# Patient Record
Sex: MALE | Race: OTHER | NOT HISPANIC OR LATINO | URBAN - METROPOLITAN AREA
[De-identification: names, ages, dates, MRNs, and addresses within clinical notes are randomized per-mention and may not be internally consistent; named-entity substitution may affect disease eponyms.]

---

## 2018-09-25 ENCOUNTER — INPATIENT (INPATIENT)
Facility: HOSPITAL | Age: 68
LOS: 2 days | Discharge: NURSING, REHAB OR LONG-TERM CARE | End: 2018-09-28
Attending: INTERNAL MEDICINE | Admitting: INTERNAL MEDICINE
Payer: MEDICAID

## 2018-09-25 VITALS
SYSTOLIC BLOOD PRESSURE: 210 MMHG | DIASTOLIC BLOOD PRESSURE: 108 MMHG | HEART RATE: 72 BPM | OXYGEN SATURATION: 97 % | RESPIRATION RATE: 18 BRPM | TEMPERATURE: 98 F

## 2018-09-25 LAB
ALBUMIN SERPL ELPH-MCNC: 4.2 G/DL — SIGNIFICANT CHANGE UP (ref 3.5–5.2)
ALP SERPL-CCNC: 123 U/L — HIGH (ref 30–115)
ALT FLD-CCNC: 12 U/L — SIGNIFICANT CHANGE UP (ref 0–41)
ANION GAP SERPL CALC-SCNC: 13 MMOL/L — SIGNIFICANT CHANGE UP (ref 7–14)
APPEARANCE UR: CLEAR — SIGNIFICANT CHANGE UP
APTT BLD: 36.1 SEC — SIGNIFICANT CHANGE UP (ref 27–39.2)
AST SERPL-CCNC: 35 U/L — SIGNIFICANT CHANGE UP (ref 0–41)
BASOPHILS # BLD AUTO: 0.04 K/UL — SIGNIFICANT CHANGE UP (ref 0–0.2)
BASOPHILS NFR BLD AUTO: 0.7 % — SIGNIFICANT CHANGE UP (ref 0–1)
BILIRUB SERPL-MCNC: 0.2 MG/DL — SIGNIFICANT CHANGE UP (ref 0.2–1.2)
BILIRUB UR-MCNC: NEGATIVE — SIGNIFICANT CHANGE UP
BUN SERPL-MCNC: 23 MG/DL — HIGH (ref 10–20)
CALCIUM SERPL-MCNC: 9.5 MG/DL — SIGNIFICANT CHANGE UP (ref 8.5–10.1)
CHLORIDE SERPL-SCNC: 103 MMOL/L — SIGNIFICANT CHANGE UP (ref 98–110)
CO2 SERPL-SCNC: 24 MMOL/L — SIGNIFICANT CHANGE UP (ref 17–32)
COLOR SPEC: YELLOW — SIGNIFICANT CHANGE UP
CREAT SERPL-MCNC: 1.2 MG/DL — SIGNIFICANT CHANGE UP (ref 0.7–1.5)
DIFF PNL FLD: NEGATIVE — SIGNIFICANT CHANGE UP
EOSINOPHIL # BLD AUTO: 0.23 K/UL — SIGNIFICANT CHANGE UP (ref 0–0.7)
EOSINOPHIL NFR BLD AUTO: 3.8 % — SIGNIFICANT CHANGE UP (ref 0–8)
GLUCOSE SERPL-MCNC: 81 MG/DL — SIGNIFICANT CHANGE UP (ref 70–99)
GLUCOSE UR QL: NEGATIVE MG/DL — SIGNIFICANT CHANGE UP
HCT VFR BLD CALC: 46.4 % — SIGNIFICANT CHANGE UP (ref 42–52)
HGB BLD-MCNC: 15.1 G/DL — SIGNIFICANT CHANGE UP (ref 14–18)
IMM GRANULOCYTES NFR BLD AUTO: 0.3 % — SIGNIFICANT CHANGE UP (ref 0.1–0.3)
INR BLD: 0.99 RATIO — SIGNIFICANT CHANGE UP (ref 0.65–1.3)
KETONES UR-MCNC: NEGATIVE — SIGNIFICANT CHANGE UP
LEUKOCYTE ESTERASE UR-ACNC: NEGATIVE — SIGNIFICANT CHANGE UP
LYMPHOCYTES # BLD AUTO: 1.11 K/UL — LOW (ref 1.2–3.4)
LYMPHOCYTES # BLD AUTO: 18.1 % — LOW (ref 20.5–51.1)
MCHC RBC-ENTMCNC: 27.7 PG — SIGNIFICANT CHANGE UP (ref 27–31)
MCHC RBC-ENTMCNC: 32.5 G/DL — SIGNIFICANT CHANGE UP (ref 32–37)
MCV RBC AUTO: 85 FL — SIGNIFICANT CHANGE UP (ref 80–94)
MONOCYTES # BLD AUTO: 0.65 K/UL — HIGH (ref 0.1–0.6)
MONOCYTES NFR BLD AUTO: 10.6 % — HIGH (ref 1.7–9.3)
NEUTROPHILS # BLD AUTO: 4.08 K/UL — SIGNIFICANT CHANGE UP (ref 1.4–6.5)
NEUTROPHILS NFR BLD AUTO: 66.5 % — SIGNIFICANT CHANGE UP (ref 42.2–75.2)
NITRITE UR-MCNC: NEGATIVE — SIGNIFICANT CHANGE UP
PH UR: 7 — SIGNIFICANT CHANGE UP (ref 5–8)
PLATELET # BLD AUTO: 244 K/UL — SIGNIFICANT CHANGE UP (ref 130–400)
POTASSIUM SERPL-MCNC: 5.3 MMOL/L — HIGH (ref 3.5–5)
POTASSIUM SERPL-SCNC: 5.3 MMOL/L — HIGH (ref 3.5–5)
PROT SERPL-MCNC: 7.4 G/DL — SIGNIFICANT CHANGE UP (ref 6–8)
PROT UR-MCNC: ABNORMAL MG/DL
PROTHROM AB SERPL-ACNC: 10.7 SEC — SIGNIFICANT CHANGE UP (ref 9.95–12.87)
RBC # BLD: 5.46 M/UL — SIGNIFICANT CHANGE UP (ref 4.7–6.1)
RBC # FLD: 13 % — SIGNIFICANT CHANGE UP (ref 11.5–14.5)
SODIUM SERPL-SCNC: 140 MMOL/L — SIGNIFICANT CHANGE UP (ref 135–146)
SP GR SPEC: 1.02 — SIGNIFICANT CHANGE UP (ref 1.01–1.03)
UROBILINOGEN FLD QL: 0.2 MG/DL — SIGNIFICANT CHANGE UP (ref 0.2–0.2)
WBC # BLD: 6.13 K/UL — SIGNIFICANT CHANGE UP (ref 4.8–10.8)
WBC # FLD AUTO: 6.13 K/UL — SIGNIFICANT CHANGE UP (ref 4.8–10.8)

## 2018-09-25 RX ORDER — ACETAMINOPHEN 500 MG
650 TABLET ORAL ONCE
Qty: 0 | Refills: 0 | Status: COMPLETED | OUTPATIENT
Start: 2018-09-25 | End: 2018-09-25

## 2018-09-25 RX ADMIN — Medication 650 MILLIGRAM(S): at 13:43

## 2018-09-25 NOTE — ED PROVIDER NOTE - PHYSICAL EXAMINATION
CONSTITUTIONAL: Well-developed; well-nourished; in no acute distress.   SKIN: warm, dry  HEAD: Normocephalic; atraumatic.  EYES: PERRL, EOMI, no conjunctival erythema  ENT: No nasal discharge; airway clear.  NECK: Supple; non tender. no midline cervical tenderness   CARD: S1, S2 normal;  Regular rate and rhythm.   RESP: No wheezes, rales or rhonchi.  ABD: soft ntnd  EXT: Normal ROM. 5/5 strength in all 4 extremities, + lumbar sacral midline point tenderness.   LYMPH: No acute cervical adenopathy.  NEURO: Alert, oriented, grossly unremarkable. neurovascularly intact   PSYCH: Cooperative, appropriate.

## 2018-09-25 NOTE — ED PROVIDER NOTE - ATTENDING CONTRIBUTION TO CARE
69 Yo M bib daughter sp multiple falls this week. Hx of parkinson's, DM, without recent med changes. Pt endorses left paraspinal back pain. At baseline mental status and appearance per daughter at bedside. Found on exam to be awake, alert, following commands, no lateralizing signs on neuro exam. midline lumbar tenderness and left paraspinal tenderness without deformity ecchymosis or fluctuance.   Concern for fx - will obtain CT L spine and pelvis, CTH, labs and admission for PT. I personally evaluated the patient. I reviewed the Resident’s or Physician Assistant’s note (as assigned above), and agree with the findings and plan except as documented in my note.    67 Yo M bib daughter sp multiple falls this week. Hx of parkinson's, DM, without recent med changes. Pt endorses left paraspinal back pain. At baseline mental status and appearance per daughter at bedside. Found on exam to be awake, alert, following commands, no lateralizing signs on neuro exam. midline lumbar tenderness and left paraspinal tenderness without deformity ecchymosis or fluctuance.   Concern for fx - will obtain CT L spine and pelvis, CTH, labs and admission for PT.

## 2018-09-25 NOTE — ED PROVIDER NOTE - MEDICAL DECISION MAKING DETAILS
69 Yo M bib daughter sp multiple falls this week. Hx of parkinson's, DM, without recent med changes. Pt endorses left paraspinal back pain. At baseline mental status and appearance per daughter at bedside. Found on exam to be awake, alert, following commands, no lateralizing signs on neuro exam. midline lumbar tenderness and left paraspinal tenderness without deformity ecchymosis or fluctuance.   Concern for fx - will obtain CT L spine and pelvis, CTH, labs and admission for PT.

## 2018-09-25 NOTE — ED PROVIDER NOTE - NS ED ROS FT
Eyes:  No visual changes, eye pain or discharge.  ENMT:   no sore throat or runny nose  Cardiac:  No chest pain, SOB   Respiratory:  No cough or respiratory distress.   GI:  No nausea, vomiting, diarrhea or abdominal pain.  :  No dysuria, frequency or burning.  MS:  + right lower back pain, sharp, radiating to the right leg, worse with movement. occuring for 1 month not relieved with naproxen.   Neuro:  No headache or weakness.  No LOC. no numbness, tingling or weakness.   Skin:  No skin rash.   Endocrine: No history of thyroid disease or diabetes.

## 2018-09-25 NOTE — ED PROVIDER NOTE - OBJECTIVE STATEMENT
67 yo M pmh of parkinsons, HTN, HLD presents with frequent falls, multiple this week. No head trauma or loc. States that for over one month he has been having right lower back pain that has been radiating down his right leg. Went to the pmd who started him on naproxen without relief. no numbness, weakness or tingling. no change in urination or bowel habits. no headache, no dizzines, no n/v/d, no abdominal pain, no cp. pmd is Dr. Patterson. Currently taking a  baby aspirin.

## 2018-09-25 NOTE — ED PROVIDER NOTE - PROGRESS NOTE DETAILS
Endosed to Dr. Marina pending CTA and admission. CT angio neg, pt unstable upon ambulation, will admit to medicine, MAR Aware

## 2018-09-25 NOTE — ED ADULT NURSE NOTE - OBJECTIVE STATEMENT
pt came to  the ER today with c/o hip pain. as per daughter he has been falling a lot lately. pt denies hitting head or LOC.

## 2018-09-26 LAB
ANION GAP SERPL CALC-SCNC: 14 MMOL/L — SIGNIFICANT CHANGE UP (ref 7–14)
APPEARANCE UR: CLEAR — SIGNIFICANT CHANGE UP
BILIRUB UR-MCNC: NEGATIVE — SIGNIFICANT CHANGE UP
BUN SERPL-MCNC: 23 MG/DL — HIGH (ref 10–20)
CALCIUM SERPL-MCNC: 9 MG/DL — SIGNIFICANT CHANGE UP (ref 8.5–10.1)
CHLORIDE SERPL-SCNC: 101 MMOL/L — SIGNIFICANT CHANGE UP (ref 98–110)
CO2 SERPL-SCNC: 25 MMOL/L — SIGNIFICANT CHANGE UP (ref 17–32)
COLOR SPEC: YELLOW — SIGNIFICANT CHANGE UP
CREAT SERPL-MCNC: 1.4 MG/DL — SIGNIFICANT CHANGE UP (ref 0.7–1.5)
CULTURE RESULTS: SIGNIFICANT CHANGE UP
DIFF PNL FLD: NEGATIVE — SIGNIFICANT CHANGE UP
GLUCOSE SERPL-MCNC: 112 MG/DL — HIGH (ref 70–99)
GLUCOSE UR QL: NEGATIVE — SIGNIFICANT CHANGE UP
KETONES UR-MCNC: ABNORMAL
LEUKOCYTE ESTERASE UR-ACNC: NEGATIVE — SIGNIFICANT CHANGE UP
NITRITE UR-MCNC: NEGATIVE — SIGNIFICANT CHANGE UP
PH UR: 6.5 — SIGNIFICANT CHANGE UP (ref 5–8)
POTASSIUM SERPL-MCNC: 4.2 MMOL/L — SIGNIFICANT CHANGE UP (ref 3.5–5)
POTASSIUM SERPL-SCNC: 4.2 MMOL/L — SIGNIFICANT CHANGE UP (ref 3.5–5)
PROT UR-MCNC: NEGATIVE — SIGNIFICANT CHANGE UP
SODIUM SERPL-SCNC: 140 MMOL/L — SIGNIFICANT CHANGE UP (ref 135–146)
SP GR SPEC: >=1.03 — SIGNIFICANT CHANGE UP (ref 1.01–1.03)
SPECIMEN SOURCE: SIGNIFICANT CHANGE UP
UROBILINOGEN FLD QL: 1 (ref 0.2–0.2)

## 2018-09-26 RX ORDER — NIFEDIPINE 30 MG
30 TABLET, EXTENDED RELEASE 24 HR ORAL DAILY
Qty: 0 | Refills: 0 | Status: DISCONTINUED | OUTPATIENT
Start: 2018-09-26 | End: 2018-09-28

## 2018-09-26 RX ORDER — TAMSULOSIN HYDROCHLORIDE 0.4 MG/1
0.8 CAPSULE ORAL AT BEDTIME
Qty: 0 | Refills: 0 | Status: DISCONTINUED | OUTPATIENT
Start: 2018-09-26 | End: 2018-09-28

## 2018-09-26 RX ORDER — COLCHICINE 0.6 MG
0.6 TABLET ORAL DAILY
Qty: 0 | Refills: 0 | Status: DISCONTINUED | OUTPATIENT
Start: 2018-09-26 | End: 2018-09-28

## 2018-09-26 RX ORDER — CARBIDOPA AND LEVODOPA 25; 100 MG/1; MG/1
1 TABLET ORAL ONCE
Qty: 0 | Refills: 0 | Status: COMPLETED | OUTPATIENT
Start: 2018-09-26 | End: 2018-09-26

## 2018-09-26 RX ORDER — PRAMIPEXOLE DIHYDROCHLORIDE 0.12 MG/1
0.5 TABLET ORAL
Qty: 0 | Refills: 0 | Status: DISCONTINUED | OUTPATIENT
Start: 2018-09-26 | End: 2018-09-28

## 2018-09-26 RX ORDER — CARBIDOPA AND LEVODOPA 25; 100 MG/1; MG/1
1 TABLET ORAL
Qty: 0 | Refills: 0 | Status: DISCONTINUED | OUTPATIENT
Start: 2018-09-26 | End: 2018-09-28

## 2018-09-26 RX ORDER — PRAMIPEXOLE DIHYDROCHLORIDE 0.12 MG/1
0.5 TABLET ORAL ONCE
Qty: 0 | Refills: 0 | Status: COMPLETED | OUTPATIENT
Start: 2018-09-26 | End: 2018-09-26

## 2018-09-26 RX ORDER — ENOXAPARIN SODIUM 100 MG/ML
40 INJECTION SUBCUTANEOUS DAILY
Qty: 0 | Refills: 0 | Status: DISCONTINUED | OUTPATIENT
Start: 2018-09-26 | End: 2018-09-28

## 2018-09-26 RX ORDER — CARBIDOPA AND LEVODOPA 25; 100 MG/1; MG/1
1 TABLET ORAL DAILY
Qty: 0 | Refills: 0 | Status: DISCONTINUED | OUTPATIENT
Start: 2018-09-26 | End: 2018-09-26

## 2018-09-26 RX ORDER — ASPIRIN/CALCIUM CARB/MAGNESIUM 324 MG
81 TABLET ORAL DAILY
Qty: 0 | Refills: 0 | Status: DISCONTINUED | OUTPATIENT
Start: 2018-09-26 | End: 2018-09-28

## 2018-09-26 RX ORDER — CARBIDOPA AND LEVODOPA 25; 100 MG/1; MG/1
1 TABLET ORAL
Qty: 0 | Refills: 0 | Status: DISCONTINUED | OUTPATIENT
Start: 2018-09-26 | End: 2018-09-26

## 2018-09-26 RX ORDER — ATORVASTATIN CALCIUM 80 MG/1
40 TABLET, FILM COATED ORAL AT BEDTIME
Qty: 0 | Refills: 0 | Status: DISCONTINUED | OUTPATIENT
Start: 2018-09-26 | End: 2018-09-28

## 2018-09-26 RX ORDER — FUROSEMIDE 40 MG
40 TABLET ORAL DAILY
Qty: 0 | Refills: 0 | Status: DISCONTINUED | OUTPATIENT
Start: 2018-09-26 | End: 2018-09-28

## 2018-09-26 RX ORDER — INFLUENZA VIRUS VACCINE 15; 15; 15; 15 UG/.5ML; UG/.5ML; UG/.5ML; UG/.5ML
0.5 SUSPENSION INTRAMUSCULAR ONCE
Qty: 0 | Refills: 0 | Status: DISCONTINUED | OUTPATIENT
Start: 2018-09-26 | End: 2018-09-28

## 2018-09-26 RX ADMIN — CARBIDOPA AND LEVODOPA 1 TABLET(S): 25; 100 TABLET ORAL at 11:34

## 2018-09-26 RX ADMIN — Medication 0.6 MILLIGRAM(S): at 11:36

## 2018-09-26 RX ADMIN — PRAMIPEXOLE DIHYDROCHLORIDE 0.5 MILLIGRAM(S): 0.12 TABLET ORAL at 17:11

## 2018-09-26 RX ADMIN — Medication 5 MILLIGRAM(S): at 06:17

## 2018-09-26 RX ADMIN — PRAMIPEXOLE DIHYDROCHLORIDE 0.5 MILLIGRAM(S): 0.12 TABLET ORAL at 11:34

## 2018-09-26 RX ADMIN — ENOXAPARIN SODIUM 40 MILLIGRAM(S): 100 INJECTION SUBCUTANEOUS at 11:35

## 2018-09-26 RX ADMIN — CARBIDOPA AND LEVODOPA 1 TABLET(S): 25; 100 TABLET ORAL at 03:30

## 2018-09-26 RX ADMIN — Medication 40 MILLIGRAM(S): at 06:18

## 2018-09-26 RX ADMIN — Medication 81 MILLIGRAM(S): at 11:34

## 2018-09-26 RX ADMIN — CARBIDOPA AND LEVODOPA 1 TABLET(S): 25; 100 TABLET ORAL at 17:11

## 2018-09-26 RX ADMIN — PRAMIPEXOLE DIHYDROCHLORIDE 0.5 MILLIGRAM(S): 0.12 TABLET ORAL at 08:14

## 2018-09-26 RX ADMIN — Medication 500 MILLIGRAM(S): at 06:17

## 2018-09-26 RX ADMIN — CARBIDOPA AND LEVODOPA 1 TABLET(S): 25; 100 TABLET ORAL at 08:14

## 2018-09-26 RX ADMIN — CARBIDOPA AND LEVODOPA 1 TABLET(S): 25; 100 TABLET ORAL at 21:41

## 2018-09-26 RX ADMIN — TAMSULOSIN HYDROCHLORIDE 0.8 MILLIGRAM(S): 0.4 CAPSULE ORAL at 21:41

## 2018-09-26 RX ADMIN — PRAMIPEXOLE DIHYDROCHLORIDE 0.5 MILLIGRAM(S): 0.12 TABLET ORAL at 03:30

## 2018-09-26 RX ADMIN — Medication 500 MILLIGRAM(S): at 17:11

## 2018-09-26 RX ADMIN — Medication 30 MILLIGRAM(S): at 12:29

## 2018-09-26 NOTE — OCCUPATIONAL THERAPY INITIAL EVALUATION ADULT - GENERAL OBSERVATIONS, REHAB EVAL
pt recevied semi robin in bed in [facility], agreeable to OT evaluation, pt is Occitan speaking, however dtr present at bedside and prefers to translate for pt, pt declined  phone offered to him by therapist.  + IV lock, pt left seated in b/s chair in NAD, LILLY Wolf present in room as well as PCA. call bell in reach, dtr present at bedside

## 2018-09-26 NOTE — PATIENT PROFILE ADULT. - VISION (WITH CORRECTIVE LENSES IF THE PATIENT USUALLY WEARS THEM):
Partially impaired: cannot see medication labels or newsprint, but can see obstacles in path, and the surrounding layout; can count fingers at arm's length/B/L CATARACTS

## 2018-09-26 NOTE — OCCUPATIONAL THERAPY INITIAL EVALUATION ADULT - TRANSFER SAFETY CONCERNS NOTED: BED/CHAIR, REHAB EVAL
decreased weight-shifting ability/decreased balance during turns/losing balance/decreased sequencing ability

## 2018-09-26 NOTE — OCCUPATIONAL THERAPY INITIAL EVALUATION ADULT - PLANNED THERAPY INTERVENTIONS, OT EVAL
ADL retraining/balance training/strengthening/ROM/fine motor coordination training/motor coordination training/parent/caregiver training.../transfer training/bed mobility training

## 2018-09-26 NOTE — OCCUPATIONAL THERAPY INITIAL EVALUATION ADULT - ADDITIONAL COMMENTS
as per pt and dtr report, pt has home health aide prior to admit for 7 hours per day, 7 days per week.  Aide assisted pt with all ADLs and functional transfers. Pt utilized RW prior to admit, at night time, pt was alone, however was unable to get to bedside commode independently and would utilize urinal when able to do so.

## 2018-09-26 NOTE — PHYSICAL THERAPY INITIAL EVALUATION ADULT - GENERAL OBSERVATIONS, REHAB EVAL
[id number] pm. patient received in bed, left in b/s chair, nad, + chair alarm, RN aware. patient seen 3.5 hrs post last PD medicine administered; on PD med Q6 hrs

## 2018-09-26 NOTE — CONSULT NOTE ADULT - ASSESSMENT
IMPRESSION: Rehab of parkinson's disease    PRECAUTIONS: [  ] Cardiac  [  ] Respiratory  [  ] Seizures [  ] Contact Isolation  [  ] Droplet Isolation  [  ] Other    Weight Bearing Status:     RECOMMENDATION:    Out of Bed to Chair     DVT/Decubiti Prophylaxis    REHAB PLAN:     [ x  ] Bedside P/T 3-5 times a week   [ x  ]   Bedside O/T  2-3 times a week             [   ] No Rehab Therapy Indicated                   [   ]  Speech Therapy   Conditioning/ROM                                    ADL  Bed Mobility                                               Conditioning/ROM  Transfers                                                     Bed Mobility  Sitting /Standing Balance                         Transfers                                        Gait Training                                               Sitting/Standing Balance  Stair Training [   ]Applicable                    Home equipment Eval                                                                        Splinting  [   ] Only      GOALS:   ADL   [   ]   Independent                    Transfers  [  x ] Independent                          Ambulation  [ x  ] Independent     [   x ] With device                            [   ]  CG                                                         [   ]  CG                                                                  [   ] CG                            [    ] Min A                                                   [   ] Min A                                                              [   ] Min  A          DISCHARGE PLAN:   [ x  ]  Good candidate for Intensive Rehabilitation/Hospital based-4A [facility]                                             Will tolerate 3hrs Intensive Rehab Daily                                       [    ]  Short Term Rehab in Skilled Nursing Facility                                       [    ]  Home with Outpatient or VN services                                         [    ]  Possible Candidate for Intensive Hospital based Rehab

## 2018-09-26 NOTE — CONSULT NOTE ADULT - SUBJECTIVE AND OBJECTIVE BOX
HISTORY OF PRESENT ILLNESS:     67 yo M with PMH of parkinsons, HTN, DLD, gout, prostatitis, back pain, b/l cataracts (surgery scheduled Oct 5th)   presents with frequent falls, daughter reports over last 4-6 weeks falls have become daily. Patient states that 'legs feel very heavy' and he falls due to this, can sense seconds before he is about to fall due to weakness.  No head trauma or LOC. For over one month he has been having right lower back pain that radiates down right leg. PMD started naproxen but pt states no relief.  Denies headaches, dizziness. No numbness, weakness or tingling.  No chest pain or palpitations. No nausea, vomiting, diarrhea.  No dysuria.      Daughter states that over last few months pt seems to have worsening Parkinson's symptoms in terms of tremor and gait instability.  Receives Parkinson's meds 4x daily.  Patient used to having symptoms worsening about 15 minutes before upcoming dose, which would improve after dose was given.  Over last 4-6 weeks he symptoms are worsening two hours before next dose.  Daughter cooks for pt, helps with toileting, hygiene.      PAST MEDICAL & SURGICAL HISTORY:  Cataract  Prostatitis  Dyslipidemia  Gout  Hypertension  Parkinson disease  No significant past surgical history    FAMILY HISTORY:  Family history of cerebrovascular accident (CVA) (Father)      SOCIAL HISTORY:  Tobacco Use:  EtOH use:   Substance:    Allergies    No Known Allergies      Review of Systems:    CONSTITUTIONAL: +weakness - legs feeling 'heavy', feels he can't support his own weight, no fevers or chills  EYES/ENT: No visual changes;  No vertigo or throat pain   NECK: No pain or stiffness  RESPIRATORY: No cough, wheezing, hemoptysis; No shortness of breath  CARDIOVASCULAR: No chest pain or palpitations  GASTROINTESTINAL: No abdominal or epigastric pain. No nausea, vomiting, or hematemesis; No diarrhea or constipation. No melena or hematochezia.  GENITOURINARY: No dysuria, frequency or hematuria NEUROLOGICAL: b/l arm tremor, gait instability	      MEDICATIONS:  Antibiotics:    Neuro:  carbidopa/levodopa  25/250 1 Tablet(s) Oral four times a day  carbidopa/levodopa CR 25/100 1 Tablet(s) Oral <User Schedule>  naproxen 500 milliGRAM(s) Oral two times a day  pramipexole 0.5 milliGRAM(s) Oral four times a day    Anticoagulation:  aspirin  chewable 81 milliGRAM(s) Oral daily  enoxaparin Injectable 40 milliGRAM(s) SubCutaneous daily    OTHER:  atorvastatin 40 milliGRAM(s) Oral at bedtime  colchicine 0.6 milliGRAM(s) Oral daily  enalapril 5 milliGRAM(s) Oral daily  furosemide    Tablet 40 milliGRAM(s) Oral daily  influenza   Vaccine 0.5 milliLiter(s) IntraMuscular once  NIFEdipine XL 30 milliGRAM(s) Oral daily  tamsulosin 0.8 milliGRAM(s) Oral at bedtime    IVF:      Vital Signs Last 24 Hrs  T(C): 36.5 (26 Sep 2018 07:48), Max: 36.8 (25 Sep 2018 16:34)  T(F): 97.7 (26 Sep 2018 07:48), Max: 98.3 (25 Sep 2018 16:34)  HR: 69 (26 Sep 2018 09:43) (64 - 72)  BP: 185/91 (26 Sep 2018 09:43) (170/85 - 197/109)  BP(mean): --  RR: 18 (26 Sep 2018 03:14) (18 - 20)  SpO2: 98% (26 Sep 2018 03:14) (97% - 98%)    PHYSICAL EXAM:    LABS:                        15.1   6.13  )-----------( 244      ( 25 Sep 2018 13:35 )             46.4         140  |  101  |  23<H>  ----------------------------<  112<H>  4.2   |  25  |  1.4    Ca    9.0      26 Sep 2018 12:42    TPro  7.4  /  Alb  4.2  /  TBili  0.2  /  DBili  x   /  AST  35  /  ALT  12  /  AlkPhos  123<H>  09-25    PT/INR - ( 25 Sep 2018 13:35 )   PT: 10.70 sec;   INR: 0.99 ratio         PTT - ( 25 Sep 2018 13:35 )  PTT:36.1 sec  Urinalysis Basic - ( 25 Sep 2018 21:00 )    Color: Yellow / Appearance: Clear / S.020 / pH: x  Gluc: x / Ketone: Negative  / Bili: Negative / Urobili: 0.2 mg/dL   Blood: x / Protein: Trace mg/dL / Nitrite: Negative   Leuk Esterase: Negative / RBC: 1-2 /HPF / WBC 1-2 /HPF   Sq Epi: x / Non Sq Epi: Few /HPF / Bacteria: x          RADIOLOGY & ADDITIONAL STUDIES:  < from: CT Lumbar Spine No Cont (18 @ 16:48) >  1.  No acute fracture demonstrated.    2.  Infrarenal abdominal aortic aneurysm incompletely evaluated on this   examination, correlation with ultrasound is recommended.    3.  L1-L2; annular disc bulge and degenerative changes.    4.  L2-L3; central spinal stenosis and bilateral foraminal narrowing.    5.  L3-L4; central spinal stenosis and bilateral foraminal narrowing.   Hypodense lesion posterior to L4 there is a free disc fragment, MRI of   the lumbar spine is suggested for further evaluation if not   contraindicated in this patient.    6.  L4-L5; central spinal stenosis, bilateral foraminal narrowing, and   right lateral recess narrowing.    7.  L5-S1; annular disc bulge, degenerative changes, bilateral foraminal   narrowing, right lateral recess narrowing.    8.  Degenerative changes involving the sacroiliac joints.    < end of copied text >    A/P       68 Year old male with Parkinsons disease with frequent falls       await MRI of LSpine HISTORY OF PRESENT ILLNESS:     69 yo M with PMH of parkinsons, HTN, DLD, gout, prostatitis, back pain, b/l cataracts (surgery scheduled Oct 5th)   presents with frequent falls, daughter reports over last 4-6 weeks falls have become daily. Patient states that 'legs feel very heavy' and he falls due to this, can sense seconds before he is about to fall due to weakness.  No head trauma or LOC. For over one month he has been having right lower back pain that radiates down right leg. PMD started naproxen but pt states no relief.  Denies headaches, dizziness. No numbness, weakness or tingling.  No chest pain or palpitations. No nausea, vomiting, diarrhea.  No dysuria.      Daughter states that over last few months pt seems to have worsening Parkinson's symptoms in terms of tremor and gait instability.  Receives Parkinson's meds 4x daily.  Patient used to having symptoms worsening about 15 minutes before upcoming dose, which would improve after dose was given.  Over last 4-6 weeks he symptoms are worsening two hours before next dose.  Daughter cooks for pt, helps with toileting, hygiene.     Patient states he has pain in bilateral knee pain and when he walks he feels like his legs are heavy     PAST MEDICAL & SURGICAL HISTORY:  Cataract  Prostatitis  Dyslipidemia  Gout  Hypertension  Parkinson disease  No significant past surgical history    FAMILY HISTORY:  Family history of cerebrovascular accident (CVA) (Father)      SOCIAL HISTORY:  Tobacco Use:  EtOH use:   Substance:    Allergies    No Known Allergies      Review of Systems:    CONSTITUTIONAL: +weakness - legs feeling 'heavy', feels he can't support his own weight, no fevers or chills  EYES/ENT: No visual changes;  No vertigo or throat pain   NECK: No pain or stiffness  RESPIRATORY: No cough, wheezing, hemoptysis; No shortness of breath  CARDIOVASCULAR: No chest pain or palpitations  GASTROINTESTINAL: No abdominal or epigastric pain. No nausea, vomiting, or hematemesis; No diarrhea or constipation. No melena or hematochezia.  GENITOURINARY: No dysuria, frequency or hematuria NEUROLOGICAL: b/l arm tremor, gait instability	      MEDICATIONS:  Antibiotics:    Neuro:  carbidopa/levodopa  25/250 1 Tablet(s) Oral four times a day  carbidopa/levodopa CR 25/100 1 Tablet(s) Oral <User Schedule>  naproxen 500 milliGRAM(s) Oral two times a day  pramipexole 0.5 milliGRAM(s) Oral four times a day    Anticoagulation:  aspirin  chewable 81 milliGRAM(s) Oral daily  enoxaparin Injectable 40 milliGRAM(s) SubCutaneous daily    OTHER:  atorvastatin 40 milliGRAM(s) Oral at bedtime  colchicine 0.6 milliGRAM(s) Oral daily  enalapril 5 milliGRAM(s) Oral daily  furosemide    Tablet 40 milliGRAM(s) Oral daily  influenza   Vaccine 0.5 milliLiter(s) IntraMuscular once  NIFEdipine XL 30 milliGRAM(s) Oral daily  tamsulosin 0.8 milliGRAM(s) Oral at bedtime    IVF:      Vital Signs Last 24 Hrs  T(C): 36.5 (26 Sep 2018 07:48), Max: 36.8 (25 Sep 2018 16:34)  T(F): 97.7 (26 Sep 2018 07:48), Max: 98.3 (25 Sep 2018 16:34)  HR: 69 (26 Sep 2018 09:43) (64 - 72)  BP: 185/91 (26 Sep 2018 09:43) (170/85 - 197/109)  BP(mean): --  RR: 18 (26 Sep 2018 03:14) (18 - 20)  SpO2: 98% (26 Sep 2018 03:14) (97% - 98%)    PHYSICAL EXAM:    alert , PERRLA  MAEX4,   strength RLE DF/PF 5/5  Knee 4/5 Hip 5-/5               LLE DF/PF 5/5  Knee 4+/5 Hip 5-/5    no back tenderness     LABS:                        15.1   6.13  )-----------( 244      ( 25 Sep 2018 13:35 )             46.4         140  |  101  |  23<H>  ----------------------------<  112<H>  4.2   |  25  |  1.4    Ca    9.0      26 Sep 2018 12:42    TPro  7.4  /  Alb  4.2  /  TBili  0.2  /  DBili  x   /  AST  35  /  ALT  12  /  AlkPhos  123<H>  09-25    PT/INR - ( 25 Sep 2018 13:35 )   PT: 10.70 sec;   INR: 0.99 ratio         PTT - ( 25 Sep 2018 13:35 )  PTT:36.1 sec  Urinalysis Basic - ( 25 Sep 2018 21:00 )    Color: Yellow / Appearance: Clear / S.020 / pH: x  Gluc: x / Ketone: Negative  / Bili: Negative / Urobili: 0.2 mg/dL   Blood: x / Protein: Trace mg/dL / Nitrite: Negative   Leuk Esterase: Negative / RBC: 1-2 /HPF / WBC 1-2 /HPF   Sq Epi: x / Non Sq Epi: Few /HPF / Bacteria: x          RADIOLOGY & ADDITIONAL STUDIES:  < from: CT Lumbar Spine No Cont (18 @ 16:48) >  1.  No acute fracture demonstrated.    2.  Infrarenal abdominal aortic aneurysm incompletely evaluated on this   examination, correlation with ultrasound is recommended.    3.  L1-L2; annular disc bulge and degenerative changes.    4.  L2-L3; central spinal stenosis and bilateral foraminal narrowing.    5.  L3-L4; central spinal stenosis and bilateral foraminal narrowing.   Hypodense lesion posterior to L4 there is a free disc fragment, MRI of   the lumbar spine is suggested for further evaluation if not   contraindicated in this patient.    6.  L4-L5; central spinal stenosis, bilateral foraminal narrowing, and   right lateral recess narrowing.    7.  L5-S1; annular disc bulge, degenerative changes, bilateral foraminal   narrowing, right lateral recess narrowing.    8.  Degenerative changes involving the sacroiliac joints.    < end of copied text >    A/P       68 Year old male with Parkinsons disease with frequent falls       await MRI of LSpine

## 2018-09-26 NOTE — OCCUPATIONAL THERAPY INITIAL EVALUATION ADULT - NS ASR FOLLOW COMMAND OT EVAL
75% of the time/able to follow single-step instructions/increased time to perform tasks secondary to bradykinesia

## 2018-09-26 NOTE — H&P ADULT - NSHPREVIEWOFSYSTEMS_GEN_ALL_CORE
REVIEW OF SYSTEMS:    CONSTITUTIONAL: +weakness - legs feeling 'heavy', feels he can't support his own weight, no fevers or chills  EYES/ENT: No visual changes;  No vertigo or throat pain   NECK: No pain or stiffness  RESPIRATORY: No cough, wheezing, hemoptysis; No shortness of breath  CARDIOVASCULAR: No chest pain or palpitations  GASTROINTESTINAL: No abdominal or epigastric pain. No nausea, vomiting, or hematemesis; No diarrhea or constipation. No melena or hematochezia.  GENITOURINARY: No dysuria, frequency or hematuria  NEUROLOGICAL: b/l arm tremor, gait instability

## 2018-09-26 NOTE — OCCUPATIONAL THERAPY INITIAL EVALUATION ADULT - RANGE OF MOTION EXAMINATION, UPPER EXTREMITY
Right UE Active Assistive ROM was WFL  (within functional limits)/required increased time to perform all

## 2018-09-26 NOTE — H&P ADULT - HISTORY OF PRESENT ILLNESS
69 yo M with PMH of parkinsons, HTN, DLD, gout, prostatitis, back pain, b/l cataracts (surgery scheduled Oct 5th)   presents with frequent falls, daughter reports over last 4-6 weeks falls have become daily. Patient states that 'legs feel very heavy' and he falls due to this, can sense seconds before he is about to fall due to weakness.  No head trauma or LOC. For over one month he has been having right lower back pain that radiates down right leg. PMD started naproxen but pt states no relief.  Denies headaches, dizziness. No numbness, weakness or tingling.  No chest pain or palpitations. No nausea, vomiting, diarrhea.  No dysuria.      Daughter states that over last few months pt seems to have worsening Parkinson's symptoms in terms of tremor and gait instability.  Receives Parkinson's meds 4x daily.  Patient used to having symptoms worsening about 15 minutes before upcoming dose, which would improve after dose was given.  Over last 4-6 weeks he symptoms are worsening two hours before next dose.  Daughter cooks for pt, helps with toileting, hygiene.

## 2018-09-26 NOTE — OCCUPATIONAL THERAPY INITIAL EVALUATION ADULT - NS ASR OT EQUIP NEEDS DISCH
pt has DME in home, potentially may benefit from tub transfer bench as opposed to current shower chair, as well as grab bars in shower

## 2018-09-26 NOTE — OCCUPATIONAL THERAPY INITIAL EVALUATION ADULT - LIVES WITH, PROFILE
apartment, 1 flight of stairs to apartment + handrail, + bathtub with shower chair, no grab bars, commode at bedside/alone

## 2018-09-26 NOTE — CONSULT NOTE ADULT - ATTENDING COMMENTS
Labs today  Contrasted enema study  After enema:  Miralax cleanout then  Miralax 17 grams Po daily   High FIber Diet 13-15 grams/day  Benefiber  2-3 tsp/day  Stool Calendar  Sit on toilet 2x/day for 5-10 minutes  Senna 10 ml Po at bedtime  Follow up 6-8 weeks with xray    
Patient seen and examined agree with above except as noted.  Spoke with patient and daughter and patient has been getting worse over last 1.5 months.  He has also been complaining of Lower back pain which they believe is related to his known sciatica.  He has been seeing pain management doctor for PAULINO in the past.    Has R>L resting tremor; CN 2-12 normal  power in UE 5/5  b/l HF 4/5, KF/KE 4+/5, DF4+/5 and PF 5-/5  Sensory decreased in LE to Vib, Temp, LT  DTR trace in UE and 0 in Le  FTN NL  Gait deferred    A/P  Patient with parkinsons disease and worsening gait.  1. MRI L/S spine w/o BASIA  2. Neurosurgery evaluation   3. Change Sinemet timing to 7AM, 11AM, 3PM, 7PM  4. Can add sinement CR 25/100 at 8PM  5. Sinemet should be given atleast 30 minutes before meals and 2 hours after meals  6. PT/Rehab after MRI LS done
Pt seen on rounds, unable to exam pt extensively due to language barrier. will attempt to see pt with family present. MRI L spine shows some degenerative changes with foraminal disease. Chronic in nature. Unclear extent of conservative management pt has had to this point.

## 2018-09-26 NOTE — OCCUPATIONAL THERAPY INITIAL EVALUATION ADULT - FINE MOTOR COORDINATION, LEFT HAND THUMB/FINGER OPPOSITION SKILLS, OT EVAL
mild to moderate impairment secondary to tremors due to parkinsons, pt reports tremors become worse when he is tired

## 2018-09-26 NOTE — CONSULT NOTE ADULT - SUBJECTIVE AND OBJECTIVE BOX
HPI:  67 yo M with PMH of parkinsons, HTN, DLD, gout, prostatitis, back pain, b/l cataracts (surgery scheduled Oct 5th)   presents with frequent falls, daughter reports over last 4-6 weeks falls have become daily. Patient states that 'legs feel very heavy' and he falls due to this, can sense seconds before he is about to fall due to weakness.  No head trauma or LOC. For over one month he has been having right lower back pain that radiates down right leg. PMD started naproxen but pt states no relief.  Denies headaches, dizziness. No numbness, weakness or tingling.  No chest pain or palpitations. No nausea, vomiting, diarrhea.  No dysuria.      Daughter states that over last few months pt seems to have worsening Parkinson's symptoms in terms of tremor and gait instability.  Receives Parkinson's meds 4x daily.  Patient used to having symptoms worsening about 15 minutes before upcoming dose, which would improve after dose was given.  Over last 4-6 weeks he symptoms are worsening two hours before next dose.  Daughter cooks for pt, helps with toileting, hygiene. (26 Sep 2018 03:01)      PAST MEDICAL & SURGICAL HISTORY:  Cataract  Prostatitis  Dyslipidemia  Gout  Hypertension  Parkinson disease  No significant past surgical history      Hospital Course:    TODAY'S SUBJECTIVE & REVIEW OF SYMPTOMS:     Constitutional WNL   Cardio WNL   Resp WNL   GI WNL  Heme WNL  Endo WNL  Skin WNL  MSK WNL  Neuro gait ataxia  Cognitive WNL  Psych WNL      MEDICATIONS  (STANDING):  aspirin  chewable 81 milliGRAM(s) Oral daily  atorvastatin 40 milliGRAM(s) Oral at bedtime  carbidopa/levodopa  25/250 1 Tablet(s) Oral four times a day  colchicine 0.6 milliGRAM(s) Oral daily  enalapril 5 milliGRAM(s) Oral daily  enoxaparin Injectable 40 milliGRAM(s) SubCutaneous daily  furosemide    Tablet 40 milliGRAM(s) Oral daily  influenza   Vaccine 0.5 milliLiter(s) IntraMuscular once  naproxen 500 milliGRAM(s) Oral two times a day  pramipexole 0.5 milliGRAM(s) Oral four times a day  tamsulosin 0.8 milliGRAM(s) Oral at bedtime    MEDICATIONS  (PRN):      FAMILY HISTORY:  Family history of cerebrovascular accident (CVA) (Father)      Allergies    No Known Allergies    Intolerances        SOCIAL HISTORY:    [  ] Etoh  [  ] Smoking  [  ] Substance abuse     Home Environment:  [  ] Home Alone  [ x ] Lives with Family  [  ] Home Health Aid    Dwelling:  [  ] Apartment  [x  ] Private House  [  ] Adult Home  [  ] Skilled Nursing Facility      [  ] Short Term  [  ] Long Term  [x  ] Stairs       Elevator [  ]    FUNCTIONAL STATUS PTA: (Check all that apply)  Ambulation: [ x  ]Independent    [  ] Dependent     [  ] Non-Ambulatory  Assistive Device: [  ] SA Cane  [  ]  Q Cane  [x  ] Walker  [  ]  Wheelchair  ADL : [  ] Independent  [x  ]  Dependent       Vital Signs Last 24 Hrs  T(C): 36.5 (26 Sep 2018 07:48), Max: 36.8 (25 Sep 2018 16:34)  T(F): 97.7 (26 Sep 2018 07:48), Max: 98.3 (25 Sep 2018 16:34)  HR: 69 (26 Sep 2018 07:48) (64 - 72)  BP: 197/109 (26 Sep 2018 07:48) (170/85 - 210/108)  BP(mean): --  RR: 18 (26 Sep 2018 03:14) (18 - 20)  SpO2: 98% (26 Sep 2018 03:14) (97% - 98%)      PHYSICAL EXAM: awake / alert  GENERAL: NAD, well-groomed, well-developed  HEAD:  Atraumatic, Normocephalic  CHEST/LUNG: Clear   HEART: S1S2+  ABDOMEN: Soft, Nontender  EXTREMITIES:  no calf tenderness, + tremors of arms    NERVOUS SYSTEM:  Cranial Nerves 2-12 intact [  ] Abnormal  [  ]  ROM: WFL all extremities [  ]  Abnormal [  ]able to move all ext against gravity  Motor Strength: WFL all extremities  [  ]  Abnormal [  ]  Sensation: intact to light touch [  ] Abnormal [  ]  Reflexes: Symmetric [  ]  Abnormal [  ]    FUNCTIONAL STATUS:  Bed Mobility: Independent [  ]  Supervision [  ]  Needs Assistance [x  ]  N/A [  ]  Transfers: Independent [  ]  Supervision [  ]  Needs Assistance [x  ]  N/A [  ]   Ambulation: Independent [  ]  Supervision [  ]  Needs Assistance [  ]  N/A [  ]  ADL: Independent [  ] Requires Assistance [  ] N/A [  ]      LABS:                        15.1   6.13  )-----------( 244      ( 25 Sep 2018 13:35 )             46.4     09-25    140  |  103  |  23<H>  ----------------------------<  81  5.3<H>   |  24  |  1.2    Ca    9.5      25 Sep 2018 13:35    TPro  7.4  /  Alb  4.2  /  TBili  0.2  /  DBili  x   /  AST  35  /  ALT  12  /  AlkPhos  123<H>  09-25    PT/INR - ( 25 Sep 2018 13:35 )   PT: 10.70 sec;   INR: 0.99 ratio         PTT - ( 25 Sep 2018 13:35 )  PTT:36.1 sec  Urinalysis Basic - ( 25 Sep 2018 21:00 )    Color: Yellow / Appearance: Clear / S.020 / pH: x  Gluc: x / Ketone: Negative  / Bili: Negative / Urobili: 0.2 mg/dL   Blood: x / Protein: Trace mg/dL / Nitrite: Negative   Leuk Esterase: Negative / RBC: 1-2 /HPF / WBC 1-2 /HPF   Sq Epi: x / Non Sq Epi: Few /HPF / Bacteria: x        RADIOLOGY & ADDITIONAL STUDIES:    Assesment:

## 2018-09-26 NOTE — H&P ADULT - NSHPPHYSICALEXAM_GEN_ALL_CORE
PHYSICAL EXAM:  GENERAL: NAD, speaks in full sentences, no signs of respiratory distress, b/l arm tremor L >R   HEAD:  Atraumatic, Normocephalic  EYES: EOMI, PERRLA, conjunctiva and sclera clear  NECK: Supple, No JVD  CHEST/LUNG:  No wheeze; No crackles; No accessory muscles used  HEART: Regular rate and rhythm; No murmurs  ABDOMEN: soft, NT, +BS  EXTREMITIES:  No cyanosis or edema  PSYCH: AAOx3  NEUROLOGY: non-focal, strength 5/5 all 4 extremities, paraspinal lumbar tenderness, sensation intact in extremities

## 2018-09-26 NOTE — H&P ADULT - ASSESSMENT
67 yo M with PMH of parkinsons, HTN, DLD, gout, prostatitis, back pain, b/l cataracts (surgery scheduled Oct 5th)     presents with frequent falls, daughter reports over last 4-6 weeks falls have become daily. Patient states that 'legs feel very heavy' and he falls due to this, can sense seconds before he is about to fall due to weakness.      # Frequent falls, likely worsening of Parkinson's Disease  - daughter states Parkinson symptoms used to worsen 15 minutes before next dose, now occuring 2 hours prior to next dose  - EKG showed Sinus rhythm with 1st degree A-V block  - CT head: Widening of the ventricles and sulci consistent with generalized cerebral   atrophy but no CT evidence for acute intracranial pathology.  - CT L spine:  no acute fracture, central spinal stenosis, foraminal narrowing, multilevel disc bulge   - CT pelvis :  No acute osseous abnormality  - CTA dissection: No evidence of aortic dissection. Ascending aortic aneurysm 4.5 cm. Aortic arch and descending thoracic  aorta within normal limits in size. There is infrarenal abdominal aortic ectasia up to 2.5 cm.  -neurology consult to review Parkinson medication    #BPH, hx of prostatitis  c/w tamsulosin    #HTN  c/w furosemide, enalapril    # gout   c/w colchicine    #DLD   c/w atorvastatin    # Right thyroid nodule   -1.3 cm nodule  - outpatient thyroid ultrasound       PT/rehab  social work consult: daugther would like to discuss options for help with father given worsening symptoms and she is helping him with ADLs  dvt ppx  full code

## 2018-09-26 NOTE — CONSULT NOTE ADULT - ASSESSMENT
67 yo M with PMH of parkinson's disease  HTN, DLD, gout, prostatitis, back pain, b/l cataracts (surgery scheduled Oct 5th) presents with  1 month h/o difficulty with ambulation,  persistent low back pain radiating to the right leg, and frequent falls since past 1 month . Daughter reports that patient had been living alone with some help until last month. Now since past 1 month he needs help to carry out all his  ADLS.      ddx     lumbar radiculopathy    Neuropathy    Plan  N/c MRI l spine  CHECK B12 FOLATE LEVELS  neuro attending note to follow

## 2018-09-26 NOTE — H&P ADULT - ATTENDING COMMENTS
Patient seen and examined independently earlier today. Case discussed with housestaff, nursing, social work, patient, daughter. I agree with most of the resident's note, physical exam, and plan except as below. Translated by .    67 yo M with PMH of parkinsons, HTN, DLD, gout, prostatitis, back pain, b/l cataracts (surgery scheduled Oct 5th) presents with frequent falls, daughter reports over last 4-6 weeks falls have become almost daily. Patient states that 'legs feel very heavy' and he falls due to this, can sense seconds before he is about to fall due to weakness. Parkinson's Sx also worse. +LBP rad into RLE. No incontinence but +freq urination.    Denies CP, SOB, N/V/D/C/AP, cough, F, chills, dizziness, new focal weakness, HA, vision changes, dysuria, or urinary symptoms, blood in stool.    ROS: all systems unremarkable except as above.     Gen: NAD, AA0x3  HEENT: PERRLA, EOM, no LAD  CV: nl S1 S2  Resp: decreased BS b/l  GI: NT/ND/S +BS  MS: no c/c/e  Neuro: Has resting tremor; CN 2-12 normal  power is 5/5 UE, 5-/5  Sensory intact to LT  DTR decreased in LE    # Frequent falls, likely worsening of Parkinson's Disease vs due to Spinal stenosis  - CT head: Widening of the ventricles and sulci consistent with generalized cerebral   atrophy but no CT evidence for acute intracranial pathology.  - CT L spine:  no acute fracture, central spinal stenosis, foraminal narrowing, multilevel disc bulge   - CT pelvis :  No acute osseous abnormality  - CTA dissection: No evidence of aortic dissection. Ascending aortic aneurysm 4.5 cm. Aortic arch and descending thoracic  aorta within normal limits in size. There is infrarenal abdominal aortic ectasia up to 2.5 cm.  -neurology consult appreciated: check MRI L-s spine, change frequency of Parkinson's meds    #Urinary frequency - BPH, hx of prostatitis  c/w tamsulosin  -check U/a  -check PVR    #HTN  c/w furosemide, enalapril    # gout   c/w colchicine    #DLD   c/w atorvastatin    # Right thyroid nodule   -1.3 cm nodule  - outpatient thyroid ultrasound     #Asc Ao/Infrarenal  aneurysms  -outpt f/u    PT/rehab  social work consult: karriether would like to discuss options for help with father given worsening symptoms and she is helping him with ADLs  dvt ppx  full code

## 2018-09-26 NOTE — PHYSICAL THERAPY INITIAL EVALUATION ADULT - GAIT PATTERN USED, PT EVAL
unsteady, ataxic, NBOS, impaired bilat steps coordination, absent heel strike, ant weight shift, impaired balance reactions

## 2018-09-26 NOTE — H&P ADULT - NSHPLABSRESULTS_GEN_ALL_CORE
Patient is a 68y old  Male who presents with a chief complaint of     PAST MEDICAL & SURGICAL HISTORY:  Cataract  Prostatitis  Dyslipidemia  Gout  Hypertension      Labs:                        15.1   6.13  )-----------( 244      ( 25 Sep 2018 13:35 )             46.4                 140  |  103  |  23<H>  ----------------------------<  81  5.3<H>   |  24  |  1.2    Ca    9.5      25 Sep 2018 13:35    TPro  7.4  /  Alb  4.2  /  TBili  0.2  /  DBili  x   /  AST  35  /  ALT  12  /  AlkPhos  123<H>      LIVER FUNCTIONS - ( 25 Sep 2018 13:35 )  Alb: 4.2 g/dL / Pro: 7.4 g/dL / ALK PHOS: 123 U/L / ALT: 12 U/L / AST: 35 U/L / GGT: x                 PT/INR - ( 25 Sep 2018 13:35 )   PT: 10.70 sec;   INR: 0.99 ratio         PTT - ( 25 Sep 2018 13:35 )  PTT:36.1 sec        Urinalysis Basic - ( 25 Sep 2018 21:00 )    Color: Yellow / Appearance: Clear / S.020 / pH: x  Gluc: x / Ketone: Negative  / Bili: Negative / Urobili: 0.2 mg/dL   Blood: x / Protein: Trace mg/dL / Nitrite: Negative   Leuk Esterase: Negative / RBC: 1-2 /HPF / WBC 1-2 /HPF   Sq Epi: x / Non Sq Epi: Few /HPF / Bacteria: x

## 2018-09-26 NOTE — CONSULT NOTE ADULT - SUBJECTIVE AND OBJECTIVE BOX
CC:  Worsening parkinson's symptoms    HPI: Patient is Upper sorbian speaking , history obtained /translated  from daughter at bedside   67 yo M with PMH of parkinson's disease  HTN, DLD, gout, prostatitis, back pain, b/l cataracts (surgery scheduled Oct 5th) presents with  1 month h/o difficulty with ambulation and  frequent falls. Daughter reports that patient had been living alone with some help until last month. Now since past 1 month he needs help to carry out his ADLS. Daughter reports falls almost everyday since past 4-6 weeks.  Patient states that his  'legs feel very heavy' and he falls due to this. . For over one month he has also been having right lower back pain that radiates down right leg. PMD started naproxen but pt states no relief.  Denies headaches, dizziness. No numbness, weakness or tingling.   Daughter states that over last few months pt seems to have worsening Parkinson's symptoms in terms of tremor and gait instability.  Receives Parkinson's meds 4x daily. Denies chewing swallowing or choking    Home medications  sinemet 25/250mg 4xd  Lasix 40mg   [id number] daily  naproxen 500mg q bid  enalapril 5mg q24  Flomax 0.4 mg q 24  colchicine o.6 mg q 24  Lipitor 40mg q 24  pramipexole 0.5 mg 4xd    Social history  Neg x 3      Neuro Exam:  Orientation: oriented to person, and place speech mildly dysarthric  Cranial Nerves: visual acuity intact bilaterally, visual fields full to confrontation, pupils equal round and reactive to light, extraocular motion intact, facial sensation intact symmetrically, face symmetrical, hearing was intact bilaterally, tongue and palate midline, head turning and shoulder shrug symmetric and there was no tongue deviation with protrusion.   Motor: 5/5 throughout / no drift             No resting tremors             No cogwheel rigidity  Sensory exam: intact to pp temp                        Decreased vibration sense Proprioception intact  Coordination:. no dysmetria or limb ataxia   Deep tendon reflexes: 2+/4 ue                                   0/ 4  le  gait: slow shuffling gait           Allergies    No Known Allergies    Intolerances      MEDICATIONS  (STANDING):  aspirin  chewable 81 milliGRAM(s) Oral daily  atorvastatin 40 milliGRAM(s) Oral at bedtime  carbidopa/levodopa  25/250 1 Tablet(s) Oral four times a day  colchicine 0.6 milliGRAM(s) Oral daily  enalapril 5 milliGRAM(s) Oral daily  enoxaparin Injectable 40 milliGRAM(s) SubCutaneous daily  furosemide    Tablet 40 milliGRAM(s) Oral daily  influenza   Vaccine 0.5 milliLiter(s) IntraMuscular once  naproxen 500 milliGRAM(s) Oral two times a day  pramipexole 0.5 milliGRAM(s) Oral four times a day  tamsulosin 0.8 milliGRAM(s) Oral at bedtime    MEDICATIONS  (PRN):      LABS:                        15.1   6.13  )-----------( 244      ( 25 Sep 2018 13:35 )             46.4     09-25    140  |  103  |  23<H>  ----------------------------<  81  5.3<H>   |  24  |  1.2    Ca    9.5      25 Sep 2018 13:35    TPro  7.4  /  Alb  4.2  /  TBili  0.2  /  DBili  x   /  AST  35  /  ALT  12  /  AlkPhos  123<H>  09-25    PT/INR - ( 25 Sep 2018 13:35 )   PT: 10.70 sec;   INR: 0.99 ratio         PTT - ( 25 Sep 2018 13:35 )  PTT:36.1 sec        Neuro Imaging:  < from: CT Head No Cont (09.25.18 @ 16:47) >  Findings:    Widening of the ventricles and sulci consistent with generalized cerebral   atrophy.    Grey-white differentiation is preserved.    There is no acute mass effect, midline shift or intracranial hemorrhage.      The calvarium is intact.    The visualized paranasal sinuses and bilateral mastoid complexes are   unremarkable. The globes and orbits are unremarkable.    Impression:     No CT evidence for acute intracranial pathology.    < end of copied text >                < from: CT Lumbar Spine No Cont (09.25.18 @ 16:48) >  IMPRESSION:    1.  No acute fracture demonstrated.    2.  Infrarenal abdominal aortic aneurysm incompletely evaluated on this   examination, correlation with ultrasound is recommended.    3.  L1-L2; annular disc bulge and degenerative changes.    4.  L2-L3; central spinal stenosis and bilateral foraminal narrowing.    5.  L3-L4; central spinal stenosis and bilateral foraminal narrowing.   Hypodense lesion posterior to L4 there is a free disc fragment, MRI of   the lumbar spine is suggested for further evaluation if not   contraindicated in this patient.    6.  L4-L5; central spinal stenosis, bilateral foraminal narrowing, and   right lateral recess narrowing.    7.  L5-S1; annular disc bulge, degenerative changes, bilateral foraminal   narrowing, right lateral recess narrowing.    8.  Degenerative changes involving the sacroiliac joints.      < end of copied text >            EEG:     Echo:   Carotid Doppler: N/A  Telemetry:

## 2018-09-27 LAB
ALBUMIN SERPL ELPH-MCNC: 4.2 G/DL — SIGNIFICANT CHANGE UP (ref 3.5–5.2)
ALP SERPL-CCNC: 125 U/L — HIGH (ref 30–115)
ALT FLD-CCNC: <5 U/L — SIGNIFICANT CHANGE UP (ref 0–41)
ANION GAP SERPL CALC-SCNC: 14 MMOL/L — SIGNIFICANT CHANGE UP (ref 7–14)
AST SERPL-CCNC: 16 U/L — SIGNIFICANT CHANGE UP (ref 0–41)
BILIRUB SERPL-MCNC: 0.4 MG/DL — SIGNIFICANT CHANGE UP (ref 0.2–1.2)
BUN SERPL-MCNC: 26 MG/DL — HIGH (ref 10–20)
CALCIUM SERPL-MCNC: 9 MG/DL — SIGNIFICANT CHANGE UP (ref 8.5–10.1)
CHLORIDE SERPL-SCNC: 100 MMOL/L — SIGNIFICANT CHANGE UP (ref 98–110)
CO2 SERPL-SCNC: 26 MMOL/L — SIGNIFICANT CHANGE UP (ref 17–32)
CREAT SERPL-MCNC: 1.3 MG/DL — SIGNIFICANT CHANGE UP (ref 0.7–1.5)
FOLATE SERPL-MCNC: 10.6 NG/ML — SIGNIFICANT CHANGE UP
GLUCOSE SERPL-MCNC: 93 MG/DL — SIGNIFICANT CHANGE UP (ref 70–99)
HCT VFR BLD CALC: 47.4 % — SIGNIFICANT CHANGE UP (ref 42–52)
HGB BLD-MCNC: 15.2 G/DL — SIGNIFICANT CHANGE UP (ref 14–18)
MAGNESIUM SERPL-MCNC: 2.3 MG/DL — SIGNIFICANT CHANGE UP (ref 1.8–2.4)
MCHC RBC-ENTMCNC: 27.2 PG — SIGNIFICANT CHANGE UP (ref 27–31)
MCHC RBC-ENTMCNC: 32.1 G/DL — SIGNIFICANT CHANGE UP (ref 32–37)
MCV RBC AUTO: 84.9 FL — SIGNIFICANT CHANGE UP (ref 80–94)
NRBC # BLD: 0 /100 WBCS — SIGNIFICANT CHANGE UP (ref 0–0)
PHOSPHATE SERPL-MCNC: 3.3 MG/DL — SIGNIFICANT CHANGE UP (ref 2.1–4.9)
PLATELET # BLD AUTO: 208 K/UL — SIGNIFICANT CHANGE UP (ref 130–400)
POTASSIUM SERPL-MCNC: 4.5 MMOL/L — SIGNIFICANT CHANGE UP (ref 3.5–5)
POTASSIUM SERPL-SCNC: 4.5 MMOL/L — SIGNIFICANT CHANGE UP (ref 3.5–5)
PROT SERPL-MCNC: 7.3 G/DL — SIGNIFICANT CHANGE UP (ref 6–8)
RBC # BLD: 5.58 M/UL — SIGNIFICANT CHANGE UP (ref 4.7–6.1)
RBC # FLD: 12.8 % — SIGNIFICANT CHANGE UP (ref 11.5–14.5)
SODIUM SERPL-SCNC: 140 MMOL/L — SIGNIFICANT CHANGE UP (ref 135–146)
T PALLIDUM AB TITR SER: NEGATIVE — SIGNIFICANT CHANGE UP
TSH SERPL-MCNC: 1.2 UIU/ML — SIGNIFICANT CHANGE UP (ref 0.27–4.2)
VIT B12 SERPL-MCNC: 599 PG/ML — SIGNIFICANT CHANGE UP (ref 232–1245)
WBC # BLD: 4.34 K/UL — LOW (ref 4.8–10.8)
WBC # FLD AUTO: 4.34 K/UL — LOW (ref 4.8–10.8)

## 2018-09-27 PROCEDURE — 99223 1ST HOSP IP/OBS HIGH 75: CPT

## 2018-09-27 RX ADMIN — ENOXAPARIN SODIUM 40 MILLIGRAM(S): 100 INJECTION SUBCUTANEOUS at 11:28

## 2018-09-27 RX ADMIN — PRAMIPEXOLE DIHYDROCHLORIDE 0.5 MILLIGRAM(S): 0.12 TABLET ORAL at 11:28

## 2018-09-27 RX ADMIN — CARBIDOPA AND LEVODOPA 1 TABLET(S): 25; 100 TABLET ORAL at 06:11

## 2018-09-27 RX ADMIN — CARBIDOPA AND LEVODOPA 1 TABLET(S): 25; 100 TABLET ORAL at 00:12

## 2018-09-27 RX ADMIN — ATORVASTATIN CALCIUM 40 MILLIGRAM(S): 80 TABLET, FILM COATED ORAL at 21:09

## 2018-09-27 RX ADMIN — Medication 5 MILLIGRAM(S): at 06:11

## 2018-09-27 RX ADMIN — TAMSULOSIN HYDROCHLORIDE 0.8 MILLIGRAM(S): 0.4 CAPSULE ORAL at 21:09

## 2018-09-27 RX ADMIN — Medication 40 MILLIGRAM(S): at 06:11

## 2018-09-27 RX ADMIN — Medication 30 MILLIGRAM(S): at 06:11

## 2018-09-27 RX ADMIN — PRAMIPEXOLE DIHYDROCHLORIDE 0.5 MILLIGRAM(S): 0.12 TABLET ORAL at 06:11

## 2018-09-27 RX ADMIN — PRAMIPEXOLE DIHYDROCHLORIDE 0.5 MILLIGRAM(S): 0.12 TABLET ORAL at 18:06

## 2018-09-27 RX ADMIN — Medication 0.6 MILLIGRAM(S): at 11:29

## 2018-09-27 RX ADMIN — CARBIDOPA AND LEVODOPA 1 TABLET(S): 25; 100 TABLET ORAL at 20:27

## 2018-09-27 RX ADMIN — PRAMIPEXOLE DIHYDROCHLORIDE 0.5 MILLIGRAM(S): 0.12 TABLET ORAL at 00:13

## 2018-09-27 RX ADMIN — Medication 81 MILLIGRAM(S): at 11:28

## 2018-09-27 RX ADMIN — ATORVASTATIN CALCIUM 40 MILLIGRAM(S): 80 TABLET, FILM COATED ORAL at 00:12

## 2018-09-27 RX ADMIN — CARBIDOPA AND LEVODOPA 1 TABLET(S): 25; 100 TABLET ORAL at 11:28

## 2018-09-27 RX ADMIN — CARBIDOPA AND LEVODOPA 1 TABLET(S): 25; 100 TABLET ORAL at 18:04

## 2018-09-27 NOTE — PROGRESS NOTE ADULT - ASSESSMENT
69 yo M with PMH of parkinsons, HTN, DLD, gout, prostatitis, back pain, b/l cataracts (surgery scheduled Oct 5th)   presents with frequent falls, daughter reports over last 4-6 weeks falls have become daily. Patient states that 'legs feel very heavy' and he falls due to this, can sense seconds before he is about to fall due to weakness.      # Frequent falls, likely worsening of Parkinson's Disease  - CT head: Widening of the ventricles and sulci consistent with generalized cerebral   atrophy but no CT evidence for acute intracranial pathology.  - CT L spine:  no acute fracture, central spinal stenosis, foraminal narrowing, multilevel disc bulge   - CT pelvis :  No acute osseous abnormality  - CTA dissection: No evidence of aortic dissection. Ascending aortic aneurysm 4.5 cm. Aortic arch and descending thoracic  aorta within normal limits in size. There is infrarenal abdominal aortic ectasia up to 2.5 cm.  -added simenet 25/100 8 pm   -c/w simenet 25/250 7am, 11am, 3pm, 7pm  -MRI L/S non con:  1.  L2-3 and L3-4: Moderate left foraminal stenosis due to small left   foraminal disc protrusions and facet hypertrophy.  2.  L4-5: Small disc herniation in the right lateral recess with   posterior displacement the descending right L5 nerve root.   3.  L5-S1: Moderate right foraminal stenosis. Enlarged, edematous   appearance of the exiting right L5 nerve root likely due to the   compression (L4 right lateral recess and/or L5 right foramen).  4.  Bone marrow edema within the right L5 pedicle, likely reflecting a   pedicle stress reaction.  -neurosurgery consult: await MRI of LSpine   -b12, tsh, folate, rpr: all negative   -PT/rehab    #BPH, hx of prostatitis  -c/w tamsulosin  -UA repeat negative     #HTN  c/w furosemide, enalapril    # gout   c/w colchicine    #DLD   c/w atorvastatin    # Right thyroid nodule   -1.3 cm nodule  - outpatient thyroid ultrasound     dvt ppx- levenox  full code 69 yo M with PMH of parkinsons, HTN, DLD, gout, prostatitis, back pain, b/l cataracts (surgery scheduled Oct 5th)   presents with frequent falls, daughter reports over last 4-6 weeks falls have become daily. Patient states that 'legs feel very heavy' and he falls due to this, can sense seconds before he is about to fall due to weakness.      Cataract surgery scheduled on 10/6 Thur    # Frequent falls, likely worsening of Parkinson's Disease  - CT head: Widening of the ventricles and sulci consistent with generalized cerebral   atrophy but no CT evidence for acute intracranial pathology.  - CT L spine:  no acute fracture, central spinal stenosis, foraminal narrowing, multilevel disc bulge   - CT pelvis :  No acute osseous abnormality  - CTA dissection: No evidence of aortic dissection. Ascending aortic aneurysm 4.5 cm. Aortic arch and descending thoracic  aorta within normal limits in size. There is infrarenal abdominal aortic ectasia up to 2.5 cm.  -added simenet 25/100 8 pm   -c/w simenet 25/250 7am, 11am, 3pm, 7pm  -MRI L/S non con:  1.  L2-3 and L3-4: Moderate left foraminal stenosis due to small left   foraminal disc protrusions and facet hypertrophy.  2.  L4-5: Small disc herniation in the right lateral recess with   posterior displacement the descending right L5 nerve root.   3.  L5-S1: Moderate right foraminal stenosis. Enlarged, edematous   appearance of the exiting right L5 nerve root likely due to the   compression (L4 right lateral recess and/or L5 right foramen).  4.  Bone marrow edema within the right L5 pedicle, likely reflecting a   pedicle stress reaction.  -neurosurgery consult: await MRI of LSpine   -b12, tsh, folate, rpr: all negative   -PT/rehab    #BPH, hx of prostatitis  -c/w tamsulosin  -UA repeat negative   -US kidney/bladder normal     #HTN  c/w furosemide, enalapril    # gout   c/w colchicine    #DLD   c/w atorvastatin    # Right thyroid nodule   -1.3 cm nodule  - outpatient thyroid ultrasound     dvt ppx- levenox  full code 67 yo M with PMH of parkinsons, HTN, DLD, gout, prostatitis, back pain, b/l cataracts (surgery scheduled Oct 5th)   presents with frequent falls, daughter reports over last 4-6 weeks falls have become daily. Patient states that 'legs feel very heavy' and he falls due to this, can sense seconds before he is about to fall due to weakness.      Cataract surgery scheduled on 10/6 Thur    # Frequent falls, likely worsening of Parkinson's Disease  - CT head: Widening of the ventricles and sulci consistent with generalized cerebral   atrophy but no CT evidence for acute intracranial pathology.  - CT L spine:  no acute fracture, central spinal stenosis, foraminal narrowing, multilevel disc bulge   - CT pelvis :  No acute osseous abnormality  - CTA dissection: No evidence of aortic dissection. Ascending aortic aneurysm 4.5 cm. Aortic arch and descending thoracic  aorta within normal limits in size. There is infrarenal abdominal aortic ectasia up to 2.5 cm.  -added simenet 25/100 8 pm   -c/w simenet 25/250 7am, 11am, 3pm, 7pm  -MRI L/S non con:  1.  L2-3 and L3-4: Moderate left foraminal stenosis due to small left   foraminal disc protrusions and facet hypertrophy.  2.  L4-5: Small disc herniation in the right lateral recess with   posterior displacement the descending right L5 nerve root.   3.  L5-S1: Moderate right foraminal stenosis. Enlarged, edematous   appearance of the exiting right L5 nerve root likely due to the   compression (L4 right lateral recess and/or L5 right foramen).  4.  Bone marrow edema within the right L5 pedicle, likely reflecting a   pedicle stress reaction.  -neurosurgery consult: await MRI of LSpine   -b12, tsh, folate, rpr: all negative   -PT/rehab    #foraminal stenosis  management as above    #BPH, hx of prostatitis  -c/w tamsulosin  -UA repeat negative   -US kidney/bladder normal     #HTN  c/w furosemide, enalapril    # gout   c/w colchicine    #DLD   c/w atorvastatin    # Right thyroid nodule   -1.3 cm nodule  - outpatient thyroid ultrasound     dvt ppx- levenox  full code

## 2018-09-27 NOTE — PROGRESS NOTE ADULT - SUBJECTIVE AND OBJECTIVE BOX
SUBJECTIVE:    Patient is a 68y old Male who presents with a chief complaint of FREQUENT FALLS (26 Sep 2018 05:26)    Currently admitted to medicine with the primary diagnosis of Ambulatory dysfunction     Today is hospital day 2d.   OVERNIGHT EVENTS no acute events  Today, patient continues to have difficulty walking and lower extremities weakness and numbness.     PAST MEDICAL & SURGICAL HISTORY  Cataract  Prostatitis  Dyslipidemia  Gout  Hypertension  Parkinson disease  No significant past surgical history          ALLERGIES:  No Known Allergies    MEDICATIONS:  STANDING MEDICATIONS  aspirin  chewable 81 milliGRAM(s) Oral daily  atorvastatin 40 milliGRAM(s) Oral at bedtime  carbidopa/levodopa  25/250 1 Tablet(s) Oral four times a day  carbidopa/levodopa CR 25/100 1 Tablet(s) Oral <User Schedule>  colchicine 0.6 milliGRAM(s) Oral daily  enalapril 5 milliGRAM(s) Oral daily  enoxaparin Injectable 40 milliGRAM(s) SubCutaneous daily  furosemide    Tablet 40 milliGRAM(s) Oral daily  influenza   Vaccine 0.5 milliLiter(s) IntraMuscular once  NIFEdipine XL 30 milliGRAM(s) Oral daily  pramipexole 0.5 milliGRAM(s) Oral four times a day  tamsulosin 0.8 milliGRAM(s) Oral at bedtime    PRN MEDICATIONS    VITALS:   T(F): 96.6  HR: 68  BP: 116/64  RR: 18  SpO2: --          LABS:                        15.2   4.34  )-----------( 208      ( 27 Sep 2018 06:36 )             47.4     09-27    140  |  100  |  26<H>  ----------------------------<  93  4.5   |  26  |  1.3    Ca    9.0      27 Sep 2018 06:36  Phos  3.3     09-27  Mg     2.3     09-27    TPro  7.3  /  Alb  4.2  /  TBili  0.4  /  DBili  x   /  AST  16  /  ALT  <5  /  AlkPhos  125<H>  09-27    PT/INR - ( 25 Sep 2018 13:35 )   PT: 10.70 sec;   INR: 0.99 ratio         PTT - ( 25 Sep 2018 13:35 )  PTT:36.1 sec  Urinalysis Basic - ( 26 Sep 2018 18:20 )    Color: Yellow / Appearance: Clear / SG: >=1.030 / pH: x  Gluc: x / Ketone: Trace  / Bili: Negative / Urobili: 1.0   Blood: x / Protein: Negative / Nitrite: Negative   Leuk Esterase: Negative / RBC: x / WBC x   Sq Epi: x / Non Sq Epi: x / Bacteria: x            Culture - Urine (collected 25 Sep 2018 21:00)  Source: .Urine Clean Catch (Midstream)  Final Report (26 Sep 2018 22:40):    <10,000 CFU/ml Normal Urogenital srikanth present          RADIOLOGY:    PHYSICAL EXAM:  GEN: NAD  LUNGS: CTAB  HEART:   ABD:   EXT:   NEURO: SUBJECTIVE:    Patient is a 68y old Male who presents with a chief complaint of FREQUENT FALLS (26 Sep 2018 05:26)    Currently admitted to medicine with the primary diagnosis of Ambulatory dysfunction     Today is hospital day 2d.   OVERNIGHT EVENTS no acute events  Today, patient continues to have difficulty walking and lower extremities weakness and numbness.     PAST MEDICAL & SURGICAL HISTORY  Cataract  Prostatitis  Dyslipidemia  Gout  Hypertension  Parkinson disease  No significant past surgical history          ALLERGIES:  No Known Allergies    MEDICATIONS:  STANDING MEDICATIONS  aspirin  chewable 81 milliGRAM(s) Oral daily  atorvastatin 40 milliGRAM(s) Oral at bedtime  carbidopa/levodopa  25/250 1 Tablet(s) Oral four times a day  carbidopa/levodopa CR 25/100 1 Tablet(s) Oral <User Schedule>  colchicine 0.6 milliGRAM(s) Oral daily  enalapril 5 milliGRAM(s) Oral daily  enoxaparin Injectable 40 milliGRAM(s) SubCutaneous daily  furosemide    Tablet 40 milliGRAM(s) Oral daily  influenza   Vaccine 0.5 milliLiter(s) IntraMuscular once  NIFEdipine XL 30 milliGRAM(s) Oral daily  pramipexole 0.5 milliGRAM(s) Oral four times a day  tamsulosin 0.8 milliGRAM(s) Oral at bedtime    PRN MEDICATIONS    VITALS:   T(F): 96.6  HR: 68  BP: 116/64  RR: 18  SpO2: --          LABS:                        15.2   4.34  )-----------( 208      ( 27 Sep 2018 06:36 )             47.4     09-27    140  |  100  |  26<H>  ----------------------------<  93  4.5   |  26  |  1.3    Ca    9.0      27 Sep 2018 06:36  Phos  3.3     09-27  Mg     2.3     09-27    TPro  7.3  /  Alb  4.2  /  TBili  0.4  /  DBili  x   /  AST  16  /  ALT  <5  /  AlkPhos  125<H>  09-27    PT/INR - ( 25 Sep 2018 13:35 )   PT: 10.70 sec;   INR: 0.99 ratio         PTT - ( 25 Sep 2018 13:35 )  PTT:36.1 sec  Urinalysis Basic - ( 26 Sep 2018 18:20 )    Color: Yellow / Appearance: Clear / SG: >=1.030 / pH: x  Gluc: x / Ketone: Trace  / Bili: Negative / Urobili: 1.0   Blood: x / Protein: Negative / Nitrite: Negative   Leuk Esterase: Negative / RBC: x / WBC x   Sq Epi: x / Non Sq Epi: x / Bacteria: x            Culture - Urine (collected 25 Sep 2018 21:00)  Source: .Urine Clean Catch (Midstream)  Final Report (26 Sep 2018 22:40):    <10,000 CFU/ml Normal Urogenital srikanth present          RADIOLOGY:    PHYSICAL EXAM:  GEN: NAD  LUNGS: CTAB  HEART: s1s2 present, RRR  ABD: soft nontender nondistended +BS  EXT: no edema   NEURO: aao x3, numbness in b/l LEs, motor 4/5 LEs SUBJECTIVE:    Patient is a 68y old Male who presents with a chief complaint of FREQUENT FALLS (26 Sep 2018 05:26)    Currently admitted to medicine with the primary diagnosis of Ambulatory dysfunction     Today is hospital day 2d.   OVERNIGHT EVENTS no acute events  Today, patient continues to have difficulty walking and lower extremities weakness and numbness. no pain  no cp, sob, abd pain, fever.    PAST MEDICAL & SURGICAL HISTORY  Cataract  Prostatitis  Dyslipidemia  Gout  Hypertension  Parkinson disease  No significant past surgical history          ALLERGIES:  No Known Allergies    MEDICATIONS:  STANDING MEDICATIONS  aspirin  chewable 81 milliGRAM(s) Oral daily  atorvastatin 40 milliGRAM(s) Oral at bedtime  carbidopa/levodopa  25/250 1 Tablet(s) Oral four times a day  carbidopa/levodopa CR 25/100 1 Tablet(s) Oral <User Schedule>  colchicine 0.6 milliGRAM(s) Oral daily  enalapril 5 milliGRAM(s) Oral daily  enoxaparin Injectable 40 milliGRAM(s) SubCutaneous daily  furosemide    Tablet 40 milliGRAM(s) Oral daily  influenza   Vaccine 0.5 milliLiter(s) IntraMuscular once  NIFEdipine XL 30 milliGRAM(s) Oral daily  pramipexole 0.5 milliGRAM(s) Oral four times a day  tamsulosin 0.8 milliGRAM(s) Oral at bedtime    PRN MEDICATIONS    VITALS:   T(F): 96.6  HR: 68  BP: 116/64  RR: 18  SpO2: --          LABS:                        15.2   4.34  )-----------( 208      ( 27 Sep 2018 06:36 )             47.4     09-27    140  |  100  |  26<H>  ----------------------------<  93  4.5   |  26  |  1.3    Ca    9.0      27 Sep 2018 06:36  Phos  3.3     09-27  Mg     2.3     09-27    TPro  7.3  /  Alb  4.2  /  TBili  0.4  /  DBili  x   /  AST  16  /  ALT  <5  /  AlkPhos  125<H>  09-27    PT/INR - ( 25 Sep 2018 13:35 )   PT: 10.70 sec;   INR: 0.99 ratio         PTT - ( 25 Sep 2018 13:35 )  PTT:36.1 sec  Urinalysis Basic - ( 26 Sep 2018 18:20 )    Color: Yellow / Appearance: Clear / SG: >=1.030 / pH: x  Gluc: x / Ketone: Trace  / Bili: Negative / Urobili: 1.0   Blood: x / Protein: Negative / Nitrite: Negative   Leuk Esterase: Negative / RBC: x / WBC x   Sq Epi: x / Non Sq Epi: x / Bacteria: x            Culture - Urine (collected 25 Sep 2018 21:00)  Source: .Urine Clean Catch (Midstream)  Final Report (26 Sep 2018 22:40):    <10,000 CFU/ml Normal Urogenital srikanth present          RADIOLOGY:    PHYSICAL EXAM:  GEN: NAD  LUNGS: CTAB  HEART: s1s2 present, RRR  ABD: soft nontender nondistended +BS  EXT: no edema   NEURO: aao x3, numbness in b/l LEs, motor 4/5 LEs

## 2018-09-28 ENCOUNTER — TRANSCRIPTION ENCOUNTER (OUTPATIENT)
Age: 68
End: 2018-09-28

## 2018-09-28 ENCOUNTER — INPATIENT (INPATIENT)
Facility: HOSPITAL | Age: 68
LOS: 13 days | Discharge: HOME HEALTH | End: 2018-10-12
Attending: PHYSICAL MEDICINE & REHABILITATION | Admitting: PHYSICAL MEDICINE & REHABILITATION

## 2018-09-28 VITALS
HEART RATE: 66 BPM | TEMPERATURE: 98 F | RESPIRATION RATE: 18 BRPM | DIASTOLIC BLOOD PRESSURE: 67 MMHG | SYSTOLIC BLOOD PRESSURE: 140 MMHG

## 2018-09-28 LAB
ANION GAP SERPL CALC-SCNC: 13 MMOL/L — SIGNIFICANT CHANGE UP (ref 7–14)
BUN SERPL-MCNC: 25 MG/DL — HIGH (ref 10–20)
CALCIUM SERPL-MCNC: 8.9 MG/DL — SIGNIFICANT CHANGE UP (ref 8.5–10.1)
CHLORIDE SERPL-SCNC: 104 MMOL/L — SIGNIFICANT CHANGE UP (ref 98–110)
CO2 SERPL-SCNC: 23 MMOL/L — SIGNIFICANT CHANGE UP (ref 17–32)
CREAT SERPL-MCNC: 1.2 MG/DL — SIGNIFICANT CHANGE UP (ref 0.7–1.5)
GLUCOSE SERPL-MCNC: 89 MG/DL — SIGNIFICANT CHANGE UP (ref 70–99)
HCT VFR BLD CALC: 42.5 % — SIGNIFICANT CHANGE UP (ref 42–52)
HGB BLD-MCNC: 13.7 G/DL — LOW (ref 14–18)
MCHC RBC-ENTMCNC: 27.2 PG — SIGNIFICANT CHANGE UP (ref 27–31)
MCHC RBC-ENTMCNC: 32.2 G/DL — SIGNIFICANT CHANGE UP (ref 32–37)
MCV RBC AUTO: 84.3 FL — SIGNIFICANT CHANGE UP (ref 80–94)
NRBC # BLD: 0 /100 WBCS — SIGNIFICANT CHANGE UP (ref 0–0)
PLATELET # BLD AUTO: 219 K/UL — SIGNIFICANT CHANGE UP (ref 130–400)
POTASSIUM SERPL-MCNC: 4.2 MMOL/L — SIGNIFICANT CHANGE UP (ref 3.5–5)
POTASSIUM SERPL-SCNC: 4.2 MMOL/L — SIGNIFICANT CHANGE UP (ref 3.5–5)
RBC # BLD: 5.04 M/UL — SIGNIFICANT CHANGE UP (ref 4.7–6.1)
RBC # FLD: 12.7 % — SIGNIFICANT CHANGE UP (ref 11.5–14.5)
SODIUM SERPL-SCNC: 140 MMOL/L — SIGNIFICANT CHANGE UP (ref 135–146)
WBC # BLD: 4.72 K/UL — LOW (ref 4.8–10.8)
WBC # FLD AUTO: 4.72 K/UL — LOW (ref 4.8–10.8)

## 2018-09-28 RX ORDER — TAMSULOSIN HYDROCHLORIDE 0.4 MG/1
0.8 CAPSULE ORAL AT BEDTIME
Qty: 0 | Refills: 0 | Status: DISCONTINUED | OUTPATIENT
Start: 2018-09-28 | End: 2018-10-12

## 2018-09-28 RX ORDER — CARBIDOPA AND LEVODOPA 25; 100 MG/1; MG/1
1 TABLET ORAL DAILY
Qty: 0 | Refills: 0 | Status: DISCONTINUED | OUTPATIENT
Start: 2018-09-28 | End: 2018-10-09

## 2018-09-28 RX ORDER — ATORVASTATIN CALCIUM 80 MG/1
40 TABLET, FILM COATED ORAL AT BEDTIME
Qty: 0 | Refills: 0 | Status: DISCONTINUED | OUTPATIENT
Start: 2018-09-28 | End: 2018-10-12

## 2018-09-28 RX ORDER — CARBIDOPA AND LEVODOPA 25; 100 MG/1; MG/1
1 TABLET ORAL EVERY 6 HOURS
Qty: 0 | Refills: 0 | Status: DISCONTINUED | OUTPATIENT
Start: 2018-09-28 | End: 2018-10-09

## 2018-09-28 RX ORDER — ATORVASTATIN CALCIUM 80 MG/1
1 TABLET, FILM COATED ORAL
Qty: 0 | Refills: 0 | COMMUNITY

## 2018-09-28 RX ORDER — NIFEDIPINE 30 MG
30 TABLET, EXTENDED RELEASE 24 HR ORAL DAILY
Qty: 0 | Refills: 0 | Status: DISCONTINUED | OUTPATIENT
Start: 2018-09-28 | End: 2018-10-12

## 2018-09-28 RX ORDER — ACETAMINOPHEN 500 MG
650 TABLET ORAL EVERY 6 HOURS
Qty: 0 | Refills: 0 | Status: DISCONTINUED | OUTPATIENT
Start: 2018-09-28 | End: 2018-09-28

## 2018-09-28 RX ORDER — ATORVASTATIN CALCIUM 80 MG/1
1 TABLET, FILM COATED ORAL
Qty: 0 | Refills: 0 | DISCHARGE
Start: 2018-09-28

## 2018-09-28 RX ORDER — FUROSEMIDE 40 MG
1 TABLET ORAL
Qty: 0 | Refills: 0 | DISCHARGE
Start: 2018-09-28

## 2018-09-28 RX ORDER — MAGNESIUM HYDROXIDE 400 MG/1
30 TABLET, CHEWABLE ORAL
Qty: 0 | Refills: 0 | Status: DISCONTINUED | OUTPATIENT
Start: 2018-09-28 | End: 2018-10-12

## 2018-09-28 RX ORDER — FUROSEMIDE 40 MG
40 TABLET ORAL DAILY
Qty: 0 | Refills: 0 | Status: DISCONTINUED | OUTPATIENT
Start: 2018-09-28 | End: 2018-10-12

## 2018-09-28 RX ORDER — FUROSEMIDE 40 MG
1 TABLET ORAL
Qty: 0 | Refills: 0 | COMMUNITY

## 2018-09-28 RX ORDER — CARBIDOPA AND LEVODOPA 25; 100 MG/1; MG/1
1 TABLET ORAL
Qty: 0 | Refills: 0 | COMMUNITY
Start: 2018-09-28

## 2018-09-28 RX ORDER — INFLUENZA VIRUS VACCINE 15; 15; 15; 15 UG/.5ML; UG/.5ML; UG/.5ML; UG/.5ML
0.5 SUSPENSION INTRAMUSCULAR ONCE
Qty: 0 | Refills: 0 | Status: COMPLETED | OUTPATIENT
Start: 2018-09-28 | End: 2018-09-28

## 2018-09-28 RX ORDER — ASPIRIN/CALCIUM CARB/MAGNESIUM 324 MG
1 TABLET ORAL
Qty: 0 | Refills: 0 | DISCHARGE
Start: 2018-09-28

## 2018-09-28 RX ORDER — NIFEDIPINE 30 MG
1 TABLET, EXTENDED RELEASE 24 HR ORAL
Qty: 0 | Refills: 0 | DISCHARGE
Start: 2018-09-28

## 2018-09-28 RX ORDER — ASPIRIN/CALCIUM CARB/MAGNESIUM 324 MG
1 TABLET ORAL
Qty: 0 | Refills: 0 | COMMUNITY

## 2018-09-28 RX ORDER — PRAMIPEXOLE DIHYDROCHLORIDE 0.12 MG/1
0.5 TABLET ORAL
Qty: 0 | Refills: 0 | COMMUNITY

## 2018-09-28 RX ORDER — PRAMIPEXOLE DIHYDROCHLORIDE 0.12 MG/1
0.5 TABLET ORAL EVERY 6 HOURS
Qty: 0 | Refills: 0 | Status: DISCONTINUED | OUTPATIENT
Start: 2018-09-28 | End: 2018-10-12

## 2018-09-28 RX ORDER — ENOXAPARIN SODIUM 100 MG/ML
40 INJECTION SUBCUTANEOUS
Qty: 0 | Refills: 0 | COMMUNITY
Start: 2018-09-28

## 2018-09-28 RX ORDER — ENOXAPARIN SODIUM 100 MG/ML
40 INJECTION SUBCUTANEOUS DAILY
Qty: 0 | Refills: 0 | Status: DISCONTINUED | OUTPATIENT
Start: 2018-09-28 | End: 2018-10-12

## 2018-09-28 RX ORDER — ASPIRIN/CALCIUM CARB/MAGNESIUM 324 MG
81 TABLET ORAL DAILY
Qty: 0 | Refills: 0 | Status: DISCONTINUED | OUTPATIENT
Start: 2018-09-28 | End: 2018-10-12

## 2018-09-28 RX ORDER — CARBIDOPA AND LEVODOPA 25; 100 MG/1; MG/1
1 TABLET ORAL
Qty: 0 | Refills: 0 | COMMUNITY

## 2018-09-28 RX ORDER — COLCHICINE 0.6 MG
0.6 TABLET ORAL DAILY
Qty: 0 | Refills: 0 | Status: DISCONTINUED | OUTPATIENT
Start: 2018-09-28 | End: 2018-10-12

## 2018-09-28 RX ORDER — PRAMIPEXOLE DIHYDROCHLORIDE 0.12 MG/1
1 TABLET ORAL
Qty: 0 | Refills: 0 | COMMUNITY
Start: 2018-09-28

## 2018-09-28 RX ORDER — ACETAMINOPHEN 500 MG
650 TABLET ORAL EVERY 6 HOURS
Qty: 0 | Refills: 0 | Status: DISCONTINUED | OUTPATIENT
Start: 2018-09-28 | End: 2018-10-12

## 2018-09-28 RX ADMIN — CARBIDOPA AND LEVODOPA 1 TABLET(S): 25; 100 TABLET ORAL at 06:31

## 2018-09-28 RX ADMIN — Medication 81 MILLIGRAM(S): at 11:51

## 2018-09-28 RX ADMIN — PRAMIPEXOLE DIHYDROCHLORIDE 0.5 MILLIGRAM(S): 0.12 TABLET ORAL at 11:51

## 2018-09-28 RX ADMIN — ENOXAPARIN SODIUM 40 MILLIGRAM(S): 100 INJECTION SUBCUTANEOUS at 11:52

## 2018-09-28 RX ADMIN — TAMSULOSIN HYDROCHLORIDE 0.8 MILLIGRAM(S): 0.4 CAPSULE ORAL at 22:15

## 2018-09-28 RX ADMIN — Medication 0.6 MILLIGRAM(S): at 11:52

## 2018-09-28 RX ADMIN — PRAMIPEXOLE DIHYDROCHLORIDE 0.5 MILLIGRAM(S): 0.12 TABLET ORAL at 00:45

## 2018-09-28 RX ADMIN — PRAMIPEXOLE DIHYDROCHLORIDE 0.5 MILLIGRAM(S): 0.12 TABLET ORAL at 06:32

## 2018-09-28 RX ADMIN — Medication 40 MILLIGRAM(S): at 06:31

## 2018-09-28 RX ADMIN — PRAMIPEXOLE DIHYDROCHLORIDE 0.5 MILLIGRAM(S): 0.12 TABLET ORAL at 17:07

## 2018-09-28 RX ADMIN — Medication 5 MILLIGRAM(S): at 06:31

## 2018-09-28 RX ADMIN — CARBIDOPA AND LEVODOPA 1 TABLET(S): 25; 100 TABLET ORAL at 00:38

## 2018-09-28 RX ADMIN — Medication 30 MILLIGRAM(S): at 06:31

## 2018-09-28 RX ADMIN — CARBIDOPA AND LEVODOPA 1 TABLET(S): 25; 100 TABLET ORAL at 11:52

## 2018-09-28 RX ADMIN — CARBIDOPA AND LEVODOPA 1 TABLET(S): 25; 100 TABLET ORAL at 17:07

## 2018-09-28 RX ADMIN — ATORVASTATIN CALCIUM 40 MILLIGRAM(S): 80 TABLET, FILM COATED ORAL at 22:17

## 2018-09-28 NOTE — DISCHARGE NOTE ADULT - CARE PROVIDER_API CALL
Sarika Villasenor), Surgical Physicians  [address]  [address]  [place] [address]  Phone: [phone]  Fax: [phone]    Fausto Snell), EEGEpilepsy; Neurology  [address]  [address]  [place] [address]  Phone: [phone]  Fax: [phone] Sarika Villasenor), Surgical Physicians  [address] [facility] [address]  [address]  [place] [address]  Phone: [phone]  Fax: [phone]    Fausto Snell), EEGEpilepsy; Neurology  [address]  [address]  [place] [address]  Phone: [phone]  Fax: [phone]    Kali Mancilla), Vascular Surgery  [address] [address]  [address]  [place] [address]  Phone: [phone]  Fax: [phone]    Krunal Anderson), Cardiology; Interventional Cardiology  [address]  [address]th [place] [address]  Phone: [phone]  Fax: [phone] Sarika Villasenor), Surgical Physicians  [address] [facility]  [address]  [place] [address]  Phone: [phone]  Fax: [phone]    Fausto Snell), EEGEpilepsy; Neurology  [address] [facility] [address] [address]  [address]  [place] [address]  Phone: [phone]  Fax: [phone]    Kali Mancilla), Vascular Surgery  [address] [facility]  [address]  [place] [address]  Phone: [phone]  Fax: [phone]    Liliya Guthrie), Thoracic and Cardiac Surgery  [address] [place] [address]  Phone: [phone]  Fax: [phone]

## 2018-09-28 NOTE — DISCHARGE NOTE ADULT - MEDICATION SUMMARY - MEDICATIONS TO TAKE
I will START or STAY ON the medications listed below when I get home from the hospital:    aspirin 81 mg oral tablet, chewable  -- 1 tab(s) by mouth once a day  -- Indication: For Prophylaxis    enalapril 5 mg oral tablet  -- 1 tab(s) by mouth once a day  -- Indication: For Hypertension    tamsulosin 0.4 mg oral capsule  -- 2 cap(s) by mouth once a day  -- Indication: For bph    enoxaparin  -- 40 milligram(s) subcutaneous once a day  -- Indication: For Dvt prophylaxis    colchicine 0.6 mg oral tablet  -- 1 tab(s) by mouth once a day  -- Indication: For Gout    atorvastatin 40 mg oral tablet  -- 1 tab(s) by mouth once a day (at bedtime)  -- Indication: For Prophylaxis    carbidopa-levodopa 25 mg-250 mg oral tablet  -- 1 tab(s) by mouth 4 times a day  -- Indication: For Parkinson disease    carbidopa-levodopa 25 mg-100 mg oral tablet, extended release  -- 1 tab(s) by mouth   -- Indication: For Parkinson disease    pramipexole 0.5 mg oral tablet  -- 1 tab(s) by mouth 4 times a day  -- Indication: For Parkinson disease    NIFEdipine 30 mg oral tablet, extended release  -- 1 tab(s) by mouth once a day  -- Indication: For Hypertension    furosemide 40 mg oral tablet  -- 1 tab(s) by mouth once a day  -- Indication: For Hypertension

## 2018-09-28 NOTE — DISCHARGE NOTE ADULT - PROVIDER TOKENS
TOKEN:'[id number]',TOKEN:'[id number]' TOKEN:'[id number]',TOKEN:'[id number]',TOKEN:'[id number]',TOKEN:'[id number]' TOKEN:'[id number]',TOKEN:'[id number]',TOKEN:'[id number]',TOKEN:'[id number]'

## 2018-09-28 NOTE — DISCHARGE NOTE ADULT - PLAN OF CARE
Control symptoms We have made adjustment to the frequency of Sinemet. Please continue Sinemet as prescribed at home, and follow up with neurology as outpatient in 1-2 weeks. Follow up outpatient You were found to have ascending aortic aneurysm 4.5 cm in CT scan. Please follow up with cardiothoracic surgery as outpatient in 1-2 weeks. Rehab and follow up outpatient Your back pain is due to foraminal stenosis in your spine. Please follow up with neurology and neurosurgery as outpatient in 1-2 weeks. Surgery Please follow up with your scheduled surgery outpatient next week 10/6/18. Please follow up with vascular surgery as outpatient in 1-2 weeks. You were found to have ascending aortic aneurysm 4.5 cm in CT scan. Please follow up with cardiothoracic surgery and vascular surgery as outpatient in 1-2 weeks. We have made adjustment to the frequency of Sinemet. Please follow up with neurology as outpatient in 1-2 weeks. Change Sinemet timing to 7AM, 11AM, 3PM, 7PM. Can add sinemet CR 25/100 at 8PM.  Sinemet should be given at least 30 minutes before meals and 2 hours after meals. You were found to have ascending aortic aneurysm 4.5 cm  and infrarenal aneurysm on CT scan. Please follow up with cardiothoracic surgery and vascular surgery as outpatient in 1-2 weeks. Please follow up with your ophthalmologist obtain ultrasound of your thyroid at earliest convenience

## 2018-09-28 NOTE — DISCHARGE NOTE ADULT - MEDICATION SUMMARY - MEDICATIONS TO CHANGE
I will SWITCH the dose or number of times a day I take the medications listed below when I get home from the hospital:    Sinemet 25 mg-250 mg oral tablet  -- 1 tab(s) by mouth 4 times a day

## 2018-09-28 NOTE — PROGRESS NOTE ADULT - ASSESSMENT
69 yo M with PMH of parkinsons, HTN, DLD, gout, prostatitis, back pain, b/l cataracts (surgery scheduled Oct 5th)   presents with frequent falls, daughter reports over last 4-6 weeks falls have become daily. Patient states that 'legs feel very heavy' and he falls due to this, can sense seconds before he is about to fall due to weakness.      Cataract surgery scheduled on 10/6 Thur, daughter wants patient to be discharged prior to surgery if its okay    # Frequent falls, likely worsening of Parkinson's Disease  - CT head: Widening of the ventricles and sulci consistent with generalized cerebral   atrophy but no CT evidence for acute intracranial pathology.  - CT L spine:  no acute fracture, central spinal stenosis, foraminal narrowing, multilevel disc bulge   - CT pelvis :  No acute osseous abnormality  - CTA dissection: No evidence of aortic dissection. Ascending aortic aneurysm 4.5 cm. Aortic arch and descending thoracic  aorta within normal limits in size. There is infrarenal abdominal aortic ectasia up to 2.5 cm.  -added simenet 25/100 8 pm   -c/w simenet 25/250 7am, 11am, 3pm, 7pm    #foraminal stenosis  -MRI L/S non con: foraminal stenosis   -f/u neurosurgery consult  -b12, tsh, folate, rpr: all negative   -PT/rehab    #BPH, hx of prostatitis  -c/w tamsulosin  -UA repeat negative   -US kidney/bladder normal     #HTN  c/w furosemide, enalapril    # gout   c/w colchicine    #DLD   c/w atorvastatin    # Right thyroid nodule   -1.3 cm nodule  - outpatient thyroid ultrasound     dvt ppx- levenox  full code 67 yo M with PMH of parkinsons, HTN, DLD, gout, prostatitis, back pain, b/l cataracts (surgery scheduled Oct 5th)   presents with frequent falls, daughter reports over last 4-6 weeks falls have become daily. Patient states that 'legs feel very heavy' and he falls due to this, can sense seconds before he is about to fall due to weakness.      Cataract surgery scheduled on 10/6 Thur, daughter wants patient to be discharged prior to surgery if its okay  Patient should follow up with CT surgery outpatient regarding ascending aortic aneurysm     # Frequent falls, likely worsening of Parkinson's Disease  - CT head: Widening of the ventricles and sulci consistent with generalized cerebral   atrophy but no CT evidence for acute intracranial pathology.  - CT L spine:  no acute fracture, central spinal stenosis, foraminal narrowing, multilevel disc bulge   - CT pelvis :  No acute osseous abnormality  - CTA dissection: No evidence of aortic dissection. Ascending aortic aneurysm 4.5 cm. Aortic arch and descending thoracic  aorta within normal limits in size. There is infrarenal abdominal aortic ectasia up to 2.5 cm.  -added simenet 25/100 8 pm   -c/w simenet 25/250 7am, 11am, 3pm, 7pm    #foraminal stenosis  -MRI L/S non con: foraminal stenosis   -f/u neurosurgery consult  -b12, tsh, folate, rpr: all negative   -PT/rehab    #BPH, hx of prostatitis  -c/w tamsulosin  -UA repeat negative   -US kidney/bladder normal     #HTN  c/w furosemide, enalapril    # gout   c/w colchicine    #DLD   c/w atorvastatin    # Right thyroid nodule   -1.3 cm nodule  - outpatient thyroid ultrasound     dvt ppx- levenox  full code

## 2018-09-28 NOTE — DISCHARGE NOTE ADULT - PATIENT PORTAL LINK FT
You can access the [facility] Patient Portal, offered by [facility], by registering with the following website: [email or web address]

## 2018-09-28 NOTE — PROGRESS NOTE ADULT - SUBJECTIVE AND OBJECTIVE BOX
SUBJECTIVE:    Patient is a 68y old Male who presents with a chief complaint of FREQUENT FALLS (26 Sep 2018 05:26)    Currently admitted to medicine with the primary diagnosis of Ambulatory dysfunction     Today is hospital day 3d.   OVERNIGHT EVENTS no acute events  Today, patient continues to have some weakness in LEs, and back pain radiating down right leg.     PAST MEDICAL & SURGICAL HISTORY  Cataract  Prostatitis  Dyslipidemia  Gout  Hypertension  Parkinson disease  No significant past surgical history          ALLERGIES:  No Known Allergies    MEDICATIONS:  STANDING MEDICATIONS  aspirin  chewable 81 milliGRAM(s) Oral daily  atorvastatin 40 milliGRAM(s) Oral at bedtime  carbidopa/levodopa  25/250 1 Tablet(s) Oral four times a day  carbidopa/levodopa CR 25/100 1 Tablet(s) Oral <User Schedule>  colchicine 0.6 milliGRAM(s) Oral daily  enalapril 5 milliGRAM(s) Oral daily  enoxaparin Injectable 40 milliGRAM(s) SubCutaneous daily  furosemide    Tablet 40 milliGRAM(s) Oral daily  influenza   Vaccine 0.5 milliLiter(s) IntraMuscular once  NIFEdipine XL 30 milliGRAM(s) Oral daily  pramipexole 0.5 milliGRAM(s) Oral four times a day  tamsulosin 0.8 milliGRAM(s) Oral at bedtime    PRN MEDICATIONS    VITALS:   T(F): 97.2  HR: 59  BP: 116/69  RR: 18  SpO2: --          LABS:                        13.7   4.72  )-----------( 219      ( 28 Sep 2018 06:30 )             42.5     09-28    140  |  104  |  25<H>  ----------------------------<  89  4.2   |  23  |  1.2    Ca    8.9      28 Sep 2018 06:30  Phos  3.3     09-27  Mg     2.3     09-27    TPro  7.3  /  Alb  4.2  /  TBili  0.4  /  DBili  x   /  AST  16  /  ALT  <5  /  AlkPhos  125<H>  09-27      Urinalysis Basic - ( 26 Sep 2018 18:20 )    Color: Yellow / Appearance: Clear / SG: >=1.030 / pH: x  Gluc: x / Ketone: Trace  / Bili: Negative / Urobili: 1.0   Blood: x / Protein: Negative / Nitrite: Negative   Leuk Esterase: Negative / RBC: x / WBC x   Sq Epi: x / Non Sq Epi: x / Bacteria: x            Culture - Urine (collected 25 Sep 2018 21:00)  Source: .Urine Clean Catch (Midstream)  Final Report (26 Sep 2018 22:40):    <10,000 CFU/ml Normal Urogenital srikanth present          RADIOLOGY:    PHYSICAL EXAM:  GEN: NAD  LUNGS: CTAB  HEART: s1s2 present, RRR  ABD: soft nontender nondistended +BS  EXT: no edema   NEURO: aao x3

## 2018-09-28 NOTE — DISCHARGE NOTE ADULT - CARE PROVIDERS DIRECT ADDRESSES
,DirectAddress_Unknown,[email or web address] ,[email or web address],[email or web address] ,[email or web address],[email or web address],DirectAddress_Unknown

## 2018-09-28 NOTE — PROGRESS NOTE ADULT - ATTENDING COMMENTS
Patient seen and examined independently earlier today. Case discussed with housestaff, nursing, social work, patient. I agree with most of the resident's note, physical exam, and plan except as below. Patient feels better. No new complaints.    Discharge instructions discussed with pt via  and daughter via phone. Patient has proper follow up.  All results discussed and patient/daughter aware they require further work up i.e aneurysms.  Stressed importance of proper follow up.  Medications prescribed and changes discussed.  All questions and concerns from patient and family addressed. Understanding of instructions verbalized.    Time spent in completing discharge process and coordinating care 45 minutes.
#foraminal stenosis  f/u neurosx recs  pain control  PT

## 2018-09-28 NOTE — DISCHARGE NOTE ADULT - CARE PLAN
Principal Discharge DX:	Parkinson disease  Goal:	Control symptoms  Assessment and plan of treatment:	We have made adjustment to the frequency of Sinemet. Please continue Sinemet as prescribed at home, and follow up with neurology as outpatient in 1-2 weeks.  Secondary Diagnosis:	Aortic aneurysm  Goal:	Follow up outpatient  Assessment and plan of treatment:	You were found to have ascending aortic aneurysm 4.5 cm in CT scan. Please follow up with cardiothoracic surgery as outpatient in 1-2 weeks.  Secondary Diagnosis:	Back pain  Goal:	Rehab and follow up outpatient  Assessment and plan of treatment:	Your back pain is due to foraminal stenosis in your spine. Please follow up with neurology and neurosurgery as outpatient in 1-2 weeks.  Secondary Diagnosis:	Cataract  Goal:	Surgery  Assessment and plan of treatment:	Please follow up with your scheduled surgery outpatient next week 10/6/18.  Secondary Diagnosis:	Lower extremity numbness  Goal:	Follow up outpatient  Assessment and plan of treatment:	Please follow up with vascular surgery as outpatient in 1-2 weeks. Principal Discharge DX:	Parkinson disease  Goal:	Control symptoms  Assessment and plan of treatment:	We have made adjustment to the frequency of Sinemet. Please continue Sinemet as prescribed at home, and follow up with neurology as outpatient in 1-2 weeks.  Secondary Diagnosis:	Aortic aneurysm  Goal:	Follow up outpatient  Assessment and plan of treatment:	You were found to have ascending aortic aneurysm 4.5 cm in CT scan. Please follow up with cardiothoracic surgery and vascular surgery as outpatient in 1-2 weeks.  Secondary Diagnosis:	Back pain  Goal:	Rehab and follow up outpatient  Assessment and plan of treatment:	Your back pain is due to foraminal stenosis in your spine. Please follow up with neurology and neurosurgery as outpatient in 1-2 weeks.  Secondary Diagnosis:	Cataract  Goal:	Surgery  Assessment and plan of treatment:	Please follow up with your scheduled surgery outpatient next week 10/6/18.  Secondary Diagnosis:	Lower extremity numbness  Goal:	Follow up outpatient  Assessment and plan of treatment:	Please follow up with vascular surgery as outpatient in 1-2 weeks. Principal Discharge DX:	Parkinson disease  Goal:	Control symptoms  Assessment and plan of treatment:	We have made adjustment to the frequency of Sinemet. Please follow up with neurology as outpatient in 1-2 weeks. Change Sinemet timing to 7AM, 11AM, 3PM, 7PM. Can add sinemet CR 25/100 at 8PM.  Sinemet should be given at least 30 minutes before meals and 2 hours after meals.  Secondary Diagnosis:	Aortic aneurysm  Goal:	Follow up outpatient  Assessment and plan of treatment:	You were found to have ascending aortic aneurysm 4.5 cm  and infrarenal aneurysm on CT scan. Please follow up with cardiothoracic surgery and vascular surgery as outpatient in 1-2 weeks.  Secondary Diagnosis:	Back pain  Goal:	Rehab and follow up outpatient  Assessment and plan of treatment:	Your back pain is due to foraminal stenosis in your spine. Please follow up with neurology and neurosurgery as outpatient in 1-2 weeks.  Secondary Diagnosis:	Cataract  Goal:	Surgery  Assessment and plan of treatment:	Please follow up with your ophthalmologist  Secondary Diagnosis:	Lower extremity numbness  Goal:	Follow up outpatient  Assessment and plan of treatment:	Please follow up with vascular surgery as outpatient in 1-2 weeks.  Secondary Diagnosis:	Thyroid nodule  Assessment and plan of treatment:	obtain ultrasound of your thyroid at earliest convenience

## 2018-09-28 NOTE — DISCHARGE NOTE ADULT - HOSPITAL COURSE
67 yo M with PMH of parkinsons, HTN, DLD, gout, prostatitis, back pain, b/l cataracts (surgery scheduled Oct 6th, 2018) presents with frequent falls, daughter reports over last 4-6 weeks falls have become daily. Patient states that 'legs feel very heavy' and he falls due to this, can sense seconds before he is about to fall due to weakness.    Frequent falls are likely worsening of Parkinson's Disease  - CT head: Widening of the ventricles and sulci consistent with generalized cerebral   atrophy but no CT evidence for acute intracranial pathology.  - CT L spine:  no acute fracture, central spinal stenosis, foraminal narrowing, multilevel disc bulge   - CT pelvis :  No acute osseous abnormality  - CTA dissection: No evidence of aortic dissection. Ascending aortic aneurysm 4.5 cm. Aortic arch and descending thoracic  aorta within normal limits in size. There is infrarenal abdominal aortic ectasia up to 2.5 cm.  patient continued to take simenet 25/250 7am, 11am, 3pm, 7pm, and added simenet 25/100 8 pm.  MRI L/S non con revealed foraminal stenosis, and neurosurgery was consulted, but patient d/c'd to rehab before follow up.   patient will follow up with neuro, neurosurg for back pain as outpatient, and CT surgery and vascular surgery as outpatient for ascending aortic aneurysm.

## 2018-09-29 ENCOUNTER — TRANSCRIPTION ENCOUNTER (OUTPATIENT)
Age: 68
End: 2018-09-29

## 2018-09-29 PROBLEM — M10.9 GOUT, UNSPECIFIED: Chronic | Status: ACTIVE | Noted: 2018-09-26

## 2018-09-29 PROBLEM — E78.5 HYPERLIPIDEMIA, UNSPECIFIED: Chronic | Status: ACTIVE | Noted: 2018-09-26

## 2018-09-29 PROBLEM — G20 PARKINSON'S DISEASE: Chronic | Status: ACTIVE | Noted: 2018-09-26

## 2018-09-29 PROBLEM — N41.9 INFLAMMATORY DISEASE OF PROSTATE, UNSPECIFIED: Chronic | Status: ACTIVE | Noted: 2018-09-26

## 2018-09-29 PROBLEM — H26.9 UNSPECIFIED CATARACT: Chronic | Status: ACTIVE | Noted: 2018-09-26

## 2018-09-29 PROBLEM — I10 ESSENTIAL (PRIMARY) HYPERTENSION: Chronic | Status: ACTIVE | Noted: 2018-09-26

## 2018-09-29 LAB
ALBUMIN SERPL ELPH-MCNC: 3.8 G/DL — SIGNIFICANT CHANGE UP (ref 3.5–5.2)
ALP SERPL-CCNC: 111 U/L — SIGNIFICANT CHANGE UP (ref 30–115)
ALT FLD-CCNC: <5 U/L — SIGNIFICANT CHANGE UP (ref 0–41)
ANION GAP SERPL CALC-SCNC: 17 MMOL/L — HIGH (ref 7–14)
AST SERPL-CCNC: 16 U/L — SIGNIFICANT CHANGE UP (ref 0–41)
BASOPHILS # BLD AUTO: 0.04 K/UL — SIGNIFICANT CHANGE UP (ref 0–0.2)
BASOPHILS NFR BLD AUTO: 0.4 % — SIGNIFICANT CHANGE UP (ref 0–1)
BILIRUB SERPL-MCNC: 0.5 MG/DL — SIGNIFICANT CHANGE UP (ref 0.2–1.2)
BUN SERPL-MCNC: 26 MG/DL — HIGH (ref 10–20)
CALCIUM SERPL-MCNC: 8.8 MG/DL — SIGNIFICANT CHANGE UP (ref 8.5–10.1)
CHLORIDE SERPL-SCNC: 100 MMOL/L — SIGNIFICANT CHANGE UP (ref 98–110)
CO2 SERPL-SCNC: 23 MMOL/L — SIGNIFICANT CHANGE UP (ref 17–32)
CREAT SERPL-MCNC: 1.4 MG/DL — SIGNIFICANT CHANGE UP (ref 0.7–1.5)
EOSINOPHIL # BLD AUTO: 0.11 K/UL — SIGNIFICANT CHANGE UP (ref 0–0.7)
EOSINOPHIL NFR BLD AUTO: 1 % — SIGNIFICANT CHANGE UP (ref 0–8)
GLUCOSE BLDC GLUCOMTR-MCNC: 107 MG/DL — HIGH (ref 70–99)
GLUCOSE SERPL-MCNC: 113 MG/DL — HIGH (ref 70–99)
HCT VFR BLD CALC: 43.1 % — SIGNIFICANT CHANGE UP (ref 42–52)
HGB BLD-MCNC: 13.9 G/DL — LOW (ref 14–18)
IMM GRANULOCYTES NFR BLD AUTO: 0.5 % — HIGH (ref 0.1–0.3)
LYMPHOCYTES # BLD AUTO: 0.58 K/UL — LOW (ref 1.2–3.4)
LYMPHOCYTES # BLD AUTO: 5.2 % — LOW (ref 20.5–51.1)
MAGNESIUM SERPL-MCNC: 2.1 MG/DL — SIGNIFICANT CHANGE UP (ref 1.8–2.4)
MCHC RBC-ENTMCNC: 27.4 PG — SIGNIFICANT CHANGE UP (ref 27–31)
MCHC RBC-ENTMCNC: 32.3 G/DL — SIGNIFICANT CHANGE UP (ref 32–37)
MCV RBC AUTO: 84.8 FL — SIGNIFICANT CHANGE UP (ref 80–94)
MONOCYTES # BLD AUTO: 0.57 K/UL — SIGNIFICANT CHANGE UP (ref 0.1–0.6)
MONOCYTES NFR BLD AUTO: 5.2 % — SIGNIFICANT CHANGE UP (ref 1.7–9.3)
NEUTROPHILS # BLD AUTO: 9.71 K/UL — HIGH (ref 1.4–6.5)
NEUTROPHILS NFR BLD AUTO: 87.7 % — HIGH (ref 42.2–75.2)
PLATELET # BLD AUTO: 233 K/UL — SIGNIFICANT CHANGE UP (ref 130–400)
POTASSIUM SERPL-MCNC: 4.4 MMOL/L — SIGNIFICANT CHANGE UP (ref 3.5–5)
POTASSIUM SERPL-SCNC: 4.4 MMOL/L — SIGNIFICANT CHANGE UP (ref 3.5–5)
PROT SERPL-MCNC: 6.8 G/DL — SIGNIFICANT CHANGE UP (ref 6–8)
RBC # BLD: 5.08 M/UL — SIGNIFICANT CHANGE UP (ref 4.7–6.1)
RBC # FLD: 12.9 % — SIGNIFICANT CHANGE UP (ref 11.5–14.5)
SODIUM SERPL-SCNC: 140 MMOL/L — SIGNIFICANT CHANGE UP (ref 135–146)
WBC # BLD: 11.06 K/UL — HIGH (ref 4.8–10.8)
WBC # FLD AUTO: 11.06 K/UL — HIGH (ref 4.8–10.8)

## 2018-09-29 RX ADMIN — ENOXAPARIN SODIUM 40 MILLIGRAM(S): 100 INJECTION SUBCUTANEOUS at 12:38

## 2018-09-29 RX ADMIN — PRAMIPEXOLE DIHYDROCHLORIDE 0.5 MILLIGRAM(S): 0.12 TABLET ORAL at 12:28

## 2018-09-29 RX ADMIN — CARBIDOPA AND LEVODOPA 1 TABLET(S): 25; 100 TABLET ORAL at 12:30

## 2018-09-29 RX ADMIN — PRAMIPEXOLE DIHYDROCHLORIDE 0.5 MILLIGRAM(S): 0.12 TABLET ORAL at 17:01

## 2018-09-29 RX ADMIN — CARBIDOPA AND LEVODOPA 1 TABLET(S): 25; 100 TABLET ORAL at 06:23

## 2018-09-29 RX ADMIN — ATORVASTATIN CALCIUM 40 MILLIGRAM(S): 80 TABLET, FILM COATED ORAL at 22:35

## 2018-09-29 RX ADMIN — PRAMIPEXOLE DIHYDROCHLORIDE 0.5 MILLIGRAM(S): 0.12 TABLET ORAL at 00:42

## 2018-09-29 RX ADMIN — CARBIDOPA AND LEVODOPA 1 TABLET(S): 25; 100 TABLET ORAL at 12:31

## 2018-09-29 RX ADMIN — TAMSULOSIN HYDROCHLORIDE 0.8 MILLIGRAM(S): 0.4 CAPSULE ORAL at 22:35

## 2018-09-29 RX ADMIN — Medication 5 MILLIGRAM(S): at 06:23

## 2018-09-29 RX ADMIN — PRAMIPEXOLE DIHYDROCHLORIDE 0.5 MILLIGRAM(S): 0.12 TABLET ORAL at 06:23

## 2018-09-29 RX ADMIN — Medication 650 MILLIGRAM(S): at 10:55

## 2018-09-29 RX ADMIN — CARBIDOPA AND LEVODOPA 1 TABLET(S): 25; 100 TABLET ORAL at 00:42

## 2018-09-29 RX ADMIN — Medication 40 MILLIGRAM(S): at 06:23

## 2018-09-29 RX ADMIN — Medication 0.6 MILLIGRAM(S): at 12:36

## 2018-09-29 RX ADMIN — Medication 81 MILLIGRAM(S): at 12:36

## 2018-09-29 RX ADMIN — CARBIDOPA AND LEVODOPA 1 TABLET(S): 25; 100 TABLET ORAL at 17:01

## 2018-09-29 RX ADMIN — Medication 650 MILLIGRAM(S): at 14:04

## 2018-09-29 RX ADMIN — Medication 30 MILLIGRAM(S): at 06:23

## 2018-09-29 NOTE — DISCHARGE NOTE ADULT - CARE PLAN
Principal Discharge DX:	Parkinson disease  Goal:	Improve function  Assessment and plan of treatment:	c/w PT/OT; Neurology OP f/u  Secondary Diagnosis:	Cataract  Goal:	Improve vision  Assessment and plan of treatment:	f/u with Ophthomologist  Secondary Diagnosis:	Essential hypertension  Goal:	BP < 140/80  Assessment and plan of treatment:	c/w enalapril 4mg daily, nifedipine xL 30mg daily

## 2018-09-29 NOTE — DISCHARGE NOTE ADULT - CARE PROVIDER_API CALL
Kali Mancilla), Vascular Surgery  [address]  [address]  [place] [address]  Phone: [phone]  Fax: [phone]    Liliya Guthrie), Thoracic and Cardiac Surgery  [address] [place] [address]  Phone: [phone]  Fax: [phone]    Sarika Villasenor), Surgical Physicians  [address]  [address]  [place] [address]  Phone: [phone]  Fax: [phone]    Fausto Snell), EEGEpilepsy; Neurology  [address]  [address]  [place] [address]  Phone: [phone]  Fax: [phone]

## 2018-09-29 NOTE — DISCHARGE NOTE ADULT - MEDICATION SUMMARY - MEDICATIONS TO TAKE
I will START or STAY ON the medications listed below when I get home from the hospital:    predniSONE 10 mg oral tablet  -- 2 tab(s) by mouth on 10/13, then 1 tab on 10/14, then stop  -- Indication: For taper    aspirin 81 mg oral tablet, chewable  -- 1 tab(s) by mouth once a day  -- Indication: For REHAB OF PARKINSONS WITH DECLINE    acetaminophen 325 mg oral tablet  -- 2 tab(s) by mouth every 6 hours, As needed, Mild Pain (1 - 3)  -- Indication: For Pain    enalapril 5 mg oral tablet  -- 1 tab(s) by mouth once a day  -- Indication: For htn    tamsulosin 0.4 mg oral capsule  -- 2 cap(s) by mouth once a day (at bedtime)  -- Indication: For BPH    gabapentin 100 mg oral capsule  -- 1 cap(s) by mouth 3 times a day  -- Indication: For REHAB OF PARKINSONS WITH DECLINE    colchicine 0.6 mg oral tablet  -- 1 tab(s) by mouth once a day  -- Indication: For REHAB OF PARKINSONS WITH DECLINE    atorvastatin 40 mg oral tablet  -- 1 tab(s) by mouth once a day (at bedtime)  -- Indication: For hyperlipidemia    carbidopa-levodopa 25 mg-250 mg oral tablet  -- 1 tab(s) by mouth 4 times a day  -- Indication: For REHAB OF PARKINSONS WITH DECLINE    pramipexole 0.5 mg oral tablet  -- 1 tab(s) by mouth 4 times a day  -- Indication: For REHAB OF PARKINSONS WITH DECLINE    carbidopa-levodopa 25 mg-100 mg oral tablet, extended release  -- 1 tab(s) by mouth   -- Indication: For REHAB OF PARKINSONS WITH DECLINE    NIFEdipine 30 mg oral tablet, extended release  -- 1 tab(s) by mouth once a day  -- Indication: For htn    furosemide 40 mg oral tablet  -- 1 tab(s) by mouth once a day  -- Indication: For htn

## 2018-09-29 NOTE — DISCHARGE NOTE ADULT - PATIENT PORTAL LINK FT
You can access the [facility] Patient Portal, offered by [facility], by registering with the following website: [email or web address]

## 2018-09-29 NOTE — DISCHARGE NOTE ADULT - HOSPITAL COURSE
HPI: 68M with PMH of parkinsons, HTN, DLD, gout, prostatitis, back pain, b/l cataracts (surgery scheduled Oct 6th, 2018) presents with frequent falls, daughter reports over last 4-6 weeks falls have become daily. Patient states that 'legs feel very heavy' and he falls due to this, can sense seconds before he is about to fall due to weakness. Frequent falls are likely worsening of Parkinson's Disease. CT head: Widening of the ventricles and sulci consistent with generalized cerebral atrophy but no CT evidence for acute intracranial pathology. CT L spine:  no acute fracture, central spinal stenosis, foraminal narrowing, multilevel disc bulge. CT pelvis :  No acute osseous abnormality. CTA dissection: No evidence of aortic dissection. Ascending aortic aneurysm 4.5 cm. Aortic arch and descending thoracic  aorta within normal limits in size. There is infrarenal abdominal aortic ectasia up to 2.5 cm. Patient continued to take simenet 25/250 7am, 11am, 3pm, 7pm, and added simenet 25/100 8 pm. MRI L/S non con revealed foraminal stenosis, and neurosurgery was consulted, but patient d/c'd to rehab before follow up. Patient will follow up with neuro, neurosurg for back pain as outpatient, and CT surgery and vascular surgery as outpatient for ascending aortic aneurysm.     Prior functional status: Ambulates with walker, dependent in ADLs.    Admission Physical Exam:  Vital signs stable. Afebrile  Gen: Alert oriented x 3, NAD  Head: Atraumatic, normocephalic  Cardio: S1, S2  Lungs: clear  Abd: soft, NT  Extremities: No calf tenderness, +tremors of arms. Functional ROM   Neuro: +tremors bilat arms  Cranial nerves: grossly intact  Motor Power: Functional ROM   Sensation: intact    Hospital Course: The patient was admitted to the acute inpatient rehab unit presenting with a decline in functional status. Neurology followed the pt and recommended starting robaxin PRN, neurontin 100mg tid and prednisone taper. The patient participated in three hours of multidisciplinary therapy 5-6 days per week. The patient was continued on all home medications or equivalent alternatives as deemed appropriate. The patient received prophylactic anticoagulation medication and was monitored closely with no complications. During the stay on the inpatient unit, the patient showed as much progress as had been anticipated and was cleared for discharge by a multidisciplinary team.    Discharge functional status: Min A / CG with bed mobility, transfers to , Toileting/hygiene and functional mobility; FIM 4. CG with toilet transfer. Able to ambulate 150ft x 2 with RW and CG; FIM 4. Able to navigate 12 steps with CG and two hand rails. Max A with WC mobility for 10 feet; FIM 1.    Discharge disposition: Home with home care

## 2018-09-29 NOTE — DISCHARGE NOTE ADULT - MEDICATION SUMMARY - MEDICATIONS TO STOP TAKING
I will STOP taking the medications listed below when I get home from the hospital:    enoxaparin  -- 40 milligram(s) subcutaneous once a day

## 2018-09-29 NOTE — DISCHARGE NOTE ADULT - CARE PROVIDERS DIRECT ADDRESSES
,[email or web address],DirectAddress_Unknown,DirectAddress_Unknown,[email or web address]

## 2018-09-29 NOTE — DISCHARGE NOTE ADULT - PLAN OF CARE
Improve function c/w PT/OT; Neurology OP f/u Improve vision f/u with Ophthomologist BP < 140/80 c/w enalapril 4mg daily, nifedipine xL 30mg daily

## 2018-09-30 RX ADMIN — CARBIDOPA AND LEVODOPA 1 TABLET(S): 25; 100 TABLET ORAL at 13:13

## 2018-09-30 RX ADMIN — CARBIDOPA AND LEVODOPA 1 TABLET(S): 25; 100 TABLET ORAL at 11:42

## 2018-09-30 RX ADMIN — PRAMIPEXOLE DIHYDROCHLORIDE 0.5 MILLIGRAM(S): 0.12 TABLET ORAL at 00:57

## 2018-09-30 RX ADMIN — PRAMIPEXOLE DIHYDROCHLORIDE 0.5 MILLIGRAM(S): 0.12 TABLET ORAL at 06:33

## 2018-09-30 RX ADMIN — TAMSULOSIN HYDROCHLORIDE 0.8 MILLIGRAM(S): 0.4 CAPSULE ORAL at 21:29

## 2018-09-30 RX ADMIN — Medication 81 MILLIGRAM(S): at 11:41

## 2018-09-30 RX ADMIN — Medication 5 MILLIGRAM(S): at 06:33

## 2018-09-30 RX ADMIN — CARBIDOPA AND LEVODOPA 1 TABLET(S): 25; 100 TABLET ORAL at 17:06

## 2018-09-30 RX ADMIN — Medication 30 MILLIGRAM(S): at 06:33

## 2018-09-30 RX ADMIN — PRAMIPEXOLE DIHYDROCHLORIDE 0.5 MILLIGRAM(S): 0.12 TABLET ORAL at 11:41

## 2018-09-30 RX ADMIN — CARBIDOPA AND LEVODOPA 1 TABLET(S): 25; 100 TABLET ORAL at 06:33

## 2018-09-30 RX ADMIN — Medication 0.6 MILLIGRAM(S): at 11:50

## 2018-09-30 RX ADMIN — Medication 40 MILLIGRAM(S): at 06:33

## 2018-09-30 RX ADMIN — PRAMIPEXOLE DIHYDROCHLORIDE 0.5 MILLIGRAM(S): 0.12 TABLET ORAL at 17:08

## 2018-09-30 RX ADMIN — ENOXAPARIN SODIUM 40 MILLIGRAM(S): 100 INJECTION SUBCUTANEOUS at 11:44

## 2018-09-30 RX ADMIN — CARBIDOPA AND LEVODOPA 1 TABLET(S): 25; 100 TABLET ORAL at 00:57

## 2018-09-30 RX ADMIN — ATORVASTATIN CALCIUM 40 MILLIGRAM(S): 80 TABLET, FILM COATED ORAL at 21:29

## 2018-10-01 LAB
ANION GAP SERPL CALC-SCNC: 15 MMOL/L — HIGH (ref 7–14)
BUN SERPL-MCNC: 31 MG/DL — HIGH (ref 10–20)
CALCIUM SERPL-MCNC: 9 MG/DL — SIGNIFICANT CHANGE UP (ref 8.5–10.1)
CHLORIDE SERPL-SCNC: 100 MMOL/L — SIGNIFICANT CHANGE UP (ref 98–110)
CO2 SERPL-SCNC: 24 MMOL/L — SIGNIFICANT CHANGE UP (ref 17–32)
CREAT SERPL-MCNC: 1.4 MG/DL — SIGNIFICANT CHANGE UP (ref 0.7–1.5)
GLUCOSE SERPL-MCNC: 101 MG/DL — HIGH (ref 70–99)
HCT VFR BLD CALC: 42.7 % — SIGNIFICANT CHANGE UP (ref 42–52)
HGB BLD-MCNC: 13.9 G/DL — LOW (ref 14–18)
MCHC RBC-ENTMCNC: 27.6 PG — SIGNIFICANT CHANGE UP (ref 27–31)
MCHC RBC-ENTMCNC: 32.6 G/DL — SIGNIFICANT CHANGE UP (ref 32–37)
MCV RBC AUTO: 84.9 FL — SIGNIFICANT CHANGE UP (ref 80–94)
NRBC # BLD: 0 /100 WBCS — SIGNIFICANT CHANGE UP (ref 0–0)
PLATELET # BLD AUTO: 203 K/UL — SIGNIFICANT CHANGE UP (ref 130–400)
POTASSIUM SERPL-MCNC: 4 MMOL/L — SIGNIFICANT CHANGE UP (ref 3.5–5)
POTASSIUM SERPL-SCNC: 4 MMOL/L — SIGNIFICANT CHANGE UP (ref 3.5–5)
RBC # BLD: 5.03 M/UL — SIGNIFICANT CHANGE UP (ref 4.7–6.1)
RBC # FLD: 13 % — SIGNIFICANT CHANGE UP (ref 11.5–14.5)
SODIUM SERPL-SCNC: 139 MMOL/L — SIGNIFICANT CHANGE UP (ref 135–146)
WBC # BLD: 5.46 K/UL — SIGNIFICANT CHANGE UP (ref 4.8–10.8)
WBC # FLD AUTO: 5.46 K/UL — SIGNIFICANT CHANGE UP (ref 4.8–10.8)

## 2018-10-01 RX ADMIN — PRAMIPEXOLE DIHYDROCHLORIDE 0.5 MILLIGRAM(S): 0.12 TABLET ORAL at 23:04

## 2018-10-01 RX ADMIN — CARBIDOPA AND LEVODOPA 1 TABLET(S): 25; 100 TABLET ORAL at 06:05

## 2018-10-01 RX ADMIN — Medication 650 MILLIGRAM(S): at 14:00

## 2018-10-01 RX ADMIN — CARBIDOPA AND LEVODOPA 1 TABLET(S): 25; 100 TABLET ORAL at 00:00

## 2018-10-01 RX ADMIN — Medication 0.6 MILLIGRAM(S): at 12:32

## 2018-10-01 RX ADMIN — CARBIDOPA AND LEVODOPA 1 TABLET(S): 25; 100 TABLET ORAL at 23:04

## 2018-10-01 RX ADMIN — PRAMIPEXOLE DIHYDROCHLORIDE 0.5 MILLIGRAM(S): 0.12 TABLET ORAL at 00:01

## 2018-10-01 RX ADMIN — PRAMIPEXOLE DIHYDROCHLORIDE 0.5 MILLIGRAM(S): 0.12 TABLET ORAL at 17:22

## 2018-10-01 RX ADMIN — Medication 650 MILLIGRAM(S): at 13:49

## 2018-10-01 RX ADMIN — Medication 40 MILLIGRAM(S): at 06:05

## 2018-10-01 RX ADMIN — CARBIDOPA AND LEVODOPA 1 TABLET(S): 25; 100 TABLET ORAL at 17:23

## 2018-10-01 RX ADMIN — CARBIDOPA AND LEVODOPA 1 TABLET(S): 25; 100 TABLET ORAL at 12:27

## 2018-10-01 RX ADMIN — Medication 30 MILLIGRAM(S): at 06:05

## 2018-10-01 RX ADMIN — TAMSULOSIN HYDROCHLORIDE 0.8 MILLIGRAM(S): 0.4 CAPSULE ORAL at 21:17

## 2018-10-01 RX ADMIN — CARBIDOPA AND LEVODOPA 1 TABLET(S): 25; 100 TABLET ORAL at 12:31

## 2018-10-01 RX ADMIN — Medication 5 MILLIGRAM(S): at 06:05

## 2018-10-01 RX ADMIN — Medication 81 MILLIGRAM(S): at 12:27

## 2018-10-01 RX ADMIN — PRAMIPEXOLE DIHYDROCHLORIDE 0.5 MILLIGRAM(S): 0.12 TABLET ORAL at 14:10

## 2018-10-01 RX ADMIN — ENOXAPARIN SODIUM 40 MILLIGRAM(S): 100 INJECTION SUBCUTANEOUS at 12:32

## 2018-10-01 RX ADMIN — ATORVASTATIN CALCIUM 40 MILLIGRAM(S): 80 TABLET, FILM COATED ORAL at 21:17

## 2018-10-01 RX ADMIN — PRAMIPEXOLE DIHYDROCHLORIDE 0.5 MILLIGRAM(S): 0.12 TABLET ORAL at 06:05

## 2018-10-02 DIAGNOSIS — H26.9 UNSPECIFIED CATARACT: ICD-10-CM

## 2018-10-02 DIAGNOSIS — I10 ESSENTIAL (PRIMARY) HYPERTENSION: ICD-10-CM

## 2018-10-02 DIAGNOSIS — E11.9 TYPE 2 DIABETES MELLITUS WITHOUT COMPLICATIONS: ICD-10-CM

## 2018-10-02 DIAGNOSIS — M25.551 PAIN IN RIGHT HIP: ICD-10-CM

## 2018-10-02 DIAGNOSIS — N40.0 BENIGN PROSTATIC HYPERPLASIA WITHOUT LOWER URINARY TRACT SYMPTOMS: ICD-10-CM

## 2018-10-02 DIAGNOSIS — R35.0 FREQUENCY OF MICTURITION: ICD-10-CM

## 2018-10-02 DIAGNOSIS — M99.73 CONNECTIVE TISSUE AND DISC STENOSIS OF INTERVERTEBRAL FORAMINA OF LUMBAR REGION: ICD-10-CM

## 2018-10-02 DIAGNOSIS — M25.562 PAIN IN LEFT KNEE: ICD-10-CM

## 2018-10-02 DIAGNOSIS — E78.5 HYPERLIPIDEMIA, UNSPECIFIED: ICD-10-CM

## 2018-10-02 DIAGNOSIS — E04.1 NONTOXIC SINGLE THYROID NODULE: ICD-10-CM

## 2018-10-02 DIAGNOSIS — G20 PARKINSON'S DISEASE: ICD-10-CM

## 2018-10-02 DIAGNOSIS — I72.2 ANEURYSM OF RENAL ARTERY: ICD-10-CM

## 2018-10-02 DIAGNOSIS — M10.9 GOUT, UNSPECIFIED: ICD-10-CM

## 2018-10-02 DIAGNOSIS — I71.4 ABDOMINAL AORTIC ANEURYSM, WITHOUT RUPTURE: ICD-10-CM

## 2018-10-02 DIAGNOSIS — N41.9 INFLAMMATORY DISEASE OF PROSTATE, UNSPECIFIED: ICD-10-CM

## 2018-10-02 DIAGNOSIS — M25.561 PAIN IN RIGHT KNEE: ICD-10-CM

## 2018-10-02 RX ADMIN — Medication 30 MILLIGRAM(S): at 05:41

## 2018-10-02 RX ADMIN — Medication 5 MILLIGRAM(S): at 05:41

## 2018-10-02 RX ADMIN — CARBIDOPA AND LEVODOPA 1 TABLET(S): 25; 100 TABLET ORAL at 12:23

## 2018-10-02 RX ADMIN — ENOXAPARIN SODIUM 40 MILLIGRAM(S): 100 INJECTION SUBCUTANEOUS at 12:25

## 2018-10-02 RX ADMIN — Medication 0.6 MILLIGRAM(S): at 12:27

## 2018-10-02 RX ADMIN — ATORVASTATIN CALCIUM 40 MILLIGRAM(S): 80 TABLET, FILM COATED ORAL at 21:48

## 2018-10-02 RX ADMIN — CARBIDOPA AND LEVODOPA 1 TABLET(S): 25; 100 TABLET ORAL at 12:30

## 2018-10-02 RX ADMIN — Medication 81 MILLIGRAM(S): at 12:24

## 2018-10-02 RX ADMIN — PRAMIPEXOLE DIHYDROCHLORIDE 0.5 MILLIGRAM(S): 0.12 TABLET ORAL at 17:32

## 2018-10-02 RX ADMIN — TAMSULOSIN HYDROCHLORIDE 0.8 MILLIGRAM(S): 0.4 CAPSULE ORAL at 21:48

## 2018-10-02 RX ADMIN — CARBIDOPA AND LEVODOPA 1 TABLET(S): 25; 100 TABLET ORAL at 17:33

## 2018-10-02 RX ADMIN — Medication 40 MILLIGRAM(S): at 05:41

## 2018-10-02 RX ADMIN — CARBIDOPA AND LEVODOPA 1 TABLET(S): 25; 100 TABLET ORAL at 05:42

## 2018-10-02 RX ADMIN — CARBIDOPA AND LEVODOPA 1 TABLET(S): 25; 100 TABLET ORAL at 23:25

## 2018-10-02 RX ADMIN — PRAMIPEXOLE DIHYDROCHLORIDE 0.5 MILLIGRAM(S): 0.12 TABLET ORAL at 12:23

## 2018-10-02 RX ADMIN — PRAMIPEXOLE DIHYDROCHLORIDE 0.5 MILLIGRAM(S): 0.12 TABLET ORAL at 05:41

## 2018-10-02 RX ADMIN — PRAMIPEXOLE DIHYDROCHLORIDE 0.5 MILLIGRAM(S): 0.12 TABLET ORAL at 23:25

## 2018-10-03 RX ADMIN — CARBIDOPA AND LEVODOPA 1 TABLET(S): 25; 100 TABLET ORAL at 11:45

## 2018-10-03 RX ADMIN — ENOXAPARIN SODIUM 40 MILLIGRAM(S): 100 INJECTION SUBCUTANEOUS at 11:46

## 2018-10-03 RX ADMIN — Medication 5 MILLIGRAM(S): at 06:12

## 2018-10-03 RX ADMIN — PRAMIPEXOLE DIHYDROCHLORIDE 0.5 MILLIGRAM(S): 0.12 TABLET ORAL at 17:54

## 2018-10-03 RX ADMIN — PRAMIPEXOLE DIHYDROCHLORIDE 0.5 MILLIGRAM(S): 0.12 TABLET ORAL at 11:46

## 2018-10-03 RX ADMIN — Medication 0.6 MILLIGRAM(S): at 11:47

## 2018-10-03 RX ADMIN — TAMSULOSIN HYDROCHLORIDE 0.8 MILLIGRAM(S): 0.4 CAPSULE ORAL at 21:56

## 2018-10-03 RX ADMIN — Medication 30 MILLIGRAM(S): at 06:12

## 2018-10-03 RX ADMIN — Medication 40 MILLIGRAM(S): at 06:12

## 2018-10-03 RX ADMIN — ATORVASTATIN CALCIUM 40 MILLIGRAM(S): 80 TABLET, FILM COATED ORAL at 21:55

## 2018-10-03 RX ADMIN — CARBIDOPA AND LEVODOPA 1 TABLET(S): 25; 100 TABLET ORAL at 06:12

## 2018-10-03 RX ADMIN — Medication 81 MILLIGRAM(S): at 11:45

## 2018-10-03 RX ADMIN — CARBIDOPA AND LEVODOPA 1 TABLET(S): 25; 100 TABLET ORAL at 17:54

## 2018-10-03 RX ADMIN — PRAMIPEXOLE DIHYDROCHLORIDE 0.5 MILLIGRAM(S): 0.12 TABLET ORAL at 06:12

## 2018-10-04 RX ADMIN — CARBIDOPA AND LEVODOPA 1 TABLET(S): 25; 100 TABLET ORAL at 11:55

## 2018-10-04 RX ADMIN — ENOXAPARIN SODIUM 40 MILLIGRAM(S): 100 INJECTION SUBCUTANEOUS at 11:54

## 2018-10-04 RX ADMIN — Medication 0.6 MILLIGRAM(S): at 11:56

## 2018-10-04 RX ADMIN — CARBIDOPA AND LEVODOPA 1 TABLET(S): 25; 100 TABLET ORAL at 02:16

## 2018-10-04 RX ADMIN — Medication 5 MILLIGRAM(S): at 06:19

## 2018-10-04 RX ADMIN — Medication 40 MILLIGRAM(S): at 06:19

## 2018-10-04 RX ADMIN — PRAMIPEXOLE DIHYDROCHLORIDE 0.5 MILLIGRAM(S): 0.12 TABLET ORAL at 06:19

## 2018-10-04 RX ADMIN — PRAMIPEXOLE DIHYDROCHLORIDE 0.5 MILLIGRAM(S): 0.12 TABLET ORAL at 11:55

## 2018-10-04 RX ADMIN — Medication 81 MILLIGRAM(S): at 11:55

## 2018-10-04 RX ADMIN — ATORVASTATIN CALCIUM 40 MILLIGRAM(S): 80 TABLET, FILM COATED ORAL at 21:28

## 2018-10-04 RX ADMIN — PRAMIPEXOLE DIHYDROCHLORIDE 0.5 MILLIGRAM(S): 0.12 TABLET ORAL at 02:16

## 2018-10-04 RX ADMIN — CARBIDOPA AND LEVODOPA 1 TABLET(S): 25; 100 TABLET ORAL at 23:14

## 2018-10-04 RX ADMIN — CARBIDOPA AND LEVODOPA 1 TABLET(S): 25; 100 TABLET ORAL at 11:54

## 2018-10-04 RX ADMIN — TAMSULOSIN HYDROCHLORIDE 0.8 MILLIGRAM(S): 0.4 CAPSULE ORAL at 21:28

## 2018-10-04 RX ADMIN — PRAMIPEXOLE DIHYDROCHLORIDE 0.5 MILLIGRAM(S): 0.12 TABLET ORAL at 17:26

## 2018-10-04 RX ADMIN — PRAMIPEXOLE DIHYDROCHLORIDE 0.5 MILLIGRAM(S): 0.12 TABLET ORAL at 23:14

## 2018-10-04 RX ADMIN — Medication 30 MILLIGRAM(S): at 06:18

## 2018-10-04 RX ADMIN — CARBIDOPA AND LEVODOPA 1 TABLET(S): 25; 100 TABLET ORAL at 06:19

## 2018-10-04 RX ADMIN — CARBIDOPA AND LEVODOPA 1 TABLET(S): 25; 100 TABLET ORAL at 17:27

## 2018-10-05 LAB — GLUCOSE BLDC GLUCOMTR-MCNC: 109 MG/DL — HIGH (ref 70–99)

## 2018-10-05 RX ADMIN — PRAMIPEXOLE DIHYDROCHLORIDE 0.5 MILLIGRAM(S): 0.12 TABLET ORAL at 11:41

## 2018-10-05 RX ADMIN — Medication 81 MILLIGRAM(S): at 11:39

## 2018-10-05 RX ADMIN — CARBIDOPA AND LEVODOPA 1 TABLET(S): 25; 100 TABLET ORAL at 11:40

## 2018-10-05 RX ADMIN — ATORVASTATIN CALCIUM 40 MILLIGRAM(S): 80 TABLET, FILM COATED ORAL at 21:13

## 2018-10-05 RX ADMIN — TAMSULOSIN HYDROCHLORIDE 0.8 MILLIGRAM(S): 0.4 CAPSULE ORAL at 21:13

## 2018-10-05 RX ADMIN — Medication 0.6 MILLIGRAM(S): at 11:39

## 2018-10-05 RX ADMIN — Medication 40 MILLIGRAM(S): at 06:03

## 2018-10-05 RX ADMIN — CARBIDOPA AND LEVODOPA 1 TABLET(S): 25; 100 TABLET ORAL at 11:38

## 2018-10-05 RX ADMIN — Medication 30 MILLIGRAM(S): at 06:03

## 2018-10-05 RX ADMIN — ENOXAPARIN SODIUM 40 MILLIGRAM(S): 100 INJECTION SUBCUTANEOUS at 11:41

## 2018-10-05 RX ADMIN — Medication 5 MILLIGRAM(S): at 06:03

## 2018-10-05 RX ADMIN — CARBIDOPA AND LEVODOPA 1 TABLET(S): 25; 100 TABLET ORAL at 06:03

## 2018-10-05 RX ADMIN — PRAMIPEXOLE DIHYDROCHLORIDE 0.5 MILLIGRAM(S): 0.12 TABLET ORAL at 06:03

## 2018-10-05 RX ADMIN — CARBIDOPA AND LEVODOPA 1 TABLET(S): 25; 100 TABLET ORAL at 17:58

## 2018-10-05 RX ADMIN — PRAMIPEXOLE DIHYDROCHLORIDE 0.5 MILLIGRAM(S): 0.12 TABLET ORAL at 17:59

## 2018-10-06 RX ADMIN — CARBIDOPA AND LEVODOPA 1 TABLET(S): 25; 100 TABLET ORAL at 23:09

## 2018-10-06 RX ADMIN — PRAMIPEXOLE DIHYDROCHLORIDE 0.5 MILLIGRAM(S): 0.12 TABLET ORAL at 23:09

## 2018-10-06 RX ADMIN — CARBIDOPA AND LEVODOPA 1 TABLET(S): 25; 100 TABLET ORAL at 11:43

## 2018-10-06 RX ADMIN — ATORVASTATIN CALCIUM 40 MILLIGRAM(S): 80 TABLET, FILM COATED ORAL at 21:33

## 2018-10-06 RX ADMIN — CARBIDOPA AND LEVODOPA 1 TABLET(S): 25; 100 TABLET ORAL at 17:10

## 2018-10-06 RX ADMIN — Medication 0.6 MILLIGRAM(S): at 11:42

## 2018-10-06 RX ADMIN — PRAMIPEXOLE DIHYDROCHLORIDE 0.5 MILLIGRAM(S): 0.12 TABLET ORAL at 00:30

## 2018-10-06 RX ADMIN — Medication 40 MILLIGRAM(S): at 05:37

## 2018-10-06 RX ADMIN — Medication 30 MILLIGRAM(S): at 05:37

## 2018-10-06 RX ADMIN — PRAMIPEXOLE DIHYDROCHLORIDE 0.5 MILLIGRAM(S): 0.12 TABLET ORAL at 05:37

## 2018-10-06 RX ADMIN — CARBIDOPA AND LEVODOPA 1 TABLET(S): 25; 100 TABLET ORAL at 00:30

## 2018-10-06 RX ADMIN — PRAMIPEXOLE DIHYDROCHLORIDE 0.5 MILLIGRAM(S): 0.12 TABLET ORAL at 17:10

## 2018-10-06 RX ADMIN — Medication 5 MILLIGRAM(S): at 05:36

## 2018-10-06 RX ADMIN — PRAMIPEXOLE DIHYDROCHLORIDE 0.5 MILLIGRAM(S): 0.12 TABLET ORAL at 11:43

## 2018-10-06 RX ADMIN — Medication 81 MILLIGRAM(S): at 11:43

## 2018-10-06 RX ADMIN — CARBIDOPA AND LEVODOPA 1 TABLET(S): 25; 100 TABLET ORAL at 05:36

## 2018-10-06 RX ADMIN — TAMSULOSIN HYDROCHLORIDE 0.8 MILLIGRAM(S): 0.4 CAPSULE ORAL at 21:33

## 2018-10-06 RX ADMIN — ENOXAPARIN SODIUM 40 MILLIGRAM(S): 100 INJECTION SUBCUTANEOUS at 11:43

## 2018-10-07 RX ADMIN — ENOXAPARIN SODIUM 40 MILLIGRAM(S): 100 INJECTION SUBCUTANEOUS at 11:32

## 2018-10-07 RX ADMIN — PRAMIPEXOLE DIHYDROCHLORIDE 0.5 MILLIGRAM(S): 0.12 TABLET ORAL at 11:32

## 2018-10-07 RX ADMIN — Medication 40 MILLIGRAM(S): at 05:41

## 2018-10-07 RX ADMIN — CARBIDOPA AND LEVODOPA 1 TABLET(S): 25; 100 TABLET ORAL at 11:32

## 2018-10-07 RX ADMIN — Medication 81 MILLIGRAM(S): at 11:32

## 2018-10-07 RX ADMIN — Medication 30 MILLIGRAM(S): at 05:41

## 2018-10-07 RX ADMIN — CARBIDOPA AND LEVODOPA 1 TABLET(S): 25; 100 TABLET ORAL at 05:41

## 2018-10-07 RX ADMIN — PRAMIPEXOLE DIHYDROCHLORIDE 0.5 MILLIGRAM(S): 0.12 TABLET ORAL at 05:41

## 2018-10-07 RX ADMIN — PRAMIPEXOLE DIHYDROCHLORIDE 0.5 MILLIGRAM(S): 0.12 TABLET ORAL at 23:19

## 2018-10-07 RX ADMIN — Medication 0.6 MILLIGRAM(S): at 11:33

## 2018-10-07 RX ADMIN — CARBIDOPA AND LEVODOPA 1 TABLET(S): 25; 100 TABLET ORAL at 23:18

## 2018-10-07 RX ADMIN — ATORVASTATIN CALCIUM 40 MILLIGRAM(S): 80 TABLET, FILM COATED ORAL at 21:33

## 2018-10-07 RX ADMIN — Medication 5 MILLIGRAM(S): at 05:41

## 2018-10-07 RX ADMIN — PRAMIPEXOLE DIHYDROCHLORIDE 0.5 MILLIGRAM(S): 0.12 TABLET ORAL at 17:40

## 2018-10-07 RX ADMIN — TAMSULOSIN HYDROCHLORIDE 0.8 MILLIGRAM(S): 0.4 CAPSULE ORAL at 21:33

## 2018-10-07 RX ADMIN — CARBIDOPA AND LEVODOPA 1 TABLET(S): 25; 100 TABLET ORAL at 17:40

## 2018-10-08 RX ADMIN — PRAMIPEXOLE DIHYDROCHLORIDE 0.5 MILLIGRAM(S): 0.12 TABLET ORAL at 14:41

## 2018-10-08 RX ADMIN — Medication 0.6 MILLIGRAM(S): at 12:12

## 2018-10-08 RX ADMIN — PRAMIPEXOLE DIHYDROCHLORIDE 0.5 MILLIGRAM(S): 0.12 TABLET ORAL at 23:49

## 2018-10-08 RX ADMIN — ATORVASTATIN CALCIUM 40 MILLIGRAM(S): 80 TABLET, FILM COATED ORAL at 21:37

## 2018-10-08 RX ADMIN — ENOXAPARIN SODIUM 40 MILLIGRAM(S): 100 INJECTION SUBCUTANEOUS at 12:12

## 2018-10-08 RX ADMIN — CARBIDOPA AND LEVODOPA 1 TABLET(S): 25; 100 TABLET ORAL at 12:12

## 2018-10-08 RX ADMIN — TAMSULOSIN HYDROCHLORIDE 0.8 MILLIGRAM(S): 0.4 CAPSULE ORAL at 21:37

## 2018-10-08 RX ADMIN — Medication 81 MILLIGRAM(S): at 12:12

## 2018-10-08 RX ADMIN — PRAMIPEXOLE DIHYDROCHLORIDE 0.5 MILLIGRAM(S): 0.12 TABLET ORAL at 17:32

## 2018-10-08 RX ADMIN — CARBIDOPA AND LEVODOPA 1 TABLET(S): 25; 100 TABLET ORAL at 17:32

## 2018-10-08 RX ADMIN — PRAMIPEXOLE DIHYDROCHLORIDE 0.5 MILLIGRAM(S): 0.12 TABLET ORAL at 06:13

## 2018-10-08 RX ADMIN — Medication 40 MILLIGRAM(S): at 06:12

## 2018-10-08 RX ADMIN — Medication 30 MILLIGRAM(S): at 06:12

## 2018-10-08 RX ADMIN — CARBIDOPA AND LEVODOPA 1 TABLET(S): 25; 100 TABLET ORAL at 23:49

## 2018-10-08 RX ADMIN — Medication 5 MILLIGRAM(S): at 06:12

## 2018-10-08 RX ADMIN — CARBIDOPA AND LEVODOPA 1 TABLET(S): 25; 100 TABLET ORAL at 06:12

## 2018-10-09 RX ORDER — CARBIDOPA AND LEVODOPA 25; 100 MG/1; MG/1
1 TABLET ORAL
Qty: 0 | Refills: 0 | Status: DISCONTINUED | OUTPATIENT
Start: 2018-10-09 | End: 2018-10-12

## 2018-10-09 RX ORDER — GABAPENTIN 400 MG/1
100 CAPSULE ORAL THREE TIMES A DAY
Qty: 0 | Refills: 0 | Status: DISCONTINUED | OUTPATIENT
Start: 2018-10-09 | End: 2018-10-12

## 2018-10-09 RX ORDER — METHOCARBAMOL 500 MG/1
750 TABLET, FILM COATED ORAL THREE TIMES A DAY
Qty: 0 | Refills: 0 | Status: DISCONTINUED | OUTPATIENT
Start: 2018-10-09 | End: 2018-10-12

## 2018-10-09 RX ADMIN — Medication 30 MILLIGRAM(S): at 06:14

## 2018-10-09 RX ADMIN — PRAMIPEXOLE DIHYDROCHLORIDE 0.5 MILLIGRAM(S): 0.12 TABLET ORAL at 17:53

## 2018-10-09 RX ADMIN — TAMSULOSIN HYDROCHLORIDE 0.8 MILLIGRAM(S): 0.4 CAPSULE ORAL at 21:11

## 2018-10-09 RX ADMIN — CARBIDOPA AND LEVODOPA 1 TABLET(S): 25; 100 TABLET ORAL at 06:14

## 2018-10-09 RX ADMIN — Medication 0.6 MILLIGRAM(S): at 12:07

## 2018-10-09 RX ADMIN — GABAPENTIN 100 MILLIGRAM(S): 400 CAPSULE ORAL at 21:13

## 2018-10-09 RX ADMIN — CARBIDOPA AND LEVODOPA 1 TABLET(S): 25; 100 TABLET ORAL at 12:00

## 2018-10-09 RX ADMIN — PRAMIPEXOLE DIHYDROCHLORIDE 0.5 MILLIGRAM(S): 0.12 TABLET ORAL at 23:46

## 2018-10-09 RX ADMIN — ENOXAPARIN SODIUM 40 MILLIGRAM(S): 100 INJECTION SUBCUTANEOUS at 12:01

## 2018-10-09 RX ADMIN — Medication 5 MILLIGRAM(S): at 06:14

## 2018-10-09 RX ADMIN — CARBIDOPA AND LEVODOPA 1 TABLET(S): 25; 100 TABLET ORAL at 18:00

## 2018-10-09 RX ADMIN — PRAMIPEXOLE DIHYDROCHLORIDE 0.5 MILLIGRAM(S): 0.12 TABLET ORAL at 12:02

## 2018-10-09 RX ADMIN — PRAMIPEXOLE DIHYDROCHLORIDE 0.5 MILLIGRAM(S): 0.12 TABLET ORAL at 06:14

## 2018-10-09 RX ADMIN — Medication 40 MILLIGRAM(S): at 06:14

## 2018-10-09 RX ADMIN — Medication 81 MILLIGRAM(S): at 12:00

## 2018-10-09 RX ADMIN — CARBIDOPA AND LEVODOPA 1 TABLET(S): 25; 100 TABLET ORAL at 15:46

## 2018-10-09 RX ADMIN — ATORVASTATIN CALCIUM 40 MILLIGRAM(S): 80 TABLET, FILM COATED ORAL at 21:11

## 2018-10-09 RX ADMIN — CARBIDOPA AND LEVODOPA 1 TABLET(S): 25; 100 TABLET ORAL at 20:46

## 2018-10-09 RX ADMIN — GABAPENTIN 100 MILLIGRAM(S): 400 CAPSULE ORAL at 15:03

## 2018-10-09 RX ADMIN — Medication 60 MILLIGRAM(S): at 12:02

## 2018-10-10 LAB
ANION GAP SERPL CALC-SCNC: 15 MMOL/L — HIGH (ref 7–14)
BUN SERPL-MCNC: 35 MG/DL — HIGH (ref 10–20)
CALCIUM SERPL-MCNC: 9.4 MG/DL — SIGNIFICANT CHANGE UP (ref 8.5–10.1)
CHLORIDE SERPL-SCNC: 102 MMOL/L — SIGNIFICANT CHANGE UP (ref 98–110)
CO2 SERPL-SCNC: 24 MMOL/L — SIGNIFICANT CHANGE UP (ref 17–32)
CREAT SERPL-MCNC: 1.3 MG/DL — SIGNIFICANT CHANGE UP (ref 0.7–1.5)
GLUCOSE SERPL-MCNC: 127 MG/DL — HIGH (ref 70–99)
HCT VFR BLD CALC: 43.1 % — SIGNIFICANT CHANGE UP (ref 42–52)
HGB BLD-MCNC: 14.1 G/DL — SIGNIFICANT CHANGE UP (ref 14–18)
MAGNESIUM SERPL-MCNC: 2.3 MG/DL — SIGNIFICANT CHANGE UP (ref 1.8–2.4)
MCHC RBC-ENTMCNC: 27.8 PG — SIGNIFICANT CHANGE UP (ref 27–31)
MCHC RBC-ENTMCNC: 32.7 G/DL — SIGNIFICANT CHANGE UP (ref 32–37)
MCV RBC AUTO: 84.8 FL — SIGNIFICANT CHANGE UP (ref 80–94)
NRBC # BLD: 0 /100 WBCS — SIGNIFICANT CHANGE UP (ref 0–0)
PLATELET # BLD AUTO: 237 K/UL — SIGNIFICANT CHANGE UP (ref 130–400)
POTASSIUM SERPL-MCNC: 4 MMOL/L — SIGNIFICANT CHANGE UP (ref 3.5–5)
POTASSIUM SERPL-SCNC: 4 MMOL/L — SIGNIFICANT CHANGE UP (ref 3.5–5)
RBC # BLD: 5.08 M/UL — SIGNIFICANT CHANGE UP (ref 4.7–6.1)
RBC # FLD: 12.8 % — SIGNIFICANT CHANGE UP (ref 11.5–14.5)
SODIUM SERPL-SCNC: 141 MMOL/L — SIGNIFICANT CHANGE UP (ref 135–146)
WBC # BLD: 11.88 K/UL — HIGH (ref 4.8–10.8)
WBC # FLD AUTO: 11.88 K/UL — HIGH (ref 4.8–10.8)

## 2018-10-10 RX ADMIN — GABAPENTIN 100 MILLIGRAM(S): 400 CAPSULE ORAL at 21:34

## 2018-10-10 RX ADMIN — CARBIDOPA AND LEVODOPA 1 TABLET(S): 25; 100 TABLET ORAL at 06:21

## 2018-10-10 RX ADMIN — ENOXAPARIN SODIUM 40 MILLIGRAM(S): 100 INJECTION SUBCUTANEOUS at 12:27

## 2018-10-10 RX ADMIN — Medication 50 MILLIGRAM(S): at 07:56

## 2018-10-10 RX ADMIN — CARBIDOPA AND LEVODOPA 1 TABLET(S): 25; 100 TABLET ORAL at 12:28

## 2018-10-10 RX ADMIN — ATORVASTATIN CALCIUM 40 MILLIGRAM(S): 80 TABLET, FILM COATED ORAL at 21:34

## 2018-10-10 RX ADMIN — PRAMIPEXOLE DIHYDROCHLORIDE 0.5 MILLIGRAM(S): 0.12 TABLET ORAL at 12:27

## 2018-10-10 RX ADMIN — PRAMIPEXOLE DIHYDROCHLORIDE 0.5 MILLIGRAM(S): 0.12 TABLET ORAL at 20:42

## 2018-10-10 RX ADMIN — CARBIDOPA AND LEVODOPA 1 TABLET(S): 25; 100 TABLET ORAL at 21:35

## 2018-10-10 RX ADMIN — TAMSULOSIN HYDROCHLORIDE 0.8 MILLIGRAM(S): 0.4 CAPSULE ORAL at 21:34

## 2018-10-10 RX ADMIN — GABAPENTIN 100 MILLIGRAM(S): 400 CAPSULE ORAL at 13:47

## 2018-10-10 RX ADMIN — Medication 60 MILLIGRAM(S): at 12:28

## 2018-10-10 RX ADMIN — GABAPENTIN 100 MILLIGRAM(S): 400 CAPSULE ORAL at 06:21

## 2018-10-10 RX ADMIN — Medication 0.6 MILLIGRAM(S): at 12:27

## 2018-10-10 RX ADMIN — CARBIDOPA AND LEVODOPA 1 TABLET(S): 25; 100 TABLET ORAL at 18:15

## 2018-10-10 RX ADMIN — CARBIDOPA AND LEVODOPA 1 TABLET(S): 25; 100 TABLET ORAL at 13:47

## 2018-10-10 RX ADMIN — Medication 30 MILLIGRAM(S): at 06:21

## 2018-10-10 RX ADMIN — Medication 81 MILLIGRAM(S): at 12:28

## 2018-10-10 RX ADMIN — Medication 40 MILLIGRAM(S): at 06:21

## 2018-10-10 RX ADMIN — Medication 5 MILLIGRAM(S): at 06:20

## 2018-10-10 RX ADMIN — PRAMIPEXOLE DIHYDROCHLORIDE 0.5 MILLIGRAM(S): 0.12 TABLET ORAL at 06:22

## 2018-10-11 RX ADMIN — TAMSULOSIN HYDROCHLORIDE 0.8 MILLIGRAM(S): 0.4 CAPSULE ORAL at 21:27

## 2018-10-11 RX ADMIN — Medication 5 MILLIGRAM(S): at 05:36

## 2018-10-11 RX ADMIN — PRAMIPEXOLE DIHYDROCHLORIDE 0.5 MILLIGRAM(S): 0.12 TABLET ORAL at 05:35

## 2018-10-11 RX ADMIN — GABAPENTIN 100 MILLIGRAM(S): 400 CAPSULE ORAL at 21:27

## 2018-10-11 RX ADMIN — PRAMIPEXOLE DIHYDROCHLORIDE 0.5 MILLIGRAM(S): 0.12 TABLET ORAL at 17:42

## 2018-10-11 RX ADMIN — Medication 81 MILLIGRAM(S): at 13:04

## 2018-10-11 RX ADMIN — CARBIDOPA AND LEVODOPA 1 TABLET(S): 25; 100 TABLET ORAL at 17:43

## 2018-10-11 RX ADMIN — GABAPENTIN 100 MILLIGRAM(S): 400 CAPSULE ORAL at 13:22

## 2018-10-11 RX ADMIN — GABAPENTIN 100 MILLIGRAM(S): 400 CAPSULE ORAL at 05:36

## 2018-10-11 RX ADMIN — CARBIDOPA AND LEVODOPA 1 TABLET(S): 25; 100 TABLET ORAL at 18:25

## 2018-10-11 RX ADMIN — Medication 30 MILLIGRAM(S): at 05:36

## 2018-10-11 RX ADMIN — PRAMIPEXOLE DIHYDROCHLORIDE 0.5 MILLIGRAM(S): 0.12 TABLET ORAL at 00:24

## 2018-10-11 RX ADMIN — ATORVASTATIN CALCIUM 40 MILLIGRAM(S): 80 TABLET, FILM COATED ORAL at 21:27

## 2018-10-11 RX ADMIN — Medication 40 MILLIGRAM(S): at 05:36

## 2018-10-11 RX ADMIN — Medication 40 MILLIGRAM(S): at 08:17

## 2018-10-11 RX ADMIN — PRAMIPEXOLE DIHYDROCHLORIDE 0.5 MILLIGRAM(S): 0.12 TABLET ORAL at 23:02

## 2018-10-11 RX ADMIN — CARBIDOPA AND LEVODOPA 1 TABLET(S): 25; 100 TABLET ORAL at 08:17

## 2018-10-11 RX ADMIN — CARBIDOPA AND LEVODOPA 1 TABLET(S): 25; 100 TABLET ORAL at 21:28

## 2018-10-11 RX ADMIN — PRAMIPEXOLE DIHYDROCHLORIDE 0.5 MILLIGRAM(S): 0.12 TABLET ORAL at 12:52

## 2018-10-11 RX ADMIN — ENOXAPARIN SODIUM 40 MILLIGRAM(S): 100 INJECTION SUBCUTANEOUS at 12:50

## 2018-10-11 RX ADMIN — CARBIDOPA AND LEVODOPA 1 TABLET(S): 25; 100 TABLET ORAL at 12:49

## 2018-10-11 RX ADMIN — Medication 0.6 MILLIGRAM(S): at 12:54

## 2018-10-12 RX ORDER — CARBIDOPA AND LEVODOPA 25; 100 MG/1; MG/1
2 TABLET ORAL
Qty: 0 | Refills: 0 | DISCHARGE
Start: 2018-10-12

## 2018-10-12 RX ORDER — GABAPENTIN 400 MG/1
1 CAPSULE ORAL
Qty: 90 | Refills: 0 | OUTPATIENT
Start: 2018-10-12 | End: 2018-11-10

## 2018-10-12 RX ORDER — TAMSULOSIN HYDROCHLORIDE 0.4 MG/1
2 CAPSULE ORAL
Qty: 0 | Refills: 0 | COMMUNITY

## 2018-10-12 RX ORDER — CARBIDOPA AND LEVODOPA 25; 100 MG/1; MG/1
1 TABLET ORAL
Qty: 0 | Refills: 0 | COMMUNITY
Start: 2018-10-12

## 2018-10-12 RX ORDER — PRAMIPEXOLE DIHYDROCHLORIDE 0.12 MG/1
1 TABLET ORAL
Qty: 0 | Refills: 0 | COMMUNITY
Start: 2018-10-12

## 2018-10-12 RX ORDER — COLCHICINE 0.6 MG
1 TABLET ORAL
Qty: 0 | Refills: 0 | COMMUNITY

## 2018-10-12 RX ORDER — ACETAMINOPHEN 500 MG
2 TABLET ORAL
Qty: 0 | Refills: 0 | DISCHARGE
Start: 2018-10-12

## 2018-10-12 RX ORDER — CARBIDOPA AND LEVODOPA 25; 100 MG/1; MG/1
1 TABLET ORAL
Qty: 0 | Refills: 0 | DISCHARGE
Start: 2018-10-12

## 2018-10-12 RX ORDER — PRAMIPEXOLE DIHYDROCHLORIDE 0.12 MG/1
1 TABLET ORAL
Qty: 0 | Refills: 0 | DISCHARGE
Start: 2018-10-12

## 2018-10-12 RX ORDER — GABAPENTIN 400 MG/1
1 CAPSULE ORAL
Qty: 90 | Refills: 0
Start: 2018-10-12 | End: 2018-11-10

## 2018-10-12 RX ADMIN — Medication 30 MILLIGRAM(S): at 07:56

## 2018-10-12 RX ADMIN — GABAPENTIN 100 MILLIGRAM(S): 400 CAPSULE ORAL at 13:17

## 2018-10-12 RX ADMIN — GABAPENTIN 100 MILLIGRAM(S): 400 CAPSULE ORAL at 06:14

## 2018-10-12 RX ADMIN — PRAMIPEXOLE DIHYDROCHLORIDE 0.5 MILLIGRAM(S): 0.12 TABLET ORAL at 06:13

## 2018-10-12 RX ADMIN — Medication 40 MILLIGRAM(S): at 06:14

## 2018-10-12 RX ADMIN — ENOXAPARIN SODIUM 40 MILLIGRAM(S): 100 INJECTION SUBCUTANEOUS at 11:42

## 2018-10-12 RX ADMIN — Medication 5 MILLIGRAM(S): at 06:14

## 2018-10-12 RX ADMIN — Medication 30 MILLIGRAM(S): at 06:14

## 2018-10-12 RX ADMIN — CARBIDOPA AND LEVODOPA 1 TABLET(S): 25; 100 TABLET ORAL at 11:35

## 2018-10-12 RX ADMIN — PRAMIPEXOLE DIHYDROCHLORIDE 0.5 MILLIGRAM(S): 0.12 TABLET ORAL at 12:56

## 2018-10-12 RX ADMIN — Medication 81 MILLIGRAM(S): at 11:36

## 2018-10-12 RX ADMIN — Medication 0.6 MILLIGRAM(S): at 11:39

## 2018-10-12 RX ADMIN — CARBIDOPA AND LEVODOPA 1 TABLET(S): 25; 100 TABLET ORAL at 06:14

## 2018-10-18 DIAGNOSIS — I77.811 ABDOMINAL AORTIC ECTASIA: ICD-10-CM

## 2018-10-18 DIAGNOSIS — R29.6 REPEATED FALLS: ICD-10-CM

## 2018-10-18 DIAGNOSIS — G20 PARKINSON'S DISEASE: ICD-10-CM

## 2018-10-18 DIAGNOSIS — M10.9 GOUT, UNSPECIFIED: ICD-10-CM

## 2018-10-18 DIAGNOSIS — H26.9 UNSPECIFIED CATARACT: ICD-10-CM

## 2018-10-18 DIAGNOSIS — I10 ESSENTIAL (PRIMARY) HYPERTENSION: ICD-10-CM

## 2018-10-18 DIAGNOSIS — E78.5 HYPERLIPIDEMIA, UNSPECIFIED: ICD-10-CM

## 2018-10-18 DIAGNOSIS — I71.2 THORACIC AORTIC ANEURYSM, WITHOUT RUPTURE: ICD-10-CM

## 2018-10-18 DIAGNOSIS — N40.0 BENIGN PROSTATIC HYPERPLASIA WITHOUT LOWER URINARY TRACT SYMPTOMS: ICD-10-CM

## 2018-10-18 DIAGNOSIS — M51.16 INTERVERTEBRAL DISC DISORDERS WITH RADICULOPATHY, LUMBAR REGION: ICD-10-CM

## 2019-12-10 PROBLEM — Z00.00 ENCOUNTER FOR PREVENTIVE HEALTH EXAMINATION: Status: ACTIVE | Noted: 2019-12-10

## 2019-12-12 ENCOUNTER — APPOINTMENT (OUTPATIENT)
Dept: NEUROLOGY | Facility: CLINIC | Age: 69
End: 2019-12-12
Payer: MEDICARE

## 2019-12-12 VITALS — BODY MASS INDEX: 33.32 KG/M2 | HEIGHT: 65 IN | WEIGHT: 200 LBS

## 2019-12-12 VITALS — HEART RATE: 75 BPM | DIASTOLIC BLOOD PRESSURE: 108 MMHG | OXYGEN SATURATION: 98 % | SYSTOLIC BLOOD PRESSURE: 187 MMHG

## 2019-12-12 DIAGNOSIS — Z87.438 PERSONAL HISTORY OF OTHER DISEASES OF MALE GENITAL ORGANS: ICD-10-CM

## 2019-12-12 DIAGNOSIS — Z86.79 PERSONAL HISTORY OF OTHER DISEASES OF THE CIRCULATORY SYSTEM: ICD-10-CM

## 2019-12-12 DIAGNOSIS — Z87.39 PERSONAL HISTORY OF OTHER DISEASES OF THE MUSCULOSKELETAL SYSTEM AND CONNECTIVE TISSUE: ICD-10-CM

## 2019-12-12 PROCEDURE — 99214 OFFICE O/P EST MOD 30 MIN: CPT

## 2019-12-12 RX ORDER — GABAPENTIN 100 MG/1
100 CAPSULE ORAL
Refills: 0 | Status: ACTIVE | COMMUNITY

## 2019-12-12 RX ORDER — OMEPRAZOLE 40 MG/1
40 CAPSULE, DELAYED RELEASE ORAL
Refills: 0 | Status: ACTIVE | COMMUNITY

## 2019-12-12 RX ORDER — FUROSEMIDE 40 MG/1
40 TABLET ORAL
Refills: 0 | Status: ACTIVE | COMMUNITY

## 2019-12-12 RX ORDER — ENALAPRIL MALEATE 5 MG/1
5 TABLET ORAL
Refills: 0 | Status: ACTIVE | COMMUNITY

## 2019-12-12 RX ORDER — COLCHICINE 0.6 MG/1
0.6 CAPSULE ORAL
Refills: 0 | Status: ACTIVE | COMMUNITY

## 2019-12-12 NOTE — REVIEW OF SYSTEMS
[Feeling Tired] : feeling tired [Difficulty Writing] : difficulty writing [Difficulty Walking] : difficulty walking [As Noted in HPI] : as noted in HPI [Arthralgias] : arthralgias [Joint Pain] : joint pain [Negative] : Heme/Lymph [FreeTextEntry3] : eyes droop sometimes

## 2019-12-12 NOTE — HISTORY OF PRESENT ILLNESS
[FreeTextEntry1] : Mr. Edmonds is a 70yo man seen by me in [facility] in 9/2018 for worsening Parkinsons disease (see below for consult note form hospital).  Since then his sinemet is taken 6AM, 10AM, 2PM and 6PM.  He also takes a Sinemet ER at 8 PM\par He believes and daughter agrees that the sinemet lasts about 3 hours for him.  He has severe knee pain and right hip pain and was getting injections regularly but they no longer help.\par He does not have any cognitive complaints as per daughter and patient.\par He urinates frequently.\par He uses a walker to get around and a wheelchair when going out for convienience\par He c/o pain in all his joints as well

## 2019-12-12 NOTE — DISCUSSION/SUMMARY
[FreeTextEntry1] : Mr. Edmonds is here for his parkinsons. He time one sinemet is about 3 hours from history.  Will add entacapone to try and increase the effective coverage.  As his arthritis limits his mobility further management may be limited because of the arthtis\par 1. Orthopedic evaluation\par 2. Add entacapone initally once a day and then over 1 month increase to 3 times a day\par 3. Continue sinemet and sinemet ER as above\par 4. Will consider Parkinsons rehab in the future

## 2020-02-24 ENCOUNTER — APPOINTMENT (OUTPATIENT)
Dept: ORTHOPEDIC SURGERY | Facility: CLINIC | Age: 70
End: 2020-02-24
Payer: MEDICARE

## 2020-02-24 DIAGNOSIS — M25.561 PAIN IN RIGHT KNEE: ICD-10-CM

## 2020-02-24 DIAGNOSIS — M25.551 PAIN IN RIGHT HIP: ICD-10-CM

## 2020-02-24 DIAGNOSIS — M25.562 PAIN IN LEFT KNEE: ICD-10-CM

## 2020-02-24 DIAGNOSIS — Z80.9 FAMILY HISTORY OF MALIGNANT NEOPLASM, UNSPECIFIED: ICD-10-CM

## 2020-02-24 DIAGNOSIS — Z86.39 PERSONAL HISTORY OF OTHER ENDOCRINE, NUTRITIONAL AND METABOLIC DISEASE: ICD-10-CM

## 2020-02-24 DIAGNOSIS — M25.552 PAIN IN LEFT HIP: ICD-10-CM

## 2020-02-24 DIAGNOSIS — Z86.718 PERSONAL HISTORY OF OTHER VENOUS THROMBOSIS AND EMBOLISM: ICD-10-CM

## 2020-02-24 DIAGNOSIS — Z82.61 FAMILY HISTORY OF ARTHRITIS: ICD-10-CM

## 2020-02-24 DIAGNOSIS — M17.10 UNILATERAL PRIMARY OSTEOARTHRITIS, UNSPECIFIED KNEE: ICD-10-CM

## 2020-02-24 PROCEDURE — 73522 X-RAY EXAM HIPS BI 3-4 VIEWS: CPT

## 2020-02-24 PROCEDURE — 99203 OFFICE O/P NEW LOW 30 MIN: CPT

## 2020-02-24 PROCEDURE — 73564 X-RAY EXAM KNEE 4 OR MORE: CPT | Mod: 50

## 2020-02-24 RX ORDER — KETOROLAC TROMETHAMINE 5 MG/ML
0.5 SOLUTION OPHTHALMIC
Refills: 0 | Status: DISCONTINUED | COMMUNITY
End: 2020-02-24

## 2020-02-24 RX ORDER — PREDNISOLONE ACETATE 10 MG/ML
1 SUSPENSION/ DROPS OPHTHALMIC
Refills: 0 | Status: DISCONTINUED | COMMUNITY
End: 2020-02-24

## 2020-02-24 RX ORDER — PRAMIPEXOLE DIHYDROCHLORIDE 0.5 MG/1
0.5 TABLET ORAL
Refills: 0 | Status: ACTIVE | COMMUNITY

## 2020-02-24 RX ORDER — APIXABAN 5 MG/1
5 TABLET, FILM COATED ORAL
Refills: 0 | Status: DISCONTINUED | COMMUNITY
End: 2020-02-24

## 2020-02-24 RX ORDER — PRAMIPEXOLE DIHYDROCHLORIDE 0.5 MG/1
0.5 TABLET ORAL
Refills: 0 | Status: DISCONTINUED | COMMUNITY
End: 2020-02-24

## 2020-02-24 RX ORDER — NAPROXEN AND ESOMEPRAZOLE MAGNESIUM 500; 20 MG/1; MG/1
500-20 TABLET, DELAYED RELEASE ORAL
Refills: 0 | Status: DISCONTINUED | COMMUNITY
End: 2020-02-24

## 2020-02-24 RX ORDER — ATORVASTATIN CALCIUM 40 MG/1
40 TABLET, FILM COATED ORAL
Refills: 0 | Status: DISCONTINUED | COMMUNITY
End: 2020-02-24

## 2020-02-24 RX ORDER — CARBIDOPA AND LEVODOPA 25; 250 MG/1; MG/1
25-250 TABLET ORAL
Refills: 0 | Status: DISCONTINUED | COMMUNITY
End: 2020-02-24

## 2020-03-04 NOTE — ASSESSMENT
[FreeTextEntry1] : Pt with Parkinsons disease that presents with bilateral knee pain, secondary to PF arthritis. The plan is to treat the pt with bilateral gel injections.

## 2020-03-04 NOTE — HISTORY OF PRESENT ILLNESS
[de-identified] : 70 year old male with a past medical history of Parkinsons disease presents to the office today complaining of bilateral hip pain and bilateral knee pain. He is accompanied by his daughter. Pt denies any groin pain, but reports lateral and posterior hip pain. Pt reports swelling, clicking, and loss of motion in his knees. He is currently in a wheelchair and does not ambulate often. Pt reports extreme difficulty with negotiating stairs. He reports taking Ibuprofen for pain with only minimal relief. Pt reports having injections in his bilateral knees about 6 months ago but is unsure what type of injection and denies any relief. Pt reports having a history of DVT after being in the hospital for stomach ulcers about 2 years ago, he was treated with Eliquis. Pt is here today to discuss his options for pain relief.

## 2020-03-04 NOTE — PHYSICAL EXAM
[de-identified] : General appearance: well nourished and hydrated, pleasant, alert and oriented x 3, cooperative.\par Cardiovascular: no apparent abnormalities, no lower leg edema, no varicosities, pedal pulses are palpable.\par Neurologic: sensation is normal, no muscle weakness in upper or lower extremities\par Dermatologic no apparent skin lesions, moist, warm, no rash.\par Gait: nonantalgic.\par \par Left knee\par Inspection: no effusion or erythema.\par Wounds: none.\par Alignment: normal.\par Palpation: no specific tenderness on palpation.\par ROM: 0-100 degrees with PF crepitus \par Ligamentous laxity: all ligaments appear stable\par Muscle Test: good quad strength.\par \par Right knee\par Inspection: no effusion or erythema.\par Wounds: none.\par Alignment: normal.\par Palpation: no specific tenderness on palpation.\par ROM: 0-100 degrees with PF crepitus \par Ligamentous laxity: all ligaments appear stable\par Muscle Test: good quad strength.\par \par Left hip\par Inspection: No swelling or ecchymosis.\par Wounds: none.\par Palpation: non-tender.\par Stability: no instability.\par Strength: 5/5 all motor groups.\par ROM: no pain with FROM.\par Leg length: equal.\par \par Right hip\par Inspection: No swelling or ecchymosis.\par Wounds: none.\par Palpation: non-tender.\par Stability: no instability.\par Strength: 5/5 all motor groups.\par ROM: no pain with FROM.\par Leg length: equal.\par \par   [de-identified] : Radiographs done today AP lateral and skyline of both knees shows the bony structures are intact , there is well maintained TF joint spaces. Bone on bone in PF joint.

## 2020-04-27 ENCOUNTER — APPOINTMENT (OUTPATIENT)
Dept: NEUROLOGY | Facility: CLINIC | Age: 70
End: 2020-04-27
Payer: MEDICARE

## 2020-04-27 DIAGNOSIS — G20 PARKINSON'S DISEASE: ICD-10-CM

## 2020-04-27 PROCEDURE — 99442: CPT | Mod: CR

## 2020-04-27 PROCEDURE — ZZZZZ: CPT

## 2020-05-04 ENCOUNTER — APPOINTMENT (OUTPATIENT)
Dept: ORTHOPEDIC SURGERY | Facility: CLINIC | Age: 70
End: 2020-05-04

## 2020-05-26 ENCOUNTER — APPOINTMENT (OUTPATIENT)
Dept: ORTHOPEDIC SURGERY | Facility: CLINIC | Age: 70
End: 2020-05-26
Payer: MEDICAID

## 2020-05-26 VITALS — TEMPERATURE: 98.1 F

## 2020-05-26 RX ORDER — HYALURONATE SODIUM 20 MG/2 ML
20 SYRINGE (ML) INTRAARTICULAR
Refills: 0 | Status: COMPLETED | OUTPATIENT
Start: 2020-05-26

## 2020-05-26 RX ADMIN — Medication 2 MG/2ML: at 00:00

## 2020-05-26 NOTE — ASSESSMENT
[FreeTextEntry1] : Discussed at length with the patient the planned Euflexxa injection. The risks, benefits, convalescence and alternatives were reviewed. The possible side effects discussed included but were not limited to: pain, swelling, heat and redness. There symptoms are generally mild but if they are extensive then contact the office. Giving pain relievers by mouth such as NSAID’s or Tylenol can generally treat the reactions to steroid and lidocaine. Rare cases of infection have been noted. Rash, hives and itching may occur post injection. If you have muscle pain or cramps, flushing and or swelling of the face, rapid heart beat, nausea, dizziness, fever, chills, headache, difficulty breathing, swelling in the arms or legs, or have a prickly feeling of your skin, contact a health care provider immediately.\par \par Following this discussion, the right and left knee was prepped with betadine and under sterile conditions the Euflexxa injection was performed with a 22 gauge needle. The needle was introduced into the joint, aspiration was performed to ensure intra-articular placement and the medication was injected. Upon withdrawal of the needle the site was cleaned with alcohol and a bandaid applied. The patient tolerated the injection well and there were no adverse effects. Post injection instructions included no strenuous activity for 24 hours, cryotherapy and if there are any adverse effects to contact the office.

## 2020-05-26 NOTE — HISTORY OF PRESENT ILLNESS
[de-identified] : Patient is here for bilateral Euflexxa injections to his bilateral arthritic knees.

## 2020-05-26 NOTE — REASON FOR VISIT
[Follow-Up Visit] : a follow-up visit for [Osteoarthritis, Knee] : osteoarthritis, knee [FreeTextEntry2] : Right and Left Knee

## 2020-06-02 ENCOUNTER — APPOINTMENT (OUTPATIENT)
Dept: ORTHOPEDIC SURGERY | Facility: CLINIC | Age: 70
End: 2020-06-02
Payer: MEDICARE

## 2020-06-02 VITALS — TEMPERATURE: 97.5 F

## 2020-06-02 DIAGNOSIS — M17.12 UNILATERAL PRIMARY OSTEOARTHRITIS, LEFT KNEE: ICD-10-CM

## 2020-06-02 DIAGNOSIS — M17.11 UNILATERAL PRIMARY OSTEOARTHRITIS, RIGHT KNEE: ICD-10-CM

## 2020-06-02 PROCEDURE — 99213 OFFICE O/P EST LOW 20 MIN: CPT | Mod: 25

## 2020-06-02 RX ORDER — HYALURONATE SODIUM 20 MG/2 ML
20 SYRINGE (ML) INTRAARTICULAR
Refills: 0 | Status: COMPLETED | OUTPATIENT
Start: 2020-06-02

## 2020-06-02 RX ADMIN — Medication 2 MG/2ML: at 00:00

## 2020-06-02 NOTE — HISTORY OF PRESENT ILLNESS
[de-identified] : 70 year old male presents to the office today for Euflexxa injections in his bilateral arthritic knees.

## 2020-06-08 ENCOUNTER — APPOINTMENT (OUTPATIENT)
Dept: ORTHOPEDIC SURGERY | Facility: CLINIC | Age: 70
End: 2020-06-08

## 2020-06-09 ENCOUNTER — APPOINTMENT (OUTPATIENT)
Dept: ORTHOPEDIC SURGERY | Facility: CLINIC | Age: 70
End: 2020-06-09
Payer: MEDICARE

## 2020-06-09 VITALS — TEMPERATURE: 97.9 F

## 2020-06-09 PROCEDURE — 99213 OFFICE O/P EST LOW 20 MIN: CPT | Mod: 25

## 2020-06-09 RX ORDER — HYALURONATE SODIUM 20 MG/2 ML
20 SYRINGE (ML) INTRAARTICULAR
Refills: 0 | Status: COMPLETED | OUTPATIENT
Start: 2020-06-09

## 2020-06-09 RX ADMIN — Medication 2 MG/2ML: at 00:00

## 2020-06-09 NOTE — HISTORY OF PRESENT ILLNESS
[de-identified] : 70 year old male presents to the office today for Euflexxa injections in his bilateral arthritic knees.

## 2020-06-12 NOTE — OCCUPATIONAL THERAPY INITIAL EVALUATION ADULT - REHAB POTENTIAL, OT EVAL
Chau Rodarte is a 70 y.o. male with CAD s/p PCI x3 back in 2016 (on DAPT at home), HFrEF (last echo Feb 2019 showing EF 45% and G1DD; on GDMT with lisinopril and metoprolol succinate), A fib (on amiodarone,not on anticoagulation 2/2 GI bleed in 2017; has a loop recorder in place), CKD (baseline creatinine appears 2.5-3), IDDM, PAD (s/p L femoro-popliteal bypass in 2017; history of diabetic foot ulcers), HTN, HLD, and obesity who is admitted to vascular neurology service in the setting of large R ISABELA stroke and smaller L ISABELA stroke diagnosed overnight 6/10-6/11. Patient reports that he had increasing weakness and 4 loose bowel movements with bloody streaks throughout the day 6/10 and when attempting to leave for the ER, he noticed weakness and inability to move his left leg to put on his shoes. Patient then presented to ED and was diagnosed with R ISABELA and L ISABELA stroke as above and admitted to stroke service, opted for medical management given presentation outside the tPA window and history concerning for possible GI bleed (hematechezia with 4 looser bowel movements and known history of GI bleeding). Pt evaluated by GI for possible GI bleeding with plan for colonoscopy 6/15.  Pt reported right sided abdominal pain in the AM on 6/11 with a sore, achy character that continued to worsen throughout the day. A KUB obtained 6/11 concerning for distal small bowel obstruction; NGT placed but patient continued to have worsening abdominal pain. Lactic acid elevated at 4.2 and continued to rise to 4.7 on repeat. Critical care medicine consulted for concern for worsening abdominal pain and lactic acidosis.    fair, will monitor progress closely

## 2020-08-18 RX ORDER — CELECOXIB 100 MG/1
100 CAPSULE ORAL TWICE DAILY
Qty: 60 | Refills: 0 | Status: ACTIVE | COMMUNITY
Start: 2020-08-18 | End: 1900-01-01

## 2020-09-21 ENCOUNTER — APPOINTMENT (OUTPATIENT)
Dept: ORTHOPEDIC SURGERY | Facility: CLINIC | Age: 70
End: 2020-09-21
Payer: MEDICARE

## 2020-09-21 VITALS — TEMPERATURE: 97.2 F

## 2020-09-21 PROCEDURE — 99214 OFFICE O/P EST MOD 30 MIN: CPT

## 2020-09-21 RX ORDER — MELOXICAM 7.5 MG/1
7.5 TABLET ORAL
Qty: 30 | Refills: 1 | Status: DISCONTINUED | COMMUNITY
Start: 2020-04-27 | End: 2020-09-21

## 2020-09-21 NOTE — ASSESSMENT
[FreeTextEntry1] : Patient is here to follow up on his bilateral arthritic knees. Patient has severe patellofemoral arthritis of both knees. Patient is essentially wheelchair bound, has difficulty with ambulation, getting in and out of chairs, and going up and down stairs. I am concerned that simply replacing his knees will not produce the result that the patient and family are looking for because of his other comorbidities, particularly his Parkinsons. I would like to discuss this further with his neurologist. We have called him and left a message for him to call us back.

## 2020-09-21 NOTE — HISTORY OF PRESENT ILLNESS
[de-identified] : 2/24/2020 - 70 year old male with a past medical history of Parkinsons disease presents to the office today complaining of bilateral hip pain and bilateral knee pain. He is accompanied by his daughter. Pt denies any groin pain, but reports lateral and posterior hip pain. Pt reports swelling, clicking, and loss of motion in his knees. He is currently in a wheelchair and does not ambulate often. Pt reports extreme difficulty with negotiating stairs. He reports taking Ibuprofen for pain with only minimal relief. Pt reports having injections in his bilateral knees about 6 months ago but is unsure what type of injection and denies any relief. Pt reports having a history of DVT after being in the hospital for stomach ulcers about 2 years ago, he was treated with Eliquis. Pt is here today to discuss his options for pain relief.\par \par 9/21/2020 - 70 year old male presents to the office today complaining of bilateral knee pain, Left knee worse than right. He received Euflexxa injections by us in early June in both of his knees, but denies any relief. Patient reports taking Ibuprofen for pain, but denies any relief with this. Patient returns today to discuss his options for a total knee replacement.

## 2020-10-08 RX ORDER — TAMSULOSIN HYDROCHLORIDE 0.4 MG/1
0.4 CAPSULE ORAL DAILY
Refills: 0 | Status: DISCONTINUED | COMMUNITY
End: 2020-10-08

## 2020-10-09 ENCOUNTER — APPOINTMENT (OUTPATIENT)
Dept: ORTHOPEDIC SURGERY | Facility: CLINIC | Age: 70
End: 2020-10-09
Payer: MEDICARE

## 2020-10-09 VITALS
BODY MASS INDEX: 34.15 KG/M2 | TEMPERATURE: 95.9 F | OXYGEN SATURATION: 98 % | SYSTOLIC BLOOD PRESSURE: 118 MMHG | HEIGHT: 64 IN | WEIGHT: 200 LBS | HEART RATE: 66 BPM | DIASTOLIC BLOOD PRESSURE: 80 MMHG

## 2020-10-09 PROCEDURE — 73030 X-RAY EXAM OF SHOULDER: CPT | Mod: LT

## 2020-10-09 PROCEDURE — 99204 OFFICE O/P NEW MOD 45 MIN: CPT

## 2020-10-09 RX ORDER — CARBIDOPA AND LEVODOPA 25; 250 MG/1; MG/1
25-250 TABLET ORAL
Refills: 0 | Status: ACTIVE | COMMUNITY

## 2020-10-09 RX ORDER — ACETAMINOPHEN 325 MG/1
325 TABLET ORAL
Refills: 0 | Status: ACTIVE | COMMUNITY

## 2020-10-09 RX ORDER — APIXABAN 5 MG/1
5 TABLET, FILM COATED ORAL
Refills: 0 | Status: ACTIVE | COMMUNITY

## 2020-10-09 RX ORDER — TAMSULOSIN HYDROCHLORIDE 0.4 MG/1
CAPSULE ORAL
Refills: 0 | Status: ACTIVE | COMMUNITY

## 2020-10-09 RX ORDER — ATORVASTATIN CALCIUM 40 MG/1
40 TABLET, FILM COATED ORAL
Refills: 0 | Status: ACTIVE | COMMUNITY

## 2020-10-21 ENCOUNTER — APPOINTMENT (OUTPATIENT)
Age: 70
End: 2020-10-21
Payer: MEDICARE

## 2020-10-21 VITALS — WEIGHT: 200 LBS | HEIGHT: 64 IN | BODY MASS INDEX: 34.15 KG/M2

## 2020-10-21 DIAGNOSIS — M24.412 RECURRENT DISLOCATION, LEFT SHOULDER: ICD-10-CM

## 2020-10-21 PROCEDURE — 99204 OFFICE O/P NEW MOD 45 MIN: CPT | Mod: 95

## 2020-10-21 PROCEDURE — 99214 OFFICE O/P EST MOD 30 MIN: CPT | Mod: 95

## 2020-10-21 RX ORDER — CARBIDOPA AND LEVODOPA 25; 100 MG/1; MG/1
25-100 TABLET ORAL 3 TIMES DAILY
Qty: 90 | Refills: 0 | Status: DISCONTINUED | COMMUNITY
End: 2020-10-21

## 2020-12-22 RX ORDER — ENTACAPONE 200 MG/1
200 TABLET, FILM COATED ORAL 3 TIMES DAILY
Qty: 90 | Refills: 5 | Status: ACTIVE | COMMUNITY
Start: 2019-12-12 | End: 1900-01-01

## 2021-02-12 ENCOUNTER — INPATIENT (INPATIENT)
Facility: HOSPITAL | Age: 71
LOS: 26 days | Discharge: NURSING, REHAB OR LONG-TERM CARE | End: 2021-03-11
Attending: INTERNAL MEDICINE | Admitting: INTERNAL MEDICINE
Payer: MEDICARE

## 2021-02-12 VITALS
OXYGEN SATURATION: 98 % | SYSTOLIC BLOOD PRESSURE: 120 MMHG | TEMPERATURE: 98 F | HEIGHT: 66.93 IN | DIASTOLIC BLOOD PRESSURE: 62 MMHG | RESPIRATION RATE: 17 BRPM | HEART RATE: 40 BPM | WEIGHT: 199.96 LBS

## 2021-02-12 DIAGNOSIS — R13.10 DYSPHAGIA, UNSPECIFIED: ICD-10-CM

## 2021-02-12 DIAGNOSIS — I82.509 CHRONIC EMBOLISM AND THROMBOSIS OF UNSPECIFIED DEEP VEINS OF UNSPECIFIED LOWER EXTREMITY: ICD-10-CM

## 2021-02-12 DIAGNOSIS — Z79.01 LONG TERM (CURRENT) USE OF ANTICOAGULANTS: ICD-10-CM

## 2021-02-12 LAB
ALBUMIN SERPL ELPH-MCNC: 3.7 G/DL — SIGNIFICANT CHANGE UP (ref 3.5–5.2)
ALP SERPL-CCNC: 228 U/L — HIGH (ref 30–115)
ALT FLD-CCNC: 27 U/L — SIGNIFICANT CHANGE UP (ref 0–41)
ANION GAP SERPL CALC-SCNC: 8 MMOL/L — SIGNIFICANT CHANGE UP (ref 7–14)
ANISOCYTOSIS BLD QL: SLIGHT — SIGNIFICANT CHANGE UP
APPEARANCE UR: CLEAR — SIGNIFICANT CHANGE UP
APTT BLD: 54.5 SEC — HIGH (ref 27–39.2)
AST SERPL-CCNC: 82 U/L — HIGH (ref 0–41)
BACTERIA # UR AUTO: ABNORMAL
BASE EXCESS BLDV CALC-SCNC: 2.4 MMOL/L — HIGH (ref -2–2)
BASOPHILS # BLD AUTO: 0 K/UL — SIGNIFICANT CHANGE UP (ref 0–0.2)
BASOPHILS NFR BLD AUTO: 0 % — SIGNIFICANT CHANGE UP (ref 0–1)
BILIRUB SERPL-MCNC: 0.3 MG/DL — SIGNIFICANT CHANGE UP (ref 0.2–1.2)
BILIRUB UR-MCNC: NEGATIVE — SIGNIFICANT CHANGE UP
BLD GP AB SCN SERPL QL: SIGNIFICANT CHANGE UP
BUN SERPL-MCNC: 38 MG/DL — HIGH (ref 10–20)
CA-I SERPL-SCNC: 1.23 MMOL/L — SIGNIFICANT CHANGE UP (ref 1.12–1.3)
CALCIUM SERPL-MCNC: 8.7 MG/DL — SIGNIFICANT CHANGE UP (ref 8.5–10.1)
CHLORIDE SERPL-SCNC: 106 MMOL/L — SIGNIFICANT CHANGE UP (ref 98–110)
CO2 SERPL-SCNC: 26 MMOL/L — SIGNIFICANT CHANGE UP (ref 17–32)
COLOR SPEC: SIGNIFICANT CHANGE UP
CREAT SERPL-MCNC: 1.2 MG/DL — SIGNIFICANT CHANGE UP (ref 0.7–1.5)
DIFF PNL FLD: SIGNIFICANT CHANGE UP
ELLIPTOCYTES BLD QL SMEAR: SLIGHT — SIGNIFICANT CHANGE UP
EOSINOPHIL # BLD AUTO: 0.05 K/UL — SIGNIFICANT CHANGE UP (ref 0–0.7)
EOSINOPHIL NFR BLD AUTO: 2.6 % — SIGNIFICANT CHANGE UP (ref 0–8)
EPI CELLS # UR: 2 /HPF — SIGNIFICANT CHANGE UP (ref 0–5)
GAS PNL BLDV: 144 MMOL/L — SIGNIFICANT CHANGE UP (ref 136–145)
GAS PNL BLDV: SIGNIFICANT CHANGE UP
GIANT PLATELETS BLD QL SMEAR: PRESENT — SIGNIFICANT CHANGE UP
GLUCOSE SERPL-MCNC: 73 MG/DL — SIGNIFICANT CHANGE UP (ref 70–99)
GLUCOSE UR QL: NEGATIVE — SIGNIFICANT CHANGE UP
HCO3 BLDV-SCNC: 30 MMOL/L — HIGH (ref 22–29)
HCT VFR BLD CALC: 33.9 % — LOW (ref 42–52)
HCT VFR BLDA CALC: 33.5 % — LOW (ref 34–44)
HGB BLD CALC-MCNC: 10.9 G/DL — LOW (ref 14–18)
HGB BLD-MCNC: 10.6 G/DL — LOW (ref 14–18)
HYALINE CASTS # UR AUTO: 1 /LPF — SIGNIFICANT CHANGE UP (ref 0–7)
INR BLD: 1.13 RATIO — SIGNIFICANT CHANGE UP (ref 0.65–1.3)
KETONES UR-MCNC: NEGATIVE — SIGNIFICANT CHANGE UP
LACTATE BLDV-MCNC: 1.1 MMOL/L — SIGNIFICANT CHANGE UP (ref 0.5–1.6)
LEUKOCYTE ESTERASE UR-ACNC: ABNORMAL
LYMPHOCYTES # BLD AUTO: 0.32 K/UL — LOW (ref 1.2–3.4)
LYMPHOCYTES # BLD AUTO: 15.7 % — LOW (ref 20.5–51.1)
MAGNESIUM SERPL-MCNC: 2.2 MG/DL — SIGNIFICANT CHANGE UP (ref 1.8–2.4)
MANUAL SMEAR VERIFICATION: SIGNIFICANT CHANGE UP
MCHC RBC-ENTMCNC: 25.5 PG — LOW (ref 27–31)
MCHC RBC-ENTMCNC: 31.3 G/DL — LOW (ref 32–37)
MCV RBC AUTO: 81.5 FL — SIGNIFICANT CHANGE UP (ref 80–94)
MONOCYTES # BLD AUTO: 0.11 K/UL — SIGNIFICANT CHANGE UP (ref 0.1–0.6)
MONOCYTES NFR BLD AUTO: 5.2 % — SIGNIFICANT CHANGE UP (ref 1.7–9.3)
NEUTROPHILS # BLD AUTO: 1.47 K/UL — SIGNIFICANT CHANGE UP (ref 1.4–6.5)
NEUTROPHILS NFR BLD AUTO: 72.2 % — SIGNIFICANT CHANGE UP (ref 42.2–75.2)
NITRITE UR-MCNC: POSITIVE
NRBC # BLD: 1 /100 — HIGH (ref 0–0)
NRBC # BLD: SIGNIFICANT CHANGE UP /100 WBCS (ref 0–0)
NT-PROBNP SERPL-SCNC: 348 PG/ML — HIGH (ref 0–300)
PCO2 BLDV: 62 MMHG — HIGH (ref 41–51)
PH BLDV: 7.29 — SIGNIFICANT CHANGE UP (ref 7.26–7.43)
PH UR: 7 — SIGNIFICANT CHANGE UP (ref 5–8)
PLAT MORPH BLD: ABNORMAL
PLATELET # BLD AUTO: 66 K/UL — LOW (ref 130–400)
PO2 BLDV: 40 MMHG — SIGNIFICANT CHANGE UP (ref 20–40)
POIKILOCYTOSIS BLD QL AUTO: SLIGHT — SIGNIFICANT CHANGE UP
POTASSIUM BLDV-SCNC: 4.8 MMOL/L — SIGNIFICANT CHANGE UP (ref 3.3–5.6)
POTASSIUM SERPL-MCNC: 4.5 MMOL/L — SIGNIFICANT CHANGE UP (ref 3.5–5)
POTASSIUM SERPL-SCNC: 4.5 MMOL/L — SIGNIFICANT CHANGE UP (ref 3.5–5)
PROT SERPL-MCNC: 7.1 G/DL — SIGNIFICANT CHANGE UP (ref 6–8)
PROT UR-MCNC: NEGATIVE — SIGNIFICANT CHANGE UP
PROTHROM AB SERPL-ACNC: 13 SEC — HIGH (ref 9.95–12.87)
RBC # BLD: 4.16 M/UL — LOW (ref 4.7–6.1)
RBC # FLD: 17.2 % — HIGH (ref 11.5–14.5)
RBC BLD AUTO: ABNORMAL
RBC CASTS # UR COMP ASSIST: 12 /HPF — HIGH (ref 0–4)
SAO2 % BLDV: 67 % — SIGNIFICANT CHANGE UP
SARS-COV-2 RNA SPEC QL NAA+PROBE: SIGNIFICANT CHANGE UP
SODIUM SERPL-SCNC: 140 MMOL/L — SIGNIFICANT CHANGE UP (ref 135–146)
SP GR SPEC: 1.01 — SIGNIFICANT CHANGE UP (ref 1.01–1.03)
TROPONIN T SERPL-MCNC: 0.03 NG/ML — CRITICAL HIGH
UROBILINOGEN FLD QL: SIGNIFICANT CHANGE UP
VARIANT LYMPHS # BLD: 4.3 % — SIGNIFICANT CHANGE UP (ref 0–5)
WBC # BLD: 2.03 K/UL — LOW (ref 4.8–10.8)
WBC # FLD AUTO: 2.03 K/UL — LOW (ref 4.8–10.8)
WBC UR QL: 15 /HPF — HIGH (ref 0–5)

## 2021-02-12 PROCEDURE — 99291 CRITICAL CARE FIRST HOUR: CPT

## 2021-02-12 PROCEDURE — 71045 X-RAY EXAM CHEST 1 VIEW: CPT | Mod: 26

## 2021-02-12 PROCEDURE — 70450 CT HEAD/BRAIN W/O DYE: CPT | Mod: 26

## 2021-02-12 PROCEDURE — 99223 1ST HOSP IP/OBS HIGH 75: CPT

## 2021-02-12 PROCEDURE — 99285 EMERGENCY DEPT VISIT HI MDM: CPT | Mod: GC

## 2021-02-12 PROCEDURE — 93010 ELECTROCARDIOGRAM REPORT: CPT | Mod: 77

## 2021-02-12 PROCEDURE — 93010 ELECTROCARDIOGRAM REPORT: CPT | Mod: 76

## 2021-02-12 RX ORDER — CARBIDOPA AND LEVODOPA 25; 100 MG/1; MG/1
1 TABLET ORAL
Refills: 0 | Status: DISCONTINUED | OUTPATIENT
Start: 2021-02-12 | End: 2021-02-20

## 2021-02-12 RX ORDER — TAMSULOSIN HYDROCHLORIDE 0.4 MG/1
2 CAPSULE ORAL
Qty: 0 | Refills: 0 | DISCHARGE

## 2021-02-12 RX ORDER — CHLORHEXIDINE GLUCONATE 213 G/1000ML
1 SOLUTION TOPICAL
Refills: 0 | Status: DISCONTINUED | OUTPATIENT
Start: 2021-02-12 | End: 2021-03-11

## 2021-02-12 RX ORDER — ATROPINE SULFATE 0.1 MG/ML
0.5 SYRINGE (ML) INJECTION ONCE
Refills: 0 | Status: COMPLETED | OUTPATIENT
Start: 2021-02-12 | End: 2021-02-12

## 2021-02-12 RX ORDER — SENNA PLUS 8.6 MG/1
2 TABLET ORAL AT BEDTIME
Refills: 0 | Status: DISCONTINUED | OUTPATIENT
Start: 2021-02-12 | End: 2021-03-11

## 2021-02-12 RX ORDER — DOBUTAMINE HCL 250MG/20ML
5 VIAL (ML) INTRAVENOUS
Qty: 500 | Refills: 0 | Status: DISCONTINUED | OUTPATIENT
Start: 2021-02-12 | End: 2021-02-12

## 2021-02-12 RX ORDER — CARBIDOPA AND LEVODOPA 25; 100 MG/1; MG/1
1 TABLET ORAL AT BEDTIME
Refills: 0 | Status: DISCONTINUED | OUTPATIENT
Start: 2021-02-12 | End: 2021-02-20

## 2021-02-12 RX ORDER — FUROSEMIDE 40 MG
40 TABLET ORAL
Refills: 0 | Status: DISCONTINUED | OUTPATIENT
Start: 2021-02-12 | End: 2021-02-13

## 2021-02-12 RX ORDER — SODIUM CHLORIDE 9 MG/ML
1000 INJECTION, SOLUTION INTRAVENOUS ONCE
Refills: 0 | Status: COMPLETED | OUTPATIENT
Start: 2021-02-12 | End: 2021-02-12

## 2021-02-12 RX ORDER — ATORVASTATIN CALCIUM 80 MG/1
40 TABLET, FILM COATED ORAL AT BEDTIME
Refills: 0 | Status: DISCONTINUED | OUTPATIENT
Start: 2021-02-12 | End: 2021-03-11

## 2021-02-12 RX ORDER — POLYETHYLENE GLYCOL 3350 17 G/17G
17 POWDER, FOR SOLUTION ORAL DAILY
Refills: 0 | Status: DISCONTINUED | OUTPATIENT
Start: 2021-02-12 | End: 2021-03-11

## 2021-02-12 RX ORDER — DOPAMINE HYDROCHLORIDE 40 MG/ML
5 INJECTION, SOLUTION, CONCENTRATE INTRAVENOUS
Qty: 400 | Refills: 0 | Status: DISCONTINUED | OUTPATIENT
Start: 2021-02-12 | End: 2021-02-13

## 2021-02-12 RX ORDER — PANTOPRAZOLE SODIUM 20 MG/1
40 TABLET, DELAYED RELEASE ORAL
Refills: 0 | Status: DISCONTINUED | OUTPATIENT
Start: 2021-02-12 | End: 2021-02-16

## 2021-02-12 RX ORDER — PRAMIPEXOLE DIHYDROCHLORIDE 0.12 MG/1
0.5 TABLET ORAL
Refills: 0 | Status: DISCONTINUED | OUTPATIENT
Start: 2021-02-12 | End: 2021-02-21

## 2021-02-12 RX ORDER — APIXABAN 2.5 MG/1
5 TABLET, FILM COATED ORAL EVERY 12 HOURS
Refills: 0 | Status: DISCONTINUED | OUTPATIENT
Start: 2021-02-12 | End: 2021-02-13

## 2021-02-12 RX ORDER — COLCHICINE 0.6 MG
0.6 TABLET ORAL DAILY
Refills: 0 | Status: DISCONTINUED | OUTPATIENT
Start: 2021-02-12 | End: 2021-03-11

## 2021-02-12 RX ADMIN — Medication 0.5 MILLIGRAM(S): at 10:23

## 2021-02-12 RX ADMIN — DOPAMINE HYDROCHLORIDE 17 MICROGRAM(S)/KG/MIN: 40 INJECTION, SOLUTION, CONCENTRATE INTRAVENOUS at 15:54

## 2021-02-12 RX ADMIN — Medication 13.6 MICROGRAM(S)/KG/MIN: at 13:41

## 2021-02-12 RX ADMIN — CARBIDOPA AND LEVODOPA 1 TABLET(S): 25; 100 TABLET ORAL at 22:03

## 2021-02-12 RX ADMIN — SENNA PLUS 2 TABLET(S): 8.6 TABLET ORAL at 22:03

## 2021-02-12 RX ADMIN — ATORVASTATIN CALCIUM 40 MILLIGRAM(S): 80 TABLET, FILM COATED ORAL at 22:03

## 2021-02-12 RX ADMIN — PRAMIPEXOLE DIHYDROCHLORIDE 0.5 MILLIGRAM(S): 0.12 TABLET ORAL at 23:20

## 2021-02-12 RX ADMIN — SODIUM CHLORIDE 1000 MILLILITER(S): 9 INJECTION, SOLUTION INTRAVENOUS at 12:36

## 2021-02-12 RX ADMIN — CARBIDOPA AND LEVODOPA 1 TABLET(S): 25; 100 TABLET ORAL at 23:20

## 2021-02-12 NOTE — H&P ADULT - NSHPLABSRESULTS_GEN_ALL_CORE
10.6   2.03  )-----------( 66       ( 12 Feb 2021 10:26 )             33.9       02-12    140  |  106  |  38<H>  ----------------------------<  73  4.5   |  26  |  1.2    Ca    8.7      12 Feb 2021 10:26  Mg     2.2     02-12    TPro  7.1  /  Alb  3.7  /  TBili  0.3  /  DBili  x   /  AST  82<H>  /  ALT  27  /  AlkPhos  228<H>  02-12                  PT/INR - ( 12 Feb 2021 10:27 )   PT: 13.00 sec;   INR: 1.13 ratio         PTT - ( 12 Feb 2021 10:27 )  PTT:54.5 sec    CARDIAC MARKERS ( 12 Feb 2021 10:26 )  x     / 0.03 ng/mL / x     / x     / x            CAPILLARY BLOOD GLUCOSE      POCT Blood Glucose.: 74 mg/dL (12 Feb 2021 10:11)

## 2021-02-12 NOTE — H&P ADULT - NSHPPHYSICALEXAM_GEN_ALL_CORE
GENERAL: NAD, lying in bed comfortably  HEAD:  Atraumatic, Normocephalic  CHEST/LUNG: Clear to auscultation bilaterally  HEART: Regular rate and rhythm  ABDOMEN: Bowel sounds present; Soft, Nontender, Nondistended  EXTREMITIES: 2+ Peripheral edema, 2+ arm edema  NERVOUS SYSTEM: Mute, responds to name  MSK: Moves all extremities  SKIN: No rashes or lesions  Back: No ulcer or excoriation on the back GENERAL: NAD, lying in bed comfortably, responds to tactile stuimuli  HEAD:  Atraumatic, Normocephalic  CHEST/LUNG: Clear to auscultation bilaterally  HEART: Regular rate and rhythm  ABDOMEN: Bowel sounds present; Soft, Nontender, Nondistended  EXTREMITIES: 2+ Peripheral edema, 2+ arm edema  NERVOUS SYSTEM: Mute, responds to name  MSK: Moves all extremities  SKIN: No rashes or lesions  Back: No ulcer or excoriation on the back

## 2021-02-12 NOTE — ED ADULT NURSE NOTE - OBJECTIVE STATEMENT
pt presents for weakness, lethargy , altered mental status at home/unable to ambulate x 2 days. as per wife, pt is normal alert and oriented and functional. pt is lethargic and not speaking currently with generalized weakness. pt found to be bradycardic in triage., wife denies any n/v/d/ . denies fever/chills.

## 2021-02-12 NOTE — ED ADULT TRIAGE NOTE - CHIEF COMPLAINT QUOTE
as per daughter, patient is unable to talk since Wednesday, patient non-verbal at this time, noted bradycardia. Patient taken to crtical care.

## 2021-02-12 NOTE — H&P ADULT - ASSESSMENT
71 year old Telugu speaking Male with past medical history of Parkinson disease, HLD, HTN, H/O DVT on Eliquis and Lasix presents to ED with altered mental status for 2 days.     Patient still altered possible from underlying cardiac or metabolic pathology. Will send workup for bradycardia and admit the patient to CCU. Patients symptoms could be worsening from baseline from Parkinson and bradycardia could also be from decreased sympathetic drive from PD. Will need to rule out reversible causes and follow with EP and Cardio    # Sinus Bradycardia possible Parkinson induced vs Infectious  - Patient lethargic and mute for 2 days  - HR 30s on admission s/p Dopamine drip with improvement, pancytopenia  - EKG shows sinus Bradycardia with first degree block  - Will continue with Dopamine for now  - Will get TSH, Lyme titers, Echo  - Will work up for possible infectious workup  - Monitor in CCU  - Cardiology follow up for ischemic work up  - Likely will need a pacemaker    # Pancytopenia  - Low WBC count 2k, low platelets and low Hgb  - Will send iron studies  - Will get Heme/On consult  - No indication for any transfusion    # RLL Opacity on CXR  - Pancytopenic with change in mental status  - s/p Levofloxacin in ED  - Will continue with Levofloxacin for now    # Gout  - Continue with Colchicine    # H/O Disc Herniation  - Hold Gabapentin    # Chronic Lower extremity Edema  - Will increase Lasix to twice daily    # H/O DVT  - Continue Eliquis twice daily    # DLD  - Continue Statin    # H/O Parkinson's Disease  - Will continue with home Sinemet ( bedtime and  four times a day and Pramipexole)    DVT: Eliquis  GI: Pantoprazole  Dispo: CCU 71 year old Maori speaking Male with past medical history of Parkinson disease, HLD, HTN, H/O DVT on Eliquis and Lasix presents to ED with altered mental status for 2 days.     Patient still altered possible from underlying cardiac or metabolic pathology. Will send workup for bradycardia and admit the patient to CCU. Patients symptoms could be worsening from baseline from Parkinson and bradycardia could also be from decreased sympathetic drive from PD. Will need to rule out reversible causes and follow with EP and Cardio    # Sinus Bradycardia possible Parkinson induced Dysautonomia vs Infectious  - Patient lethargic and mute for 2 days  - HR 30s on admission s/p Dopamine drip with improvement, pancytopenia  - EKG shows sinus Bradycardia with first degree block  - Will continue with Dopamine for now  - Will get TSH, Lyme titers, Echo  - Will work up for possible infectious workup  - Monitor in CCU  - Cardiology follow up for ischemic work up  - Likely will need a pacemaker    # Pancytopenia  - Low WBC count 2k, low platelets and low Hgb  - Will send iron studies  - Will get Heme/On consult  - No indication for any transfusion    # RLL Opacity on CXR  - Pancytopenic with change in mental status  - s/p Levofloxacin in ED  - Will continue with Levofloxacin for now    # Gout  - Continue with Colchicine    # H/O Disc Herniation  - Hold Gabapentin    # Chronic Lower extremity Edema  - Will increase Lasix to twice daily    # H/O DVT  - Continue Eliquis twice daily    # DLD  - Continue Statin    # H/O Parkinson's Disease  - Will continue with home Sinemet ( bedtime and  four times a day and Pramipexole)    # Constipation  - Has intermittent constipation, abdomen distended on exam  - Will give Senna and Miralax    DVT: Eliquis  GI: Pantoprazole  Dispo: CCU 71 year old Slovak speaking Male with past medical history of Parkinson disease, HLD, HTN, H/O DVT on Eliquis and Lasix presents to ED with altered mental status for 2 days.     Patient still altered possible from underlying cardiac or metabolic pathology. Will send workup for bradycardia and admit the patient to CCU. Patients symptoms could be worsening from baseline from Parkinson and bradycardia could also be from decreased sympathetic drive from PD. Will need to rule out reversible causes and follow with EP and Cardio    # Sinus Bradycardia possible Parkinson induced Dysautonomia vs Infectious  - Patient lethargic and mute for 2 days  - HR 30s on admission s/p Dopamine drip with improvement, pancytopenia  - EKG shows sinus Bradycardia with first degree block  - Will continue with Dopamine for now  - Will get TSH, Lyme titers, Echo  - Will work up for possible infectious workup  - Monitor in CCU  - Cardiology follow up for ischemic work up  - Likely will need a pacemaker    # Pancytopenia  - Low WBC count 2k, low platelets and low Hgb  - Will send iron studies  - Will get Heme/On consult  - No indication for any transfusion    # RLL Opacity on CXR  - Pancytopenic with change in mental status  - s/p Levofloxacin in ED  - Will continue with IV Abx for now. F/u cultures    # Gout  - Continue with Colchicine    # H/O Disc Herniation  - Hold Gabapentin    # Chronic Lower extremity Edema  - Will increase Lasix to twice daily    # H/O DVT  - Continue Eliquis twice daily    # DLD  - Continue Statin    # H/O Parkinson's Disease  - Will continue with home Sinemet ( bedtime and  four times a day and Pramipexole)    # Constipation  - Has intermittent constipation, abdomen distended on exam  - Will give Senna and Miralax    DVT: Eliquis  GI: Pantoprazole  Dispo: CCU

## 2021-02-12 NOTE — CONSULT NOTE ADULT - SUBJECTIVE AND OBJECTIVE BOX
Outpt cardiologist:  none    HPI:      ---  cardio fellow additional notes:        PAST MEDICAL & SURGICAL HISTORY  Cataract    Prostatitis    Dyslipidemia    Gout    Hypertension    Parkinson disease    No significant past surgical history        FAMILY HISTORY:  FAMILY HISTORY:  Family history of cerebrovascular accident (CVA) (Father)        SOCIAL HISTORY:  Social History:      ALLERGIES:  No Known Allergies      MEDICATIONS:  DOPamine Infusion 5 MICROgram(s)/kG/Min (17 mL/Hr) IV Continuous <Continuous>    PRN:      HOME MEDICATIONS:  Home Medications:  acetaminophen 325 mg oral tablet: 2 tab(s) orally every 6 hours, As needed, Mild Pain (1 - 3) (12 Oct 2018 10:07)  aspirin 81 mg oral tablet, chewable: 1 tab(s) orally once a day (28 Sep 2018 16:47)  atorvastatin 40 mg oral tablet: 1 tab(s) orally once a day (at bedtime) (28 Sep 2018 16:47)  carbidopa-levodopa 25 mg-100 mg oral tablet, extended release: 1 tab(s) orally  (12 Oct 2018 10:07)  carbidopa-levodopa 25 mg-250 mg oral tablet: 1 tab(s) orally 4 times a day (12 Oct 2018 11:14)  colchicine 0.6 mg oral tablet: 1 tab(s) orally once a day (12 Oct 2018 11:14)  enalapril 5 mg oral tablet: 1 tab(s) orally once a day (28 Sep 2018 16:47)  furosemide 40 mg oral tablet: 1 tab(s) orally once a day (28 Sep 2018 16:47)  NIFEdipine 30 mg oral tablet, extended release: 1 tab(s) orally once a day (28 Sep 2018 16:47)  pramipexole 0.5 mg oral tablet: 1 tab(s) orally 4 times a day (12 Oct 2018 11:14)  tamsulosin 0.4 mg oral capsule: 2 cap(s) orally once a day (at bedtime) (12 Oct 2018 11:14)      VITALS:   T(F): 97.5 ( @ 15:24), Max: 98 ( @ 09:55)  HR: 51 ( @ 16:26) (36 - 59)  BP: 152/85 ( @ 16:26) (107/62 - 168/77)  BP(mean): 112 ( @ 16:26) (81 - 112)  RR: 16 ( @ 16:26) (16 - 18)  SpO2: 98% ( @ 16:26) (97% - 99%)    I&O's Summary      REVIEW OF SYSTEMS:  unable to obtain from pt.  per daughter at bedside,  just L shoulder pains    PHYSICAL EXAM:  General: Not in distress.  Non-toxic appearing.   HEENT: EOMI  Cardio: regular, S1, S2, no murmur  Pulm: B/L BS.  No wheezing / crackles / rales  Abdomen: Soft, non-tender, non-distended. Normoactive bowel sounds  Extremities: No edema b/l le  Neuro: A&O x3. No focal deficits    LABS:                        10.6   2.03  )-----------( 66       ( 2021 10:26 )             33.9         140  |  106  |  38<H>  ----------------------------<  73  4.5   |  26  |  1.2    Ca    8.7      2021 10:26  Mg     2.2         TPro  7.1  /  Alb  3.7  /  TBili  0.3  /  DBili  x   /  AST  82<H>  /  ALT  27  /  AlkPhos  228<H>      PT/INR - ( 2021 10:27 )   PT: 13.00 sec;   INR: 1.13 ratio         PTT - ( 2021 10:27 )  PTT:54.5 sec  Troponin T, Serum: 0.03 ng/mL <HH> (21 @ 10:26)    CARDIAC MARKERS ( 2021 10:26 )  x     / 0.03 ng/mL / x     / x     / x            Troponin trend:    Serum Pro-Brain Natriuretic Peptide: 348 pg/mL (21 @ 10:26)      COVID-19 PCR: NotDetec (2021 10:17)      RADIOLOGY:  -CXR:  -TTE:  -CCTA:  -STRESS TEST:  -CATHETERIZATION:  -OTHER:  EC Lead ECG:   Ventricular Rate 49 BPM    Atrial Rate 49 BPM    P-R Interval 302 ms    QRS Duration 106 ms    Q-T Interval 532 ms    QTC Calculation(Bazett) 480 ms    R Axis -1 degrees    T Axis 19 degrees    Diagnosis Line Sinus bradycardia with 1st degree A-V block  Prolonged QT  Abnormal ECG    Confirmed by ANA GARDUNO MD ([id number]) on 2021 3:45:47 PM ( @ 13:58)      TELEMETRY EVENTS:   Outpt cardiologist:  none    HPI:  72 yo M,  pmh HTN, DL, PD/dementia (baseline conversive and ambulatory, though has on/off days),  L shoulder dislocation few months ago, and recent covid infection last month (per family, asymptomatic, but CXR today showing R opacities) --     presents with 2d history of AMS / lethargy / decreased ambulation status / decreased responsiveness.      In ED,  pt was normotensive but bradycardic ~40 (narrow complex).  Pt was started on dobutamine, and went into a wide complex tachycardia / LBBB morphology, c/w VT.    Dobutamine was then stopped and pt was placed on dopamine instead.  On Dopamine, HR improved to ~50.      No reports of chest pain / palpitations / fevers / loc / syncope per daughter at bedside.     ----  Daughter: Kelton [phone]        PAST MEDICAL & SURGICAL HISTORY  Cataract    Prostatitis    Dyslipidemia    Gout    Hypertension    Parkinson disease    No significant past surgical history        FAMILY HISTORY:  FAMILY HISTORY:  Family history of cerebrovascular accident (CVA) (Father)        SOCIAL HISTORY:  Social History:      ALLERGIES:  No Known Allergies      MEDICATIONS:  DOPamine Infusion 5 MICROgram(s)/kG/Min (17 mL/Hr) IV Continuous <Continuous>    PRN:      HOME MEDICATIONS:  Home Medications:  acetaminophen 325 mg oral tablet: 2 tab(s) orally every 6 hours, As needed, Mild Pain (1 - 3) (12 Oct 2018 10:07)  aspirin 81 mg oral tablet, chewable: 1 tab(s) orally once a day (28 Sep 2018 16:47)  atorvastatin 40 mg oral tablet: 1 tab(s) orally once a day (at bedtime) (28 Sep 2018 16:47)  carbidopa-levodopa 25 mg-100 mg oral tablet, extended release: 1 tab(s) orally  (12 Oct 2018 10:07)  carbidopa-levodopa 25 mg-250 mg oral tablet: 1 tab(s) orally 4 times a day (12 Oct 2018 11:14)  colchicine 0.6 mg oral tablet: 1 tab(s) orally once a day (12 Oct 2018 11:14)  enalapril 5 mg oral tablet: 1 tab(s) orally once a day (28 Sep 2018 16:47)  furosemide 40 mg oral tablet: 1 tab(s) orally once a day (28 Sep 2018 16:47)  NIFEdipine 30 mg oral tablet, extended release: 1 tab(s) orally once a day (28 Sep 2018 16:47)  pramipexole 0.5 mg oral tablet: 1 tab(s) orally 4 times a day (12 Oct 2018 11:14)  tamsulosin 0.4 mg oral capsule: 2 cap(s) orally once a day (at bedtime) (12 Oct 2018 11:14)      VITALS:   T(F): 97.5 ( @ 15:24), Max: 98 ( @ 09:55)  HR: 51 ( @ 16:26) (36 - 59)  BP: 152/85 ( @ 16:26) (107/62 - 168/77)  BP(mean): 112 ( @ 16:26) (81 - 112)  RR: 16 ( @ 16:26) (16 - 18)  SpO2: 98% ( @ 16:26) (97% - 99%)    I&O's Summary      REVIEW OF SYSTEMS:  unable to obtain from pt.  per daughter at bedside,  just L shoulder pains    PHYSICAL EXAM:  General: Not in distress.  Non-toxic appearing.   HEENT: EOMI  Neck: no JVD  Cardio: regular, S1, S2, no murmur  Pulm: B/L BS.  No wheezing / crackles / rales  Abdomen: Soft, non-tender, non-distended. Normoactive bowel sounds  Extremities: No edema b/l le      LABS:                        10.6   2.03  )-----------( 66       ( 2021 10:26 )             33.9     02-12    140  |  106  |  38<H>  ----------------------------<  73  4.5   |  26  |  1.2    Ca    8.7      2021 10:26  Mg     2.2     02-12    TPro  7.1  /  Alb  3.7  /  TBili  0.3  /  DBili  x   /  AST  82<H>  /  ALT  27  /  AlkPhos  228<H>  02-12    PT/INR - ( 2021 10:27 )   PT: 13.00 sec;   INR: 1.13 ratio         PTT - ( 2021 10:27 )  PTT:54.5 sec  Troponin T, Serum: 0.03 ng/mL <HH> (21 @ 10:26)    CARDIAC MARKERS ( 2021 10:26 )  x     / 0.03 ng/mL / x     / x     / x            Troponin trend:    Serum Pro-Brain Natriuretic Peptide: 348 pg/mL (21 @ 10:26)      COVID-19 PCR: NotDetec (2021 10:17)      RADIOLOGY:  -CXR:  -TTE:  -CCTA:  -STRESS TEST:  -CATHETERIZATION:  -OTHER:  EC Lead ECG:   Ventricular Rate 49 BPM    Atrial Rate 49 BPM    P-R Interval 302 ms    QRS Duration 106 ms    Q-T Interval 532 ms    QTC Calculation(Bazett) 480 ms    R Axis -1 degrees    T Axis 19 degrees    Diagnosis Line Sinus bradycardia with 1st degree A-V block  Prolonged QT  Abnormal ECG    Confirmed by ANA GARDUNO MD ([id number]) on 2021 3:45:47 PM ( @ 13:58)      TELEMETRY EVENTS:   Outpt cardiologist:  none    HPI:  70 yo M,  pmh HTN, DL, PD/dementia (baseline conversive and ambulatory, though has on/off days),  L shoulder dislocation few months ago, and recent covid infection last month (per family, asymptomatic, but CXR today showing R opacities) --     presents with 2d history of AMS / lethargy / decreased ambulation status / decreased responsiveness.      In ED,  pt was normotensive but bradycardic ~40 (narrow complex).  Pt was started on dobutamine, and went into a wide complex tachycardia / LBBB morphology, c/w VT.    Dobutamine was then stopped and pt was placed on dopamine instead.  On Dopamine, HR improved to ~50.      No reports of chest pain / palpitations / fevers / loc / syncope per daughter at bedside.     ----  Daughter: Kelton [phone]        PAST MEDICAL & SURGICAL HISTORY  Cataract    Prostatitis    Dyslipidemia    Gout    Hypertension    Parkinson disease    No significant past surgical history        FAMILY HISTORY:  FAMILY HISTORY:  Family history of cerebrovascular accident (CVA) (Father)        SOCIAL HISTORY:  Social History:      ALLERGIES:  No Known Allergies      MEDICATIONS:  DOPamine Infusion 5 MICROgram(s)/kG/Min (17 mL/Hr) IV Continuous <Continuous>    PRN:      HOME MEDICATIONS:  Home Medications:  acetaminophen 325 mg oral tablet: 2 tab(s) orally every 6 hours, As needed, Mild Pain (1 - 3) (12 Oct 2018 10:07)  aspirin 81 mg oral tablet, chewable: 1 tab(s) orally once a day (28 Sep 2018 16:47)  atorvastatin 40 mg oral tablet: 1 tab(s) orally once a day (at bedtime) (28 Sep 2018 16:47)  carbidopa-levodopa 25 mg-100 mg oral tablet, extended release: 1 tab(s) orally  (12 Oct 2018 10:07)  carbidopa-levodopa 25 mg-250 mg oral tablet: 1 tab(s) orally 4 times a day (12 Oct 2018 11:14)  colchicine 0.6 mg oral tablet: 1 tab(s) orally once a day (12 Oct 2018 11:14)  enalapril 5 mg oral tablet: 1 tab(s) orally once a day (28 Sep 2018 16:47)  furosemide 40 mg oral tablet: 1 tab(s) orally once a day (28 Sep 2018 16:47)  NIFEdipine 30 mg oral tablet, extended release: 1 tab(s) orally once a day (28 Sep 2018 16:47)  pramipexole 0.5 mg oral tablet: 1 tab(s) orally 4 times a day (12 Oct 2018 11:14)  tamsulosin 0.4 mg oral capsule: 2 cap(s) orally once a day (at bedtime) (12 Oct 2018 11:14)      VITALS:   T(F): 97.5 ( @ 15:24), Max: 98 ( @ 09:55)  HR: 51 ( @ 16:26) (36 - 59)  BP: 152/85 ( @ 16:26) (107/62 - 168/77)  BP(mean): 112 ( @ 16:26) (81 - 112)  RR: 16 ( @ 16:26) (16 - 18)  SpO2: 98% ( @ 16:26) (97% - 99%)    I&O's Summary      REVIEW OF SYSTEMS:  unable to obtain from pt.  per daughter at bedside,  just L shoulder pains    PHYSICAL EXAM:  General: Not in distress.  Non-toxic appearing.   HEENT: EOMI  Neck: no JVD  Cardio: regular, S1, S2, no murmur  Pulm: B/L BS.  +crackles R  Abdomen: Soft, non-tender, non-distended. Normoactive bowel sounds  Extremities: +b/l le edema, +lue edema      LABS:                        10.6   2.03  )-----------( 66       ( 2021 10:26 )             33.9     -12    140  |  106  |  38<H>  ----------------------------<  73  4.5   |  26  |  1.2    Ca    8.7      2021 10:26  Mg     2.2     02-    TPro  7.1  /  Alb  3.7  /  TBili  0.3  /  DBili  x   /  AST  82<H>  /  ALT  27  /  AlkPhos  228<H>  0212    PT/INR - ( 2021 10:27 )   PT: 13.00 sec;   INR: 1.13 ratio         PTT - ( 2021 10:27 )  PTT:54.5 sec  Troponin T, Serum: 0.03 ng/mL <HH> (21 @ 10:26)    CARDIAC MARKERS ( 2021 10:26 )  x     / 0.03 ng/mL / x     / x     / x            Troponin trend:    Serum Pro-Brain Natriuretic Peptide: 348 pg/mL (21 @ 10:26)      COVID-19 PCR: NotDetec (2021 10:17)      RADIOLOGY:  -CXR:  -TTE:  -CCTA:  -STRESS TEST:  -CATHETERIZATION:  -OTHER:  EC Lead ECG:   Ventricular Rate 49 BPM    Atrial Rate 49 BPM    P-R Interval 302 ms    QRS Duration 106 ms    Q-T Interval 532 ms    QTC Calculation(Bazett) 480 ms    R Axis -1 degrees    T Axis 19 degrees    Diagnosis Line Sinus bradycardia with 1st degree A-V block  Prolonged QT  Abnormal ECG    Confirmed by ANA GARDUNO MD ([id number]) on 2021 3:45:47 PM ( @ 13:58)      TELEMETRY EVENTS:   Outpt cardiologist:  none    HPI:  72 yo M,  pmh HTN, DL, PD/dementia (baseline conversive and ambulatory, though has on/off days),  L shoulder dislocation few months ago, and recent covid infection last month (per family, asymptomatic, but CXR today showing R opacities) --     presents with 2d history of AMS / lethargy / decreased ambulation status / decreased responsiveness.      In ED,  pt was normotensive but bradycardic ~40 (narrow complex).   Atropin was given without improvement, and  Pt was started on dobutamine, and went into a wide complex tachycardia / LBBB morphology, c/w VT.    Dobutamine was then stopped and pt was placed on dopamine instead.  On Dopamine, HR improved to ~50.      No reports of chest pain / palpitations / fevers / loc / syncope per daughter at bedside.     No reports of prior cardiac history, arrythmias, cad/mi.      ----  Daughter: Kelton [phone]        PAST MEDICAL & SURGICAL HISTORY  Cataract    Prostatitis    Dyslipidemia    Gout    Hypertension    Parkinson disease    No significant past surgical history        FAMILY HISTORY:  FAMILY HISTORY:  Family history of cerebrovascular accident (CVA) (Father)        SOCIAL HISTORY:  Social History:      ALLERGIES:  No Known Allergies      MEDICATIONS:  DOPamine Infusion 5 MICROgram(s)/kG/Min (17 mL/Hr) IV Continuous <Continuous>    PRN:      HOME MEDICATIONS:  Home Medications:  acetaminophen 325 mg oral tablet: 2 tab(s) orally every 6 hours, As needed, Mild Pain (1 - 3) (12 Oct 2018 10:07)  aspirin 81 mg oral tablet, chewable: 1 tab(s) orally once a day (28 Sep 2018 16:47)  atorvastatin 40 mg oral tablet: 1 tab(s) orally once a day (at bedtime) (28 Sep 2018 16:47)  carbidopa-levodopa 25 mg-100 mg oral tablet, extended release: 1 tab(s) orally  (12 Oct 2018 10:07)  carbidopa-levodopa 25 mg-250 mg oral tablet: 1 tab(s) orally 4 times a day (12 Oct 2018 11:14)  colchicine 0.6 mg oral tablet: 1 tab(s) orally once a day (12 Oct 2018 11:14)  enalapril 5 mg oral tablet: 1 tab(s) orally once a day (28 Sep 2018 16:47)  furosemide 40 mg oral tablet: 1 tab(s) orally once a day (28 Sep 2018 16:47)  NIFEdipine 30 mg oral tablet, extended release: 1 tab(s) orally once a day (28 Sep 2018 16:47)  pramipexole 0.5 mg oral tablet: 1 tab(s) orally 4 times a day (12 Oct 2018 11:14)  tamsulosin 0.4 mg oral capsule: 2 cap(s) orally once a day (at bedtime) (12 Oct 2018 11:14)      VITALS:   T(F): 97.5 ( @ 15:24), Max: 98 ( @ 09:55)  HR: 51 ( @ 16:26) (36 - 59)  BP: 152/85 ( @ 16:26) (107/62 - 168/77)  BP(mean): 112 ( @ 16:26) (81 - 112)  RR: 16 ( @ 16:26) (16 - 18)  SpO2: 98% ( @ 16:26) (97% - 99%)    I&O's Summary      REVIEW OF SYSTEMS:  unable to obtain from pt.  per daughter at bedside,  just L shoulder pains    PHYSICAL EXAM:  General: Not in distress.  Non-toxic appearing.   HEENT: EOMI  Neck: no JVD  Cardio: regular, S1, S2, no murmur  Pulm: B/L BS.  +crackles R  Abdomen: Soft, non-tender, non-distended. Normoactive bowel sounds  Extremities: +b/l le edema, +lue edema      LABS:                        10.6   2.03  )-----------( 66       ( 2021 10:26 )             33.9     02-12    140  |  106  |  38<H>  ----------------------------<  73  4.5   |  26  |  1.2    Ca    8.7      2021 10:26  Mg     2.2     -    TPro  7.1  /  Alb  3.7  /  TBili  0.3  /  DBili  x   /  AST  82<H>  /  ALT  27  /  AlkPhos  228<H>  02-12    PT/INR - ( 2021 10:27 )   PT: 13.00 sec;   INR: 1.13 ratio         PTT - ( 2021 10:27 )  PTT:54.5 sec  Troponin T, Serum: 0.03 ng/mL <HH> (21 @ 10:26)    CARDIAC MARKERS ( 2021 10:26 )  x     / 0.03 ng/mL / x     / x     / x            Troponin trend:    Serum Pro-Brain Natriuretic Peptide: 348 pg/mL (21 @ 10:26)      COVID-19 PCR: NotDetec (2021 10:17)      RADIOLOGY:  -CXR:  -TTE:  -CCTA:  -STRESS TEST:  -CATHETERIZATION:  -OTHER:  EC Lead ECG:   Ventricular Rate 49 BPM    Atrial Rate 49 BPM    P-R Interval 302 ms    QRS Duration 106 ms    Q-T Interval 532 ms    QTC Calculation(Bazett) 480 ms    R Axis -1 degrees    T Axis 19 degrees    Diagnosis Line Sinus bradycardia with 1st degree A-V block  Prolonged QT  Abnormal ECG    Confirmed by ANA GARDUNO MD ([id number]) on 2021 3:45:47 PM ( @ 13:58)      TELEMETRY EVENTS:

## 2021-02-12 NOTE — ED PROVIDER NOTE - CLINICAL SUMMARY MEDICAL DECISION MAKING FREE TEXT BOX
70 y/o Greek speaking M PMHx Parkinsons, HLD, HTN, on Eliquis and Lasix presents to ED with AMS x2 days and bradycardia. Per patients daughter, patient typically speaks but has been non-verbal over last two days acutely. No recent changes to medications. Per daughter no fevers/chills. No recent trauma or falls per daughter. New worsening swelling of lower extremities.    Patient brought to Critical Care ED/ HR in the 30's. Patient awake and SBP initially 170. Patient has pitting edema to legs and rhonchi to right side. Bedside echo performed. Emergent large bore access obtained and labs sent, including Thyroid studies. Patients temperature was normal. EKG immediately performed and shows marked sinus bradycardia with 1st degree AV block. ED blood work reviewed and electrolytes were normal. CT head done for AMS and shows no acute changes. Patient had a steady HR in the high 30's and his BP was falling (-120). Patient initially started on Dobutamine and HR improved and patient went tachycardic. Electrophysiology and Cardiology consulted for care. CXR shows right sided pneumonia and Legionella antigen ordered. Patient given fluids IV slowly with multiple reassessments. On Dopamine drip, HR improved. Patient evaluated and will admit to CCU. Will treat infection and thyroid studies pending as look for underlying cause continues. Daughter spoken to about results and plan of care, need for admission.

## 2021-02-12 NOTE — ED PROVIDER NOTE - PROGRESS NOTE DETAILS
DL - Spoke with EP, will come evaluate. Patient bradycardic 1st degree heart block, PNA on CXR, added Levaquin for legionella coverage. DL - Pt initial EKG sinus ingris 1st degree AV block, did not respond to atropine, given dobutamine with response in HR rapdily. Drip d/c and HR back in 30s. CCU consulted. Cardiology bedside recomending dopamine. DL - CCU fellow Carmelina approved pt for CCU. Pt on dopamine drip HR in 50s. Cardiology and EP following.

## 2021-02-12 NOTE — ED PROVIDER NOTE - OBJECTIVE STATEMENT
72 y/o Azeri speaking M PMHx Parkinsons, HLD, HTN, on Eliquis and Lasix presents to ED with AMS x2 days and bradycardia. Per patients daughter, patient typically speaks but has been non-verbal over last two days acutely. No recent changes to medications. Per daughter no fevers/chills. No recent trauma or falls per daughter. New worsening swelling of lower extremities.

## 2021-02-12 NOTE — ED ADULT NURSE NOTE - NSIMPLEMENTINTERV_GEN_ALL_ED
Implemented All Fall with Harm Risk Interventions:  Ponder to call system. Call bell, personal items and telephone within reach. Instruct patient to call for assistance. Room bathroom lighting operational. Non-slip footwear when patient is off stretcher. Physically safe environment: no spills, clutter or unnecessary equipment. Stretcher in lowest position, wheels locked, appropriate side rails in place. Provide visual cue, wrist band, yellow gown, etc. Monitor gait and stability. Monitor for mental status changes and reorient to person, place, and time. Review medications for side effects contributing to fall risk. Reinforce activity limits and safety measures with patient and family. Provide visual clues: red socks.

## 2021-02-12 NOTE — CONSULT NOTE ADULT - SUBJECTIVE AND OBJECTIVE BOX
Patient is a 71y old  Male who presents with a chief complaint of bradycardia / ams. (2021 16:43)        HPI:      Electrophysiology:  71y Male with h/o Parkinson disease, HTN, HLD, gout, psoriasis, DVT on Eliquis, brought in by family due to altered mental status, lethargy, not communicating/non-verbal for 2 days. In ED patient was noted to be bradycardic to HR 38-42 with 1st degree AVB JESS 340ms.  Family at bedside, Denies recent travels, recent illnesses, head trauma, change in medications, Denies c/o chest discomfort, palpitations, dizziness, LOC.  No prior cardiac history, no prior events, no prior work up.  In ED, Atropin was given with no response, was started on Dobutamine, developed WCT @120-125bpms VT vs Aberrancy drip was d/c'd.      REVIEW OF SYSTEMS    [x] A ten-point review of systems was otherwise negative except as noted. per family  [x ] Due to altered mental status/intubation, subjective information were not able to be obtained from the patient. History was obtained, to the extent possible, from review of the chart and collateral sources of information.      PAST MEDICAL & SURGICAL HISTORY:  Cataract    Prostatitis    Dyslipidemia    Gout    Hypertension    Parkinson disease    No significant past surgical history        Home Medications:  acetaminophen 325 mg oral tablet: 2 tab(s) orally every 6 hours, As needed, Mild Pain (1 - 3) (12 Oct 2018 10:07)  aspirin 81 mg oral tablet, chewable: 1 tab(s) orally once a day (28 Sep 2018 16:47)  atorvastatin 40 mg oral tablet: 1 tab(s) orally once a day (at bedtime) (28 Sep 2018 16:47)  carbidopa-levodopa 25 mg-100 mg oral tablet, extended release: 1 tab(s) orally  (12 Oct 2018 10:07)  carbidopa-levodopa 25 mg-250 mg oral tablet: 1 tab(s) orally 4 times a day (12 Oct 2018 11:14)  colchicine 0.6 mg oral tablet: 1 tab(s) orally once a day (12 Oct 2018 11:14)  enalapril 5 mg oral tablet: 1 tab(s) orally once a day (28 Sep 2018 16:47)  furosemide 40 mg oral tablet: 1 tab(s) orally once a day (28 Sep 2018 16:47)  NIFEdipine 30 mg oral tablet, extended release: 1 tab(s) orally once a day (28 Sep 2018 16:47)  pramipexole 0.5 mg oral tablet: 1 tab(s) orally 4 times a day (12 Oct 2018 11:14)  tamsulosin 0.4 mg oral capsule: 2 cap(s) orally once a day (at bedtime) (12 Oct 2018 11:14)      Allergies:  No Known Allergies      FAMILY HISTORY:  Family history of cerebrovascular accident (CVA) (Father)        SOCIAL HISTORY: denies tobacco / ETOH / illicit drug use     CIGARETTES:  ALCOHOL:        PREVIOUS DIAGNOSTIC TESTING:  none available       MEDICATIONS  (STANDING):  DOPamine Infusion 5 MICROgram(s)/kG/Min (17 mL/Hr) IV Continuous <Continuous>    MEDICATIONS  (PRN):      Vital Signs Last 24 Hrs  T(C): 36.4 (2021 15:24), Max: 36.7 (2021 09:55)  T(F): 97.5 (2021 15:24), Max: 98 (2021 09:55)  HR: 53 (2021 16:57) (36 - 59)  BP: 149/76 (2021 16:57) (107/62 - 168/77)  BP(mean): 112 (2021 16:26) (81 - 112)  RR: 17 (2021 16:57) (16 - 18)  SpO2: 98% (2021 16:57) (97% - 99%)    PHYSICAL EXAM:    HEAD:  Atraumatic, Normocephalic  EYES: EOMI, PERRLA, conjunctiva and sclera clear  HEART: bradyc Regular rhythm; No murmurs, rubs, or gallops.  PULMONARY: Clear to auscultation and perfusion.  No rales, wheezing, or rhonchi bilaterally.  ABDOMEN: Soft, Nontender, Nondistended; Bowel sounds present  EXTREMITIES:  2+ Peripheral Pulses, No clubbing, cyanosis, or 1+ edema  NEUROLOGICAL: Grossly nonfocal    I&O's Detail    Daily Height in cm: 170 (2021 09:55)    Daily     INTERPRETATION OF TELEMETRY:    EC Lead ECG:   Ventricular Rate 49 BPM    Atrial Rate 49 BPM    P-R Interval 302 ms    QRS Duration 106 ms    Q-T Interval 532 ms    QTC Calculation(Bazett) 480 ms    R Axis -1 degrees    T Axis 19 degrees    Diagnosis Line Sinus bradycardia with 1st degree A-V block  Prolonged QT  Abnormal ECG    Confirmed by ANA GARDUNO MD ([id number]) on 2021 3:45:47 PM (21 @ 13:58)  12 Lead ECG:   Ventricular Rate 124 BPM    QRS Duration 148 ms    Q-T Interval 410 ms    QTC Calculation(Bazett) 589 ms    R Axis -10 degrees    T Axis 204 degrees    Diagnosis Line Wide QRS tachycardia  Left bundle branch block  Abnormal ECG    Confirmed by STEVE SOARES MD ([id number]) on 2021 3:23:19 PM (21 @ 13:41)  12 Lead ECG:   Ventricular Rate 41 BPM    Atrial Rate 41 BPM    P-R Interval 340 ms    QRS Duration 110 ms    Q-T Interval 540 ms    QTC Calculation(Bazett) 445 ms    P Axis -15 degrees    R Axis -15 degrees    T Axis -9 degrees    Diagnosis Line Marked sinus bradycardia with 1st degree A-V block  Minimal voltage criteria for LVH, may be normal variant ( R in aVL )  Inferior infarct , age undetermined  Abnormal ECG    Confirmed by ANA GARDUNO MD ([id number]) on 2021 10:50:53 AM (21 @ 10:03)        LABS:                        10.6   2.03  )-----------( 66       ( 2021 10:26 )             33.9         140  |  106  |  38<H>  ----------------------------<  73  4.5   |  26  |  1.2    Ca    8.7      2021 10:26  Mg     2.2         TPro  7.1  /  Alb  3.7  /  TBili  0.3  /  DBili  x   /  AST  82<H>  /  ALT  27  /  AlkPhos  228<H>  12    CARDIAC MARKERS ( 2021 10:26 )  x     / 0.03 ng/mL / x     / x     / x          PT/INR - ( 2021 10:27 )   PT: 13.00 sec;   INR: 1.13 ratio         PTT - ( 2021 10:27 )  PTT:54.5 sec    BNPSerum Pro-Brain Natriuretic Peptide: 348 pg/mL ( @ 10:26)      COVID-19 PCR: NotDetec:  (21 @ 10:17)         RADIOLOGY & ADDITIONAL STUDIES:  < from: Xray Chest 1 View- PORTABLE-Urgent (Xray Chest 1 View- PORTABLE-Urgent .) (21 @ 11:10) >  Findings:    Support devices: Telemetry leads overlie the thorax    Cardiac/mediastinum/hilum: Heart is enlarged.    Lung parenchyma/Pleura: Right lung opacity.    Skeleton/soft tissues: Unchanged    Impression:    Right lung opacification, may be indicative of pneumonia.    < end of copied text >      < from: CT Head No Cont (21 @ 11:41) >  MPRESSION:    No acute intracranial pathology.    Mild chronic microvascular ischemic changes.    < end of copied text >

## 2021-02-12 NOTE — CONSULT NOTE ADULT - ASSESSMENT
71y Male with h/o Parkinson disease, HTN, HLD, gout, psoriasis, DVT on Eliquis, brought in by family due to altered mental status, lethargy, not communicating/non-verbal for 2 days. In ED patient was noted to be bradycardic to HR 38-42 with 1st degree AVB JESS 340ms    symptomatic bradycardia  not on any AVN blockers at baseline    con't tele  Dopamine drip for bradycardia and hemodynamic support  avoid AVN blockers  Echocardiogram  Cardiology for ischemic work up  Check TSH / Free T4   Check lyme titers   will follow                 Cardiologist: None  PCP: Dr. Patterson ([id number])    71y Male with h/o Parkinson disease, HTN, HLD, gout, psoriasis, DVT on Eliquis, brought in by family due to altered mental status, lethargy, not communicating/non-verbal for 2 days. In ED patient was noted to be bradycardic to HR 38-42 with 1st degree AVB (340 ms)    sinus bradycardia with symptoms of lethargy  not on any AVN blockers at baseline    con't tele  Dopamine drip for bradycardia and hemodynamic support  avoid AVN blockers  2D Echocardiogram  Cardiology consult for ischemic work up  Check TSH / Free T4   Check lyme titers   will follow

## 2021-02-12 NOTE — H&P ADULT - HISTORY OF PRESENT ILLNESS
71 year old Yi speaking Male with past medical history of Parkinson disease, HLD, HTN, on Eliquis and Lasix presents to ED with altered mental status for 2 days.     As per family patient at baseline is able to speak but since last 2 days has not been able to speak and more lethargic than baseline. Patient bright to ED for non resolution of symptoms. Family denies any fevers, chills, rigors, cough, shortness of breath, loss of consciousness, incontinence or any other complaints.     In ED patient hypertensive to 170s and bradycardic to 30s. EKG showed sinus bradycardia with 1st degree AV block. Electrolytes were normal. Patient given atropine without response. dobutamine and HR increased with wide complex tachycardia CT head done for AMS and shows no acute changes 71 year old Mohawk speaking Male with past medical history of Parkinson disease, HLD, HTN, H/O DVT on Eliquis and Lasix presents to ED with altered mental status for 2 days.     As per family patient at baseline is able to speak and has his on and off days but since last 2 days has not been able to speak and more lethargic than baseline. As per daughter at the bedside, patient has "bad days but this is the worst we have seen" Patient bright to ED for non resolution of symptoms. Family denies any fevers, chills, rigors, cough, shortness of breath, loss of consciousness, incontinence or any other complaints.     In ED patient hypertensive to 170s and bradycardic to 30s. EKG showed sinus bradycardia with 1st degree AV block. Electrolytes were normal. Patient given atropine without response, he was given dobutamine and HR increased with wide complex tachycardia. Patient placed on Dopamine with HR improving to the 50s. CT head done for AMS and shows no acute changes. Patient at time of interview minimally verbal but appeared in no apparent discomfort 71 year old Armenian speaking Male with past medical history of Parkinson disease, HLD, HTN, H/O DVT on Eliquis and Lasix, H/O COVID presents to ED with altered mental status for 2 days.     As per family patient at baseline is able to speak and has his on and off days but since last 2 days has not been able to speak and more lethargic than baseline. As per daughter at the bedside, patient has "bad days but this is the worst we have seen" Patient bright to ED for non resolution of symptoms. Family denies any fevers, chills, rigors, cough, shortness of breath, loss of consciousness, incontinence or any other complaints.     In ED patient hypertensive to 170s and bradycardic to 30s. EKG showed sinus bradycardia with 1st degree AV block. Electrolytes were normal. Patient given atropine without response, he was given dobutamine and HR increased with wide complex tachycardia. Patient placed on Dopamine with HR improving to the 50s. CT head done for AMS and shows no acute changes. Patient at time of interview minimally verbal but appeared in no apparent discomfort 71 year old Danish speaking Male with past medical history of Parkinson disease, HLD, HTN, H/O DVT on Eliquis and Lasix, H/O COVID presents to ED with altered mental status for 2 days.     As per family patient at baseline is able to speak and has his on and off days but since last 2 days has not been able to speak and more lethargic than baseline. As per daughter at the bedside, patient has "bad days but this is the worst we have seen" Patient bright to ED for non resolution of symptoms. Family denies any fevers, chills, rigors, cough, shortness of breath, loss of consciousness, incontinence or any other complaints.     In ED patient hypertensive to 170s and bradycardic to 30s. EKG showed sinus bradycardia with 1st degree AV block. Electrolytes were normal. Patient given atropine without response, he was given dobutamine and HR increased with wide complex tachycardia. Patient placed on Dopamine with HR improving to the 50s. CT head done for AMS and shows no acute changes. Patient at time of interview minimally verbal but appeared in no apparent discomfort.

## 2021-02-12 NOTE — ED PROVIDER NOTE - PHYSICAL EXAMINATION
CONSTITUTIONAL: Well-developed; well-nourished; in no acute distress.   SKIN: warm, dry  HEAD: Normocephalic; atraumatic.  EYES: no conjunctival injection. EOMI. PERRLA, 3mm R and 2mm L, +horizontal nystagmus   ENT: No nasal discharge; airway clear.  NECK: Supple; non tender.  CARD: S1, S2 normal, +Brachycardiac   RESP: No wheezes, rales or rhonchi.  ABD: soft ntnd. +ventral hernia   EXT: Normal ROM.  +bilateral LE edema. Distal pulses intact   LYMPH: No acute cervical adenopathy.  NEURO: AAOX0. Non-verbal. Follows commands.

## 2021-02-12 NOTE — ED PROVIDER NOTE - ATTENDING CONTRIBUTION TO CARE
I personally evaluated patient. I agree with the findings and plan with all documentation on chart except as documented  in my note.    70 y/o Frisian speaking M PMHx Parkinsons, HLD, HTN, on Eliquis and Lasix presents to ED with AMS x2 days and bradycardia. Per patients daughter, patient typically speaks but has been non-verbal over last two days acutely. No recent changes to medications. Per daughter no fevers/chills. No recent trauma or falls per daughter. New worsening swelling of lower extremities.    Patient brought to Critical Care ED/ HR in the 30's. Patient awake and SBP initially 170. Patient has pitting edema to legs and rhonchi to right side. Bedside echo performed. Emergent large bore access obtained and labs sent, including Thyroid studies. Patients temperature was normal. EKG immediately performed and shows marked sinus bradycardia with 1st degree AV block. ED blood work reviewed and electrolytes were normal. CT head done for AMS and shows no acute changes. Patient had a steady HR in the high 30's and his BP was falling (-120). Patient initially started on Dobutamine and HR improved and patient went tachycardic. Electrophysiology and Cardiology consulted for care. CXR shows right sided pneumonia and Legionella antigen ordered. Patient given fluids IV slowly with multiple reassessments. On Dopamine drip, HR improved. Patient evaluated and will admit to CCU. Will treat infection and thyroid studies pending as look for underlying cause continues. Daughter spoken to about results and plan of care, need for admission.

## 2021-02-12 NOTE — CONSULT NOTE ADULT - ASSESSMENT
IMPRESSION:        PLAN:    CNS: Avoid over sedation    HEENT: Oral care    PULMONARY:  HOB @ 45 degrees    CARDIOVASCULAR:  - tte:    - meds:   - plan:           GI: GI prophylaxis.  Feeding     RENAL:  Follow up lytes.  Correct as needed    INFECTIOUS DISEASE: Follow up cultures    HEMATOLOGICAL:  DVT prophylaxis.    ENDOCRINE:  Follow up FS.  Insulin protocol if needed.    MUSCULOSKELETAL: oobtc      DISPO:  CCU         IMPRESSION:        PLAN:    CNS: Avoid over sedation    HEENT: Oral care    PULMONARY:  HOB @ 45 degrees    CARDIOVASCULAR:  - tte:  pending  avoid AVN blockers  C/w Dopamine for now.  F/u labs, TFT's, r/o reversible causes  EP follow-up for possible PPM          GI: GI prophylaxis.  Feeding     RENAL:  Follow up lytes.  Correct as needed    INFECTIOUS DISEASE: Follow up cultures    HEMATOLOGICAL:  DVT prophylaxis.    ENDOCRINE:  Follow up FS.  Insulin protocol if needed.    MUSCULOSKELETAL: oobtc      DISPO:  CCU         IMPRESSION:  - Sinus Bradycardia / 1st deg AVB - symptomatic? (lethargy)  - Lethargy / AMS - need to r/o other causes   - Pancytopenia  - Recent Covid infection with R opacities on CXR.   - Other hx: htn, dl, parkinsons dementia, dvt      PLAN:    CNS: Avoid over sedation  >> continue same PD medications with same timing  >> neuro eval (Dr. Lm Velasquez)    HEENT: Oral care    PULMONARY:  HOB @ 45 degrees    CARDIOVASCULAR:  - tte:  pending  avoid AVN blockers  C/w Dopamine for now.  F/u labs, TFT's, r/o reversible causes  EP follow-up for possible PPM  check lue duplex for unilateral LUE swelling  repeat troponins and ekg in am      GI: GI prophylaxis.  Feeding     RENAL:  Follow up lytes.  Correct as needed    INFECTIOUS DISEASE: Follow up cultures    HEMATOLOGICAL:  DVT prophylaxis.  >> hemonc eval      ENDOCRINE:  Follow up FS.  Insulin protocol if needed.    MUSCULOSKELETAL: [email or web address]      DISPO:  CCU

## 2021-02-12 NOTE — PATIENT PROFILE ADULT - LANGUAGE ASSISTANCE NEEDED
pt is altered but has attempted to use  ipad with patient. pt is also newly nonverbal. Romanian translation board used as well

## 2021-02-12 NOTE — H&P ADULT - NSHPSOCIALHISTORY_GEN_ALL_CORE
Denies smoking, alcohol or any other illicit drug. Brother takes care of him at home Denies smoking, alcohol or any other illicit drug. Brother takes care of him at home.

## 2021-02-13 LAB
A1C WITH ESTIMATED AVERAGE GLUCOSE RESULT: 5.1 % — SIGNIFICANT CHANGE UP (ref 4–5.6)
ALBUMIN SERPL ELPH-MCNC: 3.5 G/DL — SIGNIFICANT CHANGE UP (ref 3.5–5.2)
ALP SERPL-CCNC: 219 U/L — HIGH (ref 30–115)
ALT FLD-CCNC: 37 U/L — SIGNIFICANT CHANGE UP (ref 0–41)
ANION GAP SERPL CALC-SCNC: 10 MMOL/L — SIGNIFICANT CHANGE UP (ref 7–14)
APPEARANCE UR: ABNORMAL
APTT BLD: 53.3 SEC — HIGH (ref 27–39.2)
AST SERPL-CCNC: 95 U/L — HIGH (ref 0–41)
B BURGDOR C6 AB SER-ACNC: NEGATIVE — SIGNIFICANT CHANGE UP
B BURGDOR IGG+IGM SER-ACNC: 0.71 INDEX — SIGNIFICANT CHANGE UP (ref 0.01–0.89)
BACTERIA # UR AUTO: NEGATIVE — SIGNIFICANT CHANGE UP
BASOPHILS # BLD AUTO: 0.01 K/UL — SIGNIFICANT CHANGE UP (ref 0–0.2)
BASOPHILS NFR BLD AUTO: 0.3 % — SIGNIFICANT CHANGE UP (ref 0–1)
BILIRUB SERPL-MCNC: 0.4 MG/DL — SIGNIFICANT CHANGE UP (ref 0.2–1.2)
BILIRUB UR-MCNC: NEGATIVE — SIGNIFICANT CHANGE UP
BUN SERPL-MCNC: 35 MG/DL — HIGH (ref 10–20)
CALCIUM SERPL-MCNC: 8.7 MG/DL — SIGNIFICANT CHANGE UP (ref 8.5–10.1)
CHLORIDE SERPL-SCNC: 106 MMOL/L — SIGNIFICANT CHANGE UP (ref 98–110)
CHOLEST SERPL-MCNC: 131 MG/DL — SIGNIFICANT CHANGE UP
CO2 SERPL-SCNC: 26 MMOL/L — SIGNIFICANT CHANGE UP (ref 17–32)
COLOR SPEC: ABNORMAL
CREAT SERPL-MCNC: 1.1 MG/DL — SIGNIFICANT CHANGE UP (ref 0.7–1.5)
CULTURE RESULTS: SIGNIFICANT CHANGE UP
DIFF PNL FLD: ABNORMAL
EOSINOPHIL # BLD AUTO: 0.01 K/UL — SIGNIFICANT CHANGE UP (ref 0–0.7)
EOSINOPHIL NFR BLD AUTO: 0.3 % — SIGNIFICANT CHANGE UP (ref 0–8)
EPI CELLS # UR: 1 /HPF — SIGNIFICANT CHANGE UP (ref 0–5)
ESTIMATED AVERAGE GLUCOSE: 100 MG/DL — SIGNIFICANT CHANGE UP (ref 68–114)
GLUCOSE SERPL-MCNC: 66 MG/DL — LOW (ref 70–99)
GLUCOSE UR QL: NEGATIVE — SIGNIFICANT CHANGE UP
HCT VFR BLD CALC: 33.8 % — LOW (ref 42–52)
HDLC SERPL-MCNC: 51 MG/DL — SIGNIFICANT CHANGE UP
HGB BLD-MCNC: 10.7 G/DL — LOW (ref 14–18)
HYALINE CASTS # UR AUTO: 3 /LPF — SIGNIFICANT CHANGE UP (ref 0–7)
IMM GRANULOCYTES NFR BLD AUTO: 0.3 % — SIGNIFICANT CHANGE UP (ref 0.1–0.3)
INR BLD: 1.23 RATIO — SIGNIFICANT CHANGE UP (ref 0.65–1.3)
IRON SATN MFR SERPL: 32 % — SIGNIFICANT CHANGE UP (ref 15–50)
IRON SATN MFR SERPL: 86 UG/DL — SIGNIFICANT CHANGE UP (ref 35–150)
KETONES UR-MCNC: SIGNIFICANT CHANGE UP
LEUKOCYTE ESTERASE UR-ACNC: ABNORMAL
LIPID PNL WITH DIRECT LDL SERPL: 73 MG/DL — SIGNIFICANT CHANGE UP
LYMPHOCYTES # BLD AUTO: 0.18 K/UL — LOW (ref 1.2–3.4)
LYMPHOCYTES # BLD AUTO: 5.4 % — LOW (ref 20.5–51.1)
MAGNESIUM SERPL-MCNC: 2 MG/DL — SIGNIFICANT CHANGE UP (ref 1.8–2.4)
MCHC RBC-ENTMCNC: 25.1 PG — LOW (ref 27–31)
MCHC RBC-ENTMCNC: 31.7 G/DL — LOW (ref 32–37)
MCV RBC AUTO: 79.3 FL — LOW (ref 80–94)
MONOCYTES # BLD AUTO: 0.17 K/UL — SIGNIFICANT CHANGE UP (ref 0.1–0.6)
MONOCYTES NFR BLD AUTO: 5.1 % — SIGNIFICANT CHANGE UP (ref 1.7–9.3)
NEUTROPHILS # BLD AUTO: 2.98 K/UL — SIGNIFICANT CHANGE UP (ref 1.4–6.5)
NEUTROPHILS NFR BLD AUTO: 88.6 % — HIGH (ref 42.2–75.2)
NITRITE UR-MCNC: NEGATIVE — SIGNIFICANT CHANGE UP
NON HDL CHOLESTEROL: 80 MG/DL — SIGNIFICANT CHANGE UP
NRBC # BLD: 0 /100 WBCS — SIGNIFICANT CHANGE UP (ref 0–0)
PH UR: 6 — SIGNIFICANT CHANGE UP (ref 5–8)
PHOSPHATE SERPL-MCNC: 3.4 MG/DL — SIGNIFICANT CHANGE UP (ref 2.1–4.9)
PLATELET # BLD AUTO: 65 K/UL — LOW (ref 130–400)
POTASSIUM SERPL-MCNC: 4.6 MMOL/L — SIGNIFICANT CHANGE UP (ref 3.5–5)
POTASSIUM SERPL-SCNC: 4.6 MMOL/L — SIGNIFICANT CHANGE UP (ref 3.5–5)
PROT SERPL-MCNC: 6.8 G/DL — SIGNIFICANT CHANGE UP (ref 6–8)
PROT UR-MCNC: ABNORMAL
PROTHROM AB SERPL-ACNC: 14.1 SEC — HIGH (ref 9.95–12.87)
RBC # BLD: 4.26 M/UL — LOW (ref 4.7–6.1)
RBC # FLD: 17 % — HIGH (ref 11.5–14.5)
RBC CASTS # UR COMP ASSIST: 147 /HPF — HIGH (ref 0–4)
SODIUM SERPL-SCNC: 142 MMOL/L — SIGNIFICANT CHANGE UP (ref 135–146)
SP GR SPEC: 1.02 — SIGNIFICANT CHANGE UP (ref 1.01–1.03)
SPECIMEN SOURCE: SIGNIFICANT CHANGE UP
T3 SERPL-MCNC: 102 NG/DL — SIGNIFICANT CHANGE UP (ref 80–200)
T4 AB SER-ACNC: 7.4 UG/DL — SIGNIFICANT CHANGE UP (ref 4.6–12)
TIBC SERPL-MCNC: 266 UG/DL — SIGNIFICANT CHANGE UP (ref 220–430)
TRIGL SERPL-MCNC: 63 MG/DL — SIGNIFICANT CHANGE UP
TSH SERPL-MCNC: 2.63 UIU/ML — SIGNIFICANT CHANGE UP (ref 0.27–4.2)
TSH SERPL-MCNC: 3.95 UIU/ML — SIGNIFICANT CHANGE UP (ref 0.27–4.2)
UIBC SERPL-MCNC: 180 UG/DL — SIGNIFICANT CHANGE UP (ref 110–370)
UROBILINOGEN FLD QL: ABNORMAL
WBC # BLD: 3.36 K/UL — LOW (ref 4.8–10.8)
WBC # FLD AUTO: 3.36 K/UL — LOW (ref 4.8–10.8)
WBC UR QL: 523 /HPF — HIGH (ref 0–5)

## 2021-02-13 PROCEDURE — 99222 1ST HOSP IP/OBS MODERATE 55: CPT

## 2021-02-13 PROCEDURE — 99291 CRITICAL CARE FIRST HOUR: CPT

## 2021-02-13 PROCEDURE — 93306 TTE W/DOPPLER COMPLETE: CPT | Mod: 26

## 2021-02-13 PROCEDURE — 93010 ELECTROCARDIOGRAM REPORT: CPT

## 2021-02-13 PROCEDURE — 71045 X-RAY EXAM CHEST 1 VIEW: CPT | Mod: 26

## 2021-02-13 RX ORDER — ACETAMINOPHEN 500 MG
650 TABLET ORAL ONCE
Refills: 0 | Status: COMPLETED | OUTPATIENT
Start: 2021-02-13 | End: 2021-02-13

## 2021-02-13 RX ORDER — ONDANSETRON 8 MG/1
4 TABLET, FILM COATED ORAL ONCE
Refills: 0 | Status: COMPLETED | OUTPATIENT
Start: 2021-02-13 | End: 2021-02-13

## 2021-02-13 RX ORDER — CEFEPIME 1 G/1
INJECTION, POWDER, FOR SOLUTION INTRAMUSCULAR; INTRAVENOUS
Refills: 0 | Status: DISCONTINUED | OUTPATIENT
Start: 2021-02-13 | End: 2021-02-15

## 2021-02-13 RX ORDER — SODIUM CHLORIDE 9 MG/ML
500 INJECTION, SOLUTION INTRAVENOUS ONCE
Refills: 0 | Status: COMPLETED | OUTPATIENT
Start: 2021-02-13 | End: 2021-02-13

## 2021-02-13 RX ORDER — CEFEPIME 1 G/1
2000 INJECTION, POWDER, FOR SOLUTION INTRAMUSCULAR; INTRAVENOUS EVERY 12 HOURS
Refills: 0 | Status: DISCONTINUED | OUTPATIENT
Start: 2021-02-13 | End: 2021-02-15

## 2021-02-13 RX ORDER — FUROSEMIDE 40 MG
40 TABLET ORAL DAILY
Refills: 0 | Status: DISCONTINUED | OUTPATIENT
Start: 2021-02-13 | End: 2021-02-13

## 2021-02-13 RX ORDER — SODIUM CHLORIDE 9 MG/ML
500 INJECTION, SOLUTION INTRAVENOUS
Refills: 0 | Status: DISCONTINUED | OUTPATIENT
Start: 2021-02-13 | End: 2021-02-13

## 2021-02-13 RX ORDER — FUROSEMIDE 40 MG
40 TABLET ORAL ONCE
Refills: 0 | Status: COMPLETED | OUTPATIENT
Start: 2021-02-13 | End: 2021-02-13

## 2021-02-13 RX ORDER — ENOXAPARIN SODIUM 100 MG/ML
90 INJECTION SUBCUTANEOUS EVERY 12 HOURS
Refills: 0 | Status: DISCONTINUED | OUTPATIENT
Start: 2021-02-13 | End: 2021-02-14

## 2021-02-13 RX ORDER — NOREPINEPHRINE BITARTRATE/D5W 8 MG/250ML
0.05 PLASTIC BAG, INJECTION (ML) INTRAVENOUS
Qty: 8 | Refills: 0 | Status: DISCONTINUED | OUTPATIENT
Start: 2021-02-13 | End: 2021-02-16

## 2021-02-13 RX ORDER — CEFEPIME 1 G/1
2000 INJECTION, POWDER, FOR SOLUTION INTRAMUSCULAR; INTRAVENOUS ONCE
Refills: 0 | Status: COMPLETED | OUTPATIENT
Start: 2021-02-13 | End: 2021-02-13

## 2021-02-13 RX ORDER — FUROSEMIDE 40 MG
40 TABLET ORAL DAILY
Refills: 0 | Status: DISCONTINUED | OUTPATIENT
Start: 2021-02-14 | End: 2021-02-14

## 2021-02-13 RX ADMIN — SODIUM CHLORIDE 500 MILLILITER(S): 9 INJECTION, SOLUTION INTRAVENOUS at 18:22

## 2021-02-13 RX ADMIN — Medication 40 MILLIGRAM(S): at 21:07

## 2021-02-13 RX ADMIN — CHLORHEXIDINE GLUCONATE 1 APPLICATION(S): 213 SOLUTION TOPICAL at 05:14

## 2021-02-13 RX ADMIN — Medication 40 MILLIGRAM(S): at 05:14

## 2021-02-13 RX ADMIN — CEFEPIME 100 MILLIGRAM(S): 1 INJECTION, POWDER, FOR SOLUTION INTRAMUSCULAR; INTRAVENOUS at 12:41

## 2021-02-13 RX ADMIN — PANTOPRAZOLE SODIUM 40 MILLIGRAM(S): 20 TABLET, DELAYED RELEASE ORAL at 05:14

## 2021-02-13 RX ADMIN — CEFEPIME 100 MILLIGRAM(S): 1 INJECTION, POWDER, FOR SOLUTION INTRAMUSCULAR; INTRAVENOUS at 17:16

## 2021-02-13 RX ADMIN — ONDANSETRON 4 MILLIGRAM(S): 8 TABLET, FILM COATED ORAL at 23:35

## 2021-02-13 RX ADMIN — CARBIDOPA AND LEVODOPA 1 TABLET(S): 25; 100 TABLET ORAL at 05:14

## 2021-02-13 RX ADMIN — SODIUM CHLORIDE 1000 MILLILITER(S): 9 INJECTION, SOLUTION INTRAVENOUS at 15:00

## 2021-02-13 RX ADMIN — PRAMIPEXOLE DIHYDROCHLORIDE 0.5 MILLIGRAM(S): 0.12 TABLET ORAL at 05:14

## 2021-02-13 RX ADMIN — APIXABAN 5 MILLIGRAM(S): 2.5 TABLET, FILM COATED ORAL at 05:14

## 2021-02-13 RX ADMIN — SODIUM CHLORIDE 1000 MILLILITER(S): 9 INJECTION, SOLUTION INTRAVENOUS at 12:30

## 2021-02-13 RX ADMIN — Medication 650 MILLIGRAM(S): at 14:20

## 2021-02-13 NOTE — CONSULT NOTE ADULT - ASSESSMENT
71y Male with h/o Parkinson disease, HTN, HLD, gout, psoriasis, DVT on Eliquis, brought in by family due to altered mental status, lethargy, not communicating/non-verbal for 2 days. In ED patient was noted to be bradycardic to HR 38-42 with 1st degree AVB (340 ms). He was started on dopamine drip. This morning he was also noted to be hypotensive.     We are consulted for pancytopenia       1. Pancytopenia likely 2/2 to shock / sepsis/ recent COVID infection   His CBC was normal in 2018  Peripheral smear reviewed- Normocytic RBC snoted. No schistocytes. Platelets are decreased in number estimated about 60-70K. No clumps noted.   Normal looking WBCs with some bands seen . No blasts like cells noted     Iron studies normal- f/u ferritin. TSH normal   r/o underlying infection   UA+ and CXR showed Rt LL opacity   f/u blood and urine Cx  f/u ID recs- on cefepime. recommended CT chest   Can check HIV and Hepatitis panel     Can monitor CBC for now. Hopefully it will start to improve once he improves clinically   If persistent pancytopenia then we can do further work up as outpt.   c/w supportive care to keep Hb above 7 and plt count above 20K   He is on eliquis for h/o DVT- ok to c/w therapeutic AC as long as plt count is above 50K

## 2021-02-13 NOTE — CONSULT NOTE ADULT - ASSESSMENT
IMPRESSION;  No convincing evidence of an infectious focus  Clinically no cough/SOB. CXR with opacity ( not clear ) right base   Miramontes with borderline pyuria. No clinical Hx c/w pyelonephritis  pancytopenia    RECOMMENDATIONS;  procal  bCx  UCx  Cefepime 2 gm iv q12h for now  Consider CT chest

## 2021-02-13 NOTE — PROGRESS NOTE ADULT - SUBJECTIVE AND OBJECTIVE BOX
SUBJECTIVE:    Patient is a 71y old Male who presents with a chief complaint of Altered Mental Status (2021 17:19)    Currently admitted to medicine with the primary diagnosis of Bradycardia       Today is hospital day 1d. This morning he is resting comfortably in bed and reports no new issues or overnight events.     PAST MEDICAL & SURGICAL HISTORY  Cataract    Prostatitis    Dyslipidemia    Gout    Hypertension    Parkinson disease    No significant past surgical history      SOCIAL HISTORY:  Negative for smoking/alcohol/drug use.     ALLERGIES:  No Known Allergies    MEDICATIONS:  STANDING MEDICATIONS  apixaban 5 milliGRAM(s) Oral every 12 hours  atorvastatin 40 milliGRAM(s) Oral at bedtime  carbidopa/levodopa  25/250 1 Tablet(s) Oral four times a day  carbidopa/levodopa CR 25/100 1 Tablet(s) Oral at bedtime  chlorhexidine 4% Liquid 1 Application(s) Topical <User Schedule>  colchicine 0.6 milliGRAM(s) Oral daily  DOPamine Infusion 5 MICROgram(s)/kG/Min IV Continuous <Continuous>  furosemide    Tablet 40 milliGRAM(s) Oral two times a day  levoFLOXacin IVPB 750 milliGRAM(s) IV Intermittent every 24 hours  pantoprazole    Tablet 40 milliGRAM(s) Oral before breakfast  polyethylene glycol 3350 17 Gram(s) Oral daily  pramipexole 0.5 milliGRAM(s) Oral four times a day  senna 2 Tablet(s) Oral at bedtime    PRN MEDICATIONS    VITALS:   T(F): 97.3  HR: 74  BP: 80/41  RR: 19  SpO2: 92%    LABS:                        10.7   3.36  )-----------( 65       ( 2021 01:38 )             33.8     -    142  |  106  |  35<H>  ----------------------------<  66<L>  4.6   |  26  |  1.1    Ca    8.7      2021 01:38  Phos  3.4     02-13  Mg     2.0         TPro  6.8  /  Alb  3.5  /  TBili  0.4  /  DBili  x   /  AST  95<H>  /  ALT  37  /  AlkPhos  219<H>  02-13    PT/INR - ( 2021 01:38 )   PT: 14.10 sec;   INR: 1.23 ratio         PTT - ( 2021 01:38 )  PTT:53.3 sec  Urinalysis Basic - ( 2021 18:37 )    Color: Light Yellow / Appearance: Clear / S.011 / pH: x  Gluc: x / Ketone: Negative  / Bili: Negative / Urobili: <2 mg/dL   Blood: x / Protein: Negative / Nitrite: Positive   Leuk Esterase: Moderate / RBC: 12 /HPF / WBC 15 /HPF   Sq Epi: x / Non Sq Epi: 2 /HPF / Bacteria: Moderate        Troponin T, Serum: 0.03 ng/mL <HH> (21 @ 10:26)      CARDIAC MARKERS ( 2021 10:26 )  x     / 0.03 ng/mL / x     / x     / x          RADIOLOGY:    PHYSICAL EXAM:  GEN: No acute distress  LUNGS: Clear to auscultation bilaterally   HEART: S1/S2 present. RRR.   ABD: Soft, non-tender, non-distended. Bowel sounds present  EXT: NC/NC/NE/2+PP/EVANGELISTA  NEURO: AAOX3     SUBJECTIVE:    Patient is a 71y old Male who presents with a chief complaint of Altered Mental Status (2021 17:19)    Currently admitted to medicine with the primary diagnosis of Bradycardia       Today is hospital day 1d. This morning he is resting comfortably in bed and reports no new issues or overnight events.     PAST MEDICAL & SURGICAL HISTORY  Cataract    Prostatitis    Dyslipidemia    Gout    Hypertension    Parkinson disease    No significant past surgical history      SOCIAL HISTORY:  Negative for smoking/alcohol/drug use.     ALLERGIES:  No Known Allergies    MEDICATIONS:  STANDING MEDICATIONS  apixaban 5 milliGRAM(s) Oral every 12 hours  atorvastatin 40 milliGRAM(s) Oral at bedtime  carbidopa/levodopa  25/250 1 Tablet(s) Oral four times a day  carbidopa/levodopa CR 25/100 1 Tablet(s) Oral at bedtime  chlorhexidine 4% Liquid 1 Application(s) Topical <User Schedule>  colchicine 0.6 milliGRAM(s) Oral daily  DOPamine Infusion 5 MICROgram(s)/kG/Min IV Continuous <Continuous>  furosemide    Tablet 40 milliGRAM(s) Oral two times a day  levoFLOXacin IVPB 750 milliGRAM(s) IV Intermittent every 24 hours  pantoprazole    Tablet 40 milliGRAM(s) Oral before breakfast  polyethylene glycol 3350 17 Gram(s) Oral daily  pramipexole 0.5 milliGRAM(s) Oral four times a day  senna 2 Tablet(s) Oral at bedtime    PRN MEDICATIONS    VITALS:   T(F): 97.3  HR: 74  BP: 80/41  RR: 19  SpO2: 92%    LABS:                        10.7   3.36  )-----------( 65       ( 2021 01:38 )             33.8     -    142  |  106  |  35<H>  ----------------------------<  66<L>  4.6   |  26  |  1.1    Ca    8.7      2021 01:38  Phos  3.4     02-13  Mg     2.0         TPro  6.8  /  Alb  3.5  /  TBili  0.4  /  DBili  x   /  AST  95<H>  /  ALT  37  /  AlkPhos  219<H>  02-13    PT/INR - ( 2021 01:38 )   PT: 14.10 sec;   INR: 1.23 ratio         PTT - ( 2021 01:38 )  PTT:53.3 sec  Urinalysis Basic - ( 2021 18:37 )    Color: Light Yellow / Appearance: Clear / S.011 / pH: x  Gluc: x / Ketone: Negative  / Bili: Negative / Urobili: <2 mg/dL   Blood: x / Protein: Negative / Nitrite: Positive   Leuk Esterase: Moderate / RBC: 12 /HPF / WBC 15 /HPF   Sq Epi: x / Non Sq Epi: 2 /HPF / Bacteria: Moderate        Troponin T, Serum: 0.03 ng/mL <HH> (21 @ 10:26)      CARDIAC MARKERS ( 2021 10:26 )  x     / 0.03 ng/mL / x     / x     / x          RADIOLOGY:  CXR: right sided opacity     PHYSICAL EXAM:  GEN: No acute distress  LUNGS: bilateral ronchi  HEART: S1/S2 present. RRR.   ABD: Soft, non-tender, non-distended. Bowel sounds present  EXT: Bilateral NUNU +2  NEURO: A&Ox1-2     SUBJECTIVE:    Patient is a 71y old Male who presents with a chief complaint of Altered Mental Status (2021 17:19)    Currently admitted to medicine with the primary diagnosis of Bradycardia       Today is hospital day 1d. This morning he is resting comfortably in bed and reports no new issues or overnight events. BP is low, required increased pressor support.    PAST MEDICAL & SURGICAL HISTORY  Cataract    Prostatitis    Dyslipidemia    Gout    Hypertension    Parkinson disease    No significant past surgical history      SOCIAL HISTORY:  Negative for smoking/alcohol/drug use.     ALLERGIES:  No Known Allergies    MEDICATIONS:  STANDING MEDICATIONS  apixaban 5 milliGRAM(s) Oral every 12 hours  atorvastatin 40 milliGRAM(s) Oral at bedtime  carbidopa/levodopa  25/250 1 Tablet(s) Oral four times a day  carbidopa/levodopa CR 25/100 1 Tablet(s) Oral at bedtime  chlorhexidine 4% Liquid 1 Application(s) Topical <User Schedule>  colchicine 0.6 milliGRAM(s) Oral daily  DOPamine Infusion 5 MICROgram(s)/kG/Min IV Continuous <Continuous>  furosemide    Tablet 40 milliGRAM(s) Oral two times a day  levoFLOXacin IVPB 750 milliGRAM(s) IV Intermittent every 24 hours  pantoprazole    Tablet 40 milliGRAM(s) Oral before breakfast  polyethylene glycol 3350 17 Gram(s) Oral daily  pramipexole 0.5 milliGRAM(s) Oral four times a day  senna 2 Tablet(s) Oral at bedtime    PRN MEDICATIONS    VITALS:   T(F): 97.3  HR: 74  BP: 80/41  RR: 19  SpO2: 92%    LABS:                        10.7   3.36  )-----------( 65       ( 2021 01:38 )             33.8     02-    142  |  106  |  35<H>  ----------------------------<  66<L>  4.6   |  26  |  1.1    Ca    8.7      2021 01:38  Phos  3.4     -  Mg     2.0         TPro  6.8  /  Alb  3.5  /  TBili  0.4  /  DBili  x   /  AST  95<H>  /  ALT  37  /  AlkPhos  219<H>  -    PT/INR - ( 2021 01:38 )   PT: 14.10 sec;   INR: 1.23 ratio         PTT - ( 2021 01:38 )  PTT:53.3 sec  Urinalysis Basic - ( 2021 18:37 )    Color: Light Yellow / Appearance: Clear / S.011 / pH: x  Gluc: x / Ketone: Negative  / Bili: Negative / Urobili: <2 mg/dL   Blood: x / Protein: Negative / Nitrite: Positive   Leuk Esterase: Moderate / RBC: 12 /HPF / WBC 15 /HPF   Sq Epi: x / Non Sq Epi: 2 /HPF / Bacteria: Moderate        Troponin T, Serum: 0.03 ng/mL <HH> (21 @ 10:26)      CARDIAC MARKERS ( 2021 10:26 )  x     / 0.03 ng/mL / x     / x     / x          RADIOLOGY:  CXR: right sided opacity     PHYSICAL EXAM:  GEN: No acute distress  HEEN: NC/AT  NEck: no JVD  LUNGS: bilateral ronchi  HEART: S1/S2 present. RRR.   ABD: Soft, non-tender, non-distended. Bowel sounds present  EXT: Bilateral NUNU +2  NEURO: A&Ox1

## 2021-02-13 NOTE — CONSULT NOTE ADULT - SUBJECTIVE AND OBJECTIVE BOX
Patient is a 71y old  Male who presents with a chief complaint of Altered Mental Status (13 Feb 2021 10:18)      HPI:  71 year old Sinhala speaking Male with past medical history of Parkinson disease, HLD, HTN, H/O DVT on Eliquis and Lasix, H/O COVID presents to ED with altered mental status for 2 days.     As per family patient at baseline is able to speak and has his on and off days but since last 2 days has not been able to speak and more lethargic than baseline. As per daughter at the bedside, patient has "bad days but this is the worst we have seen" Patient bright to ED for non resolution of symptoms. Family denies any fevers, chills, rigors, cough, shortness of breath, loss of consciousness, incontinence or any other complaints.     In ED patient hypertensive to 170s and bradycardic to 30s. EKG showed sinus bradycardia with 1st degree AV block. Electrolytes were normal. Patient given atropine without response, he was given dobutamine and HR increased with wide complex tachycardia. Patient placed on Dopamine with HR improving to the 50s. CT head done for AMS and shows no acute changes. Patient at time of interview minimally verbal but appeared in no apparent discomfort. This morning he was noted to be hypotensive as well      We are consulted for pancytopenia. On review of HIE he had normal CBCs in 2018.   ROS:  Negative except for:    PAST MEDICAL & SURGICAL HISTORY:  Cataract    Prostatitis    Dyslipidemia    Gout    Hypertension    Parkinson disease    No significant past surgical history      :    FAMILY HISTORY:  Family history of cerebrovascular accident (CVA) (Father)        MEDICATIONS  (STANDING):  apixaban 5 milliGRAM(s) Oral every 12 hours  atorvastatin 40 milliGRAM(s) Oral at bedtime  carbidopa/levodopa  25/250 1 Tablet(s) Oral four times a day  carbidopa/levodopa CR 25/100 1 Tablet(s) Oral at bedtime  cefepime   IVPB      cefepime   IVPB 2000 milliGRAM(s) IV Intermittent every 12 hours  chlorhexidine 4% Liquid 1 Application(s) Topical <User Schedule>  colchicine 0.6 milliGRAM(s) Oral daily  DOPamine Infusion 5 MICROgram(s)/kG/Min (17 mL/Hr) IV Continuous <Continuous>  furosemide    Tablet 40 milliGRAM(s) Oral two times a day  lactated ringers. 500 milliLiter(s) (1000 mL/Hr) IV Continuous <Continuous>  pantoprazole    Tablet 40 milliGRAM(s) Oral before breakfast  polyethylene glycol 3350 17 Gram(s) Oral daily  pramipexole 0.5 milliGRAM(s) Oral four times a day  senna 2 Tablet(s) Oral at bedtime    MEDICATIONS  (PRN):      Allergies    No Known Allergies    Intolerances        Vital Signs Last 24 Hrs  T(C): 36.5 (13 Feb 2021 08:00), Max: 36.5 (13 Feb 2021 08:00)  T(F): 97.7 (13 Feb 2021 08:00), Max: 97.7 (13 Feb 2021 08:00)  HR: 82 (13 Feb 2021 10:00) (39 - 84)  BP: 79/46 (13 Feb 2021 10:00) (75/49 - 152/85)  BP(mean): 68 (13 Feb 2021 10:00) (53 - 112)  RR: 16 (13 Feb 2021 10:00) (10 - 31)  SpO2: 92% (13 Feb 2021 10:00) (92% - 99%)    PHYSICAL EXAM  General: Lethargic and not answering any questions   Neck: supple, No JVD  CV: normal S1/S2   Lungs: positive air movement b/l ant lungs with rales   Abdomen: soft non-tender  Neuro: Lethargic and not answering any questions     LABS:                          10.7   3.36  )-----------( 65       ( 13 Feb 2021 01:38 )             33.8         Mean Cell Volume : 79.3 fL  Mean Cell Hemoglobin : 25.1 pg  Mean Cell Hemoglobin Concentration : 31.7 g/dL  Auto Neutrophil # : 2.98 K/uL  Auto Lymphocyte # : 0.18 K/uL  Auto Monocyte # : 0.17 K/uL  Auto Eosinophil # : 0.01 K/uL  Auto Basophil # : 0.01 K/uL  Auto Neutrophil % : 88.6 %  Auto Lymphocyte % : 5.4 %  Auto Monocyte % : 5.1 %  Auto Eosinophil % : 0.3 %  Auto Basophil % : 0.3 %      Serial CBC's  02-13 @ 01:38  Hct-33.8 / Hgb-10.7 / Plat-65 / RBC-4.26 / WBC-3.36  Serial CBC's  02-12 @ 10:26  Hct-33.9 / Hgb-10.6 / Plat-66 / RBC-4.16 / WBC-2.03      02-13    142  |  106  |  35<H>  ----------------------------<  66<L>  4.6   |  26  |  1.1    Ca    8.7      13 Feb 2021 01:38  Phos  3.4     02-13  Mg     2.0     02-13    TPro  6.8  /  Alb  3.5  /  TBili  0.4  /  DBili  x   /  AST  95<H>  /  ALT  37  /  AlkPhos  219<H>  02-13      PT/INR - ( 13 Feb 2021 01:38 )   PT: 14.10 sec;   INR: 1.23 ratio         PTT - ( 13 Feb 2021 01:38 )  PTT:53.3 sec    Iron - Total Binding Capacity.: 266 ug/dL (02-13 @ 01:38)              BLOOD SMEAR INTERPRETATION:       RADIOLOGY & ADDITIONAL STUDIES:

## 2021-02-13 NOTE — PROGRESS NOTE ADULT - ASSESSMENT
IMPRESSION:  - Sinus Bradycardia / 1st deg AVB - symptomatic? (lethargy/AMS)  - UTI as cause of lethargy/AMS?   - Pancytopenia  - Recent Covid infection with R opacities on CXR.   - Other hx: htn, dl, parkinsons dementia, dvt      PLAN:    CNS: Avoid over sedation  >> continue same PD medications with same timing  >> neuro eval (Dr. Lm Velasquez)    HEENT: Oral care    PULMONARY:  HOB @ 45 degrees    CARDIOVASCULAR:  - tte:  pending  avoid AVN blockers  C/w Dopamine for now.  F/u labs, TFT's, r/o reversible causes  EP follow-up for possible PPM  check lue duplex for unilateral LUE swelling  repeat troponins and ekg in am    GI: GI prophylaxis.  Feeding     RENAL:  Follow up lytes.  Correct as needed    INFECTIOUS DISEASE:  - Pancultures cultures  - Treat for UTI empirically and check for improvement in patient's condition    HEMATOLOGICAL:  DVT prophylaxis.  >> hemonc eval      ENDOCRINE:  Follow up FS.  Insulin protocol if needed.    MUSCULOSKELETAL: oobtc      DISPO:  CCU         IMPRESSION:  - Sinus Bradycardia / 1st deg AVB - symptomatic? (lethargy/AMS)  - Sepsis secondary to UTI +/- PNA   - Pancytopenia  - Recent Covid infection with R opacities on CXR.   - Other hx: htn, dl, parkinsons dementia, dvt      PLAN:    CNS: Avoid over sedation  >> continue same PD medications with same timing  >> neuro eval (Dr. Lm Velasquez)    HEENT: Oral care    PULMONARY:  HOB @ 45 degrees  - right sided opacity on CXR (unlikely covid) - will cover for HCAP    CARDIOVASCULAR:  - tte:  pending  avoid AVN blockers  C/w Dopamine for now.  F/u labs, TFT's, r/o reversible causes  EP follow-up for possible PPM  check lue duplex for unilateral LUE swelling  repeat troponins and ekg in am    GI: GI prophylaxis.  Feeding     RENAL:  Follow up lytes.  Correct as needed    INFECTIOUS DISEASE:  - Pancultures cultures  - Start Cefepime for UTI and PNA  - Give 500cc bolus IV fluids  - If no major improvement in BP, start levophed    HEMATOLOGICAL:  DVT prophylaxis.  >> hemonc eval - Pancytopenia likely secondary to recent covid and sepsis    ENDOCRINE:  Follow up FS.  Insulin protocol if needed.    MUSCULOSKELETAL: oobtc    DISPO:  CCU         IMPRESSION:  - Sinus Bradycardia / 1st deg AVB - symptomatic (lethargy/AMS) - responded to pressors. Had VT with Dobutamine, switched to Dopamine, which he tolerated.  - Sepsis secondary to UTI +/- PNA   - Pancytopenia  - Recent Covid infection with R opacities on CXR.   - Other hx: htn, dl, Parkinson's dementia, DVT      PLAN:    CNS: Avoid over sedation  >> continue same PD medications with same timing  >> neuro eval (Dr. Lm Velasquez)    HEENT: Oral care    PULMONARY:  HOB @ 45 degrees  - right sided opacity on CXR (unlikely covid) - will cover for HCAP  Switch Abx to Cefepime for broader coverage. F/u cultures.    CARDIOVASCULAR:  - tte:  pending  avoid AVN blockers  C/w Dopamine for now. Dose increased today - HR is WNL, monitor BP  Will give a bolus of NS. If still hypotensive, will switch to Levophed, in which case will need A-line and Central line  F/u labs, TFT's, r/o reversible causes  EP follow-up for possible PPM  check lue duplex for unilateral LUE swelling  repeat troponins and ekg in am  Check Cortisol level    GI: GI prophylaxis.  Feeding     RENAL:  Follow up lytes.  Correct as needed    INFECTIOUS DISEASE:  - Pan-cultures cultures of urine, blood, sputum  - Start Cefepime for UTI and PNA coverage  - Give 500cc bolus IV fluids  - If no major improvement in BP, start levophed  - recent COVID, no active antiviral therapy    HEMATOLOGICAL:  DVT prophylaxis.  >> hemonc eval - Pancytopenia likely secondary to recent covid and sepsis    ENDOCRINE:  Follow up FS.  Insulin protocol if needed.    MUSCULOSKELETAL: oobtc if tolerated    DISPO:  CCU. MICU consult

## 2021-02-13 NOTE — CHART NOTE - NSCHARTNOTEFT_GEN_A_CORE
Examined patient on overloaded side, with LE edema, will switch to levophed from dopamine and give lasix Iv push once bp improves  patient opens his eyes and awake on stimulation ( same since admission, non focal, normal pupillary reflexes) but does not respond to commands, so cannot take po meds - ABG done showed a pH 7.45, co2 39, O2 67- sat 94% on RA  will place ng tube to start feeds and PO meds - switched AC to lovenox as not taking eliquis PO -please check platelet count in am if < [id number], d/c anticoagulation  ANtibiotics started at cefepime, fu MRSA, ID on board, fu CT chest and procalcitonin

## 2021-02-13 NOTE — CONSULT NOTE ADULT - SUBJECTIVE AND OBJECTIVE BOX
NIEVES LABOY  71y, Male  Allergy: No Known Allergies      All historical available data reviewed.    HPI:  71 year old Portuguese speaking Male with past medical history of Parkinson disease, HLD, HTN, H/O DVT on Eliquis and Lasix, H/O COVID presents to ED with altered mental status for 2 days.     As per family patient at baseline is able to speak and has his on and off days but since last 2 days has not been able to speak and more lethargic than baseline. As per daughter at the bedside, patient has "bad days but this is the worst we have seen" Patient bright to ED for non resolution of symptoms. Family denies any fevers, chills, rigors, cough, shortness of breath, loss of consciousness, incontinence or any other complaints.     In ED patient hypertensive to 170s and bradycardic to 30s. EKG showed sinus bradycardia with 1st degree AV block. Electrolytes were normal. Patient given atropine without response, he was given dobutamine and HR increased with wide complex tachycardia. Patient placed on Dopamine with HR improving to the 50s. CT head done for AMS and shows no acute changes. Patient at time of interview minimally verbal but appeared in no apparent discomfort (2021 17:17)  ID called for possible PNA    FAMILY HISTORY:  Family history of cerebrovascular accident (CVA) (Father)      PAST MEDICAL & SURGICAL HISTORY:  Cataract    Prostatitis    Dyslipidemia    Gout    Hypertension    Parkinson disease    No significant past surgical history          VITALS:  T(F): 97.7, Max: 97.7 (21 @ 08:00)  HR: 82  BP: 79/46  RR: 16Vital Signs Last 24 Hrs  T(C): 36.5 (2021 08:00), Max: 36.5 (2021 08:00)  T(F): 97.7 (2021 08:00), Max: 97.7 (2021 08:00)  HR: 82 (2021 10:00) (36 - 84)  BP: 79/46 (2021 10:00) (75/49 - 152/85)  BP(mean): 68 (2021 10:00) (53 - 112)  RR: 16 (2021 10:00) (10 - 31)  SpO2: 92% (2021 10:00) (92% - 99%)    TESTS & MEASUREMENTS:                        10.7   3.36  )-----------( 65       ( 2021 01:38 )             33.8         142  |  106  |  35<H>  ----------------------------<  66<L>  4.6   |  26  |  1.1    Ca    8.7      2021 01:38  Phos  3.4       Mg     2.0         TPro  6.8  /  Alb  3.5  /  TBili  0.4  /  DBili  x   /  AST  95<H>  /  ALT  37  /  AlkPhos  219<H>      LIVER FUNCTIONS - ( 2021 01:38 )  Alb: 3.5 g/dL / Pro: 6.8 g/dL / ALK PHOS: 219 U/L / ALT: 37 U/L / AST: 95 U/L / GGT: x             Urinalysis Basic - ( 2021 18:37 )    Color: Light Yellow / Appearance: Clear / S.011 / pH: x  Gluc: x / Ketone: Negative  / Bili: Negative / Urobili: <2 mg/dL   Blood: x / Protein: Negative / Nitrite: Positive   Leuk Esterase: Moderate / RBC: 12 /HPF / WBC 15 /HPF   Sq Epi: x / Non Sq Epi: 2 /HPF / Bacteria: Moderate          RADIOLOGY & ADDITIONAL TESTS:  Personal review of radiological diagnostics performed  Echo and EKG results noted when applicable.     MEDICATIONS:  apixaban 5 milliGRAM(s) Oral every 12 hours  atorvastatin 40 milliGRAM(s) Oral at bedtime  carbidopa/levodopa  25/250 1 Tablet(s) Oral four times a day  carbidopa/levodopa CR 25/100 1 Tablet(s) Oral at bedtime  cefepime   IVPB      chlorhexidine 4% Liquid 1 Application(s) Topical <User Schedule>  colchicine 0.6 milliGRAM(s) Oral daily  DOPamine Infusion 5 MICROgram(s)/kG/Min IV Continuous <Continuous>  furosemide    Tablet 40 milliGRAM(s) Oral two times a day  lactated ringers. 500 milliLiter(s) IV Continuous <Continuous>  pantoprazole    Tablet 40 milliGRAM(s) Oral before breakfast  polyethylene glycol 3350 17 Gram(s) Oral daily  pramipexole 0.5 milliGRAM(s) Oral four times a day  senna 2 Tablet(s) Oral at bedtime      ANTIBIOTICS:  cefepime   IVPB

## 2021-02-14 LAB
ALBUMIN SERPL ELPH-MCNC: 3.2 G/DL — LOW (ref 3.5–5.2)
ALP SERPL-CCNC: 217 U/L — HIGH (ref 30–115)
ALT FLD-CCNC: 52 U/L — HIGH (ref 0–41)
ANION GAP SERPL CALC-SCNC: 11 MMOL/L — SIGNIFICANT CHANGE UP (ref 7–14)
APTT BLD: >200 SEC — CRITICAL HIGH (ref 27–39.2)
AST SERPL-CCNC: 128 U/L — HIGH (ref 0–41)
BASE EXCESS BLDA CALC-SCNC: 4.1 MMOL/L — HIGH (ref -2–2)
BASOPHILS # BLD AUTO: 0.01 K/UL — SIGNIFICANT CHANGE UP (ref 0–0.2)
BASOPHILS # BLD AUTO: 0.03 K/UL — SIGNIFICANT CHANGE UP (ref 0–0.2)
BASOPHILS NFR BLD AUTO: 0.3 % — SIGNIFICANT CHANGE UP (ref 0–1)
BASOPHILS NFR BLD AUTO: 0.9 % — SIGNIFICANT CHANGE UP (ref 0–1)
BILIRUB SERPL-MCNC: 0.7 MG/DL — SIGNIFICANT CHANGE UP (ref 0.2–1.2)
BLD GP AB SCN SERPL QL: SIGNIFICANT CHANGE UP
BUN SERPL-MCNC: 45 MG/DL — HIGH (ref 10–20)
CALCIUM SERPL-MCNC: 8.3 MG/DL — LOW (ref 8.5–10.1)
CHLORIDE SERPL-SCNC: 102 MMOL/L — SIGNIFICANT CHANGE UP (ref 98–110)
CO2 SERPL-SCNC: 24 MMOL/L — SIGNIFICANT CHANGE UP (ref 17–32)
CREAT SERPL-MCNC: 2.3 MG/DL — HIGH (ref 0.7–1.5)
EOSINOPHIL # BLD AUTO: 0.01 K/UL — SIGNIFICANT CHANGE UP (ref 0–0.7)
EOSINOPHIL # BLD AUTO: 0.02 K/UL — SIGNIFICANT CHANGE UP (ref 0–0.7)
EOSINOPHIL NFR BLD AUTO: 0.3 % — SIGNIFICANT CHANGE UP (ref 0–8)
EOSINOPHIL NFR BLD AUTO: 0.6 % — SIGNIFICANT CHANGE UP (ref 0–8)
GAS PNL BLDA: SIGNIFICANT CHANGE UP
GLUCOSE SERPL-MCNC: 65 MG/DL — LOW (ref 70–99)
HCO3 BLDA-SCNC: 28 MMOL/L — SIGNIFICANT CHANGE UP (ref 21–29)
HCT VFR BLD CALC: 32.4 % — LOW (ref 42–52)
HCT VFR BLD CALC: 32.4 % — LOW (ref 42–52)
HGB BLD-MCNC: 10 G/DL — LOW (ref 14–18)
HGB BLD-MCNC: 10.2 G/DL — LOW (ref 14–18)
IMM GRANULOCYTES NFR BLD AUTO: 0.3 % — SIGNIFICANT CHANGE UP (ref 0.1–0.3)
IMM GRANULOCYTES NFR BLD AUTO: 0.5 % — HIGH (ref 0.1–0.3)
LYMPHOCYTES # BLD AUTO: 0.49 K/UL — LOW (ref 1.2–3.4)
LYMPHOCYTES # BLD AUTO: 0.62 K/UL — LOW (ref 1.2–3.4)
LYMPHOCYTES # BLD AUTO: 13.9 % — LOW (ref 20.5–51.1)
LYMPHOCYTES # BLD AUTO: 16.8 % — LOW (ref 20.5–51.1)
MAGNESIUM SERPL-MCNC: 1.8 MG/DL — SIGNIFICANT CHANGE UP (ref 1.8–2.4)
MCHC RBC-ENTMCNC: 25.2 PG — LOW (ref 27–31)
MCHC RBC-ENTMCNC: 25.4 PG — LOW (ref 27–31)
MCHC RBC-ENTMCNC: 30.9 G/DL — LOW (ref 32–37)
MCHC RBC-ENTMCNC: 31.5 G/DL — LOW (ref 32–37)
MCV RBC AUTO: 80.8 FL — SIGNIFICANT CHANGE UP (ref 80–94)
MCV RBC AUTO: 81.6 FL — SIGNIFICANT CHANGE UP (ref 80–94)
MONOCYTES # BLD AUTO: 0.52 K/UL — SIGNIFICANT CHANGE UP (ref 0.1–0.6)
MONOCYTES # BLD AUTO: 0.7 K/UL — HIGH (ref 0.1–0.6)
MONOCYTES NFR BLD AUTO: 14.8 % — HIGH (ref 1.7–9.3)
MONOCYTES NFR BLD AUTO: 18.9 % — HIGH (ref 1.7–9.3)
NEUTROPHILS # BLD AUTO: 2.34 K/UL — SIGNIFICANT CHANGE UP (ref 1.4–6.5)
NEUTROPHILS # BLD AUTO: 2.45 K/UL — SIGNIFICANT CHANGE UP (ref 1.4–6.5)
NEUTROPHILS NFR BLD AUTO: 63.2 % — SIGNIFICANT CHANGE UP (ref 42.2–75.2)
NEUTROPHILS NFR BLD AUTO: 69.5 % — SIGNIFICANT CHANGE UP (ref 42.2–75.2)
NRBC # BLD: 0 /100 WBCS — SIGNIFICANT CHANGE UP (ref 0–0)
NRBC # BLD: 0 /100 WBCS — SIGNIFICANT CHANGE UP (ref 0–0)
PCO2 BLDA: 42 MMHG — SIGNIFICANT CHANGE UP (ref 38–42)
PH BLDA: 7.45 — HIGH (ref 7.38–7.42)
PLATELET # BLD AUTO: 55 K/UL — LOW (ref 130–400)
PLATELET # BLD AUTO: 60 K/UL — LOW (ref 130–400)
PO2 BLDA: 103 MMHG — HIGH (ref 78–95)
POTASSIUM SERPL-MCNC: 5 MMOL/L — SIGNIFICANT CHANGE UP (ref 3.5–5)
POTASSIUM SERPL-SCNC: 5 MMOL/L — SIGNIFICANT CHANGE UP (ref 3.5–5)
PROCALCITONIN SERPL-MCNC: 0.46 NG/ML — HIGH (ref 0.02–0.1)
PROT SERPL-MCNC: 6.2 G/DL — SIGNIFICANT CHANGE UP (ref 6–8)
RBC # BLD: 3.97 M/UL — LOW (ref 4.7–6.1)
RBC # BLD: 4.01 M/UL — LOW (ref 4.7–6.1)
RBC # FLD: 17.2 % — HIGH (ref 11.5–14.5)
RBC # FLD: 17.4 % — HIGH (ref 11.5–14.5)
SAO2 % BLDA: 98 % — HIGH (ref 92–96)
SODIUM SERPL-SCNC: 137 MMOL/L — SIGNIFICANT CHANGE UP (ref 135–146)
WBC # BLD: 3.52 K/UL — LOW (ref 4.8–10.8)
WBC # BLD: 3.7 K/UL — LOW (ref 4.8–10.8)
WBC # FLD AUTO: 3.52 K/UL — LOW (ref 4.8–10.8)
WBC # FLD AUTO: 3.7 K/UL — LOW (ref 4.8–10.8)

## 2021-02-14 PROCEDURE — 71250 CT THORAX DX C-: CPT | Mod: 26

## 2021-02-14 PROCEDURE — 70450 CT HEAD/BRAIN W/O DYE: CPT | Mod: 26

## 2021-02-14 PROCEDURE — 76705 ECHO EXAM OF ABDOMEN: CPT | Mod: 26

## 2021-02-14 PROCEDURE — 99291 CRITICAL CARE FIRST HOUR: CPT

## 2021-02-14 PROCEDURE — 95819 EEG AWAKE AND ASLEEP: CPT | Mod: 26

## 2021-02-14 PROCEDURE — 71045 X-RAY EXAM CHEST 1 VIEW: CPT | Mod: 26

## 2021-02-14 PROCEDURE — 71045 X-RAY EXAM CHEST 1 VIEW: CPT | Mod: 26,77

## 2021-02-14 PROCEDURE — 99222 1ST HOSP IP/OBS MODERATE 55: CPT

## 2021-02-14 RX ORDER — SODIUM CHLORIDE 9 MG/ML
1000 INJECTION, SOLUTION INTRAVENOUS
Refills: 0 | Status: DISCONTINUED | OUTPATIENT
Start: 2021-02-14 | End: 2021-02-16

## 2021-02-14 RX ORDER — HEPARIN SODIUM 5000 [USP'U]/ML
1000 INJECTION INTRAVENOUS; SUBCUTANEOUS
Qty: 25000 | Refills: 0 | Status: DISCONTINUED | OUTPATIENT
Start: 2021-02-14 | End: 2021-02-18

## 2021-02-14 RX ORDER — SODIUM CHLORIDE 9 MG/ML
1000 INJECTION, SOLUTION INTRAVENOUS
Refills: 0 | Status: DISCONTINUED | OUTPATIENT
Start: 2021-02-14 | End: 2021-02-14

## 2021-02-14 RX ORDER — VANCOMYCIN HCL 1 G
1500 VIAL (EA) INTRAVENOUS ONCE
Refills: 0 | Status: COMPLETED | OUTPATIENT
Start: 2021-02-14 | End: 2021-02-14

## 2021-02-14 RX ADMIN — CHLORHEXIDINE GLUCONATE 1 APPLICATION(S): 213 SOLUTION TOPICAL at 05:31

## 2021-02-14 RX ADMIN — CEFEPIME 100 MILLIGRAM(S): 1 INJECTION, POWDER, FOR SOLUTION INTRAMUSCULAR; INTRAVENOUS at 17:48

## 2021-02-14 RX ADMIN — CEFEPIME 100 MILLIGRAM(S): 1 INJECTION, POWDER, FOR SOLUTION INTRAMUSCULAR; INTRAVENOUS at 05:29

## 2021-02-14 RX ADMIN — Medication 40 MILLIGRAM(S): at 05:30

## 2021-02-14 RX ADMIN — Medication 250 MILLIGRAM(S): at 09:31

## 2021-02-14 RX ADMIN — HEPARIN SODIUM 10 UNIT(S)/HR: 5000 INJECTION INTRAVENOUS; SUBCUTANEOUS at 09:03

## 2021-02-14 RX ADMIN — ENOXAPARIN SODIUM 90 MILLIGRAM(S): 100 INJECTION SUBCUTANEOUS at 05:30

## 2021-02-14 RX ADMIN — SODIUM CHLORIDE 70 MILLILITER(S): 9 INJECTION, SOLUTION INTRAVENOUS at 09:13

## 2021-02-14 NOTE — CHART NOTE - NSCHARTNOTEFT_GEN_A_CORE
Transfer Note    Transfer from: [facility]    Transfer to: (  ) Medicine    (  ) Telemetry    (  ) RCU      (  ) Palliative    (  ) Stroke Unit    ( X ) MICU    (  ) __________________    Accepting Physician:    Signout given to:     HPI / CCU COURSE:    Patient is a 72 y/o man with PMH Parkinson disease, HLD, HTN, DVT (on Eliquis), Lasix, COVID who presents for AMS x2 days. In ED patient found to be bradycardic to 30s; EKG showing bradycardia with 1st degree AV block; patient given atropine with no response. Patient then placed on dopamine drip.       Vital Signs Last 24 Hrs  T(C): 37.1 (14 Feb 2021 11:39), Max: 37.6 (13 Feb 2021 18:00)  T(F): 98.8 (14 Feb 2021 11:39), Max: 99.6 (13 Feb 2021 18:00)  HR: 68 (14 Feb 2021 13:00) (50 - 78)  BP: 149/75 (14 Feb 2021 13:00) (93/46 - 149/75)  BP(mean): 99 (14 Feb 2021 13:00) (64 - 108)  RR: 12 (14 Feb 2021 13:00) (12 - 24)  SpO2: 100% (14 Feb 2021 13:00) (93% - 100%)    I&O's Summary    13 Feb 2021 07:01  -  14 Feb 2021 07:00  --------------------------------------------------------  IN: 1374 mL / OUT: 2445 mL / NET: -1071 mL    14 Feb 2021 07:01  -  14 Feb 2021 17:21  --------------------------------------------------------  IN: 782 mL / OUT: 1475 mL / NET: -693 mL        Physical Exam:       LABS:                               10.0   3.70  )-----------( 60       ( 14 Feb 2021 05:07 )             32.4       02-14    137  |  102  |  45<H>  ----------------------------<  65<L>  5.0   |  24  |  2.3<H>    Ca    8.3<L>      14 Feb 2021 05:07  Phos  3.4     02-13  Mg     1.8     02-14    TPro  6.2  /  Alb  3.2<L>  /  TBili  0.7  /  DBili  x   /  AST  128<H>  /  ALT  52<H>  /  AlkPhos  217<H>  02-14      PT/INR - ( 13 Feb 2021 01:38 )   PT: 14.10 sec;   INR: 1.23 ratio         PTT - ( 14 Feb 2021 16:15 )  PTT:>200.0 sec    ABG - ( 14 Feb 2021 09:28 )  pH, Arterial: 7.45  pH, Blood: x     /  pCO2: 42    /  pO2: 103   / HCO3: 28    / Base Excess: 4.1   /  SaO2: 98          Imaging:    < from: CT Head No Cont (02.14.21 @ 13:15) >      IMPRESSION:  No evidence of acute transcortical infarct, acute intracranial hemorrhage, or mass effect.                < end of copied text >    < from: US Abdomen Limited (02.14.21 @ 00:22) >      IMPRESSION:    No sonographic evidence of cholelithiasis or cholecystitis.        < end of copied text >        ASSESSMENT & PLAN:     71 year old Lao speaking Male with past medical history of Parkinson disease, HLD, HTN, H/O DVT on Eliquis and Lasix presents to ED with altered mental status for 2 days.     Patient still altered possible from underlying cardiac or metabolic pathology. Will send workup for bradycardia and admit the patient to CCU. Patients symptoms could be worsening from baseline from Parkinson and bradycardia could also be from decreased sympathetic drive from PD. Will need to rule out reversible causes and follow with EP and Cardio    # Sinus Bradycardia possible Parkinson induced Dysautonomia vs Infectious  - Patient lethargic and mute for 2 days  - HR 30s on admission s/p Dopamine drip with improvement, pancytopenia  - EKG shows sinus Bradycardia with first degree block  - Will continue with Dopamine for now  - Will get TSH, Lyme titers, Echo  - Will work up for possible infectious workup  - Monitor in CCU  - Cardiology follow up for ischemic work up  - Likely will need a pacemaker    # Pancytopenia  - Low WBC count 2k, low platelets and low Hgb  - Will send iron studies  - Will get Heme/On consult  - No indication for any transfusion    # RLL Opacity on CXR  - Pancytopenic with change in mental status  - s/p Levofloxacin in ED  - Will continue with IV Abx for now. F/u cultures    # Gout  - Continue with Colchicine    # H/O Disc Herniation  - Hold Gabapentin    # Chronic Lower extremity Edema  - Will increase Lasix to twice daily    # H/O DVT  - Continue Eliquis twice daily    # DLD  - Continue Statin    # H/O Parkinson's Disease  - Will continue with home Sinemet ( bedtime and  four times a day and Pramipexole)    # Constipation  - Has intermittent constipation, abdomen distended on exam  - Will give Senna and Miralax    DVT: Eliquis  GI: Pantoprazole  Dispo: CCU        FOR FOLLOW UP:  [ ]   [ ]   [ ]   [ ]   [ ]   [ ] Transfer Note    Transfer from: [facility]    Transfer to: (  ) Medicine    (  ) Telemetry    (  ) RCU      (  ) Palliative    (  ) Stroke Unit    ( X ) MICU    (  ) __________________    Accepting Physician:    Signout given to:     HPI / CCU COURSE:    Patient is a 72 y/o man with PMH Parkinson disease, HLD, HTN, DVT (on Eliquis), Lasix, COVID who presents for AMS x2 days. In ED patient found to be bradycardic to 30s; EKG showing bradycardia with 1st degree AV block; patient given atropine with no response. Patient then placed on dopamine drip with improvement; transitioned to levophed.      Vital Signs Last 24 Hrs  T(C): 37.1 (14 Feb 2021 11:39), Max: 37.6 (13 Feb 2021 18:00)  T(F): 98.8 (14 Feb 2021 11:39), Max: 99.6 (13 Feb 2021 18:00)  HR: 68 (14 Feb 2021 13:00) (50 - 78)  BP: 149/75 (14 Feb 2021 13:00) (93/46 - 149/75)  BP(mean): 99 (14 Feb 2021 13:00) (64 - 108)  RR: 12 (14 Feb 2021 13:00) (12 - 24)  SpO2: 100% (14 Feb 2021 13:00) (93% - 100%)    I&O's Summary    13 Feb 2021 07:01  -  14 Feb 2021 07:00  --------------------------------------------------------  IN: 1374 mL / OUT: 2445 mL / NET: -1071 mL    14 Feb 2021 07:01  -  14 Feb 2021 17:21  --------------------------------------------------------  IN: 782 mL / OUT: 1475 mL / NET: -693 mL        Physical Exam:       LABS:                               10.0   3.70  )-----------( 60       ( 14 Feb 2021 05:07 )             32.4       02-14    137  |  102  |  45<H>  ----------------------------<  65<L>  5.0   |  24  |  2.3<H>    Ca    8.3<L>      14 Feb 2021 05:07  Phos  3.4     02-13  Mg     1.8     02-14    TPro  6.2  /  Alb  3.2<L>  /  TBili  0.7  /  DBili  x   /  AST  128<H>  /  ALT  52<H>  /  AlkPhos  217<H>  02-14      PT/INR - ( 13 Feb 2021 01:38 )   PT: 14.10 sec;   INR: 1.23 ratio         PTT - ( 14 Feb 2021 16:15 )  PTT:>200.0 sec    ABG - ( 14 Feb 2021 09:28 )  pH, Arterial: 7.45  pH, Blood: x     /  pCO2: 42    /  pO2: 103   / HCO3: 28    / Base Excess: 4.1   /  SaO2: 98          Imaging:    < from: CT Head No Cont (02.14.21 @ 13:15) >      IMPRESSION:  No evidence of acute transcortical infarct, acute intracranial hemorrhage, or mass effect.                < end of copied text >    < from: US Abdomen Limited (02.14.21 @ 00:22) >      IMPRESSION:    No sonographic evidence of cholelithiasis or cholecystitis.        < end of copied text >        ASSESSMENT & PLAN:     71 year old Czech speaking Male with past medical history of Parkinson disease, HLD, HTN, H/O DVT on Eliquis and Lasix presents to ED with altered mental status for 2 days.     Patient still altered possible from underlying cardiac or metabolic pathology. Will send workup for bradycardia and admit the patient to CCU. Patients symptoms could be worsening from baseline from Parkinson and bradycardia could also be from decreased sympathetic drive from PD. Will need to rule out reversible causes and follow with EP and Cardio    # Sinus Bradycardia possible Parkinson induced Dysautonomia vs Infectious  - Patient lethargic and mute for 2 days  - HR 30s on admission s/p Dopamine drip with improvement, pancytopenia  - EKG shows sinus Bradycardia with first degree block  - Will continue with Dopamine for now  - Will get TSH, Lyme titers, Echo  - Will work up for possible infectious workup  - Monitor in CCU  - Cardiology follow up for ischemic work up  - Likely will need a pacemaker    # Pancytopenia  - Low WBC count 2k, low platelets and low Hgb  - Will send iron studies  - Will get Heme/On consult  - No indication for any transfusion    # RLL Opacity on CXR  - Pancytopenic with change in mental status  - s/p Levofloxacin in ED  - Will continue with IV Abx for now. F/u cultures    # Gout  - Continue with Colchicine    # H/O Disc Herniation  - Hold Gabapentin    # Chronic Lower extremity Edema  - Will increase Lasix to twice daily    # H/O DVT  - Continue Eliquis twice daily    # DLD  - Continue Statin    # H/O Parkinson's Disease  - Will continue with home Sinemet ( bedtime and  four times a day and Pramipexole)    # Constipation  - Has intermittent constipation, abdomen distended on exam  - Will give Senna and Miralax    DVT: Eliquis  GI: Pantoprazole  Dispo: CCU        FOR FOLLOW UP:  [ ]   [ ]   [ ]   [ ]   [ ]   [ ]

## 2021-02-14 NOTE — CONSULT NOTE ADULT - ASSESSMENT
Altered mental Status  -History of Parkinsons Altered mental Status  -History of Parkinsons on Sinemet and Entacapone.   -thyroid function normal. CT head not significant for acute intracranial pathology.   -Getting Routine EEG, maintain Mg >2 and potassium >4, seizure precautions.   -Rule out metabolic causes of encephalopathy including infection.

## 2021-02-14 NOTE — CONSULT NOTE ADULT - ASSESSMENT
IMPRESSION:  alter mental status   secondary to metabolic encephalopathy   sepsis   possible PNA   parkinson   ABEBE   DVT hx         PLAN:    CNS: ct brain   neurology consult   EEG   continue home meds     HEENT: oral care     PULMONARY: keep pox > 92 % ct chest no contrast   ABG   CARDIOVASCULAR: hold lasix   follow cardiology     GI: GI prophylaxis.  npo because of alter mental status place NG for feed     RENAL: renal consult   renal US     INFECTIOUS DISEASE: cefpime , vanco x1   nasal mrsa   bld cx     HEMATOLOGICAL:  DVT prophylaxis. heparin follow PTT     ENDOCRINE:  Follow up FS.  Insulin protocol if needed.    MUSCULOSKELETAL:        CRITICAL CARE TIME SPENT: ***

## 2021-02-14 NOTE — CONSULT NOTE ADULT - SUBJECTIVE AND OBJECTIVE BOX
NEPHROLOGY CONSULTATION NOTE    NIEVES LABOY  71y  Male  MRN-[id number]    CC:   Patient is a 71y old  Male who presents with a chief complaint of Altered Mental Status (2021 08:56)      HPI:  71 year old Irish speaking Male with past medical history of Parkinson disease, HLD, HTN, H/O DVT on Eliquis and Lasix, H/O COVID presents to ED with altered mental status for 2 days.     As per family patient at baseline is able to speak and has his on and off days but since last 2 days has not been able to speak and more lethargic than baseline. As per daughter at the bedside, patient has "bad days but this is the worst we have seen" Patient bright to ED for non resolution of symptoms. Family denies any fevers, chills, rigors, cough, shortness of breath, loss of consciousness, incontinence or any other complaints.     In ED patient hypertensive to 170s and bradycardic to 30s. EKG showed sinus bradycardia with 1st degree AV block. Electrolytes were normal. Patient given atropine without response, he was given dobutamine and HR increased with wide complex tachycardia. Patient placed on Dopamine with HR improving to the 50s. CT head done for AMS and shows no acute changes. Patient at time of interview minimally verbal but appeared in no apparent discomfort. (2021 17:17)      PAST MEDICAL & SURGICAL HISTORY:  Cataract    Prostatitis    Dyslipidemia    Gout    Hypertension    Parkinson disease    No significant past surgical history      Allergies:  No Known Allergies    Home Medications Reviewed  Hospital Medications:   MEDICATIONS  (STANDING):  atorvastatin 40 milliGRAM(s) Oral at bedtime  carbidopa/levodopa  25/250 1 Tablet(s) Oral four times a day  carbidopa/levodopa CR 25/100 1 Tablet(s) Oral at bedtime  cefepime   IVPB      cefepime   IVPB 2000 milliGRAM(s) IV Intermittent every 12 hours  chlorhexidine 4% Liquid 1 Application(s) Topical <User Schedule>  colchicine 0.6 milliGRAM(s) Oral daily  heparin  Infusion 1000 Unit(s)/Hr (10 mL/Hr) IV Continuous <Continuous>  lactated ringers. 1000 milliLiter(s) (70 mL/Hr) IV Continuous <Continuous>  norepinephrine Infusion 0.05 MICROgram(s)/kG/Min (8.5 mL/Hr) IV Continuous <Continuous>  pantoprazole    Tablet 40 milliGRAM(s) Oral before breakfast  polyethylene glycol 3350 17 Gram(s) Oral daily  pramipexole 0.5 milliGRAM(s) Oral four times a day  senna 2 Tablet(s) Oral at bedtime    MEDICATIONS  (PRN):    Home medications:  Home Medications:  atorvastatin 40 mg oral tablet: 1 tab(s) orally once a day (at bedtime) (2021 18:10)  carbidopa-levodopa 25 mg-100 mg oral tablet, extended release: 1 tab(s) orally  (2021 18:10)  carbidopa-levodopa 25 mg-250 mg oral tablet: 1 tab(s) orally 4 times a day (2021 18:10)  colchicine 0.6 mg oral tablet: 1 tab(s) orally once a day (2021 18:10)  Eliquis 5 mg oral tablet: 1 tab(s) orally 2 times a day (2021 18:27)  enalapril 5 mg oral tablet: 1 tab(s) orally once a day (2021 18:10)  furosemide 40 mg oral tablet: 1 tab(s) orally once a day (2021 18:10)  omeprazole 40 mg oral delayed release capsule: 1 cap(s) orally once a day (2021 18:10)  pramipexole 0.5 mg oral tablet: 1 tab(s) orally 4 times a day (2021 18:10)      SOCIAL HISTORY:  Social History:  Denies smoking, alcohol or any other illicit drug. Brother takes care of him at home. (2021 17:17)      FAMILY HISTORY:  Family history of cerebrovascular accident (CVA) (Father)        REVIEW OF SYSTEMS:     All other review of systems is negative unless indicated above.    VITALS:  T(F): 98.5 (21 @ 08:00), Max: 100.3 (21 @ 14:00)  HR: 72 (21 @ 10:00)  BP: 138/66 (21 @ 10:00)  RR: 12 (21 @ 10:00)  SpO2: 99% (21 @ 10:00)      I&O's Detail    2021 07:01  -  2021 07:00  --------------------------------------------------------  IN:    DOPamine Infusion: 214 mL    IV PiggyBack: 100 mL    Lactated Ringers: 1000 mL    Norepinephrine: 60 mL  Total IN: 1374 mL    OUT:    Voided (mL): 2445 mL  Total OUT: 2445 mL    Total NET: -1071 mL      2021 07:  -  2021 10:47  --------------------------------------------------------  IN:    Heparin: 10 mL    Norepinephrine: 4 mL  Total IN: 14 mL    OUT:    Voided (mL): 275 mL  Total OUT: 275 mL    Total NET: -261 mL            I&O's Summary    2021 07:  -  2021 07:00  --------------------------------------------------------  IN: 1374 mL / OUT: 2445 mL / NET: -1071 mL    2021 07:01  -  2021 10:47  --------------------------------------------------------  IN: 14 mL / OUT: 275 mL / NET: -261 mL        PHYSICAL EXAM:  Gen: NAD  resp: bibasilar crackles  card: S1/S2  abd: soft  ext: b/l LE edema      LABS:  Daily     Daily Weight in k.2 (2021 06:02)  ABG - ( 2021 09:28 )  pH, Arterial: 7.45  pH, Blood: x     /  pCO2: 42    /  pO2: 103   / HCO3: 28    / Base Excess: 4.1   /  SaO2: 98                137  |  102  |  45<H>  ----------------------------<  65<L>  5.0   |  24  |  2.3<H>    Ca    8.3<L>      2021 05:07  Phos  3.4       Mg     1.8         TPro  6.2  /  Alb  3.2<L>  /  TBili  0.7  /  DBili      /  AST  128<H>  /  ALT  52<H>  /  AlkPhos  217<H>      eGFR if Non African American: 27 mL/min/1.73M2 (21 @ 05:07)  eGFR if African American: 32 mL/min/1.73M2 (21 @ 05:07)  eGFR if Non African American: 67 mL/min/1.73M2 (21 @ 01:38)  eGFR if : 77 mL/min/1.73M2 (21 @ 01:38)    Creatinine Trend:   Creatinine, Serum: 2.3 mg/dL (21 @ 05:07)  Creatinine, Serum: 1.1 mg/dL (21 @ 01:38)  Creatinine, Serum: 1.2 mg/dL (21 @ 10:26)  Creatinine, Serum: 1.3 mg/dL (10-10-18 @ 07:26)  Creatinine, Serum: 1.4 mg/dL (10-01-18 @ 07:33)                            10.0   3.70  )-----------( 60       ( 2021 05:07 )             32.4     Mean Cell Volume: 81.6 fL (21 @ 05:07)    Urine Studies:  Urinalysis Basic - ( 2021 09:36 )    Color: Marta / Appearance: Slightly Turbid / S.016 / pH:   Gluc:  / Ketone: Trace  / Bili: Negative / Urobili: 3 mg/dL   Blood:  / Protein: 30 mg/dL / Nitrite: Negative   Leuk Esterase: Large / RBC: 147 /HPF /  /HPF   Sq Epi:  / Non Sq Epi: 1 /HPF / Bacteria: Negative            RADIOLOGY & ADDITIONAL STUDIES:        Xray Chest 1 View- PORTABLE-Routine:   EXAM:  XR CHEST PORTABLE ROUTINE 1V            PROCEDURE DATE:  2021            INTERPRETATION:  Clinical History / Reason for exam: Follow-up    Comparison : Chest radiograph 2021.    Technique/Positioning: Adequate.    Findings:    Support devices: None.    Cardiac/mediastinum/hilum: Stable    Lung parenchyma/Pleura: Bilateral opacities, unchanged. No pneumothorax is seen.    Skeleton/soft tissues: Stable    Impression: Bilateral opacities, unchanged.      LATOYA DE LA FUENTE MD; Attending Radiologist  This document has been electronically signed. 2021  8:35AM (21 @ 04:58)

## 2021-02-14 NOTE — CONSULT NOTE ADULT - ASSESSMENT
71M with PMH of CKD 3, parkinson's disease, HTN admitted for AMS, with bradycardia and ABEBE, now hypotensive on pressor.      # ABEBE, baseline creatinine ~ 1.3  - pre-renal iso bradycardia, hypotension, r/o ATN, shock on pressor, CRS, ?obstruction  - urine output significantly improved, continue holding lasix, continue LR current rate  - UA with pyuria/hematuria, f/u urine cx, on cefepime  - repeat phos level  - f/u renal/bladder ultrasound  - d/c colchicine if able  # Appreciate cardioloy/EP f/u   # AMS - likely metabolic, appreciate neuro eval, EEG  Will follow  71M with PMH of CKD 3, parkinson's disease, HTN admitted for AMS, with bradycardia and ABEBE, now hypotensive on pressor.      # ABEBE, baseline creatinine ~ 1.3  - pre-renal iso bradycardia, hypotension, r/o ATN, shock on pressor, CRS, ?obstruction  - urine output significantly improved, continue holding lasix, continue LR current rate  - UA with pyuria/hematuria, f/u urine cx, on cefepime  - repeat phos level  - f/u renal/bladder ultrasound  - d/c colchicine if able  # Appreciate cardioloy/EP f/u   # AMS - likely metabolic, appreciate neuro eval, EEG  # Sepsis - vanc x1, on cefepime, adjust dose to egfr< 10 if creatinine continues rising  Will follow

## 2021-02-14 NOTE — CONSULT NOTE ADULT - SUBJECTIVE AND OBJECTIVE BOX
Neurology Consult    Patient is a 71y old  Male who presents with a chief complaint of Altered Mental Status (2021 08:00)      HPI:  71 year old Dive[place] speaking Male with past medical history of Parkinson disease, HLD, HTN, H/O DVT on Eliquis and Lasix, H/O COVID presents to ED with altered mental status for 3 days.     As per family patient at baseline is able to speak and has his on and off days but since last few days has not been able to speak and more lethargic than baseline. As per daughter at the bedside, patient has "bad days but this is the worst we have seen" Patient brought to ED for non resolution of symptoms. Family denies any fevers, chills, rigors, cough, shortness of breath, loss of consciousness, incontinence or any other complaints.     In ED patient hypertensive to 170s and bradycardic to 30s. EKG showed sinus bradycardia with 1st degree AV block. Electrolytes were normal. Patient given atropine without response, he was given dobutamine and HR increased with wide complex tachycardia. Patient placed on Dopamine with HR improving to the 50s. CT head done for AMS and shows no acute changes. Patient at time of interview minimally verbal but appeared in no apparent discomfort. (2021 17:17)    Currently patient is somnolent but responsive to painful stimuli, able to maintain his airway tract. Patient not responding to questions or verbal stimuli.       PAST MEDICAL & SURGICAL HISTORY:  Cataract    Prostatitis    Dyslipidemia    Gout    Hypertension    Parkinson disease    No significant past surgical history        FAMILY HISTORY:  Family history of cerebrovascular accident (CVA) (Father)        Social History: (-) x 3    Allergies    No Known Allergies    Intolerances        MEDICATIONS  (STANDING):  atorvastatin 40 milliGRAM(s) Oral at bedtime  carbidopa/levodopa  25/250 1 Tablet(s) Oral four times a day  carbidopa/levodopa CR 25/100 1 Tablet(s) Oral at bedtime  cefepime   IVPB      cefepime   IVPB 2000 milliGRAM(s) IV Intermittent every 12 hours  chlorhexidine 4% Liquid 1 Application(s) Topical <User Schedule>  colchicine 0.6 milliGRAM(s) Oral daily  furosemide   Injectable 40 milliGRAM(s) IV Push daily  heparin  Infusion 1000 Unit(s)/Hr (10 mL/Hr) IV Continuous <Continuous>  lactated ringers. 1000 milliLiter(s) (70 mL/Hr) IV Continuous <Continuous>  norepinephrine Infusion 0.05 MICROgram(s)/kG/Min (8.5 mL/Hr) IV Continuous <Continuous>  pantoprazole    Tablet 40 milliGRAM(s) Oral before breakfast  polyethylene glycol 3350 17 Gram(s) Oral daily  pramipexole 0.5 milliGRAM(s) Oral four times a day  senna 2 Tablet(s) Oral at bedtime  vancomycin  IVPB 1500 milliGRAM(s) IV Intermittent once    Review of systems:    Constitutional: as per HPI  Eyes: No eye pain or discharge  Neck: No pain or stiffness  Respiratory: No cough, wheezing, chills or hemoptysis  Cardiovascular: No chest pain, palpitations, shortness of breath, dyspnea on exertion  Gastrointestinal: No abdominal pain, nausea, vomiting or hematemesis; No diarrhea or constipation.   Genitourinary: No dysuria, frequency, hematuria or incontinence  Neurological: As per HPI    Vital Signs Last 24 Hrs  T(C): 36.9 (2021 08:00), Max: 37.9 (2021 14:00)  T(F): 98.5 (2021 08:00), Max: 100.3 (2021 14:00)  HR: 70 (2021 08:00) (70 - 100)  BP: 149/73 (2021 08:00) (75/49 - 149/73)  BP(mean): 108 (2021 08:00) (47 - 108)  RR: 16 (2021 08:00) (12 - 31)  SpO2: 99% (2021 08:00) (92% - 99%)    Examination:  General:  Appearance is consistent with chronologic age.  No abnormal facies.   Cognitive/Language: Unable to obtain as patient is keeping eyes closed and not responding.   Eyes: PEERL, no nystagmus noted.   Face: no facial asymmetry.    Ears/Nose/Throat:  Palate elevates midline.  Tongue and uvula midline.   Motor examination: Unable to evaluate as patient is not following commands. Withdraws to pain in lower extremities bilaterally. Moves all extremities occasionally on his own during the examination.   Reflexes: Plantar response downgoing b/l. clonus absent.  Sensory examination: Not evaluated.   Cerebellum: Unable to evaluate.   Respiratory:  no audible wheezing or inspiratory stridor.  No use of accessory muscles.   Cardiac: pulse palpable, no audible bruits  Abdomen: Mildly winces his eyes with right upper quadrant palpation.     Labs:   CBC Full  -  ( 2021 05:07 )  WBC Count : 3.70 K/uL  RBC Count : 3.97 M/uL  Hemoglobin : 10.0 g/dL  Hematocrit : 32.4 %  Platelet Count - Automated : 60 K/uL  Mean Cell Volume : 81.6 fL  Mean Cell Hemoglobin : 25.2 pg  Mean Cell Hemoglobin Concentration : 30.9 g/dL  Auto Neutrophil # : 2.34 K/uL  Auto Lymphocyte # : 0.62 K/uL  Auto Monocyte # : 0.70 K/uL  Auto Eosinophil # : 0.01 K/uL  Auto Basophil # : 0.01 K/uL  Auto Neutrophil % : 63.2 %  Auto Lymphocyte % : 16.8 %  Auto Monocyte % : 18.9 %  Auto Eosinophil % : 0.3 %  Auto Basophil % : 0.3 %        137  |  102  |  45<H>  ----------------------------<  65<L>  5.0   |  24  |  2.3<H>    Ca    8.3<L>      2021 05:07  Phos  3.4     -  Mg     1.8         TPro  6.2  /  Alb  3.2<L>  /  TBili  0.7  /  DBili  x   /  AST  128<H>  /  ALT  52<H>  /  AlkPhos  217<H>      LIVER FUNCTIONS - ( 2021 05:07 )  Alb: 3.2 g/dL / Pro: 6.2 g/dL / ALK PHOS: 217 U/L / ALT: 52 U/L / AST: 128 U/L / GGT: x           PT/INR - ( 2021 01:38 )   PT: 14.10 sec;   INR: 1.23 ratio         PTT - ( 2021 01:38 )  PTT:53.3 sec  Urinalysis Basic - ( 2021 09:36 )    Color: Marta / Appearance: Slightly Turbid / S.016 / pH: x  Gluc: x / Ketone: Trace  / Bili: Negative / Urobili: 3 mg/dL   Blood: x / Protein: 30 mg/dL / Nitrite: Negative   Leuk Esterase: Large / RBC: 147 /HPF /  /HPF   Sq Epi: x / Non Sq Epi: 1 /HPF / Bacteria: Negative      < from: CT Head No Cont (21 @ 11:41) >  FINDINGS:    There is enlargement of the sulci, sylvian fissures, and ventricles, reflecting stable moderate diffuse parenchymal volume loss.    There are scattered patchy low attenuations in the periventricular cerebral white matter consistent with stable mild chronic microvascular ischemic changes.    There is no evidence of acute territorial/transcortical infarction or intracranial hemorrhage. There is no space-occupying lesion or midline shift.    There is no evidence of hydrocephalus. There are no extra-axial fluid collections.    The visualized intraorbital contents are normal. The imaged portions of the paranasal sinuses are aerated.The mastoid air cells are aerated. The visualized soft tissues and osseous structures appear normal. Partially visualized parotid glands are normal.      IMPRESSION:    No acute intracranial pathology.    Mild chronic microvascular ischemic changes.    < end of copied text >

## 2021-02-14 NOTE — PROGRESS NOTE ADULT - ASSESSMENT
Cardiologist: None  PCP: Dr. Patterson ([id number])    71y Male with h/o Parkinson disease, HTN, HLD, gout, psoriasis, DVT on Eliquis, brought in by family due to altered mental status, lethargy, not communicating/non-verbal for 2 days. In ED patient was noted to be bradycardic to HR 38-42 with 1st degree AVB (340 ms)  Now resolved  Echo normal LV function  suspected sepsis    resolved sinus bradycardia with symptoms of lethargy, likely sepsis induced  not on any AVN blockers at baseline    con't tele  avoid AVN blockers  continue current management  No need for PPM at this time  recall EP prn  [id number]         Cardiologist: None  PCP: Dr. Patterson ([id number])    71 y Male with h/o Parkinson disease, HTN, HLD, gout, psoriasis, DVT on Eliquis, brought in by family due to altered mental status, lethargy, not communicating/non-verbal for 2 days. In ED patient was noted to be bradycardic to HR 38-42 with 1st degree AVB (340 ms)    Sinus Gilbert resolved  Echo normal LV function  suspected sepsis    resolved sinus bradycardia with symptoms of lethargy, likely sepsis induced  not on any AVN blockers at baseline    con't tele  avoid AVN blockers  continue current management  No need for PPM at this time  recall EP prn  [id number]

## 2021-02-14 NOTE — PROGRESS NOTE ADULT - SUBJECTIVE AND OBJECTIVE BOX
`HPI:  71 year old Persian speaking Male with past medical history of Parkinson disease, HLD, HTN, H/O DVT on Eliquis and Lasix, H/O COVID presents to ED with altered mental status for 2 days.     As per family patient at baseline is able to speak and has his on and off days but since last 2 days has not been able to speak and more lethargic than baseline. As per daughter at the bedside, patient has "bad days but this is the worst we have seen" Patient bright to ED for non resolution of symptoms. Family denies any fevers, chills, rigors, cough, shortness of breath, loss of consciousness, incontinence or any other complaints.     In ED patient hypertensive to 170s and bradycardic to 30s. EKG showed sinus bradycardia with 1st degree AV block. Electrolytes were normal. Patient given atropine without response, he was given dobutamine and HR increased with wide complex tachycardia. Patient placed on Dopamine with HR improving to the 50s. CT head done for AMS and shows no acute changes. Patient at time of interview minimally verbal but appeared in no apparent discomfort. (2021 17:17)      INTERVAL HISTORY:    PAST MEDICAL & SURGICAL HISTORY  Cataract    Prostatitis    Dyslipidemia    Gout    Hypertension    Parkinson disease    No significant past surgical history        ALLERGIES:  No Known Allergies      MEDICATIONS:  MEDICATIONS  (STANDING):  atorvastatin 40 milliGRAM(s) Oral at bedtime  carbidopa/levodopa  25/250 1 Tablet(s) Oral four times a day  carbidopa/levodopa CR 25/100 1 Tablet(s) Oral at bedtime  cefepime   IVPB      cefepime   IVPB 2000 milliGRAM(s) IV Intermittent every 12 hours  chlorhexidine 4% Liquid 1 Application(s) Topical <User Schedule>  colchicine 0.6 milliGRAM(s) Oral daily  enoxaparin Injectable 90 milliGRAM(s) SubCutaneous every 12 hours  furosemide   Injectable 40 milliGRAM(s) IV Push daily  norepinephrine Infusion 0.05 MICROgram(s)/kG/Min (8.5 mL/Hr) IV Continuous <Continuous>  pantoprazole    Tablet 40 milliGRAM(s) Oral before breakfast  polyethylene glycol 3350 17 Gram(s) Oral daily  pramipexole 0.5 milliGRAM(s) Oral four times a day  senna 2 Tablet(s) Oral at bedtime    MEDICATIONS  (PRN):      HOME MEDICATIONS:  Home Medications:  atorvastatin 40 mg oral tablet: 1 tab(s) orally once a day (at bedtime) (2021 18:10)  carbidopa-levodopa 25 mg-100 mg oral tablet, extended release: 1 tab(s) orally  (2021 18:10)  carbidopa-levodopa 25 mg-250 mg oral tablet: 1 tab(s) orally 4 times a day (2021 18:10)  colchicine 0.6 mg oral tablet: 1 tab(s) orally once a day (2021 18:10)  Eliquis 5 mg oral tablet: 1 tab(s) orally 2 times a day (2021 18:27)  enalapril 5 mg oral tablet: 1 tab(s) orally once a day (2021 18:10)  furosemide 40 mg oral tablet: 1 tab(s) orally once a day (2021 18:10)  omeprazole 40 mg oral delayed release capsule: 1 cap(s) orally once a day (2021 18:10)  pramipexole 0.5 mg oral tablet: 1 tab(s) orally 4 times a day (2021 18:10)        OBJECTIVE:  ICU Vital Signs Last 24 Hrs  T(C): 37.2 (2021 00:00), Max: 37.9 (2021 14:00)  T(F): 99 (2021 00:00), Max: 100.3 (2021 14:00)  HR: 74 (2021 00:00) (64 - 100)  BP: 129/57 (2021 00:00) (75/49 - 136/57)  BP(mean): 82 (2021 00:00) (47 - 85)  ABP: --  ABP(mean): --  RR: 12 (2021 00:00) (12 - 31)  SpO2: 98% (2021 00:00) (92% - 98%)      Adult Advanced Hemodynamics Last 24 Hrs  CVP(mm Hg): --  CVP(cm H2O): --  CO: --  CI: --  PA: --  PA(mean): --  PCWP: --  SVR: --  SVRI: --  PVR: --  PVRI: --  I&O's Summary    2021 07:01  -  2021 07:00  --------------------------------------------------------  IN: 88 mL / OUT: 750 mL / NET: -662 mL    2021 07:01  -  2021 02:26  --------------------------------------------------------  IN: 1293 mL / OUT: 1095 mL / NET: 198 mL      Daily     Daily Weight in k.7 (2021 06:05)    PHYSICAL EXAM:  GEN: No acute distress  HEEN: NC/AT  NEck: no JVD  LUNGS: bilateral ronchi  HEART: S1/S2 present. RRR.   ABD: Soft, non-tender, non-distended. Bowel sounds present  EXT: Bilateral NUNU +2  NEURO: A&Ox1    LABS:                        10.7   3.36  )-----------( 65       ( 2021 01:38 )             33.8     02-13    142  |  106  |  35<H>  ----------------------------<  66<L>  4.6   |  26  |  1.1    Ca    8.7      2021 01:38  Phos  3.4     02-13  Mg     2.0     02-13    TPro  6.8  /  Alb  3.5  /  TBili  0.4  /  DBili  x   /  AST  95<H>  /  ALT  37  /  AlkPhos  219<H>  02-13    PT/INR - ( 2021 01:38 )   PT: 14.10 sec;   INR: 1.23 ratio         PTT - ( 2021 01:38 )  PTT:53.3 sec    CARDIAC MARKERS ( 2021 10:26 )  x     / 0.03 ng/mL / x     / x     / x            Troponin trend:    Serum Pro-Brain Natriuretic Peptide: 348 pg/mL (21 @ 10:26)     Chol 131 LDL -- HDL 51 Trig 63      RADIOLOGY:  -CXR:  -TTE:  -STRESS TEST:  -CATHETERIZATION:    ECG:    TELEMETRY EVENTS:       `HPI:  71 year old Maltese speaking Male with past medical history of Parkinson disease, HLD, HTN, H/O DVT on Eliquis and Lasix, H/O COVID presents to ED with altered mental status for 2 days.     As per family patient at baseline is able to speak and has his on and off days but since last 2 days has not been able to speak and more lethargic than baseline. As per daughter at the bedside, patient has "bad days but this is the worst we have seen" Patient bright to ED for non resolution of symptoms. Family denies any fevers, chills, rigors, cough, shortness of breath, loss of consciousness, incontinence or any other complaints.     In ED patient hypertensive to 170s and bradycardic to 30s. EKG showed sinus bradycardia with 1st degree AV block. Electrolytes were normal. Patient given atropine without response, he was given dobutamine and HR increased with wide complex tachycardia. Patient placed on Dopamine with HR improving to the 50s. CT head done for AMS and shows no acute changes. Patient at time of interview minimally verbal but appeared in no apparent discomfort. (2021 17:17)      INTERVAL HISTORY:  received 1L LR for hypotension, became fluid overloaded, started on lasix and levophed ggt, ordered BCx and UCx, started on cefepime    PAST MEDICAL & SURGICAL HISTORY  Cataract    Prostatitis    Dyslipidemia    Gout    Hypertension    Parkinson disease    No significant past surgical history        ALLERGIES:  No Known Allergies      MEDICATIONS:  MEDICATIONS  (STANDING):  atorvastatin 40 milliGRAM(s) Oral at bedtime  carbidopa/levodopa  25/250 1 Tablet(s) Oral four times a day  carbidopa/levodopa CR 25/100 1 Tablet(s) Oral at bedtime  cefepime   IVPB      cefepime   IVPB 2000 milliGRAM(s) IV Intermittent every 12 hours  chlorhexidine 4% Liquid 1 Application(s) Topical <User Schedule>  colchicine 0.6 milliGRAM(s) Oral daily  enoxaparin Injectable 90 milliGRAM(s) SubCutaneous every 12 hours  furosemide   Injectable 40 milliGRAM(s) IV Push daily  norepinephrine Infusion 0.05 MICROgram(s)/kG/Min (8.5 mL/Hr) IV Continuous <Continuous>  pantoprazole    Tablet 40 milliGRAM(s) Oral before breakfast  polyethylene glycol 3350 17 Gram(s) Oral daily  pramipexole 0.5 milliGRAM(s) Oral four times a day  senna 2 Tablet(s) Oral at bedtime    MEDICATIONS  (PRN):      HOME MEDICATIONS:  Home Medications:  atorvastatin 40 mg oral tablet: 1 tab(s) orally once a day (at bedtime) (2021 18:10)  carbidopa-levodopa 25 mg-100 mg oral tablet, extended release: 1 tab(s) orally  (2021 18:10)  carbidopa-levodopa 25 mg-250 mg oral tablet: 1 tab(s) orally 4 times a day (2021 18:10)  colchicine 0.6 mg oral tablet: 1 tab(s) orally once a day (2021 18:10)  Eliquis 5 mg oral tablet: 1 tab(s) orally 2 times a day (2021 18:27)  enalapril 5 mg oral tablet: 1 tab(s) orally once a day (2021 18:10)  furosemide 40 mg oral tablet: 1 tab(s) orally once a day (2021 18:10)  omeprazole 40 mg oral delayed release capsule: 1 cap(s) orally once a day (2021 18:10)  pramipexole 0.5 mg oral tablet: 1 tab(s) orally 4 times a day (2021 18:10)        OBJECTIVE:  ICU Vital Signs Last 24 Hrs  T(C): 37.2 (2021 00:00), Max: 37.9 (2021 14:00)  T(F): 99 (2021 00:00), Max: 100.3 (2021 14:00)  HR: 74 (2021 00:00) (64 - 100)  BP: 129/57 (2021 00:00) (75/49 - 136/57)  BP(mean): 82 (2021 00:00) (47 - 85)  ABP: --  ABP(mean): --  RR: 12 (2021 00:00) (12 - 31)  SpO2: 98% (2021 00:00) (92% - 98%)      Adult Advanced Hemodynamics Last 24 Hrs  CVP(mm Hg): --  CVP(cm H2O): --  CO: --  CI: --  PA: --  PA(mean): --  PCWP: --  SVR: --  SVRI: --  PVR: --  PVRI: --  I&O's Summary    2021 07:01  -  2021 07:00  --------------------------------------------------------  IN: 88 mL / OUT: 750 mL / NET: -662 mL    2021 07:01  -  2021 02:26  --------------------------------------------------------  IN: 1293 mL / OUT: 1095 mL / NET: 198 mL      Daily     Daily Weight in k.7 (2021 06:05)    PHYSICAL EXAM:  GEN: No acute distress  HEEN: NC/AT  NEck: no JVD  LUNGS: bl crackles  HEART: S1/S2 present. RRR.   ABD: Soft, non-tender, non-distended. Bowel sounds present  EXT: bl 2+ pitting edema  NEURO: AAOx1    LABS:                        10.7   3.36  )-----------( 65       ( 2021 01:38 )             33.8     02-    142  |  106  |  35<H>  ----------------------------<  66<L>  4.6   |  26  |  1.1    Ca    8.7      2021 01:38  Phos  3.4     02-13  Mg     2.0     -13    TPro  6.8  /  Alb  3.5  /  TBili  0.4  /  DBili  x   /  AST  95<H>  /  ALT  37  /  AlkPhos  219<H>  02-13    PT/INR - ( 2021 01:38 )   PT: 14.10 sec;   INR: 1.23 ratio         PTT - ( 2021 01:38 )  PTT:53.3 sec    CARDIAC MARKERS ( 2021 10:26 )  x     / 0.03 ng/mL / x     / x     / x            Troponin trend:    Serum Pro-Brain Natriuretic Peptide: 348 pg/mL (21 @ 10:26)    02- Chol 131 LDL -- HDL 51 Trig 63      RADIOLOGY:  -CXR:  -TTE:  -STRESS TEST:  -CATHETERIZATION:    ECG:    TELEMETRY EVENTS:       `HPI:  71 year old Kazakh speaking Male with past medical history of Parkinson disease, HLD, HTN, H/O DVT on Eliquis and Lasix, H/O COVID presents to ED with altered mental status for 2 days.     As per family patient at baseline is able to speak and has his on and off days but since last 2 days has not been able to speak and more lethargic than baseline. As per daughter at the bedside, patient has "bad days but this is the worst we have seen" Patient bright to ED for non resolution of symptoms. Family denies any fevers, chills, rigors, cough, shortness of breath, loss of consciousness, incontinence or any other complaints.     In ED patient hypertensive to 170s and bradycardic to 30s. EKG showed sinus bradycardia with 1st degree AV block. Electrolytes were normal. Patient given atropine without response, he was given dobutamine and HR increased with wide complex tachycardia. Patient placed on Dopamine with HR improving to the 50s. CT head done for AMS and shows no acute changes. Patient at time of interview minimally verbal but appeared in no apparent discomfort. (2021 17:17)      INTERVAL HISTORY:  received 1L LR for hypotension, became fluid overloaded, started on lasix and levophed ggt, ordered BCx and UCx, started on cefepime    PAST MEDICAL & SURGICAL HISTORY  Cataract    Prostatitis    Dyslipidemia    Gout    Hypertension    Parkinson disease    No significant past surgical history        ALLERGIES:  No Known Allergies      MEDICATIONS:  MEDICATIONS  (STANDING):  atorvastatin 40 milliGRAM(s) Oral at bedtime  carbidopa/levodopa  25/250 1 Tablet(s) Oral four times a day  carbidopa/levodopa CR 25/100 1 Tablet(s) Oral at bedtime  cefepime   IVPB      cefepime   IVPB 2000 milliGRAM(s) IV Intermittent every 12 hours  chlorhexidine 4% Liquid 1 Application(s) Topical <User Schedule>  colchicine 0.6 milliGRAM(s) Oral daily  enoxaparin Injectable 90 milliGRAM(s) SubCutaneous every 12 hours  furosemide   Injectable 40 milliGRAM(s) IV Push daily  norepinephrine Infusion 0.05 MICROgram(s)/kG/Min (8.5 mL/Hr) IV Continuous <Continuous>  pantoprazole    Tablet 40 milliGRAM(s) Oral before breakfast  polyethylene glycol 3350 17 Gram(s) Oral daily  pramipexole 0.5 milliGRAM(s) Oral four times a day  senna 2 Tablet(s) Oral at bedtime    MEDICATIONS  (PRN):      HOME MEDICATIONS:  Home Medications:  atorvastatin 40 mg oral tablet: 1 tab(s) orally once a day (at bedtime) (2021 18:10)  carbidopa-levodopa 25 mg-100 mg oral tablet, extended release: 1 tab(s) orally  (2021 18:10)  carbidopa-levodopa 25 mg-250 mg oral tablet: 1 tab(s) orally 4 times a day (2021 18:10)  colchicine 0.6 mg oral tablet: 1 tab(s) orally once a day (2021 18:10)  Eliquis 5 mg oral tablet: 1 tab(s) orally 2 times a day (2021 18:27)  enalapril 5 mg oral tablet: 1 tab(s) orally once a day (2021 18:10)  furosemide 40 mg oral tablet: 1 tab(s) orally once a day (2021 18:10)  omeprazole 40 mg oral delayed release capsule: 1 cap(s) orally once a day (2021 18:10)  pramipexole 0.5 mg oral tablet: 1 tab(s) orally 4 times a day (2021 18:10)        OBJECTIVE:  ICU Vital Signs Last 24 Hrs  T(C): 37.2 (2021 00:00), Max: 37.9 (2021 14:00)  T(F): 99 (2021 00:00), Max: 100.3 (2021 14:00)  HR: 74 (2021 00:00) (64 - 100)  BP: 129/57 (2021 00:00) (75/49 - 136/57)  BP(mean): 82 (2021 00:00) (47 - 85)  ABP: --  ABP(mean): --  RR: 12 (2021 00:00) (12 - 31)  SpO2: 98% (2021 00:00) (92% - 98%)    I&O's Summary    2021 07:01  -  2021 07:00  --------------------------------------------------------  IN: 88 mL / OUT: 750 mL / NET: -662 mL    2021 07:01  -  2021 02:26  --------------------------------------------------------  IN: 1293 mL / OUT: 1095 mL / NET: 198 mL      Daily     Daily Weight in k.7 (2021 06:05)    PHYSICAL EXAM:  GEN: No acute distress  HEEN: NC/AT  NEck: no JVD  LUNGS: bl crackles  HEART: S1/S2 present. RRR.   ABD: Soft, non-tender, non-distended. Bowel sounds present  EXT: bl 2+ pitting edema  NEURO: AAOx1    LABS:                        10.7   3.36  )-----------( 65       ( 2021 01:38 )             33.8     02    142  |  106  |  35<H>  ----------------------------<  66<L>  4.6   |  26  |  1.1    Ca    8.7      2021 01:38  Phos  3.4       Mg     2.0         TPro  6.8  /  Alb  3.5  /  TBili  0.4  /  DBili  x   /  AST  95<H>  /  ALT  37  /  AlkPhos  219<H>  0213    PT/INR - ( 2021 01:38 )   PT: 14.10 sec;   INR: 1.23 ratio    PTT - ( 2021 01:38 )  PTT:53.3 sec    CARDIAC MARKERS ( 2021 10:26 )  x     / 0.03 ng/mL / x     / x     / x        Troponin trend:    Serum Pro-Brain Natriuretic Peptide: 348 pg/mL (21 @ 10:26)     Chol 131 LDL -- HDL 51 Trig 63      RADIOLOGY:  -CXR: < from: Xray Chest 1 View- PORTABLE-Routine (Xray Chest 1 View- PORTABLE-Routine in AM.) (21 @ 04:58) >  Impression: Bilateral opacities, unchanged.    -TTE: < from: TTE Echo Complete w/o Contrast w/ Doppler (21 @ 10:33) >  Summary:   1. Normal left ventricular size and wall thicknesses, with normal systolic and diastolic function.   2. The left ventricular diastolic function could not be assessed in this study.   3. Mildly enlarged left atrium.   4. Mildly enlarged right atrium.   5. No evidence of mitral valve regurgitation.   6. Mild tricuspid regurgitation.   7. Intra-atrial septal aneurysm.   8. LA volume Index is 36.6 ml/m² ml/m2.    -STRESS TEST:   -CATHETERIZATION:     ECG:  < from: 12 Lead ECG (21 @ 05:51) >  Diagnosis Line Sinus rhythm with 1st degree A-V block  Septal infarct , age undetermined  Abnormal ECG    TELEMETRY EVENTS:  No acute event on telemetry. SR with 1st AV block.     `HPI:  71 year old Japanese speaking Male with past medical history of Parkinson disease, HLD, HTN, H/O DVT on Eliquis and Lasix, H/O COVID presents to ED with altered mental status for 2 days.     As per family patient at baseline is able to speak and has his on and off days but since last 2 days has not been able to speak and more lethargic than baseline. As per daughter at the bedside, patient has "bad days but this is the worst we have seen" Patient bright to ED for non resolution of symptoms. Family denies any fevers, chills, rigors, cough, shortness of breath, loss of consciousness, incontinence or any other complaints.     In ED patient hypertensive to 170s and bradycardic to 30s. EKG showed sinus bradycardia with 1st degree AV block. Electrolytes were normal. Patient given atropine without response, he was given dobutamine and HR increased with wide complex tachycardia. Patient placed on Dopamine with HR improving to the 50s. CT head done for AMS and shows no acute changes. Patient at time of interview minimally verbal but appeared in no apparent discomfort. (2021 17:17)        INTERVAL HISTORY:  received 1L LR for hypotension, became fluid overloaded, started on lasix and levophed ggt, ordered BCx and UCx, started on cefepime    PAST MEDICAL & SURGICAL HISTORY  Cataract    Prostatitis    Dyslipidemia    Gout    Hypertension    Parkinson disease    No significant past surgical history        ALLERGIES:  No Known Allergies      MEDICATIONS:  MEDICATIONS  (STANDING):  atorvastatin 40 milliGRAM(s) Oral at bedtime  carbidopa/levodopa  25/250 1 Tablet(s) Oral four times a day  carbidopa/levodopa CR 25/100 1 Tablet(s) Oral at bedtime  cefepime   IVPB      cefepime   IVPB 2000 milliGRAM(s) IV Intermittent every 12 hours  chlorhexidine 4% Liquid 1 Application(s) Topical <User Schedule>  colchicine 0.6 milliGRAM(s) Oral daily  enoxaparin Injectable 90 milliGRAM(s) SubCutaneous every 12 hours  furosemide   Injectable 40 milliGRAM(s) IV Push daily  norepinephrine Infusion 0.05 MICROgram(s)/kG/Min (8.5 mL/Hr) IV Continuous <Continuous>  pantoprazole    Tablet 40 milliGRAM(s) Oral before breakfast  polyethylene glycol 3350 17 Gram(s) Oral daily  pramipexole 0.5 milliGRAM(s) Oral four times a day  senna 2 Tablet(s) Oral at bedtime    MEDICATIONS  (PRN):      HOME MEDICATIONS:  Home Medications:  atorvastatin 40 mg oral tablet: 1 tab(s) orally once a day (at bedtime) (2021 18:10)  carbidopa-levodopa 25 mg-100 mg oral tablet, extended release: 1 tab(s) orally  (2021 18:10)  carbidopa-levodopa 25 mg-250 mg oral tablet: 1 tab(s) orally 4 times a day (2021 18:10)  colchicine 0.6 mg oral tablet: 1 tab(s) orally once a day (2021 18:10)  Eliquis 5 mg oral tablet: 1 tab(s) orally 2 times a day (2021 18:27)  enalapril 5 mg oral tablet: 1 tab(s) orally once a day (2021 18:10)  furosemide 40 mg oral tablet: 1 tab(s) orally once a day (2021 18:10)  omeprazole 40 mg oral delayed release capsule: 1 cap(s) orally once a day (2021 18:10)  pramipexole 0.5 mg oral tablet: 1 tab(s) orally 4 times a day (2021 18:10)        OBJECTIVE:  ICU Vital Signs Last 24 Hrs  T(C): 37.2 (2021 00:00), Max: 37.9 (2021 14:00)  T(F): 99 (2021 00:00), Max: 100.3 (2021 14:00)  HR: 74 (2021 00:00) (64 - 100)  BP: 129/57 (2021 00:00) (75/49 - 136/57)  BP(mean): 82 (2021 00:00) (47 - 85)  ABP: --  ABP(mean): --  RR: 12 (2021 00:00) (12 - 31)  SpO2: 98% (2021 00:00) (92% - 98%)    I&O's Summary    2021 07:01  -  2021 07:00  --------------------------------------------------------  IN: 88 mL / OUT: 750 mL / NET: -662 mL    2021 07:01  -  2021 02:26  --------------------------------------------------------  IN: 1293 mL / OUT: 1095 mL / NET: 198 mL      Daily     Daily Weight in k.7 (2021 06:05)    PHYSICAL EXAM:  GEN: No acute distress, obtunded  HEEN: NC/AT  Neck: no JVD  LUNGS: bl crackles  HEART: S1/S2 present. RRR.   ABD: Soft, non-tender, non-distended. Bowel sounds present  EXT: bl 2+ pitting edema  NEURO: AAOx1    LABS:                        10.7   3.36  )-----------( 65       ( 2021 01:38 )             33.8     02    142  |  106  |  35<H>  ----------------------------<  66<L>  4.6   |  26  |  1.1    Ca    8.7      2021 01:38  Phos  3.4       Mg     2.0         TPro  6.8  /  Alb  3.5  /  TBili  0.4  /  DBili  x   /  AST  95<H>  /  ALT  37  /  AlkPhos  219<H>  13    PT/INR - ( 2021 01:38 )   PT: 14.10 sec;   INR: 1.23 ratio    PTT - ( 2021 01:38 )  PTT:53.3 sec    CARDIAC MARKERS ( 2021 10:26 )  x     / 0.03 ng/mL / x     / x     / x        Troponin trend:    Serum Pro-Brain Natriuretic Peptide: 348 pg/mL (21 @ 10:26)     Chol 131 LDL -- HDL 51 Trig 63      RADIOLOGY:  -CXR: < from: Xray Chest 1 View- PORTABLE-Routine (Xray Chest 1 View- PORTABLE-Routine in AM.) (21 @ 04:58) >  Impression: Bilateral opacities, unchanged.    -TTE: < from: TTE Echo Complete w/o Contrast w/ Doppler (21 @ 10:33) >  Summary:   1. Normal left ventricular size and wall thicknesses, with normal systolic and diastolic function.   2. The left ventricular diastolic function could not be assessed in this study.   3. Mildly enlarged left atrium.   4. Mildly enlarged right atrium.   5. No evidence of mitral valve regurgitation.   6. Mild tricuspid regurgitation.   7. Intra-atrial septal aneurysm.   8. LA volume Index is 36.6 ml/m² ml/m2.    -STRESS TEST:   -CATHETERIZATION:     ECG:  < from: 12 Lead ECG (21 @ 05:51) >  Diagnosis Line Sinus rhythm with 1st degree A-V block  Septal infarct , age undetermined  Abnormal ECG    TELEMETRY EVENTS:  No acute event on telemetry. SR with 1st AV block.

## 2021-02-14 NOTE — PROGRESS NOTE ADULT - SUBJECTIVE AND OBJECTIVE BOX
INTERVAL HPI/OVERNIGHT EVENTS:  still letargic, ill appearing,  hypotensive overnight, Dopamine changed to Levo  tele NSR @70s since arrival to CCU yesterday    MEDICATIONS  (STANDING):  atorvastatin 40 milliGRAM(s) Oral at bedtime  carbidopa/levodopa  25/250 1 Tablet(s) Oral four times a day  carbidopa/levodopa CR 25/100 1 Tablet(s) Oral at bedtime  cefepime   IVPB      cefepime   IVPB 2000 milliGRAM(s) IV Intermittent every 12 hours  chlorhexidine 4% Liquid 1 Application(s) Topical <User Schedule>  colchicine 0.6 milliGRAM(s) Oral daily  heparin  Infusion 1000 Unit(s)/Hr (10 mL/Hr) IV Continuous <Continuous>  lactated ringers. 1000 milliLiter(s) (70 mL/Hr) IV Continuous <Continuous>  norepinephrine Infusion 0.05 MICROgram(s)/kG/Min (8.5 mL/Hr) IV Continuous <Continuous>  pantoprazole    Tablet 40 milliGRAM(s) Oral before breakfast  polyethylene glycol 3350 17 Gram(s) Oral daily  pramipexole 0.5 milliGRAM(s) Oral four times a day  senna 2 Tablet(s) Oral at bedtime    MEDICATIONS  (PRN):      Allergies    No Known Allergies    Intolerances        REVIEW OF SYSTEMS    [ ] A ten-point review of systems was otherwise negative except as noted.  [x ] Due to altered mental status/intubation, subjective information were not able to be obtained from the patient. History was obtained, to the extent possible, from review of the chart and collateral sources of information.      Vital Signs Last 24 Hrs  T(C): 37.1 (2021 11:39), Max: 37.9 (2021 14:00)  T(F): 98.8 (2021 11:39), Max: 100.3 (2021 14:00)  HR: 50 (2021 11:39) (50 - 84)  BP: 108/55 (2021 11:39) (80/44 - 149/73)  BP(mean): 77 (2021 11:39) (54 - 108)  RR: 24 (2021 11:39) (12 - 24)  SpO2: 100% (2021 11:39) (92% - 100%)    21 @ 07:01  -  21 @ 07:00  --------------------------------------------------------  IN: 1374 mL / OUT: 2445 mL / NET: -1071 mL    21 @ 07:01  -  21 @ 12:56  --------------------------------------------------------  IN: 546 mL / OUT: 1025 mL / NET: -479 mL        PHYSICAL EXAM:    GENERAL: In no apparent distress, well nourished, and hydrated.  HEART: Regular rate and rhythm; No murmur; NO rubs, or gallops.  PULMONARY: bilateral whonchi.  Normal expansion/effort. No rales, wheezing, bilaterally.  ABDOMEN: Soft, Nontender, Nondistended; Bowel sounds present  EXTREMITIES:  Extremities warm, pink, well-perfused, 2+ Peripheral Pulses, No clubbing, cyanosis, or edema  NEUROLOGICAL: lethargic, no focal deficits, PERRLA, EOMI    LABS:                        10.0   3.70  )-----------( 60       ( 2021 05:07 )             32.4         137  |  102  |  45<H>  ----------------------------<  65<L>  5.0   |  24  |  2.3<H>    Ca    8.3<L>      2021 05:07  Phos  3.4       Mg     1.8         TPro  6.2  /  Alb  3.2<L>  /  TBili  0.7  /  DBili  x   /  AST  128<H>  /  ALT  52<H>  /  AlkPhos  217<H>      PT/INR - ( 2021 01:38 )   PT: 14.10 sec;   INR: 1.23 ratio         PTT - ( 2021 01:38 )  PTT:53.3 sec  Urinalysis Basic - ( 2021 09:36 )    Color: Marta / Appearance: Slightly Turbid / S.016 / pH: x  Gluc: x / Ketone: Trace  / Bili: Negative / Urobili: 3 mg/dL   Blood: x / Protein: 30 mg/dL / Nitrite: Negative   Leuk Esterase: Large / RBC: 147 /HPF /  /HPF   Sq Epi: x / Non Sq Epi: 1 /HPF / Bacteria: Negative    Thyroid Stimulating Hormone, Serum: 2.63 uIU/mL (21 @ 01:38)     Borrelia burgdorferi IgG/IgM Antibodies (21 @ 01:38)   LYME IgG/IgM Antibodies Result: 0.71 Index   Lyme C6 Interpretation: Negative:     RADIOLOGY & ADDITIONAL TESTS:    < from: Xray Chest 1 View- PORTABLE-Routine (Xray Chest 1 View- PORTABLE-Routine in AM.) (21 @ 04:58) >  Findings:    Support devices: None.    Cardiac/mediastinum/hilum: Stable    Lung parenchyma/Pleura: Bilateral opacities, unchanged. No pneumothorax is seen.    Skeleton/soft tissues: Stable    Impression: Bilateral opacities, unchanged.    < end of copied text >    < from: TTE Echo Complete w/o Contrast w/ Doppler (21 @ 10:33) >  Summary:   1. Normal left ventricular size and wall thicknesses, with normal systolic and diastolic function.   2. The left ventricular diastolic function could not be assessed in this study.   3. Mildly enlarged left atrium.   4. Mildly enlarged right atrium.   5. No evidence of mitral valve regurgitation.   6. Mild tricuspid regurgitation.   7. Intra-atrial septal aneurysm.   8. LA volume Index is 36.6 ml/m² ml/m2.    PHYSICIAN INTERPRETATION:  Left Ventricle: Normal left ventricular sizeand wall thicknesses, with normal systolic and diastolic function. The left ventricular diastolic function could not be assessed in this study.    < end of copied text >   INTERVAL HPI/OVERNIGHT EVENTS:  still lethargic, ill appearing,  hypotensive overnight, Dopamine changed to Levo  tele NSR @70s since arrival to CCU yesterday. No further ingris episodes.     MEDICATIONS  (STANDING):  atorvastatin 40 milliGRAM(s) Oral at bedtime  carbidopa/levodopa  25/250 1 Tablet(s) Oral four times a day  carbidopa/levodopa CR 25/100 1 Tablet(s) Oral at bedtime  cefepime   IVPB      cefepime   IVPB 2000 milliGRAM(s) IV Intermittent every 12 hours  chlorhexidine 4% Liquid 1 Application(s) Topical <User Schedule>  colchicine 0.6 milliGRAM(s) Oral daily  heparin  Infusion 1000 Unit(s)/Hr (10 mL/Hr) IV Continuous <Continuous>  lactated ringers. 1000 milliLiter(s) (70 mL/Hr) IV Continuous <Continuous>  norepinephrine Infusion 0.05 MICROgram(s)/kG/Min (8.5 mL/Hr) IV Continuous <Continuous>  pantoprazole    Tablet 40 milliGRAM(s) Oral before breakfast  polyethylene glycol 3350 17 Gram(s) Oral daily  pramipexole 0.5 milliGRAM(s) Oral four times a day  senna 2 Tablet(s) Oral at bedtime    MEDICATIONS  (PRN):      Allergies    No Known Allergies    Intolerances        REVIEW OF SYSTEMS    [ ] A ten-point review of systems was otherwise negative except as noted.  [x ] Due to altered mental status/intubation, subjective information were not able to be obtained from the patient. History was obtained, to the extent possible, from review of the chart and collateral sources of information.      Vital Signs Last 24 Hrs  T(C): 37.1 (2021 11:39), Max: 37.9 (2021 14:00)  T(F): 98.8 (2021 11:39), Max: 100.3 (2021 14:00)  HR: 50 (2021 11:39) (50 - 84)  BP: 108/55 (2021 11:39) (80/44 - 149/73)  BP(mean): 77 (2021 11:39) (54 - 108)  RR: 24 (2021 11:39) (12 - 24)  SpO2: 100% (2021 11:39) (92% - 100%)    21 @ 07:01  -  21 @ 07:00  --------------------------------------------------------  IN: 1374 mL / OUT: 2445 mL / NET: -1071 mL    21 @ 07:01  -  21 @ 12:56  --------------------------------------------------------  IN: 546 mL / OUT: 1025 mL / NET: -479 mL        PHYSICAL EXAM:    GENERAL: In no apparent distress, well nourished, and hydrated.  HEART: Regular rate and rhythm; No murmur; NO rubs, or gallops.  PULMONARY: bilateral rhonchi.  Normal expansion/effort. No rales, wheezing, bilaterally.  ABDOMEN: Soft, Nontender, Nondistended; Bowel sounds present  EXTREMITIES:  Extremities warm, pink, well-perfused, 2+ Peripheral Pulses, No clubbing, cyanosis, or edema  NEUROLOGICAL: lethargic, no focal deficits, PERRLA, EOMI    LABS:                        10.0   3.70  )-----------( 60       ( 2021 05:07 )             32.4         137  |  102  |  45<H>  ----------------------------<  65<L>  5.0   |  24  |  2.3<H>    Ca    8.3<L>      2021 05:07  Phos  3.4       Mg     1.8         TPro  6.2  /  Alb  3.2<L>  /  TBili  0.7  /  DBili  x   /  AST  128<H>  /  ALT  52<H>  /  AlkPhos  217<H>      PT/INR - ( 2021 01:38 )   PT: 14.10 sec;   INR: 1.23 ratio         PTT - ( 2021 01:38 )  PTT:53.3 sec  Urinalysis Basic - ( 2021 09:36 )    Color: Marta / Appearance: Slightly Turbid / S.016 / pH: x  Gluc: x / Ketone: Trace  / Bili: Negative / Urobili: 3 mg/dL   Blood: x / Protein: 30 mg/dL / Nitrite: Negative   Leuk Esterase: Large / RBC: 147 /HPF /  /HPF   Sq Epi: x / Non Sq Epi: 1 /HPF / Bacteria: Negative    Thyroid Stimulating Hormone, Serum: 2.63 uIU/mL (21 @ 01:38)     Borrelia burgdorferi IgG/IgM Antibodies (21 @ 01:38)   LYME IgG/IgM Antibodies Result: 0.71 Index   Lyme C6 Interpretation: Negative:     RADIOLOGY & ADDITIONAL TESTS:    < from: Xray Chest 1 View- PORTABLE-Routine (Xray Chest 1 View- PORTABLE-Routine in AM.) (21 @ 04:58) >  Findings:    Support devices: None.    Cardiac/mediastinum/hilum: Stable    Lung parenchyma/Pleura: Bilateral opacities, unchanged. No pneumothorax is seen.    Skeleton/soft tissues: Stable    Impression: Bilateral opacities, unchanged.    < end of copied text >    < from: TTE Echo Complete w/o Contrast w/ Doppler (21 @ 10:33) >  Summary:   1. Normal left ventricular size and wall thicknesses, with normal systolic and diastolic function.   2. The left ventricular diastolic function could not be assessed in this study.   3. Mildly enlarged left atrium.   4. Mildly enlarged right atrium.   5. No evidence of mitral valve regurgitation.   6. Mild tricuspid regurgitation.   7. Intra-atrial septal aneurysm.   8. LA volume Index is 36.6 ml/m² ml/m2.    PHYSICIAN INTERPRETATION:  Left Ventricle: Normal left ventricular sizeand wall thicknesses, with normal systolic and diastolic function. The left ventricular diastolic function could not be assessed in this study.    < end of copied text >

## 2021-02-14 NOTE — PROGRESS NOTE ADULT - ASSESSMENT
IMPRESSION:  - Sinus Bradycardia / 1st deg AVB - symptomatic (lethargy/AMS) - responded to pressors. Had VT with Dobutamine, switched to Dopamine, which he tolerated.  - Sepsis secondary to UTI +/- PNA   - Pancytopenia  - Recent Covid infection with R opacities on CXR.   - Other hx: htn, dl, Parkinson's dementia, DVT      PLAN:    CNS: Avoid over sedation  >> continue same PD medications with same timing  >> neuro eval (Dr. Lm Velasquez)    HEENT: Oral care    PULMONARY:  HOB @ 45 degrees  - right sided opacity on CXR (unlikely covid) - will cover for HCAP  Switch Abx to Cefepime for broader coverage. F/u cultures.    CARDIOVASCULAR:  - tte:  pending  avoid AVN blockers  C/w Dopamine for now. Dose increased today - HR is WNL, monitor BP  Will give a bolus of NS. If still hypotensive, will switch to Levophed, in which case will need A-line and Central line  F/u labs, TFT's, r/o reversible causes  EP follow-up for possible PPM  check lue duplex for unilateral LUE swelling  repeat troponins and ekg in am  Check Cortisol level    GI: GI prophylaxis.  Feeding     RENAL:  Follow up lytes.  Correct as needed    INFECTIOUS DISEASE:  - Pan-cultures cultures of urine, blood, sputum  - Start Cefepime for UTI and PNA coverage  - Give 500cc bolus IV fluids  - If no major improvement in BP, start levophed  - recent COVID, no active antiviral therapy    HEMATOLOGICAL:  DVT prophylaxis.  >> hemonc eval - Pancytopenia likely secondary to recent covid and sepsis    ENDOCRINE:  Follow up FS.  Insulin protocol if needed.    MUSCULOSKELETAL: oobtc if tolerated    DISPO:  CCU. MICU consult         IMPRESSION:  - Sinus Bradycardia / 1st deg AVB - symptomatic (lethargy/AMS) - responded to pressors. Had VT with Dobutamine, switched to Dopamine, which he tolerated.  - Sepsis secondary to UTI +/- PNA   - Pancytopenia  - Recent Covid infection with R opacities on CXR.   - Other hx: htn, dl, Parkinson's dementia, DVT      PLAN:    CNS: Avoid over sedation  >> continue same PD medications with same timing  >> neuro eval (Dr. Lm Velasquez)    HEENT: Oral care    PULMONARY:  HOB @ 45 degrees  - right sided opacity on CXR (unlikely covid) - will cover for HCAP  Switch Abx to Cefepime for broader coverage. F/u cultures.    CARDIOVASCULAR:  - tte:  pending  - avoid AVN blockers  - c/w levophed  F/u labs, TFT's, r/o reversible causes  EP follow-up for possible PPM  check lue duplex for unilateral LUE swelling  repeat troponins and ekg in am  Check Cortisol level    GI: GI prophylaxis.  Feeding     RENAL:  Follow up lytes.  Correct as needed    INFECTIOUS DISEASE:  - Pan-cultures cultures of urine, blood, sputum  - c/w Cefepime for UTI and PNA coverage  - recent COVID, no active antiviral therapy    HEMATOLOGICAL:  DVT prophylaxis.  >> hemonc eval - Pancytopenia likely secondary to recent covid and sepsis    ENDOCRINE:  Follow up FS.  Insulin protocol if needed.    MUSCULOSKELETAL: oobtc if tolerated    DISPO:  CCU. MICU consult         IMPRESSION:  - Sinus Bradycardia / 1st deg AVB - symptomatic (lethargy/AMS) - responded to pressors. Had VT with Dobutamine, switched to Dopamine, which he tolerated.  - Sepsis secondary to UTI +/- PNA   - Pancytopenia  - Recent Covid infection with R opacities on CXR.   - Other hx: htn, dl, Parkinson's dementia, DVT      PLAN:    CNS: Avoid over sedation  >> continue same PD medications with same timing  >> neuro eval (Dr. Lm Velasquez)    HEENT: Oral care    PULMONARY:  HOB @ 45 degrees  - right sided opacity on CXR (unlikely covid) - will cover for HCAP  Switch Abx to Cefepime for broader coverage. F/u cultures.    CARDIOVASCULAR:  - tte:  pending  - avoid AVN blockers  - c/w levophed  F/u labs, TFT's, r/o reversible causes  EP follow-up for possible PPM  check lue duplex for unilateral LUE swelling  repeat troponins and ekg in am  Check Cortisol level    GI: GI prophylaxis.  NGT vs. OGT for feeding     RENAL:  Follow up lytes.  Correct as needed    INFECTIOUS DISEASE:  - Pan-cultures cultures of urine, blood, sputum  - c/w Cefepime for UTI and PNA coverage  - recent COVID, no active antiviral therapy  - ID evaluation    HEMATOLOGICAL:  DVT prophylaxis.  >> hemonc eval - Pancytopenia likely secondary to recent covid and sepsis    ENDOCRINE:  Follow up FS.  Insulin protocol if needed.    MUSCULOSKELETAL: oobtc if tolerated    DISPO:  CCU. MICU consult         IMPRESSION:  - Sinus Bradycardia / 1st deg AVB - symptomatic (lethargy/AMS) - responded to pressors. Had VT with Dobutamine, switched to Dopamine, which he tolerated.  - Sepsis secondary to UTI +/- PNA   - Pancytopenia  - Recent Covid infection with R opacities on CXR.   - Other hx: htn, dl, Parkinson's dementia, DVT      PLAN:    CNS: Avoid over sedation  >> continue same PD medications with same timing  >> neuro eval (Dr. Lm Velasquez)  Head CT - f/u imaging  S/p EEG - f/u results with neuro    HEENT: Oral care    PULMONARY:  HOB @ 45 degrees  - right sided opacity on CXR (unlikely covid) - will cover for HCAP  Switch Abx to Cefepime for broader coverage. F/u cultures.  ID evaluation    CARDIOVASCULAR:  - tte:  pending  - avoid AVN blockers  - c/w levophed  F/u labs, TFT's, r/o reversible causes  EP follow-up for possible PPM  check lue duplex for unilateral LUE swelling  repeat troponins and ekg in am  Check Cortisol level    GI: GI prophylaxis.  NGT vs. OGT for feeding     RENAL:  Follow up lytes.  Correct as needed    INFECTIOUS DISEASE:  - Pan-cultures cultures of urine, blood, sputum  - c/w Cefepime for UTI and PNA coverage  - recent COVID, no active antiviral therapy  - ID evaluation    HEMATOLOGICAL:  DVT prophylaxis.  >> hemonc eval - Pancytopenia likely secondary to recent covid and sepsis    ENDOCRINE:  Follow up TSH, FS.  Insulin protocol if needed.    MUSCULOSKELETAL: oob tc once tolerated    DISPO:  CCU. MICU consult for septic shock

## 2021-02-14 NOTE — CONSULT NOTE ADULT - SUBJECTIVE AND OBJECTIVE BOX
Patient is a 71y old  Male who presents with a chief complaint of Altered Mental Status (2021 02:25)      HPI:  71 year old Frisian speaking Male with past medical history of Parkinson disease, HLD, HTN, H/O DVT on Eliquis and Lasix, H/O COVID presents to ED with altered mental status for 2 days.     As per family patient at baseline is able to speak and has his on and off days but since last 2 days has not been able to speak and more lethargic than baseline. As per daughter at the bedside, patient has "bad days but this is the worst we have seen" Patient bright to ED for non resolution of symptoms. Family denies any fevers, chills, rigors, cough, shortness of breath, loss of consciousness, incontinence or any other complaints.     In ED patient hypertensive to 170s and bradycardic to 30s. EKG showed sinus bradycardia with 1st degree AV block. Electrolytes were normal. Patient given atropine without response, he was given dobutamine and HR increased with wide complex tachycardia. Patient placed on Dopamine with HR improving to the 50s. CT head done for AMS and shows no acute changes. Patient at time of interview minimally verbal but appeared in no apparent discomfort. (2021 17:17)  still alter metnal     PAST MEDICAL & SURGICAL HISTORY:  Cataract    Prostatitis    Dyslipidemia    Gout    Hypertension    Parkinson disease    No significant past surgical history      Allergies    No Known Allergies    Intolerances      Family history : no cardiovscular family history   Home Medications:  atorvastatin 40 mg oral tablet: 1 tab(s) orally once a day (at bedtime) (2021 18:10)  carbidopa-levodopa 25 mg-100 mg oral tablet, extended release: 1 tab(s) orally  (2021 18:10)  carbidopa-levodopa 25 mg-250 mg oral tablet: 1 tab(s) orally 4 times a day (2021 18:10)  colchicine 0.6 mg oral tablet: 1 tab(s) orally once a day (2021 18:10)  Eliquis 5 mg oral tablet: 1 tab(s) orally 2 times a day (2021 18:27)  enalapril 5 mg oral tablet: 1 tab(s) orally once a day (2021 18:10)  furosemide 40 mg oral tablet: 1 tab(s) orally once a day (2021 18:10)  omeprazole 40 mg oral delayed release capsule: 1 cap(s) orally once a day (2021 18:10)  pramipexole 0.5 mg oral tablet: 1 tab(s) orally 4 times a day (2021 18:10)    Occupation:  Alochol: Denied  Smoking: Denied  Drug Use: Denied  Marital Status:         ROS: as in HPI; All other systems reviewed are negative    ICU Vital Signs Last 24 Hrs  T(C): 37.2 (2021 00:00), Max: 37.9 (2021 14:00)  T(F): 99 (2021 00:00), Max: 100.3 (2021 14:00)  HR: 70 (2021 06:02) (70 - 100)  BP: 122/58 (2021 06:02) (75/49 - 140/61)  BP(mean): 76 (2021 06:02) (47 - 85)  ABP: --  ABP(mean): --  RR: 15 (2021 06:02) (12 - 31)  SpO2: 99% (2021 06:02) (92% - 99%)        Physical Examination:    General:  lethargic protecting his airway     HEENT: Pupils equal, reactive to light.  Symmetric.    PULM: crackles b/l     CVS: Regular rate and rhythm, no murmurs, rubs, or gallops    ABD: Soft, nondistended, nontender, normoactive bowel sounds, no masses    EXT: No edema, nontender, no clubbing     SKIN: Warm and well perfused, no rashes noted.    Neurology : no motor or sensory deficit     Musculoskeletal : move all extremity     Lymphatic system: no Palpable node           ABG - ( 2021 19:26 )  pH, Arterial: 7.46  pH, Blood: x     /  pCO2: 39    /  pO2: 67    / HCO3: 28    / Base Excess: 3.5   /  SaO2: 94                  I&O's Detail    2021 07:01  -  2021 07:00  --------------------------------------------------------  IN:    DOPamine Infusion: 214 mL    IV PiggyBack: 100 mL    Lactated Ringers: 1000 mL    Norepinephrine: 60 mL  Total IN: 1374 mL    OUT:    Voided (mL): 2445 mL  Total OUT: 2445 mL    Total NET: -1071 mL      2021 07:01  -  2021 08:01  --------------------------------------------------------  IN:    Norepinephrine: 4 mL  Total IN: 4 mL    OUT:    Voided (mL): 275 mL  Total OUT: 275 mL    Total NET: -271 mL            LABS:                        10.0   3.70  )-----------( 60       ( 2021 05:07 )             32.4     2021 05:07    137    |  102    |  45     ----------------------------<  65     5.0     |  24     |  2.3      Ca    8.3        2021 05:07  Phos  3.4       2021 01:38  Mg     1.8       2021 05:07    TPro  6.2    /  Alb  3.2    /  TBili  0.7    /  DBili  x      /  AST  128    /  ALT  52     /  AlkPhos  217    2021 05:07  Amylase x     lipase x          CARDIAC MARKERS ( 2021 10:26 )  x     / 0.03 ng/mL / x     / x     / x          CAPILLARY BLOOD GLUCOSE      POCT Blood Glucose.: 74 mg/dL (2021 10:11)    PT/INR - ( 2021 01:38 )   PT: 14.10 sec;   INR: 1.23 ratio         PTT - ( 2021 01:38 )  PTT:53.3 sec  Urinalysis Basic - ( 2021 09:36 )    Color: Marta / Appearance: Slightly Turbid / S.016 / pH: x  Gluc: x / Ketone: Trace  / Bili: Negative / Urobili: 3 mg/dL   Blood: x / Protein: 30 mg/dL / Nitrite: Negative   Leuk Esterase: Large / RBC: 147 /HPF /  /HPF   Sq Epi: x / Non Sq Epi: 1 /HPF / Bacteria: Negative      Culture    Culture - Urine (collected 2021 18:37)  Source: .Urine Clean Catch (Midstream)  Final Report (2021 22:43):    <10,000 CFU/mL Normal Urogenital [place]le          MEDICATIONS  (STANDING):  atorvastatin 40 milliGRAM(s) Oral at bedtime  carbidopa/levodopa  25/250 1 Tablet(s) Oral four times a day  carbidopa/levodopa CR 25/100 1 Tablet(s) Oral at bedtime  cefepime   IVPB      cefepime   IVPB 2000 milliGRAM(s) IV Intermittent every 12 hours  chlorhexidine 4% Liquid 1 Application(s) Topical <User Schedule>  colchicine 0.6 milliGRAM(s) Oral daily  enoxaparin Injectable 90 milliGRAM(s) SubCutaneous every 12 hours  furosemide   Injectable 40 milliGRAM(s) IV Push daily  norepinephrine Infusion 0.05 MICROgram(s)/kG/Min (8.5 mL/Hr) IV Continuous <Continuous>  pantoprazole    Tablet 40 milliGRAM(s) Oral before breakfast  polyethylene glycol 3350 17 Gram(s) Oral daily  pramipexole 0.5 milliGRAM(s) Oral four times a day  senna 2 Tablet(s) Oral at bedtime    MEDICATIONS  (PRN):        RADIOLOGY: ***     CXR: b/l opacity R>L  TLC:  OG:  ET tube:        [facility] ICU:  ECHO:

## 2021-02-15 LAB
ALBUMIN SERPL ELPH-MCNC: 2.9 G/DL — LOW (ref 3.5–5.2)
ALP SERPL-CCNC: 199 U/L — HIGH (ref 30–115)
ALT FLD-CCNC: 108 U/L — HIGH (ref 0–41)
AMMONIA BLD-MCNC: 34 UMOL/L — SIGNIFICANT CHANGE UP (ref 11–55)
ANION GAP SERPL CALC-SCNC: 13 MMOL/L — SIGNIFICANT CHANGE UP (ref 7–14)
APTT BLD: 130.1 SEC — CRITICAL HIGH (ref 27–39.2)
APTT BLD: 56.5 SEC — HIGH (ref 27–39.2)
APTT BLD: 60.5 SEC — HIGH (ref 27–39.2)
APTT BLD: 62.9 SEC — HIGH (ref 27–39.2)
APTT BLD: 85.2 SEC — CRITICAL HIGH (ref 27–39.2)
AST SERPL-CCNC: 114 U/L — HIGH (ref 0–41)
BASOPHILS # BLD AUTO: 0.03 K/UL — SIGNIFICANT CHANGE UP (ref 0–0.2)
BASOPHILS NFR BLD AUTO: 0.9 % — SIGNIFICANT CHANGE UP (ref 0–1)
BILIRUB SERPL-MCNC: 0.7 MG/DL — SIGNIFICANT CHANGE UP (ref 0.2–1.2)
BUN SERPL-MCNC: 44 MG/DL — HIGH (ref 10–20)
CALCIUM SERPL-MCNC: 8.4 MG/DL — LOW (ref 8.5–10.1)
CHLORIDE SERPL-SCNC: 106 MMOL/L — SIGNIFICANT CHANGE UP (ref 98–110)
CO2 SERPL-SCNC: 22 MMOL/L — SIGNIFICANT CHANGE UP (ref 17–32)
CORTIS AM PEAK SERPL-MCNC: 9.1 UG/DL — SIGNIFICANT CHANGE UP (ref 6–18.4)
CREAT SERPL-MCNC: 2.3 MG/DL — HIGH (ref 0.7–1.5)
CULTURE RESULTS: SIGNIFICANT CHANGE UP
EOSINOPHIL # BLD AUTO: 0.01 K/UL — SIGNIFICANT CHANGE UP (ref 0–0.7)
EOSINOPHIL NFR BLD AUTO: 0.3 % — SIGNIFICANT CHANGE UP (ref 0–8)
FOLATE SERPL-MCNC: 7.9 NG/ML — SIGNIFICANT CHANGE UP
GLUCOSE SERPL-MCNC: 89 MG/DL — SIGNIFICANT CHANGE UP (ref 70–99)
HAV IGM SER-ACNC: REACTIVE
HBV CORE IGM SER-ACNC: SIGNIFICANT CHANGE UP
HBV SURFACE AG SER-ACNC: SIGNIFICANT CHANGE UP
HCT VFR BLD CALC: 32 % — LOW (ref 42–52)
HCV AB S/CO SERPL IA: 0.13 S/CO — SIGNIFICANT CHANGE UP (ref 0–0.99)
HCV AB SERPL-IMP: SIGNIFICANT CHANGE UP
HGB BLD-MCNC: 10 G/DL — LOW (ref 14–18)
HIV 1+2 AB+HIV1 P24 AG SERPL QL IA: SIGNIFICANT CHANGE UP
IMM GRANULOCYTES NFR BLD AUTO: 0.3 % — SIGNIFICANT CHANGE UP (ref 0.1–0.3)
LYMPHOCYTES # BLD AUTO: 0.52 K/UL — LOW (ref 1.2–3.4)
LYMPHOCYTES # BLD AUTO: 16.1 % — LOW (ref 20.5–51.1)
MAGNESIUM SERPL-MCNC: 2 MG/DL — SIGNIFICANT CHANGE UP (ref 1.8–2.4)
MCHC RBC-ENTMCNC: 25.3 PG — LOW (ref 27–31)
MCHC RBC-ENTMCNC: 31.3 G/DL — LOW (ref 32–37)
MCV RBC AUTO: 81 FL — SIGNIFICANT CHANGE UP (ref 80–94)
MONOCYTES # BLD AUTO: 0.59 K/UL — SIGNIFICANT CHANGE UP (ref 0.1–0.6)
MONOCYTES NFR BLD AUTO: 18.3 % — HIGH (ref 1.7–9.3)
MRSA PCR RESULT.: NEGATIVE — SIGNIFICANT CHANGE UP
NEUTROPHILS # BLD AUTO: 2.07 K/UL — SIGNIFICANT CHANGE UP (ref 1.4–6.5)
NEUTROPHILS NFR BLD AUTO: 64.1 % — SIGNIFICANT CHANGE UP (ref 42.2–75.2)
NRBC # BLD: 0 /100 WBCS — SIGNIFICANT CHANGE UP (ref 0–0)
PLATELET # BLD AUTO: 54 K/UL — LOW (ref 130–400)
POTASSIUM SERPL-MCNC: 4.6 MMOL/L — SIGNIFICANT CHANGE UP (ref 3.5–5)
POTASSIUM SERPL-SCNC: 4.6 MMOL/L — SIGNIFICANT CHANGE UP (ref 3.5–5)
PROT SERPL-MCNC: 6.1 G/DL — SIGNIFICANT CHANGE UP (ref 6–8)
RBC # BLD: 3.95 M/UL — LOW (ref 4.7–6.1)
RBC # FLD: 17.1 % — HIGH (ref 11.5–14.5)
SODIUM SERPL-SCNC: 141 MMOL/L — SIGNIFICANT CHANGE UP (ref 135–146)
SPECIMEN SOURCE: SIGNIFICANT CHANGE UP
VIT B12 SERPL-MCNC: 1989 PG/ML — HIGH (ref 232–1245)
WBC # BLD: 3.23 K/UL — LOW (ref 4.8–10.8)
WBC # FLD AUTO: 3.23 K/UL — LOW (ref 4.8–10.8)

## 2021-02-15 PROCEDURE — 93010 ELECTROCARDIOGRAM REPORT: CPT

## 2021-02-15 PROCEDURE — 99233 SBSQ HOSP IP/OBS HIGH 50: CPT

## 2021-02-15 RX ORDER — CEFEPIME 1 G/1
1000 INJECTION, POWDER, FOR SOLUTION INTRAMUSCULAR; INTRAVENOUS EVERY 24 HOURS
Refills: 0 | Status: DISCONTINUED | OUTPATIENT
Start: 2021-02-15 | End: 2021-02-17

## 2021-02-15 RX ADMIN — CEFEPIME 100 MILLIGRAM(S): 1 INJECTION, POWDER, FOR SOLUTION INTRAMUSCULAR; INTRAVENOUS at 18:20

## 2021-02-15 RX ADMIN — SODIUM CHLORIDE 70 MILLILITER(S): 9 INJECTION, SOLUTION INTRAVENOUS at 04:53

## 2021-02-15 RX ADMIN — HEPARIN SODIUM 5 UNIT(S)/HR: 5000 INJECTION INTRAVENOUS; SUBCUTANEOUS at 03:45

## 2021-02-15 RX ADMIN — CHLORHEXIDINE GLUCONATE 1 APPLICATION(S): 213 SOLUTION TOPICAL at 06:40

## 2021-02-15 RX ADMIN — CEFEPIME 100 MILLIGRAM(S): 1 INJECTION, POWDER, FOR SOLUTION INTRAMUSCULAR; INTRAVENOUS at 04:54

## 2021-02-15 NOTE — PROGRESS NOTE ADULT - SUBJECTIVE AND OBJECTIVE BOX
Nephrology progress note    THIS IS AN INCOMPLETE NOTE . FULL NOTE TO FOLLOW SHORTLY    Patient is seen and examined, events over the last 24 h noted .    Allergies:  No Known Allergies    Hospital Medications:   MEDICATIONS  (STANDING):  atorvastatin 40 milliGRAM(s) Oral at bedtime  carbidopa/levodopa  25/250 1 Tablet(s) Oral four times a day  carbidopa/levodopa CR 25/100 1 Tablet(s) Oral at bedtime  cefepime   IVPB      cefepime   IVPB 2000 milliGRAM(s) IV Intermittent every 12 hours  chlorhexidine 4% Liquid 1 Application(s) Topical <User Schedule>  colchicine 0.6 milliGRAM(s) Oral daily  dextrose 5% + sodium chloride 0.9%. 1000 milliLiter(s) (70 mL/Hr) IV Continuous <Continuous>  heparin  Infusion 1000 Unit(s)/Hr (5 mL/Hr) IV Continuous <Continuous>  norepinephrine Infusion 0.05 MICROgram(s)/kG/Min (8.5 mL/Hr) IV Continuous <Continuous>  pantoprazole    Tablet 40 milliGRAM(s) Oral before breakfast  polyethylene glycol 3350 17 Gram(s) Oral daily  pramipexole 0.5 milliGRAM(s) Oral four times a day  senna 2 Tablet(s) Oral at bedtime        VITALS:  T(F): 98.3 (02-15-21 @ 08:00), Max: 99 (02-15-21 @ 04:00)  HR: 68 (02-15-21 @ 09:00)  BP: 86/42 (02-15-21 @ 09:00)  RR: 17 (02-15-21 @ 09:00)  SpO2: 98% (02-15-21 @ 09:00)  Wt(kg): --     @ 07:01  -   @ 07:00  --------------------------------------------------------  IN: 1374 mL / OUT: 2445 mL / NET: -1071 mL     @ 07:01  -  02-15 @ 07:00  --------------------------------------------------------  IN: 1901 mL / OUT: 3210 mL / NET: -1309 mL    02-15 @ 07:01  -  02-15 @ 09:27  --------------------------------------------------------  IN: 152 mL / OUT: 100 mL / NET: 52 mL          PHYSICAL EXAM:  Constitutional: NAD  HEENT: anicteric sclera, oropharynx clear, MMM  Neck: No JVD  Respiratory: CTAB, no wheezes, rales or rhonchi  Cardiovascular: S1, S2, RRR  Gastrointestinal: BS+, soft, NT/ND  Extremities: No cyanosis or clubbing. No peripheral edema  :  No hancock.   Skin: No rashes    LABS:  02-15    141  |  106  |  44<H>  ----------------------------<  89  4.6   |  22  |  2.3<H>    Ca    8.4<L>      15 Feb 2021 04:36  Mg     2.0     02-15    TPro  6.1  /  Alb  2.9<L>  /  TBili  0.7  /  DBili      /  AST  114<H>  /  ALT  108<H>  /  AlkPhos  199<H>  02-15                          10.0   3.23  )-----------( 54       ( 15 Feb 2021 04:36 )             32.0       Urine Studies:  Urinalysis Basic - ( 2021 09:36 )    Color: Marta / Appearance: Slightly Turbid / S.016 / pH:   Gluc:  / Ketone: Trace  / Bili: Negative / Urobili: 3 mg/dL   Blood:  / Protein: 30 mg/dL / Nitrite: Negative   Leuk Esterase: Large / RBC: 147 /HPF /  /HPF   Sq Epi:  / Non Sq Epi: 1 /HPF / Bacteria: Negative        RADIOLOGY & ADDITIONAL STUDIES:   Nephrology progress note  Patient is seen and examined, events over the last 24 h noted .  lying in bed nonverbal     Allergies:  No Known Allergies    Hospital Medications:   MEDICATIONS  (STANDING):    atorvastatin 40 milliGRAM(s) Oral at bedtime  carbidopa/levodopa  25/250 1 Tablet(s) Oral four times a day  carbidopa/levodopa CR 25/100 1 Tablet(s) Oral at bedtime  cefepime   IVPB 2000 milliGRAM(s) IV Intermittent every 12 hours  colchicine 0.6 milliGRAM(s) Oral daily  dextrose 5% + sodium chloride 0.9%. 1000 milliLiter(s) (70 mL/Hr) IV Continuous <Continuous>  heparin  Infusion 1000 Unit(s)/Hr (5 mL/Hr) IV Continuous <Continuous>  norepinephrine Infusion 0.05 MICROgram(s)/kG/Min (8.5 mL/Hr) IV Continuous <Continuous>  pantoprazole    Tablet 40 milliGRAM(s) Oral before breakfast  polyethylene glycol 3350 17 Gram(s) Oral daily  pramipexole 0.5 milliGRAM(s) Oral four times a day  senna 2 Tablet(s) Oral at bedtime        VITALS:  T(F): 98.3 (02-15-21 @ 08:00), Max: 99 (02-15-21 @ 04:00)  HR: 68 (02-15-21 @ 09:00)  BP: 86/42 (02-15-21 @ 09:00)  RR: 17 (02-15-21 @ 09:00)  SpO2: 98% (02-15-21 @ 09:00)       @ :  -   @ 07:00  --------------------------------------------------------  IN: 1374 mL / OUT: 2445 mL / NET: -1071 mL     @ 07:01  -  02-15 @ 07:00  --------------------------------------------------------  IN: 1901 mL / OUT: 3210 mL / NET: -1309 mL    02-15 @ 07:01  -  02-15 @ 09:27  --------------------------------------------------------  IN: 152 mL / OUT: 100 mL / NET: 52 mL          PHYSICAL EXAM:  Constitutional: lying in bed comfortable   Neck: No JVD  Respiratory: CTAB,  Cardiovascular: S1, S2, RRR  Gastrointestinal: BS+, soft, NT/ND  Extremities: No cyanosis or clubbing. No peripheral edema  Skin: No rashes    LABS:  02-15    141  |  106  |  44<H>  ----------------------------<  89  4.6   |  22  |  2.3<H>  Creatinine Trend: 2.3<--, 2.3<--, 1.1<--, 1.2<--  Ca    8.4<L>      15 Feb 2021 04:36  Mg     2.0     02-15    TPro  6.1  /  Alb  2.9<L>  /  TBili  0.7  /  DBili      /  AST  114<H>  /  ALT  108<H>  /  AlkPhos  199<H>  02-15                          10.0   3.23  )-----------( 54       ( 15 Feb 2021 04:36 )             32.0       Urine Studies:  Urinalysis Basic - ( 2021 09:36 )    Color: Marta / Appearance: Slightly Turbid / S.016 / pH:   Gluc:  / Ketone: Trace  / Bili: Negative / Urobili: 3 mg/dL   Blood:  / Protein: 30 mg/dL / Nitrite: Negative   Leuk Esterase: Large / RBC: 147 /HPF /  /HPF   Sq Epi:  / Non Sq Epi: 1 /HPF / Bacteria: Negative        RADIOLOGY & ADDITIONAL STUDIES:  < from: US Abdomen Limited (21 @ 00:22) >  Right kidney: Length 9 cm. No hydronephrosis.    < end of copied text >

## 2021-02-15 NOTE — PROGRESS NOTE ADULT - SUBJECTIVE AND OBJECTIVE BOX
HPI:  71 year old Kiswahili speaking Male with past medical history of Parkinson disease, HLD, HTN, H/O DVT on Eliquis and Lasix, H/O COVID presents to ED with altered mental status for 2 days.     As per family patient at baseline is able to speak and has his on and off days but since last 2 days has not been able to speak and more lethargic than baseline. As per daughter at the bedside, patient has "bad days but this is the worst we have seen" Patient bright to ED for non resolution of symptoms. Family denies any fevers, chills, rigors, cough, shortness of breath, loss of consciousness, incontinence or any other complaints.     In ED patient hypertensive to 170s and bradycardic to 30s. EKG showed sinus bradycardia with 1st degree AV block. Electrolytes were normal. Patient given atropine without response, he was given dobutamine and HR increased with wide complex tachycardia. Patient placed on Dopamine with HR improving to the 50s. CT head done for AMS and shows no acute changes. Patient at time of interview minimally verbal but appeared in no apparent discomfort. (2021 17:17)      INTERVAL HISTORY:  3rd day of admission.     PAST MEDICAL & SURGICAL HISTORY  Cataract    Prostatitis    Dyslipidemia    Gout    Hypertension    Parkinson disease    No significant past surgical history        ALLERGIES:  No Known Allergies      MEDICATIONS:  MEDICATIONS  (STANDING):  atorvastatin 40 milliGRAM(s) Oral at bedtime  carbidopa/levodopa  25/250 1 Tablet(s) Oral four times a day  carbidopa/levodopa CR 25/100 1 Tablet(s) Oral at bedtime  cefepime   IVPB      cefepime   IVPB 2000 milliGRAM(s) IV Intermittent every 12 hours  chlorhexidine 4% Liquid 1 Application(s) Topical <User Schedule>  colchicine 0.6 milliGRAM(s) Oral daily  dextrose 5% + sodium chloride 0.9%. 1000 milliLiter(s) (70 mL/Hr) IV Continuous <Continuous>  heparin  Infusion 1000 Unit(s)/Hr (5 mL/Hr) IV Continuous <Continuous>  norepinephrine Infusion 0.05 MICROgram(s)/kG/Min (8.5 mL/Hr) IV Continuous <Continuous>  pantoprazole    Tablet 40 milliGRAM(s) Oral before breakfast  polyethylene glycol 3350 17 Gram(s) Oral daily  pramipexole 0.5 milliGRAM(s) Oral four times a day  senna 2 Tablet(s) Oral at bedtime    MEDICATIONS  (PRN):      HOME MEDICATIONS:  Home Medications:  atorvastatin 40 mg oral tablet: 1 tab(s) orally once a day (at bedtime) (2021 18:10)  carbidopa-levodopa 25 mg-100 mg oral tablet, extended release: 1 tab(s) orally  (2021 18:10)  carbidopa-levodopa 25 mg-250 mg oral tablet: 1 tab(s) orally 4 times a day (2021 18:10)  colchicine 0.6 mg oral tablet: 1 tab(s) orally once a day (2021 18:10)  Eliquis 5 mg oral tablet: 1 tab(s) orally 2 times a day (2021 18:27)  enalapril 5 mg oral tablet: 1 tab(s) orally once a day (2021 18:10)  furosemide 40 mg oral tablet: 1 tab(s) orally once a day (2021 18:10)  omeprazole 40 mg oral delayed release capsule: 1 cap(s) orally once a day (2021 18:10)  pramipexole 0.5 mg oral tablet: 1 tab(s) orally 4 times a day (2021 18:10)        OBJECTIVE:  ICU Vital Signs Last 24 Hrs  T(C): 37.2 (15 Feb 2021 04:00), Max: 37.2 (15 Feb 2021 04:00)  T(F): 99 (15 Feb 2021 04:00), Max: 99 (15 Feb 2021 04:00)  HR: 68 (15 Feb 2021 07:00) (50 - 76)  BP: 93/47 (15 Feb 2021 07:00) (78/46 - 169/96)  BP(mean): 74 (15 Feb 2021 07:00) (58 - 125)  ABP: --  ABP(mean): --  RR: 18 (15 Feb 2021 07:00) (11 - 24)  SpO2: 94% (15 Feb 2021 07:00) (94% - 100%)      Adult Advanced Hemodynamics Last 24 Hrs  CVP(mm Hg): --  CVP(cm H2O): --  CO: --  CI: --  PA: --  PA(mean): --  PCWP: --  SVR: --  SVRI: --  PVR: --  PVRI: --  I&O's Summary    2021 07:01  -  15 Feb 2021 07:00  --------------------------------------------------------  IN: 1901 mL / OUT: 3210 mL / NET: -1309 mL    15 Feb 2021 07:01  -  15 Feb 2021 07:36  --------------------------------------------------------  IN: 6 mL / OUT: 0 mL / NET: 6 mL      Daily     Daily Weight in k.4 (15 Feb 2021 05:00)    PHYSICAL EXAM:  NEURO: patient is awake , alert and oriented  GEN: Not in acute distress  NECK: no thyroid enlargement, no JVD  LUNGS: Clear to auscultation bilaterally   CARDIOVASCULAR: S1/S2 present, RRR , no murmurs or rubs, no carotid bruits,  + PP bilaterally  ABD: Soft, non-tender, non-distended, +BS  EXT: No NUNU  SKIN: Intact  ACCESS Site:    LABS:                        10.0   3.23  )-----------( 54       ( 15 Feb 2021 04:36 )             32.0     02-15    141  |  106  |  44<H>  ----------------------------<  89  4.6   |  22  |  2.3<H>    Ca    8.4<L>      15 Feb 2021 04:36  Mg     2.0     02-15    TPro  6.1  /  Alb  2.9<L>  /  TBili  0.7  /  DBili  x   /  AST  114<H>  /  ALT  108<H>  /  AlkPhos  199<H>  02-15    PTT - ( 15 Feb 2021 02:40 )  PTT:130.1 sec          Troponin trend:       Chol 131 LDL -- HDL 51 Trig 63      RADIOLOGY:  -CXR:  -TTE:  -STRESS TEST:  -CATHETERIZATION:    ECG:    TELEMETRY EVENTS:       HPI:  71 year old French speaking Male with past medical history of Parkinson disease, HLD, HTN, H/O DVT on Eliquis and Lasix, H/O COVID presents to ED with altered mental status for 2 days.     As per family patient at baseline is able to speak and has his on and off days but since last 2 days has not been able to speak and more lethargic than baseline. As per daughter at the bedside, patient has "bad days but this is the worst we have seen" Patient bright to ED for non resolution of symptoms. Family denies any fevers, chills, rigors, cough, shortness of breath, loss of consciousness, incontinence or any other complaints.     In ED patient hypertensive to 170s and bradycardic to 30s. EKG showed sinus bradycardia with 1st degree AV block. Electrolytes were normal. Patient given atropine without response, he was given dobutamine and HR increased with wide complex tachycardia. Patient placed on Dopamine with HR improving to the 50s. CT head done for AMS and shows no acute changes. Patient at time of interview minimally verbal but appeared in no apparent discomfort. (2021 17:17)        INTERVAL HISTORY:  3rd day of admission. Still on low dose Levophed. No further ectopy. HR is controlled. Mental status improved. CTH - negative.    PAST MEDICAL & SURGICAL HISTORY  Cataract    Prostatitis    Dyslipidemia    Gout    Hypertension    Parkinson disease    No significant past surgical history        ALLERGIES:  No Known Allergies      MEDICATIONS:  MEDICATIONS  (STANDING):  atorvastatin 40 milliGRAM(s) Oral at bedtime  carbidopa/levodopa  25/250 1 Tablet(s) Oral four times a day  carbidopa/levodopa CR 25/100 1 Tablet(s) Oral at bedtime  cefepime   IVPB      cefepime   IVPB 2000 milliGRAM(s) IV Intermittent every 12 hours  chlorhexidine 4% Liquid 1 Application(s) Topical <User Schedule>  colchicine 0.6 milliGRAM(s) Oral daily  dextrose 5% + sodium chloride 0.9%. 1000 milliLiter(s) (70 mL/Hr) IV Continuous <Continuous>  heparin  Infusion 1000 Unit(s)/Hr (5 mL/Hr) IV Continuous <Continuous>  norepinephrine Infusion 0.05 MICROgram(s)/kG/Min (8.5 mL/Hr) IV Continuous <Continuous>  pantoprazole    Tablet 40 milliGRAM(s) Oral before breakfast  polyethylene glycol 3350 17 Gram(s) Oral daily  pramipexole 0.5 milliGRAM(s) Oral four times a day  senna 2 Tablet(s) Oral at bedtime    MEDICATIONS  (PRN):      HOME MEDICATIONS:  Home Medications:  atorvastatin 40 mg oral tablet: 1 tab(s) orally once a day (at bedtime) (2021 18:10)  carbidopa-levodopa 25 mg-100 mg oral tablet, extended release: 1 tab(s) orally  (2021 18:10)  carbidopa-levodopa 25 mg-250 mg oral tablet: 1 tab(s) orally 4 times a day (2021 18:10)  colchicine 0.6 mg oral tablet: 1 tab(s) orally once a day (2021 18:10)  Eliquis 5 mg oral tablet: 1 tab(s) orally 2 times a day (2021 18:27)  enalapril 5 mg oral tablet: 1 tab(s) orally once a day (2021 18:10)  furosemide 40 mg oral tablet: 1 tab(s) orally once a day (2021 18:10)  omeprazole 40 mg oral delayed release capsule: 1 cap(s) orally once a day (2021 18:10)  pramipexole 0.5 mg oral tablet: 1 tab(s) orally 4 times a day (2021 18:10)        OBJECTIVE:  ICU Vital Signs Last 24 Hrs  T(C): 37.2 (15 Feb 2021 04:00), Max: 37.2 (15 Feb 2021 04:00)  T(F): 99 (15 Feb 2021 04:00), Max: 99 (15 Feb 2021 04:00)  HR: 68 (15 Feb 2021 07:00) (50 - 76)  BP: 93/47 (15 Feb 2021 07:00) (78/46 - 169/96)  BP(mean): 74 (15 Feb 2021 07:00) (58 - 125)  ABP: --  ABP(mean): --  RR: 18 (15 Feb 2021 07:00) (11 - 24)  SpO2: 94% (15 Feb 2021 07:00) (94% - 100%)      Adult Advanced Hemodynamics Last 24 Hrs  CVP(mm Hg): --  CVP(cm H2O): --  CO: --  CI: --  PA: --  PA(mean): --  PCWP: --  SVR: --  SVRI: --  PVR: --  PVRI: --  I&O's Summary    2021 07:01  -  15 Feb 2021 07:00  --------------------------------------------------------  IN: 1901 mL / OUT: 3210 mL / NET: -1309 mL    15 Feb 2021 07:01  -  15 Feb 2021 07:36  --------------------------------------------------------  IN: 6 mL / OUT: 0 mL / NET: 6 mL      Daily     Daily Weight in k.4 (15 Feb 2021 05:00)    PHYSICAL EXAM:  NEURO: patient is awake , more alert  today  GEN: Not in acute distress  NECK:  no JVD  LUNGS: Clear to auscultation bilaterally   CARDIOVASCULAR: S1/S2 present, RRR , no murmurs or rubs, no carotid bruits,  + PP bilaterally  ABD: Soft, non-tender, non-distended, +BS  EXT: No NUNU  SKIN: Intact      LABS:                        10.0   3.23  )-----------( 54       ( 15 Feb 2021 04:36 )             32.0     02-15    141  |  106  |  44<H>  ----------------------------<  89  4.6   |  22  |  2.3<H>    Ca    8.4<L>      15 Feb 2021 04:36  Mg     2.0     0215    TPro  6.1  /  Alb  2.9<L>  /  TBili  0.7  /  DBili  x   /  AST  114<H>  /  ALT  108<H>  /  AlkPhos  199<H>  02-15    PTT - ( 15 Feb 2021 02:40 )  PTT:130.1 sec          Troponin trend:       Chol 131 LDL -- HDL 51 Trig 63      RADIOLOGY:  -CXR:  -TTE:  -STRESS TEST:  -CATHETERIZATION:    ECG:    TELEMETRY EVENTS:

## 2021-02-15 NOTE — PROGRESS NOTE ADULT - SUBJECTIVE AND OBJECTIVE BOX
NIEVES LABOY  71y, Male  Allergy: No Known Allergies      LOS  3d    CHIEF COMPLAINT: Altered Mental Status (15 Feb 2021 09:27)      INTERVAL EVENTS/HPI  - No acute events overnight, afebrile  No leukocytosis   - T(F): , Max: 99 (02-15-21 @ 04:00)  - Tolerating medication  - WBC Count: 3.23 (02-15-21 @ 04:36)  WBC Count: 3.52 (02-14-21 @ 20:33)     - Creatinine, Serum: 2.3 (02-15-21 @ 04:36)  Creatinine, Serum: 2.3 (02-14-21 @ 05:07)       ROS  ***    VITALS:  T(F): 98.3, Max: 99 (02-15-21 @ 04:00)  HR: 70  BP: 95/47  RR: 27Vital Signs Last 24 Hrs  T(C): 36.8 (15 Feb 2021 08:00), Max: 37.2 (15 Feb 2021 04:00)  T(F): 98.3 (15 Feb 2021 08:00), Max: 99 (15 Feb 2021 04:00)  HR: 70 (15 Feb 2021 10:00) (50 - 76)  BP: 95/47 (15 Feb 2021 10:00) (78/46 - 169/96)  BP(mean): 69 (15 Feb 2021 10:00) (53 - 125)  RR: 27 (15 Feb 2021 10:00) (11 - 27)  SpO2: 97% (15 Feb 2021 10:00) (94% - 100%)    PHYSICAL EXAM:  ***    FH: Non-contributory  Social Hx: Non-contributory    TESTS & MEASUREMENTS:                        10.0   3.23  )-----------( 54       ( 15 Feb 2021 04:36 )             32.0     02-15    141  |  106  |  44<H>  ----------------------------<  89  4.6   |  22  |  2.3<H>    Ca    8.4<L>      15 Feb 2021 04:36  Mg     2.0     02-15    TPro  6.1  /  Alb  2.9<L>  /  TBili  0.7  /  DBili  x   /  AST  114<H>  /  ALT  108<H>  /  AlkPhos  199<H>  02-15    eGFR if Non African American: 27 mL/min/1.73M2 (02-15-21 @ 04:36)  eGFR if African American: 32 mL/min/1.73M2 (02-15-21 @ 04:36)    LIVER FUNCTIONS - ( 15 Feb 2021 04:36 )  Alb: 2.9 g/dL / Pro: 6.1 g/dL / ALK PHOS: 199 U/L / ALT: 108 U/L / AST: 114 U/L / GGT: x               Culture - Blood (collected 02-13-21 @ 11:54)  Source: .Blood None  Preliminary Report (02-14-21 @ 19:02):    No growth to date.    Culture - Urine (collected 02-13-21 @ 09:36)  Source: .Urine Kidney  Final Report (02-15-21 @ 08:40):    No growth at 48 hours    Culture - Urine (collected 02-12-21 @ 18:37)  Source: .Urine Clean Catch (Midstream)  Final Report (02-13-21 @ 22:43):    <10,000 CFU/mL Normal Urogenital Chelle        Blood Gas Venous - Lactate: 1.1 mmoL/L (02-12-21 @ 10:10)      INFECTIOUS DISEASES TESTING  MRSA PCR Result.: Negative (02-14-21 @ 18:29)  Procalcitonin, Serum: 0.46 (02-13-21 @ 16:00)  COVID-19 PCR: NotDetec (02-12-21 @ 10:17)      INFLAMMATORY MARKERS      RADIOLOGY & ADDITIONAL TESTS:  I have personally reviewed the last available Chest xray  CXR  Xray Chest 1 View- PORTABLE-Urgent:   EXAM:  XR CHEST PORTABLE URGENT 1V            PROCEDURE DATE:  02/12/2021            INTERPRETATION:  Clinical History / Reason for exam: Bradycardia.    Comparison : Chest radiograph September 25, 2018.    Technique/Positioning: Frontal portable.    Findings:    Support devices: Telemetry leads overlie the thorax    Cardiac/mediastinum/hilum: Heart is enlarged.    Lung parenchyma/Pleura: Right lung opacity.    Skeleton/soft tissues: Unchanged    Impression:    Right lung opacification, may be indicative of pneumonia.                  ROGELIO SMITH MD; Attending Interventional Radiologist  This document has been electronically signed. Feb 12 2021 11:21AM (02-12-21 @ 11:10)      CT      CARDIOLOGY TESTING  12 Lead ECG:   Ventricular Rate 72 BPM    Atrial Rate 72 BPM    P-R Interval 258 ms    QRS Duration 86 ms    Q-T Interval 360 ms    QTC Calculation(Bazett) 394 ms    P Axis 42 degrees    R Axis -15 degrees    T Axis 31 degrees    Diagnosis Line Sinus rhythm with 1st degree A-V block  Cannot rule out Anterior infarct , age undetermined  Abnormal ECG    Confirmed by DEBORA MCMAHON MD ([id number]) on 2/15/2021 7:00:02 AM (02-15-21 @ 05:31)  12 Lead ECG:   Ventricular Rate 78 BPM    Atrial Rate 78 BPM    P-R Interval 314 ms    QRS Duration 82 ms    Q-T Interval 386 ms    QTC Calculation(Bazett) 440 ms    P Axis 47 degrees    R Axis 11 degrees    T Axis 40 degrees    Diagnosis Line Sinus rhythm with 1st degree A-V block  Septal infarct , age undetermined  Abnormal ECG    Confirmed by DEBORA MCMAHON MD ([id number]) on 2/13/2021 8:19:38 AM (02-13-21 @ 05:51)      MEDICATIONS  atorvastatin 40 Oral at bedtime  carbidopa/levodopa  25/250 1 Oral four times a day  carbidopa/levodopa CR 25/100 1 Oral at bedtime  cefepime   IVPB     cefepime   IVPB 2000 IV Intermittent every 12 hours  chlorhexidine 4% Liquid 1 Topical <User Schedule>  colchicine 0.6 Oral daily  dextrose 5% + sodium chloride 0.9%. 1000 IV Continuous <Continuous>  heparin  Infusion 1000 IV Continuous <Continuous>  norepinephrine Infusion 0.05 IV Continuous <Continuous>  pantoprazole    Tablet 40 Oral before breakfast  polyethylene glycol 3350 17 Oral daily  pramipexole 0.5 Oral four times a day  senna 2 Oral at bedtime      WEIGHT  Weight (kg): 90.7 (02-12-21 @ 09:55)  Creatinine, Serum: 2.3 mg/dL (02-15-21 @ 04:36)      ANTIBIOTICS:  cefepime   IVPB      cefepime   IVPB 2000 milliGRAM(s) IV Intermittent every 12 hours      All available historical records have been reviewed       NIEVES LABOY  71y, Male  Allergy: No Known Allergies      LOS  3d    CHIEF COMPLAINT: Altered Mental Status (15 Feb 2021 09:27)      INTERVAL EVENTS/HPI  - No acute events overnight, afebrile  No leukocytosis   - T(F): , Max: 99 (02-15-21 @ 04:00)  - Tolerating medication  - WBC Count: 3.23 (02-15-21 @ 04:36)  WBC Count: 3.52 (02-14-21 @ 20:33)     - Creatinine, Serum: 2.3 (02-15-21 @ 04:36)  Creatinine, Serum: 2.3 (02-14-21 @ 05:07)       ROS  unable to obtain history secondary to patient's mental status and/or sedation     VITALS:  T(F): 98.3, Max: 99 (02-15-21 @ 04:00)  HR: 70  BP: 95/47  RR: 27Vital Signs Last 24 Hrs  T(C): 36.8 (15 Feb 2021 08:00), Max: 37.2 (15 Feb 2021 04:00)  T(F): 98.3 (15 Feb 2021 08:00), Max: 99 (15 Feb 2021 04:00)  HR: 70 (15 Feb 2021 10:00) (50 - 76)  BP: 95/47 (15 Feb 2021 10:00) (78/46 - 169/96)  BP(mean): 69 (15 Feb 2021 10:00) (53 - 125)  RR: 27 (15 Feb 2021 10:00) (11 - 27)  SpO2: 97% (15 Feb 2021 10:00) (94% - 100%)    PHYSICAL EXAM:  Gen: NAD, does not respond to questions  HEENT: Normocephalic, atraumatic  Neck: supple, no lymphadenopathy  CV: Regular rate & regular rhythm  Lungs: decreased BS at bases, no fremitus  Abdomen: Soft, BS present  Ext: Warm, well perfused  Neuro: non focal, contracted  Skin: no rash, no erythema  Lines: no phlebitis     FH: Non-contributory  Social Hx: Non-contributory    TESTS & MEASUREMENTS:                        10.0   3.23  )-----------( 54       ( 15 Feb 2021 04:36 )             32.0     02-15    141  |  106  |  44<H>  ----------------------------<  89  4.6   |  22  |  2.3<H>    Ca    8.4<L>      15 Feb 2021 04:36  Mg     2.0     02-15    TPro  6.1  /  Alb  2.9<L>  /  TBili  0.7  /  DBili  x   /  AST  114<H>  /  ALT  108<H>  /  AlkPhos  199<H>  02-15    eGFR if Non African American: 27 mL/min/1.73M2 (02-15-21 @ 04:36)  eGFR if African American: 32 mL/min/1.73M2 (02-15-21 @ 04:36)    LIVER FUNCTIONS - ( 15 Feb 2021 04:36 )  Alb: 2.9 g/dL / Pro: 6.1 g/dL / ALK PHOS: 199 U/L / ALT: 108 U/L / AST: 114 U/L / GGT: x               Culture - Blood (collected 02-13-21 @ 11:54)  Source: .Blood None  Preliminary Report (02-14-21 @ 19:02):    No growth to date.    Culture - Urine (collected 02-13-21 @ 09:36)  Source: .Urine Kidney  Final Report (02-15-21 @ 08:40):    No growth at 48 hours    Culture - Urine (collected 02-12-21 @ 18:37)  Source: .Urine Clean Catch (Midstream)  Final Report (02-13-21 @ 22:43):    <10,000 CFU/mL Normal Urogenital Chelle        Blood Gas Venous - Lactate: 1.1 mmoL/L (02-12-21 @ 10:10)      INFECTIOUS DISEASES TESTING  MRSA PCR Result.: Negative (02-14-21 @ 18:29)  Procalcitonin, Serum: 0.46 (02-13-21 @ 16:00)  COVID-19 PCR: NotDetec (02-12-21 @ 10:17)      INFLAMMATORY MARKERS      RADIOLOGY & ADDITIONAL TESTS:  I have personally reviewed the last available Chest xray  CXR  Xray Chest 1 View- PORTABLE-Urgent:   EXAM:  XR CHEST PORTABLE URGENT 1V            PROCEDURE DATE:  02/12/2021            INTERPRETATION:  Clinical History / Reason for exam: Bradycardia.    Comparison : Chest radiograph September 25, 2018.    Technique/Positioning: Frontal portable.    Findings:    Support devices: Telemetry leads overlie the thorax    Cardiac/mediastinum/hilum: Heart is enlarged.    Lung parenchyma/Pleura: Right lung opacity.    Skeleton/soft tissues: Unchanged    Impression:    Right lung opacification, may be indicative of pneumonia.                  ROGELIO SMITH MD; Attending Interventional Radiologist  This document has been electronically signed. Feb 12 2021 11:21AM (02-12-21 @ 11:10)      CT      CARDIOLOGY TESTING  12 Lead ECG:   Ventricular Rate 72 BPM    Atrial Rate 72 BPM    P-R Interval 258 ms    QRS Duration 86 ms    Q-T Interval 360 ms    QTC Calculation(Bazett) 394 ms    P Axis 42 degrees    R Axis -15 degrees    T Axis 31 degrees    Diagnosis Line Sinus rhythm with 1st degree A-V block  Cannot rule out Anterior infarct , age undetermined  Abnormal ECG    Confirmed by DEBORA MCMAHON MD ([id number]) on 2/15/2021 7:00:02 AM (02-15-21 @ 05:31)  12 Lead ECG:   Ventricular Rate 78 BPM    Atrial Rate 78 BPM    P-R Interval 314 ms    QRS Duration 82 ms    Q-T Interval 386 ms    QTC Calculation(Bazett) 440 ms    P Axis 47 degrees    R Axis 11 degrees    T Axis 40 degrees    Diagnosis Line Sinus rhythm with 1st degree A-V block  Septal infarct , age undetermined  Abnormal ECG    Confirmed by DEBORA MCMAHON MD ([id number]) on 2/13/2021 8:19:38 AM (02-13-21 @ 05:51)      MEDICATIONS  atorvastatin 40 Oral at bedtime  carbidopa/levodopa  25/250 1 Oral four times a day  carbidopa/levodopa CR 25/100 1 Oral at bedtime  cefepime   IVPB     cefepime   IVPB 2000 IV Intermittent every 12 hours  chlorhexidine 4% Liquid 1 Topical <User Schedule>  colchicine 0.6 Oral daily  dextrose 5% + sodium chloride 0.9%. 1000 IV Continuous <Continuous>  heparin  Infusion 1000 IV Continuous <Continuous>  norepinephrine Infusion 0.05 IV Continuous <Continuous>  pantoprazole    Tablet 40 Oral before breakfast  polyethylene glycol 3350 17 Oral daily  pramipexole 0.5 Oral four times a day  senna 2 Oral at bedtime      WEIGHT  Weight (kg): 90.7 (02-12-21 @ 09:55)  Creatinine, Serum: 2.3 mg/dL (02-15-21 @ 04:36)      ANTIBIOTICS:  cefepime   IVPB      cefepime   IVPB 2000 milliGRAM(s) IV Intermittent every 12 hours      All available historical records have been reviewed

## 2021-02-15 NOTE — PROGRESS NOTE ADULT - ASSESSMENT
IMPRESSION:  - Sinus Bradycardia / 1st deg AVB - improved, now off dopamine - no need for ppm.   - Sepsis secondary to UTI +/- PNA   - AMS, likely met encephalopathy / Parkinsons dementia  - ABEBE (baseline scr ~1.3)  - Pancytopenia  - Recent Covid infection with R opacities on CXR.   - Other hx: htn, dl, dvt      PLAN:    CNS: Avoid over sedation  >> neuro recs appreciated from  " One option for restarting would be to have script sent to outpatient pharmacy for orally disintegrating (ODT) carbidopa levodopa 25/100 and give the followinam: two 25/100 tabs, 10 am one 25/100 tablet, 2 pm two 25/100 tabs, 6 pm two 25/100 tabs, and 10 p.m. one 25/100 tablet.  This would achieve total daily levodopa dose of 800 mg.  Nursing would need to ensure disintegration of medication in mouth prior to placing any positive pressure mask.  "     HEENT: Oral care    PULMONARY:  HOB @ 45 degrees  - right sided opacity on CXR (unlikely covid) - will cover for HCAP  Switch Abx to Cefepime for broader coverage. F/u cultures.  ID evaluation    CARDIOVASCULAR:  - tte:  nml lvsf. Ias aneurysm.   - avoid AVN blockers  - ep signed off - no need for ppm    GI: GI prophylaxis.  NGT vs. OGT for feeding     RENAL:  Follow up lytes.  Correct as needed    INFECTIOUS DISEASE:  - Pan-cultures cultures of urine, blood, sputum  - currently on Cefepime for UTI and PNA coverage  - recent COVID, no active antiviral therapy  - ID f/u today    HEMATOLOGICAL:  DVT prophylaxis.  - hemonc following - Pancytopenia likely secondary to recent covid and sepsis    ENDOCRINE:  Follow up TSH, FS.  Insulin protocol if needed.    MUSCULOSKELETAL: oob tc once tolerated    DISPO: transferred to MICU service IMPRESSION:  - Sinus Bradycardia / 1st deg AVB - improved, now off dopamine - no need for ppm.   - Sepsis secondary to UTI +/- PNA   - AMS, likely met encephalopathy / Parkinsons dementia  - ABEBE (baseline scr ~1.3)  - Pancytopenia  - Recent Covid infection with R opacities on CXR.   - Other hx: htn, dl, dvt      PLAN:    CNS: Avoid over sedation  >> neuro recs appreciated from  " One option for restarting would be to have script sent to outpatient pharmacy for orally disintegrating (ODT) carbidopa levodopa 25/100 and give the followinam: two 25/100 tabs, 10 am one 25/100 tablet, 2 pm two 25/100 tabs, 6 pm two 25/100 tabs, and 10 p.m. one 25/100 tablet.  This would achieve total daily levodopa dose of 800 mg.  Nursing would need to ensure disintegration of medication in mouth prior to placing any positive pressure mask.  "     HEENT: Oral care    PULMONARY:  HOB @ 45 degrees  - right sided opacity on CXR (unlikely covid) - will cover for HCAP  - on cefepime for pna coverage  - id f/u today      CARDIOVASCULAR:  - tte:  nml lvsf. Ias aneurysm.   - avoid AVN blockers  - ep signed off - no need for ppm    GI: GI prophylaxis.  NGT vs. OGT for feeding     RENAL:  Follow up lytes.  Correct as needed    INFECTIOUS DISEASE:  - Pan-cultures cultures of urine, blood, sputum  - currently on Cefepime for UTI and PNA coverage  - recent COVID, no active antiviral therapy  - ID f/u today    HEMATOLOGICAL:  DVT prophylaxis.  - hemonc following - Pancytopenia likely secondary to recent covid and sepsis    ENDOCRINE:  Follow up TSH, FS.  Insulin protocol if needed.    MUSCULOSKELETAL: oob tc once tolerated    DISPO: transferred to MICU service IMPRESSION:  - Sinus Bradycardia / 1st deg AVB - improved, now off dopamine - no need for ppm.   - Sepsis secondary to UTI +/- PNA   - AMS, likely met encephalopathy / Parkinsons dementia  - ABEBE (baseline scr ~1.3)  - Pancytopenia  - Recent Covid infection with R opacities on CXR.   - Other hx: htn, dl, dvt      PLAN:    CNS: Avoid over sedation  >> neuro recs appreciated from  " One option for restarting would be to have script sent to outpatient pharmacy for orally disintegrating (ODT) carbidopa levodopa 25/100 and give the followinam: two 25/100 tabs, 10 am one 25/100 tablet, 2 pm two 25/100 tabs, 6 pm two 25/100 tabs, and 10 p.m. one 25/100 tablet.  This would achieve total daily levodopa dose of 800 mg.  Nursing would need to ensure disintegration of medication in mouth prior to placing any positive pressure mask.  "   CT scan, EEG noted  Neurology follow-up    HEENT: Oral care    PULMONARY:  HOB @ 45 degrees  - right sided opacity on CXR (unlikely covid) - will continue to cover for HCAP  - on cefepime for pna coverage  - id f/u today      CARDIOVASCULAR:  - tte:  nml lvsf. Ias aneurysm.   - avoid AVN blockers  - ep signed off - no need for ppm  - wean off pressors today    GI: GI prophylaxis.  NGT vs. OGT for feeding     RENAL:  Follow up lytes.  Correct as needed    INFECTIOUS DISEASE:  - Pan-cultures cultures of urine, blood, sputum  - currently on Cefepime for UTI and PNA coverage  - recent COVID, no active antiviral therapy  - ID f/u today    HEMATOLOGICAL:  DVT prophylaxis.  - hemonc following - Pancytopenia likely secondary to recent covid and sepsis    ENDOCRINE:  Follow up TSH, FS.  Insulin protocol if needed.    MUSCULOSKELETAL: oob tc once tolerated    DISPO: transferred to MICU service, possible downgrade today, if off pressors.

## 2021-02-15 NOTE — CHART NOTE - NSCHARTNOTEFT_GEN_A_CORE
MICU Transfer Note  ---------------------------    Transfer from: MICU  Transfer to:  (  ) Medicine    (  ) Telemetry    (  ) RCU    (  ) Palliative    (  ) Stroke Unit    ( X ) [facility]  Accepting Physician:    MICU COURSE  71 year old German speaking Male with past medical history of Parkinson disease, HLD, HTN, H/O DVT on Eliquis and Lasix, H/O COVID presented to ED with altered mental status for 2 days.   As per family patient at baseline is able to speak and has his on and off days but for 2 days prior to presentation had not been able to speak and more lethargic than baseline. As per daughter at the bedside, patient has "bad days but this is the worst we have seen" Patient bright to ED for non resolution of symptoms. Family denies any fevers, chills, rigors, cough, shortness of breath, loss of consciousness, incontinence or any other complaints.   In ED patient hypertensive to 170s and bradycardic to 30s. EKG showed sinus bradycardia with 1st degree AV block. Electrolytes were normal. Patient given atropine without response, he was given dobutamine and HR increased with wide complex tachycardia. Patient placed on Dopamine with HR improving to the 50s. CT head done for AMS and shows no acute changes. Patient at time of interview minimally verbal but appeared in no apparent discomfort. (2021 17:17).      Assessment and Plan:    - Sinus Bradycardia / 1st deg AVB - improved, now off dopamine - no need for ppm.   - Sepsis secondary to UTI +/- PNA   - AMS, likely met encephalopathy / Parkinsons dementia  - ABEBE (baseline scr ~1.3)  - Pancytopenia  - Recent Covid infection with R opacities on CXR.   - Other hx: htn, dl, dvt      PLAN:    CNS: Avoid over sedation  >> neuro recs appreciated from  " One option for restarting would be to have script sent to outpatient pharmacy for orally disintegrating (ODT) carbidopa levodopa 25/100 and give the followinam: two 25/100 tabs, 10 am one 25/100 tablet, 2 pm two 25/100 tabs, 6 pm two 25/100 tabs, and 10 p.m. one 25/100 tablet.  This would achieve total daily levodopa dose of 800 mg.  Nursing would need to ensure disintegration of medication in mouth prior to placing any positive pressure mask.  "   CT scan, EEG noted  Neurology follow-up    HEENT: Oral care    PULMONARY:  HOB @ 45 degrees  - right sided opacity on CXR (unlikely covid) - will continue to cover for HCAP  - on cefepime for pna coverage  - id f/u today      CARDIOVASCULAR:  - tte:  nml lvsf. Ias aneurysm.   - avoid AVN blockers  - ep signed off - no need for ppm  - wean off pressors today    GI: GI prophylaxis.  NGT vs. OGT for feeding     RENAL:  Follow up lytes.  Correct as needed    INFECTIOUS DISEASE:  - Pan-cultures cultures of urine, blood, sputum  - currently on Cefepime for UTI and PNA coverage  - recent COVID, no active antiviral therapy  - ID f/u today    HEMATOLOGICAL:  DVT prophylaxis.  - hemonc following - Pancytopenia likely secondary to recent covid and sepsis    ENDOCRINE:  Follow up TSH, FS.  Insulin protocol if needed.    MUSCULOSKELETAL: oob tc once tolerated    DISPO: transferred to MICU service, possible downgrade today, if off pressors.        OBJECTIVE --  Vital Signs Last 24 Hrs  T(C): 36.8 (15 Feb 2021 08:00), Max: 37.2 (15 Feb 2021 04:00)  T(F): 98.3 (15 Feb 2021 08:00), Max: 99 (15 Feb 2021 04:00)  HR: 70 (15 Feb 2021 12:49) (62 - 76)  BP: 135/64 (15 Feb 2021 12:49) (78/46 - 169/96)  BP(mean): 83 (15 Feb 2021 12:49) (53 - 125)  RR: 17 (15 Feb 2021 12:49) (11 - 31)  SpO2: 97% (15 Feb 2021 12:49) (94% - 99%)    I&O's Summary    2021 07:01  -  15 Feb 2021 07:00  --------------------------------------------------------  IN: 1901 mL / OUT: 3210 mL / NET: -1309 mL    15 Feb 2021 07:01  -  15 2021 13:29  --------------------------------------------------------  IN: 380.6 mL / OUT: 475 mL / NET: -94.4 mL        MEDICATIONS  (STANDING):  atorvastatin 40 milliGRAM(s) Oral at bedtime  carbidopa/levodopa  25/250 1 Tablet(s) Oral four times a day  carbidopa/levodopa CR 25/100 1 Tablet(s) Oral at bedtime  cefepime   IVPB      cefepime   IVPB 2000 milliGRAM(s) IV Intermittent every 12 hours  chlorhexidine 4% Liquid 1 Application(s) Topical <User Schedule>  colchicine 0.6 milliGRAM(s) Oral daily  dextrose 5% + sodium chloride 0.9%. 1000 milliLiter(s) (70 mL/Hr) IV Continuous <Continuous>  heparin  Infusion 1000 Unit(s)/Hr (5 mL/Hr) IV Continuous <Continuous>  norepinephrine Infusion 0.05 MICROgram(s)/kG/Min (8.5 mL/Hr) IV Continuous <Continuous>  pantoprazole    Tablet 40 milliGRAM(s) Oral before breakfast  polyethylene glycol 3350 17 Gram(s) Oral daily  pramipexole 0.5 milliGRAM(s) Oral four times a day  senna 2 Tablet(s) Oral at bedtime    MEDICATIONS  (PRN):        LABS                                            10.0                  Neurophils% (auto):   64.1   (02-15 @ 04:36):    3.23 )-----------(54           Lymphocytes% (auto):  16.1                                          32.0                   Eosinphils% (auto):   0.3      Manual%: Neutrophils x    ; Lymphocytes x    ; Eosinophils x    ; Bands%: x    ; Blasts x                                    141    |  106    |  44                  Calcium: 8.4   / iCa: x      (02-15 @ 04:36)    ----------------------------<  89        Magnesium: 2.0                              4.6     |  22     |  2.3              Phosphorous: x        TPro  6.1    /  Alb  2.9    /  TBili  0.7    /  DBili  x      /  AST  114    /  ALT  108    /  AlkPhos  199    15 Feb 2021 04:36    ( 02-15 @ 11:00 )   PT: x    ;   INR: x      aPTT: 62.9 sec          ASSESSMENT & PLAN:         For Follow-Up:  [ ]   [ ] MICU Transfer Note  ---------------------------    Transfer from: MICU  Transfer to:  (  ) Medicine    (  ) Telemetry    (  ) RCU    (  ) Palliative    (  ) Stroke Unit    ( X ) [facility]  Accepting Physician:    MICU COURSE  71 year old Azeri speaking Male with past medical history of Parkinson disease, HLD, HTN, H/O DVT on Eliquis and Lasix, H/O COVID presented to ED with altered mental status for 2 days.   As per family patient at baseline is able to speak and has his on and off days but for 2 days prior to presentation had not been able to speak and more lethargic than baseline. As per daughter at the bedside, patient has "bad days but this is the worst we have seen" Patient bright to ED for non resolution of symptoms. Family denies any fevers, chills, rigors, cough, shortness of breath, loss of consciousness, incontinence or any other complaints.   In ED patient hypertensive to 170s and bradycardic to 30s. EKG showed sinus bradycardia with 1st degree AV block. Electrolytes were normal. Patient given atropine without response, he was given dobutamine and HR increased with wide complex tachycardia. Patient placed on Dopamine with HR improving to the 50s. CT head done for AMS and shows no acute changes. Patient at time of interview minimally verbal but appeared in no apparent discomfort. (12 Feb 2021 17:17).      Assessment and Plan:  # Sinus Bradycardia / 1st deg AVB   - improved, now off dopamine  - no need for ppm.     # Sepsis secondary to ? Aspiration PNA   # Recent Covid infection with R opacities on CXR.   # AMS, likely met encephalopathy d/t sepsis vs progression of Parkinsons dementia  - Continue cefepime 1g q24h  - Continue carbidopa/Levodopa  - wean off levophed  - ID follow up    # ABEBE (baseline scr ~1.3)  - Cont. IVF D5+1/2NS at 70ml/hr  - Monitor Cr  - Strict I/O    # Pancytopenia  - Hem/onc eval done  - Likely sec to recent COVID infection/Sepsis  - Follow up  B12, Folate, SPEP, JASS, LDH, Haptoglobin, Hep B and C panel.  - Monitor CBC    # H/o DVT  - Was on eliquis at home  - Continue heparin gtt for now  - Monitor PTT and adjust accordingly    OBJECTIVE --  Vital Signs Last 24 Hrs  T(C): 36.8 (15 Feb 2021 08:00), Max: 37.2 (15 Feb 2021 04:00)  T(F): 98.3 (15 Feb 2021 08:00), Max: 99 (15 Feb 2021 04:00)  HR: 70 (15 Feb 2021 12:49) (62 - 76)  BP: 135/64 (15 Feb 2021 12:49) (78/46 - 169/96)  BP(mean): 83 (15 Feb 2021 12:49) (53 - 125)  RR: 17 (15 Feb 2021 12:49) (11 - 31)  SpO2: 97% (15 Feb 2021 12:49) (94% - 99%)    I&O's Summary    14 Feb 2021 07:01  -  15 Feb 2021 07:00  --------------------------------------------------------  IN: 1901 mL / OUT: 3210 mL / NET: -1309 mL    15 Feb 2021 07:01  -  15 Feb 2021 13:29  --------------------------------------------------------  IN: 380.6 mL / OUT: 475 mL / NET: -94.4 mL        MEDICATIONS  (STANDING):  atorvastatin 40 milliGRAM(s) Oral at bedtime  carbidopa/levodopa  25/250 1 Tablet(s) Oral four times a day  carbidopa/levodopa CR 25/100 1 Tablet(s) Oral at bedtime  cefepime   IVPB      cefepime   IVPB 2000 milliGRAM(s) IV Intermittent every 12 hours  chlorhexidine 4% Liquid 1 Application(s) Topical <User Schedule>  colchicine 0.6 milliGRAM(s) Oral daily  dextrose 5% + sodium chloride 0.9%. 1000 milliLiter(s) (70 mL/Hr) IV Continuous <Continuous>  heparin  Infusion 1000 Unit(s)/Hr (5 mL/Hr) IV Continuous <Continuous>  norepinephrine Infusion 0.05 MICROgram(s)/kG/Min (8.5 mL/Hr) IV Continuous <Continuous>  pantoprazole    Tablet 40 milliGRAM(s) Oral before breakfast  polyethylene glycol 3350 17 Gram(s) Oral daily  pramipexole 0.5 milliGRAM(s) Oral four times a day  senna 2 Tablet(s) Oral at bedtime    MEDICATIONS  (PRN):        LABS                                            10.0                  Neurophils% (auto):   64.1   (02-15 @ 04:36):    3.23 )-----------(54           Lymphocytes% (auto):  16.1                                          32.0                   Eosinphils% (auto):   0.3      Manual%: Neutrophils x    ; Lymphocytes x    ; Eosinophils x    ; Bands%: x    ; Blasts x                                    141    |  106    |  44                  Calcium: 8.4   / iCa: x      (02-15 @ 04:36)    ----------------------------<  89        Magnesium: 2.0                              4.6     |  22     |  2.3              Phosphorous: x        TPro  6.1    /  Alb  2.9    /  TBili  0.7    /  DBili  x      /  AST  114    /  ALT  108    /  AlkPhos  199    15 Feb 2021 04:36    ( 02-15 @ 11:00 )   PT: x    ;   INR: x      aPTT: 62.9 sec MICU Transfer Note  ---------------------------    Transfer from: MICU  Transfer to:  (  ) Medicine    (  ) Telemetry    (  ) RCU    (  ) Palliative    (  ) Stroke Unit    ( X ) [facility]  Accepting Physician:    MICU COURSE  71 year old Serbian speaking Male with past medical history of Parkinson disease, HLD, HTN, H/O DVT on Eliquis and Lasix, H/O COVID presented to ED with altered mental status for 2 days.   As per family patient at baseline is able to speak and has his on and off days but for 2 days prior to presentation had not been able to speak and more lethargic than baseline. As per daughter at the bedside, patient has "bad days but this is the worst we have seen" Patient bright to ED for non resolution of symptoms. Family denies any fevers, chills, rigors, cough, shortness of breath, loss of consciousness, incontinence or any other complaints.   In ED patient hypertensive to 170s and bradycardic to 30s. EKG showed sinus bradycardia with 1st degree AV block. Electrolytes were normal. Patient given atropine without response, he was given dobutamine and HR increased with wide complex tachycardia. Patient placed on Dopamine with HR improving to the 50s. CT head done for AMS and shows no acute changes. Patient at time of interview minimally verbal but appeared in no apparent discomfort. (12 Feb 2021 17:17).      Assessment and Plan:  # Sinus Bradycardia / 1st deg AVB   - improved, now off dopamine  - no need for ppm.     # Sepsis secondary to ? Aspiration PNA   # Recent Covid infection with R opacities on CXR.   # AMS, likely met encephalopathy d/t sepsis vs progression of Parkinsons dementia  - CT chest - R>L scattered groundglass opacities  - Continue cefepime 1g q24h - adjust based on renal function  - Continue carbidopa/Levodopa  - wean off levophed  - ID follow up    # ABEBE (baseline scr ~1.3)  - Cont. IVF D5+1/2NS at 70ml/hr  - Monitor Cr  - Strict I/O    # Pancytopenia  - Hem/onc eval done  - Likely sec to recent COVID infection/Sepsis  - Follow up  B12, Folate, SPEP, JASS, LDH, Haptoglobin, Hep B and C panel.  - Monitor CBC    # H/o DVT  - Was on eliquis at home  - Continue heparin gtt for now  - Monitor PTT and adjust accordingly    To follow : PTT - adjust heparin dose accordingly, CXR    OBJECTIVE --  Vital Signs Last 24 Hrs  T(C): 36.8 (15 Feb 2021 08:00), Max: 37.2 (15 Feb 2021 04:00)  T(F): 98.3 (15 Feb 2021 08:00), Max: 99 (15 Feb 2021 04:00)  HR: 70 (15 Feb 2021 12:49) (62 - 76)  BP: 135/64 (15 Feb 2021 12:49) (78/46 - 169/96)  BP(mean): 83 (15 Feb 2021 12:49) (53 - 125)  RR: 17 (15 Feb 2021 12:49) (11 - 31)  SpO2: 97% (15 Feb 2021 12:49) (94% - 99%)    Physical Exam:   General: Not in distress; Pallor (-), Icterus (-), Cyanosis (-), Clubbing (-)  HEENT: Pupils equal, round and reactive to light symmetrically, No external discharge noted, JVD (-), Lymphadenopathy(-)  PULM: Bilaterally equal breath sounds, no wheeze, rubs, basal crackles +.   CVS: Normal S1 and S2, no murmurs, rubs, or gallops.   GI: Soft, nondistended, nontender, BS +  MSK: Edema (+), no joint or muscle tenderness  SKIN: Warm and well perfused, no rashes noted  NEURO:  Alert but non-verbal, doesn't follow command, limited neuro exam d/t AMS    I&O's Summary    14 Feb 2021 07:01  -  15 Feb 2021 07:00  --------------------------------------------------------  IN: 1901 mL / OUT: 3210 mL / NET: -1309 mL    15 Feb 2021 07:01  -  15 Feb 2021 13:29  --------------------------------------------------------  IN: 380.6 mL / OUT: 475 mL / NET: -94.4 mL        MEDICATIONS  (STANDING):  atorvastatin 40 milliGRAM(s) Oral at bedtime  carbidopa/levodopa  25/250 1 Tablet(s) Oral four times a day  carbidopa/levodopa CR 25/100 1 Tablet(s) Oral at bedtime  cefepime   IVPB      cefepime   IVPB 2000 milliGRAM(s) IV Intermittent every 12 hours  chlorhexidine 4% Liquid 1 Application(s) Topical <User Schedule>  colchicine 0.6 milliGRAM(s) Oral daily  dextrose 5% + sodium chloride 0.9%. 1000 milliLiter(s) (70 mL/Hr) IV Continuous <Continuous>  heparin  Infusion 1000 Unit(s)/Hr (5 mL/Hr) IV Continuous <Continuous>  norepinephrine Infusion 0.05 MICROgram(s)/kG/Min (8.5 mL/Hr) IV Continuous <Continuous>  pantoprazole    Tablet 40 milliGRAM(s) Oral before breakfast  polyethylene glycol 3350 17 Gram(s) Oral daily  pramipexole 0.5 milliGRAM(s) Oral four times a day  senna 2 Tablet(s) Oral at bedtime    MEDICATIONS  (PRN):        LABS                                            10.0                  Neurophils% (auto):   64.1   (02-15 @ 04:36):    3.23 )-----------(54           Lymphocytes% (auto):  16.1                                          32.0                   Eosinphils% (auto):   0.3      Manual%: Neutrophils x    ; Lymphocytes x    ; Eosinophils x    ; Bands%: x    ; Blasts x                                    141    |  106    |  44                  Calcium: 8.4   / iCa: x      (02-15 @ 04:36)    ----------------------------<  89        Magnesium: 2.0                              4.6     |  22     |  2.3              Phosphorous: x        TPro  6.1    /  Alb  2.9    /  TBili  0.7    /  DBili  x      /  AST  114    /  ALT  108    /  AlkPhos  199    15 Feb 2021 04:36    ( 02-15 @ 11:00 )   PT: x    ;   INR: x      aPTT: 62.9 sec

## 2021-02-15 NOTE — PROGRESS NOTE ADULT - ASSESSMENT
71M with PMH of CKD 3, parkinson's disease, HTN admitted for AMS, with bradycardia and ABEBE, now hypotensive on pressor.      # ABEBE, baseline creatinine ~ 1.3  - pre-renal iso bradycardia, hypotension, r/o ATN, shock on pressor, CRS, ?obstruction  - urine output significantly improved, continue holding lasix, continue IVF / creat has been stable   - UA with pyuria/hematuria, f/u urine cx, on cefepime adjust dose to kidney function please   - repeat phos level  - f/u renal/bladder ultrasound please Abd US results noted for no hydro   - d/c colchicine if able  # Appreciate cardioloy/EP f/u   # AMS - likely metabolic, appreciate neuro eval, EEG  # Sepsis - vanc x1, on cefepime, adjust dose to egfr< 10 if creatinine continues rising as above   Will follow

## 2021-02-15 NOTE — PROGRESS NOTE ADULT - ASSESSMENT
IMPRESSION;  No convincing evidence of an infectious focus  Procalcitonin, Serum: 0.46 (02-13-21 @ 16:00)  2/13 Blood & Urine cxs NGTD   Clinically no cough/SOB. CXR with opacity ( not clear ) right base   Miramontes with borderline pyuria. No clinical Hx c/w pyelonephritis  pancytopenia    RECOMMENDATIONS;  monitor off abx

## 2021-02-16 LAB
ALBUMIN SERPL ELPH-MCNC: 3.1 G/DL — LOW (ref 3.5–5.2)
ALP SERPL-CCNC: 190 U/L — HIGH (ref 30–115)
ALT FLD-CCNC: 105 U/L — HIGH (ref 0–41)
ANION GAP SERPL CALC-SCNC: 9 MMOL/L — SIGNIFICANT CHANGE UP (ref 7–14)
APPEARANCE UR: CLEAR — SIGNIFICANT CHANGE UP
APTT BLD: 50.5 SEC — HIGH (ref 27–39.2)
APTT BLD: 51.6 SEC — HIGH (ref 27–39.2)
AST SERPL-CCNC: 69 U/L — HIGH (ref 0–41)
BACTERIA # UR AUTO: ABNORMAL
BASOPHILS # BLD AUTO: 0.02 K/UL — SIGNIFICANT CHANGE UP (ref 0–0.2)
BASOPHILS NFR BLD AUTO: 0.5 % — SIGNIFICANT CHANGE UP (ref 0–1)
BILIRUB SERPL-MCNC: 0.7 MG/DL — SIGNIFICANT CHANGE UP (ref 0.2–1.2)
BILIRUB UR-MCNC: NEGATIVE — SIGNIFICANT CHANGE UP
BUN SERPL-MCNC: 36 MG/DL — HIGH (ref 10–20)
CALCIUM SERPL-MCNC: 8.8 MG/DL — SIGNIFICANT CHANGE UP (ref 8.5–10.1)
CHLORIDE SERPL-SCNC: 110 MMOL/L — SIGNIFICANT CHANGE UP (ref 98–110)
CO2 SERPL-SCNC: 25 MMOL/L — SIGNIFICANT CHANGE UP (ref 17–32)
COLOR SPEC: YELLOW — SIGNIFICANT CHANGE UP
CREAT SERPL-MCNC: 1.6 MG/DL — HIGH (ref 0.7–1.5)
DACRYOCYTES BLD QL SMEAR: SLIGHT — SIGNIFICANT CHANGE UP
DIFF PNL FLD: ABNORMAL
EOSINOPHIL # BLD AUTO: 0.08 K/UL — SIGNIFICANT CHANGE UP (ref 0–0.7)
EOSINOPHIL NFR BLD AUTO: 1.9 % — SIGNIFICANT CHANGE UP (ref 0–8)
EPI CELLS # UR: ABNORMAL /HPF
GLUCOSE SERPL-MCNC: 91 MG/DL — SIGNIFICANT CHANGE UP (ref 70–99)
GLUCOSE UR QL: NEGATIVE MG/DL — SIGNIFICANT CHANGE UP
HCT VFR BLD CALC: 32.4 % — LOW (ref 42–52)
HGB BLD-MCNC: 9.9 G/DL — LOW (ref 14–18)
IMM GRANULOCYTES NFR BLD AUTO: 0.5 % — HIGH (ref 0.1–0.3)
INR BLD: 1.23 RATIO — SIGNIFICANT CHANGE UP (ref 0.65–1.3)
KETONES UR-MCNC: NEGATIVE — SIGNIFICANT CHANGE UP
LEUKOCYTE ESTERASE UR-ACNC: ABNORMAL
LYMPHOCYTES # BLD AUTO: 0.53 K/UL — LOW (ref 1.2–3.4)
LYMPHOCYTES # BLD AUTO: 12.9 % — LOW (ref 20.5–51.1)
MAGNESIUM SERPL-MCNC: 1.9 MG/DL — SIGNIFICANT CHANGE UP (ref 1.8–2.4)
MANUAL SMEAR VERIFICATION: SIGNIFICANT CHANGE UP
MCHC RBC-ENTMCNC: 25 PG — LOW (ref 27–31)
MCHC RBC-ENTMCNC: 30.6 G/DL — LOW (ref 32–37)
MCV RBC AUTO: 81.8 FL — SIGNIFICANT CHANGE UP (ref 80–94)
MONOCYTES # BLD AUTO: 0.58 K/UL — SIGNIFICANT CHANGE UP (ref 0.1–0.6)
MONOCYTES NFR BLD AUTO: 14.1 % — HIGH (ref 1.7–9.3)
NEUTROPHILS # BLD AUTO: 2.88 K/UL — SIGNIFICANT CHANGE UP (ref 1.4–6.5)
NEUTROPHILS NFR BLD AUTO: 70.1 % — SIGNIFICANT CHANGE UP (ref 42.2–75.2)
NITRITE UR-MCNC: NEGATIVE — SIGNIFICANT CHANGE UP
NRBC # BLD: 0 /100 WBCS — SIGNIFICANT CHANGE UP (ref 0–0)
OSMOLALITY SERPL: 305 MOS/KG — HIGH (ref 280–301)
OSMOLALITY UR: 548 MOS/KG — SIGNIFICANT CHANGE UP (ref 50–1200)
OVALOCYTES BLD QL SMEAR: SLIGHT — SIGNIFICANT CHANGE UP
PH UR: 6 — SIGNIFICANT CHANGE UP (ref 5–8)
PHOSPHATE SERPL-MCNC: 3.7 MG/DL — SIGNIFICANT CHANGE UP (ref 2.1–4.9)
PLAT MORPH BLD: NORMAL — SIGNIFICANT CHANGE UP
PLATELET # BLD AUTO: 55 K/UL — LOW (ref 130–400)
PLATELET CLUMP BLD QL SMEAR: SIGNIFICANT CHANGE UP
PLATELET COUNT - ESTIMATE: ABNORMAL
POTASSIUM SERPL-MCNC: 4.3 MMOL/L — SIGNIFICANT CHANGE UP (ref 3.5–5)
POTASSIUM SERPL-SCNC: 4.3 MMOL/L — SIGNIFICANT CHANGE UP (ref 3.5–5)
PROT SERPL-MCNC: 6.2 G/DL — SIGNIFICANT CHANGE UP (ref 6–8)
PROT SERPL-MCNC: 6.5 G/DL — SIGNIFICANT CHANGE UP (ref 6–8.3)
PROT SERPL-MCNC: 6.5 G/DL — SIGNIFICANT CHANGE UP (ref 6–8.3)
PROT UR-MCNC: 30 MG/DL
PROTHROM AB SERPL-ACNC: 14.1 SEC — HIGH (ref 9.95–12.87)
RBC # BLD: 3.96 M/UL — LOW (ref 4.7–6.1)
RBC # FLD: 16.7 % — HIGH (ref 11.5–14.5)
RBC BLD AUTO: NORMAL — SIGNIFICANT CHANGE UP
RBC CASTS # UR COMP ASSIST: ABNORMAL /HPF
SODIUM SERPL-SCNC: 144 MMOL/L — SIGNIFICANT CHANGE UP (ref 135–146)
SODIUM UR-SCNC: 120 MMOL/L — SIGNIFICANT CHANGE UP
SP GR SPEC: 1.02 — SIGNIFICANT CHANGE UP (ref 1.01–1.03)
UROBILINOGEN FLD QL: 1 MG/DL (ref 0.2–0.2)
WBC # BLD: 4.11 K/UL — LOW (ref 4.8–10.8)
WBC # FLD AUTO: 4.11 K/UL — LOW (ref 4.8–10.8)
WBC UR QL: SIGNIFICANT CHANGE UP /HPF

## 2021-02-16 PROCEDURE — 99233 SBSQ HOSP IP/OBS HIGH 50: CPT

## 2021-02-16 PROCEDURE — 71045 X-RAY EXAM CHEST 1 VIEW: CPT | Mod: 26

## 2021-02-16 RX ORDER — ACETAMINOPHEN 500 MG
650 TABLET ORAL EVERY 6 HOURS
Refills: 0 | Status: DISCONTINUED | OUTPATIENT
Start: 2021-02-16 | End: 2021-02-28

## 2021-02-16 RX ORDER — PANTOPRAZOLE SODIUM 20 MG/1
40 TABLET, DELAYED RELEASE ORAL
Refills: 0 | Status: DISCONTINUED | OUTPATIENT
Start: 2021-02-16 | End: 2021-03-11

## 2021-02-16 RX ADMIN — HEPARIN SODIUM 5 UNIT(S)/HR: 5000 INJECTION INTRAVENOUS; SUBCUTANEOUS at 20:27

## 2021-02-16 RX ADMIN — Medication 0.6 MILLIGRAM(S): at 12:18

## 2021-02-16 RX ADMIN — PRAMIPEXOLE DIHYDROCHLORIDE 0.5 MILLIGRAM(S): 0.12 TABLET ORAL at 17:29

## 2021-02-16 RX ADMIN — PRAMIPEXOLE DIHYDROCHLORIDE 0.5 MILLIGRAM(S): 0.12 TABLET ORAL at 23:31

## 2021-02-16 RX ADMIN — PRAMIPEXOLE DIHYDROCHLORIDE 0.5 MILLIGRAM(S): 0.12 TABLET ORAL at 12:19

## 2021-02-16 RX ADMIN — CARBIDOPA AND LEVODOPA 1 TABLET(S): 25; 100 TABLET ORAL at 23:31

## 2021-02-16 RX ADMIN — CARBIDOPA AND LEVODOPA 1 TABLET(S): 25; 100 TABLET ORAL at 17:29

## 2021-02-16 RX ADMIN — POLYETHYLENE GLYCOL 3350 17 GRAM(S): 17 POWDER, FOR SOLUTION ORAL at 12:59

## 2021-02-16 RX ADMIN — ATORVASTATIN CALCIUM 40 MILLIGRAM(S): 80 TABLET, FILM COATED ORAL at 23:31

## 2021-02-16 RX ADMIN — CHLORHEXIDINE GLUCONATE 1 APPLICATION(S): 213 SOLUTION TOPICAL at 06:36

## 2021-02-16 RX ADMIN — HEPARIN SODIUM 5 UNIT(S)/HR: 5000 INJECTION INTRAVENOUS; SUBCUTANEOUS at 08:46

## 2021-02-16 RX ADMIN — Medication 650 MILLIGRAM(S): at 23:52

## 2021-02-16 RX ADMIN — SENNA PLUS 2 TABLET(S): 8.6 TABLET ORAL at 23:31

## 2021-02-16 RX ADMIN — CARBIDOPA AND LEVODOPA 1 TABLET(S): 25; 100 TABLET ORAL at 12:58

## 2021-02-16 RX ADMIN — CEFEPIME 100 MILLIGRAM(S): 1 INJECTION, POWDER, FOR SOLUTION INTRAMUSCULAR; INTRAVENOUS at 17:28

## 2021-02-16 NOTE — PROGRESS NOTE ADULT - SUBJECTIVE AND OBJECTIVE BOX
Patient is a 71y old  Male who presents with a chief complaint of Altered Mental Status (16 Feb 2021 09:26)      T(F): 99.3 (02-16-21 @ 07:01), Max: 99.3 (02-16-21 @ 07:01)  HR: 68 (02-16-21 @ 09:21)  BP: 139/62 (02-16-21 @ 09:21)  RR: 19 (02-16-21 @ 09:21)  SpO2: 99% (02-16-21 @ 09:21) (96% - 100%)    PHYSICAL EXAM:  GENERAL: NAD, well-groomed, well-developed  HEAD:  Atraumatic, Normocephalic  EYES: EOMI, PERRLA, conjunctiva and sclera clear  ENMT: No tonsillar erythema, exudates, or enlargement; Moist mucous membranes, Good dentition, No lesions  NECK: Supple, No JVD, Normal thyroid  NERVOUS SYSTEM:  Alert & Oriented X3, Good concentration; Motor Strength 5/5 B/L upper and lower extremities; DTRs 2+ intact and symmetric  CHEST/LUNG: Clear to percussion bilaterally; No rales, rhonchi, wheezing, or rubs  HEART: Regular rate and rhythm; No murmurs, rubs, or gallops  ABDOMEN: Soft, Nontender, Nondistended; Bowel sounds present  EXTREMITIES:  2+ Peripheral Pulses, No clubbing, cyanosis, or edema  LYMPH: No lymphadenopathy noted  SKIN: No rashes or lesions    LABS  02-16    144  |  110  |  36<H>  ----------------------------<  91  4.3   |  25  |  1.6<H>    Ca    8.8      16 Feb 2021 06:02  Phos  3.7     02-16  Mg     1.9     02-16    TPro  6.2  /  Alb  3.1<L>  /  TBili  0.7  /  DBili  x   /  AST  69<H>  /  ALT  105<H>  /  AlkPhos  190<H>  02-16                          9.9    4.11  )-----------( 55       ( 16 Feb 2021 06:02 )             32.4     PT/INR - ( 16 Feb 2021 06:02 )   PT: 14.10 sec;   INR: 1.23 ratio         PTT - ( 16 Feb 2021 06:02 )  PTT:50.5 sec      Culture Results:   No growth to date. (02-13-21)  Culture Results:   No growth at 48 hours (02-13-21)  Culture Results:   <10,000 CFU/mL Normal Urogenital Chelle (02-12-21)    RADIOLOGY  < from: CT Head No Cont (02.14.21 @ 13:15) >    IMPRESSION:  No evidence of acute transcortical infarct, acute intracranial hemorrhage, or mass effect.      < end of copied text >  < from: CT Chest No Cont (02.14.21 @ 13:15) >  IMPRESSION:    1. Small bilateral effusions, right greater than left. Scattered groundglass parenchymal opacities bilaterally consistent with infectious/inflammatory etiology.    2. Left shoulder joint complex effusion with intra-articular joint bodies/calcifications.    < end of copied text >  < from: US Abdomen Limited (02.14.21 @ 00:22) >    IMPRESSION:    No sonographic evidence of cholelithiasis or cholecystitis.        < end of copied text >    MEDICATIONS  (STANDING):  atorvastatin 40 milliGRAM(s) Oral at bedtime  carbidopa/levodopa  25/250 1 Tablet(s) Oral four times a day  carbidopa/levodopa CR 25/100 1 Tablet(s) Oral at bedtime  cefepime   IVPB 1000 milliGRAM(s) IV Intermittent every 24 hours  chlorhexidine 4% Liquid 1 Application(s) Topical <User Schedule>  colchicine 0.6 milliGRAM(s) Oral daily  dextrose 5% + sodium chloride 0.9%. 1000 milliLiter(s) (70 mL/Hr) IV Continuous <Continuous>  heparin  Infusion 1000 Unit(s)/Hr (5 mL/Hr) IV Continuous <Continuous>  norepinephrine Infusion 0.05 MICROgram(s)/kG/Min (8.5 mL/Hr) IV Continuous <Continuous>  pantoprazole    Tablet 40 milliGRAM(s) Oral before breakfast  polyethylene glycol 3350 17 Gram(s) Oral daily  pramipexole 0.5 milliGRAM(s) Oral four times a day  senna 2 Tablet(s) Oral at bedtime    MEDICATIONS  (PRN):     chart reviewed, pt seen and evaluated this am, pt responds to some verbal stimuli, not answering questions       T(F): 99.3 (02-16-21 @ 07:01), Max: 99.3 (02-16-21 @ 07:01)  HR: 68 (02-16-21 @ 09:21)  BP: 139/62 (02-16-21 @ 09:21)  RR: 19 (02-16-21 @ 09:21)  SpO2: 99% (02-16-21 @ 09:21) (96% - 100%)    PHYSICAL EXAM:  GENERAL: NAD  HEAD:  Atraumatic, Normocephalic  NERVOUS SYSTEM:  no focal deficits   CHEST/LUNG: mild bilateral rhonchi  HEART: Regular rate and rhythm; No murmurs, rubs, or gallops  ABDOMEN: Soft, Nontender, Nondistended; Bowel sounds present  EXTREMITIES: b/l  edema      LABS  02-16    144  |  110  |  36<H>  ----------------------------<  91  4.3   |  25  |  1.6<H>    Ca    8.8      16 Feb 2021 06:02  Phos  3.7     02-16  Mg     1.9     02-16    TPro  6.2  /  Alb  3.1<L>  /  TBili  0.7  /  DBili  x   /  AST  69<H>  /  ALT  105<H>  /  AlkPhos  190<H>  02-16                          9.9    4.11  )-----------( 55       ( 16 Feb 2021 06:02 )             32.4     PT/INR - ( 16 Feb 2021 06:02 )   PT: 14.10 sec;   INR: 1.23 ratio         PTT - ( 16 Feb 2021 06:02 )  PTT:50.5 sec      Culture Results:   No growth to date. (02-13-21)  Culture Results:   No growth at 48 hours (02-13-21)  Culture Results:   <10,000 CFU/mL Normal Urogenital Chelle (02-12-21)    RADIOLOGY  < from: CT Head No Cont (02.14.21 @ 13:15) >    IMPRESSION:  No evidence of acute transcortical infarct, acute intracranial hemorrhage, or mass effect.      < end of copied text >  < from: CT Chest No Cont (02.14.21 @ 13:15) >  IMPRESSION:    1. Small bilateral effusions, right greater than left. Scattered groundglass parenchymal opacities bilaterally consistent with infectious/inflammatory etiology.    2. Left shoulder joint complex effusion with intra-articular joint bodies/calcifications.    < end of copied text >  < from: US Abdomen Limited (02.14.21 @ 00:22) >    IMPRESSION:    No sonographic evidence of cholelithiasis or cholecystitis.        < end of copied text >    MEDICATIONS  (STANDING):  atorvastatin 40 milliGRAM(s) Oral at bedtime  carbidopa/levodopa  25/250 1 Tablet(s) Oral four times a day  carbidopa/levodopa CR 25/100 1 Tablet(s) Oral at bedtime  cefepime   IVPB 1000 milliGRAM(s) IV Intermittent every 24 hours  chlorhexidine 4% Liquid 1 Application(s) Topical <User Schedule>  colchicine 0.6 milliGRAM(s) Oral daily  dextrose 5% + sodium chloride 0.9%. 1000 milliLiter(s) (70 mL/Hr) IV Continuous <Continuous>  heparin  Infusion 1000 Unit(s)/Hr (5 mL/Hr) IV Continuous <Continuous>  norepinephrine Infusion 0.05 MICROgram(s)/kG/Min (8.5 mL/Hr) IV Continuous <Continuous>  pantoprazole    Tablet 40 milliGRAM(s) Oral before breakfast  polyethylene glycol 3350 17 Gram(s) Oral daily  pramipexole 0.5 milliGRAM(s) Oral four times a day  senna 2 Tablet(s) Oral at bedtime    MEDICATIONS  (PRN):

## 2021-02-16 NOTE — DIETITIAN INITIAL EVALUATION ADULT. - ORAL INTAKE PTA/DIET HISTORY
Spoke to pt's daughter via phone. Per daughter pt. with good appetite, adequate PO intake (3meals daily), regular texture/consistency up until 2 days PTP. NKFA. No supplement use. Some gradual weight loss noted per daughter, reports UBW ~220lbs but unsure when pt was at that weight last. No cultural/Yarsanism dietary restr. Pt. needs his meats chopped for him.

## 2021-02-16 NOTE — PROGRESS NOTE ADULT - SUBJECTIVE AND OBJECTIVE BOX
Nephrology Progress Note    NIEVES LABOY  MRN-[id number]  71y  Male    S:  Patient is seen and examined, events over the last 24h noted.    O:  Allergies:  No Known Allergies    Hospital Medications:   MEDICATIONS  (STANDING):  atorvastatin 40 milliGRAM(s) Oral at bedtime  carbidopa/levodopa  25/250 1 Tablet(s) Oral four times a day  carbidopa/levodopa CR 25/100 1 Tablet(s) Oral at bedtime  cefepime   IVPB 1000 milliGRAM(s) IV Intermittent every 24 hours  chlorhexidine 4% Liquid 1 Application(s) Topical <User Schedule>  colchicine 0.6 milliGRAM(s) Oral daily  heparin  Infusion 1000 Unit(s)/Hr (5 mL/Hr) IV Continuous <Continuous>  pantoprazole    Tablet 40 milliGRAM(s) Oral before breakfast  polyethylene glycol 3350 17 Gram(s) Oral daily  pramipexole 0.5 milliGRAM(s) Oral four times a day  senna 2 Tablet(s) Oral at bedtime    MEDICATIONS  (PRN):    Home Medications:  Home Medications:  atorvastatin 40 mg oral tablet: 1 tab(s) orally once a day (at bedtime) (2021 18:10)  carbidopa-levodopa 25 mg-100 mg oral tablet, extended release: 1 tab(s) orally  (2021 18:10)  carbidopa-levodopa 25 mg-250 mg oral tablet: 1 tab(s) orally 4 times a day (2021 18:10)  colchicine 0.6 mg oral tablet: 1 tab(s) orally once a day (2021 18:10)  Eliquis 5 mg oral tablet: 1 tab(s) orally 2 times a day (2021 18:27)  enalapril 5 mg oral tablet: 1 tab(s) orally once a day (2021 18:10)  furosemide 40 mg oral tablet: 1 tab(s) orally once a day (2021 18:10)  omeprazole 40 mg oral delayed release capsule: 1 cap(s) orally once a day (2021 18:10)  pramipexole 0.5 mg oral tablet: 1 tab(s) orally 4 times a day (2021 18:10)      VITALS:  Daily     Daily   T(F): 99.7 (02-16-21 @ 11:00), Max: 99.7 (21 @ 11:00)  HR: 74 (21 @ 14:01)  BP: 106/53 (21 @ 14:01)  RR: 21 (21 @ 14:01)  SpO2: 98% (21 @ 12:00)  Wt(kg): --  I&O's Detail    15 Feb 2021 07:  -  2021 07:00  --------------------------------------------------------  IN:    dextrose 5% + sodium chloride 0.9%: 1680 mL    Heparin: 120 mL    IV PiggyBack: 50 mL    Norepinephrine: 11.2 mL  Total IN: 1861.2 mL    OUT:    Indwelling Catheter - Urethral (mL): 1495 mL    Voided (mL): 475 mL  Total OUT: 1970 mL    Total NET: -108.8 mL      2021 07:  -  2021 15:41  --------------------------------------------------------  IN:    dextrose 5% + sodium chloride 0.9%: 210 mL    Free Water: 200 mL    Heparin: 40 mL  Total IN: 450 mL    OUT:    Indwelling Catheter - Urethral (mL): 970 mL  Total OUT: 970 mL    Total NET: -520 mL        I&O's Summary    15 Feb 2021 07:  -  2021 07:00  --------------------------------------------------------  IN: 1861.2 mL / OUT: 1970 mL / NET: -108.8 mL    2021 07:  -  2021 15:41  --------------------------------------------------------  IN: 450 mL / OUT: 970 mL / NET: -520 mL          PHYSICAL EXAM:  Gen: NAD  Resp: decreased bs at the bases  Card: S1/S2  Abd: soft  Extremities: edema  [place]    LABS:        144  |  110  |  36<H>  ----------------------------<  91  4.3   |  25  |  1.6<H>    Ca    8.8      2021 06:02  Phos  3.7       Mg     1.9         TPro  6.2  /  Alb  3.1<L>  /  TBili  0.7  /  DBili      /  AST  69<H>  /  ALT  105<H>  /  AlkPhos  190<H>      eGFR if Non African American: 42 mL/min/1.73M2 (21 @ 06:02)  eGFR if : 49 mL/min/1.73M2 (21 @ 06:02)    Phosphorus Level, Serum: 3.7 mg/dL (21 @ 06:02)  Phosphorus Level, Serum: 3.4 mg/dL (21 @ 01:38)                            9.9    4.11  )-----------( 55       ( 2021 06:02 )             32.4     Mean Cell Volume: 81.8 fL (21 @ 06:02)    % Saturation, Iron: 32 % (21 @ 01:38)      Urine Studies:  Urinalysis Basic - ( 2021 09:33 )    Color: Yellow / Appearance: Clear / S.025 / pH:   Gluc:  / Ketone: Negative  / Bili: Negative / Urobili: 1.0 mg/dL   Blood:  / Protein: 30 mg/dL / Nitrite: Negative   Leuk Esterase: Trace / RBC: 3-5 /HPF / WBC 3-5 /HPF   Sq Epi:  / Non Sq Epi: Occasional /HPF / Bacteria: Few      Sodium, Random Urine: 120.0 mmoL/L ( @ 09:33)  Culture Results:   No growth to date. ( @ 11:54)    Creatinine trend:  Creatinine, Serum: 1.6 mg/dL (21 @ 06:02)  Creatinine, Serum: 2.3 mg/dL (02-15-21 @ 04:36)  Creatinine, Serum: 2.3 mg/dL (21 @ 05:07)  Creatinine, Serum: 1.1 mg/dL (21 @ 01:38)  Creatinine, Serum: 1.2 mg/dL (21 @ 10:26)  Creatinine, Serum: 1.3 mg/dL (10-10-18 @ 07:26)    Hepatitis B Core IgM Antibody: Nonreact (21 @ 05:07)  Hepatitis C Virus S/CO Ratio: 0.13 S/CO (21 @ 05:07)  Hepatitis B Surface Antigen: Nonreact (21 @ 05:07)    < from: US Abdomen Limited (21 @ 00:22) >    EXAM:  US ABDOMEN LIMITED            PROCEDURE DATE:  2021            INTERPRETATION:  CLINICAL INFORMATION: Pain.    COMPARISON: None.    TECHNIQUE: Sonography of the right upper quadrant. Limited portable exam.    FINDINGS:    Liver: [id number] cm. Normal echogenicity.  Bile ducts: Normal caliber. Common bile duct measures 5 mm.  Gallbladder: Within normal limits.  Pancreas: Obscured  Right kidney: Length 9 cm. No hydronephrosis.  Ascites: None.  IVC: Visualized portions are within normal limits.    IMPRESSION:    No sonographic evidence of cholelithiasis or cholecystitis.              JUN JENKINS MD; Attending Radiologist  This document has been electronically signed. 2021  1:12PM    < end of copied text >

## 2021-02-16 NOTE — PROGRESS NOTE ADULT - SUBJECTIVE AND OBJECTIVE BOX
Patient is a 71y old  Male who presents with a chief complaint of Altered Mental Status (15 Feb 2021 10:09)        HPI:  71 year old [place] speaking Male with past medical history of Parkinson disease, HLD, HTN, H/O DVT on Eliquis and Lasix, H/O COVID presents to ED with altered mental status for 2 days.     As per family patient at baseline is able to speak and has his on and off days but since last 2 days has not been able to speak and more lethargic than baseline. As per daughter at the bedside, patient has "bad days but this is the worst we have seen" Patient bright to ED for non resolution of symptoms. Family denies any fevers, chills, rigors, cough, shortness of breath, loss of consciousness, incontinence or any other complaints.     In ED patient hypertensive to 170s and bradycardic to 30s. EKG showed sinus bradycardia with 1st degree AV block. Electrolytes were normal. Patient given atropine without response, he was given dobutamine and HR increased with wide complex tachycardia. Patient placed on Dopamine with HR improving to the 50s. CT head done for AMS and shows no acute changes. Patient at time of interview minimally verbal but appeared in no apparent discomfort. (12 Feb 2021 17:17)    Pt evaluated on AM rounds.  Interval Events: No overnight events.    REVIEW OF SYSTEMS:   see HPI      OBJECTIVE:  ICU Vital Signs Last 24 Hrs  T(C): 37.4 (16 Feb 2021 07:01), Max: 37.4 (16 Feb 2021 07:01)  T(F): 99.3 (16 Feb 2021 07:01), Max: 99.3 (16 Feb 2021 07:01)  HR: 67 (16 Feb 2021 06:20) (64 - 70)  BP: 123/62 (16 Feb 2021 06:20) (82/50 - 147/70)  BP(mean): 87 (16 Feb 2021 06:20) (53 - 100)  RR: 11 (16 Feb 2021 07:01) (11 - 31)  SpO2: 96% (16 Feb 2021 06:20) (94% - 100%)        02-15 @ 07:01  -  02-16 @ 07:00  --------------------------------------------------------  IN: 1861.2 mL / OUT: 1970 mL / NET: -108.8 mL    02-16 @ 07:01  -  02-16 @ 07:54  --------------------------------------------------------  IN: 0 mL / OUT: 250 mL / NET: -250 mL      CAPILLARY BLOOD GLUCOSE            PHYSICAL EXAM:     · CONSTITUTIONAL:   not septic appearing,   well nourished,   NAD    · ENMT:   Airway patent,   Nasal mucosa clear.  Mouth with normal mucosa.   No thrush    · EYES:   Clear bilaterally,   pupils equal,   round and reactive to light.    · CARDIAC:   Normal rate,   regular rhythm.    Heart sounds S1, S2.   No murmurs, no rubs or gallops on auscultation  no edema        CAROTID:   normal systolic impulse  no bruits    · RESPIRATORY:   rales  normal chest expansion  no retractions or use of accessory muscles  palpation of chest is normal with no fremitus  percussion of chest demonstrates no hyperresonance or dullness    · GASTROINTESTINAL:  Abdomen soft,   non-tender,   + BS  liver/spleen not palpable    · MUSCULOSKELETAL:   no clubbing, cyanosis      · NEUROLOGICAL:   non communicative  makes eye contact    · SKIN:   Skin normal color for race,   warm, dry   No evidence of rash.      · HEME LYMPH:   no splenomegaly.  No cervical  lymphadenopathy.  no inguinal lymphadenopathy    HOSPITAL MEDICATIONS:  MEDICATIONS  (STANDING):  atorvastatin 40 milliGRAM(s) Oral at bedtime  carbidopa/levodopa  25/250 1 Tablet(s) Oral four times a day  carbidopa/levodopa CR 25/100 1 Tablet(s) Oral at bedtime  cefepime   IVPB 1000 milliGRAM(s) IV Intermittent every 24 hours  chlorhexidine 4% Liquid 1 Application(s) Topical <User Schedule>  colchicine 0.6 milliGRAM(s) Oral daily  dextrose 5% + sodium chloride 0.9%. 1000 milliLiter(s) (70 mL/Hr) IV Continuous <Continuous>  heparin  Infusion 1000 Unit(s)/Hr (5 mL/Hr) IV Continuous <Continuous>  norepinephrine Infusion 0.05 MICROgram(s)/kG/Min (8.5 mL/Hr) IV Continuous <Continuous>  pantoprazole    Tablet 40 milliGRAM(s) Oral before breakfast  polyethylene glycol 3350 17 Gram(s) Oral daily  pramipexole 0.5 milliGRAM(s) Oral four times a day  senna 2 Tablet(s) Oral at bedtime    MEDICATIONS  (PRN):    lactated ringers.: Solution, 1000 milliLiter(s) infuse at 70 mL/Hr  lactated ringers Bolus:   500 milliLiter(s), IV Bolus, once, infuse over 1 Hour(s), Stop After 1 Doses  lactated ringers Bolus:   500 milliLiter(s), IV Bolus, once, infuse over 30 Minute(s), Stop After 1 Doses  lactated ringers.: Solution, 500 milliLiter(s) infuse at 1000 mL/Hr  lactated ringers Bolus:   1000 milliLiter(s), IV Bolus, once, infuse over 60 Minute(s), Stop After 1 Doses  Provider's Contact #: [phone]      LABS:                        9.9    4.11  )-----------( 55       ( 16 Feb 2021 06:02 )             32.4     02-16    144  |  110  |  36<H>  ----------------------------<  91  4.3   |  25  |  1.6<H>    Ca    8.8      16 Feb 2021 06:02  Phos  3.7     02-16  Mg     1.9     02-16    TPro  6.2  /  Alb  3.1<L>  /  TBili  0.7  /  DBili  x   /  AST  69<H>  /  ALT  105<H>  /  AlkPhos  190<H>  02-16    PT/INR - ( 16 Feb 2021 06:02 )   PT: 14.10 sec;   INR: 1.23 ratio         PTT - ( 16 Feb 2021 06:02 )  PTT:50.5 sec    Arterial Blood Gas:  02-14 @ 09:28  7.45/42/103/28/98/4.1  ABG lactate: --      ABG - ( 14 Feb 2021 09:28 )  pH, Arterial: 7.45  pH, Blood: x     /  pCO2: 42    /  pO2: 103   / HCO3: 28    / Base Excess: 4.1   /  SaO2: 98          RADIOLOGY: I personally reviewed latest CXR and other pertinent films.

## 2021-02-16 NOTE — PROGRESS NOTE ADULT - ASSESSMENT
71M with PMH of CKD 3, parkinson's disease, HTN admitted for AMS, with bradycardia and ABEBE, now hypotensive on pressor.      # ABEBE, baseline creatinine ~ 1.3  - pre-renal/ATN iso bradycardia/hypotension/shock, CRS, ?obstruction  - creatinine down trending, good urine output, hold lasix  - monitor K, Mg, replete as needed.  Replete free water enterally  - UA with pyuria/hematuria, proteinuria, check urine pr/cr ratio  - urine cx w/o growth, adjust cefepime dose to baseline egfr  - phos level noted, does not need binder   - f/u renal/bladder ultrasound please Abd US results noted for R kidney w/o hydronephrosis   # Appreciate cardioloy/EP f/u   # AMS/parkinsons dz - likely metabolic, appreciate neuro eval, EEG  Recall nephrology as needed

## 2021-02-16 NOTE — PROGRESS NOTE ADULT - ASSESSMENT
A 71y Romansh speaking male with PMH of Parkinson disease, HLD, HTN, prostatitis, H/O COVID 1 month ago, H/O DVT on Eliquis and Lasix presents to ED with altered mental status for 2 days.    # Sepsis secondary to PNA or UTI - improving  - Symptomatic lethargy/AMS  - c/w cefepime    # Sinus Bradycardia/1st Degree AV block secondary to Parkinsons or Infection- resolved  - Symptomatic lethargy/AMS  - On admission, HR 30s, s/p dopamine drip with improvement  - EKG showed sinus Bradycardia with first degree block- resolved  - D/C'd pressors  - appreciate EP, cardiology, and neuro recs    # Right sided opacity on chest CXR and CT secondary to COVID or PNA - improving  - resolving, c/w cefepime  - cultures negative  - appreciate ID and pulmonary recs    # Positive Urinalysis  - urine culture negative  - c/w cefepime  - appreciate nephro recs    # ABEBE, baseline creatinine 1.3, likely secondary to hypoperfusion - resolved  - UA with pyuria/hematuria, urine cultures negative  - urine output significantly improved, continue holding lasix, continue LR current rate  - phos level 3.7  - as per nephro, d/c colchicine if able    # Pancytopenia  - appreciate heme onc consult- likely secondary to COVID or sepsis  - iron studies wnl  - No indication for any transfusion    # Transaminitis likely secondary to hypoperfusion - resolved  - Hep A positive    # H/O Parkinson's Disease  - Will continue with home Sinemet ( bedtime and  four times a day and Pramipexole)    # Gout  - as per nephro, d/c colchicine if able    # H/O Disc Herniation  - Hold Gabapentin    # H/O DVT  - On Eliquis at home  - c/w heparin drip    # DLD  - Continue Statin    # Constipation  - Has intermittent constipation, abdomen distended on exam  - Will give Senna and Miralax    GI ppx: Pantoprazole  DVT ppx: Heparin  Code status: Full Code  Disposition: Acute, CCU

## 2021-02-16 NOTE — PROGRESS NOTE ADULT - SUBJECTIVE AND OBJECTIVE BOX
NIEVES LABOY 71y Male  MRN#: [id number]     SUBJECTIVE  Patient is a 71y old Male who presents with a chief complaint of Altered Mental Status (16 Feb 2021 07:53)    Today is hospital day 4d, and this morning he is lying in bed without distress.   This morning, patient more responsive to painful stimuli, mobilizing left arm to push away.  No acute overnight events.       OBJECTIVE  PAST MEDICAL & SURGICAL HISTORY  Cataract  Prostatitis  Dyslipidemia  Gout  Hypertension  Parkinson disease  No significant past surgical history      ALLERGIES:  No Known Allergies    MEDICATIONS:  STANDING MEDICATIONS  atorvastatin 40 milliGRAM(s) Oral at bedtime  carbidopa/levodopa  25/250 1 Tablet(s) Oral four times a day  carbidopa/levodopa CR 25/100 1 Tablet(s) Oral at bedtime  cefepime   IVPB 1000 milliGRAM(s) IV Intermittent every 24 hours  chlorhexidine 4% Liquid 1 Application(s) Topical <User Schedule>  colchicine 0.6 milliGRAM(s) Oral daily  dextrose 5% + sodium chloride 0.9%. 1000 milliLiter(s) IV Continuous <Continuous>  heparin  Infusion 1000 Unit(s)/Hr IV Continuous <Continuous>  norepinephrine Infusion 0.05 MICROgram(s)/kG/Min IV Continuous <Continuous>  pantoprazole    Tablet 40 milliGRAM(s) Oral before breakfast  polyethylene glycol 3350 17 Gram(s) Oral daily  pramipexole 0.5 milliGRAM(s) Oral four times a day  senna 2 Tablet(s) Oral at bedtime    PRN MEDICATIONS    HOME MEDICATIONS  Home Medications:  atorvastatin 40 mg oral tablet: 1 tab(s) orally once a day (at bedtime) (12 Feb 2021 18:10)  carbidopa-levodopa 25 mg-100 mg oral tablet, extended release: 1 tab(s) orally  (12 Feb 2021 18:10)  carbidopa-levodopa 25 mg-250 mg oral tablet: 1 tab(s) orally 4 times a day (12 Feb 2021 18:10)  colchicine 0.6 mg oral tablet: 1 tab(s) orally once a day (12 Feb 2021 18:10)  Eliquis 5 mg oral tablet: 1 tab(s) orally 2 times a day (12 Feb 2021 18:27)  enalapril 5 mg oral tablet: 1 tab(s) orally once a day (12 Feb 2021 18:10)  furosemide 40 mg oral tablet: 1 tab(s) orally once a day (12 Feb 2021 18:10)  omeprazole 40 mg oral delayed release capsule: 1 cap(s) orally once a day (12 Feb 2021 18:10)  pramipexole 0.5 mg oral tablet: 1 tab(s) orally 4 times a day (12 Feb 2021 18:10)      VITAL SIGNS: Last 24 Hours  T(C): 37.4 (16 Feb 2021 07:01), Max: 37.4 (16 Feb 2021 07:01)  T(F): 99.3 (16 Feb 2021 07:01), Max: 99.3 (16 Feb 2021 07:01)  HR: 67 (16 Feb 2021 06:20) (64 - 70)  BP: 123/62 (16 Feb 2021 06:20) (82/50 - 147/70)  BP(mean): 87 (16 Feb 2021 06:20) (57 - 100)  RR: 11 (16 Feb 2021 07:01) (11 - 31)  SpO2: 96% (16 Feb 2021 06:20) (96% - 100%)    02-15-21 @ 07:01  -  02-16-21 @ 07:00  --------------------------------------------------------  IN: 1861.2 mL / OUT: 1970 mL / NET: -108.8 mL    02-16-21 @ 07:01  -  02-16-21 @ 09:27  --------------------------------------------------------  IN: 0 mL / OUT: 525 mL / NET: -525 mL        LABS:                        9.9    4.11  )-----------( 55       ( 16 Feb 2021 06:02 )             32.4     02-16    144  |  110  |  36<H>  ----------------------------<  91  4.3   |  25  |  1.6<H>    Ca    8.8      16 Feb 2021 06:02  Phos  3.7     02-16  Mg     1.9     02-16    TPro  6.2  /  Alb  3.1<L>  /  TBili  0.7  /  DBili  x   /  AST  69<H>  /  ALT  105<H>  /  AlkPhos  190<H>  02-16    LIVER FUNCTIONS - ( 16 Feb 2021 06:02 )  Alb: 3.1 g/dL / Pro: 6.2 g/dL / ALK PHOS: 190 U/L / ALT: 105 U/L / AST: 69 U/L / GGT: x           PT/INR - ( 16 Feb 2021 06:02 )   PT: 14.10 sec;   INR: 1.23 ratio         PTT - ( 16 Feb 2021 06:02 )  PTT:50.5 sec    ABG - ( 14 Feb 2021 09:28 )  pH, Arterial: 7.45  pH, Blood: x     /  pCO2: 42    /  pO2: 103   / HCO3: 28    / Base Excess: 4.1   /  SaO2: 98          Culture - Blood (collected 13 Feb 2021 11:54)  Source: .Blood None  Preliminary Report (14 Feb 2021 19:02):    No growth to date.    Culture - Urine (collected 13 Feb 2021 09:36)  Source: .Urine Kidney  Final Report (15 Feb 2021 08:40):    No growth at 48 hours      RADIOLOGY:  EXAM: CT CHEST  PROCEDURE DATE: 02/14/2021    IMPRESSION:  1. Small bilateral effusions, right greater than left. Scattered groundglass parenchymal opacities bilaterally consistent with infectious/inflammatory etiology.  2. Left shoulder joint complex effusion with intra-articular joint bodies/calcifications.      EXAM: XR CHEST PORTABLE ROUTINE 1V  PROCEDURE DATE: 02/14/2021    Impression: Bilateral opacities, unchanged.    PHYSICAL EXAM:  GENERAL: NAD  HEAD:  Atraumatic, Normocephalic  EYES: EOMI, conjunctiva clear and sclera white  NECK: Supple, No JVD  CHEST/LUNG: Clear to auscultation bilaterally; No wheeze; No crackles; No accessory muscles used  HEART: Regular rate and rhythm; No murmurs   ABDOMEN: Soft, Nontender, Nondistended; Bowel sounds present; No guarding  EXTREMITIES:  2+ Peripheral Pulses, No cyanosis or edema  NEUROLOGY: non-focal    ADMISSION SUMMARY  Patient is a 71y old Male who presents with a chief complaint of Altered Mental Status (16 Feb 2021 07:53)

## 2021-02-16 NOTE — PROGRESS NOTE ADULT - ASSESSMENT
IMPRESSION:  alter mental status secondary to metabolic encephalopathy improved somewhat  sepsis   transaminitis   PNA likely bacterial  post COVID  parkinson / dementia at baseline  ABEBE   DVT hx         PLAN:    CNS:  neurology recomm reviewed  continue home meds     HEENT: oral care     PULMONARY: keep pox > 92 %   CXR reviewed      CARDIOVASCULAR:   cont hold lasix   taper pressors      GI: GI prophylaxis.    npo   speech swallow eval   place NG for feed if needed    RENAL:   renal f/u      INFECTIOUS DISEASE:   cefpime , vanco x1   nasal mrsa   bld cx     HEMATOLOGICAL:  DVT prophylaxis. heparin follow PTT     ENDOCRINE:  Follow up FS.  Insulin protocol if needed.    MUSCULOSKELETAL: IMPRESSION:  alter mental status secondary to metabolic encephalopathy improved somewhat  sepsis   transaminitis   PNA likely bacterial  post COVID  parkinson / dementia at baseline  ABEBE   DVT hx         PLAN:    CNS:  neurology recomm reviewed  continue home meds for PArk    HEENT: oral care     PULMONARY: keep pox > 92 %   CXR reviewed improving      CARDIOVASCULAR:   cont hold lasix   taper pressors      GI: GI prophylaxis.    npo   place NG for feed     RENAL:   renal f/u      INFECTIOUS DISEASE:   cefpime finish course  bld cx     HEMATOLOGICAL:  DVT prophylaxis. heparin follow PTT     ENDOCRINE:  Follow up FS.  Insulin protocol if needed.

## 2021-02-16 NOTE — DIETITIAN INITIAL EVALUATION ADULT. - NUTRITON FOCUSED PHYSICAL EXAM
Patient is a 75y old  Male who presents with a chief complaint of Worsening agitation and difficulty ambulating (02 Aug 2019 18:12)    Interval event: low grade fever (99.1-99.9F), tachycardia until 6am.   Patient seen and examined at bedside.    ROS: Denies any chest pain, Shortness of breath, nausea, vomiting.     ALLERGIES:  No Known Allergies  MEDICATIONS  (STANDING):  amLODIPine   Tablet 5 milliGRAM(s) Oral daily  dextrose 5%. 1000 milliLiter(s) (50 mL/Hr) IV Continuous <Continuous>  dextrose 50% Injectable 12.5 Gram(s) IV Push once  dextrose 50% Injectable 25 Gram(s) IV Push once  dextrose 50% Injectable 25 Gram(s) IV Push once  insulin lispro (HumaLOG) corrective regimen sliding scale   SubCutaneous Before meals and at bedtime  latanoprost 0.005% Ophthalmic Solution 1 Drop(s) Both EYES at bedtime  lisinopril 40 milliGRAM(s) Oral daily  pantoprazole    Tablet 40 milliGRAM(s) Oral two times a day  sodium chloride 0.9%. 1000 milliLiter(s) (100 mL/Hr) IV Continuous <Continuous>  tamsulosin 0.4 milliGRAM(s) Oral at bedtime    MEDICATIONS  (PRN):  acetaminophen   Tablet .. 650 milliGRAM(s) Oral every 6 hours PRN Mild Pain (1 - 3)  acetaminophen   Tablet .. 650 milliGRAM(s) Oral every 6 hours PRN Temp greater or equal to 38C (100.4F)  ALPRAZolam 0.5 milliGRAM(s) Oral three times a day PRN agitation/anxiety  ondansetron Injectable 4 milliGRAM(s) IV Push every 6 hours PRN Nausea and/or Vomiting  sodium chloride 0.9% lock flush 10 milliLiter(s) IV Push every 1 hour PRN Pre/post blood products, medications, blood draw, and to maintain line patency  traZODone 100 milliGRAM(s) Oral at bedtime PRN sleep/agitation    Vital Signs Last 24 Hrs  T(F): 100.1 (03 Aug 2019 06:03), Max: 100.4 (02 Aug 2019 18:00)  HR: 95 (03 Aug 2019 06:03) (95 - 115)  BP: 161/89 (03 Aug 2019 06:03) (153/88 - 179/91)  RR: 15 (02 Aug 2019 21:00) (15 - 15)  SpO2: 95% (03 Aug 2019 06:03) (93% - 95%)  I&O's Summary    02 Aug 2019 07:01  -  03 Aug 2019 07:00  --------------------------------------------------------  IN: 2720 mL / OUT: 1500 mL / NET: 1220 mL    03 Aug 2019 07:01  -  03 Aug 2019 10:54  --------------------------------------------------------  IN: 480 mL / OUT: 0 mL / NET: 480 mL        PHYSICAL EXAM:  General: NAD, A/O x 2  Head: no diaphoresis, no temp felt on forehead.   ENT: MMM  Neck: Supple, No JVD  Lungs: Clear to auscultation bilaterally, slightly diminished to RU lobe.   Cardio: RRR, S1/S2, No murmurs  Abdomen: Soft, Nontender, Distended (obese); Bowel sounds present  Extremities: No calf tenderness, No pitting edema LE bilat.   Pain-Denies    LABS:                        11.3   12.95 )-----------( 303      ( 03 Aug 2019 06:25 )             34.9     -    141  |  106  |  12  ----------------------------<  128  3.9   |  25  |  1.18    Ca    8.7      03 Aug 2019 06:25  Mg     1.8           eGFR if Non African American: 60 mL/min/1.73M2 (19 @ 06:25)  eGFR if : 69 mL/min/1.73M2 (19 @ 06:25)    Lactate, Blood: 1.0 mmol/L ( @ 19:45)    POCT Blood Glucose.: 139 mg/dL (03 Aug 2019 07:45)  POCT Blood Glucose.: 110 mg/dL (02 Aug 2019 23:01)  POCT Blood Glucose.: 140 mg/dL (02 Aug 2019 16:52)  POCT Blood Glucose.: 165 mg/dL (02 Aug 2019 11:19)    07-28 [id number] 5.7    Urinalysis Basic - ( 02 Aug 2019 18:31 )    Color: Yellow / Appearance: Clear / S.015 / pH: x  Gluc: x / Ketone: Negative  / Bili: Negative / Urobili: Negative   Blood: x / Protein: 100 / Nitrite: Negative   Leuk Esterase: Moderate / RBC: >50 /HPF / WBC 11-25 /HPF   Sq Epi: x / Non Sq Epi: Neg.-Few / Bacteria: Few /HPF    RADIOLOGY & ADDITIONAL TESTS:  < from: Xray Chest 1 View-PORTABLE IMMEDIATE (19 @ 19:13) >  EXAM:  XR CHEST PORTABLE IMMED 1V    PROCEDURE DATE:  2019    INTERPRETATION:  Single view chest  History aspiration  comparison 19  Right upper lung opacity has partially improved with mild residual. There   is a persistently poor inspiratory result without new consolidation,   pneumothorax or layering effusion.  < end of copied text >    < from: US Duplex Venous Lower Ext Complete, Bilateral (19 @ 13:58) >  EXAM:  US DPLX LWR EXT VEINS COMPL BI      PROCEDURE DATE:  2019        INTERPRETATION:  CLINICAL INFORMATION: Bilateral lower extreme pain and   swelling of unknown severity or duration. Evaluate for DVT bilaterally.   No additional information.    COMPARISON: 3/13/2013  TECHNIQUE: Duplex sonography of the BILATERAL LOWER extremity veins with   color and spectral Doppler, with and without compression.    IMPRESSION:   No evidence of deep venous thrombosis in either lower extremity.  < end of copied text >    < from: Xray Ankle Complete 3 Views, Left (19 @ 10:02) >  EXAM:  ANKLE LEFT (MINIMUM 3 VIEWS)    PROCEDURE DATE:  2019    INTERPRETATION:  Left ankle. Patient has local pain. 3 views obtained.  IMPRESSION: Soft tissue swelling.  < end of copied text >    < from: Xray Chest 1 View- PORTABLE-Routine (19 @ 08:35) >  EXAM:  XR CHEST PORTABLE ROUTINE 1V    PROCEDURE DATE:  2019    INTERPRETATION:  AP semierect chest on 2019 at 8:10 AM. Patient   has abnormal chest sounds.  IMPRESSION: Tubes removed. Developing lung findings.  < end of copied text >    Care Discussed with Consultants/Other Providers: no...

## 2021-02-16 NOTE — DIETITIAN INITIAL EVALUATION ADULT. - ENTERAL
When medically feasible to initiate EN provide Osmolite 1.5 @300ml q6h with No Carb Prosource TF daily for 1840kcal, 85g protein, 912ml ree H2O. Additional free H2O per LIP. Maintain all aspiration precautions.

## 2021-02-16 NOTE — DIETITIAN INITIAL EVALUATION ADULT. - OTHER INFO
P/w: AMS.  Sepsis - resolving / pancytopenia - improving / ABEBE - almost at baseline. SLP rec NPO with alternate means of nutrition/hydration- NGT placed.

## 2021-02-16 NOTE — DIETITIAN INITIAL EVALUATION ADULT. - PERSON TAUGHT/METHOD
Reviewed need for NPO status and EN principles. Family receptive to RD ed./verbal instruction/daughter instructed

## 2021-02-16 NOTE — PROGRESS NOTE ADULT - ASSESSMENT
71 year old Japanese speaking Male with past medical history of Parkinson disease, HLD, HTN, H/O DVT on Eliquis and Lasix, H/O COVID presented to ED 4 days ago to the [place] site with a CC of  altered mental status for 2 days.   As per family patient at baseline pt is able to speak and has his on and off days but for 2 days prior to presentation had not been able to speak and more lethargic than baseline.  Family denies any fevers, chills, rigors, cough, shortness of breath, loss of consciousness, incontinence or any other complaints.   In ED patient hypertensive to 170s and bradycardic to 30s. EKG showed sinus bradycardia with 1st degree AV block. Electrolytes were normal. Patient given atropine without response, he was given dobutamine and HR increased with wide complex tachycardia. Patient placed on Dopamine with HR improving to the 50s. CT head done for AMS and shows no acute changes.     Pt was treated in the [place] ICU for 4 days for sepsis and ABEBE secondary to pneumonia vs UTI on IV Levophed and antibiotics and transferred to the [facility] yesterday. Levophed was DC'd early this am.     Sepsis - resolving /    71 year old Mongolian speaking Male with past medical history of Parkinson disease, HLD, HTN, H/O DVT on Eliquis and Lasix, H/O COVID presented to ED 4 days ago to the [place] site with a CC of  altered mental status for 2 days.   As per family patient at baseline pt is able to speak and has his on and off days but for 2 days prior to presentation had not been able to speak and more lethargic than baseline.  Family denies any fevers, chills, rigors, cough, shortness of breath, loss of consciousness, incontinence or any other complaints.   In ED patient hypertensive to 170s and bradycardic to 30s. EKG showed sinus bradycardia with 1st degree AV block. Electrolytes were normal. Patient given atropine without response, he was given dobutamine and HR increased with wide complex tachycardia. Patient placed on Dopamine with HR improving to the 50s. CT head done for AMS and shows no acute changes.     Pt was treated in the [place] ICU for 4 days for sepsis, pancytopenia and ABEBE secondary to pneumonia vs UTI on IV Levophed and antibiotics and transferred to the [facility] Unit yesterday. Levophed was DC'd early this am.     Sepsis - resolving / pancytopenia - improving / ABEBE - almost at baseline      - pt had not received any of his home meds or nutrition   - I placed NGT this am   - please DC IV fluids - start NGT feeds   - please resume pts home meds   - hold Lasix for now    - complete antibiotic course   - may also consider resuming Eliquis and dc-ing IV heparin

## 2021-02-17 LAB
% ALBUMIN: 44.6 % — SIGNIFICANT CHANGE UP
% ALPHA 1: 7.3 % — SIGNIFICANT CHANGE UP
% ALPHA 2: 11 % — SIGNIFICANT CHANGE UP
% BETA: 13.2 % — SIGNIFICANT CHANGE UP
% GAMMA: 23.9 % — SIGNIFICANT CHANGE UP
ALBUMIN SERPL ELPH-MCNC: 2.9 G/DL — LOW (ref 3.6–5.5)
ALBUMIN SERPL ELPH-MCNC: 3.1 G/DL — LOW (ref 3.5–5.2)
ALBUMIN/GLOB SERPL ELPH: 0.8 RATIO — SIGNIFICANT CHANGE UP
ALP SERPL-CCNC: 173 U/L — HIGH (ref 30–115)
ALPHA1 GLOB SERPL ELPH-MCNC: 0.5 G/DL — HIGH (ref 0.1–0.4)
ALPHA2 GLOB SERPL ELPH-MCNC: 0.7 G/DL — SIGNIFICANT CHANGE UP (ref 0.5–1)
ALT FLD-CCNC: 22 U/L — SIGNIFICANT CHANGE UP (ref 0–41)
ANION GAP SERPL CALC-SCNC: 10 MMOL/L — SIGNIFICANT CHANGE UP (ref 7–14)
APTT BLD: 50.5 SEC — HIGH (ref 27–39.2)
AST SERPL-CCNC: 59 U/L — HIGH (ref 0–41)
B-GLOBULIN SERPL ELPH-MCNC: 0.9 G/DL — SIGNIFICANT CHANGE UP (ref 0.5–1)
BASOPHILS # BLD AUTO: 0.03 K/UL — SIGNIFICANT CHANGE UP (ref 0–0.2)
BASOPHILS NFR BLD AUTO: 0.5 % — SIGNIFICANT CHANGE UP (ref 0–1)
BILIRUB SERPL-MCNC: 0.8 MG/DL — SIGNIFICANT CHANGE UP (ref 0.2–1.2)
BUN SERPL-MCNC: 41 MG/DL — HIGH (ref 10–20)
CALCIUM SERPL-MCNC: 8.6 MG/DL — SIGNIFICANT CHANGE UP (ref 8.5–10.1)
CHLORIDE SERPL-SCNC: 108 MMOL/L — SIGNIFICANT CHANGE UP (ref 98–110)
CO2 SERPL-SCNC: 26 MMOL/L — SIGNIFICANT CHANGE UP (ref 17–32)
CREAT SERPL-MCNC: 1.7 MG/DL — HIGH (ref 0.7–1.5)
EOSINOPHIL # BLD AUTO: 0.12 K/UL — SIGNIFICANT CHANGE UP (ref 0–0.7)
EOSINOPHIL NFR BLD AUTO: 2.1 % — SIGNIFICANT CHANGE UP (ref 0–8)
GAMMA GLOBULIN: 1.6 G/DL — SIGNIFICANT CHANGE UP (ref 0.6–1.6)
GLUCOSE SERPL-MCNC: 95 MG/DL — SIGNIFICANT CHANGE UP (ref 70–99)
HCT VFR BLD CALC: 30.7 % — LOW (ref 42–52)
HGB BLD-MCNC: 9.3 G/DL — LOW (ref 14–18)
IMM GRANULOCYTES NFR BLD AUTO: 0.3 % — SIGNIFICANT CHANGE UP (ref 0.1–0.3)
INTERPRETATION SERPL IFE-IMP: SIGNIFICANT CHANGE UP
LYMPHOCYTES # BLD AUTO: 0.71 K/UL — LOW (ref 1.2–3.4)
LYMPHOCYTES # BLD AUTO: 12.1 % — LOW (ref 20.5–51.1)
MAGNESIUM SERPL-MCNC: 2.1 MG/DL — SIGNIFICANT CHANGE UP (ref 1.8–2.4)
MCHC RBC-ENTMCNC: 25.5 PG — LOW (ref 27–31)
MCHC RBC-ENTMCNC: 30.3 G/DL — LOW (ref 32–37)
MCV RBC AUTO: 84.1 FL — SIGNIFICANT CHANGE UP (ref 80–94)
MONOCYTES # BLD AUTO: 0.86 K/UL — HIGH (ref 0.1–0.6)
MONOCYTES NFR BLD AUTO: 14.7 % — HIGH (ref 1.7–9.3)
NEUTROPHILS # BLD AUTO: 4.11 K/UL — SIGNIFICANT CHANGE UP (ref 1.4–6.5)
NEUTROPHILS NFR BLD AUTO: 70.3 % — SIGNIFICANT CHANGE UP (ref 42.2–75.2)
NRBC # BLD: 0 /100 WBCS — SIGNIFICANT CHANGE UP (ref 0–0)
PHOSPHATE SERPL-MCNC: 3.1 MG/DL — SIGNIFICANT CHANGE UP (ref 2.1–4.9)
PLATELET # BLD AUTO: 57 K/UL — LOW (ref 130–400)
POTASSIUM SERPL-MCNC: 4.6 MMOL/L — SIGNIFICANT CHANGE UP (ref 3.5–5)
POTASSIUM SERPL-SCNC: 4.6 MMOL/L — SIGNIFICANT CHANGE UP (ref 3.5–5)
PROT PATTERN SERPL ELPH-IMP: SIGNIFICANT CHANGE UP
PROT SERPL-MCNC: 6.3 G/DL — SIGNIFICANT CHANGE UP (ref 6–8)
RBC # BLD: 3.65 M/UL — LOW (ref 4.7–6.1)
RBC # FLD: 16.4 % — HIGH (ref 11.5–14.5)
SODIUM SERPL-SCNC: 144 MMOL/L — SIGNIFICANT CHANGE UP (ref 135–146)
WBC # BLD: 5.85 K/UL — SIGNIFICANT CHANGE UP (ref 4.8–10.8)
WBC # FLD AUTO: 5.85 K/UL — SIGNIFICANT CHANGE UP (ref 4.8–10.8)

## 2021-02-17 PROCEDURE — 99233 SBSQ HOSP IP/OBS HIGH 50: CPT

## 2021-02-17 PROCEDURE — 76770 US EXAM ABDO BACK WALL COMP: CPT | Mod: 26

## 2021-02-17 PROCEDURE — 71045 X-RAY EXAM CHEST 1 VIEW: CPT | Mod: 26

## 2021-02-17 PROCEDURE — 73030 X-RAY EXAM OF SHOULDER: CPT | Mod: 26,LT

## 2021-02-17 RX ORDER — SODIUM CHLORIDE 9 MG/ML
1000 INJECTION INTRAMUSCULAR; INTRAVENOUS; SUBCUTANEOUS ONCE
Refills: 0 | Status: COMPLETED | OUTPATIENT
Start: 2021-02-17 | End: 2021-02-17

## 2021-02-17 RX ORDER — SODIUM CHLORIDE 9 MG/ML
250 INJECTION INTRAMUSCULAR; INTRAVENOUS; SUBCUTANEOUS ONCE
Refills: 0 | Status: COMPLETED | OUTPATIENT
Start: 2021-02-17 | End: 2021-02-17

## 2021-02-17 RX ORDER — SODIUM CHLORIDE 9 MG/ML
1000 INJECTION, SOLUTION INTRAVENOUS
Refills: 0 | Status: DISCONTINUED | OUTPATIENT
Start: 2021-02-17 | End: 2021-02-18

## 2021-02-17 RX ORDER — SODIUM CHLORIDE 9 MG/ML
500 INJECTION INTRAMUSCULAR; INTRAVENOUS; SUBCUTANEOUS ONCE
Refills: 0 | Status: COMPLETED | OUTPATIENT
Start: 2021-02-17 | End: 2021-02-17

## 2021-02-17 RX ORDER — CEFEPIME 1 G/1
1000 INJECTION, POWDER, FOR SOLUTION INTRAMUSCULAR; INTRAVENOUS EVERY 12 HOURS
Refills: 0 | Status: DISCONTINUED | OUTPATIENT
Start: 2021-02-17 | End: 2021-02-19

## 2021-02-17 RX ADMIN — PRAMIPEXOLE DIHYDROCHLORIDE 0.5 MILLIGRAM(S): 0.12 TABLET ORAL at 18:10

## 2021-02-17 RX ADMIN — ATORVASTATIN CALCIUM 40 MILLIGRAM(S): 80 TABLET, FILM COATED ORAL at 23:04

## 2021-02-17 RX ADMIN — CEFEPIME 100 MILLIGRAM(S): 1 INJECTION, POWDER, FOR SOLUTION INTRAMUSCULAR; INTRAVENOUS at 18:10

## 2021-02-17 RX ADMIN — CARBIDOPA AND LEVODOPA 1 TABLET(S): 25; 100 TABLET ORAL at 23:04

## 2021-02-17 RX ADMIN — PRAMIPEXOLE DIHYDROCHLORIDE 0.5 MILLIGRAM(S): 0.12 TABLET ORAL at 23:05

## 2021-02-17 RX ADMIN — HEPARIN SODIUM 5 UNIT(S)/HR: 5000 INJECTION INTRAVENOUS; SUBCUTANEOUS at 20:30

## 2021-02-17 RX ADMIN — CARBIDOPA AND LEVODOPA 1 TABLET(S): 25; 100 TABLET ORAL at 11:12

## 2021-02-17 RX ADMIN — SODIUM CHLORIDE 500 MILLILITER(S): 9 INJECTION INTRAMUSCULAR; INTRAVENOUS; SUBCUTANEOUS at 20:30

## 2021-02-17 RX ADMIN — CARBIDOPA AND LEVODOPA 1 TABLET(S): 25; 100 TABLET ORAL at 05:37

## 2021-02-17 RX ADMIN — SODIUM CHLORIDE 666.67 MILLILITER(S): 9 INJECTION INTRAMUSCULAR; INTRAVENOUS; SUBCUTANEOUS at 02:24

## 2021-02-17 RX ADMIN — SENNA PLUS 2 TABLET(S): 8.6 TABLET ORAL at 23:04

## 2021-02-17 RX ADMIN — SODIUM CHLORIDE 1000 MILLILITER(S): 9 INJECTION INTRAMUSCULAR; INTRAVENOUS; SUBCUTANEOUS at 02:24

## 2021-02-17 RX ADMIN — CHLORHEXIDINE GLUCONATE 1 APPLICATION(S): 213 SOLUTION TOPICAL at 05:37

## 2021-02-17 RX ADMIN — Medication 0.6 MILLIGRAM(S): at 11:12

## 2021-02-17 RX ADMIN — PRAMIPEXOLE DIHYDROCHLORIDE 0.5 MILLIGRAM(S): 0.12 TABLET ORAL at 05:38

## 2021-02-17 RX ADMIN — SODIUM CHLORIDE 75 MILLILITER(S): 9 INJECTION, SOLUTION INTRAVENOUS at 20:29

## 2021-02-17 RX ADMIN — PANTOPRAZOLE SODIUM 40 MILLIGRAM(S): 20 TABLET, DELAYED RELEASE ORAL at 05:38

## 2021-02-17 RX ADMIN — POLYETHYLENE GLYCOL 3350 17 GRAM(S): 17 POWDER, FOR SOLUTION ORAL at 11:12

## 2021-02-17 RX ADMIN — Medication 650 MILLIGRAM(S): at 18:10

## 2021-02-17 RX ADMIN — CARBIDOPA AND LEVODOPA 1 TABLET(S): 25; 100 TABLET ORAL at 18:10

## 2021-02-17 RX ADMIN — PRAMIPEXOLE DIHYDROCHLORIDE 0.5 MILLIGRAM(S): 0.12 TABLET ORAL at 11:12

## 2021-02-17 RX ADMIN — SODIUM CHLORIDE 75 MILLILITER(S): 9 INJECTION, SOLUTION INTRAVENOUS at 11:14

## 2021-02-17 NOTE — PROGRESS NOTE ADULT - ASSESSMENT
71 year old Spanish speaking Male with past medical history of Parkinson disease, HLD, HTN, H/O DVT on Eliquis and Lasix, H/O COVID presented to ED 4 days prior to admission to the [place] site for evaluation of two days of altered mental status.   As per family patient at baseline pt is able to speak and has his on and off days but for 2 days prior to presentation had not been able to speak and more lethargic than baseline.  Family denies any fevers, chills, rigors, cough, shortness of breath, loss of consciousness, incontinence or any other complaints.   In ED patient hypertensive to 170s and bradycardic to 30s. EKG showed sinus bradycardia with 1st degree AV block. Electrolytes were normal. Patient given atropine without response, he was given dobutamine and HR increased with wide complex tachycardia. Patient placed on Dopamine with HR improving to the 50s. CT head done for AMS and shows no acute changes.     Pt was treated in the [place] ICU for 4 days for sepsis, pancytopenia and ABEBE secondary to pneumonia vs UTI on IV Levophed and antibiotics and transferred to the [facility] Unit. Levophed was stopped yesterday.    Septic shock present on admisison  - due to ABEBE vs pneumonia  - c/w cefepime  - blood and urine cultures unremarkable    Parkinson's  - c/w sinemet  - physical therapy    Pancytopenia  - resolving    ABEBE  - resolving  - has large UO  - unclear if post obstructive diuresis  -  monitoring electrolytes    DVT  - takes eliquis  - eliquis on hold for renal failure  - monitor CrCL, once back to baseline can d/c heparin ggt and restart eliquis

## 2021-02-17 NOTE — PROGRESS NOTE ADULT - SUBJECTIVE AND OBJECTIVE BOX
Patient is a 71y old  Male who presents with a chief complaint of Altered Mental Status (2021 13:02)        HPI:  71 year old Greenlandic speaking Male with past medical history of Parkinson disease, HLD, HTN, H/O DVT on Eliquis and Lasix, H/O COVID presents to ED with altered mental status for 2 days.     As per family patient at baseline is able to speak and has his on and off days but since last 2 days has not been able to speak and more lethargic than baseline. As per daughter at the bedside, patient has "bad days but this is the worst we have seen" Patient bright to ED for non resolution of symptoms. Family denies any fevers, chills, rigors, cough, shortness of breath, loss of consciousness, incontinence or any other complaints.     In ED patient hypertensive to 170s and bradycardic to 30s. EKG showed sinus bradycardia with 1st degree AV block. Electrolytes were normal. Patient given atropine without response, he was given dobutamine and HR increased with wide complex tachycardia. Patient placed on Dopamine with HR improving to the 50s. CT head done for AMS and shows no acute changes. Patient at time of interview minimally verbal but appeared in no apparent discomfort. (2021 17:17)    Pt evaluated on AM rounds.  Interval Events: No overnight events.    REVIEW OF SYSTEMS:   see HPI      OBJECTIVE:  ICU Vital Signs Last 24 Hrs  T(C): 37.5 (2021 07:01), Max: 38.4 (2021 23:36)  T(F): 99.5 (2021 07:01), Max: 101.2 (2021 23:36)  HR: 64 (2021 05:27) (64 - 75)  BP: 116/58 (2021 05:27) (86/49 - 188/77)  BP(mean): 83 (2021 05:27) (64 - 110)  RR: 28 (2021 07:01) (12 - 28)  SpO2: 97% (2021 05:27) (93% - 99%)        02-16 @ 07:01  -  02-17 @ 07:00  --------------------------------------------------------  IN: 2980 mL / OUT: 1875 mL / NET: 1105 mL     @ 07:01  -   @ 08:32  --------------------------------------------------------  IN: 0 mL / OUT: 115 mL / NET: -115 mL      CAPILLARY BLOOD GLUCOSE            PHYSICAL EXAM:     · CONSTITUTIONAL:   not septic appearing,   well nourished,   NAD    · ENMT:   Airway patent,   Nasal mucosa clear.  Mouth with normal mucosa.   No thrush    · EYES:   Clear bilaterally,   pupils equal,   round and reactive to light.    · CARDIAC:   Normal rate,   regular rhythm.    Heart sounds S1, S2.   No murmurs, no rubs or gallops on auscultation  no edema        CAROTID:   normal systolic impulse  no bruits    · RESPIRATORY:   rales  normal chest expansion  no retractions or use of accessory muscles  palpation of chest is normal with no fremitus  percussion of chest demonstrates no hyperresonance or dullness    · GASTROINTESTINAL:  Abdomen soft,   non-tender,   + BS  liver/spleen not palpable    · MUSCULOSKELETAL:   no clubbing, cyanosis      · NEUROLOGICAL:   unchanged      · SKIN:   Skin normal color for race,   warm, dry   No evidence of rash.      · HEME LYMPH:   no splenomegaly.  No cervical  lymphadenopathy.  no inguinal lymphadenopathy    HOSPITAL MEDICATIONS:  MEDICATIONS  (STANDING):  atorvastatin 40 milliGRAM(s) Oral at bedtime  carbidopa/levodopa  25/250 1 Tablet(s) Oral four times a day  carbidopa/levodopa CR 25/100 1 Tablet(s) Oral at bedtime  cefepime   IVPB 1000 milliGRAM(s) IV Intermittent every 24 hours  chlorhexidine 4% Liquid 1 Application(s) Topical <User Schedule>  colchicine 0.6 milliGRAM(s) Oral daily  heparin  Infusion 1000 Unit(s)/Hr (5 mL/Hr) IV Continuous <Continuous>  pantoprazole   Suspension 40 milliGRAM(s) Oral before breakfast  polyethylene glycol 3350 17 Gram(s) Oral daily  pramipexole 0.5 milliGRAM(s) Oral four times a day  senna 2 Tablet(s) Oral at bedtime    MEDICATIONS  (PRN):  acetaminophen   Tablet .. 650 milliGRAM(s) Oral every 6 hours PRN Temp greater or equal to 38.5C (101.3F)    sodium chloride 0.9% Bolus:   1000 milliLiter(s), IV Bolus, once, infuse over 90 Minute(s), Stop After 1 Doses  sodium chloride 0.9% Bolus:   250 milliLiter(s), IV Bolus, once, infuse over 15 Minute(s), Stop After 1 Doses  Special Instructions: for cheetah monitor  lactated ringers.: Solution, 1000 milliLiter(s) infuse at 70 mL/Hr  lactated ringers Bolus:   500 milliLiter(s), IV Bolus, once, infuse over 1 Hour(s), Stop After 1 Doses  lactated ringers Bolus:   500 milliLiter(s), IV Bolus, once, infuse over 30 Minute(s), Stop After 1 Doses  lactated ringers.: Solution, 500 milliLiter(s) infuse at 1000 mL/Hr  lactated ringers Bolus:   1000 milliLiter(s), IV Bolus, once, infuse over 60 Minute(s), Stop After 1 Doses  Provider's Contact #: [phone]      LABS:                        9.3    5.85  )-----------( 57       ( 2021 05:53 )             30.7     02-16    144  |  110  |  36<H>  ----------------------------<  91  4.3   |  25  |  1.6<H>    Ca    8.8      2021 06:02  Phos  3.7     02-16  Mg     1.9     02-16    TPro  6.2  /  Alb  3.1<L>  /  TBili  0.7  /  DBili  x   /  AST  69<H>  /  ALT  105<H>  /  AlkPhos  190<H>  02-16    PT/INR - ( 2021 06:02 )   PT: 14.10 sec;   INR: 1.23 ratio         PTT - ( 2021 05:53 )  PTT:50.5 sec  Urinalysis Basic - ( 2021 09:33 )    Color: Yellow / Appearance: Clear / S.025 / pH: x  Gluc: x / Ketone: Negative  / Bili: Negative / Urobili: 1.0 mg/dL   Blood: x / Protein: 30 mg/dL / Nitrite: Negative   Leuk Esterase: Trace / RBC: 3-5 /HPF / WBC 3-5 /HPF   Sq Epi: x / Non Sq Epi: Occasional /HPF / Bacteria: Few          RADIOLOGY: I personally reviewed latest CXR and other pertinent films.

## 2021-02-17 NOTE — PROGRESS NOTE ADULT - SUBJECTIVE AND OBJECTIVE BOX
CHIEF COMPLAINT:    Patient is a 71y old  Male who presents with a chief complaint of Altered Mental Status    INTERVAL HPI/OVERNIGHT EVENTS:    Patient seen and examined at bedside. No acute overnight events occurred.    ROS: Unable to obtain    Vital Signs:    T(F): 99.8 (02-17-21 @ 11:00), Max: 101.2 (02-16-21 @ 23:36)  HR: 62 (02-17-21 @ 08:33) (62 - 75)  BP: 113/56 (02-17-21 @ 08:33) (86/49 - 124/58)  RR: 13 (02-17-21 @ 11:00) (13 - 28)  SpO2: 97% (02-17-21 @ 08:33) (93% - 98%)  I&O's Summary    16 Feb 2021 07:01  -  17 Feb 2021 07:00  --------------------------------------------------------  IN: 2980 mL / OUT: 1875 mL / NET: 1105 mL    17 Feb 2021 07:01  -  17 Feb 2021 14:00  --------------------------------------------------------  IN: 0 mL / OUT: 465 mL / NET: -465 mL    PHYSICAL EXAM:  GENERAL:  NAD  SKIN: No rashes or lesions  HEENT: Atraumatic. Normocephalic. Anicteric  NECK:  No JVD.   PULMONARY: Clear to ausculation bilaterally. No wheezing. No rales  CVS: Normal S1, S2. Regular rate and rhythm. No murmurs.  ABDOMEN/GI: Soft, Nontender, Nondistended; Bowel sounds are present  EXTREMITIES:  No edema B/L LE.  NEUROLOGIC:  Non participatory  PSYCH: somnolent      LABS:                        9.3    5.85  )-----------( 57       ( 17 Feb 2021 05:53 )             30.7     02-17    144  |  108  |  41<H>  ----------------------------<  95  4.6   |  26  |  1.7<H>    Ca    8.6      17 Feb 2021 05:53  Phos  3.1     02-17  Mg     2.1     02-17    TPro  6.3  /  Alb  3.1<L>  /  TBili  0.8  /  DBili  x   /  AST  59<H>  /  ALT  22  /  AlkPhos  173<H>  02-17    PT/INR - ( 16 Feb 2021 06:02 )   PT: 14.10 sec;   INR: 1.23 ratio         PTT - ( 17 Feb 2021 05:53 )  PTT:50.5 sec  Serum Pro-Brain Natriuretic Peptide: 348 pg/mL (02-12-21 @ 10:26)          RADIOLOGY & ADDITIONAL TESTS:  No new images    Medications:  Standing  atorvastatin 40 milliGRAM(s) Oral at bedtime  carbidopa/levodopa  25/250 1 Tablet(s) Oral four times a day  carbidopa/levodopa CR 25/100 1 Tablet(s) Oral at bedtime  cefepime   IVPB 1000 milliGRAM(s) IV Intermittent every 12 hours  chlorhexidine 4% Liquid 1 Application(s) Topical <User Schedule>  colchicine 0.6 milliGRAM(s) Oral daily  heparin  Infusion 1000 Unit(s)/Hr IV Continuous <Continuous>  pantoprazole   Suspension 40 milliGRAM(s) Oral before breakfast  polyethylene glycol 3350 17 Gram(s) Oral daily  pramipexole 0.5 milliGRAM(s) Oral four times a day  senna 2 Tablet(s) Oral at bedtime  sodium chloride 0.45%. 1000 milliLiter(s) IV Continuous <Continuous>    PRN Meds  acetaminophen   Tablet .. 650 milliGRAM(s) Oral every 6 hours PRN      Case discussed with resident  Care discussed with pt

## 2021-02-17 NOTE — PROGRESS NOTE ADULT - ASSESSMENT
IMPRESSION:  alter mental status secondary to metabolic encephalopathy improved somewhat  sepsis   transaminitis   PNA likely bacterial  post COVID  parkinson / dementia at baseline  ABEBE   DVT hx         PLAN:    CNS:  neurology recomm reviewed  continue home meds for PArk    HEENT: oral care     PULMONARY: keep pox > 92 %   CXR reviewed improving      CARDIOVASCULAR:   cont hold lasix   taper pressors      GI: GI prophylaxis.    npo   place NG for feed     RENAL:   renal f/u      INFECTIOUS DISEASE:   cefpime finish course  bld cx     HEMATOLOGICAL:  DVT prophylaxis. heparin follow PTT     ENDOCRINE:  Follow up FS.  Insulin protocol if needed.   IMPRESSION:  alter mental status secondary to metabolic encephalopathy improved somewhat  sepsis   transaminitis   PNA likely bacterial  post COVID  parkinson / dementia at baseline  ABEBE   DVT hx         PLAN:    CNS:  neurology recomm reviewed  continue home meds for PArk    HEENT: oral care     PULMONARY: keep pox > 92 %   CXR reviewed improving      CARDIOVASCULAR:   cont hold lasix   taper pressors  keep I=O with IVF      GI: GI prophylaxis.    npo   place NG for feed     RENAL:   renal f/u   speak with renal; regarding inappropriate large urine output        INFECTIOUS DISEASE:   cefpime finish course  f/u cx     HEMATOLOGICAL:  DVT prophylaxis. heparin follow PTT     ENDOCRINE:  Follow up FS.  Insulin protocol if needed.

## 2021-02-17 NOTE — SWALLOW BEDSIDE ASSESSMENT ADULT - SPECIFY REASON(S)
SLP requested to reevaluate w/ daughter present to provide Danish/English interpretation
dysphagia follow up
dysphagia follow up

## 2021-02-18 LAB
ALBUMIN SERPL ELPH-MCNC: 2.9 G/DL — LOW (ref 3.5–5.2)
ALP SERPL-CCNC: 146 U/L — HIGH (ref 30–115)
ALT FLD-CCNC: 15 U/L — SIGNIFICANT CHANGE UP (ref 0–41)
ANION GAP SERPL CALC-SCNC: 9 MMOL/L — SIGNIFICANT CHANGE UP (ref 7–14)
APTT BLD: 46.7 SEC — HIGH (ref 27–39.2)
APTT BLD: 56 SEC — HIGH (ref 27–39.2)
AST SERPL-CCNC: 55 U/L — HIGH (ref 0–41)
BASOPHILS # BLD AUTO: 0.03 K/UL — SIGNIFICANT CHANGE UP (ref 0–0.2)
BASOPHILS NFR BLD AUTO: 0.5 % — SIGNIFICANT CHANGE UP (ref 0–1)
BILIRUB SERPL-MCNC: 0.5 MG/DL — SIGNIFICANT CHANGE UP (ref 0.2–1.2)
BUN SERPL-MCNC: 44 MG/DL — HIGH (ref 10–20)
CALCIUM SERPL-MCNC: 8.2 MG/DL — LOW (ref 8.5–10.1)
CHLORIDE SERPL-SCNC: 108 MMOL/L — SIGNIFICANT CHANGE UP (ref 98–110)
CO2 SERPL-SCNC: 27 MMOL/L — SIGNIFICANT CHANGE UP (ref 17–32)
CREAT SERPL-MCNC: 1.5 MG/DL — SIGNIFICANT CHANGE UP (ref 0.7–1.5)
CULTURE RESULTS: SIGNIFICANT CHANGE UP
EOSINOPHIL # BLD AUTO: 0.27 K/UL — SIGNIFICANT CHANGE UP (ref 0–0.7)
EOSINOPHIL NFR BLD AUTO: 4.9 % — SIGNIFICANT CHANGE UP (ref 0–8)
GLUCOSE SERPL-MCNC: 121 MG/DL — HIGH (ref 70–99)
HCT VFR BLD CALC: 26.7 % — LOW (ref 42–52)
HCT VFR BLD CALC: 28.8 % — LOW (ref 42–52)
HGB BLD-MCNC: 8.3 G/DL — LOW (ref 14–18)
HGB BLD-MCNC: 8.7 G/DL — LOW (ref 14–18)
IMM GRANULOCYTES NFR BLD AUTO: 0.7 % — HIGH (ref 0.1–0.3)
LYMPHOCYTES # BLD AUTO: 0.76 K/UL — LOW (ref 1.2–3.4)
LYMPHOCYTES # BLD AUTO: 13.7 % — LOW (ref 20.5–51.1)
MAGNESIUM SERPL-MCNC: 2.1 MG/DL — SIGNIFICANT CHANGE UP (ref 1.8–2.4)
MCHC RBC-ENTMCNC: 25.8 PG — LOW (ref 27–31)
MCHC RBC-ENTMCNC: 26.3 PG — LOW (ref 27–31)
MCHC RBC-ENTMCNC: 30.2 G/DL — LOW (ref 32–37)
MCHC RBC-ENTMCNC: 31.1 G/DL — LOW (ref 32–37)
MCV RBC AUTO: 84.5 FL — SIGNIFICANT CHANGE UP (ref 80–94)
MCV RBC AUTO: 85.5 FL — SIGNIFICANT CHANGE UP (ref 80–94)
MONOCYTES # BLD AUTO: 0.65 K/UL — HIGH (ref 0.1–0.6)
MONOCYTES NFR BLD AUTO: 11.8 % — HIGH (ref 1.7–9.3)
NEUTROPHILS # BLD AUTO: 3.78 K/UL — SIGNIFICANT CHANGE UP (ref 1.4–6.5)
NEUTROPHILS NFR BLD AUTO: 68.4 % — SIGNIFICANT CHANGE UP (ref 42.2–75.2)
NRBC # BLD: 0 /100 WBCS — SIGNIFICANT CHANGE UP (ref 0–0)
NRBC # BLD: 0 /100 WBCS — SIGNIFICANT CHANGE UP (ref 0–0)
PLATELET # BLD AUTO: 53 K/UL — LOW (ref 130–400)
PLATELET # BLD AUTO: 65 K/UL — LOW (ref 130–400)
POTASSIUM SERPL-MCNC: 4.3 MMOL/L — SIGNIFICANT CHANGE UP (ref 3.5–5)
POTASSIUM SERPL-SCNC: 4.3 MMOL/L — SIGNIFICANT CHANGE UP (ref 3.5–5)
PROT SERPL-MCNC: 5.9 G/DL — LOW (ref 6–8)
RBC # BLD: 3.16 M/UL — LOW (ref 4.7–6.1)
RBC # BLD: 3.37 M/UL — LOW (ref 4.7–6.1)
RBC # FLD: 16.4 % — HIGH (ref 11.5–14.5)
RBC # FLD: 16.5 % — HIGH (ref 11.5–14.5)
SODIUM SERPL-SCNC: 144 MMOL/L — SIGNIFICANT CHANGE UP (ref 135–146)
SPECIMEN SOURCE: SIGNIFICANT CHANGE UP
WBC # BLD: 5.53 K/UL — SIGNIFICANT CHANGE UP (ref 4.8–10.8)
WBC # BLD: 5.56 K/UL — SIGNIFICANT CHANGE UP (ref 4.8–10.8)
WBC # FLD AUTO: 5.53 K/UL — SIGNIFICANT CHANGE UP (ref 4.8–10.8)
WBC # FLD AUTO: 5.56 K/UL — SIGNIFICANT CHANGE UP (ref 4.8–10.8)

## 2021-02-18 PROCEDURE — 99233 SBSQ HOSP IP/OBS HIGH 50: CPT

## 2021-02-18 PROCEDURE — 71045 X-RAY EXAM CHEST 1 VIEW: CPT | Mod: 26

## 2021-02-18 RX ORDER — HEPARIN SODIUM 5000 [USP'U]/ML
600 INJECTION INTRAVENOUS; SUBCUTANEOUS
Qty: 25000 | Refills: 0 | Status: DISCONTINUED | OUTPATIENT
Start: 2021-02-18 | End: 2021-02-19

## 2021-02-18 RX ADMIN — CARBIDOPA AND LEVODOPA 1 TABLET(S): 25; 100 TABLET ORAL at 12:31

## 2021-02-18 RX ADMIN — CARBIDOPA AND LEVODOPA 1 TABLET(S): 25; 100 TABLET ORAL at 18:23

## 2021-02-18 RX ADMIN — ATORVASTATIN CALCIUM 40 MILLIGRAM(S): 80 TABLET, FILM COATED ORAL at 21:26

## 2021-02-18 RX ADMIN — CEFEPIME 100 MILLIGRAM(S): 1 INJECTION, POWDER, FOR SOLUTION INTRAMUSCULAR; INTRAVENOUS at 05:33

## 2021-02-18 RX ADMIN — PRAMIPEXOLE DIHYDROCHLORIDE 0.5 MILLIGRAM(S): 0.12 TABLET ORAL at 05:30

## 2021-02-18 RX ADMIN — CHLORHEXIDINE GLUCONATE 1 APPLICATION(S): 213 SOLUTION TOPICAL at 05:31

## 2021-02-18 RX ADMIN — PRAMIPEXOLE DIHYDROCHLORIDE 0.5 MILLIGRAM(S): 0.12 TABLET ORAL at 12:32

## 2021-02-18 RX ADMIN — Medication 0.6 MILLIGRAM(S): at 12:31

## 2021-02-18 RX ADMIN — CEFEPIME 100 MILLIGRAM(S): 1 INJECTION, POWDER, FOR SOLUTION INTRAMUSCULAR; INTRAVENOUS at 18:23

## 2021-02-18 RX ADMIN — POLYETHYLENE GLYCOL 3350 17 GRAM(S): 17 POWDER, FOR SOLUTION ORAL at 12:32

## 2021-02-18 RX ADMIN — SENNA PLUS 2 TABLET(S): 8.6 TABLET ORAL at 21:26

## 2021-02-18 RX ADMIN — CARBIDOPA AND LEVODOPA 1 TABLET(S): 25; 100 TABLET ORAL at 05:30

## 2021-02-18 RX ADMIN — HEPARIN SODIUM 6 UNIT(S)/HR: 5000 INJECTION INTRAVENOUS; SUBCUTANEOUS at 13:35

## 2021-02-18 RX ADMIN — PRAMIPEXOLE DIHYDROCHLORIDE 0.5 MILLIGRAM(S): 0.12 TABLET ORAL at 18:22

## 2021-02-18 RX ADMIN — HEPARIN SODIUM 6 UNIT(S)/HR: 5000 INJECTION INTRAVENOUS; SUBCUTANEOUS at 19:45

## 2021-02-18 RX ADMIN — PANTOPRAZOLE SODIUM 40 MILLIGRAM(S): 20 TABLET, DELAYED RELEASE ORAL at 05:30

## 2021-02-18 NOTE — PROGRESS NOTE ADULT - ASSESSMENT
ASSESSMENT:  Septic shock; Due to suspected aspiration PNA vs. possible cystitis; Hx of Prostatitis  Sinus bradycardia with 1st degree AVN block; Possibly exacerbated with acidosis; resolved  Prerenal azotemia & transaminitis; Likely due to shock/hypoperfusion; resolved  Pancytopenia; Attributed to acute infectious illness(es); stable  Left shoulder dislocation  Stage III sacral ulcer present on admission; Right heel DTI present on admission    PMHx: Parkinson's disease; Dementia with likely poor functional status at baseline; DVT on AC; DLD; Gout; Constipation    ASSESSMENT:  Follow up finalized culture results and repeat Procalcitonin given recurrent temps; Source remains unclear as CXR is improving; May be due to antibiotics?; Will follow up with infectious disease; No longer on pressors  Follow up with orthopedic surgery recommendations with regard to dislocated shoulder  Continue daily wound care and off loading protocol per CCU nursing staff  Pancytopenia assessed by Hematology and attributed to infectious disease related BM suppression  Continue with full AC for DVT; Family could not provide details regarding the DVT  Continue home medications for Parkinson's disease    DVT PPx: On AC for DVT  GI PPx: Not indicated  Activity: Bedrest; likely baseline given ulcers  Diet: Continue with NG feed; Assessed by S+S    FULL CODE

## 2021-02-18 NOTE — PROGRESS NOTE ADULT - SUBJECTIVE AND OBJECTIVE BOX
CC.  AMS  HPI.  Patient appears to be comfortable.    Offers no new complaints      Vital Signs Last 24 Hrs  T(C): 37.6 (02-18-21 @ 07:01), Max: 38.5 (02-17-21 @ 15:05)  T(F): 99.7 (02-18-21 @ 07:01), Max: 101.3 (02-17-21 @ 15:05)  HR: 62 (02-18-21 @ 07:09) (55 - 67)  BP: 108/54 (02-18-21 @ 07:09) (87/50 - 123/58)  BP(mean): 78 (02-18-21 @ 07:09) (63 - 83)  RR: 20 (02-18-21 @ 07:09) (14 - 30)  SpO2: 97% (02-18-21 @ 07:09) (95% - 100%)        PHYSICAL EXAM-  GENERAL: NAD, chronic ill appearing male  HEAD:  Atraumatic, Normocephalic  EYES: EOMI, PERRLA, conjunctiva and sclera clear  NECK: Supple, No JVD, Normal thyroid  NERVOUS SYSTEM:  Alert moving all extremities  CHEST/LUNG: + rhonchi with good air entry.  HEART: Regular rate and rhythm; No murmurs, rubs, or gallops  ABDOMEN: Soft, Nontender, Nondistended; Bowel sounds present  EXTREMITIES:   No clubbing, cyanosis, or edema  SKIN: No rashes or lesions                                  8.3    5.53  )-----------( 53       ( 18 Feb 2021 10:54 )             26.7     02-18    144  |  108  |  44<H>  ----------------------------<  121<H>  4.3   |  27  |  1.5    Ca    8.2<L>      18 Feb 2021 10:54  Phos  3.1     02-17  Mg     2.1     02-18    TPro  5.9<L>  /  Alb  2.9<L>  /  TBili  0.5  /  DBili  x   /  AST  55<H>  /  ALT  15  /  AlkPhos  146<H>  02-18            PTT - ( 18 Feb 2021 10:54 )  PTT:46.7 sec        MEDICATIONS  (STANDING):  atorvastatin 40 milliGRAM(s) Oral at bedtime  carbidopa/levodopa  25/250 1 Tablet(s) Oral four times a day  carbidopa/levodopa CR 25/100 1 Tablet(s) Oral at bedtime  cefepime   IVPB 1000 milliGRAM(s) IV Intermittent every 12 hours  chlorhexidine 4% Liquid 1 Application(s) Topical <User Schedule>  colchicine 0.6 milliGRAM(s) Oral daily  heparin  Infusion 600 Unit(s)/Hr (6 mL/Hr) IV Continuous <Continuous>  pantoprazole   Suspension 40 milliGRAM(s) Oral before breakfast  polyethylene glycol 3350 17 Gram(s) Oral daily  pramipexole 0.5 milliGRAM(s) Oral four times a day  senna 2 Tablet(s) Oral at bedtime  sodium chloride 0.45%. 1000 milliLiter(s) (75 mL/Hr) IV Continuous <Continuous>    MEDICATIONS  (PRN):  acetaminophen   Tablet .. 650 milliGRAM(s) Oral every 6 hours PRN Temp greater or equal to 38.5C (101.3F)          Imaging Personally Reviewed:     [x ] YES  [ ] NO    Consultant(s) Notes Reviewed:  [x ] YES  [ ] NO    Care Discussed with Consultants/Other Providers [x ] YES  [ ] NO

## 2021-02-18 NOTE — PROGRESS NOTE ADULT - ASSESSMENT
IMPRESSION:  alter mental status secondary to metabolic encephalopathy improved somewhat  sepsis   transaminitis   PNA likely bacterial  post COVID  parkinson / dementia at baseline  ABEBE   DVT hx         SUGGEST:      CNS:    continue home meds for PArk    HEENT: oral care     PULMONARY: keep pox > 92 %   CXR reviewed improving      CARDIOVASCULAR:   cont hold lasix   taper pressors  keep I=O      GI: GI prophylaxis.    npo   place NG for feed     RENAL:   renal f/u   speak with renal; regarding inappropriate large urine output        INFECTIOUS DISEASE:   cefpime finish course  f/u cx   recheck procal  may have temps from abx  ID F/U    HEMATOLOGICAL:  DVT prophylaxis. heparin follow PTT     ENDOCRINE:  Follow up FS.  Insulin protocol if needed.

## 2021-02-18 NOTE — PROGRESS NOTE ADULT - SUBJECTIVE AND OBJECTIVE BOX
DAILY PROGRESS NOTE  ===========================================================    Patient Information:  NIEVES LABOY  /  71y  /  Male  /  MRN#: [id number]    Hospital Day: 6d     |:::::::::::::::::::::::::::| SUBJECTIVE |:::::::::::::::::::::::::::|    OVERNIGHT EVENTS: None reported; Mild temp.  TODAY: Patient was seen today at bedside. Review of systems could not be obtained due to the patients condition(s).    |:::::::::::::::::::::::::::| OBJECTIVE |:::::::::::::::::::::::::::|    VITAL SIGNS: Last 24 Hours  T(C): 37.6 (18 Feb 2021 07:01), Max: 38.5 (17 Feb 2021 15:05)  T(F): 99.7 (18 Feb 2021 07:01), Max: 101.3 (17 Feb 2021 15:05)  HR: 62 (18 Feb 2021 07:09) (55 - 67)  BP: 108/54 (18 Feb 2021 07:09) (87/50 - 130/62)  BP(mean): 78 (18 Feb 2021 07:09) (63 - 89)  RR: 20 (18 Feb 2021 07:09) (14 - 30)  SpO2: 97% (18 Feb 2021 07:09) (95% - 100%)    02-17-21 @ 07:01  -  02-18-21 @ 07:00  --------------------------------------------------------  IN: 3665 mL / OUT: 1220 mL / NET: 2445 mL    PHYSICAL EXAM:  GENERAL:   Awake and responds to physical stimuli; Not following commands.  HEENT:  Head NC/AT; Conjunctivae pink, Sclera anicteric; Oral mucosa moist.  CARDIO:   Regular rate; Regular rhythm; S1 & S2.  RESP:   No rales, wheezing, or rhonchi appreciated.  GI:   Soft; NT/ND; BS; No guarding; No rebound tenderness.  EXT:   Strength no assessed; Edema in the LE; Limited movement in Left arm compared to Right likely due to known dislocation.  SKIN:   Stage 3 sacral wound and R-heel DTI unchanged.    LAB RESULTS:        PENDING NEW LABS TODAY                  8.3    5.53  )-----------( 53       ( 18 Feb 2021 10:54 )             26.7     02-17    144  |  108  |  41<H>  ----------------------------<  95  4.6   |  26  |  1.7<H>    Ca    8.6      17 Feb 2021 05:53  Phos  3.1     02-17  Mg     2.1     02-17    TPro  6.3  /  Alb  3.1<L>  /  TBili  0.8  /  DBili  x   /  AST  59<H>  /  ALT  22  /  AlkPhos  173<H>  02-17    PTT - ( 17 Feb 2021 05:53 )  PTT:50.5 sec    MICROBIOLOGY:  None to report; Follow up final    RADIOLOGY:  CXR improved    ALLERGIES:  No Known Allergies    ===========================================================

## 2021-02-18 NOTE — CONSULT NOTE ADULT - ASSESSMENT
ORTHOPEDIC SURGERY CONSULT NOTE  - TELEHEALTH NOTE    71 year old [place] speaking Male with past medical history of Parkinson disease, HLD, HTN, H/O DVT on Eliquis and Lasix presents to ED with altered mental status. Admitted on 2/12/2021 to CCU.     Per chart, Patient still altered possible from underlying cardiac or metabolic pathology.   Per CCU team, active medical problems include:   Septic shock; Due to suspected aspiration PNA vs. possible cystitis; Hx of Prostatitis  Sinus bradycardia with 1st degree AVN block; Possibly exacerbated with acidosis; resolved  Prerenal azotemia & transaminitis; Likely due to shock/hypoperfusion; resolved  Pancytopenia; Attributed to acute infectious illness(es); stable  Left shoulder dislocation  Stage III sacral ulcer present on admission; Right heel DTI present on admission    Orthopedic surgery consulted for eval and management of L shoulder dislocation.     Imaging:  XR Chest / CT Chest:   - demonstrated dislocation of L shoulder, unable to determine chronicity   - evidence of possible Fx along L scapula     A/P: 71M w/ PMHx of Parkinson's dementia, currently in Septic shock, admitted to the CCU, w/ a L shoulder dislocation. Cannot rule out possible fx-dislocation of the L shoulder based on incomplete imaging.   Will need following imaging completed for complete evaluation   [] CT L shoulder w/o contrast  [] XR of L shoulder, 3 views (AP, Lateral), axillary)  - please order L shoulder axillary view separately    Sling to LUE  Pain control     Please contact orthopedic team once above requested imaging is complete  ortho to follow, please page w/ any questions or concerns

## 2021-02-18 NOTE — PROGRESS NOTE ADULT - SUBJECTIVE AND OBJECTIVE BOX
Patient is a 71y old  Male who presents with a chief complaint of Altered Mental Status (2021 14:00)        HPI:  71 year old Maori speaking Male with past medical history of Parkinson disease, HLD, HTN, H/O DVT on Eliquis and Lasix, H/O COVID presents to ED with altered mental status for 2 days.     As per family patient at baseline is able to speak and has his on and off days but since last 2 days has not been able to speak and more lethargic than baseline. As per daughter at the bedside, patient has "bad days but this is the worst we have seen" Patient bright to ED for non resolution of symptoms. Family denies any fevers, chills, rigors, cough, shortness of breath, loss of consciousness, incontinence or any other complaints.     In ED patient hypertensive to 170s and bradycardic to 30s. EKG showed sinus bradycardia with 1st degree AV block. Electrolytes were normal. Patient given atropine without response, he was given dobutamine and HR increased with wide complex tachycardia. Patient placed on Dopamine with HR improving to the 50s. CT head done for AMS and shows no acute changes. Patient at time of interview minimally verbal but appeared in no apparent discomfort. (2021 17:17)    Pt evaluated on AM rounds.  Interval Events: No overnight events. Family notified team that the patient had dislocated his L shoulder. Re evaluation of CXRs confirms this is the case.    REVIEW OF SYSTEMS:   see HPI      OBJECTIVE:  ICU Vital Signs Last 24 Hrs  T(C): 37.6 (2021 07:01), Max: 38.5 (2021 15:05)  T(F): 99.7 (2021 07:01), Max: 101.3 (2021 15:05)  HR: 62 (2021 07:09) (55 - 67)  BP: 108/54 (2021 07:09) (87/50 - 130/62)  BP(mean): 78 (2021 07:09) (63 - 89)  ABP: --  ABP(mean): --  RR: 20 (2021 07:09) (13 - 30)  SpO2: 97% (2021 07:09) (95% - 100%)        02-17 @ 07:01  -   @ 07:00  --------------------------------------------------------  IN: 3665 mL / OUT: 1220 mL / NET: 2445 mL      CAPILLARY BLOOD GLUCOSE            PHYSICAL EXAM:     · CONSTITUTIONAL:   not septic appearing,   well nourished,   NAD    · ENMT:   Airway patent,   Nasal mucosa clear.  Mouth with normal mucosa.   No thrush    · EYES:   Clear bilaterally,   pupils equal,   round and reactive to light.    · CARDIAC:   Normal rate,   regular rhythm.    Heart sounds S1, S2.   No murmurs, no rubs or gallops on auscultation  no edema        CAROTID:   normal systolic impulse  no bruits    · RESPIRATORY:   rales  normal chest expansion  no retractions or use of accessory muscles  palpation of chest is normal with no fremitus  percussion of chest demonstrates no hyperresonance or dullness    · GASTROINTESTINAL:  Abdomen soft,   non-tender,   + BS  liver/spleen not palpable    · MUSCULOSKELETAL:   no clubbing, cyanosis      · NEUROLOGICAL:   unchanged      · SKIN:   Skin normal color for race,   warm, dry   No evidence of rash.    · PSYCHIATRIC:   stable  no threat to self or others      · HEME LYMPH:   no splenomegaly.  No cervical  lymphadenopathy.  no inguinal lymphadenopathy    HOSPITAL MEDICATIONS:  MEDICATIONS  (STANDING):  atorvastatin 40 milliGRAM(s) Oral at bedtime  carbidopa/levodopa  25/250 1 Tablet(s) Oral four times a day  carbidopa/levodopa CR 25/100 1 Tablet(s) Oral at bedtime  cefepime   IVPB 1000 milliGRAM(s) IV Intermittent every 12 hours  chlorhexidine 4% Liquid 1 Application(s) Topical <User Schedule>  colchicine 0.6 milliGRAM(s) Oral daily  heparin  Infusion 1000 Unit(s)/Hr (5 mL/Hr) IV Continuous <Continuous>  pantoprazole   Suspension 40 milliGRAM(s) Oral before breakfast  polyethylene glycol 3350 17 Gram(s) Oral daily  pramipexole 0.5 milliGRAM(s) Oral four times a day  senna 2 Tablet(s) Oral at bedtime  sodium chloride 0.45%. 1000 milliLiter(s) (75 mL/Hr) IV Continuous <Continuous>    MEDICATIONS  (PRN):  acetaminophen   Tablet .. 650 milliGRAM(s) Oral every 6 hours PRN Temp greater or equal to 38.5C (101.3F)    sodium chloride 0.9% Bolus:   500 milliLiter(s), IV Bolus, once, infuse over 1 Hour(s), Stop After 1 Doses  Provider's Contact #: [phone]  sodium chloride 0.45%.: Solution, 1000 milliLiter(s) infuse at 75 mL/Hr  sodium chloride 0.9% Bolus:   1000 milliLiter(s), IV Bolus, once, infuse over 90 Minute(s), Stop After 1 Doses  sodium chloride 0.9% Bolus:   250 milliLiter(s), IV Bolus, once, infuse over 15 Minute(s), Stop After 1 Doses  Special Instructions: for cheetah monitor  lactated ringers.: Solution, 1000 milliLiter(s) infuse at 70 mL/Hr  lactated ringers Bolus:   500 milliLiter(s), IV Bolus, once, infuse over 1 Hour(s), Stop After 1 Doses  lactated ringers Bolus:   500 milliLiter(s), IV Bolus, once, infuse over 30 Minute(s), Stop After 1 Doses  lactated ringers.: Solution, 500 milliLiter(s) infuse at 1000 mL/Hr  lactated ringers Bolus:   1000 milliLiter(s), IV Bolus, once, infuse over 60 Minute(s), Stop After 1 Doses  Provider's Contact #: [phone]      LABS:                        9.3    5.85  )-----------( 57       ( 2021 05:53 )             30.7         144  |  108  |  41<H>  ----------------------------<  95  4.6   |  26  |  1.7<H>    Ca    8.6      2021 05:53  Phos  3.1       Mg     2.1         TPro  6.3  /  Alb  3.1<L>  /  TBili  0.8  /  DBili  x   /  AST  59<H>  /  ALT  22  /  AlkPhos  173<H>      PTT - ( 2021 05:53 )  PTT:50.5 sec  Urinalysis Basic - ( 2021 09:33 )    Color: Yellow / Appearance: Clear / S.025 / pH: x  Gluc: x / Ketone: Negative  / Bili: Negative / Urobili: 1.0 mg/dL   Blood: x / Protein: 30 mg/dL / Nitrite: Negative   Leuk Esterase: Trace / RBC: 3-5 /HPF / WBC 3-5 /HPF   Sq Epi: x / Non Sq Epi: Occasional /HPF / Bacteria: Few                        RADIOLOGY: I personally reviewed latest CXR and other pertinent films.

## 2021-02-19 LAB
ALBUMIN SERPL ELPH-MCNC: 3.1 G/DL — LOW (ref 3.5–5.2)
ALP SERPL-CCNC: 152 U/L — HIGH (ref 30–115)
ALT FLD-CCNC: 9 U/L — SIGNIFICANT CHANGE UP (ref 0–41)
ANION GAP SERPL CALC-SCNC: 5 MMOL/L — LOW (ref 7–14)
APTT BLD: 48 SEC — HIGH (ref 27–39.2)
AST SERPL-CCNC: 48 U/L — HIGH (ref 0–41)
BASOPHILS # BLD AUTO: 0.03 K/UL — SIGNIFICANT CHANGE UP (ref 0–0.2)
BASOPHILS NFR BLD AUTO: 0.7 % — SIGNIFICANT CHANGE UP (ref 0–1)
BILIRUB SERPL-MCNC: 0.3 MG/DL — SIGNIFICANT CHANGE UP (ref 0.2–1.2)
BUN SERPL-MCNC: 39 MG/DL — HIGH (ref 10–20)
CALCIUM SERPL-MCNC: 8.2 MG/DL — LOW (ref 8.5–10.1)
CHLORIDE SERPL-SCNC: 110 MMOL/L — SIGNIFICANT CHANGE UP (ref 98–110)
CO2 SERPL-SCNC: 28 MMOL/L — SIGNIFICANT CHANGE UP (ref 17–32)
CREAT SERPL-MCNC: 1.2 MG/DL — SIGNIFICANT CHANGE UP (ref 0.7–1.5)
EOSINOPHIL # BLD AUTO: 0.2 K/UL — SIGNIFICANT CHANGE UP (ref 0–0.7)
EOSINOPHIL NFR BLD AUTO: 4.6 % — SIGNIFICANT CHANGE UP (ref 0–8)
GLUCOSE SERPL-MCNC: 133 MG/DL — HIGH (ref 70–99)
HCT VFR BLD CALC: 27.1 % — LOW (ref 42–52)
HGB BLD-MCNC: 8.2 G/DL — LOW (ref 14–18)
IMM GRANULOCYTES NFR BLD AUTO: 0.7 % — HIGH (ref 0.1–0.3)
INR BLD: 1.12 RATIO — SIGNIFICANT CHANGE UP (ref 0.65–1.3)
LYMPHOCYTES # BLD AUTO: 0.58 K/UL — LOW (ref 1.2–3.4)
LYMPHOCYTES # BLD AUTO: 13.4 % — LOW (ref 20.5–51.1)
MCHC RBC-ENTMCNC: 25.5 PG — LOW (ref 27–31)
MCHC RBC-ENTMCNC: 30.3 G/DL — LOW (ref 32–37)
MCV RBC AUTO: 84.4 FL — SIGNIFICANT CHANGE UP (ref 80–94)
MONOCYTES # BLD AUTO: 0.42 K/UL — SIGNIFICANT CHANGE UP (ref 0.1–0.6)
MONOCYTES NFR BLD AUTO: 9.7 % — HIGH (ref 1.7–9.3)
NEUTROPHILS # BLD AUTO: 3.08 K/UL — SIGNIFICANT CHANGE UP (ref 1.4–6.5)
NEUTROPHILS NFR BLD AUTO: 70.9 % — SIGNIFICANT CHANGE UP (ref 42.2–75.2)
NRBC # BLD: 0 /100 WBCS — SIGNIFICANT CHANGE UP (ref 0–0)
PLATELET # BLD AUTO: 70 K/UL — LOW (ref 130–400)
POTASSIUM SERPL-MCNC: 4.3 MMOL/L — SIGNIFICANT CHANGE UP (ref 3.5–5)
POTASSIUM SERPL-SCNC: 4.3 MMOL/L — SIGNIFICANT CHANGE UP (ref 3.5–5)
PROCALCITONIN SERPL-MCNC: 0.27 NG/ML — HIGH (ref 0.02–0.1)
PROT SERPL-MCNC: 6.4 G/DL — SIGNIFICANT CHANGE UP (ref 6–8)
PROTHROM AB SERPL-ACNC: 12.9 SEC — HIGH (ref 9.95–12.87)
RBC # BLD: 3.21 M/UL — LOW (ref 4.7–6.1)
RBC # FLD: 16.4 % — HIGH (ref 11.5–14.5)
SODIUM SERPL-SCNC: 143 MMOL/L — SIGNIFICANT CHANGE UP (ref 135–146)
WBC # BLD: 4.34 K/UL — LOW (ref 4.8–10.8)
WBC # FLD AUTO: 4.34 K/UL — LOW (ref 4.8–10.8)

## 2021-02-19 PROCEDURE — 99233 SBSQ HOSP IP/OBS HIGH 50: CPT

## 2021-02-19 RX ORDER — ENOXAPARIN SODIUM 100 MG/ML
90 INJECTION SUBCUTANEOUS
Refills: 0 | Status: DISCONTINUED | OUTPATIENT
Start: 2021-02-19 | End: 2021-02-25

## 2021-02-19 RX ADMIN — PRAMIPEXOLE DIHYDROCHLORIDE 0.5 MILLIGRAM(S): 0.12 TABLET ORAL at 05:08

## 2021-02-19 RX ADMIN — CARBIDOPA AND LEVODOPA 1 TABLET(S): 25; 100 TABLET ORAL at 05:08

## 2021-02-19 RX ADMIN — CEFEPIME 100 MILLIGRAM(S): 1 INJECTION, POWDER, FOR SOLUTION INTRAMUSCULAR; INTRAVENOUS at 05:08

## 2021-02-19 RX ADMIN — CARBIDOPA AND LEVODOPA 1 TABLET(S): 25; 100 TABLET ORAL at 00:03

## 2021-02-19 RX ADMIN — PANTOPRAZOLE SODIUM 40 MILLIGRAM(S): 20 TABLET, DELAYED RELEASE ORAL at 05:08

## 2021-02-19 RX ADMIN — ENOXAPARIN SODIUM 90 MILLIGRAM(S): 100 INJECTION SUBCUTANEOUS at 16:43

## 2021-02-19 RX ADMIN — PRAMIPEXOLE DIHYDROCHLORIDE 0.5 MILLIGRAM(S): 0.12 TABLET ORAL at 00:03

## 2021-02-19 RX ADMIN — CHLORHEXIDINE GLUCONATE 1 APPLICATION(S): 213 SOLUTION TOPICAL at 05:08

## 2021-02-19 NOTE — PROGRESS NOTE ADULT - ASSESSMENT
ASSESSMENT:  Septic shock; Due to suspected aspiration PNA vs. possible cystitis; Hx of Prostatitis  Sinus bradycardia with 1st degree AVN block; Possibly exacerbated with acidosis; resolved  Prerenal azotemia & transaminitis; Likely due to shock/hypoperfusion; resolved  Pancytopenia; Attributed to acute infectious illness(es); stable  Left shoulder dislocation, present on admission  Stage III sacral ulcer present on admission; Right heel DTI present on admission    PMHx: Parkinson's disease; Dementia with likely poor functional status at baseline; DVT on AC; DLD; Gout; Constipation    ASSESSMENT:  Cultures negative; Fever stopped; Improved Xray; Stop antibiotics and monitor  Follow up with orthopedic surgery recommendations; Recall with completion of requested imaging; Rule out Fx  Continue daily wound care and off loading protocol per CCU nursing staff  Pancytopenia assessed by Hematology and attributed to infectious disease related BM suppression  Continue with full AC for DVT; Family could not provide details regarding the DVT; Witch to Lovenox and start bridging back to Coumadin tonight  Continue home medications for Parkinson's disease    DVT PPx: On AC for DVT  GI PPx: Not indicated  Activity: Bedrest; likely baseline given ulcers  Diet: Continue with NG feed; Assessed by S+S    FULL CODE       ASSESSMENT:  Septic shock; Due to suspected aspiration PNA vs. possible cystitis; Hx of Prostatitis  Sinus bradycardia with 1st degree AVN block; Possibly exacerbated with acidosis; resolved  Prerenal azotemia & transaminitis; Likely due to shock/hypoperfusion; resolved  Pancytopenia; Attributed to acute infectious illness(es); stable  Left shoulder dislocation, present on admission  Stage III sacral ulcer present on admission; Right heel DTI present on admission    PMHx: Parkinson's disease; Dementia with likely poor functional status at baseline; DVT on AC; DLD; Gout; Constipation    ASSESSMENT:  Cultures negative; Fever stopped; Improved Xray; Stop antibiotics and monitor  Follow up with orthopedic surgery recommendations; Recall with completion of requested imaging; Rule out Fx  Continue daily wound care and off loading protocol per CCU nursing staff  Pancytopenia assessed by Hematology and attributed to infectious disease related BM suppression  Continue with full AC for DVT; Family could not provide details regarding the DVT; Witch to Lovenox and restart Eliquis on D/C  Continue home medications for Parkinson's disease    Downgrade to Med/Surg; Will need S+S to revisit with improved mentation or will need permanent alternate source.    DVT PPx: On AC for DVT  GI PPx: Not indicated  Activity: Bedrest; likely baseline given ulcers  Diet: Continue with NG feed; Assessed by S+S    FULL CODE       Presented to the ED from home with a complaint of AMS (change from baseline; Baseline suspected to be poor given sacral ulcer present on admission). He was found to have Sinus bradycardia with 1st degree AV node block and Septic shock secondary to suspected aspiration PNA vs. cystitis. He im improved with antibiotics throughout his course in CCU with improving CXR and decreasing procalcitonin. His course has been complicated by pancytopenia (likely due to infection), anemia (no evidence of bleed; ?iatrogenic?), and pre-renal azotemia. He is also receiving workup for his left shoulder dislocation that was present on admission.     ASSESSMENT:  Septic shock; Due to suspected aspiration PNA vs. possible cystitis; Hx of Prostatitis  Sinus bradycardia with 1st degree AVN block; Possibly exacerbated with acidosis; resolved  Prerenal azotemia & transaminitis; Likely due to shock/hypoperfusion; resolved  Pancytopenia; Attributed to acute infectious illness(es); stable  Left shoulder dislocation, present on admission  Stage III sacral ulcer present on admission; Right heel DTI present on admission    PMHx: Parkinson's disease; Dementia with likely poor functional status at baseline; DVT on AC; DLD; Gout; Constipation    ASSESSMENT:  Cultures negative; Fever stopped; Improved Xray; Stop antibiotics and monitor  Follow up with orthopedic surgery recommendations; Recall with completion of requested imaging; Rule out Fx  Continue daily wound care and off loading protocol per CCU nursing staff  Pancytopenia assessed by Hematology and attributed to infectious disease related BM suppression  Continue with full AC for DVT; Family could not provide details regarding the DVT; Witch to Lovenox and restart Eliquis on D/C  Continue home medications for Parkinson's disease    Downgrade to Med/Surg; Will need S+S to revisit with improved mentation or will need permanent alternate source.    DVT PPx: On AC for DVT  GI PPx: Not indicated  Activity: Bedrest; likely baseline given ulcers  Diet: Continue with NG feed; Assessed by S+S    FULL CODE       Presented to the ED from home with a complaint of AMS (change from baseline; Baseline suspected to be poor given sacral ulcer present on admission). He was found to have Sinus bradycardia with 1st degree AV node block and Septic shock secondary to suspected aspiration PNA vs. cystitis. He im improved with antibiotics throughout his course in CCU with improving CXR and decreasing procalcitonin. His course has been complicated by pancytopenia (likely due to infection), anemia (no evidence of bleed; ?iatrogenic?), and pre-renal azotemia. He is also receiving workup for his left shoulder dislocation that was present on admission.     ASSESSMENT:  Septic shock; Due to suspected aspiration PNA vs. possible cystitis; Hx of Prostatitis  Sinus bradycardia with 1st degree AVN block; Possibly exacerbated with acidosis; resolved  Acute-on-chronic anemia; No evidence of bleed; Possibly iatrogenic  Prerenal azotemia & transaminitis; Likely due to shock/hypoperfusion; resolved  Pancytopenia; Attributed to acute infectious illness(es); stable  Left shoulder dislocation, present on admission  Stage III sacral ulcer present on admission; Right heel DTI present on admission    PMHx: Parkinson's disease; Dementia with likely poor functional status at baseline; DVT on AC; DLD; Gout; Constipation    ASSESSMENT:  Cultures negative; Fever stopped; Improved Xray; Stop antibiotics and monitor  Follow up with orthopedic surgery recommendations; Recall with completion of requested imaging; Rule out Fx  Monitor CBC in AM given the Hgb drop from 10.7 to 8 since admission; Iron studied unremarkable on 02/13/2021  Continue daily wound care and off loading protocol per CCU nursing staff  Pancytopenia assessed by Hematology and attributed to infectious disease related BM suppression  Continue with full AC for DVT; Family could not provide details regarding the DVT; Witch to Lovenox and restart Eliquis on D/C  Continue home medications for Parkinson's disease    Downgrade to Med/Surg; Will need S+S to revisit with improved mentation or will need permanent alternate source.    DVT PPx: On AC for DVT  GI PPx: Not indicated  Activity: Bedrest; likely baseline given ulcers  Diet: Continue with NG feed; Assessed by S+S    FULL CODE

## 2021-02-19 NOTE — PROGRESS NOTE ADULT - SUBJECTIVE AND OBJECTIVE BOX
DAILY PROGRESS NOTE  ===========================================================    Patient Information:  NIEVES LABOY  /  71y  /  Male  /  MRN#: [id number]    Hospital Day: 6d     |:::::::::::::::::::::::::::| SUBJECTIVE |:::::::::::::::::::::::::::|    OVERNIGHT EVENTS: None reported; self-removed NG tube.  TODAY: Patient was seen today at bedside. Review of systems could not be obtained due to the patients condition(s).    |:::::::::::::::::::::::::::| OBJECTIVE |:::::::::::::::::::::::::::|    VITAL SIGNS: Last 24 Hours  ICU Vital Signs Last 24 Hrs  T(C): 37.4 (19 Feb 2021 09:29), Max: 37.4 (18 Feb 2021 20:00)  T(F): 99.4 (19 Feb 2021 09:29), Max: 99.4 (18 Feb 2021 20:00)  HR: 58 (19 Feb 2021 09:30) (53 - 62)  BP: 129/60 (19 Feb 2021 09:30) (112/56 - 151/70)  BP(mean): 87 (19 Feb 2021 09:30) (76 - 103)  ABP: --  ABP(mean): --  RR: 20 (19 Feb 2021 09:30) (14 - 51)  SpO2: 92% (19 Feb 2021 09:30) (91% - 100%)    PHYSICAL EXAM:  GENERAL:   Awake and responds to physical stimuli; Not following commands.  HEENT:  Head NC/AT; Conjunctivae pink, Sclera anicteric; Oral mucosa moist.  CARDIO:   Regular rate; Regular rhythm; S1 & S2.  RESP:   No rales, wheezing, or rhonchi appreciated.  GI:   Soft; NT/ND; BS; No guarding; No rebound tenderness.  EXT:   Strength no assessed; Edema in the LE; Limited movement in Left arm compared to Right likely due to known dislocation.  SKIN:   Stage 3 sacral wound and R-heel DTI unchanged.    LAB RESULTS:                        8.2    4.34  )-----------( 70       ( 19 Feb 2021 06:36 )             27.1     02-19-21 @ 06:36    143  |  110  |  39<H>             -------------------------< 133<H>     4.3  |  28  | 1.2    eGFR AA: 70  eGFR N-AA: 60    Calcium: 8.2<L>  Phosphorus: --  Magnesium: --    AST: 48<H>    ALT: 9  AlkPhos: 152<H>  Protein: 6.4  Albumin: 3.1<L>  TBili: 0.3  D-Bili: --    MICROBIOLOGY:  Negative cultures    RADIOLOGY:  CXR improved from admission    ALLERGIES:  No Known Allergies    ===========================================================

## 2021-02-19 NOTE — PROGRESS NOTE ADULT - SUBJECTIVE AND OBJECTIVE BOX
Patient is a 71y old  Male who presents with a chief complaint of Altered Mental Status (18 Feb 2021 21:35)        HPI:  71 year old Ukrainian speaking Male with past medical history of Parkinson disease, HLD, HTN, H/O DVT on Eliquis and Lasix, H/O COVID presents to ED with altered mental status for 2 days.     As per family patient at baseline is able to speak and has his on and off days but since last 2 days has not been able to speak and more lethargic than baseline. As per daughter at the bedside, patient has "bad days but this is the worst we have seen" Patient bright to ED for non resolution of symptoms. Family denies any fevers, chills, rigors, cough, shortness of breath, loss of consciousness, incontinence or any other complaints.     In ED patient hypertensive to 170s and bradycardic to 30s. EKG showed sinus bradycardia with 1st degree AV block. Electrolytes were normal. Patient given atropine without response, he was given dobutamine and HR increased with wide complex tachycardia. Patient placed on Dopamine with HR improving to the 50s. CT head done for AMS and shows no acute changes. Patient at time of interview minimally verbal but appeared in no apparent discomfort. (12 Feb 2021 17:17)    Pt evaluated on AM rounds.  Interval Events: No overnight events.    REVIEW OF SYSTEMS:   see HPI      OBJECTIVE:  ICU Vital Signs Last 24 Hrs  T(C): 37.4 (19 Feb 2021 09:29), Max: 37.4 (18 Feb 2021 20:00)  T(F): 99.4 (19 Feb 2021 09:29), Max: 99.4 (18 Feb 2021 20:00)  HR: 58 (19 Feb 2021 09:30) (53 - 62)  BP: 129/60 (19 Feb 2021 09:30) (112/56 - 151/70)  BP(mean): 87 (19 Feb 2021 09:30) (76 - 103)  RR: 20 (19 Feb 2021 09:30) (14 - 51)  SpO2: 92% (19 Feb 2021 09:30) (91% - 100%)        02-18 @ 07:01  -  02-19 @ 07:00  --------------------------------------------------------  IN: 2424 mL / OUT: 2405 mL / NET: 19 mL    02-19 @ 07:01  - 02-19 @ 10:30  --------------------------------------------------------  IN: 18 mL / OUT: 251 mL / NET: -233 mL      CAPILLARY BLOOD GLUCOSE            PHYSICAL EXAM:     · CONSTITUTIONAL:   not septic appearing,   well nourished,   NAD    · ENMT:   Airway patent,   Nasal mucosa clear.  Mouth with normal mucosa.   No thrush    · EYES:   Clear bilaterally,   pupils equal,   round and reactive to light.    · CARDIAC:   Normal rate,   regular rhythm.    Heart sounds S1, S2.   No murmurs, no rubs or gallops on auscultation  no edema        CAROTID:   normal systolic impulse  no bruits    · RESPIRATORY:   rales  normal chest expansion  tachypnea,  no retractions or use of accessory muscles  palpation of chest is normal with no fremitus  percussion of chest demonstrates no hyperresonance or dullness    · GASTROINTESTINAL:  Abdomen soft,   non-tender,   + BS  liver/spleen not palpable    · MUSCULOSKELETAL:   no clubbing, cyanosis      · NEUROLOGICAL:   more awake responsive      · SKIN:   Skin normal color for race,   warm, dry   No evidence of rash.      · HEME LYMPH:   no splenomegaly.  No cervical  lymphadenopathy.  no inguinal lymphadenopathy    HOSPITAL MEDICATIONS:  MEDICATIONS  (STANDING):  atorvastatin 40 milliGRAM(s) Oral at bedtime  carbidopa/levodopa  25/250 1 Tablet(s) Oral four times a day  carbidopa/levodopa CR 25/100 1 Tablet(s) Oral at bedtime  chlorhexidine 4% Liquid 1 Application(s) Topical <User Schedule>  colchicine 0.6 milliGRAM(s) Oral daily  heparin  Infusion 600 Unit(s)/Hr (6 mL/Hr) IV Continuous <Continuous>  pantoprazole   Suspension 40 milliGRAM(s) Oral before breakfast  polyethylene glycol 3350 17 Gram(s) Oral daily  pramipexole 0.5 milliGRAM(s) Oral four times a day  senna 2 Tablet(s) Oral at bedtime    MEDICATIONS  (PRN):  acetaminophen   Tablet .. 650 milliGRAM(s) Oral every 6 hours PRN Temp greater or equal to 38.5C (101.3F)    sodium chloride 0.9% Bolus:   500 milliLiter(s), IV Bolus, once, infuse over 1 Hour(s), Stop After 1 Doses  Provider's Contact #: [phone]  sodium chloride 0.45%.: Solution, 1000 milliLiter(s) infuse at 75 mL/Hr  sodium chloride 0.9% Bolus:   1000 milliLiter(s), IV Bolus, once, infuse over 90 Minute(s), Stop After 1 Doses  sodium chloride 0.9% Bolus:   250 milliLiter(s), IV Bolus, once, infuse over 15 Minute(s), Stop After 1 Doses  Special Instructions: for cheetah monitor  lactated ringers.: Solution, 1000 milliLiter(s) infuse at 70 mL/Hr  lactated ringers Bolus:   500 milliLiter(s), IV Bolus, once, infuse over 1 Hour(s), Stop After 1 Doses  lactated ringers Bolus:   500 milliLiter(s), IV Bolus, once, infuse over 30 Minute(s), Stop After 1 Doses  lactated ringers.: Solution, 500 milliLiter(s) infuse at 1000 mL/Hr  lactated ringers Bolus:   1000 milliLiter(s), IV Bolus, once, infuse over 60 Minute(s), Stop After 1 Doses  Provider's Contact #: [phone]      LABS:                        8.2    4.34  )-----------( 70       ( 19 Feb 2021 06:36 )             27.1     02-19    143  |  110  |  39<H>  ----------------------------<  133<H>  4.3   |  28  |  1.2    Ca    8.2<L>      19 Feb 2021 06:36  Mg     2.1     02-18    TPro  6.4  /  Alb  3.1<L>  /  TBili  0.3  /  DBili  x   /  AST  48<H>  /  ALT  9   /  AlkPhos  152<H>  02-19    PT/INR - ( 19 Feb 2021 06:36 )   PT: 12.90 sec;   INR: 1.12 ratio         PTT - ( 19 Feb 2021 06:36 )  PTT:48.0 sec                      RADIOLOGY: I personally reviewed latest CXR and other pertinent films.

## 2021-02-19 NOTE — SWALLOW BEDSIDE ASSESSMENT ADULT - SWALLOW EVAL: FUNCTIONAL LEVEL AT TIME OF EVAL
arousable but w/ difficulty sustaining arousal, allowed oral care but did not accept tsp held to lips, no apparent pain
awake, slow processing, no apparent pain, recently transferred from CCU
lethargic, poor secretion management (suctioned by RN), febrile, no apparent pain, delayed processing, followed occasional 1 step directions
lethargic
lethargic

## 2021-02-19 NOTE — PROGRESS NOTE ADULT - SUBJECTIVE AND OBJECTIVE BOX
SUBJECTIVE:    Patient is a 71y old Male who presents with a chief complaint of Altered Mental Status (19 Feb 2021 10:58)    Currently admitted to medicine with the primary diagnosis of Bradycardia    Today is hospital day 7d. This morning he is resting comfortably in bed and reports no new issues or overnight events.     PAST MEDICAL & SURGICAL HISTORY  Cataract    Prostatitis    Dyslipidemia    Gout    Hypertension    Parkinson disease    No significant past surgical history      SOCIAL HISTORY:  Negative for smoking/alcohol/drug use.     ALLERGIES:  No Known Allergies    MEDICATIONS:  STANDING MEDICATIONS  atorvastatin 40 milliGRAM(s) Oral at bedtime  carbidopa/levodopa  25/250 1 Tablet(s) Oral four times a day  carbidopa/levodopa CR 25/100 1 Tablet(s) Oral at bedtime  chlorhexidine 4% Liquid 1 Application(s) Topical <User Schedule>  colchicine 0.6 milliGRAM(s) Oral daily  enoxaparin Injectable 90 milliGRAM(s) SubCutaneous <User Schedule>  pantoprazole   Suspension 40 milliGRAM(s) Oral before breakfast  polyethylene glycol 3350 17 Gram(s) Oral daily  pramipexole 0.5 milliGRAM(s) Oral four times a day  senna 2 Tablet(s) Oral at bedtime    PRN MEDICATIONS  acetaminophen   Tablet .. 650 milliGRAM(s) Oral every 6 hours PRN    VITALS:   T(F): 99.5  HR: 60  BP: 160/65  RR: 18  SpO2: 92%    LABS:                        8.2    4.34  )-----------( 70       ( 19 Feb 2021 06:36 )             27.1     02-19    143  |  110  |  39<H>  ----------------------------<  133<H>  4.3   |  28  |  1.2    Ca    8.2<L>      19 Feb 2021 06:36  Mg     2.1     02-18    TPro  6.4  /  Alb  3.1<L>  /  TBili  0.3  /  DBili  x   /  AST  48<H>  /  ALT  9   /  AlkPhos  152<H>  02-19    PT/INR - ( 19 Feb 2021 06:36 )   PT: 12.90 sec;   INR: 1.12 ratio         PTT - ( 19 Feb 2021 06:36 )  PTT:48.0 sec      RADIOLOGY:  Xray Chest 1 View- PORTABLE-Routine (Xray Chest 1 View- PORTABLE-Routine in AM.) (02.18.21 @ 05:58) >  No radiographic evidence of acute cardiopulmonary disease.      PHYSICAL EXAM:  GEN: Awake and responds to physical stimuli; Not following commands.  LUNGS: Clear to auscultation bilaterally   HEART: S1/S2 present. RRR.   ABD: Soft, non-tender, non-distended. Bowel sounds present  EXT: Strength no assessed; Edema in the LE; Limited movement in Left arm compared to Right likely due to known dislocation.  SKIN: Stage 3 sacral wound and R-heel DTI unchanged.  NEURO: AAOX1

## 2021-02-19 NOTE — PROGRESS NOTE ADULT - ASSESSMENT
71 year old Uzbek speaking Male with past medical history of Parkinson disease, HLD, HTN, H/O DVT on Eliquis and Lasix, H/O COVID presented to ED 4 days prior to admission to the [place] site for evaluation of two days of altered mental status. As per family patient at baseline pt is able to speak and has his on and off days but for 2 days prior to presentation had not been able to speak and more lethargic than baseline.  Family denies any fevers, chills, rigors, cough, shortness of breath, loss of consciousness, incontinence or any other complaints.     In ED patient hypertensive to 170s and bradycardic to 30s. EKG showed sinus bradycardia with 1st degree AV block. Electrolytes were normal. Patient given atropine without response, he was given dobutamine and HR increased with wide complex tachycardia. Patient placed on Dopamine with HR improving to the 50s. CT head done for AMS and shows no acute changes.     Pt was treated in the [facility] ICU for 4 days for sepsis, pancytopenia and ABEBE secondary to pneumonia vs UTI on IV Levophed and antibiotics and transferred to the [facility] Unit.     # Metabolic encephalopathy secondary to PNA  # Sepsis resolved   - c/w cefepime  - blood and urine cultures unremarkable    # Parkinson's Disease  # Dementia   - c/w sinemet  - likely poor functional status at baseline  - physical therapy    # Pancytopenia  - likely secondary to underlying sepsis   - trend cbc     # DVT  - on therapeutic Lovenox     # ABEBE on CKD 3  - resolved  - Cr at baseline     # Sinus bradycardia with 1st degree AVN block  - resolved    # Stage III sacral ulcer   - wound care    # Downgrade to medical floor     # Diet: c/w NG feed    # Full code

## 2021-02-19 NOTE — CHART NOTE - NSCHARTNOTEFT_GEN_A_CORE
Registered Dietitian Follow-Up     Patient Profile Reviewed                           Yes [x]   No []     Nutrition History Previously Obtained        Yes [x]  No []       Pertinent Subjective Information: Per RN pt pulled out NGT again. Team tried to reinsert NGT, gave pt water trial of water and seemed like he tolerated. s/s to re-evaluate pt again. When receiving tube feeds, no GI issues per RN. Pt was tolerating formula fine.      Pertinent Medical Interventions:  AMS secondary to metabolic encephalopathy  Septic shock d/t suspected aspiration PNA vs. possible cystitis    Diet order: NPO with TF --however no feeds as pt pulled out NGT   Osmolite 1.5kcal @ 350ml q6hrs      Anthropometrics:  - Ht. 67"  - Wt.  197lbs (2/15); dosing  201.5lbs (2/15)  208.7lbs (2/13)  224.6lbs (2/12)   - %wt change  - BMI 30.9 -- based on dosing   - IBW 148lbs     Pertinent Lab Data: (2/19) H/H 8.2/27.1, BUN 39, , A1c 5.1% (2/13)      Pertinent Meds: lovenox, protonix, lipitor, miralax, senna      Physical Findings:  - Appearance: lethargic, no- verbal. +1 R+L foot, leg edema noted per RN flow sheets  - GI function: LBM 2/19   - Tubes: NGT   - Oral/Mouth cavity: s/s re-evaluated pt (2/17) continue NPO w/ alternate means of nutrition/hydration  - Skin: DTI R heel, stage 3 pressure injury on coccyx per RN flow sheets     Nutrition Requirements  Weight Used: 67.1kg IBW -- continued from initial assessment (2/16)      kcal: 2013-2348kcal (30-35kcal/kg IBW) -- PU considered, bmi >30  protein: 81-101g (1.2-1.5g/kg IBW) -- PU considred, bmi >30  fluids: per CCU team      Nutrient Intake: current tube feed regimen is meeting 96% of est. energy needs and 97% est. protein needs         [x] Previous Nutrition Diagnosis:  1.  Inadequate Oral Intake            [] Ongoing          [x] Resolved  2. Increased Nutrient Needs            [x] Ongoing          [] Resolved       Nutrition Intervention: enteral nutrition     Goal/Expected Outcome: Pt to continue to meet % of estimated energy needs via enteral nutrition within 4 days      Indicator/Monitoring: energy intake, body composition, NFPF    Recommendations:   If pt fails s/s eval can continue with Osmolite 1.5 @ 350ml q6hrs to provide 1400ml formula, 2100kcal, 88g protein,  1067 free water, 100% of RDIs. Maintain aspiration precautions. Additional water flushes per LIP.  If pt passes s/s eval, diet consistent per their recs and RD to f/u to assess need for oral nutrition supplements

## 2021-02-19 NOTE — PROGRESS NOTE ADULT - ASSESSMENT
IMPRESSION:  alter mental status secondary to metabolic encephalopathy improved   sepsis   transaminitis   PNA likely bacterial  post COVID  parkinson / dementia at baseline  ABEBE   DVT hx         SUGGEST:      CNS: avoid sedatives    continue home meds for PArk    HEENT: oral care     PULMONARY: keep pox > 92 %   CXR reviewed improving      CARDIOVASCULAR:   cont hold lasix   taper pressors  keep I=O      GI: GI prophylaxis.    diet  place NG for feed     RENAL:   keep i=o    INFECTIOUS DISEASE:   finish abx course  f/u cx   may have temps from abx    HEMATOLOGICAL:  DVT prophylaxis. heparin follow PTT     ENDOCRINE:  Follow up FS.  Insulin protocol if needed.    ortho f/u

## 2021-02-19 NOTE — SWALLOW BEDSIDE ASSESSMENT ADULT - COMMENTS
pt received lethargic, obtunded. pt is not a candidate for PO trials 2/2 high risk of aspiration.
pt received lethargic, obtunded. pt is not a candidate for PO trials 2/2 high risk of aspiration.
[employer] Ur[facility]/English  #[id number] assisted w/ session.  Pt made some attempts to verbalize, but they were unintelligible to the .
RN reports that pt pulled NGT.

## 2021-02-19 NOTE — PHARMACOTHERAPY INTERVENTION NOTE - COMMENTS
Day 7 of cefepime for suspected UTI (pos UA) with possible other sources. Notified provider today is day 7 with decreasing procalcitonin, rec to consider DC.

## 2021-02-20 LAB
ALBUMIN SERPL ELPH-MCNC: 3.2 G/DL — LOW (ref 3.5–5.2)
ALP SERPL-CCNC: 169 U/L — HIGH (ref 30–115)
ALT FLD-CCNC: 61 U/L — HIGH (ref 0–41)
ANION GAP SERPL CALC-SCNC: 10 MMOL/L — SIGNIFICANT CHANGE UP (ref 7–14)
AST SERPL-CCNC: 45 U/L — HIGH (ref 0–41)
BASOPHILS # BLD AUTO: 0.05 K/UL — SIGNIFICANT CHANGE UP (ref 0–0.2)
BASOPHILS NFR BLD AUTO: 0.9 % — SIGNIFICANT CHANGE UP (ref 0–1)
BILIRUB SERPL-MCNC: 0.5 MG/DL — SIGNIFICANT CHANGE UP (ref 0.2–1.2)
BUN SERPL-MCNC: 35 MG/DL — HIGH (ref 10–20)
CALCIUM SERPL-MCNC: 8.9 MG/DL — SIGNIFICANT CHANGE UP (ref 8.5–10.1)
CHLORIDE SERPL-SCNC: 108 MMOL/L — SIGNIFICANT CHANGE UP (ref 98–110)
CO2 SERPL-SCNC: 27 MMOL/L — SIGNIFICANT CHANGE UP (ref 17–32)
CREAT SERPL-MCNC: 1.2 MG/DL — SIGNIFICANT CHANGE UP (ref 0.7–1.5)
EOSINOPHIL # BLD AUTO: 0.21 K/UL — SIGNIFICANT CHANGE UP (ref 0–0.7)
EOSINOPHIL NFR BLD AUTO: 3.9 % — SIGNIFICANT CHANGE UP (ref 0–8)
GLUCOSE SERPL-MCNC: 77 MG/DL — SIGNIFICANT CHANGE UP (ref 70–99)
HCT VFR BLD CALC: 33.1 % — LOW (ref 42–52)
HGB BLD-MCNC: 10.1 G/DL — LOW (ref 14–18)
IMM GRANULOCYTES NFR BLD AUTO: 0.7 % — HIGH (ref 0.1–0.3)
LYMPHOCYTES # BLD AUTO: 0.91 K/UL — LOW (ref 1.2–3.4)
LYMPHOCYTES # BLD AUTO: 17 % — LOW (ref 20.5–51.1)
MCHC RBC-ENTMCNC: 25.6 PG — LOW (ref 27–31)
MCHC RBC-ENTMCNC: 30.5 G/DL — LOW (ref 32–37)
MCV RBC AUTO: 83.8 FL — SIGNIFICANT CHANGE UP (ref 80–94)
MONOCYTES # BLD AUTO: 0.5 K/UL — SIGNIFICANT CHANGE UP (ref 0.1–0.6)
MONOCYTES NFR BLD AUTO: 9.3 % — SIGNIFICANT CHANGE UP (ref 1.7–9.3)
NEUTROPHILS # BLD AUTO: 3.65 K/UL — SIGNIFICANT CHANGE UP (ref 1.4–6.5)
NEUTROPHILS NFR BLD AUTO: 68.2 % — SIGNIFICANT CHANGE UP (ref 42.2–75.2)
NRBC # BLD: 0 /100 WBCS — SIGNIFICANT CHANGE UP (ref 0–0)
PLATELET # BLD AUTO: 138 K/UL — SIGNIFICANT CHANGE UP (ref 130–400)
POTASSIUM SERPL-MCNC: 4.7 MMOL/L — SIGNIFICANT CHANGE UP (ref 3.5–5)
POTASSIUM SERPL-SCNC: 4.7 MMOL/L — SIGNIFICANT CHANGE UP (ref 3.5–5)
PROT SERPL-MCNC: 7.1 G/DL — SIGNIFICANT CHANGE UP (ref 6–8)
RBC # BLD: 3.95 M/UL — LOW (ref 4.7–6.1)
RBC # FLD: 15.8 % — HIGH (ref 11.5–14.5)
SODIUM SERPL-SCNC: 145 MMOL/L — SIGNIFICANT CHANGE UP (ref 135–146)
WBC # BLD: 5.36 K/UL — SIGNIFICANT CHANGE UP (ref 4.8–10.8)
WBC # FLD AUTO: 5.36 K/UL — SIGNIFICANT CHANGE UP (ref 4.8–10.8)

## 2021-02-20 PROCEDURE — 71045 X-RAY EXAM CHEST 1 VIEW: CPT | Mod: 26

## 2021-02-20 PROCEDURE — 73200 CT UPPER EXTREMITY W/O DYE: CPT | Mod: 26,LT

## 2021-02-20 PROCEDURE — 73030 X-RAY EXAM OF SHOULDER: CPT | Mod: 26,LT

## 2021-02-20 PROCEDURE — 99233 SBSQ HOSP IP/OBS HIGH 50: CPT

## 2021-02-20 RX ORDER — CARBIDOPA AND LEVODOPA 25; 100 MG/1; MG/1
1 TABLET ORAL
Refills: 0 | Status: DISCONTINUED | OUTPATIENT
Start: 2021-02-20 | End: 2021-02-20

## 2021-02-20 RX ORDER — CARBIDOPA AND LEVODOPA 25; 100 MG/1; MG/1
2 TABLET ORAL
Refills: 0 | Status: DISCONTINUED | OUTPATIENT
Start: 2021-02-20 | End: 2021-02-25

## 2021-02-20 RX ORDER — SODIUM CHLORIDE 9 MG/ML
1000 INJECTION, SOLUTION INTRAVENOUS
Refills: 0 | Status: DISCONTINUED | OUTPATIENT
Start: 2021-02-20 | End: 2021-03-10

## 2021-02-20 RX ORDER — CARBIDOPA AND LEVODOPA 25; 100 MG/1; MG/1
2 TABLET ORAL
Refills: 0 | Status: DISCONTINUED | OUTPATIENT
Start: 2021-02-20 | End: 2021-02-20

## 2021-02-20 RX ORDER — CARBIDOPA AND LEVODOPA 25; 100 MG/1; MG/1
1 TABLET ORAL
Refills: 0 | Status: DISCONTINUED | OUTPATIENT
Start: 2021-02-20 | End: 2021-02-25

## 2021-02-20 RX ADMIN — ENOXAPARIN SODIUM 90 MILLIGRAM(S): 100 INJECTION SUBCUTANEOUS at 21:33

## 2021-02-20 RX ADMIN — ATORVASTATIN CALCIUM 40 MILLIGRAM(S): 80 TABLET, FILM COATED ORAL at 21:34

## 2021-02-20 RX ADMIN — SODIUM CHLORIDE 75 MILLILITER(S): 9 INJECTION, SOLUTION INTRAVENOUS at 17:58

## 2021-02-20 RX ADMIN — CARBIDOPA AND LEVODOPA 1 TABLET(S): 25; 100 TABLET ORAL at 21:34

## 2021-02-20 RX ADMIN — ENOXAPARIN SODIUM 90 MILLIGRAM(S): 100 INJECTION SUBCUTANEOUS at 04:58

## 2021-02-20 RX ADMIN — PRAMIPEXOLE DIHYDROCHLORIDE 0.5 MILLIGRAM(S): 0.12 TABLET ORAL at 21:34

## 2021-02-20 RX ADMIN — SENNA PLUS 2 TABLET(S): 8.6 TABLET ORAL at 21:34

## 2021-02-20 RX ADMIN — CARBIDOPA AND LEVODOPA 2 TABLET(S): 25; 100 TABLET ORAL at 21:34

## 2021-02-20 NOTE — PROGRESS NOTE ADULT - SUBJECTIVE AND OBJECTIVE BOX
SUBJECTIVE:    Patient is a 71y old Male who presents with a chief complaint of Altered Mental Status (19 Feb 2021 16:30)    Currently admitted to medicine with the primary diagnosis of Bradycardia       Today is hospital day 8d. Patient minimally responsive, stares at ceiling       PAST MEDICAL & SURGICAL HISTORY  Cataract    Prostatitis    Dyslipidemia    Gout    Hypertension    Parkinson disease    No significant past surgical history      SOCIAL HISTORY:  Negative for smoking/alcohol/drug use.     ALLERGIES:  No Known Allergies    MEDICATIONS:  STANDING MEDICATIONS  atorvastatin 40 milliGRAM(s) Oral at bedtime  carbidopa/levodopa  25/250 1 Tablet(s) Oral four times a day  carbidopa/levodopa CR 25/100 1 Tablet(s) Oral at bedtime  chlorhexidine 4% Liquid 1 Application(s) Topical <User Schedule>  colchicine 0.6 milliGRAM(s) Oral daily  enalapril 5 milliGRAM(s) Oral daily  enoxaparin Injectable 90 milliGRAM(s) SubCutaneous <User Schedule>  pantoprazole   Suspension 40 milliGRAM(s) Oral before breakfast  polyethylene glycol 3350 17 Gram(s) Oral daily  pramipexole 0.5 milliGRAM(s) Oral four times a day  senna 2 Tablet(s) Oral at bedtime    PRN MEDICATIONS  acetaminophen   Tablet .. 650 milliGRAM(s) Oral every 6 hours PRN    VITALS:   T(F): 99.5  HR: 62  BP: 155/88  RR: 16  SpO2: 99%    LABS:                        10.1   5.36  )-----------( 138      ( 20 Feb 2021 07:05 )             33.1     02-20    145  |  108  |  35<H>  ----------------------------<  77  4.7   |  27  |  1.2    Ca    8.9      20 Feb 2021 07:05    TPro  7.1  /  Alb  3.2<L>  /  TBili  0.5  /  DBili  x   /  AST  45<H>  /  ALT  61<H>  /  AlkPhos  169<H>  02-20    PT/INR - ( 19 Feb 2021 06:36 )   PT: 12.90 sec;   INR: 1.12 ratio         PTT - ( 19 Feb 2021 06:36 )  PTT:48.0 sec        RADIOLOGY:  CT Shoulder No Cont, Left (02.20.21):  Acute on chronic anterior dislocation of humeral head relative to glenoid; chronic fragmentation and partial resorption of coracoid process; chronic glenoid Bankart fracture. Glenohumeral joint effusion. Axillary neurovascular bundle lies anterior to the dislocated humeral head without evidence of injury/surrounding inflammation    Xray Shoulder 2 Views, Left (02.20.21)  There is persistent anterior dislocation of the left humeral head lying over the scapular body with surrounding bony fragments. There is chronic fracture resorption of the coracoid process. AC joint degenerative changes present.    CT Head No Cont (02.14.21)  No evidence of acute transcortical infarct, acute intracranial hemorrhage, or mass effect.      PHYSICAL EXAM:  GEN: responds to name  LUNGS: Clear to auscultation bilaterally   HEART: S1/S2 present. RRR.   ABD: Soft, non-tender, non-distended. Bowel sounds present  EXT: NC/NC/NE/2+PP/EVANGELISTA  NEURO: AAOX1, unable to assess  Skin: stage 3 sacral ulcer     SUBJECTIVE:    Patient is a 71y old Male who presents with a chief complaint of Altered Mental Status (19 Feb 2021 16:30)    Currently admitted to medicine with the primary diagnosis of Bradycardia     Today is hospital day 8d. Patient minimally responsive, but able to communicated slowly. Son at bedside.       PAST MEDICAL & SURGICAL HISTORY  Cataract    Prostatitis    Dyslipidemia    Gout    Hypertension    Parkinson disease    No significant past surgical history      SOCIAL HISTORY:  Negative for smoking/alcohol/drug use.     ALLERGIES:  No Known Allergies    MEDICATIONS:  STANDING MEDICATIONS  atorvastatin 40 milliGRAM(s) Oral at bedtime  carbidopa/levodopa  25/250 1 Tablet(s) Oral four times a day  carbidopa/levodopa CR 25/100 1 Tablet(s) Oral at bedtime  chlorhexidine 4% Liquid 1 Application(s) Topical <User Schedule>  colchicine 0.6 milliGRAM(s) Oral daily  enalapril 5 milliGRAM(s) Oral daily  enoxaparin Injectable 90 milliGRAM(s) SubCutaneous <User Schedule>  pantoprazole   Suspension 40 milliGRAM(s) Oral before breakfast  polyethylene glycol 3350 17 Gram(s) Oral daily  pramipexole 0.5 milliGRAM(s) Oral four times a day  senna 2 Tablet(s) Oral at bedtime    PRN MEDICATIONS  acetaminophen   Tablet .. 650 milliGRAM(s) Oral every 6 hours PRN    VITALS:   T(F): 99.5  HR: 62  BP: 155/88  RR: 16  SpO2: 99%    LABS:                        10.1   5.36  )-----------( 138      ( 20 Feb 2021 07:05 )             33.1     02-20    145  |  108  |  35<H>  ----------------------------<  77  4.7   |  27  |  1.2    Ca    8.9      20 Feb 2021 07:05    TPro  7.1  /  Alb  3.2<L>  /  TBili  0.5  /  DBili  x   /  AST  45<H>  /  ALT  61<H>  /  AlkPhos  169<H>  02-20    PT/INR - ( 19 Feb 2021 06:36 )   PT: 12.90 sec;   INR: 1.12 ratio         PTT - ( 19 Feb 2021 06:36 )  PTT:48.0 sec        RADIOLOGY:  CT Shoulder No Cont, Left (02.20.21):  Acute on chronic anterior dislocation of humeral head relative to glenoid; chronic fragmentation and partial resorption of coracoid process; chronic glenoid Bankart fracture. Glenohumeral joint effusion. Axillary neurovascular bundle lies anterior to the dislocated humeral head without evidence of injury/surrounding inflammation    Xray Shoulder 2 Views, Left (02.20.21)  There is persistent anterior dislocation of the left humeral head lying over the scapular body with surrounding bony fragments. There is chronic fracture resorption of the coracoid process. AC joint degenerative changes present.    CT Head No Cont (02.14.21)  No evidence of acute transcortical infarct, acute intracranial hemorrhage, or mass effect.      PHYSICAL EXAM:  GEN: responds to name  LUNGS: Clear to auscultation bilaterally   HEART: S1/S2 present. RRR.   ABD: Soft, non-tender, non-distended. Bowel sounds present  EXT: NC/NC/NE/2+PP/EVANGELISTA  NEURO: AAOX1, unable to assess  Skin: stage 3 sacral ulcer

## 2021-02-20 NOTE — CHART NOTE - NSCHARTNOTEFT_GEN_A_CORE
I Reviewed CXR for NGT placement, NGT is below the level of the hemidiaphragm.  May use NGT for Feeds and meds.   Awaiting official results.

## 2021-02-20 NOTE — PROGRESS NOTE ADULT - ASSESSMENT
71 year old Kazakh speaking Male with past medical history of Parkinson disease, HLD, HTN, H/O DVT on Eliquis and Lasix, H/O COVID presented to ED 4 days prior to admission to the [place] site for evaluation of two days of altered mental status. As per family patient at baseline pt is able to speak and has his on and off days but for 2 days prior to presentation had not been able to speak and more lethargic than baseline.  Family denies any fevers, chills, rigors, cough, shortness of breath, loss of consciousness, incontinence or any other complaints.     In ED patient hypertensive to 170s and bradycardic to 30s. EKG showed sinus bradycardia with 1st degree AV block. Electrolytes were normal. Patient given atropine without response, he was given dobutamine and HR increased with wide complex tachycardia. Patient placed on Dopamine with HR improving to the 50s. CT head done for AMS and shows no acute changes.     Pt was treated in the [place] ICU for 4 days for sepsis, pancytopenia and ABEBE secondary to pneumonia vs UTI on IV Levophed and antibiotics and transferred to the [facility] Unit.     # Parkinson's Disease  # Dementia   - patient is minimally responsive (as per daughter, at baseline pt was able to communicate, feed himself and ambulatene with walker)  - failed speech and swallow   - place NG tube   - c/w sinemet  - physical therapy  - f/u neurology recommendations    # Metabolic encephalopathy secondary to PNA  # Sepsis resolved   - CXR: no cardiopul disease noted   - blood and urine cultures unremarkable  - abx completed    # Hx of DVT  - on therapeutic Lovenox     # CKD 3    # Stage III sacral ulcer   - wound care    # Diet: c/w NG feed    # Full code 71 year old Yi speaking Male with past medical history of Parkinson disease, HLD, HTN, H/O DVT on Eliquis and Lasix, H/O COVID presented to ED 4 days prior to admission to the [place] site for evaluation of two days of altered mental status. As per family patient at baseline pt is able to speak and has his on and off days but for 2 days prior to presentation had not been able to speak and more lethargic than baseline.  Family denies any fevers, chills, rigors, cough, shortness of breath, loss of consciousness, incontinence or any other complaints.     In ED patient hypertensive to 170s and bradycardic to 30s. EKG showed sinus bradycardia with 1st degree AV block. Electrolytes were normal. Patient given atropine without response, he was given dobutamine and HR increased with wide complex tachycardia. Patient placed on Dopamine with HR improving to the 50s. CT head done for AMS and shows no acute changes.     Pt was treated in the [place] ICU for 4 days for sepsis, pancytopenia and ABEBE secondary to pneumonia vs UTI on IV Levophed and antibiotics and transferred to the [facility] Unit.     # Parkinson's Disease  # Dementia   - patient is minimally responsive (as per daughter, at baseline pt was able to communicate, feed himself and ambulate with walker)  - failed speech and swallow   - place NG tube --> restart sinemet  - start NS@75  - physical therapy  - f/u neurology recommendations    # Metabolic encephalopathy secondary to PNA  # Sepsis resolved   - CXR: no cardiopul disease noted   - blood and urine cultures unremarkable  - abx completed    # Hx of DVT  - on therapeutic Lovenox     # CKD 3    # Stage III sacral ulcer   - wound care    # Diet: c/w NG feed    # Full code

## 2021-02-20 NOTE — CHART NOTE - NSCHARTNOTEFT_GEN_A_CORE
pt seen in bed alert, comfortable, NAD  NGT placed  pt tolerated procedure well  c-xray stat  monitor vss  monitor pt

## 2021-02-21 LAB
ANION GAP SERPL CALC-SCNC: 8 MMOL/L — SIGNIFICANT CHANGE UP (ref 7–14)
BASOPHILS # BLD AUTO: 0.07 K/UL — SIGNIFICANT CHANGE UP (ref 0–0.2)
BASOPHILS NFR BLD AUTO: 1.2 % — HIGH (ref 0–1)
BUN SERPL-MCNC: 29 MG/DL — HIGH (ref 10–20)
CALCIUM SERPL-MCNC: 8.5 MG/DL — SIGNIFICANT CHANGE UP (ref 8.5–10.1)
CHLORIDE SERPL-SCNC: 111 MMOL/L — HIGH (ref 98–110)
CO2 SERPL-SCNC: 27 MMOL/L — SIGNIFICANT CHANGE UP (ref 17–32)
CREAT SERPL-MCNC: 1.2 MG/DL — SIGNIFICANT CHANGE UP (ref 0.7–1.5)
EOSINOPHIL # BLD AUTO: 0.18 K/UL — SIGNIFICANT CHANGE UP (ref 0–0.7)
EOSINOPHIL NFR BLD AUTO: 3.1 % — SIGNIFICANT CHANGE UP (ref 0–8)
GLUCOSE SERPL-MCNC: 133 MG/DL — HIGH (ref 70–99)
HCT VFR BLD CALC: 32.5 % — LOW (ref 42–52)
HGB BLD-MCNC: 10.1 G/DL — LOW (ref 14–18)
IMM GRANULOCYTES NFR BLD AUTO: 0.7 % — HIGH (ref 0.1–0.3)
LYMPHOCYTES # BLD AUTO: 0.94 K/UL — LOW (ref 1.2–3.4)
LYMPHOCYTES # BLD AUTO: 16.2 % — LOW (ref 20.5–51.1)
MAGNESIUM SERPL-MCNC: 2.2 MG/DL — SIGNIFICANT CHANGE UP (ref 1.8–2.4)
MCHC RBC-ENTMCNC: 25.8 PG — LOW (ref 27–31)
MCHC RBC-ENTMCNC: 31.1 G/DL — LOW (ref 32–37)
MCV RBC AUTO: 83.1 FL — SIGNIFICANT CHANGE UP (ref 80–94)
MONOCYTES # BLD AUTO: 0.48 K/UL — SIGNIFICANT CHANGE UP (ref 0.1–0.6)
MONOCYTES NFR BLD AUTO: 8.3 % — SIGNIFICANT CHANGE UP (ref 1.7–9.3)
NEUTROPHILS # BLD AUTO: 4.08 K/UL — SIGNIFICANT CHANGE UP (ref 1.4–6.5)
NEUTROPHILS NFR BLD AUTO: 70.5 % — SIGNIFICANT CHANGE UP (ref 42.2–75.2)
NRBC # BLD: 0 /100 WBCS — SIGNIFICANT CHANGE UP (ref 0–0)
PLATELET # BLD AUTO: 251 K/UL — SIGNIFICANT CHANGE UP (ref 130–400)
POTASSIUM SERPL-MCNC: 4.6 MMOL/L — SIGNIFICANT CHANGE UP (ref 3.5–5)
POTASSIUM SERPL-SCNC: 4.6 MMOL/L — SIGNIFICANT CHANGE UP (ref 3.5–5)
RBC # BLD: 3.91 M/UL — LOW (ref 4.7–6.1)
RBC # FLD: 15.7 % — HIGH (ref 11.5–14.5)
SODIUM SERPL-SCNC: 146 MMOL/L — SIGNIFICANT CHANGE UP (ref 135–146)
WBC # BLD: 5.79 K/UL — SIGNIFICANT CHANGE UP (ref 4.8–10.8)
WBC # FLD AUTO: 5.79 K/UL — SIGNIFICANT CHANGE UP (ref 4.8–10.8)

## 2021-02-21 PROCEDURE — 99232 SBSQ HOSP IP/OBS MODERATE 35: CPT

## 2021-02-21 PROCEDURE — 99233 SBSQ HOSP IP/OBS HIGH 50: CPT

## 2021-02-21 RX ORDER — PRAMIPEXOLE DIHYDROCHLORIDE 0.12 MG/1
0.5 TABLET ORAL
Refills: 0 | Status: DISCONTINUED | OUTPATIENT
Start: 2021-02-21 | End: 2021-02-23

## 2021-02-21 RX ADMIN — PRAMIPEXOLE DIHYDROCHLORIDE 0.5 MILLIGRAM(S): 0.12 TABLET ORAL at 17:02

## 2021-02-21 RX ADMIN — Medication 650 MILLIGRAM(S): at 17:16

## 2021-02-21 RX ADMIN — POLYETHYLENE GLYCOL 3350 17 GRAM(S): 17 POWDER, FOR SOLUTION ORAL at 10:58

## 2021-02-21 RX ADMIN — CARBIDOPA AND LEVODOPA 2 TABLET(S): 25; 100 TABLET ORAL at 17:02

## 2021-02-21 RX ADMIN — ENOXAPARIN SODIUM 90 MILLIGRAM(S): 100 INJECTION SUBCUTANEOUS at 05:01

## 2021-02-21 RX ADMIN — PRAMIPEXOLE DIHYDROCHLORIDE 0.5 MILLIGRAM(S): 0.12 TABLET ORAL at 05:01

## 2021-02-21 RX ADMIN — ENOXAPARIN SODIUM 90 MILLIGRAM(S): 100 INJECTION SUBCUTANEOUS at 17:02

## 2021-02-21 RX ADMIN — Medication 5 MILLIGRAM(S): at 05:01

## 2021-02-21 RX ADMIN — PRAMIPEXOLE DIHYDROCHLORIDE 0.5 MILLIGRAM(S): 0.12 TABLET ORAL at 10:58

## 2021-02-21 RX ADMIN — SODIUM CHLORIDE 75 MILLILITER(S): 9 INJECTION, SOLUTION INTRAVENOUS at 08:28

## 2021-02-21 RX ADMIN — PANTOPRAZOLE SODIUM 40 MILLIGRAM(S): 20 TABLET, DELAYED RELEASE ORAL at 04:59

## 2021-02-21 RX ADMIN — Medication 0.6 MILLIGRAM(S): at 10:58

## 2021-02-21 RX ADMIN — CARBIDOPA AND LEVODOPA 2 TABLET(S): 25; 100 TABLET ORAL at 13:55

## 2021-02-21 RX ADMIN — ATORVASTATIN CALCIUM 40 MILLIGRAM(S): 80 TABLET, FILM COATED ORAL at 22:21

## 2021-02-21 RX ADMIN — CHLORHEXIDINE GLUCONATE 1 APPLICATION(S): 213 SOLUTION TOPICAL at 04:59

## 2021-02-21 RX ADMIN — CARBIDOPA AND LEVODOPA 1 TABLET(S): 25; 100 TABLET ORAL at 08:52

## 2021-02-21 RX ADMIN — PRAMIPEXOLE DIHYDROCHLORIDE 0.5 MILLIGRAM(S): 0.12 TABLET ORAL at 01:20

## 2021-02-21 RX ADMIN — CARBIDOPA AND LEVODOPA 2 TABLET(S): 25; 100 TABLET ORAL at 05:01

## 2021-02-21 RX ADMIN — CARBIDOPA AND LEVODOPA 1 TABLET(S): 25; 100 TABLET ORAL at 22:21

## 2021-02-21 RX ADMIN — SENNA PLUS 2 TABLET(S): 8.6 TABLET ORAL at 22:21

## 2021-02-21 RX ADMIN — Medication 650 MILLIGRAM(S): at 08:52

## 2021-02-21 NOTE — PROGRESS NOTE ADULT - NUTRITIONAL ASSESSMENT
71 year old Spanish speaking Male with past medical history of Parkinson disease, HLD, HTN, H/O DVT on Eliquis and Lasix, H/O COVID presented to ED 4 days prior to admission to the [place] site for evaluation of two days of altered mental status. As per family patient at baseline pt is able to speak and has his on and off days but for 2 days prior to presentation had not been able to speak and more lethargic than baseline.  Family denies any fevers, chills, rigors, cough, shortness of breath, loss of consciousness, incontinence or any other complaints.     In ED patient hypertensive to 170s and bradycardic to 30s. EKG showed sinus bradycardia with 1st degree AV block. Electrolytes were normal. Patient given atropine without response, he was given dobutamine and HR increased with wide complex tachycardia. Patient placed on Dopamine with HR improving to the 50s. CT head done for AMS and shows no acute changes.     Pt was treated in the [place] ICU for 4 days for sepsis, pancytopenia and ABEBE secondary to pneumonia vs UTI on IV Levophed and antibiotics and transferred to the [facility].     # Parkinson's Disease  # Dementia   - patient more responsive then yesterday (as per daughter, at baseline pt was able to communicate, feed himself and ambulate with walker)  - failed speech and swallow --> recall to evaluate  - c/w sinemet (restarted yesterday pm - pt now more responsive)  - physical therapy  - f/u neurology recommendations    # Metabolic encephalopathy secondary to PNA  # Sepsis resolved   - CXR: no cardiopul disease noted   - blood and urine cultures unremarkable  - abx completed    # Hx of DVT  - on therapeutic Lovenox     # CKD 3    # Stage III sacral ulcer   - wound care    # Diet: c/w NG feed    # Full code 71 year old Vietnamese speaking Male with past medical history of Parkinson disease, HLD, HTN, H/O DVT on Eliquis and Lasix, H/O COVID presented to ED 4 days prior to admission to the [facility] site for evaluation of two days of altered mental status. As per family patient at baseline pt is able to speak and has his on and off days but for 2 days prior to presentation had not been able to speak and more lethargic than baseline.  Family denies any fevers, chills, rigors, cough, shortness of breath, loss of consciousness, incontinence or any other complaints.     In ED patient hypertensive to 170s and bradycardic to 30s. EKG showed sinus bradycardia with 1st degree AV block. Electrolytes were normal. Patient given atropine without response, he was given dobutamine and HR increased with wide complex tachycardia. Patient placed on Dopamine with HR improving to the 50s. CT head done for AMS and shows no acute changes.     Pt was treated in the [facility] ICU for 4 days for sepsis, pancytopenia and ABEBE secondary to pneumonia vs UTI on IV Levophed and antibiotics and transferred to the [facility].     # Parkinson's Disease  # Dementia   - patient more responsive then yesterday (as per daughter, at baseline pt was able to communicate, feed himself and ambulate with walker)  - failed speech and swallow --> recall to evaluate  - c/w sinemet (restarted yesterday pm - pt now more responsive)  - physical therapy  - REEG ordered  - f/u neurology recommendations    # Metabolic encephalopathy secondary to PNA  # Sepsis resolved   - CXR: no cardiopul disease noted   - blood and urine cultures unremarkable  - abx completed    # Hx of DVT  - on therapeutic Lovenox --> switch to Eliquis once pt able to swallow     # CKD 3    # Stage III sacral ulcer   - wound care    # Diet: c/w NG feed    # Full code  - Son and Daughter updated - agree with current mx

## 2021-02-21 NOTE — PROGRESS NOTE ADULT - SUBJECTIVE AND OBJECTIVE BOX
Neurology Follow up note    Name  NIEVES LABOY    HPI:  71 year old Serbian speaking Male with past medical history of Parkinson disease, HLD, HTN, H/O DVT on Eliquis and Lasix, H/O COVID presents to ED with altered mental status for 2 days.     As per family patient at baseline is able to speak and has his on and off days but since last 2 days has not been able to speak and more lethargic than baseline. As per daughter at the bedside, patient has "bad days but this is the worst we have seen" Patient bright to ED for non resolution of symptoms. Family denies any fevers, chills, rigors, cough, shortness of breath, loss of consciousness, incontinence or any other complaints.     In ED patient hypertensive to 170s and bradycardic to 30s. EKG showed sinus bradycardia with 1st degree AV block. Electrolytes were normal. Patient given atropine without response, he was given dobutamine and HR increased with wide complex tachycardia. Patient placed on Dopamine with HR improving to the 50s. CT head done for AMS and shows no acute changes. Patient at time of interview minimally verbal but appeared in no apparent discomfort. (12 Feb 2021 17:17)      Interval History - Patient seen and examined and prior neurology notes reviewed and prior out patient records reviewed.  Patient has not seen me in the office since 2019 but some adjustments to his parkinsons meds were made in 2020 over the phone.          Vital Signs Last 24 Hrs  T(C): 37.7 (21 Feb 2021 12:00), Max: 38 (21 Feb 2021 05:17)  T(F): 99.8 (21 Feb 2021 12:00), Max: 100.4 (21 Feb 2021 05:17)  HR: 59 (21 Feb 2021 12:00) (59 - 82)  BP: 150/67 (21 Feb 2021 12:00) (107/59 - 174/79)  BP(mean): --  RR: 16 (21 Feb 2021 12:00) (16 - 16)  SpO2: --  ICU Vital Signs Last 24 Hrs  T(C): 37.7 (21 Feb 2021 12:00), Max: 38 (21 Feb 2021 05:17)  T(F): 99.8 (21 Feb 2021 12:00), Max: 100.4 (21 Feb 2021 05:17)  HR: 59 (21 Feb 2021 12:00) (59 - 82)  BP: 150/67 (21 Feb 2021 12:00) (107/59 - 174/79)  BP(mean): --  ABP: --  ABP(mean): --  RR: 16 (21 Feb 2021 12:00) (16 - 16)  SpO2: --          Neurological Exam:   Lethargic not following commands  Grimaces in pain when touching his left arm  No resting tremor  eyes midline and move horizontally (tracking)  Plantars equivocal     Medications  acetaminophen   Tablet .. 650 milliGRAM(s) Oral every 6 hours PRN  atorvastatin 40 milliGRAM(s) Oral at bedtime  carbidopa/levodopa  25/100 1 Tablet(s) Oral <User Schedule>  carbidopa/levodopa  25/100 2 Tablet(s) Oral <User Schedule>  chlorhexidine 4% Liquid 1 Application(s) Topical <User Schedule>  colchicine 0.6 milliGRAM(s) Oral daily  dextrose 5% + sodium chloride 0.9%. 1000 milliLiter(s) IV Continuous <Continuous>  enalapril 5 milliGRAM(s) Oral daily  enoxaparin Injectable 90 milliGRAM(s) SubCutaneous <User Schedule>  pantoprazole   Suspension 40 milliGRAM(s) Oral before breakfast  polyethylene glycol 3350 17 Gram(s) Oral daily  pramipexole 0.5 milliGRAM(s) Oral four times a day  senna 2 Tablet(s) Oral at bedtime      Lab                        10.1   5.79  )-----------( 251      ( 21 Feb 2021 07:13 )             32.5     02-21    146  |  111<H>  |  29<H>  ----------------------------<  133<H>  4.6   |  27  |  1.2    Ca    8.5      21 Feb 2021 07:13  Mg     2.2     02-21    TPro  7.1  /  Alb  3.2<L>  /  TBili  0.5  /  DBili  x   /  AST  45<H>  /  ALT  61<H>  /  AlkPhos  169<H>  02-20    CAPILLARY BLOOD GLUCOSE        LIVER FUNCTIONS - ( 20 Feb 2021 07:05 )  Alb: 3.2 g/dL / Pro: 7.1 g/dL / ALK PHOS: 169 U/L / ALT: 61 U/L / AST: 45 U/L / GGT: x             Urinalysis (02.16.21 @ 09:33)    pH Urine: 6.0    Glucose Qualitative, Urine: Negative mg/dL    Blood, Urine: Small    Color: Yellow    Urine Appearance: Clear    Bilirubin: Negative    Ketone - Urine: Negative    Specific Gravity: 1.025    Protein, Urine: 30 mg/dL    Urobilinogen: 1.0 mg/dL    Nitrite: Negative    Leukocyte Esterase Concentration: Trace    Thyroid Stimulating Hormone, Serum: 2.63 uIU/mL (02.13.21 @ 01:38)    Vitamin B12, Serum: 1989 pg/mL (02.14.21 @ 05:07)    Radiology  < from: CT Head No Cont (02.14.21 @ 13:15) >  INTERPRETATION:  CLINICAL INDICATION: ams    TECHNIQUE: Axial CT scanning of the brain was obtained from the skull base to the vertex without the administration of intravenous contrast. Reformatted coronal and sagittal images were subsequently obtained and reviewed.    COMPARISON: 2/12/2021    FINDINGS:  There is no CT evidence of acute transcortical infarct. Age-related involutional changes and chronic microvascular ischemic changes.    There is no hydrocephalus, mass effect, or acute intracranial hemorrhage. No extra-axial collection. Basal cisterns are patent.    The visualized paranasal sinuses and mastoid air cells are clear.    The calvarium is intact.    Left cataract surgery.    IMPRESSION:  No evidence of acute transcortical infarct, acute intracranial hemorrhage, or mass effect.    < end of copied text >      Other studies:   < from: EEG (02.14.21 @ 10:00) >  Impression  Abnormal due to the presence of: focal slowing as above, generalized slowing as above    Clinical Correlation & Recommendations  Consistent with diffuse cerebral electrophysiological dysfunction secondary to nonspecific cause. Consistent with focal electrophysiological dysfunction secondary to nonspecific cause.    < end of copied text >          Assessment- This is a 71y year old Male presenting with confusion and appears to be encephalopathic however etiology not clear.  His Sinemet has been adjusted recently and he is     Plan  1. Continue sinemet dosing  2. Would decrease pramipexole to 0.5mg BID instead of 4 times per day  3. Consider repeating REEG or obtaining Video EEG tomorrow  4. Continue medical management  5. Call for any changes in neurological exam

## 2021-02-21 NOTE — PROGRESS NOTE ADULT - SUBJECTIVE AND OBJECTIVE BOX
SUBJECTIVE:    Patient is a 71y old Male who presents with a chief complaint of Altered Mental Status (20 Feb 2021 14:43)    Currently admitted to medicine with the primary diagnosis of Bradycardia    Today is hospital day 9d. This morning he is resting comfortably in bed. No new issues or overnight events.   Patient more responsive today.    PAST MEDICAL & SURGICAL HISTORY  Cataract    Prostatitis    Dyslipidemia    Gout    Hypertension    Parkinson disease    No significant past surgical history      SOCIAL HISTORY:  Negative for smoking/alcohol/drug use.     ALLERGIES:  No Known Allergies    MEDICATIONS:  STANDING MEDICATIONS  atorvastatin 40 milliGRAM(s) Oral at bedtime  carbidopa/levodopa  25/100 1 Tablet(s) Oral <User Schedule>  carbidopa/levodopa  25/100 2 Tablet(s) Oral <User Schedule>  chlorhexidine 4% Liquid 1 Application(s) Topical <User Schedule>  colchicine 0.6 milliGRAM(s) Oral daily  dextrose 5% + sodium chloride 0.9%. 1000 milliLiter(s) IV Continuous <Continuous>  enalapril 5 milliGRAM(s) Oral daily  enoxaparin Injectable 90 milliGRAM(s) SubCutaneous <User Schedule>  pantoprazole   Suspension 40 milliGRAM(s) Oral before breakfast  polyethylene glycol 3350 17 Gram(s) Oral daily  pramipexole 0.5 milliGRAM(s) Oral four times a day  senna 2 Tablet(s) Oral at bedtime    PRN MEDICATIONS  acetaminophen   Tablet .. 650 milliGRAM(s) Oral every 6 hours PRN    VITALS:   T(F): 100.1  HR: 82  BP: 135/72  RR: 16  SpO2: 99%    LABS:                        10.1   5.36  )-----------( 138      ( 20 Feb 2021 07:05 )             33.1     02-20    145  |  108  |  35<H>  ----------------------------<  77  4.7   |  27  |  1.2    Ca    8.9      20 Feb 2021 07:05    TPro  7.1  /  Alb  3.2<L>  /  TBili  0.5  /  DBili  x   /  AST  45<H>  /  ALT  61<H>  /  AlkPhos  169<H>  02-20      RADIOLOGY:  CT Shoulder No Cont, Left (02.20.21 @ 13:56) >  Acute on chronic anterior dislocation of humeral head relative to glenoid; chronic fragmentation and partial resorption of coracoid process; chronic glenoid Bankart fracture  Glenohumeral joint effusion  Axillary neurovascular bundle lies anterior to the dislocated humeral head without evidence of injury/surrounding inflammation      PHYSICAL EXAM:  GEN: No acute distress  LUNGS: Clear to auscultation bilaterally   HEART: S1/S2 present. RRR.   ABD: Soft, non-tender, non-distended. Bowel sounds present  EXT: NC/NC/NE/2+PP/EVANGELISTA  NEURO: AAOX3  Skin: sacral ulcer

## 2021-02-22 LAB
ANION GAP SERPL CALC-SCNC: 6 MMOL/L — LOW (ref 7–14)
BASOPHILS # BLD AUTO: 0.08 K/UL — SIGNIFICANT CHANGE UP (ref 0–0.2)
BASOPHILS NFR BLD AUTO: 1.4 % — HIGH (ref 0–1)
BUN SERPL-MCNC: 24 MG/DL — HIGH (ref 10–20)
CALCIUM SERPL-MCNC: 8.5 MG/DL — SIGNIFICANT CHANGE UP (ref 8.5–10.1)
CHLORIDE SERPL-SCNC: 112 MMOL/L — HIGH (ref 98–110)
CO2 SERPL-SCNC: 27 MMOL/L — SIGNIFICANT CHANGE UP (ref 17–32)
CREAT SERPL-MCNC: 1.1 MG/DL — SIGNIFICANT CHANGE UP (ref 0.7–1.5)
EOSINOPHIL # BLD AUTO: 0.4 K/UL — SIGNIFICANT CHANGE UP (ref 0–0.7)
EOSINOPHIL NFR BLD AUTO: 7.1 % — SIGNIFICANT CHANGE UP (ref 0–8)
GLUCOSE SERPL-MCNC: 141 MG/DL — HIGH (ref 70–99)
HCT VFR BLD CALC: 32.8 % — LOW (ref 42–52)
HGB BLD-MCNC: 10 G/DL — LOW (ref 14–18)
IMM GRANULOCYTES NFR BLD AUTO: 0.5 % — HIGH (ref 0.1–0.3)
LYMPHOCYTES # BLD AUTO: 1.09 K/UL — LOW (ref 1.2–3.4)
LYMPHOCYTES # BLD AUTO: 19.2 % — LOW (ref 20.5–51.1)
MAGNESIUM SERPL-MCNC: 2.1 MG/DL — SIGNIFICANT CHANGE UP (ref 1.8–2.4)
MCHC RBC-ENTMCNC: 25.6 PG — LOW (ref 27–31)
MCHC RBC-ENTMCNC: 30.5 G/DL — LOW (ref 32–37)
MCV RBC AUTO: 83.9 FL — SIGNIFICANT CHANGE UP (ref 80–94)
MONOCYTES # BLD AUTO: 0.41 K/UL — SIGNIFICANT CHANGE UP (ref 0.1–0.6)
MONOCYTES NFR BLD AUTO: 7.2 % — SIGNIFICANT CHANGE UP (ref 1.7–9.3)
NEUTROPHILS # BLD AUTO: 3.66 K/UL — SIGNIFICANT CHANGE UP (ref 1.4–6.5)
NEUTROPHILS NFR BLD AUTO: 64.6 % — SIGNIFICANT CHANGE UP (ref 42.2–75.2)
NRBC # BLD: 0 /100 WBCS — SIGNIFICANT CHANGE UP (ref 0–0)
PLATELET # BLD AUTO: 287 K/UL — SIGNIFICANT CHANGE UP (ref 130–400)
POTASSIUM SERPL-MCNC: 4.5 MMOL/L — SIGNIFICANT CHANGE UP (ref 3.5–5)
POTASSIUM SERPL-SCNC: 4.5 MMOL/L — SIGNIFICANT CHANGE UP (ref 3.5–5)
RBC # BLD: 3.91 M/UL — LOW (ref 4.7–6.1)
RBC # FLD: 15.9 % — HIGH (ref 11.5–14.5)
SODIUM SERPL-SCNC: 145 MMOL/L — SIGNIFICANT CHANGE UP (ref 135–146)
WBC # BLD: 5.67 K/UL — SIGNIFICANT CHANGE UP (ref 4.8–10.8)
WBC # FLD AUTO: 5.67 K/UL — SIGNIFICANT CHANGE UP (ref 4.8–10.8)

## 2021-02-22 PROCEDURE — 43752 NASAL/OROGASTRIC W/TUBE PLMT: CPT

## 2021-02-22 PROCEDURE — 99233 SBSQ HOSP IP/OBS HIGH 50: CPT

## 2021-02-22 PROCEDURE — 99232 SBSQ HOSP IP/OBS MODERATE 35: CPT

## 2021-02-22 PROCEDURE — 71045 X-RAY EXAM CHEST 1 VIEW: CPT | Mod: 26

## 2021-02-22 RX ADMIN — POLYETHYLENE GLYCOL 3350 17 GRAM(S): 17 POWDER, FOR SOLUTION ORAL at 11:39

## 2021-02-22 RX ADMIN — PRAMIPEXOLE DIHYDROCHLORIDE 0.5 MILLIGRAM(S): 0.12 TABLET ORAL at 05:06

## 2021-02-22 RX ADMIN — CARBIDOPA AND LEVODOPA 1 TABLET(S): 25; 100 TABLET ORAL at 21:17

## 2021-02-22 RX ADMIN — CARBIDOPA AND LEVODOPA 1 TABLET(S): 25; 100 TABLET ORAL at 11:40

## 2021-02-22 RX ADMIN — SENNA PLUS 2 TABLET(S): 8.6 TABLET ORAL at 21:17

## 2021-02-22 RX ADMIN — ENOXAPARIN SODIUM 90 MILLIGRAM(S): 100 INJECTION SUBCUTANEOUS at 05:06

## 2021-02-22 RX ADMIN — PANTOPRAZOLE SODIUM 40 MILLIGRAM(S): 20 TABLET, DELAYED RELEASE ORAL at 05:06

## 2021-02-22 RX ADMIN — SODIUM CHLORIDE 75 MILLILITER(S): 9 INJECTION, SOLUTION INTRAVENOUS at 11:42

## 2021-02-22 RX ADMIN — CARBIDOPA AND LEVODOPA 2 TABLET(S): 25; 100 TABLET ORAL at 17:31

## 2021-02-22 RX ADMIN — ENOXAPARIN SODIUM 90 MILLIGRAM(S): 100 INJECTION SUBCUTANEOUS at 17:31

## 2021-02-22 RX ADMIN — CARBIDOPA AND LEVODOPA 2 TABLET(S): 25; 100 TABLET ORAL at 05:07

## 2021-02-22 RX ADMIN — CARBIDOPA AND LEVODOPA 2 TABLET(S): 25; 100 TABLET ORAL at 15:36

## 2021-02-22 RX ADMIN — CHLORHEXIDINE GLUCONATE 1 APPLICATION(S): 213 SOLUTION TOPICAL at 05:04

## 2021-02-22 RX ADMIN — Medication 5 MILLIGRAM(S): at 05:06

## 2021-02-22 RX ADMIN — Medication 0.6 MILLIGRAM(S): at 11:40

## 2021-02-22 RX ADMIN — PRAMIPEXOLE DIHYDROCHLORIDE 0.5 MILLIGRAM(S): 0.12 TABLET ORAL at 17:31

## 2021-02-22 RX ADMIN — ATORVASTATIN CALCIUM 40 MILLIGRAM(S): 80 TABLET, FILM COATED ORAL at 21:17

## 2021-02-22 NOTE — PROGRESS NOTE ADULT - SUBJECTIVE AND OBJECTIVE BOX
Neurology Progress Note    Interval History:    Mongolian  - [place]  #[id number]  Patient seen at bedside this AM w/ Dr. Carnes. Attempted to utilize Mongolian  w/o success.  unable to understand patient 2/2 inaudible speech. Patient able to follow some visual commands. No acute events overnight.     HPI:  71 year old Mongolian speaking Male with past medical history of Parkinson disease, HLD, HTN, H/O DVT on Eliquis and Lasix, H/O COVID presents to ED with altered mental status for 2 days.     As per family patient at baseline is able to speak and has his on and off days but since last 2 days has not been able to speak and more lethargic than baseline. As per daughter at the bedside, patient has "bad days but this is the worst we have seen" Patient bright to ED for non resolution of symptoms. Family denies any fevers, chills, rigors, cough, shortness of breath, loss of consciousness, incontinence or any other complaints.     In ED patient hypertensive to 170s and bradycardic to 30s. EKG showed sinus bradycardia with 1st degree AV block. Electrolytes were normal. Patient given atropine without response, he was given dobutamine and HR increased with wide complex tachycardia. Patient placed on Dopamine with HR improving to the 50s. CT head done for AMS and shows no acute changes. Patient at time of interview minimally verbal but appeared in no apparent discomfort. (12 Feb 2021 17:17)      PAST MEDICAL & SURGICAL HISTORY:  Cataract  Prostatitis  Dyslipidemia  Gout  Hypertension  Parkinson disease  No significant past surgical history            Medications:  acetaminophen   Tablet .. 650 milliGRAM(s) Oral every 6 hours PRN  atorvastatin 40 milliGRAM(s) Oral at bedtime  carbidopa/levodopa  25/100 1 Tablet(s) Oral <User Schedule>  carbidopa/levodopa  25/100 2 Tablet(s) Oral <User Schedule>  chlorhexidine 4% Liquid 1 Application(s) Topical <User Schedule>  colchicine 0.6 milliGRAM(s) Oral daily  dextrose 5% + sodium chloride 0.9%. 1000 milliLiter(s) IV Continuous <Continuous>  enalapril 5 milliGRAM(s) Oral daily  enoxaparin Injectable 90 milliGRAM(s) SubCutaneous <User Schedule>  pantoprazole   Suspension 40 milliGRAM(s) Oral before breakfast  polyethylene glycol 3350 17 Gram(s) Oral daily  pramipexole 0.5 milliGRAM(s) Oral two times a day  senna 2 Tablet(s) Oral at bedtime      Vital Signs Last 24 Hrs  T(C): 37.6 (22 Feb 2021 05:55), Max: 37.7 (21 Feb 2021 12:00)  T(F): 99.6 (22 Feb 2021 05:55), Max: 99.8 (21 Feb 2021 12:00)  HR: 58 (22 Feb 2021 05:55) (53 - 62)  BP: 130/60 (22 Feb 2021 06:50) (126/59 - 171/69)  BP(mean): --  RR: 16 (22 Feb 2021 06:50) (16 - 16)  SpO2: --    Neurological Exam:   Mental status: Awake and able to follow visual commands.   Cranial nerves: EOM intact, +tracking   Motor: increased tone, decreased vocal volume   Sensation: Intact to pain      Labs:  CBC Full  -  ( 22 Feb 2021 07:22 )  WBC Count : 5.67 K/uL  RBC Count : 3.91 M/uL  Hemoglobin : 10.0 g/dL  Hematocrit : 32.8 %  Platelet Count - Automated : 287 K/uL  Mean Cell Volume : 83.9 fL  Mean Cell Hemoglobin : 25.6 pg  Mean Cell Hemoglobin Concentration : 30.5 g/dL  Auto Neutrophil # : 3.66 K/uL  Auto Lymphocyte # : 1.09 K/uL  Auto Monocyte # : 0.41 K/uL  Auto Eosinophil # : 0.40 K/uL  Auto Basophil # : 0.08 K/uL  Auto Neutrophil % : 64.6 %  Auto Lymphocyte % : 19.2 %  Auto Monocyte % : 7.2 %  Auto Eosinophil % : 7.1 %  Auto Basophil % : 1.4 %    02-22    145  |  112<H>  |  24<H>  ----------------------------<  141<H>  4.5   |  27  |  1.1    Ca    8.5      22 Feb 2021 07:22  Mg     2.1     02-22        RADIOLOGY:  < from: CT Head No Cont (02.14.21 @ 13:15) >  IMPRESSION:  No evidence of acute transcortical infarct, acute intracranial hemorrhage, or mass effect.    MANUELA OCHOA MD; Attending Radiologist  This document has been electronically signed. Feb 14 2021  2:38PM    < end of copied text >   Neurology Progress Note    Interval History:    Slovenian  - [place]  #[id number]  Patient seen at bedside this AM w/ Dr. Carnes. Attempted to utilize Slovenian  w/o success.  unable to understand patient 2/2 inaudible speech. Patient able to follow some visual commands. No acute events overnight.     HPI:  71 year old Slovenian speaking Male with past medical history of Parkinson disease, HLD, HTN, H/O DVT on Eliquis and Lasix, H/O COVID presents to ED with altered mental status for 2 days.     As per family patient at baseline is able to speak and has his on and off days but since last 2 days has not been able to speak and more lethargic than baseline. As per daughter at the bedside, patient has "bad days but this is the worst we have seen" Patient brought to ED for non resolution of symptoms. Family denies any fevers, chills, rigors, cough, shortness of breath, loss of consciousness, incontinence or any other complaints.     In ED patient hypertensive to 170s and bradycardic to 30s. EKG showed sinus bradycardia with 1st degree AV block. Electrolytes were normal. Patient given atropine without response, he was given dobutamine and HR increased with wide complex tachycardia. Patient placed on Dopamine with HR improving to the 50s. CT head done for AMS and shows no acute changes. Patient at time of interview minimally verbal but appeared in no apparent discomfort. (12 Feb 2021 17:17)      PAST MEDICAL & SURGICAL HISTORY:  Cataract  Prostatitis  Dyslipidemia  Gout  Hypertension  Parkinson disease  No significant past surgical history        Medications:  acetaminophen   Tablet .. 650 milliGRAM(s) Oral every 6 hours PRN  atorvastatin 40 milliGRAM(s) Oral at bedtime  carbidopa/levodopa  25/100 1 Tablet(s) Oral <User Schedule>  carbidopa/levodopa  25/100 2 Tablet(s) Oral <User Schedule>  chlorhexidine 4% Liquid 1 Application(s) Topical <User Schedule>  colchicine 0.6 milliGRAM(s) Oral daily  dextrose 5% + sodium chloride 0.9%. 1000 milliLiter(s) IV Continuous <Continuous>  enalapril 5 milliGRAM(s) Oral daily  enoxaparin Injectable 90 milliGRAM(s) SubCutaneous <User Schedule>  pantoprazole   Suspension 40 milliGRAM(s) Oral before breakfast  polyethylene glycol 3350 17 Gram(s) Oral daily  pramipexole 0.5 milliGRAM(s) Oral two times a day  senna 2 Tablet(s) Oral at bedtime      Vital Signs Last 24 Hrs  T(C): 37.6 (22 Feb 2021 05:55), Max: 37.7 (21 Feb 2021 12:00)  T(F): 99.6 (22 Feb 2021 05:55), Max: 99.8 (21 Feb 2021 12:00)  HR: 58 (22 Feb 2021 05:55) (53 - 62)  BP: 130/60 (22 Feb 2021 06:50) (126/59 - 171/69)  BP(mean): --  RR: 16 (22 Feb 2021 06:50) (16 - 16)  SpO2: --    Neurological Exam:   Mental status: Awake and able to follow visual commands.   Cranial nerves: EOM intact, +tracking   Motor: increased tone, decreased vocal volume   Sensation: Intact to pain      Labs:  CBC Full  -  ( 22 Feb 2021 07:22 )  WBC Count : 5.67 K/uL  RBC Count : 3.91 M/uL  Hemoglobin : 10.0 g/dL  Hematocrit : 32.8 %  Platelet Count - Automated : 287 K/uL  Mean Cell Volume : 83.9 fL  Mean Cell Hemoglobin : 25.6 pg  Mean Cell Hemoglobin Concentration : 30.5 g/dL  Auto Neutrophil # : 3.66 K/uL  Auto Lymphocyte # : 1.09 K/uL  Auto Monocyte # : 0.41 K/uL  Auto Eosinophil # : 0.40 K/uL  Auto Basophil # : 0.08 K/uL  Auto Neutrophil % : 64.6 %  Auto Lymphocyte % : 19.2 %  Auto Monocyte % : 7.2 %  Auto Eosinophil % : 7.1 %  Auto Basophil % : 1.4 %    02-22    145  |  112<H>  |  24<H>  ----------------------------<  141<H>  4.5   |  27  |  1.1    Ca    8.5      22 Feb 2021 07:22  Mg     2.1     02-22        RADIOLOGY:  < from: CT Head No Cont (02.14.21 @ 13:15) >  IMPRESSION:  No evidence of acute transcortical infarct, acute intracranial hemorrhage, or mass effect.    MANUELA OCHOA MD; Attending Radiologist  This document has been electronically signed. Feb 14 2021  2:38PM    < end of copied text >

## 2021-02-22 NOTE — PROGRESS NOTE ADULT - ASSESSMENT
ASSESSMENT: Pt is a 71y year old Male presenting with confusion and appears to be encephalopathic however etiology not clear.  His Sinemet has been adjusted recently.     PLAN:   - Pt seems to be mildly improved from yesterday  - Continue current sinemet dosing.   - Would discontinue pramipexole   - Pt had repeat REEG this AM; results pending  - Continue medical management  - Will f/u EEG results

## 2021-02-22 NOTE — PROGRESS NOTE ADULT - ASSESSMENT
Septic Shock  Patient presented with AMS  UA: Pyuria, Hematuria  CXR: RLL opacity  Likely delirium secondary to Sepsis secondary to Urosepsis or Pneumonia  Off Levophed and Dopamine  D/C'd Cefepime after 5 days  BCx and UCx unremarkable  Monitor vitals   f/u Swallow eval  F/U Neurology  F/U VEEG       Bradycardia with First Degree AV Block  Patient was initially hypertensive with Bradycardia and later went to [facility]   Started on Dopamine   EPS eval recommended no PPM for now  Monitor vitals   Echo: Unremarkable with mild MR and TR  EKG: NSR with undetermined age of infarct    ABEBE  Resolving   Nephrology on board   Hold off Lasix and Continue RL    Pancytopenia   Likely due to multiorgan damage secondary to septic shock  Monitor CBC    DVT  On Eliquis , switched to Lovenox due to NPO status     HTN  Hold off antihypertensive  Monitor BP  DASH Diet

## 2021-02-22 NOTE — CHART NOTE - NSCHARTNOTEFT_GEN_A_CORE
As per RN, patient removed the NG tube overnight   Patient is on mittens now   As per MD, patient needs NG tube as speech and swallow unable to assess today, they will attempt tomorrow    Pt is a 71y old Male . NG tube order noted on chart.    T(C): 37.6 (02-22-21 @ 05:55), Max: 37.7 (02-21-21 @ 12:00)  T(F): 99.6 (02-22-21 @ 05:55), Max: 99.8 (02-21-21 @ 12:00)  HR: 58 (02-22-21 @ 05:55) (53 - 62)  BP: 130/60 (02-22-21 @ 06:50) (126/59 - 171/69)  RR: 16 (02-22-21 @ 06:50) (16 - 16)    Patient was seen and examined at the bedside. Ng tube placement procedure was explained to the patient and the patient agrees to proceed with the plan. Head of the bed was elevated to 90 degrees. Placement of tube was attempted in the left nostril. NG tube was placed successfully on first attempt. CXR was ordered to confirmation of placement of NG tube. Patient was monitored throughout procedure and was stable afterwards.

## 2021-02-22 NOTE — PROGRESS NOTE ADULT - SUBJECTIVE AND OBJECTIVE BOX
71 year old Frisian speaking Male with past medical history of Parkinson disease, HLD, HTN, H/O DVT on Eliquis and Lasix, H/O COVID presents to ED with altered mental status for 2 days.   As per family patient at baseline is able to speak and has his on and off days but since last 2 days has not been able to speak and more lethargic than baseline. As per daughter at the bedside, patient has "bad days but this is the worst we have seen" Patient bright to ED for non resolution of symptoms. Family denies any fevers, chills, rigors, cough, shortness of breath, loss of consciousness, incontinence or any other complaints.   In ED patient hypertensive to 170s and bradycardic to 30s. EKG showed sinus bradycardia with 1st degree AV block. Electrolytes were normal. Patient given atropine without response, he was given dobutamine and HR increased with wide complex tachycardia. Patient placed on Dopamine with HR improving to the 50s. CT head  for AMS shows no acute changes. Patient at time of interview minimally verbal but appeared in no apparent discomfort (12 Feb 2021 17:17). ICU was consulted and patient is off Dopamine as well as Levophed. He was also started on Cefepime, switched to Levaquin for Pneumonia, as CXR showed RLL opacity,  and has completed the course.  ID called for possible PNA. ABEBE is resolving and BUN/Cr trending down. Nephrology was consulted and recommended to continue Cefepime for UTI along with RL, Hold off Lasix. UCX is so far negative as well as BCx. Orthopedic was consulted for Left shoulder dislocation. EPS evaluated and recommended no PPM. Neurology was consulted and recommended to decrease Sinemet dose along with VEEG.     Patient pulled off NG on 02/21/21 at night time and Swallow was consulted. Swallow recommended that patient is high risk for aspiration pneumonia and would need NG. Patient was seen at the bedside, in the morning, stable with no complaints. Will place NG again today.     Constitutional: well-developed; well-groomed; well-nourished  Eyes :conjunctiva clear   Neck supple; no JVD  Back normal shape; ROM intact  Respiratory normal; airway patent; breath sounds equal; good air movement  Cardiovascular; regular rate and rhythm  no rub  no murmur  normal PMI  Gastrointestinal :soft, Nontender, No distension, NBS  Neurological: Alert, responds to pain; responds to verbal commands  Skin; warm and dry; color normal  Musculoskeletal ::normal; ROM intact; no joint swelling; no joint erythema; no joint warmth

## 2021-02-22 NOTE — PROGRESS NOTE ADULT - ATTENDING COMMENTS
Patient examined this morning.  He was alert and followed visual commands.  Attempts to use Danish  failed due to  not being able to hear him.  He seemed at times to understand  and was talking back but  couldn't hear him.  He did not seem to have rigidity.    Plan:  1.  Okay to discontinue dopamine agonist.  2.  Repeat routine EEG.  Completed.  3.  Please continue outpatient dose of sinemet.

## 2021-02-23 LAB
ANION GAP SERPL CALC-SCNC: 8 MMOL/L — SIGNIFICANT CHANGE UP (ref 7–14)
BUN SERPL-MCNC: 21 MG/DL — HIGH (ref 10–20)
CALCIUM SERPL-MCNC: 8.6 MG/DL — SIGNIFICANT CHANGE UP (ref 8.5–10.1)
CHLORIDE SERPL-SCNC: 111 MMOL/L — HIGH (ref 98–110)
CO2 SERPL-SCNC: 24 MMOL/L — SIGNIFICANT CHANGE UP (ref 17–32)
CREAT SERPL-MCNC: 1.1 MG/DL — SIGNIFICANT CHANGE UP (ref 0.7–1.5)
GLUCOSE SERPL-MCNC: 102 MG/DL — HIGH (ref 70–99)
HCT VFR BLD CALC: 33.7 % — LOW (ref 42–52)
HGB BLD-MCNC: 10.2 G/DL — LOW (ref 14–18)
MCHC RBC-ENTMCNC: 25.6 PG — LOW (ref 27–31)
MCHC RBC-ENTMCNC: 30.3 G/DL — LOW (ref 32–37)
MCV RBC AUTO: 84.7 FL — SIGNIFICANT CHANGE UP (ref 80–94)
NRBC # BLD: 0 /100 WBCS — SIGNIFICANT CHANGE UP (ref 0–0)
PLATELET # BLD AUTO: 330 K/UL — SIGNIFICANT CHANGE UP (ref 130–400)
POTASSIUM SERPL-MCNC: 4.5 MMOL/L — SIGNIFICANT CHANGE UP (ref 3.5–5)
POTASSIUM SERPL-SCNC: 4.5 MMOL/L — SIGNIFICANT CHANGE UP (ref 3.5–5)
RBC # BLD: 3.98 M/UL — LOW (ref 4.7–6.1)
RBC # FLD: 15.6 % — HIGH (ref 11.5–14.5)
SODIUM SERPL-SCNC: 143 MMOL/L — SIGNIFICANT CHANGE UP (ref 135–146)
WBC # BLD: 6.17 K/UL — SIGNIFICANT CHANGE UP (ref 4.8–10.8)
WBC # FLD AUTO: 6.17 K/UL — SIGNIFICANT CHANGE UP (ref 4.8–10.8)

## 2021-02-23 PROCEDURE — 99233 SBSQ HOSP IP/OBS HIGH 50: CPT

## 2021-02-23 RX ORDER — CHLORHEXIDINE GLUCONATE 213 G/1000ML
1 SOLUTION TOPICAL
Refills: 0 | Status: DISCONTINUED | OUTPATIENT
Start: 2021-02-23 | End: 2021-03-11

## 2021-02-23 RX ADMIN — ENOXAPARIN SODIUM 90 MILLIGRAM(S): 100 INJECTION SUBCUTANEOUS at 17:12

## 2021-02-23 RX ADMIN — CARBIDOPA AND LEVODOPA 2 TABLET(S): 25; 100 TABLET ORAL at 13:13

## 2021-02-23 RX ADMIN — SENNA PLUS 2 TABLET(S): 8.6 TABLET ORAL at 22:04

## 2021-02-23 RX ADMIN — CARBIDOPA AND LEVODOPA 1 TABLET(S): 25; 100 TABLET ORAL at 22:04

## 2021-02-23 RX ADMIN — CARBIDOPA AND LEVODOPA 1 TABLET(S): 25; 100 TABLET ORAL at 08:55

## 2021-02-23 RX ADMIN — ATORVASTATIN CALCIUM 40 MILLIGRAM(S): 80 TABLET, FILM COATED ORAL at 22:04

## 2021-02-23 RX ADMIN — PANTOPRAZOLE SODIUM 40 MILLIGRAM(S): 20 TABLET, DELAYED RELEASE ORAL at 04:27

## 2021-02-23 RX ADMIN — ENOXAPARIN SODIUM 90 MILLIGRAM(S): 100 INJECTION SUBCUTANEOUS at 04:27

## 2021-02-23 RX ADMIN — CARBIDOPA AND LEVODOPA 2 TABLET(S): 25; 100 TABLET ORAL at 04:27

## 2021-02-23 RX ADMIN — Medication 0.6 MILLIGRAM(S): at 11:05

## 2021-02-23 RX ADMIN — POLYETHYLENE GLYCOL 3350 17 GRAM(S): 17 POWDER, FOR SOLUTION ORAL at 11:05

## 2021-02-23 RX ADMIN — CHLORHEXIDINE GLUCONATE 1 APPLICATION(S): 213 SOLUTION TOPICAL at 04:27

## 2021-02-23 RX ADMIN — CARBIDOPA AND LEVODOPA 2 TABLET(S): 25; 100 TABLET ORAL at 17:12

## 2021-02-23 RX ADMIN — Medication 5 MILLIGRAM(S): at 04:27

## 2021-02-23 RX ADMIN — PRAMIPEXOLE DIHYDROCHLORIDE 0.5 MILLIGRAM(S): 0.12 TABLET ORAL at 04:27

## 2021-02-23 NOTE — CONSULT NOTE ADULT - ASSESSMENT
IMPRESSION: Rehab of 70 y/o  m  rehab  for  decline  parkinson  disease  debility  gd  lt  shoulder  dis      PRECAUTIONS: [  ] Cardiac  [  ] Respiratory  [  ] Seizures [  ] Contact Isolation  [  ] Droplet Isolation  [ FALL ] Other    Weight Bearing Status:     RECOMMENDATION:    Out of Bed to Chair     DVT/Decubiti Prophylaxis    REHAB PLAN:     [ x  ] Bedside P/T 3-5 times a week   [   ]   Bedside O/T  2-3 times a week             [   ] No Rehab Therapy Indicated                   [   ]  Speech Therapy   Conditioning/ROM                                    ADL  Bed Mobility                                               Conditioning/ROM  Transfers                                                     Bed Mobility  Sitting /Standing Balance                         Transfers                                        Gait Training                                               Sitting/Standing Balance  Stair Training [   ]Applicable                    Home equipment Eval                                                                        Splinting  [   ] Only      GOALS:   ADL   [  x ]   Independent                    Transfers  [ x  ] Independent                          Ambulation  [  x ] Independent     [  x  ] With device                            [ x  ]  CG                                                         [   ]  CG                                                                  [   ] CG                            [    ] Min A                                                   [   ] Min A                                                              [   ] Min  A          DISCHARGE PLAN:   [   ]  Good candidate for Intensive Rehabilitation/Hospital based-4A [facility]                                             Will tolerate 3hrs Intensive Rehab Daily                                       [  xx  ]  Short Term Rehab in Skilled Nursing Facility cont current care                                       [    ]  Home with Outpatient or VN services                                         [    ]  Possible Candidate for Intensive Hospital based Rehab

## 2021-02-23 NOTE — PROGRESS NOTE ADULT - ASSESSMENT
Septic Shock  Resolved  Patient presented with AMS  UA: Pyuria, Hematuria  CXR: RLL opacity  Likely delirium secondary to Sepsis secondary to Urosepsis or Pneumonia  Off Levophed and Dopamine  D/C'd Cefepime after 5 days  BCx and UCx unremarkable  Monitor vitals     Dysphagia  -Swallow eval appreciated, can try Dysphagia 1 as patient improved   -Speech and swallow will re-eval tomorrow    Parkinsons  -Neuro consult appreciated  -Can DC dopamine agonist  -Continue Sinemet    Left shoulder dislocation  -Reviewed imaging (CT, XR)  -Follow up with orthopedics; might needs open reduction    Bradycardia with First Degree AV Block  Patient was initially hypertensive with Bradycardia and later went to [facility]   Started on Dopamine   EPS eval recommended no PPM for now  Monitor vitals   Echo: Unremarkable with mild MR and TR  EKG: NSR with undetermined age of infarct    ABEBE  Resolving   Nephrology on board   Hold off Lasix and Continue RL    Pancytopenia   Likely due to multiorgan damage secondary to septic shock  Monitor CBC    DVT  On Eliquis , switched to Lovenox due to NPO status     HTN  Hold off antihypertensive  Monitor BP  DASH Diet

## 2021-02-23 NOTE — PHYSICAL THERAPY INITIAL EVALUATION ADULT - SPECIFY REASON(S)
As discussed with SHARIF Graves, continue to hold PT at this time pending orthopedic follow up regarding (L) shoulder imaging. Will follow up and complete PT evaluation as appropriate.

## 2021-02-23 NOTE — CONSULT NOTE ADULT - SUBJECTIVE AND OBJECTIVE BOX
HPI: 71 year old Estonian speaking Male with past medical history of Parkinson disease, HLD, HTN, H/O DVT on Eliquis and Lasix, H/O COVID presents to ED with altered mental status for 2 days.   As per family patient at baseline is able to speak and has his on and off days but since last 2 days has not been able to speak and more lethargic than baseline. As per daughter at the bedside, patient has "bad days but this is the worst we have seen" Patient bright to ED for non resolution of symptoms. Family denies any fevers, chills, rigors, cough, shortness of breath, loss of consciousness, incontinence or any other complaints.     In ED patient hypertensive to 170s and bradycardic to 30s. EKG showed sinus bradycardia with 1st degree AV block. Electrolytes were normal. Patient given atropine without response, he was given dobutamine and HR increased with wide complex tachycardia. Patient placed on Dopamine with HR improving to the 50s. CT head done for AMS and shows no acute changes. Patient at time of interview minimally verbal but appeared in no apparent discomfort. or  NAD  fu simple  command  poor  hx      PTN  REFERRED TO ACUTE  REHAB  FOR  EVAL AND  TX   PAST MEDICAL & SURGICAL HISTORY:  Cataract    Prostatitis    Dyslipidemia    Gout    Hypertension    Parkinson disease    No significant past surgical history        Hospital Course:    TODAY'S SUBJECTIVE & REVIEW OF SYMPTOMS:     Constitutional WNL   Cardio WNL   Resp WNL   GI WNL  Heme WNL  Endo WNL  Skin WNL  MSK WNL  Neuro WNL  Cognitive WNL  Psych WNL      MEDICATIONS  (STANDING):  atorvastatin 40 milliGRAM(s) Oral at bedtime  carbidopa/levodopa  25/100 1 Tablet(s) Oral <User Schedule>  carbidopa/levodopa  25/100 2 Tablet(s) Oral <User Schedule>  chlorhexidine 4% Liquid 1 Application(s) Topical <User Schedule>  colchicine 0.6 milliGRAM(s) Oral daily  dextrose 5% + sodium chloride 0.9%. 1000 milliLiter(s) (75 mL/Hr) IV Continuous <Continuous>  enalapril 5 milliGRAM(s) Oral daily  enoxaparin Injectable 90 milliGRAM(s) SubCutaneous <User Schedule>  pantoprazole   Suspension 40 milliGRAM(s) Oral before breakfast  polyethylene glycol 3350 17 Gram(s) Oral daily  pramipexole 0.5 milliGRAM(s) Oral two times a day  senna 2 Tablet(s) Oral at bedtime    MEDICATIONS  (PRN):  acetaminophen   Tablet .. 650 milliGRAM(s) Oral every 6 hours PRN Temp greater or equal to 38.5C (101.3F)      FAMILY HISTORY:  Family history of cerebrovascular accident (CVA) (Father)        Allergies    No Known Allergies    Intolerances        SOCIAL HISTORY:    [  ] Etoh  [  ] Smoking  [  ] Substance abuse     Home Environment:  [  ] Home Alone  [  xx] Lives with Family  [  ] Home Health Aid    Dwelling:  [  ] Apartment  [ x ] Private House  [  ] Adult Home  [  ] Skilled Nursing Facility      [  ] Short Term  [  ] Long Term  [  x] Stairs       Elevator [  ]    FUNCTIONAL STATUS PTA: (Check all that apply)  Ambulation: [ x ]Independent    [  ] Dependent     [  ] Non-Ambulatory  Assistive Device: [  ] SA Cane  [  ]  Q Cane  [  x] Walker  [  ]  Wheelchair  ADL : [x] Independent  [  ]  Dependent       Vital Signs Last 24 Hrs  T(C): 36.7 (23 Feb 2021 05:00), Max: 37.7 (22 Feb 2021 14:00)  T(F): 98.1 (23 Feb 2021 05:00), Max: 99.9 (22 Feb 2021 14:00)  HR: 62 (23 Feb 2021 05:00) (56 - 63)  BP: 119/78 (23 Feb 2021 05:00) (119/78 - 152/70)  BP(mean): --  RR: 18 (23 Feb 2021 05:00) (18 - 18)  SpO2: --      PHYSICAL EXAM: Alert & Oriented X 1  GENERAL: NAD, well-groomed, well-developed  HEAD:  Atraumatic, Normocephalic  EYES: EOMI, PERRLA, conjunctiva and sclera clear  NECK: Supple, No JVD, Normal thyroid  CHEST/LUNG: Clear to percussion bilaterally; No rales, rhonchi, wheezing, or rubs  HEART: Regular rate and rhythm; No murmurs, rubs, or gallops  ABDOMEN: Soft, Nontender, Nondistended; Bowel sounds present  EXTREMITIES:  2+ Peripheral Pulses, No clubbing, cyanosis, or edema    NERVOUS SYSTEM:  Cranial Nerves 2-12 intact [ z ] Abnormal  [  ]  ROM: WFL all extremities [  ]  Abnormal [  z]  Motor Strength: WFL all extremities  [  ]  Abnormal [z  ]  Sensation: intact to light touch [  ] Abnormal [z  ]  Reflexes: Symmetric [  ]  Abnormal [ z ]    FUNCTIONAL STATUS:  Bed Mobility: Independent [  ]  Supervision [  ]  Needs Assistance [ z ]  N/A [  ]  Transfers: Independent [  ]  Supervision [  ]  Needs Assistance [ z ]  N/A [  ]   Ambulation: Independent [  ]  Supervision [  ]  Needs Assistance [ z ]  N/A [  ]  ADL: Independent [  ] Requires Assistance [  ] N/A [ z ]  SEE PT/OT IE NOTES    LABS:                        10.0   5.67  )-----------( 287      ( 22 Feb 2021 07:22 )             32.8     02-22    145  |  112<H>  |  24<H>  ----------------------------<  141<H>  4.5   |  27  |  1.1    Ca    8.5      22 Feb 2021 07:22  Mg     2.1     02-22            RADIOLOGY & ADDITIONAL STUDIES:< from: Xray Shoulder 2 Views, Left (02.20.21 @ 13:55) >  findings/  impression: There is persistent anterior dislocation of the left humeral head lying over the scapular body with surrounding bony fragments. There is chronic fracture resorption of the coracoid process. AC joint degenerative changes present.    < end of copied text >      Assesment:

## 2021-02-23 NOTE — PROGRESS NOTE ADULT - SUBJECTIVE AND OBJECTIVE BOX
71 year old Azeri speaking Male with past medical history of Parkinson disease, HLD, HTN, H/O DVT on Eliquis and Lasix, H/O COVID presents to ED with altered mental status for 2 days.     Today:  Seen at bedside with NGT in place, no new complaints.        REVIEW OF SYSTEMS:  Not a reliable historian.      MEDICATIONS  (STANDING):  atorvastatin 40 milliGRAM(s) Oral at bedtime  carbidopa/levodopa  25/100 1 Tablet(s) Oral <User Schedule>  carbidopa/levodopa  25/100 2 Tablet(s) Oral <User Schedule>  chlorhexidine 4% Liquid 1 Application(s) Topical <User Schedule>  chlorhexidine 4% Liquid 1 Application(s) Topical <User Schedule>  colchicine 0.6 milliGRAM(s) Oral daily  dextrose 5% + sodium chloride 0.9%. 1000 milliLiter(s) (75 mL/Hr) IV Continuous <Continuous>  enalapril 5 milliGRAM(s) Oral daily  enoxaparin Injectable 90 milliGRAM(s) SubCutaneous <User Schedule>  pantoprazole   Suspension 40 milliGRAM(s) Oral before breakfast  polyethylene glycol 3350 17 Gram(s) Oral daily  pramipexole 0.5 milliGRAM(s) Oral two times a day  senna 2 Tablet(s) Oral at bedtime    MEDICATIONS  (PRN):  acetaminophen   Tablet .. 650 milliGRAM(s) Oral every 6 hours PRN Temp greater or equal to 38.5C (101.3F)      Allergies  No Known Allergies        FAMILY HISTORY:  Family history of cerebrovascular accident (CVA) (Father)        Vital Signs Last 24 Hrs  T(C): 36.7 (23 Feb 2021 05:00), Max: 36.9 (22 Feb 2021 21:15)  T(F): 98.1 (23 Feb 2021 05:00), Max: 98.5 (22 Feb 2021 21:15)  HR: 62 (23 Feb 2021 05:00) (56 - 62)  BP: 119/78 (23 Feb 2021 05:00) (119/78 - 152/70)  RR: 18 (23 Feb 2021 05:00) (18 - 18)      PHYSICAL EXAM:  Constitutional: well-developed; well-groomed; well-nourished  Eyes :conjunctiva clear   Neck supple; no JVD  Back normal shape; ROM intact  Respiratory normal; airway patent; breath sounds equal; good air movement  Cardiovascular; regular rate and rhythm  no rub  no murmur  normal PMI  Gastrointestinal :soft, Nontender, No distension, NBS  Neurological: Alert, responds to pain; responds to verbal commands  Skin; warm and dry; color normal  Musculoskeletal: Left shoulder dislocation      LABS:                        10.2   6.17  )-----------( 330      ( 23 Feb 2021 09:20 )             33.7     02-23    143  |  111<H>  |  21<H>  ----------------------------<  102<H>  4.5   |  24  |  1.1    Ca    8.6      23 Feb 2021 09:20  Mg     2.1     02-22

## 2021-02-23 NOTE — PROGRESS NOTE ADULT - ATTENDING COMMENTS
XR/ CT reviewed  Case discussed with pt's daughter, Kelton Meade  Shoulder has been dislocated since June and pt was seen by other ortho surgeons  No acute ortho intervention  Elevate L hand  F/up with Dr. Ignacio upon discharge

## 2021-02-24 LAB
ALBUMIN SERPL ELPH-MCNC: 3.1 G/DL — LOW (ref 3.5–5.2)
ALP SERPL-CCNC: 144 U/L — HIGH (ref 30–115)
ALT FLD-CCNC: 7 U/L — SIGNIFICANT CHANGE UP (ref 0–41)
ANION GAP SERPL CALC-SCNC: 7 MMOL/L — SIGNIFICANT CHANGE UP (ref 7–14)
AST SERPL-CCNC: 46 U/L — HIGH (ref 0–41)
BASOPHILS # BLD AUTO: 0.08 K/UL — SIGNIFICANT CHANGE UP (ref 0–0.2)
BASOPHILS NFR BLD AUTO: 1.5 % — HIGH (ref 0–1)
BILIRUB SERPL-MCNC: 0.3 MG/DL — SIGNIFICANT CHANGE UP (ref 0.2–1.2)
BUN SERPL-MCNC: 19 MG/DL — SIGNIFICANT CHANGE UP (ref 10–20)
CALCIUM SERPL-MCNC: 8.8 MG/DL — SIGNIFICANT CHANGE UP (ref 8.5–10.1)
CHLORIDE SERPL-SCNC: 110 MMOL/L — SIGNIFICANT CHANGE UP (ref 98–110)
CO2 SERPL-SCNC: 26 MMOL/L — SIGNIFICANT CHANGE UP (ref 17–32)
CREAT SERPL-MCNC: 0.9 MG/DL — SIGNIFICANT CHANGE UP (ref 0.7–1.5)
EOSINOPHIL # BLD AUTO: 0.39 K/UL — SIGNIFICANT CHANGE UP (ref 0–0.7)
EOSINOPHIL NFR BLD AUTO: 7.2 % — SIGNIFICANT CHANGE UP (ref 0–8)
GLUCOSE SERPL-MCNC: 88 MG/DL — SIGNIFICANT CHANGE UP (ref 70–99)
HCT VFR BLD CALC: 33.4 % — LOW (ref 42–52)
HGB BLD-MCNC: 10.1 G/DL — LOW (ref 14–18)
IMM GRANULOCYTES NFR BLD AUTO: 0.6 % — HIGH (ref 0.1–0.3)
LYMPHOCYTES # BLD AUTO: 0.96 K/UL — LOW (ref 1.2–3.4)
LYMPHOCYTES # BLD AUTO: 17.8 % — LOW (ref 20.5–51.1)
MCHC RBC-ENTMCNC: 25.4 PG — LOW (ref 27–31)
MCHC RBC-ENTMCNC: 30.2 G/DL — LOW (ref 32–37)
MCV RBC AUTO: 83.9 FL — SIGNIFICANT CHANGE UP (ref 80–94)
MONOCYTES # BLD AUTO: 0.41 K/UL — SIGNIFICANT CHANGE UP (ref 0.1–0.6)
MONOCYTES NFR BLD AUTO: 7.6 % — SIGNIFICANT CHANGE UP (ref 1.7–9.3)
NEUTROPHILS # BLD AUTO: 3.53 K/UL — SIGNIFICANT CHANGE UP (ref 1.4–6.5)
NEUTROPHILS NFR BLD AUTO: 65.3 % — SIGNIFICANT CHANGE UP (ref 42.2–75.2)
NRBC # BLD: 0 /100 WBCS — SIGNIFICANT CHANGE UP (ref 0–0)
PLATELET # BLD AUTO: 356 K/UL — SIGNIFICANT CHANGE UP (ref 130–400)
POTASSIUM SERPL-MCNC: 4.3 MMOL/L — SIGNIFICANT CHANGE UP (ref 3.5–5)
POTASSIUM SERPL-SCNC: 4.3 MMOL/L — SIGNIFICANT CHANGE UP (ref 3.5–5)
PROT SERPL-MCNC: 6.7 G/DL — SIGNIFICANT CHANGE UP (ref 6–8)
RBC # BLD: 3.98 M/UL — LOW (ref 4.7–6.1)
RBC # FLD: 15.4 % — HIGH (ref 11.5–14.5)
SODIUM SERPL-SCNC: 143 MMOL/L — SIGNIFICANT CHANGE UP (ref 135–146)
WBC # BLD: 5.4 K/UL — SIGNIFICANT CHANGE UP (ref 4.8–10.8)
WBC # FLD AUTO: 5.4 K/UL — SIGNIFICANT CHANGE UP (ref 4.8–10.8)

## 2021-02-24 PROCEDURE — 99232 SBSQ HOSP IP/OBS MODERATE 35: CPT

## 2021-02-24 PROCEDURE — 99233 SBSQ HOSP IP/OBS HIGH 50: CPT

## 2021-02-24 RX ADMIN — CARBIDOPA AND LEVODOPA 2 TABLET(S): 25; 100 TABLET ORAL at 18:19

## 2021-02-24 RX ADMIN — CHLORHEXIDINE GLUCONATE 1 APPLICATION(S): 213 SOLUTION TOPICAL at 05:48

## 2021-02-24 RX ADMIN — POLYETHYLENE GLYCOL 3350 17 GRAM(S): 17 POWDER, FOR SOLUTION ORAL at 11:54

## 2021-02-24 RX ADMIN — CARBIDOPA AND LEVODOPA 1 TABLET(S): 25; 100 TABLET ORAL at 20:34

## 2021-02-24 RX ADMIN — CARBIDOPA AND LEVODOPA 2 TABLET(S): 25; 100 TABLET ORAL at 05:48

## 2021-02-24 RX ADMIN — Medication 5 MILLIGRAM(S): at 05:48

## 2021-02-24 RX ADMIN — SENNA PLUS 2 TABLET(S): 8.6 TABLET ORAL at 20:34

## 2021-02-24 RX ADMIN — ATORVASTATIN CALCIUM 40 MILLIGRAM(S): 80 TABLET, FILM COATED ORAL at 20:34

## 2021-02-24 RX ADMIN — CARBIDOPA AND LEVODOPA 2 TABLET(S): 25; 100 TABLET ORAL at 13:08

## 2021-02-24 RX ADMIN — ENOXAPARIN SODIUM 90 MILLIGRAM(S): 100 INJECTION SUBCUTANEOUS at 05:48

## 2021-02-24 RX ADMIN — Medication 0.6 MILLIGRAM(S): at 11:54

## 2021-02-24 RX ADMIN — ENOXAPARIN SODIUM 90 MILLIGRAM(S): 100 INJECTION SUBCUTANEOUS at 19:41

## 2021-02-24 RX ADMIN — PANTOPRAZOLE SODIUM 40 MILLIGRAM(S): 20 TABLET, DELAYED RELEASE ORAL at 05:49

## 2021-02-24 RX ADMIN — CARBIDOPA AND LEVODOPA 1 TABLET(S): 25; 100 TABLET ORAL at 11:54

## 2021-02-24 NOTE — PROGRESS NOTE ADULT - ATTENDING COMMENTS
Pt examined today in f/u after coming off dopamine agonist.  He seemed to be doing better than previous exam.  Voice was stronger and patient sitting up with pureed food in front of him.  He had mild cogwheeling but no severe rigidity.  At this point may continue with sinemet alone at home dose.  If persistant swallow difficulty may try increasing sinemet to two tablets five times a day.

## 2021-02-24 NOTE — PHYSICAL THERAPY INITIAL EVALUATION ADULT - RANGE OF MOTION EXAMINATION, REHAB EVAL
PROM to R UE and B LES grossly WFL, L UE ROM very limited 2* shoulder dislocation/fx and significant swelling

## 2021-02-24 NOTE — PHYSICAL THERAPY INITIAL EVALUATION ADULT - GENERAL OBSERVATIONS, REHAB EVAL
As discussed with LILLY Will, hold pt for PT evaluation 2* pending ortho consult for L shoulder dislocation. Pt also very agitated this AM - removed NG tube and requiring restraints. PT will cont to follow up.
Pt encountered semi-reclined in bed, NAD, +IV, +heel protectors, ok to be seen by PT as confirmed by RN and pt agreeable. +chart reviewed. Bulgarian  #[id number] used

## 2021-02-24 NOTE — CHART NOTE - NSCHARTNOTEFT_GEN_A_CORE
Registered Dietitian Follow-Up     Patient Profile Reviewed                           Yes [x]   No []     Nutrition History Previously Obtained        Yes [x]  No []       Pertinent Subjective Information: Pt sitting up in bed with breakfast tray at bedside. 100% of farina consumed; ~25% of the rest of the tray appears consumed per RD observation. Po diet initiated  per SLP recs (see below); pt was previously receiving EN via NGT.      Pertinent Medical Interventions: Septic shock- resolved. RLL opacity on CXR. Pt removed NGT on  and the replaced per team. SLP following- po diet initiated as of . L shoulder dislocation noted- no acute intervention per ortho.     Diet order: Dysphagia 1 pureed, nectar consistency fluids    Anthropometrics:  Height: 67"  Weight: dosing 89.4 kg (2/15)   BMI: 30.9  IBW: 67.3 kg    Daily Weight in k.6 () vs 91.4 kg (2/15)  % Weight Change widely varying wt throughout LOS, downtrend noted; will continue to monitor as edema remains present at this time.     MEDICATIONS  (STANDING):  atorvastatin 40 milliGRAM(s) Oral at bedtime  carbidopa/levodopa  25/100 1 Tablet(s) Oral <User Schedule>  carbidopa/levodopa  25/100 2 Tablet(s) Oral <User Schedule>  chlorhexidine 4% Liquid 1 Application(s) Topical <User Schedule>  chlorhexidine 4% Liquid 1 Application(s) Topical <User Schedule>  colchicine 0.6 milliGRAM(s) Oral daily  dextrose 5% + sodium chloride 0.9%. 1000 milliLiter(s) (75 mL/Hr) IV Continuous <Continuous>  enalapril 5 milliGRAM(s) Oral daily  enoxaparin Injectable 90 milliGRAM(s) SubCutaneous <User Schedule>  pantoprazole   Suspension 40 milliGRAM(s) Oral before breakfast  polyethylene glycol 3350 17 Gram(s) Oral daily  senna 2 Tablet(s) Oral at bedtime    MEDICATIONS  (PRN):  acetaminophen   Tablet .. 650 milliGRAM(s) Oral every 6 hours PRN Temp greater or equal to 38.5C (101.3F)    Pertinent Labs: 02-24 RBC 3.98, H/H 10.1/33.4, elevated LFTs    Physical Findings:  - Appearance: obese; () +1 generalized edema  - GI function: fecal incontinence noted, no BM this shift; pt is on a bowel regimen. Per EMR, last recorded BM was .   - Tubes: NA  - Oral/Mouth cavity: per SLP on  "No s/s aspiration/penetration for puree and nectar thick liquids, mild-moderate oral dysphagia" recommended current diet order  - Skin: suspected DTI (R heel); stage III pressure ulcer (coccyx)      Nutrition Requirements  Weight Used: 67.1kg IBW -- continued from initial assessment ()      kcal: 2013-2348kcal (30-35kcal/kg IBW) -- PU considered, bmi >30    protein: 81-101g (1.2-1.5g/kg IBW) -- PU considred, bmi >30    fluids: per LIP     Nutrient Intake: <50% of total breakfast tray consumed per RD observation today     [x] Previous Nutrition Diagnosis:  1.  Inadequate Oral Intake           [x] Re-activated as pt started on a po diet; no longer NGTF     2. Increased Nutrient Needs            [x] Ongoing               Nutrition Intervention meals and snacks, medical food supplements, coordination of care     Goal/Expected Outcome: pt to maintain po intake >50% within the next 3 days     Indicator/Monitoring: RD to monitor diet order, energy intake, body composition, NFPF    Recommendation: diet order per SLP recs, order ensure pudding TID, encourage po intake, provide assistance/encouragement with meals

## 2021-02-24 NOTE — PROGRESS NOTE ADULT - SUBJECTIVE AND OBJECTIVE BOX
Neurology Follow up note    Name  NIEVES LABOY    HPI:  71 year old Pashto speaking Male with past medical history of Parkinson disease, HLD, HTN, H/O DVT on Eliquis and Lasix, H/O COVID presents to ED with altered mental status for 2 days.     As per family patient at baseline is able to speak and has his on and off days but since last 2 days has not been able to speak and more lethargic than baseline. As per daughter at the bedside, patient has "bad days but this is the worst we have seen" Patient bright to ED for non resolution of symptoms. Family denies any fevers, chills, rigors, cough, shortness of breath, loss of consciousness, incontinence or any other complaints.     In ED patient hypertensive to 170s and bradycardic to 30s. EKG showed sinus bradycardia with 1st degree AV block. Electrolytes were normal. Patient given atropine without response, he was given dobutamine and HR increased with wide complex tachycardia. Patient placed on Dopamine with HR improving to the 50s. CT head done for AMS and shows no acute changes. Patient at time of interview minimally verbal but appeared in no apparent discomfort. (12 Feb 2021 17:17)      Interval History - Patient seen at bedside with Dr Carnes - due to patient Parkinson - dementia patient can not interact with .  Patient more awake and interactive with exam.  Patient responds to commands.   As per nurse patient more awake and had breakfast today . Favoring left arm - s/p dislocation of left shoulder.          Vital Signs Last 24 Hrs  T(C): 37.1 (24 Feb 2021 14:41), Max: 37.1 (24 Feb 2021 14:41)  T(F): 98.7 (24 Feb 2021 14:41), Max: 98.7 (24 Feb 2021 14:41)  HR: 58 (24 Feb 2021 14:41) (56 - 58)  BP: 142/89 (24 Feb 2021 14:41) (142/89 - 180/95)  BP(mean): --  RR: 16 (24 Feb 2021 14:41) (16 - 16)  SpO2: --  ICU Vital Signs Last 24 Hrs  T(C): 37.1 (24 Feb 2021 14:41), Max: 37.1 (24 Feb 2021 14:41)  T(F): 98.7 (24 Feb 2021 14:41), Max: 98.7 (24 Feb 2021 14:41)  HR: 58 (24 Feb 2021 14:41) (56 - 58)  BP: 142/89 (24 Feb 2021 14:41) (142/89 - 180/95)  BP(mean): --  ABP: --  ABP(mean): --  RR: 16 (24 Feb 2021 14:41) (16 - 16)  SpO2: --          Neurological Exam:     Mental status: Awake, and responsive not oriented; Good eye contact ; follow simple commands.  unable to assess remainder of exam  Motor:  Normal bulk and tone, strength 5/5 in RUE and B/L lower extremities. 2/5 LUE   Unable to assess remainder      Medications  acetaminophen   Tablet .. 650 milliGRAM(s) Oral every 6 hours PRN  atorvastatin 40 milliGRAM(s) Oral at bedtime  carbidopa/levodopa  25/100 1 Tablet(s) Oral <User Schedule>  carbidopa/levodopa  25/100 2 Tablet(s) Oral <User Schedule>  chlorhexidine 4% Liquid 1 Application(s) Topical <User Schedule>  chlorhexidine 4% Liquid 1 Application(s) Topical <User Schedule>  colchicine 0.6 milliGRAM(s) Oral daily  dextrose 5% + sodium chloride 0.9%. 1000 milliLiter(s) IV Continuous <Continuous>  enalapril 5 milliGRAM(s) Oral daily  enoxaparin Injectable 90 milliGRAM(s) SubCutaneous <User Schedule>  pantoprazole   Suspension 40 milliGRAM(s) Oral before breakfast  polyethylene glycol 3350 17 Gram(s) Oral daily  senna 2 Tablet(s) Oral at bedtime      Lab                        10.1   5.40  )-----------( 356      ( 24 Feb 2021 07:26 )             33.4     02-24    143  |  110  |  19  ----------------------------<  88  4.3   |  26  |  0.9    Ca    8.8      24 Feb 2021 07:26    TPro  6.7  /  Alb  3.1<L>  /  TBili  0.3  /  DBili  x   /  AST  46<H>  /  ALT  7   /  AlkPhos  144<H>  02-24    CAPILLARY BLOOD GLUCOSE        LIVER FUNCTIONS - ( 24 Feb 2021 07:26 )  Alb: 3.1 g/dL / Pro: 6.7 g/dL / ALK PHOS: 144 U/L / ALT: 7 U/L / AST: 46 U/L / GGT: x                 Assessment- This is a 71y year old Male  with PMHx BRADYCARDIA ALTERED MENTAL STATUS      Plan    1. Continue current plan as per Medicine team  2. Consider increasing Sinemet 25/100 2 tablets 5 times a day  3. Neurology will follow

## 2021-02-24 NOTE — PROGRESS NOTE ADULT - SUBJECTIVE AND OBJECTIVE BOX
71 year old Malay speaking Male with past medical history of Parkinson disease, HLD, HTN, H/O DVT on Eliquis and Lasix, H/O COVID presents to ED with altered mental status for 2 days.     Today:  Seen at bedside, eating, much more awake and alert.          REVIEW OF SYSTEMS:  Not a reliable historian.      MEDICATIONS  (STANDING):  atorvastatin 40 milliGRAM(s) Oral at bedtime  carbidopa/levodopa  25/100 1 Tablet(s) Oral <User Schedule>  carbidopa/levodopa  25/100 2 Tablet(s) Oral <User Schedule>  chlorhexidine 4% Liquid 1 Application(s) Topical <User Schedule>  chlorhexidine 4% Liquid 1 Application(s) Topical <User Schedule>  colchicine 0.6 milliGRAM(s) Oral daily  dextrose 5% + sodium chloride 0.9%. 1000 milliLiter(s) (75 mL/Hr) IV Continuous <Continuous>  enalapril 5 milliGRAM(s) Oral daily  enoxaparin Injectable 90 milliGRAM(s) SubCutaneous <User Schedule>  pantoprazole   Suspension 40 milliGRAM(s) Oral before breakfast  polyethylene glycol 3350 17 Gram(s) Oral daily  senna 2 Tablet(s) Oral at bedtime    MEDICATIONS  (PRN):  acetaminophen   Tablet .. 650 milliGRAM(s) Oral every 6 hours PRN Temp greater or equal to 38.5C (101.3F)      Allergies  No Known Allergies        FAMILY HISTORY:  Family history of cerebrovascular accident (CVA) (Father)        Vital Signs Last 24 Hrs  T(C): 36.1 (24 Feb 2021 04:45), Max: 36.9 (23 Feb 2021 14:04)  T(F): 96.9 (24 Feb 2021 04:45), Max: 98.4 (23 Feb 2021 14:04)  HR: 56 (24 Feb 2021 06:28) (56 - 60)  BP: 152/83 (24 Feb 2021 06:28) (127/71 - 180/95)  RR: 16 (24 Feb 2021 06:28) (16 - 16)      PHYSICAL EXAM:  Constitutional: well-developed; well-groomed; well-nourished  Eyes :conjunctiva clear   Neck supple; no JVD  Back normal shape; ROM intact  Respiratory normal; airway patent; breath sounds equal; good air movement  Cardiovascular; regular rate and rhythm  no rub  no murmur  normal PMI  Gastrointestinal :soft, Nontender, No distension, NBS  Neurological: Alert, responds to pain; responds to verbal commands  Skin; warm and dry; color normal  Musculoskeletal: Left shoulder dislocation      LABS:                        10.1   5.40  )-----------( 356      ( 24 Feb 2021 07:26 )             33.4     02-24    143  |  110  |  19  ----------------------------<  88  4.3   |  26  |  0.9    Ca    8.8      24 Feb 2021 07:26    TPro  6.7  /  Alb  3.1<L>  /  TBili  0.3  /  DBili  x   /  AST  46<H>  /  ALT  7   /  AlkPhos  144<H>  02-24

## 2021-02-24 NOTE — PHYSICAL THERAPY INITIAL EVALUATION ADULT - ADDITIONAL COMMENTS
Pt is a poor historian and unable to provide any history - difficulty answering questions even with use of . Per chart review pt lives with family in a private house - stairs on outside of house. Pt has 24/7 aide, hospital bed, W/C, and RW. Unclear what his functional status was prior.

## 2021-02-24 NOTE — PHYSICAL THERAPY INITIAL EVALUATION ADULT - LEVEL OF INDEPENDENCE: SIT/STAND, REHAB EVAL
Unable to progress to attempt to stand - pt becoming agitated with PT attempting to assist pt, very confused

## 2021-02-24 NOTE — PHYSICAL THERAPY INITIAL EVALUATION ADULT - LEVEL OF INDEPENDENCE: SUPINE/SIT, REHAB EVAL
Attempted to move pt to sit EOB - pt agitated and grabbing at PT with R UE, pulling LEs in and not allowing PT to assist. Confused and become agitated with repeated attempts./dependent (less than 25% patients effort)

## 2021-02-24 NOTE — PROGRESS NOTE ADULT - ASSESSMENT
Septic Shock  Resolved  Patient presented with AMS  UA: Pyuria, Hematuria  CXR: RLL opacity  Likely delirium secondary to Sepsis secondary to Urosepsis or Pneumonia  Off Levophed and Dopamine  D/C'd Cefepime after 5 days  BCx and UCx unremarkable  Monitor vitals     Dysphagia  -Swallow eval appreciated, can try Dysphagia 1 as patient improved   -has been tolerating diet     Parkinsons  -Neuro consult appreciated  -Can DC dopamine agonist  -Continue Sinemet    Left shoulder dislocation  -Reviewed imaging (CT, XR)  -Ortho consult appreciated, no need for intervention    Bradycardia with First Degree AV Block  Patient was initially hypertensive with Bradycardia and later went to [facility]   Was on Dopamine   EPS eval recommended no PPM for now  Monitor vitals   Echo: Unremarkable with mild MR and TR  EKG: NSR with undetermined age of infarct    ABEBE  Resolving   Nephrology on board   Hold off Lasix and Continue RL    Pancytopenia   Likely due to multiorgan damage secondary to septic shock  Monitor CBC    DVT  On Eliquis , switched to Lovenox due to NPO status-will restart once tolerating diet consistently     HTN  Hold off antihypertensive  Monitor BP  DASH Diet

## 2021-02-25 LAB
ALBUMIN SERPL ELPH-MCNC: 3.3 G/DL — LOW (ref 3.5–5.2)
ALP SERPL-CCNC: 159 U/L — HIGH (ref 30–115)
ALT FLD-CCNC: 8 U/L — SIGNIFICANT CHANGE UP (ref 0–41)
ANION GAP SERPL CALC-SCNC: 8 MMOL/L — SIGNIFICANT CHANGE UP (ref 7–14)
AST SERPL-CCNC: 39 U/L — SIGNIFICANT CHANGE UP (ref 0–41)
BASOPHILS # BLD AUTO: 0.07 K/UL — SIGNIFICANT CHANGE UP (ref 0–0.2)
BASOPHILS NFR BLD AUTO: 1.1 % — HIGH (ref 0–1)
BILIRUB SERPL-MCNC: 0.5 MG/DL — SIGNIFICANT CHANGE UP (ref 0.2–1.2)
BUN SERPL-MCNC: 15 MG/DL — SIGNIFICANT CHANGE UP (ref 10–20)
CALCIUM SERPL-MCNC: 9 MG/DL — SIGNIFICANT CHANGE UP (ref 8.5–10.1)
CHLORIDE SERPL-SCNC: 109 MMOL/L — SIGNIFICANT CHANGE UP (ref 98–110)
CO2 SERPL-SCNC: 24 MMOL/L — SIGNIFICANT CHANGE UP (ref 17–32)
CREAT SERPL-MCNC: 0.9 MG/DL — SIGNIFICANT CHANGE UP (ref 0.7–1.5)
EOSINOPHIL # BLD AUTO: 0.25 K/UL — SIGNIFICANT CHANGE UP (ref 0–0.7)
EOSINOPHIL NFR BLD AUTO: 4 % — SIGNIFICANT CHANGE UP (ref 0–8)
GLUCOSE SERPL-MCNC: 107 MG/DL — HIGH (ref 70–99)
HCT VFR BLD CALC: 33.4 % — LOW (ref 42–52)
HGB BLD-MCNC: 10.5 G/DL — LOW (ref 14–18)
IMM GRANULOCYTES NFR BLD AUTO: 0.5 % — HIGH (ref 0.1–0.3)
LYMPHOCYTES # BLD AUTO: 0.88 K/UL — LOW (ref 1.2–3.4)
LYMPHOCYTES # BLD AUTO: 14.1 % — LOW (ref 20.5–51.1)
MCHC RBC-ENTMCNC: 25.7 PG — LOW (ref 27–31)
MCHC RBC-ENTMCNC: 31.4 G/DL — LOW (ref 32–37)
MCV RBC AUTO: 81.7 FL — SIGNIFICANT CHANGE UP (ref 80–94)
MONOCYTES # BLD AUTO: 0.46 K/UL — SIGNIFICANT CHANGE UP (ref 0.1–0.6)
MONOCYTES NFR BLD AUTO: 7.4 % — SIGNIFICANT CHANGE UP (ref 1.7–9.3)
NEUTROPHILS # BLD AUTO: 4.53 K/UL — SIGNIFICANT CHANGE UP (ref 1.4–6.5)
NEUTROPHILS NFR BLD AUTO: 72.9 % — SIGNIFICANT CHANGE UP (ref 42.2–75.2)
NRBC # BLD: 0 /100 WBCS — SIGNIFICANT CHANGE UP (ref 0–0)
PLATELET # BLD AUTO: 380 K/UL — SIGNIFICANT CHANGE UP (ref 130–400)
POTASSIUM SERPL-MCNC: 4.4 MMOL/L — SIGNIFICANT CHANGE UP (ref 3.5–5)
POTASSIUM SERPL-SCNC: 4.4 MMOL/L — SIGNIFICANT CHANGE UP (ref 3.5–5)
PROT SERPL-MCNC: 6.9 G/DL — SIGNIFICANT CHANGE UP (ref 6–8)
RBC # BLD: 4.09 M/UL — LOW (ref 4.7–6.1)
RBC # FLD: 15.5 % — HIGH (ref 11.5–14.5)
SODIUM SERPL-SCNC: 141 MMOL/L — SIGNIFICANT CHANGE UP (ref 135–146)
WBC # BLD: 6.22 K/UL — SIGNIFICANT CHANGE UP (ref 4.8–10.8)
WBC # FLD AUTO: 6.22 K/UL — SIGNIFICANT CHANGE UP (ref 4.8–10.8)

## 2021-02-25 PROCEDURE — 99233 SBSQ HOSP IP/OBS HIGH 50: CPT

## 2021-02-25 RX ORDER — CARBIDOPA AND LEVODOPA 25; 100 MG/1; MG/1
2 TABLET ORAL
Refills: 0 | Status: DISCONTINUED | OUTPATIENT
Start: 2021-02-25 | End: 2021-03-11

## 2021-02-25 RX ORDER — APIXABAN 2.5 MG/1
5 TABLET, FILM COATED ORAL EVERY 12 HOURS
Refills: 0 | Status: DISCONTINUED | OUTPATIENT
Start: 2021-02-25 | End: 2021-03-11

## 2021-02-25 RX ADMIN — POLYETHYLENE GLYCOL 3350 17 GRAM(S): 17 POWDER, FOR SOLUTION ORAL at 11:40

## 2021-02-25 RX ADMIN — Medication 0.6 MILLIGRAM(S): at 11:39

## 2021-02-25 RX ADMIN — ENOXAPARIN SODIUM 90 MILLIGRAM(S): 100 INJECTION SUBCUTANEOUS at 05:50

## 2021-02-25 RX ADMIN — CARBIDOPA AND LEVODOPA 2 TABLET(S): 25; 100 TABLET ORAL at 18:36

## 2021-02-25 RX ADMIN — CARBIDOPA AND LEVODOPA 2 TABLET(S): 25; 100 TABLET ORAL at 05:49

## 2021-02-25 RX ADMIN — PANTOPRAZOLE SODIUM 40 MILLIGRAM(S): 20 TABLET, DELAYED RELEASE ORAL at 05:50

## 2021-02-25 RX ADMIN — SENNA PLUS 2 TABLET(S): 8.6 TABLET ORAL at 21:19

## 2021-02-25 RX ADMIN — CHLORHEXIDINE GLUCONATE 1 APPLICATION(S): 213 SOLUTION TOPICAL at 05:49

## 2021-02-25 RX ADMIN — Medication 5 MILLIGRAM(S): at 05:50

## 2021-02-25 RX ADMIN — ATORVASTATIN CALCIUM 40 MILLIGRAM(S): 80 TABLET, FILM COATED ORAL at 21:19

## 2021-02-25 RX ADMIN — CARBIDOPA AND LEVODOPA 2 TABLET(S): 25; 100 TABLET ORAL at 14:26

## 2021-02-25 RX ADMIN — CARBIDOPA AND LEVODOPA 2 TABLET(S): 25; 100 TABLET ORAL at 21:19

## 2021-02-25 RX ADMIN — APIXABAN 5 MILLIGRAM(S): 2.5 TABLET, FILM COATED ORAL at 18:36

## 2021-02-25 RX ADMIN — CHLORHEXIDINE GLUCONATE 1 APPLICATION(S): 213 SOLUTION TOPICAL at 05:51

## 2021-02-25 RX ADMIN — CARBIDOPA AND LEVODOPA 1 TABLET(S): 25; 100 TABLET ORAL at 09:23

## 2021-02-25 NOTE — PROGRESS NOTE ADULT - ASSESSMENT
Septic Shock  Resolved  Patient presented with AMS  UA: Pyuria, Hematuria  CXR: RLL opacity  Likely delirium secondary to Sepsis secondary to Urosepsis or Pneumonia  Off Levophed and Dopamine  D/C'd Cefepime after 5 days  BCx and UCx unremarkable  Monitor vitals     Dysphagia  -Swallow eval appreciated, can try Dysphagia 1 as patient improved   -has been tolerating diet   -Will re-eval prior to DC home    Parkinsons  -Neuro consult appreciated  -Can DC dopamine agonist  -Increased Sinemet    Left shoulder dislocation  -Reviewed imaging (CT, XR)  -Ortho consult appreciated, no need for intervention    Bradycardia with First Degree AV Block  Patient was initially hypertensive with Bradycardia and later went to [facility]   Was on Dopamine   EPS eval recommended no PPM for now  Monitor vitals   Echo: Unremarkable with mild MR and TR  EKG: NSR with undetermined age of infarct    ABEBE  Resolving   Nephrology on board   Hold off Lasix and Continue RL    Pancytopenia   Likely due to multiorgan damage secondary to septic shock  Monitor CBC    DVT  On Eliquis    HTN  Hold off antihypertensive  Monitor BP  DASH Diet     Likely DC home tomorrow.

## 2021-02-25 NOTE — PROGRESS NOTE ADULT - SUBJECTIVE AND OBJECTIVE BOX
71 year old Lao speaking Male with past medical history of Parkinson disease, HLD, HTN, H/O DVT on Eliquis and Lasix, H/O COVID presents to ED with altered mental status for 2 days.    Today:  Seen at bedside, appeared to not be in distress.        REVIEW OF SYSTEMS:  Not reliable historian.      MEDICATIONS  (STANDING):  apixaban 5 milliGRAM(s) Oral every 12 hours  atorvastatin 40 milliGRAM(s) Oral at bedtime  carbidopa/levodopa  25/100 2 Tablet(s) Oral <User Schedule>  chlorhexidine 4% Liquid 1 Application(s) Topical <User Schedule>  chlorhexidine 4% Liquid 1 Application(s) Topical <User Schedule>  colchicine 0.6 milliGRAM(s) Oral daily  dextrose 5% + sodium chloride 0.9%. 1000 milliLiter(s) (75 mL/Hr) IV Continuous <Continuous>  enalapril 5 milliGRAM(s) Oral daily  pantoprazole   Suspension 40 milliGRAM(s) Oral before breakfast  polyethylene glycol 3350 17 Gram(s) Oral daily  senna 2 Tablet(s) Oral at bedtime    MEDICATIONS  (PRN):  acetaminophen   Tablet .. 650 milliGRAM(s) Oral every 6 hours PRN Temp greater or equal to 38.5C (101.3F)      Allergies  No Known Allergies        FAMILY HISTORY:  Family history of cerebrovascular accident (CVA) (Father)        Vital Signs Last 24 Hrs  T(C): 36.2 (25 Feb 2021 14:31), Max: 36.2 (25 Feb 2021 05:21)  T(F): 97.1 (25 Feb 2021 14:31), Max: 97.2 (25 Feb 2021 05:21)  HR: 76 (25 Feb 2021 14:31) (58 - 76)  BP: 120/76 (25 Feb 2021 14:31) (120/76 - 147/77)  RR: 16 (25 Feb 2021 14:31) (16 - 16)      PHYSICAL EXAM:  Constitutional: well-developed; well-groomed; well-nourished  Eyes :conjunctiva clear   Neck supple; no JVD  Back normal shape; ROM intact  Respiratory normal; airway patent; breath sounds equal; good air movement  Cardiovascular; regular rate and rhythm  no rub  no murmur  normal PMI  Gastrointestinal :soft, Nontender, No distension, NBS  Neurological: Alert, responds to pain; responds to verbal commands  Skin; warm and dry; color normal  Musculoskeletal: Left shoulder dislocation        LABS:                        10.5   6.22  )-----------( 380      ( 25 Feb 2021 04:30 )             33.4     02-25    141  |  109  |  15  ----------------------------<  107<H>  4.4   |  24  |  0.9    Ca    9.0      25 Feb 2021 04:30    TPro  6.9  /  Alb  3.3<L>  /  TBili  0.5  /  DBili  x   /  AST  39  /  ALT  8   /  AlkPhos  159<H>  02-25

## 2021-02-26 ENCOUNTER — TRANSCRIPTION ENCOUNTER (OUTPATIENT)
Age: 71
End: 2021-02-26

## 2021-02-26 LAB — SARS-COV-2 RNA SPEC QL NAA+PROBE: SIGNIFICANT CHANGE UP

## 2021-02-26 PROCEDURE — 99233 SBSQ HOSP IP/OBS HIGH 50: CPT

## 2021-02-26 RX ADMIN — APIXABAN 5 MILLIGRAM(S): 2.5 TABLET, FILM COATED ORAL at 05:14

## 2021-02-26 RX ADMIN — Medication 0.6 MILLIGRAM(S): at 11:36

## 2021-02-26 RX ADMIN — CARBIDOPA AND LEVODOPA 2 TABLET(S): 25; 100 TABLET ORAL at 18:03

## 2021-02-26 RX ADMIN — CARBIDOPA AND LEVODOPA 2 TABLET(S): 25; 100 TABLET ORAL at 14:09

## 2021-02-26 RX ADMIN — CARBIDOPA AND LEVODOPA 2 TABLET(S): 25; 100 TABLET ORAL at 21:41

## 2021-02-26 RX ADMIN — PANTOPRAZOLE SODIUM 40 MILLIGRAM(S): 20 TABLET, DELAYED RELEASE ORAL at 05:15

## 2021-02-26 RX ADMIN — SENNA PLUS 2 TABLET(S): 8.6 TABLET ORAL at 21:40

## 2021-02-26 RX ADMIN — Medication 5 MILLIGRAM(S): at 05:15

## 2021-02-26 RX ADMIN — CARBIDOPA AND LEVODOPA 2 TABLET(S): 25; 100 TABLET ORAL at 05:15

## 2021-02-26 RX ADMIN — ATORVASTATIN CALCIUM 40 MILLIGRAM(S): 80 TABLET, FILM COATED ORAL at 21:41

## 2021-02-26 RX ADMIN — POLYETHYLENE GLYCOL 3350 17 GRAM(S): 17 POWDER, FOR SOLUTION ORAL at 11:36

## 2021-02-26 RX ADMIN — APIXABAN 5 MILLIGRAM(S): 2.5 TABLET, FILM COATED ORAL at 18:03

## 2021-02-26 RX ADMIN — CARBIDOPA AND LEVODOPA 2 TABLET(S): 25; 100 TABLET ORAL at 11:36

## 2021-02-26 NOTE — DISCHARGE NOTE PROVIDER - NSDCMRMEDTOKEN_GEN_ALL_CORE_FT
atorvastatin 40 mg oral tablet: 1 tab(s) orally once a day (at bedtime)  carbidopa-levodopa 25 mg-100 mg oral tablet, extended release: 1 tab(s) orally   carbidopa-levodopa 25 mg-250 mg oral tablet: 1 tab(s) orally 4 times a day  colchicine 0.6 mg oral tablet: 1 tab(s) orally once a day  Eliquis 5 mg oral tablet: 1 tab(s) orally 2 times a day  enalapril 5 mg oral tablet: 1 tab(s) orally once a day  furosemide 40 mg oral tablet: 1 tab(s) orally once a day  gabapentin 100 mg oral capsule: 1 cap(s) orally 3 times a day  omeprazole 40 mg oral delayed release capsule: 1 cap(s) orally once a day  pramipexole 0.5 mg oral tablet: 1 tab(s) orally 4 times a day   atorvastatin 40 mg oral tablet: 1 tab(s) orally once a day (at bedtime)  carbidopa-levodopa 25 mg-100 mg oral tablet, extended release: 1 tab(s) orally   carbidopa-levodopa 25 mg-250 mg oral tablet: 1 tab(s) orally 4 times a day  colchicine 0.6 mg oral tablet: 1 tab(s) orally once a day  Eliquis 5 mg oral tablet: 1 tab(s) orally 2 times a day  enalapril 5 mg oral tablet: 1 tab(s) orally once a day  furosemide 40 mg oral tablet: 1 tab(s) orally once a day  gabapentin 100 mg oral capsule: 1 cap(s) orally 3 times a day  omeprazole 40 mg oral delayed release capsule: 1 cap(s) orally once a day   atorvastatin 40 mg oral tablet: 1 tab(s) orally once a day (at bedtime)  carbidopa-levodopa 25 mg-100 mg oral tablet, extended release: 1 tab(s) orally   carbidopa-levodopa 25 mg-250 mg oral tablet: 1 tab(s) orally 4 times a day  colchicine 0.6 mg oral tablet: 1 tab(s) orally once a day  Eliquis 5 mg oral tablet: 1 tab(s) orally 2 times a day  enalapril 5 mg oral tablet: 1 tab(s) orally once a day  fluconazole 200 mg oral tablet: 2 tab(s) orally once a day  furosemide 40 mg oral tablet: 1 tab(s) orally once a day  gabapentin 100 mg oral capsule: 1 cap(s) orally 3 times a day  omeprazole 40 mg oral delayed release capsule: 1 cap(s) orally once a day   atorvastatin 40 mg oral tablet: 1 tab(s) orally once a day (at bedtime)  carbidopa-levodopa 25 mg-100 mg oral tablet, extended release: 2 tab(s) orally 5 times a day at 6AM,10Am,2PM,6PM,10 PM  colchicine 0.6 mg oral tablet: 1 tab(s) orally once a day  Eliquis 5 mg oral tablet: 1 tab(s) orally 2 times a day  enalapril 5 mg oral tablet: 1 tab(s) orally once a day  fluconazole 200 mg oral tablet: 2 tab(s) orally once a day  gabapentin 100 mg oral capsule: 1 cap(s) orally 3 times a day  omeprazole 40 mg oral delayed release capsule: 1 cap(s) orally once a day  senna oral tablet: 2 tab(s) orally once a day (at bedtime), As Needed -for constipation    atorvastatin 40 mg oral tablet: 1 tab(s) orally once a day (at bedtime)  carbidopa-levodopa 25 mg-100 mg oral tablet, extended release: 2 tab(s) orally 5 times a day at 6AM,10Am,2PM,6PM,10 PM  colchicine 0.6 mg oral tablet: 1 tab(s) orally once a day  Eliquis 5 mg oral tablet: 1 tab(s) orally 2 times a day  enalapril 10 mg oral tablet: 1 tab(s) orally once a day   fluconazole 200 mg oral tablet: 2 tab(s) orally once a day  gabapentin 100 mg oral capsule: 1 cap(s) orally 3 times a day  omeprazole 40 mg oral delayed release capsule: 1 cap(s) orally once a day  senna oral tablet: 2 tab(s) orally once a day (at bedtime), As Needed -for constipation

## 2021-02-26 NOTE — DISCHARGE NOTE PROVIDER - CARE PROVIDER_API CALL
Oumou [place])  Hematology; Internal Medicine; Medical Oncology  [address] [address]  Phone: [phone]  Fax: [phone]  Follow Up Time: 1 week    Sharon Huitron)  Internal Medicine  [address] [place] [address]  Phone: [phone]  Fax: [phone]  Follow Up Time: 1 week    Rm Carnes)  Neurology  [address] [place] [address], [address]  [place] [address]  Phone: [phone]  Fax: [phone]  Follow Up Time: 1 week    Camilla Lake)  Cardiovascular Disease; Internal Medicine  [address] [place] [address], [facility] [address]  [place], [place] [id number]  Phone: [phone]  Fax: [phone]  Follow Up Time: 2 weeks    PMD,   Phone: (   )    -  Fax: (   )    -  Follow Up Time: 1-3 days   Oumou [place])  Hematology; Internal Medicine; Medical Oncology  [address] [place] [address]  Phone: [phone]  Fax: [phone]  Follow Up Time: 1 week    Sharon Huitron)  Internal Medicine  [address] [place] [address]  Phone: [phone]  Fax: [phone]  Follow Up Time: 1 week    Rm Carnes)  Neurology  [address] [place] [address], [address]  [place] [address]  Phone: [phone]  Fax: [phone]  Follow Up Time: 1 week    Camilla Lake)  Cardiovascular Disease; Internal Medicine  [address] [place] [address], [facility] [address]  [place], [place] [id number]  Phone: [phone]  Fax: [phone]  Follow Up Time: 2 weeks    PMD,   Phone: (   )    -  Fax: (   )    -  Follow Up Time: 1-3 days    Theodore Dowd (OD)  [facility]  [address] [place] [address]  Phone: [phone]  Fax: [phone]  Follow Up Time: 1 month    Justyn Ignacio)  [facility]  [address] [place] [address]  Phone: [phone]  Fax: [phone]  Follow Up Time: 1 month

## 2021-02-26 NOTE — PROGRESS NOTE ADULT - ASSESSMENT
Septic Shock  Resolved  Patient presented with AMS  UA: Pyuria, Hematuria  CXR: RLL opacity  Likely delirium secondary to Sepsis secondary to Urosepsis or Pneumonia  Off Levophed and Dopamine  D/C'd Cefepime after 5 days  BCx and UCx unremarkable  Monitor vitals     Dysphagia  -Swallow eval appreciated, now on Dysphagia 2  -has been tolerating diet   -Will need eval at STR as he eats regular diet per family.    Parkinsons  -Neuro consult appreciated  -Can DC dopamine agonist  -Increased Sinemet    Left shoulder dislocation  -Reviewed imaging (CT, XR)  -Ortho consult appreciated, no need for intervention    Bradycardia with First Degree AV Block  Patient was initially hypertensive with Bradycardia and later went to [facility]   Was on Dopamine   EPS eval recommended no PPM for now  Monitor vitals   Echo: Unremarkable with mild MR and TR  EKG: NSR with undetermined age of infarct    ABEBE  Resolving   Nephrology on board   Hold off Lasix and Continue RL    Pancytopenia   Likely due to multiorgan damage secondary to septic shock  Monitor CBC    DVT  On Eliquis    HTN  Hold off antihypertensive  Monitor BP  DASH Diet     Likely DC STR tomorrow.

## 2021-02-26 NOTE — PROGRESS NOTE ADULT - SUBJECTIVE AND OBJECTIVE BOX
71 year old Khmer speaking Male with past medical history of Parkinson disease, HLD, HTN, H/O DVT on Eliquis and Lasix, H/O COVID presents to ED with altered mental status for 2 days.    Today:  Seen at bedside, appeared to not be in distress.  Discussed with daughter at bedside; she is agreeable to STR.        REVIEW OF SYSTEMS:  Not a reliable historian.      MEDICATIONS  (STANDING):  apixaban 5 milliGRAM(s) Oral every 12 hours  atorvastatin 40 milliGRAM(s) Oral at bedtime  carbidopa/levodopa  25/100 2 Tablet(s) Oral <User Schedule>  chlorhexidine 4% Liquid 1 Application(s) Topical <User Schedule>  chlorhexidine 4% Liquid 1 Application(s) Topical <User Schedule>  colchicine 0.6 milliGRAM(s) Oral daily  dextrose 5% + sodium chloride 0.9%. 1000 milliLiter(s) (75 mL/Hr) IV Continuous <Continuous>  enalapril 5 milliGRAM(s) Oral daily  pantoprazole   Suspension 40 milliGRAM(s) Oral before breakfast  polyethylene glycol 3350 17 Gram(s) Oral daily  senna 2 Tablet(s) Oral at bedtime    MEDICATIONS  (PRN):  acetaminophen   Tablet .. 650 milliGRAM(s) Oral every 6 hours PRN Temp greater or equal to 38.5C (101.3F)      Allergies  No Known Allergies        FAMILY HISTORY:  Family history of cerebrovascular accident (CVA) (Father)        Vital Signs Last 24 Hrs  T(C): 36.9 (26 Feb 2021 14:03), Max: 36.9 (26 Feb 2021 14:03)  T(F): 98.5 (26 Feb 2021 14:03), Max: 98.5 (26 Feb 2021 14:03)  HR: 66 (26 Feb 2021 14:03) (61 - 66)  BP: 160/71 (26 Feb 2021 14:03) (127/80 - 160/71)  RR: 18 (26 Feb 2021 14:03) (16 - 18)      PHYSICAL EXAM:  Constitutional: well-developed; well-groomed; well-nourished  Eyes :conjunctiva clear   Neck supple; no JVD  Back normal shape; ROM intact  Respiratory normal; airway patent; breath sounds equal; good air movement  Cardiovascular; regular rate and rhythm  no rub  no murmur  normal PMI  Gastrointestinal :soft, Nontender, No distension, NBS  Neurological: Alert, responds to pain; responds to verbal commands  Skin; warm and dry; color normal  Musculoskeletal: Left shoulder dislocation      LABS:                        10.5   6.22  )-----------( 380      ( 25 Feb 2021 04:30 )             33.4     02-25    141  |  109  |  15  ----------------------------<  107<H>  4.4   |  24  |  0.9    Ca    9.0      25 Feb 2021 04:30    TPro  6.9  /  Alb  3.3<L>  /  TBili  0.5  /  DBili  x   /  AST  39  /  ALT  8   /  AlkPhos  159<H>  02-25

## 2021-02-26 NOTE — DISCHARGE NOTE PROVIDER - NSDCHHNEEDSERVICE_GEN_ALL_CORE
Medication teaching and assessment/Observation and assessment/Rehabilitation services/Teaching and training
98.2

## 2021-02-26 NOTE — DISCHARGE NOTE PROVIDER - NSDCCPCAREPLAN_GEN_ALL_CORE_FT
PRINCIPAL DISCHARGE DIAGNOSIS  Diagnosis: Bradycardia  Assessment and Plan of Treatment: -  - Electrophysiology team evaluated the patient   - S/P Dopamine drip   - No need for PPM      SECONDARY DISCHARGE DIAGNOSES  Diagnosis: Altered mental status  Assessment and Plan of Treatment: -  - NG Tube placed for feeds initially, then discontinued and has been seen by speech and swallow - now tolerates diet   - Neurology evaluated the patient and believes findings are compatible with Parkinsons disease. Sent ODT Carbidopa-Levodopa to the pharmacy as 6am: two 25/100 tabs, 10 am one 25/100 tablet, 2 pm two 25/100 tabs, 6 pm two 25/100 tabs, and 10 p.m. one 25/100 tablet. This would achieve total daily levodopa dose of 800 mg.   - CT chest - R>L scattered ground glass opacities  - Treated with Cefepime renally dosed   - Weaned off from Levophed     PRINCIPAL DISCHARGE DIAGNOSIS  Diagnosis: Bradycardia  Assessment and Plan of Treatment: -  - Electrophysiology team evaluated the patient   - S/P Dopamine drip   - No need for PPM      SECONDARY DISCHARGE DIAGNOSES  Diagnosis: Dysphagia  Assessment and Plan of Treatment: Likely due to prolonged ICU course, diet advanced to Dysphagia 2 with thin liquids, will need speech language follow up while in rehab for advancement of diet.    Diagnosis: Sepsis  Assessment and Plan of Treatment: Resolved, treated with antibiotics     PRINCIPAL DISCHARGE DIAGNOSIS  Diagnosis: Candida albicans infection  Assessment and Plan of Treatment: in the blood stream. You are treated with antifungal medications. You need to continue fluconazole 400 mg daily for 1 more week. Follow up with physicain at rehab      SECONDARY DISCHARGE DIAGNOSES  Diagnosis: Dislocation of left shoulder joint  Assessment and Plan of Treatment: you have dislocation form June onwards.  possibly chornic dislocation. kleep left hand elevated. follow up with orthopedics physician    Diagnosis: Parkinsons  Assessment and Plan of Treatment: Continue sinamet 5 times a day. Follow up with neurologist    Diagnosis: Dysphagia  Assessment and Plan of Treatment: Likely due to prolonged ICU course, continue dysphagia 1 diet; with thin liquids, will need speech language follow up while in rehab for advancement of diet.    Diagnosis: Sepsis  Assessment and Plan of Treatment: Resolved, treated with antibiotics     PRINCIPAL DISCHARGE DIAGNOSIS  Diagnosis: Candida albicans infection  Assessment and Plan of Treatment: in the blood stream. You are treated with antifungal medications. You need to continue fluconazole 400 mg daily for 1 more week. Follow up with physicain at rehab      SECONDARY DISCHARGE DIAGNOSES  Diagnosis: Sacral pressure ulcer  Assessment and Plan of Treatment: continue local wound care  turn patient q2 hours  pressure offloading    Diagnosis: Dislocation of left shoulder joint  Assessment and Plan of Treatment: you have dislocation form June onwards.  possibly chornic dislocation. kleep left hand elevated. follow up with orthopedics physician    Diagnosis: Parkinsons  Assessment and Plan of Treatment: Continue sinamet 5 times a day. Follow up with neurologist    Diagnosis: Dysphagia  Assessment and Plan of Treatment: Likely due to prolonged ICU course, continue dysphagia 1 diet; with thin liquids, will need speech language follow up while in rehab for advancement of diet.    Diagnosis: Sepsis  Assessment and Plan of Treatment: Resolved, treated with antibiotics

## 2021-02-26 NOTE — CHART NOTE - NSCHARTNOTEFT_GEN_A_CORE
Registered Dietitian Follow-Up     Patient Profile Reviewed                           Yes [x]   No []     Nutrition History Previously Obtained        Yes [x]  No []       Pertinent Subjective Information: Pt. continues with poor PO intake. Recommended supplements ordered.      Pertinent Medical Interventions: Septic shock resolved. AMS: Likely delirium secondary to Sepsis secondary to Urosepsis or Pneumonia, off abx. Dysphagia: SLP following. Left shoulder dislocation  : ortho following. Parkinson's dx: medicated.     Diet order: dysphagia 1 pureed nectar consistency fluid, Ensure pudding TID      Anthropometrics:  - Ht. 170cm  - Wt. 82.6kg on 2/21 no new weights doc since  - %wt change  - BMI 30.9  - IBW 67.1kg     Pertinent Lab Data: (2/25) RBC 4.09, Hg 10.5, Hct 33.4, glu 107, alk phos 159     Pertinent Meds: Protonix, Atorvastatin, Senna, Sinemet, Miralax     Physical Findings:  - Appearance: confused, no edema noted  - GI function: no GI distress noted, last BM   - Tubes: none noted  - Oral/Mouth cavity: no symptoms noted  - Skin: pressure ulcer stg III to coccyx, DTI to R heel     Nutrition Requirements (from RD note on 2/23)   Weight Used: 67.1kg ideal weight      Estimated Energy Needs    Continue [x]  Adjust []  2013-2348kcal (30-35kcal/kg IBW) -- PU considered, bmi >30  Adjusted Energy Recommendations:   kcal/day        Estimated Protein Needs    Continue [x]  Adjust [] 81-101g (1.2-1.5g/kg IBW) -- PU considred, bmi >30  Adjusted Protein Recommendations:   gm/day        Estimated Fluid Needs        Continue [x]  Adjust [] per LIP  Adjusted Fluid Recommendations:   mL/day     Nutrient Intake: 0-25% PO at meals        [] Previous Nutrition Diagnosis: Inadequate Oral Intake, Increased nutrient needs- ongoing      Nutrition Intervention: meals and snacks, medical food supplement, coordination of care, vitamin and mineral supplement    Rec: Continue dysphagia 1 pureed nectar consistency fluid, Ensure pudding TID, add Ensure compact BID. Provide MVI daily. Add 1:1 feeding assistance.      Goal/Expected Outcome: In 4 days pt. to consistently consume at least ~50% PO and supplement      Indicator/Monitoring:  diet order, energy intake, body composition, NFPF

## 2021-02-26 NOTE — DISCHARGE NOTE PROVIDER - HOSPITAL COURSE
To be completed by the Attending     Patient is a 71 year old Mohawk speaking Male with past medical history of Parkinson disease, HLD, HTN, H/O DVT on Eliquis and Lasix, H/O COVID presented to ER on 2/12 with altered mental status for 2 days. In ED patient was noted to be bradycardic to HR 38-42 with 1st degree AVB (340 ms). Electrophysiology team saw the patient and started on Dopamine drip for bradycardia.     AMS secondary to Metabolic encephalopathy, ?Parkinson Disease  - NG Tube placed for feeds, then discontinued, now tolerates diet and seen by speech and swallow   - Neurology evaluated the patient and believes findings are compatible with Parkinsons disease. Sent ODT Carbidopa-Levodopa to the pharmacy as 6am: two 25/100 tabs, 10 am one 25/100 tablet, 2 pm two 25/100 tabs, 6 pm two 25/100 tabs, and 10 p.m. one 25/100 tablet. This would achieve total daily levodopa dose of 800 mg.   - CT chest - R>L scattered ground glass opacities  - Treated with Cefepime renally dosed x 5 days   - Weaned off from Levophed   - Blood cultures and Urine cultures unremarkable   - UA: Pyuria and Hematuria     ABEBE   - held lasix and Intravenous fluids given   - Urine output significantly improved   - Strict I&Os done     Dysphagia  -Swallow eval appreciated, can try Dysphagia 1 as patient improved   -has been tolerating diet   -Will re-eval prior to DC home    Parkinsons  -Neuro consult appreciated  -Can DC dopamine agonist  -Increased Sinemet    Left shoulder dislocation  -Reviewed imaging (CT, XR)  -Ortho consult appreciated, no need for intervention      Sinus Bradycardia / 1st Degree AVB - Improved     - Electrophysiology team evaluated the patient   - S/P Dopamine drip   - No need for PPM     Pancytopenia, which was likely secondary to Shock, sepsis and recent COVID infection.   - Hematology reviewed Peripheral smear - revealed normocytic RBCs, No schistocytes.   - Platelets drecreased but no clumps noted   - Patient to follow up outpatient with Hematologist Oumou Combs     History of Deep Vein Thrombosis   - Patient was on Eliquis at home   - Heparin GTT with close PTT monitoring   - Now Patient will be discharged with AC     Vital Signs Last 24 Hrs  T(C): 36.4 (26 Feb 2021 05:44), Max: 36.4 (26 Feb 2021 05:44)  T(F): 97.6 (26 Feb 2021 05:44), Max: 97.6 (26 Feb 2021 05:44)  HR: 66 (26 Feb 2021 05:44) (61 - 76)  BP: 127/80 (26 Feb 2021 05:44) (120/76 - 142/60)  RR: 18 (26 Feb 2021 05:44) (16 - 18)     To be completed by the Attending     Patient is a 71 year old Telugu speaking Male with past medical history of Parkinson disease, HLD, HTN, H/O DVT on Eliquis and Lasix, H/O COVID presented to ER on 2/12 with altered mental status for 2 days. In ED patient was noted to be bradycardic to HR 38-42 with 1st degree AVB (340 ms). Electrophysiology team saw the patient and started on Dopamine drip for bradycardia.     AMS secondary to Metabolic encephalopathy, ?Parkinson Disease  - NG Tube placed for feeds, then discontinued, now tolerates diet and seen by speech and swallow   - Neurology evaluated the patient and believes findings are compatible with Parkinsons disease. Sent ODT Carbidopa-Levodopa to the pharmacy as 6am: two 25/100 tabs, 10 am one 25/100 tablet, 2 pm two 25/100 tabs, 6 pm two 25/100 tabs, and 10 p.m. one 25/100 tablet. This would achieve total daily levodopa dose of 800 mg.   - CT chest - R>L scattered ground glass opacities  - Treated with Cefepime renally dosed x 5 days   - Weaned off from Levophed   - Blood cultures and Urine cultures unremarkable   - UA: Pyuria and Hematuria     ABEBE   - held lasix and Intravenous fluids given   - Urine output significantly improved   - Strict I&Os done     Dysphagia  -Swallow eval appreciated now on Dysphagia 2  -Will need continued follow up while in STR    Parkinsons  -Neuro consult appreciated  -Can DC dopamine agonist  -Increased Sinemet    Left shoulder dislocation  -Reviewed imaging (CT, XR)  -Ortho consult appreciated, no need for intervention      Sinus Bradycardia / 1st Degree AVB - Improved     - Electrophysiology team evaluated the patient   - S/P Dopamine drip   - No need for PPM     Pancytopenia, which was likely secondary to Shock, sepsis and recent COVID infection.   - Hematology reviewed Peripheral smear - revealed normocytic RBCs, No schistocytes.   - Platelets drecreased but no clumps noted   - Patient to follow up outpatient with Hematologist Oumou Combs     History of Deep Vein Thrombosis   - Now Patient will be discharged with AC     -At baseline, patient ambulates with walker at home and eats regular diet.  Patient will benefit from course of short term rehab prior to returning home.  Family had initially wanted patient to come home however they could not adequately take care of him at this time.  He is stable for discharge to short term rehab.     To be completed by the Attending     Patient is a 71 year old Urdu speaking Male with past medical history of Parkinson disease, HLD, HTN, H/O DVT on Eliquis and Lasix, H/O COVID presented to ER on 2/12 with altered mental status for 2 days. In ED patient was noted to be bradycardic to HR 38-42 with 1st degree AVB (340 ms). Electrophysiology team saw the patient and started on Dopamine drip for bradycardia.     AMS secondary to Metabolic encephalopathy  - NG Tube placed for feeds, then discontinued, now tolerates diet and seen by speech and swallow   - Neurology evaluated the patient and believes findings are compatible with Parkinsons disease. Sent ODT Carbidopa-Levodopa to the pharmacy as 6am: two 25/100 tabs, 10 am one 25/100 tablet, 2 pm two 25/100 tabs, 6 pm two 25/100 tabs, and 10 p.m. one 25/100 tablet. This would achieve total daily levodopa dose of 800 mg.   - CT chest - R>L scattered ground glass opacities  - Treated with Cefepime renally dosed x 5 days   - Weaned off from Levophed   - Blood cultures and Urine cultures unremarkable   - UA: Pyuria and Hematuria     ABEBE   - held lasix and Intravenous fluids given   - Urine output significantly improved   - Strict I&Os done     Dysphagia  -Swallow eval appreciated now on Dysphagia 2  -Will need continued follow up while in STR    Parkinsons  -Neuro consult appreciated  -Can DC dopamine agonist  -Increased Sinemet    Left shoulder dislocation  -Reviewed imaging (CT, XR)  -Ortho consult appreciated, no need for intervention      Sinus Bradycardia / 1st Degree AVB - Improved     - Electrophysiology team evaluated the patient   - S/P Dopamine drip   - No need for PPM     Pancytopenia, which was likely secondary to Shock, sepsis and recent COVID infection.   - Hematology reviewed Peripheral smear - revealed normocytic RBCs, No schistocytes.   - Platelets drecreased but no clumps noted   - Patient to follow up outpatient with Hematologist Oumou Combs     History of Deep Vein Thrombosis   - Now Patient will be discharged with AC     -At baseline, patient ambulates with walker at home and eats regular diet.  Patient will benefit from course of short term rehab prior to returning home.  Family had initially wanted patient to come home however they could not adequately take care of him at this time.  He is stable for discharge to short term rehab.     To be completed by the Attending     Patient is a 71 year old Serbian speaking Male with past medical history of Parkinson disease, HLD, HTN, H/O DVT on Eliquis and Lasix, H/O COVID presented to ER on 2/12 with altered mental status for 2 days. In ED patient was noted to be bradycardic to HR 38-42 with 1st degree AVB (340 ms). Electrophysiology team saw the patient and started on   Dopamine drip for bradycardia following which his bradycardia improved.   Patient had prolonged hospitalization and initially he was treated for AMS beileved secondary to underlying parkinsons diseasse. He was also treated for ABEBE, dysphagia,  shoulder dislocation. Patient developed persistent fevers and was treated for possible aspiration pneumonia. He was also found to have fungemia with   candida albicans on 3/7. ID team following. Patient was started on IV fluconazole and his fevers improved. Repeat blodo cultures from 3/4 not showing any growth.  ophthalmologist evaluated patient and there was no ocular involvement.  Patient to continue fluconazole until 3/16.    Diagnosis    # persistent fever- improved  # fungemia with candida albicans on 2/27; repeat cultures from 3/4 neg; continue fluconazole 400 mg daily until 3/16  # LLL pneumonia possible aspiration pneumonia- treated and resolved  -patient was treated with IV antibiotics-  Vanco and Brady; switched to Zosyn 2/28 - finished course  -Speech and swallow follow up-continue Dysphagia 1    # sacral pressure ulcer- continue wound care, position changes    #Dysphagia- continue Dysphagia 1 diet    #Parkinsons  -Continue current meds of Sinemet 25/100 2 tablets 5 times a day, pt meds were adjusted recently and would not benefit from any increase at the current time, due to the bedbound status of the patient and lack of muscle use  -Patient needs aggressive PT/OT  -f/u with neurologist as outpt in 2 - 4 weeks after discharge.  May f/u with movement specialist Dr. Saxena ([phone]) for further medication management    #Left shoulder dislocation from June onwards;  Follow up with Dr Ignacio in one month    #Bradycardia with First Degree AV Block  Patient was initially hypertensive with Bradycardia and later went to [facility]   Was on Dopamine   EPS eval recommended no PPM for now  Echo: Unremarkable with mild MR and TR  EKG: NSR with undetermined age of infarct    #ABEBE on suspected CKD stage III- resolved    #Chronic DVT-On Eliquis    #HTN- restart antihypertensive medications

## 2021-02-26 NOTE — DISCHARGE NOTE PROVIDER - CARE PROVIDERS DIRECT ADDRESSES
,[email or web address],[email or web address] [email or web address],DirectAddress_Unknown,[email or web address],DirectAddress_Unknown,[email or web address]

## 2021-02-26 NOTE — DISCHARGE NOTE PROVIDER - PROVIDER TOKENS
PROVIDER:[TOKEN:[[id number]],FOLLOWUP:[1 week]],PROVIDER:[TOKEN:[[id number]],FOLLOWUP:[1 week]],PROVIDER:[TOKEN:[[id number]],FOLLOWUP:[1 week]],PROVIDER:[TOKEN:[[id number]],FOLLOWUP:[2 weeks]],FREE:[LAST:[PMD],PHONE:[(   )    -],FAX:[(   )    -],FOLLOWUP:[1-3 days]] PROVIDER:[TOKEN:[[id number]],FOLLOWUP:[1 week]],PROVIDER:[TOKEN:[[id number]],FOLLOWUP:[1 week]],PROVIDER:[TOKEN:[[id number]],FOLLOWUP:[1 week]],PROVIDER:[TOKEN:[[id number]],FOLLOWUP:[2 weeks]],FREE:[LAST:[PMD],PHONE:[(   )    -],FAX:[(   )    -],FOLLOWUP:[1-3 days]],PROVIDER:[TOKEN:[[id number]],FOLLOWUP:[1 month]],PROVIDER:[TOKEN:[[id number]],FOLLOWUP:[1 month]]

## 2021-02-27 ENCOUNTER — TRANSCRIPTION ENCOUNTER (OUTPATIENT)
Age: 71
End: 2021-02-27

## 2021-02-27 LAB
ALBUMIN SERPL ELPH-MCNC: 3 G/DL — LOW (ref 3.5–5.2)
ALP SERPL-CCNC: 147 U/L — HIGH (ref 30–115)
ALT FLD-CCNC: 7 U/L — SIGNIFICANT CHANGE UP (ref 0–41)
ANION GAP SERPL CALC-SCNC: 10 MMOL/L — SIGNIFICANT CHANGE UP (ref 7–14)
AST SERPL-CCNC: 40 U/L — SIGNIFICANT CHANGE UP (ref 0–41)
BILIRUB SERPL-MCNC: 0.3 MG/DL — SIGNIFICANT CHANGE UP (ref 0.2–1.2)
BUN SERPL-MCNC: 15 MG/DL — SIGNIFICANT CHANGE UP (ref 10–20)
CALCIUM SERPL-MCNC: 8.2 MG/DL — LOW (ref 8.5–10.1)
CHLORIDE SERPL-SCNC: 104 MMOL/L — SIGNIFICANT CHANGE UP (ref 98–110)
CO2 SERPL-SCNC: 23 MMOL/L — SIGNIFICANT CHANGE UP (ref 17–32)
CREAT SERPL-MCNC: 1.2 MG/DL — SIGNIFICANT CHANGE UP (ref 0.7–1.5)
GLUCOSE SERPL-MCNC: 96 MG/DL — SIGNIFICANT CHANGE UP (ref 70–99)
HCT VFR BLD CALC: 33.7 % — LOW (ref 42–52)
HGB BLD-MCNC: 10.5 G/DL — LOW (ref 14–18)
MCHC RBC-ENTMCNC: 25.5 PG — LOW (ref 27–31)
MCHC RBC-ENTMCNC: 31.2 G/DL — LOW (ref 32–37)
MCV RBC AUTO: 81.8 FL — SIGNIFICANT CHANGE UP (ref 80–94)
MRSA PCR RESULT.: NEGATIVE — SIGNIFICANT CHANGE UP
NRBC # BLD: 0 /100 WBCS — SIGNIFICANT CHANGE UP (ref 0–0)
PLATELET # BLD AUTO: 311 K/UL — SIGNIFICANT CHANGE UP (ref 130–400)
POTASSIUM SERPL-MCNC: 4.3 MMOL/L — SIGNIFICANT CHANGE UP (ref 3.5–5)
POTASSIUM SERPL-SCNC: 4.3 MMOL/L — SIGNIFICANT CHANGE UP (ref 3.5–5)
PROT SERPL-MCNC: 6.3 G/DL — SIGNIFICANT CHANGE UP (ref 6–8)
RBC # BLD: 4.12 M/UL — LOW (ref 4.7–6.1)
RBC # FLD: 15.9 % — HIGH (ref 11.5–14.5)
SODIUM SERPL-SCNC: 137 MMOL/L — SIGNIFICANT CHANGE UP (ref 135–146)
WBC # BLD: 5.78 K/UL — SIGNIFICANT CHANGE UP (ref 4.8–10.8)
WBC # FLD AUTO: 5.78 K/UL — SIGNIFICANT CHANGE UP (ref 4.8–10.8)

## 2021-02-27 PROCEDURE — 71045 X-RAY EXAM CHEST 1 VIEW: CPT | Mod: 26

## 2021-02-27 PROCEDURE — 99233 SBSQ HOSP IP/OBS HIGH 50: CPT

## 2021-02-27 RX ORDER — VANCOMYCIN HCL 1 G
1250 VIAL (EA) INTRAVENOUS EVERY 12 HOURS
Refills: 0 | Status: DISCONTINUED | OUTPATIENT
Start: 2021-02-28 | End: 2021-02-28

## 2021-02-27 RX ORDER — VANCOMYCIN HCL 1 G
VIAL (EA) INTRAVENOUS
Refills: 0 | Status: DISCONTINUED | OUTPATIENT
Start: 2021-02-27 | End: 2021-02-28

## 2021-02-27 RX ORDER — VANCOMYCIN HCL 1 G
1250 VIAL (EA) INTRAVENOUS ONCE
Refills: 0 | Status: COMPLETED | OUTPATIENT
Start: 2021-02-27 | End: 2021-02-27

## 2021-02-27 RX ORDER — AZTREONAM 2 G
VIAL (EA) INJECTION
Refills: 0 | Status: DISCONTINUED | OUTPATIENT
Start: 2021-02-27 | End: 2021-02-27

## 2021-02-27 RX ORDER — MEROPENEM 1 G/30ML
1000 INJECTION INTRAVENOUS EVERY 8 HOURS
Refills: 0 | Status: DISCONTINUED | OUTPATIENT
Start: 2021-02-27 | End: 2021-02-28

## 2021-02-27 RX ADMIN — CARBIDOPA AND LEVODOPA 2 TABLET(S): 25; 100 TABLET ORAL at 14:32

## 2021-02-27 RX ADMIN — Medication 166.67 MILLIGRAM(S): at 15:38

## 2021-02-27 RX ADMIN — SODIUM CHLORIDE 75 MILLILITER(S): 9 INJECTION, SOLUTION INTRAVENOUS at 05:25

## 2021-02-27 RX ADMIN — APIXABAN 5 MILLIGRAM(S): 2.5 TABLET, FILM COATED ORAL at 17:15

## 2021-02-27 RX ADMIN — Medication 0.6 MILLIGRAM(S): at 10:31

## 2021-02-27 RX ADMIN — CARBIDOPA AND LEVODOPA 2 TABLET(S): 25; 100 TABLET ORAL at 09:20

## 2021-02-27 RX ADMIN — CARBIDOPA AND LEVODOPA 2 TABLET(S): 25; 100 TABLET ORAL at 05:27

## 2021-02-27 RX ADMIN — APIXABAN 5 MILLIGRAM(S): 2.5 TABLET, FILM COATED ORAL at 05:26

## 2021-02-27 RX ADMIN — Medication 650 MILLIGRAM(S): at 05:27

## 2021-02-27 RX ADMIN — Medication 650 MILLIGRAM(S): at 21:28

## 2021-02-27 RX ADMIN — Medication 5 MILLIGRAM(S): at 05:26

## 2021-02-27 RX ADMIN — CARBIDOPA AND LEVODOPA 2 TABLET(S): 25; 100 TABLET ORAL at 17:15

## 2021-02-27 RX ADMIN — CARBIDOPA AND LEVODOPA 2 TABLET(S): 25; 100 TABLET ORAL at 21:08

## 2021-02-27 RX ADMIN — PANTOPRAZOLE SODIUM 40 MILLIGRAM(S): 20 TABLET, DELAYED RELEASE ORAL at 05:27

## 2021-02-27 RX ADMIN — ATORVASTATIN CALCIUM 40 MILLIGRAM(S): 80 TABLET, FILM COATED ORAL at 21:08

## 2021-02-27 RX ADMIN — MEROPENEM 100 MILLIGRAM(S): 1 INJECTION INTRAVENOUS at 21:08

## 2021-02-27 RX ADMIN — POLYETHYLENE GLYCOL 3350 17 GRAM(S): 17 POWDER, FOR SOLUTION ORAL at 10:31

## 2021-02-27 RX ADMIN — SENNA PLUS 2 TABLET(S): 8.6 TABLET ORAL at 21:08

## 2021-02-27 RX ADMIN — Medication 650 MILLIGRAM(S): at 10:31

## 2021-02-27 RX ADMIN — SODIUM CHLORIDE 75 MILLILITER(S): 9 INJECTION, SOLUTION INTRAVENOUS at 15:28

## 2021-02-27 NOTE — PROGRESS NOTE ADULT - ASSESSMENT
Aspiration Pneumonia:  -Patient was to be discharged 2/27/21, spiked fevers in AM  -CXR shows LLL opacity   -Highly likely asp PNA given recent history of aspiration and advancing diet  -Will start Vanco and Meropenem  -Will send MRSA nares, urine strep and legionella, sputum Cx  -Follow Blood cx, repeat labs  -O2 via NC  -Will discuss with ID  -Speech and swallow follow up    Septic Shock (initially on admission)  Resolved  Patient presented with AMS  UA: Pyuria, Hematuria  CXR: RLL opacity  Likely delirium secondary to Sepsis secondary to Urosepsis or Pneumonia  Off Levophed and Dopamine  D/C'd Cefepime after 5 days  BCx and UCx unremarkable  Monitor vitals     Dysphagia  -Swallow eval appreciated, now on Dysphagia 2  -has been tolerating diet   -Will need eval at STR as he eats regular diet per family.    Parkinsons  -Neuro consult appreciated  -Can DC dopamine agonist  -Increased Sinemet    Left shoulder dislocation  -Reviewed imaging (CT, XR)  -Ortho consult appreciated, no need for intervention    Bradycardia with First Degree AV Block  Patient was initially hypertensive with Bradycardia and later went to [facility]   Was on Dopamine   EPS eval recommended no PPM for now  Monitor vitals   Echo: Unremarkable with mild MR and TR  EKG: NSR with undetermined age of infarct    ABEBE  Resolving   Nephrology on board   Hold off Lasix and Continue RL    Pancytopenia   Likely due to multiorgan damage secondary to septic shock  Monitor CBC    DVT  On Eliquis    HTN  Hold off antihypertensive  Monitor BP  DASH Diet

## 2021-02-27 NOTE — DISCHARGE NOTE NURSING/CASE MANAGEMENT/SOCIAL WORK - PATIENT PORTAL LINK FT
You can access the FollowMyHealth Patient Portal offered by [facility] by registering at the following website: [email or web address]. By joining [facility]’s FollowMyHealth portal, you will also be able to view your health information using other applications (apps) compatible with our system.

## 2021-02-27 NOTE — PROGRESS NOTE ADULT - SUBJECTIVE AND OBJECTIVE BOX
71 year old Hebrew speaking Male with past medical history of Parkinson disease, HLD, HTN, H/O DVT on Eliquis and Lasix, H/O COVID presents to ED with altered mental status for 2 days.    Today:  Seen at bedside, appeared to not be in distress.  DC to STR cancelled given new findings on CXR and fevers.        REVIEW OF SYSTEMS:  Not a reliable historian      MEDICATIONS  (STANDING):  apixaban 5 milliGRAM(s) Oral every 12 hours  atorvastatin 40 milliGRAM(s) Oral at bedtime  carbidopa/levodopa  25/100 2 Tablet(s) Oral <User Schedule>  chlorhexidine 4% Liquid 1 Application(s) Topical <User Schedule>  chlorhexidine 4% Liquid 1 Application(s) Topical <User Schedule>  colchicine 0.6 milliGRAM(s) Oral daily  dextrose 5% + sodium chloride 0.9%. 1000 milliLiter(s) (75 mL/Hr) IV Continuous <Continuous>  enalapril 5 milliGRAM(s) Oral daily  meropenem  IVPB 1000 milliGRAM(s) IV Intermittent every 8 hours  pantoprazole   Suspension 40 milliGRAM(s) Oral before breakfast  polyethylene glycol 3350 17 Gram(s) Oral daily  senna 2 Tablet(s) Oral at bedtime  vancomycin  IVPB 1250 milliGRAM(s) IV Intermittent once  vancomycin  IVPB        MEDICATIONS  (PRN):  acetaminophen   Tablet .. 650 milliGRAM(s) Oral every 6 hours PRN Temp greater or equal to 38.5C (101.3F)      Allergies  No Known Allergies        FAMILY HISTORY:  Family history of cerebrovascular accident (CVA) (Father)        Vital Signs Last 24 Hrs  T(C): 35.8 (27 Feb 2021 14:06), Max: 38.5 (27 Feb 2021 05:00)  T(F): 96.5 (27 Feb 2021 14:06), Max: 101.3 (27 Feb 2021 05:00)  HR: 65 (27 Feb 2021 14:06) (65 - 73)  BP: 150/67 (27 Feb 2021 14:06) (133/53 - 184/87)  RR: 18 (27 Feb 2021 14:06) (18 - 18)      PHYSICAL EXAM:  Constitutional: well-developed; well-groomed; well-nourished  Eyes :conjunctiva clear   Neck supple; no JVD  Back normal shape; ROM intact  Respiratory normal; airway patent; breath sounds equal; good air movement  Cardiovascular; regular rate and rhythm  no rub  no murmur  normal PMI  Gastrointestinal :soft, Nontender, No distension, NBS  Neurological: Alert, responds to pain; responds to verbal commands  Skin; warm and dry; color normal  Musculoskeletal: Left shoulder dislocation

## 2021-02-28 LAB
ALBUMIN SERPL ELPH-MCNC: 2.7 G/DL — LOW (ref 3.5–5.2)
ALP SERPL-CCNC: 133 U/L — HIGH (ref 30–115)
ALT FLD-CCNC: 12 U/L — SIGNIFICANT CHANGE UP (ref 0–41)
ANION GAP SERPL CALC-SCNC: 9 MMOL/L — SIGNIFICANT CHANGE UP (ref 7–14)
AST SERPL-CCNC: 38 U/L — SIGNIFICANT CHANGE UP (ref 0–41)
BASOPHILS # BLD AUTO: 0.06 K/UL — SIGNIFICANT CHANGE UP (ref 0–0.2)
BASOPHILS NFR BLD AUTO: 1 % — SIGNIFICANT CHANGE UP (ref 0–1)
BILIRUB SERPL-MCNC: 0.4 MG/DL — SIGNIFICANT CHANGE UP (ref 0.2–1.2)
BUN SERPL-MCNC: 19 MG/DL — SIGNIFICANT CHANGE UP (ref 10–20)
CALCIUM SERPL-MCNC: 8.2 MG/DL — LOW (ref 8.5–10.1)
CHLORIDE SERPL-SCNC: 104 MMOL/L — SIGNIFICANT CHANGE UP (ref 98–110)
CO2 SERPL-SCNC: 23 MMOL/L — SIGNIFICANT CHANGE UP (ref 17–32)
CREAT SERPL-MCNC: 1.5 MG/DL — SIGNIFICANT CHANGE UP (ref 0.7–1.5)
EOSINOPHIL # BLD AUTO: 0.09 K/UL — SIGNIFICANT CHANGE UP (ref 0–0.7)
EOSINOPHIL NFR BLD AUTO: 1.4 % — SIGNIFICANT CHANGE UP (ref 0–8)
GLUCOSE SERPL-MCNC: 104 MG/DL — HIGH (ref 70–99)
HCT VFR BLD CALC: 33.5 % — LOW (ref 42–52)
HGB BLD-MCNC: 10.6 G/DL — LOW (ref 14–18)
IMM GRANULOCYTES NFR BLD AUTO: 0.3 % — SIGNIFICANT CHANGE UP (ref 0.1–0.3)
LYMPHOCYTES # BLD AUTO: 0.47 K/UL — LOW (ref 1.2–3.4)
LYMPHOCYTES # BLD AUTO: 7.5 % — LOW (ref 20.5–51.1)
MCHC RBC-ENTMCNC: 25.7 PG — LOW (ref 27–31)
MCHC RBC-ENTMCNC: 31.6 G/DL — LOW (ref 32–37)
MCV RBC AUTO: 81.3 FL — SIGNIFICANT CHANGE UP (ref 80–94)
MONOCYTES # BLD AUTO: 0.58 K/UL — SIGNIFICANT CHANGE UP (ref 0.1–0.6)
MONOCYTES NFR BLD AUTO: 9.3 % — SIGNIFICANT CHANGE UP (ref 1.7–9.3)
NEUTROPHILS # BLD AUTO: 5.03 K/UL — SIGNIFICANT CHANGE UP (ref 1.4–6.5)
NEUTROPHILS NFR BLD AUTO: 80.5 % — HIGH (ref 42.2–75.2)
NRBC # BLD: 0 /100 WBCS — SIGNIFICANT CHANGE UP (ref 0–0)
PLATELET # BLD AUTO: 266 K/UL — SIGNIFICANT CHANGE UP (ref 130–400)
POTASSIUM SERPL-MCNC: 4.3 MMOL/L — SIGNIFICANT CHANGE UP (ref 3.5–5)
POTASSIUM SERPL-SCNC: 4.3 MMOL/L — SIGNIFICANT CHANGE UP (ref 3.5–5)
PROT SERPL-MCNC: 6 G/DL — SIGNIFICANT CHANGE UP (ref 6–8)
RBC # BLD: 4.12 M/UL — LOW (ref 4.7–6.1)
RBC # FLD: 16.1 % — HIGH (ref 11.5–14.5)
SODIUM SERPL-SCNC: 136 MMOL/L — SIGNIFICANT CHANGE UP (ref 135–146)
WBC # BLD: 6.25 K/UL — SIGNIFICANT CHANGE UP (ref 4.8–10.8)
WBC # FLD AUTO: 6.25 K/UL — SIGNIFICANT CHANGE UP (ref 4.8–10.8)

## 2021-02-28 PROCEDURE — 99233 SBSQ HOSP IP/OBS HIGH 50: CPT

## 2021-02-28 RX ORDER — ACETAMINOPHEN 500 MG
650 TABLET ORAL EVERY 6 HOURS
Refills: 0 | Status: DISCONTINUED | OUTPATIENT
Start: 2021-02-28 | End: 2021-03-11

## 2021-02-28 RX ORDER — PIPERACILLIN AND TAZOBACTAM 4; .5 G/20ML; G/20ML
3.38 INJECTION, POWDER, LYOPHILIZED, FOR SOLUTION INTRAVENOUS ONCE
Refills: 0 | Status: COMPLETED | OUTPATIENT
Start: 2021-02-28 | End: 2021-02-28

## 2021-02-28 RX ORDER — PIPERACILLIN AND TAZOBACTAM 4; .5 G/20ML; G/20ML
2.25 INJECTION, POWDER, LYOPHILIZED, FOR SOLUTION INTRAVENOUS EVERY 8 HOURS
Refills: 0 | Status: DISCONTINUED | OUTPATIENT
Start: 2021-02-28 | End: 2021-03-03

## 2021-02-28 RX ADMIN — CARBIDOPA AND LEVODOPA 2 TABLET(S): 25; 100 TABLET ORAL at 04:55

## 2021-02-28 RX ADMIN — CHLORHEXIDINE GLUCONATE 1 APPLICATION(S): 213 SOLUTION TOPICAL at 04:55

## 2021-02-28 RX ADMIN — Medication 166.67 MILLIGRAM(S): at 04:56

## 2021-02-28 RX ADMIN — PIPERACILLIN AND TAZOBACTAM 200 GRAM(S): 4; .5 INJECTION, POWDER, LYOPHILIZED, FOR SOLUTION INTRAVENOUS at 20:41

## 2021-02-28 RX ADMIN — MEROPENEM 100 MILLIGRAM(S): 1 INJECTION INTRAVENOUS at 04:57

## 2021-02-28 RX ADMIN — ATORVASTATIN CALCIUM 40 MILLIGRAM(S): 80 TABLET, FILM COATED ORAL at 20:41

## 2021-02-28 RX ADMIN — CARBIDOPA AND LEVODOPA 2 TABLET(S): 25; 100 TABLET ORAL at 14:58

## 2021-02-28 RX ADMIN — SENNA PLUS 2 TABLET(S): 8.6 TABLET ORAL at 20:41

## 2021-02-28 RX ADMIN — Medication 650 MILLIGRAM(S): at 03:17

## 2021-02-28 RX ADMIN — CARBIDOPA AND LEVODOPA 2 TABLET(S): 25; 100 TABLET ORAL at 20:41

## 2021-02-28 RX ADMIN — PANTOPRAZOLE SODIUM 40 MILLIGRAM(S): 20 TABLET, DELAYED RELEASE ORAL at 04:55

## 2021-02-28 RX ADMIN — APIXABAN 5 MILLIGRAM(S): 2.5 TABLET, FILM COATED ORAL at 04:55

## 2021-02-28 RX ADMIN — Medication 0.6 MILLIGRAM(S): at 10:14

## 2021-02-28 RX ADMIN — Medication 5 MILLIGRAM(S): at 04:55

## 2021-02-28 RX ADMIN — Medication 650 MILLIGRAM(S): at 13:05

## 2021-02-28 RX ADMIN — PIPERACILLIN AND TAZOBACTAM 200 GRAM(S): 4; .5 INJECTION, POWDER, LYOPHILIZED, FOR SOLUTION INTRAVENOUS at 10:15

## 2021-02-28 RX ADMIN — Medication 650 MILLIGRAM(S): at 21:18

## 2021-02-28 RX ADMIN — SODIUM CHLORIDE 75 MILLILITER(S): 9 INJECTION, SOLUTION INTRAVENOUS at 04:56

## 2021-02-28 RX ADMIN — CARBIDOPA AND LEVODOPA 2 TABLET(S): 25; 100 TABLET ORAL at 10:14

## 2021-02-28 NOTE — PROGRESS NOTE ADULT - SUBJECTIVE AND OBJECTIVE BOX
71 year old Slovenian speaking Male with past medical history of Parkinson disease, HLD, HTN, H/O DVT on Eliquis and Lasix, H/O COVID presents to ED with altered mental status for 2 days.    Today:  Seen at bedside, no new complaints.        REVIEW OF SYSTEMS:  No new complaints      MEDICATIONS  (STANDING):  apixaban 5 milliGRAM(s) Oral every 12 hours  atorvastatin 40 milliGRAM(s) Oral at bedtime  carbidopa/levodopa  25/100 2 Tablet(s) Oral <User Schedule>  chlorhexidine 4% Liquid 1 Application(s) Topical <User Schedule>  chlorhexidine 4% Liquid 1 Application(s) Topical <User Schedule>  colchicine 0.6 milliGRAM(s) Oral daily  dextrose 5% + sodium chloride 0.9%. 1000 milliLiter(s) (75 mL/Hr) IV Continuous <Continuous>  enalapril 5 milliGRAM(s) Oral daily  pantoprazole   Suspension 40 milliGRAM(s) Oral before breakfast  piperacillin/tazobactam IVPB.. 2.25 Gram(s) IV Intermittent every 8 hours  polyethylene glycol 3350 17 Gram(s) Oral daily  senna 2 Tablet(s) Oral at bedtime    MEDICATIONS  (PRN):  acetaminophen  Suppository .. 650 milliGRAM(s) Rectal every 6 hours PRN Temp greater or equal to 38C (100.4F)      Allergies  No Known Allergies        FAMILY HISTORY:  Family history of cerebrovascular accident (CVA) (Father)        Vital Signs Last 24 Hrs  T(C): 38.2 (28 Feb 2021 14:03), Max: 39.6 (27 Feb 2021 21:50)  T(F): 100.7 (28 Feb 2021 14:03), Max: 103.3 (27 Feb 2021 21:50)  HR: 60 (28 Feb 2021 14:03) (60 - 65)  BP: 134/63 (28 Feb 2021 14:03) (120/65 - 172/75)  RR: 18 (28 Feb 2021 05:51) (18 - 18)      PHYSICAL EXAM:  Constitutional: well-developed; well-groomed; well-nourished  Eyes :conjunctiva clear   Neck supple; no JVD  Back normal shape; ROM intact  Respiratory normal; airway patent; breath sounds equal; good air movement  Cardiovascular; regular rate and rhythm  no rub  no murmur  normal PMI  Gastrointestinal :soft, Nontender, No distension, NBS  Neurological: Alert, responds to pain; responds to verbal commands  Skin; warm and dry; color normal  Musculoskeletal: Left shoulder dislocation      LABS:                        10.6   6.25  )-----------( 266      ( 28 Feb 2021 10:41 )             33.5     02-28    136  |  104  |  19  ----------------------------<  104<H>  4.3   |  23  |  1.5    Ca    8.2<L>      28 Feb 2021 10:41    TPro  6.0  /  Alb  2.7<L>  /  TBili  0.4  /  DBili  x   /  AST  38  /  ALT  12  /  AlkPhos  133<H>  02-28

## 2021-02-28 NOTE — PROGRESS NOTE ADULT - ASSESSMENT
Aspiration Pneumonia:  -Patient was to be discharged 2/27/21, spiked fevers in AM  -CXR shows LLL opacity   -Repeat COVID neg  -Highly likely asp PNA given recent history of aspiration and advancing diet  -Started Vanco and Brady, patient spiked fevers after starting; MRSA nares neg, will switch to Zosyn renally dosed  -Follow urine strep and legionella, sputum Cx, procal  -Follow Blood cx  -O2 via NC  -Will discuss with ID  -Speech and swallow follow up    Septic Shock (initially on admission)  Resolved  Patient presented with AMS  UA: Pyuria, Hematuria  CXR: RLL opacity  Likely delirium secondary to Sepsis secondary to Urosepsis or Pneumonia  Off Levophed and Dopamine  D/C'd Cefepime after 5 days    Dysphagia  -Swallow eval appreciated, now on Dysphagia 2  -has been tolerating diet   -Will need eval at STR as he eats regular diet per family.    Parkinsons  -Neuro consult appreciated  -Can DC dopamine agonist  -Increased Sinemet    Left shoulder dislocation  -Reviewed imaging (CT, XR)  -Ortho consult appreciated, no need for intervention    Bradycardia with First Degree AV Block  Patient was initially hypertensive with Bradycardia and later went to [facility]   Was on Dopamine   EPS eval recommended no PPM for now  Monitor vitals   Echo: Unremarkable with mild MR and TR  EKG: NSR with undetermined age of infarct    ABEBE  Resolving   Nephrology on board   Hold off Lasix and Continue RL    DVT  On Eliquis    HTN  Hold off antihypertensive  Monitor BP  DASH Diet

## 2021-03-01 LAB
-  CANDIDA ALBICANS: SIGNIFICANT CHANGE UP
ALBUMIN SERPL ELPH-MCNC: 2.9 G/DL — LOW (ref 3.5–5.2)
ALP SERPL-CCNC: 139 U/L — HIGH (ref 30–115)
ALT FLD-CCNC: 19 U/L — SIGNIFICANT CHANGE UP (ref 0–41)
ANION GAP SERPL CALC-SCNC: 11 MMOL/L — SIGNIFICANT CHANGE UP (ref 7–14)
AST SERPL-CCNC: 40 U/L — SIGNIFICANT CHANGE UP (ref 0–41)
BASOPHILS # BLD AUTO: 0.03 K/UL — SIGNIFICANT CHANGE UP (ref 0–0.2)
BASOPHILS NFR BLD AUTO: 0.5 % — SIGNIFICANT CHANGE UP (ref 0–1)
BILIRUB SERPL-MCNC: 0.5 MG/DL — SIGNIFICANT CHANGE UP (ref 0.2–1.2)
BUN SERPL-MCNC: 19 MG/DL — SIGNIFICANT CHANGE UP (ref 10–20)
CALCIUM SERPL-MCNC: 8.7 MG/DL — SIGNIFICANT CHANGE UP (ref 8.5–10.1)
CHLORIDE SERPL-SCNC: 103 MMOL/L — SIGNIFICANT CHANGE UP (ref 98–110)
CO2 SERPL-SCNC: 25 MMOL/L — SIGNIFICANT CHANGE UP (ref 17–32)
CREAT SERPL-MCNC: 1.3 MG/DL — SIGNIFICANT CHANGE UP (ref 0.7–1.5)
EOSINOPHIL # BLD AUTO: 0.24 K/UL — SIGNIFICANT CHANGE UP (ref 0–0.7)
EOSINOPHIL NFR BLD AUTO: 3.7 % — SIGNIFICANT CHANGE UP (ref 0–8)
GLUCOSE SERPL-MCNC: 108 MG/DL — HIGH (ref 70–99)
GRAM STN FLD: SIGNIFICANT CHANGE UP
GRAM STN FLD: SIGNIFICANT CHANGE UP
HCT VFR BLD CALC: 34.5 % — LOW (ref 42–52)
HGB BLD-MCNC: 10.5 G/DL — LOW (ref 14–18)
IMM GRANULOCYTES NFR BLD AUTO: 0.3 % — SIGNIFICANT CHANGE UP (ref 0.1–0.3)
LYMPHOCYTES # BLD AUTO: 0.62 K/UL — LOW (ref 1.2–3.4)
LYMPHOCYTES # BLD AUTO: 9.5 % — LOW (ref 20.5–51.1)
MAGNESIUM SERPL-MCNC: 2.1 MG/DL — SIGNIFICANT CHANGE UP (ref 1.8–2.4)
MCHC RBC-ENTMCNC: 25.4 PG — LOW (ref 27–31)
MCHC RBC-ENTMCNC: 30.4 G/DL — LOW (ref 32–37)
MCV RBC AUTO: 83.3 FL — SIGNIFICANT CHANGE UP (ref 80–94)
METHOD TYPE: SIGNIFICANT CHANGE UP
MONOCYTES # BLD AUTO: 0.84 K/UL — HIGH (ref 0.1–0.6)
MONOCYTES NFR BLD AUTO: 12.8 % — HIGH (ref 1.7–9.3)
NEUTROPHILS # BLD AUTO: 4.79 K/UL — SIGNIFICANT CHANGE UP (ref 1.4–6.5)
NEUTROPHILS NFR BLD AUTO: 73.2 % — SIGNIFICANT CHANGE UP (ref 42.2–75.2)
NRBC # BLD: 0 /100 WBCS — SIGNIFICANT CHANGE UP (ref 0–0)
PHOSPHATE SERPL-MCNC: 3.6 MG/DL — SIGNIFICANT CHANGE UP (ref 2.1–4.9)
PLATELET # BLD AUTO: 251 K/UL — SIGNIFICANT CHANGE UP (ref 130–400)
POTASSIUM SERPL-MCNC: 4.4 MMOL/L — SIGNIFICANT CHANGE UP (ref 3.5–5)
POTASSIUM SERPL-SCNC: 4.4 MMOL/L — SIGNIFICANT CHANGE UP (ref 3.5–5)
PROT SERPL-MCNC: 6.3 G/DL — SIGNIFICANT CHANGE UP (ref 6–8)
RBC # BLD: 4.14 M/UL — LOW (ref 4.7–6.1)
RBC # FLD: 15.9 % — HIGH (ref 11.5–14.5)
SODIUM SERPL-SCNC: 139 MMOL/L — SIGNIFICANT CHANGE UP (ref 135–146)
WBC # BLD: 6.54 K/UL — SIGNIFICANT CHANGE UP (ref 4.8–10.8)
WBC # FLD AUTO: 6.54 K/UL — SIGNIFICANT CHANGE UP (ref 4.8–10.8)

## 2021-03-01 PROCEDURE — 99233 SBSQ HOSP IP/OBS HIGH 50: CPT

## 2021-03-01 PROCEDURE — 71250 CT THORAX DX C-: CPT | Mod: 26

## 2021-03-01 RX ORDER — FLUCONAZOLE 150 MG/1
400 TABLET ORAL ONCE
Refills: 0 | Status: COMPLETED | OUTPATIENT
Start: 2021-03-01 | End: 2021-03-01

## 2021-03-01 RX ORDER — FLUCONAZOLE 150 MG/1
400 TABLET ORAL EVERY 24 HOURS
Refills: 0 | Status: DISCONTINUED | OUTPATIENT
Start: 2021-03-02 | End: 2021-03-10

## 2021-03-01 RX ORDER — FLUCONAZOLE 150 MG/1
TABLET ORAL
Refills: 0 | Status: DISCONTINUED | OUTPATIENT
Start: 2021-03-01 | End: 2021-03-10

## 2021-03-01 RX ADMIN — SODIUM CHLORIDE 75 MILLILITER(S): 9 INJECTION, SOLUTION INTRAVENOUS at 05:07

## 2021-03-01 RX ADMIN — CARBIDOPA AND LEVODOPA 2 TABLET(S): 25; 100 TABLET ORAL at 14:39

## 2021-03-01 RX ADMIN — CARBIDOPA AND LEVODOPA 2 TABLET(S): 25; 100 TABLET ORAL at 05:07

## 2021-03-01 RX ADMIN — APIXABAN 5 MILLIGRAM(S): 2.5 TABLET, FILM COATED ORAL at 05:07

## 2021-03-01 RX ADMIN — PIPERACILLIN AND TAZOBACTAM 200 GRAM(S): 4; .5 INJECTION, POWDER, LYOPHILIZED, FOR SOLUTION INTRAVENOUS at 14:00

## 2021-03-01 RX ADMIN — ATORVASTATIN CALCIUM 40 MILLIGRAM(S): 80 TABLET, FILM COATED ORAL at 21:31

## 2021-03-01 RX ADMIN — CARBIDOPA AND LEVODOPA 2 TABLET(S): 25; 100 TABLET ORAL at 10:59

## 2021-03-01 RX ADMIN — Medication 5 MILLIGRAM(S): at 05:06

## 2021-03-01 RX ADMIN — PIPERACILLIN AND TAZOBACTAM 200 GRAM(S): 4; .5 INJECTION, POWDER, LYOPHILIZED, FOR SOLUTION INTRAVENOUS at 21:31

## 2021-03-01 RX ADMIN — SENNA PLUS 2 TABLET(S): 8.6 TABLET ORAL at 21:31

## 2021-03-01 RX ADMIN — SODIUM CHLORIDE 75 MILLILITER(S): 9 INJECTION, SOLUTION INTRAVENOUS at 12:52

## 2021-03-01 RX ADMIN — APIXABAN 5 MILLIGRAM(S): 2.5 TABLET, FILM COATED ORAL at 17:08

## 2021-03-01 RX ADMIN — Medication 0.6 MILLIGRAM(S): at 10:59

## 2021-03-01 RX ADMIN — FLUCONAZOLE 100 MILLIGRAM(S): 150 TABLET ORAL at 14:39

## 2021-03-01 RX ADMIN — CARBIDOPA AND LEVODOPA 2 TABLET(S): 25; 100 TABLET ORAL at 21:31

## 2021-03-01 RX ADMIN — CARBIDOPA AND LEVODOPA 2 TABLET(S): 25; 100 TABLET ORAL at 17:08

## 2021-03-01 RX ADMIN — POLYETHYLENE GLYCOL 3350 17 GRAM(S): 17 POWDER, FOR SOLUTION ORAL at 10:59

## 2021-03-01 RX ADMIN — CHLORHEXIDINE GLUCONATE 1 APPLICATION(S): 213 SOLUTION TOPICAL at 05:06

## 2021-03-01 RX ADMIN — PANTOPRAZOLE SODIUM 40 MILLIGRAM(S): 20 TABLET, DELAYED RELEASE ORAL at 05:01

## 2021-03-01 RX ADMIN — PIPERACILLIN AND TAZOBACTAM 200 GRAM(S): 4; .5 INJECTION, POWDER, LYOPHILIZED, FOR SOLUTION INTRAVENOUS at 05:07

## 2021-03-01 NOTE — PROGRESS NOTE ADULT - SUBJECTIVE AND OBJECTIVE BOX
71 year old Chinese speaking Male with past medical history of Parkinson disease, HLD, HTN, H/O DVT on Eliquis and Lasix, H/O COVID presents to ED with altered mental status for 2 days.    Today:  Seen at bedside, not in any distress.        REVIEW OF SYSTEMS:  Not a reliable historian      MEDICATIONS  (STANDING):  apixaban 5 milliGRAM(s) Oral every 12 hours  atorvastatin 40 milliGRAM(s) Oral at bedtime  carbidopa/levodopa  25/100 2 Tablet(s) Oral <User Schedule>  chlorhexidine 4% Liquid 1 Application(s) Topical <User Schedule>  chlorhexidine 4% Liquid 1 Application(s) Topical <User Schedule>  colchicine 0.6 milliGRAM(s) Oral daily  dextrose 5% + sodium chloride 0.9%. 1000 milliLiter(s) (75 mL/Hr) IV Continuous <Continuous>  enalapril 5 milliGRAM(s) Oral daily  fluconAZOLE IVPB      pantoprazole   Suspension 40 milliGRAM(s) Oral before breakfast  piperacillin/tazobactam IVPB.. 2.25 Gram(s) IV Intermittent every 8 hours  polyethylene glycol 3350 17 Gram(s) Oral daily  senna 2 Tablet(s) Oral at bedtime    MEDICATIONS  (PRN):  acetaminophen  Suppository .. 650 milliGRAM(s) Rectal every 6 hours PRN Temp greater or equal to 38C (100.4F)      Allergies  No Known Allergies        FAMILY HISTORY:  Family history of cerebrovascular accident (CVA) (Father)        Vital Signs Last 24 Hrs  T(C): 36.1 (01 Mar 2021 14:03), Max: 38.3 (28 Feb 2021 21:00)  T(F): 96.9 (01 Mar 2021 14:03), Max: 100.9 (28 Feb 2021 21:00)  HR: 58 (01 Mar 2021 14:03) (58 - 69)  BP: 109/53 (01 Mar 2021 14:03) (109/53 - 132/80)  RR: 18 (01 Mar 2021 14:03) (18 - 18)      PHYSICAL EXAM:  Constitutional: well-developed; well-groomed; well-nourished  Eyes :conjunctiva clear   Neck supple; no JVD  Back normal shape; ROM intact  Respiratory normal; airway patent; breath sounds equal; good air movement  Cardiovascular; regular rate and rhythm  no rub  no murmur  normal PMI  Gastrointestinal :soft, Nontender, No distension, NBS  Neurological: Alert, responds to pain; responds to verbal commands  Skin; warm and dry; color normal  Musculoskeletal: Left shoulder dislocation      LABS:                        10.5   6.54  )-----------( 251      ( 01 Mar 2021 05:32 )             34.5     03-01    139  |  103  |  19  ----------------------------<  108<H>  4.4   |  25  |  1.3    Ca    8.7      01 Mar 2021 05:32  Phos  3.6     03-01  Mg     2.1     03-01    TPro  6.3  /  Alb  2.9<L>  /  TBili  0.5  /  DBili  x   /  AST  40  /  ALT  19  /  AlkPhos  139<H>  03-01

## 2021-03-01 NOTE — PROGRESS NOTE ADULT - ASSESSMENT
Aspiration Pneumonia:  -Patient was to be discharged 2/27/21, spiked fevers in AM  -CXR shows LLL opacity   -Repeat COVID neg  -Highly likely asp PNA given recent history of aspiration and advancing diet  -Started Vanco and Brady, patient spiked fevers after starting; MRSA nares neg, switched to Zosyn 2/28 renally dosed  -Follow urine strep and legionella, sputum Cx, procal  -O2 via NC  -Will discuss with ID  -Speech and swallow follow up    Fungemia:  -Blood Cx growing yeast in both bottles  -patient has been persistently febrile on broad spectrum abx  -Will start Fluconazole IV and continue Zosyn for now pending ID follow up  -Ophthalmology consult    Septic Shock (initially on admission)  Resolved  Patient presented with AMS  UA: Pyuria, Hematuria  CXR: RLL opacity  Likely delirium secondary to Sepsis secondary to Urosepsis or Pneumonia  Off Levophed and Dopamine  D/C'd Cefepime after 5 days    Dysphagia  -Swallow eval appreciated, now on Dysphagia 1  -Had to change to 1 from 2; likely more lethargic from active infection    Parkinsons  -Neuro consult appreciated  -Can DC dopamine agonist  -Increased Sinemet    Left shoulder dislocation  -Reviewed imaging (CT, XR)  -Ortho consult appreciated, no need for intervention    Bradycardia with First Degree AV Block  Patient was initially hypertensive with Bradycardia and later went to [facility]   Was on Dopamine   EPS eval recommended no PPM for now  Monitor vitals   Echo: Unremarkable with mild MR and TR  EKG: NSR with undetermined age of infarct    ABEBE  Resolving   Nephrology on board   Hold off Lasix and Continue RL    DVT  On Eliquis    HTN  Hold off antihypertensive  Monitor BP  DASH Diet

## 2021-03-02 LAB
ALBUMIN SERPL ELPH-MCNC: 3 G/DL — LOW (ref 3.5–5.2)
ALP SERPL-CCNC: 140 U/L — HIGH (ref 30–115)
ALT FLD-CCNC: 13 U/L — SIGNIFICANT CHANGE UP (ref 0–41)
ANION GAP SERPL CALC-SCNC: 11 MMOL/L — SIGNIFICANT CHANGE UP (ref 7–14)
AST SERPL-CCNC: 45 U/L — HIGH (ref 0–41)
BASOPHILS # BLD AUTO: 0.03 K/UL — SIGNIFICANT CHANGE UP (ref 0–0.2)
BASOPHILS NFR BLD AUTO: 0.5 % — SIGNIFICANT CHANGE UP (ref 0–1)
BILIRUB SERPL-MCNC: 0.3 MG/DL — SIGNIFICANT CHANGE UP (ref 0.2–1.2)
BUN SERPL-MCNC: 19 MG/DL — SIGNIFICANT CHANGE UP (ref 10–20)
CALCIUM SERPL-MCNC: 8.5 MG/DL — SIGNIFICANT CHANGE UP (ref 8.5–10.1)
CHLORIDE SERPL-SCNC: 106 MMOL/L — SIGNIFICANT CHANGE UP (ref 98–110)
CO2 SERPL-SCNC: 23 MMOL/L — SIGNIFICANT CHANGE UP (ref 17–32)
CREAT SERPL-MCNC: 1 MG/DL — SIGNIFICANT CHANGE UP (ref 0.7–1.5)
EOSINOPHIL # BLD AUTO: 0.23 K/UL — SIGNIFICANT CHANGE UP (ref 0–0.7)
EOSINOPHIL NFR BLD AUTO: 4.1 % — SIGNIFICANT CHANGE UP (ref 0–8)
GLUCOSE SERPL-MCNC: 113 MG/DL — HIGH (ref 70–99)
HCT VFR BLD CALC: 32.8 % — LOW (ref 42–52)
HGB BLD-MCNC: 10.4 G/DL — LOW (ref 14–18)
IMM GRANULOCYTES NFR BLD AUTO: 0.4 % — HIGH (ref 0.1–0.3)
LYMPHOCYTES # BLD AUTO: 0.85 K/UL — LOW (ref 1.2–3.4)
LYMPHOCYTES # BLD AUTO: 15.2 % — LOW (ref 20.5–51.1)
MCHC RBC-ENTMCNC: 26 PG — LOW (ref 27–31)
MCHC RBC-ENTMCNC: 31.7 G/DL — LOW (ref 32–37)
MCV RBC AUTO: 82 FL — SIGNIFICANT CHANGE UP (ref 80–94)
MONOCYTES # BLD AUTO: 0.61 K/UL — HIGH (ref 0.1–0.6)
MONOCYTES NFR BLD AUTO: 10.9 % — HIGH (ref 1.7–9.3)
NEUTROPHILS # BLD AUTO: 3.87 K/UL — SIGNIFICANT CHANGE UP (ref 1.4–6.5)
NEUTROPHILS NFR BLD AUTO: 68.9 % — SIGNIFICANT CHANGE UP (ref 42.2–75.2)
NRBC # BLD: 0 /100 WBCS — SIGNIFICANT CHANGE UP (ref 0–0)
PLATELET # BLD AUTO: 295 K/UL — SIGNIFICANT CHANGE UP (ref 130–400)
POTASSIUM SERPL-MCNC: 4.4 MMOL/L — SIGNIFICANT CHANGE UP (ref 3.5–5)
POTASSIUM SERPL-SCNC: 4.4 MMOL/L — SIGNIFICANT CHANGE UP (ref 3.5–5)
PROCALCITONIN SERPL-MCNC: 0.29 NG/ML — HIGH (ref 0.02–0.1)
PROT SERPL-MCNC: 6.2 G/DL — SIGNIFICANT CHANGE UP (ref 6–8)
RBC # BLD: 4 M/UL — LOW (ref 4.7–6.1)
RBC # FLD: 15.7 % — HIGH (ref 11.5–14.5)
SARS-COV-2 RNA SPEC QL NAA+PROBE: SIGNIFICANT CHANGE UP
SODIUM SERPL-SCNC: 140 MMOL/L — SIGNIFICANT CHANGE UP (ref 135–146)
WBC # BLD: 5.61 K/UL — SIGNIFICANT CHANGE UP (ref 4.8–10.8)
WBC # FLD AUTO: 5.61 K/UL — SIGNIFICANT CHANGE UP (ref 4.8–10.8)

## 2021-03-02 PROCEDURE — 99233 SBSQ HOSP IP/OBS HIGH 50: CPT

## 2021-03-02 RX ADMIN — CARBIDOPA AND LEVODOPA 2 TABLET(S): 25; 100 TABLET ORAL at 17:08

## 2021-03-02 RX ADMIN — POLYETHYLENE GLYCOL 3350 17 GRAM(S): 17 POWDER, FOR SOLUTION ORAL at 11:43

## 2021-03-02 RX ADMIN — CARBIDOPA AND LEVODOPA 2 TABLET(S): 25; 100 TABLET ORAL at 09:16

## 2021-03-02 RX ADMIN — PIPERACILLIN AND TAZOBACTAM 200 GRAM(S): 4; .5 INJECTION, POWDER, LYOPHILIZED, FOR SOLUTION INTRAVENOUS at 12:18

## 2021-03-02 RX ADMIN — SODIUM CHLORIDE 75 MILLILITER(S): 9 INJECTION, SOLUTION INTRAVENOUS at 05:40

## 2021-03-02 RX ADMIN — Medication 0.6 MILLIGRAM(S): at 11:43

## 2021-03-02 RX ADMIN — APIXABAN 5 MILLIGRAM(S): 2.5 TABLET, FILM COATED ORAL at 05:40

## 2021-03-02 RX ADMIN — PANTOPRAZOLE SODIUM 40 MILLIGRAM(S): 20 TABLET, DELAYED RELEASE ORAL at 07:53

## 2021-03-02 RX ADMIN — Medication 650 MILLIGRAM(S): at 02:17

## 2021-03-02 RX ADMIN — ATORVASTATIN CALCIUM 40 MILLIGRAM(S): 80 TABLET, FILM COATED ORAL at 21:23

## 2021-03-02 RX ADMIN — FLUCONAZOLE 100 MILLIGRAM(S): 150 TABLET ORAL at 11:43

## 2021-03-02 RX ADMIN — CARBIDOPA AND LEVODOPA 2 TABLET(S): 25; 100 TABLET ORAL at 21:23

## 2021-03-02 RX ADMIN — CARBIDOPA AND LEVODOPA 2 TABLET(S): 25; 100 TABLET ORAL at 12:18

## 2021-03-02 RX ADMIN — CARBIDOPA AND LEVODOPA 2 TABLET(S): 25; 100 TABLET ORAL at 05:39

## 2021-03-02 RX ADMIN — APIXABAN 5 MILLIGRAM(S): 2.5 TABLET, FILM COATED ORAL at 17:09

## 2021-03-02 RX ADMIN — PIPERACILLIN AND TAZOBACTAM 200 GRAM(S): 4; .5 INJECTION, POWDER, LYOPHILIZED, FOR SOLUTION INTRAVENOUS at 21:23

## 2021-03-02 RX ADMIN — SENNA PLUS 2 TABLET(S): 8.6 TABLET ORAL at 21:23

## 2021-03-02 RX ADMIN — PIPERACILLIN AND TAZOBACTAM 200 GRAM(S): 4; .5 INJECTION, POWDER, LYOPHILIZED, FOR SOLUTION INTRAVENOUS at 05:40

## 2021-03-02 RX ADMIN — CHLORHEXIDINE GLUCONATE 1 APPLICATION(S): 213 SOLUTION TOPICAL at 05:39

## 2021-03-02 RX ADMIN — Medication 5 MILLIGRAM(S): at 05:40

## 2021-03-02 NOTE — CHART NOTE - NSCHARTNOTEFT_GEN_A_CORE
Registered Dietitian Follow-Up     Patient Profile Reviewed                           Yes [x]   No []     Nutrition History Previously Obtained        Yes [x]  No []       Pertinent Subjective Information: Patient PO intake ranges from 25-75% of meals, pt. needs 1:1 assistance. Attempted to speak to pt. in Urdu, but pt. was not able to answer RD assessment questions./      Pertinent Medical Interventions:   Per hospitalist note:  ---Aspiration Pneumonia: ID following  ---Likely delirium secondary to Sepsis secondary to Urosepsis or Pneumonia  ---Dysphagia, now on Dysphagia 1, likely more lethargic from active infection  ---Parkinsons, Neuro consult appreciated  ---ABEBE; Resolving    Diet, Dysphagia 1 Pureed-Nectar Consistency Fluid (03-01-21 @ 13:30) [Active]    Anthropometrics:  - Ht. 170cm  - Wt. 89.4 kg   2/12:101.9 kg  2/13: 94.7 kg  2/14:92.2 kg  2/15: 91.4 kg --- ~19 lbs weight loss in 6 x days,? bed scale error, will continue to monitor weight trends.   2/21: 82.6 kg   - %wt change  - BMI 30.9  - IBW 67.1kg     Pertinent Lab Data: (3/2) RBC 4.00, Hg 10.4, Hct 32.8, glu 113, alk phos 140, AST: 45      MEDICATIONS  (STANDING):    atorvastatin 40 milliGRAM(s) Oral at bedtime  carbidopa/levodopa  25/100 2 Tablet(s) Oral <User Schedule>  dextrose 5% + sodium chloride 0.9%. 1000 milliLiter(s) (75 mL/Hr) IV Continuous <Continuous>  enalapril 5 milliGRAM(s) Oral daily  pantoprazole   Suspension 40 milliGRAM(s) Oral before breakfast  polyethylene glycol 3350 17 Gram(s) Oral daily  senna 2 Tablet(s) Oral at bedtime     Physical Findings:  - Appearance: confused, left hand; right hand edema +2 per RN flow sheets   - GI function: no GI distress noted, last BM : 2/26 per RN flow sheets   - Tubes: none noted  - Oral/Mouth cavity: Dysphagia 1 puree and nectar thick liquids  maintain upright posture during/after eating for 30 mins; no straws; small sips/bites per SLP note on 3/1  - Skin: pressure ulcer stg III to coccyx, DTI to R heel     Nutrition Requirements (from RD note on 2/23)   Weight Used: 67.1kg ideal weight      Estimated Energy Needs    Continue [x]  Adjust []  2013-2348kcal (30-35kcal/kg IBW) -- PU considered, bmi >30      Estimated Protein Needs    Continue [x]  Adjust [] 81-101g (1.2-1.5g/kg IBW) -- PU considred, bmi >30     Estimated Fluid Needs        Continue [x]  Adjust [] per LIP  y     Nutrient Intake: 25-75% PO at meals     [] Previous Nutrition Diagnosis: Inadequate Oral Intake, Increased nutrient needs- ongoing      Nutrition Intervention: meals and snacks, medical food supplement, coordination of care, vitamin and mineral supplement, nutrition related medication     Rec:   1) Continue dysphagia 1 pureed nectar consistency fluid per SLP recommendations  2) Encourage pt to consume Ensure pudding TID+ pro source gelatein BID to optimize energy and protein intake   3) Provide MVI daily.   4) Add 1:1 feeding assistance.     * If patient continues with poor PO with nutrition oral supplements will recommend calorie count to justify the need of an appetite stimulant      Goal/Expected Outcome: In 3 days pt. to consistently consume at least ~50% PO and supplement      Indicator/Monitoring:  diet order, energy intake, body composition, NFPF. glucose profile. at risk will f/u in 3 days. Discussed with LIP

## 2021-03-02 NOTE — ADVANCED PRACTICE NURSE CONSULT - ASSESSMENT
71 year old Spanish speaking Male with past medical history of Parkinson disease, HLD, HTN, H/O DVT on Eliquis and Lasix, H/O COVID presents to ED with altered mental status for 2 days.   As per family patient at baseline is able to speak and has his on and off days but since last 2 days has not been able to speak and more lethargic than baseline. As per daughter at the bedside, patient has "bad days but this is the worst we have seen" Patient bright to ED for non resolution of symptoms. Family denies any fevers, chills, rigors, cough, shortness of breath, loss of consciousness, incontinence or any other complaints.   In ED patient hypertensive to 170s and bradycardic to 30s. EKG showed sinus bradycardia with 1st degree AV block. Electrolytes were normal. Patient given atropine without response, he was given dobutamine and HR increased with wide complex tachycardia. Patient placed on Dopamine with HR improving to the 50s. CT head done for AMS and shows no acute changes. Patient at time of interview minimally verbal but appeared in no apparent discomfort.     PAST MEDICAL & SURGICAL HISTORY:  Cataract  Prostatitis  Dyslipidemia  Gout  Hypertension  Parkinson disease  No significant past surgical history    Assessment:  Patient received in bed, vented, awake responds to minimal stimili                        Sacrum possible pressure ulcer vs perinodal cyst                     Ulcer previously covered completely with devitalised tissue per picture un chart , devitalised tissue is now completely off, showing tunnelled    closed edge ulceration. Presentation could be opened up perinodal cyst awaiting confirmation from family.- Daughter present at bed side states   patient had cyst at previous admission and had to be open up                         R lateral foot DTI     Pressure Injury  #1  Location: Sacrum  Size: Length: 2 Width: 1 Depth: 0.5  Tissue Description :   [ ] Necrotic   [ ] Slough   [ ] Infected   [ ] Granulation (firm, beefy red tissue)   [ ] Hypergranulation (soft, gelatinous)  [x ] Poor-Quality Granulation (pale, grey/brown/red granulation tissue)   [ ] Epithelium (pink/mauve at wound edges)  [ ] Macerated  [ ] Other: _______  Wound Exudate : scant    Wound Edge):  closed , tunneled   Periwound Condition :   [ ] Maceration [x ] Excoriation [ ] Dry skin [ ] Hyperkeratosis [ ] Callus [ ] Eczema [x ] Other: Lichenification   Pressure due to: [x ] Immobility [ ] Medical Device   [ ] Stage I  [ ]  Stage II  [x ]  Stage III vs perinodal cyst   [ ]  Stage VI  [ ]  Suspected Deep Tissue Injury (DTI)  [ ]  Unstageable    Pressure Injury  #2  Location:  R Lateral Heel   Size: Length: 2 Width: 1 Depth:   Tissue Description :   [ ] Necrotic   [ ] Slough   [ ] Infected   [ ] Granulation (firm, beefy red tissue)   [ ] Hypergranulation (soft, gelatinous)  [ ] Poor-Quality Granulation (pale, grey/brown/red granulation tissue)   [ ] Epithelium (pink/mauve at wound edges)  [ ] Macerated  [x ] Other:  Intact dark red to purple skin discolouration   Wound Exudate : None     Wound Edge):  Intact  Periwound Condition :   [ ] Maceration [ ] Excoriation [ ] Dry skin [ ] Hyperkeratosis [ ] Callus [ ] Eczema [x ] Other:   Pressure due to: [x ] Immobility [ ] Medical Device   [ ] Stage I  [ ]  Stage II  [ ]  Stage III    [ ]  Stage VI  [x ]  Suspected Deep Tissue Injury (DTI)  [ ]  Unstageable            
71 year old Bengali speaking Male with past medical history of Parkinson disease, HLD, HTN, H/O DVT on Eliquis and Lasix, H/O COVID presents to ED with altered mental status for 2 days.   As per family patient at baseline is able to speak and has his on and off days but since last 2 days has not been able to speak and more lethargic than baseline. As per daughter at the bedside, patient has "bad days but this is the worst we have seen" Patient bright to ED for non resolution of symptoms. Family denies any fevers, chills, rigors, cough, shortness of breath, loss of consciousness, incontinence or any other complaints.   In ED patient hypertensive to 170s and bradycardic to 30s. EKG showed sinus bradycardia with 1st degree AV block. Electrolytes were normal. Patient given atropine without response, he was given dobutamine and HR increased with wide complex tachycardia. Patient placed on Dopamine with HR improving to the 50s. CT head done for AMS and shows no acute changes. Patient at time of interview minimally verbal but appeared in no apparent discomfort.     PAST MEDICAL & SURGICAL HISTORY:  Cataract  Prostatitis  Dyslipidemia  Gout  Hypertension  Parkinson disease  No significant past surgical history    Assessment:  Patient received in bed, vented, awake responds to minimal stimili                        Sacrum possible pressure ulcer vs perinodal cyst                     Ulcer previously covered completely with devitalised tissue per picture un chart , devitalised tissue is now completely off, showing tunnelled    closed edge ulceration. Presentation could be opened up perinodal cyst awaiting confirmation from family.                        R lateral foot DTI     Pressure Injury  #1  Location: Sacrum  Size: Length: 2 Width: 1 Depth: 0.5  Tissue Description :   [ ] Necrotic   [ ] Slough   [ ] Infected   [ ] Granulation (firm, beefy red tissue)   [ ] Hypergranulation (soft, gelatinous)  [x ] Poor-Quality Granulation (pale, grey/brown/red granulation tissue)   [ ] Epithelium (pink/mauve at wound edges)  [ ] Macerated  [ ] Other: _______  Wound Exudate : scant    Wound Edge):  closed , tunneled   Periwound Condition :   [ ] Maceration [x ] Excoriation [ ] Dry skin [ ] Hyperkeratosis [ ] Callus [ ] Eczema [x ] Other: Lichenification   Pressure due to: [x ] Immobility [ ] Medical Device   [ ] Stage I  [ ]  Stage II  [x ]  Stage III vs perinodal cyst   [ ]  Stage VI  [ ]  Suspected Deep Tissue Injury (DTI)  [ ]  Unstageable    Pressure Injury  #2  Location:  R Lateral Heel   Size: Length: 2 Width: 1 Depth:   Tissue Description :   [ ] Necrotic   [ ] Slough   [ ] Infected   [ ] Granulation (firm, beefy red tissue)   [ ] Hypergranulation (soft, gelatinous)  [ ] Poor-Quality Granulation (pale, grey/brown/red granulation tissue)   [ ] Epithelium (pink/mauve at wound edges)  [ ] Macerated  [x ] Other:  Intact dark red to purple skin discolouration   Wound Exudate : None     Wound Edge):  Intact  Periwound Condition :   [ ] Maceration [ ] Excoriation [ ] Dry skin [ ] Hyperkeratosis [ ] Callus [ ] Eczema [x ] Other:   Pressure due to: [x ] Immobility [ ] Medical Device   [ ] Stage I  [ ]  Stage II  [ ]  Stage III    [ ]  Stage VI  [x ]  Suspected Deep Tissue Injury (DTI)  [ ]  Unstageable            
71 year old Maltese speaking Male with past medical history of Parkinson disease, HLD, HTN, H/O DVT on Eliquis and Lasix, H/O COVID presents to ED with altered mental status for 2 days.   As per family patient at baseline is able to speak and has his on and off days but since last 2 days has not been able to speak and more lethargic than baseline. As per daughter at the bedside, patient has "bad days but this is the worst we have seen" Patient bright to ED for non resolution of symptoms. Family denies any fevers, chills, rigors, cough, shortness of breath, loss of consciousness, incontinence or any other complaints.   In ED patient hypertensive to 170s and bradycardic to 30s. EKG showed sinus bradycardia with 1st degree AV block. Electrolytes were normal. Patient given atropine without response, he was given dobutamine and HR increased with wide complex tachycardia. Patient placed on Dopamine with HR improving to the 50s. CT head done for AMS and shows no acute changes. Patient at time of interview minimally verbal but appeared in no apparent discomfort.     PAST MEDICAL & SURGICAL HISTORY:  Cataract  Prostatitis  Dyslipidemia  Gout  Hypertension  Parkinson disease  No significant past surgical history    Assessment:  Patient received in bed, vented, awake responds to minimal stimili                        Sacrum possible pressure ulcer vs perinodal cyst                     Ulcer previously covered completely with devitalised tissue per picture un chart , devitalised tissue is now completely off, showing tunnelled    closed edge ulceration. Presentation could be opened up perinodal cyst awaiting confirmation from family.- Daughter present at bed side states  patient had cyst at previous admission and had to be open up.??  pressure injury appears the same  at this point , pt bed and chair bound at this time-                         R lateral foot DTI     Pressure Injury  #1  Location: Sacrum  Size: Length: 2 Width: 1 Depth: 0.5  Tissue Description :   [ ] Necrotic   [ ] Slough   [ ] Infected   [ ] Granulation (firm, beefy red tissue)   [ ] Hypergranulation (soft, gelatinous)  [x ] Poor-Quality Granulation (pale, grey/brown/red granulation tissue)   [ ] Epithelium (pink/mauve at wound edges)  [ ] Macerated  [ ] Other: _______  Wound Exudate : scant    Wound Edge):  closed , tunneled   Periwound Condition :   [ ] Maceration [x ] Excoriation [ ] Dry skin [ ] Hyperkeratosis [ ] Callus [ ] Eczema [x ] Other: Lichenification   Pressure due to: [x ] Immobility [ ] Medical Device   [ ] Stage I  [ ]  Stage II  [x ]  Stage III vs perinodal cyst   [ ]  Stage VI  [ ]  Suspected Deep Tissue Injury (DTI)  [ ]  Unstageable    Pressure Injury  #2  Location:  R Lateral Heel   Size: Length: 2 Width: 1 Depth:   Tissue Description :   [ ] Necrotic   [ ] Slough   [ ] Infected   [ ] Granulation (firm, beefy red tissue)   [ ] Hypergranulation (soft, gelatinous)  [ ] Poor-Quality Granulation (pale, grey/brown/red granulation tissue)   [ ] Epithelium (pink/mauve at wound edges)  [ ] Macerated  [x ] Other:  Intact dark red to purple skin discolouration   Wound Exudate : None     Wound Edge):  Intact  Periwound Condition :   [ ] Maceration [ ] Excoriation [ ] Dry skin [ ] Hyperkeratosis [ ] Callus [ ] Eczema [x ] Other:   Pressure due to: [x ] Immobility [ ] Medical Device   [ ] Stage I  [ ]  Stage II  [ ]  Stage III    [ ]  Stage VI  [x ]  Suspected Deep Tissue Injury (DTI)  [ ]  Unstageable

## 2021-03-02 NOTE — PROGRESS NOTE ADULT - SUBJECTIVE AND OBJECTIVE BOX
71 year old Croatian speaking Male with past medical history of Parkinson disease, HLD, HTN, H/O DVT on Eliquis and Lasix, H/O COVID presents to ED with altered mental status for 2 days.    Today:  Seen at bedside, not in any distress.        REVIEW OF SYSTEMS:  Not a reliable historian      MEDICATIONS  (STANDING):  apixaban 5 milliGRAM(s) Oral every 12 hours  atorvastatin 40 milliGRAM(s) Oral at bedtime  carbidopa/levodopa  25/100 2 Tablet(s) Oral <User Schedule>  chlorhexidine 4% Liquid 1 Application(s) Topical <User Schedule>  chlorhexidine 4% Liquid 1 Application(s) Topical <User Schedule>  colchicine 0.6 milliGRAM(s) Oral daily  dextrose 5% + sodium chloride 0.9%. 1000 milliLiter(s) (75 mL/Hr) IV Continuous <Continuous>  enalapril 5 milliGRAM(s) Oral daily  fluconAZOLE IVPB      fluconAZOLE IVPB 400 milliGRAM(s) IV Intermittent every 24 hours  pantoprazole   Suspension 40 milliGRAM(s) Oral before breakfast  piperacillin/tazobactam IVPB.. 2.25 Gram(s) IV Intermittent every 8 hours  polyethylene glycol 3350 17 Gram(s) Oral daily  senna 2 Tablet(s) Oral at bedtime    MEDICATIONS  (PRN):  acetaminophen  Suppository .. 650 milliGRAM(s) Rectal every 6 hours PRN Temp greater or equal to 38C (100.4F)      Allergies  No Known Allergies        FAMILY HISTORY:  Family history of cerebrovascular accident (CVA) (Father)        Vital Signs Last 24 Hrs  T(C): 37.3 (02 Mar 2021 14:12), Max: 37.9 (02 Mar 2021 02:17)  T(F): 99.1 (02 Mar 2021 14:12), Max: 100.2 (02 Mar 2021 02:17)  HR: 59 (02 Mar 2021 14:12) (51 - 70)  BP: 167/79 (02 Mar 2021 14:12) (134/59 - 174/79)  RR: 16 (02 Mar 2021 14:12) (16 - 18)    PHYSICAL EXAM:  Constitutional: well-developed; well-groomed; well-nourished  Eyes :conjunctiva clear   Neck supple; no JVD  Back normal shape; ROM intact  Respiratory normal; airway patent; breath sounds equal; good air movement  Cardiovascular; regular rate and rhythm  no rub  no murmur  normal PMI  Gastrointestinal :soft, Nontender, No distension, NBS  Neurological: Alert, responds to pain; responds to verbal commands  Skin; warm and dry; color normal  Musculoskeletal: Left shoulder dislocation      LABS:                        10.4   5.61  )-----------( 295      ( 02 Mar 2021 07:18 )             32.8     03-02    140  |  106  |  19  ----------------------------<  113<H>  4.4   |  23  |  1.0    Ca    8.5      02 Mar 2021 07:18  Phos  3.6     03-01  Mg     2.1     03-01    TPro  6.2  /  Alb  3.0<L>  /  TBili  0.3  /  DBili  x   /  AST  45<H>  /  ALT  13  /  AlkPhos  140<H>  03-02

## 2021-03-02 NOTE — ADVANCED PRACTICE NURSE CONSULT - RECOMMEDATIONS
Plan:  Continue current treatment   Cleans sacral wound with wound cleanser, pat dry then apply triad with moist saline dressing           Apply Allevyn to R heel DTI         Pressure ulcer preventive  measures        Skin care   Asses wound and inform primary provider of any changes   Case discussed with primary Rn  Wound/ ostomy specialist  to f/u as needed     Offloading: [ x] Frequent position changes [ ] Devices/Equipment  Cleansing: [ ] Saline [ ] Soap/Water [x ] Other: ______  Topicals: [ x] Barrier Cream [ ] Antimicrobial [ ] Enzymatic Wound Debridement  Dressings: [ ] Dry, sterile [ ] Foam [ ] Absorbant Pads [ ] Collagenase      
Plan:  Continue current treatment   Cleans sacral wound with wound cleanser, pat dry then apply triad with moist saline dressing           Apply Allevyn to R heel DTI         Pressure ulcer preventive  measures        Skin care   Asses wound and inform primary provider of any changes   Case discussed with primary Rn  Wound/ ostomy specialist  to f/u as needed     Offloading: [ x] Frequent position changes [ ] Devices/Equipment  Cleansing: [ ] Saline [ ] Soap/Water [x ] Other: ______  Topicals: [ x] Barrier Cream [ ] Antimicrobial [ ] Enzymatic Wound Debridement  Dressings: [ ] Dry, sterile [ ] Foam [ ] Absorbant Pads [ ] Collagenase      
Plan: Cleans sacral wound with wound cleanser, pat dry then apply triad with moist saline dressing           Apply Allevyn to R heel DTI         Pressure ulcer preventive  measures        Skin care   Asses wound and inform primary provider of any changes   Case discussed with primary Rn  Wound/ ostomy specialist  to f/u as needed     Offloading: [ x] Frequent position changes [ ] Devices/Equipment  Cleansing: [ ] Saline [ ] Soap/Water [x ] Other: ______  Topicals: [ x] Barrier Cream [ ] Antimicrobial [ ] Enzymatic Wound Debridement  Dressings: [ ] Dry, sterile [ ] Foam [ ] Absorbant Pads [ ] Collagenase

## 2021-03-02 NOTE — PROGRESS NOTE ADULT - ASSESSMENT
Aspiration Pneumonia:  -Patient was to be discharged 2/27/21, spiked fevers in AM  -CXR shows LLL opacity   -Repeat COVID neg  -Highly likely asp PNA given recent history of aspiration and advancing diet  -Started Vanco and Brady, patient spiked fevers after starting; MRSA nares neg, switched to Zosyn 2/28 renally dosed  -O2 via NC  -Follow ID consult  -Speech and swallow follow up-diet decreased to Dysphagia 1; possibly 2' to increase lethargy from new infection    Fungemia:  -Blood Cx growing yeast in both bottles  -patient had been persistently febrile on broad spectrum abx, now afebrile since starting Fluconazole.  -Will start Fluconazole IV and continue Zosyn for now pending ID follow up  -Ophthalmology, ID consults  -TTE    Dysphagia  -Swallow eval appreciated, now on Dysphagia 1  -Had to change to 1 from 2; likely more lethargic from active infection  -Speech and swallow to follow up    Septic Shock (initially on admission)  Resolved  Patient presented with AMS  UA: Pyuria, Hematuria  CXR: RLL opacity  Likely delirium secondary to Sepsis secondary to Urosepsis or Pneumonia  Off Levophed and Dopamine  D/C'd Cefepime after 5 days      Parkinsons  -Neuro consult appreciated  -Can DC dopamine agonist  -Increased Sinemet    Left shoulder dislocation  -Reviewed imaging (CT, XR)  -Ortho consult appreciated, no need for intervention    Bradycardia with First Degree AV Block  Patient was initially hypertensive with Bradycardia and later went to [facility]   Was on Dopamine   EPS eval recommended no PPM for now  Monitor vitals   Echo: Unremarkable with mild MR and TR  EKG: NSR with undetermined age of infarct    ABEBE  Resolving   Nephrology on board   Hold off Lasix and Continue RL    DVT  On Eliquis    HTN  Hold off antihypertensive  Monitor BP  DASH Diet     Dispo:  Patient was to be DCed to [facility], spiked fevers, found to have candidemia.  Now on fluconazole, follow with ID, ophthalmology.

## 2021-03-02 NOTE — ADVANCED PRACTICE NURSE CONSULT - REASON FOR CONSULT
Follow up Pressure Injury assessment and treatment recommendation 
Pressure Injury assessment and treatment recommendation 
 Follow up Pressure Injury assessment and treatment recommendation

## 2021-03-03 LAB
-  FLUCONAZOLE: SIGNIFICANT CHANGE UP
-  INTERPRETATION: SIGNIFICANT CHANGE UP
ALBUMIN SERPL ELPH-MCNC: 3 G/DL — LOW (ref 3.5–5.2)
ALP SERPL-CCNC: 136 U/L — HIGH (ref 30–115)
ALT FLD-CCNC: 9 U/L — SIGNIFICANT CHANGE UP (ref 0–41)
ANION GAP SERPL CALC-SCNC: 11 MMOL/L — SIGNIFICANT CHANGE UP (ref 7–14)
AST SERPL-CCNC: 37 U/L — SIGNIFICANT CHANGE UP (ref 0–41)
BASOPHILS # BLD AUTO: 0.02 K/UL — SIGNIFICANT CHANGE UP (ref 0–0.2)
BASOPHILS NFR BLD AUTO: 0.5 % — SIGNIFICANT CHANGE UP (ref 0–1)
BILIRUB SERPL-MCNC: 0.2 MG/DL — SIGNIFICANT CHANGE UP (ref 0.2–1.2)
BUN SERPL-MCNC: 14 MG/DL — SIGNIFICANT CHANGE UP (ref 10–20)
CALCIUM SERPL-MCNC: 8.7 MG/DL — SIGNIFICANT CHANGE UP (ref 8.5–10.1)
CHLORIDE SERPL-SCNC: 105 MMOL/L — SIGNIFICANT CHANGE UP (ref 98–110)
CO2 SERPL-SCNC: 24 MMOL/L — SIGNIFICANT CHANGE UP (ref 17–32)
CREAT SERPL-MCNC: 1 MG/DL — SIGNIFICANT CHANGE UP (ref 0.7–1.5)
CULTURE RESULTS: SIGNIFICANT CHANGE UP
EOSINOPHIL # BLD AUTO: 0.21 K/UL — SIGNIFICANT CHANGE UP (ref 0–0.7)
EOSINOPHIL NFR BLD AUTO: 4.7 % — SIGNIFICANT CHANGE UP (ref 0–8)
FUNGITELL: 62 PG/ML — SIGNIFICANT CHANGE UP
GLUCOSE SERPL-MCNC: 110 MG/DL — HIGH (ref 70–99)
HCT VFR BLD CALC: 32.4 % — LOW (ref 42–52)
HGB BLD-MCNC: 10.1 G/DL — LOW (ref 14–18)
IMM GRANULOCYTES NFR BLD AUTO: 0.5 % — HIGH (ref 0.1–0.3)
LYMPHOCYTES # BLD AUTO: 0.82 K/UL — LOW (ref 1.2–3.4)
LYMPHOCYTES # BLD AUTO: 18.5 % — LOW (ref 20.5–51.1)
MCHC RBC-ENTMCNC: 25.4 PG — LOW (ref 27–31)
MCHC RBC-ENTMCNC: 31.2 G/DL — LOW (ref 32–37)
MCV RBC AUTO: 81.6 FL — SIGNIFICANT CHANGE UP (ref 80–94)
METHOD TYPE: SIGNIFICANT CHANGE UP
MONOCYTES # BLD AUTO: 0.52 K/UL — SIGNIFICANT CHANGE UP (ref 0.1–0.6)
MONOCYTES NFR BLD AUTO: 11.7 % — HIGH (ref 1.7–9.3)
NEUTROPHILS # BLD AUTO: 2.84 K/UL — SIGNIFICANT CHANGE UP (ref 1.4–6.5)
NEUTROPHILS NFR BLD AUTO: 64.1 % — SIGNIFICANT CHANGE UP (ref 42.2–75.2)
NRBC # BLD: 0 /100 WBCS — SIGNIFICANT CHANGE UP (ref 0–0)
ORGANISM # SPEC MICROSCOPIC CNT: SIGNIFICANT CHANGE UP
PLATELET # BLD AUTO: 319 K/UL — SIGNIFICANT CHANGE UP (ref 130–400)
POTASSIUM SERPL-MCNC: 4.2 MMOL/L — SIGNIFICANT CHANGE UP (ref 3.5–5)
POTASSIUM SERPL-SCNC: 4.2 MMOL/L — SIGNIFICANT CHANGE UP (ref 3.5–5)
PROT SERPL-MCNC: 6.3 G/DL — SIGNIFICANT CHANGE UP (ref 6–8)
RBC # BLD: 3.97 M/UL — LOW (ref 4.7–6.1)
RBC # FLD: 15.5 % — HIGH (ref 11.5–14.5)
SODIUM SERPL-SCNC: 140 MMOL/L — SIGNIFICANT CHANGE UP (ref 135–146)
SPECIMEN SOURCE: SIGNIFICANT CHANGE UP
WBC # BLD: 4.43 K/UL — LOW (ref 4.8–10.8)
WBC # FLD AUTO: 4.43 K/UL — LOW (ref 4.8–10.8)

## 2021-03-03 PROCEDURE — 99232 SBSQ HOSP IP/OBS MODERATE 35: CPT

## 2021-03-03 PROCEDURE — 74177 CT ABD & PELVIS W/CONTRAST: CPT | Mod: 26

## 2021-03-03 PROCEDURE — 99233 SBSQ HOSP IP/OBS HIGH 50: CPT

## 2021-03-03 PROCEDURE — 99221 1ST HOSP IP/OBS SF/LOW 40: CPT

## 2021-03-03 RX ORDER — PIPERACILLIN AND TAZOBACTAM 4; .5 G/20ML; G/20ML
3.38 INJECTION, POWDER, LYOPHILIZED, FOR SOLUTION INTRAVENOUS EVERY 8 HOURS
Refills: 0 | Status: DISCONTINUED | OUTPATIENT
Start: 2021-03-03 | End: 2021-03-09

## 2021-03-03 RX ADMIN — POLYETHYLENE GLYCOL 3350 17 GRAM(S): 17 POWDER, FOR SOLUTION ORAL at 12:28

## 2021-03-03 RX ADMIN — PIPERACILLIN AND TAZOBACTAM 25 GRAM(S): 4; .5 INJECTION, POWDER, LYOPHILIZED, FOR SOLUTION INTRAVENOUS at 22:17

## 2021-03-03 RX ADMIN — CARBIDOPA AND LEVODOPA 2 TABLET(S): 25; 100 TABLET ORAL at 22:13

## 2021-03-03 RX ADMIN — PIPERACILLIN AND TAZOBACTAM 200 GRAM(S): 4; .5 INJECTION, POWDER, LYOPHILIZED, FOR SOLUTION INTRAVENOUS at 05:27

## 2021-03-03 RX ADMIN — CARBIDOPA AND LEVODOPA 2 TABLET(S): 25; 100 TABLET ORAL at 18:25

## 2021-03-03 RX ADMIN — SODIUM CHLORIDE 75 MILLILITER(S): 9 INJECTION, SOLUTION INTRAVENOUS at 22:10

## 2021-03-03 RX ADMIN — CARBIDOPA AND LEVODOPA 2 TABLET(S): 25; 100 TABLET ORAL at 05:27

## 2021-03-03 RX ADMIN — PANTOPRAZOLE SODIUM 40 MILLIGRAM(S): 20 TABLET, DELAYED RELEASE ORAL at 05:27

## 2021-03-03 RX ADMIN — Medication 5 MILLIGRAM(S): at 05:27

## 2021-03-03 RX ADMIN — APIXABAN 5 MILLIGRAM(S): 2.5 TABLET, FILM COATED ORAL at 05:27

## 2021-03-03 RX ADMIN — CARBIDOPA AND LEVODOPA 2 TABLET(S): 25; 100 TABLET ORAL at 10:15

## 2021-03-03 RX ADMIN — APIXABAN 5 MILLIGRAM(S): 2.5 TABLET, FILM COATED ORAL at 18:25

## 2021-03-03 RX ADMIN — CHLORHEXIDINE GLUCONATE 1 APPLICATION(S): 213 SOLUTION TOPICAL at 05:16

## 2021-03-03 RX ADMIN — ATORVASTATIN CALCIUM 40 MILLIGRAM(S): 80 TABLET, FILM COATED ORAL at 22:13

## 2021-03-03 RX ADMIN — Medication 650 MILLIGRAM(S): at 22:09

## 2021-03-03 RX ADMIN — CHLORHEXIDINE GLUCONATE 1 APPLICATION(S): 213 SOLUTION TOPICAL at 05:15

## 2021-03-03 RX ADMIN — Medication 0.6 MILLIGRAM(S): at 12:27

## 2021-03-03 RX ADMIN — FLUCONAZOLE 100 MILLIGRAM(S): 150 TABLET ORAL at 12:42

## 2021-03-03 RX ADMIN — CARBIDOPA AND LEVODOPA 2 TABLET(S): 25; 100 TABLET ORAL at 14:14

## 2021-03-03 NOTE — CONSULT NOTE ADULT - ASSESSMENT
A 71 year old English speaking Male with past medical history of Parkinson disease, HLD, HTN, H/O DVT on Eliquis and Lasix, H/O COVID presents to ED with altered mental status for 2 days seen by neurology for Parkinson , meds adjusted a week ago, Sinemet was increased to 2x tabs 5x a day and off dopamine agonist.         Plan    -Continue current meds of Sinemet 25/100 2 tablets 5 times a day, pt meds were just readjusted a week ago   it will need more time up to  2-4 weeks to start and show effect   -no further neurological work up   -PT eval and aggressive PT as tolerated as pt mostly lyes down  -pt needs to follow up with neurology , if pt does not have neurologist can follow up with Dr. Feng     A 71 year old Mohawk speaking Male with past medical history of Parkinson disease, HLD, HTN, H/O DVT on Eliquis and Lasix, H/O COVID presents to ED with altered mental status for 2 days seen by neurology for Parkinson , meds adjusted a week ago, Sinemet was increased to 2x tabs 5x a day and off dopamine agonist.  Would not make further changes to parkinson's meds for now since pt currently bedbound and not participating with PT/OT and would have minimal benefit from increasing doses of dopaminergic medications.      Plan  -Continue current meds of Sinemet 25/100 2 tablets 5 times a day, pt meds were just readjusted a week ago   -recommend aggressive PT/OT  -no further neurological work up at this time  -continue medical management for now  -f/u with neurologist as outpt in 2 - 4 weeks after discharge.  May f/u with movement specialist Dr. Saxena ([phone]) for further medication management

## 2021-03-03 NOTE — SWALLOW BEDSIDE ASSESSMENT ADULT - SWALLOW EVAL: RECOMMENDED FEEDING/EATING TECHNIQUES
maintain upright posture during/after eating for 30 mins/no straws/small sips/bites
check mouth frequently for oral residue/pocketing/maintain upright posture during/after eating for 30 mins/small sips/bites
small sips/bites

## 2021-03-03 NOTE — SWALLOW BEDSIDE ASSESSMENT ADULT - PHARYNGEAL PHASE
Delayed pharyngeal swallow/Decreased laryngeal elevation
Within functional limits
immediate coughing post intake
no pharyngeal swallow noted upon palpation, +delayed cough

## 2021-03-03 NOTE — PROGRESS NOTE ADULT - ASSESSMENT
Aspiration Pneumonia:  -Patient was to be discharged 2/27/21, spiked fevers in AM  -CXR shows LLL opacity   -Repeat COVID neg  -Highly likely asp PNA given recent history of aspiration and advancing diet  -Started Vanco and Brady, patient spiked fevers after starting; MRSA nares neg, switched to Zosyn 2/28 renally dosed  -O2 via NC  -Follow ID consult  -Speech and swallow follow up-diet decreased to Dysphagia 1; possibly 2' to increase lethargy from new infection    Fungemia:  -Blood Cx growing yeast in both bottles  -patient had been persistently febrile on broad spectrum abx, now afebrile since starting Fluconazole.  -Will start Fluconazole IV and continue Zosyn for now pending ID follow up  -Apprec Ophthalmology, fup ID consults  -TTE ordered    Dysphagia  -Swallow eval appreciated, now on Dysphagia 1  -Had to change to 1 from 2; likely more lethargic from active infection  -Speech and swallow to follow up    Septic Shock (initially on admission)  Resolved  Patient presented with AMS  UA: Pyuria, Hematuria  CXR: RLL opacity  Likely delirium secondary to Sepsis secondary to Urosepsis or Pneumonia  Off Levophed and Dopamine  D/C'd Cefepime after 5 days      Parkinsons  -Neuro consult appreciated  -Can DC dopamine agonist  -Increased Sinemet    Left shoulder dislocation  -Reviewed imaging (CT, XR)  -Ortho consult appreciated, no need for intervention    Bradycardia with First Degree AV Block  Patient was initially hypertensive with Bradycardia and later went to [facility]   Was on Dopamine   EPS eval recommended no PPM for now  Monitor vitals   Echo: Unremarkable with mild MR and TR  EKG: NSR with undetermined age of infarct    ABEBE  Resolving   Nephrology on board   Hold off Lasix and Continue RL    DVT  On Eliquis    HTN  Hold off antihypertensive  Monitor BP  DASH Diet     Dispo:  Patient was to be DCed to [facility], spiked fevers, found to have candidemia.  Now on fluconazole, follow with ID, apprec ophthalmology.      All above d/w Dr Cheatham

## 2021-03-03 NOTE — SWALLOW BEDSIDE ASSESSMENT ADULT - SWALLOW EVAL: CURRENT DIET
NPO
NPSURINDER w NGT; soft and thin liquids ordered per MD
NPO
NPO w/ NGT
NPO, pt reportedly pulled NGT today
NPO
puree and nectar thick liquids
NPO
puree and nectar thick liquids
dysphagia 2 with thin liquids

## 2021-03-03 NOTE — PROGRESS NOTE ADULT - ATTENDING COMMENTS
Patient seen and examined independently of PA and agree with the note unless otherwise stated     1) Aspiration PNA  -zosyn dose increased to 3.375g q 8 hrs as per ID   -ID following   -repeat chest xray in am     2) Chronic DVT  -on oral AC    3) Fungemia  -on Fluconazole  -monitor Blood cultures     4)  Moderate Obesity   -BMI > 30  -dietary input for reccs    5) Nausea   -CT abdomen with IV contrast as recommended by ID     6) HTN/DL  -home meds     overall prognosis poor     Attending Physician Dr. Padmini Cheatham # [id number]

## 2021-03-03 NOTE — SWALLOW BEDSIDE ASSESSMENT ADULT - POSITIONING
upright (45 degrees)
upright (90 degrees)
upright (45 degrees)
upright (90 degrees)

## 2021-03-03 NOTE — SWALLOW BEDSIDE ASSESSMENT ADULT - DATE
13-Feb-2021
26-Feb-2021
17-Feb-2021
19-Feb-2021
03-Mar-2021
17-Feb-2021
23-Feb-2021
15-Feb-2021
14-Feb-2021
01-Mar-2021

## 2021-03-03 NOTE — SWALLOW BEDSIDE ASSESSMENT ADULT - SWALLOW EVAL: RECOMMENDED DIET
NPO with alternate means of nutrition and hydration
continue NPO w/ alternate means of nutrition/hydration
NPO w/ alternate means of nutrition/hydration
continue NPO w/ alternate means of nutrition/hydration
Dysphagia 1 puree and nectar thick liquids
NPO with alternate means of nutrition and hydration
DYsphagia 1 puree and nectar thick liquids
Dysphagia 1 puree and nectar thick liquids
NPO with alternate means of nutrition and hydration
Dysphagia 2 with thin liquids

## 2021-03-03 NOTE — SWALLOW BEDSIDE ASSESSMENT ADULT - SLP GENERAL OBSERVATIONS
Pt arousable/awake however eyes remained closed throughout evaluation. +generalized weakness noted. Per RN, pt unable to utilize  phone 2' confusion.
Pt asleep, arousable to tactile cues. Poor mental status noted, not following commands
Pt awake, no apparent pain. +generalized weakness noted. Pt with no verbalizations despite max cues. UE tremors noted
Pt asleep, arousable to tactile cues. Poor mental status noted, not following commands
Pt awake, +confusion and generalized weakness noted. NGT in place.
Pt asleep, arousable to tactile cues. Poor mental status noted, not following commands
Pt awake, +generalized weakness noted. Pt in no apparent pain

## 2021-03-03 NOTE — CONSULT NOTE ADULT - SUBJECTIVE AND OBJECTIVE BOX
71 year old Kiswahili speaking Male with past medical history of Parkinson disease, HLD, HTN, H/O DVT on Eliquis and Lasix, H/O COVID presents to ED with altered mental status for 2 days.    Today:  Seen at bedside        REVIEW OF SYSTEMS:  Not a reliable historian      MEDICATIONS  (STANDING):  apixaban 5 milliGRAM(s) Oral every 12 hours  atorvastatin 40 milliGRAM(s) Oral at bedtime  carbidopa/levodopa  25/100 2 Tablet(s) Oral <User Schedule>  chlorhexidine 4% Liquid 1 Application(s) Topical <User Schedule>  chlorhexidine 4% Liquid 1 Application(s) Topical <User Schedule>  colchicine 0.6 milliGRAM(s) Oral daily  dextrose 5% + sodium chloride 0.9%. 1000 milliLiter(s) (75 mL/Hr) IV Continuous <Continuous>  enalapril 5 milliGRAM(s) Oral daily  fluconAZOLE IVPB      fluconAZOLE IVPB 400 milliGRAM(s) IV Intermittent every 24 hours  pantoprazole   Suspension 40 milliGRAM(s) Oral before breakfast  piperacillin/tazobactam IVPB.. 2.25 Gram(s) IV Intermittent every 8 hours  polyethylene glycol 3350 17 Gram(s) Oral daily  senna 2 Tablet(s) Oral at bedtime    MEDICATIONS  (PRN):  acetaminophen  Suppository .. 650 milliGRAM(s) Rectal every 6 hours PRN Temp greater or equal to 38C (100.4F)      Allergies  No Known Allergies    Eye Exam   vision   At least 20/50 Right eye , left eye  EOM full  Pupils NO APD Miotic,   IOP via tonopen OD 18, OS 16  at 8 am    SLE via hand held  Lids flaky margins  conjunt. wnl  Cornea wnl,   Iris Left eye atrophy  Stephan deep and quiet  Retina dilated with Trop 1% and P.E.2.5%  Retina c/d .45   poor fixation, No sx of endophthalmitis

## 2021-03-03 NOTE — SWALLOW BEDSIDE ASSESSMENT ADULT - MODE OF PRESENTATION
fed by clinician
spoon/fed by clinician
spoon/fed by clinician
cup/spoon/fed by clinician
fed by clinician
fed by clinician

## 2021-03-03 NOTE — CONSULT NOTE ADULT - ASSESSMENT
ASSESSMENT  71 year old Sinhala speaking Male with past medical history of Parkinson disease, HLD, HTN, H/O DVT on Eliquis and Lasix, H/O COVID presents to ED with altered mental status for 2 days.       IMPRESSION  #Aspiration pneumonia   #Fungemia  - Blood Cx 2/27 Candida albicans  - evaluated by optho - no ocular evidence     #Parkinson's disease  #HLD  #HTN  #Hx of DVT  #Obesity BMI (kg/m2): 30.9  #Abx allergy: NKDA    Creatinine, Serum: 1.0 mg/dL (03.03.21 @ 06:59)      RECOMMENDATIONS  - given nausea, recommend CT Abd/Pelvis with contrast to ensure no abdominal source for fungemia   - repeat UA/Urine Culture  - repeat blood cultures for surveillance  - obtain TTE  - continue fluconazole 400 mg daily   - continue zosyn --increase to 3.375g q 8 hours    Please call or message on [employer] Teams if with any questions.  Spectra [phone]

## 2021-03-03 NOTE — SWALLOW BEDSIDE ASSESSMENT ADULT - ORAL PHASE
Decreased anterior-posterior movement of the bolus/Delayed oral transit time
Delayed oral transit time
Decreased anterior-posterior movement of the bolus/Delayed oral transit time

## 2021-03-03 NOTE — SWALLOW BEDSIDE ASSESSMENT ADULT - SLP PERTINENT HISTORY OF CURRENT PROBLEM
Adm w/ altered mental status, bradycardia, found to have sepsis, transaminitis, PNA (RLL opacities on CXR), ABEBE, metabolic encephalopathy.  Pt recently had COVID & has hx of Parkinson's dementia at baseline & DVTs
Adm w/ altered mental status, bradycardia, found to have sepsis, transaminitis, PNA (RLL opacities on CXR), ABEBE, metabolic encephalopathy.  Pt recently had COVID & has hx of Parkinson's dementia at baseline & DVTs. Pt speaks Serbian, reportedly with difficulty utilizing  phone. Pt planned for D/C 2/27 however became febrile, possible aspiration PNA per chart. Pt noted to have candidemia, also possibly the source of new onset fevers per chart.
Pt admitted with AMS, bradycardia. Sinus bradycardia 2' Parkinson induced dysautonomia vs infection. RLL opacities on CXR. Recent COVID-19. UTI? PMHx: Parkinsons
Pt admitted with AMS, bradycardia. Sinus bradycardia 2' Parkinson induced dysautonomia vs infection. RLL opacities on CXR. Recent COVID-19. UTI? PMHx: Parkinsons
Adm w/ altered mental status, bradycardia, found to have sepsis, transaminitis, PNA (RLL opacities on CXR), ABEBE, metabolic encephalopathy.  Pt recently had COVID & has hx of Parkinson's dementia at baseline & DVTs. Pt speaks Kazakh, reportedly with difficulty utilizing  phone. Pt known to SLP dept from [facility], recs for NPO w alt means.
Pt admitted with AMS, bradycardia. Sinus bradycardia 2' Parkinson induced dysautonomia vs infection. RLL opacities on CXR. Recent COVID-19. UTI? PMHx: Parkinsons
Adm w/ altered mental status, bradycardia, found to have sepsis, transaminitis, PNA (RLL opacities on CXR), AEBBE, metabolic encephalopathy.  Pt recently had COVID & has hx of Parkinson's dementia at baseline & DVTs. Pt speaks Albanian, reportedly with difficulty utilizing  phone. Pt with new onset fevers, possible aspiration PNA per chart
Adm w/ altered mental status, bradycardia, found to have sepsis, transaminitis, PNA (RLL opacities on CXR), ABEBE, metabolic encephalopathy.  Pt recently had COVID & has hx of Parkinson's dementia at baseline & DVTs. Pt speaks Occitan, reportedly with difficulty utilizing  phone. Pt known to SLP dept from [facility], recs for NPO w alt means.

## 2021-03-03 NOTE — CONSULT NOTE ADULT - SUBJECTIVE AND OBJECTIVE BOX
NIEVES LABOY  71y, Male  Allergy: No Known Allergies      CHIEF COMPLAINT: Altered Mental Status (03 Mar 2021 11:39)      LOS  19d    HPI:  71 year old Greenlandic speaking Male with past medical history of Parkinson disease, HLD, HTN, H/O DVT on Eliquis and Lasix, H/O COVID presents to ED with altered mental status for 2 days.     As per family patient at baseline is able to speak and has his on and off days but since last 2 days has not been able to speak and more lethargic than baseline. As per daughter at the bedside, patient has "bad days but this is the worst we have seen" Patient bright to ED for non resolution of symptoms. Family denies any fevers, chills, rigors, cough, shortness of breath, loss of consciousness, incontinence or any other complaints.     In ED patient hypertensive to 170s and bradycardic to 30s. EKG showed sinus bradycardia with 1st degree AV block. Electrolytes were normal. Patient given atropine without response, he was given dobutamine and HR increased with wide complex tachycardia. Patient placed on Dopamine with HR improving to the 50s. CT head done for AMS and shows no acute changes. Patient at time of interview minimally verbal but appeared in no apparent discomfort. (12 Feb 2021 17:17)      INFECTIOUS DISEASE HISTORY:  ID consulted for fungemia  Febrile on 2/27  Blood Cx growing Candida albicans  Based on chart review, does not appear that he received any central lines during hospitlization course.   Daughter called to help interpret.   He has some nausea which is causing poor appetite.   Otherwise, denies any abdominal pain, vomiting.   Reports increased urinary frequency.     PAST MEDICAL & SURGICAL HISTORY:  Cataract    Prostatitis    Dyslipidemia    Gout    Hypertension    Parkinson disease    No significant past surgical history        FAMILY HISTORY  Family history of cerebrovascular accident (CVA) (Father)        SOCIAL HISTORY  Social History:  Denies smoking, alcohol or any other illicit drug. Brother takes care of him at home. (12 Feb 2021 17:17)        ROS  General: Denies rigors, nightsweats  HEENT: Denies headache, rhinorrhea, sore throat, eye pain  CV: Denies CP, palpitations  PULM: Denies wheezing, hemoptysis  GI: Denies hematemesis, hematochezia, melena  : Denies discharge, hematuria  MSK: Denies arthralgias, myalgias  SKIN: Denies rash, lesions  NEURO: Denies paresthesias, weakness  PSYCH: Denies depression, anxiety    VITALS:  T(F): 99, Max: 99.1 (03-02-21 @ 14:12)  HR: 66  BP: 160/86  RR: 16Vital Signs Last 24 Hrs  T(C): 37.2 (03 Mar 2021 09:23), Max: 37.3 (02 Mar 2021 14:12)  T(F): 99 (03 Mar 2021 09:23), Max: 99.1 (02 Mar 2021 14:12)  HR: 66 (03 Mar 2021 05:22) (59 - 66)  BP: 160/86 (03 Mar 2021 05:22) (160/83 - 167/79)  BP(mean): --  RR: 16 (03 Mar 2021 05:22) (16 - 16)  SpO2: --    PHYSICAL EXAM:  Gen: NAD, resting in bed  HEENT: Normocephalic, atraumatic  Neck: supple, no lymphadenopathy  CV: Regular rate & regular rhythm  Lungs: decreased BS at bases, no fremitus  Abdomen: Soft, BS present  Ext: Warm, well perfused  Neuro: non focal, awake  Skin: no rash, no erythema  Lines: no phlebitis    TESTS & MEASUREMENTS:                        10.1   4.43  )-----------( 319      ( 03 Mar 2021 06:59 )             32.4     03-03    140  |  105  |  14  ----------------------------<  110<H>  4.2   |  24  |  1.0    Ca    8.7      03 Mar 2021 06:59    TPro  6.3  /  Alb  3.0<L>  /  TBili  0.2  /  DBili  x   /  AST  37  /  ALT  9   /  AlkPhos  136<H>  03-03    eGFR if : 87 mL/min/1.73M2 (03-03-21 @ 06:59)  eGFR if Non African American: 75 mL/min/1.73M2 (03-03-21 @ 06:59)    LIVER FUNCTIONS - ( 03 Mar 2021 06:59 )  Alb: 3.0 g/dL / Pro: 6.3 g/dL / ALK PHOS: 136 U/L / ALT: 9 U/L / AST: 37 U/L / GGT: x               Culture - Blood (collected 02-27-21 @ 14:38)  Source: .Blood None  Gram Stain (03-01-21 @ 15:00):    Growth in aerobic bottle: Yeast like cells  Preliminary Report (03-02-21 @ 10:46):    Growth in aerobic bottle: Candida albicans    See previous culture [id number]    Culture - Blood (collected 02-27-21 @ 14:38)  Source: .Blood None  Gram Stain (03-01-21 @ 12:31):    Growth in aerobic bottle: Yeast like cells  Preliminary Report (03-02-21 @ 09:41):    Growth in aerobic bottle: Candida albicans Referred to Mycology    ***Blood Panel PCR results on this specimen are available    approximately 3 hours after the Gram stain result.***    Gram stain, PCR, and/or culture results may not always    correspond due to difference in methodologies.    ************************************************************    This PCR assay was performed by multiplex PCR. This    Assay tests for 66 bacterial and resistance gene targets.    Please refer to the [facility] test directory    at [email or web address] for details.    "Due to technical problems, Pseudomonas aeruginosa    until further notice".  Organism: Blood Culture PCR (03-01-21 @ 14:36)  Organism: Blood Culture PCR (03-01-21 @ 14:36)      -  Candida albicans: Detec      Method Type: PCR    Culture - Blood (collected 02-13-21 @ 11:54)  Source: .Blood None  Final Report (02-18-21 @ 19:01):    No Growth Final    Culture - Urine (collected 02-13-21 @ 09:36)  Source: .Urine Kidney  Final Report (02-15-21 @ 08:40):    No growth at 48 hours    Culture - Urine (collected 02-12-21 @ 18:37)  Source: .Urine Clean Catch (Midstream)  Final Report (02-13-21 @ 22:43):    <10,000 CFU/mL Normal Urogenital Chelle            INFECTIOUS DISEASES TESTING  COVID-19 PCR: NotDetec (03-01-21 @ 16:49)  Procalcitonin, Serum: 0.29 ng/mL (03-01-21 @ 05:32)  MRSA PCR Result.: Negative (02-27-21 @ 15:44)  COVID-19 PCR: NotDetec (02-25-21 @ 15:34)  Procalcitonin, Serum: 0.27 ng/mL (02-18-21 @ 10:54)  MRSA PCR Result.: Negative (02-14-21 @ 18:29)  Hepatitis C Virus Interpretation: Nonreact (02-14-21 @ 05:07)  Hepatitis B Surface Antigen: Nonreact (02-14-21 @ 05:07)  HIV-1/2 Combo Result: Nonreact (02-14-21 @ 05:07)  Procalcitonin, Serum: 0.46 ng/mL (02-13-21 @ 16:00)  COVID-19 PCR: NotDetec (02-12-21 @ 10:17)      RADIOLOGY & ADDITIONAL TESTS:  I have personally reviewed the last Chest xray  CXR      CT  CT Chest No Cont:   EXAM:  CT CHEST            PROCEDURE DATE:  03/01/2021            INTERPRETATION:  CLINICAL INDICATION: new PNA, evaluate effusion?    TECHNIQUE:  A noncontrast CT of the thorax was performed. Sagittal and coronal reformats were performed as well as MIP reconstructions.    COMPARISON CT: 2/14/2021    INTERPRETATION:  Breathing motion artifact is present limiting evaluation.    Compared to recent CT chest 2/14/2021, significant decrease/improvement in bilateral opacities to near complete resolution and and resolution of pleural effusions. Bibasilar subsegmental atelectasis. Additional findings are unchanged in this short interval follow-up CT.              HARSHIL OCHOA MD; Attending Radiologist  This document has been electronically signed. Mar  1 2021  5:05PM (03-01-21 @ 09:44)      CARDIOLOGY TESTING      MEDICATIONS  apixaban 5 Oral every 12 hours  atorvastatin 40 Oral at bedtime  carbidopa/levodopa  25/100 2 Oral <User Schedule>  chlorhexidine 4% Liquid 1 Topical <User Schedule>  chlorhexidine 4% Liquid 1 Topical <User Schedule>  colchicine 0.6 Oral daily  dextrose 5% + sodium chloride 0.9%. 1000 IV Continuous <Continuous>  enalapril 5 Oral daily  fluconAZOLE IVPB     fluconAZOLE IVPB 400 IV Intermittent every 24 hours  pantoprazole   Suspension 40 Oral before breakfast  piperacillin/tazobactam IVPB.. 2.25 IV Intermittent every 8 hours  polyethylene glycol 3350 17 Oral daily  senna 2 Oral at bedtime      Weight  Weight (kg): 89.4 (02-15-21 @ 13:59)    ANTIBIOTICS:  fluconAZOLE IVPB      fluconAZOLE IVPB 400 milliGRAM(s) IV Intermittent every 24 hours  piperacillin/tazobactam IVPB.. 2.25 Gram(s) IV Intermittent every 8 hours      ALLERGIES:  No Known Allergies

## 2021-03-03 NOTE — PROGRESS NOTE ADULT - SUBJECTIVE AND OBJECTIVE BOX
71 year old Romansh speaking Male with past medical history of Parkinson disease, HLD, HTN, H/O DVT on Eliquis and Lasix, H/O COVID presents to ED with altered mental status for 2 days.    Today:  Seen at bedside, not in any distress. Pt arousable but will not open eyes        REVIEW OF SYSTEMS:  Not a reliable historian      MEDICATIONS  (STANDING):  apixaban 5 milliGRAM(s) Oral every 12 hours  atorvastatin 40 milliGRAM(s) Oral at bedtime  carbidopa/levodopa  25/100 2 Tablet(s) Oral <User Schedule>  chlorhexidine 4% Liquid 1 Application(s) Topical <User Schedule>  chlorhexidine 4% Liquid 1 Application(s) Topical <User Schedule>  colchicine 0.6 milliGRAM(s) Oral daily  dextrose 5% + sodium chloride 0.9%. 1000 milliLiter(s) (75 mL/Hr) IV Continuous <Continuous>  enalapril 5 milliGRAM(s) Oral daily  fluconAZOLE IVPB      fluconAZOLE IVPB 400 milliGRAM(s) IV Intermittent every 24 hours  pantoprazole   Suspension 40 milliGRAM(s) Oral before breakfast  piperacillin/tazobactam IVPB.. 2.25 Gram(s) IV Intermittent every 8 hours  polyethylene glycol 3350 17 Gram(s) Oral daily  senna 2 Tablet(s) Oral at bedtime    MEDICATIONS  (PRN):  acetaminophen  Suppository .. 650 milliGRAM(s) Rectal every 6 hours PRN Temp greater or equal to 38C (100.4F)      Allergies  No Known Allergies        FAMILY HISTORY:  Family history of cerebrovascular accident (CVA) (Father)        Vital Signs Last 24 Hrs  T(C): 37.2 (03 Mar 2021 09:23), Max: 37.3 (02 Mar 2021 14:12)  T(F): 99 (03 Mar 2021 09:23), Max: 99.1 (02 Mar 2021 14:12)  HR: 66 (03 Mar 2021 05:22) (59 - 66)  BP: 160/86 (03 Mar 2021 05:22) (160/83 - 167/79)  BP(mean): --  RR: 16 (03 Mar 2021 05:22) (16 - 16)  SpO2: --    PHYSICAL EXAM: cooling blanket in place  Constitutional: well-developed; well-groomed; well-nourished  Eyes :conjunctiva clear   Neck supple; no JVD  Back normal shape; ROM intact  Respiratory normal; airway patent; breath sounds equal; good air movement  Cardiovascular; regular rate and rhythm  no rub  no murmur  normal PMI  Gastrointestinal :soft, Nontender, No distension, NBS  Neurological: Alert, responds to pain; responds to verbal commands  Skin; warm and dry; color normal  Musculoskeletal: Left shoulder dislocation      LABS:                                     10.1   4.43  )-----------( 319      ( 03 Mar 2021 06:59 )             32.4       03-03    140  |  105  |  14  ----------------------------<  110<H>  4.2   |  24  |  1.0    Ca    8.7      03 Mar 2021 06:59    TPro  6.3  /  Alb  3.0<L>  /  TBili  0.2  /  DBili  x   /  AST  37  /  ALT  9   /  AlkPhos  136<H>  03-03       Culture Results:   Growth in aerobic bottle: Candida albicans Referred to Mycology  ***Blood Panel PCR results on this specimen are available  approximately 3 hours after the Gram stain result.***  Gram stain, PCR, and/or culture results may not always  correspond due to difference in methodologies.  ************************************************************  This PCR assay was performed by multiplex PCR. This  Assay tests for 66 bacterial and resistance gene targets.  Please refer to the [facility] test directory  at [email or web address] for details.  "Due to technical problems, Pseudomonas aeruginosa    until further notice". (02-27 @ 14:38)  Culture Results:   Growth in aerobic bottle: Candida albicans  See previous culture [id number] (02-27 @ 14:38)  Culture Results:   No Growth Final (02-13 @ 11:54)  Culture Results:   No growth at 48 hours (02-13 @ 09:36)  Culture Results:   <10,000 CFU/mL Normal Urogenital Chelle (02-12 @ 18:37)

## 2021-03-03 NOTE — SWALLOW BEDSIDE ASSESSMENT ADULT - NS SPL SWALLOW CLINIC TRIAL FT
+ toleration, moderate oral dysphagia. DIet upgrade not appropriate at this time 2' poor mental status, generalized weakness. Pt benefits from 1:1 feed for all meals
+ toleration, pt benefits from tspn amounts nectar thick liquids. Thin liquids not trialed 2' generalized weakness, risk for aspiration at this time
+ toleration; thin liquids not trialed 2' generalized weakness
No s/s aspiration/penetration
suspected pharyngeal dysphagia for puree, no further trials given 2' persistent coughing & risk for aspiration
severe oral dysphagia, suspected pharyngeal dysphagia, RN removed oral residue w/ oral suctioning.  No further trials offered 2' aspiration risk.

## 2021-03-03 NOTE — SWALLOW BEDSIDE ASSESSMENT ADULT - CONSISTENCIES ADMINISTERED
2 oz/nectar thick/puree
2 oz/nectar thick/puree
3 oz/nectar thick/puree
3 oz/thin liquid/mech soft
puree
puree

## 2021-03-03 NOTE — CONSULT NOTE ADULT - SUBJECTIVE AND OBJECTIVE BOX
Neurology Follow up note     # [id number]  Name  NIEVES LABOY    HPI:  71 year old Croatian speaking Male with past medical history of Parkinson disease, HLD, HTN, H/O DVT on Eliquis and Lasix, H/O COVID presents to ED with altered mental status for 2 days.     As per family patient at baseline is able to speak and has his on and off days but since last 2 days has not been able to speak and more lethargic than baseline. As per daughter at the bedside, patient has "bad days but this is the worst we have seen" Patient bright to ED for non resolution of symptoms. Family denies any fevers, chills, rigors, cough, shortness of breath, loss of consciousness, incontinence or any other complaints.     In ED patient hypertensive to 170s and bradycardic to 30s. EKG showed sinus bradycardia with 1st degree AV block. Electrolytes were normal. Patient given atropine without response, he was given dobutamine and HR increased with wide complex tachycardia. Patient placed on Dopamine with HR improving to the 50s. CT head done for AMS and shows no acute changes. Patient at time of interview minimally verbal but appeared in no apparent discomfort. (12 Feb 2021 17:17)    Neurology  Interval History - Neurology was reconsulted for Parkinson - dementia patient  could not interact with . Pt awake was able to state name and place he from.  Patient responds minimally to  commands.  Pt meds Sinemet readjusted a week ago.     Vital Signs Last 24 Hrs  T(C): 37.2 (03 Mar 2021 09:23), Max: 37.3 (02 Mar 2021 14:12)  T(F): 99 (03 Mar 2021 09:23), Max: 99.1 (02 Mar 2021 14:12)  HR: 66 (03 Mar 2021 05:22) (59 - 66)  BP: 160/86 (03 Mar 2021 05:22) (160/83 - 167/79)  BP(mean): --  RR: 16 (03 Mar 2021 05:22) (16 - 16)  SpO2: --        Neurological Exam:     Mental status: Awake, knows name and states place he from. ; Good eye contact ; follow simple commands.  Unable to assess remainder of exam  HEENT: right eye more dilated then right opthalmology examined pt today) pupils reactive to light.   Motor:  Normal bulk and tone, strength 5/5 in RUE, has chronic left shoulder dislocated,   2/5 LUE , mild rigidity and mild cogwheeling.  Unable to assess remainder      Medications  acetaminophen  Suppository .. 650 milliGRAM(s) Rectal every 6 hours PRN  apixaban 5 milliGRAM(s) Oral every 12 hours  atorvastatin 40 milliGRAM(s) Oral at bedtime  carbidopa/levodopa  25/100 2 Tablet(s) Oral <User Schedule>  chlorhexidine 4% Liquid 1 Application(s) Topical <User Schedule>  chlorhexidine 4% Liquid 1 Application(s) Topical <User Schedule>  colchicine 0.6 milliGRAM(s) Oral daily  dextrose 5% + sodium chloride 0.9%. 1000 milliLiter(s) IV Continuous <Continuous>  enalapril 5 milliGRAM(s) Oral daily  fluconAZOLE IVPB      fluconAZOLE IVPB 400 milliGRAM(s) IV Intermittent every 24 hours  pantoprazole   Suspension 40 milliGRAM(s) Oral before breakfast  piperacillin/tazobactam IVPB.. 2.25 Gram(s) IV Intermittent every 8 hours  polyethylene glycol 3350 17 Gram(s) Oral daily  senna 2 Tablet(s) Oral at bedtime      Lab                        10.1   4.43  )-----------( 319      ( 03 Mar 2021 06:59 )             32.4     03-03    140  |  105  |  14  ----------------------------<  110<H>  4.2   |  24  |  1.0    Ca    8.7      03 Mar 2021 06:59    TPro  6.3  /  Alb  3.0<L>  /  TBili  0.2  /  DBili  x   /  AST  37  /  ALT  9   /  AlkPhos  136<H>  03-03    CAPILLARY BLOOD GLUCOSE        LIVER FUNCTIONS - ( 03 Mar 2021 06:59 )  Alb: 3.0 g/dL / Pro: 6.3 g/dL / ALK PHOS: 136 U/L / ALT: 9 U/L / AST: 37 U/L / GGT: x               Radiology    < from: CT Head No Cont (02.14.21 @ 13:15) >  IMPRESSION:  No evidence of acute transcortical infarct, acute intracranial hemorrhage, or mass effect.      MANUELA OCHOA MD; Attending Radiologist  This document has been electronically signed. Feb 14 2021  2:38PM    < end of copied text >     Neurology Follow up note     # [id number]  Name  NIEVES LABOY    Neurology  Interval History - Neurology was reconsulted for Parkinson - dementia patient  could not interact with . Pt awake was able to state name and place he from.  Patient responds minimally to  commands.  Pt meds Sinemet readjusted a week ago.     Vital Signs Last 24 Hrs  T(C): 37.2 (03 Mar 2021 09:23), Max: 37.3 (02 Mar 2021 14:12)  T(F): 99 (03 Mar 2021 09:23), Max: 99.1 (02 Mar 2021 14:12)  HR: 66 (03 Mar 2021 05:22) (59 - 66)  BP: 160/86 (03 Mar 2021 05:22) (160/83 - 167/79)  BP(mean): --  RR: 16 (03 Mar 2021 05:22) (16 - 16)  SpO2: --    Neurological Exam:     Mental status: Awake, knows name and states place he from. ; Good eye contact ; follow simple commands.  Unable to assess remainder of exam  HEENT: right eye more dilated then right opthalmology examined pt today) pupils reactive to light.   Motor:  Normal bulk and tone, strength 5/5 in RUE, has chronic left shoulder dislocated,   2/5 LUE , mild rigidity and mild cogwheeling b/l UE/LE.  Mild rest tremor LUE > RUE.  currently bed-bound    Medications  acetaminophen  Suppository .. 650 milliGRAM(s) Rectal every 6 hours PRN  apixaban 5 milliGRAM(s) Oral every 12 hours  atorvastatin 40 milliGRAM(s) Oral at bedtime  carbidopa/levodopa  25/100 2 Tablet(s) Oral 5x/day  chlorhexidine 4% Liquid 1 Application(s) Topical <User Schedule>  chlorhexidine 4% Liquid 1 Application(s) Topical <User Schedule>  colchicine 0.6 milliGRAM(s) Oral daily  dextrose 5% + sodium chloride 0.9%. 1000 milliLiter(s) IV Continuous <Continuous>  enalapril 5 milliGRAM(s) Oral daily  fluconAZOLE IVPB      fluconAZOLE IVPB 400 milliGRAM(s) IV Intermittent every 24 hours  pantoprazole   Suspension 40 milliGRAM(s) Oral before breakfast  piperacillin/tazobactam IVPB.. 2.25 Gram(s) IV Intermittent every 8 hours  polyethylene glycol 3350 17 Gram(s) Oral daily  senna 2 Tablet(s) Oral at bedtime    Lab                        10.1   4.43  )-----------( 319      ( 03 Mar 2021 06:59 )             32.4     03-03    140  |  105  |  14  ----------------------------<  110<H>  4.2   |  24  |  1.0    Ca    8.7      03 Mar 2021 06:59    TPro  6.3  /  Alb  3.0<L>  /  TBili  0.2  /  DBili  x   /  AST  37  /  ALT  9   /  AlkPhos  136<H>  03-03    CAPILLARY BLOOD GLUCOSE    LIVER FUNCTIONS - ( 03 Mar 2021 06:59 )  Alb: 3.0 g/dL / Pro: 6.3 g/dL / ALK PHOS: 136 U/L / ALT: 9 U/L / AST: 37 U/L / GGT: x           Radiology    < from: CT Head No Cont (02.14.21 @ 13:15) >  IMPRESSION:  No evidence of acute transcortical infarct, acute intracranial hemorrhage, or mass effect.      MANUELA OCHOA MD; Attending Radiologist  This document has been electronically signed. Feb 14 2021  2:38PM    < end of copied text >

## 2021-03-03 NOTE — CONSULT NOTE ADULT - ASSESSMENT
No Ocular sx endophthalmitis  Patient can be seen as out patient with own doctor and at the eye clinic when discharged to recheck vision.

## 2021-03-03 NOTE — SWALLOW BEDSIDE ASSESSMENT ADULT - SWALLOW EVAL: DIAGNOSIS
No s/s aspiration/penetration for puree and nectar thick liquids, mild-moderate oral dysphagia
No s/s aspiration/penetration for thin liquids and mechanical soft. Mild oral dysphagia
not appropriate for PO at this time 2' mental status
severe oral impairment and suspected pharyngeal dysphagia
suspected pharyngeal dysphagia
Moderate oral dysphagia, no s/s aspiration/penetration for nectar thick liquids and puree. Per PCA, pt tolerating current diet however occasionally requires cues to swallow.
PO trials not appropriate at this time, pt with no awareness to situation, not following commands, poor mental status. Pt at high risk for aspiration at this time
No s/s aspiration/penetration for puree and nectar thick liquids, moderate oral dysphagia

## 2021-03-04 LAB
ANION GAP SERPL CALC-SCNC: 8 MMOL/L — SIGNIFICANT CHANGE UP (ref 7–14)
BUN SERPL-MCNC: 13 MG/DL — SIGNIFICANT CHANGE UP (ref 10–20)
CALCIUM SERPL-MCNC: 8.9 MG/DL — SIGNIFICANT CHANGE UP (ref 8.5–10.1)
CHLORIDE SERPL-SCNC: 105 MMOL/L — SIGNIFICANT CHANGE UP (ref 98–110)
CO2 SERPL-SCNC: 27 MMOL/L — SIGNIFICANT CHANGE UP (ref 17–32)
CREAT SERPL-MCNC: 1.2 MG/DL — SIGNIFICANT CHANGE UP (ref 0.7–1.5)
GLUCOSE SERPL-MCNC: 92 MG/DL — SIGNIFICANT CHANGE UP (ref 70–99)
HCT VFR BLD CALC: 34.6 % — LOW (ref 42–52)
HGB BLD-MCNC: 10.9 G/DL — LOW (ref 14–18)
MCHC RBC-ENTMCNC: 25.8 PG — LOW (ref 27–31)
MCHC RBC-ENTMCNC: 31.5 G/DL — LOW (ref 32–37)
MCV RBC AUTO: 82 FL — SIGNIFICANT CHANGE UP (ref 80–94)
NRBC # BLD: 0 /100 WBCS — SIGNIFICANT CHANGE UP (ref 0–0)
PLATELET # BLD AUTO: 368 K/UL — SIGNIFICANT CHANGE UP (ref 130–400)
POTASSIUM SERPL-MCNC: 4.1 MMOL/L — SIGNIFICANT CHANGE UP (ref 3.5–5)
POTASSIUM SERPL-SCNC: 4.1 MMOL/L — SIGNIFICANT CHANGE UP (ref 3.5–5)
RBC # BLD: 4.22 M/UL — LOW (ref 4.7–6.1)
RBC # FLD: 15.6 % — HIGH (ref 11.5–14.5)
SODIUM SERPL-SCNC: 140 MMOL/L — SIGNIFICANT CHANGE UP (ref 135–146)
WBC # BLD: 5.9 K/UL — SIGNIFICANT CHANGE UP (ref 4.8–10.8)
WBC # FLD AUTO: 5.9 K/UL — SIGNIFICANT CHANGE UP (ref 4.8–10.8)

## 2021-03-04 PROCEDURE — 99233 SBSQ HOSP IP/OBS HIGH 50: CPT

## 2021-03-04 RX ADMIN — Medication 5 MILLIGRAM(S): at 05:05

## 2021-03-04 RX ADMIN — PANTOPRAZOLE SODIUM 40 MILLIGRAM(S): 20 TABLET, DELAYED RELEASE ORAL at 05:12

## 2021-03-04 RX ADMIN — FLUCONAZOLE 100 MILLIGRAM(S): 150 TABLET ORAL at 13:50

## 2021-03-04 RX ADMIN — Medication 650 MILLIGRAM(S): at 07:31

## 2021-03-04 RX ADMIN — CHLORHEXIDINE GLUCONATE 1 APPLICATION(S): 213 SOLUTION TOPICAL at 05:06

## 2021-03-04 RX ADMIN — CARBIDOPA AND LEVODOPA 2 TABLET(S): 25; 100 TABLET ORAL at 17:35

## 2021-03-04 RX ADMIN — PIPERACILLIN AND TAZOBACTAM 25 GRAM(S): 4; .5 INJECTION, POWDER, LYOPHILIZED, FOR SOLUTION INTRAVENOUS at 13:53

## 2021-03-04 RX ADMIN — PIPERACILLIN AND TAZOBACTAM 25 GRAM(S): 4; .5 INJECTION, POWDER, LYOPHILIZED, FOR SOLUTION INTRAVENOUS at 23:28

## 2021-03-04 RX ADMIN — Medication 650 MILLIGRAM(S): at 07:30

## 2021-03-04 RX ADMIN — CARBIDOPA AND LEVODOPA 2 TABLET(S): 25; 100 TABLET ORAL at 05:05

## 2021-03-04 RX ADMIN — CARBIDOPA AND LEVODOPA 2 TABLET(S): 25; 100 TABLET ORAL at 13:49

## 2021-03-04 RX ADMIN — CARBIDOPA AND LEVODOPA 2 TABLET(S): 25; 100 TABLET ORAL at 15:07

## 2021-03-04 RX ADMIN — CARBIDOPA AND LEVODOPA 2 TABLET(S): 25; 100 TABLET ORAL at 23:29

## 2021-03-04 RX ADMIN — CHLORHEXIDINE GLUCONATE 1 APPLICATION(S): 213 SOLUTION TOPICAL at 05:11

## 2021-03-04 RX ADMIN — Medication 0.6 MILLIGRAM(S): at 13:49

## 2021-03-04 RX ADMIN — SODIUM CHLORIDE 75 MILLILITER(S): 9 INJECTION, SOLUTION INTRAVENOUS at 05:09

## 2021-03-04 RX ADMIN — PIPERACILLIN AND TAZOBACTAM 25 GRAM(S): 4; .5 INJECTION, POWDER, LYOPHILIZED, FOR SOLUTION INTRAVENOUS at 05:10

## 2021-03-04 RX ADMIN — APIXABAN 5 MILLIGRAM(S): 2.5 TABLET, FILM COATED ORAL at 17:34

## 2021-03-04 RX ADMIN — Medication 650 MILLIGRAM(S): at 00:00

## 2021-03-04 RX ADMIN — APIXABAN 5 MILLIGRAM(S): 2.5 TABLET, FILM COATED ORAL at 05:05

## 2021-03-04 RX ADMIN — ATORVASTATIN CALCIUM 40 MILLIGRAM(S): 80 TABLET, FILM COATED ORAL at 23:29

## 2021-03-04 NOTE — PROGRESS NOTE ADULT - ASSESSMENT
Aspiration Pneumonia:  -Patient was to be discharged 2/27/21, spiked fevers in AM  -CXR shows LLL opacity   -Repeat COVID neg  -Highly likely asp PNA given recent history of aspiration and advancing diet  -Started Vanco and Brady, patient spiked fevers after starting; MRSA nares neg, switched to Zosyn 2/28 renally dosed  -O2 via NC  -Apprec ID consult -   -Speech and swallow follow up-diet decreased to Dysphagia 1    Fungemia:  -Blood Cx growing yeast in both bottles  -patient had been persistently febrile on broad spectrum abx, now afebrile since starting Fluconazole.  -Will start Fluconazole IV and continue Zosyn for now pending ID follow up  -Apprec Ophthalmology, fup ID consults  -TTE ordered, pending results  - CT scan Abd neg  - repeat blood cx and Ucx pend    Dysphagia  -Swallow eval appreciated, now on Dysphagia 1  -Had to change to 1 from 2; likely more lethargic from active infection  -Speech and swallow to cont to follow up    Septic Shock (initially on admission)  Resolved  Patient presented with AMS  UA: Pyuria, Hematuria  CXR: RLL opacity  Likely delirium secondary to Sepsis secondary to Urosepsis or Pneumonia  Off Levophed and Dopamine  D/C'd Cefepime after 5 days      Parkinsons  -Neuro consult appreciated  -Can DC dopamine agonist  -Increased Sinemet    Left shoulder dislocation  -Reviewed imaging (CT, XR)  -Ortho consult appreciated, no need for intervention    Bradycardia with First Degree AV Block  Patient was initially hypertensive with Bradycardia and later went to [facility]   Was on Dopamine   EPS eval recommended no PPM for now  Monitor vitals   Echo: Unremarkable with mild MR and TR  EKG: NSR with undetermined age of infarct    ABEBE  Resolving   Nephrology on board   Hold off Lasix and Continue RL    DVT  On Eliquis    HTN  Hold off antihypertensive  Monitor BP  DASH Diet     Dispo:  Patient was to be DCed to [facility], spiked fevers, found to have candidemia.  Now on fluconazole, follow with ID, apprec ophthalmology.      All above d/w Dr Cheatham

## 2021-03-04 NOTE — PROGRESS NOTE ADULT - SUBJECTIVE AND OBJECTIVE BOX
Patient is seen and examined at the bed side, is afebrile.      REVIEW OF SYSTEMS: All other review systems are negative        ALLERGIES: No Known Allergies        Vital Signs Last 24 Hrs  T(C): 36.4 (04 Mar 2021 17:49), Max: 38 (03 Mar 2021 21:00)  T(F): 97.5 (04 Mar 2021 17:49), Max: 100.4 (03 Mar 2021 21:00)  HR: 65 (04 Mar 2021 13:50) (64 - 70)  BP: 118/60 (04 Mar 2021 13:50) (118/60 - 184/74)  BP(mean): --  RR: 16 (04 Mar 2021 13:50) (16 - 18)  SpO2: --        PHYSICAL EXAM:  GENERAL: Not in distress   CHEST/LUNG: Not using accessory muscles  HEART: s1 and s2 present  ABDOMEN:  Nontender and  Nondistended  EXTREMITIES: No pedal  edema  CNS: Awake and Alert      LABS:                        10.9   5.90  )-----------( 368      ( 04 Mar 2021 09:10 )             34.6       03-04    140  |  105  |  13  ----------------------------<  92  4.1   |  27  |  1.2    Ca    8.9      04 Mar 2021 09:10    TPro  6.3  /  Alb  3.0<L>  /  TBili  0.2  /  DBili  x   /  AST  37  /  ALT  9   /  AlkPhos  136<H>  03-03        MEDICATIONS  (STANDING):  apixaban 5 milliGRAM(s) Oral every 12 hours  atorvastatin 40 milliGRAM(s) Oral at bedtime  carbidopa/levodopa  25/100 2 Tablet(s) Oral <User Schedule>  chlorhexidine 4% Liquid 1 Application(s) Topical <User Schedule>  chlorhexidine 4% Liquid 1 Application(s) Topical <User Schedule>  colchicine 0.6 milliGRAM(s) Oral daily  dextrose 5% + sodium chloride 0.9%. 1000 milliLiter(s) (75 mL/Hr) IV Continuous <Continuous>  enalapril 5 milliGRAM(s) Oral daily  fluconAZOLE IVPB      fluconAZOLE IVPB 400 milliGRAM(s) IV Intermittent every 24 hours  pantoprazole   Suspension 40 milliGRAM(s) Oral before breakfast  piperacillin/tazobactam IVPB.. 3.375 Gram(s) IV Intermittent every 8 hours  polyethylene glycol 3350 17 Gram(s) Oral daily  senna 2 Tablet(s) Oral at bedtime    MEDICATIONS  (PRN):  acetaminophen  Suppository .. 650 milliGRAM(s) Rectal every 6 hours PRN Temp greater or equal to 38C (100.4F)        RADIOLOGY & ADDITIONAL TESTS:    < from: CT Abdomen and Pelvis w/ IV Cont (03.03.21 @ 17:41) >  No acute abnormality in the abdomen and pelvis.    Mild splenomegaly measuring up to 14.2 cm in craniocaudal dimension, new since 9/25/2018. No focal splenic lesions.      < from: CT Chest No Cont (03.01.21 @ 09:44) >  Breathing motion artifact is present limiting evaluation.    Compared to recent CT chest 2/14/2021, significant decrease/improvement in bilateral opacities to near complete resolution and and resolution of pleural effusions. Bibasilar subsegmental atelectasis. Additional findings are unchanged in this short interval follow-up CT.        MICROBIOLOGY DATA:    COVID-19 PCR . (03.01.21 @ 16:49)   COVID-19 PCR: NotDetec:  Culture - Blood (02.27.21 @ 14:38)   Gram Stain: Growth in aerobic bottle: Yeast like cells   Specimen Source: .Blood None   Culture Results: Growth in aerobic bottle: Candida albicans   See previous culture [id number]       Culture - Blood (02.27.21 @ 14:38)   - Fluconazole: S 0.5 Fluconazole = Data established for mucosal disease, and is generally applicable for invasive disease. Susceptibility is dependent on achieving the maximal possible blood level. Doses of 400 MG/day or more may be required in adults with normalrenalfunction and body habitus.   Gram Stain:   Growth in aerobic bottle: Yeast like cells   - Candida albicans: Detec   - Interpretation: See note This test method is not approved by the FDA and is for research use only. The performance characteristics of this assay may have been determined by [employer] and [facility], [place], [place] N/I - No interpretation available SDD – Sensitive Dose Dependent   Specimen Source: .Blood None   Organism: Blood Culture PCR   Organism: Candida albicans   Culture Results: Growth in aerobic bottle: Candida albicans                Patient is seen and examined at the bed side, is afebrile now.       REVIEW OF SYSTEMS: All other review systems are negative      ALLERGIES: No Known Allergies      Vital Signs Last 24 Hrs  T(C): 36.4 (04 Mar 2021 17:49), Max: 38 (03 Mar 2021 21:00)  T(F): 97.5 (04 Mar 2021 17:49), Max: 100.4 (03 Mar 2021 21:00)  HR: 65 (04 Mar 2021 13:50) (64 - 70)  BP: 118/60 (04 Mar 2021 13:50) (118/60 - 184/74)  BP(mean): --  RR: 16 (04 Mar 2021 13:50) (16 - 18)  SpO2: --        PHYSICAL EXAM:  GENERAL: Not in distress   CHEST/LUNG: Not using accessory muscles  HEART: s1 and s2 present  ABDOMEN:  Nontender and  Nondistended  EXTREMITIES: No pedal  edema  CNS: Awake and Alert      LABS:                        10.9   5.90  )-----------( 368      ( 04 Mar 2021 09:10 )             34.6       03-04    140  |  105  |  13  ----------------------------<  92  4.1   |  27  |  1.2    Ca    8.9      04 Mar 2021 09:10    TPro  6.3  /  Alb  3.0<L>  /  TBili  0.2  /  DBili  x   /  AST  37  /  ALT  9   /  AlkPhos  136<H>  03-03        MEDICATIONS  (STANDING):  apixaban 5 milliGRAM(s) Oral every 12 hours  atorvastatin 40 milliGRAM(s) Oral at bedtime  carbidopa/levodopa  25/100 2 Tablet(s) Oral <User Schedule>  chlorhexidine 4% Liquid 1 Application(s) Topical <User Schedule>  chlorhexidine 4% Liquid 1 Application(s) Topical <User Schedule>  colchicine 0.6 milliGRAM(s) Oral daily  dextrose 5% + sodium chloride 0.9%. 1000 milliLiter(s) (75 mL/Hr) IV Continuous <Continuous>  enalapril 5 milliGRAM(s) Oral daily  fluconAZOLE IVPB      fluconAZOLE IVPB 400 milliGRAM(s) IV Intermittent every 24 hours  pantoprazole   Suspension 40 milliGRAM(s) Oral before breakfast  piperacillin/tazobactam IVPB.. 3.375 Gram(s) IV Intermittent every 8 hours  polyethylene glycol 3350 17 Gram(s) Oral daily  senna 2 Tablet(s) Oral at bedtime    MEDICATIONS  (PRN):  acetaminophen  Suppository .. 650 milliGRAM(s) Rectal every 6 hours PRN Temp greater or equal to 38C (100.4F)        RADIOLOGY & ADDITIONAL TESTS:    < from: CT Abdomen and Pelvis w/ IV Cont (03.03.21 @ 17:41) >  No acute abnormality in the abdomen and pelvis.    Mild splenomegaly measuring up to 14.2 cm in craniocaudal dimension, new since 9/25/2018. No focal splenic lesions.      < from: CT Chest No Cont (03.01.21 @ 09:44) >  Breathing motion artifact is present limiting evaluation.    Compared to recent CT chest 2/14/2021, significant decrease/improvement in bilateral opacities to near complete resolution and and resolution of pleural effusions. Bibasilar subsegmental atelectasis. Additional findings are unchanged in this short interval follow-up CT.        MICROBIOLOGY DATA:    COVID-19 PCR . (03.01.21 @ 16:49)   COVID-19 PCR: NotDetec:    Culture - Blood (02.27.21 @ 14:38)   Gram Stain: Growth in aerobic bottle: Yeast like cells   Specimen Source: .Blood None   Culture Results: Growth in aerobic bottle: Candida albicans   See previous culture [id number]       Culture - Blood (02.27.21 @ 14:38)   - Fluconazole: S 0.5 Fluconazole = Data established for mucosal disease, and is generally applicable for invasive disease. Susceptibility is dependent on achieving the maximal possible blood level. Doses of 400 MG/day or more may be required in adults with normalrenalfunction and body habitus.   Gram Stain:   Growth in aerobic bottle: Yeast like cells   - Candida albicans: Detec   - Interpretation: See note This test method is not approved by the FDA and is for research use only. The performance characteristics of this assay may have been determined by [employer] and [facility], [place], [place] N/I - No interpretation available SDD – Sensitive Dose Dependent   Specimen Source: .Blood None   Organism: Blood Culture PCR   Organism: Candida albicans   Culture Results: Growth in aerobic bottle: Candida albicans

## 2021-03-04 NOTE — PROGRESS NOTE ADULT - ASSESSMENT
71 year old Wolof speaking Male with past medical history of Parkinson disease, HLD, HTN, H/O DVT on Eliquis and Lasix, H/O COVID presents to ED with altered mental status for 2 days.       IMPRESSION  #Aspiration pneumonia   #Fungemia  - Blood Cx 2/27 Candida albicans  - evaluated by optho - no ocular evidence     #Parkinson's disease  #Hx of DVT        would recommend:    1. Follow up repeat blood cultures to document clearing the blood stream  2. Continue fluconazole 400 mg daily   3. obtain TTE  4. Continue Zosyn for now  5. Aspiration precaution       Attending Attestation:   45 minutes spent on total encounter; more than 50% of the visit was spent counseling and/or coordinating care by the attending physician.   71 year old Yoruba speaking Male with past medical history of Parkinson disease, HLD, HTN, H/O DVT on Eliquis and Lasix, H/O COVID presents to ED with altered mental status for 2 days.       IMPRESSION  #Aspiration pneumonia   #Fungemia  - Blood Cx 2/27 Candida albicans  - evaluated by optho - no ocular evidence     #Parkinson's disease  #Hx of DVT    would recommend:    1. Follow up repeat blood cultures to document clearing the blood stream  2. Continue fluconazole 400 mg daily   3. obtain TTE  4. Continue Zosyn for now  5. Aspiration precaution       Attending Attestation:   45 minutes spent on total encounter; more than 50% of the visit was spent counseling and/or coordinating care by the attending physician.

## 2021-03-04 NOTE — PROGRESS NOTE ADULT - ATTENDING COMMENTS
pt with prolonged hospitalization     1) Aspiration PNA  -zosyn dose increased to 3.375g q 8 hrs as per ID   -ID following   -follow up chest xray    2) Chronic DVT  -on oral AC    3) Fungemia  -on Fluconazole  -monitor Blood cultures; two sets reordered     4)  Moderate Obesity   -BMI > 30  -dietary input for reccs    5) Nausea   -CT abdomen with IV contrast noted; no acute abnormality     6) HTN/DL  -home meds     overall prognosis poor     Attending Physician Dr. Padmini Cheatham # [phone] . pt with prolonged hospitalization; agree with above note unless otherwise stated     1) Aspiration PNA  -zosyn dose increased to 3.375g q 8 hrs as per ID   -ID following   -follow up chest xray    2) Chronic DVT  -on oral AC    3) Fungemia  -on Fluconazole  -monitor Blood cultures; two sets reordered     4)  Moderate Obesity   -BMI > 30  -dietary input for reccs    5) Nausea   -CT abdomen with IV contrast noted; no acute abnormality     6) HTN/DL  -home meds     overall prognosis poor     Attending Physician Dr. Padmini Cheatham # [phone] . pt with prolonged hospitalization; agree with above note unless otherwise stated     1) Aspiration PNA  -zosyn dose increased to 3.375g q 8 hrs as per ID   -ID following   -follow up chest xray    2) Chronic DVT  -on oral AC    3) Fungemia  -on Fluconazole  -monitor Blood cultures; two sets reordered     4)  Moderate Obesity   -BMI > 30  -dietary input for reccs    5) Nausea   -CT abdomen with IV contrast noted; no acute abnormality     6) HTN/DL  -home meds     overall prognosis poor       # Progress Note Handoff  PENDING as follows  consults: ID follow up   Test: Cultures   Family discussion: discussed with patient's daughter Kelton at [phone]; updates given, aware of the poor prognosis and that patient needs inpatient care (repeat blood cultures pending)  Disposition: ACUTE; not ready    Attending Physician Dr. Padmini Cheatham # [phone] .

## 2021-03-05 LAB
ALBUMIN SERPL ELPH-MCNC: 3 G/DL — LOW (ref 3.5–5.2)
ALP SERPL-CCNC: 145 U/L — HIGH (ref 30–115)
ALT FLD-CCNC: 5 U/L — SIGNIFICANT CHANGE UP (ref 0–41)
ANION GAP SERPL CALC-SCNC: 10 MMOL/L — SIGNIFICANT CHANGE UP (ref 7–14)
AST SERPL-CCNC: 29 U/L — SIGNIFICANT CHANGE UP (ref 0–41)
BILIRUB SERPL-MCNC: 0.4 MG/DL — SIGNIFICANT CHANGE UP (ref 0.2–1.2)
BUN SERPL-MCNC: 14 MG/DL — SIGNIFICANT CHANGE UP (ref 10–20)
CALCIUM SERPL-MCNC: 8.9 MG/DL — SIGNIFICANT CHANGE UP (ref 8.5–10.1)
CHLORIDE SERPL-SCNC: 105 MMOL/L — SIGNIFICANT CHANGE UP (ref 98–110)
CO2 SERPL-SCNC: 25 MMOL/L — SIGNIFICANT CHANGE UP (ref 17–32)
CREAT SERPL-MCNC: 1.3 MG/DL — SIGNIFICANT CHANGE UP (ref 0.7–1.5)
GLUCOSE SERPL-MCNC: 97 MG/DL — SIGNIFICANT CHANGE UP (ref 70–99)
HCT VFR BLD CALC: 33 % — LOW (ref 42–52)
HGB BLD-MCNC: 10.2 G/DL — LOW (ref 14–18)
MAGNESIUM SERPL-MCNC: 2 MG/DL — SIGNIFICANT CHANGE UP (ref 1.8–2.4)
MCHC RBC-ENTMCNC: 25.4 PG — LOW (ref 27–31)
MCHC RBC-ENTMCNC: 30.9 G/DL — LOW (ref 32–37)
MCV RBC AUTO: 82.3 FL — SIGNIFICANT CHANGE UP (ref 80–94)
NRBC # BLD: 0 /100 WBCS — SIGNIFICANT CHANGE UP (ref 0–0)
PHOSPHATE SERPL-MCNC: 3.7 MG/DL — SIGNIFICANT CHANGE UP (ref 2.1–4.9)
PLATELET # BLD AUTO: 408 K/UL — HIGH (ref 130–400)
POTASSIUM SERPL-MCNC: 4.2 MMOL/L — SIGNIFICANT CHANGE UP (ref 3.5–5)
POTASSIUM SERPL-SCNC: 4.2 MMOL/L — SIGNIFICANT CHANGE UP (ref 3.5–5)
PROT SERPL-MCNC: 6.6 G/DL — SIGNIFICANT CHANGE UP (ref 6–8)
RBC # BLD: 4.01 M/UL — LOW (ref 4.7–6.1)
RBC # FLD: 15.6 % — HIGH (ref 11.5–14.5)
SODIUM SERPL-SCNC: 140 MMOL/L — SIGNIFICANT CHANGE UP (ref 135–146)
WBC # BLD: 6.74 K/UL — SIGNIFICANT CHANGE UP (ref 4.8–10.8)
WBC # FLD AUTO: 6.74 K/UL — SIGNIFICANT CHANGE UP (ref 4.8–10.8)

## 2021-03-05 PROCEDURE — 71045 X-RAY EXAM CHEST 1 VIEW: CPT | Mod: 26

## 2021-03-05 PROCEDURE — 99233 SBSQ HOSP IP/OBS HIGH 50: CPT

## 2021-03-05 RX ADMIN — PIPERACILLIN AND TAZOBACTAM 25 GRAM(S): 4; .5 INJECTION, POWDER, LYOPHILIZED, FOR SOLUTION INTRAVENOUS at 15:14

## 2021-03-05 RX ADMIN — CHLORHEXIDINE GLUCONATE 1 APPLICATION(S): 213 SOLUTION TOPICAL at 05:35

## 2021-03-05 RX ADMIN — SENNA PLUS 2 TABLET(S): 8.6 TABLET ORAL at 21:49

## 2021-03-05 RX ADMIN — FLUCONAZOLE 100 MILLIGRAM(S): 150 TABLET ORAL at 11:28

## 2021-03-05 RX ADMIN — CARBIDOPA AND LEVODOPA 2 TABLET(S): 25; 100 TABLET ORAL at 10:52

## 2021-03-05 RX ADMIN — Medication 0.6 MILLIGRAM(S): at 11:28

## 2021-03-05 RX ADMIN — APIXABAN 5 MILLIGRAM(S): 2.5 TABLET, FILM COATED ORAL at 17:44

## 2021-03-05 RX ADMIN — CARBIDOPA AND LEVODOPA 2 TABLET(S): 25; 100 TABLET ORAL at 05:34

## 2021-03-05 RX ADMIN — Medication 650 MILLIGRAM(S): at 05:43

## 2021-03-05 RX ADMIN — PIPERACILLIN AND TAZOBACTAM 25 GRAM(S): 4; .5 INJECTION, POWDER, LYOPHILIZED, FOR SOLUTION INTRAVENOUS at 05:32

## 2021-03-05 RX ADMIN — APIXABAN 5 MILLIGRAM(S): 2.5 TABLET, FILM COATED ORAL at 05:35

## 2021-03-05 RX ADMIN — CARBIDOPA AND LEVODOPA 2 TABLET(S): 25; 100 TABLET ORAL at 15:13

## 2021-03-05 RX ADMIN — CHLORHEXIDINE GLUCONATE 1 APPLICATION(S): 213 SOLUTION TOPICAL at 05:42

## 2021-03-05 RX ADMIN — Medication 650 MILLIGRAM(S): at 05:38

## 2021-03-05 RX ADMIN — CARBIDOPA AND LEVODOPA 2 TABLET(S): 25; 100 TABLET ORAL at 21:49

## 2021-03-05 RX ADMIN — ATORVASTATIN CALCIUM 40 MILLIGRAM(S): 80 TABLET, FILM COATED ORAL at 21:49

## 2021-03-05 RX ADMIN — PANTOPRAZOLE SODIUM 40 MILLIGRAM(S): 20 TABLET, DELAYED RELEASE ORAL at 05:38

## 2021-03-05 RX ADMIN — SODIUM CHLORIDE 75 MILLILITER(S): 9 INJECTION, SOLUTION INTRAVENOUS at 05:33

## 2021-03-05 RX ADMIN — CARBIDOPA AND LEVODOPA 2 TABLET(S): 25; 100 TABLET ORAL at 17:44

## 2021-03-05 RX ADMIN — Medication 5 MILLIGRAM(S): at 05:35

## 2021-03-05 RX ADMIN — PIPERACILLIN AND TAZOBACTAM 25 GRAM(S): 4; .5 INJECTION, POWDER, LYOPHILIZED, FOR SOLUTION INTRAVENOUS at 21:49

## 2021-03-05 NOTE — PROGRESS NOTE ADULT - ASSESSMENT
ASSESSMENT  71 year old Hebrew speaking Male with past medical history of Parkinson disease, HLD, HTN, H/O DVT on Eliquis and Lasix, H/O COVID presents to ED with altered mental status for 2 days.       IMPRESSION  #Aspiration pneumonia   #Fungemia  - Blood Cx 2/27 Candida albicans  - Blood Cx 3/3 NG  - evaluated by optho - no ocular evidence   - CT Abdomen and Pelvis w/ IV Cont (03.03.21 @ 17:41): No acute abnormality in the abdomen and pelvis. Mild splenomegaly measuring up to 14.2 cm in craniocaudal dimension, new since 9/25/2018. No focal splenic lesions. See incidental findings above.  - TTE Echo Complete w/o Contrast w/ Doppler (02.13.21 @ 10:33): no MV regurg, mild TR     #Parkinson's disease  #HLD  #HTN  #Hx of DVT  #Obesity BMI (kg/m2): 30.9  #Abx allergy: NKDA    Creatinine, Serum: 1.0 mg/dL (03.03.21 @ 06:59)      RECOMMENDATIONS  - unclear source of fungemia, possible PIV related?   - follow-up blood cx from 3/4 -- repeat blood cx again if febrile  - given persistent fevers, would consult general surgery for evaluation of sacral wound and any need for debridement -- has small stage III sacral ulcer with some slough in wound base, but limited exam   - repeat UA and Urine Cultures  - continue fluconazole 400 mg daily and zosyn for now  - repeat procal    Please call or message on [employer] if with any questions.  Spectra [phone]   ASSESSMENT  71 year old Polish speaking Male with past medical history of Parkinson disease, HLD, HTN, H/O DVT on Eliquis and Lasix, H/O COVID presents to ED with altered mental status for 2 days.       IMPRESSION  #Aspiration pneumonia   #Fungemia  - Blood Cx 2/27 Candida albicans  - Blood Cx 3/3 NG  - evaluated by optho - no ocular evidence   - CT Abdomen and Pelvis w/ IV Cont (03.03.21 @ 17:41): No acute abnormality in the abdomen and pelvis. Mild splenomegaly measuring up to 14.2 cm in craniocaudal dimension, new since 9/25/2018. No focal splenic lesions. See incidental findings above.  - TTE Echo Complete w/o Contrast w/ Doppler (02.13.21 @ 10:33): no MV regurg, mild TR     #Parkinson's disease  #HLD  #HTN  #Hx of DVT  #Obesity BMI (kg/m2): 30.9  #Abx allergy: NKDA    Creatinine, Serum: 1.0 mg/dL (03.03.21 @ 06:59)    RECOMMENDATIONS  - unclear source of fungemia, possible PIV related?   - follow-up TTE  - follow-up blood cx from 3/4 -- repeat blood cx again if febrile  - given persistent fevers, would consult general surgery for evaluation of sacral wound and any need for debridement -- has small stage III sacral ulcer with some slough in wound base, but limited exam today   - repeat UA and Urine Cultures  - continue fluconazole 400 mg daily and zosyn for now  - repeat procal    Please call or message on [employer] Teams if with any questions.  Spectra [phone]

## 2021-03-05 NOTE — CHART NOTE - NSCHARTNOTEFT_GEN_A_CORE
Registered Dietitian Follow-Up     Patient Profile Reviewed                           Yes [x]   No []     Nutrition History Previously Obtained        Yes [x]  No []       Pertinent Subjective Information: This note was charted remotely. Pt. noted with fair PO intake at this time.      Pertinent Medical Interventions: Aspiration Pneumonia: ID following, medicated. Fungemia: medicated. Dysphagia SLP following. Parkinson's dx: neuro following. ABEBE resolving.        Diet order: dysphagia 1 pureed nectar consistency fluid, Prosource gelatein BID, Ensure pudding TID     Anthropometrics:  - Ht. 170cm  - Wt.  82.6kg on 2/21   - %wt change  - BMI 30.9  - IBW 67.1kg     Pertinent Lab Data: (3/5) RBC 4.01, Hg 10.2, Hct 33.0, alk phos 145, eGFR 55     Pertinent Meds: Sinemet, Protonix, Miralax, Senna     Physical Findings:  - Appearance: confused, no edema noted  - GI function: no symptoms noted, last BM 3/4  - Tubes: none noted  - Oral/Mouth cavity: no symptoms noted  - Skin: DTI to R heel, pressure ulcer stg III to coccyx     Nutrition Requirements (from RD note on 3/2)   Weight Used: ideal 67.1kg     Estimated Energy Needs    Continue [x]  Adjust []  2013-2348kcal (30-35kcal/kg IBW) -- PU considered, bmi >30  Adjusted Energy Recommendations:   kcal/day        Estimated Protein Needs    Continue [x]  Adjust [] 81-101g (1.2-1.5g/kg IBW) -- PU considered, bmi >30  Adjusted Protein Recommendations:   gm/day        Estimated Fluid Needs        Continue []  Adjust [] per LIP  Adjusted Fluid Recommendations:   mL/day     Nutrient Intake: ~50% PO at meals        [] Previous Nutrition Diagnosis:  Inadequate Oral Intake, Increased nutrient needs- ongoing               Nutrition Intervention: meals and snacks, medical food supplement, coordination of care, vitamin and mineral supplement, nutrition related medication    Rec: Continue current diet and supplement order. Continue to encourage pt. during meals. Add MVI daily. Consider appetite stimulant.     Goal/Expected Outcome: In 4 days pt. to consistently consume at least 50-75% PO at meals      Indicator/Monitoring: diet order, energy intake, body composition, NFPF, glucose profile no flank pain R/no bladder infections/no flank pain L/no hematuria/no dysuria/normal urinary frequency

## 2021-03-05 NOTE — PROGRESS NOTE ADULT - SUBJECTIVE AND OBJECTIVE BOX
NIEVES LABOY  71y, Male  Allergy: No Known Allergies      LOS  21d    CHIEF COMPLAINT: Altered Mental Status (04 Mar 2021 18:47)      INTERVAL EVENTS/HPI  - No acute events overnight  - T(F): , Max: 100.5 (03-05-21 @ 05:04)  - remains febrile, spikes intermittent fevers  - interviewed with daughter -- patient denies any abdominal pain, nausae, vomiting, headaches, sinus congestion   - Tolerating medication  - WBC Count: 6.74 (03-05-21 @ 07:21)  WBC Count: 5.90 (03-04-21 @ 09:10)     - Creatinine, Serum: 1.3 (03-05-21 @ 07:21)  Creatinine, Serum: 1.2 (03-04-21 @ 09:10)       ROS  General: Denies rigors, nightsweats  HEENT: Denies headache, rhinorrhea, sore throat, eye pain  CV: Denies CP, palpitations  PULM: Denies wheezing, hemoptysis  GI: Denies hematemesis, hematochezia, melena  : Denies discharge, hematuria  MSK: Denies arthralgias, myalgias  SKIN: Denies rash, lesions  NEURO: Denies paresthesias, weakness  PSYCH: Denies depression, anxiety    VITALS:  T(F): 97.1, Max: 100.5 (03-05-21 @ 05:04)  HR: 64  BP: 138/64  RR: 16Vital Signs Last 24 Hrs  T(C): 36.2 (05 Mar 2021 12:42), Max: 38.1 (05 Mar 2021 05:04)  T(F): 97.1 (05 Mar 2021 12:42), Max: 100.5 (05 Mar 2021 05:04)  HR: 64 (05 Mar 2021 12:42) (62 - 65)  BP: 138/64 (05 Mar 2021 12:42) (118/60 - 147/70)  BP(mean): --  RR: 16 (05 Mar 2021 12:42) (16 - 16)  SpO2: --    PHYSICAL EXAM:  Gen: NAD, resting in bed  HEENT: Normocephalic, atraumatic  Neck: supple, no lymphadenopathy  CV: Regular rate & regular rhythm  Lungs: decreased BS at bases, no fremitus  Abdomen: Soft, BS present  Ext: Warm, well perfused  Neuro: non focal, awake  Skin: no rash, no erythema  Lines: no phlebitis    FH: Non-contributory  Social Hx: Non-contributory    TESTS & MEASUREMENTS:                        10.2   6.74  )-----------( 408      ( 05 Mar 2021 07:21 )             33.0     03-05    140  |  105  |  14  ----------------------------<  97  4.2   |  25  |  1.3    Ca    8.9      05 Mar 2021 07:21  Phos  3.7     03-05  Mg     2.0     03-05    TPro  6.6  /  Alb  3.0<L>  /  TBili  0.4  /  DBili  x   /  AST  29  /  ALT  5   /  AlkPhos  145<H>  03-05    eGFR if Non African American: 55 mL/min/1.73M2 (03-05-21 @ 07:21)  eGFR if : 63 mL/min/1.73M2 (03-05-21 @ 07:21)    LIVER FUNCTIONS - ( 05 Mar 2021 07:21 )  Alb: 3.0 g/dL / Pro: 6.6 g/dL / ALK PHOS: 145 U/L / ALT: 5 U/L / AST: 29 U/L / GGT: x               Culture - Blood (collected 03-03-21 @ 16:27)  Source: .Blood None  Preliminary Report (03-04-21 @ 23:01):    No growth to date.    Culture - Blood (collected 02-27-21 @ 14:38)  Source: .Blood None  Gram Stain (03-01-21 @ 15:00):    Growth in aerobic bottle: Yeast like cells  Preliminary Report (03-02-21 @ 10:46):    Growth in aerobic bottle: Candida albicans    See previous culture [id number]    Culture - Blood (collected 02-27-21 @ 14:38)  Source: .Blood None  Gram Stain (03-01-21 @ 12:31):    Growth in aerobic bottle: Yeast like cells  Final Report (03-03-21 @ 13:57):    Growth in aerobic bottle: Candida albicans    ***Blood Panel PCR results on this specimen are available    approximately 3 hours after the Gram stain result.***    Gram stain, PCR, and/or culture results may not always    correspond due to difference in methodologies.    ************************************************************    This PCR assay was performed by multiplex PCR. This    Assay tests for 66 bacterial and resistance gene targets.    Please refer to the [facility] Hotlist test directory    at [email or web address] for details.    "Due to technical problems, Pseudomonas aeruginosa    until further notice".  Organism: Blood Culture PCR  Candida albicans (03-03-21 @ 13:57)  Organism: Candida albicans (03-03-21 @ 13:57)      -  Fluconazole: S 0.5 Fluconazole = Data established for mucosal disease, and is generally applicable for invasive disease.  Susceptibility is dependent on achieving the maximal possible blood level.  Doses of 400 MG/day or more may be required in adults with normalrenalfunction and body habitus.      -  Interpretation: See note This test method is not approved by the FDA and is for research use only.  The performance characteristics of this assay may have been determined by [facility] and [facility], [place], [place]                            N/I - No interpretation available                                                         SDD – Sensitive Dose Dependent      Method Type: YSTMIC  Organism: Blood Culture PCR (03-03-21 @ 13:57)      -  Candida albicans: Detec      Method Type: PCR    Culture - Blood (collected 02-13-21 @ 11:54)  Source: .Blood None  Final Report (02-18-21 @ 19:01):    No Growth Final    Culture - Urine (collected 02-13-21 @ 09:36)  Source: .Urine Kidney  Final Report (02-15-21 @ 08:40):    No growth at 48 hours    Culture - Urine (collected 02-12-21 @ 18:37)  Source: .Urine Clean Catch (Midstream)  Final Report (02-13-21 @ 22:43):    <10,000 CFU/mL Normal Urogenital Chelle            INFECTIOUS DISEASES TESTING  COVID-19 PCR: NotDetec (03-01-21 @ 16:49)  Fungitell: 62 (03-01-21 @ 11:00)  Procalcitonin, Serum: 0.29 (03-01-21 @ 05:32)  MRSA PCR Result.: Negative (02-27-21 @ 15:44)  COVID-19 PCR: NotDetec (02-25-21 @ 15:34)  Procalcitonin, Serum: 0.27 (02-18-21 @ 10:54)  MRSA PCR Result.: Negative (02-14-21 @ 18:29)  HIV-1/2 Combo Result: Nonreact (02-14-21 @ 05:07)  Procalcitonin, Serum: 0.46 (02-13-21 @ 16:00)  COVID-19 PCR: NotDetec (02-12-21 @ 10:17)      INFLAMMATORY MARKERS      RADIOLOGY & ADDITIONAL TESTS:  I have personally reviewed the last available Chest xray  CXR      CT  CT Abdomen and Pelvis w/ IV Cont:   EXAM:  CT ABDOMEN AND PELVIS IC            PROCEDURE DATE:  03/03/2021            INTERPRETATION:  CLINICAL STATEMENT: Source of fungemia    TECHNIQUE: Contiguous axial CT images were obtained from the lower chest to the pubic symphysis following administration of 100cc Optiray 320 intravenous contrast.  Oral contrast was not administered.  Reformatted images in the coronal and sagittal planes were acquired.    COMPARISON CT: CTA dissection study on 9/25/2018    OTHER STUDIES USED FOR CORRELATION: None.      FINDINGS:    LOWER CHEST: Minimal bibasilar atelectasis right greater than left.    HEPATOBILIARY: 2.1 cm posterior right hepatic lobe cyst. No calcified gallstones. The gallbladder is nondistended.    SPLEEN: Mild splenomegaly measuring up to 14.2 cm in craniocaudal dimension, new since 9/25/2018. No focal splenic lesions.    PANCREAS: Unremarkable.    ADRENAL GLANDS: Unremarkable.    KIDNEYS: Unremarkable.    ABDOMINOPELVIC NODES: Unremarkable.    PELVIC ORGANS: Unremarkable.    PERITONEUM/MESENTERY/BOWEL: No bowel obstruction. Ventral midline hernia at the level of the iliac wings containing nonobstructed small bowel.  Neck measures about 2.4 cm in width. No free fluid or free air.    BONES/SOFT TISSUES: Multilevel degenerative changes of the spine.    OTHER: Presacral edema. No erosions or periosteal reaction within the sacrum.      IMPRESSION:    No acute abnormality in the abdomen and pelvis.    Mild splenomegaly measuring up to 14.2 cm in craniocaudal dimension, new since 9/25/2018. No focal splenic lesions.    See incidental findings above.              MOISES GUADALUPE MD; Attending Radiologist  This document has been electronically signed. Mar  4 2021  8:32AM (03-03-21 @ 17:41)      CARDIOLOGY TESTING      MEDICATIONS  apixaban 5 Oral every 12 hours  atorvastatin 40 Oral at bedtime  carbidopa/levodopa  25/100 2 Oral <User Schedule>  chlorhexidine 4% Liquid 1 Topical <User Schedule>  chlorhexidine 4% Liquid 1 Topical <User Schedule>  colchicine 0.6 Oral daily  dextrose 5% + sodium chloride 0.9%. 1000 IV Continuous <Continuous>  enalapril 5 Oral daily  fluconAZOLE IVPB     fluconAZOLE IVPB 400 IV Intermittent every 24 hours  pantoprazole   Suspension 40 Oral before breakfast  piperacillin/tazobactam IVPB.. 3.375 IV Intermittent every 8 hours  polyethylene glycol 3350 17 Oral daily  senna 2 Oral at bedtime      WEIGHT  Weight (kg): 89.4 (02-15-21 @ 13:59)  Creatinine, Serum: 1.3 mg/dL (03-05-21 @ 07:21)      ANTIBIOTICS:  fluconAZOLE IVPB      fluconAZOLE IVPB 400 milliGRAM(s) IV Intermittent every 24 hours  piperacillin/tazobactam IVPB.. 3.375 Gram(s) IV Intermittent every 8 hours      All available historical records have been reviewed

## 2021-03-05 NOTE — PROGRESS NOTE ADULT - SUBJECTIVE AND OBJECTIVE BOX
71 year old Sami speaking Male with past medical history of Parkinson disease, HLD, HTN, H/O DVT on Eliquis and Lasix, H/O COVID presents to ED with altered mental status for 2 days.    Today:  -pt seen and examined at bedside   -fevers noted; continue with current abx regimen  -prognosis poor  -spoke to daughter and updates given to her      REVIEW OF SYSTEMS:  Not a reliable historian      Allergies  No Known Allergies        FAMILY HISTORY:  Family history of cerebrovascular accident (CVA) (Father)        Vital Signs Last 24 Hrs  T(C): 36.2 (05 Mar 2021 12:42), Max: 38.1 (05 Mar 2021 05:04)  T(F): 97.1 (05 Mar 2021 12:42), Max: 100.5 (05 Mar 2021 05:04)  HR: 64 (05 Mar 2021 12:42) (62 - 65)  BP: 138/64 (05 Mar 2021 12:42) (118/60 - 147/70)  RR: 16 (05 Mar 2021 12:42) (16 - 16)    PHYSICAL EXAM:  GENERAL: ill-appearing  HEAD:  Atraumatic, Normocephalic  EYES: EOMI, PERRLA, conjunctiva and sclera clear  NERVOUS SYSTEM:  Awake not alert   CHEST/LUNG: decreased breath sounds at bases   HEART: Regular rate and rhythm; No murmurs, rubs, or gallops  ABDOMEN: Soft, Nontender, Nondistended; Bowel sounds present  EXTREMITIES:  2+ Peripheral Pulses, No clubbing, cyanosis, or edema  SKIN: No rashes or lesions    LABS:                           10.2   6.74  )-----------( 408      ( 05 Mar 2021 07:21 )             33.0   03-05    140  |  105  |  14  ----------------------------<  97  4.2   |  25  |  1.3    Ca    8.9      05 Mar 2021 07:21  Phos  3.7     03-05  Mg     2.0     03-05    TPro  6.6  /  Alb  3.0<L>  /  TBili  0.4  /  DBili  x   /  AST  29  /  ALT  5   /  AlkPhos  145<H>  03-05        Culture Results:   Growth in aerobic bottle: Candida albicans  ***Blood Panel PCR results on this specimen are available  approximately 3 hours after the Gram stain result.***  Gram stain, PCR, and/or culture results may not always  correspond due to difference in methodologies.  ************************************************************  This PCR assay was performed by multiplex PCR. This  Assay tests for 66 bacterial and resistance gene targets.  Please refer to the [facility] test directory  at [email or web address] for details.  "Due to technical problems, Pseudomonas aeruginosa    until further notice". (02-27 @ 14:38)  Culture Results:   Growth in aerobic bottle: Candida albicans  See previous culture [id number] (02-27 @ 14:38)  Culture Results:   No Growth Final (02-13 @ 11:54)  Culture Results:   No growth at 48 hours (02-13 @ 09:36)  Culture Results:   <10,000 CFU/mL Normal Urogenital Chelle (02-12 @ 18:37)      RADIOLOGY:  < from: CT Abdomen and Pelvis w/ IV Cont (03.03.21 @ 17:41) >  IMPRESSION:    No acute abnormality in the abdomen and pelvis.    Mild splenomegaly measuring up to 14.2 cm in craniocaudal dimension, new since 9/25/2018. No focal splenic lesions.    See incidental findings above.        MOISES GUADALUPE MD; Attending Radiologist  This document has been electronically signed. Mar  4 2021  8:32AM    < end of copied text >    ECHO NOT DONE (ORDERED MULTIPLE TIMES)    MEDICATIONS  (STANDING):  apixaban 5 milliGRAM(s) Oral every 12 hours  atorvastatin 40 milliGRAM(s) Oral at bedtime  carbidopa/levodopa  25/100 2 Tablet(s) Oral <User Schedule>  chlorhexidine 4% Liquid 1 Application(s) Topical <User Schedule>  chlorhexidine 4% Liquid 1 Application(s) Topical <User Schedule>  colchicine 0.6 milliGRAM(s) Oral daily  dextrose 5% + sodium chloride 0.9%. 1000 milliLiter(s) (75 mL/Hr) IV Continuous <Continuous>  enalapril 5 milliGRAM(s) Oral daily  fluconAZOLE IVPB      fluconAZOLE IVPB 400 milliGRAM(s) IV Intermittent every 24 hours  pantoprazole   Suspension 40 milliGRAM(s) Oral before breakfast  piperacillin/tazobactam IVPB.. 3.375 Gram(s) IV Intermittent every 8 hours  polyethylene glycol 3350 17 Gram(s) Oral daily  senna 2 Tablet(s) Oral at bedtime    MEDICATIONS  (PRN):  acetaminophen  Suppository .. 650 milliGRAM(s) Rectal every 6 hours PRN Temp greater or equal to 38C (100.4F)

## 2021-03-05 NOTE — PROGRESS NOTE ADULT - ASSESSMENT
Aspiration Pneumonia:  -Patient was to be discharged 2/27/21, spiked fevers in AM  -CXR shows LLL opacity   -Repeat COVID neg  -Highly likely asp PNA given recent history of aspiration and advancing diet  -Started Vanco and Brady, patient spiked fevers after starting; MRSA nares neg, switched to Zosyn 2/28 renally dosed  -O2 via NC  -Speech and swallow follow up-diet decreased to Dysphagia 1    Fungemia:  -Blood Cx grew yeast in both bottles  -patient had been persistently febrile on broad spectrum abx  -Continue with Fluconazole IV and Zosyn   -Apprec Ophthalmology, fup ID consults  -TTE ordered multiple times and not done; recalled again today  - CT scan Abd neg  - repeat blood cx negative for 03/03/21    Dysphagia  -Swallow eval appreciated, now on Dysphagia 1  -Had to change to 1 from 2; likely more lethargic from active infection  -Speech and swallow to cont to follow up    Septic Shock (initially on admission)  Resolved  Patient presented with AMS  UA: Pyuria, Hematuria  CXR: RLL opacity  Likely delirium secondary to Sepsis secondary to Urosepsis or Pneumonia  Off Levophed and Dopamine  D/C'd Cefepime after 5 days      Parkinsons  -Neuro consult appreciated  -Can DC dopamine agonist  -Increased Sinemet    Left shoulder dislocation  -Reviewed imaging (CT, XR)  -Ortho consult appreciated, no need for intervention    Bradycardia with First Degree AV Block  Patient was initially hypertensive with Bradycardia and later went to [facility]   Was on Dopamine   EPS eval recommended no PPM for now  Monitor vitals   Echo: Unremarkable with mild MR and TR  EKG: NSR with undetermined age of infarct    ABEBE on suspected CKD stage III  Resolved  -monitor kidney function while on current abx     Chronic DVT  On Eliquis    HTN  Hold off antihypertensive  Monitor BP  DASH Diet       # Progress Note Handoff  PENDING as follows  consults: ID follow up today   Test: ECHO pending  Family discussion: discussed with patient's daughter Kelton and updates given; aware of the overall poor prognosis   Disposition: not ready    Attending Physician Dr. Padmini Cheatham # [id number]

## 2021-03-06 LAB
ANION GAP SERPL CALC-SCNC: 6 MMOL/L — LOW (ref 7–14)
BUN SERPL-MCNC: 12 MG/DL — SIGNIFICANT CHANGE UP (ref 10–20)
CALCIUM SERPL-MCNC: 8.8 MG/DL — SIGNIFICANT CHANGE UP (ref 8.5–10.1)
CHLORIDE SERPL-SCNC: 104 MMOL/L — SIGNIFICANT CHANGE UP (ref 98–110)
CO2 SERPL-SCNC: 29 MMOL/L — SIGNIFICANT CHANGE UP (ref 17–32)
CREAT SERPL-MCNC: 1.2 MG/DL — SIGNIFICANT CHANGE UP (ref 0.7–1.5)
CULTURE RESULTS: NO GROWTH — SIGNIFICANT CHANGE UP
CULTURE RESULTS: SIGNIFICANT CHANGE UP
GLUCOSE SERPL-MCNC: 97 MG/DL — SIGNIFICANT CHANGE UP (ref 70–99)
HCT VFR BLD CALC: 35 % — LOW (ref 42–52)
HGB BLD-MCNC: 10.7 G/DL — LOW (ref 14–18)
MCHC RBC-ENTMCNC: 25.1 PG — LOW (ref 27–31)
MCHC RBC-ENTMCNC: 30.6 G/DL — LOW (ref 32–37)
MCV RBC AUTO: 82.2 FL — SIGNIFICANT CHANGE UP (ref 80–94)
NRBC # BLD: 0 /100 WBCS — SIGNIFICANT CHANGE UP (ref 0–0)
PLATELET # BLD AUTO: 394 K/UL — SIGNIFICANT CHANGE UP (ref 130–400)
POTASSIUM SERPL-MCNC: 4 MMOL/L — SIGNIFICANT CHANGE UP (ref 3.5–5)
POTASSIUM SERPL-SCNC: 4 MMOL/L — SIGNIFICANT CHANGE UP (ref 3.5–5)
RBC # BLD: 4.26 M/UL — LOW (ref 4.7–6.1)
RBC # FLD: 15.5 % — HIGH (ref 11.5–14.5)
SODIUM SERPL-SCNC: 139 MMOL/L — SIGNIFICANT CHANGE UP (ref 135–146)
SPECIMEN SOURCE: SIGNIFICANT CHANGE UP
SPECIMEN SOURCE: SIGNIFICANT CHANGE UP
WBC # BLD: 6.75 K/UL — SIGNIFICANT CHANGE UP (ref 4.8–10.8)
WBC # FLD AUTO: 6.75 K/UL — SIGNIFICANT CHANGE UP (ref 4.8–10.8)

## 2021-03-06 PROCEDURE — 99233 SBSQ HOSP IP/OBS HIGH 50: CPT

## 2021-03-06 PROCEDURE — 99222 1ST HOSP IP/OBS MODERATE 55: CPT

## 2021-03-06 RX ADMIN — ATORVASTATIN CALCIUM 40 MILLIGRAM(S): 80 TABLET, FILM COATED ORAL at 21:24

## 2021-03-06 RX ADMIN — CHLORHEXIDINE GLUCONATE 1 APPLICATION(S): 213 SOLUTION TOPICAL at 05:04

## 2021-03-06 RX ADMIN — Medication 5 MILLIGRAM(S): at 08:46

## 2021-03-06 RX ADMIN — PIPERACILLIN AND TAZOBACTAM 25 GRAM(S): 4; .5 INJECTION, POWDER, LYOPHILIZED, FOR SOLUTION INTRAVENOUS at 21:24

## 2021-03-06 RX ADMIN — CARBIDOPA AND LEVODOPA 2 TABLET(S): 25; 100 TABLET ORAL at 09:04

## 2021-03-06 RX ADMIN — Medication 0.6 MILLIGRAM(S): at 13:47

## 2021-03-06 RX ADMIN — CARBIDOPA AND LEVODOPA 2 TABLET(S): 25; 100 TABLET ORAL at 21:24

## 2021-03-06 RX ADMIN — SENNA PLUS 2 TABLET(S): 8.6 TABLET ORAL at 21:24

## 2021-03-06 RX ADMIN — APIXABAN 5 MILLIGRAM(S): 2.5 TABLET, FILM COATED ORAL at 18:09

## 2021-03-06 RX ADMIN — CARBIDOPA AND LEVODOPA 2 TABLET(S): 25; 100 TABLET ORAL at 18:09

## 2021-03-06 RX ADMIN — Medication 650 MILLIGRAM(S): at 08:50

## 2021-03-06 RX ADMIN — PIPERACILLIN AND TAZOBACTAM 25 GRAM(S): 4; .5 INJECTION, POWDER, LYOPHILIZED, FOR SOLUTION INTRAVENOUS at 13:48

## 2021-03-06 RX ADMIN — Medication 650 MILLIGRAM(S): at 10:36

## 2021-03-06 RX ADMIN — CARBIDOPA AND LEVODOPA 2 TABLET(S): 25; 100 TABLET ORAL at 13:47

## 2021-03-06 RX ADMIN — PANTOPRAZOLE SODIUM 40 MILLIGRAM(S): 20 TABLET, DELAYED RELEASE ORAL at 08:46

## 2021-03-06 RX ADMIN — APIXABAN 5 MILLIGRAM(S): 2.5 TABLET, FILM COATED ORAL at 08:46

## 2021-03-06 RX ADMIN — PIPERACILLIN AND TAZOBACTAM 25 GRAM(S): 4; .5 INJECTION, POWDER, LYOPHILIZED, FOR SOLUTION INTRAVENOUS at 05:04

## 2021-03-06 RX ADMIN — FLUCONAZOLE 100 MILLIGRAM(S): 150 TABLET ORAL at 13:47

## 2021-03-06 NOTE — CONSULT NOTE ADULT - PROVIDER SPECIALTY LIST ADULT
Infectious Disease
Heme/Onc
Electrophysiology
CCU
Nephrology
Neurology
Neurology
Ophthalmology
Orthopedics
Pulmonology
Surgery
Infectious Disease
Physiatry

## 2021-03-06 NOTE — PROGRESS NOTE ADULT - SUBJECTIVE AND OBJECTIVE BOX
71 year old Frisian speaking Male with past medical history of Parkinson disease, HLD, HTN, H/O DVT on Eliquis and Lasix, H/O COVID presents to ED with altered mental status for 2 days.    Today:  -pt seen and examined at bedside   -pt needs surgery eval for sacral wound assessment as pt still with fevers (reccs as per ID)  -continue with current zosyn and fluconazole  -skin care as per nursing and frequent turning q 2 hrs   -daughter aware of the overall poor prognosis and the need for inpatient monitoring       REVIEW OF SYSTEMS:  Not a reliable historian      Allergies  No Known Allergies    FAMILY HISTORY:  Family history of cerebrovascular accident (CVA) (Father)    Vital Signs Last 24 Hrs  T(C): 37.4 (06 Mar 2021 05:15), Max: 37.4 (06 Mar 2021 05:15)  T(F): 99.3 (06 Mar 2021 05:15), Max: 99.3 (06 Mar 2021 05:15)  HR: 66 (06 Mar 2021 05:15) (66 - 66)  BP: 145/67 (06 Mar 2021 05:15) (136/83 - 145/67)  RR: 16 (06 Mar 2021 05:15) (16 - 16)      PHYSICAL EXAM:  GENERAL: ill-appearing  HEAD:  Atraumatic, Normocephalic  EYES: EOMI, PERRLA, conjunctiva and sclera clear  NERVOUS SYSTEM:  Awake not alert   CHEST/LUNG: decreased breath sounds at bases   HEART: Regular rate and rhythm; No murmurs, rubs, or gallops  ABDOMEN: Soft, Nontender, Nondistended; Bowel sounds present  EXTREMITIES:  2+ Peripheral Pulses, No clubbing, cyanosis, or edema  SKIN: No rashes or lesions    LABS:                         10.7   6.75  )-----------( 394      ( 06 Mar 2021 08:02 )             35.0   03-06    139  |  104  |  12  ----------------------------<  97  4.0   |  29  |  1.2    Ca    8.8      06 Mar 2021 08:02  Phos  3.7     03-05  Mg     2.0     03-05    TPro  6.6  /  Alb  3.0<L>  /  TBili  0.4  /  DBili  x   /  AST  29  /  ALT  5   /  AlkPhos  145<H>  03-05    Culture Results:   Growth in aerobic bottle: Candida albicans  ***Blood Panel PCR results on this specimen are available  approximately 3 hours after the Gram stain result.***  Gram stain, PCR, and/or culture results may not always  correspond due to difference in methodologies.  ************************************************************  This PCR assay was performed by multiplex PCR. This  Assay tests for 66 bacterial and resistance gene targets.  Please refer to the [facility] test directory  at [email or web address] for details.  "Due to technical problems, Pseudomonas aeruginosa    until further notice". (02-27 @ 14:38)  Culture Results:   Growth in aerobic bottle: Candida albicans  See previous culture [id number] (02-27 @ 14:38)  Culture Results:   No Growth Final (02-13 @ 11:54)  Culture Results:   No growth at 48 hours (02-13 @ 09:36)  Culture Results:   <10,000 CFU/mL Normal Urogenital Chelle (02-12 @ 18:37)      RADIOLOGY:  < from: CT Abdomen and Pelvis w/ IV Cont (03.03.21 @ 17:41) >  IMPRESSION:    No acute abnormality in the abdomen and pelvis.    Mild splenomegaly measuring up to 14.2 cm in craniocaudal dimension, new since 9/25/2018. No focal splenic lesions.    See incidental findings above.        MOISES GUADALUPE MD; Attending Radiologist  This document has been electronically signed. Mar  4 2021  8:32AM    < end of copied text >    ECHO NOT DONE (ORDERED MULTIPLE TIMES)    MEDICATIONS  (STANDING):  apixaban 5 milliGRAM(s) Oral every 12 hours  atorvastatin 40 milliGRAM(s) Oral at bedtime  carbidopa/levodopa  25/100 2 Tablet(s) Oral <User Schedule>  chlorhexidine 4% Liquid 1 Application(s) Topical <User Schedule>  chlorhexidine 4% Liquid 1 Application(s) Topical <User Schedule>  colchicine 0.6 milliGRAM(s) Oral daily  dextrose 5% + sodium chloride 0.9%. 1000 milliLiter(s) (75 mL/Hr) IV Continuous <Continuous>  enalapril 5 milliGRAM(s) Oral daily  fluconAZOLE IVPB      fluconAZOLE IVPB 400 milliGRAM(s) IV Intermittent every 24 hours  pantoprazole   Suspension 40 milliGRAM(s) Oral before breakfast  piperacillin/tazobactam IVPB.. 3.375 Gram(s) IV Intermittent every 8 hours  polyethylene glycol 3350 17 Gram(s) Oral daily  senna 2 Tablet(s) Oral at bedtime    MEDICATIONS  (PRN):  acetaminophen  Suppository .. 650 milliGRAM(s) Rectal every 6 hours PRN Temp greater or equal to 38C (100.4F)

## 2021-03-06 NOTE — PROGRESS NOTE ADULT - ASSESSMENT
Aspiration Pneumonia:  -Patient was to be discharged 2/27/21, persistent fevers   -CXR shows LLL opacity   -Repeat COVID neg  -Highly likely asp PNA given recent history of aspiration and advancing diet  -Started Vanco and Brady, patient spiked fevers after starting; MRSA nares neg, switched to Zosyn 2/28 renally dosed  -O2 via NC  -Speech and swallow follow up-diet decreased to Dysphagia 1  -surgery eval for sacral wound assessment (ruling out possible cause of persistent fevers despite broad spectrum IV abx)    Fungemia:  -Blood Cx grew yeast in both bottles  -patient had been persistently febrile on broad spectrum abx  -Continue with Fluconazole IV and Zosyn   -Apprec Ophthalmology, fup ID consults  -TTE ordered multiple times and not done; recalled multiple times   - CT scan Abd neg  - repeat blood cx negative for 03/03/21    Dysphagia  -Swallow eval appreciated, now on Dysphagia 1  -Had to change to 1 from 2; likely more lethargic from active infection  -Speech and swallow to cont to follow up    Parkinsons  -Neuro consult appreciated  -Can DC dopamine agonist  -Increased Sinemet    Left shoulder dislocation  -Reviewed imaging (CT, XR)  -Ortho consult appreciated, no need for intervention    Bradycardia with First Degree AV Block  Patient was initially hypertensive with Bradycardia and later went to [facility]   Was on Dopamine   EPS eval recommended no PPM for now  Monitor vitals   Echo: Unremarkable with mild MR and TR  EKG: NSR with undetermined age of infarct    ABEBE on suspected CKD stage III  Resolved  -monitor kidney function while on current abx     Chronic DVT  On Eliquis    HTN  Hold off antihypertensive  Monitor BP  DASH Diet       # Progress Note Handoff  PENDING as follows  consults: ID follow up today   Test: ECHO pending  Family discussion: discussed with patient's daughter Kelton and updates given; aware of the overall poor prognosis   Disposition: not ready    Attending Physician Dr. Padmini Cheatham # [id number]

## 2021-03-06 NOTE — CONSULT NOTE ADULT - ATTENDING COMMENTS
Pt. seen/ examined  Exam limited by mental status  Able to move L hand  Significant edema noted  Ring removed from L ring finger, given to nurse VERONIKA Paredes to be secured  XR/ CT chest reviewed  Previously, CT/ XR shoulder were ordered, discussed w/ Dr. Kerns, will follow  Appears that dislocation may be chronic, no reduction attepted  Depending on CT results may refer to a shoulder specialist for an open reduction
I have personally seen and examined this patient.  I have fully participated in the care of this patient.  I have reviewed all pertinent clinical information, including history, physical exam, plan and note.   I have reviewed all pertinent clinical information and reviewed all relevant imaging and diagnostic studies personally.  Recommendations as above.  Agree with above assessment except as noted.
Patient examined on 21 and examination findings compatible with parkinson disease.  His outpatient record was reviewed and the entacapone dose was being discontinued due to hallucinations.  Additionally the levodopa-carbidopa dose had been reduced for similar reasons.  Outpatient dosage seemed to be:  6 am 25/250, 10 am 25/100, 2 pm 25/250, 6 pm 25/250 and 10 pm. 25/100 (850 mg levodopa total). Plans were for further reduction depending on mental status.    One option for restarting would be to have script sent to outpatient pharmacy for orally disintegrating (ODT) carbidopa levodopa 25/100 and give the followinam: two 25/100 tabs, 10 am one 25/100 tablet, 2 pm two 25/100 tabs, 6 pm two 25/100 tabs, and 10 p.m. one 25/100 tablet.  This would achieve total daily levodopa dose of 800 mg.  Nursing would need to ensure disintegration of medication in mouth prior to placing any positive pressure mask.
The patient was seen. Agree with above.   71y Male with h/o Parkinson disease, HTN, HLD, gout, psoriasis, DVT on Eliquis was brought in for altered mental status, lethargy, found to be bradycardic to HR 38-42 with 1st degree AVB and hypotension.    On admission, CBC showed pancytopenia. There is no recent blood work results to compare. He has COVID19 infection recently. Pancytopenia could be related to post viral infection or ongoing sepsis.   Blood smear reviewed. There is no schistocytosis, no blasts. WBC and PLT are reduced. There is no PLT clumping.   Recommend supportive care. Monitor CBC daily.  Check B12, Folate, SPEP, JASS, LDH, Haptoglobin, Hep B and C panel.  Followup blood and urine Cx  Continue cefepime.
71M with PMH of Parkinson disease, HLD, HTN, H/O DVT on Eliquis, H/O COVID presents to ED with altered mental status.  During admission patient has been having fever of unknown origin.  Surgery called to evaluate sacral decubitus ulcer.    Patient seen and examined.  Patient appears comfortable in bed      Rectal - 3x3cm area of sacral decubitus ulcer with clean edges, no eschar, erythema or purulence.    Vitals labs and images reviewed    Recent CT without evidence of abscess or bone involvement from the sacral decubitus ulcer    Plan  - Sacral ulcer appears clear without signs of active infection or abscess  - no need for surgical debridement  - Antibiotics per ID  - local wound care  - surgery to sign off

## 2021-03-06 NOTE — CONSULT NOTE ADULT - CONSULT REQUESTED BY NAME
Dr. Barakat
medicine
Dr Gonzalez
ED provider
CCU
CCU team
Dr. Washington
Howard Sandhu
Team
cardiology
medicine
Dr. Husain
ed

## 2021-03-06 NOTE — CONSULT NOTE ADULT - CONSULT REQUESTED DATE/TIME
12-Feb-2021 17:19
13-Feb-2021 10:19
03-Mar-2021 08:00
03-Mar-2021 11:37
06-Mar-2021 15:52
12-Feb-2021 16:44
14-Feb-2021 08:00
14-Feb-2021 08:56
18-Feb-2021 21:35
03-Mar-2021 12:20
13-Feb-2021 12:47
14-Feb-2021 09:46
23-Feb-2021 09:28

## 2021-03-06 NOTE — CONSULT NOTE ADULT - SUBJECTIVE AND OBJECTIVE BOX
.  Patient seen & examined.  No acute events noted overnight.  Patient awake but confused and non-verbal.   Surgery called to evaluate sacral pressure ulcer.  Patient has been having fevers of unknown origin since admission and fever work has been unremarkable.    Infectious disease MD recommended to hospitalist should have surgery evaluation of pressure ulcer to see if could be causing the fevers.            I&O's Detail    05 Mar 2021 07:01  -  06 Mar 2021 07:00  --------------------------------------------------------  IN:  Total IN: 0 mL    OUT:    Intermittent Catheterization - Urethral (mL): 0 mL  Total OUT: 0 mL    Total NET: 0 mL          MEDICATIONS  (STANDING):  apixaban 5 milliGRAM(s) Oral every 12 hours  atorvastatin 40 milliGRAM(s) Oral at bedtime  carbidopa/levodopa  25/100 2 Tablet(s) Oral <User Schedule>  chlorhexidine 4% Liquid 1 Application(s) Topical <User Schedule>  chlorhexidine 4% Liquid 1 Application(s) Topical <User Schedule>  colchicine 0.6 milliGRAM(s) Oral daily  dextrose 5% + sodium chloride 0.9%. 1000 milliLiter(s) (75 mL/Hr) IV Continuous <Continuous>  enalapril 5 milliGRAM(s) Oral daily  fluconAZOLE IVPB      fluconAZOLE IVPB 400 milliGRAM(s) IV Intermittent every 24 hours  pantoprazole   Suspension 40 milliGRAM(s) Oral before breakfast  piperacillin/tazobactam IVPB.. 3.375 Gram(s) IV Intermittent every 8 hours  polyethylene glycol 3350 17 Gram(s) Oral daily  senna 2 Tablet(s) Oral at bedtime    MEDICATIONS  (PRN):  acetaminophen  Suppository .. 650 milliGRAM(s) Rectal every 6 hours PRN Temp greater or equal to 38C (100.4F)          Vital Signs Last 24 Hrs  T(C): 37.6 (06 Mar 2021 13:44), Max: 37.6 (06 Mar 2021 13:44)  T(F): 99.7 (06 Mar 2021 13:44), Max: 99.7 (06 Mar 2021 13:44)  HR: 64 (06 Mar 2021 13:44) (64 - 66)  BP: 126/58 (06 Mar 2021 13:44) (126/58 - 145/67)  RR: 16 (06 Mar 2021 13:44) (16 - 16)        Physical Exam:  General:  WD, obese, awake, alert but confused.    Sacrum:  Allevyn dressing in place.  Dressing removed and Triad barrier cream in place.  Pressure ulcer noted 2 cm round and tunnels in about 1 cm or so that is visible but unable to assess true depth.  No erythema, clean edges, no necrotic tissue, no granulation tissue, no discharge or bleeding, No foul odor.            LABS:                        10.7   6.75  )-----------( 394      ( 06 Mar 2021 08:02 )             35.0     03-06    139  |  104  |  12  ----------------------------<  97  4.0   |  29  |  1.2    Ca    8.8      06 Mar 2021 08:02  Phos  3.7     03-05  Mg     2.0     03-05    TPro  6.6  /  Alb  3.0<L>  /  TBili  0.4  /  DBili  x   /  AST  29  /  ALT  5   /  AlkPhos  145<H>  03-05          CT Abdomen and Pelvis w/ IV Cont (03.03.21 @ 17:41)   EXAM:  CT ABDOMEN AND PELVIS IC            PROCEDURE DATE:  03/03/2021        INTERPRETATION:  CLINICAL STATEMENT: Source of fungemia    TECHNIQUE: Contiguous axial CT images were obtained from the lower chest to the pubic symphysis following administration of 100cc Optiray 320 intravenous contrast.  Oral contrast was not administered.  Reformatted images in the coronal and sagittal planes were acquired.    COMPARISON CT: CTA dissection study on 9/25/2018    OTHER STUDIES USED FOR CORRELATION: None.      FINDINGS:    LOWER CHEST: Minimal bibasilar atelectasis right greater than left.    HEPATOBILIARY: 2.1 cm posterior right hepatic lobe cyst. No calcified gallstones. The gallbladder is nondistended.    SPLEEN: Mild splenomegaly measuring up to 14.2 cm in craniocaudal dimension, new since 9/25/2018. No focal splenic lesions.    PANCREAS: Unremarkable.    ADRENAL GLANDS: Unremarkable.    KIDNEYS: Unremarkable.    ABDOMINOPELVIC NODES: Unremarkable.    PELVIC ORGANS: Unremarkable.    PERITONEUM/MESENTERY/BOWEL: No bowel obstruction. Ventral midline hernia at the level of the iliac wings containing nonobstructed small bowel.  Neck measures about 2.4 cm in width. No free fluid or free air.    BONES/SOFT TISSUES: Multilevel degenerative changes of the spine.    OTHER: Presacral edema. No erosions or periosteal reaction within the sacrum.      IMPRESSION:    No acute abnormality in the abdomen and pelvis.    Mild splenomegaly measuring up to 14.2 cm in craniocaudal dimension, new since 9/25/2018. No focal splenic lesions.    See incidental findings above.       .  Patient seen & examined.  No acute events noted overnight.  Patient awake but confused and non-verbal.   Surgery called to evaluate sacral pressure ulcer.  Patient has been having fevers of unknown origin since admission and fever work has been unremarkable.    Infectious disease MD recommended to hospitalist should have surgery evaluation of pressure ulcer to see if could be causing the fevers.          PAST MEDICAL & SURGICAL HISTORY:  Cataract    Prostatitis    Dyslipidemia    Gout    Hypertension    Parkinson disease    No significant past surgical history        Social history and Family history:  Unable to attain.  Patient confused and non-verbal.        Allergies:  No Known Allergies          MEDICATIONS  (STANDING):  apixaban 5 milliGRAM(s) Oral every 12 hours  atorvastatin 40 milliGRAM(s) Oral at bedtime  carbidopa/levodopa  25/100 2 Tablet(s) Oral <User Schedule>  chlorhexidine 4% Liquid 1 Application(s) Topical <User Schedule>  chlorhexidine 4% Liquid 1 Application(s) Topical <User Schedule>  colchicine 0.6 milliGRAM(s) Oral daily  dextrose 5% + sodium chloride 0.9%. 1000 milliLiter(s) (75 mL/Hr) IV Continuous <Continuous>  enalapril 5 milliGRAM(s) Oral daily  fluconAZOLE IVPB      fluconAZOLE IVPB 400 milliGRAM(s) IV Intermittent every 24 hours  pantoprazole   Suspension 40 milliGRAM(s) Oral before breakfast  piperacillin/tazobactam IVPB.. 3.375 Gram(s) IV Intermittent every 8 hours  polyethylene glycol 3350 17 Gram(s) Oral daily  senna 2 Tablet(s) Oral at bedtime    MEDICATIONS  (PRN):  acetaminophen  Suppository .. 650 milliGRAM(s) Rectal every 6 hours PRN Temp greater or equal to 38C (100.4F)          Vital Signs Last 24 Hrs  T(C): 37.6 (06 Mar 2021 13:44), Max: 37.6 (06 Mar 2021 13:44)  T(F): 99.7 (06 Mar 2021 13:44), Max: 99.7 (06 Mar 2021 13:44)  HR: 64 (06 Mar 2021 13:44) (64 - 66)  BP: 126/58 (06 Mar 2021 13:44) (126/58 - 145/67)  RR: 16 (06 Mar 2021 13:44) (16 - 16)        Physical Exam:  General:  WD, obese, awake, alert but confused.    Sacrum:  Allevyn dressing in place.  Dressing removed and Triad barrier cream in place.  Pressure ulcer noted 2 cm round and tunnels in about 1 cm or so that is visible but unable to assess true depth.  No erythema, clean edges, no necrotic tissue, no granulation tissue, no discharge or bleeding, No foul odor.            LABS:                        10.7   6.75  )-----------( 394      ( 06 Mar 2021 08:02 )             35.0     03-06    139  |  104  |  12  ----------------------------<  97  4.0   |  29  |  1.2    Ca    8.8      06 Mar 2021 08:02  Phos  3.7     03-05  Mg     2.0     03-05    TPro  6.6  /  Alb  3.0<L>  /  TBili  0.4  /  DBili  x   /  AST  29  /  ALT  5   /  AlkPhos  145<H>  03-05          CT Abdomen and Pelvis w/ IV Cont (03.03.21 @ 17:41)   EXAM:  CT ABDOMEN AND PELVIS IC            PROCEDURE DATE:  03/03/2021        INTERPRETATION:  CLINICAL STATEMENT: Source of fungemia    TECHNIQUE: Contiguous axial CT images were obtained from the lower chest to the pubic symphysis following administration of 100cc Optiray 320 intravenous contrast.  Oral contrast was not administered.  Reformatted images in the coronal and sagittal planes were acquired.    COMPARISON CT: CTA dissection study on 9/25/2018    OTHER STUDIES USED FOR CORRELATION: None.      FINDINGS:    LOWER CHEST: Minimal bibasilar atelectasis right greater than left.    HEPATOBILIARY: 2.1 cm posterior right hepatic lobe cyst. No calcified gallstones. The gallbladder is nondistended.    SPLEEN: Mild splenomegaly measuring up to 14.2 cm in craniocaudal dimension, new since 9/25/2018. No focal splenic lesions.    PANCREAS: Unremarkable.    ADRENAL GLANDS: Unremarkable.    KIDNEYS: Unremarkable.    ABDOMINOPELVIC NODES: Unremarkable.    PELVIC ORGANS: Unremarkable.    PERITONEUM/MESENTERY/BOWEL: No bowel obstruction. Ventral midline hernia at the level of the iliac wings containing nonobstructed small bowel.  Neck measures about 2.4 cm in width. No free fluid or free air.    BONES/SOFT TISSUES: Multilevel degenerative changes of the spine.    OTHER: Presacral edema. No erosions or periosteal reaction within the sacrum.      IMPRESSION:    No acute abnormality in the abdomen and pelvis.    Mild splenomegaly measuring up to 14.2 cm in craniocaudal dimension, new since 9/25/2018. No focal splenic lesions.    See incidental findings above.

## 2021-03-06 NOTE — CONSULT NOTE ADULT - CONSULT REASON
Pancytopenia
ABEBE
bradycardia
Alter mental status
Altered mental status
Evaluation of Sacral decubiti
Parkinson's
Rule out Fungemia in the retina
r/o L shoulder dislocation
symptomatic bradycardia / ams
Fungemia
PNA
weakness lt  shoulder dis

## 2021-03-06 NOTE — CONSULT NOTE ADULT - ASSESSMENT
Impression:  72 y/o male with sacral pressure ulcer and fever of unknown origin.        Plan:   - Sacral pressure ulcer appears clean without erythema, necrotic or granulation tissue, discharge or bleeding and not malodorous.   Ulcer does not appear to be infected at this time but unable to assess true depth due to small size.  CT scan of abdomen/pelvis mentions:  Presacral edema.  No erosions or periosteal reaction within the sacrum.    - Continue Ivabx with Zosyn and Diflucan.  Monitor WBC.  I.D. following.      - Continue local wound care with Allevyn dressing and Triad barrier cream per nursing.     - Case d/w Dr. Santizo and states continue present mgt and he will evaluate wound in am.  Impression:    72 y/o male with sacral pressure ulcer and fever of unknown origin.        Plan:   - Sacral pressure ulcer appears clean without erythema, necrotic or granulation tissue, discharge or bleeding and not malodorous.   Ulcer does not appear to be infected at this time but unable to assess true depth due to small size.  CT scan of abdomen/pelvis mentions:  Presacral edema.  No erosions or periosteal reaction within the sacrum.    - Continue Ivabx with Zosyn and Diflucan.  Monitor WBC.  I.D. following.      - Continue local wound care with Allevyn dressing and Triad barrier cream per nursing.     - Case d/w Dr. Santizo and states continue present mgt and he will evaluate wound in am.

## 2021-03-07 LAB
ANION GAP SERPL CALC-SCNC: 10 MMOL/L — SIGNIFICANT CHANGE UP (ref 7–14)
BUN SERPL-MCNC: 12 MG/DL — SIGNIFICANT CHANGE UP (ref 10–20)
CALCIUM SERPL-MCNC: 8.7 MG/DL — SIGNIFICANT CHANGE UP (ref 8.5–10.1)
CHLORIDE SERPL-SCNC: 105 MMOL/L — SIGNIFICANT CHANGE UP (ref 98–110)
CO2 SERPL-SCNC: 25 MMOL/L — SIGNIFICANT CHANGE UP (ref 17–32)
CREAT SERPL-MCNC: 1.3 MG/DL — SIGNIFICANT CHANGE UP (ref 0.7–1.5)
GLUCOSE SERPL-MCNC: 93 MG/DL — SIGNIFICANT CHANGE UP (ref 70–99)
HCT VFR BLD CALC: 35.5 % — LOW (ref 42–52)
HGB BLD-MCNC: 10.9 G/DL — LOW (ref 14–18)
MCHC RBC-ENTMCNC: 25.1 PG — LOW (ref 27–31)
MCHC RBC-ENTMCNC: 30.7 G/DL — LOW (ref 32–37)
MCV RBC AUTO: 81.8 FL — SIGNIFICANT CHANGE UP (ref 80–94)
NRBC # BLD: 0 /100 WBCS — SIGNIFICANT CHANGE UP (ref 0–0)
PLATELET # BLD AUTO: 403 K/UL — HIGH (ref 130–400)
POTASSIUM SERPL-MCNC: 4.1 MMOL/L — SIGNIFICANT CHANGE UP (ref 3.5–5)
POTASSIUM SERPL-SCNC: 4.1 MMOL/L — SIGNIFICANT CHANGE UP (ref 3.5–5)
RBC # BLD: 4.34 M/UL — LOW (ref 4.7–6.1)
RBC # FLD: 15.7 % — HIGH (ref 11.5–14.5)
SODIUM SERPL-SCNC: 140 MMOL/L — SIGNIFICANT CHANGE UP (ref 135–146)
WBC # BLD: 6.07 K/UL — SIGNIFICANT CHANGE UP (ref 4.8–10.8)
WBC # FLD AUTO: 6.07 K/UL — SIGNIFICANT CHANGE UP (ref 4.8–10.8)

## 2021-03-07 PROCEDURE — 99233 SBSQ HOSP IP/OBS HIGH 50: CPT

## 2021-03-07 PROCEDURE — 99231 SBSQ HOSP IP/OBS SF/LOW 25: CPT

## 2021-03-07 RX ADMIN — APIXABAN 5 MILLIGRAM(S): 2.5 TABLET, FILM COATED ORAL at 18:39

## 2021-03-07 RX ADMIN — CARBIDOPA AND LEVODOPA 2 TABLET(S): 25; 100 TABLET ORAL at 05:39

## 2021-03-07 RX ADMIN — CARBIDOPA AND LEVODOPA 2 TABLET(S): 25; 100 TABLET ORAL at 10:05

## 2021-03-07 RX ADMIN — APIXABAN 5 MILLIGRAM(S): 2.5 TABLET, FILM COATED ORAL at 05:40

## 2021-03-07 RX ADMIN — FLUCONAZOLE 100 MILLIGRAM(S): 150 TABLET ORAL at 12:56

## 2021-03-07 RX ADMIN — CARBIDOPA AND LEVODOPA 2 TABLET(S): 25; 100 TABLET ORAL at 22:34

## 2021-03-07 RX ADMIN — Medication 5 MILLIGRAM(S): at 05:40

## 2021-03-07 RX ADMIN — ATORVASTATIN CALCIUM 40 MILLIGRAM(S): 80 TABLET, FILM COATED ORAL at 22:34

## 2021-03-07 RX ADMIN — PANTOPRAZOLE SODIUM 40 MILLIGRAM(S): 20 TABLET, DELAYED RELEASE ORAL at 05:40

## 2021-03-07 RX ADMIN — CARBIDOPA AND LEVODOPA 2 TABLET(S): 25; 100 TABLET ORAL at 13:00

## 2021-03-07 RX ADMIN — PIPERACILLIN AND TAZOBACTAM 25 GRAM(S): 4; .5 INJECTION, POWDER, LYOPHILIZED, FOR SOLUTION INTRAVENOUS at 13:00

## 2021-03-07 RX ADMIN — CHLORHEXIDINE GLUCONATE 1 APPLICATION(S): 213 SOLUTION TOPICAL at 05:40

## 2021-03-07 RX ADMIN — SODIUM CHLORIDE 75 MILLILITER(S): 9 INJECTION, SOLUTION INTRAVENOUS at 05:41

## 2021-03-07 RX ADMIN — Medication 0.6 MILLIGRAM(S): at 10:05

## 2021-03-07 RX ADMIN — SENNA PLUS 2 TABLET(S): 8.6 TABLET ORAL at 22:34

## 2021-03-07 RX ADMIN — PIPERACILLIN AND TAZOBACTAM 25 GRAM(S): 4; .5 INJECTION, POWDER, LYOPHILIZED, FOR SOLUTION INTRAVENOUS at 22:34

## 2021-03-07 RX ADMIN — PIPERACILLIN AND TAZOBACTAM 25 GRAM(S): 4; .5 INJECTION, POWDER, LYOPHILIZED, FOR SOLUTION INTRAVENOUS at 05:40

## 2021-03-07 RX ADMIN — POLYETHYLENE GLYCOL 3350 17 GRAM(S): 17 POWDER, FOR SOLUTION ORAL at 10:06

## 2021-03-07 RX ADMIN — CARBIDOPA AND LEVODOPA 2 TABLET(S): 25; 100 TABLET ORAL at 18:39

## 2021-03-07 NOTE — PROGRESS NOTE ADULT - SUBJECTIVE AND OBJECTIVE BOX
71 year old Estonian speaking Male with past medical history of Parkinson disease, HLD, HTN, H/O DVT on Eliquis and Lasix, H/O COVID presents to ED with altered mental status for 2 days.    Today:  -pt seen and examined at bedside   -surgery's input appreciated; discussed with PA; attending and Sx resident to evaluate the case today; if no inpatient debridement warranted, will continue with current abx regimen and local dressing change as per wound care Nurse   -Daughter aware of the daily updates; and overall poor prognosis and that patient is not ready for NH d/c (still persistent fevers)  -no discharge until cleared by ID       REVIEW OF SYSTEMS:  Not a reliable historian      Allergies  No Known Allergies    FAMILY HISTORY:  Family history of cerebrovascular accident (CVA) (Father)    Vital Signs Last 24 Hrs  T(C): 36.9 (07 Mar 2021 05:00), Max: 37.6 (06 Mar 2021 13:44)  T(F): 98.4 (07 Mar 2021 05:00), Max: 99.7 (06 Mar 2021 13:44)  HR: 61 (07 Mar 2021 05:00) (60 - 64)  BP: 155/72 (07 Mar 2021 05:00) (126/58 - 155/72)  RR: 16 (07 Mar 2021 05:00) (16 - 16)      PHYSICAL EXAM:  GENERAL: ill-appearing  HEAD:  Atraumatic, Normocephalic  EYES: EOMI, PERRLA, conjunctiva and sclera clear  NERVOUS SYSTEM:  Awake not alert   CHEST/LUNG: decreased breath sounds at bases   HEART: Regular rate and rhythm; No murmurs, rubs, or gallops  ABDOMEN: Soft, Nontender, Nondistended; Bowel sounds present  EXTREMITIES:  2+ Peripheral Pulses, No clubbing, cyanosis, or edema  SKIN: No rashes or lesions    LABS:             10.7   6.75  )-----------( 394      ( 06 Mar 2021 08:02 )             35.0   03-06    139  |  104  |  12  ----------------------------<  97  4.0   |  29  |  1.2    Ca    8.8      06 Mar 2021 08:02  Phos  3.7     03-05  Mg     2.0     03-05    TPro  6.6  /  Alb  3.0<L>  /  TBili  0.4  /  DBili  x   /  AST  29  /  ALT  5   /  AlkPhos  145<H>  03-05    Culture Results:   Growth in aerobic bottle: Candida albicans  ***Blood Panel PCR results on this specimen are available  approximately 3 hours after the Gram stain result.***  Gram stain, PCR, and/or culture results may not always  correspond due to difference in methodologies.  ************************************************************  This PCR assay was performed by multiplex PCR. This  Assay tests for 66 bacterial and resistance gene targets.  Please refer to the [facility] test directory  at [email or web address] for details.  "Due to technical problems, Pseudomonas aeruginosa    until further notice". (02-27 @ 14:38)  Culture Results:   Growth in aerobic bottle: Candida albicans  See previous culture [id number] (02-27 @ 14:38)  Culture Results:   No Growth Final (02-13 @ 11:54)  Culture Results:   No growth at 48 hours (02-13 @ 09:36)  Culture Results:   <10,000 CFU/mL Normal Urogenital Chelle (02-12 @ 18:37)      RADIOLOGY:  < from: CT Abdomen and Pelvis w/ IV Cont (03.03.21 @ 17:41) >  IMPRESSION:    No acute abnormality in the abdomen and pelvis.    Mild splenomegaly measuring up to 14.2 cm in craniocaudal dimension, new since 9/25/2018. No focal splenic lesions.    See incidental findings above.        MOISES GUADALUPE MD; Attending Radiologist  This document has been electronically signed. Mar  4 2021  8:32AM    < end of copied text >    ECHO results pending    MEDICATIONS  (STANDING):  apixaban 5 milliGRAM(s) Oral every 12 hours  atorvastatin 40 milliGRAM(s) Oral at bedtime  carbidopa/levodopa  25/100 2 Tablet(s) Oral <User Schedule>  chlorhexidine 4% Liquid 1 Application(s) Topical <User Schedule>  chlorhexidine 4% Liquid 1 Application(s) Topical <User Schedule>  colchicine 0.6 milliGRAM(s) Oral daily  dextrose 5% + sodium chloride 0.9%. 1000 milliLiter(s) (75 mL/Hr) IV Continuous <Continuous>  enalapril 5 milliGRAM(s) Oral daily  fluconAZOLE IVPB      fluconAZOLE IVPB 400 milliGRAM(s) IV Intermittent every 24 hours  pantoprazole   Suspension 40 milliGRAM(s) Oral before breakfast  piperacillin/tazobactam IVPB.. 3.375 Gram(s) IV Intermittent every 8 hours  polyethylene glycol 3350 17 Gram(s) Oral daily  senna 2 Tablet(s) Oral at bedtime    MEDICATIONS  (PRN):  acetaminophen  Suppository .. 650 milliGRAM(s) Rectal every 6 hours PRN Temp greater or equal to 38C (100.4F)

## 2021-03-07 NOTE — PROGRESS NOTE ADULT - SUBJECTIVE AND OBJECTIVE BOX
Patient is seen and examined at the bed side, is afebrile. He is doing better, more awake and alert. The repeat Blood cultures remains negative to date.      REVIEW OF SYSTEMS: All other review systems are negative      ALLERGIES: No Known Allergies      Vital Signs Last 24 Hrs  T(C): 36.4 (07 Mar 2021 13:57), Max: 36.9 (07 Mar 2021 05:00)  T(F): 97.6 (07 Mar 2021 13:57), Max: 98.4 (07 Mar 2021 05:00)  HR: 69 (07 Mar 2021 21:09) (61 - 69)  BP: 154/90 (07 Mar 2021 21:09) (153/71 - 177/92)  BP(mean): --  RR: 18 (07 Mar 2021 21:09) (16 - 18)  SpO2: --      PHYSICAL EXAM:  GENERAL: Not in distress   CHEST/LUNG: Not using accessory muscles  HEART: s1 and s2 present  ABDOMEN:  Nontender and  Nondistended  EXTREMITIES: No pedal  edema  CNS: Awake and Alert      LABS:                        10.9   6.07  )-----------( 403      ( 07 Mar 2021 08:13 )             35.5                           10.9   5.90  )-----------( 368      ( 04 Mar 2021 09:10 )             34.6     03-07    140  |  105  |  12  ----------------------------<  93  4.1   |  25  |  1.3    Ca    8.7      07 Mar 2021 08:13      03-04    140  |  105  |  13  ----------------------------<  92  4.1   |  27  |  1.2    Ca    8.9      04 Mar 2021 09:10    TPro  6.3  /  Alb  3.0<L>  /  TBili  0.2  /  DBili  x   /  AST  37  /  ALT  9   /  AlkPhos  136<H>  03-03        MEDICATIONS  (STANDING):    apixaban 5 milliGRAM(s) Oral every 12 hours  atorvastatin 40 milliGRAM(s) Oral at bedtime  carbidopa/levodopa  25/100 2 Tablet(s) Oral <User Schedule>  chlorhexidine 4% Liquid 1 Application(s) Topical <User Schedule>  chlorhexidine 4% Liquid 1 Application(s) Topical <User Schedule>  colchicine 0.6 milliGRAM(s) Oral daily  dextrose 5% + sodium chloride 0.9%. 1000 milliLiter(s) (75 mL/Hr) IV Continuous <Continuous>  enalapril 5 milliGRAM(s) Oral daily  fluconAZOLE IVPB      fluconAZOLE IVPB 400 milliGRAM(s) IV Intermittent every 24 hours  pantoprazole   Suspension 40 milliGRAM(s) Oral before breakfast  piperacillin/tazobactam IVPB.. 3.375 Gram(s) IV Intermittent every 8 hours  polyethylene glycol 3350 17 Gram(s) Oral daily  senna 2 Tablet(s) Oral at bedtime        RADIOLOGY & ADDITIONAL TESTS:    < from: CT Abdomen and Pelvis w/ IV Cont (03.03.21 @ 17:41) >  No acute abnormality in the abdomen and pelvis.    Mild splenomegaly measuring up to 14.2 cm in craniocaudal dimension, new since 9/25/2018. No focal splenic lesions.      < from: CT Chest No Cont (03.01.21 @ 09:44) >  Breathing motion artifact is present limiting evaluation.    Compared to recent CT chest 2/14/2021, significant decrease/improvement in bilateral opacities to near complete resolution and and resolution of pleural effusions. Bibasilar subsegmental atelectasis. Additional findings are unchanged in this short interval follow-up CT.        MICROBIOLOGY DATA:    Culture - Blood in AM (03.04.21 @ 11:12)   Specimen Source: .Blood None   Culture Results: No growth to date.     Culture - Blood (03.03.21 @ 16:27)   Specimen Source: .Blood None   Culture Results: No growth to date.     COVID-19 PCR . (03.01.21 @ 16:49)   COVID-19 PCR: NotDetec:     COVID-19 PCR . (03.01.21 @ 16:49)   COVID-19 PCR: NotDetec:    Culture - Blood (02.27.21 @ 14:38)   Gram Stain: Growth in aerobic bottle: Yeast like cells   Specimen Source: .Blood None   Culture Results: Growth in aerobic bottle: Candida albicans   See previous culture [id number]       Culture - Blood (02.27.21 @ 14:38)   - Fluconazole: S 0.5 Fluconazole = Data established for mucosal disease, and is generally applicable for invasive disease. Susceptibility is dependent on achieving the maximal possible blood level. Doses of 400 MG/day or more may be required in adults with normalrenalfunction and body habitus.   Gram Stain:   Growth in aerobic bottle: Yeast like cells   - Candida albicans: Detec   - Interpretation: See note This test method is not approved by the FDA and is for research use only. The performance characteristics of this assay may have been determined by [facility], [place], [place] N/I - No interpretation available SDD – Sensitive Dose Dependent   Specimen Source: .Blood None   Organism: Blood Culture PCR   Organism: Candida albicans   Culture Results: Growth in aerobic bottle: Candida albicans        Patient is seen and examined at the bed side, is afebrile. He is doing better, more awake and alert. The repeat Blood cultures remains negative to date. The TTE is done.       REVIEW OF SYSTEMS: All other review systems are negative      ALLERGIES: No Known Allergies      Vital Signs Last 24 Hrs  T(C): 36.4 (07 Mar 2021 13:57), Max: 36.9 (07 Mar 2021 05:00)  T(F): 97.6 (07 Mar 2021 13:57), Max: 98.4 (07 Mar 2021 05:00)  HR: 69 (07 Mar 2021 21:09) (61 - 69)  BP: 154/90 (07 Mar 2021 21:09) (153/71 - 177/92)  BP(mean): --  RR: 18 (07 Mar 2021 21:09) (16 - 18)  SpO2: --      PHYSICAL EXAM:  GENERAL: Not in distress   CHEST/LUNG: Not using accessory muscles  HEART: s1 and s2 present  ABDOMEN:  Nontender and  Nondistended  EXTREMITIES: No pedal  edema  CNS: Awake and Alert      LABS:                        10.9   6.07  )-----------( 403      ( 07 Mar 2021 08:13 )             35.5                           10.9   5.90  )-----------( 368      ( 04 Mar 2021 09:10 )             34.6     03-07    140  |  105  |  12  ----------------------------<  93  4.1   |  25  |  1.3    Ca    8.7      07 Mar 2021 08:13      03-04    140  |  105  |  13  ----------------------------<  92  4.1   |  27  |  1.2    Ca    8.9      04 Mar 2021 09:10    TPro  6.3  /  Alb  3.0<L>  /  TBili  0.2  /  DBili  x   /  AST  37  /  ALT  9   /  AlkPhos  136<H>  03-03        MEDICATIONS  (STANDING):    apixaban 5 milliGRAM(s) Oral every 12 hours  atorvastatin 40 milliGRAM(s) Oral at bedtime  carbidopa/levodopa  25/100 2 Tablet(s) Oral <User Schedule>  chlorhexidine 4% Liquid 1 Application(s) Topical <User Schedule>  chlorhexidine 4% Liquid 1 Application(s) Topical <User Schedule>  colchicine 0.6 milliGRAM(s) Oral daily  dextrose 5% + sodium chloride 0.9%. 1000 milliLiter(s) (75 mL/Hr) IV Continuous <Continuous>  enalapril 5 milliGRAM(s) Oral daily  fluconAZOLE IVPB      fluconAZOLE IVPB 400 milliGRAM(s) IV Intermittent every 24 hours  pantoprazole   Suspension 40 milliGRAM(s) Oral before breakfast  piperacillin/tazobactam IVPB.. 3.375 Gram(s) IV Intermittent every 8 hours  polyethylene glycol 3350 17 Gram(s) Oral daily  senna 2 Tablet(s) Oral at bedtime        RADIOLOGY & ADDITIONAL TESTS:    < from: CT Abdomen and Pelvis w/ IV Cont (03.03.21 @ 17:41) >  No acute abnormality in the abdomen and pelvis.    Mild splenomegaly measuring up to 14.2 cm in craniocaudal dimension, new since 9/25/2018. No focal splenic lesions.      < from: CT Chest No Cont (03.01.21 @ 09:44) >  Breathing motion artifact is present limiting evaluation.    Compared to recent CT chest 2/14/2021, significant decrease/improvement in bilateral opacities to near complete resolution and and resolution of pleural effusions. Bibasilar subsegmental atelectasis. Additional findings are unchanged in this short interval follow-up CT.        MICROBIOLOGY DATA:    Culture - Blood in AM (03.04.21 @ 11:12)   Specimen Source: .Blood None   Culture Results: No growth to date.     Culture - Blood (03.03.21 @ 16:27)   Specimen Source: .Blood None   Culture Results: No growth to date.     COVID-19 PCR . (03.01.21 @ 16:49)   COVID-19 PCR: NotDetec:     COVID-19 PCR . (03.01.21 @ 16:49)   COVID-19 PCR: NotDetec:    Culture - Blood (02.27.21 @ 14:38)   Gram Stain: Growth in aerobic bottle: Yeast like cells   Specimen Source: .Blood None   Culture Results: Growth in aerobic bottle: Candida albicans   See previous culture [id number]       Culture - Blood (02.27.21 @ 14:38)   - Fluconazole: S 0.5 Fluconazole = Data established for mucosal disease, and is generally applicable for invasive disease. Susceptibility is dependent on achieving the maximal possible blood level. Doses of 400 MG/day or more may be required in adults with normalrenalfunction and body habitus.   Gram Stain:   Growth in aerobic bottle: Yeast like cells   - Candida albicans: Detec   - Interpretation: See note This test method is not approved by the FDA and is for research use only. The performance characteristics of this assay may have been determined by [facility], [place], [place] N/I - No interpretation available SDD – Sensitive Dose Dependent   Specimen Source: .Blood None   Organism: Blood Culture PCR   Organism: Candida albicans   Culture Results: Growth in aerobic bottle: Candida albicans

## 2021-03-07 NOTE — PROGRESS NOTE ADULT - ASSESSMENT
Aspiration Pneumonia:  -Patient was to be discharged 2/27/21, persistent fevers   -CXR shows LLL opacity   -Repeat COVID neg  -Highly likely asp PNA given recent history of aspiration and advancing diet  -Started Vanco and Brady, patient spiked fevers after starting; MRSA nares neg, switched to Zosyn 2/28 renally dosed  -O2 via NC  -Speech and swallow follow up-diet decreased to Dysphagia 1  -surgery eval for sacral wound assessment (ruling out possible cause of persistent fevers despite broad spectrum IV abx); discussed with surgical PA    Fungemia:  -Blood Cx grew yeast in both bottles  -patient had been persistently febrile on broad spectrum abx  -Continue with Fluconazole IV and Zosyn   -Apprec Ophthalmology, fup ID consults  -TTE ordered multiple times; finally done; will follow results   - CT scan Abd neg  - repeat blood cx negative for 03/03/21    Dysphagia  -Swallow eval appreciated, now on Dysphagia 1  -Had to change to 1 from 2; likely more lethargic from active infection  -Speech and swallow to cont to follow up    Parkinsons  -Neuro consult appreciated  -Can DC dopamine agonist  -Increased Sinemet    Left shoulder dislocation  -Reviewed imaging (CT, XR)  -Ortho consult appreciated, no need for intervention    Bradycardia with First Degree AV Block  Patient was initially hypertensive with Bradycardia and later went to [facility]   Was on Dopamine   EPS eval recommended no PPM for now  Monitor vitals   Echo: Unremarkable with mild MR and TR  EKG: NSR with undetermined age of infarct    ABEBE on suspected CKD stage III  Resolved  -monitor kidney function while on current abx     Chronic DVT  On Eliquis    HTN  Hold off antihypertensive  Monitor BP  DASH Diet       # Progress Note Handoff  PENDING as follows  consults: ID follow up today   Test: ECHO pending results   Family discussion: discussed with patient's daughter Kelton and updates given on a daily basis; aware of the overall poor prognosis   Disposition: not ready    Attending Physician Dr. Padmini Cheatham # [id number]   Aspiration Pneumonia:  -Patient was to be discharged 2/27/21, persistent fevers   -CXR shows LLL opacity   -Repeat COVID neg  -Highly likely asp PNA given recent history of aspiration and advancing diet  -Started Vanco and Brady, patient spiked fevers after starting; MRSA nares neg, switched to Zosyn 2/28 renally dosed  -O2 via NC  -Speech and swallow follow up-diet decreased to Dysphagia 1  -surgery eval for sacral wound assessment (ruling out possible cause of persistent fevers despite broad spectrum IV abx); discussed with surgical PA    Fungemia:  -Blood Cx grew yeast in both bottles  -patient had been persistently febrile on broad spectrum abx  -Continue with Fluconazole IV and Zosyn   -Apprec Ophthalmology, fup ID consults  -TTE ordered multiple times; finally done; will follow results   - CT scan Abd neg  - repeat blood cx negative for 03/03/21    Dysphagia  -Swallow eval appreciated, now on Dysphagia 1  -Had to change to 1 from 2; likely more lethargic from active infection  -Speech and swallow to cont to follow up    Parkinsons  -Neuro consult appreciated  -Can DC dopamine agonist  -Increased Sinemet    Left shoulder dislocation  -Reviewed imaging (CT, XR)  -Ortho consult appreciated, no need for intervention    Bradycardia with First Degree AV Block  Patient was initially hypertensive with Bradycardia and later went to [facility]   Was on Dopamine   EPS eval recommended no PPM for now  Monitor vitals   Echo: Unremarkable with mild MR and TR  EKG: NSR with undetermined age of infarct    ABEBE on suspected CKD stage III  Resolved  -monitor kidney function while on current abx     Chronic DVT  On Eliquis    HTN  Hold off antihypertensive  Monitor BP  DASH Diet       # Progress Note Handoff  PENDING as follows  consults: ID follow up today   Test: ECHO pending results   Family discussion: discussed with patient's daughter Kelton and updates given on a daily basis and today; aware of the overall poor prognosis; answered all questions and went over hospital course since admission - today   Disposition: not ready    Attending Physician Dr. Padmini Cheatham # [id number]

## 2021-03-07 NOTE — PROGRESS NOTE ADULT - ATTENDING COMMENTS
71M with PMH of Parkinson disease, HLD, HTN, H/O DVT on Eliquis, H/O COVID presents to ED with altered mental status.  During admission patient has been having fever of unknown origin.  Surgery called to evaluate sacral decubitus ulcer.    Patient seen and examined.  Patient appears comfortable in bed      Rectal - 3x3cm area of sacral decubitus ulcer with clean edges, no eschar, erythema or purulence.    Vitals labs and images reviewed    Recent CT without evidence of abscess or bone involvement from the sacral decubitus ulcer    Plan  - Sacral ulcer appears clear without signs of active infection or abscess  - no need for surgical debridement  - Antibiotics per ID  - local wound care  - surgery to sign off

## 2021-03-07 NOTE — PROGRESS NOTE ADULT - ASSESSMENT
Impression:    70 y/o male with sacral pressure ulcer and fever of unknown origin.        Plan:   - Sacral pressure ulcer still appears clean without erythema, necrotic or granulation tissue, discharge or bleeding and not malodorous.   Ulcer does not appear to be infected at this time but unable to assess true depth due to small size.  CT scan of abdomen/pelvis mentions:  Presacral edema.  No erosions or periosteal reaction within the sacrum.    - Continue Ivabx with Zosyn and Diflucan.  Monitor WBC.  I.D. following.      - Continue local wound care with Allevyn dressing and can use Santyl cream.     - Patient seen, examined and d/w Dr. Santizo and states continue present conservative mgt and local wound care.  He doubts ulcer is source of the fevers from the appearance and also CT scan does not show collections or periosteal involvement.  No surgical debridement needed at this time.  Surgery to sign off and may reconsult as needed.

## 2021-03-07 NOTE — PROGRESS NOTE ADULT - ASSESSMENT
71 year old Maori speaking Male with past medical history of Parkinson disease, HLD, HTN, H/O DVT on Eliquis and Lasix, H/O COVID presents to ED with altered mental status for 2 days.       IMPRESSION  #Aspiration pneumonia   #Fungemia  - Blood Cx 2/27 Candida albicans  - evaluated by optho - no ocular evidence   #Hx of DVT    would recommend:    1. Continue fluconazole 400 mg daily   2. Need at least 2 weeks of  Fluconazole starting from negative blood culture  3. Continue Zosyn for now  4. Aspiration precaution   5. Frequent repositioning      Attending Attestation:     45 minutes spent on total encounter; more than 50% of the visit was spent counseling and/or coordinating care by the attending physician.         71 year old Italian speaking Male with past medical history of Parkinson disease, HLD, HTN, H/O DVT on Eliquis and Lasix, H/O COVID presents to ED with altered mental status for 2 days.       IMPRESSION  #Aspiration pneumonia   #Fungemia  - Blood Cx 2/27 Candida albicans  - evaluated by optho - no ocular evidence   #Hx of DVT    would recommend:    1. Continue fluconazole 400 mg daily   2. Duration of  Fluconazole based on TTE report, its done   3. Continue Zosyn  for now   4. Aspiration precaution   5. Frequent repositioning    d/w Nursing staff     Attending Attestation:     45 minutes spent on total encounter; more than 50% of the visit was spent counseling and/or coordinating care by the attending physician.

## 2021-03-07 NOTE — PROGRESS NOTE ADULT - SUBJECTIVE AND OBJECTIVE BOX
.  Patient seen & examined with surgery resident and Dr. Santizo.    Patient remains afebrile.             MEDICATIONS  (STANDING):  apixaban 5 milliGRAM(s) Oral every 12 hours  atorvastatin 40 milliGRAM(s) Oral at bedtime  carbidopa/levodopa  25/100 2 Tablet(s) Oral <User Schedule>  chlorhexidine 4% Liquid 1 Application(s) Topical <User Schedule>  chlorhexidine 4% Liquid 1 Application(s) Topical <User Schedule>  colchicine 0.6 milliGRAM(s) Oral daily  dextrose 5% + sodium chloride 0.9%. 1000 milliLiter(s) (75 mL/Hr) IV Continuous <Continuous>  enalapril 5 milliGRAM(s) Oral daily  fluconAZOLE IVPB      fluconAZOLE IVPB 400 milliGRAM(s) IV Intermittent every 24 hours  pantoprazole   Suspension 40 milliGRAM(s) Oral before breakfast  piperacillin/tazobactam IVPB.. 3.375 Gram(s) IV Intermittent every 8 hours  polyethylene glycol 3350 17 Gram(s) Oral daily  senna 2 Tablet(s) Oral at bedtime    MEDICATIONS  (PRN):  acetaminophen  Suppository .. 650 milliGRAM(s) Rectal every 6 hours PRN Temp greater or equal to 38C (100.4F)          Vital Signs Last 24 Hrs  T(C): 36.9 (07 Mar 2021 05:00), Max: 37.6 (06 Mar 2021 13:44)  T(F): 98.4 (07 Mar 2021 05:00), Max: 99.7 (06 Mar 2021 13:44)  HR: 61 (07 Mar 2021 05:00) (60 - 64)  BP: 155/72 (07 Mar 2021 05:00) (126/58 - 155/72)  RR: 16 (07 Mar 2021 05:00) (16 - 16)        Physical Exam:  Sacrum:  Allevyn dressing in place.  Dressing removed and Triad barrier cream in place.  Pressure ulcer noted 2 cm round and tunnels in about 1 cm that is visulized but unable to assess true depth.  No erythema, clean edges, no necrotic tissue, no granulation tissue, no discharge or bleeding, No foul odor.            LABS:                        10.9   6.07  )-----------( 403      ( 07 Mar 2021 08:13 )             35.5     03-07    140  |  105  |  12  ----------------------------<  93  4.1   |  25  |  1.3    Ca    8.7      07 Mar 2021 08:13        3/3/2021 CT scan abd/pelvis showed Presacral edema. No erosions or periosteal reaction within the sacrum.

## 2021-03-08 LAB
ANION GAP SERPL CALC-SCNC: 8 MMOL/L — SIGNIFICANT CHANGE UP (ref 7–14)
BUN SERPL-MCNC: 12 MG/DL — SIGNIFICANT CHANGE UP (ref 10–20)
CALCIUM SERPL-MCNC: 8.5 MG/DL — SIGNIFICANT CHANGE UP (ref 8.5–10.1)
CHLORIDE SERPL-SCNC: 106 MMOL/L — SIGNIFICANT CHANGE UP (ref 98–110)
CO2 SERPL-SCNC: 26 MMOL/L — SIGNIFICANT CHANGE UP (ref 17–32)
CREAT SERPL-MCNC: 1.2 MG/DL — SIGNIFICANT CHANGE UP (ref 0.7–1.5)
CULTURE RESULTS: SIGNIFICANT CHANGE UP
GLUCOSE SERPL-MCNC: 93 MG/DL — SIGNIFICANT CHANGE UP (ref 70–99)
HCT VFR BLD CALC: 33.8 % — LOW (ref 42–52)
HGB BLD-MCNC: 10.3 G/DL — LOW (ref 14–18)
MCHC RBC-ENTMCNC: 25.2 PG — LOW (ref 27–31)
MCHC RBC-ENTMCNC: 30.5 G/DL — LOW (ref 32–37)
MCV RBC AUTO: 82.6 FL — SIGNIFICANT CHANGE UP (ref 80–94)
NRBC # BLD: 0 /100 WBCS — SIGNIFICANT CHANGE UP (ref 0–0)
PLATELET # BLD AUTO: 385 K/UL — SIGNIFICANT CHANGE UP (ref 130–400)
POTASSIUM SERPL-MCNC: 3.9 MMOL/L — SIGNIFICANT CHANGE UP (ref 3.5–5)
POTASSIUM SERPL-SCNC: 3.9 MMOL/L — SIGNIFICANT CHANGE UP (ref 3.5–5)
RBC # BLD: 4.09 M/UL — LOW (ref 4.7–6.1)
RBC # FLD: 15.4 % — HIGH (ref 11.5–14.5)
SODIUM SERPL-SCNC: 140 MMOL/L — SIGNIFICANT CHANGE UP (ref 135–146)
SPECIMEN SOURCE: SIGNIFICANT CHANGE UP
WBC # BLD: 5.77 K/UL — SIGNIFICANT CHANGE UP (ref 4.8–10.8)
WBC # FLD AUTO: 5.77 K/UL — SIGNIFICANT CHANGE UP (ref 4.8–10.8)

## 2021-03-08 PROCEDURE — 99233 SBSQ HOSP IP/OBS HIGH 50: CPT

## 2021-03-08 PROCEDURE — 99232 SBSQ HOSP IP/OBS MODERATE 35: CPT

## 2021-03-08 RX ADMIN — ATORVASTATIN CALCIUM 40 MILLIGRAM(S): 80 TABLET, FILM COATED ORAL at 23:01

## 2021-03-08 RX ADMIN — APIXABAN 5 MILLIGRAM(S): 2.5 TABLET, FILM COATED ORAL at 17:03

## 2021-03-08 RX ADMIN — CARBIDOPA AND LEVODOPA 2 TABLET(S): 25; 100 TABLET ORAL at 17:03

## 2021-03-08 RX ADMIN — CARBIDOPA AND LEVODOPA 2 TABLET(S): 25; 100 TABLET ORAL at 10:28

## 2021-03-08 RX ADMIN — CARBIDOPA AND LEVODOPA 2 TABLET(S): 25; 100 TABLET ORAL at 13:51

## 2021-03-08 RX ADMIN — Medication 5 MILLIGRAM(S): at 05:34

## 2021-03-08 RX ADMIN — FLUCONAZOLE 100 MILLIGRAM(S): 150 TABLET ORAL at 13:51

## 2021-03-08 RX ADMIN — SODIUM CHLORIDE 75 MILLILITER(S): 9 INJECTION, SOLUTION INTRAVENOUS at 05:35

## 2021-03-08 RX ADMIN — POLYETHYLENE GLYCOL 3350 17 GRAM(S): 17 POWDER, FOR SOLUTION ORAL at 11:22

## 2021-03-08 RX ADMIN — CARBIDOPA AND LEVODOPA 2 TABLET(S): 25; 100 TABLET ORAL at 05:35

## 2021-03-08 RX ADMIN — PIPERACILLIN AND TAZOBACTAM 25 GRAM(S): 4; .5 INJECTION, POWDER, LYOPHILIZED, FOR SOLUTION INTRAVENOUS at 23:02

## 2021-03-08 RX ADMIN — PIPERACILLIN AND TAZOBACTAM 25 GRAM(S): 4; .5 INJECTION, POWDER, LYOPHILIZED, FOR SOLUTION INTRAVENOUS at 13:53

## 2021-03-08 RX ADMIN — PANTOPRAZOLE SODIUM 40 MILLIGRAM(S): 20 TABLET, DELAYED RELEASE ORAL at 06:10

## 2021-03-08 RX ADMIN — SENNA PLUS 2 TABLET(S): 8.6 TABLET ORAL at 23:01

## 2021-03-08 RX ADMIN — PIPERACILLIN AND TAZOBACTAM 25 GRAM(S): 4; .5 INJECTION, POWDER, LYOPHILIZED, FOR SOLUTION INTRAVENOUS at 05:35

## 2021-03-08 RX ADMIN — APIXABAN 5 MILLIGRAM(S): 2.5 TABLET, FILM COATED ORAL at 05:34

## 2021-03-08 RX ADMIN — CHLORHEXIDINE GLUCONATE 1 APPLICATION(S): 213 SOLUTION TOPICAL at 05:40

## 2021-03-08 RX ADMIN — CARBIDOPA AND LEVODOPA 2 TABLET(S): 25; 100 TABLET ORAL at 23:01

## 2021-03-08 RX ADMIN — Medication 0.6 MILLIGRAM(S): at 11:22

## 2021-03-08 NOTE — OCCUPATIONAL THERAPY INITIAL EVALUATION ADULT - GENERAL OBSERVATIONS, REHAB EVAL
13:15-13:45 chart reviewed, ok to treat by Occupational Therapist as confirmed by MD, Pt received semifowler in bed in NAD. Son present and provided interpretation.

## 2021-03-08 NOTE — PROGRESS NOTE ADULT - SUBJECTIVE AND OBJECTIVE BOX
SHAR[facility] 2[address]  Neurology Follow up note    Name  NIEVES LABOY    HPI:  71 year old Mohawk speaking Male with past medical history of Parkinson disease, HLD, HTN, H/O DVT on Eliquis and Lasix, H/O COVID presents to ED with altered mental status for 2 days.     As per family patient at baseline is able to speak and has his on and off days but since last 2 days has not been able to speak and more lethargic than baseline. As per daughter at the bedside, patient has "bad days but this is the worst we have seen" Patient bright to ED for non resolution of symptoms. Family denies any fevers, chills, rigors, cough, shortness of breath, loss of consciousness, incontinence or any other complaints.     In ED patient hypertensive to 170s and bradycardic to 30s. EKG showed sinus bradycardia with 1st degree AV block. Electrolytes were normal. Patient given atropine without response, he was given dobutamine and HR increased with wide complex tachycardia. Patient placed on Dopamine with HR improving to the 50s. CT head done for AMS and shows no acute changes. Patient at time of interview minimally verbal but appeared in no apparent discomfort. (12 Feb 2021 17:17)      Interval History - Patient seen at bedside with Dr Lm Velasquez. Patient speaks Mohawk only- translated by Dr Thomas.  Patient is known to neurology service and has no new complaints.  Family requested to be re-seen due to patient tremors worsening.  Family present at bedside and daughter on phone also.          Vital Signs Last 24 Hrs  T(C): 37.8 (08 Mar 2021 14:56), Max: 37.8 (08 Mar 2021 14:56)  T(F): 100.1 (08 Mar 2021 14:56), Max: 100.1 (08 Mar 2021 14:56)  HR: 67 (08 Mar 2021 14:00) (67 - 69)  BP: 145/77 (08 Mar 2021 14:00) (131/65 - 154/90)  BP(mean): --  RR: 18 (08 Mar 2021 14:00) (16 - 18)  ICU Vital Signs Last 24 Hrs  T(C): 37.8 (08 Mar 2021 14:56), Max: 37.8 (08 Mar 2021 14:56)  T(F): 100.1 (08 Mar 2021 14:56), Max: 100.1 (08 Mar 2021 14:56)  HR: 67 (08 Mar 2021 14:00) (67 - 69)  BP: 145/77 (08 Mar 2021 14:00) (131/65 - 154/90)  BP(mean): --  ABP: --  ABP(mean): --  RR: 18 (08 Mar 2021 14:00) (16 - 18)  SpO2: --      Neurological Exam:     Mental status: Awake, alert and Oriented; Good eye contact ; follow simple commands.  Attention/concentration intact.     Cranial nerves:  no nystagmus, extraocular muscles intact, face symmetric,  Motor:  Normal bulk and tone, strength 5/5 in RUE, has chronic left shoulder dislocated,   2/5 LUE , mild rigidity and mild cogwheeling b/l UE/LE.  Moderate resting tremor.  currently bed-bound.   strength asymmetric.         Medications  acetaminophen  Suppository .. 650 milliGRAM(s) Rectal every 6 hours PRN  apixaban 5 milliGRAM(s) Oral every 12 hours  atorvastatin 40 milliGRAM(s) Oral at bedtime  carbidopa/levodopa  25/100 2 Tablet(s) Oral <User Schedule>  chlorhexidine 4% Liquid 1 Application(s) Topical <User Schedule>  chlorhexidine 4% Liquid 1 Application(s) Topical <User Schedule>  colchicine 0.6 milliGRAM(s) Oral daily  dextrose 5% + sodium chloride 0.9%. 1000 milliLiter(s) IV Continuous <Continuous>  enalapril 5 milliGRAM(s) Oral daily  fluconAZOLE IVPB      fluconAZOLE IVPB 400 milliGRAM(s) IV Intermittent every 24 hours  pantoprazole   Suspension 40 milliGRAM(s) Oral before breakfast  piperacillin/tazobactam IVPB.. 3.375 Gram(s) IV Intermittent every 8 hours  polyethylene glycol 3350 17 Gram(s) Oral daily  senna 2 Tablet(s) Oral at bedtime      Lab                        10.3   5.77  )-----------( 385      ( 08 Mar 2021 06:09 )             33.8     03-08    140  |  106  |  12  ----------------------------<  93  3.9   |  26  |  1.2    Ca    8.5      08 Mar 2021 06:09      CAPILLARY BLOOD GLUCOSE                · Assessment    	  A 71 year old Mohawk speaking Male with past medical history of Parkinson disease, HLD, HTN, H/O DVT on Eliquis and Lasix, H/O COVID presents to ED with altered mental status for 2 days seen by neurology for Parkinson , meds adjusted multiple times during the course of admission.      Plan  -Continue current meds of Sinemet 25/100 2 tablets 5 times a day, pt meds were adjusted recently and would not benefit from any increase at the current time, due to the bedbound status of the patient and lack of muscle use  -Patient needs aggressive PT/OT  -no further neurological work up at this time and case discussed with family bedside and the need for PT/OT explained as the priority tx presently  -continue medical management   -f/u with neurologist as outpt in 2 - 4 weeks after discharge.  May f/u with movement specialist Dr. Saxena ([phone]) for further medication management  -may call Neurology team as needed.

## 2021-03-08 NOTE — PROGRESS NOTE ADULT - ATTENDING COMMENTS
Patient seen and examined with SHARIF Cornell and agree with above except as noted.  Patients history, notes, labs, imaging, vitals and meds reviewed personally.  Son was at bedside and daughter was on the phone.  Daughter says he was doing much better at home prior to coming into the hospital  however he was dealing with hallucinations.  his doses of sinemet have been increasing during this admission and his tremors donot appear to be improved however prior he was altered and has been bed-bound through most of the admission.  Would continue current dose and work on PT/OT aggressively.  The sinemet should be lowered if he shows signs of hallucinations or increased abnormal movements

## 2021-03-08 NOTE — PROGRESS NOTE ADULT - ASSESSMENT
Aspiration Pneumonia:  -Patient was to be discharged 2/27/21, persistent fevers   -CXR shows LLL opacity   -Repeat COVID neg  -Highly likely asp PNA given recent history of aspiration and advancing diet  -Started Vanco and Brady, patient spiked fevers after starting; MRSA nares neg, switched to Zosyn 2/28 renally dosed  -O2 via NC  -Speech and swallow follow up-diet decreased to Dysphagia 1  -surgery eval for sacral wound assessment appreciated -- no debridement  warranted     Fungemia:  -patient had been persistently febrile on broad spectrum abx  -Continue with Fluconazole IV and Zosyn   -TTE ordered multiple times; finally done; will follow results   - CT scan Abd neg  - repeat blood cx negative for 03/03/21    Dysphagia  -Swallow eval appreciated, now on Dysphagia 1  -Had to change to 1 from 2; likely more lethargic from active infection  -Speech and swallow to cont to follow up    Parkinsons  -daughter requesting neuro follow up     Left shoulder dislocation  -Reviewed imaging (CT, XR)  -Ortho consult appreciated, no need for intervention    Bradycardia with First Degree AV Block  Patient was initially hypertensive with Bradycardia and later went to [facility]   Was on Dopamine   EPS eval recommended no PPM for now  Monitor vitals   Echo: Unremarkable with mild MR and TR  EKG: NSR with undetermined age of infarct    ABEBE on suspected CKD stage III  Resolved  -monitor kidney function while on current abx     Chronic DVT  On Eliquis    HTN  Hold off antihypertensive  Monitor BP  DASH Diet       # Progress Note Handoff  PENDING as follows  consults: ID follow up today   Test: noted  Family discussion: discussed with patient's daughter Kelton and updates given on a daily basis and today; aware of the overall poor prognosis; answered all questions and went over hospital course since admission - today   Disposition: not ready    Attending Physician Dr. Padmini Cheatham # [id number]

## 2021-03-08 NOTE — OCCUPATIONAL THERAPY INITIAL EVALUATION ADULT - PERTINENT HX OF CURRENT PROBLEM, REHAB EVAL
past medical history of Parkinson disease, HLD, HTN, H/O DVT on Eliquis and Lasix, H/O COVID presents to ED with altered mental status for 2 days.; s/p Left shoulder dislocation June 2020.

## 2021-03-08 NOTE — OCCUPATIONAL THERAPY INITIAL EVALUATION ADULT - RANGE OF MOTION EXAMINATION
Left shoulder PROM 1/4 range; elbow 3/4 range; wrist and hand WFLs; AROM left shoulder trace; elbow 1/2 range; observed digit flex extension approx 3/4 range; unable to formally assess secondary to confusion; RUE WFLs/deficits as listed below

## 2021-03-08 NOTE — PROGRESS NOTE ADULT - SUBJECTIVE AND OBJECTIVE BOX
71 year old Thai speaking Male with past medical history of Parkinson disease, HLD, HTN, H/O DVT on Eliquis and Lasix, H/O COVID presents to ED with altered mental status for 2 days.    Today:  3/7/21:  -pt seen and examined at bedside   -surgery's input appreciated; discussed with PA; attending and Sx resident to evaluate the case today; if no inpatient debridement warranted, will continue with current abx regimen and local dressing change as per wound care Nurse   -Daughter aware of the daily updates; and overall poor prognosis and that patient is not ready for NH d/c (still persistent fevers)  -no discharge until cleared by ID     3/8/21:  pt seen and examined at bedside   -continue with current antibiotics regimen   -neuro follow up placed as per daughter's request for Parkinson's medications adjustment  -skin care as per nursing --- wound care Nurse Practitioner called for follow up  -pt with overall poor prognosis; daughter aware   -ID follow up for abx; still with persistent fever     REVIEW OF SYSTEMS:  Not a reliable historian      Allergies  No Known Allergies    FAMILY HISTORY:  Family history of cerebrovascular accident (CVA) (Father)    Vital Signs Last 24 Hrs  T(C): 37.3 (08 Mar 2021 08:30), Max: 37.3 (08 Mar 2021 08:30)  T(F): 99.1 (08 Mar 2021 08:30), Max: 99.1 (08 Mar 2021 08:30)  HR: 68 (08 Mar 2021 05:25) (66 - 69)  BP: 131/65 (08 Mar 2021 05:25) (131/65 - 177/92)  RR: 16 (08 Mar 2021 05:25) (16 - 18)  SpO2: --    PHYSICAL EXAM:  GENERAL: ill-appearing  HEAD:  Atraumatic, Normocephalic  EYES: EOMI, PERRLA, conjunctiva and sclera clear  NERVOUS SYSTEM:  Awake not alert   CHEST/LUNG: decreased breath sounds at bases   HEART: Regular rate and rhythm; No murmurs, rubs, or gallops  ABDOMEN: Soft, Nontender, Nondistended; Bowel sounds present  EXTREMITIES:  2+ Peripheral Pulses, No clubbing, cyanosis, or edema  SKIN: No rashes or lesions    LABS:                          10.3   5.77  )-----------( 385      ( 08 Mar 2021 06:09 )             33.8   03-08    140  |  106  |  12  ----------------------------<  93  3.9   |  26  |  1.2    Ca    8.5      08 Mar 2021 06:09    Culture Results:   Growth in aerobic bottle: Candida albicans  ***Blood Panel PCR results on this specimen are available  approximately 3 hours after the Gram stain result.***  Gram stain, PCR, and/or culture results may not always  correspond due to difference in methodologies.  ************************************************************  This PCR assay was performed by multiplex PCR. This  Assay tests for 66 bacterial and resistance gene targets.  Please refer to the [facility] test directory  at [email or web address] for details.  "Due to technical problems, Pseudomonas aeruginosa    until further notice". (02-27 @ 14:38)  Culture Results:   Growth in aerobic bottle: Candida albicans  See previous culture [id number] (02-27 @ 14:38)  Culture Results:   No Growth Final (02-13 @ 11:54)  Culture Results:   No growth at 48 hours (02-13 @ 09:36)  Culture Results:   <10,000 CFU/mL Normal Urogenital Chelle (02-12 @ 18:37)      RADIOLOGY:  < from: CT Abdomen and Pelvis w/ IV Cont (03.03.21 @ 17:41) >  IMPRESSION:    No acute abnormality in the abdomen and pelvis.      Mild splenomegaly measuring up to 14.2 cm in craniocaudal dimension, new since 9/25/2018. No focal splenic lesions.    See incidental findings above.        MOISES GUADALUPE MD; Attending Radiologist  This document has been electronically signed. Mar  4 2021  8:32AM    < end of copied text >    MEDICATIONS  (STANDING):  apixaban 5 milliGRAM(s) Oral every 12 hours  atorvastatin 40 milliGRAM(s) Oral at bedtime  carbidopa/levodopa  25/100 2 Tablet(s) Oral <User Schedule>  chlorhexidine 4% Liquid 1 Application(s) Topical <User Schedule>  chlorhexidine 4% Liquid 1 Application(s) Topical <User Schedule>  colchicine 0.6 milliGRAM(s) Oral daily  dextrose 5% + sodium chloride 0.9%. 1000 milliLiter(s) (75 mL/Hr) IV Continuous <Continuous>  enalapril 5 milliGRAM(s) Oral daily  fluconAZOLE IVPB      fluconAZOLE IVPB 400 milliGRAM(s) IV Intermittent every 24 hours  pantoprazole   Suspension 40 milliGRAM(s) Oral before breakfast  piperacillin/tazobactam IVPB.. 3.375 Gram(s) IV Intermittent every 8 hours  polyethylene glycol 3350 17 Gram(s) Oral daily  senna 2 Tablet(s) Oral at bedtime    MEDICATIONS  (PRN):  acetaminophen  Suppository .. 650 milliGRAM(s) Rectal every 6 hours PRN Temp greater or equal to 38C (100.4F)

## 2021-03-09 LAB
ANION GAP SERPL CALC-SCNC: 7 MMOL/L — SIGNIFICANT CHANGE UP (ref 7–14)
BUN SERPL-MCNC: 11 MG/DL — SIGNIFICANT CHANGE UP (ref 10–20)
CALCIUM SERPL-MCNC: 8.9 MG/DL — SIGNIFICANT CHANGE UP (ref 8.5–10.1)
CHLORIDE SERPL-SCNC: 105 MMOL/L — SIGNIFICANT CHANGE UP (ref 98–110)
CO2 SERPL-SCNC: 28 MMOL/L — SIGNIFICANT CHANGE UP (ref 17–32)
CREAT SERPL-MCNC: 1.3 MG/DL — SIGNIFICANT CHANGE UP (ref 0.7–1.5)
CULTURE RESULTS: SIGNIFICANT CHANGE UP
GLUCOSE SERPL-MCNC: 93 MG/DL — SIGNIFICANT CHANGE UP (ref 70–99)
HCT VFR BLD CALC: 35.6 % — LOW (ref 42–52)
HGB BLD-MCNC: 10.9 G/DL — LOW (ref 14–18)
MCHC RBC-ENTMCNC: 25.2 PG — LOW (ref 27–31)
MCHC RBC-ENTMCNC: 30.6 G/DL — LOW (ref 32–37)
MCV RBC AUTO: 82.4 FL — SIGNIFICANT CHANGE UP (ref 80–94)
NRBC # BLD: 0 /100 WBCS — SIGNIFICANT CHANGE UP (ref 0–0)
PLATELET # BLD AUTO: 356 K/UL — SIGNIFICANT CHANGE UP (ref 130–400)
POTASSIUM SERPL-MCNC: 4.1 MMOL/L — SIGNIFICANT CHANGE UP (ref 3.5–5)
POTASSIUM SERPL-SCNC: 4.1 MMOL/L — SIGNIFICANT CHANGE UP (ref 3.5–5)
RBC # BLD: 4.32 M/UL — LOW (ref 4.7–6.1)
RBC # FLD: 15.4 % — HIGH (ref 11.5–14.5)
SODIUM SERPL-SCNC: 140 MMOL/L — SIGNIFICANT CHANGE UP (ref 135–146)
SPECIMEN SOURCE: SIGNIFICANT CHANGE UP
WBC # BLD: 6.29 K/UL — SIGNIFICANT CHANGE UP (ref 4.8–10.8)
WBC # FLD AUTO: 6.29 K/UL — SIGNIFICANT CHANGE UP (ref 4.8–10.8)

## 2021-03-09 PROCEDURE — 99233 SBSQ HOSP IP/OBS HIGH 50: CPT

## 2021-03-09 RX ADMIN — Medication 0.6 MILLIGRAM(S): at 11:48

## 2021-03-09 RX ADMIN — POLYETHYLENE GLYCOL 3350 17 GRAM(S): 17 POWDER, FOR SOLUTION ORAL at 11:49

## 2021-03-09 RX ADMIN — PIPERACILLIN AND TAZOBACTAM 25 GRAM(S): 4; .5 INJECTION, POWDER, LYOPHILIZED, FOR SOLUTION INTRAVENOUS at 05:10

## 2021-03-09 RX ADMIN — ATORVASTATIN CALCIUM 40 MILLIGRAM(S): 80 TABLET, FILM COATED ORAL at 21:58

## 2021-03-09 RX ADMIN — CARBIDOPA AND LEVODOPA 2 TABLET(S): 25; 100 TABLET ORAL at 05:09

## 2021-03-09 RX ADMIN — CHLORHEXIDINE GLUCONATE 1 APPLICATION(S): 213 SOLUTION TOPICAL at 05:10

## 2021-03-09 RX ADMIN — PANTOPRAZOLE SODIUM 40 MILLIGRAM(S): 20 TABLET, DELAYED RELEASE ORAL at 05:09

## 2021-03-09 RX ADMIN — Medication 5 MILLIGRAM(S): at 05:09

## 2021-03-09 RX ADMIN — CARBIDOPA AND LEVODOPA 2 TABLET(S): 25; 100 TABLET ORAL at 09:43

## 2021-03-09 RX ADMIN — CARBIDOPA AND LEVODOPA 2 TABLET(S): 25; 100 TABLET ORAL at 14:03

## 2021-03-09 RX ADMIN — SODIUM CHLORIDE 75 MILLILITER(S): 9 INJECTION, SOLUTION INTRAVENOUS at 14:04

## 2021-03-09 RX ADMIN — CARBIDOPA AND LEVODOPA 2 TABLET(S): 25; 100 TABLET ORAL at 17:05

## 2021-03-09 RX ADMIN — CARBIDOPA AND LEVODOPA 2 TABLET(S): 25; 100 TABLET ORAL at 21:58

## 2021-03-09 RX ADMIN — PIPERACILLIN AND TAZOBACTAM 25 GRAM(S): 4; .5 INJECTION, POWDER, LYOPHILIZED, FOR SOLUTION INTRAVENOUS at 14:04

## 2021-03-09 RX ADMIN — FLUCONAZOLE 100 MILLIGRAM(S): 150 TABLET ORAL at 11:48

## 2021-03-09 RX ADMIN — APIXABAN 5 MILLIGRAM(S): 2.5 TABLET, FILM COATED ORAL at 05:09

## 2021-03-09 RX ADMIN — APIXABAN 5 MILLIGRAM(S): 2.5 TABLET, FILM COATED ORAL at 17:04

## 2021-03-09 RX ADMIN — SENNA PLUS 2 TABLET(S): 8.6 TABLET ORAL at 21:59

## 2021-03-09 NOTE — PROGRESS NOTE ADULT - SUBJECTIVE AND OBJECTIVE BOX
71 year old Czech speaking Male with past medical history of Parkinson disease, HLD, HTN, H/O DVT on Eliquis and Lasix, H/O COVID presents to ED with altered mental status for 2 days.    Today:  3/7/21:  -pt seen and examined at bedside   -surgery's input appreciated; discussed with PA; attending and Sx resident to evaluate the case today; if no inpatient debridement warranted, will continue with current abx regimen and local dressing change as per wound care Nurse   -Daughter aware of the daily updates; and overall poor prognosis and that patient is not ready for NH d/c (still persistent fevers)  -no discharge until cleared by ID     3/8/21:  pt seen and examined at bedside   -continue with current antibiotics regimen   -neuro follow up placed as per daughter's request for Parkinson's medications adjustment  -skin care as per nursing --- wound care Nurse Practitioner called for follow up  -pt with overall poor prognosis; daughter aware   -ID follow up for abx; still with persistent fever     3/9/21:  pt seen and examined at bedside this morning  -still with persistent fevers; need ID follow up for further reccs  -I speak to patient's daughter Kelton (drives from [place] to visit him) and give her updates  -Neuro follow up appreciated and patient will be continued on current parkinson's meds  -plan of care discussed with Occupational therapist yesterday for shoulder dislocation; OT will be done couple times a week and SLING is not recommended at this point  -c/w abx as per ID   -no discharge planning to NH as pt is still acute   -overall prognosis poor     REVIEW OF SYSTEMS:  Not a reliable historian      Allergies  No Known Allergies    FAMILY HISTORY:  Family history of cerebrovascular accident (CVA) (Father)    Vital Signs Last 24 Hrs  T(C): 36.2 (09 Mar 2021 06:05), Max: 37.8 (08 Mar 2021 14:56)  T(F): 97.1 (09 Mar 2021 06:05), Max: 100.1 (08 Mar 2021 14:56)  HR: 60 (09 Mar 2021 06:05) (60 - 70)  BP: 168/78 (09 Mar 2021 06:05) (142/74 - 168/78)  RR: 18 (09 Mar 2021 06:05) (18 - 18)  SpO2: 100% (09 Mar 2021 08:22) (100% - 100%)    PHYSICAL EXAM:  GENERAL: ill-appearing  HEAD:  Atraumatic, Normocephalic  EYES: EOMI, PERRLA, conjunctiva and sclera clear  NERVOUS SYSTEM:  Awake not alert   CHEST/LUNG: decreased breath sounds at bases   HEART: Regular rate and rhythm; No murmurs, rubs, or gallops  ABDOMEN: Soft, Nontender, Nondistended; Bowel sounds present  EXTREMITIES:  2+ Peripheral Pulses, No clubbing, cyanosis, or edema  SKIN: No rashes or lesions    LABS:                        10.9   6.29  )-----------( 356      ( 09 Mar 2021 07:37 )             35.6   03-09    140  |  105  |  11  ----------------------------<  93  4.1   |  28  |  1.3    Ca    8.9      09 Mar 2021 07:37                            10.3   5.77  )-----------( 385      ( 08 Mar 2021 06:09 )             33.8   03-08    140  |  106  |  12  ----------------------------<  93  3.9   |  26  |  1.2    Ca    8.5      08 Mar 2021 06:09    Culture Results:   Growth in aerobic bottle: Candida albicans  ***Blood Panel PCR results on this specimen are available  approximately 3 hours after the Gram stain result.***  Gram stain, PCR, and/or culture results may not always  correspond due to difference in methodologies.  ************************************************************  This PCR assay was performed by multiplex PCR. This  Assay tests for 66 bacterial and resistance gene targets.  Please refer to the [facility] test directory  at [email or web address] for details.  "Due to technical problems, Pseudomonas aeruginosa    until further notice". (02-27 @ 14:38)  Culture Results:   Growth in aerobic bottle: Candida albicans  See previous culture [id number] (02-27 @ 14:38)  Culture Results:   No Growth Final (02-13 @ 11:54)  Culture Results:   No growth at 48 hours (02-13 @ 09:36)  Culture Results:   <10,000 CFU/mL Normal Urogenital Chelle (02-12 @ 18:37)      RADIOLOGY:  < from: CT Abdomen and Pelvis w/ IV Cont (03.03.21 @ 17:41) >  IMPRESSION:    No acute abnormality in the abdomen and pelvis.      Mild splenomegaly measuring up to 14.2 cm in craniocaudal dimension, new since 9/25/2018. No focal splenic lesions.    See incidental findings above.        MOISES GUADALUPE MD; Attending Radiologist  This document has been electronically signed. Mar  4 2021  8:32AM    < end of copied text >    MEDICATIONS  (STANDING):  apixaban 5 milliGRAM(s) Oral every 12 hours  atorvastatin 40 milliGRAM(s) Oral at bedtime  carbidopa/levodopa  25/100 2 Tablet(s) Oral <User Schedule>  chlorhexidine 4% Liquid 1 Application(s) Topical <User Schedule>  chlorhexidine 4% Liquid 1 Application(s) Topical <User Schedule>  colchicine 0.6 milliGRAM(s) Oral daily  dextrose 5% + sodium chloride 0.9%. 1000 milliLiter(s) (75 mL/Hr) IV Continuous <Continuous>  enalapril 5 milliGRAM(s) Oral daily  fluconAZOLE IVPB      fluconAZOLE IVPB 400 milliGRAM(s) IV Intermittent every 24 hours  pantoprazole   Suspension 40 milliGRAM(s) Oral before breakfast  piperacillin/tazobactam IVPB.. 3.375 Gram(s) IV Intermittent every 8 hours  polyethylene glycol 3350 17 Gram(s) Oral daily  senna 2 Tablet(s) Oral at bedtime    MEDICATIONS  (PRN):  acetaminophen  Suppository .. 650 milliGRAM(s) Rectal every 6 hours PRN Temp greater or equal to 38C (100.4F)

## 2021-03-09 NOTE — PROGRESS NOTE ADULT - ASSESSMENT
71 year old Croatian speaking Male with past medical history of Parkinson disease, HLD, HTN, H/O DVT on Eliquis and Lasix, H/O COVID presents to ED with altered mental status for 2 days.       IMPRESSION  #Aspiration pneumonia   #Fungemia  - Blood Cx 2/27 Candida albicans  - evaluated by optho - no ocular evidence   - TTE no significant valvulopathy     #Hx of DVT    would recommend:  - Continue fluconazole 400 mg daily PO  - would plan for 14 day course from last documented negative culture (3/3-3/16)  - can stop zosyn     Please call or message on [employer] Teams if with any questions.  Spectra [phone]

## 2021-03-09 NOTE — PROGRESS NOTE ADULT - SUBJECTIVE AND OBJECTIVE BOX
NIEVES LABOY  71y, Male  Allergy: No Known Allergies      LOS  25d    CHIEF COMPLAINT: Altered Mental Status (09 Mar 2021 12:14)      INTERVAL EVENTS/HPI  - No acute events overnight  - T(F): , Max: 100.1 (03-08-21 @ 14:56)  - fever curve appears to be improving   - WBC Count: 6.29 (03-09-21 @ 07:37)  WBC Count: 5.77 (03-08-21 @ 06:09)     - Creatinine, Serum: 1.3 (03-09-21 @ 07:37)  Creatinine, Serum: 1.2 (03-08-21 @ 06:09)       ROS  General: Denies rigors, nightsweats  HEENT: Denies headache, rhinorrhea, sore throat, eye pain  CV: Denies CP, palpitations  PULM: Denies wheezing, hemoptysis  GI: Denies hematemesis, hematochezia, melena  : Denies discharge, hematuria  MSK: Denies arthralgias, myalgias  SKIN: Denies rash, lesions  NEURO: Denies paresthesias, weakness  PSYCH: Denies depression, anxiety    VITALS:  T(F): 97.1, Max: 100.1 (03-08-21 @ 14:56)  HR: 60  BP: 168/78  RR: 18Vital Signs Last 24 Hrs  T(C): 36.2 (09 Mar 2021 06:05), Max: 37.8 (08 Mar 2021 14:56)  T(F): 97.1 (09 Mar 2021 06:05), Max: 100.1 (08 Mar 2021 14:56)  HR: 60 (09 Mar 2021 06:05) (60 - 70)  BP: 168/78 (09 Mar 2021 06:05) (142/74 - 168/78)  BP(mean): --  RR: 18 (09 Mar 2021 06:05) (18 - 18)  SpO2: 100% (09 Mar 2021 08:22) (100% - 100%)    PHYSICAL EXAM:  Gen: NAD, resting in bed  HEENT: Normocephalic, atraumatic  Neck: supple, no lymphadenopathy  CV: Regular rate & regular rhythm  Lungs: decreased BS at bases, no fremitus  Abdomen: Soft, BS present  Ext: Warm, well perfused  Neuro: non focal, awake  Skin: no rash, no erythema  Lines: no phlebitis    FH: Non-contributory  Social Hx: Non-contributory    TESTS & MEASUREMENTS:                        10.9   6.29  )-----------( 356      ( 09 Mar 2021 07:37 )             35.6     03-09    140  |  105  |  11  ----------------------------<  93  4.1   |  28  |  1.3    Ca    8.9      09 Mar 2021 07:37      eGFR if Non African American: 55 mL/min/1.73M2 (03-09-21 @ 07:37)  eGFR if : 63 mL/min/1.73M2 (03-09-21 @ 07:37)          Culture - Urine (collected 03-04-21 @ 12:00)  Source: .Urine Clean Catch (Midstream)  Final Report (03-06-21 @ 07:24):    No growth    Culture - Blood (collected 03-04-21 @ 11:12)  Source: .Blood None  Preliminary Report (03-05-21 @ 23:01):    No growth to date.    Culture - Blood (collected 03-03-21 @ 16:27)  Source: .Blood None  Final Report (03-08-21 @ 23:01):    No Growth Final    Culture - Blood (collected 02-27-21 @ 14:38)  Source: .Blood None  Gram Stain (03-01-21 @ 15:00):    Growth in aerobic bottle: Yeast like cells  Final Report (03-06-21 @ 14:27):    Growth in aerobic bottle: Candida albicans    See previous culture [id number]    Culture - Blood (collected 02-27-21 @ 14:38)  Source: .Blood None  Gram Stain (03-01-21 @ 12:31):    Growth in aerobic bottle: Yeast like cells  Final Report (03-03-21 @ 13:57):    Growth in aerobic bottle: Candida albicans    ***Blood Panel PCR results on this specimen are available    approximately 3 hours after the Gram stain result.***    Gram stain, PCR, and/or culture results may not always    correspond due to difference in methodologies.    ************************************************************    This PCR assay was performed by multiplex PCR. This    Assay tests for 66 bacterial and resistance gene targets.    Please refer to the [facility] test directory    at [email or web address] for details.    "Due to technical problems, Pseudomonas aeruginosa    until further notice".  Organism: Blood Culture PCR  Candida albicans (03-03-21 @ 13:57)  Organism: Candida albicans (03-03-21 @ 13:57)      -  Fluconazole: S 0.5 Fluconazole = Data established for mucosal disease, and is generally applicable for invasive disease.  Susceptibility is dependent on achieving the maximal possible blood level.  Doses of 400 MG/day or more may be required in adults with normalrenalfunction and body habitus.      -  Interpretation: See note This test method is not approved by the FDA and is for research use only.  The performance characteristics of this assay may have been determined by [facility] and [facility], [place], [place]                            N/I - No interpretation available                                                         SDD – Sensitive Dose Dependent      Method Type: YSTMIC  Organism: Blood Culture PCR (03-03-21 @ 13:57)      -  Candida albicans: Detec      Method Type: PCR    Culture - Blood (collected 02-13-21 @ 11:54)  Source: .Blood None  Final Report (02-18-21 @ 19:01):    No Growth Final    Culture - Urine (collected 02-13-21 @ 09:36)  Source: .Urine Kidney  Final Report (02-15-21 @ 08:40):    No growth at 48 hours    Culture - Urine (collected 02-12-21 @ 18:37)  Source: .Urine Clean Catch (Midstream)  Final Report (02-13-21 @ 22:43):    <10,000 CFU/mL Normal Urogenital Chelle            INFECTIOUS DISEASES TESTING  COVID-19 PCR: NotDetec (03-01-21 @ 16:49)  Fungitell: 62 (03-01-21 @ 11:00)  Procalcitonin, Serum: 0.29 (03-01-21 @ 05:32)  MRSA PCR Result.: Negative (02-27-21 @ 15:44)  COVID-19 PCR: NotDetec (02-25-21 @ 15:34)  Procalcitonin, Serum: 0.27 (02-18-21 @ 10:54)  MRSA PCR Result.: Negative (02-14-21 @ 18:29)  HIV-1/2 Combo Result: Nonreact (02-14-21 @ 05:07)  Procalcitonin, Serum: 0.46 (02-13-21 @ 16:00)  COVID-19 PCR: NotDetec (02-12-21 @ 10:17)      INFLAMMATORY MARKERS      RADIOLOGY & ADDITIONAL TESTS:  I have personally reviewed the last available Chest xray  CXR      CT      CARDIOLOGY TESTING      MEDICATIONS  apixaban 5 Oral every 12 hours  atorvastatin 40 Oral at bedtime  carbidopa/levodopa  25/100 2 Oral <User Schedule>  chlorhexidine 4% Liquid 1 Topical <User Schedule>  chlorhexidine 4% Liquid 1 Topical <User Schedule>  colchicine 0.6 Oral daily  dextrose 5% + sodium chloride 0.9%. 1000 IV Continuous <Continuous>  enalapril 5 Oral daily  fluconAZOLE IVPB     fluconAZOLE IVPB 400 IV Intermittent every 24 hours  pantoprazole   Suspension 40 Oral before breakfast  piperacillin/tazobactam IVPB.. 3.375 IV Intermittent every 8 hours  polyethylene glycol 3350 17 Oral daily  senna 2 Oral at bedtime      WEIGHT  Weight (kg): 89.4 (02-15-21 @ 13:59)  Creatinine, Serum: 1.3 mg/dL (03-09-21 @ 07:37)      ANTIBIOTICS:  fluconAZOLE IVPB      fluconAZOLE IVPB 400 milliGRAM(s) IV Intermittent every 24 hours  piperacillin/tazobactam IVPB.. 3.375 Gram(s) IV Intermittent every 8 hours      All available historical records have been reviewed

## 2021-03-09 NOTE — CHART NOTE - NSCHARTNOTEFT_GEN_A_CORE
Registered Dietitian Follow-Up     Patient Profile Reviewed                           Yes [x]   No []     Nutrition History Previously Obtained        Yes [x]  No []       Pertinent Subjective Information: Po intake improving overall since previously assessed. Per EMR, po intake has been ranging 50-80% since previous assessment. No GI s/s noted. Downgraded to pureed/nectar thickened liquids since 3/1 per SLP recs. Tolerating current diet.      Pertinent Medical Interventions: Pt with persistent fevers; remains on abx per ID. OT following r/t dislocated shoulder. Pt remains acute at this time. Surgery eval for sacral wound assessment- no debridement.      Diet order: Diet, Dysphagia 1 Pureed-Nectar Consistency Fluid:   Prosource Gelatein Plus     Qty per Day:  2  Supplement Feeding Modality:  Oral  Ensure Pudding Cans or Servings Per Day:  1       Frequency:  Three Times a day (03-02-21 @ 13:27) [Active]    Anthropometrics:  Height: 67"  Weight: dosing (2/15) 89.4 kg  BMI: 30.9  IBW: 67.3 kg    Daily   % Weight Change no new wt recorded since previously assessed    MEDICATIONS  (STANDING):  apixaban 5 milliGRAM(s) Oral every 12 hours  atorvastatin 40 milliGRAM(s) Oral at bedtime  carbidopa/levodopa  25/100 2 Tablet(s) Oral <User Schedule>  chlorhexidine 4% Liquid 1 Application(s) Topical <User Schedule>  chlorhexidine 4% Liquid 1 Application(s) Topical <User Schedule>  colchicine 0.6 milliGRAM(s) Oral daily  dextrose 5% + sodium chloride 0.9%. 1000 milliLiter(s) (75 mL/Hr) IV Continuous <Continuous>  enalapril 5 milliGRAM(s) Oral daily  fluconAZOLE IVPB      fluconAZOLE IVPB 400 milliGRAM(s) IV Intermittent every 24 hours  pantoprazole   Suspension 40 milliGRAM(s) Oral before breakfast  piperacillin/tazobactam IVPB.. 3.375 Gram(s) IV Intermittent every 8 hours  polyethylene glycol 3350 17 Gram(s) Oral daily  senna 2 Tablet(s) Oral at bedtime    MEDICATIONS  (PRN):  acetaminophen  Suppository .. 650 milliGRAM(s) Rectal every 6 hours PRN Temp greater or equal to 38C (100.4F)    Pertinent Labs: 03-09 RBC 4.32, H/H 10.9/35.6    Physical Findings:  - Appearance: obese; (3/9) +2 edema (L/R hands)  - GI function: last BM on 3/8 per EMR; pt remains on bowel regimen  - Tubes: N/A  - Oral/Mouth cavity: per SLP on 3/3 "Moderate oral dysphagia, no s/s aspiration/penetration for nectar thick liquids and puree. Per PCA, pt tolerating current diet however occasionally requires cues to swallow"   - Skin: DTI (R heel), stage III pressure ulcer (coccyx)     Nutrition Requirements (from RD note on 3/2)   Weight Used: ideal 67.1kg     Estimated Energy Needs    Continue [x]  2013-2348kcal (30-35kcal/kg IBW) -- PU considered, bmi >30     Estimated Protein Needs    Continue [x] 81-101g (1.2-1.5g/kg IBW) same as above        Estimated Fluid Needs        Continue [x] per LIP     Nutrient Intake: 50-80%, ongoing improvement     [x] Previous Nutrition Diagnosis: 1. Inadequate oral intake; 2. Increased nutrient needs            [x] Ongoing               Nutrition Intervention meals and snacks, vitamin/mineral supplements, coordination of care     Goal/Expected Outcome: pt to maintain po intake >75% within the next 3 days     Indicator/Monitoring: RD to monitor diet order, energy intake, body composition, NFPF    Recommendation: continue the current diet order per SLP recs, continue with the current supplements, consider ordering a daily MVI supplement, encourage po intake, provide assistance with meals PRN.

## 2021-03-09 NOTE — PROGRESS NOTE ADULT - ASSESSMENT
Aspiration Pneumonia:  -Patient was to be discharged 2/27/21, persistent fevers   -CXR shows LLL opacity   -Repeat COVID neg  -Highly likely asp PNA given recent history of aspiration and advancing diet  -Started Vanco and Brady, patient spiked fevers after starting; MRSA nares neg, switched to Zosyn 2/28 renally dosed  -O2 via NC  -Speech and swallow follow up-diet decreased to Dysphagia 1  -surgery eval for sacral wound assessment appreciated -- no debridement  warranted   -ID follow up today --- may be abx vacation; will follow official note     Fungemia:  -patient had been persistently febrile on broad spectrum abx  -Continue with Fluconazole IV and Zosyn   -TTE ordered multiple times; finally done; no vegetations   - CT scan Abd neg  - repeat blood cx negative for 03/03/21    Dysphagia  -Swallow eval appreciated, now on Dysphagia 1  -Had to change to 1 from 2; likely more lethargic from active infection  -Speech and swallow to cont to follow up    Parkinsons  -daughter requesting neuro follow up     Left shoulder dislocation  -Reviewed imaging (CT, XR)  -Ortho consult appreciated, no need for intervention    Bradycardia with First Degree AV Block  Patient was initially hypertensive with Bradycardia and later went to [facility]   Was on Dopamine   EPS eval recommended no PPM for now  Monitor vitals   Echo: Unremarkable with mild MR and TR  EKG: NSR with undetermined age of infarct    ABEBE on suspected CKD stage III  Resolved  -monitor kidney function while on current abx     Chronic DVT  On Eliquis    HTN  Hold off antihypertensive  Monitor BP  DASH Diet       # Progress Note Handoff  PENDING as follows  consults: ID follow up today   Test: noted  Family discussion: discussed with patient's daughter Kelton and updates given on a daily basis, aware of the overall poor prognosis; answered all questions and went over hospital course since admission - today   Disposition: not ready    Attending Physician Dr. Padmini Cheatham # [id number]

## 2021-03-09 NOTE — PROGRESS NOTE ADULT - NSHPATTENDINGPLANDISCUSS_GEN_ALL_CORE
medical staff
PAs
medical staff
resident
medical staff
housestaff

## 2021-03-10 PROCEDURE — 99233 SBSQ HOSP IP/OBS HIGH 50: CPT

## 2021-03-10 RX ORDER — FLUCONAZOLE 150 MG/1
400 TABLET ORAL DAILY
Refills: 0 | Status: DISCONTINUED | OUTPATIENT
Start: 2021-03-10 | End: 2021-03-11

## 2021-03-10 RX ADMIN — CARBIDOPA AND LEVODOPA 2 TABLET(S): 25; 100 TABLET ORAL at 09:22

## 2021-03-10 RX ADMIN — APIXABAN 5 MILLIGRAM(S): 2.5 TABLET, FILM COATED ORAL at 05:48

## 2021-03-10 RX ADMIN — Medication 0.6 MILLIGRAM(S): at 11:01

## 2021-03-10 RX ADMIN — CARBIDOPA AND LEVODOPA 2 TABLET(S): 25; 100 TABLET ORAL at 17:05

## 2021-03-10 RX ADMIN — Medication 5 MILLIGRAM(S): at 05:49

## 2021-03-10 RX ADMIN — CHLORHEXIDINE GLUCONATE 1 APPLICATION(S): 213 SOLUTION TOPICAL at 05:49

## 2021-03-10 RX ADMIN — CARBIDOPA AND LEVODOPA 2 TABLET(S): 25; 100 TABLET ORAL at 05:48

## 2021-03-10 RX ADMIN — CARBIDOPA AND LEVODOPA 2 TABLET(S): 25; 100 TABLET ORAL at 13:24

## 2021-03-10 RX ADMIN — ATORVASTATIN CALCIUM 40 MILLIGRAM(S): 80 TABLET, FILM COATED ORAL at 21:49

## 2021-03-10 RX ADMIN — FLUCONAZOLE 100 MILLIGRAM(S): 150 TABLET ORAL at 11:02

## 2021-03-10 RX ADMIN — APIXABAN 5 MILLIGRAM(S): 2.5 TABLET, FILM COATED ORAL at 17:06

## 2021-03-10 RX ADMIN — CARBIDOPA AND LEVODOPA 2 TABLET(S): 25; 100 TABLET ORAL at 21:49

## 2021-03-10 RX ADMIN — PANTOPRAZOLE SODIUM 40 MILLIGRAM(S): 20 TABLET, DELAYED RELEASE ORAL at 05:49

## 2021-03-10 RX ADMIN — SENNA PLUS 2 TABLET(S): 8.6 TABLET ORAL at 21:49

## 2021-03-10 NOTE — PROGRESS NOTE ADULT - ASSESSMENT
# persistent fever- improving  # fungemia  -CXR shows LLL opacity   -Repeat COVID neg  -Highly likely asp PNA given recent history of aspiration and advancing diet  -patient was treated with IV antibiotics-  Vanco and Brady; switched to Zosyn 2/28 - finished course  -Speech and swallow follow up-diet decreased to Dysphagia 1  -surgery eval for sacral wound assessment appreciated -- no debridement  warranted   -ID following; continue fluconazole 400 mg for 2 weeks; until 3/16    Dysphagia  -Swallow eval appreciated, now on Dysphagia 1  -Had to change to 1 from 2; likely more lethargic from active infection  -Speech and swallow to cont to follow up    Parkinsons  continue meds    Left shoulder dislocation  -Reviewed imaging (CT, XR)  -Ortho consult appreciated, no need for intervention    Bradycardia with First Degree AV Block  Patient was initially hypertensive with Bradycardia and later went to [facility]   Was on Dopamine   EPS eval recommended no PPM for now  Monitor vitals   Echo: Unremarkable with mild MR and TR  EKG: NSR with undetermined age of infarct    ABEBE on suspected CKD stage III  Resolved  -monitor kidney function while on current abx     Chronic DVT  On Eliquis    HTN  Hold off antihypertensive  Monitor BP  DASH Diet       # Progress Note Handoff  PENDING as follows  consults: prepare for discharge; covid swab  plan of care updated with family

## 2021-03-10 NOTE — PROGRESS NOTE ADULT - SUBJECTIVE AND OBJECTIVE BOX
NIEVES LABOY  71y, Male  Allergy: No Known Allergies      LOS  26d    CHIEF COMPLAINT: Altered Mental Status (09 Mar 2021 13:06)      INTERVAL EVENTS/HPI  - No acute events overnight  - T(F): , Max: 99.7 (03-09-21 @ 14:00)  - Video  used -- reports leg pains/bilateral, these are chronic   - Denies any worsening symptoms  - Tolerating medication  - WBC Count: 6.29 (03-09-21 @ 07:37)  WBC Count: 5.77 (03-08-21 @ 06:09)     - Creatinine, Serum: 1.3 (03-09-21 @ 07:37)       ROS  General: Denies rigors, nightsweats  HEENT: Denies headache, rhinorrhea, sore throat, eye pain  CV: Denies CP, palpitations  PULM: Denies wheezing, hemoptysis  GI: Denies hematemesis, hematochezia, melena  : Denies discharge, hematuria  MSK: Denies arthralgias, myalgias  SKIN: Denies rash, lesions  NEURO: Denies paresthesias, weakness  PSYCH: Denies depression, anxiety    VITALS:  T(F): 98.3, Max: 99.7 (03-09-21 @ 14:00)  HR: 60  BP: 185/86  RR: 18Vital Signs Last 24 Hrs  T(C): 36.8 (10 Mar 2021 05:00), Max: 37.6 (09 Mar 2021 14:00)  T(F): 98.3 (10 Mar 2021 05:00), Max: 99.7 (09 Mar 2021 14:00)  HR: 60 (10 Mar 2021 05:00) (60 - 66)  BP: 185/86 (10 Mar 2021 05:00) (152/72 - 185/86)  BP(mean): --  RR: 18 (10 Mar 2021 05:00) (18 - 18)  SpO2: --    PHYSICAL EXAM:  Gen: NAD, resting in bed  HEENT: Normocephalic, atraumatic  Neck: supple, no lymphadenopathy  CV: Regular rate & regular rhythm  Lungs: decreased BS at bases, no fremitus  Abdomen: Soft, BS present  Ext: Warm, well perfused  Neuro: non focal, awake  Skin: no rash, no erythema  Lines: no phlebitis    FH: Non-contributory  Social Hx: Non-contributory    TESTS & MEASUREMENTS:                        10.9   6.29  )-----------( 356      ( 09 Mar 2021 07:37 )             35.6     03-09    140  |  105  |  11  ----------------------------<  93  4.1   |  28  |  1.3    Ca    8.9      09 Mar 2021 07:37              Culture - Urine (collected 03-04-21 @ 12:00)  Source: .Urine Clean Catch (Midstream)  Final Report (03-06-21 @ 07:24):    No growth    Culture - Blood (collected 03-04-21 @ 11:12)  Source: .Blood None  Final Report (03-09-21 @ 23:01):    No Growth Final    Culture - Blood (collected 03-03-21 @ 16:27)  Source: .Blood None  Final Report (03-08-21 @ 23:01):    No Growth Final    Culture - Blood (collected 02-27-21 @ 14:38)  Source: .Blood None  Gram Stain (03-01-21 @ 15:00):    Growth in aerobic bottle: Yeast like cells  Final Report (03-06-21 @ 14:27):    Growth in aerobic bottle: Candida albicans    See previous culture [id number]    Culture - Blood (collected 02-27-21 @ 14:38)  Source: .Blood None  Gram Stain (03-01-21 @ 12:31):    Growth in aerobic bottle: Yeast like cells  Final Report (03-03-21 @ 13:57):    Growth in aerobic bottle: Candida albicans    ***Blood Panel PCR results on this specimen are available    approximately 3 hours after the Gram stain result.***    Gram stain, PCR, and/or culture results may not always    correspond due to difference in methodologies.    ************************************************************    This PCR assay was performed by multiplex PCR. This    Assay tests for 66 bacterial and resistance gene targets.    Please refer to the [facility] test directory    at [email or web address] for details.    "Due to technical problems, Pseudomonas aeruginosa    until further notice".  Organism: Blood Culture PCR  Candida albicans (03-03-21 @ 13:57)  Organism: Candida albicans (03-03-21 @ 13:57)      -  Fluconazole: S 0.5 Fluconazole = Data established for mucosal disease, and is generally applicable for invasive disease.  Susceptibility is dependent on achieving the maximal possible blood level.  Doses of 400 MG/day or more may be required in adults with normalrenalfunction and body habitus.      -  Interpretation: See note This test method is not approved by the FDA and is for research use only.  The performance characteristics of this assay may have been determined by [facility] and [facility], [place], [place]..                            N/I - No interpretation available                                                         SDD – Sensitive Dose Dependent      Method Type: YSTMIC  Organism: Blood Culture PCR (03-03-21 @ 13:57)      -  Candida albicans: Detec      Method Type: PCR    Culture - Blood (collected 02-13-21 @ 11:54)  Source: .Blood None  Final Report (02-18-21 @ 19:01):    No Growth Final    Culture - Urine (collected 02-13-21 @ 09:36)  Source: .Urine Kidney  Final Report (02-15-21 @ 08:40):    No growth at 48 hours    Culture - Urine (collected 02-12-21 @ 18:37)  Source: .Urine Clean Catch (Midstream)  Final Report (02-13-21 @ 22:43):    <10,000 CFU/mL Normal Urogenital Chelle            INFECTIOUS DISEASES TESTING  COVID-19 PCR: NotDetec (03-01-21 @ 16:49)  Fungitell: 62 (03-01-21 @ 11:00)  Procalcitonin, Serum: 0.29 (03-01-21 @ 05:32)  MRSA PCR Result.: Negative (02-27-21 @ 15:44)  COVID-19 PCR: NotDetec (02-25-21 @ 15:34)  Procalcitonin, Serum: 0.27 (02-18-21 @ 10:54)  MRSA PCR Result.: Negative (02-14-21 @ 18:29)  HIV-1/2 Combo Result: Nonreact (02-14-21 @ 05:07)  Procalcitonin, Serum: 0.46 (02-13-21 @ 16:00)  COVID-19 PCR: NotDetec (02-12-21 @ 10:17)      INFLAMMATORY MARKERS      RADIOLOGY & ADDITIONAL TESTS:  I have personally reviewed the last available Chest xray  CXR      CT      CARDIOLOGY TESTING      MEDICATIONS  apixaban 5 Oral every 12 hours  atorvastatin 40 Oral at bedtime  carbidopa/levodopa  25/100 2 Oral <User Schedule>  chlorhexidine 4% Liquid 1 Topical <User Schedule>  chlorhexidine 4% Liquid 1 Topical <User Schedule>  colchicine 0.6 Oral daily  dextrose 5% + sodium chloride 0.9%. 1000 IV Continuous <Continuous>  enalapril 5 Oral daily  fluconAZOLE IVPB     fluconAZOLE IVPB 400 IV Intermittent every 24 hours  pantoprazole   Suspension 40 Oral before breakfast  polyethylene glycol 3350 17 Oral daily  senna 2 Oral at bedtime      WEIGHT  Weight (kg): 89.4 (02-15-21 @ 13:59)      ANTIBIOTICS:  fluconAZOLE IVPB      fluconAZOLE IVPB 400 milliGRAM(s) IV Intermittent every 24 hours      All available historical records have been reviewed

## 2021-03-10 NOTE — PROGRESS NOTE ADULT - ASSESSMENT
71 year old Polish speaking Male with past medical history of Parkinson disease, HLD, HTN, H/O DVT on Eliquis and Lasix, H/O COVID presents to ED with altered mental status for 2 days.       IMPRESSION  #Aspiration pneumonia   #Fungemia  - Blood Cx 2/27 Candida albicans  - evaluated by optho - no ocular evidence   - TTE no significant valvulopathy     #Hx of DVT    would recommend:  - Continue fluconazole 400 mg daily PO  - would plan for 14 day course from last documented negative culture (3/3-3/16)  - trend fever curve    Please call or message on Microsoft Teams if with any questions.  Spectra [phone]

## 2021-03-10 NOTE — PROGRESS NOTE ADULT - SUBJECTIVE AND OBJECTIVE BOX
HPI  Patient is a 71y old Male who presents with a chief complaint of Altered Mental Status (10 Mar 2021 12:32)    Currently admitted to medicine with the primary diagnosis of Bradycardia       Today is hospital day 26d.     INTERVAL HPI / OVERNIGHT EVENTS:  Patient was examined and seen at bedside.  son present at bedside; son acted as   patient feels okay  no new complaints          PAST MEDICAL & SURGICAL HISTORY  Cataract    Prostatitis    Dyslipidemia    Gout    Hypertension    Parkinson disease    No significant past surgical history      ALLERGIES  No Known Allergies    MEDICATIONS  STANDING MEDICATIONS  apixaban 5 milliGRAM(s) Oral every 12 hours  atorvastatin 40 milliGRAM(s) Oral at bedtime  carbidopa/levodopa  25/100 2 Tablet(s) Oral <User Schedule>  chlorhexidine 4% Liquid 1 Application(s) Topical <User Schedule>  chlorhexidine 4% Liquid 1 Application(s) Topical <User Schedule>  colchicine 0.6 milliGRAM(s) Oral daily  dextrose 5% + sodium chloride 0.9%. 1000 milliLiter(s) IV Continuous <Continuous>  enalapril 5 milliGRAM(s) Oral daily  fluconAZOLE IVPB      fluconAZOLE IVPB 400 milliGRAM(s) IV Intermittent every 24 hours  pantoprazole   Suspension 40 milliGRAM(s) Oral before breakfast  polyethylene glycol 3350 17 Gram(s) Oral daily  senna 2 Tablet(s) Oral at bedtime    PRN MEDICATIONS  acetaminophen  Suppository .. 650 milliGRAM(s) Rectal every 6 hours PRN    VITALS:  T(F): 99  HR: 67  BP: 134/92  RR: 16  SpO2: --    PHYSICAL EXAM  GEN: NAD, Resting comfortably in bed  PULM: Clear to auscultation bilaterally, No wheezes  CVS: Regular rate and rhythm, S1-S2, no rubs or gallops  ABD: Soft, non-tender, non-distended, no guarding  EXT: No edema  NEURO: A&Ox3, lower extremity weakness    LABS                        10.9   6.29  )-----------( 356      ( 09 Mar 2021 07:37 )             35.6     03-09    140  |  105  |  11  ----------------------------<  93  4.1   |  28  |  1.3    Ca    8.9      09 Mar 2021 07:37                    RADIOLOGY

## 2021-03-11 VITALS
HEART RATE: 71 BPM | TEMPERATURE: 99 F | DIASTOLIC BLOOD PRESSURE: 76 MMHG | SYSTOLIC BLOOD PRESSURE: 175 MMHG | RESPIRATION RATE: 18 BRPM

## 2021-03-11 LAB
ALBUMIN SERPL ELPH-MCNC: 3 G/DL — LOW (ref 3.5–5.2)
ALP SERPL-CCNC: 117 U/L — HIGH (ref 30–115)
ALT FLD-CCNC: <5 U/L — SIGNIFICANT CHANGE UP (ref 0–41)
ANION GAP SERPL CALC-SCNC: 9 MMOL/L — SIGNIFICANT CHANGE UP (ref 7–14)
AST SERPL-CCNC: 19 U/L — SIGNIFICANT CHANGE UP (ref 0–41)
BASOPHILS # BLD AUTO: 0.04 K/UL — SIGNIFICANT CHANGE UP (ref 0–0.2)
BASOPHILS NFR BLD AUTO: 0.7 % — SIGNIFICANT CHANGE UP (ref 0–1)
BILIRUB SERPL-MCNC: 0.4 MG/DL — SIGNIFICANT CHANGE UP (ref 0.2–1.2)
BUN SERPL-MCNC: 15 MG/DL — SIGNIFICANT CHANGE UP (ref 10–20)
CALCIUM SERPL-MCNC: 9 MG/DL — SIGNIFICANT CHANGE UP (ref 8.5–10.1)
CHLORIDE SERPL-SCNC: 102 MMOL/L — SIGNIFICANT CHANGE UP (ref 98–110)
CO2 SERPL-SCNC: 27 MMOL/L — SIGNIFICANT CHANGE UP (ref 17–32)
CREAT SERPL-MCNC: 1.2 MG/DL — SIGNIFICANT CHANGE UP (ref 0.7–1.5)
EOSINOPHIL # BLD AUTO: 0.26 K/UL — SIGNIFICANT CHANGE UP (ref 0–0.7)
EOSINOPHIL NFR BLD AUTO: 4.6 % — SIGNIFICANT CHANGE UP (ref 0–8)
GLUCOSE BLDC GLUCOMTR-MCNC: 86 MG/DL — SIGNIFICANT CHANGE UP (ref 70–99)
GLUCOSE SERPL-MCNC: 82 MG/DL — SIGNIFICANT CHANGE UP (ref 70–99)
HCT VFR BLD CALC: 36 % — LOW (ref 42–52)
HGB BLD-MCNC: 11 G/DL — LOW (ref 14–18)
IMM GRANULOCYTES NFR BLD AUTO: 0.5 % — HIGH (ref 0.1–0.3)
LYMPHOCYTES # BLD AUTO: 1.07 K/UL — LOW (ref 1.2–3.4)
LYMPHOCYTES # BLD AUTO: 18.7 % — LOW (ref 20.5–51.1)
MCHC RBC-ENTMCNC: 25.1 PG — LOW (ref 27–31)
MCHC RBC-ENTMCNC: 30.6 G/DL — LOW (ref 32–37)
MCV RBC AUTO: 82.2 FL — SIGNIFICANT CHANGE UP (ref 80–94)
MONOCYTES # BLD AUTO: 0.51 K/UL — SIGNIFICANT CHANGE UP (ref 0.1–0.6)
MONOCYTES NFR BLD AUTO: 8.9 % — SIGNIFICANT CHANGE UP (ref 1.7–9.3)
NEUTROPHILS # BLD AUTO: 3.8 K/UL — SIGNIFICANT CHANGE UP (ref 1.4–6.5)
NEUTROPHILS NFR BLD AUTO: 66.6 % — SIGNIFICANT CHANGE UP (ref 42.2–75.2)
NRBC # BLD: 0 /100 WBCS — SIGNIFICANT CHANGE UP (ref 0–0)
PLATELET # BLD AUTO: 287 K/UL — SIGNIFICANT CHANGE UP (ref 130–400)
POTASSIUM SERPL-MCNC: 4.1 MMOL/L — SIGNIFICANT CHANGE UP (ref 3.5–5)
POTASSIUM SERPL-SCNC: 4.1 MMOL/L — SIGNIFICANT CHANGE UP (ref 3.5–5)
PROT SERPL-MCNC: 6.8 G/DL — SIGNIFICANT CHANGE UP (ref 6–8)
RBC # BLD: 4.38 M/UL — LOW (ref 4.7–6.1)
RBC # FLD: 15.6 % — HIGH (ref 11.5–14.5)
SARS-COV-2 RNA SPEC QL NAA+PROBE: SIGNIFICANT CHANGE UP
SODIUM SERPL-SCNC: 138 MMOL/L — SIGNIFICANT CHANGE UP (ref 135–146)
WBC # BLD: 5.71 K/UL — SIGNIFICANT CHANGE UP (ref 4.8–10.8)
WBC # FLD AUTO: 5.71 K/UL — SIGNIFICANT CHANGE UP (ref 4.8–10.8)

## 2021-03-11 PROCEDURE — 99239 HOSP IP/OBS DSCHRG MGMT >30: CPT

## 2021-03-11 RX ORDER — FLUCONAZOLE 150 MG/1
2 TABLET ORAL
Qty: 14 | Refills: 0
Start: 2021-03-11 | End: 2021-03-17

## 2021-03-11 RX ORDER — SENNA PLUS 8.6 MG/1
2 TABLET ORAL
Qty: 20 | Refills: 0
Start: 2021-03-11 | End: 2021-03-20

## 2021-03-11 RX ADMIN — APIXABAN 5 MILLIGRAM(S): 2.5 TABLET, FILM COATED ORAL at 18:48

## 2021-03-11 RX ADMIN — Medication 0.6 MILLIGRAM(S): at 11:39

## 2021-03-11 RX ADMIN — CARBIDOPA AND LEVODOPA 2 TABLET(S): 25; 100 TABLET ORAL at 09:28

## 2021-03-11 RX ADMIN — Medication 5 MILLIGRAM(S): at 05:39

## 2021-03-11 RX ADMIN — CARBIDOPA AND LEVODOPA 2 TABLET(S): 25; 100 TABLET ORAL at 18:48

## 2021-03-11 RX ADMIN — POLYETHYLENE GLYCOL 3350 17 GRAM(S): 17 POWDER, FOR SOLUTION ORAL at 11:40

## 2021-03-11 RX ADMIN — CARBIDOPA AND LEVODOPA 2 TABLET(S): 25; 100 TABLET ORAL at 13:54

## 2021-03-11 RX ADMIN — APIXABAN 5 MILLIGRAM(S): 2.5 TABLET, FILM COATED ORAL at 05:39

## 2021-03-11 RX ADMIN — FLUCONAZOLE 400 MILLIGRAM(S): 150 TABLET ORAL at 11:39

## 2021-03-11 RX ADMIN — PANTOPRAZOLE SODIUM 40 MILLIGRAM(S): 20 TABLET, DELAYED RELEASE ORAL at 05:39

## 2021-03-11 RX ADMIN — CARBIDOPA AND LEVODOPA 2 TABLET(S): 25; 100 TABLET ORAL at 05:39

## 2021-03-11 NOTE — CHART NOTE - NSCHARTNOTEFT_GEN_A_CORE
Registered Dietitian Follow-Up     Patient Profile Reviewed                           Yes [x]   No []     Nutrition History Previously Obtained        Yes [x]  No []      This assessment was completed remotely     Pertinent Subjective Information: Pt continue to eat fairly, improving since initial assessment. Po intake varies 50-80% since changed to pureed diet per SLP. Pt maintaining tolerance to current diet. No noted GI s/s.       Pertinent Medical Interventions: Persistent fever, improving. Fungemia- CXR showed LLL opacity; COVID negative; high probability of aspiration pneumonia sp treament with IV abx per ID.      Diet order: Diet, Dysphagia 1 Pureed-Nectar Consistency Fluid:   Prosource Gelatein Plus     Qty per Day:  2  Supplement Feeding Modality:  Oral  Ensure Pudding Cans or Servings Per Day:  1       Frequency:  Three Times a day (03-02-21 @ 13:27) [Active]      Anthropometrics:  Height: 67"  Weight: dosing 89.4 kg  BMI: 30.9  IBW: 67.3 kg    Daily   % Weight Change no new wt recorded since previously assessed     MEDICATIONS  (STANDING):  apixaban 5 milliGRAM(s) Oral every 12 hours  atorvastatin 40 milliGRAM(s) Oral at bedtime  carbidopa/levodopa  25/100 2 Tablet(s) Oral <User Schedule>  chlorhexidine 4% Liquid 1 Application(s) Topical <User Schedule>  chlorhexidine 4% Liquid 1 Application(s) Topical <User Schedule>  colchicine 0.6 milliGRAM(s) Oral daily  enalapril 5 milliGRAM(s) Oral daily  fluconAZOLE   Tablet 400 milliGRAM(s) Oral daily  pantoprazole   Suspension 40 milliGRAM(s) Oral before breakfast  polyethylene glycol 3350 17 Gram(s) Oral daily  senna 2 Tablet(s) Oral at bedtime    MEDICATIONS  (PRN):  acetaminophen  Suppository .. 650 milliGRAM(s) Rectal every 6 hours PRN Temp greater or equal to 38C (100.4F)    Pertinent Labs: 03-11 RBC 4.38, H/H 11.0/36.0    Physical Findings:  - Appearance: obese; (3/9) +2 edema (L/R hands)  - GI function: last BM on 3/8 per EMR. Pt remains on a bowel regimen.  - Tubes: N/A  - Oral/Mouth cavity: dysphagia diet ordered per SLP recs  - Skin: suspected DTI (R heel), stage III pressure ulcer (coccyx)     Nutrition Requirements (from RD note on 3/2)   Weight Used: ideal 67.1kg     Estimated Energy Needs    Continue [x]  2013-2348kcal (30-35kcal/kg IBW) -- PU considered, bmi >30     Estimated Protein Needs    Continue [x] 81-101g (1.2-1.5g/kg IBW) same as above        Estimated Fluid Needs        Continue [x] per LIP     Nutrient Intake: 50-80%, ongoing improvement     [x] Previous Nutrition Diagnosis: 1. Inadequate oral intake; 2. Increased nutrient needs            [x] Ongoing               Nutrition Intervention meals and snacks, vitamin/mineral supplements, coordination of care     Goal/Expected Outcome: pt to maintain po intake >75% within the next 4 days     Indicator/Monitoring: RD to monitor diet order, energy intake, body composition, NFPF    Recommendation: continue the current diet order per SLP recs, continue with the current supplements, consider ordering a daily MVI supplement, encourage po intake, provide assistance with meals PRN.

## 2021-03-11 NOTE — PROGRESS NOTE ADULT - REASON FOR ADMISSION
Altered Mental Status
Altered Mental Status  Reason for Continued Hospitalization: Swallow Eval, VEEG
Altered Mental Status

## 2021-03-11 NOTE — PROGRESS NOTE ADULT - SUBJECTIVE AND OBJECTIVE BOX
HPI  Patient is a 71y old Male who presents with a chief complaint of Altered Mental Status (10 Mar 2021 18:08)    Currently admitted to medicine with the primary diagnosis of Candida albicans infection       Today is hospital day 27d.     INTERVAL HPI / OVERNIGHT EVENTS:  Patient was examined and seen at bedside.  Patient was sleepy in the morning. Patient was evaluated again in the afternoon. Patient was more awake  patient was interviewed with daughter as .   patient feels okay and denies complaints.        PAST MEDICAL & SURGICAL HISTORY  Cataract    Prostatitis    Dyslipidemia    Gout    Hypertension    Parkinson disease    No significant past surgical history      ALLERGIES  No Known Allergies    MEDICATIONS  STANDING MEDICATIONS  apixaban 5 milliGRAM(s) Oral every 12 hours  atorvastatin 40 milliGRAM(s) Oral at bedtime  carbidopa/levodopa  25/100 2 Tablet(s) Oral <User Schedule>  chlorhexidine 4% Liquid 1 Application(s) Topical <User Schedule>  chlorhexidine 4% Liquid 1 Application(s) Topical <User Schedule>  colchicine 0.6 milliGRAM(s) Oral daily  enalapril 5 milliGRAM(s) Oral daily  fluconAZOLE   Tablet 400 milliGRAM(s) Oral daily  pantoprazole   Suspension 40 milliGRAM(s) Oral before breakfast  polyethylene glycol 3350 17 Gram(s) Oral daily  senna 2 Tablet(s) Oral at bedtime    PRN MEDICATIONS  acetaminophen  Suppository .. 650 milliGRAM(s) Rectal every 6 hours PRN    VITALS:  T(F): 98.9  HR: 71  BP: 175/76  RR: 18  SpO2: --    PHYSICAL EXAM  GEN: NAD, lethargic  PULM: Clear to auscultation bilaterally, No wheezes  CVS: Regular rate and rhythm, S1-S2, no murmurs  ABD: Soft, non-tender, non-distended, no guarding  EXT: generalized tremor present  NEURO: lethargic; oriented X3; able to move right upper extremity  minimal movement of left upper extremity  decreased mobility of lower extremity    LABS                        11.0   5.71  )-----------( 287      ( 11 Mar 2021 07:19 )             36.0     03-11    138  |  102  |  15  ----------------------------<  82  4.1   |  27  |  1.2    Ca    9.0      11 Mar 2021 07:19    TPro  6.8  /  Alb  3.0<L>  /  TBili  0.4  /  DBili  x   /  AST  19  /  ALT  <5  /  AlkPhos  117<H>  03-11                  RADIOLOGY

## 2021-03-11 NOTE — PROGRESS NOTE ADULT - PROVIDER SPECIALTY LIST ADULT
CCU
Critical Care
Infectious Disease
Neurology
Surgery
Hospitalist
Internal Medicine
Nephrology
Nephrology
Neurology
CCU
Electrophysiology
Hospitalist
Infectious Disease
Infectious Disease
Internal Medicine
Neurology
Neurology
Orthopedics
Critical Care
Hospitalist
Infectious Disease
Internal Medicine
Hospitalist
Infectious Disease
Infectious Disease
Hospitalist

## 2021-03-11 NOTE — PROGRESS NOTE ADULT - ASSESSMENT
# persistent fever- improved  # fungemia with candida albicans on 2/27; repeat cultures from 3/4 neg; continue fluconazole 400 mg daily until 3/16  # LLL pneumonia possible aspiration pneumonia- treated and resolved  -patient was treated with IV antibiotics-  Vanco and Brady; switched to Zosyn 2/28 - finished course  -Speech and swallow follow up-continue Dysphagia 1    # sacral pressure ulcer- continue wound care, position changes    #Dysphagia- continue Dysphagia 1 diet    #Parkinsons  -Continue current meds of Sinemet 25/100 2 tablets 5 times a day, pt meds were adjusted recently and would not benefit from any increase at the current time, due to the bedbound status of the patient and lack of muscle use  -Patient needs aggressive PT/OT  -f/u with neurologist as outpt in 2 - 4 weeks after discharge.  May f/u with movement specialist Dr. Saxena ([phone]) for further medication management    #Left shoulder dislocation from June onwards;  Follow up with Dr Ignacio in one month    #Bradycardia with First Degree AV Block  Patient was initially hypertensive with Bradycardia and later went to [facility]   Was on Dopamine   EPS eval recommended no PPM for now  Echo: Unremarkable with mild MR and TR  EKG: NSR with undetermined age of infarct    #ABEBE on suspected CKD stage III- resolved    #Chronic DVT-On Eliquis    #HTN- restart antihypertensive medications

## 2021-03-11 NOTE — CHART NOTE - NSCHARTNOTESELECT_GEN_ALL_CORE
Event Note
RD at risk f/u/Event Note
Transfer Note
Event Note
NG Tube Placement
PA note/Event Note
RD follow up/Event Note
Transfer Note

## 2021-03-15 DIAGNOSIS — J69.0 PNEUMONITIS DUE TO INHALATION OF FOOD AND VOMIT: ICD-10-CM

## 2021-03-15 DIAGNOSIS — Z86.16 PERSONAL HISTORY OF COVID-19: ICD-10-CM

## 2021-03-15 DIAGNOSIS — B49 UNSPECIFIED MYCOSIS: ICD-10-CM

## 2021-03-15 DIAGNOSIS — A41.9 SEPSIS, UNSPECIFIED ORGANISM: ICD-10-CM

## 2021-03-15 DIAGNOSIS — N17.9 ACUTE KIDNEY FAILURE, UNSPECIFIED: ICD-10-CM

## 2021-03-15 DIAGNOSIS — N18.30 CHRONIC KIDNEY DISEASE, STAGE 3 UNSPECIFIED: ICD-10-CM

## 2021-03-15 DIAGNOSIS — L89.153 PRESSURE ULCER OF SACRAL REGION, STAGE 3: ICD-10-CM

## 2021-03-15 DIAGNOSIS — R74.01 ELEVATION OF LEVELS OF LIVER TRANSAMINASE LEVELS: ICD-10-CM

## 2021-03-15 DIAGNOSIS — R00.1 BRADYCARDIA, UNSPECIFIED: ICD-10-CM

## 2021-03-15 DIAGNOSIS — L89.616 PRESSURE-INDUCED DEEP TISSUE DAMAGE OF RIGHT HEEL: ICD-10-CM

## 2021-03-15 DIAGNOSIS — I12.9 HYPERTENSIVE CHRONIC KIDNEY DISEASE WITH STAGE 1 THROUGH STAGE 4 CHRONIC KIDNEY DISEASE, OR UNSPECIFIED CHRONIC KIDNEY DISEASE: ICD-10-CM

## 2021-03-15 DIAGNOSIS — B15.9 HEPATITIS A WITHOUT HEPATIC COMA: ICD-10-CM

## 2021-03-15 DIAGNOSIS — E66.9 OBESITY, UNSPECIFIED: ICD-10-CM

## 2021-03-15 DIAGNOSIS — E78.5 HYPERLIPIDEMIA, UNSPECIFIED: ICD-10-CM

## 2021-03-15 DIAGNOSIS — D61.818 OTHER PANCYTOPENIA: ICD-10-CM

## 2021-03-15 DIAGNOSIS — G20 PARKINSON'S DISEASE: ICD-10-CM

## 2021-03-15 DIAGNOSIS — R65.21 SEVERE SEPSIS WITH SEPTIC SHOCK: ICD-10-CM

## 2021-03-15 DIAGNOSIS — G93.41 METABOLIC ENCEPHALOPATHY: ICD-10-CM

## 2021-03-15 DIAGNOSIS — Z79.82 LONG TERM (CURRENT) USE OF ASPIRIN: ICD-10-CM

## 2021-03-15 DIAGNOSIS — Z86.718 PERSONAL HISTORY OF OTHER VENOUS THROMBOSIS AND EMBOLISM: ICD-10-CM

## 2021-03-15 DIAGNOSIS — M10.9 GOUT, UNSPECIFIED: ICD-10-CM

## 2021-03-15 DIAGNOSIS — M24.412 RECURRENT DISLOCATION, LEFT SHOULDER: ICD-10-CM

## 2021-03-15 DIAGNOSIS — N39.0 URINARY TRACT INFECTION, SITE NOT SPECIFIED: ICD-10-CM

## 2021-03-15 DIAGNOSIS — Z79.52 LONG TERM (CURRENT) USE OF SYSTEMIC STEROIDS: ICD-10-CM

## 2021-03-15 DIAGNOSIS — I47.2 VENTRICULAR TACHYCARDIA: ICD-10-CM

## 2021-03-15 DIAGNOSIS — R26.9 UNSPECIFIED ABNORMALITIES OF GAIT AND MOBILITY: ICD-10-CM

## 2021-03-15 DIAGNOSIS — I44.0 ATRIOVENTRICULAR BLOCK, FIRST DEGREE: ICD-10-CM

## 2021-03-15 DIAGNOSIS — F02.80 DEMENTIA IN OTHER DISEASES CLASSIFIED ELSEWHERE, UNSPECIFIED SEVERITY, WITHOUT BEHAVIORAL DISTURBANCE, PSYCHOTIC DISTURBANCE, MOOD DISTURBANCE, AND ANXIETY: ICD-10-CM

## 2021-03-15 DIAGNOSIS — K59.00 CONSTIPATION, UNSPECIFIED: ICD-10-CM

## 2021-03-22 ENCOUNTER — INPATIENT (INPATIENT)
Facility: HOSPITAL | Age: 71
LOS: 66 days | Discharge: NURSING, REHAB OR LONG-TERM CARE | End: 2021-05-28
Attending: HOSPITALIST | Admitting: HOSPITALIST
Payer: MEDICARE

## 2021-03-22 VITALS — HEIGHT: 66.93 IN | WEIGHT: 175.05 LBS

## 2021-03-22 LAB
ALBUMIN SERPL ELPH-MCNC: 3 G/DL — LOW (ref 3.5–5.2)
ALP SERPL-CCNC: 126 U/L — HIGH (ref 30–115)
ALT FLD-CCNC: 14 U/L — SIGNIFICANT CHANGE UP (ref 0–41)
ANION GAP SERPL CALC-SCNC: 12 MMOL/L — SIGNIFICANT CHANGE UP (ref 7–14)
APPEARANCE UR: CLEAR — SIGNIFICANT CHANGE UP
APTT BLD: 46.3 SEC — HIGH (ref 27–39.2)
AST SERPL-CCNC: 53 U/L — HIGH (ref 0–41)
BACTERIA # UR AUTO: NEGATIVE — SIGNIFICANT CHANGE UP
BASOPHILS # BLD AUTO: 0.04 K/UL — SIGNIFICANT CHANGE UP (ref 0–0.2)
BASOPHILS NFR BLD AUTO: 0.5 % — SIGNIFICANT CHANGE UP (ref 0–1)
BILIRUB SERPL-MCNC: 0.3 MG/DL — SIGNIFICANT CHANGE UP (ref 0.2–1.2)
BILIRUB UR-MCNC: NEGATIVE — SIGNIFICANT CHANGE UP
BUN SERPL-MCNC: 75 MG/DL — CRITICAL HIGH (ref 10–20)
CALCIUM SERPL-MCNC: 9.7 MG/DL — SIGNIFICANT CHANGE UP (ref 8.5–10.1)
CHLORIDE SERPL-SCNC: 104 MMOL/L — SIGNIFICANT CHANGE UP (ref 98–110)
CO2 SERPL-SCNC: 26 MMOL/L — SIGNIFICANT CHANGE UP (ref 17–32)
COLOR SPEC: YELLOW — SIGNIFICANT CHANGE UP
CREAT SERPL-MCNC: 1.9 MG/DL — HIGH (ref 0.7–1.5)
DIFF PNL FLD: NEGATIVE — SIGNIFICANT CHANGE UP
EOSINOPHIL # BLD AUTO: 0.17 K/UL — SIGNIFICANT CHANGE UP (ref 0–0.7)
EOSINOPHIL NFR BLD AUTO: 2.1 % — SIGNIFICANT CHANGE UP (ref 0–8)
EPI CELLS # UR: 0 /HPF — SIGNIFICANT CHANGE UP (ref 0–5)
GLUCOSE SERPL-MCNC: 102 MG/DL — HIGH (ref 70–99)
GLUCOSE UR QL: NEGATIVE — SIGNIFICANT CHANGE UP
HCT VFR BLD CALC: 36.8 % — LOW (ref 42–52)
HGB BLD-MCNC: 11.1 G/DL — LOW (ref 14–18)
HYALINE CASTS # UR AUTO: 1 /LPF — SIGNIFICANT CHANGE UP (ref 0–7)
IMM GRANULOCYTES NFR BLD AUTO: 0.4 % — HIGH (ref 0.1–0.3)
INR BLD: 1.83 RATIO — HIGH (ref 0.65–1.3)
KETONES UR-MCNC: SIGNIFICANT CHANGE UP
LACTATE SERPL-SCNC: 1.1 MMOL/L — SIGNIFICANT CHANGE UP (ref 0.7–2)
LEUKOCYTE ESTERASE UR-ACNC: NEGATIVE — SIGNIFICANT CHANGE UP
LYMPHOCYTES # BLD AUTO: 1.48 K/UL — SIGNIFICANT CHANGE UP (ref 1.2–3.4)
LYMPHOCYTES # BLD AUTO: 18.5 % — LOW (ref 20.5–51.1)
MCHC RBC-ENTMCNC: 25.1 PG — LOW (ref 27–31)
MCHC RBC-ENTMCNC: 30.2 G/DL — LOW (ref 32–37)
MCV RBC AUTO: 83.1 FL — SIGNIFICANT CHANGE UP (ref 80–94)
MONOCYTES # BLD AUTO: 0.73 K/UL — HIGH (ref 0.1–0.6)
MONOCYTES NFR BLD AUTO: 9.1 % — SIGNIFICANT CHANGE UP (ref 1.7–9.3)
NEUTROPHILS # BLD AUTO: 5.55 K/UL — SIGNIFICANT CHANGE UP (ref 1.4–6.5)
NEUTROPHILS NFR BLD AUTO: 69.4 % — SIGNIFICANT CHANGE UP (ref 42.2–75.2)
NITRITE UR-MCNC: NEGATIVE — SIGNIFICANT CHANGE UP
NRBC # BLD: 0 /100 WBCS — SIGNIFICANT CHANGE UP (ref 0–0)
PH UR: 6 — SIGNIFICANT CHANGE UP (ref 5–8)
PLATELET # BLD AUTO: 265 K/UL — SIGNIFICANT CHANGE UP (ref 130–400)
POTASSIUM SERPL-MCNC: 5.1 MMOL/L — HIGH (ref 3.5–5)
POTASSIUM SERPL-SCNC: 5.1 MMOL/L — HIGH (ref 3.5–5)
PROT SERPL-MCNC: 8.1 G/DL — HIGH (ref 6–8)
PROT UR-MCNC: ABNORMAL
PROTHROM AB SERPL-ACNC: 21.1 SEC — HIGH (ref 9.95–12.87)
RBC # BLD: 4.43 M/UL — LOW (ref 4.7–6.1)
RBC # FLD: 16.9 % — HIGH (ref 11.5–14.5)
RBC CASTS # UR COMP ASSIST: 12 /HPF — HIGH (ref 0–4)
SARS-COV-2 RNA SPEC QL NAA+PROBE: SIGNIFICANT CHANGE UP
SODIUM SERPL-SCNC: 142 MMOL/L — SIGNIFICANT CHANGE UP (ref 135–146)
SP GR SPEC: 1.02 — SIGNIFICANT CHANGE UP (ref 1.01–1.03)
UROBILINOGEN FLD QL: SIGNIFICANT CHANGE UP
WBC # BLD: 8 K/UL — SIGNIFICANT CHANGE UP (ref 4.8–10.8)
WBC # FLD AUTO: 8 K/UL — SIGNIFICANT CHANGE UP (ref 4.8–10.8)
WBC UR QL: 2 /HPF — SIGNIFICANT CHANGE UP (ref 0–5)

## 2021-03-22 PROCEDURE — 71045 X-RAY EXAM CHEST 1 VIEW: CPT | Mod: 26

## 2021-03-22 PROCEDURE — 99285 EMERGENCY DEPT VISIT HI MDM: CPT | Mod: CS,GC

## 2021-03-22 PROCEDURE — 93010 ELECTROCARDIOGRAM REPORT: CPT

## 2021-03-22 PROCEDURE — 70450 CT HEAD/BRAIN W/O DYE: CPT | Mod: 26

## 2021-03-22 RX ORDER — SODIUM CHLORIDE 9 MG/ML
1000 INJECTION INTRAMUSCULAR; INTRAVENOUS; SUBCUTANEOUS ONCE
Refills: 0 | Status: COMPLETED | OUTPATIENT
Start: 2021-03-22 | End: 2021-03-22

## 2021-03-22 RX ADMIN — SODIUM CHLORIDE 1000 MILLILITER(S): 9 INJECTION INTRAMUSCULAR; INTRAVENOUS; SUBCUTANEOUS at 22:23

## 2021-03-22 NOTE — H&P ADULT - ATTENDING COMMENTS
Agree with resident's note, HPI, PE, assessment and plan. All imaging studies, labs and vitals reviewed by me.   Pt was seen and examined independently.     Chart reviewed, prior admissions reviewed.     71 year old Male with past medical history of Parkinson's, dementia, CKD Stage 3, 1st degree AV block, HLD, gout, HTN, DM, recent fungal septicemia (completed course of Diflucan few days ago) presenting from Health system for acute change in mental status. Pt was found to have ABEBE, seems due to dehydration. No fever, no elevation of WBC. Confusion seems to be toxic metabolic encephalopathy due to dehydration. No need for additional imaging studies for now, if mental status will not improve with hydration will consider further work up. Continue to hold gabapentin. check kidneys US, monitor UO.   Hyperkalemia due to ABEBE, resolved.  Chronic left shoulder dislocation - ortho eval.  Parkinson's disease - cont Sinemet.  Chronic anemia - OP work up.     DVT ppx

## 2021-03-22 NOTE — ED ADULT TRIAGE NOTE - CHIEF COMPLAINT QUOTE
BIBA from [facility], worsening AMS x 3 days, recent dx of left hand cellulitis, as per EMS, pt is usually verbal and more alert

## 2021-03-22 NOTE — H&P ADULT - NSHPPHYSICALEXAM_GEN_ALL_CORE
GENERAL: Drowsy  HEAD:  Atraumatic, Normocephalic  NECK: Supple, No JVD  CHEST/LUNG: Clear to auscultation bilaterally  HEART: Regular rate and rhythm  ABDOMEN: Bowel sounds present; Soft, Nontender, Nondistended.  EXTREMITIES: No peripheral edema  NEURO: lethargic

## 2021-03-22 NOTE — ED PROVIDER NOTE - CLINICAL SUMMARY MEDICAL DECISION MAKING FREE TEXT BOX
Patient presents with worsening mental status from nursing home. labs, ekg, cxr, ct done. Found to have ABEBE. Patient given fluids. Admitted for further management.

## 2021-03-22 NOTE — H&P ADULT - HISTORY OF PRESENT ILLNESS
71 year old Male with past medical history of Parkinson's, dementia, CKD Stage 3, 1st degree AV block, HLD, gout, HTN, DM, fungal septicemia presenting from [facility] for acute decompensation in mental status.     As per documentation patient at baseline is verbal but for past 3 days, he has been worsening to state of being non verbal. Patient was admitted to [facility] for fungemia and discharged on the 11th March. At  he had left hand cellulitis and was treated for it.    In ED patient hemodynamically stable, afebrile, ABEBE with rise in Cr to 1.9 and BUn to 75, remains non verbal. CT head negative for any new strokes and UA negative. Family has not visited the patient recently so unaware of his status. 71 year old Male with past medical history of Parkinson's, dementia, CKD Stage 3, 1st degree AV block, HLD, gout, HTN, DM, fungal septicemia presenting from [facility] for acute decompensation in mental status.     As per documentation patient at baseline is verbal but for past 3 days, he has been worsening to state of being non verbal. Patient was admitted to [facility] for fungemia and discharged on the 11th March. At  he had left hand cellulitis and was treated for it.    In ED patient hemodynamically stable, afebrile, ABEBE with rise in Cr to 1.9 and BUN to 75, remains non verbal. CT head negative for any new strokes and UA negative. Family has not visited the patient recently so unaware of his status.

## 2021-03-22 NOTE — ED ADULT NURSE NOTE - CHIEF COMPLAINT QUOTE
BIBA from [facility], worsening AMS x 3 days, recent dx of left hand cellulitis, as per EMS, pt is usually verbal and more alert

## 2021-03-22 NOTE — ED ADULT NURSE NOTE - OBJECTIVE STATEMENT
was sent in for AMS from [facility] . Patient recently got  treated for arm cellulitis . Patient is alert to painful stimulation ,not able to get a verbal response at this time .

## 2021-03-22 NOTE — ED ADULT TRIAGE NOTE - BEFAST ARM NUMBNESS
Nephrology Progress Note  8/4/2020 1:28 PM        Subjective:   Admit Date: 8/1/2020  PCP: Abby Petit MD    Interval History: doing better    Diet: better    ROS:  Some cough    Data:     Current meds:    sodium phosphate IVPB  15 mmol Intravenous Once    insulin lispro  0-18 Units Subcutaneous TID WC    insulin lispro  0-9 Units Subcutaneous 2 times per day    insulin lispro  10 Units Subcutaneous TID     insulin glargine  40 Units Subcutaneous Nightly    enoxaparin  40 mg Subcutaneous Daily    aspirin  81 mg Oral Daily    atorvastatin  40 mg Oral Daily    QUEtiapine  400 mg Oral Nightly    nicotine  1 patch Transdermal Daily    pantoprazole  40 mg Intravenous Daily    cefepime  2 g Intravenous Q12H    lidocaine PF  5 mL Intradermal Once    nicotine  1 patch Transdermal Once      dextrose           I/O last 3 completed shifts: In: 2633 [P.O.:1880; I.V.:200; Blood:383; IV Piggyback:170]  Out: 2100 [Urine:2100]    CBC:   Recent Labs     08/02/20  1140 08/03/20  0545 08/03/20  1645 08/04/20  0620   WBC 17.2* 11.8*  --  12.1*   HGB 7.6* 6.4* 7.8* 7.1*    294  --  294          Recent Labs     08/02/20  1643 08/03/20  0207 08/03/20  0545 08/04/20  0620     --  137 140   K 3.1* 3.5 3.3* 5.5*     --  105 112*   CO2 22  --  22 20*   BUN 6  --  4* 6   CREATININE 1.0  --  0.9 1.0   GLUCOSE 242*  --  124* 209*       Lab Results   Component Value Date    CALCIUM 8.3 08/04/2020    PHOS 1.8 (L) 08/04/2020       Objective:     Vitals: BP (!) 140/88   Pulse 113   Temp 98.1 °F (36.7 °C) (Oral)   Resp 29   Ht 5' 2\" (1.575 m)   Wt 124 lb 4.8 oz (56.4 kg)   SpO2 99%   BMI 22.73 kg/m²     General appearance:  No distress - thin  HEENT:  ++ conjpallor  Neck:  supple  Lungs:  + rhonhci  Heart:   tachycardia   Abdomen: soft  Extremities:  No overt edema       Problem List :         Impression :     1. dysnatremia- resolved  2.  High k ironic- as her k was low- likely either lab error- ( No

## 2021-03-22 NOTE — ED ADULT NURSE NOTE - NSIMPLEMENTINTERV_GEN_ALL_ED
Implemented All Fall Risk Interventions:  [place] to call system. Call bell, personal items and telephone within reach. Instruct patient to call for assistance. Room bathroom lighting operational. Non-slip footwear when patient is off stretcher. Physically safe environment: no spills, clutter or unnecessary equipment. Stretcher in lowest position, wheels locked, appropriate side rails in place. Provide visual cue, wrist band, yellow gown, etc. Monitor gait and stability. Monitor for mental status changes and reorient to person, place, and time. Review medications for side effects contributing to fall risk. Reinforce activity limits and safety measures with patient and family.

## 2021-03-22 NOTE — H&P ADULT - ASSESSMENT
71 year old Male with past medical history of Parkinson's, dementia, CKD Stage 3, 1st degree AV block, HLD, gout, HTN, DM, fungal septicemia presenting from [facility] for acute decompensation in mental status.     # Altered Mental Status  - 3 days of  - CT Head negative for any acut      # sacral pressure ulcer  - wound care, position changes    # H/O Parkinson disease  -Continue current meds of Sinemet 25/100 2 tablets 5 times a day,  - Follow with Neuro outpatient    # Left shoulder dislocation   - Outpatient follow up    # H/O First Degree AV Block  - No indication for PPM    # ABEBE likely Pre renal  - Baseline Cr 1.2-1.3  - Now Cr 1.9 BUN 75  - Given 1L on NS in ED    # Chronic DVT  - Continue Eliquis    # HTN  - Hold    DVT: Eliquis  GI:  Dispo: pending improvement in mental status 71 year old Male with past medical history of Parkinson's, dementia, CKD Stage 3, 1st degree AV block, HLD, gout, HTN, DM, fungal septicemia presenting [facility] for acute decompensation in mental status.     Patient appears to be dehydrated. No clear cause of change in mental status. If does not improve with hydration consider Neuro consultation.    # Altered Mental Status possible Metabolic vs Dehydration  - 3 days of decreasing alertness  - CT Head negative for any acute strokes  - Negative UA, normal white count, hand does not look like cellulitis  - Will hold Gabapentin  - Can get EEG  - Will hydrate with LR @ 70  - Follow up Cultures  - Monitor for any change in mental status  - Neurology Eval if no improvement    # Sacral pressure ulcer  - wound care, position changes    # H/O Parkinson disease  -Continue current meds of Sinemet 25/100 2 tablets 5 times a day,  - Follow with Neuro outpatient    # Left shoulder dislocation   - Outpatient follow up    # H/O First Degree AV Block  - No indication for PPM    # ABEBE likely Pre renal  - Baseline Cr 1.2-1.3  - Now Cr 1.9 BUN 75  - Given 1L on NS in ED    # Chronic DVT  - Continue Eliquis    # HTN  - Hold Enalapril    DVT: Eliquis  GI: Omeprazole  Diet: Pending Speech and Swallow  Dispo: pending improvement in mental status

## 2021-03-22 NOTE — ED PROVIDER NOTE - PHYSICAL EXAMINATION
Vital Signs: I have reviewed the initial vital signs.  Constitutional: well-nourished, appears stated age, no acute distress  Cardiovascular: regular rate, regular rhythm, well-perfused extremities  Respiratory: unlabored respiratory effort, clear to auscultation bilaterally  Gastrointestinal: soft, non-tender abdomen  Musculoskeletal: supple neck, no lower extremity edema. (+) Kerlex wrapped R foot   Integumentary: warm, dry, no rash  Neurologic: awake, (+) tracks eyes but is non-verbal, doesn't follow commands. Extremities’ motor and sensory functions grossly intact.   Psychiatric: appropriate mood, appropriate affect

## 2021-03-22 NOTE — ED PROVIDER NOTE - ATTENDING CONTRIBUTION TO CARE
72 yo M pmh of Parkinson's, dementia, CKD III, HLD, gout, HTN, DM, fungal septicemia presents with worsening mental status. As per nursing home patient was verbal 3 days ago but then became non verbal. No other changes noted. no fevers. Patient recently admitted for fungal infection. Discussed with family who state that due to covid restrictions they have been unable to visit and are unsure of his baseline mentation.     CONSTITUTIONAL: Well-developed; well-nourished; in no acute distress.   SKIN: warm, dry  HEAD: Normocephalic; atraumatic.  EYES: PERRL, EOMI, no conjunctival erythema  ENT: No nasal discharge; airway clear.  NECK: Supple; non tender.  CARD: S1, S2 normal;  Regular rate and rhythm.   RESP: No wheezes, rales or rhonchi.  ABD: soft non tender, non distended, no rebound or guarding  EXT: Normal ROM.  5/5 strength in all 4 extremities   LYMPH: No acute cervical adenopathy.  NEURO: Alert, oriented, grossly unremarkable. neurovascularly intact  PSYCH: Cooperative, appropriate.

## 2021-03-22 NOTE — H&P ADULT - NSHPLABSRESULTS_GEN_ALL_CORE
11.1   8.00  )-----------( 265      ( 22 Mar 2021 17:50 )             36.8           142  |  104  |  75<HH>  ----------------------------<  102<H>  5.1<H>   |  26  |  1.9<H>    Ca    9.7      22 Mar 2021 17:50    TPro  8.1<H>  /  Alb  3.0<L>  /  TBili  0.3  /  DBili  x   /  AST  53<H>  /  ALT  14  /  AlkPhos  126<H>                Urinalysis Basic - ( 22 Mar 2021 20:41 )    Color: Yellow / Appearance: Clear / S.025 / pH: x  Gluc: x / Ketone: Trace  / Bili: Negative / Urobili: <2 mg/dL   Blood: x / Protein: 30 mg/dL / Nitrite: Negative   Leuk Esterase: Negative / RBC: 12 /HPF / WBC 2 /HPF   Sq Epi: x / Non Sq Epi: 0 /HPF / Bacteria: Negative        PT/INR - ( 22 Mar 2021 17:50 )   PT: 21.10 sec;   INR: 1.83 ratio         PTT - ( 22 Mar 2021 17:50 )  PTT:46.3 sec          CAPILLARY BLOOD GLUCOSE

## 2021-03-22 NOTE — ED PROVIDER NOTE - CARE PLAN
Principal Discharge DX:	AMS (altered mental status)  Secondary Diagnosis:	ABEBE (acute kidney injury)

## 2021-03-22 NOTE — ED PROVIDER NOTE - OBJECTIVE STATEMENT
The pt is a 71y M w/ hx of Parkinson's, dementia, CKD III, 1st degree AV block, HLD, gout, HTN, DM, fungal septicemia presenting from [facility] for acute decompensation in mental status. At baseline, pt is verbal. NH states for the past 3 days, he has been worsening to the point that he is now non-verbal. They note that he was recently diagnosed with L hand cellulitis. Unable to obtain ROS from pt.

## 2021-03-23 LAB
A1C WITH ESTIMATED AVERAGE GLUCOSE RESULT: 5.4 % — SIGNIFICANT CHANGE UP (ref 4–5.6)
ALBUMIN SERPL ELPH-MCNC: 2.8 G/DL — LOW (ref 3.5–5.2)
ALP SERPL-CCNC: 115 U/L — SIGNIFICANT CHANGE UP (ref 30–115)
ALT FLD-CCNC: 29 U/L — SIGNIFICANT CHANGE UP (ref 0–41)
ANION GAP SERPL CALC-SCNC: 12 MMOL/L — SIGNIFICANT CHANGE UP (ref 7–14)
AST SERPL-CCNC: 40 U/L — SIGNIFICANT CHANGE UP (ref 0–41)
BASOPHILS # BLD AUTO: 0.09 K/UL — SIGNIFICANT CHANGE UP (ref 0–0.2)
BASOPHILS NFR BLD AUTO: 1 % — SIGNIFICANT CHANGE UP (ref 0–1)
BILIRUB SERPL-MCNC: 0.4 MG/DL — SIGNIFICANT CHANGE UP (ref 0.2–1.2)
BUN SERPL-MCNC: 76 MG/DL — CRITICAL HIGH (ref 10–20)
CALCIUM SERPL-MCNC: 9.7 MG/DL — SIGNIFICANT CHANGE UP (ref 8.5–10.1)
CHLORIDE SERPL-SCNC: 106 MMOL/L — SIGNIFICANT CHANGE UP (ref 98–110)
CO2 SERPL-SCNC: 26 MMOL/L — SIGNIFICANT CHANGE UP (ref 17–32)
COVID-19 SPIKE DOMAIN AB INTERP: POSITIVE
COVID-19 SPIKE DOMAIN ANTIBODY RESULT: 6.51 U/ML — HIGH
CREAT SERPL-MCNC: 1.9 MG/DL — HIGH (ref 0.7–1.5)
CULTURE RESULTS: NO GROWTH — SIGNIFICANT CHANGE UP
EOSINOPHIL # BLD AUTO: 0.12 K/UL — SIGNIFICANT CHANGE UP (ref 0–0.7)
EOSINOPHIL NFR BLD AUTO: 1.4 % — SIGNIFICANT CHANGE UP (ref 0–8)
ESTIMATED AVERAGE GLUCOSE: 108 MG/DL — SIGNIFICANT CHANGE UP (ref 68–114)
GLUCOSE SERPL-MCNC: 89 MG/DL — SIGNIFICANT CHANGE UP (ref 70–99)
HCT VFR BLD CALC: 34.5 % — LOW (ref 42–52)
HCV AB S/CO SERPL IA: 0.03 COI — SIGNIFICANT CHANGE UP
HCV AB SERPL-IMP: SIGNIFICANT CHANGE UP
HGB BLD-MCNC: 10.3 G/DL — LOW (ref 14–18)
IMM GRANULOCYTES NFR BLD AUTO: 0.3 % — SIGNIFICANT CHANGE UP (ref 0.1–0.3)
LYMPHOCYTES # BLD AUTO: 1.55 K/UL — SIGNIFICANT CHANGE UP (ref 1.2–3.4)
LYMPHOCYTES # BLD AUTO: 17.5 % — LOW (ref 20.5–51.1)
MAGNESIUM SERPL-MCNC: 2.5 MG/DL — HIGH (ref 1.8–2.4)
MCHC RBC-ENTMCNC: 25.2 PG — LOW (ref 27–31)
MCHC RBC-ENTMCNC: 29.9 G/DL — LOW (ref 32–37)
MCV RBC AUTO: 84.4 FL — SIGNIFICANT CHANGE UP (ref 80–94)
MONOCYTES # BLD AUTO: 0.78 K/UL — HIGH (ref 0.1–0.6)
MONOCYTES NFR BLD AUTO: 8.8 % — SIGNIFICANT CHANGE UP (ref 1.7–9.3)
NEUTROPHILS # BLD AUTO: 6.3 K/UL — SIGNIFICANT CHANGE UP (ref 1.4–6.5)
NEUTROPHILS NFR BLD AUTO: 71 % — SIGNIFICANT CHANGE UP (ref 42.2–75.2)
NRBC # BLD: 0 /100 WBCS — SIGNIFICANT CHANGE UP (ref 0–0)
PHOSPHATE SERPL-MCNC: 5.8 MG/DL — HIGH (ref 2.1–4.9)
PLATELET # BLD AUTO: 247 K/UL — SIGNIFICANT CHANGE UP (ref 130–400)
POTASSIUM SERPL-MCNC: 5 MMOL/L — SIGNIFICANT CHANGE UP (ref 3.5–5)
POTASSIUM SERPL-SCNC: 5 MMOL/L — SIGNIFICANT CHANGE UP (ref 3.5–5)
PROT SERPL-MCNC: 7.6 G/DL — SIGNIFICANT CHANGE UP (ref 6–8)
RBC # BLD: 4.09 M/UL — LOW (ref 4.7–6.1)
RBC # FLD: 16.9 % — HIGH (ref 11.5–14.5)
SARS-COV-2 IGG+IGM SERPL QL IA: 6.51 U/ML — HIGH
SARS-COV-2 IGG+IGM SERPL QL IA: POSITIVE
SODIUM SERPL-SCNC: 144 MMOL/L — SIGNIFICANT CHANGE UP (ref 135–146)
SPECIMEN SOURCE: SIGNIFICANT CHANGE UP
WBC # BLD: 8.87 K/UL — SIGNIFICANT CHANGE UP (ref 4.8–10.8)
WBC # FLD AUTO: 8.87 K/UL — SIGNIFICANT CHANGE UP (ref 4.8–10.8)

## 2021-03-23 PROCEDURE — 76775 US EXAM ABDO BACK WALL LIM: CPT | Mod: 26

## 2021-03-23 PROCEDURE — 99223 1ST HOSP IP/OBS HIGH 75: CPT

## 2021-03-23 PROCEDURE — 73030 X-RAY EXAM OF SHOULDER: CPT | Mod: 26,LT

## 2021-03-23 PROCEDURE — 99221 1ST HOSP IP/OBS SF/LOW 40: CPT

## 2021-03-23 RX ORDER — CHLORHEXIDINE GLUCONATE 213 G/1000ML
1 SOLUTION TOPICAL
Refills: 0 | Status: DISCONTINUED | OUTPATIENT
Start: 2021-03-23 | End: 2021-03-31

## 2021-03-23 RX ORDER — COLLAGENASE CLOSTRIDIUM HIST. 250 UNIT/G
1 OINTMENT (GRAM) TOPICAL
Refills: 0 | Status: DISCONTINUED | OUTPATIENT
Start: 2021-03-23 | End: 2021-03-31

## 2021-03-23 RX ORDER — ATORVASTATIN CALCIUM 80 MG/1
40 TABLET, FILM COATED ORAL AT BEDTIME
Refills: 0 | Status: DISCONTINUED | OUTPATIENT
Start: 2021-03-23 | End: 2021-03-31

## 2021-03-23 RX ORDER — SENNA PLUS 8.6 MG/1
2 TABLET ORAL AT BEDTIME
Refills: 0 | Status: DISCONTINUED | OUTPATIENT
Start: 2021-03-23 | End: 2021-03-31

## 2021-03-23 RX ORDER — CARBIDOPA AND LEVODOPA 25; 100 MG/1; MG/1
2 TABLET ORAL
Refills: 0 | Status: DISCONTINUED | OUTPATIENT
Start: 2021-03-23 | End: 2021-03-24

## 2021-03-23 RX ORDER — PANTOPRAZOLE SODIUM 20 MG/1
40 TABLET, DELAYED RELEASE ORAL
Refills: 0 | Status: DISCONTINUED | OUTPATIENT
Start: 2021-03-23 | End: 2021-03-26

## 2021-03-23 RX ORDER — SODIUM HYPOCHLORITE 0.125 %
1 SOLUTION, NON-ORAL MISCELLANEOUS
Refills: 0 | Status: DISCONTINUED | OUTPATIENT
Start: 2021-03-23 | End: 2021-03-31

## 2021-03-23 RX ORDER — SODIUM CHLORIDE 9 MG/ML
1000 INJECTION, SOLUTION INTRAVENOUS
Refills: 0 | Status: DISCONTINUED | OUTPATIENT
Start: 2021-03-23 | End: 2021-03-25

## 2021-03-23 RX ORDER — OMEPRAZOLE 10 MG/1
1 CAPSULE, DELAYED RELEASE ORAL
Qty: 0 | Refills: 0 | DISCHARGE

## 2021-03-23 RX ORDER — APIXABAN 2.5 MG/1
5 TABLET, FILM COATED ORAL EVERY 12 HOURS
Refills: 0 | Status: DISCONTINUED | OUTPATIENT
Start: 2021-03-23 | End: 2021-03-29

## 2021-03-23 RX ADMIN — APIXABAN 5 MILLIGRAM(S): 2.5 TABLET, FILM COATED ORAL at 06:42

## 2021-03-23 RX ADMIN — CARBIDOPA AND LEVODOPA 2 TABLET(S): 25; 100 TABLET ORAL at 07:04

## 2021-03-23 RX ADMIN — Medication 1 APPLICATION(S): at 18:03

## 2021-03-23 RX ADMIN — SODIUM CHLORIDE 70 MILLILITER(S): 9 INJECTION, SOLUTION INTRAVENOUS at 19:50

## 2021-03-23 RX ADMIN — APIXABAN 5 MILLIGRAM(S): 2.5 TABLET, FILM COATED ORAL at 18:03

## 2021-03-23 RX ADMIN — PANTOPRAZOLE SODIUM 40 MILLIGRAM(S): 20 TABLET, DELAYED RELEASE ORAL at 06:42

## 2021-03-23 RX ADMIN — CHLORHEXIDINE GLUCONATE 1 APPLICATION(S): 213 SOLUTION TOPICAL at 06:43

## 2021-03-23 RX ADMIN — Medication 1 APPLICATION(S): at 18:35

## 2021-03-23 RX ADMIN — SODIUM CHLORIDE 70 MILLILITER(S): 9 INJECTION, SOLUTION INTRAVENOUS at 04:14

## 2021-03-23 RX ADMIN — CARBIDOPA AND LEVODOPA 2 TABLET(S): 25; 100 TABLET ORAL at 18:03

## 2021-03-23 NOTE — PROGRESS NOTE ADULT - SUBJECTIVE AND OBJECTIVE BOX
NIEVES LABOY 71y Male  MRN#: [id number]   Hospital Day: 1d    SUBJECTIVE  Patient is a 71y old Male who presents with a chief complaint of Change in Mental Status (22 Mar 2021 23:59)  Currently admitted to medicine with the primary diagnosis of AMS (altered mental status)      INTERVAL HPI AND OVERNIGHT EVENTS:  Patient was examined and seen at bedside. This morning he is resting comfortably in bed and reports no issues or overnight events.    REVIEW OF SYMPTOMS: Unable to obtain due to patient's mental status      OBJECTIVE  PAST MEDICAL & SURGICAL HISTORY  Cataract  Prostatitis  Dyslipidemia  Gout  Hypertension  Parkinson disease  No significant past surgical history      ALLERGIES:  No Known Allergies    MEDICATIONS:  STANDING MEDICATIONS  apixaban 5 milliGRAM(s) Oral every 12 hours  atorvastatin 40 milliGRAM(s) Oral at bedtime  carbidopa/levodopa CR 25/100 2 Tablet(s) Oral <User Schedule>  chlorhexidine 4% Liquid 1 Application(s) Topical <User Schedule>  lactated ringers. 1000 milliLiter(s) IV Continuous <Continuous>  pantoprazole    Tablet 40 milliGRAM(s) Oral before breakfast  senna 2 Tablet(s) Oral at bedtime    PRN MEDICATIONS      VITAL SIGNS: Last 24 Hours  T(C): 37.1 (23 Mar 2021 08:06), Max: 37.8 (22 Mar 2021 17:44)  T(F): 98.8 (23 Mar 2021 08:06), Max: 100 (22 Mar 2021 17:44)  HR: 85 (23 Mar 2021 08:06) (61 - 85)  BP: 134/95 (23 Mar 2021 08:06) (96/53 - 134/95)  BP(mean): --  RR: 19 (23 Mar 2021 08:06) (18 - 20)  SpO2: 100% (22 Mar 2021 19:43) (100% - 100%)    LABS:                        10.3   8.87  )-----------( 247      ( 23 Mar 2021 05:47 )             34.5     03-23    144  |  106  |  76<HH>  ----------------------------<  89  5.0   |  26  |  1.9<H>    Ca    9.7      23 Mar 2021 05:47  Phos  5.8     03-23  Mg     2.5     03-23    TPro  7.6  /  Alb  2.8<L>  /  TBili  0.4  /  DBili  x   /  AST  40  /  ALT  29  /  AlkPhos  115      PT/INR - ( 22 Mar 2021 17:50 )   PT: 21.10 sec;   INR: 1.83 ratio         PTT - ( 22 Mar 2021 17:50 )  PTT:46.3 sec  Urinalysis Basic - ( 22 Mar 2021 20:41 )    Color: Yellow / Appearance: Clear / S.025 / pH: x  Gluc: x / Ketone: Trace  / Bili: Negative / Urobili: <2 mg/dL   Blood: x / Protein: 30 mg/dL / Nitrite: Negative   Leuk Esterase: Negative / RBC: 12 /HPF / WBC 2 /HPF   Sq Epi: x / Non Sq Epi: 0 /HPF / Bacteria: Negative        Lactate, Blood: 1.1 mmol/L (21 @ 17:50)      PHYSICAL EXAM:  CONSTITUTIONAL: No acute distress, well-developed, well-groomed, AAOx3  HEAD: Atraumatic, normocephalic  EYES: EOM intact, PERRLA, conjunctiva and sclera clear  PULMONARY: Clear to auscultation bilaterally  CARDIOVASCULAR: Regular rate and rhythm  GASTROINTESTINAL: Soft, non-tender, non-distended  MUSCULOSKELETAL: 2+ peripheral pulses; no clubbing, no cyanosis, no edema  NEUROLOGY: bilateral pill rolling tremor, rigidity in upper extremity, unable to participate in full exam   SKIN: stage 4 sacral ulcer, DTI on right foot     ASSESSMENT & PLAN  71 year old Male with past medical history of Parkinson's, dementia, CKD Stage 3, 1st degree AV block, HLD, gout, HTN, DM, fungal septicemia presenting from [facility] for acute decompensation in mental status.     Patient appears to be dehydrated. No clear cause of change in mental status. If does not improve with hydration consider Neuro consultation.    # Altered Mental Status possible Metabolic vs Dehydration  - 3 days of decreasing alertness and no longer speaking   - CT Head negative for any acute strokes  - UA negative   - Will hold Gabapentin for now   - c/w LR @ 70  - F/u BloodCx UrineCx   - Monitor for any change in mental status  - Neurology Eval pending   - EEG pending     # Sacral pressure ulcer- stage 4  - burn consulted to evaluate the wound     # H/O Parkinson disease  - Continue current meds of Sinemet 25/100 2 tablets 5 times a day,  - Follow with Neuro consult   - EEG pending   - CT head negative     # Left shoulder dislocation   - seen on CT in 2021  - Ortho reconsulted     # H/O First Degree AV Block  - No indication for PPM    # ABEBE likely Pre renal  - Baseline Cr 1.2-1.3  - Now Cr 1.9 BUN 75  - s/p 1L on NS in ED  - c/w LR @ 75     # Chronic DVT  - Continue Eliquis    # HTN  - Hold Enalapril due to intermittent hypotension     DVT: Eliquis  GI: Omeprazole  Diet: Pending Speech and Swallow  Dispo: pending improvement in mental status NIEVES LABOY 71y Male  MRN#: [id number]   Hospital Day: 1d    SUBJECTIVE  Patient is a 71y old Male who presents with a chief complaint of Change in Mental Status (22 Mar 2021 23:59)  Currently admitted to medicine with the primary diagnosis of AMS (altered mental status)      INTERVAL HPI AND OVERNIGHT EVENTS:  Patient was examined and seen at bedside. This morning he is resting comfortably in bed and reports no issues or overnight events.    REVIEW OF SYMPTOMS: Unable to obtain due to patient's mental status      OBJECTIVE  PAST MEDICAL & SURGICAL HISTORY  Cataract  Prostatitis  Dyslipidemia  Gout  Hypertension  Parkinson disease  No significant past surgical history      ALLERGIES:  No Known Allergies    MEDICATIONS:  STANDING MEDICATIONS  apixaban 5 milliGRAM(s) Oral every 12 hours  atorvastatin 40 milliGRAM(s) Oral at bedtime  carbidopa/levodopa CR 25/100 2 Tablet(s) Oral <User Schedule>  chlorhexidine 4% Liquid 1 Application(s) Topical <User Schedule>  lactated ringers. 1000 milliLiter(s) IV Continuous <Continuous>  pantoprazole    Tablet 40 milliGRAM(s) Oral before breakfast  senna 2 Tablet(s) Oral at bedtime    PRN MEDICATIONS      VITAL SIGNS: Last 24 Hours  T(C): 37.1 (23 Mar 2021 08:06), Max: 37.8 (22 Mar 2021 17:44)  T(F): 98.8 (23 Mar 2021 08:06), Max: 100 (22 Mar 2021 17:44)  HR: 85 (23 Mar 2021 08:06) (61 - 85)  BP: 134/95 (23 Mar 2021 08:06) (96/53 - 134/95)  BP(mean): --  RR: 19 (23 Mar 2021 08:06) (18 - 20)  SpO2: 100% (22 Mar 2021 19:43) (100% - 100%)    LABS:                        10.3   8.87  )-----------( 247      ( 23 Mar 2021 05:47 )             34.5     03-23    144  |  106  |  76<HH>  ----------------------------<  89  5.0   |  26  |  1.9<H>    Ca    9.7      23 Mar 2021 05:47  Phos  5.8     03-23  Mg     2.5     03-23    TPro  7.6  /  Alb  2.8<L>  /  TBili  0.4  /  DBili  x   /  AST  40  /  ALT  29  /  AlkPhos  115      PT/INR - ( 22 Mar 2021 17:50 )   PT: 21.10 sec;   INR: 1.83 ratio         PTT - ( 22 Mar 2021 17:50 )  PTT:46.3 sec  Urinalysis Basic - ( 22 Mar 2021 20:41 )    Color: Yellow / Appearance: Clear / S.025 / pH: x  Gluc: x / Ketone: Trace  / Bili: Negative / Urobili: <2 mg/dL   Blood: x / Protein: 30 mg/dL / Nitrite: Negative   Leuk Esterase: Negative / RBC: 12 /HPF / WBC 2 /HPF   Sq Epi: x / Non Sq Epi: 0 /HPF / Bacteria: Negative        Lactate, Blood: 1.1 mmol/L (21 @ 17:50)      PHYSICAL EXAM:  CONSTITUTIONAL: No acute distress, well-developed, well-groomed, AAOx0   HEAD: Atraumatic, normocephalic  PULMONARY: Clear to auscultation bilaterally  CARDIOVASCULAR: Regular rate and rhythm  GASTROINTESTINAL: Soft, non-tender, non-distended  MUSCULOSKELETAL: 2+ peripheral pulses; no clubbing, no cyanosis, no edema  NEUROLOGY: bilateral pill rolling tremor, rigidity in upper extremity, unable to participate in full exam   SKIN: stage 4 sacral ulcer, DTI on right foot     ASSESSMENT & PLAN  71 year old Male with past medical history of Parkinson's, dementia, CKD Stage 3, 1st degree AV block, HLD, gout, HTN, DM, fungal septicemia presenting from [facility] for acute decompensation in mental status.     Patient appears to be dehydrated. No clear cause of change in mental status. If does not improve with hydration consider Neuro consultation.    # Altered Mental Status possible Metabolic vs Dehydration  - 3 days of decreasing alertness and no longer speaking   - CT Head negative for any acute strokes  - UA negative   - Will hold Gabapentin for now   - c/w LR @ 70  - F/u BloodCx UrineCx   - Monitor for any change in mental status  - Neurology Eval pending   - EEG pending   - per S&S patient is too lethargic and unable to participate in exam, keep NPO for now     # Sacral pressure ulcer- stage 4  - burn consulted to evaluate the wound     # H/O Parkinson disease  - Continue current meds of Sinemet 25/100 2 tablets 5 times a day,  - Follow with Neuro consult   - EEG pending   - CT head negative     # Left shoulder dislocation   - seen on CT in 2021  - Ortho reconsulted- pending L shoulder XR     # H/O First Degree AV Block  - No indication for PPM    # ABEBE likely Pre renal  - Baseline Cr 1.2-1.3  - Now Cr 1.9 BUN 75  - s/p 1L on NS in ED  - c/w LR @ 75     # Chronic DVT  - Continue Eliquis    # HTN  - Hold Enalapril due to intermittent hypotension         DVT: Eliquis  GI: Omeprazole  Diet: Pending Speech and Swallow  Dispo: pending improvement in mental status

## 2021-03-23 NOTE — SWALLOW BEDSIDE ASSESSMENT ADULT - SLP GENERAL OBSERVATIONS
Pt received in bed asleep minimally arousable in no apparent pain. +3L NC. [place]  Divehi-English ID3: [id number] utilized t/o evaluation. Pt received in bed asleep minimally arousable in no apparent pain. +3L NC. [employer]  Lux[place]ish-English ID#: [id number] utilized t/o evaluation.

## 2021-03-23 NOTE — CONSULT NOTE ADULT - ASSESSMENT
71y old  Male  seen by Burn for sacral ulcer    - No surgical intervention at this time  - Local wound care: please wash wound with soap and water. Apply santyl and hydrogel, pack with Dakins moist gauze, cover with ABD, Twice daily.  - Offloading/Positioning    Remainder of care per primary team   71y old  Male  seen by Burn for sacral ulcer    - No urgent surgical intervention at this time  - Local wound care: please wash wound with soap and water. Apply santyl and hydrogel after irrigation with  pack with Dakins solution , cover with Allevyn - Twice daily.  - Offloading/Positioning      Remainder of care per primary team

## 2021-03-23 NOTE — PATIENT PROFILE ADULT - LANGUAGE ASSISTANCE NEEDED
pt is altered but has attempted to use  ipad with patient. pt is also newly nonverbal. Indonesian translation board used as well

## 2021-03-23 NOTE — CONSULT NOTE ADULT - SUBJECTIVE AND OBJECTIVE BOX
Neurology Consult    Patient is a 71y old  Male who presents with a chief complaint of Change in Mental Status (23 Mar 2021 09:45)      HPI:  71 year old Male with past medical history of Parkinson's, dementia, CKD Stage 3, 1st degree AV block, HLD, gout, HTN, DM, fungal septicemia presenting from [facility] for acute decompensation in mental status.     As per documentation patient at baseline is verbal but for past 3 days, he has been worsening to state of being non verbal. Patient was admitted to [facility] for fungemia and discharged on the . At  he had left hand cellulitis and was treated for it.    In ED patient hemodynamically stable, afebrile, ABEBE with rise in Cr to 1.9 and BUN to 75, remains non verbal. CT head negative for any new strokes and UA negative. Family has not visited the patient recently so unaware of his status. (22 Mar 2021 23:59)      PAST MEDICAL & SURGICAL HISTORY:  Cataract    Prostatitis    Dyslipidemia    Gout    Hypertension    Parkinson disease    No significant past surgical history        FAMILY HISTORY:  Family history of cerebrovascular accident (CVA) (Father)        Social History: (-) x 3    Allergies    No Known Allergies    Intolerances        MEDICATIONS  (STANDING):  apixaban 5 milliGRAM(s) Oral every 12 hours  atorvastatin 40 milliGRAM(s) Oral at bedtime  carbidopa/levodopa CR 25/100 2 Tablet(s) Oral <User Schedule>  chlorhexidine 4% Liquid 1 Application(s) Topical <User Schedule>  lactated ringers. 1000 milliLiter(s) (70 mL/Hr) IV Continuous <Continuous>  pantoprazole    Tablet 40 milliGRAM(s) Oral before breakfast  senna 2 Tablet(s) Oral at bedtime    MEDICATIONS  (PRN):      Review of systems:    Constitutional: as per HPI  Eyes: No eye pain or discharge  ENMT:  No difficulty hearing; No sinus or throat pain  Neck: No pain or stiffness  Respiratory: No cough, wheezing, chills or hemoptysis  Cardiovascular: No chest pain, palpitations, shortness of breath, dyspnea on exertion  Gastrointestinal: No abdominal pain, nausea, vomiting or hematemesis; No diarrhea or constipation.   Genitourinary: No dysuria, frequency, hematuria or incontinence  Neurological: As per HPI  Skin: No rashes or lesions   Endocrine: No heat or cold intolerance; No hair loss  Musculoskeletal: No joint pain or swelling  Psychiatric: No depression, anxiety, mood swings  Heme/Lymph: No easy bruising or bleeding gums    Vital Signs Last 24 Hrs  T(C): 37.1 (23 Mar 2021 08:06), Max: 37.8 (22 Mar 2021 17:44)  T(F): 98.8 (23 Mar 2021 08:06), Max: 100 (22 Mar 2021 17:44)  HR: 85 (23 Mar 2021 08:06) (61 - 85)  BP: 134/95 (23 Mar 2021 08:06) (96/53 - 134/95)  BP(mean): --  RR: 19 (23 Mar 2021 08:06) (18 - 20)  SpO2: 100% (22 Mar 2021 19:43) (100% - 100%)    Examination:  General:  Appearance is consistent with chronologic age.  Cognitive/Language:  The patient is not oriented to person, place, time and date.  But is able to comprehend some commands.    Eyes: EOMI w/o nystagmus,  PERRL  Face:  no facial asymmetry.    Ears/Nose/Throat:  Hearing grossly intact b/l.     Motor examination:+ tremors, involuntary movements b/l.  Formal Muscle Strength Testing: unable to test  Reflexes:  not tested  Sensory examination:   unable to test  Cerebellum:   unable to test  ABdomen: soft, nontender    Labs:   CBC Full  -  ( 23 Mar 2021 05:47 )  WBC Count : 8.87 K/uL  RBC Count : 4.09 M/uL  Hemoglobin : 10.3 g/dL  Hematocrit : 34.5 %  Platelet Count - Automated : 247 K/uL  Mean Cell Volume : 84.4 fL  Mean Cell Hemoglobin : 25.2 pg  Mean Cell Hemoglobin Concentration : 29.9 g/dL  Auto Neutrophil # : 6.30 K/uL  Auto Lymphocyte # : 1.55 K/uL  Auto Monocyte # : 0.78 K/uL  Auto Eosinophil # : 0.12 K/uL  Auto Basophil # : 0.09 K/uL  Auto Neutrophil % : 71.0 %  Auto Lymphocyte % : 17.5 %  Auto Monocyte % : 8.8 %  Auto Eosinophil % : 1.4 %  Auto Basophil % : 1.0 %        144  |  106  |  76<HH>  ----------------------------<  89  5.0   |  26  |  1.9<H>    Ca    9.7      23 Mar 2021 05:47  Phos  5.8       Mg     2.5         TPro  7.6  /  Alb  2.8<L>  /  TBili  0.4  /  DBili  x   /  AST  40  /  ALT  29  /  AlkPhos  115      LIVER FUNCTIONS - ( 23 Mar 2021 05:47 )  Alb: 2.8 g/dL / Pro: 7.6 g/dL / ALK PHOS: 115 U/L / ALT: 29 U/L / AST: 40 U/L / GGT: x           PT/INR - ( 22 Mar 2021 17:50 )   PT: 21.10 sec;   INR: 1.83 ratio         PTT - ( 22 Mar 2021 17:50 )  PTT:46.3 sec  Urinalysis Basic - ( 22 Mar 2021 20:41 )    Color: Yellow / Appearance: Clear / S.025 / pH: x  Gluc: x / Ketone: Trace  / Bili: Negative / Urobili: <2 mg/dL   Blood: x / Protein: 30 mg/dL / Nitrite: Negative   Leuk Esterase: Negative / RBC: 12 /HPF / WBC 2 /HPF   Sq Epi: x / Non Sq Epi: 0 /HPF / Bacteria: Negative      Neuroimaging:  NCHCT: CT Head No Cont:   EXAM:  CT BRAIN            PROCEDURE DATE:  2021      INTERPRETATION:  Clinical History / Reason for exam: Altered mental status..    TECHNIQUE: CT of the head was performed without the administration of intravenous contrast.    COMPARISON: CT head dated 2021.    FINDINGS:    Parenchymal volume is appropriate for patient age. No hydrocephalus.    No large territorial infarct, intracranial hemorrhage, extra-axial fluid collection, or midline shift.    Punctate hypodensities in the bihemispheric white matter consistent with mild microvascular ischemic disease, essentially stable.    Left cataract surgery. The calvarium is intact    Imaged paranasal sinuses and mastoid air cavities are well-aerated.    IMPRESSION:    No acute intracranial pathology.      DEUCE BETTS M.D., RESIDENT RADIOLOGIST  This document has been electronically signed.  NEVA URBAN MD; Attending Radiologist  This document has been electronically signed. Mar 22 2021  8:23PM (21 @ 18:16)      21 @ 10:03

## 2021-03-23 NOTE — CONSULT NOTE ADULT - SUBJECTIVE AND OBJECTIVE BOX
ORTHOPAEDIC SURGERY CONSULT NOTE    Reason for Consult: Chronic left shoulder dislocation    HPI: 71M with non-verbal dementia, Parkinson's [facility] from [facility] for fungal septicemia and acute decline in mental status. Known to have left shoulder dislocation, chronic, since at least 06/2020 per family members. Patient is non-verbal upon encounter and unable to provide history.    Patient was previously seen by Orthopaedics team on last admission. Last encounter was 2/23/21 with note documented in chart. No acute orthopaedic intervention indicated at that time. Plan was for outpatient follow up. Patient's orthopaedic condition remains unchanged at this time.    PAST MEDICAL & SURGICAL HISTORY: Cataract, Prostatitis, HLD, HTN, Gout, Parkinson's  Allergies: No Known Allergies    Medications: apixaban 5 milliGRAM(s) Oral every 12 hours  atorvastatin 40 milliGRAM(s) Oral at bedtime  carbidopa/levodopa CR 25/100 2 Tablet(s) Oral <User Schedule>  chlorhexidine 4% Liquid 1 Application(s) Topical <User Schedule>  lactated ringers. 1000 milliLiter(s) IV Continuous <Continuous>  pantoprazole    Tablet 40 milliGRAM(s) Oral before breakfast  senna 2 Tablet(s) Oral at bedtime    PHYSICAL EXAM:  Vital Signs Last 24 Hrs  T(C): 37.1 (23 Mar 2021 08:06), Max: 37.8 (22 Mar 2021 17:44)  T(F): 98.8 (23 Mar 2021 08:06), Max: 100 (22 Mar 2021 17:44)  HR: 85 (23 Mar 2021 08:06) (61 - 85)  BP: 134/95 (23 Mar 2021 08:06) (96/53 - 134/95)  RR: 19 (23 Mar 2021 08:06) (18 - 20)  SpO2: 100% (22 Mar 2021 19:43) (100% - 100%)    Physical Exam:  General: NAD. Sleeping on exam. Dementia, non-verbal, unable to comply with exam.  Resp: NLB on RA.    LUE:  Skin intact. No open skin or wounds  Appears +TTP at the L shoulder or with any examination of LUE  Unable to assess sensation  Grossly motor intact in the digits, wiggling fingers  2+ radial pulse  <2sec cap refill at distal finger tips    BLE: Contracted at B/L hips and knees.    Labs:                10.3   8.87  )-----------( 247      ( 23 Mar 2021 05:47 )             34.5     144  |  106  |  76<HH>  ----------------------------<  89  5.0   |  26  |  1.9<H>    Ca    9.7      23 Mar 2021 05:47  Phos  5.8     03-23  Mg     2.5     03-23  TPro  7.6  /  Alb  2.8<L>  /  TBili  0.4  /  DBili  x   /  AST  40  /  ALT  29  /  AlkPhos  115  03-23  PT/INR - ( 22 Mar 2021 17:50 )   PT: 21.10 sec;   INR: 1.83 ratio    PTT - ( 22 Mar 2021 17:50 )  PTT:46.3 sec    Imaging XR: Chest XR shows gross dislocation of L glenohumeral joint. No recent dedicated shoulder views taken.    A/P: 71M with dementia, Parkinson's, non-verbal, with L shoulder chronic dislocation since at least 06/2020. No acute orthopaedic intervention indicated at this time.    Patient was previously seen by Orthopaedics team on last admission. Last encounter was 2/23/21 with note documented in chart. No acute orthopaedic intervention indicated at that time. Plan was for outpatient follow up. Patient's orthopaedic condition remains unchanged at this time.    - Obtain dedicated L shoulder XRs, ordered  - Pain control PRN per primary team  - WBAT LUE  - If discharged, patient may follow up with Dr. Ignacio, consistent with plan from previous admission/discharge.

## 2021-03-23 NOTE — SWALLOW BEDSIDE ASSESSMENT ADULT - SLP PERTINENT HISTORY OF CURRENT PROBLEM
Pt is 70 y/o M w/ PMHx: Parkinson's, dementia, CKD Stage 3, 1st degree AV block, HLD, gout, HTN, DM, fungal septicemia presenting from [facility] for AMS. As per documentation patient at baseline is verbal but for past 3 days, he has been worsening to state of being non verbal. Pt was admitted to [facility] for fungemia and discharged on 3/11. CTH  (-) for acute changes. Pt known to SLP services w/ most recent recs for dysphagia 1 w/ nectar-thick liquids. SLP c/s 2' hx of dysphagia. Pt is a 72 y/o M w/ PMHx: Parkinson's, dementia, CKD Stage 3, HTN, DM, presenting from [facility] for AMS. CTH (-). pt being treated for ABEBE and AMS likely 2' uremic encephalopathy.  Pt known to SLP services w/ most recent recs for dysphagia 1 w/ nectar-thick liquids. SLP c/s 2' hx of dysphagia.

## 2021-03-23 NOTE — CONSULT NOTE ADULT - ASSESSMENT
71 year old Male with past medical history of Parkinson's, dementia, CKD Stage 3, 1st degree AV block, HLD, gout, HTN, DM, fungal septicemia presenting from [facility] for acute decompensation in mental status found to have ABEBE.     Impression:     Altered mental status likely due to uremic encephalopathy   -Patient with baseline Cr 1.2-1.3. Presents with Cr 1.9 and BUN of 75-76.   -Recommend re-evaluation once resolution of uremia and ABEBE IF altered mental status persists, however currently patient is easily arousable, able to comprehend and follow some commands (possible improvement from prior documented status).   -Continue sinemet  -ABEBE workup and management as per primary team - renal US pending, IVF, monitor urine output, etc.

## 2021-03-23 NOTE — CONSULT NOTE ADULT - SUBJECTIVE AND OBJECTIVE BOX
Patient is a 71y old  Male who presents with a chief complaint of Change in Mental Status (23 Mar 2021 10:44)      HPI:  71 year old Male with past medical history of Parkinson's, dementia, CKD Stage 3, 1st degree AV block, HLD, gout, HTN, DM, fungal septicemia presenting from [facility] for acute decompensation in mental status.     As per documentation patient at baseline is verbal but for past 3 days, he has been worsening to state of being non verbal. Patient was admitted to [facility] for fungemia and discharged on the 11th March. At  he had left hand cellulitis and was treated for it.    In ED patient hemodynamically stable, afebrile, ABEBE with rise in Cr to 1.9 and BUN to 75, remains non verbal. CT head negative for any new strokes and UA negative. Family has not visited the patient recently so unaware of his status. (22 Mar 2021 23:59)    Burn c/s for evaluation of sacral ulcer      PAST MEDICAL & SURGICAL HISTORY:  Cataract    Prostatitis    Dyslipidemia    Gout    Hypertension    Parkinson disease    No significant past surgical history        PHYSICAL EXAM    Vital Signs Last 24 Hrs  T(C): 37.1 (23 Mar 2021 08:06), Max: 37.8 (22 Mar 2021 17:44)  T(F): 98.8 (23 Mar 2021 08:06), Max: 100 (22 Mar 2021 17:44)  HR: 85 (23 Mar 2021 08:06) (61 - 85)  BP: 134/95 (23 Mar 2021 08:06) (96/53 - 134/95)  RR: 19 (23 Mar 2021 08:06) (18 - 20)  SpO2: 100% (22 Mar 2021 19:43) (100% - 100%)      PHYSICAL EXAM:  GENERAL: NAD, lying comfortably in bed  SKIN: sacrum with full thickness ~4x5cm with yellow/brown devitalized tissue at base; no purulent drainage, no bleeding   Patient is a 71y old  Male who presents with a chief complaint of Change in Mental Status (23 Mar 2021 10:44)      HPI:  71 year old Male with past medical history of Parkinson's, dementia, CKD Stage 3, 1st degree AV block, HLD, gout, HTN, DM, fungal septicemia presenting from [facility] for acute decompensation in mental status.     As per documentation patient at baseline is verbal but for past 3 days, he has been worsening to state of being non verbal. Patient was admitted to [facility] for fungemia and discharged on the 11th March. At  he had left hand cellulitis and was treated for it.    In ED patient hemodynamically stable, afebrile, ABEBE with rise in Cr to 1.9 and BUN to 75, remains non verbal. CT head negative for any new strokes and UA negative. Family has not visited the patient recently so unaware of his status. (22 Mar 2021 23:59)    Burn c/s for evaluation of sacral ulcer      PAST MEDICAL & SURGICAL HISTORY:  Cataract    Prostatitis    Dyslipidemia    Gout    Hypertension    Parkinson disease    No significant past surgical history        PHYSICAL EXAM    Vital Signs Last 24 Hrs  T(C): 37.1 (23 Mar 2021 08:06), Max: 37.8 (22 Mar 2021 17:44)  T(F): 98.8 (23 Mar 2021 08:06), Max: 100 (22 Mar 2021 17:44)  HR: 85 (23 Mar 2021 08:06) (61 - 85)  BP: 134/95 (23 Mar 2021 08:06) (96/53 - 134/95)  RR: 19 (23 Mar 2021 08:06) (18 - 20)  SpO2: 100% (22 Mar 2021 19:43) (100% - 100%)      PHYSICAL EXAM:  GENERAL: NAD, lying comfortably in bed  SKIN: sacrum with full thickness ~4x5cm with dark /brown devitalized tissue at base; no purulent drainage, no bleeding

## 2021-03-23 NOTE — SWALLOW BEDSIDE ASSESSMENT ADULT - SWALLOW EVAL: DIAGNOSIS
PO trials unsafe at this time 2' lethargy PO trials not appropriate 2' poor mental status, pt minimally arousable.

## 2021-03-24 LAB
ALBUMIN SERPL ELPH-MCNC: 2.8 G/DL — LOW (ref 3.5–5.2)
ALP SERPL-CCNC: 106 U/L — SIGNIFICANT CHANGE UP (ref 30–115)
ALT FLD-CCNC: 19 U/L — SIGNIFICANT CHANGE UP (ref 0–41)
AMMONIA BLD-MCNC: 19 UMOL/L — SIGNIFICANT CHANGE UP (ref 11–55)
ANION GAP SERPL CALC-SCNC: 12 MMOL/L — SIGNIFICANT CHANGE UP (ref 7–14)
AST SERPL-CCNC: 37 U/L — SIGNIFICANT CHANGE UP (ref 0–41)
BASOPHILS # BLD AUTO: 0.06 K/UL — SIGNIFICANT CHANGE UP (ref 0–0.2)
BASOPHILS NFR BLD AUTO: 0.6 % — SIGNIFICANT CHANGE UP (ref 0–1)
BILIRUB SERPL-MCNC: 0.5 MG/DL — SIGNIFICANT CHANGE UP (ref 0.2–1.2)
BUN SERPL-MCNC: 67 MG/DL — CRITICAL HIGH (ref 10–20)
CALCIUM SERPL-MCNC: 9.7 MG/DL — SIGNIFICANT CHANGE UP (ref 8.5–10.1)
CHLORIDE SERPL-SCNC: 111 MMOL/L — HIGH (ref 98–110)
CO2 SERPL-SCNC: 25 MMOL/L — SIGNIFICANT CHANGE UP (ref 17–32)
CREAT ?TM UR-MCNC: 84 MG/DL — SIGNIFICANT CHANGE UP
CREAT SERPL-MCNC: 1.6 MG/DL — HIGH (ref 0.7–1.5)
EOSINOPHIL # BLD AUTO: 0.07 K/UL — SIGNIFICANT CHANGE UP (ref 0–0.7)
EOSINOPHIL NFR BLD AUTO: 0.7 % — SIGNIFICANT CHANGE UP (ref 0–8)
GLUCOSE SERPL-MCNC: 95 MG/DL — SIGNIFICANT CHANGE UP (ref 70–99)
HCT VFR BLD CALC: 33.6 % — LOW (ref 42–52)
HGB BLD-MCNC: 10.1 G/DL — LOW (ref 14–18)
IMM GRANULOCYTES NFR BLD AUTO: 0.4 % — HIGH (ref 0.1–0.3)
LYMPHOCYTES # BLD AUTO: 1.21 K/UL — SIGNIFICANT CHANGE UP (ref 1.2–3.4)
LYMPHOCYTES # BLD AUTO: 12.3 % — LOW (ref 20.5–51.1)
MAGNESIUM SERPL-MCNC: 2.5 MG/DL — HIGH (ref 1.8–2.4)
MCHC RBC-ENTMCNC: 25.3 PG — LOW (ref 27–31)
MCHC RBC-ENTMCNC: 30.1 G/DL — LOW (ref 32–37)
MCV RBC AUTO: 84.2 FL — SIGNIFICANT CHANGE UP (ref 80–94)
MONOCYTES # BLD AUTO: 0.88 K/UL — HIGH (ref 0.1–0.6)
MONOCYTES NFR BLD AUTO: 9 % — SIGNIFICANT CHANGE UP (ref 1.7–9.3)
NEUTROPHILS # BLD AUTO: 7.54 K/UL — HIGH (ref 1.4–6.5)
NEUTROPHILS NFR BLD AUTO: 77 % — HIGH (ref 42.2–75.2)
NRBC # BLD: 0 /100 WBCS — SIGNIFICANT CHANGE UP (ref 0–0)
OSMOLALITY UR: 606 MOS/KG — SIGNIFICANT CHANGE UP (ref 50–1200)
PLATELET # BLD AUTO: 213 K/UL — SIGNIFICANT CHANGE UP (ref 130–400)
POTASSIUM SERPL-MCNC: 4.5 MMOL/L — SIGNIFICANT CHANGE UP (ref 3.5–5)
POTASSIUM SERPL-SCNC: 4.5 MMOL/L — SIGNIFICANT CHANGE UP (ref 3.5–5)
PROT ?TM UR-MCNC: 23 MG/DLG/24H — SIGNIFICANT CHANGE UP
PROT SERPL-MCNC: 7.6 G/DL — SIGNIFICANT CHANGE UP (ref 6–8)
RBC # BLD: 3.99 M/UL — LOW (ref 4.7–6.1)
RBC # FLD: 16.7 % — HIGH (ref 11.5–14.5)
SODIUM SERPL-SCNC: 148 MMOL/L — HIGH (ref 135–146)
SODIUM UR-SCNC: 60 MMOL/L — SIGNIFICANT CHANGE UP
UUN UR-MCNC: 1141 MG/DL — SIGNIFICANT CHANGE UP
WBC # BLD: 9.8 K/UL — SIGNIFICANT CHANGE UP (ref 4.8–10.8)
WBC # FLD AUTO: 9.8 K/UL — SIGNIFICANT CHANGE UP (ref 4.8–10.8)

## 2021-03-24 PROCEDURE — 95819 EEG AWAKE AND ASLEEP: CPT | Mod: 26

## 2021-03-24 PROCEDURE — 99233 SBSQ HOSP IP/OBS HIGH 50: CPT

## 2021-03-24 PROCEDURE — 99497 ADVNCD CARE PLAN 30 MIN: CPT | Mod: 25

## 2021-03-24 PROCEDURE — 71045 X-RAY EXAM CHEST 1 VIEW: CPT | Mod: 26

## 2021-03-24 RX ORDER — CARBIDOPA AND LEVODOPA 25; 100 MG/1; MG/1
2 TABLET ORAL
Refills: 0 | Status: DISCONTINUED | OUTPATIENT
Start: 2021-03-24 | End: 2021-03-25

## 2021-03-24 RX ORDER — ACETAMINOPHEN 500 MG
650 TABLET ORAL ONCE
Refills: 0 | Status: COMPLETED | OUTPATIENT
Start: 2021-03-24 | End: 2021-03-24

## 2021-03-24 RX ADMIN — APIXABAN 5 MILLIGRAM(S): 2.5 TABLET, FILM COATED ORAL at 23:01

## 2021-03-24 RX ADMIN — CHLORHEXIDINE GLUCONATE 1 APPLICATION(S): 213 SOLUTION TOPICAL at 06:42

## 2021-03-24 RX ADMIN — SENNA PLUS 2 TABLET(S): 8.6 TABLET ORAL at 23:00

## 2021-03-24 RX ADMIN — Medication 1 APPLICATION(S): at 17:41

## 2021-03-24 RX ADMIN — Medication 1 APPLICATION(S): at 06:42

## 2021-03-24 RX ADMIN — Medication 1 APPLICATION(S): at 17:42

## 2021-03-24 RX ADMIN — ATORVASTATIN CALCIUM 40 MILLIGRAM(S): 80 TABLET, FILM COATED ORAL at 23:00

## 2021-03-24 NOTE — GOALS OF CARE CONVERSATION - ADVANCED CARE PLANNING - CONVERSATION DETAILS
Met with patient's daughter Halina  to discuss overall goals of care. Discussed patient's current prognosis, diagnosis, and treatment options.  Patient is unable to make decisions  Patient, family verbalized understanding of his very complicated medical conditions. They  want to opt for full medical management including CPR and intubation if required in case of worsening condition. Also agreed for NG placement. Pt is full code.

## 2021-03-24 NOTE — PROGRESS NOTE ADULT - SUBJECTIVE AND OBJECTIVE BOX
Progress Note:  Provider Speciality                            Hospitalist      NIEVES LABOY MRN-[id number] 71y Male     CHIEF PRESENTING COMPLAINT:  Patient is a 71y old  Male who presents with a chief complaint of Change in Mental Status (24 Mar 2021 09:49)        SUBJECTIVE:  Patient was seen and examined at bedside. Review of systems could not be obtained in this patient.    No significant overnight events reported.     HISTORY OF PRESENTING ILLNESS:  HPI:  71 year old Male with past medical history of Parkinson's, dementia, CKD Stage 3, 1st degree AV block, HLD, gout, HTN, DM, fungal septicemia presenting from [facility] for acute decompensation in mental status.     As per documentation patient at baseline is verbal but for past 3 days, he has been worsening to state of being non verbal. Patient was admitted to [facility] for fungemia and discharged on the 11th March. At  he had left hand cellulitis and was treated for it.    In ED patient hemodynamically stable, afebrile, ABEBE with rise in Cr to 1.9 and BUN to 75, remains non verbal. CT head negative for any new strokes and UA negative. Family has not visited the patient recently so unaware of his status. (22 Mar 2021 23:59)        REVIEW OF SYSTEMS:  Review of systems could not be obtained in this patient.     PAST MEDICAL & SURGICAL HISTORY:  PAST MEDICAL & SURGICAL HISTORY:  Cataract    Prostatitis    Dyslipidemia    Gout    Hypertension    Parkinson disease    No significant past surgical history            VITAL SIGNS:  Vital Signs Last 24 Hrs  T(C): 36.3 (24 Mar 2021 08:09), Max: 37.2 (24 Mar 2021 00:00)  T(F): 97.4 (24 Mar 2021 08:09), Max: 99 (24 Mar 2021 00:00)  HR: 67 (24 Mar 2021 08:09) (66 - 79)  BP: 125/77 (24 Mar 2021 08:09) (125/77 - 138/65)  BP(mean): --  RR: 18 (24 Mar 2021 08:09) (18 - 20)  SpO2: 100% (24 Mar 2021 00:00) (100% - 100%)          PHYSICAL EXAMINATION:  Not in acute distress, obtunded  General: No pallor, no icterus  HEENT:   EOMI, no JVD.  Heart: S1+S2 audible  Lungs: bilateral  fair air entry, no wheezing, no crepitations.  Abdomen: Soft, non-tender, non-distended , no  rigidity or guarding.  CNS: Awake, obtunded, alert, CN  could not assessed. Does snot follow commnads  Extremities:  No edema            CONSULTS:  Consultant(s) Notes Reviewed by me.   Care Discussed with Consultants/Other Providers where required.        MEDICATIONS:  MEDICATIONS  (STANDING):  apixaban 5 milliGRAM(s) Oral every 12 hours  atorvastatin 40 milliGRAM(s) Oral at bedtime  carbidopa/levodopa CR 25/100 2 Tablet(s) Oral <User Schedule>  chlorhexidine 4% Liquid 1 Application(s) Topical <User Schedule>  collagenase Ointment 1 Application(s) Topical two times a day  Dakins Solution - 1/2 Strength 1 Application(s) Topical two times a day  lactated ringers. 1000 milliLiter(s) (70 mL/Hr) IV Continuous <Continuous>  pantoprazole    Tablet 40 milliGRAM(s) Oral before breakfast  senna 2 Tablet(s) Oral at bedtime    MEDICATIONS  (PRN):            ASSESSMENT:      71 year old Male with past medical history of Parkinson's, dementia, CKD Stage 3, 1st degree AV block, HLD, gout, HTN, DM, fungal septicemia presenting from [facility] for acute decompensation in mental status. Patient appears to be dehydrated. No clear cause of change in mental status.       Suspected metabolic encephalopathy  Possible advancing Parkinsonism vs dementia  Left shoulder dislocation   ABEBE on CKD stage 3  Sacral pressure ulcer- stage 4      No apprent infectious etiology  CT Head negative for any acute strokes  c/w LR at 75 ml/hr  EEG pending   keep NPO for now as high risk for spiration. Will consider NG feeding  Sacral pressure ulcer- stage 4- Burn: no surgical intervention   H/O Parkinson disease-neurology noted  Left shoulder dislocation-recurrent -ortho follow up  ABEBE likely Pre renal- continue gentle IVF  Chronic DVT- Continue Eliquis    Progress note Handoff:   Pending: EEG  Discussion: Diagnosis, current management plan and further plan of care discussed with family  and housestaff in morning  rounds.  Disposition: Acute for  now

## 2021-03-24 NOTE — CHART NOTE - NSCHARTNOTEFT_GEN_A_CORE
Routine EEG results called to me by epilepsy attending with findings of epileptiform discharges predominantly in the right posterior quadrant which has a high potential for focal onset seizures.  Also reviewing medications appears that sinemet dosing was not given for over 24 hours.  As abruptly stopping dopamine agonists can lead to dopamine withdrawal (which can mimic neuroleptic malignant syndrome to some degree) would make every effort to place NGT and restart sinemet at immediate release formulations as ER may not be able to be given through NGT    1. Start keppra 250mg BID  2. Give Sinemet 25/100 2 tabs at 10am, 2pm and 6 pm (may need additional doses after determining clinical response) via NGT  3. Will follow

## 2021-03-24 NOTE — SWALLOW BEDSIDE ASSESSMENT ADULT - SLP PERTINENT HISTORY OF CURRENT PROBLEM
Pt is a 72 y/o M w/ PMHx: Parkinson's, dementia, CKD Stage 3, HTN, DM, presenting from [facility] for AMS. CTH (-). pt being treated for ABEBE and AMS likely 2' uremic encephalopathy. ?Metabolic vs. dehydration as per chart. Pt known to SLP services w/ most recent recs for dysphagia 1 w/ nectar-thick liquids.

## 2021-03-24 NOTE — PROGRESS NOTE ADULT - SUBJECTIVE AND OBJECTIVE BOX
NIEVES LABOY 71y Male  MRN#: [id number]   Hospital Day: 2d    SUBJECTIVE  Patient is a 71y old Male who presents with a chief complaint of Change in Mental Status (23 Mar 2021 14:52)  Currently admitted to medicine with the primary diagnosis of AMS (altered mental status)      INTERVAL HPI AND OVERNIGHT EVENTS:  Patient was examined and seen at bedside. This morning he is resting comfortably in bed and reports no issues or overnight events.    REVIEW OF SYMPTOMS:  CONSTITUTIONAL: No weakness, fevers or chills; No headaches  EYES: No visual changes, eye pain, or discharge  ENT: No vertigo; No ear pain or change in hearing; No sore throat or difficulty swallowing  NECK: No pain or stiffness  RESPIRATORY: No cough, wheezing, or hemoptysis; No shortness of breath  CARDIOVASCULAR: No chest pain or palpitations  GASTROINTESTINAL: No abdominal or epigastric pain; No nausea, vomiting, or hematemesis; No diarrhea or constipation; No melena or hematochezia  GENITOURINARY: No dysuria, frequency or hematuria  MUSCULOSKELETAL: No joint pain, no muscle pain, no weakness  NEUROLOGICAL: No numbness or weakness  SKIN: No itching or rashes    OBJECTIVE  PAST MEDICAL & SURGICAL HISTORY  Cataract    Prostatitis    Dyslipidemia    Gout    Hypertension    Parkinson disease    No significant past surgical history      ALLERGIES:  No Known Allergies    MEDICATIONS:  STANDING MEDICATIONS  apixaban 5 milliGRAM(s) Oral every 12 hours  atorvastatin 40 milliGRAM(s) Oral at bedtime  carbidopa/levodopa CR 25/100 2 Tablet(s) Oral <User Schedule>  chlorhexidine 4% Liquid 1 Application(s) Topical <User Schedule>  collagenase Ointment 1 Application(s) Topical two times a day  Dakins Solution - 1/2 Strength 1 Application(s) Topical two times a day  lactated ringers. 1000 milliLiter(s) IV Continuous <Continuous>  pantoprazole    Tablet 40 milliGRAM(s) Oral before breakfast  senna 2 Tablet(s) Oral at bedtime    PRN MEDICATIONS      VITAL SIGNS: Last 24 Hours  T(C): 36.3 (24 Mar 2021 08:09), Max: 37.2 (24 Mar 2021 00:00)  T(F): 97.4 (24 Mar 2021 08:09), Max: 99 (24 Mar 2021 00:00)  HR: 67 (24 Mar 2021 08:09) (66 - 79)  BP: 125/77 (24 Mar 2021 08:09) (125/77 - 138/65)  BP(mean): --  RR: 18 (24 Mar 2021 08:09) (18 - 20)  SpO2: 100% (24 Mar 2021 00:00) (100% - 100%)    LABS:                        10.1   9.80  )-----------( 213      ( 24 Mar 2021 07:46 )             33.6     03-23    144  |  106  |  76<HH>  ----------------------------<  89  5.0   |  26  |  1.9<H>    Ca    9.7      23 Mar 2021 05:47  Phos  5.8     -  Mg     2.5     -23    TPro  7.6  /  Alb  2.8<L>  /  TBili  0.4  /  DBili  x   /  AST  40  /  ALT  29  /  AlkPhos  115  03-23    PT/INR - ( 22 Mar 2021 17:50 )   PT: 21.10 sec;   INR: 1.83 ratio         PTT - ( 22 Mar 2021 17:50 )  PTT:46.3 sec  Urinalysis Basic - ( 22 Mar 2021 20:41 )    Color: Yellow / Appearance: Clear / S.025 / pH: x  Gluc: x / Ketone: Trace  / Bili: Negative / Urobili: <2 mg/dL   Blood: x / Protein: 30 mg/dL / Nitrite: Negative   Leuk Esterase: Negative / RBC: 12 /HPF / WBC 2 /HPF   Sq Epi: x / Non Sq Epi: 0 /HPF / Bacteria: Negative            Culture - Urine (collected 22 Mar 2021 20:41)  Source: .Urine Clean Catch (Midstream)  Final Report (23 Mar 2021 20:53):    No growth    Culture - Blood (collected 22 Mar 2021 15:31)  Source: .Blood Blood-Peripheral  Preliminary Report (24 Mar 2021 02:20):    No growth to date.    Culture - Blood (collected 22 Mar 2021 15:28)  Source: .Blood Blood-Peripheral  Preliminary Report (24 Mar 2021 02:20):    No growth to date.          RADIOLOGY:      PHYSICAL EXAM:  CONSTITUTIONAL: No acute distress, AAOx0, lethargic, dose not follow any commands   HEAD: Atraumatic, normocephalic  EYES: EOM intact, PERRLA, conjunctiva and sclera clear  PULMONARY: Clear to auscultation bilaterally  CARDIOVASCULAR: Regular rate and rhythm  GASTROINTESTINAL: Soft, non-tender, non-distended  MUSCULOSKELETAL: does not follow commands, bed bounds   NEUROLOGY: does not participate in exam or follow any commands     ASSESSMENT & PLAN  71 year old Male with past medical history of Parkinson's, dementia, CKD Stage 3, 1st degree AV block, HLD, gout, HTN, DM, fungal septicemia presenting from [facility] for acute decompensation in mental status. Patient appears to be dehydrated. No clear cause of change in mental status. If does not improve with hydration consider Neuro consultation.    # Altered Mental Status possible Metabolic vs Dehydration  - 3 days of decreasing alertness and no longer speaking at the nursing home   - CT Head negative for any acute strokes  - UA negative   - Blood Cx: negative  - Urine Cx: negative   - c/w LR @ 70  - EEG pending   - per S&S patient is too lethargic and unable to participate in exam, keep NPO for now     # Sacral pressure ulcer- stage 4  - Burn: no surgical intervention     # H/O Parkinson disease  - Continue current meds of Sinemet 25/100 2 tablets 5 times a day,  - Neuro consult complete: no intervention at this time   - EEG pending   - CT head negative     # Left shoulder dislocation   - seen on CT in 2021  - Ortho reconsulted- pending L shoulder XR   - Xray Shoulder 2 Views, Left (21 @ 21:48) Reidentified anterior dislocation of the humeral head overlying the left second rib with bony fragmentation along the glenoid and humeral head. AC joint degenerative change.    # H/O First Degree AV Block  - No indication for PPM    # ABEBE likely Pre renal  - Baseline Cr 1.2-1.3  - s/p 1L on NS in ED  - c/w LR @ 74   - Creatinine, Serum: 1.9 mg/dL (21 @ 05:47)    # Chronic DVT  - Continue Eliquis    # HTN - well controlled   - Hold Enalapril due to intermittent hypotension     DVT: Eliquis  GI: Omeprazole  Diet: NPO, lethargic   Dispo: pending improvement in mental status

## 2021-03-25 LAB
ALBUMIN SERPL ELPH-MCNC: 2.7 G/DL — LOW (ref 3.5–5.2)
ALP SERPL-CCNC: 104 U/L — SIGNIFICANT CHANGE UP (ref 30–115)
ALT FLD-CCNC: 37 U/L — SIGNIFICANT CHANGE UP (ref 0–41)
ANION GAP SERPL CALC-SCNC: 13 MMOL/L — SIGNIFICANT CHANGE UP (ref 7–14)
APPEARANCE UR: CLEAR — SIGNIFICANT CHANGE UP
AST SERPL-CCNC: 39 U/L — SIGNIFICANT CHANGE UP (ref 0–41)
BACTERIA # UR AUTO: NEGATIVE — SIGNIFICANT CHANGE UP
BASOPHILS # BLD AUTO: 0.07 K/UL — SIGNIFICANT CHANGE UP (ref 0–0.2)
BASOPHILS NFR BLD AUTO: 0.7 % — SIGNIFICANT CHANGE UP (ref 0–1)
BILIRUB SERPL-MCNC: 0.6 MG/DL — SIGNIFICANT CHANGE UP (ref 0.2–1.2)
BILIRUB UR-MCNC: NEGATIVE — SIGNIFICANT CHANGE UP
BUN SERPL-MCNC: 69 MG/DL — CRITICAL HIGH (ref 10–20)
CALCIUM SERPL-MCNC: 9.6 MG/DL — SIGNIFICANT CHANGE UP (ref 8.5–10.1)
CHLORIDE SERPL-SCNC: 114 MMOL/L — HIGH (ref 98–110)
CO2 SERPL-SCNC: 24 MMOL/L — SIGNIFICANT CHANGE UP (ref 17–32)
COLOR SPEC: YELLOW — SIGNIFICANT CHANGE UP
CREAT SERPL-MCNC: 1.8 MG/DL — HIGH (ref 0.7–1.5)
DIFF PNL FLD: NEGATIVE — SIGNIFICANT CHANGE UP
EOSINOPHIL # BLD AUTO: 0.08 K/UL — SIGNIFICANT CHANGE UP (ref 0–0.7)
EOSINOPHIL NFR BLD AUTO: 0.7 % — SIGNIFICANT CHANGE UP (ref 0–8)
EPI CELLS # UR: 1 /HPF — SIGNIFICANT CHANGE UP (ref 0–5)
GLUCOSE BLDC GLUCOMTR-MCNC: 110 MG/DL — HIGH (ref 70–99)
GLUCOSE BLDC GLUCOMTR-MCNC: 117 MG/DL — HIGH (ref 70–99)
GLUCOSE BLDC GLUCOMTR-MCNC: 88 MG/DL — SIGNIFICANT CHANGE UP (ref 70–99)
GLUCOSE SERPL-MCNC: 98 MG/DL — SIGNIFICANT CHANGE UP (ref 70–99)
GLUCOSE UR QL: NEGATIVE — SIGNIFICANT CHANGE UP
HCT VFR BLD CALC: 34.3 % — LOW (ref 42–52)
HGB BLD-MCNC: 10 G/DL — LOW (ref 14–18)
HYALINE CASTS # UR AUTO: 12 /LPF — HIGH (ref 0–7)
IMM GRANULOCYTES NFR BLD AUTO: 0.4 % — HIGH (ref 0.1–0.3)
KETONES UR-MCNC: SIGNIFICANT CHANGE UP
LACTATE SERPL-SCNC: 1 MMOL/L — SIGNIFICANT CHANGE UP (ref 0.7–2)
LEUKOCYTE ESTERASE UR-ACNC: ABNORMAL
LYMPHOCYTES # BLD AUTO: 1.15 K/UL — LOW (ref 1.2–3.4)
LYMPHOCYTES # BLD AUTO: 10.7 % — LOW (ref 20.5–51.1)
MAGNESIUM SERPL-MCNC: 2.4 MG/DL — SIGNIFICANT CHANGE UP (ref 1.8–2.4)
MCHC RBC-ENTMCNC: 25.2 PG — LOW (ref 27–31)
MCHC RBC-ENTMCNC: 29.2 G/DL — LOW (ref 32–37)
MCV RBC AUTO: 86.4 FL — SIGNIFICANT CHANGE UP (ref 80–94)
MONOCYTES # BLD AUTO: 0.97 K/UL — HIGH (ref 0.1–0.6)
MONOCYTES NFR BLD AUTO: 9.1 % — SIGNIFICANT CHANGE UP (ref 1.7–9.3)
NEUTROPHILS # BLD AUTO: 8.4 K/UL — HIGH (ref 1.4–6.5)
NEUTROPHILS NFR BLD AUTO: 78.4 % — HIGH (ref 42.2–75.2)
NITRITE UR-MCNC: NEGATIVE — SIGNIFICANT CHANGE UP
NRBC # BLD: 0 /100 WBCS — SIGNIFICANT CHANGE UP (ref 0–0)
PH UR: 5.5 — SIGNIFICANT CHANGE UP (ref 5–8)
PHOSPHATE SERPL-MCNC: 4.6 MG/DL — SIGNIFICANT CHANGE UP (ref 2.1–4.9)
PLATELET # BLD AUTO: 202 K/UL — SIGNIFICANT CHANGE UP (ref 130–400)
POTASSIUM SERPL-MCNC: 4.2 MMOL/L — SIGNIFICANT CHANGE UP (ref 3.5–5)
POTASSIUM SERPL-SCNC: 4.2 MMOL/L — SIGNIFICANT CHANGE UP (ref 3.5–5)
PROT SERPL-MCNC: 7.5 G/DL — SIGNIFICANT CHANGE UP (ref 6–8)
PROT UR-MCNC: SIGNIFICANT CHANGE UP
RBC # BLD: 3.97 M/UL — LOW (ref 4.7–6.1)
RBC # FLD: 16.6 % — HIGH (ref 11.5–14.5)
RBC CASTS # UR COMP ASSIST: 4 /HPF — SIGNIFICANT CHANGE UP (ref 0–4)
SODIUM SERPL-SCNC: 151 MMOL/L — HIGH (ref 135–146)
SP GR SPEC: 1.02 — SIGNIFICANT CHANGE UP (ref 1.01–1.03)
TSH SERPL-MCNC: 1.57 UIU/ML — SIGNIFICANT CHANGE UP (ref 0.27–4.2)
UROBILINOGEN FLD QL: SIGNIFICANT CHANGE UP
WBC # BLD: 10.71 K/UL — SIGNIFICANT CHANGE UP (ref 4.8–10.8)
WBC # FLD AUTO: 10.71 K/UL — SIGNIFICANT CHANGE UP (ref 4.8–10.8)
WBC UR QL: 4 /HPF — SIGNIFICANT CHANGE UP (ref 0–5)

## 2021-03-25 PROCEDURE — 99233 SBSQ HOSP IP/OBS HIGH 50: CPT

## 2021-03-25 PROCEDURE — 99232 SBSQ HOSP IP/OBS MODERATE 35: CPT

## 2021-03-25 RX ORDER — LEVETIRACETAM 250 MG/1
250 TABLET, FILM COATED ORAL ONCE
Refills: 0 | Status: COMPLETED | OUTPATIENT
Start: 2021-03-25 | End: 2021-03-25

## 2021-03-25 RX ORDER — CARBIDOPA AND LEVODOPA 25; 100 MG/1; MG/1
2 TABLET ORAL
Refills: 0 | Status: DISCONTINUED | OUTPATIENT
Start: 2021-03-25 | End: 2021-03-31

## 2021-03-25 RX ORDER — SODIUM CHLORIDE 9 MG/ML
1000 INJECTION, SOLUTION INTRAVENOUS
Refills: 0 | Status: DISCONTINUED | OUTPATIENT
Start: 2021-03-25 | End: 2021-03-29

## 2021-03-25 RX ORDER — LEVETIRACETAM 250 MG/1
250 TABLET, FILM COATED ORAL EVERY 12 HOURS
Refills: 0 | Status: DISCONTINUED | OUTPATIENT
Start: 2021-03-25 | End: 2021-03-26

## 2021-03-25 RX ORDER — ACETAMINOPHEN 500 MG
650 TABLET ORAL EVERY 6 HOURS
Refills: 0 | Status: DISCONTINUED | OUTPATIENT
Start: 2021-03-25 | End: 2021-03-31

## 2021-03-25 RX ADMIN — Medication 650 MILLIGRAM(S): at 09:24

## 2021-03-25 RX ADMIN — Medication 1 APPLICATION(S): at 05:28

## 2021-03-25 RX ADMIN — CARBIDOPA AND LEVODOPA 2 TABLET(S): 25; 100 TABLET ORAL at 21:34

## 2021-03-25 RX ADMIN — PANTOPRAZOLE SODIUM 40 MILLIGRAM(S): 20 TABLET, DELAYED RELEASE ORAL at 06:58

## 2021-03-25 RX ADMIN — Medication 650 MILLIGRAM(S): at 01:52

## 2021-03-25 RX ADMIN — CARBIDOPA AND LEVODOPA 2 TABLET(S): 25; 100 TABLET ORAL at 09:36

## 2021-03-25 RX ADMIN — LEVETIRACETAM 400 MILLIGRAM(S): 250 TABLET, FILM COATED ORAL at 05:27

## 2021-03-25 RX ADMIN — APIXABAN 5 MILLIGRAM(S): 2.5 TABLET, FILM COATED ORAL at 17:36

## 2021-03-25 RX ADMIN — Medication 1 APPLICATION(S): at 17:36

## 2021-03-25 RX ADMIN — CHLORHEXIDINE GLUCONATE 1 APPLICATION(S): 213 SOLUTION TOPICAL at 05:28

## 2021-03-25 RX ADMIN — ATORVASTATIN CALCIUM 40 MILLIGRAM(S): 80 TABLET, FILM COATED ORAL at 21:34

## 2021-03-25 RX ADMIN — Medication 650 MILLIGRAM(S): at 10:42

## 2021-03-25 RX ADMIN — LEVETIRACETAM 400 MILLIGRAM(S): 250 TABLET, FILM COATED ORAL at 17:36

## 2021-03-25 RX ADMIN — SENNA PLUS 2 TABLET(S): 8.6 TABLET ORAL at 21:35

## 2021-03-25 RX ADMIN — CARBIDOPA AND LEVODOPA 2 TABLET(S): 25; 100 TABLET ORAL at 13:41

## 2021-03-25 RX ADMIN — CARBIDOPA AND LEVODOPA 2 TABLET(S): 25; 100 TABLET ORAL at 05:28

## 2021-03-25 RX ADMIN — SODIUM CHLORIDE 75 MILLILITER(S): 9 INJECTION, SOLUTION INTRAVENOUS at 09:22

## 2021-03-25 RX ADMIN — APIXABAN 5 MILLIGRAM(S): 2.5 TABLET, FILM COATED ORAL at 05:28

## 2021-03-25 RX ADMIN — Medication 1 APPLICATION(S): at 17:34

## 2021-03-25 NOTE — DIETITIAN INITIAL EVALUATION ADULT. - REASON
Unable to perform Nutrition focused physical exam as pt sleeping, confused. No family at bedside to obtain consent.

## 2021-03-25 NOTE — PROGRESS NOTE ADULT - ASSESSMENT
71 year old Male with past medical history of Parkinson's, dementia, CKD Stage 3, 1st degree AV block, HLD, gout, HTN, DM, fungal septicemia presenting from [facility] for acute decompensation in mental status found to have ABEBE.  CTH negative. Patient seems more lethargic than prior neuro exam. REEG + epileptiform discharges in the right posterior quadrant which has a high potential for focal onset seizures. Patient started on Keppra 250mg bid. Patient had not received sinemet ER for 24 hrs since patient was very lethargic. NGT Placed in the evening , sinemet changed to immediate release and is to be resumed.         Plan  1. Continue sinemet via ngt  2. Start Keppra 250 mg bid (first dose now)  3. Follow up pending official read of REEG  4. Will follow

## 2021-03-25 NOTE — PROGRESS NOTE ADULT - SUBJECTIVE AND OBJECTIVE BOX
Progress Note:  Provider Speciality                            Hospitalist      NIEVES LABOY MRN-[id number] 71y Male     CHIEF PRESENTING COMPLAINT:  Patient is a 71y old  Male who presents with a chief complaint of Change in Mental Status (24 Mar 2021 09:49)        SUBJECTIVE:  Patient was seen and examined at bedside. Review of systems could not be obtained in this patient.    No significant overnight events reported.     HISTORY OF PRESENTING ILLNESS:  HPI:  71 year old Male with past medical history of Parkinson's, dementia, CKD Stage 3, 1st degree AV block, HLD, gout, HTN, DM, fungal septicemia presenting from [facility] for acute decompensation in mental status.     As per documentation patient at baseline is verbal but for past 3 days, he has been worsening to state of being non verbal. Patient was admitted to [facility] for fungemia and discharged on the 11th March. At  he had left hand cellulitis and was treated for it.    In ED patient hemodynamically stable, afebrile, ABEBE with rise in Cr to 1.9 and BUN to 75, remains non verbal. CT head negative for any new strokes and UA negative. Family has not visited the patient recently so unaware of his status. (22 Mar 2021 23:59)        REVIEW OF SYSTEMS:  Review of systems could not be obtained in this patient.     PAST MEDICAL & SURGICAL HISTORY:  PAST MEDICAL & SURGICAL HISTORY:  Cataract    Prostatitis    Dyslipidemia    Gout    Hypertension    Parkinson disease    No significant past surgical history            VITAL SIGNS:  Vital Signs Last 24 Hrs  T(C): 36.3 (24 Mar 2021 08:09), Max: 37.2 (24 Mar 2021 00:00)  T(F): 97.4 (24 Mar 2021 08:09), Max: 99 (24 Mar 2021 00:00)  HR: 67 (24 Mar 2021 08:09) (66 - 79)  BP: 125/77 (24 Mar 2021 08:09) (125/77 - 138/65)  BP(mean): --  RR: 18 (24 Mar 2021 08:09) (18 - 20)  SpO2: 100% (24 Mar 2021 00:00) (100% - 100%)          PHYSICAL EXAMINATION:  Not in acute distress, obtunded  General: No pallor, no icterus  HEENT:   EOMI, no JVD.  Heart: S1+S2 audible  Lungs: bilateral  fair air entry, no wheezing, no crepitations.  Abdomen: Soft, non-tender, non-distended , no  rigidity or guarding.  CNS: Awake, obtunded, alert, CN  could not assessed. Does snot follow commnads  Extremities:  No edema            CONSULTS:  Consultant(s) Notes Reviewed by me.   Care Discussed with Consultants/Other Providers where required.        MEDICATIONS:  MEDICATIONS  (STANDING):  apixaban 5 milliGRAM(s) Oral every 12 hours  atorvastatin 40 milliGRAM(s) Oral at bedtime  carbidopa/levodopa CR 25/100 2 Tablet(s) Oral <User Schedule>  chlorhexidine 4% Liquid 1 Application(s) Topical <User Schedule>  collagenase Ointment 1 Application(s) Topical two times a day  Dakins Solution - 1/2 Strength 1 Application(s) Topical two times a day  lactated ringers. 1000 milliLiter(s) (70 mL/Hr) IV Continuous <Continuous>  pantoprazole    Tablet 40 milliGRAM(s) Oral before breakfast  senna 2 Tablet(s) Oral at bedtime    MEDICATIONS  (PRN):            ASSESSMENT:      71 year old Male with past medical history of Parkinson's, dementia, CKD Stage 3, 1st degree AV block, HLD, gout, HTN, DM, fungal septicemia presenting from [facility] for acute decompensation in mental status. Patient appears to be dehydrated. No clear cause of change in mental status.       Suspected metabolic encephalopathy  Possible advancing Parkinsonism vs dementia  Left shoulder dislocation   ABEBE on CKD stage 3  Sacral pressure ulcer- stage 4  Hypernatremia      No apparent infectious etiology but continues to spike . Follow up CXR & cultures  CT Head negative for any acute strokes  Hypernatremia- switch IVf to D5  EEG annormal- started om keppra by neuro  keep NPO for now as high risk for spiration. Will consider NG feeding  Sacral pressure ulcer- stage 4- Burn: no surgical intervention   H/O Parkinson disease-neurology noted  Left shoulder dislocation-recurrent -ortho follow up  ABEBE likely Pre renal- continue gentle IVF  Chronic DVT- Continue Eliquis    Progress note Handoff:   Discussion: Diagnosis, current management plan and further plan of care discussed with family  and housestaff in morning  rounds.  Disposition: Acute for  now. Medically worsening , not ready for discharge

## 2021-03-25 NOTE — PROGRESS NOTE ADULT - SUBJECTIVE AND OBJECTIVE BOX
NIEVES LABOY 71y Male  MRN#: [id number]   Hospital Day: 3d    SUBJECTIVE  Patient is a 71y old Male who presents with a chief complaint of Change in Mental Status (25 Mar 2021 01:15)  Currently admitted to medicine with the primary diagnosis of AMS (altered mental status)      INTERVAL HPI AND OVERNIGHT EVENTS:  Patient was examined and seen at bedside. This morning he is resting comfortably in bed and reports no issues or overnight events.    REVIEW OF SYMPTOMS: Unable to obtain due to patient's mental status    OBJECTIVE  PAST MEDICAL & SURGICAL HISTORY  Cataract  Prostatitis  Dyslipidemia  Gout  Hypertension  Parkinson disease  No significant past surgical history      ALLERGIES:  No Known Allergies    MEDICATIONS:  STANDING MEDICATIONS  apixaban 5 milliGRAM(s) Oral every 12 hours  atorvastatin 40 milliGRAM(s) Oral at bedtime  carbidopa/levodopa  25/100 2 Tablet(s) Oral <User Schedule>  chlorhexidine 4% Liquid 1 Application(s) Topical <User Schedule>  collagenase Ointment 1 Application(s) Topical two times a day  Dakins Solution - 1/2 Strength 1 Application(s) Topical two times a day  dextrose 5%. 1000 milliLiter(s) IV Continuous <Continuous>  levETIRAcetam  IVPB 250 milliGRAM(s) IV Intermittent every 12 hours  pantoprazole    Tablet 40 milliGRAM(s) Oral before breakfast  senna 2 Tablet(s) Oral at bedtime    PRN MEDICATIONS  acetaminophen   Tablet .. 650 milliGRAM(s) Oral every 6 hours PRN      VITAL SIGNS: Last 24 Hours  T(C): 38.4 (25 Mar 2021 08:20), Max: 38.4 (25 Mar 2021 08:20)  T(F): 101.2 (25 Mar 2021 08:20), Max: 101.2 (25 Mar 2021 08:20)  HR: 69 (25 Mar 2021 08:20) (69 - 76)  BP: 114/64 (25 Mar 2021 08:20) (114/64 - 120/85)  BP(mean): --  RR: 18 (25 Mar 2021 08:20) (16 - 18)  SpO2: --    LABS:                        10.0   10.71 )-----------( 202      ( 25 Mar 2021 05:47 )             34.3     03-25    151<H>  |  114<H>  |  69<HH>  ----------------------------<  98  4.2   |  24  |  1.8<H>    Ca    9.6      25 Mar 2021 05:47  Phos  4.6     03-25  Mg     2.4     03-25    TPro  7.5  /  Alb  2.7<L>  /  TBili  0.6  /  DBili  x   /  AST  39  /  ALT  37  /  AlkPhos  104  03-25              Culture - Urine (collected 22 Mar 2021 20:41)  Source: .Urine Clean Catch (Midstream)  Final Report (23 Mar 2021 20:53):    No growth    Culture - Blood (collected 22 Mar 2021 15:31)  Source: .Blood Blood-Peripheral  Preliminary Report (24 Mar 2021 02:20):    No growth to date.    Culture - Blood (collected 22 Mar 2021 15:28)  Source: .Blood Blood-Peripheral  Preliminary Report (24 Mar 2021 02:20):    No growth to date.      RADIOLOGY:   Xray Chest 1 View-PORTABLE IMMEDIATE (Xray Chest 1 View-PORTABLE IMMEDIATE .) (03.24.21 @ 21:14)    Impression: No radiographic evidence of acute cardiopulmonary disease. Feeding tube in stomach.        PHYSICAL EXAM:  CONSTITUTIONAL: No acute distress, AAOx0, lethargic, dose not follow any commands   HEAD: Atraumatic, normocephalic  EYES: EOM intact, PERRLA, conjunctiva and sclera clear  PULMONARY: Clear to auscultation bilaterally  CARDIOVASCULAR: Regular rate and rhythm  GASTROINTESTINAL: Soft, non-tender, non-distended  MUSCULOSKELETAL: does not follow commands, bed bounds   NEUROLOGY: does not participate in exam or follow any commands     ASSESSMENT & PLAN  71 year old Male with past medical history of Parkinson's, dementia, CKD Stage 3, 1st degree AV block, HLD, gout, HTN, DM, fungal septicemia presenting from [facility] for acute decompensation in mental status. Patient appears to be dehydrated. No clear cause of change in mental status. If does not improve with hydration consider Neuro consultation.    # Altered Mental Status possible Metabolic vs Dehydration  - 3 days of decreasing alertness and no longer speaking at the nursing home   - CT Head negative for any acute strokes  - UA negative   - Blood Cx: negative, Urine Cx: negative   - c/w LR @ 70  - EEG pending   - per S&S patient is too lethargic and unable to participate in exam, keep NPO for now, NG tube in place  ** patient febrile overnight, repeat      #hypernatremia  - Sodium, Serum: 151 mmol/L (03.25.21 @ 05:47)  - change IV Fluids from LR to D5 as patient is NPO     # Sacral pressure ulcer- stage 4  - Burn: no surgical intervention     # H/O Parkinson disease  - Continue current meds of Sinemet 25/100 2 tablets, confirming dose with Neurology   - Neuro consult complete: c/w sinemet & keppra   - EEG w/ epileptiform activity, started on keppra per neuro   - CT head negative     # Left shoulder dislocation   - seen on CT in Feb 2021  - Ortho reconsulted- pending L shoulder XR   - Xray Shoulder 2 Views, Left (03.23.21 @ 21:48) Reidentified anterior dislocation of the humeral head overlying the left second rib with bony fragmentation along the glenoid and humeral head. AC joint degenerative change.  - Per Ortho: likely no surgical intervention but pending finalized note    # H/O First Degree AV Block  - No indication for PPM    # ABEBE likely Pre renal  - Baseline Cr 1.2-1.3  - c/w D5 @ 75   - Creatinine, Serum: 1.8 mg/dL (03.25.21 @ 05:47)    # Chronic DVT  - Continue Eliquis    # HTN - well controlled   - Hold Enalapril due to intermittent hypotension     DVT: Eliquis  GI: Omeprazole  Diet: NPO, lethargic   Dispo: pending improvement in mental status

## 2021-03-25 NOTE — DIETITIAN INITIAL EVALUATION ADULT. - ADD RECOMMEND
If enteral nutrition warranted, recommend Jevity 1.2 @ 360ml q6hrs + 1 packet of prosource TF to provide 1440ml, 1768cal, 91 g protein, 1162ml free water.

## 2021-03-25 NOTE — SWALLOW BEDSIDE ASSESSMENT ADULT - SLP GENERAL OBSERVATIONS
Pt received in bed asleep minimally arousable in no apparent pain. +3L NC. +estela diamond. Pt received in bed asleep minimally arousable in no apparent pain. +3L NC. +salem sump in place

## 2021-03-25 NOTE — SWALLOW BEDSIDE ASSESSMENT ADULT - SLP PERTINENT HISTORY OF CURRENT PROBLEM
Pt is a 70 y/o M w/ PMHx: Parkinson's, dementia, CKD Stage 3, HTN, DM, presenting from [facility] for AMS. CTH (-). pt being treated for ABEBE and AMS likely 2' uremic encephalopathy. ?Metabolic vs. dehydration as per chart. Pt known to SLP services w/ most recent recs for dysphagia 1 w/ nectar-thick liquids. Pt is a 70 y/o M w/ PMHx: Parkinson's, dementia, CKD Stage 3, HTN, DM, presenting from [facility] for AMS. CTH (-). pt being treated for ABEBE and AMS likely 2' uremic encephalopathy. ?Metabolic vs. dehydration as per chart. pt seen by neuro, reportedly missed some doses of sinemet and EEG showed some epileptiform abnormalities. +full code; Pt known to SLP services w/ most recent recs for dysphagia 1 w/ nectar-thick liquids.

## 2021-03-25 NOTE — DIETITIAN INITIAL EVALUATION ADULT. - OTHER INFO
72 yo M with PMH Parkinson's, dementia, CKD3, HLD, gout, HTN, DM, presented from NH for acute decompensation in mental status. per S&S patient is too lethargic and unable to participate in exam, keep NPO for now, NG tube in place. Noted hypernatremia.     Pt confused, unable to reach pt's emergency contact in chart. Nutrition hx obtained from pt's NH chart. Was on Provided heart healthy, pureed texture, nectar consistency. Was receiving Moris BID, protein liquid supplements 30cc TID, vit C, D, and zinc sulfate.     In house npo, NGT in place. Per convo with MD Ornelas, team concerned for aspiration. Pending chest xray, if cleared will start feeds. LBM 3/24. Appears comfortable.     NH wt 173.2lbs (3/20). Previous adm wt 197lbs (2/15/2021) ~11% wt loss x 6 weeks    Ht:  67"   Wt:  175lbs   IBW:  148lbs +/-10%    BMI: 27.5  kg/m2      Skin: unstageable pressure injury on sacral spine, DTI on R heel per RN flow sheets  Edema: No edema noted per RN flow sheets

## 2021-03-25 NOTE — SWALLOW BEDSIDE ASSESSMENT ADULT - SWALLOW EVAL: DIAGNOSIS
PO trials not administered at this time 2' lethargy PO trials remain unsafe 2' pt minimally arousable

## 2021-03-25 NOTE — PROGRESS NOTE ADULT - SUBJECTIVE AND OBJECTIVE BOX
Neurology Follow up note   Patient seen and examined at bedside he is very lethargic, only grimaces to stimuli. Seems more lethargic than prior neuro exam. NGT placed in the evening.      HPI:  71 year old Male with past medical history of Parkinson's, dementia, CKD Stage 3, 1st degree AV block, HLD, gout, HTN, DM, fungal septicemia presenting from [facility] for acute decompensation in mental status. As per documentation patient at baseline is verbal but for past 3 days, he has been worsening to state of being non verbal. Patient was admitted to [facility] for fungemia and discharged on the 11th March. At  he had left hand cellulitis and was treated for it. In ED patient hemodynamically stable, afebrile, ABEBE with rise in Cr to 1.9 and BUN to 75, remains non verbal. CT head negative for any new strokes and UA negative. Family has not visited the patient recently so unaware of his status.            Vital Signs Last 24 Hrs  T(C): 38.1 (25 Mar 2021 00:00), Max: 38.1 (25 Mar 2021 00:00)  T(F): 100.5 (25 Mar 2021 00:00), Max: 100.5 (25 Mar 2021 00:00)  HR: 74 (25 Mar 2021 00:00) (67 - 76)  BP: 120/85 (25 Mar 2021 00:00) (118/57 - 125/77)  BP(mean): --  RR: 16 (25 Mar 2021 00:00) (16 - 18)  SpO2: --          Neurological Exam:   Mental status:  Patient is very lethargic , arouses and grimaces to pain, no verbal output. Not responsive to commands.  Cn: perrla, eyes midline, no obvious facial asymmetry,   Motor examination:+ ue resting tremors, involuntary movements b/l.  Formal Muscle Strength Testing: unable to test  Reflexes:  not tested  Sensory examination:   unable to test  Cerebellum:   unable to test      Medications  acetaminophen   Tablet .. 650 milliGRAM(s) Oral once  apixaban 5 milliGRAM(s) Oral every 12 hours  atorvastatin 40 milliGRAM(s) Oral at bedtime  carbidopa/levodopa  25/100 2 Tablet(s) Oral <User Schedule>  chlorhexidine 4% Liquid 1 Application(s) Topical <User Schedule>  collagenase Ointment 1 Application(s) Topical two times a day  Dakins Solution - 1/2 Strength 1 Application(s) Topical two times a day  lactated ringers. 1000 milliLiter(s) IV Continuous <Continuous>  pantoprazole    Tablet 40 milliGRAM(s) Oral before breakfast  senna 2 Tablet(s) Oral at bedtime      Lab                        10.1   9.80  )-----------( 213      ( 24 Mar 2021 07:46 )             33.6     03-24    148<H>  |  111<H>  |  67<HH>  ----------------------------<  95  4.5   |  25  |  1.6<H>    Ca    9.7      24 Mar 2021 07:46  Phos  5.8     03-23  Mg     2.5     03-24    TPro  7.6  /  Alb  2.8<L>  /  TBili  0.5  /  DBili  x   /  AST  37  /  ALT  19  /  AlkPhos  106  03-24    Ammonia, Serum (03.24.21 @ 11:06)   Ammonia, Serum: 19 umol/L A1C with Estimated Average Glucose (03.23.21 @ 05:47)     A1C with Estimated Average Glucose Result: 5.4    Radiology

## 2021-03-25 NOTE — PROGRESS NOTE ADULT - ATTENDING COMMENTS
Patient seen and examined and agree with above except as noted.  Patients history, notes, labs, imaging, vitals, meds and EEG reviewed.  Patient level arousal was worsening again and appeared to miss some doses of sinemet and EEG showed some epileptiform abnormalities    Plan as above

## 2021-03-25 NOTE — DIETITIAN INITIAL EVALUATION ADULT. - OTHER CALCULATIONS
wt used: 79.4kg CBW kcal: 1807-2110kcal (MSJ x 1.2-1.4AF) -- pressure injury considered protein: 95-111g (1.2-1.4g/kg CBW)-- as mentioned fluids: 1ml/kcal or per LIP

## 2021-03-25 NOTE — DIETITIAN INITIAL EVALUATION ADULT. - NAME AND PHONE
Nutrition Interventions: enteral nutrition  RD to monitor diet order, energy intake, body composition, NFPF, renal/electrolyte profile

## 2021-03-26 DIAGNOSIS — Z51.5 ENCOUNTER FOR PALLIATIVE CARE: ICD-10-CM

## 2021-03-26 DIAGNOSIS — G20 PARKINSON'S DISEASE: ICD-10-CM

## 2021-03-26 DIAGNOSIS — R41.82 ALTERED MENTAL STATUS, UNSPECIFIED: ICD-10-CM

## 2021-03-26 DIAGNOSIS — A41.9 SEPSIS, UNSPECIFIED ORGANISM: ICD-10-CM

## 2021-03-26 DIAGNOSIS — R13.10 DYSPHAGIA, UNSPECIFIED: ICD-10-CM

## 2021-03-26 LAB
ALBUMIN SERPL ELPH-MCNC: 2.6 G/DL — LOW (ref 3.5–5.2)
ALP SERPL-CCNC: 113 U/L — SIGNIFICANT CHANGE UP (ref 30–115)
ALT FLD-CCNC: 10 U/L — SIGNIFICANT CHANGE UP (ref 0–41)
ANION GAP SERPL CALC-SCNC: 11 MMOL/L — SIGNIFICANT CHANGE UP (ref 7–14)
AST SERPL-CCNC: 37 U/L — SIGNIFICANT CHANGE UP (ref 0–41)
BILIRUB SERPL-MCNC: 0.6 MG/DL — SIGNIFICANT CHANGE UP (ref 0.2–1.2)
BUN SERPL-MCNC: 65 MG/DL — CRITICAL HIGH (ref 10–20)
CALCIUM SERPL-MCNC: 9.6 MG/DL — SIGNIFICANT CHANGE UP (ref 8.5–10.1)
CHLORIDE SERPL-SCNC: 113 MMOL/L — HIGH (ref 98–110)
CO2 SERPL-SCNC: 25 MMOL/L — SIGNIFICANT CHANGE UP (ref 17–32)
CREAT SERPL-MCNC: 2 MG/DL — HIGH (ref 0.7–1.5)
GLUCOSE BLDC GLUCOMTR-MCNC: 104 MG/DL — HIGH (ref 70–99)
GLUCOSE BLDC GLUCOMTR-MCNC: 113 MG/DL — HIGH (ref 70–99)
GLUCOSE BLDC GLUCOMTR-MCNC: 128 MG/DL — HIGH (ref 70–99)
GLUCOSE SERPL-MCNC: 128 MG/DL — HIGH (ref 70–99)
HCT VFR BLD CALC: 31.7 % — LOW (ref 42–52)
HGB BLD-MCNC: 9.2 G/DL — LOW (ref 14–18)
MCHC RBC-ENTMCNC: 25.1 PG — LOW (ref 27–31)
MCHC RBC-ENTMCNC: 29 G/DL — LOW (ref 32–37)
MCV RBC AUTO: 86.6 FL — SIGNIFICANT CHANGE UP (ref 80–94)
NRBC # BLD: 0 /100 WBCS — SIGNIFICANT CHANGE UP (ref 0–0)
PLATELET # BLD AUTO: 193 K/UL — SIGNIFICANT CHANGE UP (ref 130–400)
POTASSIUM SERPL-MCNC: 4.1 MMOL/L — SIGNIFICANT CHANGE UP (ref 3.5–5)
POTASSIUM SERPL-SCNC: 4.1 MMOL/L — SIGNIFICANT CHANGE UP (ref 3.5–5)
PROT SERPL-MCNC: 7.4 G/DL — SIGNIFICANT CHANGE UP (ref 6–8)
RBC # BLD: 3.66 M/UL — LOW (ref 4.7–6.1)
RBC # FLD: 16.6 % — HIGH (ref 11.5–14.5)
SODIUM SERPL-SCNC: 149 MMOL/L — HIGH (ref 135–146)
WBC # BLD: 16.33 K/UL — HIGH (ref 4.8–10.8)
WBC # FLD AUTO: 16.33 K/UL — HIGH (ref 4.8–10.8)

## 2021-03-26 PROCEDURE — 99223 1ST HOSP IP/OBS HIGH 75: CPT

## 2021-03-26 PROCEDURE — 99497 ADVNCD CARE PLAN 30 MIN: CPT | Mod: 25

## 2021-03-26 PROCEDURE — 99233 SBSQ HOSP IP/OBS HIGH 50: CPT

## 2021-03-26 PROCEDURE — 99232 SBSQ HOSP IP/OBS MODERATE 35: CPT

## 2021-03-26 PROCEDURE — 71045 X-RAY EXAM CHEST 1 VIEW: CPT | Mod: 26

## 2021-03-26 PROCEDURE — 73120 X-RAY EXAM OF HAND: CPT | Mod: 26,LT

## 2021-03-26 PROCEDURE — 99231 SBSQ HOSP IP/OBS SF/LOW 25: CPT

## 2021-03-26 RX ORDER — CEFEPIME 1 G/1
1000 INJECTION, POWDER, FOR SOLUTION INTRAMUSCULAR; INTRAVENOUS EVERY 12 HOURS
Refills: 0 | Status: DISCONTINUED | OUTPATIENT
Start: 2021-03-26 | End: 2021-03-31

## 2021-03-26 RX ORDER — SODIUM CHLORIDE 9 MG/ML
500 INJECTION INTRAMUSCULAR; INTRAVENOUS; SUBCUTANEOUS ONCE
Refills: 0 | Status: COMPLETED | OUTPATIENT
Start: 2021-03-26 | End: 2021-03-26

## 2021-03-26 RX ORDER — CEFEPIME 1 G/1
1000 INJECTION, POWDER, FOR SOLUTION INTRAMUSCULAR; INTRAVENOUS ONCE
Refills: 0 | Status: COMPLETED | OUTPATIENT
Start: 2021-03-26 | End: 2021-03-26

## 2021-03-26 RX ORDER — LEVETIRACETAM 250 MG/1
125 TABLET, FILM COATED ORAL EVERY 12 HOURS
Refills: 0 | Status: DISCONTINUED | OUTPATIENT
Start: 2021-03-26 | End: 2021-03-31

## 2021-03-26 RX ORDER — CEFEPIME 1 G/1
INJECTION, POWDER, FOR SOLUTION INTRAMUSCULAR; INTRAVENOUS
Refills: 0 | Status: DISCONTINUED | OUTPATIENT
Start: 2021-03-26 | End: 2021-03-31

## 2021-03-26 RX ORDER — PANTOPRAZOLE SODIUM 20 MG/1
40 TABLET, DELAYED RELEASE ORAL DAILY
Refills: 0 | Status: DISCONTINUED | OUTPATIENT
Start: 2021-03-26 | End: 2021-03-31

## 2021-03-26 RX ORDER — METRONIDAZOLE 500 MG
500 TABLET ORAL EVERY 8 HOURS
Refills: 0 | Status: DISCONTINUED | OUTPATIENT
Start: 2021-03-26 | End: 2021-03-31

## 2021-03-26 RX ADMIN — CARBIDOPA AND LEVODOPA 2 TABLET(S): 25; 100 TABLET ORAL at 22:46

## 2021-03-26 RX ADMIN — LEVETIRACETAM 400 MILLIGRAM(S): 250 TABLET, FILM COATED ORAL at 05:18

## 2021-03-26 RX ADMIN — CARBIDOPA AND LEVODOPA 2 TABLET(S): 25; 100 TABLET ORAL at 11:50

## 2021-03-26 RX ADMIN — CHLORHEXIDINE GLUCONATE 1 APPLICATION(S): 213 SOLUTION TOPICAL at 06:45

## 2021-03-26 RX ADMIN — Medication 1 APPLICATION(S): at 05:18

## 2021-03-26 RX ADMIN — Medication 650 MILLIGRAM(S): at 01:06

## 2021-03-26 RX ADMIN — LEVETIRACETAM 405 MILLIGRAM(S): 250 TABLET, FILM COATED ORAL at 17:06

## 2021-03-26 RX ADMIN — APIXABAN 5 MILLIGRAM(S): 2.5 TABLET, FILM COATED ORAL at 17:05

## 2021-03-26 RX ADMIN — Medication 100 MILLIGRAM(S): at 22:45

## 2021-03-26 RX ADMIN — SODIUM CHLORIDE 1000 MILLILITER(S): 9 INJECTION INTRAMUSCULAR; INTRAVENOUS; SUBCUTANEOUS at 11:50

## 2021-03-26 RX ADMIN — CARBIDOPA AND LEVODOPA 2 TABLET(S): 25; 100 TABLET ORAL at 13:14

## 2021-03-26 RX ADMIN — SENNA PLUS 2 TABLET(S): 8.6 TABLET ORAL at 22:45

## 2021-03-26 RX ADMIN — PANTOPRAZOLE SODIUM 40 MILLIGRAM(S): 20 TABLET, DELAYED RELEASE ORAL at 11:50

## 2021-03-26 RX ADMIN — Medication 1 APPLICATION(S): at 17:06

## 2021-03-26 RX ADMIN — Medication 1 APPLICATION(S): at 05:17

## 2021-03-26 RX ADMIN — ATORVASTATIN CALCIUM 40 MILLIGRAM(S): 80 TABLET, FILM COATED ORAL at 22:46

## 2021-03-26 RX ADMIN — APIXABAN 5 MILLIGRAM(S): 2.5 TABLET, FILM COATED ORAL at 05:17

## 2021-03-26 RX ADMIN — Medication 650 MILLIGRAM(S): at 23:50

## 2021-03-26 RX ADMIN — Medication 650 MILLIGRAM(S): at 00:07

## 2021-03-26 RX ADMIN — CEFEPIME 100 MILLIGRAM(S): 1 INJECTION, POWDER, FOR SOLUTION INTRAMUSCULAR; INTRAVENOUS at 17:04

## 2021-03-26 RX ADMIN — CARBIDOPA AND LEVODOPA 2 TABLET(S): 25; 100 TABLET ORAL at 05:17

## 2021-03-26 RX ADMIN — CEFEPIME 100 MILLIGRAM(S): 1 INJECTION, POWDER, FOR SOLUTION INTRAMUSCULAR; INTRAVENOUS at 11:50

## 2021-03-26 NOTE — PROGRESS NOTE ADULT - ASSESSMENT
72 y/o male with sacral ulcer.   - plan for Debridement of Sacrum  - LWC: santyl Dakins WTD   - Will follow

## 2021-03-26 NOTE — CONSULT NOTE ADULT - SUBJECTIVE AND OBJECTIVE BOX
NIEVES LABOY  71y, Male  Allergy: No Known Allergies      CHIEF COMPLAINT:   Change in Mental Status (25 Mar 2021 16:13)      LOS  4d    HPI  HPI:  71 year old Male with past medical history of Parkinson's, dementia, CKD Stage 3, 1st degree AV block, HLD, gout, HTN, DM, fungal septicemia presenting from [facility] for acute decompensation in mental status.     As per documentation patient at baseline is verbal but for past 3 days, he has been worsening to state of being non verbal. Patient was admitted to [facility] for fungemia and discharged on the . At  he had left hand cellulitis and was treated for it.    In ED patient hemodynamically stable, afebrile, ABEBE with rise in Cr to 1.9 and BUN to 75, remains non verbal. CT head negative for any new strokes and UA negative. Family has not visited the patient recently so unaware of his status. (22 Mar 2021 23:59)      INFECTIOUS DISEASE HISTORY:  Febrile  leukocytosis   3/22 Blood & Urine cxs NGTD   Recent Fungemia  - Blood Cx  Candida albicans  - evaluated by optho - no ocular evidence   - TTE no significant valvulopathy     PMH  PAST MEDICAL & SURGICAL HISTORY:  Cataract    Prostatitis    Dyslipidemia    Gout    Hypertension    Parkinson disease    No significant past surgical history        FAMILY HISTORY  Family history of cerebrovascular accident (CVA) (Father)        SOCIAL HISTORY  Social History:  Lives in [facility] (22 Mar 2021 23:59)        ROS  ***    VITALS:  T(F): 99, Max: 102.2 (21 @ 00:06)  HR: 67  BP: 107/58  RR: 19Vital Signs Last 24 Hrs  T(C): 37.2 (26 Mar 2021 07:10), Max: 39 (26 Mar 2021 00:06)  T(F): 99 (26 Mar 2021 07:10), Max: 102.2 (26 Mar 2021 00:06)  HR: 67 (26 Mar 2021 07:10) (67 - 70)  BP: 107/58 (26 Mar 2021 07:10) (107/58 - 129/60)  BP(mean): --  RR: 19 (26 Mar 2021 07:10) (18 - 19)  SpO2: --    PHYSICAL EXAM:  ***    TESTS & MEASUREMENTS:                        9.2    16.33 )-----------( 193      ( 26 Mar 2021 05:33 )             31.7         149<H>  |  113<H>  |  65<HH>  ----------------------------<  128<H>  4.1   |  25  |  2.0<H>    Ca    9.6      26 Mar 2021 05:33  Phos  4.6       Mg     2.4         TPro  7.4  /  Alb  2.6<L>  /  TBili  0.6  /  DBili  x   /  AST  37  /  ALT  10  /  AlkPhos  113      eGFR if Non African American: 32 mL/min/1.73M2 (21 @ 05:33)  eGFR if African American: 38 mL/min/1.73M2 (21 @ 05:33)    LIVER FUNCTIONS - ( 26 Mar 2021 05:33 )  Alb: 2.6 g/dL / Pro: 7.4 g/dL / ALK PHOS: 113 U/L / ALT: 10 U/L / AST: 37 U/L / GGT: x           Urinalysis Basic - ( 25 Mar 2021 08:31 )    Color: Yellow / Appearance: Clear / S.022 / pH: x  Gluc: x / Ketone: Trace  / Bili: Negative / Urobili: <2 mg/dL   Blood: x / Protein: Trace / Nitrite: Negative   Leuk Esterase: Small / RBC: 4 /HPF / WBC 4 /HPF   Sq Epi: x / Non Sq Epi: 1 /HPF / Bacteria: Negative        Culture - Urine (collected 21 @ 20:41)  Source: .Urine Clean Catch (Midstream)  Final Report (21 @ 20:53):    No growth    Culture - Blood (collected 21 @ 15:31)  Source: .Blood Blood-Peripheral  Preliminary Report (21 @ 02:20):    No growth to date.    Culture - Blood (collected 21 @ 15:28)  Source: .Blood Blood-Peripheral  Preliminary Report (21 @ 02:20):    No growth to date.    Culture - Urine (collected 21 @ 12:00)  Source: .Urine Clean Catch (Midstream)  Final Report (21 @ 07:24):    No growth    Culture - Blood (collected 21 @ 11:12)  Source: .Blood None  Final Report (21 @ 23:01):    No Growth Final    Culture - Blood (collected 21 @ 16:27)  Source: .Blood None  Final Report (21 @ 23:01):    No Growth Final    Culture - Blood (collected 21 @ 14:38)  Source: .Blood None  Gram Stain (21 @ 15:00):    Growth in aerobic bottle: Yeast like cells  Final Report (21 @ 14:27):    Growth in aerobic bottle: Candida albicans    See previous culture [id number]    Culture - Blood (collected 21 @ 14:38)  Source: .Blood None  Gram Stain (21 @ 12:31):    Growth in aerobic bottle: Yeast like cells  Final Report (21 @ 13:57):    Growth in aerobic bottle: Candida albicans    ***Blood Panel PCR results on this specimen are available    approximately 3 hours after the Gram stain result.***    Gram stain, PCR, and/or culture results may not always    correspond due to difference in methodologies.    ************************************************************    This PCR assay was performed by multiplex PCR. This    Assay tests for 66 bacterial and resistance gene targets.    Please refer to the [facility] test directory    at [email or web address] for details.    "Due to technical problems, Pseudomonas aeruginosa    until further notice".  Organism: Blood Culture PCR  Candida albicans (21 @ 13:57)  Organism: Candida albicans (21 @ 13:57)      -  Fluconazole: S 0.5 Fluconazole = Data established for mucosal disease, and is generally applicable for invasive disease.  Susceptibility is dependent on achieving the maximal possible blood level.  Doses of 400 MG/day or more may be required in adults with normalrenalfunction and body habitus.      -  Interpretation: See note This test method is not approved by the FDA and is for research use only.  The performance characteristics of this assay may have been determined by [facility], [place], [place]                            N/I - No interpretation available                                                         SDD – Sensitive Dose Dependent      Method Type: YSTMIC  Organism: Blood Culture PCR (21 @ 13:57)      -  Candida albicans: Detec      Method Type: PCR    Culture - Blood (collected 21 @ 11:54)  Source: .Blood None  Final Report (21 @ 19:01):    No Growth Final    Culture - Urine (collected 21 @ 09:36)  Source: .Urine Kidney  Final Report (02-15-21 @ 08:40):    No growth at 48 hours    Culture - Urine (collected 21 @ 18:37)  Source: .Urine Clean Catch (Midstream)  Final Report (21 @ 22:43):    <10,000 CFU/mL Normal Urogenital Chelle        Lactate, Blood: 1.0 mmol/L (21 @ 11:09)  Lactate, Blood: 1.1 mmol/L (21 @ 17:50)      INFECTIOUS DISEASES TESTING  COVID-19 PCR: NotDetec (21 @ 20:13)  COVID-19 PCR: NotDetec (03-10-21 @ 12:22)  COVID-19 PCR: NotDetec (21 @ 16:49)  Fungitell: 62 pg/mL (21 @ 11:00)  Procalcitonin, Serum: 0.29 ng/mL (21 @ 05:32)  MRSA PCR Result.: Negative (21 @ 15:44)  COVID-19 PCR: NotDetec (21 @ 15:34)  Procalcitonin, Serum: 0.27 ng/mL (21 @ 10:54)  MRSA PCR Result.: Negative (21 @ 18:29)  Hepatitis C Virus Interpretation: Nonreact (21 @ 05:07)  Hepatitis B Surface Antigen: Nonreact (21 @ 05:07)  HIV-1/2 Combo Result: Nonreact (21 @ 05:07)  Procalcitonin, Serum: 0.46 ng/mL (21 @ 16:00)  COVID-19 PCR: NotDetec (21 @ 10:17)      INFLAMMATORY MARKERS      RADIOLOGY & ADDITIONAL TESTS:  I have personally reviewed the last Chest xray  CXR  Xray Chest 1 View- PORTABLE-Urgent:   EXAM:  XR CHEST PORTABLE URGENT 1V            PROCEDURE DATE:  2021            INTERPRETATION:  Clinical History / Reason for exam: Pneumonia    Comparison : Chest radiograph 2021.    Technique/Positioning: Single AP view of the chest.    Findings:    Support devices: Feeding tube in stomach    Cardiac/mediastinum/hilum: Unremarkable.    Lung parenchyma/Pleura: No consolidation, effusion or pneumothorax.    Skeleton/soft tissues: Unchanged.    Impression:    No consolidation, effusion or pneumothorax.                  ELLIOT LANDAU MD; Attending Radiologist  This document has been electronically signed. Mar 26 2021  9:55AM (21 @ 07:10)      CT      CARDIOLOGY TESTING  12 Lead ECG:   Ventricular Rate 64 BPM    Atrial Rate 64 BPM    P-R Interval 234 ms    QRS Duration 88 ms    Q-T Interval 404 ms    QTC Calculation(Bazett) 416 ms    P Axis 68 degrees    R Axis -17 degrees    T Axis 9 degrees    Diagnosis Line Sinus rhythm with 1st degree A-V block  Voltage criteria for left ventricular hypertrophy  Nonspecific T wave abnormality  Abnormal ECG    Confirmed by ANA GARDUNO MD ([id number]) on 3/22/2021 9:54:41 PM (21 @ 16:57)      MEDICATIONS  apixaban 5 Oral every 12 hours  atorvastatin 40 Oral at bedtime  carbidopa/levodopa  25/100 2 Oral <User Schedule>  cefepime   IVPB 1000 IV Intermittent once  cefepime   IVPB 1000 IV Intermittent every 12 hours  cefepime   IVPB     chlorhexidine 4% Liquid 1 Topical <User Schedule>  collagenase Ointment 1 Topical two times a day  Dakins Solution - 1/2 Strength 1 Topical two times a day  dextrose 5%. 1000 IV Continuous <Continuous>  levETIRAcetam  IVPB 250 IV Intermittent every 12 hours  pantoprazole  Injectable 40 IV Push daily  senna 2 Oral at bedtime      Weight  Weight (kg): 79.4 (21 @ 16:10)    ANTIBIOTICS:  cefepime   IVPB 1000 milliGRAM(s) IV Intermittent once  cefepime   IVPB 1000 milliGRAM(s) IV Intermittent every 12 hours  cefepime   IVPB          ALLERGIES:  No Known Allergies           NIEVES LABOY  71y, Male  Allergy: No Known Allergies      CHIEF COMPLAINT:   Change in Mental Status (25 Mar 2021 16:13)      LOS  4d    HPI  HPI:  71 year old Male with past medical history of Parkinson's, dementia, CKD Stage 3, 1st degree AV block, HLD, gout, HTN, DM, fungal septicemia presenting from [facility] for acute decompensation in mental status.     As per documentation patient at baseline is verbal but for past 3 days, he has been worsening to state of being non verbal. Patient was admitted to [facility] for fungemia and discharged on the . At  he had left hand cellulitis and was treated for it.    In ED patient hemodynamically stable, afebrile, ABEBE with rise in Cr to 1.9 and BUN to 75, remains non verbal. CT head negative for any new strokes and UA negative. Family has not visited the patient recently so unaware of his status. (22 Mar 2021 23:59)      INFECTIOUS DISEASE HISTORY:  Febrile  leukocytosis   3/22 Blood & Urine cxs NGTD   Recent Fungemia  - Blood Cx  Candida albicans  - evaluated by optho - no ocular evidence   - TTE no significant valvulopathy     PMH  PAST MEDICAL & SURGICAL HISTORY:  Cataract    Prostatitis    Dyslipidemia    Gout    Hypertension    Parkinson disease    No significant past surgical history        FAMILY HISTORY  Family history of cerebrovascular accident (CVA) (Father)        SOCIAL HISTORY  Social History:  Lives in [facility] (22 Mar 2021 23:59)        ROS  unable to obtain history secondary to patient's mental status and/or sedation     VITALS:  T(F): 99, Max: 102.2 (21 @ 00:06)  HR: 67  BP: 107/58  RR: 19Vital Signs Last 24 Hrs  T(C): 37.2 (26 Mar 2021 07:10), Max: 39 (26 Mar 2021 00:06)  T(F): 99 (26 Mar 2021 07:10), Max: 102.2 (26 Mar 2021 00:06)  HR: 67 (26 Mar 2021 07:10) (67 - 70)  BP: 107/58 (26 Mar 2021 07:10) (107/58 - 129/60)  BP(mean): --  RR: 19 (26 Mar 2021 07:10) (18 - 19)  SpO2: --    PHYSICAL EXAM:  Gen: NAD  HEENT: Normocephalic, atraumatic, stiff  Neck: supple, no lymphadenopathy  CV: Regular rate & regular rhythm  Lungs: decreased BS at bases, no fremitus  Abdomen: Soft, BS present  Ext: Warm, well perfused  Neuro: contracted  necrotic sacral ulcer  Skin: no rash, no erythema  Lines: no phlebitis     TESTS & MEASUREMENTS:                        9.2    16.33 )-----------( 193      ( 26 Mar 2021 05:33 )             31.7         149<H>  |  113<H>  |  65<HH>  ----------------------------<  128<H>  4.1   |  25  |  2.0<H>    Ca    9.6      26 Mar 2021 05:33  Phos  4.6       Mg     2.4         TPro  7.4  /  Alb  2.6<L>  /  TBili  0.6  /  DBili  x   /  AST  37  /  ALT  10  /  AlkPhos  113      eGFR if Non African American: 32 mL/min/1.73M2 (21 @ 05:33)  eGFR if African American: 38 mL/min/1.73M2 (21 @ 05:33)    LIVER FUNCTIONS - ( 26 Mar 2021 05:33 )  Alb: 2.6 g/dL / Pro: 7.4 g/dL / ALK PHOS: 113 U/L / ALT: 10 U/L / AST: 37 U/L / GGT: x           Urinalysis Basic - ( 25 Mar 2021 08:31 )    Color: Yellow / Appearance: Clear / S.022 / pH: x  Gluc: x / Ketone: Trace  / Bili: Negative / Urobili: <2 mg/dL   Blood: x / Protein: Trace / Nitrite: Negative   Leuk Esterase: Small / RBC: 4 /HPF / WBC 4 /HPF   Sq Epi: x / Non Sq Epi: 1 /HPF / Bacteria: Negative        Culture - Urine (collected 21 @ 20:41)  Source: .Urine Clean Catch (Midstream)  Final Report (21 @ 20:53):    No growth    Culture - Blood (collected 21 @ 15:31)  Source: .Blood Blood-Peripheral  Preliminary Report (21 @ 02:20):    No growth to date.    Culture - Blood (collected 21 @ 15:28)  Source: .Blood Blood-Peripheral  Preliminary Report (21 @ 02:20):    No growth to date.    Culture - Urine (collected 21 @ 12:00)  Source: .Urine Clean Catch (Midstream)  Final Report (21 @ 07:24):    No growth    Culture - Blood (collected 21 @ 11:12)  Source: .Blood None  Final Report (21 @ 23:01):    No Growth Final    Culture - Blood (collected 21 @ 16:27)  Source: .Blood None  Final Report (21 @ 23:01):    No Growth Final    Culture - Blood (collected 21 @ 14:38)  Source: .Blood None  Gram Stain (21 @ 15:00):    Growth in aerobic bottle: Yeast like cells  Final Report (21 @ 14:27):    Growth in aerobic bottle: Candida albicans    See previous culture [id number]    Culture - Blood (collected 21 @ 14:38)  Source: .Blood None  Gram Stain (21 @ 12:31):    Growth in aerobic bottle: Yeast like cells  Final Report (21 @ 13:57):    Growth in aerobic bottle: Candida albicans    ***Blood Panel PCR results on this specimen are available    approximately 3 hours after the Gram stain result.***    Gram stain, PCR, and/or culture results may not always    correspond due to difference in methodologies.    ************************************************************    This PCR assay was performed by multiplex PCR. This    Assay tests for 66 bacterial and resistance gene targets.    Please refer to the [facility] test directory    at [email or web address] for details.    "Due to technical problems, Pseudomonas aeruginosa    until further notice".  Organism: Blood Culture PCR  Candida albicans (21 @ 13:57)  Organism: Candida albicans (21 @ 13:57)      -  Fluconazole: S 0.5 Fluconazole = Data established for mucosal disease, and is generally applicable for invasive disease.  Susceptibility is dependent on achieving the maximal possible blood level.  Doses of 400 MG/day or more may be required in adults with normalrenalfunction and body habitus.      -  Interpretation: See note This test method is not approved by the FDA and is for research use only.  The performance characteristics of this assay may have been determined by [facility] and [facility], [place], [place]                            N/I - No interpretation available                                                         SDD – Sensitive Dose Dependent      Method Type: YSTMIC  Organism: Blood Culture PCR (21 @ 13:57)      -  Candida albicans: Detec      Method Type: PCR    Culture - Blood (collected 21 @ 11:54)  Source: .Blood None  Final Report (21 @ 19:01):    No Growth Final    Culture - Urine (collected 21 @ 09:36)  Source: .Urine Kidney  Final Report (02-15-21 @ 08:40):    No growth at 48 hours    Culture - Urine (collected 21 @ 18:37)  Source: .Urine Clean Catch (Midstream)  Final Report (21 @ 22:43):    <10,000 CFU/mL Normal Urogenital Chelle        Lactate, Blood: 1.0 mmol/L (21 @ 11:09)  Lactate, Blood: 1.1 mmol/L (21 @ 17:50)      INFECTIOUS DISEASES TESTING  COVID-19 PCR: NotDetec (21 @ 20:13)  COVID-19 PCR: NotDetec (03-10-21 @ 12:22)  COVID-19 PCR: NotDetec (21 @ 16:49)  Fungitell: 62 pg/mL (21 @ 11:00)  Procalcitonin, Serum: 0.29 ng/mL (21 @ 05:32)  MRSA PCR Result.: Negative (21 @ 15:44)  COVID-19 PCR: NotDetec (21 @ 15:34)  Procalcitonin, Serum: 0.27 ng/mL (21 @ 10:54)  MRSA PCR Result.: Negative (21 @ 18:29)  Hepatitis C Virus Interpretation: Nonreact (21 @ 05:07)  Hepatitis B Surface Antigen: Nonreact (21 @ 05:07)  HIV-1/2 Combo Result: Nonreact (21 @ 05:07)  Procalcitonin, Serum: 0.46 ng/mL (21 @ 16:00)  COVID-19 PCR: NotDetec (21 @ 10:17)      INFLAMMATORY MARKERS      RADIOLOGY & ADDITIONAL TESTS:  I have personally reviewed the last Chest xray  CXR  Xray Chest 1 View- PORTABLE-Urgent:   EXAM:  XR CHEST PORTABLE URGENT 1V            PROCEDURE DATE:  2021            INTERPRETATION:  Clinical History / Reason for exam: Pneumonia    Comparison : Chest radiograph 2021.    Technique/Positioning: Single AP view of the chest.    Findings:    Support devices: Feeding tube in stomach    Cardiac/mediastinum/hilum: Unremarkable.    Lung parenchyma/Pleura: No consolidation, effusion or pneumothorax.    Skeleton/soft tissues: Unchanged.    Impression:    No consolidation, effusion or pneumothorax.                  ELLIOT LANDAU MD; Attending Radiologist  This document has been electronically signed. Mar 26 2021  9:55AM (21 @ 07:10)      CT      CARDIOLOGY TESTING  12 Lead ECG:   Ventricular Rate 64 BPM    Atrial Rate 64 BPM    P-R Interval 234 ms    QRS Duration 88 ms    Q-T Interval 404 ms    QTC Calculation(Bazett) 416 ms    P Axis 68 degrees    R Axis -17 degrees    T Axis 9 degrees    Diagnosis Line Sinus rhythm with 1st degree A-V block  Voltage criteria for left ventricular hypertrophy  Nonspecific T wave abnormality  Abnormal ECG    Confirmed by ANA GARDUNO MD ([id number]) on 3/22/2021 9:54:41 PM (21 @ 16:57)      MEDICATIONS  apixaban 5 Oral every 12 hours  atorvastatin 40 Oral at bedtime  carbidopa/levodopa  25/100 2 Oral <User Schedule>  cefepime   IVPB 1000 IV Intermittent once  cefepime   IVPB 1000 IV Intermittent every 12 hours  cefepime   IVPB     chlorhexidine 4% Liquid 1 Topical <User Schedule>  collagenase Ointment 1 Topical two times a day  Dakins Solution - 1/2 Strength 1 Topical two times a day  dextrose 5%. 1000 IV Continuous <Continuous>  levETIRAcetam  IVPB 250 IV Intermittent every 12 hours  pantoprazole  Injectable 40 IV Push daily  senna 2 Oral at bedtime      Weight  Weight (kg): 79.4 (21 @ 16:10)    ANTIBIOTICS:  cefepime   IVPB 1000 milliGRAM(s) IV Intermittent once  cefepime   IVPB 1000 milliGRAM(s) IV Intermittent every 12 hours  cefepime   IVPB          ALLERGIES:  No Known Allergies

## 2021-03-26 NOTE — CONSULT NOTE ADULT - ASSESSMENT
71yMale being evaluated for      MEDD (morphine equivalent daily dose):      See Recs below.    Please call [phone] with questions or concerns 24/7.   We will continue to follow.    71yMale being evaluated for goals of care and symptom management. Pt has very advanced parkinson's disease with acute sepsis. Pt's daughter aware of prognosis and current medical treatment. Palliative care requested as patient may keep declining and she needs support with decision making.       MEDD (morphine equivalent daily dose): 0      See Recs below.    - Full code  - Palliative care will follow for support and family meetings as needed    Please call [phone] with questions or concerns 24/7.   We will continue to follow.    71yMale being evaluated for goals of care and symptom management. Pt has very advanced parkinson's disease with acute sepsis. Pt's daughter aware of prognosis and current medical treatment. Palliative care requested as patient may keep declining and she needs support with decision making.       MEDD (morphine equivalent daily dose): 0      See Recs below.    - Full code  - Palliative care will follow for support and family meetings as needed    Please call [phone] with questions or concerns 24/7.   We will continue to follow.   Discussed with primary team and daughter

## 2021-03-26 NOTE — CONSULT NOTE ADULT - PROBLEM SELECTOR RECOMMENDATION 9
Full code. Pt has advanced PD and now is septic, and minimally responisve. Palliative team met daughter at bedside, introduced palliative care. Reviewed advanced directives. She is willing to have us follow for further discussion Full code. Pt has advanced PD and now is septic, and minimally responsive. Palliative team met daughter at bedside, introduced palliative care. Reviewed advanced directives. She is willing to have us follow for further discussion

## 2021-03-26 NOTE — CONSULT NOTE ADULT - PROBLEM SELECTOR RECOMMENDATION 3
NGT insitu pt minimally respsonsive, multifactorial cause NGT in-situ. pt minimally responsive, multifactorial cause

## 2021-03-26 NOTE — CONSULT NOTE ADULT - PROBLEM SELECTOR RECOMMENDATION 4
Management by primary medical team. Daughter wants full aggressive medical management at this time. If pt's condition worsens goals should be re-addressed

## 2021-03-26 NOTE — PROGRESS NOTE ADULT - ATTENDING COMMENTS
Sacrum - 5 x 4 cm area of gray black necrotic skin  and subcutis with 2x 2 cm central deeper wound to ligamentous tissue - black base   5 x 1 cm area of skin erythema right buttock     Wound debridement indicated based on appearance of sacral pressure ulcer   Palliative Service eval noted  Consent - family     Rec:   Wound care as above -   Increase offloading measures   Discussed with nurse present in room Sacrum - 5 x 4 cm area of gray black necrotic skin  and subcutis with 2x 2 cm central deeper wound to ligamentous tissue - black base   5 x 1 cm area of skin erythema right buttock     Wound debridement indicated based on appearance of sacral pressure ulcer   Palliative Service eval noted  Consent - family     Rec:   Wound care as above -   Increase offloading measures   Nurse present in room during eval

## 2021-03-26 NOTE — CONSULT NOTE ADULT - SUBJECTIVE AND OBJECTIVE BOX
NIEVES LABOY          MRN-[id number]              HPI:  71 year old Male with past medical history of Parkinson's, dementia, CKD Stage 3, 1st degree AV block, HLD, gout, HTN, DM, fungal septicemia presenting from [facility] for acute decompensation in mental status.     As per documentation patient at baseline is verbal but for past 3 days, he has been worsening to state of being non verbal. Patient was admitted to [facility] for fungemia and discharged on the . At  he had left hand cellulitis and was treated for it.    In ED patient hemodynamically stable, afebrile, ABEBE with rise in Cr to 1.9 and BUN to 75, remains non verbal. CT head negative for any new strokes and UA negative. Family has not visited the patient recently so unaware of his status. (22 Mar 2021 23:59)      PAST MEDICAL & SURGICAL HISTORY:  Cataract    Prostatitis    Dyslipidemia    Gout    Hypertension    Parkinson disease    No significant past surgical history        FAMILY HISTORY:  Family history of cerebrovascular accident (CVA) (Father)     Reviewed and found non contributory in mother or father    SOCIAL HISTORY:     ROS:    Unable to attain due to:                      Dyspnea (Gabriela 0-10): 0                       N/V (Y/N): No                             Secretions (Y/N) : No                                          Agitation(Y/N): No                              Pain (Y/N): No                                 -Provocation/Palliation: N/A  -Quality/Quantity: N/A  -Radiating: N/A  -Severity: No pain  -Timing/Frequency: N/A  -Impact on ADLs: N/A    General:  Denied  HEENT:    Denied  Neck:  Denied  CVS:  Denied  Resp:  Denied  GI:  Denied    :  Denied  Musc:  Denied  Neuro:  Denied  Psych:  Denied  Skin:  Denied  Lymph:  Denied    Allergies    No Known Allergies    Intolerances      Opiate Naive (Y/N):   -iStop reviewed (Y/N):   Ref#:              Medications:      MEDICATIONS  (STANDING):  apixaban 5 milliGRAM(s) Oral every 12 hours  atorvastatin 40 milliGRAM(s) Oral at bedtime  carbidopa/levodopa  25/100 2 Tablet(s) Oral <User Schedule>  cefepime   IVPB 1000 milliGRAM(s) IV Intermittent every 12 hours  cefepime   IVPB      chlorhexidine 4% Liquid 1 Application(s) Topical <User Schedule>  collagenase Ointment 1 Application(s) Topical two times a day  Dakins Solution - 1/2 Strength 1 Application(s) Topical two times a day  dextrose 5%. 1000 milliLiter(s) (75 mL/Hr) IV Continuous <Continuous>  levETIRAcetam  IVPB 250 milliGRAM(s) IV Intermittent every 12 hours  pantoprazole  Injectable 40 milliGRAM(s) IV Push daily  senna 2 Tablet(s) Oral at bedtime    MEDICATIONS  (PRN):  acetaminophen   Tablet .. 650 milliGRAM(s) Oral every 6 hours PRN Temp greater or equal to 38C (100.4F)      Labs:    CBC:                        9.2    16.33 )-----------( 193      ( 26 Mar 2021 05:33 )             31.7     CMP:        149<H>  |  113<H>  |  65<HH>  ----------------------------<  128<H>  4.1   |  25  |  2.0<H>    Ca    9.6      26 Mar 2021 05:33  Phos  4.6       Mg     2.4         TPro  7.4  /  Alb  2.6<L>  /  TBili  0.6  /  DBili  x   /  AST  37  /  ALT  10  /  AlkPhos  113       Albumin, Serum: 2.6 g/dL (21 @ 05:33)         Urinalysis Basic - ( 25 Mar 2021 08:31 )    Color: Yellow / Appearance: Clear / S.022 / pH: x  Gluc: x / Ketone: Trace  / Bili: Negative / Urobili: <2 mg/dL   Blood: x / Protein: Trace / Nitrite: Negative   Leuk Esterase: Small / RBC: 4 /HPF / WBC 4 /HPF   Sq Epi: x / Non Sq Epi: 1 /HPF / Bacteria: Negative        Imaging:  Reviewed    PEx:  T(C): 37.2 (21 @ 07:10), Max: 39 (21 @ 00:06)  HR: 67 (21 @ 07:10) (67 - 70)  BP: 107/58 (21 @ 07:10) (107/58 - 129/60)  RR: 19 (21 @ 07:10) (18 - 19)  SpO2: --  Wt(kg): --  Daily Height in cm: 170 (25 Mar 2021 14:29)    Daily     General: AAOx3    found in bed in NAD  HEENT:  NCAT PERRL EOMI Non icteric MOM  Neck: Supple no masses  CVS: RR S1S2 No M/G/R  Resp: Unlabored Non tachypneic No increased WOB  GI:  Soft NT ND BS+  :  Voiding / Miramontes / PrimaFit  Musc: No C/C/E    Neuro: Follows commands No focal deficits  Psych: Calm Pleasant  Skin: Non jaundiced   Lymph: Normal    Preadmit Karnofsky:  %           Current Karnofsky:     %  http://www.npcrc.org/files/news/karnofsky_performance_scale.pdf   http://www.npcrc.org/files/news/palliative_performance_scale_PPSv2.pdf  Cachexia (Y/N): yes  BMI:    Advanced Directives:     Full Code      Decision maker: The patient is NOT able to participate in complex medical decision making conversations.   Legal surrogate: Kelton Zavala    GOALS OF CARE DISCUSSION       Palliative care info/counseling provided	           Family meeting       Advanced Directives addressed please see Advance Care Planning Note	           See previous Palliative Medicine Note       Documentation of GOC: 	    REFERRALS	        Palliative Med        Unit SW/Case Mgmt              Speech/Swallow       Patient/Family Support       Massage Therapy       Music Therapy       Pet Therapy       Hospice       Nutrition       Dietician       PT/OT NIEVES LABOY          MRN-[id number]              HPI:  71 year old Male with past medical history of Parkinson's, dementia, CKD Stage 3, 1st degree AV block, HLD, gout, HTN, DM, fungal septicemia presenting from [facility] for acute decompensation in mental status.     As per documentation patient at baseline is verbal but for past 3 days, he has been worsening to state of being non verbal. Patient was admitted to [facility] for fungemia and discharged on the . At  he had left hand cellulitis and was treated for it.    In ED patient hemodynamically stable, afebrile, ABEBE with rise in Cr to 1.9 and BUN to 75, remains non verbal. CT head negative for any new strokes and UA negative. Family has not visited the patient recently so unaware of his status. (22 Mar 2021 23:59)      PAST MEDICAL & SURGICAL HISTORY:  Cataract    Prostatitis    Dyslipidemia    Gout    Hypertension    Parkinson disease    No significant past surgical history        FAMILY HISTORY:  Family history of cerebrovascular accident (CVA) (Father)     Reviewed and found non contributory in mother or father    SOCIAL HISTORY:     ROS:    Unable to attain due to:  minimal mental status                     Dyspnea (Gabriela 0-10): 0                       N/V (Y/N): yes                         Secretions (Y/N) : yes                                        Agitation(Y/N): No                              Pain (Y/N): No                                 -Provocation/Palliation: N/A  -Quality/Quantity: N/A  -Radiating: N/A  -Severity: No pain  -Timing/Frequency: N/A  -Impact on ADLs: N/A      Allergies    No Known Allergies    Intolerances      Opiate Naive (Y/N):   -iStop reviewed (Y/N):   Ref#:              Medications:      MEDICATIONS  (STANDING):  apixaban 5 milliGRAM(s) Oral every 12 hours  atorvastatin 40 milliGRAM(s) Oral at bedtime  carbidopa/levodopa  25/100 2 Tablet(s) Oral <User Schedule>  cefepime   IVPB 1000 milliGRAM(s) IV Intermittent every 12 hours  cefepime   IVPB      chlorhexidine 4% Liquid 1 Application(s) Topical <User Schedule>  collagenase Ointment 1 Application(s) Topical two times a day  Dakins Solution - 1/2 Strength 1 Application(s) Topical two times a day  dextrose 5%. 1000 milliLiter(s) (75 mL/Hr) IV Continuous <Continuous>  levETIRAcetam  IVPB 250 milliGRAM(s) IV Intermittent every 12 hours  pantoprazole  Injectable 40 milliGRAM(s) IV Push daily  senna 2 Tablet(s) Oral at bedtime    MEDICATIONS  (PRN):  acetaminophen   Tablet .. 650 milliGRAM(s) Oral every 6 hours PRN Temp greater or equal to 38C (100.4F)      Labs:    CBC:                        9.2    16.33 )-----------( 193      ( 26 Mar 2021 05:33 )             31.7     CMP:        149<H>  |  113<H>  |  65<HH>  ----------------------------<  128<H>  4.1   |  25  |  2.0<H>    Ca    9.6      26 Mar 2021 05:33  Phos  4.6       Mg     2.4         TPro  7.4  /  Alb  2.6<L>  /  TBili  0.6  /  DBili  x   /  AST  37  /  ALT  10  /  AlkPhos  113       Albumin, Serum: 2.6 g/dL (21 @ 05:33)         Urinalysis Basic - ( 25 Mar 2021 08:31 )    Color: Yellow / Appearance: Clear / S.022 / pH: x  Gluc: x / Ketone: Trace  / Bili: Negative / Urobili: <2 mg/dL   Blood: x / Protein: Trace / Nitrite: Negative   Leuk Esterase: Small / RBC: 4 /HPF / WBC 4 /HPF   Sq Epi: x / Non Sq Epi: 1 /HPF / Bacteria: Negative        Imaging:  Reviewed    PEx:  T(C): 37.2 (21 @ 07:10), Max: 39 (21 @ 00:06)  HR: 67 (21 @ 07:10) (67 - 70)  BP: 107/58 (21 @ 07:10) (107/58 - 129/60)  RR: 19 (21 @ 07:10) (18 - 19)  SpO2: --  Wt(kg): --  Daily Height in cm: 170 (25 Mar 2021 14:29)    Daily     General: AAOx0    found in bed not responsive to verbal stimuli   HEENT:  NCAT PERRL EOMI Non icteric MOM  Neck: Supple no masses  CVS: RR S1S2 No M/G/R  Resp: mildly labored tachypneic gurgling noted   GI:  Soft NT ND BS+  :  Voiding / Miramontes / PrimaFit  Musc: No C/C/E    Neuro: Follows commands No focal deficits  Psych: Calm Pleasant  Skin: Non jaundiced   Lymph: Normal    Preadmit Karnofsky:  %           Current Karnofsky:     %  http://www.npcrc.org/files/news/karnofsky_performance_scale.pdf   http://www.npcrc.org/files/news/palliative_performance_scale_PPSv2.pdf  Cachexia (Y/N): no  BMI:    Advanced Directives:     Full Code      Decision maker: The patient is NOT able to participate in complex medical decision making conversations.   Legal surrogate: Howardinalexa Zavala    GOALS OF CARE DISCUSSION       Palliative care info/counseling provided to pt's daughter            Family meeting       Advanced Directives addressed please see Advance Care Planning Note	           See previous Palliative Medicine Note       Documentation of GOC: 	    REFERRALS	        Palliative Med        Unit SW/Case Mgmt              Speech/Swallow       Patient/Family Support       Massage Therapy       Music Therapy       Pet Therapy       Hospice       Nutrition       Dietician       PT/OT NIEVES LABOY          MRN-[id number]              HPI:  71 year old Male with past medical history of Parkinson's, dementia, CKD Stage 3, 1st degree AV block, HLD, gout, HTN, DM, fungal septicemia presenting from [facility] for acute decompensation in mental status.     As per documentation patient at baseline is verbal but for past 3 days, he has been worsening to state of being non verbal. Patient was admitted to [facility] for fungemia and discharged on the . At  he had left hand cellulitis and was treated for it.    In ED patient hemodynamically stable, afebrile, ABEBE with rise in Cr to 1.9 and BUN to 75, remains non verbal. CT head negative for any new strokes and UA negative. Family has not visited the patient recently so unaware of his status. (22 Mar 2021 23:59)    Patient seen and examined at bedside. Lethargic and not able to participate. Met with daughter and address [facility].    PAST MEDICAL & SURGICAL HISTORY:  Cataract    Prostatitis    Dyslipidemia    Gout    Hypertension    Parkinson disease    No significant past surgical history        FAMILY HISTORY:  Family history of cerebrovascular accident (CVA) (Father)     Reviewed and found non contributory in mother or father    SOCIAL HISTORY: no smoking     ROS:    Unable to attain due to: minimal mental status                     Dyspnea (Gabriela 0-10): 0                       N/V (Y/N): yes                         Secretions (Y/N) : yes                                        Agitation(Y/N): No                              Pain (Y/N): No                                 -Provocation/Palliation: N/A  -Quality/Quantity: N/A  -Radiating: N/A  -Severity: No pain  -Timing/Frequency: N/A  -Impact on ADLs: N/A    Allergies  No Known Allergies    Intolerances      Opiate Naive (Y/N): y   -iStop reviewed (Y/N):  y   Ref#:      : Stacie Cheatham | Reference #: [id number]           Medications:      MEDICATIONS  (STANDING):  apixaban 5 milliGRAM(s) Oral every 12 hours  atorvastatin 40 milliGRAM(s) Oral at bedtime  carbidopa/levodopa  25/100 2 Tablet(s) Oral <User Schedule>  cefepime   IVPB 1000 milliGRAM(s) IV Intermittent every 12 hours  cefepime   IVPB      chlorhexidine 4% Liquid 1 Application(s) Topical <User Schedule>  collagenase Ointment 1 Application(s) Topical two times a day  Dakins Solution - 1/2 Strength 1 Application(s) Topical two times a day  dextrose 5%. 1000 milliLiter(s) (75 mL/Hr) IV Continuous <Continuous>  levETIRAcetam  IVPB 250 milliGRAM(s) IV Intermittent every 12 hours  pantoprazole  Injectable 40 milliGRAM(s) IV Push daily  senna 2 Tablet(s) Oral at bedtime    MEDICATIONS  (PRN):  acetaminophen   Tablet .. 650 milliGRAM(s) Oral every 6 hours PRN Temp greater or equal to 38C (100.4F)      Labs:    CBC:                        9.2    16.33 )-----------( 193      ( 26 Mar 2021 05:33 )             31.7     CMP:        149<H>  |  113<H>  |  65<HH>  ----------------------------<  128<H>  4.1   |  25  |  2.0<H>    Ca    9.6      26 Mar 2021 05:33  Phos  4.6       Mg     2.4         TPro  7.4  /  Alb  2.6<L>  /  TBili  0.6  /  DBili  x   /  AST  37  /  ALT  10  /  AlkPhos  113       Albumin, Serum: 2.6 g/dL (21 @ 05:33)         Urinalysis Basic - ( 25 Mar 2021 08:31 )    Color: Yellow / Appearance: Clear / S.022 / pH: x  Gluc: x / Ketone: Trace  / Bili: Negative / Urobili: <2 mg/dL   Blood: x / Protein: Trace / Nitrite: Negative   Leuk Esterase: Small / RBC: 4 /HPF / WBC 4 /HPF   Sq Epi: x / Non Sq Epi: 1 /HPF / Bacteria: Negative        Imaging:  Reviewed personally  CT head 3/22: No acute intracranial pathology.      PEx:  T(C): 37.2 (21 @ 07:10), Max: 39 (21 @ 00:06)  HR: 67 (21 @ 07:10) (67 - 70)  BP: 107/58 (21 @ 07:10) (107/58 - 129/60)  RR: 19 (21 @ 07:10) ( - )  SpO2: --  Wt(kg): --  Daily Height in cm: 170 (25 Mar 2021 14:29)    Daily     General: AAOx0    found in bed not responsive to verbal stimuli   HEENT:  NCAT PERRL EOMI Non icteric MOM  Neck: Supple no masses  CVS: RR S1S2 No M/G/R  Resp: mildly labored tachypneic gurgling noted   GI:  Soft NT ND BS+  :  Miramontes  Musc: No C/C/E    Neuro: lethargic   Skin: Non jaundiced   Lymph: Normal    Preadmit Karnofsky:  %     30      Current Karnofsky:     %20  http://www.npcrc.org/files/news/karnofsky_performance_scale.pdf   http://www.npcrc.org/files/news/palliative_performance_scale_PPSv2.pdf  Cachexia (Y/N): no  BMI: 27.5    Advanced Directives:     Full Code      Decision maker: The patient is NOT able to participate in complex medical decision making conversations.   Legal surrogate: Kelton Zavala    GOALS OF CARE DISCUSSION       Palliative care info/counseling provided to pt's daughter            Family meeting       Advanced Directives addressed please see Advance Care Planning Note	           See previous Palliative Medicine Note       Documentation of GOC: 	    REFERRALS	        Palliative Med        Unit SW/Case Mgmt              Speech/Swallow       Patient/Family Support       Massage Therapy       Music Therapy       Pet Therapy       Hospice       Nutrition       Dietician       PT/OT

## 2021-03-26 NOTE — CONSULT NOTE ADULT - ASSESSMENT
ASSESSMENT  71y M admitted with AMS(ALTERED MENTAL STATUS);ABEBE(ACUTE KIDNEY INJURY)      Cataract    Prostatitis    Dyslipidemia    Gout    Hypertension    Parkinson disease        IMPRESSION  #Fever 3/25 with sepsis, not present on admission    CXR no PNA   3/22 Blood & Urine cxs NGTD   #Sacral ulcer    seen by Burn sacrum with full thickness ~4x5cm with dark /brown devitalized tissue at base; no purulent drainage, no bleeding  #Recent Fungemia    Blood Cx 2/27 Candida albicans    evaluated by optho - no ocular evidence     TTE no significant valvulopathy   #ABEBE    Creatinine, Serum: 2.0 (03-26-21 @ 05:33)      RECOMMENDATIONS  This is an incomplete consult note. All recommendations to follow after interview and examination of the patient.     If any questions, please call or send a message on [employer] Teams  Spectra [id number] ASSESSMENT  71 year old Male with past medical history of Parkinson's, dementia, CKD Stage 3, 1st degree AV block, HLD, gout, HTN, DM, fungal septicemia presenting from [facility] for acute decompensation in mental status.     IMPRESSION  #Fever 3/25 with sepsis, not present on admission    CXR no PNA   3/22 Blood & Urine cxs NGTD   #Sacral ulcer- necrotic     seen by Burn sacrum with full thickness ~4x5cm with dark /brown devitalized tissue at base; no purulent drainage, no bleeding  #Recent Fungemia    Blood Cx 2/27 Candida albicans    evaluated by optho - no ocular evidence     TTE no significant valvulopathy   #ABEBE    Creatinine, Serum: 2.0 (03-26-21 @ 05:33)      RECOMMENDATIONS  - continue cefepime 1g q12h IV  - add flagyl as necrotic ulcer  - mrsa nares  - f/u CX  - exam with stiff neck, but whole body contracted secondary to PD, low suspicion for meningitis    If any questions, please call or send a message on [employer] Teams  Spectra [id number]

## 2021-03-26 NOTE — PROGRESS NOTE ADULT - SUBJECTIVE AND OBJECTIVE BOX
Neurology Follow up note    Name  NIEVES LABOY    HPI:  71 year old Male with past medical history of Parkinson's, dementia, CKD Stage 3, 1st degree AV block, HLD, gout, HTN, DM, fungal septicemia presenting from [facility] for acute decompensation in mental status.     As per documentation patient at baseline is verbal but for past 3 days, he has been worsening to state of being non verbal. Patient was admitted to [facility] for fungemia and discharged on the 11th March. At  he had left hand cellulitis and was treated for it.    In ED patient hemodynamically stable, afebrile, ABEBE with rise in Cr to 1.9 and BUN to 75, remains non verbal. CT head negative for any new strokes and UA negative. Family has not visited the patient recently so unaware of his status. (22 Mar 2021 23:59)      Interval History - Patient seen and examined this morning and no event noted.  Patient is more obtunded then when seen yesterday and not following any commands          Vital Signs Last 24 Hrs  T(C): 37.2 (26 Mar 2021 07:10), Max: 39 (26 Mar 2021 00:06)  T(F): 99 (26 Mar 2021 07:10), Max: 102.2 (26 Mar 2021 00:06)  HR: 67 (26 Mar 2021 07:10) (67 - 70)  BP: 107/58 (26 Mar 2021 07:10) (107/58 - 129/60)  BP(mean): --  RR: 19 (26 Mar 2021 07:10) (18 - 19)  SpO2: --  ICU Vital Signs Last 24 Hrs  T(C): 37.2 (26 Mar 2021 07:10), Max: 39 (26 Mar 2021 00:06)  T(F): 99 (26 Mar 2021 07:10), Max: 102.2 (26 Mar 2021 00:06)  HR: 67 (26 Mar 2021 07:10) (67 - 70)  BP: 107/58 (26 Mar 2021 07:10) (107/58 - 129/60)  BP(mean): --  ABP: --  ABP(mean): --  RR: 19 (26 Mar 2021 07:10) (18 - 19)  SpO2: --          Neurological Exam:   Obtunded not following commands, resists eye opening  Not tracking  Increased tone throughout      Medications  acetaminophen   Tablet .. 650 milliGRAM(s) Oral every 6 hours PRN  apixaban 5 milliGRAM(s) Oral every 12 hours  atorvastatin 40 milliGRAM(s) Oral at bedtime  carbidopa/levodopa  25/100 2 Tablet(s) Oral <User Schedule>  cefepime   IVPB 1000 milliGRAM(s) IV Intermittent every 12 hours  cefepime   IVPB      chlorhexidine 4% Liquid 1 Application(s) Topical <User Schedule>  collagenase Ointment 1 Application(s) Topical two times a day  Dakins Solution - 1/2 Strength 1 Application(s) Topical two times a day  dextrose 5%. 1000 milliLiter(s) IV Continuous <Continuous>  levETIRAcetam  IVPB 250 milliGRAM(s) IV Intermittent every 12 hours  pantoprazole  Injectable 40 milliGRAM(s) IV Push daily  senna 2 Tablet(s) Oral at bedtime      Lab                        9.2    16.33 )-----------( 193      ( 26 Mar 2021 05:33 )             31.7     03-26    149<H>  |  113<H>  |  65<HH>  ----------------------------<  128<H>  4.1   |  25  |  2.0<H>    eGFR if African American: 38 mL/min/1.73M2 (03.26.21 @ 05:33)        Ca    9.6      26 Mar 2021 05:33  Phos  4.6     03-25  Mg     2.4     03-25    TPro  7.4  /  Alb  2.6<L>  /  TBili  0.6  /  DBili  x   /  AST  37  /  ALT  10  /  AlkPhos  113  03-26    CAPILLARY BLOOD GLUCOSE      POCT Blood Glucose.: 104 mg/dL (26 Mar 2021 11:04)  POCT Blood Glucose.: 117 mg/dL (25 Mar 2021 21:15)  POCT Blood Glucose.: 88 mg/dL (25 Mar 2021 16:24)    LIVER FUNCTIONS - ( 26 Mar 2021 05:33 )  Alb: 2.6 g/dL / Pro: 7.4 g/dL / ALK PHOS: 113 U/L / ALT: 10 U/L / AST: 37 U/L / GGT: x               Radiology  < from: CT Head No Cont (03.22.21 @ 18:16) >    INTERPRETATION:  Clinical History / Reason for exam: Altered mental status..    TECHNIQUE: CT of the head was performed without the administration of intravenous contrast.    COMPARISON: CT head dated 2/14/2021.    FINDINGS:    Parenchymal volume is appropriate for patient age. No hydrocephalus.    No large territorial infarct, intracranial hemorrhage, extra-axial fluid collection, or midline shift.    Punctate hypodensities in the bihemispheric white matter consistent with mild microvascular ischemic disease, essentially stable.    Left cataract surgery. The calvarium is intact    Imaged paranasal sinuses and mastoid air cavities are well-aerated.    IMPRESSION:    No acute intracranial pathology.    < end of copied text >      Other studies:   < from: EEG (03.23.21 @ 12:00) >  State  Awake, asleep, and drowsy    Symmetry  Symmetric  -    Organization  Less than optimally organized    PDR  Continuous  Background: moderate amount of low theta and  a PDR reaching 6-7 hz hz    Generalized Slowing  Yes  mild - moderate    Focal Slowing  Yes  Focal slowing bilaterial independent right more than left  mild  -  anterior quadrant  -  -    Breach Artifact: No  -      Activation Procedure  Hyper Ventilation  No  -  -    Photic Stimulation  No  -  -    Epileptiform Activity  Yes    Frequency:  small number  -    Side:  Bilateral synchronous  Right    Type:  Sharp waves that are probably epileptiform  -    Area of Activity:  Occipital    Events:  No  -  -    Impression  -  Abnormal due to the presence of: focal slowing as above, generalized slowing as above, interictal activity as above  -    Clinical Correlation & Recommendations  Consistent with diffuse cerebral electrophysiological dysfunction.  Secondary to none specific cause. Consistent with focal electrophysiological dysfunction.  Secondary to structural/metabolic cause. Consistent with potential for partial seizure from RIGHT hemisphere.    < end of copied text >        Assessment- This is a 71y year old Male presenting with worsening level of arousal from NH after spending a prolonged admission at [facility].  Recent REEG shows risk for seizures and keppra started.    Plan  1. Check CPK level   2. Continue sinemet via NGT  3. Would decrease keppra to 125mg BID  4. Video EEG

## 2021-03-26 NOTE — PROGRESS NOTE ADULT - SUBJECTIVE AND OBJECTIVE BOX
Patient is a 71y old  Male who presents with a chief complaint of Change in Mental Status. Burn team consulted for evaluation of sacral wound.       INTERVAL HPI/OVERNIGHT EVENTS:  ICU Vital Signs Last 24 Hrs  T(C): 37.2 (26 Mar 2021 07:10), Max: 39 (26 Mar 2021 00:06)  T(F): 99 (26 Mar 2021 07:10), Max: 102.2 (26 Mar 2021 00:06)  HR: 67 (26 Mar 2021 07:10) (67 - 70)  BP: 107/58 (26 Mar 2021 07:10) (107/58 - 129/60)  RR: 19 (26 Mar 2021 07:10) (18 - 19)        MEDICATIONS  (STANDING):  apixaban 5 milliGRAM(s) Oral every 12 hours  atorvastatin 40 milliGRAM(s) Oral at bedtime  carbidopa/levodopa  25/100 2 Tablet(s) Oral <User Schedule>  cefepime   IVPB 1000 milliGRAM(s) IV Intermittent every 12 hours  cefepime   IVPB      chlorhexidine 4% Liquid 1 Application(s) Topical <User Schedule>  collagenase Ointment 1 Application(s) Topical two times a day  Dakins Solution - 1/2 Strength 1 Application(s) Topical two times a day  dextrose 5%. 1000 milliLiter(s) (75 mL/Hr) IV Continuous <Continuous>  levETIRAcetam  IVPB 250 milliGRAM(s) IV Intermittent every 12 hours  pantoprazole  Injectable 40 milliGRAM(s) IV Push daily  senna 2 Tablet(s) Oral at bedtime    PHYSICAL EXAM:  GENERAL: well built, well nourished  Wound: ~[id number] wound to sacrum. Wound black with adherent necrotic tissue. Little visibly healthy tissue noted.

## 2021-03-26 NOTE — CHART NOTE - NSCHARTNOTEFT_GEN_A_CORE
Called pt's son for consent for VEEG but no answer Called pt's son for consent for VEEG but no answer. No other number in chart

## 2021-03-26 NOTE — PROGRESS NOTE ADULT - SUBJECTIVE AND OBJECTIVE BOX
Progress Note:  Provider Speciality                            Hospitalist      NIEVES LABOY MRN-[id number] 71y Male     CHIEF PRESENTING COMPLAINT:  Patient is a 71y old  Male who presents with a chief complaint of Change in Mental Status (24 Mar 2021 09:49)        SUBJECTIVE:  Patient was seen and examined at bedside. Review of systems could not be obtained in this patient.    Overnight fever reported.     HISTORY OF PRESENTING ILLNESS:  HPI:  71 year old Male with past medical history of Parkinson's, dementia, CKD Stage 3, 1st degree AV block, HLD, gout, HTN, DM, fungal septicemia presenting from [facility] for acute decompensation in mental status.     As per documentation patient at baseline is verbal but for past 3 days, he has been worsening to state of being non verbal. Patient was admitted to [facility] for fungemia and discharged on the 11th March. At  he had left hand cellulitis and was treated for it.    In ED patient hemodynamically stable, afebrile, ABEBE with rise in Cr to 1.9 and BUN to 75, remains non verbal. CT head negative for any new strokes and UA negative. Family has not visited the patient recently so unaware of his status. (22 Mar 2021 23:59)        REVIEW OF SYSTEMS:  Review of systems could not be obtained in this patient.     PAST MEDICAL & SURGICAL HISTORY:  PAST MEDICAL & SURGICAL HISTORY:  Cataract    Prostatitis    Dyslipidemia    Gout    Hypertension    Parkinson disease    No significant past surgical history            VITAL SIGNS:  Vital Signs Last 24 Hrs  T(C): 37.2 (26 Mar 2021 07:10), Max: 39 (26 Mar 2021 00:06)  T(F): 99 (26 Mar 2021 07:10), Max: 102.2 (26 Mar 2021 00:06)  HR: 67 (26 Mar 2021 07:10) (67 - 70)  BP: 107/58 (26 Mar 2021 07:10) (107/58 - 129/60)  BP(mean): --  RR: 19 (26 Mar 2021 07:10) (18 - 19)  SpO2: --        PHYSICAL EXAMINATION:  Not in acute distress, obtunded  General: No pallor, no icterus  HEENT:   EOMI, no JVD.  Heart: S1+S2 audible  Lungs: bilateral  moderate air entry, no wheezing, no crepitations.  Abdomen: Soft, non-tender, non-distended , no  rigidity or guarding.  CNS:  obtunded, CN  could not assessed. Does snot follow commnads  Extremities:  No edema    , R hand swelling        CONSULTS:  Consultant(s) Notes Reviewed by me.   Care Discussed with Consultants/Other Providers where required.        MEDICATIONS:  MEDICATIONS  (STANDING):  apixaban 5 milliGRAM(s) Oral every 12 hours  atorvastatin 40 milliGRAM(s) Oral at bedtime  carbidopa/levodopa CR 25/100 2 Tablet(s) Oral <User Schedule>  chlorhexidine 4% Liquid 1 Application(s) Topical <User Schedule>  collagenase Ointment 1 Application(s) Topical two times a day  Dakins Solution - 1/2 Strength 1 Application(s) Topical two times a day  lactated ringers. 1000 milliLiter(s) (70 mL/Hr) IV Continuous <Continuous>  pantoprazole    Tablet 40 milliGRAM(s) Oral before breakfast  senna 2 Tablet(s) Oral at bedtime    MEDICATIONS  (PRN):            ASSESSMENT:      71 year old Male with past medical history of Parkinson's, dementia, CKD Stage 3, 1st degree AV block, HLD, gout, HTN, DM, fungal septicemia presenting from [facility] for acute decompensation in mental status. Patient appears to be dehydrated. No clear cause of change in mental status.       Suspected metabolic encephalopathy  Pyrexia, unclear source  Possible advancing Parkinsonism vs dementia  Left shoulder dislocation   ABEBE on CKD stage 3  Sacral pressure ulcer- stage 4  Hypernatremia      No apparent infectious source but continues to spike .  CXR show sno infiltrates . Follow up cultures  CT Head negative for any acute strokes  Hypernatremia- switch IVf to D5-improving  EEG annormal- startedd om keppra by neuro- possible lethargy also secondary secondary to keppra  keep NPO for now as high risk for spiration.  NG feeding to be started  Sacral pressure ulcer- stage 4- Burn: no surgical intervention   H/O Parkinson disease-neurology noted  Left shoulder dislocation-recurrent -ortho follow up. Follow up UE doppler  ABEBE likely Pre renal- continue gentle IVF  Chronic DVT- Continue Eliquis    Progress note Handoff:   Discussion: Diagnosis, current management plan and further plan of care discussed with family  and housestaff in morning  rounds.  Disposition: Acute for  now. Medically worsening , not ready for discharge                 Progress Note:  Provider Speciality                            Hospitalist      NIEVES LABOY MRN-[id number] 71y Male     CHIEF PRESENTING COMPLAINT:  Patient is a 71y old  Male who presents with a chief complaint of Change in Mental Status (24 Mar 2021 09:49)        SUBJECTIVE:  Patient was seen and examined at bedside. Review of systems could not be obtained in this patient.    Overnight fever reported.     HISTORY OF PRESENTING ILLNESS:  HPI:  71 year old Male with past medical history of Parkinson's, dementia, CKD Stage 3, 1st degree AV block, HLD, gout, HTN, DM, fungal septicemia presenting from [facility] for acute decompensation in mental status.     As per documentation patient at baseline is verbal but for past 3 days, he has been worsening to state of being non verbal. Patient was admitted to [facility] for fungemia and discharged on the 11th March. At  he had left hand cellulitis and was treated for it.    In ED patient hemodynamically stable, afebrile, ABEBE with rise in Cr to 1.9 and BUN to 75, remains non verbal. CT head negative for any new strokes and UA negative. Family has not visited the patient recently so unaware of his status. (22 Mar 2021 23:59)        REVIEW OF SYSTEMS:  Review of systems could not be obtained in this patient.     PAST MEDICAL & SURGICAL HISTORY:  PAST MEDICAL & SURGICAL HISTORY:  Cataract    Prostatitis    Dyslipidemia    Gout    Hypertension    Parkinson disease    No significant past surgical history            VITAL SIGNS:  Vital Signs Last 24 Hrs  T(C): 37.2 (26 Mar 2021 07:10), Max: 39 (26 Mar 2021 00:06)  T(F): 99 (26 Mar 2021 07:10), Max: 102.2 (26 Mar 2021 00:06)  HR: 67 (26 Mar 2021 07:10) (67 - 70)  BP: 107/58 (26 Mar 2021 07:10) (107/58 - 129/60)  BP(mean): --  RR: 19 (26 Mar 2021 07:10) (18 - 19)  SpO2: --        PHYSICAL EXAMINATION:  Not in acute distress, obtunded  General: No pallor, no icterus  HEENT:   EOMI, no JVD.  Heart: S1+S2 audible  Lungs: bilateral  moderate air entry, no wheezing, no crepitations.  Abdomen: Soft, non-tender, non-distended , no  rigidity or guarding.  CNS:  obtunded, CN  could not assessed. Does snot follow commnads  Extremities:  No edema    , R hand swelling        CONSULTS:  Consultant(s) Notes Reviewed by me.   Care Discussed with Consultants/Other Providers where required.        MEDICATIONS:  MEDICATIONS  (STANDING):  apixaban 5 milliGRAM(s) Oral every 12 hours  atorvastatin 40 milliGRAM(s) Oral at bedtime  carbidopa/levodopa CR 25/100 2 Tablet(s) Oral <User Schedule>  chlorhexidine 4% Liquid 1 Application(s) Topical <User Schedule>  collagenase Ointment 1 Application(s) Topical two times a day  Dakins Solution - 1/2 Strength 1 Application(s) Topical two times a day  lactated ringers. 1000 milliLiter(s) (70 mL/Hr) IV Continuous <Continuous>  pantoprazole    Tablet 40 milliGRAM(s) Oral before breakfast  senna 2 Tablet(s) Oral at bedtime    MEDICATIONS  (PRN):            ASSESSMENT:      71 year old Male with past medical history of Parkinson's, dementia, CKD Stage 3, 1st degree AV block, HLD, gout, HTN, DM, fungal septicemia presenting from [facility] for acute decompensation in mental status. Patient appears to be dehydrated. No clear cause of change in mental status.       Suspected metabolic encephalopathy  Pyrexia, unclear source  Possible advancing Parkinsonism vs dementia  Left shoulder dislocation   ABEBE on CKD stage 3  Sacral pressure ulcer- stage 4  Hypernatremia      No apparent infectious source but continues to spike .  CXR show sno infiltrates . Follow up cultures  CT Head negative for any acute strokes  Hypernatremia- switch IVf to D5-improving  EEG annormal- startedd om keppra by neuro- possible lethargy also secondary secondary to keppra  keep NPO for now as high risk for spiration.  NG feeding to be started  Sacral pressure ulcer- stage 4- Burn: no surgical intervention   H/O Parkinson disease-neurology noted  Left shoulder dislocation-recurrent -ortho follow up. Follow up UE doppler  ABEBE likely Pre renal- continue gentle IVF  Chronic DVT- Continue Eliquis  GOC addressed Pt is full code.     Progress note Handoff:   Discussion: Diagnosis, current management plan and further plan of care discussed with family  and housestaff in morning  rounds.  Disposition: Acute for  now. Medically worsening , not ready for discharge                 Progress Note:  Provider Speciality                            Hospitalist      NIEVES LABOY MRN-[id number] 71y Male     CHIEF PRESENTING COMPLAINT:  Patient is a 71y old  Male who presents with a chief complaint of Change in Mental Status (24 Mar 2021 09:49)        SUBJECTIVE:  Patient was seen and examined at bedside. Review of systems could not be obtained in this patient.    Overnight fever reported.     HISTORY OF PRESENTING ILLNESS:  HPI:  71 year old Male with past medical history of Parkinson's, dementia, CKD Stage 3, 1st degree AV block, HLD, gout, HTN, DM, fungal septicemia presenting from [facility] for acute decompensation in mental status.     As per documentation patient at baseline is verbal but for past 3 days, he has been worsening to state of being non verbal. Patient was admitted to [facility] for fungemia and discharged on the 11th March. At  he had left hand cellulitis and was treated for it.    In ED patient hemodynamically stable, afebrile, ABEBE with rise in Cr to 1.9 and BUN to 75, remains non verbal. CT head negative for any new strokes and UA negative. Family has not visited the patient recently so unaware of his status. (22 Mar 2021 23:59)        REVIEW OF SYSTEMS:  Review of systems could not be obtained in this patient.     PAST MEDICAL & SURGICAL HISTORY:  PAST MEDICAL & SURGICAL HISTORY:  Cataract    Prostatitis    Dyslipidemia    Gout    Hypertension    Parkinson disease    No significant past surgical history            VITAL SIGNS:  Vital Signs Last 24 Hrs  T(C): 37.2 (26 Mar 2021 07:10), Max: 39 (26 Mar 2021 00:06)  T(F): 99 (26 Mar 2021 07:10), Max: 102.2 (26 Mar 2021 00:06)  HR: 67 (26 Mar 2021 07:10) (67 - 70)  BP: 107/58 (26 Mar 2021 07:10) (107/58 - 129/60)  BP(mean): --  RR: 19 (26 Mar 2021 07:10) (18 - 19)  SpO2: --        PHYSICAL EXAMINATION:  Not in acute distress, obtunded  General: No pallor, no icterus  HEENT:   EOMI, no JVD.  Heart: S1+S2 audible  Lungs: bilateral  moderate air entry, no wheezing, no crepitations.  Abdomen: Soft, non-tender, non-distended , no  rigidity or guarding.  CNS:  obtunded, CN  could not assessed. Does snot follow commnads  Extremities:  No edema    , R hand swelling        CONSULTS:  Consultant(s) Notes Reviewed by me.   Care Discussed with Consultants/Other Providers where required.        MEDICATIONS:  MEDICATIONS  (STANDING):  apixaban 5 milliGRAM(s) Oral every 12 hours  atorvastatin 40 milliGRAM(s) Oral at bedtime  carbidopa/levodopa CR 25/100 2 Tablet(s) Oral <User Schedule>  chlorhexidine 4% Liquid 1 Application(s) Topical <User Schedule>  collagenase Ointment 1 Application(s) Topical two times a day  Dakins Solution - 1/2 Strength 1 Application(s) Topical two times a day  lactated ringers. 1000 milliLiter(s) (70 mL/Hr) IV Continuous <Continuous>  pantoprazole    Tablet 40 milliGRAM(s) Oral before breakfast  senna 2 Tablet(s) Oral at bedtime    MEDICATIONS  (PRN):            ASSESSMENT:      71 year old Male with past medical history of Parkinson's, dementia, CKD Stage 3, 1st degree AV block, HLD, gout, HTN, DM, fungal septicemia presenting from [facility] for acute decompensation in mental status. Patient appears to be dehydrated. No clear cause of change in mental status.       Metabolic/toxic encephalopathy  Pyrexia, unclear source  Possible advancing Parkinsonism vs dementia  Left shoulder dislocation   ABEBE on CKD stage 3  Sacral pressure ulcer- stage 4  Hypernatremia      No apparent infectious source but continues to spike .  CXR show sno infiltrates . Follow up cultures. Empirically started on cefepime  CT Head negative for any acute strokes  Hypernatremia- switch IVf to D5-improving  EEG annormal- startedd om keppra by neuro- possible lethargy also secondary secondary to keppra  keep NPO for now as high risk for spiration.  NG feeding to be started  Sacral pressure ulcer- stage 4- Burn: no surgical intervention   H/O Parkinson disease-neurology noted  Left shoulder dislocation-recurrent -ortho follow up. Follow up UE doppler  ABEBE likely Pre renal- continue gentle IVF  Chronic DVT- Continue Eliquis  GOC addressed Pt is full code.     Progress note Handoff:   Discussion: Diagnosis, current management plan and further plan of care discussed with family  and housestaff in morning  rounds.  Disposition: Acute for  now. Medically worsening , not ready for discharge                 Progress Note:  Provider Speciality                            Hospitalist      NIEVES LABOY MRN-[id number] 71y Male     CHIEF PRESENTING COMPLAINT:  Patient is a 71y old  Male who presents with a chief complaint of Change in Mental Status (24 Mar 2021 09:49)        SUBJECTIVE:  Patient was seen and examined at bedside. Review of systems could not be obtained in this patient.    Overnight fever reported.     HISTORY OF PRESENTING ILLNESS:  HPI:  71 year old Male with past medical history of Parkinson's, dementia, CKD Stage 3, 1st degree AV block, HLD, gout, HTN, DM, fungal septicemia presenting from [facility] for acute decompensation in mental status.     As per documentation patient at baseline is verbal but for past 3 days, he has been worsening to state of being non verbal. Patient was admitted to [facility] for fungemia and discharged on the 11th March. At  he had left hand cellulitis and was treated for it.    In ED patient hemodynamically stable, afebrile, ABEBE with rise in Cr to 1.9 and BUN to 75, remains non verbal. CT head negative for any new strokes and UA negative. Family has not visited the patient recently so unaware of his status. (22 Mar 2021 23:59)        REVIEW OF SYSTEMS:  Review of systems could not be obtained in this patient.     PAST MEDICAL & SURGICAL HISTORY:  PAST MEDICAL & SURGICAL HISTORY:  Cataract    Prostatitis    Dyslipidemia    Gout    Hypertension    Parkinson disease    No significant past surgical history            VITAL SIGNS:  Vital Signs Last 24 Hrs  T(C): 37.2 (26 Mar 2021 07:10), Max: 39 (26 Mar 2021 00:06)  T(F): 99 (26 Mar 2021 07:10), Max: 102.2 (26 Mar 2021 00:06)  HR: 67 (26 Mar 2021 07:10) (67 - 70)  BP: 107/58 (26 Mar 2021 07:10) (107/58 - 129/60)  BP(mean): --  RR: 19 (26 Mar 2021 07:10) (18 - 19)  SpO2: --        PHYSICAL EXAMINATION:  Not in acute distress, obtunded  General: No pallor, no icterus  HEENT:   EOMI, no JVD.  Heart: S1+S2 audible  Lungs: bilateral  moderate air entry, no wheezing, no crepitations.  Abdomen: Soft, non-tender, non-distended , no  rigidity or guarding.  CNS:  obtunded, CN  could not assessed. Does snot follow commnads  Extremities:  No edema    , R hand swelling        CONSULTS:  Consultant(s) Notes Reviewed by me.   Care Discussed with Consultants/Other Providers where required.        MEDICATIONS:  MEDICATIONS  (STANDING):  apixaban 5 milliGRAM(s) Oral every 12 hours  atorvastatin 40 milliGRAM(s) Oral at bedtime  carbidopa/levodopa CR 25/100 2 Tablet(s) Oral <User Schedule>  chlorhexidine 4% Liquid 1 Application(s) Topical <User Schedule>  collagenase Ointment 1 Application(s) Topical two times a day  Dakins Solution - 1/2 Strength 1 Application(s) Topical two times a day  lactated ringers. 1000 milliLiter(s) (70 mL/Hr) IV Continuous <Continuous>  pantoprazole    Tablet 40 milliGRAM(s) Oral before breakfast  senna 2 Tablet(s) Oral at bedtime    MEDICATIONS  (PRN):            ASSESSMENT:      71 year old Male with past medical history of Parkinson's, dementia, CKD Stage 3, 1st degree AV block, HLD, gout, HTN, DM, fungal septicemia presenting from [facility] for acute decompensation in mental status. Patient appears to be dehydrated. No clear cause of change in mental status.       Metabolic/toxic encephalopathy  Pyrexia, unclear source  Possible advancing Parkinsonism vs dementia  Left shoulder dislocation   ABEBE on CKD stage 3  Sacral pressure ulcer- stage 4  Hypernatremia      No apparent infectious source but continues to spike .  CXR show sno infiltrates . Follow up cultures. Empirically started on cefepime  CT Head negative for any acute strokes  Hypernatremia- switch IVf to D5-improving  EEG annormal- startedd om keppra by neuro- possible lethargy also secondary secondary to keppra  keep NPO for now as high risk for spiration.  NG feeding to be started  Sacral pressure ulcer- stage 4- Burn: no surgical intervention   H/O Parkinson disease-neurology noted  Left shoulder dislocation-recurrent -ortho follow up. Follow up UE doppler  ABEBE likely Pre renal- continue gentle IVF  Chronic DVT- Continue Eliquis  GOC addressed Pt is full code.     Progress note Handoff:   Discussion: Diagnosis, current management plan and further plan of care discussed with   and housestaff in morning  rounds.  Family updated about worsening prognosis  Disposition: Acute for  now. Medically worsening , not ready for discharge

## 2021-03-26 NOTE — CONSULT NOTE ADULT - CONVERSATION DETAILS
Met with daughter and introduced palliative care. Addressed overall GOC and code status.   Daughter would like to continue full aggressive measures.

## 2021-03-26 NOTE — PROGRESS NOTE ADULT - SUBJECTIVE AND OBJECTIVE BOX
NIEVES LABOY 71y Male  MRN#: [id number]   Hospital Day: 4d    SUBJECTIVE  Patient is a 71y old Male who presents with a chief complaint of Change in Mental Status (26 Mar 2021 10:30)  Currently admitted to medicine with the primary diagnosis of AMS (altered mental status)      INTERVAL HPI AND OVERNIGHT EVENTS:  Patient was examined and seen at bedside. This morning he is resting comfortably in bed and reports no issues or overnight events.    OBJECTIVE  PAST MEDICAL & SURGICAL HISTORY  Cataract    Prostatitis    Dyslipidemia    Gout    Hypertension    Parkinson disease    No significant past surgical history      ALLERGIES:  No Known Allergies    MEDICATIONS:  STANDING MEDICATIONS  apixaban 5 milliGRAM(s) Oral every 12 hours  atorvastatin 40 milliGRAM(s) Oral at bedtime  carbidopa/levodopa  25/100 2 Tablet(s) Oral <User Schedule>  cefepime   IVPB 1000 milliGRAM(s) IV Intermittent once  cefepime   IVPB 1000 milliGRAM(s) IV Intermittent every 12 hours  cefepime   IVPB      chlorhexidine 4% Liquid 1 Application(s) Topical <User Schedule>  collagenase Ointment 1 Application(s) Topical two times a day  Dakins Solution - 1/2 Strength 1 Application(s) Topical two times a day  dextrose 5%. 1000 milliLiter(s) IV Continuous <Continuous>  levETIRAcetam  IVPB 250 milliGRAM(s) IV Intermittent every 12 hours  pantoprazole  Injectable 40 milliGRAM(s) IV Push daily  senna 2 Tablet(s) Oral at bedtime  sodium chloride 0.9% Bolus 500 milliLiter(s) IV Bolus once    PRN MEDICATIONS  acetaminophen   Tablet .. 650 milliGRAM(s) Oral every 6 hours PRN      VITAL SIGNS: Last 24 Hours  T(C): 37.2 (26 Mar 2021 07:10), Max: 39 (26 Mar 2021 00:06)  T(F): 99 (26 Mar 2021 07:10), Max: 102.2 (26 Mar 2021 00:06)  HR: 67 (26 Mar 2021 07:10) (67 - 70)  BP: 107/58 (26 Mar 2021 07:10) (107/58 - 129/60)  BP(mean): --  RR: 19 (26 Mar 2021 07:10) (18 - 19)  SpO2: --    LABS:                        9.2    16.33 )-----------( 193      ( 26 Mar 2021 05:33 )             31.7     03    149<H>  |  113<H>  |  65<HH>  ----------------------------<  128<H>  4.1   |  25  |  2.0<H>    Ca    9.6      26 Mar 2021 05:33  Phos  4.6       Mg     2.4         TPro  7.4  /  Alb  2.6<L>  /  TBili  0.6  /  DBili  x   /  AST  37  /  ALT  10  /  AlkPhos  113        Urinalysis Basic - ( 25 Mar 2021 08:31 )    Color: Yellow / Appearance: Clear / S.022 / pH: x  Gluc: x / Ketone: Trace  / Bili: Negative / Urobili: <2 mg/dL   Blood: x / Protein: Trace / Nitrite: Negative   Leuk Esterase: Small / RBC: 4 /HPF / WBC 4 /HPF   Sq Epi: x / Non Sq Epi: 1 /HPF / Bacteria: Negative        Lactate, Blood: 1.0 mmol/L (21 @ 11:09)      PHYSICAL EXAM:  CONSTITUTIONAL: No acute distress, AAOx0, lethargic, dose not follow any commands   HEAD: Atraumatic, normocephalic  EYES: EOM intact, PERRLA, conjunctiva and sclera clear  PULMONARY: Clear to auscultation bilaterally  CARDIOVASCULAR: Regular rate and rhythm  GASTROINTESTINAL: Soft, non-tender, non-distended  MUSCULOSKELETAL: does not follow commands, bed bound, left hand swelling and tenderness, fist clenched due to parkinson's   NEUROLOGY: does not participate in exam or follow any commands     ASSESSMENT & PLAN  71 year old Male with past medical history of Parkinson's, dementia, CKD Stage 3, 1st degree AV block, HLD, gout, HTN, DM, fungal septicemia presenting from [facility] for acute decompensation in mental status. Patient appears to be dehydrated. No clear cause of change in mental status. If does not improve with hydration consider Neuro consultation.    # Altered Mental Status possible Metabolic vs Dehydration  - 3 days of decreasing alertness and no longer speaking at the nursing home   - CT Head negative for any acute strokes  - repeat UA showing small LE   - will start empiric coverage with cefepime due to ongoing fevers  - Blood Cx: negative, Urine Cx: negative   - c/w D5 @ 75 + 500 of NS bolus now   - EEG: + epileptiform activity, start on keppra 250 BID   - per S&S patient is too lethargic and unable to participate in exam, keep NPO for now, NG tube in place    # Left hand pain & swelling   - tender to touch, swollen, mild erythema  - VA duplex pending   - XR R Hand pending     # Sacral pressure ulcer- stage 4  - Burn: no surgical intervention     # H/O Parkinson disease  - Neuro consult complete: c/w sinemet & keppra   - EEG w/ epileptiform activity, started on keppra per neuro   - CT head negative     # Left shoulder dislocation   - seen on CT in 2021  - Ortho reconsulted- pending L shoulder XR   - Xray Shoulder 2 Views, Left (21 @ 21:48) Reidentified anterior dislocation of the humeral head overlying the left second rib with bony fragmentation along the glenoid and humeral head. AC joint degenerative change.  - Per Ortho: likely no surgical intervention but pending finalized note    # H/O First Degree AV Block  - No indication for PPM    # ABEBE likely Pre renal  #Hypernatremia  - Baseline Cr 1.2-1.3 --> 2.0 today   - c/w D5 @ 75   - Sodium, Serum: 151 mmol/L (21 @ 05:47)  - w/ D5 and given 500 cc bolus this morning     # Chronic DVT  - Continue Eliquis    # HTN - well controlled   - Hold Enalapril due to intermittent hypotension     DVT: Eliquis  GI: Omeprazole  Diet: NPO, lethargic   Dispo: pending improvement in mental status

## 2021-03-27 LAB
GLUCOSE BLDC GLUCOMTR-MCNC: 112 MG/DL — HIGH (ref 70–99)
GLUCOSE BLDC GLUCOMTR-MCNC: 125 MG/DL — HIGH (ref 70–99)
GLUCOSE BLDC GLUCOMTR-MCNC: 132 MG/DL — HIGH (ref 70–99)
PROCALCITONIN SERPL-MCNC: 0.61 NG/ML — HIGH (ref 0.02–0.1)

## 2021-03-27 PROCEDURE — 93970 EXTREMITY STUDY: CPT | Mod: 26

## 2021-03-27 PROCEDURE — 99233 SBSQ HOSP IP/OBS HIGH 50: CPT

## 2021-03-27 PROCEDURE — 73100 X-RAY EXAM OF WRIST: CPT | Mod: 26,LT

## 2021-03-27 PROCEDURE — 95720 EEG PHY/QHP EA INCR W/VEEG: CPT

## 2021-03-27 PROCEDURE — 93971 EXTREMITY STUDY: CPT | Mod: 26,59,LT

## 2021-03-27 PROCEDURE — 99232 SBSQ HOSP IP/OBS MODERATE 35: CPT

## 2021-03-27 RX ORDER — ACYCLOVIR SODIUM 500 MG
800 VIAL (EA) INTRAVENOUS EVERY 12 HOURS
Refills: 0 | Status: DISCONTINUED | OUTPATIENT
Start: 2021-03-27 | End: 2021-03-31

## 2021-03-27 RX ADMIN — CARBIDOPA AND LEVODOPA 2 TABLET(S): 25; 100 TABLET ORAL at 10:40

## 2021-03-27 RX ADMIN — LEVETIRACETAM 405 MILLIGRAM(S): 250 TABLET, FILM COATED ORAL at 18:15

## 2021-03-27 RX ADMIN — ATORVASTATIN CALCIUM 40 MILLIGRAM(S): 80 TABLET, FILM COATED ORAL at 21:06

## 2021-03-27 RX ADMIN — SODIUM CHLORIDE 75 MILLILITER(S): 9 INJECTION, SOLUTION INTRAVENOUS at 11:22

## 2021-03-27 RX ADMIN — CEFEPIME 100 MILLIGRAM(S): 1 INJECTION, POWDER, FOR SOLUTION INTRAMUSCULAR; INTRAVENOUS at 05:50

## 2021-03-27 RX ADMIN — CARBIDOPA AND LEVODOPA 2 TABLET(S): 25; 100 TABLET ORAL at 13:23

## 2021-03-27 RX ADMIN — CHLORHEXIDINE GLUCONATE 1 APPLICATION(S): 213 SOLUTION TOPICAL at 05:51

## 2021-03-27 RX ADMIN — APIXABAN 5 MILLIGRAM(S): 2.5 TABLET, FILM COATED ORAL at 05:11

## 2021-03-27 RX ADMIN — Medication 1 APPLICATION(S): at 17:02

## 2021-03-27 RX ADMIN — Medication 650 MILLIGRAM(S): at 15:43

## 2021-03-27 RX ADMIN — Medication 1 APPLICATION(S): at 05:51

## 2021-03-27 RX ADMIN — SENNA PLUS 2 TABLET(S): 8.6 TABLET ORAL at 21:06

## 2021-03-27 RX ADMIN — Medication 650 MILLIGRAM(S): at 22:29

## 2021-03-27 RX ADMIN — CEFEPIME 100 MILLIGRAM(S): 1 INJECTION, POWDER, FOR SOLUTION INTRAMUSCULAR; INTRAVENOUS at 18:36

## 2021-03-27 RX ADMIN — Medication 1 APPLICATION(S): at 05:50

## 2021-03-27 RX ADMIN — Medication 266 MILLIGRAM(S): at 17:00

## 2021-03-27 RX ADMIN — Medication 100 MILLIGRAM(S): at 05:50

## 2021-03-27 RX ADMIN — PANTOPRAZOLE SODIUM 40 MILLIGRAM(S): 20 TABLET, DELAYED RELEASE ORAL at 11:23

## 2021-03-27 RX ADMIN — APIXABAN 5 MILLIGRAM(S): 2.5 TABLET, FILM COATED ORAL at 17:02

## 2021-03-27 RX ADMIN — LEVETIRACETAM 405 MILLIGRAM(S): 250 TABLET, FILM COATED ORAL at 05:51

## 2021-03-27 RX ADMIN — CARBIDOPA AND LEVODOPA 2 TABLET(S): 25; 100 TABLET ORAL at 05:11

## 2021-03-27 RX ADMIN — Medication 100 MILLIGRAM(S): at 13:27

## 2021-03-27 RX ADMIN — Medication 100 MILLIGRAM(S): at 21:06

## 2021-03-27 RX ADMIN — CARBIDOPA AND LEVODOPA 2 TABLET(S): 25; 100 TABLET ORAL at 21:06

## 2021-03-27 NOTE — PROGRESS NOTE ADULT - ASSESSMENT
71y year old Male presenting with worsening level of arousal from NH after spending a prolonged admission at [facility].  Recent REEG shows risk for seizures and keppra started. Currently on VEEG with generalized slowing and triphasic waves, remains obtunded.  Likely Toxic Metabolic encephalopathy    Plan  Recheck Covid  Continue Keppra 125 BID  Continue Sinemet via NG tube  Check CPK  F/u on official VEEG report        Plan discussed with Dr. Lm Velasquez  71y year old Male presenting with worsening level of arousal from NH after spending a prolonged admission at [facility].  Recent REEG shows risk for seizures and keppra started. Currently on VEEG with generalized slowing and triphasic waves, remains obtunded.  Likely Toxic Metabolic encephalopathy    Plan  Recheck Covid  Continue Keppra 125 BID  Continue Sinemet via NG tube  Check CPK  F/u on official VEEG report  Continue medical management for possible toxic/metabolic encephalopathy        Plan discussed with Dr. Lm Velasquez

## 2021-03-27 NOTE — PROGRESS NOTE ADULT - SUBJECTIVE AND OBJECTIVE BOX
NIEVES LABOY 71y Male  MRN#: [id number]   Hospital Day: 5d    SUBJECTIVE  Patient is a 71y old Male who presents with a chief complaint of Change in Mental Status (26 Mar 2021 13:59)  Currently admitted to medicine with the primary diagnosis of AMS (altered mental status)      INTERVAL HPI AND OVERNIGHT EVENTS:  Patient was examined and seen at bedside. This morning he is resting comfortably in bed and reports no issues or overnight events.    OBJECTIVE  PAST MEDICAL & SURGICAL HISTORY  Cataract    Prostatitis    Dyslipidemia    Gout    Hypertension    Parkinson disease    No significant past surgical history      ALLERGIES:  No Known Allergies    MEDICATIONS:  STANDING MEDICATIONS  apixaban 5 milliGRAM(s) Oral every 12 hours  atorvastatin 40 milliGRAM(s) Oral at bedtime  carbidopa/levodopa  25/100 2 Tablet(s) Oral <User Schedule>  cefepime   IVPB 1000 milliGRAM(s) IV Intermittent every 12 hours  cefepime   IVPB      chlorhexidine 4% Liquid 1 Application(s) Topical <User Schedule>  collagenase Ointment 1 Application(s) Topical two times a day  Dakins Solution - 1/2 Strength 1 Application(s) Topical two times a day  dextrose 5%. 1000 milliLiter(s) IV Continuous <Continuous>  levETIRAcetam  IVPB 125 milliGRAM(s) IV Intermittent every 12 hours  metroNIDAZOLE  IVPB 500 milliGRAM(s) IV Intermittent every 8 hours  pantoprazole  Injectable 40 milliGRAM(s) IV Push daily  senna 2 Tablet(s) Oral at bedtime    PRN MEDICATIONS  acetaminophen   Tablet .. 650 milliGRAM(s) Oral every 6 hours PRN      VITAL SIGNS: Last 24 Hours  T(C): 36.1 (27 Mar 2021 07:51), Max: 38.5 (26 Mar 2021 23:48)  T(F): 96.9 (27 Mar 2021 07:51), Max: 101.3 (26 Mar 2021 23:48)  HR: 71 (27 Mar 2021 07:51) (70 - 76)  BP: 112/55 (27 Mar 2021 07:51) (112/55 - 129/57)  BP(mean): --  RR: 18 (27 Mar 2021 07:51) (18 - 20)  SpO2: --    LABS:                        9.2    16.33 )-----------( 193      ( 26 Mar 2021 05:33 )             31.7     03-26    149<H>  |  113<H>  |  65<HH>  ----------------------------<  128<H>  4.1   |  25  |  2.0<H>    Ca    9.6      26 Mar 2021 05:33    TPro  7.4  /  Alb  2.6<L>  /  TBili  0.6  /  DBili  x   /  AST  37  /  ALT  10  /  AlkPhos  113  03-26              Culture - Blood (collected 25 Mar 2021 11:09)  Source: .Blood None  Preliminary Report (26 Mar 2021 23:01):    No growth to date.    Culture - Blood (collected 25 Mar 2021 05:47)  Source: .Blood None  Preliminary Report (26 Mar 2021 14:01):    No growth to date.        PHYSICAL EXAM:  CONSTITUTIONAL: No acute distress, AAOx0, lethargic, dose not follow any commands   HEAD: Atraumatic, normocephalic  EYES: EOM intact, PERRLA, conjunctiva and sclera clear  PULMONARY: Clear to auscultation bilaterally  CARDIOVASCULAR: Regular rate and rhythm  GASTROINTESTINAL: Soft, non-tender, non-distended  MUSCULOSKELETAL: does not follow commands, bed bound, left hand swelling and tenderness, fist clenched due to parkinson's   NEUROLOGY: does not participate in exam or follow any commands     ASSESSMENT & PLAN  71 year old Male with past medical history of Parkinson's, dementia, CKD Stage 3, 1st degree AV block, HLD, gout, HTN, DM, fungal septicemia presenting from [facility] for acute decompensation in mental status. Patient appears to be dehydrated. No clear cause of change in mental status. If does not improve with hydration consider Neuro consultation.    # Altered Mental Status possible Metabolic vs Dehydration  - 3 days of decreasing alertness and no longer speaking at the nursing home   - CT Head negative for any acute strokes  - repeat UA showing small LE   - ID: c/w cefepime & adding flagyl due to necrotic ulcer  - Burn: possible OR for debridement, follow up recs   - Blood Cx: negative, Urine Cx: negative   - c/w D5 @ 75   - EEG: + epileptiform activity  - VEEG in progress, c/w keppra 125 BID   - per S&S patient is too lethargic and unable to participate in exam, keep NPO for now, NG tube in place    # Left hand pain & swelling   - tender to touch, swollen,  - VA duplex pending:   - XR R Hand: No acute fracture or dislocation. Lucency in the distal radial metaphysis, nonspecific. Dedicated wrist radiographs recommended for further evaluation.  - XR Wrist pending     # Sacral pressure ulcer- stage 4  - Burn: possible OR for intervention, f/u burn recs     # H/O Parkinson disease  - Neuro consult complete: c/w sinemet & keppra   - EEG w/ epileptiform activity, started on keppra per neuro   - CT head negative     # Left shoulder dislocation   - seen on CT in Feb 2021  - Ortho reconsulted- pending L shoulder XR   - Xray Shoulder 2 Views, Left (03.23.21 @ 21:48) Reidentified anterior dislocation of the humeral head overlying the left second rib with bony fragmentation along the glenoid and humeral head. AC joint degenerative change.  - Per Ortho: likely no surgical intervention but pending finalized note    # H/O First Degree AV Block  - No indication for PPM    # ABEBE likely Pre renal  #Hypernatremia  - Baseline Cr 1.2-1.3 --> 2.0 today   - c/w D5 @ 75   - Sodium, Serum: 151 mmol/L (03.25.21 @ 05:47)  - c/w D5     # Chronic DVT  - Continue Eliquis    # HTN - well controlled   - Hold Enalapril due to intermittent hypotension     DVT: Eliquis  GI: Omeprazole  Diet: NPO, lethargic   Dispo: pending improvement in mental status NIEVES LABOY 71y Male  MRN#: [id number]   Hospital Day: 5d    SUBJECTIVE  Patient is a 71y old Male who presents with a chief complaint of Change in Mental Status (26 Mar 2021 13:59)  Currently admitted to medicine with the primary diagnosis of AMS (altered mental status)      INTERVAL HPI AND OVERNIGHT EVENTS:  Patient was examined and seen at bedside. This morning he is resting comfortably in bed and reports no issues or overnight events.    OBJECTIVE  PAST MEDICAL & SURGICAL HISTORY  Cataract    Prostatitis    Dyslipidemia    Gout    Hypertension    Parkinson disease    No significant past surgical history      ALLERGIES:  No Known Allergies    MEDICATIONS:  STANDING MEDICATIONS  apixaban 5 milliGRAM(s) Oral every 12 hours  atorvastatin 40 milliGRAM(s) Oral at bedtime  carbidopa/levodopa  25/100 2 Tablet(s) Oral <User Schedule>  cefepime   IVPB 1000 milliGRAM(s) IV Intermittent every 12 hours  cefepime   IVPB      chlorhexidine 4% Liquid 1 Application(s) Topical <User Schedule>  collagenase Ointment 1 Application(s) Topical two times a day  Dakins Solution - 1/2 Strength 1 Application(s) Topical two times a day  dextrose 5%. 1000 milliLiter(s) IV Continuous <Continuous>  levETIRAcetam  IVPB 125 milliGRAM(s) IV Intermittent every 12 hours  metroNIDAZOLE  IVPB 500 milliGRAM(s) IV Intermittent every 8 hours  pantoprazole  Injectable 40 milliGRAM(s) IV Push daily  senna 2 Tablet(s) Oral at bedtime    PRN MEDICATIONS  acetaminophen   Tablet .. 650 milliGRAM(s) Oral every 6 hours PRN      VITAL SIGNS: Last 24 Hours  T(C): 36.1 (27 Mar 2021 07:51), Max: 38.5 (26 Mar 2021 23:48)  T(F): 96.9 (27 Mar 2021 07:51), Max: 101.3 (26 Mar 2021 23:48)  HR: 71 (27 Mar 2021 07:51) (70 - 76)  BP: 112/55 (27 Mar 2021 07:51) (112/55 - 129/57)  BP(mean): --  RR: 18 (27 Mar 2021 07:51) (18 - 20)  SpO2: --    LABS:                        9.2    16.33 )-----------( 193      ( 26 Mar 2021 05:33 )             31.7     03-26    149<H>  |  113<H>  |  65<HH>  ----------------------------<  128<H>  4.1   |  25  |  2.0<H>    Ca    9.6      26 Mar 2021 05:33    TPro  7.4  /  Alb  2.6<L>  /  TBili  0.6  /  DBili  x   /  AST  37  /  ALT  10  /  AlkPhos  113  03-26              Culture - Blood (collected 25 Mar 2021 11:09)  Source: .Blood None  Preliminary Report (26 Mar 2021 23:01):    No growth to date.    Culture - Blood (collected 25 Mar 2021 05:47)  Source: .Blood None  Preliminary Report (26 Mar 2021 14:01):    No growth to date.        PHYSICAL EXAM:  CONSTITUTIONAL: No acute distress, AAOx0, lethargic, dose not follow any commands   HEAD: Atraumatic, normocephalic  EYES: EOM intact, PERRLA, conjunctiva and sclera clear  PULMONARY: Clear to auscultation bilaterally  CARDIOVASCULAR: Regular rate and rhythm  GASTROINTESTINAL: Soft, non-tender, non-distended  MUSCULOSKELETAL: does not follow commands, bed bound, left hand swelling and tenderness, fist clenched due to parkinson's   NEUROLOGY: does not participate in exam or follow any commands     ASSESSMENT & PLAN  71 year old Male with past medical history of Parkinson's, dementia, CKD Stage 3, 1st degree AV block, HLD, gout, HTN, DM, fungal septicemia presenting from [facility] for acute decompensation in mental status. Patient appears to be dehydrated. No clear cause of change in mental status. If does not improve with hydration consider Neuro consultation.    # Altered Mental Status possible Metabolic vs Dehydration  - 3 days of decreasing alertness and no longer speaking at the nursing home   - CT Head negative for any acute strokes  - Blood Cx: negative, Urine Cx: negative   - EEG: + epileptiform activity  - c/w D5 @ 75   - VEEG in progress, c/w keppra 125 BID   - per S&S patient is too lethargic and unable to participate in exam, keep NPO for now, NG tube in place  - ID: c/w cefepime, flagyl, & acyclovir  - IR: consult for LP (LP recommended by Neuro & ID), due to new onset seizures, fevers, with no known source, and worsening mental status **please call IR on Monday to schedule LP**  - Neuro: Recheck Covid (ordered), c/w Keppra & sinemet, check CPK (ordered), follow up VEEG  - Burn: possible OR for debridement, follow up recs       # Left hand pain & swelling   - tender to touch, swollen,  - VA duplex pending:   - XR R Hand: No acute fracture or dislocation. Lucency in the distal radial metaphysis, nonspecific. Dedicated wrist radiographs recommended for further evaluation.  - Xray Wrist 2 Views, Left (03.27.21 @ 11:13) No acute fracture or dislocation. Diffuse soft tissue swelling. Nonaggressive appearing lucency in the distal radius, indeterminate. Nonemergent MRI may be obtained for further evaluation.  - per radiology, non aggressive lesion seen in  L wrist, patient would benefit from MRI wrist as outpatient     # Sacral pressure ulcer- stage 4  - Burn: possible OR for intervention, f/u burn recs     # H/O Parkinson disease  - Neuro consult complete: c/w sinemet & keppra   - EEG w/ epileptiform activity, started on keppra per neuro   - CT head negative     # Left shoulder dislocation   - seen on CT in Feb 2021  - Ortho reconsulted- pending L shoulder XR   - Xray Shoulder 2 Views, Left (03.23.21 @ 21:48) Reidentified anterior dislocation of the humeral head overlying the left second rib with bony fragmentation along the glenoid and humeral head. AC joint degenerative change.  - Per Ortho: likely no surgical intervention but pending finalized note    # H/O First Degree AV Block  - No indication for PPM    # ABEBE likely Pre renal  #Hypernatremia  - Baseline Cr 1.2-1.3 --> 2.0 today   - c/w D5 @ 75   - Sodium, Serum: 151 mmol/L (03.25.21 @ 05:47)  - c/w D5     # Chronic DVT  - Continue Eliquis    # HTN - well controlled   - Hold Enalapril due to intermittent hypotension     DVT: Eliquis  GI: Omeprazole  Diet: NPO, lethargic   Dispo: pending improvement in mental status

## 2021-03-27 NOTE — PROGRESS NOTE ADULT - SUBJECTIVE AND OBJECTIVE BOX
Progress Note:  Provider Speciality                            Hospitalist      NIEVES LABOY MRN-[id number] 71y Male     CHIEF PRESENTING COMPLAINT:  Patient is a 71y old  Male who presents with a chief complaint of Change in Mental Status (24 Mar 2021 09:49)        SUBJECTIVE:  Patient was seen and examined at bedside. Review of systems could not be obtained in this patient.    Overnight fever reported.     HISTORY OF PRESENTING ILLNESS:  HPI:  71 year old Male with past medical history of Parkinson's, dementia, CKD Stage 3, 1st degree AV block, HLD, gout, HTN, DM, fungal septicemia presenting from [facility] for acute decompensation in mental status.     As per documentation patient at baseline is verbal but for past 3 days, he has been worsening to state of being non verbal. Patient was admitted to [facility] for fungemia and discharged on the 11th March. At  he had left hand cellulitis and was treated for it.    In ED patient hemodynamically stable, afebrile, ABEBE with rise in Cr to 1.9 and BUN to 75, remains non verbal. CT head negative for any new strokes and UA negative. Family has not visited the patient recently so unaware of his status. (22 Mar 2021 23:59)        REVIEW OF SYSTEMS:  Review of systems could not be obtained in this patient.     PAST MEDICAL & SURGICAL HISTORY:  PAST MEDICAL & SURGICAL HISTORY:  Cataract    Prostatitis    Dyslipidemia    Gout    Hypertension    Parkinson disease    No significant past surgical history            VITAL SIGNS:  Vital Signs Last 24 Hrs  T(C): 36.1 (27 Mar 2021 07:51), Max: 38.5 (26 Mar 2021 23:48)  T(F): 96.9 (27 Mar 2021 07:51), Max: 101.3 (26 Mar 2021 23:48)  HR: 71 (27 Mar 2021 07:51) (70 - 76)  BP: 112/55 (27 Mar 2021 07:51) (112/55 - 129/57)  BP(mean): --  RR: 18 (27 Mar 2021 07:51) (18 - 20)  SpO2: 96% (27 Mar 2021 10:29) (96% - 96%)    PHYSICAL EXAMINATION:  Not in acute distress, obtunded  General: No pallor, no icterus  HEENT:   EOMI, no JVD.  Heart: S1+S2 audible  Lungs: bilateral  moderate air entry, no wheezing, no crepitations.  Abdomen: Soft, non-tender, non-distended , no  rigidity or guarding.  CNS:  obtunded, CN  could not assessed. Does snot follow commnads  Extremities:  No edema    , R hand swelling        CONSULTS:  Consultant(s) Notes Reviewed by me.   Care Discussed with Consultants/Other Providers where required.        MEDICATIONS:  MEDICATIONS  (STANDING):  apixaban 5 milliGRAM(s) Oral every 12 hours  atorvastatin 40 milliGRAM(s) Oral at bedtime  carbidopa/levodopa CR 25/100 2 Tablet(s) Oral <User Schedule>  chlorhexidine 4% Liquid 1 Application(s) Topical <User Schedule>  collagenase Ointment 1 Application(s) Topical two times a day  Dakins Solution - 1/2 Strength 1 Application(s) Topical two times a day  lactated ringers. 1000 milliLiter(s) (70 mL/Hr) IV Continuous <Continuous>  pantoprazole    Tablet 40 milliGRAM(s) Oral before breakfast  senna 2 Tablet(s) Oral at bedtime    MEDICATIONS  (PRN):            ASSESSMENT:      71 year old Male with past medical history of Parkinson's, dementia, CKD Stage 3, 1st degree AV block, HLD, gout, HTN, DM, fungal septicemia presenting from [facility] for acute decompensation in mental status. Patient appears to be dehydrated. No clear cause of change in mental status.       Metabolic/toxic encephalopathy  Pyrexia, unclear source  Possible advancing Parkinsonism vs dementia  Left shoulder dislocation   ABEBE on CKD stage 3  Sacral pressure ulcer- stage 4  Hypernatremia      No apparent infectious source so far .  CXR showss no infiltrates . Follow up cultures. Empirically started on cefepime, flagyl & acyclovir. Will try to get LP  CT Head negative for any acute strokes  Hypernatremia- switch IVf to D5-improving  EEG annormal- started om keppra by neuro- possible lethargy also secondary secondary to keppra. Reduced the dose  keep NPO for now as high risk for spiration.  NG feeding to be started  Sacral pressure ulcer- stage 4- plan for surgical intervention bu Burn  H/O Parkinson disease-neurology noted  Left shoulder dislocation-recurrent -ortho follow up  ABEBE likely Pre renal- continue gentle IVF  Chronic DVT- Continue Eliquis  GOC addressed Pt is full code.     Progress note Handoff:   Discussion: Diagnosis, current management plan and further plan of care discussed with   and housestaff in morning  rounds.  Family updated about worsening prognosis  Disposition: Acute for  now. Medically worsening , not ready for discharge                 Progress Note:  Provider Speciality                            Hospitalist      NIEVES LABOY MRN-[id number] 71y Male     CHIEF PRESENTING COMPLAINT:  Patient is a 71y old  Male who presents with a chief complaint of Change in Mental Status (24 Mar 2021 09:49)        SUBJECTIVE:  Patient was seen and examined at bedside. Review of systems could not be obtained in this patient.    Overnight fever reported.     HISTORY OF PRESENTING ILLNESS:  HPI:  71 year old Male with past medical history of Parkinson's, dementia, CKD Stage 3, 1st degree AV block, HLD, gout, HTN, DM, fungal septicemia presenting from [facility] for acute decompensation in mental status.     As per documentation patient at baseline is verbal but for past 3 days, he has been worsening to state of being non verbal. Patient was admitted to [facility] for fungemia and discharged on the 11th March. At  he had left hand cellulitis and was treated for it.    In ED patient hemodynamically stable, afebrile, ABEBE with rise in Cr to 1.9 and BUN to 75, remains non verbal. CT head negative for any new strokes and UA negative. Family has not visited the patient recently so unaware of his status. (22 Mar 2021 23:59)        REVIEW OF SYSTEMS:  Review of systems could not be obtained in this patient.     PAST MEDICAL & SURGICAL HISTORY:  PAST MEDICAL & SURGICAL HISTORY:  Cataract    Prostatitis    Dyslipidemia    Gout    Hypertension    Parkinson disease    No significant past surgical history            VITAL SIGNS:  Vital Signs Last 24 Hrs  T(C): 36.1 (27 Mar 2021 07:51), Max: 38.5 (26 Mar 2021 23:48)  T(F): 96.9 (27 Mar 2021 07:51), Max: 101.3 (26 Mar 2021 23:48)  HR: 71 (27 Mar 2021 07:51) (70 - 76)  BP: 112/55 (27 Mar 2021 07:51) (112/55 - 129/57)  BP(mean): --  RR: 18 (27 Mar 2021 07:51) (18 - 20)  SpO2: 96% (27 Mar 2021 10:29) (96% - 96%)    PHYSICAL EXAMINATION:  Not in acute distress, obtunded  General: No pallor, no icterus  HEENT:   EOMI, no JVD.  Heart: S1+S2 audible  Lungs: bilateral  moderate air entry, no wheezing, no crepitations.  Abdomen: Soft, non-tender, non-distended , no  rigidity or guarding.  CNS:  obtunded, CN  could not assessed. Does snot follow commnads  Extremities:  No edema    , R hand swelling        CONSULTS:  Consultant(s) Notes Reviewed by me.   Care Discussed with Consultants/Other Providers where required.        MEDICATIONS:  MEDICATIONS  (STANDING):  apixaban 5 milliGRAM(s) Oral every 12 hours  atorvastatin 40 milliGRAM(s) Oral at bedtime  carbidopa/levodopa CR 25/100 2 Tablet(s) Oral <User Schedule>  chlorhexidine 4% Liquid 1 Application(s) Topical <User Schedule>  collagenase Ointment 1 Application(s) Topical two times a day  Dakins Solution - 1/2 Strength 1 Application(s) Topical two times a day  lactated ringers. 1000 milliLiter(s) (70 mL/Hr) IV Continuous <Continuous>  pantoprazole    Tablet 40 milliGRAM(s) Oral before breakfast  senna 2 Tablet(s) Oral at bedtime    MEDICATIONS  (PRN):            ASSESSMENT:      71 year old Male with past medical history of Parkinson's, dementia, CKD Stage 3, 1st degree AV block, HLD, gout, HTN, DM, fungal septicemia presenting from [facility] for acute decompensation in mental status. Patient appears to be dehydrated. No clear cause of change in mental status.       Metabolic/toxic encephalopathy  Pyrexia, unclear source  Possible advancing Parkinsonism vs dementia  Left shoulder dislocation   ABEBE on CKD stage 3  Sacral pressure ulcer- stage 4  Hypernatremia      No apparent infectious source so far .  CXR showss no infiltrates . Follow up cultures. Empirically started on cefepime, flagyl & acyclovir. Will try to get LP  CT Head negative for any acute strokes  Hypernatremia- switch IVf to D5-improving  EEG annormal- started om keppra by neuro- possible lethargy also secondary secondary to keppra. Reduced the dose  keep NPO for now as high risk for spiration.  NG feeding to be started  Sacral pressure ulcer- stage 4- plan for surgical intervention bu Burn  H/O Parkinson disease-neurology noted  Left shoulder dislocation-recurrent -ortho follow up  ABEBE likely Pre renal- continue gentle IVF  Chronic DVT- Hold Eliquis if going for OR by burn. Get venous doppler of LE  GOC addressed Pt is full code.     Progress note Handoff:   Discussion: Diagnosis, current management plan and further plan of care discussed with   and housestaff in morning  rounds.  Family updated about worsening prognosis  Disposition: Acute for  now. Medically worsening , not ready for discharge

## 2021-03-27 NOTE — PROGRESS NOTE ADULT - SUBJECTIVE AND OBJECTIVE BOX
NIEVES LABOY  71y, Male  Allergy: No Known Allergies      LOS  5d    CHIEF COMPLAINT: Change in Mental Status (27 Mar 2021 09:39)      INTERVAL EVENTS/HPI  - No acute events overnight  - T(F): , Max: 101.3 (03-26-21 @ 23:48)  - WBC Count: 16.33 (03-26-21 @ 05:33) pending AM   WBC Count: 10.71 (03-25-21 @ 05:47)  - Creatinine, Serum: 2.0 (03-26-21 @ 05:33)       ROS  unable to obtain history secondary to patient's mental status and/or sedation     VITALS:  T(F): 96.9, Max: 101.3 (03-26-21 @ 23:48)  HR: 71  BP: 112/55  RR: 18Vital Signs Last 24 Hrs  T(C): 36.1 (27 Mar 2021 07:51), Max: 38.5 (26 Mar 2021 23:48)  T(F): 96.9 (27 Mar 2021 07:51), Max: 101.3 (26 Mar 2021 23:48)  HR: 71 (27 Mar 2021 07:51) (70 - 76)  BP: 112/55 (27 Mar 2021 07:51) (112/55 - 129/57)  BP(mean): --  RR: 18 (27 Mar 2021 07:51) (18 - 20)  SpO2: 96% (27 Mar 2021 10:29) (96% - 96%)    PHYSICAL EXAM:  Gen: contracted  HEENT: Normocephalic, atraumatic  Neck: supple, no lymphadenopathy  CV: Regular rate & regular rhythm  Lungs: decreased BS at bases, no fremitus  Abdomen: Soft, BS present  Ext: Warm, well perfused L hand edema  Neuro: non verbal  Skin: no rash, no erythema  Lines: no phlebitis     FH: Non-contributory  Social Hx: Non-contributory    TESTS & MEASUREMENTS:                        9.2    16.33 )-----------( 193      ( 26 Mar 2021 05:33 )             31.7     03-26    149<H>  |  113<H>  |  65<HH>  ----------------------------<  128<H>  4.1   |  25  |  2.0<H>    Ca    9.6      26 Mar 2021 05:33    TPro  7.4  /  Alb  2.6<L>  /  TBili  0.6  /  DBili  x   /  AST  37  /  ALT  10  /  AlkPhos  113  03-26      LIVER FUNCTIONS - ( 26 Mar 2021 05:33 )  Alb: 2.6 g/dL / Pro: 7.4 g/dL / ALK PHOS: 113 U/L / ALT: 10 U/L / AST: 37 U/L / GGT: x               Culture - Blood (collected 03-25-21 @ 11:09)  Source: .Blood None  Preliminary Report (03-26-21 @ 23:01):    No growth to date.    Culture - Blood (collected 03-25-21 @ 05:47)  Source: .Blood None  Preliminary Report (03-26-21 @ 14:01):    No growth to date.    Culture - Urine (collected 03-22-21 @ 20:41)  Source: .Urine Clean Catch (Midstream)  Final Report (03-23-21 @ 20:53):    No growth    Culture - Blood (collected 03-22-21 @ 15:31)  Source: .Blood Blood-Peripheral  Preliminary Report (03-24-21 @ 02:20):    No growth to date.    Culture - Blood (collected 03-22-21 @ 15:28)  Source: .Blood Blood-Peripheral  Preliminary Report (03-24-21 @ 02:20):    No growth to date.    Culture - Urine (collected 03-04-21 @ 12:00)  Source: .Urine Clean Catch (Midstream)  Final Report (03-06-21 @ 07:24):    No growth    Culture - Blood (collected 03-04-21 @ 11:12)  Source: .Blood None  Final Report (03-09-21 @ 23:01):    No Growth Final    Culture - Blood (collected 03-03-21 @ 16:27)  Source: .Blood None  Final Report (03-08-21 @ 23:01):    No Growth Final    Culture - Blood (collected 02-27-21 @ 14:38)  Source: .Blood None  Gram Stain (03-01-21 @ 15:00):    Growth in aerobic bottle: Yeast like cells  Final Report (03-06-21 @ 14:27):    Growth in aerobic bottle: Candida albicans    See previous culture [id number]    Culture - Blood (collected 02-27-21 @ 14:38)  Source: .Blood None  Gram Stain (03-01-21 @ 12:31):    Growth in aerobic bottle: Yeast like cells  Final Report (03-03-21 @ 13:57):    Growth in aerobic bottle: Candida albicans    ***Blood Panel PCR results on this specimen are available    approximately 3 hours after the Gram stain result.***    Gram stain, PCR, and/or culture results may not always    correspond due to difference in methodologies.    ************************************************************    This PCR assay was performed by multiplex PCR. This    Assay tests for 66 bacterial and resistance gene targets.    Please refer to the [facility] test directory    at [email or web address] for details.    "Due to technical problems, Pseudomonas aeruginosa    until further notice".  Organism: Blood Culture PCR  Candida albicans (03-03-21 @ 13:57)  Organism: Candida albicans (03-03-21 @ 13:57)      -  Fluconazole: S 0.5 Fluconazole = Data established for mucosal disease, and is generally applicable for invasive disease.  Susceptibility is dependent on achieving the maximal possible blood level.  Doses of 400 MG/day or more may be required in adults with normalrenalfunction and body habitus.      -  Interpretation: See note This test method is not approved by the FDA and is for research use only.  The performance characteristics of this assay may have been determined by [facility], [place], [place]                            N/I - No interpretation available                                                         SDD – Sensitive Dose Dependent      Method Type: YSTMIC  Organism: Blood Culture PCR (03-03-21 @ 13:57)      -  Candida albicans: Detec      Method Type: PCR        Lactate, Blood: 1.0 mmol/L (03-25-21 @ 11:09)  Lactate, Blood: 1.1 mmol/L (03-22-21 @ 17:50)      INFECTIOUS DISEASES TESTING  Procalcitonin, Serum: 0.61 (03-26-21 @ 10:20)  COVID-19 PCR: NotDetec (03-22-21 @ 20:13)  COVID-19 PCR: NotDetec (03-10-21 @ 12:22)  COVID-19 PCR: NotDetec (03-01-21 @ 16:49)  Fungitell: 62 (03-01-21 @ 11:00)  Procalcitonin, Serum: 0.29 (03-01-21 @ 05:32)  MRSA PCR Result.: Negative (02-27-21 @ 15:44)  COVID-19 PCR: NotDetec (02-25-21 @ 15:34)  Procalcitonin, Serum: 0.27 (02-18-21 @ 10:54)  MRSA PCR Result.: Negative (02-14-21 @ 18:29)  HIV-1/2 Combo Result: Nonreact (02-14-21 @ 05:07)  Procalcitonin, Serum: 0.46 (02-13-21 @ 16:00)  COVID-19 PCR: NotDetec (02-12-21 @ 10:17)      INFLAMMATORY MARKERS      RADIOLOGY & ADDITIONAL TESTS:  I have personally reviewed the last available Chest xray  CXR  Xray Chest 1 View- PORTABLE-Urgent:   EXAM:  XR CHEST PORTABLE URGENT 1V            PROCEDURE DATE:  03/26/2021            INTERPRETATION:  Clinical History / Reason for exam: Pneumonia    Comparison : Chest radiograph March 24, 2021.    Technique/Positioning: Single AP view of the chest.    Findings:    Support devices: Feeding tube in stomach    Cardiac/mediastinum/hilum: Unremarkable.    Lung parenchyma/Pleura: No consolidation, effusion or pneumothorax.    Skeleton/soft tissues: Unchanged.    Impression:    No consolidation, effusion or pneumothorax.                  ELLIOT LANDAU MD; Attending Radiologist  This document has been electronically signed. Mar 26 2021  9:55AM (03-26-21 @ 07:10)      CT      CARDIOLOGY TESTING  12 Lead ECG:   Ventricular Rate 64 BPM    Atrial Rate 64 BPM    P-R Interval 234 ms    QRS Duration 88 ms    Q-T Interval 404 ms    QTC Calculation(Bazett) 416 ms    P Axis 68 degrees    R Axis -17 degrees    T Axis 9 degrees    Diagnosis Line Sinus rhythm with 1st degree A-V block  Voltage criteria for left ventricular hypertrophy  Nonspecific T wave abnormality  Abnormal ECG    Confirmed by ANA GARDUNO MD ([id number]) on 3/22/2021 9:54:41 PM (03-22-21 @ 16:57)      MEDICATIONS  apixaban 5 Oral every 12 hours  atorvastatin 40 Oral at bedtime  carbidopa/levodopa  25/100 2 Oral <User Schedule>  cefepime   IVPB 1000 IV Intermittent every 12 hours  cefepime   IVPB     chlorhexidine 4% Liquid 1 Topical <User Schedule>  collagenase Ointment 1 Topical two times a day  Dakins Solution - 1/2 Strength 1 Topical two times a day  dextrose 5%. 1000 IV Continuous <Continuous>  levETIRAcetam  IVPB 125 IV Intermittent every 12 hours  metroNIDAZOLE  IVPB 500 IV Intermittent every 8 hours  pantoprazole  Injectable 40 IV Push daily  senna 2 Oral at bedtime      WEIGHT  Weight (kg): 79.4 (03-22-21 @ 16:10)      ANTIBIOTICS:  cefepime   IVPB 1000 milliGRAM(s) IV Intermittent every 12 hours  cefepime   IVPB      metroNIDAZOLE  IVPB 500 milliGRAM(s) IV Intermittent every 8 hours      All available historical records have been reviewed

## 2021-03-27 NOTE — PROGRESS NOTE ADULT - ATTENDING COMMENTS
Patient seen and examined with SHARIF Hung and agree with above except as noted.  Patient still obtunded and tone increased.  Pain when evaluating the left UE noted by grimacing.  Not following commands    Plan as above

## 2021-03-27 NOTE — PROGRESS NOTE ADULT - SUBJECTIVE AND OBJECTIVE BOX
Neurology Progress Note    Interval History:  patient is examined at the bedside, he remains obtunded, triphasic waves on VEEG    HPI:  71 year old Male with past medical history of Parkinson's, dementia, CKD Stage 3, 1st degree AV block, HLD, gout, HTN, DM, fungal septicemia presenting from [facility] for acute decompensation in mental status.     As per documentation patient at baseline is verbal but for past 3 days, he has been worsening to state of being non verbal. Patient was admitted to [facility] for fungemia and discharged on the 11th March. At  he had left hand cellulitis and was treated for it.    In ED patient hemodynamically stable, afebrile, ABEBE with rise in Cr to 1.9 and BUN to 75, remains non verbal. CT head negative for any new strokes and UA negative. Family has not visited the patient recently so unaware of his status. (22 Mar 2021 23:59)      PAST MEDICAL & SURGICAL HISTORY:  Cataract    Prostatitis    Dyslipidemia    Gout    Hypertension    Parkinson disease    No significant past surgical history            Medications:  acetaminophen   Tablet .. 650 milliGRAM(s) Oral every 6 hours PRN  acyclovir IVPB 800 milliGRAM(s) IV Intermittent every 12 hours  apixaban 5 milliGRAM(s) Oral every 12 hours  atorvastatin 40 milliGRAM(s) Oral at bedtime  carbidopa/levodopa  25/100 2 Tablet(s) Oral <User Schedule>  cefepime   IVPB 1000 milliGRAM(s) IV Intermittent every 12 hours  cefepime   IVPB      chlorhexidine 4% Liquid 1 Application(s) Topical <User Schedule>  collagenase Ointment 1 Application(s) Topical two times a day  Dakins Solution - 1/2 Strength 1 Application(s) Topical two times a day  dextrose 5%. 1000 milliLiter(s) IV Continuous <Continuous>  levETIRAcetam  IVPB 125 milliGRAM(s) IV Intermittent every 12 hours  metroNIDAZOLE  IVPB 500 milliGRAM(s) IV Intermittent every 8 hours  pantoprazole  Injectable 40 milliGRAM(s) IV Push daily  senna 2 Tablet(s) Oral at bedtime      Vital Signs Last 24 Hrs  T(C): 36.1 (27 Mar 2021 07:51), Max: 38.5 (26 Mar 2021 23:48)  T(F): 96.9 (27 Mar 2021 07:51), Max: 101.3 (26 Mar 2021 23:48)  HR: 71 (27 Mar 2021 07:51) (70 - 76)  BP: 112/55 (27 Mar 2021 07:51) (112/55 - 129/57)  BP(mean): --  RR: 18 (27 Mar 2021 07:51) (18 - 20)  SpO2: 96% (27 Mar 2021 10:29) (96% - 96%)    Neurological Exam:   Mental status: Awake, alert and oriented x3.  Recent and remote memory intact.  Naming, repetition and comprehension intact.  Attention/concentration intact.  No dysarthria, no aphasia.  Fund of knowledge appropriate.    Cranial nerves: Pupils equally round and reactive to light, visual fields full, no nystagmus, extraocular muscles intact, V1 through V3 intact bilaterally and symmetric, face symmetric, hearing intact to finger rub, palate elevation symmetric, tongue was midline.  Motor:  MRC grading 5/5 b/l UE/LE.   strength 5/5.  Normal tone and bulk.  No abnormal movements.    Sensation: Intact to light touch, proprioception, and pinprick.   Coordination: No dysmetria on finger-to-nose and heel-to-shin.  No dysdiadokinesia.  Reflexes: 2+ in bilateral UE/LE, downgoing toes bilaterally. (-) Evans.  Gait: Narrow and steady. No ataxia.  Romberg negative    Labs:  CBC Full  -  ( 26 Mar 2021 05:33 )  WBC Count : 16.33 K/uL  RBC Count : 3.66 M/uL  Hemoglobin : 9.2 g/dL  Hematocrit : 31.7 %  Platelet Count - Automated : 193 K/uL  Mean Cell Volume : 86.6 fL  Mean Cell Hemoglobin : 25.1 pg  Mean Cell Hemoglobin Concentration : 29.0 g/dL  Auto Neutrophil # : x  Auto Lymphocyte # : x  Auto Monocyte # : x  Auto Eosinophil # : x  Auto Basophil # : x  Auto Neutrophil % : x  Auto Lymphocyte % : x  Auto Monocyte % : x  Auto Eosinophil % : x  Auto Basophil % : x    03-26    149<H>  |  113<H>  |  65<HH>  ----------------------------<  128<H>  4.1   |  25  |  2.0<H>    Ca    9.6      26 Mar 2021 05:33    TPro  7.4  /  Alb  2.6<L>  /  TBili  0.6  /  DBili  x   /  AST  37  /  ALT  10  /  AlkPhos  113  03-26    LIVER FUNCTIONS - ( 26 Mar 2021 05:33 )  Alb: 2.6 g/dL / Pro: 7.4 g/dL / ALK PHOS: 113 U/L / ALT: 10 U/L / AST: 37 U/L / GGT: x                 Assessment:  This is a 71y Male w/ h/o     Plan:

## 2021-03-27 NOTE — EEG REPORT - NS EEG TEXT BOX
VIDEO EEG FINAL REPORT      NIEVES LABOY    71y Male  MRN [id number]      Recording Technique: The patient underwent continuous video-EEG monitoring using Telemetry System hardware on the [employer] System. EEG and video data were stored on a computer hard drive with important events saved in digital archive files. The material was reviewed by a physician (electroencephalographer/epileptologist) on a daily basis. Axel and seizure detection algorithms were utilized if needed. An EEG technician attended to the patient for 8 to 10 hours per day. The patient was observed by the epilepsy nursing staff 24 hrs per day. The epilepsy center neurologist was available in person or on call 24 hours per day.    Placement and Labeling of Eelectrodes: The EEG was performed using at least 20 channel referential EEG connections (coronal over temporal over parasaggital montage) with inferior temporal electrodes when indicting and using all standard 10-20 electrode placement with EKG, with additional electrodes placed in the inferior temporal region using the modified 10-10 montage electrode placements for elective admissions, or if deemed necessary. Recording was at a sampling rate of 256 samples per second per channel. Time syncronized digital video recording was done simultaneously with EEG recording. A low light infrared camera was used for low light recording.      HPI:  71 year old Male with past medical history of Parkinson's, dementia, CKD Stage 3, 1st degree AV block, HLD, gout, HTN, DM, fungal septicemia presenting from [facility] for acute decompensation in mental status.     As per documentation patient at baseline is verbal but for past 3 days, he has been worsening to state of being non verbal. Patient was admitted to [facility] for fungemia and discharged on the 11th March. At  he had left hand cellulitis and was treated for it.    In ED patient hemodynamically stable, afebrile, ABEBE with rise in Cr to 1.9 and BUN to 75, remains non verbal. CT head negative for any new strokes and UA negative. Family has not visited the patient recently so unaware of his status. (22 Mar 2021 23:59)      MEDICATIONS  (STANDING):  acyclovir IVPB 800 milliGRAM(s) IV Intermittent every 12 hours  apixaban 5 milliGRAM(s) Oral every 12 hours  atorvastatin 40 milliGRAM(s) Oral at bedtime  carbidopa/levodopa  25/100 2 Tablet(s) Oral <User Schedule>  cefepime   IVPB 1000 milliGRAM(s) IV Intermittent every 12 hours  cefepime   IVPB      chlorhexidine 4% Liquid 1 Application(s) Topical <User Schedule>  collagenase Ointment 1 Application(s) Topical two times a day  Dakins Solution - 1/2 Strength 1 Application(s) Topical two times a day  dextrose 5%. 1000 milliLiter(s) (75 mL/Hr) IV Continuous <Continuous>  levETIRAcetam  IVPB 125 milliGRAM(s) IV Intermittent every 12 hours  metroNIDAZOLE  IVPB 500 milliGRAM(s) IV Intermittent every 8 hours  pantoprazole  Injectable 40 milliGRAM(s) IV Push daily  senna 2 Tablet(s) Oral at bedtime  sodium chloride 0.9%. 1000 milliLiter(s) (1000 mL/Hr) IV Continuous <Continuous>  sodium chloride 0.9%. 1000 milliLiter(s) (1000 mL/Hr) IV Continuous <Continuous>    MEDICATIONS  (PRN):  acetaminophen   Tablet .. 650 milliGRAM(s) Oral every 6 hours PRN Temp greater or equal to 38C (100.4F)        AWAKE  The recording during the wakefulness consists of a symmetrical, but poorly  background of predominantly theta range frequencids  which is reactive to eye opening and eye closure and change in the level of alertness.      ASLEEP  Different stages of sleep were present.  Stage II of non-REM sleep was characterized by the presence of symmetrical  sleep spindles but a paucity of sleep spindles.  The deeper stages of sleep were identified by the presence of low theta and delta range activity seen diffusely over both hemispheres and more prominently from the frontal and central derivations.       GENERALIZED SLOWING  Moderate slowing, present bilaterally,   FOCAL SLOWING    None  BREACH ARTIFACT  None    ACTIVATION PROCEDURES  None  EPILEPTIFORM ACTIVITY      NOne      IMPRESSION  Abnormal VEEG due to diffuse slowing, and poorly characterized background state specific patterns.     CLINICAL CORRELATION    These findings were consistent with diffuse cerebral dysfunction, but are nonspecific in terms of etiology

## 2021-03-27 NOTE — PROGRESS NOTE ADULT - ASSESSMENT
ASSESSMENT  71 year old Male with past medical history of Parkinson's, dementia, CKD Stage 3, 1st degree AV block, HLD, gout, HTN, DM, fungal septicemia presenting from [facility] for acute decompensation in mental status.     IMPRESSION  #Fever 3/25 with sepsis, not present on admission with metabolic encephalopathy, EEG + seizure    cannot rule out meningitis     CXR no PNA   3/22 Blood & Urine cxs NGTD   #Sacral ulcer- necrotic     seen by Burn sacrum with full thickness ~4x5cm with dark /brown devitalized tissue at base; no purulent drainage, no bleeding  #Recent Fungemia    Blood Cx 2/27 Candida albicans    evaluated by optho - no ocular evidence     TTE no significant valvulopathy   #ABEBE  #L hand edema  < from: Xray Wrist 2 Views, Left (03.27.21 @ 11:13) >No acute fracture or dislocation. Diffuse soft tissue swelling. Nonaggressive appearing lucency in the distal radius, indeterminate. Nonemergent MRI may be obtained for further evaluation.    Creatinine, Serum: 2.0 (03-26-21 @ 05:33)      RECOMMENDATIONS  - Discuss LP with Neuro given fevers and + EEG: cell count, protein, glucose, CSF PCR, HSV PCR   - ADD empiric ACV 10mg/kg q12h IV  - continue cefepime 1g q12h IV  - flagyl 500mg q8h IV   - Burn following    If any questions, please call or send a message on [employer] Teams  Spectra [phone]

## 2021-03-28 LAB
-  COAGULASE NEGATIVE STAPHYLOCOCCUS: SIGNIFICANT CHANGE UP
ALBUMIN SERPL ELPH-MCNC: 2 G/DL — LOW (ref 3.5–5.2)
ALP SERPL-CCNC: 123 U/L — HIGH (ref 30–115)
ALT FLD-CCNC: 5 U/L — SIGNIFICANT CHANGE UP (ref 0–41)
ANION GAP SERPL CALC-SCNC: 12 MMOL/L — SIGNIFICANT CHANGE UP (ref 7–14)
ANION GAP SERPL CALC-SCNC: 16 MMOL/L — HIGH (ref 7–14)
AST SERPL-CCNC: 42 U/L — HIGH (ref 0–41)
BILIRUB SERPL-MCNC: 0.6 MG/DL — SIGNIFICANT CHANGE UP (ref 0.2–1.2)
BUN SERPL-MCNC: 67 MG/DL — CRITICAL HIGH (ref 10–20)
BUN SERPL-MCNC: 69 MG/DL — CRITICAL HIGH (ref 10–20)
CALCIUM SERPL-MCNC: 8.4 MG/DL — LOW (ref 8.5–10.1)
CALCIUM SERPL-MCNC: 8.5 MG/DL — SIGNIFICANT CHANGE UP (ref 8.5–10.1)
CHLORIDE SERPL-SCNC: 110 MMOL/L — SIGNIFICANT CHANGE UP (ref 98–110)
CHLORIDE SERPL-SCNC: 113 MMOL/L — HIGH (ref 98–110)
CK SERPL-CCNC: 222 U/L — SIGNIFICANT CHANGE UP (ref 0–225)
CO2 SERPL-SCNC: 17 MMOL/L — SIGNIFICANT CHANGE UP (ref 17–32)
CO2 SERPL-SCNC: 19 MMOL/L — SIGNIFICANT CHANGE UP (ref 17–32)
CREAT SERPL-MCNC: 2.4 MG/DL — HIGH (ref 0.7–1.5)
CREAT SERPL-MCNC: 2.4 MG/DL — HIGH (ref 0.7–1.5)
CULTURE RESULTS: SIGNIFICANT CHANGE UP
CULTURE RESULTS: SIGNIFICANT CHANGE UP
GLUCOSE BLDC GLUCOMTR-MCNC: 109 MG/DL — HIGH (ref 70–99)
GLUCOSE BLDC GLUCOMTR-MCNC: 110 MG/DL — HIGH (ref 70–99)
GLUCOSE BLDC GLUCOMTR-MCNC: 112 MG/DL — HIGH (ref 70–99)
GLUCOSE BLDC GLUCOMTR-MCNC: 132 MG/DL — HIGH (ref 70–99)
GLUCOSE SERPL-MCNC: 110 MG/DL — HIGH (ref 70–99)
GLUCOSE SERPL-MCNC: 122 MG/DL — HIGH (ref 70–99)
GRAM STN FLD: SIGNIFICANT CHANGE UP
HCT VFR BLD CALC: 26.1 % — LOW (ref 42–52)
HCT VFR BLD CALC: 30.1 % — LOW (ref 42–52)
HGB BLD-MCNC: 7.8 G/DL — LOW (ref 14–18)
HGB BLD-MCNC: 8.9 G/DL — LOW (ref 14–18)
MCHC RBC-ENTMCNC: 25.1 PG — LOW (ref 27–31)
MCHC RBC-ENTMCNC: 25.2 PG — LOW (ref 27–31)
MCHC RBC-ENTMCNC: 29.6 G/DL — LOW (ref 32–37)
MCHC RBC-ENTMCNC: 29.9 G/DL — LOW (ref 32–37)
MCV RBC AUTO: 84.2 FL — SIGNIFICANT CHANGE UP (ref 80–94)
MCV RBC AUTO: 85 FL — SIGNIFICANT CHANGE UP (ref 80–94)
METHOD TYPE: SIGNIFICANT CHANGE UP
NRBC # BLD: 0 /100 WBCS — SIGNIFICANT CHANGE UP (ref 0–0)
NRBC # BLD: 0 /100 WBCS — SIGNIFICANT CHANGE UP (ref 0–0)
PLATELET # BLD AUTO: 140 K/UL — SIGNIFICANT CHANGE UP (ref 130–400)
PLATELET # BLD AUTO: 144 K/UL — SIGNIFICANT CHANGE UP (ref 130–400)
POTASSIUM SERPL-MCNC: 3.5 MMOL/L — SIGNIFICANT CHANGE UP (ref 3.5–5)
POTASSIUM SERPL-MCNC: 3.9 MMOL/L — SIGNIFICANT CHANGE UP (ref 3.5–5)
POTASSIUM SERPL-SCNC: 3.5 MMOL/L — SIGNIFICANT CHANGE UP (ref 3.5–5)
POTASSIUM SERPL-SCNC: 3.9 MMOL/L — SIGNIFICANT CHANGE UP (ref 3.5–5)
PROT SERPL-MCNC: 6 G/DL — SIGNIFICANT CHANGE UP (ref 6–8)
RBC # BLD: 3.1 M/UL — LOW (ref 4.7–6.1)
RBC # BLD: 3.54 M/UL — LOW (ref 4.7–6.1)
RBC # FLD: 16.7 % — HIGH (ref 11.5–14.5)
RBC # FLD: 16.8 % — HIGH (ref 11.5–14.5)
SODIUM SERPL-SCNC: 143 MMOL/L — SIGNIFICANT CHANGE UP (ref 135–146)
SODIUM SERPL-SCNC: 144 MMOL/L — SIGNIFICANT CHANGE UP (ref 135–146)
SPECIMEN SOURCE: SIGNIFICANT CHANGE UP
SPECIMEN SOURCE: SIGNIFICANT CHANGE UP
VANCOMYCIN TROUGH SERPL-MCNC: <4 UG/ML — LOW (ref 5–10)
WBC # BLD: 17.76 K/UL — HIGH (ref 4.8–10.8)
WBC # BLD: 20.86 K/UL — HIGH (ref 4.8–10.8)
WBC # FLD AUTO: 17.76 K/UL — HIGH (ref 4.8–10.8)
WBC # FLD AUTO: 20.86 K/UL — HIGH (ref 4.8–10.8)

## 2021-03-28 PROCEDURE — 99291 CRITICAL CARE FIRST HOUR: CPT

## 2021-03-28 PROCEDURE — 99222 1ST HOSP IP/OBS MODERATE 55: CPT

## 2021-03-28 RX ORDER — VANCOMYCIN HCL 1 G
1250 VIAL (EA) INTRAVENOUS ONCE
Refills: 0 | Status: DISCONTINUED | OUTPATIENT
Start: 2021-03-28 | End: 2021-03-28

## 2021-03-28 RX ORDER — NOREPINEPHRINE BITARTRATE/D5W 8 MG/250ML
0.05 PLASTIC BAG, INJECTION (ML) INTRAVENOUS
Qty: 8 | Refills: 0 | Status: DISCONTINUED | OUTPATIENT
Start: 2021-03-28 | End: 2021-03-29

## 2021-03-28 RX ORDER — AMPICILLIN TRIHYDRATE 250 MG
2 CAPSULE ORAL EVERY 12 HOURS
Refills: 0 | Status: DISCONTINUED | OUTPATIENT
Start: 2021-03-28 | End: 2021-03-31

## 2021-03-28 RX ORDER — SODIUM CHLORIDE 9 MG/ML
1000 INJECTION INTRAMUSCULAR; INTRAVENOUS; SUBCUTANEOUS
Refills: 0 | Status: DISCONTINUED | OUTPATIENT
Start: 2021-03-28 | End: 2021-03-29

## 2021-03-28 RX ORDER — SODIUM CHLORIDE 9 MG/ML
1000 INJECTION INTRAMUSCULAR; INTRAVENOUS; SUBCUTANEOUS
Refills: 0 | Status: COMPLETED | OUTPATIENT
Start: 2021-03-28 | End: 2021-03-28

## 2021-03-28 RX ORDER — MIDODRINE HYDROCHLORIDE 2.5 MG/1
10 TABLET ORAL EVERY 8 HOURS
Refills: 0 | Status: DISCONTINUED | OUTPATIENT
Start: 2021-03-28 | End: 2021-03-31

## 2021-03-28 RX ORDER — AMPICILLIN TRIHYDRATE 250 MG
CAPSULE ORAL
Refills: 0 | Status: DISCONTINUED | OUTPATIENT
Start: 2021-03-28 | End: 2021-03-28

## 2021-03-28 RX ORDER — VANCOMYCIN HCL 1 G
1000 VIAL (EA) INTRAVENOUS ONCE
Refills: 0 | Status: COMPLETED | OUTPATIENT
Start: 2021-03-28 | End: 2021-03-28

## 2021-03-28 RX ADMIN — Medication 1 APPLICATION(S): at 05:38

## 2021-03-28 RX ADMIN — PANTOPRAZOLE SODIUM 40 MILLIGRAM(S): 20 TABLET, DELAYED RELEASE ORAL at 11:16

## 2021-03-28 RX ADMIN — APIXABAN 5 MILLIGRAM(S): 2.5 TABLET, FILM COATED ORAL at 05:33

## 2021-03-28 RX ADMIN — CHLORHEXIDINE GLUCONATE 1 APPLICATION(S): 213 SOLUTION TOPICAL at 05:36

## 2021-03-28 RX ADMIN — Medication 1 APPLICATION(S): at 21:40

## 2021-03-28 RX ADMIN — Medication 100 MILLIGRAM(S): at 13:42

## 2021-03-28 RX ADMIN — APIXABAN 5 MILLIGRAM(S): 2.5 TABLET, FILM COATED ORAL at 18:27

## 2021-03-28 RX ADMIN — Medication 650 MILLIGRAM(S): at 08:26

## 2021-03-28 RX ADMIN — LEVETIRACETAM 405 MILLIGRAM(S): 250 TABLET, FILM COATED ORAL at 18:08

## 2021-03-28 RX ADMIN — ATORVASTATIN CALCIUM 40 MILLIGRAM(S): 80 TABLET, FILM COATED ORAL at 21:41

## 2021-03-28 RX ADMIN — Medication 100 MILLIGRAM(S): at 21:41

## 2021-03-28 RX ADMIN — SODIUM CHLORIDE 500 MILLILITER(S): 9 INJECTION INTRAMUSCULAR; INTRAVENOUS; SUBCUTANEOUS at 02:30

## 2021-03-28 RX ADMIN — SODIUM CHLORIDE 1000 MILLILITER(S): 9 INJECTION INTRAMUSCULAR; INTRAVENOUS; SUBCUTANEOUS at 00:48

## 2021-03-28 RX ADMIN — CARBIDOPA AND LEVODOPA 2 TABLET(S): 25; 100 TABLET ORAL at 05:33

## 2021-03-28 RX ADMIN — Medication 1 APPLICATION(S): at 05:36

## 2021-03-28 RX ADMIN — CEFEPIME 100 MILLIGRAM(S): 1 INJECTION, POWDER, FOR SOLUTION INTRAMUSCULAR; INTRAVENOUS at 05:30

## 2021-03-28 RX ADMIN — LEVETIRACETAM 405 MILLIGRAM(S): 250 TABLET, FILM COATED ORAL at 05:30

## 2021-03-28 RX ADMIN — MIDODRINE HYDROCHLORIDE 10 MILLIGRAM(S): 2.5 TABLET ORAL at 21:40

## 2021-03-28 RX ADMIN — CARBIDOPA AND LEVODOPA 2 TABLET(S): 25; 100 TABLET ORAL at 13:40

## 2021-03-28 RX ADMIN — CARBIDOPA AND LEVODOPA 2 TABLET(S): 25; 100 TABLET ORAL at 21:41

## 2021-03-28 RX ADMIN — Medication 266 MILLIGRAM(S): at 21:40

## 2021-03-28 RX ADMIN — Medication 250 MILLIGRAM(S): at 05:31

## 2021-03-28 RX ADMIN — Medication 216 GRAM(S): at 17:36

## 2021-03-28 RX ADMIN — Medication 266 MILLIGRAM(S): at 05:31

## 2021-03-28 RX ADMIN — Medication 650 MILLIGRAM(S): at 21:39

## 2021-03-28 RX ADMIN — SENNA PLUS 2 TABLET(S): 8.6 TABLET ORAL at 21:40

## 2021-03-28 RX ADMIN — SODIUM CHLORIDE 1000 MILLILITER(S): 9 INJECTION INTRAMUSCULAR; INTRAVENOUS; SUBCUTANEOUS at 00:15

## 2021-03-28 RX ADMIN — SODIUM CHLORIDE 75 MILLILITER(S): 9 INJECTION, SOLUTION INTRAVENOUS at 08:28

## 2021-03-28 RX ADMIN — Medication 100 MILLIGRAM(S): at 05:30

## 2021-03-28 RX ADMIN — CARBIDOPA AND LEVODOPA 2 TABLET(S): 25; 100 TABLET ORAL at 10:34

## 2021-03-28 RX ADMIN — CEFEPIME 100 MILLIGRAM(S): 1 INJECTION, POWDER, FOR SOLUTION INTRAMUSCULAR; INTRAVENOUS at 18:27

## 2021-03-28 RX ADMIN — Medication 1 APPLICATION(S): at 21:41

## 2021-03-28 RX ADMIN — MIDODRINE HYDROCHLORIDE 10 MILLIGRAM(S): 2.5 TABLET ORAL at 13:43

## 2021-03-28 NOTE — PROVIDER CONTACT NOTE (OTHER) - SITUATION
Patient nonverbal & minimally responsive at baseline. BP 73/41, HR 75, 
phlebotomist was unable to draw AM labs
pt was febrile this shift with a temp temp of 102, Tylenol given as ordered
can you please renew pt's [facility] order

## 2021-03-28 NOTE — PROGRESS NOTE ADULT - ASSESSMENT
ASSESSMENT  71 year old Male with past medical history of Parkinson's, dementia, CKD Stage 3, 1st degree AV block, HLD, gout, HTN, DM, fungal septicemia presenting from [facility] for acute decompensation in mental status.     IMPRESSION  #Fever 3/25 with sepsis, not present on admission with metabolic encephalopathy, EEG + seizure    cannot rule out meningitis     CXR no PNA   3/22 Blood & Urine cxs NGTD   #Sacral ulcer- necrotic     seen by Burn sacrum with full thickness ~4x5cm with dark /brown devitalized tissue at base; no purulent drainage, no bleeding  #Recent Fungemia    Blood Cx 2/27 Candida albicans    evaluated by optho - no ocular evidence     TTE no significant valvulopathy   #ABEBE  #L hand edema  < from: Xray Wrist 2 Views, Left (03.27.21 @ 11:13) >No acute fracture or dislocation. Diffuse soft tissue swelling. Nonaggressive appearing lucency in the distal radius, indeterminate. Nonemergent MRI may be obtained for further evaluation.    Creatinine, Serum: 2.0 (03-26-21 @ 05:33)      RECOMMENDATIONS  - LP given fevers and + EEG: cell count, protein, glucose, CSF PCR, HSV PCR   - empiric ACV 10mg/kg q12h IV  - s/p vancomycin 1g x1 3/27 check AM level 3/28  - continue cefepime 1g q12h IV  - flagyl 500mg q8h IV given decub  - Ampicillin 2g q6h IV  - Burn following    If any questions, please call or send a message on [employer] Teams  Spectra [phone]

## 2021-03-28 NOTE — CONSULT NOTE ADULT - ASSESSMENT
IMPRESSION:  AMS, likely metabolic/ toxic encephalopathy, possible meningitis  Left shoulder dislocation s/p reduction  ABEBE on CKD 3, likely pre-renal  Sacral ulcers, stage 4  HO Parkinson dementia (unclear baseline mental status, from NH)    PLAN:    CNS: avoid CNS depressants. FU neuro. Call family to find out baseline mental status and get GOC.     HEENT: Oral care    PULMONARY:  HOB @ 45 degrees.  Aspiration precautions. Target SpO2>94%, wean oxygen as tolerated. CXR.     CARDIOVASCULAR: Goal directed fluid resuscitation. 500 cc LR bolus. Wean levo as tolerated. Target MAP>60. Midodrine 10mg q8h.     GI: GI prophylaxis.  Feeding as tolerated via NG tube for now.  Bowel regimen.     RENAL:  Follow up lytes.  Correct as needed. Trend Cr. Avoid nephrotoxins. Check urine lytes, urine Na, urine Cr, and urine urea nitrogen.    INFECTIOUS DISEASE: FU cultures. Abx as per ID. FU IR, LP on monday.     HEMATOLOGICAL:  DVT prophylaxis.     ENDOCRINE:  Follow up FS.  Insulin protocol if needed.    MUSCULOSKELETAL: bedrest. FU burn for sacral ulcers.     Possible downgrade in PM IMPRESSION:    AMS, likely metabolic/ toxic encephalopathy, possible meningitis??  sepsis on ABX  Left shoulder dislocation s/p reduction  ABEBE on CKD 3, likely pre-renal  Sacral ulcers, stage 4  HO Parkinson dementia (unclear baseline mental status, from NH)    PLAN:    CNS: avoid CNS depressants. FU neuro. Call family to find out baseline mental status and get GOC.    HEENT: Oral care    PULMONARY:  HOB @ 45 degrees.  Aspiration precautions. Target SpO2>94%, wean oxygen as tolerated. CXR.     CARDIOVASCULAR: Goal directed fluid resuscitation. 500 cc LR bolus. Wean levo as tolerated. Target MAP>60. Midodrine 10mg q8h.     GI: GI prophylaxis.  Feeding as tolerated via NG tube for now.  Bowel regimen.     RENAL:  Follow up lytes.  Correct as needed. Trend Cr. Avoid nephrotoxins. Check urine lytes, urine Na, urine Cr, and urine urea nitrogen.    INFECTIOUS DISEASE: FU cultures. Abx as per ID. FU IR, LP on monday?? per floor team    HEMATOLOGICAL:  DVT prophylaxis. LE doppler    ENDOCRINE:  Follow up FS.  Insulin protocol if needed.    MUSCULOSKELETAL: bedrest. FU burn for sacral ulcers.     very poor prognosis  floor   if intubated, vent unit  palliative care eval

## 2021-03-28 NOTE — CHART NOTE - NSCHARTNOTEFT_GEN_A_CORE
71 year old Male with past medical history of Parkinson's, dementia, CKD Stage 3, 1st degree AV block, HLD, gout, HTN, DM, fungal septicemia presenting from [facility] for acute decompensation in mental status. Patient appears to be dehydrated. No clear cause of change in mental status.    Patient became hypotensive at 10:30PM on 3/27 --> s/p 2.5L NSS, BP remains 79/39mmHg, overnight intesivist was called and patient was started on levophed.  Transfer to [facility] for continuity of care    # Altered Mental Status possible Metabolic vs Dehydration  - 3 days of decreasing alertness and no longer speaking at the nursing home   - CT Head negative for any acute strokes  - Blood Cx: negative, Urine Cx: negative   - EEG: + epileptiform activity  - c/w D5 @ 75   - VEEG in progress, c/w keppra 125 BID   - per S&S patient is too lethargic and unable to participate in exam, keep NPO for now, NG tube in place  - ID: c/w cefepime, flagyl, & acyclovir  - IR: consult for LP (LP recommended by Neuro & ID), due to new onset seizures, fevers, with no known source, and worsening mental status **please call IR on Monday to schedule LP**  - Neuro: Recheck Covid (ordered), c/w Keppra & sinemet, check CPK (ordered), follow up VEEG  - Burn: possible OR for debridement, follow up recs       # Left hand pain & swelling   - tender to touch, swollen,  - VA duplex pending:   - XR R Hand: No acute fracture or dislocation. Lucency in the distal radial metaphysis, nonspecific. Dedicated wrist radiographs recommended for further evaluation.  - Xray Wrist 2 Views, Left (03.27.21 @ 11:13) No acute fracture or dislocation. Diffuse soft tissue swelling. Nonaggressive appearing lucency in the distal radius, indeterminate. Nonemergent MRI may be obtained for further evaluation.  - per radiology, non aggressive lesion seen in  L wrist, patient would benefit from MRI wrist as outpatient     # Sacral pressure ulcer- stage 4  - Burn: possible OR for intervention, f/u burn recs     # H/O Parkinson disease  - Neuro consult complete: c/w sinemet & keppra   - EEG w/ epileptiform activity, started on keppra per neuro   - CT head negative     # Left shoulder dislocation   - seen on CT in Feb 2021  - Ortho reconsulted- pending L shoulder XR   - Xray Shoulder 2 Views, Left (03.23.21 @ 21:48) Reidentified anterior dislocation of the humeral head overlying the left second rib with bony fragmentation along the glenoid and humeral head. AC joint degenerative change.  - Per Ortho: likely no surgical intervention but pending finalized note    # H/O First Degree AV Block  - No indication for PPM    # ABEBE likely Pre renal  #Hypernatremia  - Baseline Cr 1.2-1.3 --> 2.0 today   - c/w D5 @ 75   - Sodium, Serum: 151 mmol/L (03.25.21 @ 05:47)  - c/w D5     # Chronic DVT  - Continue Eliquis    # HTN - well controlled   - Hold Enalapril due to intermittent hypotension

## 2021-03-28 NOTE — CONSULT NOTE ADULT - SUBJECTIVE AND OBJECTIVE BOX
Patient is a 71y old  Male who presents with a chief complaint of Change in Mental Status (27 Mar 2021 14:23)      HPI:  71 year old Male with past medical history of Parkinson's, dementia, CKD Stage 3, 1st degree AV block, HLD, gout, HTN, DM, fungal septicemia presenting from [facility] for acute decompensation in mental status.     As per documentation patient at baseline is verbal but for past 3 days, he has been worsening to state of being non verbal. Patient was admitted to [facility] for fungemia and discharged on the 11th March. At  he had left hand cellulitis and was treated for it.    In ED patient hemodynamically stable, afebrile, ABEBE with rise in Cr to 1.9 and BUN to 75, remains non verbal. CT head negative for any new strokes and UA negative. Family has not visited the patient recently so unaware of his status. (22 Mar 2021 23:59)      PAST MEDICAL & SURGICAL HISTORY:  Cataract    Prostatitis    Dyslipidemia    Gout    Hypertension    Parkinson disease    No significant past surgical history        SOCIAL HX:   Smoking           UTO              ETOH        UTO                    Other    FAMILY HISTORY:  Family history of cerebrovascular accident (CVA) (Father)    :  No known cardiovacular family hisotry     Review Of Systems:     All ROS are negative except per HPI       Allergies    No Known Allergies    Intolerances      Drips  Levo-0.03      PHYSICAL EXAM    ICU Vital Signs Last 24 Hrs  T(C): 37.3 (28 Mar 2021 03:00), Max: 38.9 (27 Mar 2021 16:16)  T(F): 99.2 (28 Mar 2021 03:00), Max: 102 (27 Mar 2021 16:16)  HR: 74 (28 Mar 2021 06:00) (68 - 75)  BP: 128/60 (28 Mar 2021 06:00) (73/41 - 128/60)  BP(mean): 86 (28 Mar 2021 06:00) (67 - 89)  RR: 18 (28 Mar 2021 01:10) (18 - 18)  SpO2: 98% (28 Mar 2021 06:00) (96% - 100%)      CONSTITUTIONAL:  Elderly, frail appearing male.  NAD    ENT:   Airway patent,   Mouth with normal mucosa.   No thrush    EYES:   pupils equal,   round and reactive to light.    CARDIAC:   Normal rate,   Regular rhythm.    Heart sounds S1, S2.   No edema    Vascular:   normal systolic impulse  no bruits    RESPIRATORY:   No wheezing   Normal chest expansion  No use of accessory muscles    GASTROINTESTINAL:  NG  Abdomen soft   Non-tender,   No guarding,   + BS    MUSCULOSKELETAL:   LUE swelling and warmth  Range of motion is not limited,  No clubbing, cyanosis    NEUROLOGICAL:   AOx0  Doesn't follow commands  Just moans and groans  No motor or sensory deficits.  Pertinent DTRs normal    SKIN:   Skin normal color for race,   Warm and dry  No evidence of rash.    PSYCHIATRIC:   Normal mood and affect.   No apparent risk to self or others.            03-27-21 @ 07:01  -  03-28-21 @ 07:00  --------------------------------------------------------  IN:    dextrose 5%: 1200 mL    IV PiggyBack: 1200 mL    sodium chloride 0.9%: 2000 mL  Total IN: 4400 mL    OUT:    Indwelling Catheter - Urethral (mL): 450 mL  Total OUT: 450 mL    Total NET: 3950 mL          LABS:                          8.9    20.86 )-----------( 144      ( 28 Mar 2021 05:24 )             30.1                                               03-28    144  |  113<H>  |  69<HH>  ----------------------------<  122<H>  3.5   |  19  |  2.4<H>    Ca    8.4<L>      28 Mar 2021 01:24    TPro  6.0  /  Alb  2.0<L>  /  TBili  0.6  /  DBili  x   /  AST  42<H>  /  ALT  5   /  AlkPhos  123<H>  03-28                                                                                           LIVER FUNCTIONS - ( 28 Mar 2021 01:24 )  Alb: 2.0 g/dL / Pro: 6.0 g/dL / ALK PHOS: 123 U/L / ALT: 5 U/L / AST: 42 U/L / GGT: x                                                  Culture - Blood (collected 25 Mar 2021 11:09)  Source: .Blood None  Preliminary Report (26 Mar 2021 23:01):    No growth to date.                                                                                        X-Rays reviewed:                                                                                    ECHO    CXR interpreted by me:  not done    MEDICATIONS  (STANDING):  acyclovir IVPB 800 milliGRAM(s) IV Intermittent every 12 hours  ampicillin  IVPB      apixaban 5 milliGRAM(s) Oral every 12 hours  atorvastatin 40 milliGRAM(s) Oral at bedtime  carbidopa/levodopa  25/100 2 Tablet(s) Oral <User Schedule>  cefepime   IVPB 1000 milliGRAM(s) IV Intermittent every 12 hours  cefepime   IVPB      chlorhexidine 4% Liquid 1 Application(s) Topical <User Schedule>  collagenase Ointment 1 Application(s) Topical two times a day  Dakins Solution - 1/2 Strength 1 Application(s) Topical two times a day  dextrose 5%. 1000 milliLiter(s) (75 mL/Hr) IV Continuous <Continuous>  levETIRAcetam  IVPB 125 milliGRAM(s) IV Intermittent every 12 hours  metroNIDAZOLE  IVPB 500 milliGRAM(s) IV Intermittent every 8 hours  norepinephrine Infusion 0.05 MICROgram(s)/kG/Min (7.44 mL/Hr) IV Continuous <Continuous>  pantoprazole  Injectable 40 milliGRAM(s) IV Push daily  senna 2 Tablet(s) Oral at bedtime  sodium chloride 0.9%. 1000 milliLiter(s) (1000 mL/Hr) IV Continuous <Continuous>  sodium chloride 0.9%. 1000 milliLiter(s) (1000 mL/Hr) IV Continuous <Continuous>    MEDICATIONS  (PRN):  acetaminophen   Tablet .. 650 milliGRAM(s) Oral every 6 hours PRN Temp greater or equal to 38C (100.4F)         Patient is a 71y old  Male who presents with a chief complaint of Change in Mental Status (27 Mar 2021 14:23)      HPI:  71 year old Male with past medical history of Parkinson's, dementia, CKD Stage 3, 1st degree AV block, HLD, gout, HTN, DM, fungal septicemia presenting from [facility] for acute decompensation in mental status.     As per documentation patient at baseline is verbal but for past 3 days, he has been worsening to state of being non verbal. Patient was admitted to [facility] for fungemia and discharged on the 11th March. At NH he had left hand cellulitis and was treated for it.    In ED patient hemodynamically stable, afebrile, ABEBE with rise in Cr to 1.9 and BUN to 75, remains non verbal. CT head negative for any new strokes and UA negative. Admitted to floor, hypotensive, started on levophed, transferred to VENT unit, this ams off pressors, still spiking T      PAST MEDICAL & SURGICAL HISTORY:  Cataract    Prostatitis    Dyslipidemia    Gout    Hypertension    Parkinson disease    No significant past surgical history        SOCIAL HX:   Smoking           UTO              ETOH        UTO                    Other    FAMILY HISTORY:  Family history of cerebrovascular accident (CVA) (Father)    :  No known cardiovacular family hisotry     Review Of Systems:     All ROS are negative except per HPI       Allergies    No Known Allergies    Intolerances      Drips  Levo-0.03      PHYSICAL EXAM    ICU Vital Signs Last 24 Hrs  T(C): 37.3 (28 Mar 2021 03:00), Max: 38.9 (27 Mar 2021 16:16)  T(F): 99.2 (28 Mar 2021 03:00), Max: 102 (27 Mar 2021 16:16)  HR: 74 (28 Mar 2021 06:00) (68 - 75)  BP: 128/60 (28 Mar 2021 06:00) (73/41 - 128/60)  BP(mean): 86 (28 Mar 2021 06:00) (67 - 89)  RR: 18 (28 Mar 2021 01:10) (18 - 18)  SpO2: 98% (28 Mar 2021 06:00) (96% - 100%)      CONSTITUTIONAL:  Elderly, frail appearing male.    ENT:   Airway patent,   Mouth with normal mucosa.   No thrush    EYES:   pupils equal,   round and reactive to light.    CARDIAC:   Normal rate,   Regular rhythm.    Heart sounds S1, S2.   No edema    RESPIRATORY:   No wheezing   Normal chest expansion  No use of accessory muscles    GASTROINTESTINAL:  NG  Abdomen soft   Non-tender,   No guarding,   + BS    MUSCULOSKELETAL:   LUE swelling and warmth  Range of motion is not limited,  No clubbing, cyanosis    NEUROLOGICAL:   AOx0  Doesn't follow commands  Just moans and groans                03-27-21 @ 07:01  -  03-28-21 @ 07:00  --------------------------------------------------------  IN:    dextrose 5%: 1200 mL    IV PiggyBack: 1200 mL    sodium chloride 0.9%: 2000 mL  Total IN: 4400 mL    OUT:    Indwelling Catheter - Urethral (mL): 450 mL  Total OUT: 450 mL    Total NET: 3950 mL          LABS:                          8.9    20.86 )-----------( 144      ( 28 Mar 2021 05:24 )             30.1                                               03-28    144  |  113<H>  |  69<HH>  ----------------------------<  122<H>  3.5   |  19  |  2.4<H>    Ca    8.4<L>      28 Mar 2021 01:24    TPro  6.0  /  Alb  2.0<L>  /  TBili  0.6  /  DBili  x   /  AST  42<H>  /  ALT  5   /  AlkPhos  123<H>  03-28                                                                                           LIVER FUNCTIONS - ( 28 Mar 2021 01:24 )  Alb: 2.0 g/dL / Pro: 6.0 g/dL / ALK PHOS: 123 U/L / ALT: 5 U/L / AST: 42 U/L / GGT: x                                                  Culture - Blood (collected 25 Mar 2021 11:09)  Source: .Blood None  Preliminary Report (26 Mar 2021 23:01):    No growth to date.                                                                                      CXR P    MEDICATIONS  (STANDING):  acyclovir IVPB 800 milliGRAM(s) IV Intermittent every 12 hours  ampicillin  IVPB      apixaban 5 milliGRAM(s) Oral every 12 hours  atorvastatin 40 milliGRAM(s) Oral at bedtime  carbidopa/levodopa  25/100 2 Tablet(s) Oral <User Schedule>  cefepime   IVPB 1000 milliGRAM(s) IV Intermittent every 12 hours  cefepime   IVPB      chlorhexidine 4% Liquid 1 Application(s) Topical <User Schedule>  collagenase Ointment 1 Application(s) Topical two times a day  Dakins Solution - 1/2 Strength 1 Application(s) Topical two times a day  dextrose 5%. 1000 milliLiter(s) (75 mL/Hr) IV Continuous <Continuous>  levETIRAcetam  IVPB 125 milliGRAM(s) IV Intermittent every 12 hours  metroNIDAZOLE  IVPB 500 milliGRAM(s) IV Intermittent every 8 hours  norepinephrine Infusion 0.05 MICROgram(s)/kG/Min (7.44 mL/Hr) IV Continuous <Continuous>  pantoprazole  Injectable 40 milliGRAM(s) IV Push daily  senna 2 Tablet(s) Oral at bedtime  sodium chloride 0.9%. 1000 milliLiter(s) (1000 mL/Hr) IV Continuous <Continuous>  sodium chloride 0.9%. 1000 milliLiter(s) (1000 mL/Hr) IV Continuous <Continuous>    MEDICATIONS  (PRN):  acetaminophen   Tablet .. 650 milliGRAM(s) Oral every 6 hours PRN Temp greater or equal to 38C (100.4F)

## 2021-03-28 NOTE — PROGRESS NOTE ADULT - SUBJECTIVE AND OBJECTIVE BOX
NIEVES LABOY  71y, Male  Allergy: No Known Allergies      LOS  6d    CHIEF COMPLAINT: Change in Mental Status (28 Mar 2021 07:51)      INTERVAL EVENTS/HPI  - febrile, poor MS, rising WBC  - T(F): , Max: 102 (03-27-21 @ 16:16)  - Tolerating medication  - WBC Count: 20.86 (03-28-21 @ 05:24)  WBC Count: 17.76 (03-28-21 @ 01:24)     - Creatinine, Serum: 2.4 (03-28-21 @ 05:24)  Creatinine, Serum: 2.4 (03-28-21 @ 01:24)       ROS  unable to obtain history secondary to patient's mental status and/or sedation     VITALS:  T(F): 99.2, Max: 102 (03-27-21 @ 16:16)  HR: 80  BP: 89/53  RR: 18Vital Signs Last 24 Hrs  T(C): 37.3 (28 Mar 2021 03:00), Max: 38.9 (27 Mar 2021 16:16)  T(F): 99.2 (28 Mar 2021 03:00), Max: 102 (27 Mar 2021 16:16)  HR: 80 (28 Mar 2021 08:00) (68 - 80)  BP: 89/53 (28 Mar 2021 08:00) (73/41 - 128/60)  BP(mean): 65 (28 Mar 2021 08:00) (65 - 89)  RR: 18 (28 Mar 2021 08:00) (18 - 18)  SpO2: 99% (28 Mar 2021 08:00) (96% - 100%)    PHYSICAL EXAM:  Gen: contracted  HEENT: Normocephalic, atraumatic  Neck: supple, no lymphadenopathy  CV: Regular rate & regular rhythm  Lungs: decreased BS at bases, no fremitus  Abdomen: Soft, BS present  Ext: Warm, well perfused L hand edema  Neuro: non verbal  Skin: no rash, no erythema  Lines: no phlebitis     FH: Non-contributory  Social Hx: Non-contributory    TESTS & MEASUREMENTS:                        8.9    20.86 )-----------( 144      ( 28 Mar 2021 05:24 )             30.1     03-28    143  |  110  |  67<HH>  ----------------------------<  110<H>  3.9   |  17  |  2.4<H>    Ca    8.5      28 Mar 2021 05:24    TPro  6.0  /  Alb  2.0<L>  /  TBili  0.6  /  DBili  x   /  AST  42<H>  /  ALT  5   /  AlkPhos  123<H>  03-28    eGFR if Non African American: 26 mL/min/1.73M2 (03-28-21 @ 05:24)  eGFR if African American: 30 mL/min/1.73M2 (03-28-21 @ 05:24)  eGFR if Non African American: 26 mL/min/1.73M2 (03-28-21 @ 01:24)  eGFR if African American: 30 mL/min/1.73M2 (03-28-21 @ 01:24)    LIVER FUNCTIONS - ( 28 Mar 2021 01:24 )  Alb: 2.0 g/dL / Pro: 6.0 g/dL / ALK PHOS: 123 U/L / ALT: 5 U/L / AST: 42 U/L / GGT: x               Culture - Blood (collected 03-25-21 @ 11:09)  Source: .Blood None  Preliminary Report (03-26-21 @ 23:01):    No growth to date.    Culture - Blood (collected 03-25-21 @ 05:47)  Source: .Blood None  Preliminary Report (03-26-21 @ 14:01):    No growth to date.    Culture - Urine (collected 03-22-21 @ 20:41)  Source: .Urine Clean Catch (Midstream)  Final Report (03-23-21 @ 20:53):    No growth    Culture - Blood (collected 03-22-21 @ 15:31)  Source: .Blood Blood-Peripheral  Final Report (03-28-21 @ 02:33):    No Growth Final    Culture - Blood (collected 03-22-21 @ 15:28)  Source: .Blood Blood-Peripheral  Final Report (03-28-21 @ 02:33):    No Growth Final    Culture - Urine (collected 03-04-21 @ 12:00)  Source: .Urine Clean Catch (Midstream)  Final Report (03-06-21 @ 07:24):    No growth    Culture - Blood (collected 03-04-21 @ 11:12)  Source: .Blood None  Final Report (03-09-21 @ 23:01):    No Growth Final    Culture - Blood (collected 03-03-21 @ 16:27)  Source: .Blood None  Final Report (03-08-21 @ 23:01):    No Growth Final    Culture - Blood (collected 02-27-21 @ 14:38)  Source: .Blood None  Gram Stain (03-01-21 @ 15:00):    Growth in aerobic bottle: Yeast like cells  Final Report (03-06-21 @ 14:27):    Growth in aerobic bottle: Candida albicans    See previous culture [id number]    Culture - Blood (collected 02-27-21 @ 14:38)  Source: .Blood None  Gram Stain (03-01-21 @ 12:31):    Growth in aerobic bottle: Yeast like cells  Final Report (03-03-21 @ 13:57):    Growth in aerobic bottle: Candida albicans    ***Blood Panel PCR results on this specimen are available    approximately 3 hours after the Gram stain result.***    Gram stain, PCR, and/or culture results may not always    correspond due to difference in methodologies.    ************************************************************    This PCR assay was performed by multiplex PCR. This    Assay tests for 66 bacterial and resistance gene targets.    Please refer to the [facility] (formerly [employer]) test directory    at [email or web address] for details.    "Due to technical problems, Pseudomonas aeruginosa    until further notice".  Organism: Blood Culture PCR  Candida albicans (03-03-21 @ 13:57)  Organism: Candida albicans (03-03-21 @ 13:57)      -  Fluconazole: S 0.5 Fluconazole = Data established for mucosal disease, and is generally applicable for invasive disease.  Susceptibility is dependent on achieving the maximal possible blood level.  Doses of 400 MG/day or more may be required in adults with normalrenalfunction and body habitus.      -  Interpretation: See note This test method is not approved by the FDA and is for research use only.  The performance characteristics of this assay may have been determined by "[employer] and [facility], [place], [place]                            N/I - No interpretation available                                                         SDD – Sensitive Dose Dependent      Method Type: YSTMIC  Organism: Blood Culture PCR (03-03-21 @ 13:57)      -  Candida albicans: Detec      Method Type: PCR        Lactate, Blood: 1.0 mmol/L (03-25-21 @ 11:09)      INFECTIOUS DISEASES TESTING  Procalcitonin, Serum: 0.61 (03-26-21 @ 10:20)  COVID-19 PCR: NotDetec (03-22-21 @ 20:13)  COVID-19 PCR: NotDetec (03-10-21 @ 12:22)  COVID-19 PCR: NotDetec (03-01-21 @ 16:49)  Fungitell: 62 (03-01-21 @ 11:00)  Procalcitonin, Serum: 0.29 (03-01-21 @ 05:32)  MRSA PCR Result.: Negative (02-27-21 @ 15:44)  COVID-19 PCR: NotDetec (02-25-21 @ 15:34)  Procalcitonin, Serum: 0.27 (02-18-21 @ 10:54)  MRSA PCR Result.: Negative (02-14-21 @ 18:29)  HIV-1/2 Combo Result: Nonreact (02-14-21 @ 05:07)  Procalcitonin, Serum: 0.46 (02-13-21 @ 16:00)  COVID-19 PCR: NotDetec (02-12-21 @ 10:17)      INFLAMMATORY MARKERS      RADIOLOGY & ADDITIONAL TESTS:  I have personally reviewed the last available Chest xray  CXR  Xray Chest 1 View- PORTABLE-Urgent:   EXAM:  XR CHEST PORTABLE URGENT 1V            PROCEDURE DATE:  03/26/2021            INTERPRETATION:  Clinical History / Reason for exam: Pneumonia    Comparison : Chest radiograph March 24, 2021.    Technique/Positioning: Single AP view of the chest.    Findings:    Support devices: Feeding tube in stomach    Cardiac/mediastinum/hilum: Unremarkable.    Lung parenchyma/Pleura: No consolidation, effusion or pneumothorax.    Skeleton/soft tissues: Unchanged.    Impression:    No consolidation, effusion or pneumothorax.                  ELLIOT LANDAU MD; Attending Radiologist  This document has been electronically signed. Mar 26 2021  9:55AM (03-26-21 @ 07:10)      CT      CARDIOLOGY TESTING  12 Lead ECG:   Ventricular Rate 64 BPM    Atrial Rate 64 BPM    P-R Interval 234 ms    QRS Duration 88 ms    Q-T Interval 404 ms    QTC Calculation(Bazett) 416 ms    P Axis 68 degrees    R Axis -17 degrees    T Axis 9 degrees    Diagnosis Line Sinus rhythm with 1st degree A-V block  Voltage criteria for left ventricular hypertrophy  Nonspecific T wave abnormality  Abnormal ECG    Confirmed by ANA GARDUNO MD ([id number]) on 3/22/2021 9:54:41 PM (03-22-21 @ 16:57)      MEDICATIONS  acyclovir IVPB 800 IV Intermittent every 12 hours  ampicillin  IVPB 2 IV Intermittent every 12 hours  apixaban 5 Oral every 12 hours  atorvastatin 40 Oral at bedtime  carbidopa/levodopa  25/100 2 Oral <User Schedule>  cefepime   IVPB 1000 IV Intermittent every 12 hours  cefepime   IVPB     chlorhexidine 4% Liquid 1 Topical <User Schedule>  collagenase Ointment 1 Topical two times a day  Dakins Solution - 1/2 Strength 1 Topical two times a day  dextrose 5%. 1000 IV Continuous <Continuous>  levETIRAcetam  IVPB 125 IV Intermittent every 12 hours  metroNIDAZOLE  IVPB 500 IV Intermittent every 8 hours  norepinephrine Infusion 0.05 IV Continuous <Continuous>  pantoprazole  Injectable 40 IV Push daily  senna 2 Oral at bedtime  sodium chloride 0.9%. 1000 IV Continuous <Continuous>  sodium chloride 0.9%. 1000 IV Continuous <Continuous>      WEIGHT  Weight (kg): 79.4 (03-22-21 @ 16:10)  Creatinine, Serum: 2.4 mg/dL (03-28-21 @ 05:24)  Creatinine, Serum: 2.4 mg/dL (03-28-21 @ 01:24)      ANTIBIOTICS:  acyclovir IVPB 800 milliGRAM(s) IV Intermittent every 12 hours  ampicillin  IVPB 2 Gram(s) IV Intermittent every 12 hours  cefepime   IVPB 1000 milliGRAM(s) IV Intermittent every 12 hours  cefepime   IVPB      metroNIDAZOLE  IVPB 500 milliGRAM(s) IV Intermittent every 8 hours      All available historical records have been reviewed

## 2021-03-28 NOTE — PROVIDER CONTACT NOTE (OTHER) - ACTION/TREATMENT ORDERED:
provider aware, no further intervention
provider to renew hancock
Provider aware, no further intervention
[facility] instructed this RN to infuse IVF over 30min & Delay Rapid Response activation until after completion of IVF bolus & reassessment of BP bc "Rapid response will do exactly what we are doing."

## 2021-03-28 NOTE — PROGRESS NOTE ADULT - ATTENDING COMMENTS
Patient seen and examined with resident and agree with above except as noted.  Patient is still obtunded and not following commands.  Tone increased throughout.  Appears septic    Plan as above

## 2021-03-28 NOTE — CHART NOTE - NSCHARTNOTEFT_GEN_A_CORE
71 year old Male with past medical history of Parkinson's, dementia, CKD Stage 3, 1st degree AV block, HLD, gout, HTN, DM, fungal septicemia presenting from [facility] for acute decompensation in mental status. Patient appears to be dehydrated. No clear cause of change in mental status. Patient became hypotensive at 10:30PM on 3/27 --> s/p 2.5L NSS, BP remains 79/39mmHg, overnight intesivist was called and patient was started on levophed. Patient was monitered in the vent unit. Levophed was weaned down then discontinued. Blood pressure now controlled, with MAP 65 off pressors. Started on midodrine 10mg q8. Patient is stable for downgrade to medical floor.       # Altered Mental Status possible Metabolic vs Dehydration  - 3 days of decreasing alertness and no longer speaking at the nursing home   - CT Head negative for any acute strokes  - Blood Cx: negative, Urine Cx: negative   - EEG: + epileptiform activity  - c/w D5 @ 75   - VEEG in progress, c/w keppra 125 BID   - per S&S patient is too lethargic and unable to participate in exam, keep NPO for now, NG tube in place  - ID: c/w cefepime, flagyl, & acyclovir  - Vanc trough was not drawn, f/u repeat   - c/w midodrine   - IR: consult for LP (LP recommended by Neuro & ID), due to new onset seizures, fevers, with no known source, and worsening mental status **please call IR on Monday to schedule LP**  - Neuro: Recheck Covid (order but never done, repeated order ), c/w Keppra & sinemet, check CPK (ordered), follow up VEEG  - Burn: possible OR for debridement, follow up recs   - downgrade to medical floor         # Left hand pain & swelling   - tender to touch, swollen,  - VA duplex pending:   - XR R Hand: No acute fracture or dislocation. Lucency in the distal radial metaphysis, nonspecific. Dedicated wrist radiographs recommended for further evaluation.  - Xray Wrist 2 Views, Left (03.27.21 @ 11:13) No acute fracture or dislocation. Diffuse soft tissue swelling. Nonaggressive appearing lucency in the distal radius, indeterminate. Nonemergent MRI may be obtained for further evaluation.  - per radiology, non aggressive lesion seen in  L wrist, patient would benefit from MRI wrist as outpatient     # Sacral pressure ulcer- stage 4  - Burn: possible OR for intervention, f/u burn recs     # H/O Parkinson disease  - Neuro consult complete: c/w sinemet & keppra   - EEG w/ epileptiform activity, started on keppra per neuro   - CT head negative     # Left shoulder dislocation   - seen on CT in Feb 2021  - Ortho reconsulted- pending L shoulder XR   - Xray Shoulder 2 Views, Left (03.23.21 @ 21:48) Reidentified anterior dislocation of the humeral head overlying the left second rib with bony fragmentation along the glenoid and humeral head. AC joint degenerative change.  - Per Ortho: likely no surgical intervention but pending finalized note    # H/O First Degree AV Block  - No indication for PPM    # ABEBE likely Pre renal  #Hypernatremia  - Baseline Cr 1.2-1.3 --> 2.0 today   - c/w D5 @ 75   - Sodium, Serum: 151 mmol/L (03.25.21 @ 05:47)  - c/w D5     # Chronic DVT  - Continue Eliquis    # HTN - well controlled   - Hold Enalapril due to intermittent hypotension.

## 2021-03-29 ENCOUNTER — RESULT REVIEW (OUTPATIENT)
Age: 71
End: 2021-03-29

## 2021-03-29 LAB
ANION GAP SERPL CALC-SCNC: 12 MMOL/L — SIGNIFICANT CHANGE UP (ref 7–14)
BUN SERPL-MCNC: 56 MG/DL — HIGH (ref 10–20)
CALCIUM SERPL-MCNC: 7.6 MG/DL — LOW (ref 8.5–10.1)
CHLORIDE SERPL-SCNC: 108 MMOL/L — SIGNIFICANT CHANGE UP (ref 98–110)
CO2 SERPL-SCNC: 19 MMOL/L — SIGNIFICANT CHANGE UP (ref 17–32)
CREAT SERPL-MCNC: 1.8 MG/DL — HIGH (ref 0.7–1.5)
CULTURE RESULTS: SIGNIFICANT CHANGE UP
GLUCOSE BLDC GLUCOMTR-MCNC: 123 MG/DL — HIGH (ref 70–99)
GLUCOSE BLDC GLUCOMTR-MCNC: 125 MG/DL — HIGH (ref 70–99)
GLUCOSE BLDC GLUCOMTR-MCNC: 132 MG/DL — HIGH (ref 70–99)
GLUCOSE BLDC GLUCOMTR-MCNC: 144 MG/DL — HIGH (ref 70–99)
GLUCOSE BLDC GLUCOMTR-MCNC: 96 MG/DL — SIGNIFICANT CHANGE UP (ref 70–99)
GLUCOSE SERPL-MCNC: 118 MG/DL — HIGH (ref 70–99)
HCT VFR BLD CALC: 27.7 % — LOW (ref 42–52)
HGB BLD-MCNC: 8.3 G/DL — LOW (ref 14–18)
MCHC RBC-ENTMCNC: 25.1 PG — LOW (ref 27–31)
MCHC RBC-ENTMCNC: 30 G/DL — LOW (ref 32–37)
MCV RBC AUTO: 83.7 FL — SIGNIFICANT CHANGE UP (ref 80–94)
NRBC # BLD: 0 /100 WBCS — SIGNIFICANT CHANGE UP (ref 0–0)
ORGANISM # SPEC MICROSCOPIC CNT: SIGNIFICANT CHANGE UP
ORGANISM # SPEC MICROSCOPIC CNT: SIGNIFICANT CHANGE UP
PLATELET # BLD AUTO: 129 K/UL — LOW (ref 130–400)
POTASSIUM SERPL-MCNC: 3.6 MMOL/L — SIGNIFICANT CHANGE UP (ref 3.5–5)
POTASSIUM SERPL-SCNC: 3.6 MMOL/L — SIGNIFICANT CHANGE UP (ref 3.5–5)
RBC # BLD: 3.31 M/UL — LOW (ref 4.7–6.1)
RBC # FLD: 17 % — HIGH (ref 11.5–14.5)
SODIUM SERPL-SCNC: 139 MMOL/L — SIGNIFICANT CHANGE UP (ref 135–146)
SPECIMEN SOURCE: SIGNIFICANT CHANGE UP
WBC # BLD: 20.99 K/UL — HIGH (ref 4.8–10.8)
WBC # FLD AUTO: 20.99 K/UL — HIGH (ref 4.8–10.8)

## 2021-03-29 PROCEDURE — 88311 DECALCIFY TISSUE: CPT | Mod: 26

## 2021-03-29 PROCEDURE — 88305 TISSUE EXAM BY PATHOLOGIST: CPT | Mod: 26

## 2021-03-29 PROCEDURE — 99233 SBSQ HOSP IP/OBS HIGH 50: CPT

## 2021-03-29 RX ORDER — MORPHINE SULFATE 50 MG/1
2 CAPSULE, EXTENDED RELEASE ORAL ONCE
Refills: 0 | Status: DISCONTINUED | OUTPATIENT
Start: 2021-03-29 | End: 2021-03-29

## 2021-03-29 RX ORDER — NOREPINEPHRINE BITARTRATE/D5W 8 MG/250ML
0.01 PLASTIC BAG, INJECTION (ML) INTRAVENOUS
Qty: 8 | Refills: 0 | Status: DISCONTINUED | OUTPATIENT
Start: 2021-03-29 | End: 2021-03-30

## 2021-03-29 RX ORDER — LIDOCAINE HYDROCHLORIDE AND EPINEPHRINE 10; 10 MG/ML; UG/ML
40 INJECTION, SOLUTION INFILTRATION; PERINEURAL ONCE
Refills: 0 | Status: DISCONTINUED | OUTPATIENT
Start: 2021-03-29 | End: 2021-03-31

## 2021-03-29 RX ADMIN — CARBIDOPA AND LEVODOPA 2 TABLET(S): 25; 100 TABLET ORAL at 21:20

## 2021-03-29 RX ADMIN — Medication 100 MILLIGRAM(S): at 21:23

## 2021-03-29 RX ADMIN — Medication 1 APPLICATION(S): at 05:04

## 2021-03-29 RX ADMIN — MORPHINE SULFATE 2 MILLIGRAM(S): 50 CAPSULE, EXTENDED RELEASE ORAL at 16:00

## 2021-03-29 RX ADMIN — PANTOPRAZOLE SODIUM 40 MILLIGRAM(S): 20 TABLET, DELAYED RELEASE ORAL at 12:08

## 2021-03-29 RX ADMIN — MIDODRINE HYDROCHLORIDE 10 MILLIGRAM(S): 2.5 TABLET ORAL at 14:58

## 2021-03-29 RX ADMIN — Medication 266 MILLIGRAM(S): at 10:17

## 2021-03-29 RX ADMIN — LEVETIRACETAM 405 MILLIGRAM(S): 250 TABLET, FILM COATED ORAL at 05:05

## 2021-03-29 RX ADMIN — Medication 1 APPLICATION(S): at 18:17

## 2021-03-29 RX ADMIN — CEFEPIME 100 MILLIGRAM(S): 1 INJECTION, POWDER, FOR SOLUTION INTRAMUSCULAR; INTRAVENOUS at 05:04

## 2021-03-29 RX ADMIN — CARBIDOPA AND LEVODOPA 2 TABLET(S): 25; 100 TABLET ORAL at 05:03

## 2021-03-29 RX ADMIN — Medication 216 GRAM(S): at 18:16

## 2021-03-29 RX ADMIN — Medication 100 MILLIGRAM(S): at 05:03

## 2021-03-29 RX ADMIN — ATORVASTATIN CALCIUM 40 MILLIGRAM(S): 80 TABLET, FILM COATED ORAL at 21:20

## 2021-03-29 RX ADMIN — Medication 100 MILLIGRAM(S): at 15:00

## 2021-03-29 RX ADMIN — CARBIDOPA AND LEVODOPA 2 TABLET(S): 25; 100 TABLET ORAL at 10:18

## 2021-03-29 RX ADMIN — SENNA PLUS 2 TABLET(S): 8.6 TABLET ORAL at 21:24

## 2021-03-29 RX ADMIN — Medication 1 APPLICATION(S): at 05:05

## 2021-03-29 RX ADMIN — MORPHINE SULFATE 2 MILLIGRAM(S): 50 CAPSULE, EXTENDED RELEASE ORAL at 15:27

## 2021-03-29 RX ADMIN — CHLORHEXIDINE GLUCONATE 1 APPLICATION(S): 213 SOLUTION TOPICAL at 05:04

## 2021-03-29 RX ADMIN — Medication 216 GRAM(S): at 05:03

## 2021-03-29 RX ADMIN — APIXABAN 5 MILLIGRAM(S): 2.5 TABLET, FILM COATED ORAL at 05:03

## 2021-03-29 RX ADMIN — CEFEPIME 100 MILLIGRAM(S): 1 INJECTION, POWDER, FOR SOLUTION INTRAMUSCULAR; INTRAVENOUS at 18:16

## 2021-03-29 RX ADMIN — MIDODRINE HYDROCHLORIDE 10 MILLIGRAM(S): 2.5 TABLET ORAL at 05:05

## 2021-03-29 RX ADMIN — LEVETIRACETAM 405 MILLIGRAM(S): 250 TABLET, FILM COATED ORAL at 18:26

## 2021-03-29 RX ADMIN — Medication 266 MILLIGRAM(S): at 18:16

## 2021-03-29 RX ADMIN — MIDODRINE HYDROCHLORIDE 10 MILLIGRAM(S): 2.5 TABLET ORAL at 21:20

## 2021-03-29 RX ADMIN — CARBIDOPA AND LEVODOPA 2 TABLET(S): 25; 100 TABLET ORAL at 14:58

## 2021-03-29 NOTE — PROGRESS NOTE ADULT - SUBJECTIVE AND OBJECTIVE BOX
SUBJECTIVE:    Patient is a 71y old  Male who presents with a chief complaint of Change in Mental Status (28 Mar 2021 12:01)    Currently admitted to medicine for work-up of his AMS (altered mental status)       Today is hospital day 7d. Patient was seen and examined at bedside. Patient is non-verbal and minimally responsive to auditory and tactile stimuli.     PAST MEDICAL & SURGICAL HISTORY  PAST MEDICAL & SURGICAL HISTORY:  Cataract    Prostatitis    Dyslipidemia    Gout    Hypertension    Parkinson disease    No significant past surgical history    ALLERGIES:  No Known Allergies    MEDICATIONS:  STANDING MEDICATIONS  acyclovir IVPB 800 milliGRAM(s) IV Intermittent every 12 hours  ampicillin  IVPB 2 Gram(s) IV Intermittent every 12 hours  atorvastatin 40 milliGRAM(s) Oral at bedtime  carbidopa/levodopa  25/100 2 Tablet(s) Oral <User Schedule>  cefepime   IVPB 1000 milliGRAM(s) IV Intermittent every 12 hours  cefepime   IVPB      chlorhexidine 4% Liquid 1 Application(s) Topical <User Schedule>  collagenase Ointment 1 Application(s) Topical two times a day  Dakins Solution - 1/2 Strength 1 Application(s) Topical two times a day  dextrose 5%. 1000 milliLiter(s) IV Continuous <Continuous>  levETIRAcetam  IVPB 125 milliGRAM(s) IV Intermittent every 12 hours  lidocaine 1%/epinephrine 1:100,000 Inj 40 milliLiter(s) Local Injection once  metroNIDAZOLE  IVPB 500 milliGRAM(s) IV Intermittent every 8 hours  midodrine 10 milliGRAM(s) Oral every 8 hours  norepinephrine Infusion 0.01 MICROgram(s)/kG/Min IV Continuous <Continuous>  pantoprazole  Injectable 40 milliGRAM(s) IV Push daily  senna 2 Tablet(s) Oral at bedtime  sodium chloride 0.9%. 1000 milliLiter(s) IV Continuous <Continuous>  sodium chloride 0.9%. 1000 milliLiter(s) IV Continuous <Continuous>    PRN MEDICATIONS  acetaminophen   Tablet .. 650 milliGRAM(s) Oral every 6 hours PRN    VITALS:   T(F): 99.9  HR: 80  BP: 104/56  RR: 18  SpO2: 99%    LABS:                        8.3    20.99 )-----------( 129      ( 29 Mar 2021 04:30 )             27.7     03-29    139  |  108  |  56<H>  ----------------------------<  118<H>  3.6   |  19  |  1.8<H>    Ca    7.6<L>      29 Mar 2021 04:30    TPro  6.0  /  Alb  2.0<L>  /  TBili  0.6  /  DBili  x   /  AST  42<H>  /  ALT  5   /  AlkPhos  123<H>  03-28    Culture - Blood (collected 28 Mar 2021 01:24)  Source: .Blood None  Preliminary Report (29 Mar 2021 13:02):    No growth to date.      CARDIAC MARKERS ( 28 Mar 2021 05:24 )  x     / x     / 222 U/L / x     / x          PHYSICAL EXAM:  GENERAL: Lethargic, non-verbal. Not in respiratory distress  HEAD: Atraumatic. NG tube in place  NECK: Supple  CHEST/LUNG: Poor inspiratory effort   HEART: S1, S2; RRR; No murmurs, rubs, or gallops  ABDOMEN: BS+; Soft, Non-tender, Non-distended  EXTREMITIES:  2+ Peripheral Pulses, No clubbing, cyanosis, or edema  PSYCH: AAOx0  NEUROLOGY: Not able to be fully assessed  SKIN: No rashes or lesions

## 2021-03-29 NOTE — CHART NOTE - NSCHARTNOTEFT_GEN_A_CORE
Registered Dietitian Follow-Up    ***Scroll to the bottom for RD recommendation***    Patient Profile Reviewed                           Yes [x]   No []  Nutrition History Previously Obtained        Yes [x]  No []          PERTINENT SUBJECTIVE INFORMATION:  - pt is resting, has NGT  - NPO since 3/23  - spoken with RN, current plan is possibly to consult SPEECH but pt has been lethargic.  - Also, pt has NGT placement, no tube feed has been initiated.   - Pt came from [facility], was eating pureed nectar, with Moris x2, ProState x3, and Vitamin C, D, and Zinc.   - Currently on D5W+Med at 250cc/hr. If running for 24 hours, it gives 1020 kcal/day on Dextrose.          PERTINENT MEDICAL INFORMATIONS:  (1) Sent from [facility] for acute decompensation in Mental Status.  (2) CKD3, Dementia, hx of Parkinson's.   (3) Neuro f/u VEEG  (4) AMS likely metabolic/toxic encephalopathy sepsis on Abx.  (5) Pressure ulcers multiple.  (6) ABEBE on CKD3  (7) Palliative care eval pending.   (8) s/p SLP 3/25 - NPO          DIET ORDER:   NPO since 3/23        ANTHROPOMETRICS:  - Ht.  170cm  - Wt.   (3/22): 79.4kg  (3/25): 79.4kg - stable for now  - BMI. 27.5  - IBW.        PERTINENT LAB DATA:   3/29: WBC 20.99, h/h 8.3/27.7, BUN 56, Cr 1.8, Glucose 118, Ca 7.6, Albumin 2.0, GFR 37, HgbA1c 5.4  PERTINENT MEDS:   apixaban, abx, metoprolol, flagyl, levophed, protonix, acetaminophen, atorvastatin, senna       PHYSICAL FINDINGS  - APPEARANCE:        Dementia, Confused, 1+ L hand edema, 2+ L arm and L hand.   - GI FUNCTION:        LBM 3/29  - TUBES:                   NG  - ORAL/MOUTH:       NPO per SLP on 3/25  - SKIN:                     Unstageable to sacrum, DTI to R heel and R hip.         NUTRITION REQUIREMENTS  WEIGHT USED:                          ABW: 79.4kg  ESTIMATED ENERGY NEEDS:       CONTINUE [  ]      ADJUST [  x] - as of 3/29    ESTIMATED ENERGY NEEDS:         1985-2382 kcal/day (25-30 kcal/kg of ABW) - for PU  ESTIMATED PROTEIN NEEDS:        95-111g (1.2-1.4g/kg CBW)  ESTIMATED FLUID NEEDS:             1ml/kcal or per LIP    CURRENT NUTRIENT NEEDS:       NPO since 3/29          [ x ] PREVIOUS NUTRITION DIAGNOSIS:   (1) Increased Nutrient Needs  (2) Inadequate Protein Energy Intake            [ x ] ONGOING        [  ] RESOLVED              PATIENT INTERVENTION:    [  ] ORAL        [x ] EN/TF     GOAL/EXPECTED OUTCOME:     pt to initiate TF, meet and tolerate >85% of estimated kcal/protein via TF upon f/u in 3 days. Pt to have 1BM/day.  INDICATOR/MONITORING:       RD to monitor diet order, energy intake, body composition, nutrition focused physical findings (EN tolerance, s/s, BM)  NUTRITION INTERVENTION:       Enteral nutrition. Coordination of care              PLAN:  - same rec as previously, patient been failing speech and swallow eval, has NG placed, initiate TF if stable  - start with Jevity 1.2 at 140mL q4hr with No-carb Prosource TF packet x1/day for 6 feeds, monitor electrolytes as feeding start.  - if tolerated, adv to GOAL of Jevity 12 at 280mL q4hr with No-carb Prosource TF packet x1/day.   - this regimen at goal gives 1995 kcal/ 92g protein/ 1360mL free water, suggesting at least 80mL flush each, or per LIP.  - If pt to pass SLP, order diet per SLP rec.

## 2021-03-29 NOTE — PROGRESS NOTE ADULT - SUBJECTIVE AND OBJECTIVE BOX
called by hospitalist for follow up on requested xrays   recent xrays of left shoulder again shows left shoulder dislocation unchanged from 2/20/21 xray   plan remains fu dr darby as op  called by hospitalist for follow up on requested xrays   recent xrays of left shoulder again shows left shoulder dislocation unchanged from 2/20/21 xray.  Pt has chronic dislocation since june previous chart review.    no ortho intervention   plan remains fu dr blair darby as op

## 2021-03-29 NOTE — PROGRESS NOTE ADULT - ATTENDING COMMENTS
responsive.     IMPRESSION   Toxic Metabolic  Encephalopathy / r/o Meningitis Etiology  Unknown   Underlying Parkinson's   Appears Septic        Plan  F/u LP  w/u   Continue  Abx as per ID   Recheck Covid  Continue Keppra 125 BID  Continue Sinemet via NG tube  Continue medical management for possible toxic/metabolic encephalopathy/meningitis Patient seen and examined, obtunded     #Toxic Metabolic  Encephalopathy, Abnormal EEG, pending LP with IR on Thursday as patient must be off Eliquis for 3 days ar per team, will repeat COVID as per Neurology,     #Fever 3/25 with sepsis, not present on admission with metabolic encephalopathy, EEG + seizure    cannot rule out meningitis     CXR no PNA   3/22 Blood & Urine cxs NGTD   #Sacral ulcer- necrotic     seen by Burn sacrum with full thickness ~4x5cm with dark /brown devitalized tissue at base; no purulent drainage, no bleeding  #Recent Fungemia    Blood Cx 2/27 Candida albicans    evaluated by optho - no ocular evidence     TTE no significant valvulopathy   #ABEBE  #L hand edema  < from: Xray Wrist 2 Views, Left (03.27.21 @ 11:13) >No acute fracture or dislocation. Diffuse soft tissue swelling. Nonaggressive appearing lucency in the distal radius, indeterminate. Nonemergent MRI may be obtained for further evaluation.    Creatinine, Serum: 2.0 (03-26-21 @ 05:33)      RECOMMENDATIONS  - LP given fevers and + EEG: cell count, protein, glucose, CSF PCR, HSV PCR   - empiric ACV 10mg/kg q12h IV  - s/p vancomycin 1g x1 3/27 check AM level 3/28  - continue cefepime 1g q12h IV  - flagyl 500mg q8h IV given decub  - Ampicillin 2g q6h IV  - Burn following          AMS, likely metabolic/ toxic encephalopathy, possible meningitis??  sepsis on ABX  Left shoulder dislocation s/p reduction  ABEBE on CKD 3, likely pre-renal  Sacral ulcers, stage 4  HO Parkinson dementia (unclear baseline mental status, from NH)    PLAN:    CNS: avoid CNS depressants. FU neuro. Call family to find out baseline mental status and get GOC.    HEENT: Oral care    PULMONARY:  HOB @ 45 degrees.  Aspiration precautions. Target SpO2>94%, wean oxygen as tolerated. CXR.     CARDIOVASCULAR: Goal directed fluid resuscitation. 500 cc LR bolus. Wean levo as tolerated. Target MAP>60. Midodrine 10mg q8h.     GI: GI prophylaxis.  Feeding as tolerated via NG tube for now.  Bowel regimen.     RENAL:  Follow up lytes.  Correct as needed. Trend Cr. Avoid nephrotoxins. Check urine lytes, urine Na, urine Cr, and urine urea nitrogen.    INFECTIOUS DISEASE: FU cultures. Abx as per ID. FU IR, LP on monday?? per floor team    HEMATOLOGICAL:  DVT prophylaxis. LE doppler    ENDOCRINE:  Follow up FS.  Insulin protocol if needed.    MUSCULOSKELETAL: bedrest. FU burn for sacral ulcers.     very poor prognosis  floor   if intubated, vent unit  palliative care eval Patient seen and examined, obtunded     #Toxic Metabolic Encephalopathy, Positive Epileptiform Activity on EEG, Parkinson's Disease: pending LP with IR on Thursday as patient must be off Eliquis for 3 days ar per team, will repeat COVID as per Neurology, s/p Vancomycin today, on Cefepime, Flagyl, Ampicillin      #Necrotic Sacral Ulcer: Burn seen, continue local wound care, increase offloading measures     #History of Recent Fungemia    #ABEBE likely prerenal, improving, creatinine 1.8 today     #Left Hand Edema: duplex negative, nonaggressive appearing lucency in the distal radius, indeterminate. Nonemergent MRI may be obtained for further evaluation.    #Reidentified anterior dislocation of the humeral head overlying the left second rib with bony fragmentation along the glenoid and humeral head. AC joint degenerative change on xray, awaiting Ortho follow up    #Chronic DVT: Eliquis on hold for planned LP with IR, antithrombotics for now, duplex negative for DVT on 3/27    Palliative care consulted.    Disposition: Remains acute, ongoing workup for encephalopathy Patient seen and examined, obtunded     #Toxic Metabolic Encephalopathy, Positive Epileptiform Activity on EEG, Parkinson's Disease: pending LP with IR on Thursday as patient must be off Eliquis for 3 days ar per team, will repeat COVID as per Neurology, s/p Vancomycin today, on Cefepime, Flagyl, Ampicillin      #Necrotic Sacral Ulcer: Burn seen, continue local wound care, increase offloading measures     #History of Recent Fungemia    #ABEBE likely prerenal, improving, creatinine 1.8 today     #Left Hand Edema: duplex negative, nonaggressive appearing lucency in the distal radius, indeterminate. Nonemergent MRI may be obtained for further evaluation.    #Reidentified anterior dislocation of the humeral head overlying the left second rib with bony fragmentation along the glenoid and humeral head. AC joint degenerative change on xray, awaiting Ortho follow up--discussed with team #[id number]    #Chronic DVT: Eliquis on hold for planned LP with IR, antithrombotics for now, duplex negative for DVT on 3/27    Palliative care consulted.    Disposition: Remains acute, ongoing workup for encephalopathy

## 2021-03-29 NOTE — PROGRESS NOTE ADULT - ASSESSMENT
71 year old Male with past medical history of Parkinson's, dementia, CKD Stage 3, 1st degree AV block, HLD, gout, HTN, DM, fungal septicemia presenting from [facility] for acute decompensation in mental status. Patient appears to be dehydrated. No clear cause of change in mental status. Patient became hypotensive at 10:30PM on 3/27 --> s/p 2.5L NSS, BP remains 79/39mmHg, overnight intesivist was called and patient was started on levophed. Patient was monitered in the vent unit. Levophed was weaned down then discontinued. Blood pressure now controlled, with MAP 65 off pressors. Started on midodrine 10mg q8. Patient is stable for downgrade to medical floor.       # Altered Mental Status possible Metabolic vs Dehydration  - 3 days of decreasing alertness and no longer speaking at the nursing home   - CT Head negative for any acute strokes  - Blood Cx: negative, Urine Cx: negative   - EEG: + epileptiform activity  - c/w keppra 125 BID  - Will start tube feeds  - per S&S patient is too lethargic and unable to participate in exam, keep NPO for now, NG tube in place  - ID: c/w cefepime, flagyl, & acyclovir  - Vanc trough low, will follow-up with ID  - c/w midodrine   - IR: Consulted for LP - will take on Thursday (must be off eliquis for three days)   - Neuro: c/w Keppra & sinemet, check CPK, follow up VEEG  - Burn: possible OR for debridement. wound care for now      # Left hand pain & swelling   - tender to touch, swollen,  - VA duplex pending:   - XR R Hand: No acute fracture or dislocation. Lucency in the distal radial metaphysis, nonspecific. Dedicated wrist radiographs recommended for further evaluation.  - Xray Wrist 2 Views, Left (03.27.21 @ 11:13) No acute fracture or dislocation. Diffuse soft tissue swelling. Nonaggressive appearing lucency in the distal radius, indeterminate. Nonemergent MRI may be obtained for further evaluation.  - per radiology, non aggressive lesion seen in  L wrist, patient would benefit from MRI wrist as outpatient     # Sacral pressure ulcer- stage 4  - Burn: possible OR for intervention,     # H/O Parkinson disease  - Neuro consult complete: c/w sinemet & keppra   - EEG w/ epileptiform activity, started on keppra per neuro   - CT head negative     # Left shoulder dislocation   - seen on CT in Feb 2021  - Ortho reconsulted- pending L shoulder XR   - Xray Shoulder 2 Views, Left (03.23.21 @ 21:48) Reidentified anterior dislocation of the humeral head overlying the left second rib with bony fragmentation along the glenoid and humeral head. AC joint degenerative change.  - Per Ortho: no surgical intervention at this time    # H/O First Degree AV Block  - No indication for PPM    # ABEBE likely Pre renal  #Hypernatremia  - Baseline Cr 1.2-1.3 --> 2.0 today   - c/w D5 @ 75   - Sodium, Serum: 151 mmol/L (03.25.21 @ 05:47)  - c/w D5     # Chronic DVT  - Continue Eliquis    # HTN - well controlled   - Hold Enalapril due to intermittent hypotension.

## 2021-03-30 LAB
ALBUMIN SERPL ELPH-MCNC: 1.9 G/DL — LOW (ref 3.5–5.2)
ALBUMIN SERPL ELPH-MCNC: 2.2 G/DL — LOW (ref 3.5–5.2)
ALP SERPL-CCNC: 217 U/L — HIGH (ref 30–115)
ALP SERPL-CCNC: 226 U/L — HIGH (ref 30–115)
ALT FLD-CCNC: 12 U/L — SIGNIFICANT CHANGE UP (ref 0–41)
ALT FLD-CCNC: 6 U/L — SIGNIFICANT CHANGE UP (ref 0–41)
ANION GAP SERPL CALC-SCNC: 12 MMOL/L — SIGNIFICANT CHANGE UP (ref 7–14)
ANION GAP SERPL CALC-SCNC: 14 MMOL/L — SIGNIFICANT CHANGE UP (ref 7–14)
APTT BLD: 44.2 SEC — HIGH (ref 27–39.2)
AST SERPL-CCNC: 44 U/L — HIGH (ref 0–41)
AST SERPL-CCNC: 48 U/L — HIGH (ref 0–41)
BASOPHILS # BLD AUTO: 0.03 K/UL — SIGNIFICANT CHANGE UP (ref 0–0.2)
BASOPHILS # BLD AUTO: 0.05 K/UL — SIGNIFICANT CHANGE UP (ref 0–0.2)
BASOPHILS NFR BLD AUTO: 0.2 % — SIGNIFICANT CHANGE UP (ref 0–1)
BASOPHILS NFR BLD AUTO: 0.2 % — SIGNIFICANT CHANGE UP (ref 0–1)
BILIRUB SERPL-MCNC: 0.4 MG/DL — SIGNIFICANT CHANGE UP (ref 0.2–1.2)
BILIRUB SERPL-MCNC: 0.6 MG/DL — SIGNIFICANT CHANGE UP (ref 0.2–1.2)
BUN SERPL-MCNC: 47 MG/DL — HIGH (ref 10–20)
BUN SERPL-MCNC: 51 MG/DL — HIGH (ref 10–20)
CALCIUM SERPL-MCNC: 8.3 MG/DL — LOW (ref 8.5–10.1)
CALCIUM SERPL-MCNC: 8.3 MG/DL — LOW (ref 8.5–10.1)
CHLORIDE SERPL-SCNC: 104 MMOL/L — SIGNIFICANT CHANGE UP (ref 98–110)
CHLORIDE SERPL-SCNC: 105 MMOL/L — SIGNIFICANT CHANGE UP (ref 98–110)
CO2 SERPL-SCNC: 16 MMOL/L — LOW (ref 17–32)
CO2 SERPL-SCNC: 19 MMOL/L — SIGNIFICANT CHANGE UP (ref 17–32)
CREAT SERPL-MCNC: 1.6 MG/DL — HIGH (ref 0.7–1.5)
CREAT SERPL-MCNC: 1.7 MG/DL — HIGH (ref 0.7–1.5)
CULTURE RESULTS: SIGNIFICANT CHANGE UP
EOSINOPHIL # BLD AUTO: 0.25 K/UL — SIGNIFICANT CHANGE UP (ref 0–0.7)
EOSINOPHIL # BLD AUTO: 0.26 K/UL — SIGNIFICANT CHANGE UP (ref 0–0.7)
EOSINOPHIL NFR BLD AUTO: 1.2 % — SIGNIFICANT CHANGE UP (ref 0–8)
EOSINOPHIL NFR BLD AUTO: 1.3 % — SIGNIFICANT CHANGE UP (ref 0–8)
GLUCOSE BLDC GLUCOMTR-MCNC: 152 MG/DL — HIGH (ref 70–99)
GLUCOSE BLDC GLUCOMTR-MCNC: 154 MG/DL — HIGH (ref 70–99)
GLUCOSE BLDC GLUCOMTR-MCNC: 156 MG/DL — HIGH (ref 70–99)
GLUCOSE BLDC GLUCOMTR-MCNC: 156 MG/DL — HIGH (ref 70–99)
GLUCOSE BLDC GLUCOMTR-MCNC: 191 MG/DL — HIGH (ref 70–99)
GLUCOSE SERPL-MCNC: 149 MG/DL — HIGH (ref 70–99)
GLUCOSE SERPL-MCNC: 160 MG/DL — HIGH (ref 70–99)
HCT VFR BLD CALC: 27 % — LOW (ref 42–52)
HCT VFR BLD CALC: 27.5 % — LOW (ref 42–52)
HGB BLD-MCNC: 8 G/DL — LOW (ref 14–18)
HGB BLD-MCNC: 8.3 G/DL — LOW (ref 14–18)
IMM GRANULOCYTES NFR BLD AUTO: 1.6 % — HIGH (ref 0.1–0.3)
IMM GRANULOCYTES NFR BLD AUTO: 1.8 % — HIGH (ref 0.1–0.3)
INR BLD: 2.2 RATIO — HIGH (ref 0.65–1.3)
LYMPHOCYTES # BLD AUTO: 0.84 K/UL — LOW (ref 1.2–3.4)
LYMPHOCYTES # BLD AUTO: 0.87 K/UL — LOW (ref 1.2–3.4)
LYMPHOCYTES # BLD AUTO: 4 % — LOW (ref 20.5–51.1)
LYMPHOCYTES # BLD AUTO: 4.5 % — LOW (ref 20.5–51.1)
MAGNESIUM SERPL-MCNC: 1.7 MG/DL — LOW (ref 1.8–2.4)
MCHC RBC-ENTMCNC: 25.1 PG — LOW (ref 27–31)
MCHC RBC-ENTMCNC: 25.2 PG — LOW (ref 27–31)
MCHC RBC-ENTMCNC: 29.6 G/DL — LOW (ref 32–37)
MCHC RBC-ENTMCNC: 30.2 G/DL — LOW (ref 32–37)
MCV RBC AUTO: 83.3 FL — SIGNIFICANT CHANGE UP (ref 80–94)
MCV RBC AUTO: 84.6 FL — SIGNIFICANT CHANGE UP (ref 80–94)
MONOCYTES # BLD AUTO: 1.18 K/UL — HIGH (ref 0.1–0.6)
MONOCYTES # BLD AUTO: 1.35 K/UL — HIGH (ref 0.1–0.6)
MONOCYTES NFR BLD AUTO: 5.5 % — SIGNIFICANT CHANGE UP (ref 1.7–9.3)
MONOCYTES NFR BLD AUTO: 7.3 % — SIGNIFICANT CHANGE UP (ref 1.7–9.3)
NEUTROPHILS # BLD AUTO: 15.81 K/UL — HIGH (ref 1.4–6.5)
NEUTROPHILS # BLD AUTO: 18.92 K/UL — HIGH (ref 1.4–6.5)
NEUTROPHILS NFR BLD AUTO: 84.9 % — HIGH (ref 42.2–75.2)
NEUTROPHILS NFR BLD AUTO: 87.5 % — HIGH (ref 42.2–75.2)
NRBC # BLD: 0 /100 WBCS — SIGNIFICANT CHANGE UP (ref 0–0)
NRBC # BLD: 0 /100 WBCS — SIGNIFICANT CHANGE UP (ref 0–0)
PLATELET # BLD AUTO: 113 K/UL — LOW (ref 130–400)
PLATELET # BLD AUTO: 124 K/UL — LOW (ref 130–400)
POTASSIUM SERPL-MCNC: 3.5 MMOL/L — SIGNIFICANT CHANGE UP (ref 3.5–5)
POTASSIUM SERPL-MCNC: 3.6 MMOL/L — SIGNIFICANT CHANGE UP (ref 3.5–5)
POTASSIUM SERPL-SCNC: 3.5 MMOL/L — SIGNIFICANT CHANGE UP (ref 3.5–5)
POTASSIUM SERPL-SCNC: 3.6 MMOL/L — SIGNIFICANT CHANGE UP (ref 3.5–5)
PROT SERPL-MCNC: 5.8 G/DL — LOW (ref 6–8)
PROT SERPL-MCNC: 5.8 G/DL — LOW (ref 6–8)
PROTHROM AB SERPL-ACNC: 25.3 SEC — HIGH (ref 9.95–12.87)
RBC # BLD: 3.19 M/UL — LOW (ref 4.7–6.1)
RBC # BLD: 3.3 M/UL — LOW (ref 4.7–6.1)
RBC # FLD: 16.8 % — HIGH (ref 11.5–14.5)
RBC # FLD: 17 % — HIGH (ref 11.5–14.5)
SODIUM SERPL-SCNC: 134 MMOL/L — LOW (ref 135–146)
SODIUM SERPL-SCNC: 136 MMOL/L — SIGNIFICANT CHANGE UP (ref 135–146)
SPECIMEN SOURCE: SIGNIFICANT CHANGE UP
WBC # BLD: 18.61 K/UL — HIGH (ref 4.8–10.8)
WBC # BLD: 21.62 K/UL — HIGH (ref 4.8–10.8)
WBC # FLD AUTO: 18.61 K/UL — HIGH (ref 4.8–10.8)
WBC # FLD AUTO: 21.62 K/UL — HIGH (ref 4.8–10.8)

## 2021-03-30 PROCEDURE — 99233 SBSQ HOSP IP/OBS HIGH 50: CPT

## 2021-03-30 PROCEDURE — 11044 DBRDMT BONE 1ST 20 SQ CM/<: CPT

## 2021-03-30 RX ORDER — MAGNESIUM SULFATE 500 MG/ML
2 VIAL (ML) INJECTION ONCE
Refills: 0 | Status: COMPLETED | OUTPATIENT
Start: 2021-03-30 | End: 2021-03-30

## 2021-03-30 RX ORDER — VANCOMYCIN HCL 1 G
1000 VIAL (EA) INTRAVENOUS ONCE
Refills: 0 | Status: COMPLETED | OUTPATIENT
Start: 2021-03-30 | End: 2021-03-30

## 2021-03-30 RX ORDER — VANCOMYCIN HCL 1 G
1000 VIAL (EA) INTRAVENOUS EVERY 24 HOURS
Refills: 0 | Status: DISCONTINUED | OUTPATIENT
Start: 2021-03-31 | End: 2021-03-31

## 2021-03-30 RX ORDER — POTASSIUM CHLORIDE 20 MEQ
40 PACKET (EA) ORAL ONCE
Refills: 0 | Status: COMPLETED | OUTPATIENT
Start: 2021-03-30 | End: 2021-03-30

## 2021-03-30 RX ORDER — VANCOMYCIN HCL 1 G
VIAL (EA) INTRAVENOUS
Refills: 0 | Status: DISCONTINUED | OUTPATIENT
Start: 2021-03-30 | End: 2021-03-31

## 2021-03-30 RX ADMIN — PANTOPRAZOLE SODIUM 40 MILLIGRAM(S): 20 TABLET, DELAYED RELEASE ORAL at 11:23

## 2021-03-30 RX ADMIN — CARBIDOPA AND LEVODOPA 2 TABLET(S): 25; 100 TABLET ORAL at 10:14

## 2021-03-30 RX ADMIN — MIDODRINE HYDROCHLORIDE 10 MILLIGRAM(S): 2.5 TABLET ORAL at 05:22

## 2021-03-30 RX ADMIN — Medication 250 MILLIGRAM(S): at 14:38

## 2021-03-30 RX ADMIN — LEVETIRACETAM 405 MILLIGRAM(S): 250 TABLET, FILM COATED ORAL at 06:31

## 2021-03-30 RX ADMIN — MIDODRINE HYDROCHLORIDE 10 MILLIGRAM(S): 2.5 TABLET ORAL at 21:09

## 2021-03-30 RX ADMIN — CARBIDOPA AND LEVODOPA 2 TABLET(S): 25; 100 TABLET ORAL at 05:22

## 2021-03-30 RX ADMIN — Medication 1 APPLICATION(S): at 06:32

## 2021-03-30 RX ADMIN — CEFEPIME 100 MILLIGRAM(S): 1 INJECTION, POWDER, FOR SOLUTION INTRAMUSCULAR; INTRAVENOUS at 05:13

## 2021-03-30 RX ADMIN — CARBIDOPA AND LEVODOPA 2 TABLET(S): 25; 100 TABLET ORAL at 14:36

## 2021-03-30 RX ADMIN — Medication 216 GRAM(S): at 17:31

## 2021-03-30 RX ADMIN — CEFEPIME 100 MILLIGRAM(S): 1 INJECTION, POWDER, FOR SOLUTION INTRAMUSCULAR; INTRAVENOUS at 17:38

## 2021-03-30 RX ADMIN — Medication 216 GRAM(S): at 05:22

## 2021-03-30 RX ADMIN — CHLORHEXIDINE GLUCONATE 1 APPLICATION(S): 213 SOLUTION TOPICAL at 05:35

## 2021-03-30 RX ADMIN — Medication 266 MILLIGRAM(S): at 17:37

## 2021-03-30 RX ADMIN — CARBIDOPA AND LEVODOPA 2 TABLET(S): 25; 100 TABLET ORAL at 21:09

## 2021-03-30 RX ADMIN — Medication 100 MILLIGRAM(S): at 05:35

## 2021-03-30 RX ADMIN — Medication 100 MILLIGRAM(S): at 14:36

## 2021-03-30 RX ADMIN — LEVETIRACETAM 405 MILLIGRAM(S): 250 TABLET, FILM COATED ORAL at 17:35

## 2021-03-30 RX ADMIN — Medication 1 APPLICATION(S): at 05:23

## 2021-03-30 RX ADMIN — Medication 50 GRAM(S): at 18:24

## 2021-03-30 RX ADMIN — Medication 1 APPLICATION(S): at 17:36

## 2021-03-30 RX ADMIN — Medication 40 MILLIEQUIVALENT(S): at 21:12

## 2021-03-30 RX ADMIN — Medication 100 MILLIGRAM(S): at 21:08

## 2021-03-30 RX ADMIN — Medication 266 MILLIGRAM(S): at 05:23

## 2021-03-30 RX ADMIN — ATORVASTATIN CALCIUM 40 MILLIGRAM(S): 80 TABLET, FILM COATED ORAL at 21:09

## 2021-03-30 NOTE — PROGRESS NOTE ADULT - ASSESSMENT
71 year old Male with past medical history of Parkinson's, dementia, CKD Stage 3, 1st degree AV block, HLD, gout, HTN, DM, fungal septicemia presenting from [facility] for acute decompensation in mental status. Patient appears to be dehydrated. No clear cause of change in mental status. Patient became hypotensive at 10:30PM on 3/27 --> s/p 2.5L NSS, BP remains 79/39mmHg, overnight intesivist was called and patient was started on levophed. Patient was monitered in the vent unit. Levophed was weaned down then discontinued. Blood pressure now controlled, with MAP 65 off pressors. Started on midodrine 10mg q8. Patient is stable for downgrade to medical floor.       # Altered Mental Status possible Metabolic vs Dehydration  - 3 days of decreasing alertness and no longer speaking at the nursing home   - CT Head negative for any acute strokes  - Blood Cx: negative, Urine Cx: negative   - EEG: + epileptiform activity  - c/w keppra 125 BID  - Will start tube feeds  - per S&S patient is too lethargic and unable to participate in exam, keep NPO for now, NG tube in place  - ID: c/w cefepime, flagyl, & acyclovir  - Vanc trough low, will follow-up with ID  - c/w midodrine   - IR: Consulted for LP - will take on Thursday (must be off eliquis for three days)   - Neuro: c/w Keppra & sinemet, check CPK, follow up VEEG  - Burn: possible OR for debridement. wound care for now      # Left hand pain & swelling   - tender to touch, swollen,  - VA duplex pending:   - XR R Hand: No acute fracture or dislocation. Lucency in the distal radial metaphysis, nonspecific. Dedicated wrist radiographs recommended for further evaluation.  - Xray Wrist 2 Views, Left (03.27.21 @ 11:13) No acute fracture or dislocation. Diffuse soft tissue swelling. Nonaggressive appearing lucency in the distal radius, indeterminate. Nonemergent MRI may be obtained for further evaluation.  - per radiology, non aggressive lesion seen in  L wrist, patient would benefit from MRI wrist as outpatient     # Sacral pressure ulcer- stage 4  - Burn: possible OR for intervention,     # H/O Parkinson disease  - Neuro consult complete: c/w sinemet & keppra   - EEG w/ epileptiform activity, started on keppra per neuro   - CT head negative     # Left shoulder dislocation   - seen on CT in Feb 2021  - Ortho reconsulted- pending L shoulder XR   - Xray Shoulder 2 Views, Left (03.23.21 @ 21:48) Reidentified anterior dislocation of the humeral head overlying the left second rib with bony fragmentation along the glenoid and humeral head. AC joint degenerative change.  - Per Ortho: no surgical intervention at this time    # H/O First Degree AV Block  - No indication for PPM    # ABEBE likely Pre renal  #Hypernatremia  - Baseline Cr 1.2-1.3-2- 2.4 - 2.4 -> 1.8  - c/w D5 @ 75   - Sodium, Serum: 151 mmol/L (03.25.21 @ 05:47) now 139  - c/w D5   - trend bmp    # Chronic DVT  - Continue Eliquis    # HTN - well controlled   - Hold Enalapril due to intermittent hypotension.   71M, pmh Parkinson's, dementia, CKD Stage 3, 1st degree AV block, HLD, gout, HTN, DM, fungal septicemia presenting from [facility] for acute decompensation in mental status. admitted, hypotensive episode 3/27 --> s/p 2.5L NSS, BP remains 79/39mmHg s/p levophed, upgraded to icu, monitored and downgraded on midodrine 10mg q8.     # Altered Mental Status possible Metabolic  - 3 days of decreasing alertness and no longer speaking at the nursing home   - CT Head negative for any acute strokes  - Blood Cx: negative, Urine Cx: negative   - EEG: + epileptiform activity  - c/w keppra 125 BID  - c/w tube feeds  - per S&S patient is too lethargic and unable to participate in exam, keep NPO for now, NG tube in place  - ID: cefepime 1g q12h IV, flagyl 500mg q8h IV given decub, Ampicillin 2g q6h IV, Vanc 1g q24h IV, check AM level 3/31  - c/w midodrine   - IR: Consulted for LP - will take on Thursday (must be off eliquis for three days)   - Neuro: c/w Keppra & sinemet  - Burn: possible OR for debridement. wound care for now      # Left hand pain & swelling   - tender to touch, swollen,  - VA duplex: no evidence of dvt  - XR R Hand: No acute fracture or dislocation. Lucency in the distal radial metaphysis, nonspecific. Dedicated wrist radiographs recommended for further evaluation.  - Xray Wrist 2 Views, Left (03.27.21 @ 11:13) No acute fracture or dislocation. Diffuse soft tissue swelling. Nonaggressive appearing lucency in the distal radius, indeterminate. Nonemergent MRI may be obtained for further evaluation.  - per radiology, non aggressive lesion seen in  L wrist, patient would benefit from MRI wrist as outpatient     # Sacral pressure ulcer- stage 4  - Burn: possible OR for intervention,     # H/O Parkinson disease  - Neuro consult complete: c/w sinemet & keppra   - EEG w/ epileptiform activity, started on keppra per neuro   - CT head negative     # Left shoulder dislocation   - seen on CT in Feb 2021  - Ortho reconsulted- pending L shoulder XR   - Xray Shoulder 2 Views, Left (03.23.21 @ 21:48) Reidentified anterior dislocation of the humeral head overlying the left second rib with bony fragmentation along the glenoid and humeral head. AC joint degenerative change.  - Per Ortho: no surgical intervention at this time    # H/O First Degree AV Block  - No indication for PPM    # ABEBE likely Pre renal  #Hypernatremia  - Baseline Cr 1.2-1.3-2- 2.4 - 2.4 -> 1.8  - c/w D5 @ 75   - Sodium, Serum: 151 mmol/L (03.25.21 @ 05:47) now 139  - c/w D5   - trend bmp    # Chronic DVT  - Continue Eliquis    # HTN - well controlled   - Hold Enalapril due to intermittent hypotension.   71M, pmh Parkinson's, dementia, CKD Stage 3, 1st degree AV block, HLD, gout, HTN, DM, fungal septicemia presenting from [facility] for acute decompensation in mental status. admitted, hypotensive episode 3/27 --> s/p 2.5L NSS, BP remains 79/39mmHg s/p levophed, upgraded to icu, monitored and downgraded on midodrine 10mg q8.     # Altered Mental Status possible Metabolic  - 3 days of decreasing alertness and no longer speaking at the nursing home   - CT Head negative for any acute strokes  - Blood Cx: negative, Urine Cx: negative   - EEG: + epileptiform activity  - c/w keppra 125 BID  - c/w tube feeds  - per S&S patient is too lethargic and unable to participate in exam, keep NPO for now, NG tube in place  - ID: cefepime 1g q12h IV, flagyl 500mg q8h IV given decub, Ampicillin 2g q6h IV, Vanc 1g q24h IV, check AM level 3/31  - c/w midodrine   - IR: Consulted for LP - will take on Thursday (must be off eliquis for three days, last given 3/29 in AM)   - Wednesday must: put in 8pm PT/PTT/INR, T&S, hold AC and place CSF studies.   - Neuro: c/w Keppra & sinemet  - s/p sacral debridement 3/29 w/ burn     # Left hand pain & swelling   - tender to touch, swollen,  - VA duplex: no evidence of dvt  - XR R Hand: No acute fracture or dislocation. Lucency in the distal radial metaphysis, nonspecific. Dedicated wrist radiographs recommended for further evaluation.  - Xray Wrist 2 Views, Left (03.27.21 @ 11:13) No acute fracture or dislocation. Diffuse soft tissue swelling. Nonaggressive appearing lucency in the distal radius, indeterminate. Nonemergent MRI may be obtained for further evaluation.  - per radiology, non aggressive lesion seen in  L wrist, patient would benefit from MRI wrist as outpatient     # Sacral pressure ulcer- stage 4  - spoke with Burn: s/p debridement 3/29    # H/O Parkinson disease  - Neuro consult: c/w sinemet & keppra   - EEG w/ epileptiform activity, started on keppra per neuro   - CT head negative     # Left shoulder dislocation   - seen on CT in Feb 2021  - Ortho reconsulted- pending L shoulder XR   - Xray Shoulder 2 Views, Left (03.23.21 @ 21:48) Reidentified anterior dislocation of the humeral head overlying the left second rib with bony fragmentation along the glenoid and humeral head. AC joint degenerative change.  - Per Ortho: no surgical intervention at this time    # H/O First Degree AV Block  - No indication for PPM    # ABEBE likely Pre renal  #Hypernatremia  - Baseline Cr 1.2-1.3-2- 2.4 - 2.4 - 1.8 -> 1.7  - Na 134 this am  - trend bmp    # Chronic DVT  - Hold Eliquis for LP thursday     # HTN - well controlled   - Hold Enalapril due to intermittent hypotension.

## 2021-03-30 NOTE — PROGRESS NOTE ADULT - ATTENDING COMMENTS
Patient seen and examined, remains obtunded     #Toxic Metabolic Encephalopathy, Positive Epileptiform Activity on EEG, Parkinson's Disease: pending LP with IR on Thursday as patient must be off Eliquis for 3 days ar per team, will repeat COVID as per Neurology sent 3/30, on Vancomycin today, Cefepime, Flagyl, Ampicillin , ID following    #Necrotic Sacral Ulcer: Burn seen, continue local wound care, increase offloading measures, s/p bedside debridement today    #History of Recent Fungemia    #ABEBE likely prerenal, improving, creatinine 1.7 today     #Left Hand Edema: duplex negative, nonaggressive appearing lucency in the distal radius, indeterminate. Nonemergent MRI may be obtained for further evaluation.    #Reidentified anterior dislocation of the humeral head overlying the left second rib with bony fragmentation along the glenoid and humeral head. AC joint degenerative change on xray, Ortho recommendations noted    #Chronic DVT: Eliquis on hold for planned LP with IR, antithrombotics for now, duplex negative for DVT on 3/27    #Hypomagnesemia: repleted     Palliative care consulted    Disposition: Remains acute, ongoing workup for encephalopathy. Patient seen and examined, remains obtunded     #Toxic Metabolic Encephalopathy, Positive Epileptiform Activity on EEG, Parkinson's Disease: pending LP with IR on Thursday as patient must be off Eliquis for 3 days ar per team, will repeat COVID as per Neurology sent 3/30, on Vancomycin today, Cefepime, Flagyl, Ampicillin , ID following    #Necrotic Sacral Ulcer: Burn seen, continue local wound care, increase offloading measures, s/p bedside debridement today    #History of Recent Fungemia    #ABEBE likely prerenal, improving, creatinine 1.7 today     #Left Hand Edema: duplex negative, nonaggressive appearing lucency in the distal radius, indeterminate. Nonemergent MRI may be obtained for further evaluation.    #Reidentified anterior dislocation of the humeral head overlying the left second rib with bony fragmentation along the glenoid and humeral head. AC joint degenerative change on xray, Ortho recommendations noted    #Chronic DVT: Eliquis on hold for planned LP with IR, antithrombotics for now, duplex negative for DVT on 3/27    #Hypomagnesemia: repleted     Palliative care consulted    Disposition: Remains acute, ongoing workup for encephalopathy.    Attempted to call patient's son Kelton, left voice message.

## 2021-03-30 NOTE — PROGRESS NOTE ADULT - ASSESSMENT
ASSESSMENT  71 year old Male with past medical history of Parkinson's, dementia, CKD Stage 3, 1st degree AV block, HLD, gout, HTN, DM, fungal septicemia presenting from [facility] for acute decompensation in mental status.     IMPRESSION  #Fever 3/25 with sepsis, not present on admission with metabolic encephalopathy, EEG + seizure    3/25 BCX 1/2 sets, 1/4 bottles Staphylococcus pettenkoferi, likely contaminant     cannot rule out meningitis     CXR no PNA   3/22 Blood & Urine cxs NGTD   #Sacral ulcer- necrotic     seen by Burn sacrum with full thickness ~4x5cm with dark /brown devitalized tissue at base; no purulent drainage, no bleeding  #Recent Fungemia    Blood Cx 2/27 Candida albicans    evaluated by optho - no ocular evidence     TTE no significant valvulopathy   #ABEBE  #L hand edema  < from: Xray Wrist 2 Views, Left (03.27.21 @ 11:13) >No acute fracture or dislocation. Diffuse soft tissue swelling. Nonaggressive appearing lucency in the distal radius, indeterminate. Nonemergent MRI may be obtained for further evaluation.    Creatinine, Serum: 2.0 (03-26-21 @ 05:33)      RECOMMENDATIONS  - Pending LP given fevers and + EEG: cell count, protein, glucose, CSF PCR, HSV PCR   - empiric ACV 10mg/kg q12h IV  - s/p vancomycin 1g x1 3/27 , 3/29 level < 4  - Vanc 1g q24h IV, check AM level 3/31  - continue cefepime 1g q12h IV  - flagyl 500mg q8h IV given decub  - Ampicillin 2g q6h IV  - Burn following    If any questions, please call or send a message on [employer] Teams  Spectra [phone]

## 2021-03-30 NOTE — PROGRESS NOTE ADULT - SUBJECTIVE AND OBJECTIVE BOX
NIEVES LABOY  71y, Male  Allergy: No Known Allergies      LOS  8d    CHIEF COMPLAINT: Change in Mental Status (30 Mar 2021 06:25)      INTERVAL EVENTS/HPI  - afebrile   - T(F): , Max: 99.9 (03-29-21 @ 12:58)  - WBC Count: 21.62 (03-30-21 @ 07:07)  WBC Count: 20.99 (03-29-21 @ 04:30)  - Creatinine, Serum: 1.7 (03-30-21 @ 07:07)  Creatinine, Serum: 1.8 (03-29-21 @ 04:30)       ROS  unable to obtain history secondary to patient's mental status and/or sedation     VITALS:  T(F): 97, Max: 99.9 (03-29-21 @ 12:58)  HR: 66  BP: 112/53  RR: 18Vital Signs Last 24 Hrs  T(C): 36.1 (30 Mar 2021 08:00), Max: 37.7 (29 Mar 2021 12:58)  T(F): 97 (30 Mar 2021 08:00), Max: 99.9 (29 Mar 2021 12:58)  HR: 66 (30 Mar 2021 10:00) (66 - 80)  BP: 112/53 (30 Mar 2021 10:00) (104/56 - 129/60)  BP(mean): 70 (29 Mar 2021 20:57) (70 - 70)  RR: 18 (30 Mar 2021 10:00) (17 - 19)  SpO2: 98% (30 Mar 2021 08:00) (98% - 98%)    PHYSICAL EXAM:  Gen: chronically ill appearing contracted  HEENT: Normocephalic, atraumatic  Neck: supple, no lymphadenopathy  CV: Regular rate & regular rhythm  Lungs: decreased BS at bases, no fremitus  Abdomen: Soft, BS present  Ext: Warm, well perfused  Neuro: non focal, awake  Skin: no rash, no erythema  Lines: no phlebitis     FH: Non-contributory  Social Hx: Non-contributory    TESTS & MEASUREMENTS:                        8.0    21.62 )-----------( 113      ( 30 Mar 2021 07:07 )             27.0     03-30    134<L>  |  104  |  51<H>  ----------------------------<  160<H>  3.6   |  16<L>  |  1.7<H>    Ca    8.3<L>      30 Mar 2021 07:07  Mg     1.7     03-30    TPro  5.8<L>  /  Alb  1.9<L>  /  TBili  0.6  /  DBili  x   /  AST  48<H>  /  ALT  12  /  AlkPhos  226<H>  03-30    eGFR if Non African American: 39 mL/min/1.73M2 (03-30-21 @ 07:07)  eGFR if : 46 mL/min/1.73M2 (03-30-21 @ 07:07)    LIVER FUNCTIONS - ( 30 Mar 2021 07:07 )  Alb: 1.9 g/dL / Pro: 5.8 g/dL / ALK PHOS: 226 U/L / ALT: 12 U/L / AST: 48 U/L / GGT: x               Culture - Blood (collected 03-28-21 @ 01:24)  Source: .Blood None  Preliminary Report (03-29-21 @ 13:02):    No growth to date.    Culture - Blood (collected 03-25-21 @ 11:09)  Source: .Blood None  Gram Stain (03-28-21 @ 18:04):    Growth in anaerobic bottle: Gram Positive Cocci in Clusters  Final Report (03-29-21 @ 17:13):    Growth in anaerobic bottle: Staphylococcus pettenkoferi    Coag Negative Staphylococcus    Single set isolate, possible contaminant. Contact    Microbiology if susceptibility testing clinically    indicated.    ***Blood Panel PCR results on this specimen areavailable    approximately 3 hours after the Gram stain result.***    Gram stain, PCR, and/or culture results may not always    correspond due to difference in methodologies.    ************************************************************    This PCR assay wasperformed by multiplex PCR. This    Assay tests for 66 bacterial and resistance gene targets.    Please refer to the [facility] test directory    at [email or web address] for details.  Organism: Blood Culture PCR (03-29-21 @ 17:13)  Organism: Blood Culture PCR (03-29-21 @ 17:13)      -  Coagulase negative Staphylococcus: Detec      Method Type: PCR    Culture - Blood (collected 03-25-21 @ 05:47)  Source: .Blood None  Preliminary Report (03-26-21 @ 14:01):    No growth to date.    Culture - Urine (collected 03-22-21 @ 20:41)  Source: .Urine Clean Catch (Midstream)  Final Report (03-23-21 @ 20:53):    No growth    Culture - Blood (collected 03-22-21 @ 15:31)  Source: .Blood Blood-Peripheral  Final Report (03-28-21 @ 02:33):    No Growth Final    Culture - Blood (collected 03-22-21 @ 15:28)  Source: .Blood Blood-Peripheral  Final Report (03-28-21 @ 02:33):    No Growth Final    Culture - Urine (collected 03-04-21 @ 12:00)  Source: .Urine Clean Catch (Midstream)  Final Report (03-06-21 @ 07:24):    No growth    Culture - Blood (collected 03-04-21 @ 11:12)  Source: .Blood None  Final Report (03-09-21 @ 23:01):    No Growth Final    Culture - Blood (collected 03-03-21 @ 16:27)  Source: .Blood None  Final Report (03-08-21 @ 23:01):    No Growth Final            INFECTIOUS DISEASES TESTING  Vancomycin Level, Trough: <4.0 (03-28-21 @ 13:00)  Procalcitonin, Serum: 0.61 (03-26-21 @ 10:20)  COVID-19 PCR: NotDetec (03-22-21 @ 20:13)  COVID-19 PCR: NotDetec (03-10-21 @ 12:22)  COVID-19 PCR: NotDetec (03-01-21 @ 16:49)  Fungitell: 62 (03-01-21 @ 11:00)  Procalcitonin, Serum: 0.29 (03-01-21 @ 05:32)  MRSA PCR Result.: Negative (02-27-21 @ 15:44)  COVID-19 PCR: NotDetec (02-25-21 @ 15:34)  Procalcitonin, Serum: 0.27 (02-18-21 @ 10:54)  MRSA PCR Result.: Negative (02-14-21 @ 18:29)  HIV-1/2 Combo Result: Nonreact (02-14-21 @ 05:07)  Procalcitonin, Serum: 0.46 (02-13-21 @ 16:00)  COVID-19 PCR: NotDetec (02-12-21 @ 10:17)      INFLAMMATORY MARKERS      RADIOLOGY & ADDITIONAL TESTS:  I have personally reviewed the last available Chest xray  CXR      CT      CARDIOLOGY TESTING  12 Lead ECG:   Ventricular Rate 64 BPM    Atrial Rate 64 BPM    P-R Interval 234 ms    QRS Duration 88 ms    Q-T Interval 404 ms    QTC Calculation(Bazett) 416 ms    P Axis 68 degrees    R Axis -17 degrees    T Axis 9 degrees    Diagnosis Line Sinus rhythm with 1st degree A-V block  Voltage criteria for left ventricular hypertrophy  Nonspecific T wave abnormality  Abnormal ECG    Confirmed by ANA GARDUNO MD ([id number]) on 3/22/2021 9:54:41 PM (03-22-21 @ 16:57)      MEDICATIONS  acyclovir IVPB 800 IV Intermittent every 12 hours  ampicillin  IVPB 2 IV Intermittent every 12 hours  atorvastatin 40 Oral at bedtime  carbidopa/levodopa  25/100 2 Oral <User Schedule>  cefepime   IVPB 1000 IV Intermittent every 12 hours  cefepime   IVPB     chlorhexidine 4% Liquid 1 Topical <User Schedule>  collagenase Ointment 1 Topical two times a day  Dakins Solution - 1/2 Strength 1 Topical two times a day  levETIRAcetam  IVPB 125 IV Intermittent every 12 hours  lidocaine 1%/epinephrine 1:100,000 Inj 40 Local Injection once  metroNIDAZOLE  IVPB 500 IV Intermittent every 8 hours  midodrine 10 Oral every 8 hours  norepinephrine Infusion 0.01 IV Continuous <Continuous>  pantoprazole  Injectable 40 IV Push daily  senna 2 Oral at bedtime      WEIGHT  Weight (kg): 79.4 (03-22-21 @ 16:10)  Creatinine, Serum: 1.7 mg/dL (03-30-21 @ 07:07)      ANTIBIOTICS:  acyclovir IVPB 800 milliGRAM(s) IV Intermittent every 12 hours  ampicillin  IVPB 2 Gram(s) IV Intermittent every 12 hours  cefepime   IVPB 1000 milliGRAM(s) IV Intermittent every 12 hours  cefepime   IVPB      metroNIDAZOLE  IVPB 500 milliGRAM(s) IV Intermittent every 8 hours      All available historical records have been reviewed

## 2021-03-30 NOTE — PROGRESS NOTE ADULT - SUBJECTIVE AND OBJECTIVE BOX
SUBJECTIVE  Patient is a 71y old Male who presents with a chief complaint of Change in Mental Status (30 Mar 2021 12:25)  Currently admitted to medicine with the primary diagnosis of AMS (altered mental status)    Today is hospital day 8d, and this morning he is laying in bed, diaphoretic but in no acute distress. Patient remains without opening his eyes and not responding to verbal or tactile stimuli. Patient withdrawing to pain.     OBJECTIVE  PAST MEDICAL & SURGICAL HISTORY  Parkinson disease    Hypertension    Gout    Dyslipidemia    Prostatitis    Cataract    No significant past surgical history      ALLERGIES:  No Known Allergies    MEDICATIONS:  STANDING MEDICATIONS  acyclovir IVPB 800 milliGRAM(s) IV Intermittent every 12 hours  ampicillin  IVPB 2 Gram(s) IV Intermittent every 12 hours  atorvastatin 40 milliGRAM(s) Oral at bedtime  carbidopa/levodopa  25/100 2 Tablet(s) Oral <User Schedule>  cefepime   IVPB 1000 milliGRAM(s) IV Intermittent every 12 hours  cefepime   IVPB      chlorhexidine 4% Liquid 1 Application(s) Topical <User Schedule>  collagenase Ointment 1 Application(s) Topical two times a day  Dakins Solution - 1/2 Strength 1 Application(s) Topical two times a day  levETIRAcetam  IVPB 125 milliGRAM(s) IV Intermittent every 12 hours  lidocaine 1%/epinephrine 1:100,000 Inj 40 milliLiter(s) Local Injection once  metroNIDAZOLE  IVPB 500 milliGRAM(s) IV Intermittent every 8 hours  midodrine 10 milliGRAM(s) Oral every 8 hours  norepinephrine Infusion 0.01 MICROgram(s)/kG/Min IV Continuous <Continuous>  pantoprazole  Injectable 40 milliGRAM(s) IV Push daily  senna 2 Tablet(s) Oral at bedtime  vancomycin  IVPB        PRN MEDICATIONS  acetaminophen   Tablet .. 650 milliGRAM(s) Oral every 6 hours PRN      VITAL SIGNS: Last 24 Hours  T(C): 36.1 (30 Mar 2021 08:00), Max: 37.6 (29 Mar 2021 20:57)  T(F): 97 (30 Mar 2021 08:00), Max: 99.6 (29 Mar 2021 20:57)  HR: 68 (30 Mar 2021 12:00) (66 - 77)  BP: 147/70 (30 Mar 2021 12:00) (105/51 - 147/70)  BP(mean): 70 (29 Mar 2021 20:57) (70 - 70)  RR: 18 (30 Mar 2021 10:00) (17 - 19)  SpO2: 98% (30 Mar 2021 08:00) (98% - 98%)    LABS:                        8.0    21.62 )-----------( 113      ( 30 Mar 2021 07:07 )             27.0     03-30    134<L>  |  104  |  51<H>  ----------------------------<  160<H>  3.6   |  16<L>  |  1.7<H>    Ca    8.3<L>      30 Mar 2021 07:07  Mg     1.7     03-30    TPro  5.8<L>  /  Alb  1.9<L>  /  TBili  0.6  /  DBili  x   /  AST  48<H>  /  ALT  12  /  AlkPhos  226<H>  03-30    Culture - Blood (collected 28 Mar 2021 01:24)  Source: .Blood None  Preliminary Report (29 Mar 2021 13:02):    No growth to date.    RADIOLOGY:    PHYSICAL EXAM:    GENERAL: NAD, well-developed, not responding to verbal or tactile stimuli. diaphoretic.   HEENT:  Atraumatic, Normocephalic  PULMONARY: Clear to auscultation bilaterally; mild expiratory wheeze  CARDIOVASCULAR: Regular rate and rhythm; No murmurs, rubs, or gallops  GASTROINTESTINAL: Soft, Nontender, Nondistended  MUSCULOSKELETAL: Large edematous bilateral upper extremities and Lower extremities. No clubbing, cyanosis  SKIN: No rashes or lesions

## 2021-03-31 ENCOUNTER — RESULT REVIEW (OUTPATIENT)
Age: 71
End: 2021-03-31

## 2021-03-31 LAB
-  AMIKACIN: SIGNIFICANT CHANGE UP
-  AMIKACIN: SIGNIFICANT CHANGE UP
-  AMOXICILLIN/CLAVULANIC ACID: SIGNIFICANT CHANGE UP
-  AMPICILLIN/SULBACTAM: SIGNIFICANT CHANGE UP
-  AMPICILLIN: SIGNIFICANT CHANGE UP
-  AMPICILLIN: SIGNIFICANT CHANGE UP
-  AZTREONAM: SIGNIFICANT CHANGE UP
-  AZTREONAM: SIGNIFICANT CHANGE UP
-  CEFAZOLIN: SIGNIFICANT CHANGE UP
-  CEFEPIME: SIGNIFICANT CHANGE UP
-  CEFEPIME: SIGNIFICANT CHANGE UP
-  CEFOXITIN: SIGNIFICANT CHANGE UP
-  CEFTAZIDIME: SIGNIFICANT CHANGE UP
-  CEFTRIAXONE: SIGNIFICANT CHANGE UP
-  CIPROFLOXACIN: SIGNIFICANT CHANGE UP
-  CIPROFLOXACIN: SIGNIFICANT CHANGE UP
-  DAPTOMYCIN: SIGNIFICANT CHANGE UP
-  ERTAPENEM: SIGNIFICANT CHANGE UP
-  GENTAMICIN: SIGNIFICANT CHANGE UP
-  GENTAMICIN: SIGNIFICANT CHANGE UP
-  IMIPENEM: SIGNIFICANT CHANGE UP
-  IMIPENEM: SIGNIFICANT CHANGE UP
-  LEVOFLOXACIN: SIGNIFICANT CHANGE UP
-  LINEZOLID: SIGNIFICANT CHANGE UP
-  MEROPENEM: SIGNIFICANT CHANGE UP
-  MEROPENEM: SIGNIFICANT CHANGE UP
-  PIPERACILLIN/TAZOBACTAM: SIGNIFICANT CHANGE UP
-  PIPERACILLIN/TAZOBACTAM: SIGNIFICANT CHANGE UP
-  TETRACYCLINE: SIGNIFICANT CHANGE UP
-  TOBRAMYCIN: SIGNIFICANT CHANGE UP
-  TOBRAMYCIN: SIGNIFICANT CHANGE UP
-  TRIMETHOPRIM/SULFAMETHOXAZOLE: SIGNIFICANT CHANGE UP
-  VANCOMYCIN: SIGNIFICANT CHANGE UP
ALBUMIN SERPL ELPH-MCNC: 1.8 G/DL — LOW (ref 3.5–5.2)
ALP SERPL-CCNC: 191 U/L — HIGH (ref 30–115)
ALT FLD-CCNC: 11 U/L — SIGNIFICANT CHANGE UP (ref 0–41)
ANION GAP SERPL CALC-SCNC: 11 MMOL/L — SIGNIFICANT CHANGE UP (ref 7–14)
ANION GAP SERPL CALC-SCNC: 13 MMOL/L — SIGNIFICANT CHANGE UP (ref 7–14)
APTT BLD: 29.2 SEC — SIGNIFICANT CHANGE UP (ref 27–39.2)
AST SERPL-CCNC: 31 U/L — SIGNIFICANT CHANGE UP (ref 0–41)
BASOPHILS # BLD AUTO: 0 K/UL — SIGNIFICANT CHANGE UP (ref 0–0.2)
BASOPHILS NFR BLD AUTO: 0 % — SIGNIFICANT CHANGE UP (ref 0–1)
BILIRUB SERPL-MCNC: 0.4 MG/DL — SIGNIFICANT CHANGE UP (ref 0.2–1.2)
BUN SERPL-MCNC: 39 MG/DL — HIGH (ref 10–20)
BUN SERPL-MCNC: 44 MG/DL — HIGH (ref 10–20)
CALCIUM SERPL-MCNC: 8.1 MG/DL — LOW (ref 8.5–10.1)
CALCIUM SERPL-MCNC: 8.4 MG/DL — LOW (ref 8.5–10.1)
CHLORIDE SERPL-SCNC: 104 MMOL/L — SIGNIFICANT CHANGE UP (ref 98–110)
CHLORIDE SERPL-SCNC: 106 MMOL/L — SIGNIFICANT CHANGE UP (ref 98–110)
CO2 SERPL-SCNC: 16 MMOL/L — LOW (ref 17–32)
CO2 SERPL-SCNC: 20 MMOL/L — SIGNIFICANT CHANGE UP (ref 17–32)
CREAT SERPL-MCNC: 1.3 MG/DL — SIGNIFICANT CHANGE UP (ref 0.7–1.5)
CREAT SERPL-MCNC: 1.3 MG/DL — SIGNIFICANT CHANGE UP (ref 0.7–1.5)
CULTURE RESULTS: SIGNIFICANT CHANGE UP
EOSINOPHIL # BLD AUTO: 0.62 K/UL — SIGNIFICANT CHANGE UP (ref 0–0.7)
EOSINOPHIL NFR BLD AUTO: 3.5 % — SIGNIFICANT CHANGE UP (ref 0–8)
GLUCOSE BLDC GLUCOMTR-MCNC: 138 MG/DL — HIGH (ref 70–99)
GLUCOSE BLDC GLUCOMTR-MCNC: 98 MG/DL — SIGNIFICANT CHANGE UP (ref 70–99)
GLUCOSE SERPL-MCNC: 161 MG/DL — HIGH (ref 70–99)
GLUCOSE SERPL-MCNC: 190 MG/DL — HIGH (ref 70–99)
HCT VFR BLD CALC: 27.4 % — LOW (ref 42–52)
HCT VFR BLD CALC: 32 % — LOW (ref 42–52)
HGB BLD-MCNC: 8.3 G/DL — LOW (ref 14–18)
HGB BLD-MCNC: 9.7 G/DL — LOW (ref 14–18)
INR BLD: 1.47 RATIO — HIGH (ref 0.65–1.3)
LYMPHOCYTES # BLD AUTO: 0.62 K/UL — LOW (ref 1.2–3.4)
LYMPHOCYTES # BLD AUTO: 3.5 % — LOW (ref 20.5–51.1)
MAGNESIUM SERPL-MCNC: 2.1 MG/DL — SIGNIFICANT CHANGE UP (ref 1.8–2.4)
MAGNESIUM SERPL-MCNC: 2.1 MG/DL — SIGNIFICANT CHANGE UP (ref 1.8–2.4)
MCHC RBC-ENTMCNC: 25.3 PG — LOW (ref 27–31)
MCHC RBC-ENTMCNC: 25.5 PG — LOW (ref 27–31)
MCHC RBC-ENTMCNC: 30.3 G/DL — LOW (ref 32–37)
MCHC RBC-ENTMCNC: 30.3 G/DL — LOW (ref 32–37)
MCV RBC AUTO: 83.5 FL — SIGNIFICANT CHANGE UP (ref 80–94)
MCV RBC AUTO: 84.2 FL — SIGNIFICANT CHANGE UP (ref 80–94)
METHOD TYPE: SIGNIFICANT CHANGE UP
MONOCYTES # BLD AUTO: 1.09 K/UL — HIGH (ref 0.1–0.6)
MONOCYTES NFR BLD AUTO: 6.1 % — SIGNIFICANT CHANGE UP (ref 1.7–9.3)
NEUTROPHILS # BLD AUTO: 15.34 K/UL — HIGH (ref 1.4–6.5)
NEUTROPHILS NFR BLD AUTO: 84.3 % — HIGH (ref 42.2–75.2)
NRBC # BLD: 0 /100 WBCS — SIGNIFICANT CHANGE UP (ref 0–0)
ORGANISM # SPEC MICROSCOPIC CNT: SIGNIFICANT CHANGE UP
PHOSPHATE SERPL-MCNC: 3.4 MG/DL — SIGNIFICANT CHANGE UP (ref 2.1–4.9)
PLATELET # BLD AUTO: 82 K/UL — LOW (ref 130–400)
PLATELET # BLD AUTO: 86 K/UL — LOW (ref 130–400)
POTASSIUM SERPL-MCNC: 3.6 MMOL/L — SIGNIFICANT CHANGE UP (ref 3.5–5)
POTASSIUM SERPL-MCNC: 4.2 MMOL/L — SIGNIFICANT CHANGE UP (ref 3.5–5)
POTASSIUM SERPL-SCNC: 3.6 MMOL/L — SIGNIFICANT CHANGE UP (ref 3.5–5)
POTASSIUM SERPL-SCNC: 4.2 MMOL/L — SIGNIFICANT CHANGE UP (ref 3.5–5)
PROT SERPL-MCNC: 5.3 G/DL — LOW (ref 6–8)
PROTHROM AB SERPL-ACNC: 16.9 SEC — HIGH (ref 9.95–12.87)
RBC # BLD: 3.28 M/UL — LOW (ref 4.7–6.1)
RBC # BLD: 3.8 M/UL — LOW (ref 4.7–6.1)
RBC # FLD: 16.7 % — HIGH (ref 11.5–14.5)
RBC # FLD: 17 % — HIGH (ref 11.5–14.5)
SARS-COV-2 RNA SPEC QL NAA+PROBE: SIGNIFICANT CHANGE UP
SODIUM SERPL-SCNC: 135 MMOL/L — SIGNIFICANT CHANGE UP (ref 135–146)
SODIUM SERPL-SCNC: 135 MMOL/L — SIGNIFICANT CHANGE UP (ref 135–146)
SPECIMEN SOURCE: SIGNIFICANT CHANGE UP
VANCOMYCIN FLD-MCNC: 11.5 UG/ML — HIGH (ref 5–10)
VANCOMYCIN TROUGH SERPL-MCNC: 11.5 UG/ML — HIGH (ref 5–10)
WBC # BLD: 17.84 K/UL — HIGH (ref 4.8–10.8)
WBC # BLD: 18.92 K/UL — HIGH (ref 4.8–10.8)
WBC # FLD AUTO: 17.84 K/UL — HIGH (ref 4.8–10.8)
WBC # FLD AUTO: 18.92 K/UL — HIGH (ref 4.8–10.8)

## 2021-03-31 PROCEDURE — 88313 SPECIAL STAINS GROUP 2: CPT | Mod: 26

## 2021-03-31 PROCEDURE — 20240 BONE BIOPSY OPEN SUPERFICIAL: CPT

## 2021-03-31 PROCEDURE — 88312 SPECIAL STAINS GROUP 1: CPT | Mod: 26

## 2021-03-31 PROCEDURE — 11047 DBRDMT BONE EACH ADDL: CPT

## 2021-03-31 PROCEDURE — 88304 TISSUE EXAM BY PATHOLOGIST: CPT | Mod: 26

## 2021-03-31 PROCEDURE — 88305 TISSUE EXAM BY PATHOLOGIST: CPT | Mod: 26

## 2021-03-31 PROCEDURE — 11044 DBRDMT BONE 1ST 20 SQ CM/<: CPT

## 2021-03-31 PROCEDURE — 88311 DECALCIFY TISSUE: CPT | Mod: 26

## 2021-03-31 PROCEDURE — 99232 SBSQ HOSP IP/OBS MODERATE 35: CPT | Mod: GC

## 2021-03-31 RX ORDER — ACETAMINOPHEN 500 MG
650 TABLET ORAL EVERY 6 HOURS
Refills: 0 | Status: DISCONTINUED | OUTPATIENT
Start: 2021-03-31 | End: 2021-05-28

## 2021-03-31 RX ORDER — VANCOMYCIN HCL 1 G
1250 VIAL (EA) INTRAVENOUS EVERY 12 HOURS
Refills: 0 | Status: DISCONTINUED | OUTPATIENT
Start: 2021-03-31 | End: 2021-03-31

## 2021-03-31 RX ORDER — LINEZOLID 600 MG/300ML
600 INJECTION, SOLUTION INTRAVENOUS EVERY 12 HOURS
Refills: 0 | Status: DISCONTINUED | OUTPATIENT
Start: 2021-03-31 | End: 2021-03-31

## 2021-03-31 RX ORDER — AMPICILLIN TRIHYDRATE 250 MG
2 CAPSULE ORAL EVERY 12 HOURS
Refills: 0 | Status: DISCONTINUED | OUTPATIENT
Start: 2021-03-31 | End: 2021-04-01

## 2021-03-31 RX ORDER — OXYCODONE AND ACETAMINOPHEN 5; 325 MG/1; MG/1
1 TABLET ORAL EVERY 6 HOURS
Refills: 0 | Status: DISCONTINUED | OUTPATIENT
Start: 2021-03-31 | End: 2021-04-04

## 2021-03-31 RX ORDER — ACYCLOVIR SODIUM 500 MG
800 VIAL (EA) INTRAVENOUS EVERY 12 HOURS
Refills: 0 | Status: DISCONTINUED | OUTPATIENT
Start: 2021-03-31 | End: 2021-04-02

## 2021-03-31 RX ORDER — SODIUM CHLORIDE 9 MG/ML
1000 INJECTION INTRAMUSCULAR; INTRAVENOUS; SUBCUTANEOUS
Refills: 0 | Status: DISCONTINUED | OUTPATIENT
Start: 2021-03-31 | End: 2021-03-31

## 2021-03-31 RX ORDER — VANCOMYCIN HCL 1 G
250 VIAL (EA) INTRAVENOUS ONCE
Refills: 0 | Status: DISCONTINUED | OUTPATIENT
Start: 2021-03-31 | End: 2021-03-31

## 2021-03-31 RX ORDER — HYDROMORPHONE HYDROCHLORIDE 2 MG/ML
0.5 INJECTION INTRAMUSCULAR; INTRAVENOUS; SUBCUTANEOUS
Refills: 0 | Status: DISCONTINUED | OUTPATIENT
Start: 2021-03-31 | End: 2021-03-31

## 2021-03-31 RX ORDER — METRONIDAZOLE 500 MG
500 TABLET ORAL EVERY 8 HOURS
Refills: 0 | Status: DISCONTINUED | OUTPATIENT
Start: 2021-03-31 | End: 2021-04-01

## 2021-03-31 RX ORDER — ATORVASTATIN CALCIUM 80 MG/1
40 TABLET, FILM COATED ORAL AT BEDTIME
Refills: 0 | Status: DISCONTINUED | OUTPATIENT
Start: 2021-03-31 | End: 2021-05-28

## 2021-03-31 RX ORDER — ONDANSETRON 8 MG/1
4 TABLET, FILM COATED ORAL ONCE
Refills: 0 | Status: DISCONTINUED | OUTPATIENT
Start: 2021-03-31 | End: 2021-03-31

## 2021-03-31 RX ORDER — SENNA PLUS 8.6 MG/1
2 TABLET ORAL AT BEDTIME
Refills: 0 | Status: DISCONTINUED | OUTPATIENT
Start: 2021-03-31 | End: 2021-05-28

## 2021-03-31 RX ORDER — MEROPENEM 1 G/30ML
1000 INJECTION INTRAVENOUS EVERY 12 HOURS
Refills: 0 | Status: DISCONTINUED | OUTPATIENT
Start: 2021-03-31 | End: 2021-03-31

## 2021-03-31 RX ORDER — PHYTONADIONE (VIT K1) 5 MG
5 TABLET ORAL ONCE
Refills: 0 | Status: COMPLETED | OUTPATIENT
Start: 2021-03-31 | End: 2021-03-31

## 2021-03-31 RX ORDER — LINEZOLID 600 MG/300ML
INJECTION, SOLUTION INTRAVENOUS
Refills: 0 | Status: DISCONTINUED | OUTPATIENT
Start: 2021-03-31 | End: 2021-03-31

## 2021-03-31 RX ORDER — PANTOPRAZOLE SODIUM 20 MG/1
40 TABLET, DELAYED RELEASE ORAL DAILY
Refills: 0 | Status: DISCONTINUED | OUTPATIENT
Start: 2021-03-31 | End: 2021-04-01

## 2021-03-31 RX ORDER — CEFEPIME 1 G/1
1000 INJECTION, POWDER, FOR SOLUTION INTRAMUSCULAR; INTRAVENOUS EVERY 12 HOURS
Refills: 0 | Status: DISCONTINUED | OUTPATIENT
Start: 2021-03-31 | End: 2021-04-01

## 2021-03-31 RX ORDER — SODIUM CHLORIDE 9 MG/ML
1000 INJECTION, SOLUTION INTRAVENOUS
Refills: 0 | Status: DISCONTINUED | OUTPATIENT
Start: 2021-03-31 | End: 2021-03-31

## 2021-03-31 RX ORDER — CARBIDOPA AND LEVODOPA 25; 100 MG/1; MG/1
2 TABLET ORAL
Refills: 0 | Status: DISCONTINUED | OUTPATIENT
Start: 2021-03-31 | End: 2021-05-28

## 2021-03-31 RX ORDER — LINEZOLID 600 MG/300ML
600 INJECTION, SOLUTION INTRAVENOUS ONCE
Refills: 0 | Status: DISCONTINUED | OUTPATIENT
Start: 2021-03-31 | End: 2021-03-31

## 2021-03-31 RX ORDER — MIDODRINE HYDROCHLORIDE 2.5 MG/1
10 TABLET ORAL EVERY 8 HOURS
Refills: 0 | Status: DISCONTINUED | OUTPATIENT
Start: 2021-03-31 | End: 2021-04-20

## 2021-03-31 RX ORDER — VANCOMYCIN HCL 1 G
1250 VIAL (EA) INTRAVENOUS EVERY 24 HOURS
Refills: 0 | Status: DISCONTINUED | OUTPATIENT
Start: 2021-03-31 | End: 2021-04-02

## 2021-03-31 RX ORDER — COLLAGENASE CLOSTRIDIUM HIST. 250 UNIT/G
1 OINTMENT (GRAM) TOPICAL
Refills: 0 | Status: DISCONTINUED | OUTPATIENT
Start: 2021-03-31 | End: 2021-05-28

## 2021-03-31 RX ORDER — SODIUM HYPOCHLORITE 0.125 %
1 SOLUTION, NON-ORAL MISCELLANEOUS
Refills: 0 | Status: DISCONTINUED | OUTPATIENT
Start: 2021-03-31 | End: 2021-05-28

## 2021-03-31 RX ORDER — CHLORHEXIDINE GLUCONATE 213 G/1000ML
1 SOLUTION TOPICAL
Refills: 0 | Status: DISCONTINUED | OUTPATIENT
Start: 2021-03-31 | End: 2021-05-28

## 2021-03-31 RX ORDER — MEROPENEM 1 G/30ML
INJECTION INTRAVENOUS
Refills: 0 | Status: DISCONTINUED | OUTPATIENT
Start: 2021-03-31 | End: 2021-03-31

## 2021-03-31 RX ORDER — LEVETIRACETAM 250 MG/1
125 TABLET, FILM COATED ORAL EVERY 12 HOURS
Refills: 0 | Status: DISCONTINUED | OUTPATIENT
Start: 2021-03-31 | End: 2021-04-09

## 2021-03-31 RX ORDER — MEROPENEM 1 G/30ML
1000 INJECTION INTRAVENOUS ONCE
Refills: 0 | Status: DISCONTINUED | OUTPATIENT
Start: 2021-03-31 | End: 2021-03-31

## 2021-03-31 RX ORDER — VANCOMYCIN HCL 1 G
1250 VIAL (EA) INTRAVENOUS EVERY 24 HOURS
Refills: 0 | Status: CANCELLED | OUTPATIENT
Start: 2021-04-01 | End: 2021-03-31

## 2021-03-31 RX ADMIN — Medication 100 MILLIGRAM(S): at 06:22

## 2021-03-31 RX ADMIN — ATORVASTATIN CALCIUM 40 MILLIGRAM(S): 80 TABLET, FILM COATED ORAL at 21:21

## 2021-03-31 RX ADMIN — Medication 266 MILLIGRAM(S): at 19:03

## 2021-03-31 RX ADMIN — CARBIDOPA AND LEVODOPA 2 TABLET(S): 25; 100 TABLET ORAL at 10:50

## 2021-03-31 RX ADMIN — Medication 250 MILLIGRAM(S): at 13:03

## 2021-03-31 RX ADMIN — CARBIDOPA AND LEVODOPA 2 TABLET(S): 25; 100 TABLET ORAL at 06:23

## 2021-03-31 RX ADMIN — CARBIDOPA AND LEVODOPA 2 TABLET(S): 25; 100 TABLET ORAL at 13:04

## 2021-03-31 RX ADMIN — Medication 216 GRAM(S): at 19:09

## 2021-03-31 RX ADMIN — MIDODRINE HYDROCHLORIDE 10 MILLIGRAM(S): 2.5 TABLET ORAL at 13:04

## 2021-03-31 RX ADMIN — LEVETIRACETAM 405 MILLIGRAM(S): 250 TABLET, FILM COATED ORAL at 19:37

## 2021-03-31 RX ADMIN — PANTOPRAZOLE SODIUM 40 MILLIGRAM(S): 20 TABLET, DELAYED RELEASE ORAL at 10:50

## 2021-03-31 RX ADMIN — SENNA PLUS 2 TABLET(S): 8.6 TABLET ORAL at 22:12

## 2021-03-31 RX ADMIN — CARBIDOPA AND LEVODOPA 2 TABLET(S): 25; 100 TABLET ORAL at 21:21

## 2021-03-31 RX ADMIN — Medication 266 MILLIGRAM(S): at 05:30

## 2021-03-31 RX ADMIN — CHLORHEXIDINE GLUCONATE 1 APPLICATION(S): 213 SOLUTION TOPICAL at 06:22

## 2021-03-31 RX ADMIN — CEFEPIME 100 MILLIGRAM(S): 1 INJECTION, POWDER, FOR SOLUTION INTRAMUSCULAR; INTRAVENOUS at 06:22

## 2021-03-31 RX ADMIN — Medication 100 MILLIGRAM(S): at 13:03

## 2021-03-31 RX ADMIN — LEVETIRACETAM 405 MILLIGRAM(S): 250 TABLET, FILM COATED ORAL at 05:25

## 2021-03-31 RX ADMIN — Medication 1 APPLICATION(S): at 06:24

## 2021-03-31 RX ADMIN — MIDODRINE HYDROCHLORIDE 10 MILLIGRAM(S): 2.5 TABLET ORAL at 21:21

## 2021-03-31 RX ADMIN — Medication 216 GRAM(S): at 05:25

## 2021-03-31 RX ADMIN — MIDODRINE HYDROCHLORIDE 10 MILLIGRAM(S): 2.5 TABLET ORAL at 06:23

## 2021-03-31 RX ADMIN — Medication 100 MILLIGRAM(S): at 22:11

## 2021-03-31 NOTE — CHART NOTE - NSCHARTNOTEFT_GEN_A_CORE
Case reviewed with medical team and Palliative attending    Pt is still not responding purposefully, still being worked up for metabolic/infectious causes of encephalopathy. Pt has NGT not requiring oxygen. Getting wound debridement. Daughter introduced to palliative care, and advanced directives. She was clear she understood poor prognosis but believes in continuing all life prolonging measures. Pt is not actively dying at this time. Palliative team available as need  for goals of care discussions with primary team. call x [phone]

## 2021-03-31 NOTE — PROGRESS NOTE ADULT - SUBJECTIVE AND OBJECTIVE BOX
SUBJECTIVE  Patient is a 71y old Male who presents with a chief complaint of Change in Mental Status (31 Mar 2021 12:04)  Currently admitted to medicine with the primary diagnosis of AMS (altered mental status)    Today is hospital day 9d, and this morning he is laying comfortably in bed, no acute distress. Patient remains unresponsive to verbal or tactile stimulus.    OBJECTIVE  PAST MEDICAL & SURGICAL HISTORY  Parkinson disease    Hypertension    Gout    Dyslipidemia    Prostatitis    Cataract    No significant past surgical history      ALLERGIES:  No Known Allergies    MEDICATIONS:  STANDING MEDICATIONS  acyclovir IVPB 800 milliGRAM(s) IV Intermittent every 12 hours  ampicillin  IVPB 2 Gram(s) IV Intermittent every 12 hours  atorvastatin 40 milliGRAM(s) Oral at bedtime  carbidopa/levodopa  25/100 2 Tablet(s) Oral <User Schedule>  cefepime   IVPB 1000 milliGRAM(s) IV Intermittent every 12 hours  cefepime   IVPB      chlorhexidine 4% Liquid 1 Application(s) Topical <User Schedule>  collagenase Ointment 1 Application(s) Topical two times a day  Dakins Solution - 1/2 Strength 1 Application(s) Topical two times a day  levETIRAcetam  IVPB 125 milliGRAM(s) IV Intermittent every 12 hours  lidocaine 1%/epinephrine 1:100,000 Inj 40 milliLiter(s) Local Injection once  metroNIDAZOLE  IVPB 500 milliGRAM(s) IV Intermittent every 8 hours  midodrine 10 milliGRAM(s) Oral every 8 hours  pantoprazole  Injectable 40 milliGRAM(s) IV Push daily  senna 2 Tablet(s) Oral at bedtime  vancomycin  IVPB      vancomycin  IVPB 1000 milliGRAM(s) IV Intermittent every 24 hours    PRN MEDICATIONS  acetaminophen   Tablet .. 650 milliGRAM(s) Oral every 6 hours PRN      VITAL SIGNS: Last 24 Hours  T(C): 36.1 (31 Mar 2021 04:53), Max: 36.7 (30 Mar 2021 23:35)  T(F): 97 (31 Mar 2021 04:53), Max: 98.1 (30 Mar 2021 23:35)  HR: 63 (31 Mar 2021 04:53) (63 - 79)  BP: 125/62 (31 Mar 2021 04:53) (107/58 - 125/66)  BP(mean): 87 (31 Mar 2021 04:53) (81 - 90)  RR: 18 (31 Mar 2021 04:53) (18 - 20)  SpO2: 100% (30 Mar 2021 23:35) (100% - 100%)    LABS:                        8.3    17.84 )-----------( 82       ( 31 Mar 2021 07:00 )             27.4     03-31    135  |  104  |  44<H>  ----------------------------<  161<H>  3.6   |  20  |  1.3    Ca    8.1<L>      31 Mar 2021 07:00  Mg     2.1     03-31    TPro  5.3<L>  /  Alb  1.8<L>  /  TBili  0.4  /  DBili  x   /  AST  31  /  ALT  11  /  AlkPhos  191<H>  03-31    PT/INR - ( 30 Mar 2021 16:57 )   PT: 25.30 sec;   INR: 2.20 ratio         PTT - ( 30 Mar 2021 16:57 )  PTT:44.2 sec    Culture - Other (collected 29 Mar 2021 17:15)  Source: .Other sacrum  Preliminary Report (30 Mar 2021 22:49):    Numerous Klebsiella pneumoniae    Rare Pseudomonas aeruginosa    Numerous Enterococcus faecalis      RADIOLOGY:    PHYSICAL EXAM:    GENERAL: NAD, well-developed, not responding to verbal or tactile stimuli.  HEENT:  Atraumatic, Normocephalic  PULMONARY: Clear to auscultation bilaterally  CARDIOVASCULAR: Regular rate and rhythm; No murmurs, rubs, or gallops  GASTROINTESTINAL: Soft, Nontender, Nondistended  MUSCULOSKELETAL: Large edematous bilateral upper extremities and Lower extremities. No clubbing, cyanosis  SKIN: No rashes or lesions

## 2021-03-31 NOTE — PROGRESS NOTE ADULT - ASSESSMENT
71y year old Male presenting with worsening level of arousal from NH after spending a prolonged admission at [facility].  Recent REEG shows risk for seizures and keppra started. Currently on VEEG with generalized slowing and triphasic waves, remains obtunded.    Likely Toxic Metabolic encephalopathy, Now hemodynamically Unstable  on pressors. Minimally responsive.     IMPRESSION   Toxic Metabolic  Encephalopathy / r/o Meningitis Etiology  Unknown   Underlying Parkinson's   necrotic sacral ulcer s/p debridement  Septic shock during this admission       Plan  F/u LP  w/u - scheduled for LP by IR on 4/1   Continue  Abx as per ID   Continue Keppra 125 BID  Continue Sinemet via NG tube  Continue medical management for possible toxic/metabolic encephalopathy/meningitis     71y year old Male presenting with worsening level of arousal from NH after spending a prolonged admission at [facility].  Recent REEG shows risk for seizures and keppra started. Currently on VEEG with generalized slowing and triphasic waves, remains obtunded.    Likely Toxic Metabolic encephalopathy, Now hemodynamically Unstable  on pressors. Minimally responsive.     IMPRESSION   Toxic Metabolic  Encephalopathy / r/o Meningitis Etiology  Unknown   Underlying Parkinson's   necrotic sacral ulcer s/p debridement  Septic shock during this admission       Plan  F/u LP  w/u - scheduled for LP by IR on 4/1   currently on empiric acyclovir  Continue  Abx as per ID   Continue Keppra 125 BID  Continue Sinemet via NG tube  Continue medical management for possible toxic/metabolic encephalopathy/meningitis     71y year old Male presenting with worsening level of arousal from NH after spending a prolonged admission at [facility].  Recent REEG shows risk for seizures and keppra started. Currently on VEEG with generalized slowing and triphasic waves, remains obtunded.    Likely Toxic Metabolic encephalopathy, Now hemodynamically Unstable  on pressors. Minimally responsive.     IMPRESSION   Toxic Metabolic  Encephalopathy / r/o Meningitis Etiology  Unknown   Underlying Parkinson's   necrotic sacral ulcer s/p debridement  Septic shock during this admission  Elevated INR and thrombocytopenia      Plan  F/u LP  w/u - scheduled for LP by IR on 4/1   currently on empiric acyclovir  Continue  Abx as per ID   Continue Keppra 125 BID  Continue Sinemet via NG tube  Continue medical management for possible toxic/metabolic encephalopathy/meningitis   Correct INR for possible LP   Monitor thrombocytopenia as platelets have been downtrending     71y year old Male presenting with worsening level of arousal from NH after spending a prolonged admission at [facility].  Recent REEG shows risk for seizures and keppra started. Currently on VEEG with generalized slowing and triphasic waves, remains obtunded.    Likely Toxic Metabolic encephalopathy, Now hemodynamically Unstable  on pressors. Minimally responsive.     IMPRESSION   Toxic Metabolic  Encephalopathy / r/o Meningitis Etiology  Unknown   Underlying Parkinson's   necrotic sacral ulcer s/p debridement  Septic shock during this admission  Elevated INR and thrombocytopenia      Plan  F/u LP  w/u - scheduled for LP by IR on 4/1   currently on empiric acyclovir  Continue  Abx as per ID   Continue Keppra 125 BID.   can repeat REEG   Continue Sinemet via NG tube  Continue medical management for possible toxic/metabolic encephalopathy/meningitis   Correct INR for possible LP   Monitor thrombocytopenia as platelets have been downtrending     71y year old Male presenting with worsening level of arousal from NH after spending a prolonged admission at [facility].  Recent REEG shows risk for seizures and keppra started. Currently on VEEG with generalized slowing and triphasic waves c/w severe toxic/metabolic encephalopathy currently without improvement.    IMPRESSION   Toxic Metabolic  Encephalopathy / r/o Meningitis Etiology  Unknown   Underlying Parkinson's   necrotic sacral ulcer s/p debridement  Septic shock during this admission  Elevated INR and thrombocytopenia      Plan  F/u LP  w/u - scheduled for LP by IR on 4/1 - Correct INR for possible LP and track thrombocytopenia  consider hematology f/u for acute thrombocytopenia  currently on empiric acyclovir  Continue  Abx as per ID   Continue Keppra 125 BID.   can repeat REEG   Continue Sinemet via NG tube  Continue medical management for possible toxic/metabolic encephalopathy/meningitis     71y year old Male presenting with worsening level of arousal from NH after spending a prolonged admission at [facility].  Recent REEG shows risk for seizures and keppra started. Currently on VEEG with generalized slowing and triphasic waves c/w severe toxic/metabolic encephalopathy currently without improvement.    IMPRESSION   Toxic Metabolic  Encephalopathy / r/o Meningitis Etiology  Unknown   Underlying Parkinson's   necrotic sacral ulcer s/p debridement  Septic shock during this admission  Elevated INR and thrombocytopenia      Plan  F/u LP  w/u - scheduled for LP by IR on 4/1 - Correct INR for possible LP and track thrombocytopenia  consider hematology f/u for acute thrombocytopenia  currently on empiric acyclovir  Continue  Abx as per ID   Continue Keppra 125 BID.   repeat REEG   Continue Sinemet via NG tube  Continue medical management for possible toxic/metabolic encephalopathy/meningitis

## 2021-03-31 NOTE — CHART NOTE - NSCHARTNOTEFT_GEN_A_CORE
PACU ANESTHESIA ADMISSION NOTE      Procedure: Debridement of ulcer of sacrum or ulcer of ischium  Stage 4 infected      Post op diagnosis:      ____  Intubated  TV:______       Rate: ______      FiO2: ______    __x__  Patent Airway    ____  Full return of protective reflexes    ____  Full recovery from anesthesia / back to baseline status    Vitals:  T(C): 36.1   HR: 72   BP: 153/75  RR: 18   SpO2: 98%     Mental Status:  _x___ Awake   _____ Alert   _____ Drowsy   _____ Sedated    Nausea/Vomiting:  ____ Yes, See Post - Op Orders      ___x_ No    Pain Scale (0-10):  _____    Treatment: ____ None    __x__ See Post - Op/PCA Orders    Post - Operative Fluids:   ____ Oral   __x__ See Post - Op Orders    Plan: Discharge:   ____Home       ____x_Floor     _____Critical Care    _____  Other:_________________    Comments:  report given to RN DC to pacu

## 2021-03-31 NOTE — PROGRESS NOTE ADULT - ASSESSMENT
71M, pmh Parkinson's, dementia, CKD Stage 3, 1st degree AV block, HLD, gout, HTN, DM, fungal septicemia presenting from [facility] for acute decompensation in mental status. admitted, hypotensive episode 3/27 --> s/p 2.5L NSS, BP remains 79/39mmHg s/p levophed, upgraded to icu, monitored and downgraded on midodrine 10mg q8.     # Altered Mental Status possible Metabolic  - 3 days of decreasing alertness and no longer speaking at the nursing home   - CT Head negative for any acute strokes  - Blood Cx: negative, Urine Cx: negative   - EEG: + epileptiform activity  - c/w keppra 125 BID  - c/w tube feeds  - per S&S patient is too lethargic and unable to participate in exam, keep NPO for now, NG tube in place  - ID: cefepime 1g q12h IV, flagyl 500mg q8h IV given decub, Ampicillin 2g q6h IV, Vanc 1g q24h IV, check AM level 3/31  - c/w midodrine   - IR: Consulted for LP - will take on Thursday (must be off eliquis for three days, last given 3/29 in AM)   - Wednesday must: put in 8pm PT/PTT/INR, T&S, hold AC and place CSF studies.   - Neuro: c/w Keppra & sinemet  - s/p sacral debridement 3/30 w/ burn, planned OR debridement 3/31     # Left hand pain & swelling   - tender to touch, swollen,  - VA duplex: no evidence of dvt  - XR R Hand: No acute fracture or dislocation. Lucency in the distal radial metaphysis, nonspecific. Dedicated wrist radiographs recommended for further evaluation.  - Xray Wrist 2 Views, Left (03.27.21 @ 11:13) No acute fracture or dislocation. Diffuse soft tissue swelling. Nonaggressive appearing lucency in the distal radius, indeterminate. Nonemergent MRI may be obtained for further evaluation.  - per radiology, non aggressive lesion seen in  L wrist, patient would benefit from MRI wrist as outpatient     # Sacral pressure ulcer- stage 4  - spoke with Burn: s/p debridement 3/30,  planned OR debridement 3/31    # H/O Parkinson disease  - Neuro consult: c/w sinemet & keppra   - EEG w/ epileptiform activity, started on keppra per neuro   - CT head negative     # Left shoulder dislocation   - seen on CT in Feb 2021  - Ortho reconsulted- pending L shoulder XR   - Xray Shoulder 2 Views, Left (03.23.21 @ 21:48) Reidentified anterior dislocation of the humeral head overlying the left second rib with bony fragmentation along the glenoid and humeral head. AC joint degenerative change.  - Per Ortho: no surgical intervention at this time    # H/O First Degree AV Block  - No indication for PPM    # ABEBE likely Pre renal  #Hypernatremia  - Baseline Cr 1.2-1.3-2- 2.4 - 2.4 - 1.8 -> 1.7  - Na 134 this am  - trend bmp    # Chronic DVT  - Hold Eliquis for LP thursday     # HTN - well controlled   - Hold Enalapril due to intermittent hypotension.   71M, pmh Parkinson's, dementia, CKD Stage 3, 1st degree AV block, HLD, gout, HTN, DM, fungal septicemia presenting from [facility] for acute decompensation in mental status. admitted, hypotensive episode 3/27 --> s/p 2.5L NSS, BP remains 79/39mmHg s/p levophed, upgraded to icu, monitored and downgraded on midodrine 10mg q8.     # Altered Mental Status possible Metabolic  - 3 days of decreasing alertness and no longer speaking at the nursing home   - CT Head negative for any acute strokes  - Blood Cx: negative, Urine Cx: negative   - EEG: + epileptiform activity  - c/w keppra 125 BID  - c/w tube feeds  - per S&S patient is too lethargic and unable to participate in exam, keep NPO for now, NG tube in place  - ID:  empiric ACV 10mg/kg q12h IV, INCREASE to Vanc 1.25 g q24h IV  continue cefepime 1g q12h IV. if hemodynamic compromise, d/c cefepime/flagyl --> meropenem 1g q12h IV, flagyl 500mg q8h IV given decub, Ampicillin 2g q6h IV  - c/w midodrine   - IR: Consulted for LP - will take on Thursday (must be off eliquis for three days, last given 3/29 in AM)   - f/u 8pm PT/PTT/INR  - Neuro: c/w Keppra & sinemet  - s/p sacral debridement 3/30 w/ burn, planned OR debridement 3/31  - f/u burn, f/u neuro, f/u ID    #Elevated INR   - 2.20 this am  - vitamin k 5mg x1  - f/u 8pm IL/ INR     # Left hand pain & swelling   - tender to touch, swollen,  - VA duplex: no evidence of dvt  - XR R Hand: No acute fracture or dislocation. Lucency in the distal radial metaphysis, nonspecific. Dedicated wrist radiographs recommended for further evaluation.  - Xray Wrist 2 Views, Left (03.27.21 @ 11:13) No acute fracture or dislocation. Diffuse soft tissue swelling. Nonaggressive appearing lucency in the distal radius, indeterminate. Nonemergent MRI may be obtained for further evaluation.  - per radiology, non aggressive lesion seen in  L wrist, patient would benefit from MRI wrist as outpatient     # Sacral pressure ulcer- stage 4  - spoke with Burn: s/p debridement 3/30,  planned OR debridement 3/31    # H/O Parkinson disease  - Neuro consult: c/w sinemet & keppra   - EEG w/ epileptiform activity, started on keppra per neuro   - CT head negative     # Left shoulder dislocation   - seen on CT in Feb 2021  - Ortho reconsulted- pending L shoulder XR   - Xray Shoulder 2 Views, Left (03.23.21 @ 21:48) Reidentified anterior dislocation of the humeral head overlying the left second rib with bony fragmentation along the glenoid and humeral head. AC joint degenerative change.  - Per Ortho: no surgical intervention at this time    # H/O First Degree AV Block  - No indication for PPM    # ABEBE likely Pre renal  #Hypernatremia- resolved  - Baseline Cr 1.2-1.3-2- 2.4 - 2.4 - 1.8 -> 1.7- 1.6 -> 1.3  - Na 134 this am  - trend bmp    # Chronic DVT  - Hold Eliquis for LP thursday     # HTN - well controlled   - Hold Enalapril due to intermittent hypotension.

## 2021-03-31 NOTE — PROGRESS NOTE ADULT - SUBJECTIVE AND OBJECTIVE BOX
NIEVES LABOY  71y, Male  Allergy: No Known Allergies      LOS  9d    CHIEF COMPLAINT: Change in Mental Status (31 Mar 2021 06:42)      INTERVAL EVENTS/HPI  - No acute events overnight, Vancomycin Level, Random: 11.5 (03-31-21 @ 07:00)  - T(F): , Max: 98.1 (03-30-21 @ 23:35)  - Denies any worsening symptoms  - Tolerating medication  - WBC Count: 17.84 (03-31-21 @ 07:00)  WBC Count: 18.61 (03-30-21 @ 16:57)  - Creatinine, Serum: 1.3 (03-31-21 @ 07:00)  Creatinine, Serum: 1.6 (03-30-21 @ 16:57)       ROS  General: Denies rigors, nightsweats  HEENT: Denies headache, rhinorrhea, sore throat, eye pain  CV: Denies CP, palpitations  PULM: Denies wheezing, hemoptysis  GI: Denies hematemesis, hematochezia, melena  : Denies discharge, hematuria  MSK: Denies arthralgias, myalgias  SKIN: Denies rash, lesions  NEURO: Denies paresthesias, weakness  PSYCH: Denies depression, anxiety    VITALS:  T(F): 97, Max: 98.1 (03-30-21 @ 23:35)  HR: 63  BP: 125/62  RR: 18Vital Signs Last 24 Hrs  T(C): 36.1 (31 Mar 2021 04:53), Max: 36.7 (30 Mar 2021 23:35)  T(F): 97 (31 Mar 2021 04:53), Max: 98.1 (30 Mar 2021 23:35)  HR: 63 (31 Mar 2021 04:53) (63 - 79)  BP: 125/62 (31 Mar 2021 04:53) (107/58 - 151/76)  BP(mean): 87 (31 Mar 2021 04:53) (81 - 176)  RR: 18 (31 Mar 2021 04:53) (18 - 20)  SpO2: 100% (30 Mar 2021 23:35) (100% - 100%)    PHYSICAL EXAM:  Gen: NAD, resting in bed, chronically ill appearing contracted  HEENT: Normocephalic, atraumatic  Neck: supple, no lymphadenopathy  CV: Regular rate & regular rhythm  Lungs: decreased BS at bases, no fremitus  Abdomen: Soft, BS present  Ext: Warm, well perfused  Neuro: non focal, awake  Skin: no rash, no erythema  Lines: no phlebitis    FH: Non-contributory  Social Hx: Non-contributory    TESTS & MEASUREMENTS:                        8.3    17.84 )-----------( 82       ( 31 Mar 2021 07:00 )             27.4     03-31    135  |  104  |  44<H>  ----------------------------<  161<H>  3.6   |  20  |  1.3    Ca    8.1<L>      31 Mar 2021 07:00  Mg     2.1     03-31    TPro  5.3<L>  /  Alb  1.8<L>  /  TBili  0.4  /  DBili  x   /  AST  31  /  ALT  11  /  AlkPhos  191<H>  03-31    eGFR if Non African American: 55 mL/min/1.73M2 (03-31-21 @ 07:00)  eGFR if : 63 mL/min/1.73M2 (03-31-21 @ 07:00)  eGFR if Non African American: 42 mL/min/1.73M2 (03-30-21 @ 16:57)  eGFR if : 49 mL/min/1.73M2 (03-30-21 @ 16:57)    LIVER FUNCTIONS - ( 31 Mar 2021 07:00 )  Alb: 1.8 g/dL / Pro: 5.3 g/dL / ALK PHOS: 191 U/L / ALT: 11 U/L / AST: 31 U/L / GGT: x               Culture - Other (collected 03-29-21 @ 17:15)  Source: .Other sacrum  Preliminary Report (03-30-21 @ 22:49):    Numerous Klebsiella pneumoniae    Rare Pseudomonas aeruginosa    Numerous Enterococcus faecalis    Culture - Blood (collected 03-28-21 @ 01:24)  Source: .Blood None  Preliminary Report (03-29-21 @ 13:02):    No growth to date.    Culture - Blood (collected 03-25-21 @ 11:09)  Source: .Blood None  Gram Stain (03-28-21 @ 18:04):    Growth in anaerobic bottle: Gram Positive Cocci in Clusters  Final Report (03-29-21 @ 17:13):    Growth in anaerobic bottle: Staphylococcus pettenkoferi    Coag Negative Staphylococcus    Single set isolate, possible contaminant. Contact    Microbiology if susceptibility testing clinically    indicated.    ***Blood Panel PCR results on this specimen areavailable    approximately 3 hours after the Gram stain result.***    Gram stain, PCR, and/or culture results may not always    correspond due to difference in methodologies.    ************************************************************    This PCR assay wasperformed by multiplex PCR. This    Assay tests for 66 bacterial and resistance gene targets.    Please refer to the [facility] [employer] test directory    at [email or web address] for details.  Organism: Blood Culture PCR (03-29-21 @ 17:13)  Organism: Blood Culture PCR (03-29-21 @ 17:13)      -  Coagulase negative Staphylococcus: Detec      Method Type: PCR    Culture - Blood (collected 03-25-21 @ 05:47)  Source: .Blood None  Final Report (03-30-21 @ 14:01):    No Growth Final    Culture - Urine (collected 03-22-21 @ 20:41)  Source: .Urine Clean Catch (Midstream)  Final Report (03-23-21 @ 20:53):    No growth    Culture - Blood (collected 03-22-21 @ 15:31)  Source: .Blood Blood-Peripheral  Final Report (03-28-21 @ 02:33):    No Growth Final    Culture - Blood (collected 03-22-21 @ 15:28)  Source: .Blood Blood-Peripheral  Final Report (03-28-21 @ 02:33):    No Growth Final    Culture - Urine (collected 03-04-21 @ 12:00)  Source: .Urine Clean Catch (Midstream)  Final Report (03-06-21 @ 07:24):    No growth    Culture - Blood (collected 03-04-21 @ 11:12)  Source: .Blood None  Final Report (03-09-21 @ 23:01):    No Growth Final    Culture - Blood (collected 03-03-21 @ 16:27)  Source: .Blood None  Final Report (03-08-21 @ 23:01):    No Growth Final            INFECTIOUS DISEASES TESTING  Vancomycin Level, Random: 11.5 (03-31-21 @ 07:00)  Vancomycin Level, Trough: 11.5 (03-31-21 @ 07:00)  COVID-19 PCR: NotDetec (03-30-21 @ 15:19)  COVID-19 PCR: NotDetec (03-29-21 @ 19:40)  Vancomycin Level, Trough: <4.0 (03-28-21 @ 13:00)  Procalcitonin, Serum: 0.61 (03-26-21 @ 10:20)  COVID-19 PCR: NotDetec (03-22-21 @ 20:13)  COVID-19 PCR: NotDetec (03-10-21 @ 12:22)  COVID-19 PCR: NotDetec (03-01-21 @ 16:49)  Fungitell: 62 (03-01-21 @ 11:00)  Procalcitonin, Serum: 0.29 (03-01-21 @ 05:32)  MRSA PCR Result.: Negative (02-27-21 @ 15:44)  COVID-19 PCR: NotDetec (02-25-21 @ 15:34)  Procalcitonin, Serum: 0.27 (02-18-21 @ 10:54)  MRSA PCR Result.: Negative (02-14-21 @ 18:29)  HIV-1/2 Combo Result: Nonreact (02-14-21 @ 05:07)  Procalcitonin, Serum: 0.46 (02-13-21 @ 16:00)  COVID-19 PCR: NotDetec (02-12-21 @ 10:17)      INFLAMMATORY MARKERS      RADIOLOGY & ADDITIONAL TESTS:  I have personally reviewed the last available Chest xray  CXR      CT      CARDIOLOGY TESTING  12 Lead ECG:   Ventricular Rate 64 BPM    Atrial Rate 64 BPM    P-R Interval 234 ms    QRS Duration 88 ms    Q-T Interval 404 ms    QTC Calculation(Bazett) 416 ms    P Axis 68 degrees    R Axis -17 degrees    T Axis 9 degrees    Diagnosis Line Sinus rhythm with 1st degree A-V block  Voltage criteria for left ventricular hypertrophy  Nonspecific T wave abnormality  Abnormal ECG    Confirmed by ANA GARDUNO MD ([id number]) on 3/22/2021 9:54:41 PM (03-22-21 @ 16:57)      MEDICATIONS  acyclovir IVPB 800 IV Intermittent every 12 hours  ampicillin  IVPB 2 IV Intermittent every 12 hours  atorvastatin 40 Oral at bedtime  carbidopa/levodopa  25/100 2 Oral <User Schedule>  cefepime   IVPB 1000 IV Intermittent every 12 hours  cefepime   IVPB     chlorhexidine 4% Liquid 1 Topical <User Schedule>  collagenase Ointment 1 Topical two times a day  Dakins Solution - 1/2 Strength 1 Topical two times a day  levETIRAcetam  IVPB 125 IV Intermittent every 12 hours  lidocaine 1%/epinephrine 1:100,000 Inj 40 Local Injection once  metroNIDAZOLE  IVPB 500 IV Intermittent every 8 hours  midodrine 10 Oral every 8 hours  pantoprazole  Injectable 40 IV Push daily  senna 2 Oral at bedtime  vancomycin  IVPB     vancomycin  IVPB 1000 IV Intermittent every 24 hours      WEIGHT  Weight (kg): 79.4 (03-22-21 @ 16:10)  Creatinine, Serum: 1.3 mg/dL (03-31-21 @ 07:00)  Creatinine, Serum: 1.6 mg/dL (03-30-21 @ 16:57)      ANTIBIOTICS:  acyclovir IVPB 800 milliGRAM(s) IV Intermittent every 12 hours  ampicillin  IVPB 2 Gram(s) IV Intermittent every 12 hours  cefepime   IVPB 1000 milliGRAM(s) IV Intermittent every 12 hours  cefepime   IVPB      metroNIDAZOLE  IVPB 500 milliGRAM(s) IV Intermittent every 8 hours  vancomycin  IVPB      vancomycin  IVPB 1000 milliGRAM(s) IV Intermittent every 24 hours      All available historical records have been reviewed

## 2021-03-31 NOTE — PRE-ANESTHESIA EVALUATION ADULT - NSANTHADDINFOFT_GEN_ALL_CORE
Procedure/Risks explained to daughter regarding moderate/deep sedation v. GA with LMA v. ETT + routine monitoring.  Daughter states an understanding of the anesthetic plan and agrees to proceed.

## 2021-03-31 NOTE — PROGRESS NOTE ADULT - ATTENDING COMMENTS
# Altered Mental Status possible Metabolic  #Elevated INR and thrombocytopenia  # malnourishment  # Sacral pressure ulcer- stage 4  # chronic DVT    - Sacral wound culture grew ESBL klebsiella, Psudamonus and VRE  - Switch to Meropenem and Linezolid   - F/U ID  - LP tomorrow with IR  - OR for debridement today  - 2.20 this am  - vitamin k 5mg x1  - Hematology consult  - F/U Nutrition recommendation  - EEG positive cont keppra and f/u neurology post LP  - Hold Eliquis for LP thursday

## 2021-03-31 NOTE — PROGRESS NOTE ADULT - ASSESSMENT
ASSESSMENT  71 year old Male with past medical history of Parkinson's, dementia, CKD Stage 3, 1st degree AV block, HLD, gout, HTN, DM, fungal septicemia presenting from [facility] for acute decompensation in mental status.     IMPRESSION  #Fever 3/25 with sepsis, not present on admission with metabolic encephalopathy, EEG + seizure    3/25 BCX 1/2 sets, 1/4 bottles Staphylococcus pettenkoferi, likely contaminant     cannot rule out meningitis     CXR no PNA   3/22 Blood & Urine cxs NGTD   #Sacral ulcer- necrotic     WCX   Numerous Klebsiella pneumoniae    Rare Pseudomonas aeruginosa    Numerous Enterococcus faecalis    seen by Burn sacrum with full thickness ~4x5cm with dark /brown devitalized tissue at base; no purulent drainage, no bleeding  #Recent Fungemia    Blood Cx 2/27 Candida albicans    evaluated by optho - no ocular evidence     TTE no significant valvulopathy   #ABEBE  #L hand edema  < from: Xray Wrist 2 Views, Left (03.27.21 @ 11:13) >No acute fracture or dislocation. Diffuse soft tissue swelling. Nonaggressive appearing lucency in the distal radius, indeterminate. Nonemergent MRI may be obtained for further evaluation.    Creatinine, Serum: 2.0 (03-26-21 @ 05:33)      RECOMMENDATIONS  - Pending LP given fevers and + EEG: cell count, protein, glucose, CSF PCR, HSV PCR   - empiric ACV 10mg/kg q12h IV  - INCREASE to Vanc 1.25 g q24h IV   - f/u sacral WCX sensitivities   - continue cefepime 1g q12h IV. if hemodynamic compromise, d/c cefepime/flagyl --> meropenem 1g q12h IV  - flagyl 500mg q8h IV given decub  - Ampicillin 2g q6h IV  - Burn following    If any questions, please call or send a message on [employer]  Spectra [phone]

## 2021-03-31 NOTE — PROGRESS NOTE ADULT - SUBJECTIVE AND OBJECTIVE BOX
Neurology Progress Note        HPI:  71 year old Male with past medical history of Parkinson's, dementia, CKD Stage 3, 1st degree AV block, HLD, gout, HTN, DM, fungal septicemia presenting from [facility] for acute decompensation in mental status.     As per documentation patient at baseline is verbal but for past 3 days, he has been worsening to state of being non verbal. Patient was admitted to [facility] for fungemia and discharged on the 11th March. At  he had left hand cellulitis and was treated for it.    In ED patient hemodynamically stable, afebrile, ABEBE with rise in Cr to 1.9 and BUN to 75, remains non verbal. CT head negative for any new strokes and UA negative. Family has not visited the patient recently so unaware of his status. (22 Mar 2021 23:59)    Interval Events:     Patient became hypotensive at 10:30PM on 3/27 --> s/p 2.5L NSS, BP remains 79/39mmHg, overnight intensivist was called and patient was started on levophed. Worsened mental status. upgraded to icu, monitored and downgraded on midodrine 10mg q8.     patient is examined at the bedside, he remains obtunded,       PAST MEDICAL & SURGICAL HISTORY:  Cataract    Prostatitis    Dyslipidemia    Gout    Hypertension    Parkinson disease    No significant past surgical history    MEDICATIONS  (STANDING):  acyclovir IVPB 800 milliGRAM(s) IV Intermittent every 12 hours  ampicillin  IVPB 2 Gram(s) IV Intermittent every 12 hours  atorvastatin 40 milliGRAM(s) Oral at bedtime  carbidopa/levodopa  25/100 2 Tablet(s) Oral <User Schedule>  cefepime   IVPB 1000 milliGRAM(s) IV Intermittent every 12 hours  cefepime   IVPB      chlorhexidine 4% Liquid 1 Application(s) Topical <User Schedule>  collagenase Ointment 1 Application(s) Topical two times a day  Dakins Solution - 1/2 Strength 1 Application(s) Topical two times a day  levETIRAcetam  IVPB 125 milliGRAM(s) IV Intermittent every 12 hours  lidocaine 1%/epinephrine 1:100,000 Inj 40 milliLiter(s) Local Injection once  metroNIDAZOLE  IVPB 500 milliGRAM(s) IV Intermittent every 8 hours  midodrine 10 milliGRAM(s) Oral every 8 hours  pantoprazole  Injectable 40 milliGRAM(s) IV Push daily  senna 2 Tablet(s) Oral at bedtime  vancomycin  IVPB      vancomycin  IVPB 1000 milliGRAM(s) IV Intermittent every 24 hours    MEDICATIONS  (PRN):  acetaminophen   Tablet .. 650 milliGRAM(s) Oral every 6 hours PRN Temp greater or equal to 38C (100.4F)    Vital Signs Last 24 Hrs    T(C): 36.1 (31 Mar 2021 04:53), Max: 36.7 (30 Mar 2021 23:35)  T(F): 97 (31 Mar 2021 04:53), Max: 98.1 (30 Mar 2021 23:35)  HR: 63 (31 Mar 2021 04:53) (63 - 79)  BP: 125/62 (31 Mar 2021 04:53) (107/58 - 151/76)  BP(mean): 87 (31 Mar 2021 04:53) (81 - 176)  RR: 18 (31 Mar 2021 04:53) (18 - 20)  SpO2: 100% (30 Mar 2021 23:35) (100% - 100%)    Neurological Exam:   Mental status: Not  awake  not Alert  only responds to painfully stimuli.  Unable to assess  memory or  Naming or repetition or comprehension at this time.  Cranial nerves: Pupils equally round and reactive to light, Unable  to fully Asses  given  mental status   Motor:  did not follow  commands   Sensation:  Withdraws  to painfully stimuli   Coordination: unable to assess   Reflexes: Unasesessed   Gait: unable to assess     LABS:                         8.3    17.84 )-----------( 82       ( 31 Mar 2021 07:00 )             27.4     03-31    135  |  104  |  44<H>  ----------------------------<  161<H>  3.6   |  20  |  1.3    Ca    8.1<L>      31 Mar 2021 07:00  Mg     2.1     03-31    TPro  5.3<L>  /  Alb  1.8<L>  /  TBili  0.4  /  DBili  x   /  AST  31  /  ALT  11  /  AlkPhos  191<H>  03-31    PT/INR - ( 30 Mar 2021 16:57 )   PT: 25.30 sec;   INR: 2.20 ratio      PTT - ( 30 Mar 2021 16:57 )  PTT:44.2 sec      RADIOLOGY TESTS: Reviewed.     < from: CT Head No Cont (03.22.21 @ 18:16) >  IMPRESSION:    No acute intracranial pathology.    < end of copied text >      < from: EEG (03.23.21 @ 12:00) >  Clinical Correlation & Recommendations  Consistent with diffuse cerebral electrophysiological dysfunction.  Secondary to none specific cause. Consistent with focal electrophysiological dysfunction.  Secondary to structural/metabolic cause. Consistent with potential for partial seizure from RIGHT hemisphere.    < end of copied text >     IMPRESSION    Abnormal VEEG due to diffuse slowing, and poorly characterized background state specific patterns.     CLINICAL CORRELATION    These findings were consistent with diffuse cerebral dysfunction, but are nonspecific in terms of etiology    Neurology Progress Note        HPI:  71 year old Male with past medical history of Parkinson's, dementia, CKD Stage 3, 1st degree AV block, HLD, gout, HTN, DM, fungal septicemia presenting from [facility] for acute decompensation in mental status.     As per documentation patient at baseline is verbal but for past 3 days, he has been worsening to state of being non verbal. Patient was admitted to [facility] for fungemia and discharged on the 11th March. At  he had left hand cellulitis and was treated for it.    In ED patient hemodynamically stable, afebrile, ABEBE with rise in Cr to 1.9 and BUN to 75, remains non verbal. CT head negative for any new strokes and UA negative. Family has not visited the patient recently so unaware of his status. (22 Mar 2021 23:59)    Interval Events:     Patient became hypotensive at 10:30PM on 3/27 --> s/p 2.5L NSS, BP remains 79/39mmHg, overnight intensivist was called and patient was started on levophed. Worsened mental status. upgraded to icu, monitored and downgraded on midodrine 10mg q8.     patient is examined at the bedside, he remains obtunded,       PAST MEDICAL & SURGICAL HISTORY:  Cataract    Prostatitis    Dyslipidemia    Gout    Hypertension    Parkinson disease    No significant past surgical history    MEDICATIONS  (STANDING):  acyclovir IVPB 800 milliGRAM(s) IV Intermittent every 12 hours  ampicillin  IVPB 2 Gram(s) IV Intermittent every 12 hours  atorvastatin 40 milliGRAM(s) Oral at bedtime  carbidopa/levodopa  25/100 2 Tablet(s) Oral <User Schedule>  cefepime   IVPB 1000 milliGRAM(s) IV Intermittent every 12 hours  cefepime   IVPB      chlorhexidine 4% Liquid 1 Application(s) Topical <User Schedule>  collagenase Ointment 1 Application(s) Topical two times a day  Dakins Solution - 1/2 Strength 1 Application(s) Topical two times a day  levETIRAcetam  IVPB 125 milliGRAM(s) IV Intermittent every 12 hours  lidocaine 1%/epinephrine 1:100,000 Inj 40 milliLiter(s) Local Injection once  metroNIDAZOLE  IVPB 500 milliGRAM(s) IV Intermittent every 8 hours  midodrine 10 milliGRAM(s) Oral every 8 hours  pantoprazole  Injectable 40 milliGRAM(s) IV Push daily  senna 2 Tablet(s) Oral at bedtime  vancomycin  IVPB      vancomycin  IVPB 1000 milliGRAM(s) IV Intermittent every 24 hours    MEDICATIONS  (PRN):  acetaminophen   Tablet .. 650 milliGRAM(s) Oral every 6 hours PRN Temp greater or equal to 38C (100.4F)    Vital Signs Last 24 Hrs    T(C): 36.1 (31 Mar 2021 04:53), Max: 36.7 (30 Mar 2021 23:35)  T(F): 97 (31 Mar 2021 04:53), Max: 98.1 (30 Mar 2021 23:35)  HR: 63 (31 Mar 2021 04:53) (63 - 79)  BP: 125/62 (31 Mar 2021 04:53) (107/58 - 151/76)  BP(mean): 87 (31 Mar 2021 04:53) (81 - 176)  RR: 18 (31 Mar 2021 04:53) (18 - 20)  SpO2: 100% (30 Mar 2021 23:35) (100% - 100%)    Neurological Exam:   Mental status: Not  awake  not Alert  only responds to painfully stimuli.  Unable to assess  memory or  Naming or repetition or comprehension at this time.  Cranial nerves: Pupils equally round and reactive to light, Unable  to fully Asses  given  mental status   Motor:  did not follow  commands   Sensation:  Withdraws  to painfully stimuli   Coordination: unable to assess   Reflexes: diminished reflexes on the UE and LE. Positive babinski on the b/l LE.   Gait: unable to assess     LABS:                         8.3    17.84 )-----------( 82       ( 31 Mar 2021 07:00 )             27.4     03-31    135  |  104  |  44<H>  ----------------------------<  161<H>  3.6   |  20  |  1.3    Ca    8.1<L>      31 Mar 2021 07:00  Mg     2.1     03-31    TPro  5.3<L>  /  Alb  1.8<L>  /  TBili  0.4  /  DBili  x   /  AST  31  /  ALT  11  /  AlkPhos  191<H>  03-31    PT/INR - ( 30 Mar 2021 16:57 )   PT: 25.30 sec;   INR: 2.20 ratio      PTT - ( 30 Mar 2021 16:57 )  PTT:44.2 sec      RADIOLOGY TESTS: Reviewed.     < from: CT Head No Cont (03.22.21 @ 18:16) >  IMPRESSION:    No acute intracranial pathology.    < end of copied text >      < from: EEG (03.23.21 @ 12:00) >  Clinical Correlation & Recommendations  Consistent with diffuse cerebral electrophysiological dysfunction.  Secondary to none specific cause. Consistent with focal electrophysiological dysfunction.  Secondary to structural/metabolic cause. Consistent with potential for partial seizure from RIGHT hemisphere.    < end of copied text >     IMPRESSION    Abnormal VEEG due to diffuse slowing, and poorly characterized background state specific patterns.     CLINICAL CORRELATION    These findings were consistent with diffuse cerebral dysfunction, but are nonspecific in terms of etiology    Neurology Progress Note        HPI:  71 year old Male with past medical history of Parkinson's, dementia, CKD Stage 3, 1st degree AV block, HLD, gout, HTN, DM, fungal septicemia presenting from [facility] for acute decompensation in mental status.     As per documentation patient at baseline is verbal but for past 3 days, he has been worsening to state of being non verbal. Patient was admitted to [facility] for fungemia and discharged on the 11th March. At  he had left hand cellulitis and was treated for it.    In ED patient hemodynamically stable, afebrile, ABEBE with rise in Cr to 1.9 and BUN to 75, remains non verbal. CT head negative for any new strokes and UA negative. Family has not visited the patient recently so unaware of his status. (22 Mar 2021 23:59)    Interval Events:     Patient became hypotensive at 10:30PM on 3/27 --> s/p 2.5L NSS, BP remains 79/39mmHg, overnight intensivist was called and patient was started on levophed. Worsened mental status. upgraded to icu, monitored and downgraded on midodrine 10mg q8.     patient is examined at the bedside, he remains obtunded,       PAST MEDICAL & SURGICAL HISTORY:  Cataract    Prostatitis    Dyslipidemia    Gout    Hypertension    Parkinson disease    No significant past surgical history    MEDICATIONS  (STANDING):  acyclovir IVPB 800 milliGRAM(s) IV Intermittent every 12 hours  ampicillin  IVPB 2 Gram(s) IV Intermittent every 12 hours  atorvastatin 40 milliGRAM(s) Oral at bedtime  carbidopa/levodopa  25/100 2 Tablet(s) Oral <User Schedule>  cefepime   IVPB 1000 milliGRAM(s) IV Intermittent every 12 hours  cefepime   IVPB      chlorhexidine 4% Liquid 1 Application(s) Topical <User Schedule>  collagenase Ointment 1 Application(s) Topical two times a day  Dakins Solution - 1/2 Strength 1 Application(s) Topical two times a day  levETIRAcetam  IVPB 125 milliGRAM(s) IV Intermittent every 12 hours  lidocaine 1%/epinephrine 1:100,000 Inj 40 milliLiter(s) Local Injection once  metroNIDAZOLE  IVPB 500 milliGRAM(s) IV Intermittent every 8 hours  midodrine 10 milliGRAM(s) Oral every 8 hours  pantoprazole  Injectable 40 milliGRAM(s) IV Push daily  senna 2 Tablet(s) Oral at bedtime  vancomycin  IVPB      vancomycin  IVPB 1000 milliGRAM(s) IV Intermittent every 24 hours    MEDICATIONS  (PRN):  acetaminophen   Tablet .. 650 milliGRAM(s) Oral every 6 hours PRN Temp greater or equal to 38C (100.4F)    Vital Signs Last 24 Hrs    T(C): 36.1 (31 Mar 2021 04:53), Max: 36.7 (30 Mar 2021 23:35)  T(F): 97 (31 Mar 2021 04:53), Max: 98.1 (30 Mar 2021 23:35)  HR: 63 (31 Mar 2021 04:53) (63 - 79)  BP: 125/62 (31 Mar 2021 04:53) (107/58 - 151/76)  BP(mean): 87 (31 Mar 2021 04:53) (81 - 176)  RR: 18 (31 Mar 2021 04:53) (18 - 20)  SpO2: 100% (30 Mar 2021 23:35) (100% - 100%)    Neurological Exam:   Mental status: Not  awake  not Alert  only responds to painfully stimuli.  Unable to assess  memory or  Naming or repetition or comprehension at this time.  Cranial nerves: Pupils equally round and reactive to light, Unable  to fully Asses  given  mental status   Motor:  did not follow  commands   Sensation:  Withdraws  to painfully stimuli   Coordination: unable to assess   Reflexes: diminished reflexes on the UE and LE. Positive babinski on the b/l LE.   Gait: unable to assess     LABS:                         8.3    17.84 )-----------( 82       ( 31 Mar 2021 07:00 )             27.4     03-31    135  |  104  |  44<H>  ----------------------------<  161<H>  3.6   |  20  |  1.3    Ca    8.1<L>      31 Mar 2021 07:00  Mg     2.1     03-31    TPro  5.3<L>  /  Alb  1.8<L>  /  TBili  0.4  /  DBili  x   /  AST  31  /  ALT  11  /  AlkPhos  191<H>  03-31    PT/INR - ( 30 Mar 2021 16:57 )   PT: 25.30 sec;   INR: 2.20 ratio      PTT - ( 30 Mar 2021 16:57 )  PTT:44.2 sec      RADIOLOGY TESTS: Reviewed.     < from: CT Head No Cont (03.22.21 @ 18:16) >  IMPRESSION:    No acute intracranial pathology.    < end of copied text >      < from: EEG (03.23.21 @ 12:00) >  Clinical Correlation & Recommendations  Consistent with diffuse cerebral electrophysiological dysfunction.  Secondary to none specific cause. Consistent with focal electrophysiological dysfunction.  Secondary to structural/metabolic cause. Consistent with potential for partial seizure from RIGHT hemisphere.    < end of copied text >     IMPRESSION    Abnormal VEEG due to diffuse slowing, and poorly characterized background state specific patterns.     CLINICAL CORRELATION    These findings were consistent with diffuse cerebral dysfunction, but are nonspecific in terms of etiology    Neurology Progress Note    HPI:  71 year old Male with past medical history of Parkinson's, dementia, CKD Stage 3, 1st degree AV block, HLD, gout, HTN, DM, fungal septicemia presenting from [facility] for acute decompensation in mental status.     As per documentation patient at baseline is verbal but for past 3 days, he has been worsening to state of being non verbal. Patient was admitted to [facility] for fungemia and discharged on the 11th March. At  he had left hand cellulitis and was treated for it.    In ED patient hemodynamically stable, afebrile, ABEBE with rise in Cr to 1.9 and BUN to 75, remains non verbal. CT head negative for any new strokes and UA negative. Family has not visited the patient recently so unaware of his status. (22 Mar 2021 23:59)    Interval Events:   Patient became hypotensive at 10:30PM on 3/27 --> s/p 2.5L NSS, BP remains 79/39mmHg, overnight intensivist was called and patient was started on levophed. Worsened mental status. upgraded to icu, monitored and downgraded on midodrine 10mg q8.     patient is examined at the bedside, he remains obtunded,       PAST MEDICAL & SURGICAL HISTORY:  Cataract    Prostatitis    Dyslipidemia    Gout    Hypertension    Parkinson disease    No significant past surgical history    MEDICATIONS  (STANDING):  acyclovir IVPB 800 milliGRAM(s) IV Intermittent every 12 hours  ampicillin  IVPB 2 Gram(s) IV Intermittent every 12 hours  atorvastatin 40 milliGRAM(s) Oral at bedtime  carbidopa/levodopa  25/100 2 Tablet(s) Oral <User Schedule>  cefepime   IVPB 1000 milliGRAM(s) IV Intermittent every 12 hours  cefepime   IVPB      chlorhexidine 4% Liquid 1 Application(s) Topical <User Schedule>  collagenase Ointment 1 Application(s) Topical two times a day  Dakins Solution - 1/2 Strength 1 Application(s) Topical two times a day  levETIRAcetam  IVPB 125 milliGRAM(s) IV Intermittent every 12 hours  lidocaine 1%/epinephrine 1:100,000 Inj 40 milliLiter(s) Local Injection once  metroNIDAZOLE  IVPB 500 milliGRAM(s) IV Intermittent every 8 hours  midodrine 10 milliGRAM(s) Oral every 8 hours  pantoprazole  Injectable 40 milliGRAM(s) IV Push daily  senna 2 Tablet(s) Oral at bedtime  vancomycin  IVPB      vancomycin  IVPB 1000 milliGRAM(s) IV Intermittent every 24 hours    MEDICATIONS  (PRN):  acetaminophen   Tablet .. 650 milliGRAM(s) Oral every 6 hours PRN Temp greater or equal to 38C (100.4F)    Vital Signs Last 24 Hrs    T(C): 36.1 (31 Mar 2021 04:53), Max: 36.7 (30 Mar 2021 23:35)  T(F): 97 (31 Mar 2021 04:53), Max: 98.1 (30 Mar 2021 23:35)  HR: 63 (31 Mar 2021 04:53) (63 - 79)  BP: 125/62 (31 Mar 2021 04:53) (107/58 - 151/76)  BP(mean): 87 (31 Mar 2021 04:53) (81 - 176)  RR: 18 (31 Mar 2021 04:53) (18 - 20)  SpO2: 100% (30 Mar 2021 23:35) (100% - 100%)    Neurological Exam:   Mental status: Not  awake  not Alert  only responds to painfully stimuli.  Unable to assess  memory or  Naming or repetition or comprehension at this time.  Cranial nerves: Pupils equally round and reactive to light, Unable  to fully Asses  given  mental status   Motor:  did not follow  commands   Sensation:  Withdraws  to painfully stimuli   Coordination: unable to assess   Reflexes: diminished reflexes on the UE and LE. Positive babinski on the b/l LE.   Gait: unable to assess     (on my exam: easily arousable, eyes open and tracks to voice but not following commands.  (+) spontaneous movement.  no abnormal movements.  nonverbal)    LABS:                         8.3    17.84 )-----------( 82       ( 31 Mar 2021 07:00 )             27.4     03-31    135  |  104  |  44<H>  ----------------------------<  161<H>  3.6   |  20  |  1.3    Ca    8.1<L>      31 Mar 2021 07:00  Mg     2.1     03-31    TPro  5.3<L>  /  Alb  1.8<L>  /  TBili  0.4  /  DBili  x   /  AST  31  /  ALT  11  /  AlkPhos  191<H>  03-31    PT/INR - ( 30 Mar 2021 16:57 )   PT: 25.30 sec;   INR: 2.20 ratio      PTT - ( 30 Mar 2021 16:57 )  PTT:44.2 sec      RADIOLOGY TESTS: Reviewed.     < from: CT Head No Cont (03.22.21 @ 18:16) >  IMPRESSION:    No acute intracranial pathology.    < end of copied text >      < from: EEG (03.23.21 @ 12:00) >  Clinical Correlation & Recommendations  Consistent with diffuse cerebral electrophysiological dysfunction.  Secondary to none specific cause. Consistent with focal electrophysiological dysfunction.  Secondary to structural/metabolic cause. Consistent with potential for partial seizure from RIGHT hemisphere.    < end of copied text >     IMPRESSION    Abnormal VEEG due to diffuse slowing, and poorly characterized background state specific patterns.     CLINICAL CORRELATION    These findings were consistent with diffuse cerebral dysfunction, but are nonspecific in terms of etiology

## 2021-04-01 ENCOUNTER — RESULT REVIEW (OUTPATIENT)
Age: 71
End: 2021-04-01

## 2021-04-01 LAB
ALBUMIN SERPL ELPH-MCNC: 1.9 G/DL — LOW (ref 3.5–5.2)
ALP SERPL-CCNC: 151 U/L — HIGH (ref 30–115)
ALT FLD-CCNC: 10 U/L — SIGNIFICANT CHANGE UP (ref 0–41)
ANION GAP SERPL CALC-SCNC: 11 MMOL/L — SIGNIFICANT CHANGE UP (ref 7–14)
APPEARANCE CSF: CLEAR — SIGNIFICANT CHANGE UP
APTT BLD: 21.1 SEC — CRITICAL LOW (ref 27–39.2)
AST SERPL-CCNC: 36 U/L — SIGNIFICANT CHANGE UP (ref 0–41)
BASOPHILS # BLD AUTO: 0.02 K/UL — SIGNIFICANT CHANGE UP (ref 0–0.2)
BASOPHILS NFR BLD AUTO: 0.1 % — SIGNIFICANT CHANGE UP (ref 0–1)
BILIRUB SERPL-MCNC: 0.3 MG/DL — SIGNIFICANT CHANGE UP (ref 0.2–1.2)
BLD GP AB SCN SERPL QL: SIGNIFICANT CHANGE UP
BUN SERPL-MCNC: 46 MG/DL — HIGH (ref 10–20)
CALCIUM SERPL-MCNC: 7.9 MG/DL — LOW (ref 8.5–10.1)
CHLORIDE SERPL-SCNC: 106 MMOL/L — SIGNIFICANT CHANGE UP (ref 98–110)
CO2 SERPL-SCNC: 17 MMOL/L — SIGNIFICANT CHANGE UP (ref 17–32)
COLOR CSF: SIGNIFICANT CHANGE UP
CREAT SERPL-MCNC: 1.2 MG/DL — SIGNIFICANT CHANGE UP (ref 0.7–1.5)
EOSINOPHIL # BLD AUTO: 0 K/UL — SIGNIFICANT CHANGE UP (ref 0–0.7)
EOSINOPHIL NFR BLD AUTO: 0 % — SIGNIFICANT CHANGE UP (ref 0–8)
GLUCOSE BLDC GLUCOMTR-MCNC: 152 MG/DL — HIGH (ref 70–99)
GLUCOSE BLDC GLUCOMTR-MCNC: 154 MG/DL — HIGH (ref 70–99)
GLUCOSE BLDC GLUCOMTR-MCNC: 172 MG/DL — HIGH (ref 70–99)
GLUCOSE CSF-MCNC: 97 MG/DL — HIGH (ref 45–75)
GLUCOSE SERPL-MCNC: 167 MG/DL — HIGH (ref 70–99)
GRAM STN FLD: SIGNIFICANT CHANGE UP
HCT VFR BLD CALC: 23.7 % — LOW (ref 42–52)
HGB BLD-MCNC: 7.2 G/DL — LOW (ref 14–18)
IMM GRANULOCYTES NFR BLD AUTO: 7.1 % — HIGH (ref 0.1–0.3)
INR BLD: 1.39 RATIO — HIGH (ref 0.65–1.3)
LYMPHOCYTES # BLD AUTO: 0.67 K/UL — LOW (ref 1.2–3.4)
LYMPHOCYTES # BLD AUTO: 4.8 % — LOW (ref 20.5–51.1)
MCHC RBC-ENTMCNC: 25.4 PG — LOW (ref 27–31)
MCHC RBC-ENTMCNC: 30.4 G/DL — LOW (ref 32–37)
MCV RBC AUTO: 83.5 FL — SIGNIFICANT CHANGE UP (ref 80–94)
MONOCYTES # BLD AUTO: 0.83 K/UL — HIGH (ref 0.1–0.6)
MONOCYTES NFR BLD AUTO: 6 % — SIGNIFICANT CHANGE UP (ref 1.7–9.3)
NEUTROPHILS # BLD AUTO: 11.34 K/UL — HIGH (ref 1.4–6.5)
NEUTROPHILS # CSF: SIGNIFICANT CHANGE UP % (ref 0–6)
NEUTROPHILS NFR BLD AUTO: 82 % — HIGH (ref 42.2–75.2)
NRBC # BLD: 0 /100 WBCS — SIGNIFICANT CHANGE UP (ref 0–0)
NRBC NFR CSF: 0 /UL — SIGNIFICANT CHANGE UP (ref 0–5)
PLATELET # BLD AUTO: 49 K/UL — LOW (ref 130–400)
POTASSIUM SERPL-MCNC: 3.9 MMOL/L — SIGNIFICANT CHANGE UP (ref 3.5–5)
POTASSIUM SERPL-SCNC: 3.9 MMOL/L — SIGNIFICANT CHANGE UP (ref 3.5–5)
PROT CSF-MCNC: 42 MG/DL — SIGNIFICANT CHANGE UP (ref 15–45)
PROT SERPL-MCNC: 5.3 G/DL — LOW (ref 6–8)
PROTHROM AB SERPL-ACNC: 16 SEC — HIGH (ref 9.95–12.87)
RBC # BLD: 2.84 M/UL — LOW (ref 4.7–6.1)
RBC # CSF: 9 /UL — SIGNIFICANT CHANGE UP (ref 0–0)
RBC # FLD: 16.6 % — HIGH (ref 11.5–14.5)
SODIUM SERPL-SCNC: 134 MMOL/L — LOW (ref 135–146)
SPECIMEN SOURCE: SIGNIFICANT CHANGE UP
TUBE TYPE: SIGNIFICANT CHANGE UP
WBC # BLD: 13.84 K/UL — HIGH (ref 4.8–10.8)
WBC # FLD AUTO: 13.84 K/UL — HIGH (ref 4.8–10.8)

## 2021-04-01 PROCEDURE — 99231 SBSQ HOSP IP/OBS SF/LOW 25: CPT

## 2021-04-01 PROCEDURE — 99233 SBSQ HOSP IP/OBS HIGH 50: CPT

## 2021-04-01 PROCEDURE — 62328 DX LMBR SPI PNXR W/FLUOR/CT: CPT

## 2021-04-01 PROCEDURE — 99222 1ST HOSP IP/OBS MODERATE 55: CPT

## 2021-04-01 PROCEDURE — 93970 EXTREMITY STUDY: CPT | Mod: 26

## 2021-04-01 PROCEDURE — 88108 CYTOPATH CONCENTRATE TECH: CPT | Mod: 26

## 2021-04-01 RX ORDER — AMPICILLIN TRIHYDRATE 250 MG
2 CAPSULE ORAL EVERY 6 HOURS
Refills: 0 | Status: DISCONTINUED | OUTPATIENT
Start: 2021-04-01 | End: 2021-04-02

## 2021-04-01 RX ORDER — FAMOTIDINE 10 MG/ML
20 INJECTION INTRAVENOUS DAILY
Refills: 0 | Status: DISCONTINUED | OUTPATIENT
Start: 2021-04-01 | End: 2021-04-06

## 2021-04-01 RX ORDER — FAMOTIDINE 10 MG/ML
40 INJECTION INTRAVENOUS DAILY
Refills: 0 | Status: DISCONTINUED | OUTPATIENT
Start: 2021-04-01 | End: 2021-04-01

## 2021-04-01 RX ORDER — MEROPENEM 1 G/30ML
1000 INJECTION INTRAVENOUS EVERY 12 HOURS
Refills: 0 | Status: DISCONTINUED | OUTPATIENT
Start: 2021-04-01 | End: 2021-04-05

## 2021-04-01 RX ADMIN — Medication 266 MILLIGRAM(S): at 17:03

## 2021-04-01 RX ADMIN — MIDODRINE HYDROCHLORIDE 10 MILLIGRAM(S): 2.5 TABLET ORAL at 21:28

## 2021-04-01 RX ADMIN — LEVETIRACETAM 405 MILLIGRAM(S): 250 TABLET, FILM COATED ORAL at 05:35

## 2021-04-01 RX ADMIN — Medication 166.67 MILLIGRAM(S): at 00:41

## 2021-04-01 RX ADMIN — Medication 5 MILLIGRAM(S): at 22:15

## 2021-04-01 RX ADMIN — CEFEPIME 100 MILLIGRAM(S): 1 INJECTION, POWDER, FOR SOLUTION INTRAMUSCULAR; INTRAVENOUS at 05:36

## 2021-04-01 RX ADMIN — Medication 266 MILLIGRAM(S): at 05:37

## 2021-04-01 RX ADMIN — Medication 1 APPLICATION(S): at 17:04

## 2021-04-01 RX ADMIN — Medication 100 MILLIGRAM(S): at 05:40

## 2021-04-01 RX ADMIN — Medication 216 GRAM(S): at 05:37

## 2021-04-01 RX ADMIN — Medication 100 MILLIGRAM(S): at 13:03

## 2021-04-01 RX ADMIN — CARBIDOPA AND LEVODOPA 2 TABLET(S): 25; 100 TABLET ORAL at 11:25

## 2021-04-01 RX ADMIN — Medication 166.67 MILLIGRAM(S): at 23:10

## 2021-04-01 RX ADMIN — Medication 216 GRAM(S): at 23:08

## 2021-04-01 RX ADMIN — MEROPENEM 100 MILLIGRAM(S): 1 INJECTION INTRAVENOUS at 18:52

## 2021-04-01 RX ADMIN — SENNA PLUS 2 TABLET(S): 8.6 TABLET ORAL at 21:28

## 2021-04-01 RX ADMIN — Medication 216 GRAM(S): at 18:27

## 2021-04-01 RX ADMIN — CARBIDOPA AND LEVODOPA 2 TABLET(S): 25; 100 TABLET ORAL at 14:40

## 2021-04-01 RX ADMIN — PANTOPRAZOLE SODIUM 40 MILLIGRAM(S): 20 TABLET, DELAYED RELEASE ORAL at 11:26

## 2021-04-01 RX ADMIN — ATORVASTATIN CALCIUM 40 MILLIGRAM(S): 80 TABLET, FILM COATED ORAL at 21:28

## 2021-04-01 RX ADMIN — CHLORHEXIDINE GLUCONATE 1 APPLICATION(S): 213 SOLUTION TOPICAL at 05:33

## 2021-04-01 RX ADMIN — CARBIDOPA AND LEVODOPA 2 TABLET(S): 25; 100 TABLET ORAL at 21:28

## 2021-04-01 RX ADMIN — Medication 1 APPLICATION(S): at 17:03

## 2021-04-01 RX ADMIN — LEVETIRACETAM 405 MILLIGRAM(S): 250 TABLET, FILM COATED ORAL at 17:03

## 2021-04-01 RX ADMIN — CARBIDOPA AND LEVODOPA 2 TABLET(S): 25; 100 TABLET ORAL at 05:38

## 2021-04-01 NOTE — PROGRESS NOTE ADULT - SUBJECTIVE AND OBJECTIVE BOX
SUBJECTIVE  Patient is a 71y old Male who presents with a chief complaint of Change in Mental Status (01 Apr 2021 11:17)  Currently admitted to medicine with the primary diagnosis of AMS (altered mental status)    Today is hospital day 10d, and this morning he remains unresponsive. Patient appearing more comfortably than on previous days    OBJECTIVE  PAST MEDICAL & SURGICAL HISTORY  Parkinson disease    Hypertension    Gout    Dyslipidemia    Prostatitis    Cataract    No significant past surgical history      ALLERGIES:  No Known Allergies    MEDICATIONS:  STANDING MEDICATIONS  acyclovir IVPB 800 milliGRAM(s) IV Intermittent every 12 hours  ampicillin  IVPB 2 Gram(s) IV Intermittent every 12 hours  atorvastatin 40 milliGRAM(s) Oral at bedtime  carbidopa/levodopa  25/100 2 Tablet(s) Oral <User Schedule>  cefepime   IVPB 1000 milliGRAM(s) IV Intermittent every 12 hours  chlorhexidine 4% Liquid 1 Application(s) Topical <User Schedule>  collagenase Ointment 1 Application(s) Topical two times a day  Dakins Solution - 1/2 Strength 1 Application(s) Topical two times a day  levETIRAcetam  IVPB 125 milliGRAM(s) IV Intermittent every 12 hours  metroNIDAZOLE  IVPB 500 milliGRAM(s) IV Intermittent every 8 hours  midodrine 10 milliGRAM(s) Oral every 8 hours  pantoprazole  Injectable 40 milliGRAM(s) IV Push daily  senna 2 Tablet(s) Oral at bedtime  vancomycin  IVPB 1250 milliGRAM(s) IV Intermittent every 24 hours    PRN MEDICATIONS  acetaminophen   Tablet .. 650 milliGRAM(s) Oral every 6 hours PRN  oxycodone    5 mG/acetaminophen 325 mG 1 Tablet(s) Oral every 6 hours PRN      VITAL SIGNS: Last 24 Hours  T(C): 36.7 (01 Apr 2021 13:10), Max: 37.3 (31 Mar 2021 20:36)  T(F): 98 (01 Apr 2021 13:10), Max: 99.2 (31 Mar 2021 20:36)  HR: 68 (01 Apr 2021 13:10) (62 - 85)  BP: 138/71 (01 Apr 2021 13:10) (121/59 - 181/94)  BP(mean): 118 (01 Apr 2021 07:33) (86 - 130)  RR: 20 (01 Apr 2021 13:10) (20 - 28)  SpO2: 98% (01 Apr 2021 05:16) (95% - 98%)    LABS:                        10.1   12.62 )-----------( 51       ( 01 Apr 2021 06:55 )             33.5     04-01    137  |  108  |  41<H>  ----------------------------<  176<H>  4.1   |  17  |  1.3    Ca    8.2<L>      01 Apr 2021 06:55  Phos  3.4     03-31  Mg     2.1     03-31    TPro  6.1  /  Alb  2.1<L>  /  TBili  0.3  /  DBili  x   /  AST  33  /  ALT  12  /  AlkPhos  201<H>  04-01    PT/INR - ( 01 Apr 2021 06:55 )   PT: 17.00 sec;   INR: 1.48 ratio         PTT - ( 01 Apr 2021 06:55 )  PTT:39.9 sec          Culture - Acid Fast - Tissue w/Smear (collected 31 Mar 2021 16:18)  Source: .Tissue None    Culture - Tissue with Gram Stain (collected 31 Mar 2021 16:18)  Source: .Tissue None  Gram Stain (01 Apr 2021 07:05):    Few polymorphonuclear leukocytes seen per low power field    Numerous Gram Negative Rods seen per oil power field    Culture - Other (collected 29 Mar 2021 17:15)  Source: .Other sacrum  Final Report (31 Mar 2021 16:53):    Numerous Klebsiella pneumoniae ESBL    Few Pseudomonas aeruginosa    Numerous Enterococcus faecalis (vancomycin resistant)  Organism: Klebsiella pneumoniae ESBL  Pseudomonas aeruginosa  Enterococcus faecalis (vancomycin resistant) (31 Mar 2021 16:53)  Organism: Enterococcus faecalis (vancomycin resistant) (31 Mar 2021 16:53)  Organism: Pseudomonas aeruginosa (31 Mar 2021 16:53)  Organism: Klebsiella pneumoniae ESBL (31 Mar 2021 16:53)          RADIOLOGY:    PHYSICAL EXAM:    GENERAL: NAD, well-developed, AAOx3  HEENT:  Atraumatic, Normocephalic. EOMI, PERRLA, conjunctiva and sclera clear, No JVD  PULMONARY: Clear to auscultation bilaterally; No wheeze  CARDIOVASCULAR: Regular rate and rhythm; No murmurs, rubs, or gallops  GASTROINTESTINAL: Soft, Nontender, Nondistended; Bowel sounds present  MUSCULOSKELETAL:  2+ Peripheral Pulses, No clubbing, cyanosis, or edema  NEUROLOGY: non-focal  SKIN: No rashes or lesions     SUBJECTIVE  Patient is a 71y old Male who presents with a chief complaint of Change in Mental Status (01 Apr 2021 11:17)  Currently admitted to medicine with the primary diagnosis of AMS (altered mental status)    Today is hospital day 10d, and this morning he remains unresponsive. Patient appearing more comfortable than on previous days.     OBJECTIVE  PAST MEDICAL & SURGICAL HISTORY  Parkinson disease    Hypertension    Gout    Dyslipidemia    Prostatitis    Cataract    No significant past surgical history      ALLERGIES:  No Known Allergies    MEDICATIONS:  STANDING MEDICATIONS  acyclovir IVPB 800 milliGRAM(s) IV Intermittent every 12 hours  ampicillin  IVPB 2 Gram(s) IV Intermittent every 12 hours  atorvastatin 40 milliGRAM(s) Oral at bedtime  carbidopa/levodopa  25/100 2 Tablet(s) Oral <User Schedule>  cefepime   IVPB 1000 milliGRAM(s) IV Intermittent every 12 hours  chlorhexidine 4% Liquid 1 Application(s) Topical <User Schedule>  collagenase Ointment 1 Application(s) Topical two times a day  Dakins Solution - 1/2 Strength 1 Application(s) Topical two times a day  levETIRAcetam  IVPB 125 milliGRAM(s) IV Intermittent every 12 hours  metroNIDAZOLE  IVPB 500 milliGRAM(s) IV Intermittent every 8 hours  midodrine 10 milliGRAM(s) Oral every 8 hours  pantoprazole  Injectable 40 milliGRAM(s) IV Push daily  senna 2 Tablet(s) Oral at bedtime  vancomycin  IVPB 1250 milliGRAM(s) IV Intermittent every 24 hours    PRN MEDICATIONS  acetaminophen   Tablet .. 650 milliGRAM(s) Oral every 6 hours PRN  oxycodone    5 mG/acetaminophen 325 mG 1 Tablet(s) Oral every 6 hours PRN      VITAL SIGNS: Last 24 Hours  T(C): 36.7 (01 Apr 2021 13:10), Max: 37.3 (31 Mar 2021 20:36)  T(F): 98 (01 Apr 2021 13:10), Max: 99.2 (31 Mar 2021 20:36)  HR: 68 (01 Apr 2021 13:10) (62 - 85)  BP: 138/71 (01 Apr 2021 13:10) (121/59 - 181/94)  BP(mean): 118 (01 Apr 2021 07:33) (86 - 130)  RR: 20 (01 Apr 2021 13:10) (20 - 28)  SpO2: 98% (01 Apr 2021 05:16) (95% - 98%)    LABS:                        10.1   12.62 )-----------( 51       ( 01 Apr 2021 06:55 )             33.5     04-01    137  |  108  |  41<H>  ----------------------------<  176<H>  4.1   |  17  |  1.3    Ca    8.2<L>      01 Apr 2021 06:55  Phos  3.4     03-31  Mg     2.1     03-31    TPro  6.1  /  Alb  2.1<L>  /  TBili  0.3  /  DBili  x   /  AST  33  /  ALT  12  /  AlkPhos  201<H>  04-01    PT/INR - ( 01 Apr 2021 06:55 )   PT: 17.00 sec;   INR: 1.48 ratio         PTT - ( 01 Apr 2021 06:55 )  PTT:39.9 sec          Culture - Acid Fast - Tissue w/Smear (collected 31 Mar 2021 16:18)  Source: .Tissue None    Culture - Tissue with Gram Stain (collected 31 Mar 2021 16:18)  Source: .Tissue None  Gram Stain (01 Apr 2021 07:05):    Few polymorphonuclear leukocytes seen per low power field    Numerous Gram Negative Rods seen per oil power field    Culture - Other (collected 29 Mar 2021 17:15)  Source: .Other sacrum  Final Report (31 Mar 2021 16:53):    Numerous Klebsiella pneumoniae ESBL    Few Pseudomonas aeruginosa    Numerous Enterococcus faecalis (vancomycin resistant)  Organism: Klebsiella pneumoniae ESBL  Pseudomonas aeruginosa  Enterococcus faecalis (vancomycin resistant) (31 Mar 2021 16:53)  Organism: Enterococcus faecalis (vancomycin resistant) (31 Mar 2021 16:53)  Organism: Pseudomonas aeruginosa (31 Mar 2021 16:53)  Organism: Klebsiella pneumoniae ESBL (31 Mar 2021 16:53)      RADIOLOGY:    PHYSICAL EXAM:    GENERAL: NAD, well-developed, not responding to verbal or tactile stimuli.  HEENT:  Atraumatic, Normocephalic  PULMONARY: Clear to auscultation bilaterally  CARDIOVASCULAR: Regular rate and rhythm; No murmurs, rubs, or gallops  GASTROINTESTINAL: Soft, Nontender, Nondistended  MUSCULOSKELETAL: Large edematous bilateral upper extremities and Lower extremities. No clubbing, cyanosis  SKIN: No rashes or lesions   SUBJECTIVE  Patient is a 71y old Male who presents with a chief complaint of Change in Mental Status (01 Apr 2021 11:17)  Currently admitted to medicine with the primary diagnosis of AMS (altered mental status)    Today is hospital day 10d, and this morning he remains unresponsive. Patient appearing more comfortable than on previous days.     OBJECTIVE  PAST MEDICAL & SURGICAL HISTORY  Parkinson disease    Hypertension    Gout    Dyslipidemia    Prostatitis    Cataract    No significant past surgical history      ALLERGIES:  No Known Allergies    MEDICATIONS:  STANDING MEDICATIONS  acyclovir IVPB 800 milliGRAM(s) IV Intermittent every 12 hours  ampicillin  IVPB 2 Gram(s) IV Intermittent every 12 hours  atorvastatin 40 milliGRAM(s) Oral at bedtime  carbidopa/levodopa  25/100 2 Tablet(s) Oral <User Schedule>  cefepime   IVPB 1000 milliGRAM(s) IV Intermittent every 12 hours  chlorhexidine 4% Liquid 1 Application(s) Topical <User Schedule>  collagenase Ointment 1 Application(s) Topical two times a day  Dakins Solution - 1/2 Strength 1 Application(s) Topical two times a day  levETIRAcetam  IVPB 125 milliGRAM(s) IV Intermittent every 12 hours  metroNIDAZOLE  IVPB 500 milliGRAM(s) IV Intermittent every 8 hours  midodrine 10 milliGRAM(s) Oral every 8 hours  pantoprazole  Injectable 40 milliGRAM(s) IV Push daily  senna 2 Tablet(s) Oral at bedtime  vancomycin  IVPB 1250 milliGRAM(s) IV Intermittent every 24 hours    PRN MEDICATIONS  acetaminophen   Tablet .. 650 milliGRAM(s) Oral every 6 hours PRN  oxycodone    5 mG/acetaminophen 325 mG 1 Tablet(s) Oral every 6 hours PRN      VITAL SIGNS: Last 24 Hours  T(C): 36.7 (01 Apr 2021 13:10), Max: 37.3 (31 Mar 2021 20:36)  T(F): 98 (01 Apr 2021 13:10), Max: 99.2 (31 Mar 2021 20:36)  HR: 68 (01 Apr 2021 13:10) (62 - 85)  BP: 138/71 (01 Apr 2021 13:10) (121/59 - 181/94)  BP(mean): 118 (01 Apr 2021 07:33) (86 - 130)  RR: 20 (01 Apr 2021 13:10) (20 - 28)  SpO2: 98% (01 Apr 2021 05:16) (95% - 98%)    LABS:                        10.1   12.62 )-----------( 51       ( 01 Apr 2021 06:55 )             33.5     04-01    137  |  108  |  41<H>  ----------------------------<  176<H>  4.1   |  17  |  1.3    Ca    8.2<L>      01 Apr 2021 06:55  Phos  3.4     03-31  Mg     2.1     03-31    TPro  6.1  /  Alb  2.1<L>  /  TBili  0.3  /  DBili  x   /  AST  33  /  ALT  12  /  AlkPhos  201<H>  04-01    PT/INR - ( 01 Apr 2021 06:55 )   PT: 17.00 sec;   INR: 1.48 ratio         PTT - ( 01 Apr 2021 06:55 )  PTT:39.9 sec          Culture - Acid Fast - Tissue w/Smear (collected 31 Mar 2021 16:18)  Source: .Tissue None    Culture - Tissue with Gram Stain (collected 31 Mar 2021 16:18)  Source: .Tissue None  Gram Stain (01 Apr 2021 07:05):    Few polymorphonuclear leukocytes seen per low power field    Numerous Gram Negative Rods seen per oil power field    Culture - Other (collected 29 Mar 2021 17:15)  Source: .Other sacrum  Final Report (31 Mar 2021 16:53):    Numerous Klebsiella pneumoniae ESBL    Few Pseudomonas aeruginosa    Numerous Enterococcus faecalis (vancomycin resistant)  Organism: Klebsiella pneumoniae ESBL  Pseudomonas aeruginosa  Enterococcus faecalis (vancomycin resistant) (31 Mar 2021 16:53)  Organism: Enterococcus faecalis (vancomycin resistant) (31 Mar 2021 16:53)  Organism: Pseudomonas aeruginosa (31 Mar 2021 16:53)  Organism: Klebsiella pneumoniae ESBL (31 Mar 2021 16:53)    Protein, CSF: 42 mg/dL (04.01.21 @ 14:00)  Glucose, CSF: 97 mg/dL (04.01.21 @ 14:00)  Cerebrospinal Fluid Cell Count-1 (04.01.21 @ 14:00)    Tube Type: Tube 2    CSF Appearance: Clear    CSF Segmented Neutrophils: n/a %    CSF Color: No Color    RBC Count - Spinal Fluid: 9 /uL    Total Nucleated Cell Count, CSF: 0 /uL        RADIOLOGY:    PHYSICAL EXAM:    GENERAL: NAD, well-developed, not responding to verbal or tactile stimuli.  HEENT:  Atraumatic, Normocephalic  PULMONARY: Clear to auscultation bilaterally  CARDIOVASCULAR: Regular rate and rhythm; No murmurs, rubs, or gallops  GASTROINTESTINAL: Soft, Nontender, Nondistended  MUSCULOSKELETAL: Large edematous bilateral upper extremities and Lower extremities. No clubbing, cyanosis  SKIN: No rashes or lesions

## 2021-04-01 NOTE — CONSULT NOTE ADULT - ASSESSMENT
71 year old male with PMHx of Parkinson disease, dementia, CKD III, HTN, HLD, gout, psoriasis, DVT on Eliquis, 1st degree AV block, recently admitted for candidemia/AMS complicated by thrombocytopenia, now returns for AMS, EEG showing partial seizure; heme consulted again for thrombocytopenia    Thrombocytopenia: Patient was recently admitted for candidemia, smear reviewed at that time, no schistocytes, no clumps, normal WBCs/no blasts. Thrombocytopenia at that time likely due to sepsis. Now he returns and is on multiple antimicrobials (vanc, ampillcin, flagyl, acyclovir, ordered for zyvox but held, as well as Keppra, all of which can cause thrombocytopenia. Additionally, he was hypotensive requiring pressors and ICU care. Therefore, this is most likely multifactorial due to drugs, shock marrow from hypotension and sepsis. No heparin exposure, creatinine improving.   - Monitor CBC  - Keep plt > 20K  - Keep anticoagulation if plt > 50k  - Change PPI to H2  - If negative LP, stop vanc, ampicillin, acyclovir; per ID stop flagyl    Hx of DVT: Unclear how long he was on Eliquis, repeat LE duplex negative bilaterally    Anemia: Iron studies wnl, SPEP/JASS wnl, B12/folate wnl, Hep B/C negative; has mild CKD; likely multifactorial including CKD, inflammation, shock marrow, medications  - Check ferritin    AMS:  - LP planned  - Antibiotics per ID

## 2021-04-01 NOTE — PROGRESS NOTE ADULT - SUBJECTIVE AND OBJECTIVE BOX
NIEVES LABOY  71y, Male  Allergy: No Known Allergies      LOS  10d    CHIEF COMPLAINT: Change in Mental Status (31 Mar 2021 12:04)      INTERVAL EVENTS/HPI  - No acute events overnight  - T(F): , Max: 99.2 (03-31-21 @ 20:36)  - Tolerating medication  - WBC Count: 12.62 (04-01-21 @ 06:55)  WBC Count: 18.92 (03-31-21 @ 21:50)     - Creatinine, Serum: 1.3 (04-01-21 @ 06:55)  Creatinine, Serum: 1.3 (03-31-21 @ 21:50)       ROS  unable to obtain history secondary to patient's mental status and/or sedation     VITALS:  T(F): 96.2, Max: 99.2 (03-31-21 @ 20:36)  HR: 62  BP: 172/87  RR: 20Vital Signs Last 24 Hrs  T(C): 35.7 (01 Apr 2021 05:16), Max: 37.3 (31 Mar 2021 20:36)  T(F): 96.2 (01 Apr 2021 05:16), Max: 99.2 (31 Mar 2021 20:36)  HR: 62 (01 Apr 2021 07:33) (62 - 85)  BP: 172/87 (01 Apr 2021 07:33) (121/59 - 181/94)  BP(mean): 118 (01 Apr 2021 07:33) (86 - 130)  RR: 20 (01 Apr 2021 05:16) (18 - 28)  SpO2: 98% (01 Apr 2021 05:16) (95% - 98%)    PHYSICAL EXAM:  Gen: chronically ill appearing contracted  HEENT: Normocephalic, atraumatic  Neck: supple, no lymphadenopathy  CV: Regular rate & regular rhythm  Lungs: decreased BS at bases, no fremitus  Abdomen: Soft, BS present  Ext: Warm, well perfused  Neuro: non focal, awake  Skin: no rash, no erythema  Lines: no phlebitis     FH: Non-contributory  Social Hx: Non-contributory    TESTS & MEASUREMENTS:                        10.1   12.62 )-----------( x        ( 01 Apr 2021 06:55 )             33.5     04-01    137  |  108  |  41<H>  ----------------------------<  176<H>  4.1   |  17  |  1.3    Ca    8.2<L>      01 Apr 2021 06:55  Phos  3.4     03-31  Mg     2.1     03-31    TPro  6.1  /  Alb  2.1<L>  /  TBili  0.3  /  DBili  x   /  AST  33  /  ALT  12  /  AlkPhos  201<H>  04-01    eGFR if Non African American: 55 mL/min/1.73M2 (04-01-21 @ 06:55)  eGFR if African American: 63 mL/min/1.73M2 (04-01-21 @ 06:55)  eGFR if Non African American: 55 mL/min/1.73M2 (03-31-21 @ 21:50)  eGFR if : 63 mL/min/1.73M2 (03-31-21 @ 21:50)    LIVER FUNCTIONS - ( 01 Apr 2021 06:55 )  Alb: 2.1 g/dL / Pro: 6.1 g/dL / ALK PHOS: 201 U/L / ALT: 12 U/L / AST: 33 U/L / GGT: x               Culture - Tissue with Gram Stain (collected 03-31-21 @ 16:18)  Source: .Tissue None  Gram Stain (04-01-21 @ 07:05):    Few polymorphonuclear leukocytes seen per low power field    Numerous Gram Negative Rods seen per oil power field    Culture - Other (collected 03-29-21 @ 17:15)  Source: .Other sacrum  Final Report (03-31-21 @ 16:53):    Numerous Klebsiella pneumoniae ESBL    Few Pseudomonas aeruginosa    Numerous Enterococcus faecalis (vancomycin resistant)  Organism: Klebsiella pneumoniae ESBL  Pseudomonas aeruginosa  Enterococcus faecalis (vancomycin resistant) (03-31-21 @ 16:53)  Organism: Enterococcus faecalis (vancomycin resistant) (03-31-21 @ 16:53)      -  Ampicillin: S <=2 Predicts results to ampicillin/sulbactam, amoxacillin-clavulanate and  piperacillin-tazobactam.      -  Daptomycin: S 1      -  Levofloxacin: R >4      -  Linezolid: S 2      -  Tetra/Doxy: R >8      -  Vancomycin: R >16      Method Type: JAZMIN  Organism: Pseudomonas aeruginosa (03-31-21 @ 16:53)      -  Amikacin: S <=16      -  Aztreonam: I 16      -  Cefepime: S 8      -  Ceftazidime: S 4      -  Ciprofloxacin: R >2      -  Gentamicin: I 8      -  Imipenem: I 4      -  Levofloxacin: R >4      -  Meropenem: S <=1      -  Piperacillin/Tazobactam: S 16      -  Tobramycin: S <=2      Method Type: JAZMIN  Organism: Klebsiella pneumoniae ESBL (03-31-21 @ 16:53)      -  Amikacin: S <=16      -  Amoxicillin/Clavulanic Acid: S <=8/4      -  Ampicillin: R >16 These ampicillin results predict results for amoxicillin      -  Ampicillin/Sulbactam: R >16/8 Enterobacter, Citrobacter, and Serratia may develop resistance during prolonged therapy (3-4 days)      -  Aztreonam: R >16      -  Cefazolin: R >16 Enterobacter, Citrobacter, and Serratia may develop resistance during prolonged therapy (3-4 days)      -  Cefepime: R >16      -  Cefoxitin: S <=8      -  Ceftriaxone: R >32 Enterobacter, Citrobacter, and Serratia may develop resistance during prolonged therapy      -  Ciprofloxacin: R >2      -  Ertapenem: S <=0.5      -  Gentamicin: S <=2      -  Imipenem: S <=1      -  Levofloxacin: R 2      -  Meropenem: S <=1      -  Piperacillin/Tazobactam: R <=8      -  Tobramycin: S <=2      -  Trimethoprim/Sulfamethoxazole: R >2/38      Method Type: JAZMIN    Culture - Blood (collected 03-28-21 @ 01:24)  Source: .Blood None  Preliminary Report (03-29-21 @ 13:02):    No growth to date.    Culture - Blood (collected 03-25-21 @ 11:09)  Source: .Blood None  Gram Stain (03-28-21 @ 18:04):    Growth in anaerobic bottle: Gram Positive Cocci in Clusters  Final Report (03-29-21 @ 17:13):    Growth in anaerobic bottle: Staphylococcus pettenkoferi    Coag Negative Staphylococcus    Single set isolate, possible contaminant. Contact    Microbiology if susceptibility testing clinically    indicated.    ***Blood Panel PCR results on this specimen areavailable    approximately 3 hours after the Gram stain result.***    Gram stain, PCR, and/or culture results may not always    correspond due to difference in methodologies.    ************************************************************    This PCR assay wasperformed by multiplex PCR. This    Assay tests for 66 bacterial and resistance gene targets.    Please refer to the [facility] test directory    at [email or web address] for details.  Organism: Blood Culture PCR (03-29-21 @ 17:13)  Organism: Blood Culture PCR (03-29-21 @ 17:13)      -  Coagulase negative Staphylococcus: Detec      Method Type: PCR    Culture - Blood (collected 03-25-21 @ 05:47)  Source: .Blood None  Final Report (03-30-21 @ 14:01):    No Growth Final    Culture - Urine (collected 03-22-21 @ 20:41)  Source: .Urine Clean Catch (Midstream)  Final Report (03-23-21 @ 20:53):    No growth    Culture - Blood (collected 03-22-21 @ 15:31)  Source: .Blood Blood-Peripheral  Final Report (03-28-21 @ 02:33):    No Growth Final    Culture - Blood (collected 03-22-21 @ 15:28)  Source: .Blood Blood-Peripheral  Final Report (03-28-21 @ 02:33):    No Growth Final    Culture - Urine (collected 03-04-21 @ 12:00)  Source: .Urine Clean Catch (Midstream)  Final Report (03-06-21 @ 07:24):    No growth    Culture - Blood (collected 03-04-21 @ 11:12)  Source: .Blood None  Final Report (03-09-21 @ 23:01):    No Growth Final    Culture - Blood (collected 03-03-21 @ 16:27)  Source: .Blood None  Final Report (03-08-21 @ 23:01):    No Growth Final            INFECTIOUS DISEASES TESTING  Vancomycin Level, Random: 11.5 (03-31-21 @ 07:00)  Vancomycin Level, Trough: 11.5 (03-31-21 @ 07:00)  COVID-19 PCR: NotDetec (03-30-21 @ 15:19)  COVID-19 PCR: NotDetec (03-29-21 @ 19:40)  Vancomycin Level, Trough: <4.0 (03-28-21 @ 13:00)  Procalcitonin, Serum: 0.61 (03-26-21 @ 10:20)  COVID-19 PCR: NotDetec (03-22-21 @ 20:13)  COVID-19 PCR: NotDetec (03-10-21 @ 12:22)  COVID-19 PCR: NotDetec (03-01-21 @ 16:49)  Fungitell: 62 (03-01-21 @ 11:00)  Procalcitonin, Serum: 0.29 (03-01-21 @ 05:32)  MRSA PCR Result.: Negative (02-27-21 @ 15:44)  COVID-19 PCR: NotDetec (02-25-21 @ 15:34)  Procalcitonin, Serum: 0.27 (02-18-21 @ 10:54)  MRSA PCR Result.: Negative (02-14-21 @ 18:29)  HIV-1/2 Combo Result: Nonreact (02-14-21 @ 05:07)  Procalcitonin, Serum: 0.46 (02-13-21 @ 16:00)  COVID-19 PCR: NotDetec (02-12-21 @ 10:17)      INFLAMMATORY MARKERS      RADIOLOGY & ADDITIONAL TESTS:  I have personally reviewed the last available Chest xray  CXR      CT      CARDIOLOGY TESTING  12 Lead ECG:   Ventricular Rate 64 BPM    Atrial Rate 64 BPM    P-R Interval 234 ms    QRS Duration 88 ms    Q-T Interval 404 ms    QTC Calculation(Bazett) 416 ms    P Axis 68 degrees    R Axis -17 degrees    T Axis 9 degrees    Diagnosis Line Sinus rhythm with 1st degree A-V block  Voltage criteria for left ventricular hypertrophy  Nonspecific T wave abnormality  Abnormal ECG    Confirmed by ANA GARDUNO MD ([id number]) on 3/22/2021 9:54:41 PM (03-22-21 @ 16:57)      MEDICATIONS  acyclovir IVPB 800 IV Intermittent every 12 hours  ampicillin  IVPB 2 IV Intermittent every 12 hours  atorvastatin 40 Oral at bedtime  carbidopa/levodopa  25/100 2 Oral <User Schedule>  cefepime   IVPB 1000 IV Intermittent every 12 hours  chlorhexidine 4% Liquid 1 Topical <User Schedule>  collagenase Ointment 1 Topical two times a day  Dakins Solution - 1/2 Strength 1 Topical two times a day  levETIRAcetam  IVPB 125 IV Intermittent every 12 hours  metroNIDAZOLE  IVPB 500 IV Intermittent every 8 hours  midodrine 10 Oral every 8 hours  pantoprazole  Injectable 40 IV Push daily  senna 2 Oral at bedtime  vancomycin  IVPB 1250 IV Intermittent every 24 hours      WEIGHT  Weight (kg): 79.4 (03-31-21 @ 15:48)  Creatinine, Serum: 1.3 mg/dL (04-01-21 @ 06:55)  Creatinine, Serum: 1.3 mg/dL (03-31-21 @ 21:50)      ANTIBIOTICS:  acyclovir IVPB 800 milliGRAM(s) IV Intermittent every 12 hours  ampicillin  IVPB 2 Gram(s) IV Intermittent every 12 hours  cefepime   IVPB 1000 milliGRAM(s) IV Intermittent every 12 hours  metroNIDAZOLE  IVPB 500 milliGRAM(s) IV Intermittent every 8 hours  vancomycin  IVPB 1250 milliGRAM(s) IV Intermittent every 24 hours      All available historical records have been reviewed

## 2021-04-01 NOTE — PROGRESS NOTE ADULT - ASSESSMENT
71M, pmh Parkinson's, dementia, CKD Stage 3, 1st degree AV block, HLD, gout, HTN, DM, fungal septicemia presenting from [facility] for acute decompensation in mental status. admitted, hypotensive episode 3/27 --> s/p 2.5L NSS, BP remains 79/39mmHg s/p levophed, upgraded to icu, monitored and downgraded on midodrine 10mg q8.     # Altered Mental Status possible Metabolic  - 3 days of decreasing alertness and no longer speaking at the nursing home   - CT Head negative for any acute strokes  - Blood Cx: negative, Urine Cx: negative   - EEG: + epileptiform activity  - per S&S patient is too lethargic and unable to participate in exam, keep NPO for now, NG tube in place  - ID:  empiric ACV 10mg/kg q12h IV,  D/C cefepime/flagyl given ESRBL- Change to meropenem 1g q12h IV, Vanc 1.25 g q24h IV, Ampicillin 2g q6h IV  - IF LP unremarkable will STOP Vanc & ACV & amp and continue meropenem   - c/w keppra 125 BID  - c/w midodrine   - c/w tube feeds  - s/p LP 4/1 w/ IR  - Neuro: c/w Keppra & sinemet  -  f/u neuro, f/u ID    # Thrombocytopenia  - slowly decreasing platelet count  - Burn bleeding from debridement site, now cleaned, stitched and without further bleeding  - no bleeding from mucous membranes or other areas at this time  - Heme onc following: likely multifactorial  - f/u 8pm CBC for platelet count, CMP, PT/PTT/INR    #Sacral ulcer:  - 5 x 4 cm area of gray black necrotic skin  and subcutis with 2x 2 cm central deeper wound to ligamentous tissue - black base  - s/p debridement with burn 3/30  - burn following    # Left hand pain & swelling   - tender to touch, swollen,  - VA duplex: no evidence of dvt  - XR R Hand: No acute fracture or dislocation. Lucency in the distal radial metaphysis, nonspecific. Dedicated wrist radiographs recommended for further evaluation.  - Xray Wrist 2 Views, Left (03.27.21 @ 11:13) No acute fracture or dislocation. Diffuse soft tissue swelling. Nonaggressive appearing lucency in the distal radius, indeterminate. Nonemergent MRI may be obtained for further evaluation.  - per radiology, non aggressive lesion seen in  L wrist, patient would benefit from MRI wrist as outpatient     # Sacral pressure ulcer- stage 4  - spoke with Burn: s/p debridement 3/30,  planned OR debridement 3/31    # H/O Parkinson disease  - Neuro consult: c/w sinemet & keppra   - EEG w/ epileptiform activity, started on keppra per neuro   - CT head negative     # Left shoulder dislocation   - seen on CT in Feb 2021  - Ortho reconsulted- pending L shoulder XR   - Xray Shoulder 2 Views, Left (03.23.21 @ 21:48) Reidentified anterior dislocation of the humeral head overlying the left second rib with bony fragmentation along the glenoid and humeral head. AC joint degenerative change.  - Per Ortho: no surgical intervention at this time    # H/O First Degree AV Block  - No indication for PPM    # ABEBE likely Pre renal  #Hypernatremia- resolved  - Baseline Cr 1.2-1.3-2- 2.4 - 2.4 - 1.8 -> 1.7- 1.6 -> 1.3  - Na 134 this am  - trend bmp    # Chronic DVT  - Hold Eliquis for LP thursday     # HTN - well controlled   - Hold Enalapril due to intermittent hypotension.   71M, pmh Parkinson's, dementia, CKD Stage 3, 1st degree AV block, HLD, gout, HTN, DM, fungal septicemia presenting from [facility] for acute decompensation in mental status. admitted, hypotensive episode 3/27 --> s/p 2.5L NSS, BP remains 79/39mmHg s/p levophed, upgraded to icu, monitored and downgraded on midodrine 10mg q8.     # Altered Mental Status possible Metabolic  - 3 days of decreasing alertness and no longer speaking at the nursing home   - CT Head negative for any acute strokes  - Blood Cx: negative, Urine Cx: negative   - EEG: + epileptiform activity  - Wound cultures revealing numerous organisms  - ID:  empiric ACV 10mg/kg q12h IV,  D/C cefepime/flagyl given ESRBL- Change to meropenem 1g q12h IV, Vanc 1.25 g q24h IV, Ampicillin 2g q6h IV  - IF LP unremarkable will STOP Vanc & ACV & amp and continue meropenem   - per S&S patient is too lethargic and unable to participate in exam, keep NPO for now, NG tube in place  - c/w keppra 125 BID  - c/w midodrine   - c/w tube feeds  - s/p LP 4/1 w/ IR  - Neuro: c/w Keppra & sinemet  -  f/u neuro, f/u ID    # Thrombocytopenia  - slowly decreasing platelet count  - Burn bleeding from sacral debridement site, now cleaned, stitched and without further bleeding  - no bleeding from mucous membranes or other areas at this time  - Heme onc following: likely multifactoral, Keep plt > 20K, Change PPI to H2  - f/u 8pm CBC for platelet count, CMP, PT/PTT/INR    # Sacral pressure ulcer- stage 4  - 5 x 4 cm area of gray black necrotic skin  and subcutis with 2x 2 cm central deeper wound to ligamentous tissue - black base  - s/p debridement with burn 3/30  - burn following    # Left hand pain & swelling   - tender to touch, swollen,  - VA duplex: no evidence of dvt  - XR R Hand: No acute fracture or dislocation. Lucency in the distal radial metaphysis, nonspecific. Dedicated wrist radiographs recommended for further evaluation.  - Xray Wrist 2 Views, Left (03.27.21 @ 11:13) No acute fracture or dislocation. Diffuse soft tissue swelling. Nonaggressive appearing lucency in the distal radius, indeterminate. Nonemergent MRI may be obtained for further evaluation.  - per radiology, non aggressive lesion seen in  L wrist, patient would benefit from MRI wrist as outpatient     # H/O Parkinson disease  - Neuro consult: c/w sinemet & keppra   - EEG w/ epileptiform activity, started on keppra per neuro   - CT head negative     # Left shoulder dislocation   - seen on CT in Feb 2021  - Ortho reconsulted- pending L shoulder XR   - Xray Shoulder 2 Views, Left (03.23.21 @ 21:48) Reidentified anterior dislocation of the humeral head overlying the left second rib with bony fragmentation along the glenoid and humeral head. AC joint degenerative change.  - Per Ortho: no surgical intervention at this time    # H/O First Degree AV Block  - No indication for PPM    # ABEBE likely Pre renal  #Hypernatremia- resolved  - Baseline Cr 1.2  - trend bmp    # Chronic DVT  - Hold Eliquis    # HTN - well controlled   - resume enalapril

## 2021-04-01 NOTE — PROGRESS NOTE ADULT - ASSESSMENT
70 y/o male with sacral ulcer. pt POD#1 s/p debridement of sacrum   - no more recommendation for surgery at this time   - pt had multiple episodes of bleeding from wound. Monitor for bleeds.    - monitor H&H   - continue local wound care with santyl/dakins WTD.  - will follow.

## 2021-04-01 NOTE — CHART NOTE - NSCHARTNOTEFT_GEN_A_CORE
Registered Dietitian Follow-Up    Patient Profile Reviewed                           Yes [x]   No []  Nutrition History Previously Obtained        Yes [x]  No []        PERTINENT SUBJECTIVE INFORMATION:  --Patient just initiated on tube feeding on 3/31   -- Tolerating tube feeding regimen, with no GI s/s for now.        PERTINENT MEDICAL INFORMATIONS:  Per neuro note:  ---Toxic Metabolic  Encephalopathy / r/o Meningitis Etiology  Unknown, Neuro f/u VEEG  ---Underlying Parkinson's   ---necrotic sacral ulcer s/p debridement  ---Septic shock during this admission  ---Elevated INR and thrombocytopenia    ---Per palliative care; She was clear she understood poor prognosis but believes in continuing all life prolonging measures  --- s/p SLP 3/25 - NPO    Diet, NPO with Tube Feed:   Tube Feeding Modality: Nasogastric  Jevity 1.2 Sarmad  Total Volume for 24 Hours (mL): 840  Bolus  Total Volume of Bolus (mL):  140  Tube Feed Frequency: Every 4 hours   Tube Feed Start Time: 14:30  Bolus Feed Rate (mL per Hour): 140   Bolus Feed Duration (in Hours): 1  Free Water Flush  Bolus   Total Volume per Flush (mL): 80   Frequency: Every 4 Hours  No Carb Prosource (1pkg = 15gms Protein)     Qty per Day:  1 (03-31-21 @ 17:51) [Active]      ANTHROPOMETRICS:  - Ht.  170cm  - Wt.   (3/22): 79.4kg  (3/25): 79.4kg - stable for now, no new weights, will continue to monitor weight trends.   - BMI. 27.5  - IBW 61.4 kg        PERTINENT LAB DATA 4/1:WBC 12.62, h/h 10.1/33.5, BUN 41, Glucose 176,  eGFR 55, alkaline phosphatase: 201, HgbA1c 5.4    MEDICATIONS  (STANDING):  atorvastatin 40 milliGRAM(s) Oral at bedtime  carbidopa/levodopa  25/100 2 Tablet(s) Oral <User Schedule>  pantoprazole  Injectable 40 milliGRAM(s) IV Push daily  senna 2 Tablet(s) Oral at bedtime    PHYSICAL FINDINGS  - APPEARANCE:  Dementia, Confused, and disoriented per RN flow sheets, generalized; left leg; right leg edema +2 per RN flow sheets   - GI FUNCTION:  No GI s/s per RN flow sheets at this time. LBM 4/1 per RN flow sheets  - TUBES: NG tube   - ORAL/MOUTH:       NPO per SLP on 3/25  - SKIN:    R temporal skin tear, L inner ankle wound,   Unstageable to sacrum, DTI to R heel and R hip.     NUTRITION REQUIREMENTS  WEIGHT USED:                          ABW: 79.4kg  ESTIMATED ENERGY NEEDS:       CONTINUE [  ]      ADJUST [  x] - as of 3/29    ESTIMATED ENERGY NEEDS:         9712-1273 kcal/day (25-30 kcal/kg of ABW) - for PU  ESTIMATED PROTEIN NEEDS:        95-111g (1.2-1.4g/kg CBW)  ESTIMATED FLUID NEEDS:             1ml/kcal or per LIP    CURRENT NUTRIENT NEEDS:  Currently pt is receiving 1008 calories and 46.2 g protein, pt  to advance to goal tube feeding regimen as tolerated.       [ x ] PREVIOUS NUTRITION DIAGNOSIS:   (1) Increased Nutrient Needs  [ x ] ONGOING (2) Inadequate Protein Energy Intake [ x ] ONGOING; once tube feeding is given at goal pt will meet estimated energy and protein needs.                        PATIENT INTERVENTION:    [  ] ORAL        [x ] EN/TF     GOAL/EXPECTED OUTCOME:     pt to initiate TF, meet and tolerate >85% of estimated kcal/protein via TF upon f/u in 4 days.     INDICATOR/MONITORING:  RD to monitor diet order, energy intake, body composition, nutrition focused physical findings (EN tolerance, s/s, BM). at risk will f/u in 4 days.      NUTRITION INTERVENTION:  Enteral nutrition. Coordination of care      PLAN:    If patient is tolerating  Jevity 1.2 at 140mL q4hr with No-carb Prosource TF packet x1/day for 6 feeds,adv to GOAL of Jevity 12 at 280mL q4hr with No-carb Prosource TF packet x1/day this regimen at goal gives 1995 kcal/ 92g protein/ 1360mL free water, suggesting at least 80mL flush each, or per LIP.  - If pt to pass SLP, order diet per SLP rec. Registered Dietitian Follow-Up    Patient Profile Reviewed                           Yes [x]   No []  Nutrition History Previously Obtained        Yes [x]  No []        PERTINENT SUBJECTIVE INFORMATION:  --Patient just initiated on tube feeding on 3/31   --- Tolerating tube feeding regimen, with no GI s/s for now.        PERTINENT MEDICAL INFORMATIONS:  Per neuro note:  ---Toxic Metabolic  Encephalopathy / r/o Meningitis Etiology  Unknown, Neuro f/u VEEG  ---Underlying Parkinson's   ---necrotic sacral ulcer s/p debridement  ---Septic shock during this admission  ---Elevated INR and thrombocytopenia    ---Per palliative care; She was clear she understood poor prognosis but believes in continuing all life prolonging measures  --- s/p SLP 3/25 - NPO    Diet, NPO with Tube Feed:   Tube Feeding Modality: Nasogastric  Jevity 1.2 Sarmad  Total Volume for 24 Hours (mL): 840  Bolus  Total Volume of Bolus (mL):  140  Tube Feed Frequency: Every 4 hours   Tube Feed Start Time: 14:30  Bolus Feed Rate (mL per Hour): 140   Bolus Feed Duration (in Hours): 1  Free Water Flush  Bolus   Total Volume per Flush (mL): 80   Frequency: Every 4 Hours  No Carb Prosource (1pkg = 15gms Protein)     Qty per Day:  1 (03-31-21 @ 17:51) [Active]      ANTHROPOMETRICS:  - Ht.  170cm  - Wt.   (3/22): 79.4kg  (3/25): 79.4kg - stable for now, no new weights, will continue to monitor weight trends.   - BMI. 27.5  - IBW 61.4 kg        PERTINENT LAB DATA 4/1:WBC 12.62, h/h 10.1/33.5, BUN 41, Glucose 176,  eGFR 55, alkaline phosphatase: 201, HgbA1c 5.4    MEDICATIONS  (STANDING):  atorvastatin 40 milliGRAM(s) Oral at bedtime  carbidopa/levodopa  25/100 2 Tablet(s) Oral <User Schedule>  pantoprazole  Injectable 40 milliGRAM(s) IV Push daily  senna 2 Tablet(s) Oral at bedtime    PHYSICAL FINDINGS  - APPEARANCE:  Dementia, Confused, and disoriented per RN flow sheets, generalized; left leg; right leg edema +2 per RN flow sheets   - GI FUNCTION:  No GI s/s per RN flow sheets at this time. LBM 4/1 per RN flow sheets  - TUBES: NG tube   - ORAL/MOUTH:       NPO per SLP on 3/25  - SKIN:    R temporal skin tear, L inner ankle wound,   Unstageable to sacrum, DTI to R heel and R hip.     NUTRITION REQUIREMENTS  WEIGHT USED:                          ABW: 79.4kg  ESTIMATED ENERGY NEEDS:       CONTINUE [  ]      ADJUST [  x] - as of 3/29    ESTIMATED ENERGY NEEDS:         7387-5003 kcal/day (25-30 kcal/kg of ABW) - for PU  ESTIMATED PROTEIN NEEDS:        95-111g (1.2-1.4g/kg CBW)  ESTIMATED FLUID NEEDS:             1ml/kcal or per LIP    CURRENT NUTRIENT NEEDS:  Currently pt is receiving 1008 calories and 46.2 g protein, pt  to advance to goal tube feeding regimen as tolerated.       [ x ] PREVIOUS NUTRITION DIAGNOSIS:   (1) Increased Nutrient Needs  [ x ] ONGOING (2) Inadequate Protein Energy Intake [ x ] ONGOING; once tube feeding is given at goal pt will meet estimated energy and protein needs.                        PATIENT INTERVENTION:    [  ] ORAL        [x ] EN/TF     GOAL/EXPECTED OUTCOME:     pt to initiate TF, meet and tolerate >85% of estimated kcal/protein via TF upon f/u in 4 days.     INDICATOR/MONITORING:  RD to monitor diet order, energy intake, body composition, nutrition focused physical findings (EN tolerance, s/s, BM). at risk will f/u in 4 days.      NUTRITION INTERVENTION:  Enteral nutrition. Coordination of care      PLAN:    If patient is tolerating  Jevity 1.2 at 140mL q4hr with No-carb Prosource TF packet x1/day for 6 feeds,adv to GOAL of Jevity 12 at 280mL q4hr with No-carb Prosource TF packet x1/day this regimen at goal gives 2056  kcal/ 103 g protein/ 1360 mL free water, suggesting at least 80mL flush each, or per LIP.  - If pt to pass SLP, order diet per SLP rec.

## 2021-04-01 NOTE — PROGRESS NOTE ADULT - ASSESSMENT
ASSESSMENT  71 year old Male with past medical history of Parkinson's, dementia, CKD Stage 3, 1st degree AV block, HLD, gout, HTN, DM, fungal septicemia presenting from [facility] for acute decompensation in mental status.     IMPRESSION  #Fever 3/25 with sepsis, not present on admission with metabolic encephalopathy, EEG + seizure    3/25 BCX 1/2 sets, 1/4 bottles Staphylococcus pettenkoferi, likely contaminant     cannot rule out meningitis     CXR no PNA   3/22 Blood & Urine cxs NGTD   #Sacral ulcer- necrotic     3/31 WCX GNR    3/29   Numerous Klebsiella pneumoniae ESBL    Few Pseudomonas aeruginosa    Numerous Enterococcus faecalis (vancomycin resistant)- S amp    seen by Burn sacrum with full thickness ~4x5cm with dark /brown devitalized tissue at base; no purulent drainage, no bleeding  #Recent Fungemia    Blood Cx 2/27 Candida albicans    evaluated by optho - no ocular evidence     TTE no significant valvulopathy   #ABEBE  #L hand edema  < from: Xray Wrist 2 Views, Left (03.27.21 @ 11:13) >No acute fracture or dislocation. Diffuse soft tissue swelling. Nonaggressive appearing lucency in the distal radius, indeterminate. Nonemergent MRI may be obtained for further evaluation.    Creatinine, Serum: 2.0 (03-26-21 @ 05:33)      RECOMMENDATIONS  - Pending LP given fevers and + EEG: cell count, protein, glucose, CSF PCR, HSV PCR   - empiric ACV 10mg/kg q12h IV  - D/C cefepime/flagyl given ESRBL- Change to meropenem 1g q12h IV  aware some studies show may lower seizure threshold, no other good option  - Vanc 1.25 g q24h IV   - Ampicillin 2g q6h IV  - Burn following  - IF LP unremarkable will STOP Vanc & ACV & amp and continue meropenem     If any questions, please call or send a message on [employer] Teams  Spectra [phone]

## 2021-04-01 NOTE — CONSULT NOTE ADULT - SUBJECTIVE AND OBJECTIVE BOX
Patient is a 71y old  Male who presents with a chief complaint of Change in Mental Status (01 Apr 2021 08:55)      HPI:  71 year old Male with past medical history of Parkinson's, dementia, CKD Stage 3, 1st degree AV block, HLD, gout, HTN, DM, fungal septicemia presenting from [facility] for acute decompensation in mental status.     As per documentation patient at baseline is verbal but for past 3 days, he has been worsening to state of being non verbal. Patient was admitted to [facility] for fungemia and discharged on the 11th March. At  he had left hand cellulitis and was treated for it.    In ED patient hemodynamically stable, afebrile, ABEBE with rise in Cr to 1.9 and BUN to 75, remains non verbal. CT head negative for any new strokes and UA negative. Family has not visited the patient recently so unaware of his status. (22 Mar 2021 23:59)       ROS negative except for the above.     PAST MEDICAL & SURGICAL HISTORY:  Parkinson disease    Hypertension    Gout    Dyslipidemia    Prostatitis    Cataract    No significant past surgical history        SOCIAL HISTORY:    FAMILY HISTORY:  Family history of cerebrovascular accident (CVA) (Father)        MEDICATIONS  (STANDING):  acyclovir IVPB 800 milliGRAM(s) IV Intermittent every 12 hours  ampicillin  IVPB 2 Gram(s) IV Intermittent every 12 hours  atorvastatin 40 milliGRAM(s) Oral at bedtime  carbidopa/levodopa  25/100 2 Tablet(s) Oral <User Schedule>  cefepime   IVPB 1000 milliGRAM(s) IV Intermittent every 12 hours  chlorhexidine 4% Liquid 1 Application(s) Topical <User Schedule>  collagenase Ointment 1 Application(s) Topical two times a day  Dakins Solution - 1/2 Strength 1 Application(s) Topical two times a day  levETIRAcetam  IVPB 125 milliGRAM(s) IV Intermittent every 12 hours  metroNIDAZOLE  IVPB 500 milliGRAM(s) IV Intermittent every 8 hours  midodrine 10 milliGRAM(s) Oral every 8 hours  pantoprazole  Injectable 40 milliGRAM(s) IV Push daily  senna 2 Tablet(s) Oral at bedtime  vancomycin  IVPB 1250 milliGRAM(s) IV Intermittent every 24 hours    MEDICATIONS  (PRN):  acetaminophen   Tablet .. 650 milliGRAM(s) Oral every 6 hours PRN Temp greater or equal to 38C (100.4F)  oxycodone    5 mG/acetaminophen 325 mG 1 Tablet(s) Oral every 6 hours PRN Moderate Pain (4 - 6)    Height (cm): 170 (03-31-21 @ 15:48)  Weight (kg): 79.4 (03-31-21 @ 15:48)  BMI (kg/m2): 27.5 (03-31-21 @ 15:48)  BSA (m2): 1.91 (03-31-21 @ 15:48)  Allergies    No Known Allergies    Intolerances        Vital Signs Last 24 Hrs  T(C): 35.7 (01 Apr 2021 05:16), Max: 37.3 (31 Mar 2021 20:36)  T(F): 96.2 (01 Apr 2021 05:16), Max: 99.2 (31 Mar 2021 20:36)  HR: 62 (01 Apr 2021 07:33) (62 - 85)  BP: 172/87 (01 Apr 2021 07:33) (121/59 - 181/94)  BP(mean): 118 (01 Apr 2021 07:33) (86 - 130)  RR: 20 (01 Apr 2021 05:16) (18 - 28)  SpO2: 98% (01 Apr 2021 05:16) (95% - 98%)    PHYSICAL EXAM  General: NAD  HEENT: NCAT, EOMI  Neck: supple  CV: Regular rate and rhythm  Lungs: Clear to ascultation bilaterally, no respiratory distress  Abdomen: soft non-tender non-distended  Ext: No edema  Skin: no rashes and no petechiae  Neuro: alert and oriented, no focal deficits      LABS:                          10.1   12.62 )-----------( 51       ( 01 Apr 2021 06:55 )             33.5         Mean Cell Volume : 84.8 fL  Mean Cell Hemoglobin : 25.6 pg  Mean Cell Hemoglobin Concentration : 30.1 g/dL  Auto Neutrophil # : 11.16 K/uL  Auto Lymphocyte # : 0.49 K/uL  Auto Monocyte # : 0.27 K/uL  Auto Eosinophil # : 0.00 K/uL  Auto Basophil # : 0.04 K/uL  Auto Neutrophil % : 88.5 %  Auto Lymphocyte % : 3.9 %  Auto Monocyte % : 2.1 %  Auto Eosinophil % : 0.0 %  Auto Basophil % : 0.3 %      Serial CBC's  04-01 @ 06:55  Hct-33.5 / Hgb-10.1 / Plat-51 / RBC-3.95 / WBC-12.62  Serial CBC's  03-31 @ 21:50  Hct-32.0 / Hgb-9.7 / Plat-86 / RBC-3.80 / WBC-18.92  Serial CBC's  03-31 @ 07:00  Hct-27.4 / Hgb-8.3 / Plat-82 / RBC-3.28 / WBC-17.84  Serial CBC's  03-30 @ 16:57  Hct-27.5 / Hgb-8.3 / Plat-124 / RBC-3.30 / WBC-18.61  Serial CBC's  03-30 @ 07:07  Hct-27.0 / Hgb-8.0 / Plat-113 / RBC-3.19 / WBC-21.62  Serial CBC's  03-29 @ 04:30  Hct-27.7 / Hgb-8.3 / Plat-129 / RBC-3.31 / WBC-20.99      04-01    137  |  108  |  41<H>  ----------------------------<  176<H>  4.1   |  17  |  1.3    Ca    8.2<L>      01 Apr 2021 06:55  Phos  3.4     03-31  Mg     2.1     03-31    TPro  6.1  /  Alb  2.1<L>  /  TBili  0.3  /  DBili  x   /  AST  33  /  ALT  12  /  AlkPhos  201<H>  04-01      PT/INR - ( 01 Apr 2021 06:55 )   PT: 17.00 sec;   INR: 1.48 ratio         PTT - ( 01 Apr 2021 06:55 )  PTT:39.9 sec                BLOOD SMEAR INTERPRETATION:       RADIOLOGY & ADDITIONAL STUDIES:

## 2021-04-01 NOTE — PROGRESS NOTE ADULT - ATTENDING COMMENTS
Patient seen and examined independently. Agree with resident note with exceptions.    # Metabolic encephalopathy  # Sepsis  # Necrotic sacral ulcer stage4  # H/o recent fungemia  - CT Head No Cont (03.22.21 @ 18:16) >No acute intracranial pathology.  - EEG : epileptiform activity  -  Xray Chest 1 View- PORTABLE-Urgent (Xray Chest 1 View- PORTABLE-Urgent .) (03.26.21 @ 07:10) >No consolidation, effusion or pneumothorax.  - 3/22 Blood & Urine cxs NGTD   - 3/31 WCX GNR, 3/29   Numerous Klebsiella pneumoniae ESBL, Few Pseudomonas aeruginosa, Numerous Enterococcus faecalis (vancomycin resistant)  - evaluated by Burn: s/p debridement of sacrumon 3/30 .  no more recommendation for surgery at this time .continue local wound care with santyl/dakins WTD.  - ID F/u: c/w acyclovir, meropenem, vancomycin, ampicillin  -evaluated by neurology:  c/w keppra, seizure precautions  - S/p spinal tap today --> follow cytology ,culture    # Hypotension  - c/w midodrine    # Thrombocytopenia sec to sepsis  - monitor platelets    #  Acute kidney injury , prerenal-resolving  - monitor BUN/creat    # H/o parkinsons disease  - c/w sinemet    # left Shoulder dislocation  - Xray Shoulder 2 Views, Left (03.23.21 @ 21:48) >Reidentified anterior dislocation of the humeral head overlying the left second rib with bony fragmentation along the glenoid and humeral head. AC joint degenerative change.  - Pt has chronic dislocation since june previous chart review.    - no ortho intervention    # H/o chronic DVT  - VA Duplex Lower Ext Vein Scan, Bilat (03.27.21 @ 18:48) >No evidence of deep venous thrombosis or superficial thrombophlebitis in the bilateral lower extremities.  - continue holding     # Dyslipidemia  - c/w statin    # DVT prophylaxis  -scd    # Full code    # Poor prognosis    #Pending: clinical improvement. F/u csf cytology, culture, monitor cbc

## 2021-04-01 NOTE — PROGRESS NOTE ADULT - SUBJECTIVE AND OBJECTIVE BOX
Patient is a 71y old  Male who presents with a chief complaint of Change in Mental Status. pt is POD#1 s/p debridement of sacrum. pt evaluated at bedside.       INTERVAL HPI/OVERNIGHT EVENTS:  ICU Vital Signs Last 24 Hrs  T(C): 36.7 (01 Apr 2021 13:10), Max: 37.3 (31 Mar 2021 20:36)  T(F): 98 (01 Apr 2021 13:10), Max: 99.2 (31 Mar 2021 20:36)  HR: 68 (01 Apr 2021 13:10) (62 - 81)  BP: 138/71 (01 Apr 2021 13:10) (128/61 - 181/94)  BP(mean): 118 (01 Apr 2021 07:33) (86 - 130)  RR: 20 (01 Apr 2021 13:10) (20 - 24)  SpO2: 98% (01 Apr 2021 05:16) (98% - 98%)    MEDICATIONS  (STANDING):  acyclovir IVPB 800 milliGRAM(s) IV Intermittent every 12 hours  ampicillin  IVPB 2 Gram(s) IV Intermittent every 6 hours  atorvastatin 40 milliGRAM(s) Oral at bedtime  carbidopa/levodopa  25/100 2 Tablet(s) Oral <User Schedule>  chlorhexidine 4% Liquid 1 Application(s) Topical <User Schedule>  collagenase Ointment 1 Application(s) Topical two times a day  Dakins Solution - 1/2 Strength 1 Application(s) Topical two times a day  levETIRAcetam  IVPB 125 milliGRAM(s) IV Intermittent every 12 hours  meropenem  IVPB 1000 milliGRAM(s) IV Intermittent every 12 hours  midodrine 10 milliGRAM(s) Oral every 8 hours  pantoprazole  Injectable 40 milliGRAM(s) IV Push daily  senna 2 Tablet(s) Oral at bedtime  vancomycin  IVPB 1250 milliGRAM(s) IV Intermittent every 24 hours    PHYSICAL EXAM:  GENERAL: well built, well nourished  HEAD:  Atraumatic, Normocephalic  sacral wound: wound bed pink/red with several areas of clotted blood with many areas of oozing. Sutures put in place to attempt hemostasis, silver nitrate and Surgicel applied to wound. applied pressure dressing to wound.      Patient is a 71y old  Male who presents with a chief complaint of Change in Mental Status. pt is POD#1 s/p debridement of sacrum. pt evaluated at bedside.       INTERVAL HPI/OVERNIGHT EVENTS:  ICU Vital Signs Last 24 Hrs  T(C): 36.7 (01 Apr 2021 13:10), Max: 37.3 (31 Mar 2021 20:36)  T(F): 98 (01 Apr 2021 13:10), Max: 99.2 (31 Mar 2021 20:36)  HR: 68 (01 Apr 2021 13:10) (62 - 81)  BP: 138/71 (01 Apr 2021 13:10) (128/61 - 181/94)  BP(mean): 118 (01 Apr 2021 07:33) (86 - 130)  RR: 20 (01 Apr 2021 13:10) (20 - 24)  SpO2: 98% (01 Apr 2021 05:16) (98% - 98%)    MEDICATIONS  (STANDING):  acyclovir IVPB 800 milliGRAM(s) IV Intermittent every 12 hours  ampicillin  IVPB 2 Gram(s) IV Intermittent every 6 hours  atorvastatin 40 milliGRAM(s) Oral at bedtime  carbidopa/levodopa  25/100 2 Tablet(s) Oral <User Schedule>  chlorhexidine 4% Liquid 1 Application(s) Topical <User Schedule>  collagenase Ointment 1 Application(s) Topical two times a day  Dakins Solution - 1/2 Strength 1 Application(s) Topical two times a day  levETIRAcetam  IVPB 125 milliGRAM(s) IV Intermittent every 12 hours  meropenem  IVPB 1000 milliGRAM(s) IV Intermittent every 12 hours  midodrine 10 milliGRAM(s) Oral every 8 hours  pantoprazole  Injectable 40 milliGRAM(s) IV Push daily  senna 2 Tablet(s) Oral at bedtime  vancomycin  IVPB 1250 milliGRAM(s) IV Intermittent every 24 hours    PHYSICAL EXAM:  GENERAL: well built, well nourished  HEAD:  Atraumatic, Normocephalic  sacral wound: ~[id number] wound to sacrum. wound bed pink/red with several areas of clotted blood with many areas of oozing. Sutures put in place to attempt hemostasis, silver nitrate and Surgicel applied to wound. applied pressure dressing to wound.

## 2021-04-02 LAB
ALBUMIN SERPL ELPH-MCNC: 1.9 G/DL — LOW (ref 3.5–5.2)
ALP SERPL-CCNC: 146 U/L — HIGH (ref 30–115)
ALT FLD-CCNC: 8 U/L — SIGNIFICANT CHANGE UP (ref 0–41)
ANION GAP SERPL CALC-SCNC: 9 MMOL/L — SIGNIFICANT CHANGE UP (ref 7–14)
AST SERPL-CCNC: 54 U/L — HIGH (ref 0–41)
BASOPHILS # BLD AUTO: 0.03 K/UL — SIGNIFICANT CHANGE UP (ref 0–0.2)
BASOPHILS NFR BLD AUTO: 0.2 % — SIGNIFICANT CHANGE UP (ref 0–1)
BILIRUB SERPL-MCNC: 0.3 MG/DL — SIGNIFICANT CHANGE UP (ref 0.2–1.2)
BUN SERPL-MCNC: 47 MG/DL — HIGH (ref 10–20)
CALCIUM SERPL-MCNC: 7.9 MG/DL — LOW (ref 8.5–10.1)
CHLORIDE SERPL-SCNC: 107 MMOL/L — SIGNIFICANT CHANGE UP (ref 98–110)
CO2 SERPL-SCNC: 18 MMOL/L — SIGNIFICANT CHANGE UP (ref 17–32)
CREAT SERPL-MCNC: 1.2 MG/DL — SIGNIFICANT CHANGE UP (ref 0.7–1.5)
CSF PCR RESULT: SIGNIFICANT CHANGE UP
CULTURE RESULTS: SIGNIFICANT CHANGE UP
EOSINOPHIL # BLD AUTO: 0 K/UL — SIGNIFICANT CHANGE UP (ref 0–0.7)
EOSINOPHIL NFR BLD AUTO: 0 % — SIGNIFICANT CHANGE UP (ref 0–8)
GLUCOSE BLDC GLUCOMTR-MCNC: 138 MG/DL — HIGH (ref 70–99)
GLUCOSE BLDC GLUCOMTR-MCNC: 150 MG/DL — HIGH (ref 70–99)
GLUCOSE BLDC GLUCOMTR-MCNC: 150 MG/DL — HIGH (ref 70–99)
GLUCOSE BLDC GLUCOMTR-MCNC: 152 MG/DL — HIGH (ref 70–99)
GLUCOSE SERPL-MCNC: 174 MG/DL — HIGH (ref 70–99)
HCT VFR BLD CALC: 24.1 % — LOW (ref 42–52)
HGB BLD-MCNC: 7.5 G/DL — LOW (ref 14–18)
IMM GRANULOCYTES NFR BLD AUTO: 8 % — HIGH (ref 0.1–0.3)
LYMPHOCYTES # BLD AUTO: 0.83 K/UL — LOW (ref 1.2–3.4)
LYMPHOCYTES # BLD AUTO: 6 % — LOW (ref 20.5–51.1)
MAGNESIUM SERPL-MCNC: 2 MG/DL — SIGNIFICANT CHANGE UP (ref 1.8–2.4)
MCHC RBC-ENTMCNC: 26.1 PG — LOW (ref 27–31)
MCHC RBC-ENTMCNC: 31.1 G/DL — LOW (ref 32–37)
MCV RBC AUTO: 84 FL — SIGNIFICANT CHANGE UP (ref 80–94)
MONOCYTES # BLD AUTO: 0.77 K/UL — HIGH (ref 0.1–0.6)
MONOCYTES NFR BLD AUTO: 5.5 % — SIGNIFICANT CHANGE UP (ref 1.7–9.3)
NEUTROPHILS # BLD AUTO: 11.2 K/UL — HIGH (ref 1.4–6.5)
NEUTROPHILS NFR BLD AUTO: 80.3 % — HIGH (ref 42.2–75.2)
NRBC # BLD: 0 /100 WBCS — SIGNIFICANT CHANGE UP (ref 0–0)
PLATELET # BLD AUTO: 50 K/UL — LOW (ref 130–400)
POTASSIUM SERPL-MCNC: 4 MMOL/L — SIGNIFICANT CHANGE UP (ref 3.5–5)
POTASSIUM SERPL-SCNC: 4 MMOL/L — SIGNIFICANT CHANGE UP (ref 3.5–5)
PROT SERPL-MCNC: 5.2 G/DL — LOW (ref 6–8)
RBC # BLD: 2.87 M/UL — LOW (ref 4.7–6.1)
RBC # FLD: 16.8 % — HIGH (ref 11.5–14.5)
SODIUM SERPL-SCNC: 134 MMOL/L — LOW (ref 135–146)
SPECIMEN SOURCE: SIGNIFICANT CHANGE UP
SURGICAL PATHOLOGY STUDY: SIGNIFICANT CHANGE UP
WBC # BLD: 13.94 K/UL — HIGH (ref 4.8–10.8)
WBC # FLD AUTO: 13.94 K/UL — HIGH (ref 4.8–10.8)

## 2021-04-02 PROCEDURE — 99233 SBSQ HOSP IP/OBS HIGH 50: CPT

## 2021-04-02 RX ADMIN — MIDODRINE HYDROCHLORIDE 10 MILLIGRAM(S): 2.5 TABLET ORAL at 05:09

## 2021-04-02 RX ADMIN — FAMOTIDINE 20 MILLIGRAM(S): 10 INJECTION INTRAVENOUS at 12:52

## 2021-04-02 RX ADMIN — CARBIDOPA AND LEVODOPA 2 TABLET(S): 25; 100 TABLET ORAL at 14:01

## 2021-04-02 RX ADMIN — MIDODRINE HYDROCHLORIDE 10 MILLIGRAM(S): 2.5 TABLET ORAL at 21:12

## 2021-04-02 RX ADMIN — Medication 1 APPLICATION(S): at 05:12

## 2021-04-02 RX ADMIN — MEROPENEM 100 MILLIGRAM(S): 1 INJECTION INTRAVENOUS at 17:33

## 2021-04-02 RX ADMIN — ATORVASTATIN CALCIUM 40 MILLIGRAM(S): 80 TABLET, FILM COATED ORAL at 21:13

## 2021-04-02 RX ADMIN — LEVETIRACETAM 405 MILLIGRAM(S): 250 TABLET, FILM COATED ORAL at 17:34

## 2021-04-02 RX ADMIN — MEROPENEM 100 MILLIGRAM(S): 1 INJECTION INTRAVENOUS at 05:10

## 2021-04-02 RX ADMIN — MIDODRINE HYDROCHLORIDE 10 MILLIGRAM(S): 2.5 TABLET ORAL at 14:01

## 2021-04-02 RX ADMIN — SENNA PLUS 2 TABLET(S): 8.6 TABLET ORAL at 21:13

## 2021-04-02 RX ADMIN — CARBIDOPA AND LEVODOPA 2 TABLET(S): 25; 100 TABLET ORAL at 21:12

## 2021-04-02 RX ADMIN — CARBIDOPA AND LEVODOPA 2 TABLET(S): 25; 100 TABLET ORAL at 11:23

## 2021-04-02 RX ADMIN — Medication 1 APPLICATION(S): at 17:30

## 2021-04-02 RX ADMIN — Medication 216 GRAM(S): at 05:13

## 2021-04-02 RX ADMIN — CHLORHEXIDINE GLUCONATE 1 APPLICATION(S): 213 SOLUTION TOPICAL at 05:12

## 2021-04-02 RX ADMIN — CARBIDOPA AND LEVODOPA 2 TABLET(S): 25; 100 TABLET ORAL at 05:09

## 2021-04-02 RX ADMIN — LEVETIRACETAM 405 MILLIGRAM(S): 250 TABLET, FILM COATED ORAL at 05:10

## 2021-04-02 RX ADMIN — Medication 266 MILLIGRAM(S): at 05:11

## 2021-04-02 NOTE — CONSULT NOTE ADULT - SUBJECTIVE AND OBJECTIVE BOX
71 year old Male with past medical history of Parkinson's, dementia, CKD Stage 3, 1st degree AV block, HLD, gout, HTN, DM, fungal septicemia presenting from [facility] for acute decompensation in mental status.   As per documentation patient at baseline is verbal but for past 3 days, he has been worsening to state of being non verbal. Patient was admitted to [facility] for fungemia and discharged on the 11th March. At NH he had left hand cellulitis and was treated for it.  In ED patient hemodynamically stable, afebrile, ABEBE with rise in Cr to 1.9 and BUN to 75, remains non verbal. CT head negative for any new strokes and UA negative. Admitted to floor, hypotensive, started on levophed, transferred to VENT unit, this ams off pressors, still spiking temps. pt now on 3E neuro unit.  pt underwent I&D of large deep sacral ulcer by Burn team - + bleeding requiring transfusions    Vital Signs Last 24 Hrs  T(C): 35.6 (02 Apr 2021 13:55), Max: 35.7 (01 Apr 2021 20:55)  T(F): 96 (02 Apr 2021 13:55), Max: 96.3 (01 Apr 2021 20:55)  HR: 68 (02 Apr 2021 13:55) (55 - 68)  BP: 134/56 (02 Apr 2021 13:55) (106/64 - 134/56)  BP(mean): 93 (02 Apr 2021 04:33) (80 - 93)  RR: 19 (02 Apr 2021 13:55) (18 - 20)  SpO2: 100% (02 Apr 2021 04:33) (100% - 100%)  Drug Dosing Weight  Height (cm): 170 (31 Mar 2021 15:48)  Weight (kg): 79.4 (31 Mar 2021 15:48)  BMI (kg/m2): 27.5 (31 Mar 2021 15:48)  BSA (m2): 1.91 (31 Mar 2021 15:48)  chronically ill appearing contracted, opens eyes but not tracking nor responding to either voice nor examination  HEENT: Normocephalic, atraumatic  CV: Regular rate & regular rhythm  Abdomen: Soft, ND  Ext: Warm, well perfused  cooling blanket on pt when seen this morning  Skin: no rash, no erythema, sacral dressings, bleeding  Lines: no phlebitis   NG [place] sump in L nare  several ecchymoses on forehead likely from EEG, and ecchymoses seen on arms  + small BM yesterday    MEDICATIONS  (STANDING):  atorvastatin 40 milliGRAM(s) Oral at bedtime  carbidopa/levodopa  25/100 2 Tablet(s) Oral <User Schedule>  chlorhexidine 4% Liquid 1 Application(s) Topical <User Schedule>  collagenase Ointment 1 Application(s) Topical two times a day  Dakins Solution - 1/2 Strength 1 Application(s) Topical two times a day  famotidine Injectable 20 milliGRAM(s) IV Push daily  levETIRAcetam  IVPB 125 milliGRAM(s) IV Intermittent every 12 hours  meropenem  IVPB 1000 milliGRAM(s) IV Intermittent every 12 hours  midodrine 10 milliGRAM(s) Oral every 8 hours  senna 2 Tablet(s) Oral at bedtime                        7.5    13.94 )-----------( 50       ( 02 Apr 2021 06:57 )             24.1   04-02    134<L>  |  107  |  47<H>  ----------------------------<  174<H>  4.0   |  18  |  1.2    Ca    7.9<L>      02 Apr 2021 06:57  Phos  3.4     03-31  Mg     2.0     04-02    TPro  5.2<L>  /  Alb  1.9<L>  /  TBili  0.3  /  DBili  x   /  AST  54<H>  /  ALT  8   /  AlkPhos  146<H>  04-02  < from: CT Head No Cont (03.22.21 @ 18:16) >  Parenchymal volume is appropriate for patient age. No hydrocephalus.    No large territorial infarct, intracranial hemorrhage, extra-axial fluid collection, or midline shift.    Punctate hypodensities in the bihemispheric white matter consistent with mild microvascular ischemic disease, essentially stable.    Left cataract surgery. The calvarium is intact    Imaged paranasal sinuses and mastoid air cavities are well-aerated.    < end of copied text >  A1C with Estimated Average Glucose (03.23.21 @ 05:47)   A1C with Estimated Average Glucose Result: 5.4:

## 2021-04-02 NOTE — PROGRESS NOTE ADULT - SUBJECTIVE AND OBJECTIVE BOX
SUBJECTIVE  Patient is a 71y old Male who presents with a chief complaint of Change in Mental Status (02 Apr 2021 08:57)  Currently admitted to medicine with the primary diagnosis of AMS (altered mental status)    Today is hospital day 11d, and this morning he is laying in bed on his side. Patient with eyes open this morning and tracking. Patient does not verbally respond or move extremities despite being spoken to in Polish and english. Patient in no acute distress    OBJECTIVE  PAST MEDICAL & SURGICAL HISTORY  Parkinson disease    Hypertension    Gout    Dyslipidemia    Prostatitis    Cataract    No significant past surgical history      ALLERGIES:  No Known Allergies    MEDICATIONS:  STANDING MEDICATIONS  atorvastatin 40 milliGRAM(s) Oral at bedtime  carbidopa/levodopa  25/100 2 Tablet(s) Oral <User Schedule>  chlorhexidine 4% Liquid 1 Application(s) Topical <User Schedule>  collagenase Ointment 1 Application(s) Topical two times a day  Dakins Solution - 1/2 Strength 1 Application(s) Topical two times a day  enalapril 5 milliGRAM(s) Oral at bedtime  famotidine Injectable 20 milliGRAM(s) IV Push daily  levETIRAcetam  IVPB 125 milliGRAM(s) IV Intermittent every 12 hours  meropenem  IVPB 1000 milliGRAM(s) IV Intermittent every 12 hours  midodrine 10 milliGRAM(s) Oral every 8 hours  senna 2 Tablet(s) Oral at bedtime    PRN MEDICATIONS  acetaminophen   Tablet .. 650 milliGRAM(s) Oral every 6 hours PRN  oxycodone    5 mG/acetaminophen 325 mG 1 Tablet(s) Oral every 6 hours PRN      VITAL SIGNS: Last 24 Hours  T(C): 35.7 (01 Apr 2021 20:55), Max: 36.7 (01 Apr 2021 13:10)  T(F): 96.3 (01 Apr 2021 20:55), Max: 98 (01 Apr 2021 13:10)  HR: 55 (02 Apr 2021 04:33) (55 - 68)  BP: 128/73 (02 Apr 2021 04:33) (106/64 - 138/71)  BP(mean): 93 (02 Apr 2021 04:33) (80 - 93)  RR: 20 (02 Apr 2021 04:33) (18 - 20)  SpO2: 100% (02 Apr 2021 04:33) (100% - 100%)    LABS:                        7.5    13.94 )-----------( 50       ( 02 Apr 2021 06:57 )             24.1     04-02    134<L>  |  107  |  47<H>  ----------------------------<  174<H>  4.0   |  18  |  1.2    Ca    7.9<L>      02 Apr 2021 06:57  Phos  3.4     03-31  Mg     2.0     04-02    TPro  5.2<L>  /  Alb  1.9<L>  /  TBili  0.3  /  DBili  x   /  AST  54<H>  /  ALT  8   /  AlkPhos  146<H>  04-02    PT/INR - ( 01 Apr 2021 20:44 )   PT: 16.00 sec;   INR: 1.39 ratio         PTT - ( 01 Apr 2021 20:44 )  PTT:21.1 sec    Culture - CSF with Gram Stain (collected 01 Apr 2021 14:00)  Source: .CSF CSF  Gram Stain (01 Apr 2021 21:44):    No polymorphonuclear cells seen    No organisms seen    by cytocentrifuge    Culture - Acid Fast - Tissue w/Smear (collected 31 Mar 2021 16:18)  Source: .Tissue None    Culture - Tissue with Gram Stain (collected 31 Mar 2021 16:18)  Source: .Tissue None  Gram Stain (01 Apr 2021 07:05):    Few polymorphonuclear leukocytes seen per low power field    Numerous Gram Negative Rods seen per oil power field  Preliminary Report (02 Apr 2021 09:58):    Numerous Gram Negative Rods    Few Gram Positive Cocci in Pairs and Chains      RADIOLOGY:    PHYSICAL EXAM:    GENERAL: NAD, well-developed, tracking but not responding  HEENT:  Atraumatic, Normocephalic. EOMI, conjunctiva and sclera clear, No JVD  PULMONARY: Clear to auscultation bilaterally; No wheeze  CARDIOVASCULAR: Regular rate and rhythm; No murmurs, rubs, or gallops  GASTROINTESTINAL: Soft, Nontender, Nondistended  MUSCULOSKELETAL:  2+ edema of bilateral ue and le. No clubbing, cyanosis  NEUROLOGY: patient not responding to verbal interactions. not moving extremities. patient is tracking  SKIN: sacral lesions clean, dry, not bleeding.

## 2021-04-02 NOTE — PHYSICAL THERAPY INITIAL EVALUATION ADULT - SPECIFY REASON(S)
Patient lethargic, unable to keep awake. Patient on-going video EEG. Will follow-up.
Attempted to see pt for PT IE, daughter at b/s speaks Kiswahili. Pt is very lethargic, unable to open eyes despite daughter's encouragement. Will f/u as appropriate.
Pt currently lethargic, not arousable to tactile cues. Pt also had debridement and has increased bleeding as per RN. PT was asked to hold as pts bleeding was increased. PT will follow up.
9:00am. Pt attempted to see the pt for PT IE. Pt was fast asleep, still very lethargic. Pt was unable to arouse despite of VC and gentle shaking. Will hold PT today and f/u when appropriate.
PT discussed the case with LILLY Stratton. As per RN, pt is still very lethargic, not alert enough to participate in PT. Will continue to hold PT. PT will f/u when appropriate.
Pt currently getting blood. PT will follow up in PM as appropriately.
11:40 am. PT attempted to see the pt for the PT IE. Pt was fast asleep. PT attempted to arouse the pt. Pt briefly opened the eyes and closed back. LILLY Dunn is aware that pt is very lethargic.

## 2021-04-02 NOTE — PROGRESS NOTE ADULT - SUBJECTIVE AND OBJECTIVE BOX
NIEVES LABOY  71y, Male  Allergy: No Known Allergies      LOS  11d    CHIEF COMPLAINT: Change in Mental Status (02 Apr 2021 06:50)      INTERVAL EVENTS/HPI  - No acute events overnight, s/p LP  - T(F): , Max: 98 (04-01-21 @ 13:10)  - Tolerating medication  - WBC Count: 13.94 (04-02-21 @ 06:57)  WBC Count: 13.84 (04-01-21 @ 20:44)     - Creatinine, Serum: 1.2 (04-02-21 @ 06:57)  Creatinine, Serum: 1.2 (04-01-21 @ 20:44)       ROS  unable to obtain history secondary to patient's mental status and/or sedation     VITALS:  T(F): 96.3, Max: 98 (04-01-21 @ 13:10)  HR: 55  BP: 128/73  RR: 20Vital Signs Last 24 Hrs  T(C): 35.7 (01 Apr 2021 20:55), Max: 36.7 (01 Apr 2021 13:10)  T(F): 96.3 (01 Apr 2021 20:55), Max: 98 (01 Apr 2021 13:10)  HR: 55 (02 Apr 2021 04:33) (55 - 68)  BP: 128/73 (02 Apr 2021 04:33) (106/64 - 138/71)  BP(mean): 93 (02 Apr 2021 04:33) (80 - 93)  RR: 20 (02 Apr 2021 04:33) (18 - 20)  SpO2: 100% (02 Apr 2021 04:33) (100% - 100%)    PHYSICAL EXAM:  Gen: chronically ill appearing contracted  HEENT: Normocephalic, atraumatic  Neck: supple, no lymphadenopathy  CV: Regular rate & regular rhythm  Lungs: decreased BS at bases, no fremitus  Abdomen: Soft, BS present  Ext: Warm, well perfused  Neuro: non focal, awake  Skin: no rash, no erythema, sacral dressings, bleeding  Lines: no phlebitis     FH: Non-contributory  Social Hx: Non-contributory    TESTS & MEASUREMENTS:                        7.5    13.94 )-----------( 50       ( 02 Apr 2021 06:57 )             24.1     04-02    134<L>  |  107  |  47<H>  ----------------------------<  174<H>  4.0   |  18  |  1.2    Ca    7.9<L>      02 Apr 2021 06:57  Phos  3.4     03-31  Mg     2.0     04-02    TPro  5.2<L>  /  Alb  1.9<L>  /  TBili  0.3  /  DBili  x   /  AST  54<H>  /  ALT  8   /  AlkPhos  146<H>  04-02    eGFR if Non African American: 60 mL/min/1.73M2 (04-02-21 @ 06:57)  eGFR if African American: 70 mL/min/1.73M2 (04-02-21 @ 06:57)  eGFR if Non African American: 60 mL/min/1.73M2 (04-01-21 @ 20:44)  eGFR if : 70 mL/min/1.73M2 (04-01-21 @ 20:44)    LIVER FUNCTIONS - ( 02 Apr 2021 06:57 )  Alb: 1.9 g/dL / Pro: 5.2 g/dL / ALK PHOS: 146 U/L / ALT: 8 U/L / AST: 54 U/L / GGT: x               Culture - CSF with Gram Stain (collected 04-01-21 @ 14:00)  Source: .CSF CSF  Gram Stain (04-01-21 @ 21:44):    No polymorphonuclear cells seen    No organisms seen    by cytocentrifuge    Culture - Acid Fast - Tissue w/Smear (collected 03-31-21 @ 16:18)  Source: .Tissue None    Culture - Tissue with Gram Stain (collected 03-31-21 @ 16:18)  Source: .Tissue None  Gram Stain (04-01-21 @ 07:05):    Few polymorphonuclear leukocytes seen per low power field    Numerous Gram Negative Rods seen per oil power field    Culture - Other (collected 03-29-21 @ 17:15)  Source: .Other sacrum  Final Report (03-31-21 @ 16:53):    Numerous Klebsiella pneumoniae ESBL    Few Pseudomonas aeruginosa    Numerous Enterococcus faecalis (vancomycin resistant)  Organism: Klebsiella pneumoniae ESBL  Pseudomonas aeruginosa  Enterococcus faecalis (vancomycin resistant) (03-31-21 @ 16:53)  Organism: Enterococcus faecalis (vancomycin resistant) (03-31-21 @ 16:53)      -  Ampicillin: S <=2 Predicts results to ampicillin/sulbactam, amoxacillin-clavulanate and  piperacillin-tazobactam.      -  Daptomycin: S 1      -  Levofloxacin: R >4      -  Linezolid: S 2      -  Tetra/Doxy: R >8      -  Vancomycin: R >16      Method Type: JAZMIN  Organism: Pseudomonas aeruginosa (03-31-21 @ 16:53)      -  Amikacin: S <=16      -  Aztreonam: I 16      -  Cefepime: S 8      -  Ceftazidime: S 4      -  Ciprofloxacin: R >2      -  Gentamicin: I 8      -  Imipenem: I 4      -  Levofloxacin: R >4      -  Meropenem: S <=1      -  Piperacillin/Tazobactam: S 16      -  Tobramycin: S <=2      Method Type: JAZMIN  Organism: Klebsiella pneumoniae ESBL (03-31-21 @ 16:53)      -  Amikacin: S <=16      -  Amoxicillin/Clavulanic Acid: S <=8/4      -  Ampicillin: R >16 These ampicillin results predict results for amoxicillin      -  Ampicillin/Sulbactam: R >16/8 Enterobacter, Citrobacter, and Serratia may develop resistance during prolonged therapy (3-4 days)      -  Aztreonam: R >16      -  Cefazolin: R >16 Enterobacter, Citrobacter, and Serratia may develop resistance during prolonged therapy (3-4 days)      -  Cefepime: R >16      -  Cefoxitin: S <=8      -  Ceftriaxone: R >32 Enterobacter, Citrobacter, and Serratia may develop resistance during prolonged therapy      -  Ciprofloxacin: R >2      -  Ertapenem: S <=0.5      -  Gentamicin: S <=2      -  Imipenem: S <=1      -  Levofloxacin: R 2      -  Meropenem: S <=1      -  Piperacillin/Tazobactam: R <=8      -  Tobramycin: S <=2      -  Trimethoprim/Sulfamethoxazole: R >2/38      Method Type: JAZMIN    Culture - Blood (collected 03-28-21 @ 01:24)  Source: .Blood None  Preliminary Report (03-29-21 @ 13:02):    No growth to date.    Culture - Blood (collected 03-25-21 @ 11:09)  Source: .Blood None  Gram Stain (03-28-21 @ 18:04):    Growth in anaerobic bottle: Gram Positive Cocci in Clusters  Final Report (03-29-21 @ 17:13):    Growth in anaerobic bottle: Staphylococcus pettenkoferi    Coag Negative Staphylococcus    Single set isolate, possible contaminant. Contact    Microbiology if susceptibility testing clinically    indicated.    ***Blood Panel PCR results on this specimen areavailable    approximately 3 hours after the Gram stain result.***    Gram stain, PCR, and/or culture results may not always    correspond due to difference in methodologies.    ************************************************************    This PCR assay wasperformed by multiplex PCR. This    Assay tests for 66 bacterial and resistance gene targets.    Please refer to the [facility] test directory    at [email or web address] for details.  Organism: Blood Culture PCR (03-29-21 @ 17:13)  Organism: Blood Culture PCR (03-29-21 @ 17:13)      -  Coagulase negative Staphylococcus: Detec      Method Type: PCR    Culture - Blood (collected 03-25-21 @ 05:47)  Source: .Blood None  Final Report (03-30-21 @ 14:01):    No Growth Final    Culture - Urine (collected 03-22-21 @ 20:41)  Source: .Urine Clean Catch (Midstream)  Final Report (03-23-21 @ 20:53):    No growth    Culture - Blood (collected 03-22-21 @ 15:31)  Source: .Blood Blood-Peripheral  Final Report (03-28-21 @ 02:33):    No Growth Final    Culture - Blood (collected 03-22-21 @ 15:28)  Source: .Blood Blood-Peripheral  Final Report (03-28-21 @ 02:33):    No Growth Final    Culture - Urine (collected 03-04-21 @ 12:00)  Source: .Urine Clean Catch (Midstream)  Final Report (03-06-21 @ 07:24):    No growth    Culture - Blood (collected 03-04-21 @ 11:12)  Source: .Blood None  Final Report (03-09-21 @ 23:01):    No Growth Final    Culture - Blood (collected 03-03-21 @ 16:27)  Source: .Blood None  Final Report (03-08-21 @ 23:01):    No Growth Final            INFECTIOUS DISEASES TESTING  Vancomycin Level, Random: 11.5 (03-31-21 @ 07:00)  Vancomycin Level, Trough: 11.5 (03-31-21 @ 07:00)  COVID-19 PCR: NotDetec (03-30-21 @ 15:19)  COVID-19 PCR: NotDetec (03-29-21 @ 19:40)  Vancomycin Level, Trough: <4.0 (03-28-21 @ 13:00)  Procalcitonin, Serum: 0.61 (03-26-21 @ 10:20)  COVID-19 PCR: NotDetec (03-22-21 @ 20:13)  COVID-19 PCR: NotDetec (03-10-21 @ 12:22)  COVID-19 PCR: NotDetec (03-01-21 @ 16:49)  Fungitell: 62 (03-01-21 @ 11:00)  Procalcitonin, Serum: 0.29 (03-01-21 @ 05:32)  MRSA PCR Result.: Negative (02-27-21 @ 15:44)  COVID-19 PCR: NotDetec (02-25-21 @ 15:34)  Procalcitonin, Serum: 0.27 (02-18-21 @ 10:54)  MRSA PCR Result.: Negative (02-14-21 @ 18:29)  HIV-1/2 Combo Result: Nonreact (02-14-21 @ 05:07)  Procalcitonin, Serum: 0.46 (02-13-21 @ 16:00)  COVID-19 PCR: NotDetec (02-12-21 @ 10:17)      INFLAMMATORY MARKERS      RADIOLOGY & ADDITIONAL TESTS:  I have personally reviewed the last available Chest xray  CXR      CT      CARDIOLOGY TESTING  12 Lead ECG:   Ventricular Rate 64 BPM    Atrial Rate 64 BPM    P-R Interval 234 ms    QRS Duration 88 ms    Q-T Interval 404 ms    QTC Calculation(Bazett) 416 ms    P Axis 68 degrees    R Axis -17 degrees    T Axis 9 degrees    Diagnosis Line Sinus rhythm with 1st degree A-V block  Voltage criteria for left ventricular hypertrophy  Nonspecific T wave abnormality  Abnormal ECG    Confirmed by ANA GARDUNO MD ([id number]) on 3/22/2021 9:54:41 PM (03-22-21 @ 16:57)      MEDICATIONS  acyclovir IVPB 800 IV Intermittent every 12 hours  ampicillin  IVPB 2 IV Intermittent every 6 hours  atorvastatin 40 Oral at bedtime  carbidopa/levodopa  25/100 2 Oral <User Schedule>  chlorhexidine 4% Liquid 1 Topical <User Schedule>  collagenase Ointment 1 Topical two times a day  Dakins Solution - 1/2 Strength 1 Topical two times a day  enalapril 5 Oral at bedtime  famotidine Injectable 20 IV Push daily  levETIRAcetam  IVPB 125 IV Intermittent every 12 hours  meropenem  IVPB 1000 IV Intermittent every 12 hours  midodrine 10 Oral every 8 hours  senna 2 Oral at bedtime  vancomycin  IVPB 1250 IV Intermittent every 24 hours      WEIGHT  Weight (kg): 79.4 (03-31-21 @ 15:48)  Creatinine, Serum: 1.2 mg/dL (04-02-21 @ 06:57)  Creatinine, Serum: 1.2 mg/dL (04-01-21 @ 20:44)      ANTIBIOTICS:  acyclovir IVPB 800 milliGRAM(s) IV Intermittent every 12 hours  ampicillin  IVPB 2 Gram(s) IV Intermittent every 6 hours  meropenem  IVPB 1000 milliGRAM(s) IV Intermittent every 12 hours  vancomycin  IVPB 1250 milliGRAM(s) IV Intermittent every 24 hours      All available historical records have been reviewed

## 2021-04-02 NOTE — PROGRESS NOTE ADULT - ASSESSMENT
71 year old Male with past medical history of Parkinson's, dementia, CKD Stage 3, 1st degree AV block, HLD, gout, HTN, DM, fungal septicemia presenting from [facility] for acute decompensation in mental status. As per documentation patient at baseline is verbal but for past 3 days, he has been worsening to state of being non verbal. Patient was admitted to [facility] for fungemia and discharged on the 11th March. At  he had left hand cellulitis and was treated for it.      # Metabolic encephalopathy  # Sepsis  # Necrotic sacral ulcer stage4  # H/o recent fungemia  - CT Head No Cont (03.22.21 @ 18:16) >No acute intracranial pathology.  - EEG : epileptiform activity  -  Xray Chest 1 View- PORTABLE-Urgent (Xray Chest 1 View- PORTABLE-Urgent .) (03.26.21 @ 07:10) >No consolidation, effusion or pneumothorax.  - 3/22 Blood & Urine cxs NGTD   - 3/31 WCX GNR, 3/29   Numerous Klebsiella pneumoniae ESBL, Few Pseudomonas aeruginosa, Numerous Enterococcus faecalis (vancomycin resistant)  - evaluated by Burn: s/p debridement of sacrumon 3/30 .  no more new recommendation for surgery at this time .continue local wound care with santyl/dakins WTD.  - ID F/u: Discontinue  acyclovir, vancomycin, ampicillin. Continue meropenem  -evaluated by neurology:  c/w keppra, seizure precautions  - S/p spinal tap today --> F/u CSF PCR.  - F/u wound culture    # Hypotension  - c/w midodrine    # Thrombocytopenia sec to sepsis  - monitor platelets    #  Acute kidney injury , prerenal-resolving  # Hyponatremia  - monitor  BMP    # H/o parkinsons disease  - c/w sinemet    # left Shoulder dislocation  - Xray Shoulder 2 Views, Left (03.23.21 @ 21:48) >Reidentified anterior dislocation of the humeral head overlying the left second rib with bony fragmentation along the glenoid and humeral head. AC joint degenerative change.  - Pt has chronic dislocation since june previous chart review.    - no ortho intervention    # H/o chronic DVT  - VA Duplex Lower Ext Vein Scan, Bilat (03.27.21 @ 18:48) >No evidence of deep venous thrombosis or superficial thrombophlebitis in the bilateral lower extremities.  - continue holding     # Dyslipidemia  - c/w statin    # Nutrition: Tube feeds    # DVT prophylaxis  -scd    # Full code    # Poor prognosis    #Pending: clinical improvement. F/u csf PCR, Wound culture, monitor cbc .  # Disposition: SNF when stable       71 year old Male with past medical history of Parkinson's, dementia, CKD Stage 3, 1st degree AV block, HLD, gout, HTN, DM, fungal septicemia presenting from [facility] for acute decompensation in mental status. As per documentation patient at baseline is verbal but for past 3 days, he has been worsening to state of being non verbal. Patient was admitted to [facility] for fungemia and discharged on the 11th March. At  he had left hand cellulitis and was treated for it.      # Metabolic encephalopathy  # Sepsis  # Necrotic sacral ulcer stage4  # H/o recent fungemia  - CT Head No Cont (03.22.21 @ 18:16) >No acute intracranial pathology.  - EEG : epileptiform activity  -  Xray Chest 1 View- PORTABLE-Urgent (Xray Chest 1 View- PORTABLE-Urgent .) (03.26.21 @ 07:10) >No consolidation, effusion or pneumothorax.  - 3/22 Blood & Urine cxs NGTD   - 3/31 WCX GNR, 3/29   Numerous Klebsiella pneumoniae ESBL, Few Pseudomonas aeruginosa, Numerous Enterococcus faecalis (vancomycin resistant)  - evaluated by Burn: s/p debridement of sacrumon 3/30 .  no more new recommendation for surgery at this time .continue local wound care with santyl/dakins WTD.  - ID F/u: Discontinue  acyclovir, vancomycin, ampicillin. Continue meropenem  -evaluated by neurology:  c/w keppra, seizure precautions  - S/p spinal tap today --> F/u CSF PCR.  - F/u wound culture    # Hypotension  - c/w midodrine    # Thrombocytopenia sec to sepsis  - monitor platelets    #  Acute kidney injury , prerenal-resolving  # Hyponatremia  - monitor  BMP    # H/o parkinsons disease  - c/w sinemet    # left Shoulder dislocation  - Xray Shoulder 2 Views, Left (03.23.21 @ 21:48) >Reidentified anterior dislocation of the humeral head overlying the left second rib with bony fragmentation along the glenoid and humeral head. AC joint degenerative change.  - Pt has chronic dislocation since june previous chart review.    - no ortho intervention    # H/o chronic DVT  - VA Duplex Lower Ext Vein Scan, Bilat (03.27.21 @ 18:48) >No evidence of deep venous thrombosis or superficial thrombophlebitis in the bilateral lower extremities.  - continue holding eliquis    # Dyslipidemia  - c/w statin    # Nutrition: Tube feeds    # DVT prophylaxis  -scd    # Full code    # Poor prognosis    #Pending: clinical improvement. F/u csf PCR, Wound culture, monitor cbc .  # Disposition: SNF when stable

## 2021-04-02 NOTE — PROGRESS NOTE ADULT - SUBJECTIVE AND OBJECTIVE BOX
Patient is a 71y old  Male who presents with a chief complaint of Change in Mental Status (02 Apr 2021 08:57)    Patient was seen and examined.  Patient with slight improvement in mentation--> opens his eyes to name and tracking.    PAST MEDICAL & SURGICAL HISTORY:  Parkinson disease  Hypertension  Gout  Dyslipidemia  Prostatitis  Cataract    Allergies  No Known Allergies    MEDICATIONS  (STANDING):  atorvastatin 40 milliGRAM(s) Oral at bedtime  carbidopa/levodopa  25/100 2 Tablet(s) Oral <User Schedule>  chlorhexidine 4% Liquid 1 Application(s) Topical <User Schedule>  collagenase Ointment 1 Application(s) Topical two times a day  Dakins Solution - 1/2 Strength 1 Application(s) Topical two times a day  enalapril 5 milliGRAM(s) Oral at bedtime  famotidine Injectable 20 milliGRAM(s) IV Push daily  levETIRAcetam  IVPB 125 milliGRAM(s) IV Intermittent every 12 hours  meropenem  IVPB 1000 milliGRAM(s) IV Intermittent every 12 hours  midodrine 10 milliGRAM(s) Oral every 8 hours  senna 2 Tablet(s) Oral at bedtime    MEDICATIONS  (PRN):  acetaminophen   Tablet .. 650 milliGRAM(s) Oral every 6 hours PRN Temp greater or equal to 38C (100.4F)  oxycodone    5 mG/acetaminophen 325 mG 1 Tablet(s) Oral every 6 hours PRN Moderate Pain (4 - 6)    Vital Signs Last 24 Hrs  T(C): 35.7  T(F): 96.3  HR: 55  BP: 128/73  BP(mean): 93  RR: 20  SpO2: 100%    O/E:  Pt not responding to oral commands  HEENT: atraumatic, EOMI. NGT+  Chest: clear.  CVS: SIS2 +, no murmur.  P/A: Soft, BS+  CNS: opens his eyes to name and tracking.  Ext:  lower ext edema+  Skin: sacral ulcer  All systems reviewed positive findings as above.    POCT Blood Glucose.: 150 mg/dL (02 Apr 2021 12:03)  POCT Blood Glucose.: 150 mg/dL (02 Apr 2021 04:54)  POCT Blood Glucose.: 172 mg/dL (01 Apr 2021 22:05)  POCT Blood Glucose.: 152 mg/dL (01 Apr 2021 17:28)  POCT Blood Glucose.: 154 mg/dL (01 Apr 2021 12:32)                          7.5<L>  13.94<H> )-----------( 50<L>    ( 02 Apr 2021 06:57 )             24.1<L>                        7.2<L>  13.84<H> )-----------( 49<L>    ( 01 Apr 2021 20:44 )             23.7<L>    04-02    134<L>  |  107  |  47<H>  ----------------------------<  174<H>  4.0   |  18  |  1.2  04-01    134<L>  |  106  |  46<H>  ----------------------------<  167<H>  3.9   |  17  |  1.2    Ca    7.9<L>      02 Apr 2021 06:57  Ca    7.9<L>      01 Apr 2021 20:44  Ca    8.2<L>      01 Apr 2021 06:55  Ca    8.4<L>      31 Mar 2021 21:50  Phos  3.4     03-31  Mg     2.0     04-02    TPro  5.2<L>  /  Alb  1.9<L>  /  TBili  0.3  /  DBili  x   /  AST  54<H>  /  ALT  8   /  AlkPhos  146<H>  04-02  TPro  5.3<L>  /  Alb  1.9<L>  /  TBili  0.3  /  DBili  x   /  AST  36  /  ALT  10  /  AlkPhos  151<H>  04-01  TPro  6.1  /  Alb  2.1<L>  /  TBili  0.3  /  DBili  x   /  AST  33  /  ALT  12  /  AlkPhos  201<H>  04-01          PT/INR - ( 01 Apr 2021 20:44 )   PT: 16.00 sec;   INR: 1.39 ratio         PTT - ( 01 Apr 2021 20:44 )  PTT:21.1 sec      Culture - CSF with Gram Stain (collected 01 Apr 2021 14:00)  Source: .CSF CSF  Gram Stain (01 Apr 2021 21:44):    No polymorphonuclear cells seen    No organisms seen    by cytocentrifuge    Culture - Acid Fast - Tissue w/Smear (collected 31 Mar 2021 16:18)  Source: .Tissue None    Culture - Tissue with Gram Stain (collected 31 Mar 2021 16:18)  Source: .Tissue None  Gram Stain (01 Apr 2021 07:05):    Few polymorphonuclear leukocytes seen per low power field    Numerous Gram Negative Rods seen per oil power field  Preliminary Report (02 Apr 2021 12:06):    Numerous Klebsiella pneumoniae    Few Enterococcus faecalis    Few Pseudomonas aeruginosa

## 2021-04-02 NOTE — PROGRESS NOTE ADULT - ASSESSMENT
ASSESSMENT  71 year old Male with past medical history of Parkinson's, dementia, CKD Stage 3, 1st degree AV block, HLD, gout, HTN, DM, fungal septicemia presenting from [facility] for acute decompensation in mental status.     IMPRESSION  #Fever 3/25 with sepsis, not present on admission with metabolic encephalopathy, EEG + seizure    LP not consistent with infection.. G/S NEGATIVE (on ABX)    Total Nucleated Cell Count, CSF: 0 /uL (04.01.21 @ 14:00)    Protein, CSF: 42 mg/dL (04.01.21 @ 14:00)    Glucose, CSF: 97 mg/dL (04.01.21 @ 14:00)    3/25 BCX 1/2 sets, 1/4 bottles Staphylococcus pettenkoferi, likely contaminant     cannot rule out meningitis     CXR no PNA   3/22 Blood & Urine cxs NGTD   #Sacral ulcer- necrotic     3/31 WCX GNR    3/29   Numerous Klebsiella pneumoniae ESBL    Few Pseudomonas aeruginosa    Numerous Enterococcus faecalis (vancomycin resistant)- S amp    seen by Burn sacrum with full thickness ~4x5cm with dark /brown devitalized tissue at base; no purulent drainage, no bleeding  #Recent Fungemia    Blood Cx 2/27 Candida albicans    evaluated by optho - no ocular evidence     TTE no significant valvulopathy   #ABEBE  #L hand edema  < from: Xray Wrist 2 Views, Left (03.27.21 @ 11:13) >No acute fracture or dislocation. Diffuse soft tissue swelling. Nonaggressive appearing lucency in the distal radius, indeterminate. Nonemergent MRI may be obtained for further evaluation.    Creatinine, Serum: 2.0 (03-26-21 @ 05:33)      RECOMMENDATIONS  - D/C Vanc & ACV & ampicillin   - Continue meropenem 1g q12h IV  - f/u CSF PCR  - f/u WCX GNR    If any questions, please call or send a message on [employer] Teams  Spectra [phone]

## 2021-04-02 NOTE — CONSULT NOTE ADULT - ASSESSMENT
IMP:  - altered mental status/ metabolic encephalopathy from sepsis  - large necrotic sacral ulcer stage 4  - post hemorrhagic anemia  - thrombocytopenia  - h/o Parkinson's disease, DM    Feeding orders d/w RN - he's been given 140 ml Jevity infused over and hour q 4h.  SUggest 360 ml Jevity 1.2 over 45 minutes x 4 feeds/d --> 79 gm protein & 1710 kcal/d  check lytes, phos, MG after first 24-48 h of this regimen - suspect will need to supplement K and phos  check poc glucose prior to each feeding - can then determine best Rx for home  for now, add Prosource TF via tube 2/d - once felt not to be septic, change to Moris (one packet in 4 oz water) twice a day via feeding tube x 1 month, then re-evaluate.  add zinc 220mg and vitamin C 500 mg once daily each x 2 weeks and then re-evaluate

## 2021-04-02 NOTE — PROGRESS NOTE ADULT - SUBJECTIVE AND OBJECTIVE BOX
Neurology Progress Note    Interval History:  Pt currently more awake today easily arousable and with noxious stimuli, tracks and verbalizes in native language.  follows occasional commands.  Minimal spontaneous movement. No tremors or shaking.  Afebrile.  s/p LP under IR.      HPI:  71 year old Male with past medical history of Parkinson's, dementia, CKD Stage 3, 1st degree AV block, HLD, gout, HTN, DM, fungal septicemia presenting from [facility] for acute decompensation in mental status.     As per documentation patient at baseline is verbal but for past 3 days, he has been worsening to state of being non verbal. Patient was admitted to [facility] for fungemia and discharged on the 11th March. At  he had left hand cellulitis and was treated for it.    In ED patient hemodynamically stable, afebrile, ABEBE with rise in Cr to 1.9 and BUN to 75, remains non verbal. CT head negative for any new strokes and UA negative. Family has not visited the patient recently so unaware of his status. (22 Mar 2021 23:59)      PAST MEDICAL & SURGICAL HISTORY:  Parkinson disease    Hypertension    Gout    Dyslipidemia    Prostatitis    Cataract    No significant past surgical history    Medications:  acetaminophen   Tablet .. 650 milliGRAM(s) Oral every 6 hours PRN  atorvastatin 40 milliGRAM(s) Oral at bedtime  carbidopa/levodopa  25/100 2 Tablet(s) Oral <User Schedule>  chlorhexidine 4% Liquid 1 Application(s) Topical <User Schedule>  collagenase Ointment 1 Application(s) Topical two times a day  Dakins Solution - 1/2 Strength 1 Application(s) Topical two times a day  famotidine Injectable 20 milliGRAM(s) IV Push daily  levETIRAcetam  IVPB 125 milliGRAM(s) IV Intermittent every 12 hours  meropenem  IVPB 1000 milliGRAM(s) IV Intermittent every 12 hours  midodrine 10 milliGRAM(s) Oral every 8 hours  oxycodone    5 mG/acetaminophen 325 mG 1 Tablet(s) Oral every 6 hours PRN  senna 2 Tablet(s) Oral at bedtime    Vital Signs Last 24 Hrs  T(C): 35.6 (02 Apr 2021 13:55), Max: 35.7 (01 Apr 2021 20:55)  T(F): 96 (02 Apr 2021 13:55), Max: 96.3 (01 Apr 2021 20:55)  HR: 68 (02 Apr 2021 13:55) (55 - 68)  BP: 134/56 (02 Apr 2021 13:55) (106/64 - 134/56)  BP(mean): 93 (02 Apr 2021 04:33) (80 - 93)  RR: 19 (02 Apr 2021 13:55) (18 - 20)  SpO2: 100% (02 Apr 2021 04:33) (100% - 100%)    Neurological Exam:   Mental status:  easily arousable, eyes open and tracks to voice, follows occasional commands.  verbalizes to noxious stimuli in native language.  (+) spontaneous movement.  no abnormal movements.    Cranial nerves: Pupils equally round and reactive to light, no facial asymmetry.    Motor:  minimal spontaneous movement but able to hand squeeze on command  tone increased b/l L > R  Sensation:  Withdraws  to painfully stimuli w/ grimace all 4 extremities  Coordination: unable to assess   Reflexes: diminished reflexes on the UE and LE. Positive babinski on the b/l LE.   Gait: unable to assess     Labs:  CBC Full  -  ( 02 Apr 2021 06:57 )  WBC Count : 13.94 K/uL  RBC Count : 2.87 M/uL  Hemoglobin : 7.5 g/dL  Hematocrit : 24.1 %  Platelet Count - Automated : 50 K/uL  Mean Cell Volume : 84.0 fL  Mean Cell Hemoglobin : 26.1 pg  Mean Cell Hemoglobin Concentration : 31.1 g/dL  Auto Neutrophil # : 11.20 K/uL  Auto Lymphocyte # : 0.83 K/uL  Auto Monocyte # : 0.77 K/uL  Auto Eosinophil # : 0.00 K/uL  Auto Basophil # : 0.03 K/uL  Auto Neutrophil % : 80.3 %  Auto Lymphocyte % : 6.0 %  Auto Monocyte % : 5.5 %  Auto Eosinophil % : 0.0 %  Auto Basophil % : 0.2 %    04-02    134<L>  |  107  |  47<H>  ----------------------------<  174<H>  4.0   |  18  |  1.2    Ca    7.9<L>      02 Apr 2021 06:57  Phos  3.4     03-31  Mg     2.0     04-02    TPro  5.2<L>  /  Alb  1.9<L>  /  TBili  0.3  /  DBili  x   /  AST  54<H>  /  ALT  8   /  AlkPhos  146<H>  04-02    LIVER FUNCTIONS - ( 02 Apr 2021 06:57 )  Alb: 1.9 g/dL / Pro: 5.2 g/dL / ALK PHOS: 146 U/L / ALT: 8 U/L / AST: 54 U/L / GGT: x           PT/INR - ( 01 Apr 2021 20:44 )   PT: 16.00 sec;   INR: 1.39 ratio       PTT - ( 01 Apr 2021 20:44 )  PTT:21.1 sec

## 2021-04-02 NOTE — PROGRESS NOTE ADULT - ASSESSMENT
70 yo Male w/ h/o IPD, dementia, CKD3, AVB, DLD, HTN, DM, fungal septicemia with persistent sacral decubitus with recurrent sepsis currently admitted for septic shock w/ persistent encephalopathy with some epileptiform activity currently improving slowly.  No obvious signs of CNS infection on workup.  Recommend continue empiric antibiotics per ID and continue keppra as seizure prophylaxis.      Plan  F/u additional CSF results  consider hematology f/u for acute thrombocytopenia  Continue  Abx as per ID   Continue Keppra 125 BID.   Continue Sinemet via NG tube  continue supportive care and avoid any sedating/hypnotic medications  avoid antiemetics  if possible, clarify with SNF pt actual baseline prior to admission  call back as needed

## 2021-04-02 NOTE — PROGRESS NOTE ADULT - ASSESSMENT
71M, pmh Parkinson's, dementia, CKD Stage 3, 1st degree AV block, HLD, gout, HTN, DM, fungal septicemia presenting from [facility] for acute decompensation in mental status. admitted, hypotensive episode 3/27 --> s/p 2.5L NSS, BP remains 79/39mmHg s/p levophed, upgraded to icu, monitored and downgraded on midodrine 10mg q8.     # Altered Mental Status possible Metabolic  - 3 days of decreasing alertness and no longer speaking at the nursing home   - CT Head negative for any acute strokes  - Blood Cx: negative, Urine Cx: negative   - EEG: + epileptiform activity  - Wound cultures revealing numerous organisms  - ID:  empiric ACV 10mg/kg q12h IV,  D/C cefepime/flagyl given ESRBL- Change to meropenem 1g q12h IV, Vanc 1.25 g q24h IV, Ampicillin 2g q6h IV  - IF LP unremarkable will STOP Vanc & ACV & amp and continue meropenem   - LP results: Glucose 97, Protein 42, Smear: no PMN or organisms, f/u remaining labs  - per S&S patient is too lethargic and unable to participate in exam, keep NPO for now, NG tube in place  - c/w keppra 125 BID  - c/w midodrine   - c/w tube feeds  - s/p LP 4/1 w/ IR  - Neuro: c/w Keppra & sinemet  - f/u neuro, f/u ID    # Thrombocytopenia  - slowly decreasing platelet count  - Burn bleeding from sacral debridement site, now cleaned, stitched and without further bleeding  - no bleeding from mucous membranes or other areas at this time  - Heme onc following: likely multifactoral, Keep plt > 20K, Change PPI to H2  - h/h dropped from 10.1 4/1 am-> 7.2 4/1 pm.  - platelets 49 4/1 pm  - 1 unit prbc and platelets ordered    # Sacral pressure ulcer- stage 4  - 5 x 4 cm area of gray black necrotic skin  and subcutis with 2x 2 cm central deeper wound to ligamentous tissue - black base  - s/p debridement with burn 3/30  - burn following    # Left hand pain & swelling   - tender to touch, swollen,  - VA duplex: no evidence of dvt  - XR R Hand: No acute fracture or dislocation. Lucency in the distal radial metaphysis, nonspecific. Dedicated wrist radiographs recommended for further evaluation.  - Xray Wrist 2 Views, Left (03.27.21 @ 11:13) No acute fracture or dislocation. Diffuse soft tissue swelling. Nonaggressive appearing lucency in the distal radius, indeterminate. Nonemergent MRI may be obtained for further evaluation.  - per radiology, non aggressive lesion seen in  L wrist, patient would benefit from MRI wrist as outpatient     # H/O Parkinson disease  - Neuro consult: c/w sinemet & keppra   - EEG w/ epileptiform activity, started on keppra per neuro   - CT head negative     # Left shoulder dislocation   - seen on CT in Feb 2021  - Ortho reconsulted- pending L shoulder XR   - Xray Shoulder 2 Views, Left (03.23.21 @ 21:48) Reidentified anterior dislocation of the humeral head overlying the left second rib with bony fragmentation along the glenoid and humeral head. AC joint degenerative change.  - Per Ortho: no surgical intervention at this time    # H/O First Degree AV Block  - No indication for PPM    # ABEBE likely Pre renal  #Hypernatremia- resolved  - Baseline Cr 1.2  - trend bmp    # Chronic DVT  - Hold Eliquis    # HTN - well controlled   - c/w home enalapril 5mg po daily   71M, pmh Parkinson's, dementia, CKD Stage 3, 1st degree AV block, HLD, gout, HTN, DM, fungal septicemia presenting from [facility] for acute decompensation in mental status. admitted, hypotensive episode 3/27 --> s/p 2.5L NSS, BP remains 79/39mmHg s/p levophed, upgraded to icu, monitored and downgraded on midodrine 10mg q8.     # Altered Mental Status possible Metabolic  - 3 days of decreasing alertness and no longer speaking at the nursing home   - CT Head negative for any acute strokes  - Blood Cx: negative, Urine Cx: negative   - EEG: + epileptiform activity  - Wound cultures revealing numerous organisms  - s/p LP 4/1 w/ IR  - ID:- D/C Vanc & ACV & ampicillin, Continue meropenem 1g q12h IV, f/u CSF PCR, f/u WCX GNR  - LP results: Glucose 97, Protein 42, Smear: no PMN or organisms, f/u remaining labs  - per S&S patient is too lethargic and unable to participate in exam, keep NPO for now, NG tube in place  - Patient more alert and awake this am.  - c/w keppra 125 BID  - c/w midodrine   - c/w tube feeds  - Neuro: c/w Keppra & sinemet  - f/u neuro, f/u ID    # Thrombocytopenia  - slowly decreasing platelet count  - Burn bleeding from sacral debridement site, now cleaned, stitched and without further bleeding  - no bleeding from mucous membranes or other areas at this time  - Heme onc following: likely multifactoral, Keep plt > 20K, Change PPI to H2  - h/h dropped from 10.1 4/1 am-> 7.2 4/1 pm -> 7.5 4/2 am  - platelets 49 4/1 pm -> 50 4/2pm  - 1 unit prbc and platelets ordered and given  - will follow    # Sacral pressure ulcer- stage 4  - 5 x 4 cm area of gray black necrotic skin  and subcutis with 2x 2 cm central deeper wound to ligamentous tissue - black base  - s/p debridement with burn 3/30  - burn following    # Left hand pain & swelling   - tender to touch, swollen,  - VA duplex: no evidence of dvt  - XR R Hand: No acute fracture or dislocation. Lucency in the distal radial metaphysis, nonspecific. Dedicated wrist radiographs recommended for further evaluation.  - Xray Wrist 2 Views, Left (03.27.21 @ 11:13) No acute fracture or dislocation. Diffuse soft tissue swelling. Nonaggressive appearing lucency in the distal radius, indeterminate. Nonemergent MRI may be obtained for further evaluation.  - per radiology, non aggressive lesion seen in  L wrist, patient would benefit from MRI wrist as outpatient     # H/O Parkinson disease  - Neuro consult: c/w sinemet & keppra   - EEG w/ epileptiform activity, started on keppra per neuro   - CT head negative     # Left shoulder dislocation   - seen on CT in Feb 2021  - Ortho reconsulted- pending L shoulder XR   - Xray Shoulder 2 Views, Left (03.23.21 @ 21:48) Reidentified anterior dislocation of the humeral head overlying the left second rib with bony fragmentation along the glenoid and humeral head. AC joint degenerative change.  - Per Ortho: no surgical intervention at this time    # H/O First Degree AV Block  - No indication for PPM    # ABEBE likely Pre renal  #Hypernatremia- resolved  - Baseline Cr 1.2  - trend bmp    # Chronic DVT  - Hold Eliquis    # HTN - well controlled   - c/w home enalapril 5mg po daily

## 2021-04-03 LAB
ALBUMIN SERPL ELPH-MCNC: 2.1 G/DL — LOW (ref 3.5–5.2)
ALP SERPL-CCNC: 161 U/L — HIGH (ref 30–115)
ALT FLD-CCNC: 11 U/L — SIGNIFICANT CHANGE UP (ref 0–41)
ANION GAP SERPL CALC-SCNC: 8 MMOL/L — SIGNIFICANT CHANGE UP (ref 7–14)
AST SERPL-CCNC: 65 U/L — HIGH (ref 0–41)
BASOPHILS # BLD AUTO: 0.15 K/UL — SIGNIFICANT CHANGE UP (ref 0–0.2)
BASOPHILS NFR BLD AUTO: 0.8 % — SIGNIFICANT CHANGE UP (ref 0–1)
BILIRUB SERPL-MCNC: 0.3 MG/DL — SIGNIFICANT CHANGE UP (ref 0.2–1.2)
BUN SERPL-MCNC: 50 MG/DL — HIGH (ref 10–20)
CALCIUM SERPL-MCNC: 7.8 MG/DL — LOW (ref 8.5–10.1)
CHLORIDE SERPL-SCNC: 106 MMOL/L — SIGNIFICANT CHANGE UP (ref 98–110)
CO2 SERPL-SCNC: 22 MMOL/L — SIGNIFICANT CHANGE UP (ref 17–32)
CREAT SERPL-MCNC: 1.1 MG/DL — SIGNIFICANT CHANGE UP (ref 0.7–1.5)
EOSINOPHIL # BLD AUTO: 0.14 K/UL — SIGNIFICANT CHANGE UP (ref 0–0.7)
EOSINOPHIL NFR BLD AUTO: 0.8 % — SIGNIFICANT CHANGE UP (ref 0–8)
GLUCOSE BLDC GLUCOMTR-MCNC: 103 MG/DL — HIGH (ref 70–99)
GLUCOSE BLDC GLUCOMTR-MCNC: 103 MG/DL — HIGH (ref 70–99)
GLUCOSE BLDC GLUCOMTR-MCNC: 129 MG/DL — HIGH (ref 70–99)
GLUCOSE SERPL-MCNC: 104 MG/DL — HIGH (ref 70–99)
HCT VFR BLD CALC: 24.9 % — LOW (ref 42–52)
HGB BLD-MCNC: 7.8 G/DL — LOW (ref 14–18)
IMM GRANULOCYTES NFR BLD AUTO: 17.2 % — HIGH (ref 0.1–0.3)
LYMPHOCYTES # BLD AUTO: 11.6 % — LOW (ref 20.5–51.1)
LYMPHOCYTES # BLD AUTO: 2.09 K/UL — SIGNIFICANT CHANGE UP (ref 1.2–3.4)
MAGNESIUM SERPL-MCNC: 1.9 MG/DL — SIGNIFICANT CHANGE UP (ref 1.8–2.4)
MCHC RBC-ENTMCNC: 25.6 PG — LOW (ref 27–31)
MCHC RBC-ENTMCNC: 31.3 G/DL — LOW (ref 32–37)
MCV RBC AUTO: 81.6 FL — SIGNIFICANT CHANGE UP (ref 80–94)
MONOCYTES # BLD AUTO: 1.52 K/UL — HIGH (ref 0.1–0.6)
MONOCYTES NFR BLD AUTO: 8.4 % — SIGNIFICANT CHANGE UP (ref 1.7–9.3)
NEUTROPHILS # BLD AUTO: 11.08 K/UL — HIGH (ref 1.4–6.5)
NEUTROPHILS NFR BLD AUTO: 61.2 % — SIGNIFICANT CHANGE UP (ref 42.2–75.2)
NRBC # BLD: 0 /100 WBCS — SIGNIFICANT CHANGE UP (ref 0–0)
PHOSPHATE SERPL-MCNC: 2.1 MG/DL — SIGNIFICANT CHANGE UP (ref 2.1–4.9)
PLATELET # BLD AUTO: 79 K/UL — LOW (ref 130–400)
POTASSIUM SERPL-MCNC: 4.2 MMOL/L — SIGNIFICANT CHANGE UP (ref 3.5–5)
POTASSIUM SERPL-SCNC: 4.2 MMOL/L — SIGNIFICANT CHANGE UP (ref 3.5–5)
PROT SERPL-MCNC: 5.3 G/DL — LOW (ref 6–8)
RBC # BLD: 3.05 M/UL — LOW (ref 4.7–6.1)
RBC # FLD: 16.5 % — HIGH (ref 11.5–14.5)
SODIUM SERPL-SCNC: 136 MMOL/L — SIGNIFICANT CHANGE UP (ref 135–146)
WBC # BLD: 18.09 K/UL — HIGH (ref 4.8–10.8)
WBC # FLD AUTO: 18.09 K/UL — HIGH (ref 4.8–10.8)

## 2021-04-03 PROCEDURE — 99233 SBSQ HOSP IP/OBS HIGH 50: CPT

## 2021-04-03 RX ADMIN — CARBIDOPA AND LEVODOPA 2 TABLET(S): 25; 100 TABLET ORAL at 13:23

## 2021-04-03 RX ADMIN — MEROPENEM 100 MILLIGRAM(S): 1 INJECTION INTRAVENOUS at 17:28

## 2021-04-03 RX ADMIN — MIDODRINE HYDROCHLORIDE 10 MILLIGRAM(S): 2.5 TABLET ORAL at 13:23

## 2021-04-03 RX ADMIN — LEVETIRACETAM 405 MILLIGRAM(S): 250 TABLET, FILM COATED ORAL at 17:28

## 2021-04-03 RX ADMIN — SENNA PLUS 2 TABLET(S): 8.6 TABLET ORAL at 21:24

## 2021-04-03 RX ADMIN — FAMOTIDINE 20 MILLIGRAM(S): 10 INJECTION INTRAVENOUS at 13:23

## 2021-04-03 RX ADMIN — CARBIDOPA AND LEVODOPA 2 TABLET(S): 25; 100 TABLET ORAL at 13:12

## 2021-04-03 RX ADMIN — MIDODRINE HYDROCHLORIDE 10 MILLIGRAM(S): 2.5 TABLET ORAL at 05:03

## 2021-04-03 RX ADMIN — Medication 1 APPLICATION(S): at 17:27

## 2021-04-03 RX ADMIN — MIDODRINE HYDROCHLORIDE 10 MILLIGRAM(S): 2.5 TABLET ORAL at 21:24

## 2021-04-03 RX ADMIN — CARBIDOPA AND LEVODOPA 2 TABLET(S): 25; 100 TABLET ORAL at 05:03

## 2021-04-03 RX ADMIN — Medication 1 APPLICATION(S): at 05:02

## 2021-04-03 RX ADMIN — Medication 1 APPLICATION(S): at 17:28

## 2021-04-03 RX ADMIN — CHLORHEXIDINE GLUCONATE 1 APPLICATION(S): 213 SOLUTION TOPICAL at 05:02

## 2021-04-03 RX ADMIN — MEROPENEM 100 MILLIGRAM(S): 1 INJECTION INTRAVENOUS at 05:01

## 2021-04-03 RX ADMIN — ATORVASTATIN CALCIUM 40 MILLIGRAM(S): 80 TABLET, FILM COATED ORAL at 21:24

## 2021-04-03 RX ADMIN — LEVETIRACETAM 405 MILLIGRAM(S): 250 TABLET, FILM COATED ORAL at 05:01

## 2021-04-03 RX ADMIN — CARBIDOPA AND LEVODOPA 2 TABLET(S): 25; 100 TABLET ORAL at 21:24

## 2021-04-03 NOTE — PROGRESS NOTE ADULT - SUBJECTIVE AND OBJECTIVE BOX
Hospital Day:  12d    Subjective: Patient is a 71y old  Male who presents with a chief complaint of Change in Mental Status (02 Apr 2021 14:43)      Pt seen and evaluated at bedside.   Complaints: N/A  Over the night Events: None    Past Medical Hx:   Parkinson disease    Hypertension    Gout    Dyslipidemia    Prostatitis    Cataract      Past Sx:  No significant past surgical history      Allergies:  No Known Allergies    Current Meds:   Standng Meds:  atorvastatin 40 milliGRAM(s) Oral at bedtime  carbidopa/levodopa  25/100 2 Tablet(s) Oral <User Schedule>  chlorhexidine 4% Liquid 1 Application(s) Topical <User Schedule>  collagenase Ointment 1 Application(s) Topical two times a day  Dakins Solution - 1/2 Strength 1 Application(s) Topical two times a day  famotidine Injectable 20 milliGRAM(s) IV Push daily  levETIRAcetam  IVPB 125 milliGRAM(s) IV Intermittent every 12 hours  meropenem  IVPB 1000 milliGRAM(s) IV Intermittent every 12 hours  midodrine 10 milliGRAM(s) Oral every 8 hours  senna 2 Tablet(s) Oral at bedtime    PRN Meds:  acetaminophen   Tablet .. 650 milliGRAM(s) Oral every 6 hours PRN Temp greater or equal to 38C (100.4F)  oxycodone    5 mG/acetaminophen 325 mG 1 Tablet(s) Oral every 6 hours PRN Moderate Pain (4 - 6)      Vital Signs:   T(F): 98.7 (04-03-21 @ 05:21), Max: 98.7 (04-03-21 @ 05:21)  HR: 71 (04-03-21 @ 05:21) (67 - 71)  BP: 141/66 (04-03-21 @ 05:21) (125/59 - 141/66)  RR: 20 (04-03-21 @ 05:21) (19 - 20)  SpO2: 100% (04-03-21 @ 05:21) (100% - 100%)    Physical Exam:   GENERAL: NAD, Resting in bed  HEENT: NCAT  CHEST/LUNG: Clear to auscultation bilaterally; No wheezing or rubs.   HEART: Regular rate and rhythm; No murmurs, rubs, or gallops  ABDOMEN: Bowel sounds present; Soft, Nontender, Nondistended.   EXTREMITIES:  pt has bilateral upper extremity edema  NERVOUS SYSTEM:  Alert & Oriented X0, pt was unable to respond and follow finger and make eye contact    FLUID BALANCE    04-01-21 @ 07:01  -  04-02-21 @ 07:00  --------------------------------------------------------  IN: 250 mL / OUT: 900 mL / NET: -650 mL    04-02-21 @ 07:01  -  04-03-21 @ 07:00  --------------------------------------------------------  IN: 610 mL / OUT: 950 mL / NET: -340 mL        Labs:                         7.8    18.09 )-----------( 79       ( 03 Apr 2021 05:37 )             24.9     Neutophil% 61.2, Lymphocyte% 11.6, Monocyte% 8.4, Bands% 17.2 04-03-21 @ 05:37    03 Apr 2021 05:37    136    |  106    |  50     ----------------------------<  104    4.2     |  22     |  1.1      Ca    7.8        03 Apr 2021 05:37  Phos  2.1       03 Apr 2021 05:37  Mg     1.9       03 Apr 2021 05:37    TPro  5.3    /  Alb  2.1    /  TBili  0.3    /  DBili  x      /  AST  65     /  ALT  11     /  AlkPhos  161    03 Apr 2021 05:37                          Culture - Blood (collected 03-28-21 @ 01:24)  Source: .Blood None  Final Report (04-02-21 @ 13:00):    No Growth Final

## 2021-04-03 NOTE — PROGRESS NOTE ADULT - ASSESSMENT
71 year old Male with past medical history of Parkinson's, dementia, CKD Stage 3, 1st degree AV block, HLD, gout, HTN, DM, fungal septicemia presenting from [facility] for acute decompensation in mental status. As per documentation patient at baseline is verbal but for past 3 days, he has been worsening to state of being non verbal. Patient was admitted to [facility] for fungemia and discharged on the 11th March. At  he had left hand cellulitis and was treated for it.      # Metabolic encephalopathy  # Sepsis  # Necrotic sacral ulcer stage4  # H/o recent fungemia  - CT Head No acute intracranial pathology.  - EEG : epileptiform activity  -  Chest Xray No consolidation, effusion or pneumothorax.  -  Blood & Urine cxs NGTD   -  WCX GNR, 3/29   Numerous Klebsiella pneumoniae ESBL, Few Pseudomonas aeruginosa, Numerous Enterococcus faecalis (vancomycin resistant)  - evaluated by Burn: s/p debridement of sacrumon 3/30 .  no more new recommendation for surgery at this time .continue local wound care with santyl/dakins WTD.  - ID F/u: Discontinue  acyclovir, vancomycin, ampicillin. Continue meropenem  -evaluated by neurology:  c/w keppra, seizure precautions  - S/p spinal tap today --> CSF clear with no growths and neg PCR  - F/u wound culture    # Hypotension  - c/w midodrine    # Thrombocytopenia sec to sepsis  - monitor platelets    #  Acute kidney injury , prerenal-resolving  # Hyponatremia  - monitor  BMP    # H/o parkinsons disease  - c/w sinemet    # left Shoulder dislocation  - Xray Shoulder 2 Views, Left (03.23.21 @ 21:48) >Reidentified anterior dislocation of the humeral head overlying the left second rib with bony fragmentation along the glenoid and humeral head. AC joint degenerative change.  - Pt has chronic dislocation since june previous chart review.    - no ortho intervention    # H/o chronic DVT  - VA Duplex Lower Ext Vein Scan, Bilat (03.27.21 @ 18:48) >No evidence of deep venous thrombosis or superficial thrombophlebitis in the bilateral lower extremities.  - continue holding eliquis    # Dyslipidemia  - c/w statin    # Nutrition: Tube feeds    # DVT prophylaxis  -scd    #Pending: clinical improvement. C/W current management. Wound culture, monitor cbc .  # Disposition: SNF when stable     71 year old Male with past medical history of Parkinson's, dementia, CKD Stage 3, 1st degree AV block, HLD, gout, HTN, DM, fungal septicemia presenting from [facility] for acute decompensation in mental status. As per documentation patient at baseline is verbal but for past 3 days, he has been worsening to state of being non verbal. Patient was admitted to [facility] for fungemia and discharged on the 11th March. At  he had left hand cellulitis and was treated for it.      # Metabolic encephalopathy  # Sepsis  # Necrotic sacral ulcer stage4  # H/o recent fungemia  - CT Head No acute intracranial pathology.  - EEG : epileptiform activity  -  Chest Xray No consolidation, effusion or pneumothorax.  -  Blood & Urine cxs NGTD   -  WCX GNR, 3/29   Numerous Klebsiella pneumoniae ESBL, Few Pseudomonas aeruginosa, Numerous Enterococcus faecalis (vancomycin resistant)  - evaluated by Burn: s/p debridement of sacrumon 3/30 .  no more new recommendation for surgery at this time .continue local wound care with santyl/dakins WTD.  - ID F/u: Discontinue  acyclovir, vancomycin, ampicillin. Continue meropenem  -evaluated by neurology:  c/w keppra, seizure precautions  - S/p spinal tap today --> CSF clear with no growths and neg PCR  - F/u wound culture    # Hypotension  - c/w midodrine    # Thrombocytopenia sec to sepsis  - monitor platelets    #  Acute kidney injury , prerenal-resolving  # Hyponatremia  - monitor  BMP    # H/o parkinsons disease  - c/w sinemet    # left Shoulder dislocation  - Xray Shoulder 2 Views, Left (03.23.21 @ 21:48) >Reidentified anterior dislocation of the humeral head overlying the left second rib with bony fragmentation along the glenoid and humeral head. AC joint degenerative change.  - Pt has chronic dislocation since june previous chart review.    - no ortho intervention    # H/o chronic DVT  - VA Duplex Lower Ext Vein Scan, Bilat (03.27.21 @ 18:48) >No evidence of deep venous thrombosis or superficial thrombophlebitis in the bilateral lower extremities.  - continue holding eliquis    # Dyslipidemia  - c/w statin    # Nutrition: Tube feeds    # DVT prophylaxis  -scd    #Pending: clinical improvement. C/W current management. Wound culture, monitor cbc .  # Disposition: SNF when stable    Spoke w/ daughter Halina about the baseline mental status and the functionality of the pt. I was informed by her that prior to admission, he was able to communicate verbally, eat without assistance, and walk on his own using a walker.

## 2021-04-03 NOTE — PROGRESS NOTE ADULT - SUBJECTIVE AND OBJECTIVE BOX
Patient is a 71y old  Male who presents with a chief complaint of Change in Mental Status (02 Apr 2021 08:57)    Patient was seen and examined.  Patient  opens his eyes to name and tracking.    PAST MEDICAL & SURGICAL HISTORY:  Parkinson disease  Hypertension  Gout  Dyslipidemia  Prostatitis  Cataract    Allergies  No Known Allergies    MEDICATIONS  (STANDING):  atorvastatin 40 milliGRAM(s) Oral at bedtime  carbidopa/levodopa  25/100 2 Tablet(s) Oral <User Schedule>  chlorhexidine 4% Liquid 1 Application(s) Topical <User Schedule>  collagenase Ointment 1 Application(s) Topical two times a day  Dakins Solution - 1/2 Strength 1 Application(s) Topical two times a day  famotidine Injectable 20 milliGRAM(s) IV Push daily  levETIRAcetam  IVPB 125 milliGRAM(s) IV Intermittent every 12 hours  meropenem  IVPB 1000 milliGRAM(s) IV Intermittent every 12 hours  midodrine 10 milliGRAM(s) Oral every 8 hours  senna 2 Tablet(s) Oral at bedtime    MEDICATIONS  (PRN):  acetaminophen   Tablet .. 650 milliGRAM(s) Oral every 6 hours PRN Temp greater or equal to 38C (100.4F)  oxycodone    5 mG/acetaminophen 325 mG 1 Tablet(s) Oral every 6 hours PRN Moderate Pain (4 - 6)    T(C): 36.6 (04-03-21 @ 12:59), Max: 37.1 (04-03-21 @ 05:21)  HR: 70 (04-03-21 @ 12:59) (67 - 71)  BP: 140/74 (04-03-21 @ 12:59) (125/59 - 141/66)  RR: 19 (04-03-21 @ 12:59) (19 - 20)  SpO2: 100% (04-03-21 @ 05:21) (100% - 100%)    O/E:  Pt not responding to oral commands  HEENT: atraumatic, EOMI. NGT+  Chest: clear.  CVS: SIS2 +, no murmur.  P/A: Soft, BS+  CNS: opens his eyes to name and tracking.  Ext:  lower ext edema+  Skin: sacral ulcer  All systems reviewed positive findings as above.                          7.8<L>  18.09<H> )-----------( 79<L>    ( 03 Apr 2021 05:37 )             24.9<L>                        7.5<L>  13.94<H> )-----------( 50<L>    ( 02 Apr 2021 06:57 )             24.1<L>  04-03    136  |  106  |  50<H>  ----------------------------<  104<H>  4.2   |  22  |  1.1    Ca    7.8<L>      03 Apr 2021 05:37  Phos  2.1     04-03  Mg     1.9     04-03    TPro  5.3<L>  /  Alb  2.1<L>  /  TBili  0.3  /  DBili  x   /  AST  65<H>  /  ALT  11  /  AlkPhos  161<H>  04-03

## 2021-04-03 NOTE — PROGRESS NOTE ADULT - ASSESSMENT
71 year old Male with past medical history of Parkinson's, dementia, CKD Stage 3, 1st degree AV block, HLD, gout, HTN, DM, fungal septicemia presenting from [facility] for acute decompensation in mental status. As per documentation patient at baseline is verbal but for past 3 days, he has been worsening to state of being non verbal. Patient was admitted to [facility] for fungemia and discharged on the 11th March. At  he had left hand cellulitis and was treated for it.      # Metabolic encephalopathy  # Sepsis  # Necrotic sacral ulcer stage4  # H/o recent fungemia  - CT Head No Cont (03.22.21 @ 18:16) >No acute intracranial pathology.  - EEG : epileptiform activity  -  Xray Chest 1 View- PORTABLE-Urgent (Xray Chest 1 View- PORTABLE-Urgent .) (03.26.21 @ 07:10) >No consolidation, effusion or pneumothorax.  - 3/22 Blood & Urine cxs NGTD   - 3/31 WCX GNR, 3/29   Numerous Klebsiella pneumoniae ESBL, Few Pseudomonas aeruginosa, Numerous Enterococcus faecalis (vancomycin resistant)  - evaluated by Burn: s/p debridement of sacrumon 3/30 .  no more new recommendation for surgery at this time .continue local wound care with santyl/dakins WTD.  - ID F/u: Discontinue  acyclovir, vancomycin, ampicillin. Continue meropenem  -evaluated by neurology:  c/w keppra, seizure precautions  - S/p spinal tap --> CSF PCR- neg  - F/u wound culture    # Thrombocytopenia sec to sepsis- improving  - monitor platelets    #  Acute kidney injury , prerenal-resolving  # Hyponatremia-resolved  - monitor  BMP    # H/o parkinsons disease  - c/w sinemet    # left Shoulder dislocation  - Xray Shoulder 2 Views, Left (03.23.21 @ 21:48) >Reidentified anterior dislocation of the humeral head overlying the left second rib with bony fragmentation along the glenoid and humeral head. AC joint degenerative change.  - Pt has chronic dislocation since june previous chart review.    - no ortho intervention    # H/o chronic DVT  - VA Duplex Lower Ext Vein Scan, Bilat (03.27.21 @ 18:48) >No evidence of deep venous thrombosis or superficial thrombophlebitis in the bilateral lower extremities.  - continue holding eliquis    # Dyslipidemia  - c/w statin    # Nutrition: Tube feeds    # DVT prophylaxis  -scd    # Full code    # Poor prognosis    #Pending: clinical improvement.  monitor cbc .  # Disposition: SNF when stable

## 2021-04-04 LAB
ALBUMIN SERPL ELPH-MCNC: 2.2 G/DL — LOW (ref 3.5–5.2)
ALP SERPL-CCNC: 153 U/L — HIGH (ref 30–115)
ALT FLD-CCNC: 10 U/L — SIGNIFICANT CHANGE UP (ref 0–41)
ANION GAP SERPL CALC-SCNC: 7 MMOL/L — SIGNIFICANT CHANGE UP (ref 7–14)
AST SERPL-CCNC: 48 U/L — HIGH (ref 0–41)
BASOPHILS # BLD AUTO: 0.18 K/UL — SIGNIFICANT CHANGE UP (ref 0–0.2)
BASOPHILS NFR BLD AUTO: 0.9 % — SIGNIFICANT CHANGE UP (ref 0–1)
BILIRUB SERPL-MCNC: 0.3 MG/DL — SIGNIFICANT CHANGE UP (ref 0.2–1.2)
BUN SERPL-MCNC: 44 MG/DL — HIGH (ref 10–20)
CALCIUM SERPL-MCNC: 7.7 MG/DL — LOW (ref 8.5–10.1)
CHLORIDE SERPL-SCNC: 110 MMOL/L — SIGNIFICANT CHANGE UP (ref 98–110)
CO2 SERPL-SCNC: 24 MMOL/L — SIGNIFICANT CHANGE UP (ref 17–32)
CREAT SERPL-MCNC: 1.1 MG/DL — SIGNIFICANT CHANGE UP (ref 0.7–1.5)
CULTURE RESULTS: NO GROWTH — SIGNIFICANT CHANGE UP
EOSINOPHIL # BLD AUTO: 0.3 K/UL — SIGNIFICANT CHANGE UP (ref 0–0.7)
EOSINOPHIL NFR BLD AUTO: 1.5 % — SIGNIFICANT CHANGE UP (ref 0–8)
GLUCOSE BLDC GLUCOMTR-MCNC: 104 MG/DL — HIGH (ref 70–99)
GLUCOSE BLDC GLUCOMTR-MCNC: 116 MG/DL — HIGH (ref 70–99)
GLUCOSE BLDC GLUCOMTR-MCNC: 117 MG/DL — HIGH (ref 70–99)
GLUCOSE BLDC GLUCOMTR-MCNC: 119 MG/DL — HIGH (ref 70–99)
GLUCOSE BLDC GLUCOMTR-MCNC: 123 MG/DL — HIGH (ref 70–99)
GLUCOSE BLDC GLUCOMTR-MCNC: 136 MG/DL — HIGH (ref 70–99)
GLUCOSE SERPL-MCNC: 107 MG/DL — HIGH (ref 70–99)
HCT VFR BLD CALC: 26.5 % — LOW (ref 42–52)
HGB BLD-MCNC: 8.3 G/DL — LOW (ref 14–18)
IMM GRANULOCYTES NFR BLD AUTO: 20.3 % — HIGH (ref 0.1–0.3)
LYMPHOCYTES # BLD AUTO: 12.7 % — LOW (ref 20.5–51.1)
LYMPHOCYTES # BLD AUTO: 2.53 K/UL — SIGNIFICANT CHANGE UP (ref 1.2–3.4)
MAGNESIUM SERPL-MCNC: 1.9 MG/DL — SIGNIFICANT CHANGE UP (ref 1.8–2.4)
MCHC RBC-ENTMCNC: 25.8 PG — LOW (ref 27–31)
MCHC RBC-ENTMCNC: 31.3 G/DL — LOW (ref 32–37)
MCV RBC AUTO: 82.3 FL — SIGNIFICANT CHANGE UP (ref 80–94)
MONOCYTES # BLD AUTO: 1.5 K/UL — HIGH (ref 0.1–0.6)
MONOCYTES NFR BLD AUTO: 7.5 % — SIGNIFICANT CHANGE UP (ref 1.7–9.3)
NEUTROPHILS # BLD AUTO: 11.32 K/UL — HIGH (ref 1.4–6.5)
NEUTROPHILS NFR BLD AUTO: 57.1 % — SIGNIFICANT CHANGE UP (ref 42.2–75.2)
NRBC # BLD: 0 /100 WBCS — SIGNIFICANT CHANGE UP (ref 0–0)
PLATELET # BLD AUTO: 60 K/UL — LOW (ref 130–400)
POTASSIUM SERPL-MCNC: 4.5 MMOL/L — SIGNIFICANT CHANGE UP (ref 3.5–5)
POTASSIUM SERPL-SCNC: 4.5 MMOL/L — SIGNIFICANT CHANGE UP (ref 3.5–5)
PROT SERPL-MCNC: 5.2 G/DL — LOW (ref 6–8)
RBC # BLD: 3.22 M/UL — LOW (ref 4.7–6.1)
RBC # FLD: 17 % — HIGH (ref 11.5–14.5)
SODIUM SERPL-SCNC: 141 MMOL/L — SIGNIFICANT CHANGE UP (ref 135–146)
SPECIMEN SOURCE: SIGNIFICANT CHANGE UP
WBC # BLD: 19.87 K/UL — HIGH (ref 4.8–10.8)
WBC # FLD AUTO: 19.87 K/UL — HIGH (ref 4.8–10.8)

## 2021-04-04 PROCEDURE — 99233 SBSQ HOSP IP/OBS HIGH 50: CPT

## 2021-04-04 PROCEDURE — 99231 SBSQ HOSP IP/OBS SF/LOW 25: CPT

## 2021-04-04 RX ADMIN — MEROPENEM 100 MILLIGRAM(S): 1 INJECTION INTRAVENOUS at 05:52

## 2021-04-04 RX ADMIN — CHLORHEXIDINE GLUCONATE 1 APPLICATION(S): 213 SOLUTION TOPICAL at 05:53

## 2021-04-04 RX ADMIN — CARBIDOPA AND LEVODOPA 2 TABLET(S): 25; 100 TABLET ORAL at 05:53

## 2021-04-04 RX ADMIN — Medication 1 APPLICATION(S): at 05:51

## 2021-04-04 RX ADMIN — SENNA PLUS 2 TABLET(S): 8.6 TABLET ORAL at 21:22

## 2021-04-04 RX ADMIN — OXYCODONE AND ACETAMINOPHEN 1 TABLET(S): 5; 325 TABLET ORAL at 19:29

## 2021-04-04 RX ADMIN — FAMOTIDINE 20 MILLIGRAM(S): 10 INJECTION INTRAVENOUS at 11:54

## 2021-04-04 RX ADMIN — MIDODRINE HYDROCHLORIDE 10 MILLIGRAM(S): 2.5 TABLET ORAL at 21:22

## 2021-04-04 RX ADMIN — CARBIDOPA AND LEVODOPA 2 TABLET(S): 25; 100 TABLET ORAL at 21:22

## 2021-04-04 RX ADMIN — ATORVASTATIN CALCIUM 40 MILLIGRAM(S): 80 TABLET, FILM COATED ORAL at 21:22

## 2021-04-04 RX ADMIN — MEROPENEM 100 MILLIGRAM(S): 1 INJECTION INTRAVENOUS at 18:44

## 2021-04-04 RX ADMIN — LEVETIRACETAM 405 MILLIGRAM(S): 250 TABLET, FILM COATED ORAL at 05:51

## 2021-04-04 RX ADMIN — CARBIDOPA AND LEVODOPA 2 TABLET(S): 25; 100 TABLET ORAL at 11:53

## 2021-04-04 RX ADMIN — CARBIDOPA AND LEVODOPA 2 TABLET(S): 25; 100 TABLET ORAL at 15:28

## 2021-04-04 RX ADMIN — Medication 1 APPLICATION(S): at 18:43

## 2021-04-04 RX ADMIN — MIDODRINE HYDROCHLORIDE 10 MILLIGRAM(S): 2.5 TABLET ORAL at 05:53

## 2021-04-04 RX ADMIN — Medication 1 APPLICATION(S): at 05:52

## 2021-04-04 RX ADMIN — OXYCODONE AND ACETAMINOPHEN 1 TABLET(S): 5; 325 TABLET ORAL at 21:23

## 2021-04-04 RX ADMIN — LEVETIRACETAM 405 MILLIGRAM(S): 250 TABLET, FILM COATED ORAL at 18:44

## 2021-04-04 NOTE — PROGRESS NOTE ADULT - SUBJECTIVE AND OBJECTIVE BOX
Patient is a 71y old  Male who presents with a chief complaint of Change in Mental Status (02 Apr 2021 08:57)    Patient was seen and examined.  Patient  opens his eyes to name and is tracking.    PAST MEDICAL & SURGICAL HISTORY:  Parkinson disease  Hypertension  Gout  Dyslipidemia  Prostatitis  Cataract    Allergies  No Known Allergies    MEDICATIONS  (STANDING):  atorvastatin 40 milliGRAM(s) Oral at bedtime  carbidopa/levodopa  25/100 2 Tablet(s) Oral <User Schedule>  chlorhexidine 4% Liquid 1 Application(s) Topical <User Schedule>  collagenase Ointment 1 Application(s) Topical two times a day  Dakins Solution - 1/2 Strength 1 Application(s) Topical two times a day  famotidine Injectable 20 milliGRAM(s) IV Push daily  levETIRAcetam  IVPB 125 milliGRAM(s) IV Intermittent every 12 hours  meropenem  IVPB 1000 milliGRAM(s) IV Intermittent every 12 hours  midodrine 10 milliGRAM(s) Oral every 8 hours  senna 2 Tablet(s) Oral at bedtime    MEDICATIONS  (PRN):  acetaminophen   Tablet .. 650 milliGRAM(s) Oral every 6 hours PRN Temp greater or equal to 38C (100.4F)  oxycodone    5 mG/acetaminophen 325 mG 1 Tablet(s) Oral every 6 hours PRN Moderate Pain (4 - 6)    T(C): 36.5 (04-04-21 @ 13:23), Max: 36.6 (04-03-21 @ 21:20)  HR: 67 (04-04-21 @ 13:23) (63 - 72)  BP: 133/69 (04-04-21 @ 13:23) (122/57 - 133/73)  RR: 18 (04-04-21 @ 13:23) (18 - 18)  SpO2: 99% (04-04-21 @ 13:23) (99% - 99%)    O/E:  Pt not responding to oral commands  HEENT: atraumatic, EOMI. NGT+  Chest: clear.  CVS: SIS2 +, no murmur.  P/A: Soft, BS+  CNS: opens his eyes to name and tracking.  Ext:  lower ext edema+  Skin: sacral ulcer  All systems reviewed positive findings as above.                               8.3<L>  19.87<H> )-----------( 60<L>    ( 04 Apr 2021 06:29 )             26.5<L>                        7.8<L>  18.09<H> )-----------( 79<L>    ( 03 Apr 2021 05:37 )             24.9<L>  04-04    141  |  110  |  44<H>  ----------------------------<  107<H>  4.5   |  24  |  1.1    Ca    7.7<L>      04 Apr 2021 06:29  Phos  2.1     04-03  Mg     1.9     04-04    TPro  5.2<L>  /  Alb  2.2<L>  /  TBili  0.3  /  DBili  x   /  AST  48<H>  /  ALT  10  /  AlkPhos  153<H>  04-04

## 2021-04-04 NOTE — PROGRESS NOTE ADULT - ASSESSMENT
71 year old Male with past medical history of Parkinson's, dementia, CKD Stage 3, 1st degree AV block, HLD, gout, HTN, DM, fungal septicemia presenting from [facility] for acute decompensation in mental status. As per documentation patient at baseline is verbal but for past 3 days, he has been worsening to state of being non verbal. Patient was admitted to [facility] for fungemia and discharged on the 11th March. At  he had left hand cellulitis and was treated for it.      # Metabolic encephalopathy  # Sepsis  # Necrotic sacral ulcer stage4  # H/o recent fungemia  - CT Head No Cont (03.22.21 @ 18:16) >No acute intracranial pathology.  - EEG : epileptiform activity  -  Xray Chest 1 View- PORTABLE-Urgent (Xray Chest 1 View- PORTABLE-Urgent .) (03.26.21 @ 07:10) >No consolidation, effusion or pneumothorax.  - 3/22 Blood & Urine cxs NGTD   - 3/31 WCX GNR, 3/29   Numerous Klebsiella pneumoniae ESBL, Few Pseudomonas aeruginosa, Numerous Enterococcus faecalis (vancomycin resistant)  - evaluated by Burn: s/p debridement of sacrumon 3/30 .  no more new recommendation for surgery at this time .continue local wound care with santyl/dakins WTD.  - c/w  meropenem  -evaluated by neurology:  c/w keppra, seizure precautions  - S/p spinal tap --> CSF PCR- neg  - F/u wound culture    # Thrombocytopenia sec to sepsis  - monitor platelets    #  Acute kidney injury , prerenal-resolving  # Hyponatremia-resolved  - monitor  BMP    # H/o parkinsons disease  - c/w sinemet    # left Shoulder dislocation  - Xray Shoulder 2 Views, Left (03.23.21 @ 21:48) >Reidentified anterior dislocation of the humeral head overlying the left second rib with bony fragmentation along the glenoid and humeral head. AC joint degenerative change.  - Pt has chronic dislocation since june previous chart review.    - no ortho intervention    # H/o chronic DVT  - VA Duplex Lower Ext Vein Scan, Bilat (03.27.21 @ 18:48) >No evidence of deep venous thrombosis or superficial thrombophlebitis in the bilateral lower extremities.  - continue holding eliquis    # Dyslipidemia  - c/w statin    # Nutrition: Tube feeds    # DVT prophylaxis  -scd    # Full code    # Poor prognosis    #Pending: clinical improvement.  monitor cbc, ID F/u  # Disposition: SNF when stable

## 2021-04-04 NOTE — PROGRESS NOTE ADULT - ASSESSMENT
A/P: S/p debridemnt of sacral wound\  -Cont Wound Care  - Turning and position changes  -[employer]

## 2021-04-04 NOTE — PROGRESS NOTE ADULT - SUBJECTIVE AND OBJECTIVE BOX
Patient is a 71y old  Male who presents with a chief complaint of Change in Mental Status (04 Apr 2021 14:08)    Pt seen for f/u sacral wound s/p debridement    Vital Signs Last 24 Hrs  T(C): 36.5 (04 Apr 2021 13:23), Max: 36.6 (03 Apr 2021 21:20)  T(F): 97.7 (04 Apr 2021 13:23), Max: 97.8 (03 Apr 2021 21:20)  HR: 67 (04 Apr 2021 13:23) (63 - 72)  BP: 133/69 (04 Apr 2021 13:23) (122/57 - 133/73)  BP(mean): 94 (04 Apr 2021 13:23) (94 - 96)  RR: 18 (04 Apr 2021 13:23) (18 - 18)  SpO2: 99% (04 Apr 2021 13:23) (99% - 99%)    Meds:  MEDICATIONS  (STANDING):  atorvastatin 40 milliGRAM(s) Oral at bedtime  carbidopa/levodopa  25/100 2 Tablet(s) Oral <User Schedule>  chlorhexidine 4% Liquid 1 Application(s) Topical <User Schedule>  collagenase Ointment 1 Application(s) Topical two times a day  Dakins Solution - 1/2 Strength 1 Application(s) Topical two times a day  famotidine Injectable 20 milliGRAM(s) IV Push daily  levETIRAcetam  IVPB 125 milliGRAM(s) IV Intermittent every 12 hours  meropenem  IVPB 1000 milliGRAM(s) IV Intermittent every 12 hours  midodrine 10 milliGRAM(s) Oral every 8 hours  senna 2 Tablet(s) Oral at bedtime    MEDICATIONS  (PRN):  acetaminophen   Tablet .. 650 milliGRAM(s) Oral every 6 hours PRN Temp greater or equal to 38C (100.4F)  oxycodone    5 mG/acetaminophen 325 mG 1 Tablet(s) Oral every 6 hours PRN Moderate Pain (4 - 6)    Culture - Blood (collected 02 Apr 2021 12:55)  Source: .Blood None  Preliminary Report (03 Apr 2021 23:01):    No growth to date.        Labs:                        8.3    19.87 )-----------( 60       ( 04 Apr 2021 06:29 )             26.5           PE:   Full thickness wound to sacrum with granulation tissue with areas of necrosis  no active bleeding  serosang dc  no erythema, no purulent dc

## 2021-04-05 LAB
ANION GAP SERPL CALC-SCNC: 8 MMOL/L — SIGNIFICANT CHANGE UP (ref 7–14)
BASOPHILS # BLD AUTO: 0.12 K/UL — SIGNIFICANT CHANGE UP (ref 0–0.2)
BASOPHILS # BLD AUTO: 0.17 K/UL — SIGNIFICANT CHANGE UP (ref 0–0.2)
BASOPHILS NFR BLD AUTO: 0.6 % — SIGNIFICANT CHANGE UP (ref 0–1)
BASOPHILS NFR BLD AUTO: 0.7 % — SIGNIFICANT CHANGE UP (ref 0–1)
BUN SERPL-MCNC: 35 MG/DL — HIGH (ref 10–20)
CALCIUM SERPL-MCNC: 7.9 MG/DL — LOW (ref 8.5–10.1)
CHLORIDE SERPL-SCNC: 110 MMOL/L — SIGNIFICANT CHANGE UP (ref 98–110)
CO2 SERPL-SCNC: 23 MMOL/L — SIGNIFICANT CHANGE UP (ref 17–32)
CREAT SERPL-MCNC: 0.9 MG/DL — SIGNIFICANT CHANGE UP (ref 0.7–1.5)
EOSINOPHIL # BLD AUTO: 0.26 K/UL — SIGNIFICANT CHANGE UP (ref 0–0.7)
EOSINOPHIL # BLD AUTO: 0.35 K/UL — SIGNIFICANT CHANGE UP (ref 0–0.7)
EOSINOPHIL NFR BLD AUTO: 1.2 % — SIGNIFICANT CHANGE UP (ref 0–8)
EOSINOPHIL NFR BLD AUTO: 1.5 % — SIGNIFICANT CHANGE UP (ref 0–8)
GLUCOSE BLDC GLUCOMTR-MCNC: 100 MG/DL — HIGH (ref 70–99)
GLUCOSE BLDC GLUCOMTR-MCNC: 109 MG/DL — HIGH (ref 70–99)
GLUCOSE BLDC GLUCOMTR-MCNC: 123 MG/DL — HIGH (ref 70–99)
GLUCOSE SERPL-MCNC: 126 MG/DL — HIGH (ref 70–99)
HCT VFR BLD CALC: 27.5 % — LOW (ref 42–52)
HGB BLD-MCNC: 8.5 G/DL — LOW (ref 14–18)
IMM GRANULOCYTES NFR BLD AUTO: 17.8 % — HIGH (ref 0.1–0.3)
IMM GRANULOCYTES NFR BLD AUTO: 21 % — HIGH (ref 0.1–0.3)
LYMPHOCYTES # BLD AUTO: 10.7 % — LOW (ref 20.5–51.1)
LYMPHOCYTES # BLD AUTO: 2.1 K/UL — SIGNIFICANT CHANGE UP (ref 1.2–3.4)
LYMPHOCYTES # BLD AUTO: 2.43 K/UL — SIGNIFICANT CHANGE UP (ref 1.2–3.4)
LYMPHOCYTES # BLD AUTO: 9.9 % — LOW (ref 20.5–51.1)
MCHC RBC-ENTMCNC: 26.1 PG — LOW (ref 27–31)
MCHC RBC-ENTMCNC: 30.9 G/DL — LOW (ref 32–37)
MCV RBC AUTO: 84.4 FL — SIGNIFICANT CHANGE UP (ref 80–94)
MONOCYTES # BLD AUTO: 0.97 K/UL — HIGH (ref 0.1–0.6)
MONOCYTES # BLD AUTO: 0.99 K/UL — HIGH (ref 0.1–0.6)
MONOCYTES NFR BLD AUTO: 4.3 % — SIGNIFICANT CHANGE UP (ref 1.7–9.3)
MONOCYTES NFR BLD AUTO: 4.6 % — SIGNIFICANT CHANGE UP (ref 1.7–9.3)
NEUTROPHILS # BLD AUTO: 14.02 K/UL — HIGH (ref 1.4–6.5)
NEUTROPHILS # BLD AUTO: 14.07 K/UL — HIGH (ref 1.4–6.5)
NEUTROPHILS NFR BLD AUTO: 61.8 % — SIGNIFICANT CHANGE UP (ref 42.2–75.2)
NEUTROPHILS NFR BLD AUTO: 65.9 % — SIGNIFICANT CHANGE UP (ref 42.2–75.2)
NRBC # BLD: 0 /100 WBCS — SIGNIFICANT CHANGE UP (ref 0–0)
NRBC # BLD: 0 /100 WBCS — SIGNIFICANT CHANGE UP (ref 0–0)
PLATELET # BLD AUTO: 40 K/UL — LOW (ref 130–400)
PLATELET # BLD AUTO: 44 K/UL — LOW (ref 130–400)
POTASSIUM SERPL-MCNC: 5 MMOL/L — SIGNIFICANT CHANGE UP (ref 3.5–5)
POTASSIUM SERPL-SCNC: 5 MMOL/L — SIGNIFICANT CHANGE UP (ref 3.5–5)
RBC # BLD: 3.26 M/UL — LOW (ref 4.7–6.1)
RBC # FLD: 17.1 % — HIGH (ref 11.5–14.5)
SODIUM SERPL-SCNC: 141 MMOL/L — SIGNIFICANT CHANGE UP (ref 135–146)
WBC # BLD: 21.25 K/UL — HIGH (ref 4.8–10.8)
WBC # FLD AUTO: 21.25 K/UL — HIGH (ref 4.8–10.8)

## 2021-04-05 PROCEDURE — 99233 SBSQ HOSP IP/OBS HIGH 50: CPT

## 2021-04-05 RX ORDER — MEROPENEM 1 G/30ML
1000 INJECTION INTRAVENOUS EVERY 8 HOURS
Refills: 0 | Status: COMPLETED | OUTPATIENT
Start: 2021-04-05 | End: 2021-04-12

## 2021-04-05 RX ORDER — POLYMYXIN B SULFATE 500000 [USP'U]/1
535000 INJECTION, POWDER, LYOPHILIZED, FOR SOLUTION INTRAMUSCULAR; INTRATHECAL; INTRAVENOUS; OPHTHALMIC EVERY 12 HOURS
Refills: 0 | Status: DISCONTINUED | OUTPATIENT
Start: 2021-04-05 | End: 2021-04-09

## 2021-04-05 RX ADMIN — MIDODRINE HYDROCHLORIDE 10 MILLIGRAM(S): 2.5 TABLET ORAL at 13:49

## 2021-04-05 RX ADMIN — ATORVASTATIN CALCIUM 40 MILLIGRAM(S): 80 TABLET, FILM COATED ORAL at 21:16

## 2021-04-05 RX ADMIN — MEROPENEM 100 MILLIGRAM(S): 1 INJECTION INTRAVENOUS at 13:48

## 2021-04-05 RX ADMIN — CARBIDOPA AND LEVODOPA 2 TABLET(S): 25; 100 TABLET ORAL at 05:05

## 2021-04-05 RX ADMIN — Medication 1 APPLICATION(S): at 05:04

## 2021-04-05 RX ADMIN — MEROPENEM 100 MILLIGRAM(S): 1 INJECTION INTRAVENOUS at 05:04

## 2021-04-05 RX ADMIN — SENNA PLUS 2 TABLET(S): 8.6 TABLET ORAL at 21:16

## 2021-04-05 RX ADMIN — CARBIDOPA AND LEVODOPA 2 TABLET(S): 25; 100 TABLET ORAL at 11:24

## 2021-04-05 RX ADMIN — LEVETIRACETAM 405 MILLIGRAM(S): 250 TABLET, FILM COATED ORAL at 17:26

## 2021-04-05 RX ADMIN — LEVETIRACETAM 405 MILLIGRAM(S): 250 TABLET, FILM COATED ORAL at 05:04

## 2021-04-05 RX ADMIN — POLYMYXIN B SULFATE 500 UNIT(S): 500000 INJECTION, POWDER, LYOPHILIZED, FOR SOLUTION INTRAMUSCULAR; INTRATHECAL; INTRAVENOUS; OPHTHALMIC at 17:26

## 2021-04-05 RX ADMIN — CHLORHEXIDINE GLUCONATE 1 APPLICATION(S): 213 SOLUTION TOPICAL at 05:03

## 2021-04-05 RX ADMIN — FAMOTIDINE 20 MILLIGRAM(S): 10 INJECTION INTRAVENOUS at 11:25

## 2021-04-05 RX ADMIN — Medication 1 APPLICATION(S): at 17:27

## 2021-04-05 RX ADMIN — MIDODRINE HYDROCHLORIDE 10 MILLIGRAM(S): 2.5 TABLET ORAL at 21:16

## 2021-04-05 RX ADMIN — CARBIDOPA AND LEVODOPA 2 TABLET(S): 25; 100 TABLET ORAL at 21:16

## 2021-04-05 RX ADMIN — MIDODRINE HYDROCHLORIDE 10 MILLIGRAM(S): 2.5 TABLET ORAL at 05:05

## 2021-04-05 RX ADMIN — CARBIDOPA AND LEVODOPA 2 TABLET(S): 25; 100 TABLET ORAL at 13:49

## 2021-04-05 RX ADMIN — MEROPENEM 100 MILLIGRAM(S): 1 INJECTION INTRAVENOUS at 21:16

## 2021-04-05 NOTE — PROGRESS NOTE ADULT - SUBJECTIVE AND OBJECTIVE BOX
Hospital Day:  14d    Subjective: Patient is a 71y old  Male who presents with a chief complaint of Change in Mental Status (05 Apr 2021 09:35)      Pt seen and evaluated at bedside.   Complaints: None  Over the night Events: None    Past Medical Hx:   Parkinson disease    Hypertension    Gout    Dyslipidemia    Prostatitis    Cataract      Past Sx:  No significant past surgical history      Allergies:  No Known Allergies    Current Meds:   Standng Meds:  atorvastatin 40 milliGRAM(s) Oral at bedtime  carbidopa/levodopa  25/100 2 Tablet(s) Oral <User Schedule>  chlorhexidine 4% Liquid 1 Application(s) Topical <User Schedule>  collagenase Ointment 1 Application(s) Topical two times a day  Dakins Solution - 1/2 Strength 1 Application(s) Topical two times a day  famotidine Injectable 20 milliGRAM(s) IV Push daily  levETIRAcetam  IVPB 125 milliGRAM(s) IV Intermittent every 12 hours  meropenem  IVPB 1000 milliGRAM(s) IV Intermittent every 8 hours  midodrine 10 milliGRAM(s) Oral every 8 hours  polymyxin B IVPB [id number] Unit(s) IV Intermittent every 12 hours  senna 2 Tablet(s) Oral at bedtime    PRN Meds:  acetaminophen   Tablet .. 650 milliGRAM(s) Oral every 6 hours PRN Temp greater or equal to 38C (100.4F)  oxycodone    5 mG/acetaminophen 325 mG 1 Tablet(s) Oral every 6 hours PRN Moderate Pain (4 - 6)      Vital Signs:   T(F): 99.2 (04-05-21 @ 12:57), Max: 99.2 (04-05-21 @ 12:57)  HR: 78 (04-05-21 @ 12:57) (68 - 78)  BP: 131/60 (04-05-21 @ 12:57) (121/62 - 131/69)  RR: 18 (04-05-21 @ 12:57) (18 - 18)  SpO2: 97% (04-05-21 @ 12:57) (97% - 104%)    Physical Exam:   GENERAL: NAD, Resting in bed  HEENT: NCAT  CHEST/LUNG: Clear to auscultation bilaterally; No wheezing or rubs.   HEART: Regular rate and rhythm; No murmurs, rubs, or gallops  ABDOMEN: Bowel sounds present; Soft, Nontender, Nondistended.   EXTREMITIES:  No clubbing, cyanosis, edema present in upper and lower extremity  NERVOUS SYSTEM:  Alert & Oriented X0, pt is only able to track with eyes and blink on command    FLUID BALANCE    04-03-21 @ 07:01  -  04-04-21 @ 07:00  --------------------------------------------------------  IN: 920 mL / OUT: 1700 mL / NET: -780 mL    04-04-21 @ 07:01  -  04-05-21 @ 07:00  --------------------------------------------------------  IN: 0 mL / OUT: 1600 mL / NET: -1600 mL        Labs:                         8.1    22.80 )-----------( 44       ( 05 Apr 2021 06:31 )             25.6     Neutophil% 61.8, Lymphocyte% 10.7, Monocyte% 4.3, Bands% 21.0 04-05-21 @ 06:31    05 Apr 2021 06:31    139    |  108    |  38     ----------------------------<  96     5.2     |  23     |  0.9      Ca    8.3        05 Apr 2021 06:31  Mg     1.9       05 Apr 2021 06:31    TPro  5.8    /  Alb  2.1    /  TBili  0.3    /  DBili  x      /  AST  47     /  ALT  8      /  AlkPhos  153    05 Apr 2021 06:31                          Culture - Blood (collected 04-02-21 @ 12:55)  Source: .Blood None  Preliminary Report (04-03-21 @ 23:01):    No growth to date.

## 2021-04-05 NOTE — PROGRESS NOTE ADULT - ASSESSMENT
71 year old Male with past medical history of Parkinson's, dementia, CKD Stage 3, 1st degree AV block, HLD, gout, HTN, DM, fungal septicemia presenting from [facility] for acute decompensation in mental status. As per documentation patient at baseline is verbal but for past 3 days, he has been worsening to state of being non verbal. Patient was admitted to [facility] for fungemia and discharged on the 11th March. At  he had left hand cellulitis and was treated for it.    # Metabolic encephalopathy on top of baseline dementia  # Sepsis  - Contact isolation  # Necrotic sacral ulcer stage4  # H/o recent fungemia  - CT Head No acute intracranial pathology.activity  -  Chest Xray No consolidation, effusion or pneumothorax.  -  Blood & Urine cxs NGTD   -  WCX GNR, 3/29   Numerous Klebsiella pneumoniae ESBL, Few Pseudomonas aeruginosa, Numerous Enterococcus faecalis (vancomycin resistant)  - evaluated by Burn: s/p debridement of sacrum on 3/30 .  no more new recommendation for surgery at this time .continue local wound care with santyl/dakins WTD.  - ID F/u: increase dose of meropenem to 1g q8hr run for 4 hrs, ADD polymyxin [id number] units q12hr  -evaluated by neurology:  c/w keppra, seizure precautions  - S/p spinal tap --> CSF clear with no growths and neg PCR  -repeat EEG as per neuro    # Hypotension  - c/w midodrine    # Thrombocytopenia ?sec to sepsis  - monitor platelets  - recall heme if further drop    #  Acute kidney injury , prerenal-resolving  # Hyponatremia  - monitor  BMP    # H/o parkinsons disease  - c/w sinemet    # left Shoulder dislocation  - Xray Shoulder 2 Views, Left (03.23.21 @ 21:48) >Reidentified anterior dislocation of the humeral head overlying the left second rib with bony fragmentation along the glenoid and humeral head. AC joint degenerative change.  - Pt has chronic dislocation since june previous chart review.    - no ortho intervention    # H/o chronic DVT  - VA Duplex Lower Ext Vein Scan, Bilat (03.27.21 @ 18:48) >No evidence of deep venous thrombosis or superficial thrombophlebitis in the bilateral lower extremities.  - continue holding eliquis    # Dyslipidemia  - c/w statin    # Nutrition: Tube feeds    # DVT prophylaxis  -scd    Very high risk pt. D/w daughter GOC: she wants full code for now. Very poor Px.    #Pending: clinical improvement vs demise, resolution of sepsis, thrombocytopenia, improved mental status  # Discussed w/ daughter in details who wants to cont aggressive Tx   # Disposition: SNF when/if stable    d/w Housestaff, nursing, case mgmt, neuro, daughter in great details

## 2021-04-05 NOTE — PHYSICAL THERAPY INITIAL EVALUATION ADULT - LEVEL OF INDEPENDENCE: SUPINE/SIT, REHAB EVAL
attempted long sitting on the bed, ;pt unable to assist; able to maintain the head in upright position; tolerated bed in the chair position fr 15-20 min, after which the RN positioned the pt for skin protection.

## 2021-04-05 NOTE — PROGRESS NOTE ADULT - SUBJECTIVE AND OBJECTIVE BOX
NIEVES LABOY  71y, Male  Allergy: No Known Allergies      LOS  14d    CHIEF COMPLAINT: Change in Mental Status (04 Apr 2021 15:26)      INTERVAL EVENTS/HPI  - Rising WBC, WCX CRE Pseudomonas  - T(F): , Max: 98.8 (04-04-21 @ 21:09)  - Tolerating medication  - WBC Count: 22.80 (04-05-21 @ 06:31)  WBC Count: 19.87 (04-04-21 @ 06:29)  - Creatinine, Serum: 0.9 (04-05-21 @ 06:31)  Creatinine, Serum: 1.1 (04-04-21 @ 06:29)       ROS  unable to obtain history secondary to patient's mental status and/or sedation     VITALS:  T(F): 96.5, Max: 98.8 (04-04-21 @ 21:09)  HR: 68  BP: 131/69  RR: 18Vital Signs Last 24 Hrs  T(C): 35.8 (05 Apr 2021 05:23), Max: 37.1 (04 Apr 2021 21:09)  T(F): 96.5 (05 Apr 2021 05:23), Max: 98.8 (04 Apr 2021 21:09)  HR: 68 (05 Apr 2021 05:23) (67 - 76)  BP: 131/69 (05 Apr 2021 05:23) (121/62 - 133/69)  BP(mean): 93 (05 Apr 2021 05:23) (84 - 94)  RR: 18 (05 Apr 2021 05:23) (18 - 18)  SpO2: 99% (05 Apr 2021 05:23) (99% - 104%)    PHYSICAL EXAM:  Gen: chronically ill appearing   HEENT: Normocephalic, atraumatic  Neck: supple, no lymphadenopathy  CV: Regular rate & regular rhythm  Lungs: decreased BS at bases, no fremitus  Abdomen: Soft, BS present  Ext: Warm, well perfused  Neuro: awake, contracted  Skin: no rash, no erythema, sacral dressings  Lines: no phlebitis   FH: Non-contributory  Social Hx: Non-contributory    TESTS & MEASUREMENTS:                        8.1    22.80 )-----------( 44       ( 05 Apr 2021 06:31 )             25.6     04-05    139  |  108  |  38<H>  ----------------------------<  96  5.2<H>   |  23  |  0.9    Ca    8.3<L>      05 Apr 2021 06:31  Mg     1.9     04-05    TPro  5.8<L>  /  Alb  2.1<L>  /  TBili  0.3  /  DBili  x   /  AST  47<H>  /  ALT  8   /  AlkPhos  153<H>  04-05    eGFR if Non African American: 85 mL/min/1.73M2 (04-05-21 @ 06:31)  eGFR if : 99 mL/min/1.73M2 (04-05-21 @ 06:31)    LIVER FUNCTIONS - ( 05 Apr 2021 06:31 )  Alb: 2.1 g/dL / Pro: 5.8 g/dL / ALK PHOS: 153 U/L / ALT: 8 U/L / AST: 47 U/L / GGT: x               Culture - Blood (collected 04-02-21 @ 12:55)  Source: .Blood None  Preliminary Report (04-03-21 @ 23:01):    No growth to date.    Culture - CSF with Gram Stain (collected 04-01-21 @ 14:00)  Source: .CSF CSF  Gram Stain (04-01-21 @ 21:44):    No polymorphonuclear cells seen    No organisms seen    by cytocentrifuge  Final Report (04-04-21 @ 15:36):    No growth    Culture - Acid Fast - Tissue w/Smear (collected 03-31-21 @ 16:18)  Source: .Tissue None  Preliminary Report (04-03-21 @ 15:04):    Culture is being performed.    Culture - Tissue with Gram Stain (collected 03-31-21 @ 16:18)  Source: .Tissue None  Gram Stain (04-01-21 @ 07:05):    Few polymorphonuclear leukocytes seen per low power field    Numerous Gram Negative Rods seen per oil power field  Preliminary Report (04-03-21 @ 12:07):    Numerous Klebsiella pneumoniae ESBL    Few Enterococcus faecalis (vancomycin resistant)    Few Pseudomonas aeruginosa (Carbapenem Resistant)  Organism: Klebsiella pneumoniae ESBL  Enterococcus faecalis (vancomycin resistant)  Pseudomonas aeruginosa (Carbapenem Resistant) (04-03-21 @ 12:04)  Organism: Pseudomonas aeruginosa (Carbapenem Resistant) (04-03-21 @ 12:04)      -  Amikacin: S <=16      -  Aztreonam: S 8      -  Cefepime: I 16      -  Ceftazidime: I 16      -  Ciprofloxacin: R >2      -  Gentamicin: I 8      -  Imipenem: R >8      -  Levofloxacin: R >4      -  Meropenem: R 8      -  Piperacillin/Tazobactam: I 64      -  Tobramycin: S <=2      Method Type: JAZMIN  Organism: Enterococcus faecalis (vancomycin resistant) (04-03-21 @ 12:04)      -  Ampicillin: S 8 Predicts results to ampicillin/sulbactam, amoxacillin-clavulanate and  piperacillin-tazobactam.      -  Daptomycin: S 1      -  Levofloxacin: R >4      -  Linezolid: S 1      -  Tetra/Doxy: R >8      -  Vancomycin: R >16      Method Type: JAZMIN  Organism: Klebsiella pneumoniae ESBL (04-03-21 @ 12:04)      -  Amikacin: S <=16      -  Amoxicillin/Clavulanic Acid: S <=8/4      -  Ampicillin: R >16 These ampicillin results predict results for amoxicillin      -  Ampicillin/Sulbactam: R >16/8 Enterobacter, Citrobacter, and Serratia may develop resistance during prolonged therapy (3-4 days)      -  Aztreonam: R >16      -  Cefazolin: R >16 Enterobacter, Citrobacter, and Serratia may develop resistance during prolonged therapy (3-4 days)      -  Cefepime: R >16      -  Cefoxitin: S <=8      -  Ceftriaxone: R >32 Enterobacter, Citrobacter, and Serratia may develop resistance during prolonged therapy      -  Ciprofloxacin: R >2      -  Ertapenem: S <=0.5      -  Gentamicin: S <=2      -  Imipenem: S <=1      -  Levofloxacin: R 2      -  Meropenem: S <=1      -  Piperacillin/Tazobactam: R <=8      -  Tobramycin: S <=2      -  Trimethoprim/Sulfamethoxazole: R >2/38      Method Type: JAZMIN    Culture - Other (collected 03-29-21 @ 17:15)  Source: .Other sacrum  Final Report (03-31-21 @ 16:53):    Numerous Klebsiella pneumoniae ESBL    Few Pseudomonas aeruginosa    Numerous Enterococcus faecalis (vancomycin resistant)  Organism: Klebsiella pneumoniae ESBL  Pseudomonas aeruginosa  Enterococcus faecalis (vancomycin resistant) (03-31-21 @ 16:53)  Organism: Enterococcus faecalis (vancomycin resistant) (03-31-21 @ 16:53)      -  Ampicillin: S <=2 Predicts results to ampicillin/sulbactam, amoxacillin-clavulanate and  piperacillin-tazobactam.      -  Daptomycin: S 1      -  Levofloxacin: R >4      -  Linezolid: S 2      -  Tetra/Doxy: R >8      -  Vancomycin: R >16      Method Type: JAZMIN  Organism: Pseudomonas aeruginosa (03-31-21 @ 16:53)      -  Amikacin: S <=16      -  Aztreonam: I 16      -  Cefepime: S 8      -  Ceftazidime: S 4      -  Ciprofloxacin: R >2      -  Gentamicin: I 8      -  Imipenem: I 4      -  Levofloxacin: R >4      -  Meropenem: S <=1      -  Piperacillin/Tazobactam: S 16      -  Tobramycin: S <=2      Method Type: JAZMIN  Organism: Klebsiella pneumoniae ESBL (03-31-21 @ 16:53)      -  Amikacin: S <=16      -  Amoxicillin/Clavulanic Acid: S <=8/4      -  Ampicillin: R >16 These ampicillin results predict results for amoxicillin      -  Ampicillin/Sulbactam: R >16/8 Enterobacter, Citrobacter, and Serratia may develop resistance during prolonged therapy (3-4 days)      -  Aztreonam: R >16      -  Cefazolin: R >16 Enterobacter, Citrobacter, and Serratia may develop resistance during prolonged therapy (3-4 days)      -  Cefepime: R >16      -  Cefoxitin: S <=8      -  Ceftriaxone: R >32 Enterobacter, Citrobacter, and Serratia may develop resistance during prolonged therapy      -  Ciprofloxacin: R >2      -  Ertapenem: S <=0.5      -  Gentamicin: S <=2      -  Imipenem: S <=1      -  Levofloxacin: R 2      -  Meropenem: S <=1      -  Piperacillin/Tazobactam: R <=8      -  Tobramycin: S <=2      -  Trimethoprim/Sulfamethoxazole: R >2/38      Method Type: JAZMIN    Culture - Blood (collected 03-28-21 @ 01:24)  Source: .Blood None  Final Report (04-02-21 @ 13:00):    No Growth Final    Culture - Blood (collected 03-25-21 @ 11:09)  Source: .Blood None  Gram Stain (03-28-21 @ 18:04):    Growth in anaerobic bottle: Gram Positive Cocci in Clusters  Final Report (03-29-21 @ 17:13):    Growth in anaerobic bottle: Staphylococcus pettenkoferi    Coag Negative Staphylococcus    Single set isolate, possible contaminant. Contact    Microbiology if susceptibility testing clinically    indicated.    ***Blood Panel PCR results on this specimen areavailable    approximately 3 hours after the Gram stain result.***    Gram stain, PCR, and/or culture results may not always    correspond due to difference in methodologies.    ************************************************************    This PCR assay wasperformed by multiplex PCR. This    Assay tests for 66 bacterial and resistance gene targets.    Please refer to the [facility] test directory    at [email or web address] for details.  Organism: Blood Culture PCR (03-29-21 @ 17:13)  Organism: Blood Culture PCR (03-29-21 @ 17:13)      -  Coagulase negative Staphylococcus: Detec      Method Type: PCR    Culture - Blood (collected 03-25-21 @ 05:47)  Source: .Blood None  Final Report (03-30-21 @ 14:01):    No Growth Final    Culture - Urine (collected 03-22-21 @ 20:41)  Source: .Urine Clean Catch (Midstream)  Final Report (03-23-21 @ 20:53):    No growth    Culture - Blood (collected 03-22-21 @ 15:31)  Source: .Blood Blood-Peripheral  Final Report (03-28-21 @ 02:33):    No Growth Final    Culture - Blood (collected 03-22-21 @ 15:28)  Source: .Blood Blood-Peripheral  Final Report (03-28-21 @ 02:33):    No Growth Final            INFECTIOUS DISEASES TESTING  Vancomycin Level, Random: 11.5 (03-31-21 @ 07:00)  Vancomycin Level, Trough: 11.5 (03-31-21 @ 07:00)  COVID-19 PCR: NotDetec (03-30-21 @ 15:19)  COVID-19 PCR: NotDetec (03-29-21 @ 19:40)  Vancomycin Level, Trough: <4.0 (03-28-21 @ 13:00)  Procalcitonin, Serum: 0.61 (03-26-21 @ 10:20)  COVID-19 PCR: NotDetec (03-22-21 @ 20:13)  COVID-19 PCR: NotDetec (03-10-21 @ 12:22)  COVID-19 PCR: NotDetec (03-01-21 @ 16:49)  Fungitell: 62 (03-01-21 @ 11:00)  Procalcitonin, Serum: 0.29 (03-01-21 @ 05:32)  MRSA PCR Result.: Negative (02-27-21 @ 15:44)  COVID-19 PCR: NotDetec (02-25-21 @ 15:34)  Procalcitonin, Serum: 0.27 (02-18-21 @ 10:54)  MRSA PCR Result.: Negative (02-14-21 @ 18:29)  HIV-1/2 Combo Result: Nonreact (02-14-21 @ 05:07)  Procalcitonin, Serum: 0.46 (02-13-21 @ 16:00)  COVID-19 PCR: NotDetec (02-12-21 @ 10:17)      INFLAMMATORY MARKERS      RADIOLOGY & ADDITIONAL TESTS:  I have personally reviewed the last available Chest xray  CXR      CT      CARDIOLOGY TESTING  12 Lead ECG:   Ventricular Rate 64 BPM    Atrial Rate 64 BPM    P-R Interval 234 ms    QRS Duration 88 ms    Q-T Interval 404 ms    QTC Calculation(Bazett) 416 ms    P Axis 68 degrees    R Axis -17 degrees    T Axis 9 degrees    Diagnosis Line Sinus rhythm with 1st degree A-V block  Voltage criteria for left ventricular hypertrophy  Nonspecific T wave abnormality  Abnormal ECG    Confirmed by ANA GARDUNO MD ([id number]) on 3/22/2021 9:54:41 PM (03-22-21 @ 16:57)      MEDICATIONS  atorvastatin 40 Oral at bedtime  carbidopa/levodopa  25/100 2 Oral <User Schedule>  chlorhexidine 4% Liquid 1 Topical <User Schedule>  collagenase Ointment 1 Topical two times a day  Dakins Solution - 1/2 Strength 1 Topical two times a day  famotidine Injectable 20 IV Push daily  levETIRAcetam  IVPB 125 IV Intermittent every 12 hours  meropenem  IVPB 1000 IV Intermittent every 12 hours  midodrine 10 Oral every 8 hours  senna 2 Oral at bedtime      WEIGHT  Weight (kg): 79.4 (03-31-21 @ 15:48)  Creatinine, Serum: 0.9 mg/dL (04-05-21 @ 06:31)      ANTIBIOTICS:  meropenem  IVPB 1000 milliGRAM(s) IV Intermittent every 12 hours      All available historical records have been reviewed

## 2021-04-05 NOTE — PHYSICAL THERAPY INITIAL EVALUATION ADULT - RANGE OF MOTION, PT EVAL
will educate family (dtr Slim) on gentle PROM/AAROM bilat UE/LE's and gentle massage, except RT shldr due to dislocation

## 2021-04-05 NOTE — PROGRESS NOTE ADULT - ASSESSMENT
ASSESSMENT  71 year old Male with past medical history of Parkinson's, dementia, CKD Stage 3, 1st degree AV block, HLD, gout, HTN, DM, fungal septicemia presenting from [facility] for acute decompensation in mental status.     IMPRESSION  #Fever 3/25 with sepsis, not present on admission with metabolic encephalopathy, EEG + seizure    LP not consistent with infection.. G/S NEGATIVE (on ABX)    Total Nucleated Cell Count, CSF: 0 /uL (04.01.21 @ 14:00)    Protein, CSF: 42 mg/dL (04.01.21 @ 14:00)    Glucose, CSF: 97 mg/dL (04.01.21 @ 14:00)    3/25 BCX 1/2 sets, 1/4 bottles Staphylococcus pettenkoferi, likely contaminant     cannot rule out meningitis     CXR no PNA   3/22 Blood & Urine cxs NGTD   #Sacral ulcer- necrotic     3/31 WCX GNR    3/29   Numerous Klebsiella pneumoniae ESBL    Few CRE Pseudomonas aeruginosa (high MICs to AGs)    Numerous Enterococcus faecalis (vancomycin resistant)- S amp    seen by Burn sacrum with full thickness ~4x5cm with dark /brown devitalized tissue at base; no purulent drainage, no bleeding  #Recent Fungemia    Blood Cx 2/27 Candida albicans    evaluated by optho - no ocular evidence     TTE no significant valvulopathy   #ABEBE  #L hand edema  < from: Xray Wrist 2 Views, Left (03.27.21 @ 11:13) >No acute fracture or dislocation. Diffuse soft tissue swelling. Nonaggressive appearing lucency in the distal radius, indeterminate. Nonemergent MRI may be obtained for further evaluation.    Creatinine, Serum: 0.9 mg/dL (04.05.21 @ 06:31)      RECOMMENDATIONS  - ADD Polymyxin 535,000 units q12h IV (using adjusted body wt)  - INCREASE to Meropenem 1g q8h IV and run over 4h extended infusion   - Contact isolation  - poor prognosis    If any questions, please call or send a message on [employer] Teams  Spectra [phone]

## 2021-04-05 NOTE — PHYSICAL THERAPY INITIAL EVALUATION ADULT - GENERAL OBSERVATIONS, REHAB EVAL
Will hold Pt today and f/u when appropriate.
patient is extremely lethargic, opens eyes briefly with tactile stimulation but immediately closes eyes and returns to stupor.
[id number] pm. 72 y/o M with h/o PD and dementia, received in bed, left in bed in the chair position, nad, + NGT, bilat heel protectors, dtr Slim at b/s. dtr provided translation to Macedonian t/o session. pt has been non-verbal and unable to ambulate for 2 months. per dtr, pt understands when she speaks Macedonian to him. uses facial expressions for agreement/discomfort. pt kept eye open t/o most of the session, demonstrating some tracking. did not show AROM in bilat UE/LE's. able to maintain the head in upright position for a few minutes. LT shldr is dislocated since Summer'20. + sacral, LT heel decubiti. increased muscle tone in RT elb/knee flexors. + dependent edema in LT>RT hand. spoke to RN, he will reposition the pt for skin protection after 15-20 min in the bed/chair position.

## 2021-04-05 NOTE — PROGRESS NOTE ADULT - ASSESSMENT
71 year old Male with past medical history of Parkinson's, dementia, CKD Stage 3, 1st degree AV block, HLD, gout, HTN, DM, fungal septicemia presenting from [facility] for acute decompensation in mental status. As per documentation patient at baseline is verbal but for past 3 days, he has been worsening to state of being non verbal. Patient was admitted to [facility] for fungemia and discharged on the 11th March. At  he had left hand cellulitis and was treated for it.      # Metabolic encephalopathy  # Sepsis  - Contact isolation  # Necrotic sacral ulcer stage4  # H/o recent fungemia  - CT Head No acute intracranial pathology.  - EEG : epileptiform activity  -  Chest Xray No consolidation, effusion or pneumothorax.  -  Blood & Urine cxs NGTD   -  WCX GNR, 3/29   Numerous Klebsiella pneumoniae ESBL, Few Pseudomonas aeruginosa, Numerous Enterococcus faecalis (vancomycin resistant)  - evaluated by Burn: s/p debridement of sacrumon 3/30 .  no more new recommendation for surgery at this time .continue local wound care with santyl/dakins WTD.  - ID F/u: increase dose of meropenem to 1g q8hr run for 4 hrs, ADD polymyxin 275025 units q12hr  - Neuro consult for thrombocytopenia potentially associated to Keppra  -evaluated by neurology:  c/w keppra, seizure precautions  - S/p spinal tap today --> CSF clear with no growths and neg PCR  - F/u wound culture    # Hypotension  - c/w midodrine    # Thrombocytopenia sec to sepsis  - monitor platelets    #  Acute kidney injury , prerenal-resolving  # Hyponatremia  - monitor  BMP    # H/o parkinsons disease  - c/w sinemet    # left Shoulder dislocation  - Xray Shoulder 2 Views, Left (03.23.21 @ 21:48) >Reidentified anterior dislocation of the humeral head overlying the left second rib with bony fragmentation along the glenoid and humeral head. AC joint degenerative change.  - Pt has chronic dislocation since june previous chart review.    - no ortho intervention    # H/o chronic DVT  - VA Duplex Lower Ext Vein Scan, Bilat (03.27.21 @ 18:48) >No evidence of deep venous thrombosis or superficial thrombophlebitis in the bilateral lower extremities.  - continue holding eliquis    # Dyslipidemia  - c/w statin    # Nutrition: Tube feeds    # DVT prophylaxis  -scd    #Pending: clinical improvement. C/W current management. Wound culture, monitor cbc .  # Disposition: SNF when stable    Plan:  monitor pt for improvement, F/U with new ID recommendation on Abx, repeat CBC and consult neuro for thrombocytopenia, potentially secondary to Keppra vs Bone marrow suppression from disseminated infection

## 2021-04-05 NOTE — PROGRESS NOTE ADULT - SUBJECTIVE AND OBJECTIVE BOX
Patient is a 71y old  Male who presents with a chief complaint of Change in Mental Status (05 Apr 2021 13:36)    INTERVAL HPI/OVERNIGHT EVENTS: Patient was examined and seen at bedside. Events noted. Daughter thinks pt is better    InitialHPI:  71 year old Male with past medical history of Parkinson's, dementia, CKD Stage 3, 1st degree AV block, HLD, gout, HTN, DM, fungal septicemia presenting from [facility] for acute decompensation in mental status.     As per documentation patient at baseline is verbal but for past 3 days, he has been worsening to state of being non verbal. Patient was admitted to [facility] for fungemia and discharged on the 11th March. At  he had left hand cellulitis and was treated for it.    In ED patient hemodynamically stable, afebrile, ABEBE with rise in Cr to 1.9 and BUN to 75, remains non verbal. CT head negative for any new strokes and UA negative. Family has not visited the patient recently so unaware of his status. (22 Mar 2021 23:59)    PAST MEDICAL & SURGICAL HISTORY:  Parkinson disease    Hypertension    Gout    Dyslipidemia    Prostatitis    Cataract    No significant past surgical history        General: lethargic, awake, ?tracking, chronically ill appearing  HEENT:  no LAD  CV: S1 S2  Resp: decreased breath sounds at bases  GI: NT/ND/S +BS  MS: no clubbing/cyanosis/edema, + pulses b/l  Neuro: unable to access    MEDICATIONS  (STANDING):  atorvastatin 40 milliGRAM(s) Oral at bedtime  carbidopa/levodopa  25/100 2 Tablet(s) Oral <User Schedule>  chlorhexidine 4% Liquid 1 Application(s) Topical <User Schedule>  collagenase Ointment 1 Application(s) Topical two times a day  Dakins Solution - 1/2 Strength 1 Application(s) Topical two times a day  famotidine Injectable 20 milliGRAM(s) IV Push daily  levETIRAcetam  IVPB 125 milliGRAM(s) IV Intermittent every 12 hours  meropenem  IVPB 1000 milliGRAM(s) IV Intermittent every 8 hours  midodrine 10 milliGRAM(s) Oral every 8 hours  polymyxin B IVPB 083283 Unit(s) IV Intermittent every 12 hours  senna 2 Tablet(s) Oral at bedtime    MEDICATIONS  (PRN):  acetaminophen   Tablet .. 650 milliGRAM(s) Oral every 6 hours PRN Temp greater or equal to 38C (100.4F)  oxycodone    5 mG/acetaminophen 325 mG 1 Tablet(s) Oral every 6 hours PRN Moderate Pain (4 - 6)    Vital Signs Last 24 Hrs  T(C): 37.3 (05 Apr 2021 12:57), Max: 37.3 (05 Apr 2021 12:57)  T(F): 99.2 (05 Apr 2021 12:57), Max: 99.2 (05 Apr 2021 12:57)  HR: 78 (05 Apr 2021 12:57) (68 - 78)  BP: 131/60 (05 Apr 2021 12:57) (121/62 - 131/69)  BP(mean): 86 (05 Apr 2021 12:57) (84 - 93)  RR: 18 (05 Apr 2021 12:57) (18 - 18)  SpO2: 97% (05 Apr 2021 12:57) (97% - 104%)  CAPILLARY BLOOD GLUCOSE      POCT Blood Glucose.: 109 mg/dL (05 Apr 2021 11:11)  POCT Blood Glucose.: 100 mg/dL (05 Apr 2021 05:37)  POCT Blood Glucose.: 123 mg/dL (04 Apr 2021 23:35)  POCT Blood Glucose.: 136 mg/dL (04 Apr 2021 20:56)  POCT Blood Glucose.: 117 mg/dL (04 Apr 2021 17:58)                          8.1    22.80 )-----------( 44       ( 05 Apr 2021 06:31 )             25.6     04-05    139  |  108  |  38<H>  ----------------------------<  96  5.2<H>   |  23  |  0.9    Ca    8.3<L>      05 Apr 2021 06:31  Mg     1.9     04-05    TPro  5.8<L>  /  Alb  2.1<L>  /  TBili  0.3  /  DBili  x   /  AST  47<H>  /  ALT  8   /  AlkPhos  153<H>  04-05    LIVER FUNCTIONS - ( 05 Apr 2021 06:31 )  Alb: 2.1 g/dL / Pro: 5.8 g/dL / ALK PHOS: 153 U/L / ALT: 8 U/L / AST: 47 U/L / GGT: x                           Chart, Consultant(s) Notes Reviewed:  [x ] YES  [ ] NO  Care Discussed with Consultants/Other Providers/ Housestaff [ x] YES  [ ] NO  Radiology, labs, old available records personally reviewed.

## 2021-04-06 LAB
ANION GAP SERPL CALC-SCNC: 6 MMOL/L — LOW (ref 7–14)
ANISOCYTOSIS BLD QL: SLIGHT — SIGNIFICANT CHANGE UP
APTT BLD: 28.4 SEC — SIGNIFICANT CHANGE UP (ref 27–39.2)
BASOPHILS # BLD AUTO: 0 K/UL — SIGNIFICANT CHANGE UP (ref 0–0.2)
BASOPHILS NFR BLD AUTO: 0 % — SIGNIFICANT CHANGE UP (ref 0–1)
BLD GP AB SCN SERPL QL: SIGNIFICANT CHANGE UP
BUN SERPL-MCNC: 32 MG/DL — HIGH (ref 10–20)
CALCIUM SERPL-MCNC: 7.9 MG/DL — LOW (ref 8.5–10.1)
CHLORIDE SERPL-SCNC: 105 MMOL/L — SIGNIFICANT CHANGE UP (ref 98–110)
CO2 SERPL-SCNC: 23 MMOL/L — SIGNIFICANT CHANGE UP (ref 17–32)
CREAT SERPL-MCNC: 0.9 MG/DL — SIGNIFICANT CHANGE UP (ref 0.7–1.5)
D DIMER BLD IA.RAPID-MCNC: 3441 NG/ML DDU — HIGH (ref 0–230)
EOSINOPHIL # BLD AUTO: 1.16 K/UL — HIGH (ref 0–0.7)
EOSINOPHIL NFR BLD AUTO: 6.1 % — SIGNIFICANT CHANGE UP (ref 0–8)
FIBRINOGEN PPP-MCNC: >700 MG/DL — HIGH (ref 204.4–570.6)
GLUCOSE BLDC GLUCOMTR-MCNC: 125 MG/DL — HIGH (ref 70–99)
GLUCOSE SERPL-MCNC: 75 MG/DL — SIGNIFICANT CHANGE UP (ref 70–99)
HCT VFR BLD CALC: 23.4 % — LOW (ref 42–52)
HCT VFR BLD CALC: 27.4 % — LOW (ref 42–52)
HGB BLD-MCNC: 7.4 G/DL — LOW (ref 14–18)
HGB BLD-MCNC: 8.4 G/DL — LOW (ref 14–18)
INR BLD: 1.17 RATIO — SIGNIFICANT CHANGE UP (ref 0.65–1.3)
LYMPHOCYTES # BLD AUTO: 1.83 K/UL — SIGNIFICANT CHANGE UP (ref 1.2–3.4)
LYMPHOCYTES # BLD AUTO: 9.6 % — LOW (ref 20.5–51.1)
MANUAL SMEAR VERIFICATION: SIGNIFICANT CHANGE UP
MCHC RBC-ENTMCNC: 26.3 PG — LOW (ref 27–31)
MCHC RBC-ENTMCNC: 26.4 PG — LOW (ref 27–31)
MCHC RBC-ENTMCNC: 30.7 G/DL — LOW (ref 32–37)
MCHC RBC-ENTMCNC: 31.6 G/DL — LOW (ref 32–37)
MCV RBC AUTO: 83.6 FL — SIGNIFICANT CHANGE UP (ref 80–94)
MCV RBC AUTO: 85.9 FL — SIGNIFICANT CHANGE UP (ref 80–94)
METAMYELOCYTES # FLD: 3.5 % — HIGH (ref 0–0)
MICROCYTES BLD QL: SLIGHT — SIGNIFICANT CHANGE UP
MONOCYTES # BLD AUTO: 0.82 K/UL — HIGH (ref 0.1–0.6)
MONOCYTES NFR BLD AUTO: 4.3 % — SIGNIFICANT CHANGE UP (ref 1.7–9.3)
MYELOCYTES NFR BLD: 2.6 % — HIGH (ref 0–0)
NEUTROPHILS # BLD AUTO: 13.59 K/UL — HIGH (ref 1.4–6.5)
NEUTROPHILS NFR BLD AUTO: 70.4 % — SIGNIFICANT CHANGE UP (ref 42.2–75.2)
NEUTS BAND # BLD: 0.9 % — SIGNIFICANT CHANGE UP (ref 0–6)
NRBC # BLD: 0 /100 WBCS — SIGNIFICANT CHANGE UP (ref 0–0)
OVALOCYTES BLD QL SMEAR: SLIGHT — SIGNIFICANT CHANGE UP
PLAT MORPH BLD: ABNORMAL
PLATELET # BLD AUTO: 23 K/UL — LOW (ref 130–400)
PLATELET # BLD AUTO: 68 K/UL — LOW (ref 130–400)
POIKILOCYTOSIS BLD QL AUTO: SIGNIFICANT CHANGE UP
POLYCHROMASIA BLD QL SMEAR: SLIGHT — SIGNIFICANT CHANGE UP
POTASSIUM SERPL-MCNC: 5.5 MMOL/L — HIGH (ref 3.5–5)
POTASSIUM SERPL-SCNC: 5.5 MMOL/L — HIGH (ref 3.5–5)
PROMYELOCYTES # FLD: 0.9 % — HIGH (ref 0–0)
PROTHROM AB SERPL-ACNC: 13.5 SEC — HIGH (ref 9.95–12.87)
RBC # BLD: 2.8 M/UL — LOW (ref 4.7–6.1)
RBC # BLD: 3.19 M/UL — LOW (ref 4.7–6.1)
RBC # FLD: 17.6 % — HIGH (ref 11.5–14.5)
RBC # FLD: 17.9 % — HIGH (ref 11.5–14.5)
RBC BLD AUTO: ABNORMAL
SODIUM SERPL-SCNC: 134 MMOL/L — LOW (ref 135–146)
VARIANT LYMPHS # BLD: 1.7 % — SIGNIFICANT CHANGE UP (ref 0–5)
WBC # BLD: 19.06 K/UL — HIGH (ref 4.8–10.8)
WBC # BLD: 22.28 K/UL — HIGH (ref 4.8–10.8)
WBC # FLD AUTO: 19.06 K/UL — HIGH (ref 4.8–10.8)
WBC # FLD AUTO: 22.28 K/UL — HIGH (ref 4.8–10.8)

## 2021-04-06 PROCEDURE — 99233 SBSQ HOSP IP/OBS HIGH 50: CPT

## 2021-04-06 PROCEDURE — 99232 SBSQ HOSP IP/OBS MODERATE 35: CPT

## 2021-04-06 RX ADMIN — SENNA PLUS 2 TABLET(S): 8.6 TABLET ORAL at 21:48

## 2021-04-06 RX ADMIN — POLYMYXIN B SULFATE 500 UNIT(S): 500000 INJECTION, POWDER, LYOPHILIZED, FOR SOLUTION INTRAMUSCULAR; INTRATHECAL; INTRAVENOUS; OPHTHALMIC at 18:15

## 2021-04-06 RX ADMIN — CHLORHEXIDINE GLUCONATE 1 APPLICATION(S): 213 SOLUTION TOPICAL at 05:05

## 2021-04-06 RX ADMIN — MIDODRINE HYDROCHLORIDE 10 MILLIGRAM(S): 2.5 TABLET ORAL at 05:05

## 2021-04-06 RX ADMIN — MEROPENEM 100 MILLIGRAM(S): 1 INJECTION INTRAVENOUS at 05:04

## 2021-04-06 RX ADMIN — ATORVASTATIN CALCIUM 40 MILLIGRAM(S): 80 TABLET, FILM COATED ORAL at 21:48

## 2021-04-06 RX ADMIN — CARBIDOPA AND LEVODOPA 2 TABLET(S): 25; 100 TABLET ORAL at 17:25

## 2021-04-06 RX ADMIN — Medication 1 APPLICATION(S): at 17:26

## 2021-04-06 RX ADMIN — Medication 1 APPLICATION(S): at 17:27

## 2021-04-06 RX ADMIN — MEROPENEM 100 MILLIGRAM(S): 1 INJECTION INTRAVENOUS at 13:25

## 2021-04-06 RX ADMIN — CARBIDOPA AND LEVODOPA 2 TABLET(S): 25; 100 TABLET ORAL at 05:05

## 2021-04-06 RX ADMIN — Medication 1 APPLICATION(S): at 05:05

## 2021-04-06 RX ADMIN — MIDODRINE HYDROCHLORIDE 10 MILLIGRAM(S): 2.5 TABLET ORAL at 13:22

## 2021-04-06 RX ADMIN — MIDODRINE HYDROCHLORIDE 10 MILLIGRAM(S): 2.5 TABLET ORAL at 21:48

## 2021-04-06 RX ADMIN — POLYMYXIN B SULFATE 500 UNIT(S): 500000 INJECTION, POWDER, LYOPHILIZED, FOR SOLUTION INTRAMUSCULAR; INTRATHECAL; INTRAVENOUS; OPHTHALMIC at 05:06

## 2021-04-06 RX ADMIN — LEVETIRACETAM 405 MILLIGRAM(S): 250 TABLET, FILM COATED ORAL at 17:29

## 2021-04-06 RX ADMIN — Medication 1 APPLICATION(S): at 05:06

## 2021-04-06 RX ADMIN — CARBIDOPA AND LEVODOPA 2 TABLET(S): 25; 100 TABLET ORAL at 13:21

## 2021-04-06 RX ADMIN — MEROPENEM 100 MILLIGRAM(S): 1 INJECTION INTRAVENOUS at 21:48

## 2021-04-06 RX ADMIN — LEVETIRACETAM 405 MILLIGRAM(S): 250 TABLET, FILM COATED ORAL at 05:05

## 2021-04-06 RX ADMIN — CARBIDOPA AND LEVODOPA 2 TABLET(S): 25; 100 TABLET ORAL at 21:48

## 2021-04-06 NOTE — PROGRESS NOTE ADULT - SUBJECTIVE AND OBJECTIVE BOX
Patient is a 71y old  Male who presents with a chief complaint of Change in Mental Status (05 Apr 2021 13:36)    INTERVAL HPI/OVERNIGHT EVENTS: Patient was examined and seen at bedside. Events noted.     InitialHPI:  71 year old Male with past medical history of Parkinson's, dementia, CKD Stage 3, 1st degree AV block, HLD, gout, HTN, DM, fungal septicemia presenting from [facility] for acute decompensation in mental status.     As per documentation patient at baseline is verbal but for past 3 days, he has been worsening to state of being non verbal. Patient was admitted to [facility] for fungemia and discharged on the 11th March. At  he had left hand cellulitis and was treated for it.    In ED patient hemodynamically stable, afebrile, ABEBE with rise in Cr to 1.9 and BUN to 75, remains non verbal. CT head negative for any new strokes and UA negative. Family has not visited the patient recently so unaware of his status. (22 Mar 2021 23:59)    PAST MEDICAL & SURGICAL HISTORY:  Parkinson disease    Hypertension    Gout    Dyslipidemia    Prostatitis    Cataract    No significant past surgical history        General: lethargic, awake, ?tracking, chronically ill appearing  HEENT:  no LAD  CV: S1 S2  Resp: decreased breath sounds at bases  GI: NT/ND/S +BS  MS: no clubbing/cyanosis/edema, + pulses b/l  Neuro: unable to access            MEDICATIONS  (STANDING):  atorvastatin 40 milliGRAM(s) Oral at bedtime  carbidopa/levodopa  25/100 2 Tablet(s) Oral <User Schedule>  chlorhexidine 4% Liquid 1 Application(s) Topical <User Schedule>  collagenase Ointment 1 Application(s) Topical two times a day  Dakins Solution - 1/2 Strength 1 Application(s) Topical two times a day  levETIRAcetam  IVPB 125 milliGRAM(s) IV Intermittent every 12 hours  meropenem  IVPB 1000 milliGRAM(s) IV Intermittent every 8 hours  midodrine 10 milliGRAM(s) Oral every 8 hours  polymyxin B IVPB [id number] Unit(s) IV Intermittent every 12 hours  senna 2 Tablet(s) Oral at bedtime    MEDICATIONS  (PRN):  acetaminophen   Tablet .. 650 milliGRAM(s) Oral every 6 hours PRN Temp greater or equal to 38C (100.4F)  oxycodone    5 mG/acetaminophen 325 mG 1 Tablet(s) Oral every 6 hours PRN Moderate Pain (4 - 6)    Home Medications:  atorvastatin 40 mg oral tablet: 1 tab(s) orally once a day (at bedtime) (23 Mar 2021 01:07)  carbidopa-levodopa 25 mg-100 mg oral tablet, extended release: 2 tab(s) orally 5 times a day at 6AM,10Am,2PM,6PM,10 PM (23 Mar 2021 01:07)  colchicine 0.6 mg oral tablet: 1 tab(s) orally once a day (23 Mar 2021 01:07)  Eliquis 5 mg oral tablet: 1 tab(s) orally 2 times a day (23 Mar 2021 01:07)  enalapril 5 mg oral tablet: 1 tab(s) orally once a day (23 Mar 2021 01:07)  omeprazole 40 mg oral delayed release capsule: 1 cap(s) orally once a day (23 Mar 2021 01:07)    Vital Signs Last 24 Hrs  T(C): 37.2 (06 Apr 2021 05:24), Max: 37.3 (05 Apr 2021 22:00)  T(F): 98.9 (06 Apr 2021 05:24), Max: 99.1 (05 Apr 2021 22:00)  HR: 69 (06 Apr 2021 05:24) (69 - 72)  BP: 115/56 (06 Apr 2021 05:24) (115/56 - 128/64)  BP(mean): --  RR: 18 (06 Apr 2021 05:24) (18 - 18)  SpO2: 99% (06 Apr 2021 05:24) (99% - 99%)  CAPILLARY BLOOD GLUCOSE      POCT Blood Glucose.: 123 mg/dL (05 Apr 2021 22:02)    LABS:                        8.4    22.28 )-----------( 23       ( 06 Apr 2021 12:32 )             27.4     04-06    134<L>  |  105  |  32<H>  ----------------------------<  75  5.5<H>   |  23  |  0.9    Ca    7.9<L>      06 Apr 2021 12:32  Mg     1.9     04-05    TPro  5.8<L>  /  Alb  2.1<L>  /  TBili  0.3  /  DBili  x   /  AST  47<H>  /  ALT  8   /  AlkPhos  153<H>  04-05    LIVER FUNCTIONS - ( 05 Apr 2021 06:31 )  Alb: 2.1 g/dL / Pro: 5.8 g/dL / ALK PHOS: 153 U/L / ALT: 8 U/L / AST: 47 U/L / GGT: x               PT/INR - ( 06 Apr 2021 12:32 )   PT: 13.50 sec;   INR: 1.17 ratio         PTT - ( 06 Apr 2021 12:32 )  PTT:28.4 sec            Consultant Notes Reviewed:  [x ] YES  [ ] NO  Care Discussed with Consultants/Other Providers/ Housestaff [ x] YES  [ ] NO  Radiology, labs, new studies personally reviewed.

## 2021-04-06 NOTE — PROGRESS NOTE ADULT - ATTENDING COMMENTS
The patient was seen. Agree with above.  71 year old male with multiple medical problems, admitted for candidemia/AMS. CBC shows leukocytosis, moderate anemia and thrombocytopenia, most likely due to his ongoing acute illness and medication. May order flow cytometry to r/o primary hematologic disorder.   Continue supportive care. The patient was seen. Agree with above.  71 year old male with multiple medical problems, admitted for candidemia/AMS. CBC shows leukocytosis, moderate anemia and thrombocytopenia. His WBC was normal 2 weeks ago and PLT count was normal at some points in the March. The findings are most likely due to his ongoing acute illness and medication.. May order flow cytometry to r/o primary hematologic disorder. Continue supportive care.

## 2021-04-06 NOTE — PROGRESS NOTE ADULT - SUBJECTIVE AND OBJECTIVE BOX
24H events:  Platelets recovered and then dropped to 40 again  He is now on polymixin and meropenem,      PAST MEDICAL & SURGICAL HISTORY  Parkinson disease    Hypertension    Gout    Dyslipidemia    Prostatitis    Cataract    No significant past surgical history      SOCIAL HISTORY:  Negative for smoking/alcohol/drug use.     ALLERGIES:  No Known Allergies    MEDICATIONS:  STANDING MEDICATIONS  atorvastatin 40 milliGRAM(s) Oral at bedtime  carbidopa/levodopa  25/100 2 Tablet(s) Oral <User Schedule>  chlorhexidine 4% Liquid 1 Application(s) Topical <User Schedule>  collagenase Ointment 1 Application(s) Topical two times a day  Dakins Solution - 1/2 Strength 1 Application(s) Topical two times a day  famotidine Injectable 20 milliGRAM(s) IV Push daily  levETIRAcetam  IVPB 125 milliGRAM(s) IV Intermittent every 12 hours  meropenem  IVPB 1000 milliGRAM(s) IV Intermittent every 8 hours  midodrine 10 milliGRAM(s) Oral every 8 hours  polymyxin B IVPB [id number] Unit(s) IV Intermittent every 12 hours  senna 2 Tablet(s) Oral at bedtime    PRN MEDICATIONS  acetaminophen   Tablet .. 650 milliGRAM(s) Oral every 6 hours PRN  oxycodone    5 mG/acetaminophen 325 mG 1 Tablet(s) Oral every 6 hours PRN    VITALS:   T(F): 98.9  HR: 69  BP: 115/56  RR: 18  SpO2: 99%    LABS:                        8.5    21.25 )-----------( 40       ( 05 Apr 2021 18:15 )             27.5     04-05    141  |  110  |  35<H>  ----------------------------<  126<H>  5.0   |  23  |  0.9    Ca    7.9<L>      05 Apr 2021 18:15  Mg     1.9     04-05    TPro  5.8<L>  /  Alb  2.1<L>  /  TBili  0.3  /  DBili  x   /  AST  47<H>  /  ALT  8   /  AlkPhos  153<H>  04-05                  RADIOLOGY:    PHYSICAL EXAM:  GEN: No acute distress  HENT: NCAT, EOMI, scabs on head  LYMPH: No appreciable adenopathy  LUNGS: No respiratory distress, clear to auscultation bilaterally   HEART: regular rate and rhythm  ABD: Soft, non-tender, non-distended  SKIN: No rash  NEURO: Non verbal, minimal response to stimulation

## 2021-04-06 NOTE — PROGRESS NOTE ADULT - ASSESSMENT
71 year old Male with past medical history of Parkinson's, dementia, CKD Stage 3, 1st degree AV block, HLD, gout, HTN, DM, fungal septicemia presenting from [facility] for acute decompensation in mental status. As per documentation patient at baseline is verbal but for past 3 days, he has been worsening to state of being non verbal. Patient was admitted to [facility] for fungemia and discharged on the 11th March. At  he had left hand cellulitis and was treated for it.      # Metabolic encephalopathy concurrent w/ baseline Parkinson's dementia  # Sepsis  - Contact isolation  # Necrotic sacral ulcer stage4  # H/o recent fungemia  - CT Head No acute intracranial pathology.  - EEG : epileptiform activity, ordered repeat  -  Chest Xray No consolidation, effusion or pneumothorax.  -  Blood & Urine cxs NGTD   -  WCX GNR, 3/29   Numerous Klebsiella pneumoniae ESBL, Few Pseudomonas aeruginosa, Numerous Enterococcus faecalis (vancomycin resistant)  - evaluated by Burn: s/p debridement of sacrumon 3/30 .  no more new recommendation for surgery at this time .continue local wound care with santyl/dakins WTD.  - ID F/u: increase dose of meropenem to 1g q8hr run for 4 hrs, ADD polymyxin [id number] units q12hr  - Neuro consult for thrombocytopenia potentially associated to Keppra vs bone marrow supression  -evaluated by neurology:  c/w keppra, seizure precautions  - S/p spinal tap --> CSF clear with no growths and neg PCR  - F/u wound culture    # Hypotension  - c/w midodrine    # Thrombocytopenia sec to sepsis  - monitor platelets    #  Acute kidney injury , prerenal-resolving  # Hyponatremia  - monitor  BMP    # H/o parkinsons disease  - c/w sinemet    # left Shoulder dislocation  - Xray Shoulder 2 Views, Left (03.23.21 @ 21:48) >Reidentified anterior dislocation of the humeral head overlying the left second rib with bony fragmentation along the glenoid and humeral head. AC joint degenerative change.  - Pt has chronic dislocation since june previous chart review.    - no ortho intervention    # H/o chronic DVT  - [facility] Duplex Lower Ext Vein Scan, Bilat (03.27.21 @ 18:48) >No evidence of deep venous thrombosis or superficial thrombophlebitis in the bilateral lower extremities.  - continue holding eliquis    # Dyslipidemia  - c/w statin    # Nutrition: Tube feeds    # DVT prophylaxis  -scd    #Pending: clinical improvement. C/W current management. Wound culture, monitor cbc .  # Disposition: SNF when stable  Full code    Plan:  Pt continues to be nonverbal with increasing WBCs and low platelets(40). Ordered PT/PTT, Fibrinogen and D-dimer to r/o DIC secondary to sepsis. Will F/U with Hem/onc recommendation for further testing and evaluation. Will trend CBC and monitor for improvement.    71 year old Male with past medical history of Parkinson's, dementia, CKD Stage 3, 1st degree AV block, HLD, gout, HTN, DM, fungal septicemia presenting from [facility] for acute decompensation in mental status. As per documentation patient at baseline is verbal but for past 3 days, he has been worsening to state of being non verbal. Patient was admitted to [facility] for fungemia and discharged on the 11th March. At  he had left hand cellulitis and was treated for it.      # Metabolic encephalopathy concurrent w/ baseline Parkinson's dementia  # Sepsis  - Contact isolation  # Necrotic sacral ulcer stage4  # H/o recent fungemia  - CT Head No acute intracranial pathology.  - EEG : epileptiform activity, ordered repeat  -  Chest Xray No consolidation, effusion or pneumothorax.  -  Blood & Urine cxs NGTD   -  WCX GNR, 3/29   Numerous Klebsiella pneumoniae ESBL, Few Pseudomonas aeruginosa, Numerous Enterococcus faecalis (vancomycin resistant)  - evaluated by Burn: s/p debridement of sacrumon 3/30 .  no more new recommendation for surgery at this time .continue local wound care with santyl/dakins WTD.  - ID F/u: increase dose of meropenem to 1g q8hr run for 4 hrs, ADD polymyxin 168808 units q12hr  - Neuro consult for thrombocytopenia potentially associated to Keppra vs bone marrow supression  -evaluated by neurology:  c/w keppra, seizure precautions  - S/p spinal tap --> CSF clear with no growths and neg PCR  - F/u wound culture    # Hypotension  - c/w midodrine    # Thrombocytopenia sec to sepsis  - monitor platelets    #  Acute kidney injury , prerenal-resolving  # Hyponatremia  - monitor  BMP    # H/o parkinsons disease  - c/w sinemet    # left Shoulder dislocation  - Xray Shoulder 2 Views, Left (03.23.21 @ 21:48) >Reidentified anterior dislocation of the humeral head overlying the left second rib with bony fragmentation along the glenoid and humeral head. AC joint degenerative change.  - Pt has chronic dislocation since june previous chart review.    - no ortho intervention    # H/o chronic DVT  - VA Duplex Lower Ext Vein Scan, Bilat (03.27.21 @ 18:48) >No evidence of deep venous thrombosis or superficial thrombophlebitis in the bilateral lower extremities.  - continue holding eliquis    # Dyslipidemia  - c/w statin    # Nutrition: Tube feeds    # DVT prophylaxis  -scd    #Pending: clinical improvement. C/W current management. Wound culture, monitor cbc .  # Disposition: SNF when stable  Full code    Plan:  Pt continues to be nonverbal with increasing WBCs and platelets continue to drop (20), potentially consumptive coagulapathy. Upper extremity doppler, Type and screen, EEG ordered. will transfuse platelets if f/u CBC drop below 20.

## 2021-04-06 NOTE — PROGRESS NOTE ADULT - ASSESSMENT
71 year old male with PMHx of Parkinson disease, dementia, CKD III, HTN, HLD, gout, psoriasis, DVT on Eliquis, 1st degree AV block, recently admitted for candidemia/AMS complicated by thrombocytopenia, now returns for AMS, EEG showing partial seizure; heme consulted again for thrombocytopenia    Thrombocytopenia: Patient was recently admitted for candidemia, smear reviewed at that time, no schistocytes, no clumps, normal WBCs/no blasts. Thrombocytopenia at that time likely due to sepsis. He was readmitted and restarted on multiple antimicrobials (vanc, ampillcin, flagyl, acyclovir, ordered for zyvox but held, as well as Keppra, all of which can cause thrombocytopenia). Additionally, he was hypotensive requiring pressors and ICU care. Therefore, this is most likely multifactorial due to drugs, shock marrow from hypotension and sepsis. No heparin exposure, creatinine improving. His platelets recovered and are now dropping again. Still suspect this is most likely medication and illness related, however can send flow cytometry with increasing WBC, anemia and thrombocytopenia.    - Monitor CBC  - Keep plt > 20K  - Hold anticoagulation if plt > 50k  - PPI changed to H2  - Can send flow cytometry due to leukocytosis, anemia, thrombocytopenia, r/o MDS/AML, check BCR-ABL, JAK2; however most likely this is still multifactorial due to his multiple comorbidities. Will recheck smear. Still has not had any heparin exposure    Hx of DVT: Unclear how long he was on Eliquis, repeat LE duplex negative bilaterally    Anemia: Iron studies wnl, SPEP/JASS wnl, B12/folate wnl, Hep B/C negative; has mild CKD; likely multifactorial including CKD, inflammation, shock marrow, medications  - Check ferritin    AMS:  - Neurology f/u  - Antibiotics per ID

## 2021-04-06 NOTE — PROGRESS NOTE ADULT - SUBJECTIVE AND OBJECTIVE BOX
Hospital Day:  15d    Subjective: Patient is a 71y old  Male who presents with a chief complaint of Change in Mental Status (06 Apr 2021 10:24)      Pt seen and evaluated at bedside.   Complaints: N/A  Over the night Events: None    Past Medical Hx:   Parkinson disease    Hypertension    Gout    Dyslipidemia    Prostatitis    Cataract      Past Sx:  No significant past surgical history      Allergies:  No Known Allergies    Current Meds:   Standng Meds:  atorvastatin 40 milliGRAM(s) Oral at bedtime  carbidopa/levodopa  25/100 2 Tablet(s) Oral <User Schedule>  chlorhexidine 4% Liquid 1 Application(s) Topical <User Schedule>  collagenase Ointment 1 Application(s) Topical two times a day  Dakins Solution - 1/2 Strength 1 Application(s) Topical two times a day  famotidine Injectable 20 milliGRAM(s) IV Push daily  levETIRAcetam  IVPB 125 milliGRAM(s) IV Intermittent every 12 hours  meropenem  IVPB 1000 milliGRAM(s) IV Intermittent every 8 hours  midodrine 10 milliGRAM(s) Oral every 8 hours  polymyxin B IVPB [id number] Unit(s) IV Intermittent every 12 hours  senna 2 Tablet(s) Oral at bedtime    PRN Meds:  acetaminophen   Tablet .. 650 milliGRAM(s) Oral every 6 hours PRN Temp greater or equal to 38C (100.4F)  oxycodone    5 mG/acetaminophen 325 mG 1 Tablet(s) Oral every 6 hours PRN Moderate Pain (4 - 6)      Vital Signs:   T(F): 98.9 (04-06-21 @ 05:24), Max: 99.2 (04-05-21 @ 12:57)  HR: 69 (04-06-21 @ 05:24) (69 - 78)  BP: 115/56 (04-06-21 @ 05:24) (115/56 - 131/60)  RR: 18 (04-06-21 @ 05:24) (18 - 18)  SpO2: 99% (04-06-21 @ 05:24) (97% - 99%)    Physical Exam:   GENERAL: NAD, Resting in bed  HEENT: NCAT  CHEST/LUNG: Clear to auscultation bilaterally; No wheezing or rubs.   HEART: Regular rate and rhythm; No murmurs, rubs, or gallops  ABDOMEN: Bowel sounds present; Soft, Nontender, Nondistended.   EXTREMITIES:  No clubbing, cyanosis, or edema  NERVOUS SYSTEM:  Alert & Oriented X0, pt is only able to track movement, he is non-verbal and has had no improvement in mental status since admission.    FLUID BALANCE    04-04-21 @ 07:01  -  04-05-21 @ 07:00  --------------------------------------------------------  IN: 0 mL / OUT: 1600 mL / NET: -1600 mL    04-05-21 @ 07:01  -  04-06-21 @ 07:00  --------------------------------------------------------  IN: 720 mL / OUT: 850 mL / NET: -130 mL        Labs:                         8.5    21.25 )-----------( 40       ( 05 Apr 2021 18:15 )             27.5     Neutophil% 65.9, Lymphocyte% 9.9, Monocyte% 4.6, Bands% 17.8 04-05-21 @ 18:15    05 Apr 2021 18:15    141    |  110    |  35     ----------------------------<  126    5.0     |  23     |  0.9      Ca    7.9        05 Apr 2021 18:15  Mg     1.9       05 Apr 2021 06:31    TPro  5.8    /  Alb  2.1    /  TBili  0.3    /  DBili  x      /  AST  47     /  ALT  8      /  AlkPhos  153    05 Apr 2021 06:31                          Culture - Blood (collected 04-02-21 @ 12:55)  Source: .Blood None  Preliminary Report (04-03-21 @ 23:01):    No growth to date.

## 2021-04-06 NOTE — PROGRESS NOTE ADULT - ASSESSMENT
71 year old Male with past medical history of Parkinson's, dementia, CKD Stage 3, 1st degree AV block, HLD, gout, HTN, DM, fungal septicemia presenting from [facility] for acute decompensation in mental status. As per documentation patient at baseline is verbal but for past 3 days, he has been worsening to state of being non verbal. Patient was admitted to [facility] for fungemia and discharged on the 11th March. At  he had left hand cellulitis and was treated for it.    # Metabolic encephalopathy on top of baseline dementia  # Sepsis  # Necrotic sacral ulcer stage4  # H/o recent fungemia  - CT Head No acute intracranial pathology.activity  -  Chest Xray No consolidation, effusion or pneumothorax.  -  Blood & Urine cxs NGTD   -  WCX GNR, 3/29   Numerous Klebsiella pneumoniae ESBL, Few Pseudomonas aeruginosa, Numerous Enterococcus faecalis (vancomycin resistant)  - evaluated by Burn: s/p debridement of sacrum on 3/30 .  no more new recommendation for surgery at this time .continue local wound care with santyl/dakins WTD.  - ID F/u: increase dose of meropenem to 1g q8hr run for 4 hrs, ADD polymyxin [id number] units q12hr  -evaluated by neurology:  c/w keppra, seizure precautions  - S/p spinal tap --> CSF clear with no growths and neg PCR  -repeat EEG as per neuro    # Hypotension  - c/w midodrine    # Thrombocytopenia ?sec to sepsis  - monitor platelets, check UE U/s for DVTs  -transfuse if <20K  -D/c Pepcid  -heme f/u  -check studies as per heme    #  Acute kidney injury , prerenal-resolving  # Hyponatremia  - monitor  BMP    # H/o parkinsons disease  - c/w sinemet    # left Shoulder dislocation  - Xray Shoulder 2 Views, Left (03.23.21 @ 21:48) >Reidentified anterior dislocation of the humeral head overlying the left second rib with bony fragmentation along the glenoid and humeral head. AC joint degenerative change.  - Pt has chronic dislocation since june previous chart review.    - no ortho intervention    # H/o chronic DVT  - VA Duplex Lower Ext Vein Scan, Bilat (03.27.21 @ 18:48) >No evidence of deep venous thrombosis or superficial thrombophlebitis in the bilateral lower extremities.  - continue holding eliquis    # Dyslipidemia  - c/w statin    # Nutrition: Tube feeds    # DVT prophylaxis  -scd    Very high risk pt. D/w daughter GOC: she wants full code for now. Very poor Px.    #Pending: clinical improvement vs demise, resolution of sepsis, thrombocytopenia, improved mental status  # Discussed w/ daughter in details who wants to cont aggressive Tx   # Disposition: SNF when/if stable    d/w Housestaff, nursing, case mgmt, heme fellow

## 2021-04-07 LAB
ALBUMIN SERPL ELPH-MCNC: 2.1 G/DL — LOW (ref 3.5–5.2)
ALP SERPL-CCNC: 146 U/L — HIGH (ref 30–115)
ALT FLD-CCNC: 10 U/L — SIGNIFICANT CHANGE UP (ref 0–41)
ANION GAP SERPL CALC-SCNC: 9 MMOL/L — SIGNIFICANT CHANGE UP (ref 7–14)
AST SERPL-CCNC: 42 U/L — HIGH (ref 0–41)
BASOPHILS # BLD AUTO: 0.05 K/UL — SIGNIFICANT CHANGE UP (ref 0–0.2)
BASOPHILS NFR BLD AUTO: 0.3 % — SIGNIFICANT CHANGE UP (ref 0–1)
BILIRUB SERPL-MCNC: 0.5 MG/DL — SIGNIFICANT CHANGE UP (ref 0.2–1.2)
BUN SERPL-MCNC: 27 MG/DL — HIGH (ref 10–20)
CALCIUM SERPL-MCNC: 7.8 MG/DL — LOW (ref 8.5–10.1)
CHLORIDE SERPL-SCNC: 97 MMOL/L — LOW (ref 98–110)
CO2 SERPL-SCNC: 24 MMOL/L — SIGNIFICANT CHANGE UP (ref 17–32)
CREAT SERPL-MCNC: 0.7 MG/DL — SIGNIFICANT CHANGE UP (ref 0.7–1.5)
CULTURE RESULTS: SIGNIFICANT CHANGE UP
EOSINOPHIL # BLD AUTO: 0.35 K/UL — SIGNIFICANT CHANGE UP (ref 0–0.7)
EOSINOPHIL NFR BLD AUTO: 2.2 % — SIGNIFICANT CHANGE UP (ref 0–8)
FERRITIN SERPL-MCNC: 599 NG/ML — HIGH (ref 30–400)
GLUCOSE BLDC GLUCOMTR-MCNC: 141 MG/DL — HIGH (ref 70–99)
GLUCOSE BLDC GLUCOMTR-MCNC: 80 MG/DL — SIGNIFICANT CHANGE UP (ref 70–99)
GLUCOSE BLDC GLUCOMTR-MCNC: 81 MG/DL — SIGNIFICANT CHANGE UP (ref 70–99)
GLUCOSE BLDC GLUCOMTR-MCNC: 99 MG/DL — SIGNIFICANT CHANGE UP (ref 70–99)
GLUCOSE SERPL-MCNC: 81 MG/DL — SIGNIFICANT CHANGE UP (ref 70–99)
HCT VFR BLD CALC: 25.1 % — LOW (ref 42–52)
HGB BLD-MCNC: 7.9 G/DL — LOW (ref 14–18)
IMM GRANULOCYTES NFR BLD AUTO: 9.7 % — HIGH (ref 0.1–0.3)
LYMPHOCYTES # BLD AUTO: 1.97 K/UL — SIGNIFICANT CHANGE UP (ref 1.2–3.4)
LYMPHOCYTES # BLD AUTO: 12.6 % — LOW (ref 20.5–51.1)
MCHC RBC-ENTMCNC: 26.1 PG — LOW (ref 27–31)
MCHC RBC-ENTMCNC: 31.5 G/DL — LOW (ref 32–37)
MCV RBC AUTO: 82.8 FL — SIGNIFICANT CHANGE UP (ref 80–94)
MONOCYTES # BLD AUTO: 1.2 K/UL — HIGH (ref 0.1–0.6)
MONOCYTES NFR BLD AUTO: 7.7 % — SIGNIFICANT CHANGE UP (ref 1.7–9.3)
NEUTROPHILS # BLD AUTO: 10.52 K/UL — HIGH (ref 1.4–6.5)
NEUTROPHILS NFR BLD AUTO: 67.5 % — SIGNIFICANT CHANGE UP (ref 42.2–75.2)
NRBC # BLD: 0 /100 WBCS — SIGNIFICANT CHANGE UP (ref 0–0)
PLATELET # BLD AUTO: 51 K/UL — LOW (ref 130–400)
POTASSIUM SERPL-MCNC: 5.3 MMOL/L — HIGH (ref 3.5–5)
POTASSIUM SERPL-SCNC: 5.3 MMOL/L — HIGH (ref 3.5–5)
PROCALCITONIN SERPL-MCNC: 0.16 NG/ML — HIGH (ref 0.02–0.1)
PROT SERPL-MCNC: 5.5 G/DL — LOW (ref 6–8)
RBC # BLD: 3.03 M/UL — LOW (ref 4.7–6.1)
RBC # FLD: 17.8 % — HIGH (ref 11.5–14.5)
SARS-COV-2 RNA SPEC QL NAA+PROBE: SIGNIFICANT CHANGE UP
SODIUM SERPL-SCNC: 130 MMOL/L — LOW (ref 135–146)
SPECIMEN SOURCE: SIGNIFICANT CHANGE UP
SURGICAL PATHOLOGY STUDY: SIGNIFICANT CHANGE UP
WBC # BLD: 15.6 K/UL — HIGH (ref 4.8–10.8)
WBC # FLD AUTO: 15.6 K/UL — HIGH (ref 4.8–10.8)

## 2021-04-07 PROCEDURE — 99232 SBSQ HOSP IP/OBS MODERATE 35: CPT

## 2021-04-07 PROCEDURE — 95816 EEG AWAKE AND DROWSY: CPT | Mod: 26

## 2021-04-07 PROCEDURE — 71260 CT THORAX DX C+: CPT | Mod: 26

## 2021-04-07 PROCEDURE — 93970 EXTREMITY STUDY: CPT | Mod: 26

## 2021-04-07 PROCEDURE — 99233 SBSQ HOSP IP/OBS HIGH 50: CPT

## 2021-04-07 RX ADMIN — CARBIDOPA AND LEVODOPA 2 TABLET(S): 25; 100 TABLET ORAL at 10:40

## 2021-04-07 RX ADMIN — Medication 1 APPLICATION(S): at 05:22

## 2021-04-07 RX ADMIN — Medication 1 APPLICATION(S): at 17:47

## 2021-04-07 RX ADMIN — Medication 650 MILLIGRAM(S): at 21:23

## 2021-04-07 RX ADMIN — MEROPENEM 100 MILLIGRAM(S): 1 INJECTION INTRAVENOUS at 21:20

## 2021-04-07 RX ADMIN — POLYMYXIN B SULFATE 500 UNIT(S): 500000 INJECTION, POWDER, LYOPHILIZED, FOR SOLUTION INTRAMUSCULAR; INTRATHECAL; INTRAVENOUS; OPHTHALMIC at 05:22

## 2021-04-07 RX ADMIN — CARBIDOPA AND LEVODOPA 2 TABLET(S): 25; 100 TABLET ORAL at 05:19

## 2021-04-07 RX ADMIN — SENNA PLUS 2 TABLET(S): 8.6 TABLET ORAL at 21:20

## 2021-04-07 RX ADMIN — MEROPENEM 100 MILLIGRAM(S): 1 INJECTION INTRAVENOUS at 13:02

## 2021-04-07 RX ADMIN — LEVETIRACETAM 405 MILLIGRAM(S): 250 TABLET, FILM COATED ORAL at 05:19

## 2021-04-07 RX ADMIN — CHLORHEXIDINE GLUCONATE 1 APPLICATION(S): 213 SOLUTION TOPICAL at 05:20

## 2021-04-07 RX ADMIN — MIDODRINE HYDROCHLORIDE 10 MILLIGRAM(S): 2.5 TABLET ORAL at 13:02

## 2021-04-07 RX ADMIN — Medication 650 MILLIGRAM(S): at 21:37

## 2021-04-07 RX ADMIN — MEROPENEM 100 MILLIGRAM(S): 1 INJECTION INTRAVENOUS at 05:19

## 2021-04-07 RX ADMIN — CARBIDOPA AND LEVODOPA 2 TABLET(S): 25; 100 TABLET ORAL at 13:02

## 2021-04-07 RX ADMIN — LEVETIRACETAM 405 MILLIGRAM(S): 250 TABLET, FILM COATED ORAL at 17:47

## 2021-04-07 RX ADMIN — CARBIDOPA AND LEVODOPA 2 TABLET(S): 25; 100 TABLET ORAL at 21:20

## 2021-04-07 RX ADMIN — MIDODRINE HYDROCHLORIDE 10 MILLIGRAM(S): 2.5 TABLET ORAL at 21:21

## 2021-04-07 RX ADMIN — Medication 1 APPLICATION(S): at 05:20

## 2021-04-07 RX ADMIN — POLYMYXIN B SULFATE 500 UNIT(S): 500000 INJECTION, POWDER, LYOPHILIZED, FOR SOLUTION INTRAMUSCULAR; INTRATHECAL; INTRAVENOUS; OPHTHALMIC at 17:47

## 2021-04-07 RX ADMIN — ATORVASTATIN CALCIUM 40 MILLIGRAM(S): 80 TABLET, FILM COATED ORAL at 21:20

## 2021-04-07 RX ADMIN — MIDODRINE HYDROCHLORIDE 10 MILLIGRAM(S): 2.5 TABLET ORAL at 05:19

## 2021-04-07 RX ADMIN — Medication 1 APPLICATION(S): at 17:48

## 2021-04-07 NOTE — PROGRESS NOTE ADULT - SUBJECTIVE AND OBJECTIVE BOX
Neurology Progress Note    Interval History:      Patient was started on Keppra 125  mg BID for seizure prophylaxis after discovery of epileptiform waves on EEG. Since then he became severely thrombocytopenic to 28,000. Primary team consulted neurology for role of Keppra in thrombocytopenia. Pt also evaluated by heme with no known cause of thrombocytopenia.     Repeat EEG n 4.6.2021  Impression  Abnormal due to the presence of: generalized slowing as above    Clinical Correlation & Recommendations  Consistent with diffuse cerebral electrophysiological dysfunction secondary to nonspecific cause.      HPI:  71 year old Male with past medical history of Parkinson's, dementia, CKD Stage 3, 1st degree AV block, HLD, gout, HTN, DM, fungal septicemia presenting from [facility] for acute decompensation in mental status.     As per documentation patient at baseline is verbal but for past 3 days, he has been worsening to state of being non verbal. Patient was admitted to [facility] for fungemia and discharged on the 11th March. At  he had left hand cellulitis and was treated for it.    In ED patient hemodynamically stable, afebrile, ABEBE with rise in Cr to 1.9 and BUN to 75, remains non verbal. CT head negative for any new strokes and UA negative. Family has not visited the patient recently so unaware of his status. (22 Mar 2021 23:59)      PAST MEDICAL & SURGICAL HISTORY:  Parkinson disease    Hypertension    Gout    Dyslipidemia    Prostatitis    Cataract    No significant past surgical history            Medications:  acetaminophen   Tablet .. 650 milliGRAM(s) Oral every 6 hours PRN  atorvastatin 40 milliGRAM(s) Oral at bedtime  carbidopa/levodopa  25/100 2 Tablet(s) Oral <User Schedule>  chlorhexidine 4% Liquid 1 Application(s) Topical <User Schedule>  collagenase Ointment 1 Application(s) Topical two times a day  Dakins Solution - 1/2 Strength 1 Application(s) Topical two times a day  levETIRAcetam  IVPB 125 milliGRAM(s) IV Intermittent every 12 hours  meropenem  IVPB 1000 milliGRAM(s) IV Intermittent every 8 hours  midodrine 10 milliGRAM(s) Oral every 8 hours  oxycodone    5 mG/acetaminophen 325 mG 1 Tablet(s) Oral every 6 hours PRN  polymyxin B IVPB [id number] Unit(s) IV Intermittent every 12 hours  senna 2 Tablet(s) Oral at bedtime      Vital Signs Last 24 Hrs  T(C): 36.4 (07 Apr 2021 04:58), Max: 37.1 (06 Apr 2021 14:03)  T(F): 97.6 (07 Apr 2021 04:58), Max: 98.8 (06 Apr 2021 14:03)  HR: 69 (07 Apr 2021 04:58) (69 - 78)  BP: 120/55 (07 Apr 2021 04:58) (120/55 - 126/68)  BP(mean): 79 (07 Apr 2021 04:58) (79 - 79)  RR: 17 (07 Apr 2021 04:58) (17 - 18)  SpO2: --    Neurological Exam:   Mental status: Awake, lethargic appearing. Nonverbal, does not follow commands. Tracks to visual and auditory stimuli.    Cranial nerves: PERRLA, (+) tracking. No gross facial asymmentry.   Motor:  +Diffuse spasticity x 4 extremities. (+) pitting edema x 4 extremities.     Sensation: Unable to assess  Coordination: Unable to assess.  Reflexes: 2+ in bilateral UE/LE, downgoing toes bilaterally.  Gait: Unable to assess.    Labs:  CBC Full  -  ( 06 Apr 2021 16:50 )  WBC Count : 19.06 K/uL  RBC Count : 2.80 M/uL  Hemoglobin : 7.4 g/dL  Hematocrit : 23.4 %  Platelet Count - Automated : 68 K/uL  Mean Cell Volume : 83.6 fL  Mean Cell Hemoglobin : 26.4 pg  Mean Cell Hemoglobin Concentration : 31.6 g/dL  Auto Neutrophil # : 13.59 K/uL  Auto Lymphocyte # : 1.83 K/uL  Auto Monocyte # : 0.82 K/uL  Auto Eosinophil # : 1.16 K/uL  Auto Basophil # : 0.00 K/uL  Auto Neutrophil % : 70.4 %  Auto Lymphocyte % : 9.6 %  Auto Monocyte % : 4.3 %  Auto Eosinophil % : 6.1 %  Auto Basophil % : 0.0 %    04-06    134<L>  |  105  |  32<H>  ----------------------------<  75  5.5<H>   |  23  |  0.9    Ca    7.9<L>      06 Apr 2021 12:32        PT/INR - ( 06 Apr 2021 12:32 )   PT: 13.50 sec;   INR: 1.17 ratio         PTT - ( 06 Apr 2021 12:32 )  PTT:28.4 sec      Assessment:   72 yo Male w/ h/o IPD, dementia, CKD3, AVB, DLD, HTN, DM, fungal septicemia with persistent sacral decubitus with recurrent sepsis currently admitted for septic shock w/ persistent encephalopathy. EEG showed epileptiform activity and he was started on Keppra 125 mg BID for seizure prophylaxis. Subsequently thrombocytopenic to 28 with no known etiology, followed by Heme Onc. Repeat EEG on 4/6/2021 shows generalized activity with no epileptiform activity.     Plan  # persistent encephalopathy, EEG with improved epileptiform activity, on Keppra 125 mg BID, thrombocytopenia of unknown etiology  - although it is unlikely that the patient's Keppra caused the patient to be thrombocytopenic, there are case studies that suggest causal relationship. Patient has no current elileptiform activity on most recent EEG. No known clinical seizure activity ever. Ok to d/c Keppra  - f/u hematology  - Continue Sinemet via NG tube  - continue supportive care and avoid any sedating/hypnotic medications  - avoid antiemetics  - f/u neurology as needed Neurology Progress Note    Interval History:      Patient was started on Keppra 125  mg BID for seizure prophylaxis after discovery of epileptiform waves on EEG. Since then he became severely thrombocytopenic to 28,000. Primary team consulted neurology for role of Keppra in thrombocytopenia. Pt also evaluated by heme with no known cause of thrombocytopenia.     Repeat EEG n 4.6.2021  Impression  Abnormal due to the presence of: generalized slowing as above    Clinical Correlation & Recommendations  Consistent with diffuse cerebral electrophysiological dysfunction secondary to nonspecific cause.      HPI:  71 year old Male with past medical history of Parkinson's, dementia, CKD Stage 3, 1st degree AV block, HLD, gout, HTN, DM, fungal septicemia presenting from [facility] for acute decompensation in mental status.     As per documentation patient at baseline is verbal but for past 3 days, he has been worsening to state of being non verbal. Patient was admitted to [facility] for fungemia and discharged on the 11th March. At  he had left hand cellulitis and was treated for it.    In ED patient hemodynamically stable, afebrile, ABEBE with rise in Cr to 1.9 and BUN to 75, remains non verbal. CT head negative for any new strokes and UA negative. Family has not visited the patient recently so unaware of his status. (22 Mar 2021 23:59)      PAST MEDICAL & SURGICAL HISTORY:  Parkinson disease    Hypertension    Gout    Dyslipidemia    Prostatitis    Cataract    No significant past surgical history            Medications:  acetaminophen   Tablet .. 650 milliGRAM(s) Oral every 6 hours PRN  atorvastatin 40 milliGRAM(s) Oral at bedtime  carbidopa/levodopa  25/100 2 Tablet(s) Oral <User Schedule>  chlorhexidine 4% Liquid 1 Application(s) Topical <User Schedule>  collagenase Ointment 1 Application(s) Topical two times a day  Dakins Solution - 1/2 Strength 1 Application(s) Topical two times a day  levETIRAcetam  IVPB 125 milliGRAM(s) IV Intermittent every 12 hours  meropenem  IVPB 1000 milliGRAM(s) IV Intermittent every 8 hours  midodrine 10 milliGRAM(s) Oral every 8 hours  oxycodone    5 mG/acetaminophen 325 mG 1 Tablet(s) Oral every 6 hours PRN  polymyxin B IVPB [id number] Unit(s) IV Intermittent every 12 hours  senna 2 Tablet(s) Oral at bedtime      Vital Signs Last 24 Hrs  T(C): 36.4 (07 Apr 2021 04:58), Max: 37.1 (06 Apr 2021 14:03)  T(F): 97.6 (07 Apr 2021 04:58), Max: 98.8 (06 Apr 2021 14:03)  HR: 69 (07 Apr 2021 04:58) (69 - 78)  BP: 120/55 (07 Apr 2021 04:58) (120/55 - 126/68)  BP(mean): 79 (07 Apr 2021 04:58) (79 - 79)  RR: 17 (07 Apr 2021 04:58) (17 - 18)  SpO2: --    Neurological Exam:   Mental status: Awake, lethargic appearing. Nonverbal, does not follow commands. Tracking to visual and auditory stimuli, improved from yesterday.  Cranial nerves: PERRLA, (+) tracking. No gross facial asymmentry.   Motor:  +Increased tone x 4 extremities, diffusely weak. + diffuse pitting edema x 4 extremities.   Sensation: Grimaces to painful stimuli. + tracking. responds to sounds.  Coordination: Unable to assess.  Reflexes: 2+ in bilateral UE/LE, downgoing toes bilaterally.  Gait: Unable to assess.    Labs:  CBC Full  -  ( 06 Apr 2021 16:50 )  WBC Count : 19.06 K/uL  RBC Count : 2.80 M/uL  Hemoglobin : 7.4 g/dL  Hematocrit : 23.4 %  Platelet Count - Automated : 68 K/uL  Mean Cell Volume : 83.6 fL  Mean Cell Hemoglobin : 26.4 pg  Mean Cell Hemoglobin Concentration : 31.6 g/dL  Auto Neutrophil # : 13.59 K/uL  Auto Lymphocyte # : 1.83 K/uL  Auto Monocyte # : 0.82 K/uL  Auto Eosinophil # : 1.16 K/uL  Auto Basophil # : 0.00 K/uL  Auto Neutrophil % : 70.4 %  Auto Lymphocyte % : 9.6 %  Auto Monocyte % : 4.3 %  Auto Eosinophil % : 6.1 %  Auto Basophil % : 0.0 %    04-06    134<L>  |  105  |  32<H>  ----------------------------<  75  5.5<H>   |  23  |  0.9    Ca    7.9<L>      06 Apr 2021 12:32        PT/INR - ( 06 Apr 2021 12:32 )   PT: 13.50 sec;   INR: 1.17 ratio         PTT - ( 06 Apr 2021 12:32 )  PTT:28.4 sec      Assessment:   70 yo Male w/ h/o IPD, dementia, CKD3, AVB, DLD, HTN, DM, fungal septicemia with persistent sacral decubitus with recurrent sepsis currently admitted for septic shock w/ persistent encephalopathy. EEG showed epileptiform activity and he was started on Keppra 125 mg BID for seizure prophylaxis. Subsequently thrombocytopenic to 28 with no known etiology, followed by Heme Onc. Repeat EEG on 4/6/2021 shows generalized activity with no epileptiform activity.     Plan  # persistent encephalopathy, EEG with improved epileptiform activity, on Keppra 125 mg BID, thrombocytopenia of unknown etiology  - although it is unlikely that the patient's Keppra caused the patient to be thrombocytopenic, there are case studies that suggest causal relationship. Patient has no current elileptiform activity on most recent EEG. No known clinical seizure activity ever. Ok to d/c Keppra  - f/u hematology  - Continue Sinemet via NG tube  - continue supportive care and avoid any sedating/hypnotic medications  - avoid antiemetics  - f/u neurology as needed Neurology Progress Note    Interval History:      Patient was started on Keppra 125  mg BID for seizure prophylaxis after discovery of epileptiform waves on EEG. Since then he became severely thrombocytopenic to 28,000. Primary team consulted neurology for role of Keppra in thrombocytopenia. Pt also evaluated by hematology with no known cause of thrombocytopenia. He had a repeat EEG that shows resolution of epileptiform activity.    Repeat EEG n 4.6.2021  Impression  Abnormal due to the presence of: generalized slowing as above    Clinical Correlation & Recommendations  Consistent with diffuse cerebral electrophysiological dysfunction secondary to nonspecific cause.      HPI:  71 year old Male with past medical history of Parkinson's, dementia, CKD Stage 3, 1st degree AV block, HLD, gout, HTN, DM, fungal septicemia presenting from [facility] for acute decompensation in mental status.     As per documentation patient at baseline is verbal but for past 3 days, he has been worsening to state of being non verbal. Patient was admitted to [facility] for fungemia and discharged on the 11th March. At  he had left hand cellulitis and was treated for it.    In ED patient hemodynamically stable, afebrile, ABEBE with rise in Cr to 1.9 and BUN to 75, remains non verbal. CT head negative for any new strokes and UA negative. Family has not visited the patient recently so unaware of his status. (22 Mar 2021 23:59)      PAST MEDICAL & SURGICAL HISTORY:  Parkinson disease    Hypertension    Gout    Dyslipidemia    Prostatitis    Cataract    No significant past surgical history            Medications:  acetaminophen   Tablet .. 650 milliGRAM(s) Oral every 6 hours PRN  atorvastatin 40 milliGRAM(s) Oral at bedtime  carbidopa/levodopa  25/100 2 Tablet(s) Oral <User Schedule>  chlorhexidine 4% Liquid 1 Application(s) Topical <User Schedule>  collagenase Ointment 1 Application(s) Topical two times a day  Dakins Solution - 1/2 Strength 1 Application(s) Topical two times a day  levETIRAcetam  IVPB 125 milliGRAM(s) IV Intermittent every 12 hours  meropenem  IVPB 1000 milliGRAM(s) IV Intermittent every 8 hours  midodrine 10 milliGRAM(s) Oral every 8 hours  oxycodone    5 mG/acetaminophen 325 mG 1 Tablet(s) Oral every 6 hours PRN  polymyxin B IVPB [id number] Unit(s) IV Intermittent every 12 hours  senna 2 Tablet(s) Oral at bedtime      Vital Signs Last 24 Hrs  T(C): 36.4 (07 Apr 2021 04:58), Max: 37.1 (06 Apr 2021 14:03)  T(F): 97.6 (07 Apr 2021 04:58), Max: 98.8 (06 Apr 2021 14:03)  HR: 69 (07 Apr 2021 04:58) (69 - 78)  BP: 120/55 (07 Apr 2021 04:58) (120/55 - 126/68)  BP(mean): 79 (07 Apr 2021 04:58) (79 - 79)  RR: 17 (07 Apr 2021 04:58) (17 - 18)  SpO2: --    Neurological Exam:   Mental status: Awake, lethargic appearing. Nonverbal, does not follow commands. Tracking to visual and auditory stimuli, improved from yesterday.  Cranial nerves: PERRLA, (+) tracking. No gross facial asymmentry.   Motor:  +Increased tone x 4 extremities, diffusely weak. + diffuse pitting edema x 4 extremities.   Sensation: Grimaces to painful stimuli. + tracking. responds to sounds.  Coordination: Unable to assess.  Reflexes: 2+ in bilateral UE/LE, downgoing toes bilaterally.  Gait: Unable to assess.    Labs:  CBC Full  -  ( 06 Apr 2021 16:50 )  WBC Count : 19.06 K/uL  RBC Count : 2.80 M/uL  Hemoglobin : 7.4 g/dL  Hematocrit : 23.4 %  Platelet Count - Automated : 68 K/uL  Mean Cell Volume : 83.6 fL  Mean Cell Hemoglobin : 26.4 pg  Mean Cell Hemoglobin Concentration : 31.6 g/dL  Auto Neutrophil # : 13.59 K/uL  Auto Lymphocyte # : 1.83 K/uL  Auto Monocyte # : 0.82 K/uL  Auto Eosinophil # : 1.16 K/uL  Auto Basophil # : 0.00 K/uL  Auto Neutrophil % : 70.4 %  Auto Lymphocyte % : 9.6 %  Auto Monocyte % : 4.3 %  Auto Eosinophil % : 6.1 %  Auto Basophil % : 0.0 %    04-06    134<L>  |  105  |  32<H>  ----------------------------<  75  5.5<H>   |  23  |  0.9    Ca    7.9<L>      06 Apr 2021 12:32        PT/INR - ( 06 Apr 2021 12:32 )   PT: 13.50 sec;   INR: 1.17 ratio         PTT - ( 06 Apr 2021 12:32 )  PTT:28.4 sec      Assessment:   70 yo Male w/ h/o IPD, dementia, CKD3, AVB, DLD, HTN, DM, fungal septicemia with persistent sacral decubitus with recurrent sepsis currently admitted for septic shock w/ persistent encephalopathy. EEG showed epileptiform activity and he was started on Keppra 125 mg BID for seizure prophylaxis. Subsequently thrombocytopenic to 28 with no known etiology, being followed by hematology. Repeat EEG on 4/6/2021 shows generalized slowed activity with no epileptiform activity.     Plan  # persistent encephalopathy, EEG with improved epileptiform activity, on Keppra 125 mg BID, thrombocytopenia of unknown etiology  - although it is unlikely that the patient's Keppra caused the patient to be thrombocytopenic, there are case studies that suggest possible causal relationship. Patient has no current elileptiform activity on most recent EEG. No known clinical seizure activity ever. Ok to d/c Keppra  - f/u hematology  - Continue Sinemet via NG tube  - continue supportive care and avoid any sedating/hypnotic medications  - avoid antiemetics  - f/u neurology as needed Neurology Progress Note    Interval History:      Patient was started on Keppra 125  mg BID for seizure prophylaxis after discovery of epileptiform waves on EEG. Since then he became severely thrombocytopenic to 28,000. Primary team consulted neurology for role of Keppra in thrombocytopenia. Pt also evaluated by hematology with no known cause of thrombocytopenia. He had a repeat EEG that shows resolution of epileptiform activity.    Repeat EEG n 4.6.2021  Impression  Abnormal due to the presence of: generalized slowing as above    Clinical Correlation & Recommendations  Consistent with diffuse cerebral electrophysiological dysfunction secondary to nonspecific cause.      HPI:  71 year old Male with past medical history of Parkinson's, dementia, CKD Stage 3, 1st degree AV block, HLD, gout, HTN, DM, fungal septicemia presenting from [facility] for acute decompensation in mental status.     As per documentation patient at baseline is verbal but for past 3 days, he has been worsening to state of being non verbal. Patient was admitted to [facility] for fungemia and discharged on the 11th March. At  he had left hand cellulitis and was treated for it.    In ED patient hemodynamically stable, afebrile, ABEBE with rise in Cr to 1.9 and BUN to 75, remains non verbal. CT head negative for any new strokes and UA negative. Family has not visited the patient recently so unaware of his status. (22 Mar 2021 23:59)      PAST MEDICAL & SURGICAL HISTORY:  Parkinson disease    Hypertension    Gout    Dyslipidemia    Prostatitis    Cataract    No significant past surgical history            Medications:  acetaminophen   Tablet .. 650 milliGRAM(s) Oral every 6 hours PRN  atorvastatin 40 milliGRAM(s) Oral at bedtime  carbidopa/levodopa  25/100 2 Tablet(s) Oral <User Schedule>  chlorhexidine 4% Liquid 1 Application(s) Topical <User Schedule>  collagenase Ointment 1 Application(s) Topical two times a day  Dakins Solution - 1/2 Strength 1 Application(s) Topical two times a day  levETIRAcetam  IVPB 125 milliGRAM(s) IV Intermittent every 12 hours  meropenem  IVPB 1000 milliGRAM(s) IV Intermittent every 8 hours  midodrine 10 milliGRAM(s) Oral every 8 hours  oxycodone    5 mG/acetaminophen 325 mG 1 Tablet(s) Oral every 6 hours PRN  polymyxin B IVPB [id number] Unit(s) IV Intermittent every 12 hours  senna 2 Tablet(s) Oral at bedtime      Vital Signs Last 24 Hrs  T(C): 36.4 (07 Apr 2021 04:58), Max: 37.1 (06 Apr 2021 14:03)  T(F): 97.6 (07 Apr 2021 04:58), Max: 98.8 (06 Apr 2021 14:03)  HR: 69 (07 Apr 2021 04:58) (69 - 78)  BP: 120/55 (07 Apr 2021 04:58) (120/55 - 126/68)  BP(mean): 79 (07 Apr 2021 04:58) (79 - 79)  RR: 17 (07 Apr 2021 04:58) (17 - 18)  SpO2: --    Neurological Exam:   Mental status: Awake, lethargic appearing. Nonverbal, does not follow commands. Tracking to visual and auditory stimuli, improved from yesterday.  Cranial nerves: PERRLA, (+) tracking. No gross facial asymmentry.   Motor:  +Increased tone x 4 extremities, diffusely weak. + diffuse pitting edema x 4 extremities.   Sensation: Grimaces to painful stimuli. + tracking. responds to sounds.  Coordination: Unable to assess.  Reflexes: 2+ in bilateral UE/LE, downgoing toes bilaterally.  Gait: Unable to assess.    Labs:  CBC Full  -  ( 06 Apr 2021 16:50 )  WBC Count : 19.06 K/uL  RBC Count : 2.80 M/uL  Hemoglobin : 7.4 g/dL  Hematocrit : 23.4 %  Platelet Count - Automated : 68 K/uL  Mean Cell Volume : 83.6 fL  Mean Cell Hemoglobin : 26.4 pg  Mean Cell Hemoglobin Concentration : 31.6 g/dL  Auto Neutrophil # : 13.59 K/uL  Auto Lymphocyte # : 1.83 K/uL  Auto Monocyte # : 0.82 K/uL  Auto Eosinophil # : 1.16 K/uL  Auto Basophil # : 0.00 K/uL  Auto Neutrophil % : 70.4 %  Auto Lymphocyte % : 9.6 %  Auto Monocyte % : 4.3 %  Auto Eosinophil % : 6.1 %  Auto Basophil % : 0.0 %    04-06    134<L>  |  105  |  32<H>  ----------------------------<  75  5.5<H>   |  23  |  0.9    Ca    7.9<L>      06 Apr 2021 12:32        PT/INR - ( 06 Apr 2021 12:32 )   PT: 13.50 sec;   INR: 1.17 ratio         PTT - ( 06 Apr 2021 12:32 )  PTT:28.4 sec      Assessment:   70 yo Male w/ h/o IPD, dementia, CKD3, AVB, DLD, HTN, DM, fungal septicemia with persistent sacral decubitus with recurrent sepsis currently admitted for septic shock w/ persistent encephalopathy. EEG showed epileptiform activity and he was started on Keppra 125 mg BID for seizure prophylaxis. Subsequently thrombocytopenic to 28 with no known etiology, being followed by hematology. Repeat EEG on 4/6/2021 shows generalized slowed activity with no epileptiform activity.     Plan  # persistent encephalopathy, EEG with resolution of epileptiform activity, on Keppra 125 mg BID, thrombocytopenia of unknown etiology  - although it is unlikely that the patient's Keppra caused the patient to be thrombocytopenic, there are case studies that suggest possible causal relationship. Patient has no current elileptiform activity on most recent EEG. No known clinical seizure activity ever. Ok to d/c Keppra  - f/u hematology  - Continue Sinemet via NG tube  - continue supportive care and avoid any sedating/hypnotic medications  - avoid antiemetics  - f/u neurology as needed

## 2021-04-07 NOTE — PROGRESS NOTE ADULT - ASSESSMENT
71 year old Male with past medical history of Parkinson's, dementia, CKD Stage 3, 1st degree AV block, HLD, gout, HTN, DM, fungal septicemia presenting from [facility] for acute decompensation in mental status. As per documentation patient at baseline is verbal but for past 3 days, he has been worsening to state of being non verbal. Patient was admitted to [facility] for fungemia and discharged on the 11th March. At  he had left hand cellulitis and was treated for it.      # Metabolic encephalopathy concurrent w/ baseline Parkinson's dementia  # Sepsis  - Contact isolation  # Necrotic sacral ulcer stage4  # H/o recent fungemia  - CT Head No acute intracranial pathology.  - EEG : repeat showed no epileptiform activity, presences of slowed brain activity consistent with metabolic encephalopathy and dementia   -  Chest Xray No consolidation, effusion or pneumothorax.  -  Blood & Urine cxs NGTD   -  WCX GNR, 3/29   Numerous Klebsiella pneumoniae ESBL, Few Pseudomonas aeruginosa, Numerous Enterococcus faecalis (vancomycin resistant)  - evaluated by Burn: s/p debridement of sacrumon 3/30 .  no more new recommendation for surgery at this time .continue local wound care with santyl/dakins WTD.  - ID F/u: increase dose of meropenem to 1g q8hr run for 4 hrs, ADD polymyxin [id number] units q12hr  - Neuro consult for thrombocytopenia potentially associated to Keppra vs bone marrow suppression-> will d/c keppra per neuro since no seizure activity in latest EEG  -evaluated by neurology:  c/w keppra, seizure precautions  - S/p spinal tap --> CSF clear with no growths and neg PCR      # Hypotension  - c/w midodrine    # Thrombocytopenia sec to sepsis  - monitor platelets    #  Acute kidney injury , prerenal-resolving  # Hyponatremia  - monitor  BMP    # H/o parkinsons disease  - c/w sinemet    # left Shoulder dislocation  - Xray Shoulder 2 Views, Left (03.23.21 @ 21:48) >Reidentified anterior dislocation of the humeral head overlying the left second rib with bony fragmentation along the glenoid and humeral head. AC joint degenerative change.  - Pt has chronic dislocation since june previous chart review.    - no ortho intervention    # H/o chronic DVT  - VA Duplex Lower Ext Vein Scan, Bilat (03.27.21 @ 18:48) >No evidence of deep venous thrombosis or superficial thrombophlebitis in the bilateral lower extremities.  - continue holding eliquis    # Dyslipidemia  - c/w statin    # Nutrition: Tube feeds    # DVT prophylaxis  -scd    #Pending: clinical improvement. C/W current management. Wound culture, monitor cbc .  # Disposition: SNF when stable  Full code    Plan:  Pt continues to be nonverbal, the platelets have improved and increased from 20 to 64. Upper extremity duplex was neg for DVT/SVT, however a fluid collection was seen on left anterior chest. Will continue to monitor for improvement and f/u with ID and hem/onc.

## 2021-04-07 NOTE — PROGRESS NOTE ADULT - ASSESSMENT
71 year old Male with past medical history of Parkinson's, dementia, CKD Stage 3, 1st degree AV block, HLD, gout, HTN, DM, fungal septicemia presenting from [facility] for acute decompensation in mental status. As per documentation patient at baseline is verbal but for past 3 days, he has been worsening to state of being non verbal. Patient was admitted to [facility] for fungemia and discharged on the 11th March. At  he had left hand cellulitis and was treated for it.    # Metabolic encephalopathy on top of baseline dementia  # Sepsis  # Necrotic sacral ulcer stage4  # H/o recent fungemia  - CT Head No acute intracranial pathology.activity  -  Chest Xray No consolidation, effusion or pneumothorax.  -  Blood & Urine cxs NGTD   -  WCX GNR, 3/29   Numerous Klebsiella pneumoniae ESBL, Few Pseudomonas aeruginosa, Numerous Enterococcus faecalis (vancomycin resistant)  - evaluated by Burn: s/p debridement of sacrum on 3/30 .  no more new recommendation for surgery at this time .continue local wound care with santyl/dakins WTD.  - ID F/u: increase dose of meropenem to 1g q8hr run for 4 hrs, ADD polymyxin [id number] units q12hr  -evaluated by neurology:  c/w keppra, seizure precautions  - S/p spinal tap --> CSF clear with no growths and neg PCR  -repeat EEG w/ no epileptiform activity but diffuse cerebral dysfxn  -d/w neuro, may d/c Keppra if Plts still dropping    #Lt chest wall hematoma  -< from: [facility] Duplex Upper Ext Vein Scan, Bilat (04.07.21 @ 08:31) >  Heterogeneous fluid collection is noted in the left anterior chest measuring 4.6 x 4.8 7.1 cm in diameter. This is consistent with hematoma versus abscess. Clinical correlation is recommended  < end of copied text >  -check CT chest  -surgery eval    # Hypotension  - c/w midodrine    # Thrombocytopenia ?sec to sepsis  - monitor platelets, check UE U/s for DVTs  -transfuse if <20K  -off Pepcid  -heme f/u appreciated  -check studies as per heme    #  Acute kidney injury , prerenal-resolving  # Hyponatremia  - monitor  BMP    # H/o parkinsons disease  - c/w sinemet    # left Shoulder dislocation  - Xray Shoulder 2 Views, Left (03.23.21 @ 21:48) >Reidentified anterior dislocation of the humeral head overlying the left second rib with bony fragmentation along the glenoid and humeral head. AC joint degenerative change.  - Pt has chronic dislocation since june previous chart review.    - no ortho intervention    # H/o chronic DVT  - [facility] Duplex Lower Ext Vein Scan, Bilat (03.27.21 @ 18:48) >No evidence of deep venous thrombosis or superficial thrombophlebitis in the bilateral lower extremities.  - continue holding eliquis due to thrombocytopenia    # Dyslipidemia  - c/w statin    # Nutrition: Tube feeds    # DVT prophylaxis  -scd    Very high risk pt. D/w daughter GOC: she wants full code for now. Very poor Px (daughter aware).    #Pending: clinical improvement vs demise, resolution of sepsis, thrombocytopenia, improved mental status, CT chest, Sx eval  # Discussed w/ daughter in details who wants to cont aggressive Tx   # Disposition: SNF when/if stable    d/w Housestaff, nursing, case mgmt

## 2021-04-07 NOTE — PROGRESS NOTE ADULT - SUBJECTIVE AND OBJECTIVE BOX
Hospital Day:  16d    Subjective: Patient is a 71y old  Male who presents with a chief complaint of Change in Mental Status (07 Apr 2021 10:58)      Pt seen and evaluated at bedside.   Complaints: N/A  Over the night Events: None    Past Medical Hx:   Parkinson disease    Hypertension    Gout    Dyslipidemia    Prostatitis    Cataract      Past Sx:  No significant past surgical history      Allergies:  No Known Allergies    Current Meds:   Standng Meds:  atorvastatin 40 milliGRAM(s) Oral at bedtime  carbidopa/levodopa  25/100 2 Tablet(s) Oral <User Schedule>  chlorhexidine 4% Liquid 1 Application(s) Topical <User Schedule>  collagenase Ointment 1 Application(s) Topical two times a day  Dakins Solution - 1/2 Strength 1 Application(s) Topical two times a day  levETIRAcetam  IVPB 125 milliGRAM(s) IV Intermittent every 12 hours  meropenem  IVPB 1000 milliGRAM(s) IV Intermittent every 8 hours  midodrine 10 milliGRAM(s) Oral every 8 hours  polymyxin B IVPB [id number] Unit(s) IV Intermittent every 12 hours  senna 2 Tablet(s) Oral at bedtime    PRN Meds:  acetaminophen   Tablet .. 650 milliGRAM(s) Oral every 6 hours PRN Temp greater or equal to 38C (100.4F)  oxycodone    5 mG/acetaminophen 325 mG 1 Tablet(s) Oral every 6 hours PRN Moderate Pain (4 - 6)      Vital Signs:   T(F): 97.6 (04-07-21 @ 04:58), Max: 98.8 (04-06-21 @ 14:03)  HR: 69 (04-07-21 @ 04:58) (69 - 78)  BP: 120/55 (04-07-21 @ 04:58) (120/55 - 126/68)  RR: 17 (04-07-21 @ 04:58) (17 - 18)  SpO2: --    Physical Exam:   GENERAL: NAD, Resting in bed  HEENT: NCAT  CHEST/LUNG: Clear to auscultation bilaterally; No wheezing or rubs.   HEART: Regular rate and rhythm; No murmurs, rubs, or gallops  ABDOMEN: Bowel sounds present; Soft, Nontender, Nondistended.   EXTREMITIES:  No clubbing, cyanosis, pos for upper extremity edema bilaterally  NERVOUS SYSTEM:  Alert & Oriented X0, pt continues to remain non-verbal. He is able to track with eyes.     FLUID BALANCE    04-05-21 @ 07:01  -  04-06-21 @ 07:00  --------------------------------------------------------  IN: 720 mL / OUT: 850 mL / NET: -130 mL    04-06-21 @ 07:01  -  04-07-21 @ 07:00  --------------------------------------------------------  IN: 1500 mL / OUT: 2675 mL / NET: -1175 mL    04-07-21 @ 07:01  -  04-07-21 @ 12:17  --------------------------------------------------------  IN: 410 mL / OUT: 500 mL / NET: -90 mL        Labs:                         7.4    19.06 )-----------( 68       ( 06 Apr 2021 16:50 )             23.4     Neutophil% 70.4, Lymphocyte% 9.6, Monocyte% 4.3, Bands% -- 04-06-21 @ 16:50    06 Apr 2021 12:32    134    |  105    |  32     ----------------------------<  75     5.5     |  23     |  0.9      Ca    7.9        06 Apr 2021 12:32         pTT    28.4             ----< 1.17 INR  (04-06-21 @ 12:32)    13.50        PT            Iron --, TIBC --, %Sat -- Ferritin 599 04-06-21 @ 16:50            Culture - Blood (collected 04-02-21 @ 12:55)  Source: .Blood None  Preliminary Report (04-03-21 @ 23:01):    No growth to date.      D-Dimer Assay, Quantitative: 3441 ng/mL DDU (04-06-21 @ 12:32)    Procalcitonin, Serum: 0.16 ng/mL (04-06-21 @ 12:32)    Ferritin, Serum: 599 ng/mL (04-06-21 @ 16:50)          Radiology:   < from: [facility] Duplex Upper Ext Vein Scan, Bilat (04.07.21 @ 08:31) >  Impression:    No evidence of deep or superficial thrombosis in the bilateral upper extremities.    Heterogeneous fluid collection is noted in the left anterior chest measuring 4.6 x 4.8 7.1 cm in diameter. This is consistent with hematoma versus abscess. Clinical correlation is recommended.      < end of copied text >

## 2021-04-07 NOTE — PROGRESS NOTE ADULT - ASSESSMENT
ASSESSMENT  71 year old Male with past medical history of Parkinson's, dementia, CKD Stage 3, 1st degree AV block, HLD, gout, HTN, DM, fungal septicemia presenting from [facility] for acute decompensation in mental status.     IMPRESSION  #Fever 3/25 with sepsis, not present on admission with metabolic encephalopathy, EEG + seizure    LP not consistent with infection.. G/S NEGATIVE (on ABX)    Total Nucleated Cell Count, CSF: 0 /uL (04.01.21 @ 14:00)    Protein, CSF: 42 mg/dL (04.01.21 @ 14:00)    Glucose, CSF: 97 mg/dL (04.01.21 @ 14:00)    3/25 BCX 1/2 sets, 1/4 bottles Staphylococcus pettenkoferi, likely contaminant     cannot rule out meningitis     CXR no PNA   3/22 Blood & Urine cxs NGTD   #Sacral ulcer- necrotic     3/31 WCX GNR    3/29   Numerous Klebsiella pneumoniae ESBL    Few CRE Pseudomonas aeruginosa (high MICs to AGs)    Numerous Enterococcus faecalis (vancomycin resistant)- S amp    seen by Burn sacrum with full thickness ~4x5cm with dark /brown devitalized tissue at base; no purulent drainage, no bleeding  #Recent Fungemia    Blood Cx 2/27 Candida albicans    evaluated by optho - no ocular evidence     TTE no significant valvulopathy   #ABEBE  #L hand edema  < from: Xray Wrist 2 Views, Left (03.27.21 @ 11:13) >No acute fracture or dislocation. Diffuse soft tissue swelling. Nonaggressive appearing lucency in the distal radius, indeterminate. Nonemergent MRI may be obtained for further evaluation.    Creatinine, Serum: 0.9 mg/dL (04.05.21 @ 06:31)      RECOMMENDATIONS  - Polymyxin 535,000 units q12h IV 4/5- . Monitor Cr & lytes closely   - Meropenem 1g q8h IV and run over 4h extended infusion   - Plan for 7 days end 4/11  - Contact isolation  - poor prognosis    If any questions, please call or send a message on [employer] Teams  Spectra [phone]

## 2021-04-07 NOTE — PROGRESS NOTE ADULT - ATTENDING COMMENTS
Patient examined today.  He is awake, blinks to threat and tracks.  Extremities diffusely weak.  Suspect dementia with metabolic encephalopathy.  His low platelet function fluctuates and is of uncertain etiology.  While earlier EEG showed some irritability most recent one just shows slowing.  I think patient can come off the low dose of levetiracetam he is on.  Reconsult if seizures or suspected seizures.  Discussed with primary medical team.

## 2021-04-07 NOTE — PROGRESS NOTE ADULT - SUBJECTIVE AND OBJECTIVE BOX
NIEVES LABOY  71y, Male  Allergy: No Known Allergies      LOS  16d    CHIEF COMPLAINT: Change in Mental Status (06 Apr 2021 13:50)      INTERVAL EVENTS/HPI  - No acute events overnight  - T(F): , Max: 98.8 (04-06-21 @ 14:03) afebrile   - Tolerating medication  - WBC Count: 19.06 (04-06-21 @ 16:50) . No WBC from today  WBC Count: 22.28 (04-06-21 @ 12:32)  - Creatinine, Serum: 0.9 (04-06-21 @ 12:32)  Creatinine, Serum: 0.9 (04-05-21 @ 18:15)       ROS  unable to obtain history secondary to patient's mental status and/or sedation     VITALS:  T(F): 97.6, Max: 98.8 (04-06-21 @ 14:03)  HR: 69  BP: 120/55  RR: 17Vital Signs Last 24 Hrs  T(C): 36.4 (07 Apr 2021 04:58), Max: 37.1 (06 Apr 2021 14:03)  T(F): 97.6 (07 Apr 2021 04:58), Max: 98.8 (06 Apr 2021 14:03)  HR: 69 (07 Apr 2021 04:58) (69 - 78)  BP: 120/55 (07 Apr 2021 04:58) (120/55 - 126/68)  BP(mean): 79 (07 Apr 2021 04:58) (79 - 79)  RR: 17 (07 Apr 2021 04:58) (17 - 18)  SpO2: --    PHYSICAL EXAM:  Gen: NAD, chronically ill appearing, contracted  HEENT: Normocephalic, atraumatic  Neck: supple, no lymphadenopathy  CV: Regular rate & regular rhythm  Lungs: decreased BS at bases, no fremitus  Abdomen: Soft, BS present  Ext: Warm, well perfused  Neuro: non focal, awake  Skin: unstageable sacral ulcer, some necrosis, bleeding  Lines: no phlebitis    FH: Non-contributory  Social Hx: Non-contributory    TESTS & MEASUREMENTS:                        7.4    19.06 )-----------( 68       ( 06 Apr 2021 16:50 )             23.4     04-06    134<L>  |  105  |  32<H>  ----------------------------<  75  5.5<H>   |  23  |  0.9    Ca    7.9<L>      06 Apr 2021 12:32      eGFR if Non African American: 85 mL/min/1.73M2 (04-06-21 @ 12:32)  eGFR if : 99 mL/min/1.73M2 (04-06-21 @ 12:32)          Culture - Blood (collected 04-02-21 @ 12:55)  Source: .Blood None  Preliminary Report (04-03-21 @ 23:01):    No growth to date.    Culture - CSF with Gram Stain (collected 04-01-21 @ 14:00)  Source: .CSF CSF  Gram Stain (04-01-21 @ 21:44):    No polymorphonuclear cells seen    No organisms seen    by cytocentrifuge  Final Report (04-04-21 @ 15:36):    No growth    Culture - Acid Fast - Tissue w/Smear (collected 03-31-21 @ 16:18)  Source: .Tissue None  Preliminary Report (04-03-21 @ 15:04):    Culture is being performed.    Culture - Tissue with Gram Stain (collected 03-31-21 @ 16:18)  Source: .Tissue None  Gram Stain (04-01-21 @ 07:05):    Few polymorphonuclear leukocytes seen per low power field    Numerous Gram Negative Rods seen per oil power field  Final Report (04-06-21 @ 11:01):    Numerous Klebsiella pneumoniae ESBL    Few Enterococcus faecalis (vancomycin resistant)    Few Pseudomonas aeruginosa (Carbapenem Resistant)  Organism: Klebsiella pneumoniae ESBL  Enterococcus faecalis (vancomycin resistant)  Pseudomonas aeruginosa (Carbapenem Resistant) (04-06-21 @ 11:01)  Organism: Pseudomonas aeruginosa (Carbapenem Resistant) (04-06-21 @ 11:01)      -  Amikacin: S <=16      -  Aztreonam: S 8      -  Cefepime: I 16      -  Ceftazidime: I 16      -  Ciprofloxacin: R >2      -  Gentamicin: I 8      -  Imipenem: R >8      -  Levofloxacin: R >4      -  Meropenem: R 8      -  Piperacillin/Tazobactam: I 64      -  Tobramycin: S <=2      Method Type: JAZMIN  Organism: Enterococcus faecalis (vancomycin resistant) (04-06-21 @ 11:01)      -  Ampicillin: S 8 Predicts results to ampicillin/sulbactam, amoxacillin-clavulanate and  piperacillin-tazobactam.      -  Daptomycin: S 1      -  Levofloxacin: R >4      -  Linezolid: S 1      -  Tetra/Doxy: R >8      -  Vancomycin: R >16      Method Type: JAZMIN  Organism: Klebsiella pneumoniae ESBL (04-06-21 @ 11:01)      -  Amikacin: S <=16      -  Amoxicillin/Clavulanic Acid: S <=8/4      -  Ampicillin: R >16 These ampicillin results predict results for amoxicillin      -  Ampicillin/Sulbactam: R >16/8 Enterobacter, Citrobacter, and Serratia may develop resistance during prolonged therapy (3-4 days)      -  Aztreonam: R >16      -  Cefazolin: R >16 Enterobacter, Citrobacter, and Serratia may develop resistance during prolonged therapy (3-4 days)      -  Cefepime: R >16      -  Cefoxitin: S <=8      -  Ceftriaxone: R >32 Enterobacter, Citrobacter, and Serratia may develop resistance during prolonged therapy      -  Ciprofloxacin: R >2      -  Ertapenem: S <=0.5      -  Gentamicin: S <=2      -  Imipenem: S <=1      -  Levofloxacin: R 2      -  Meropenem: S <=1      -  Piperacillin/Tazobactam: R <=8      -  Tobramycin: S <=2      -  Trimethoprim/Sulfamethoxazole: R >2/38      Method Type: JAZMIN    Culture - Other (collected 03-29-21 @ 17:15)  Source: .Other sacrum  Final Report (03-31-21 @ 16:53):    Numerous Klebsiella pneumoniae ESBL    Few Pseudomonas aeruginosa    Numerous Enterococcus faecalis (vancomycin resistant)  Organism: Klebsiella pneumoniae ESBL  Pseudomonas aeruginosa  Enterococcus faecalis (vancomycin resistant) (03-31-21 @ 16:53)  Organism: Enterococcus faecalis (vancomycin resistant) (03-31-21 @ 16:53)      -  Ampicillin: S <=2 Predicts results to ampicillin/sulbactam, amoxacillin-clavulanate and  piperacillin-tazobactam.      -  Daptomycin: S 1      -  Levofloxacin: R >4      -  Linezolid: S 2      -  Tetra/Doxy: R >8      -  Vancomycin: R >16      Method Type: JAZMIN  Organism: Pseudomonas aeruginosa (03-31-21 @ 16:53)      -  Amikacin: S <=16      -  Aztreonam: I 16      -  Cefepime: S 8      -  Ceftazidime: S 4      -  Ciprofloxacin: R >2      -  Gentamicin: I 8      -  Imipenem: I 4      -  Levofloxacin: R >4      -  Meropenem: S <=1      -  Piperacillin/Tazobactam: S 16      -  Tobramycin: S <=2      Method Type: JAZMIN  Organism: Klebsiella pneumoniae ESBL (03-31-21 @ 16:53)      -  Amikacin: S <=16      -  Amoxicillin/Clavulanic Acid: S <=8/4      -  Ampicillin: R >16 These ampicillin results predict results for amoxicillin      -  Ampicillin/Sulbactam: R >16/8 Enterobacter, Citrobacter, and Serratia may develop resistance during prolonged therapy (3-4 days)      -  Aztreonam: R >16      -  Cefazolin: R >16 Enterobacter, Citrobacter, and Serratia may develop resistance during prolonged therapy (3-4 days)      -  Cefepime: R >16      -  Cefoxitin: S <=8      -  Ceftriaxone: R >32 Enterobacter, Citrobacter, and Serratia may develop resistance during prolonged therapy      -  Ciprofloxacin: R >2      -  Ertapenem: S <=0.5      -  Gentamicin: S <=2      -  Imipenem: S <=1      -  Levofloxacin: R 2      -  Meropenem: S <=1      -  Piperacillin/Tazobactam: R <=8      -  Tobramycin: S <=2      -  Trimethoprim/Sulfamethoxazole: R >2/38      Method Type: JAZMIN    Culture - Blood (collected 03-28-21 @ 01:24)  Source: .Blood None  Final Report (04-02-21 @ 13:00):    No Growth Final    Culture - Blood (collected 03-25-21 @ 11:09)  Source: .Blood None  Gram Stain (03-28-21 @ 18:04):    Growth in anaerobic bottle: Gram Positive Cocci in Clusters  Final Report (03-29-21 @ 17:13):    Growth in anaerobic bottle: Staphylococcus pettenkoferi    Coag Negative Staphylococcus    Single set isolate, possible contaminant. Contact    Microbiology if susceptibility testing clinically    indicated.    ***Blood Panel PCR results on this specimen areavailable    approximately 3 hours after the Gram stain result.***    Gram stain, PCR, and/or culture results may not always    correspond due to difference in methodologies.    ************************************************************    This PCR assay wasperformed by multiplex PCR. This    Assay tests for 66 bacterial and resistance gene targets.    Please refer to the [email or web address] test directory    at [email or web address] for details.  Organism: Blood Culture PCR (03-29-21 @ 17:13)  Organism: Blood Culture PCR (03-29-21 @ 17:13)      -  Coagulase negative Staphylococcus: Detec      Method Type: PCR    Culture - Blood (collected 03-25-21 @ 05:47)  Source: .Blood None  Final Report (03-30-21 @ 14:01):    No Growth Final    Culture - Urine (collected 03-22-21 @ 20:41)  Source: .Urine Clean Catch (Midstream)  Final Report (03-23-21 @ 20:53):    No growth    Culture - Blood (collected 03-22-21 @ 15:31)  Source: .Blood Blood-Peripheral  Final Report (03-28-21 @ 02:33):    No Growth Final    Culture - Blood (collected 03-22-21 @ 15:28)  Source: .Blood Blood-Peripheral  Final Report (03-28-21 @ 02:33):    No Growth Final            INFECTIOUS DISEASES TESTING  Vancomycin Level, Random: 11.5 (03-31-21 @ 07:00)  Vancomycin Level, Trough: 11.5 (03-31-21 @ 07:00)  COVID-19 PCR: NotDetec (03-30-21 @ 15:19)  COVID-19 PCR: NotDetec (03-29-21 @ 19:40)  Vancomycin Level, Trough: <4.0 (03-28-21 @ 13:00)  Procalcitonin, Serum: 0.61 (03-26-21 @ 10:20)  COVID-19 PCR: NotDetec (03-22-21 @ 20:13)  COVID-19 PCR: NotDetec (03-10-21 @ 12:22)  COVID-19 PCR: NotDetec (03-01-21 @ 16:49)  Fungitell: 62 (03-01-21 @ 11:00)  Procalcitonin, Serum: 0.29 (03-01-21 @ 05:32)  MRSA PCR Result.: Negative (02-27-21 @ 15:44)  COVID-19 PCR: NotDetec (02-25-21 @ 15:34)  Procalcitonin, Serum: 0.27 (02-18-21 @ 10:54)  MRSA PCR Result.: Negative (02-14-21 @ 18:29)  HIV-1/2 Combo Result: Nonreact (02-14-21 @ 05:07)  Procalcitonin, Serum: 0.46 (02-13-21 @ 16:00)  COVID-19 PCR: NotDetec (02-12-21 @ 10:17)      INFLAMMATORY MARKERS      RADIOLOGY & ADDITIONAL TESTS:  I have personally reviewed the last available Chest xray  CXR      CT      CARDIOLOGY TESTING      MEDICATIONS  atorvastatin 40 Oral at bedtime  carbidopa/levodopa  25/100 2 Oral <User Schedule>  chlorhexidine 4% Liquid 1 Topical <User Schedule>  collagenase Ointment 1 Topical two times a day  Dakins Solution - 1/2 Strength 1 Topical two times a day  levETIRAcetam  IVPB 125 IV Intermittent every 12 hours  meropenem  IVPB 1000 IV Intermittent every 8 hours  midodrine 10 Oral every 8 hours  polymyxin B IVPB [id number] IV Intermittent every 12 hours  senna 2 Oral at bedtime      WEIGHT  Weight (kg): 79.4 (03-31-21 @ 15:48)  Creatinine, Serum: 0.9 mg/dL (04-06-21 @ 12:32)      ANTIBIOTICS:  meropenem  IVPB 1000 milliGRAM(s) IV Intermittent every 8 hours  polymyxin B IVPB [id number] Unit(s) IV Intermittent every 12 hours      All available historical records have been reviewed

## 2021-04-07 NOTE — PROGRESS NOTE ADULT - SUBJECTIVE AND OBJECTIVE BOX
Patient is a 71y old  Male who presents with a chief complaint of Change in Mental Status (05 Apr 2021 13:36)    INTERVAL HPI/OVERNIGHT EVENTS: Patient was examined and seen at bedside. Events noted.     InitialHPI:  71 year old Male with past medical history of Parkinson's, dementia, CKD Stage 3, 1st degree AV block, HLD, gout, HTN, DM, fungal septicemia presenting from [facility] for acute decompensation in mental status.     As per documentation patient at baseline is verbal but for past 3 days, he has been worsening to state of being non verbal. Patient was admitted to [facility] for fungemia and discharged on the 11th March. At  he had left hand cellulitis and was treated for it.    In ED patient hemodynamically stable, afebrile, ABEBE with rise in Cr to 1.9 and BUN to 75, remains non verbal. CT head negative for any new strokes and UA negative. Family has not visited the patient recently so unaware of his status. (22 Mar 2021 23:59)    PAST MEDICAL & SURGICAL HISTORY:  Parkinson disease    Hypertension    Gout    Dyslipidemia    Prostatitis    Cataract    No significant past surgical history        General: lethargic, awake, ?tracking, chronically ill appearing  HEENT:  no LAD  CV: S1 S2  Resp: decreased breath sounds at bases  GI: NT/ND/S +BS  MS: no clubbing/cyanosis/edema, + pulses b/l  Neuro: unable to access              MEDICATIONS  (STANDING):  atorvastatin 40 milliGRAM(s) Oral at bedtime  carbidopa/levodopa  25/100 2 Tablet(s) Oral <User Schedule>  chlorhexidine 4% Liquid 1 Application(s) Topical <User Schedule>  collagenase Ointment 1 Application(s) Topical two times a day  Dakins Solution - 1/2 Strength 1 Application(s) Topical two times a day  levETIRAcetam  IVPB 125 milliGRAM(s) IV Intermittent every 12 hours  meropenem  IVPB 1000 milliGRAM(s) IV Intermittent every 8 hours  midodrine 10 milliGRAM(s) Oral every 8 hours  polymyxin B IVPB [id number] Unit(s) IV Intermittent every 12 hours  senna 2 Tablet(s) Oral at bedtime    MEDICATIONS  (PRN):  acetaminophen   Tablet .. 650 milliGRAM(s) Oral every 6 hours PRN Temp greater or equal to 38C (100.4F)  oxycodone    5 mG/acetaminophen 325 mG 1 Tablet(s) Oral every 6 hours PRN Moderate Pain (4 - 6)    Home Medications:  atorvastatin 40 mg oral tablet: 1 tab(s) orally once a day (at bedtime) (23 Mar 2021 01:07)  carbidopa-levodopa 25 mg-100 mg oral tablet, extended release: 2 tab(s) orally 5 times a day at 6AM,10Am,2PM,6PM,10 PM (23 Mar 2021 01:07)  colchicine 0.6 mg oral tablet: 1 tab(s) orally once a day (23 Mar 2021 01:07)  Eliquis 5 mg oral tablet: 1 tab(s) orally 2 times a day (23 Mar 2021 01:07)  enalapril 5 mg oral tablet: 1 tab(s) orally once a day (23 Mar 2021 01:07)  omeprazole 40 mg oral delayed release capsule: 1 cap(s) orally once a day (23 Mar 2021 01:07)    Vital Signs Last 24 Hrs  T(C): 36.4 (07 Apr 2021 04:58), Max: 37.1 (06 Apr 2021 14:03)  T(F): 97.6 (07 Apr 2021 04:58), Max: 98.8 (06 Apr 2021 14:03)  HR: 73 (07 Apr 2021 13:25) (69 - 78)  BP: 80/47 (07 Apr 2021 13:25) (80/47 - 126/68)  BP(mean): 79 (07 Apr 2021 04:58) (79 - 79)  RR: 17 (07 Apr 2021 13:25) (17 - 18)  SpO2: --  CAPILLARY BLOOD GLUCOSE      POCT Blood Glucose.: 80 mg/dL (07 Apr 2021 11:45)  POCT Blood Glucose.: 99 mg/dL (07 Apr 2021 04:51)  POCT Blood Glucose.: 125 mg/dL (06 Apr 2021 22:24)    LABS:                        7.9    15.60 )-----------( 51       ( 07 Apr 2021 11:59 )             25.1     04-07    130<L>  |  97<L>  |  27<H>  ----------------------------<  81  5.3<H>   |  24  |  0.7    Ca    7.8<L>      07 Apr 2021 11:59    TPro  5.5<L>  /  Alb  2.1<L>  /  TBili  0.5  /  DBili  x   /  AST  42<H>  /  ALT  10  /  AlkPhos  146<H>  04-07    LIVER FUNCTIONS - ( 07 Apr 2021 11:59 )  Alb: 2.1 g/dL / Pro: 5.5 g/dL / ALK PHOS: 146 U/L / ALT: 10 U/L / AST: 42 U/L / GGT: x               PT/INR - ( 06 Apr 2021 12:32 )   PT: 13.50 sec;   INR: 1.17 ratio         PTT - ( 06 Apr 2021 12:32 )  PTT:28.4 sec            Consultant Notes Reviewed:  [x ] YES  [ ] NO  Care Discussed with Consultants/Other Providers/ Housestaff [ x] YES  [ ] NO  Radiology, labs, new studies personally reviewed.

## 2021-04-08 LAB
ALBUMIN SERPL ELPH-MCNC: 2 G/DL — LOW (ref 3.5–5.2)
ALP SERPL-CCNC: 154 U/L — HIGH (ref 30–115)
ALT FLD-CCNC: 13 U/L — SIGNIFICANT CHANGE UP (ref 0–41)
ANION GAP SERPL CALC-SCNC: 9 MMOL/L — SIGNIFICANT CHANGE UP (ref 7–14)
AST SERPL-CCNC: 47 U/L — HIGH (ref 0–41)
BASOPHILS # BLD AUTO: 0.02 K/UL — SIGNIFICANT CHANGE UP (ref 0–0.2)
BASOPHILS NFR BLD AUTO: 0.2 % — SIGNIFICANT CHANGE UP (ref 0–1)
BILIRUB SERPL-MCNC: 0.5 MG/DL — SIGNIFICANT CHANGE UP (ref 0.2–1.2)
BUN SERPL-MCNC: 27 MG/DL — HIGH (ref 10–20)
CALCIUM SERPL-MCNC: 7.7 MG/DL — LOW (ref 8.5–10.1)
CHLORIDE SERPL-SCNC: 96 MMOL/L — LOW (ref 98–110)
CO2 SERPL-SCNC: 25 MMOL/L — SIGNIFICANT CHANGE UP (ref 17–32)
CREAT SERPL-MCNC: 0.8 MG/DL — SIGNIFICANT CHANGE UP (ref 0.7–1.5)
EOSINOPHIL # BLD AUTO: 0.26 K/UL — SIGNIFICANT CHANGE UP (ref 0–0.7)
EOSINOPHIL NFR BLD AUTO: 2.1 % — SIGNIFICANT CHANGE UP (ref 0–8)
GLUCOSE BLDC GLUCOMTR-MCNC: 117 MG/DL — HIGH (ref 70–99)
GLUCOSE BLDC GLUCOMTR-MCNC: 117 MG/DL — HIGH (ref 70–99)
GLUCOSE BLDC GLUCOMTR-MCNC: 125 MG/DL — HIGH (ref 70–99)
GLUCOSE BLDC GLUCOMTR-MCNC: 149 MG/DL — HIGH (ref 70–99)
GLUCOSE SERPL-MCNC: 99 MG/DL — SIGNIFICANT CHANGE UP (ref 70–99)
HCT VFR BLD CALC: 24.8 % — LOW (ref 42–52)
HGB BLD-MCNC: 7.6 G/DL — LOW (ref 14–18)
IMM GRANULOCYTES NFR BLD AUTO: 7.6 % — HIGH (ref 0.1–0.3)
LYMPHOCYTES # BLD AUTO: 1.58 K/UL — SIGNIFICANT CHANGE UP (ref 1.2–3.4)
LYMPHOCYTES # BLD AUTO: 12.8 % — LOW (ref 20.5–51.1)
MCHC RBC-ENTMCNC: 25.8 PG — LOW (ref 27–31)
MCHC RBC-ENTMCNC: 30.6 G/DL — LOW (ref 32–37)
MCV RBC AUTO: 84.1 FL — SIGNIFICANT CHANGE UP (ref 80–94)
MONOCYTES # BLD AUTO: 1.05 K/UL — HIGH (ref 0.1–0.6)
MONOCYTES NFR BLD AUTO: 8.5 % — SIGNIFICANT CHANGE UP (ref 1.7–9.3)
NEUTROPHILS # BLD AUTO: 8.53 K/UL — HIGH (ref 1.4–6.5)
NEUTROPHILS NFR BLD AUTO: 68.8 % — SIGNIFICANT CHANGE UP (ref 42.2–75.2)
NRBC # BLD: 0 /100 WBCS — SIGNIFICANT CHANGE UP (ref 0–0)
PLATELET # BLD AUTO: 51 K/UL — LOW (ref 130–400)
POTASSIUM SERPL-MCNC: 5.3 MMOL/L — HIGH (ref 3.5–5)
POTASSIUM SERPL-SCNC: 5.3 MMOL/L — HIGH (ref 3.5–5)
PROT SERPL-MCNC: 5.6 G/DL — LOW (ref 6–8)
RBC # BLD: 2.95 M/UL — LOW (ref 4.7–6.1)
RBC # FLD: 17.3 % — HIGH (ref 11.5–14.5)
SODIUM SERPL-SCNC: 130 MMOL/L — LOW (ref 135–146)
WBC # BLD: 12.38 K/UL — HIGH (ref 4.8–10.8)
WBC # FLD AUTO: 12.38 K/UL — HIGH (ref 4.8–10.8)

## 2021-04-08 PROCEDURE — 99233 SBSQ HOSP IP/OBS HIGH 50: CPT

## 2021-04-08 RX ORDER — SODIUM ZIRCONIUM CYCLOSILICATE 10 G/10G
5 POWDER, FOR SUSPENSION ORAL EVERY 8 HOURS
Refills: 0 | Status: DISCONTINUED | OUTPATIENT
Start: 2021-04-08 | End: 2021-04-12

## 2021-04-08 RX ADMIN — CARBIDOPA AND LEVODOPA 2 TABLET(S): 25; 100 TABLET ORAL at 13:01

## 2021-04-08 RX ADMIN — MEROPENEM 100 MILLIGRAM(S): 1 INJECTION INTRAVENOUS at 06:50

## 2021-04-08 RX ADMIN — LEVETIRACETAM 405 MILLIGRAM(S): 250 TABLET, FILM COATED ORAL at 06:49

## 2021-04-08 RX ADMIN — MEROPENEM 100 MILLIGRAM(S): 1 INJECTION INTRAVENOUS at 13:00

## 2021-04-08 RX ADMIN — MIDODRINE HYDROCHLORIDE 10 MILLIGRAM(S): 2.5 TABLET ORAL at 06:48

## 2021-04-08 RX ADMIN — MIDODRINE HYDROCHLORIDE 10 MILLIGRAM(S): 2.5 TABLET ORAL at 21:34

## 2021-04-08 RX ADMIN — CARBIDOPA AND LEVODOPA 2 TABLET(S): 25; 100 TABLET ORAL at 06:48

## 2021-04-08 RX ADMIN — POLYMYXIN B SULFATE 500 UNIT(S): 500000 INJECTION, POWDER, LYOPHILIZED, FOR SOLUTION INTRAMUSCULAR; INTRATHECAL; INTRAVENOUS; OPHTHALMIC at 06:51

## 2021-04-08 RX ADMIN — CARBIDOPA AND LEVODOPA 2 TABLET(S): 25; 100 TABLET ORAL at 21:32

## 2021-04-08 RX ADMIN — SODIUM ZIRCONIUM CYCLOSILICATE 5 GRAM(S): 10 POWDER, FOR SUSPENSION ORAL at 14:20

## 2021-04-08 RX ADMIN — Medication 1 APPLICATION(S): at 06:51

## 2021-04-08 RX ADMIN — MEROPENEM 100 MILLIGRAM(S): 1 INJECTION INTRAVENOUS at 21:33

## 2021-04-08 RX ADMIN — CARBIDOPA AND LEVODOPA 2 TABLET(S): 25; 100 TABLET ORAL at 09:45

## 2021-04-08 RX ADMIN — LEVETIRACETAM 405 MILLIGRAM(S): 250 TABLET, FILM COATED ORAL at 17:45

## 2021-04-08 RX ADMIN — ATORVASTATIN CALCIUM 40 MILLIGRAM(S): 80 TABLET, FILM COATED ORAL at 21:32

## 2021-04-08 RX ADMIN — MIDODRINE HYDROCHLORIDE 10 MILLIGRAM(S): 2.5 TABLET ORAL at 13:01

## 2021-04-08 RX ADMIN — Medication 1 APPLICATION(S): at 06:50

## 2021-04-08 RX ADMIN — POLYMYXIN B SULFATE 500 UNIT(S): 500000 INJECTION, POWDER, LYOPHILIZED, FOR SOLUTION INTRAMUSCULAR; INTRATHECAL; INTRAVENOUS; OPHTHALMIC at 18:09

## 2021-04-08 RX ADMIN — Medication 1 APPLICATION(S): at 17:43

## 2021-04-08 RX ADMIN — Medication 1 APPLICATION(S): at 17:44

## 2021-04-08 RX ADMIN — CHLORHEXIDINE GLUCONATE 1 APPLICATION(S): 213 SOLUTION TOPICAL at 06:49

## 2021-04-08 RX ADMIN — SODIUM ZIRCONIUM CYCLOSILICATE 5 GRAM(S): 10 POWDER, FOR SUSPENSION ORAL at 21:34

## 2021-04-08 RX ADMIN — SENNA PLUS 2 TABLET(S): 8.6 TABLET ORAL at 21:33

## 2021-04-08 NOTE — PROGRESS NOTE ADULT - ASSESSMENT
ASSESSMENT  71 year old Male with past medical history of Parkinson's, dementia, CKD Stage 3, 1st degree AV block, HLD, gout, HTN, DM, fungal septicemia presenting from [facility] for acute decompensation in mental status.     IMPRESSION  #Fever 3/25 with sepsis, not present on admission with metabolic encephalopathy, EEG + seizure    LP not consistent with infection.. G/S NEGATIVE (on ABX)    Total Nucleated Cell Count, CSF: 0 /uL (04.01.21 @ 14:00)    Protein, CSF: 42 mg/dL (04.01.21 @ 14:00)    Glucose, CSF: 97 mg/dL (04.01.21 @ 14:00)    3/25 BCX 1/2 sets, 1/4 bottles Staphylococcus pettenkoferi, likely contaminant     cannot rule out meningitis     CXR no PNA   3/22 Blood & Urine cxs NGTD   #Sacral ulcer- necrotic     3/31 WCX GNR    3/29   Numerous Klebsiella pneumoniae ESBL    Few CRE Pseudomonas aeruginosa (high MICs to AGs)    Numerous Enterococcus faecalis (vancomycin resistant)- S amp    seen by Burn sacrum with full thickness ~4x5cm with dark /brown devitalized tissue at base; no purulent drainage, no bleeding  #Recent Fungemia    Blood Cx 2/27 Candida albicans    evaluated by optho - no ocular evidence     TTE no significant valvulopathy   #ABEBE  #L hand edema  < from: Xray Wrist 2 Views, Left (03.27.21 @ 11:13) >No acute fracture or dislocation. Diffuse soft tissue swelling. Nonaggressive appearing lucency in the distal radius, indeterminate. Nonemergent MRI may be obtained for further evaluation.    Creatinine, Serum: 0.9 mg/dL (04.05.21 @ 06:31)      RECOMMENDATIONS  - Repeat BCX as fever  - Polymyxin 535,000 units q12h IV 4/5- . Monitor Cr & lytes closely   - Meropenem 1g q8h IV and run over 4h extended infusion   - Plan for 7 days end 4/11  - Contact isolation  - poor prognosis    If any questions, please call or send a message on [employer] Teams  Spectra [phone]

## 2021-04-08 NOTE — PROGRESS NOTE ADULT - ASSESSMENT
71 year old Male with past medical history of Parkinson's, dementia, CKD Stage 3, 1st degree AV block, HLD, gout, HTN, DM, fungal septicemia presenting from [facility] for acute decompensation in mental status. As per documentation patient at baseline is verbal but for past 3 days, he has been worsening to state of being non verbal. Patient was admitted to [facility] for fungemia and discharged on the 11th March. At  he had left hand cellulitis and was treated for it.    # Metabolic encephalopathy on top of baseline dementia  # Sepsis  # Necrotic sacral ulcer stage 4  # H/o recent fungemia  - CT Head No acute intracranial pathology.activity  -  Chest Xray No consolidation, effusion or pneumothorax.  -  Blood & Urine cxs NGTD   -  WCX GNR, 3/29   Numerous Klebsiella pneumoniae ESBL, Few Pseudomonas aeruginosa, Numerous Enterococcus faecalis (vancomycin resistant)  - evaluated by Burn: s/p debridement of sacrum on 3/30 .  no more new recommendation for surgery at this time .continue local wound care with santyl/dakins WTD.  - ID F/u: increase dose of meropenem to 1g q8hr run for 4 hrs, ADD polymyxin 820196 units q12hr  - S/p spinal tap --> CSF clear with no growths and neg PCR  -repeat EEG w/ no epileptiform activity but diffuse cerebral dysfxn  -d/w neuro, may d/c Keppra if Plts still dropping    #Lt shoulder complicated collection  -asked ortho to tap    # Hypotension  - c/w midodrine    # Thrombocytopenia ?sec to sepsis  - monitor platelets  -transfuse if <20K  -off Pepcid  -heme f/u appreciated  -check studies as per heme    #  Acute kidney injury , prerenal-resolving  # Hyponatremia  - monitor  BMP    # H/o parkinsons disease  - c/w sinemet    # left Shoulder dislocation  - Xray Shoulder 2 Views, Left (03.23.21 @ 21:48) >Reidentified anterior dislocation of the humeral head overlying the left second rib with bony fragmentation along the glenoid and humeral head. AC joint degenerative change.  - Pt has chronic dislocation since june previous chart review.    - no ortho intervention    # H/o chronic DVT  - [facility] Duplex Lower Ext Vein Scan, Bilat (03.27.21 @ 18:48) >No evidence of deep venous thrombosis or superficial thrombophlebitis in the bilateral lower extremities.  - continue holding eliquis due to thrombocytopenia    # Dyslipidemia  - c/w statin    # Nutrition: Tube feeds    # DVT prophylaxis  -scd    Very high risk pt. D/w daughter GOC: she wants full code for now. Very poor Px (daughter aware).    #Pending: clinical improvement vs demise, resolution of sepsis, thrombocytopenia, improved mental status, CT chest, Sx eval  # Discussed w/ daughter in details who wants to cont aggressive Tx   # Disposition: SNF when/if stable    d/w Housestaff, nursing, case mgmt

## 2021-04-08 NOTE — PROGRESS NOTE ADULT - ASSESSMENT
71 year old Male with past medical history of Parkinson's, dementia, CKD Stage 3, 1st degree AV block, HLD, gout, HTN, DM, fungal septicemia presenting from [facility] for acute decompensation in mental status. As per documentation patient at baseline is verbal but for past 3 days, he has been worsening to state of being non verbal. Patient was admitted to [facility] for fungemia and discharged on the 11th March. At  he had left hand cellulitis and was treated for it. In this presentation, he was admitted for sudden AMS and sepsis secondary to stage 4 bed ulcers.       # Metabolic encephalopathy concurrent w/ baseline Parkinson's dementia  - EEG : repeat showed no epileptiform activity, presences of slowed brain activity consistent with metabolic encephalopathy and dementia     # Sepsis  - Contact isolation  - CT Head No acute intracranial pathology.  -  Chest Xray No consolidation, effusion or pneumothorax.  -  Blood & Urine cxs NGTD   -  WCX GNR, 3/29   Numerous Klebsiella pneumoniae ESBL, Few Pseudomonas aeruginosa, Numerous Enterococcus faecalis (vancomycin resistant)  - evaluated by Burn: s/p debridement of sacrumon 3/30 .  no more new recommendation for surgery at this time .continue local wound care with santyl/dakins WTD.  - ID F/u: increase dose of meropenem to 1g q8hr run for 4 hrs, ADD polymyxin 238635 units q12hr  - Neuro consult for thrombocytopenia potentially associated to Keppra vs bone marrow suppression-> will d/c keppra per neuro since no seizure activity in latest EEG  -evaluated by neurology:  c/w keppra, seizure precautions  - S/p spinal tap --> CSF clear with no growths and neg PCR  - repeat blood cultures pending    # Necrotic sacral ulcer stage4  # H/o recent fungemia      # Hypotension  - c/w midodrine    # Thrombocytopenia sec to sepsis  - monitor platelets    #  Acute kidney injury , prerenal-resolving  # Hyponatremia  - monitor  BMP    # H/o parkinsons disease  - c/w sinemet    # left Shoulder dislocation  - Xray Shoulder 2 Views, Left (03.23.21 @ 21:48) >Reidentified anterior dislocation of the humeral head overlying the left second rib with bony fragmentation along the glenoid and humeral head. AC joint degenerative change.  - Pt has chronic dislocation since june previous chart review.    - no ortho intervention    # H/o chronic DVT  - VA Duplex Lower Ext Vein Scan, Bilat (03.27.21 @ 18:48) >No evidence of deep venous thrombosis or superficial thrombophlebitis in the bilateral lower extremities.  - continue holding eliquis    # Dyslipidemia  - c/w statin    # Nutrition: Tube feeds    # DVT prophylaxis  -scd    #Pending: clinical improvement. C/W current management. Wound culture, monitor cbc .  # Disposition: SNF when stable  Full code    Plan:  Pt continues to be nonverbal, the platelets have improved and increased to 51. Upper extremity duplex was neg for DVT/SVT, however a fluid collection was seen on left anterior chest. CT chest was done and pending read. will order Blood culture per ID due to overnight fever. Pt Will continue to monitor for improvement and f/u with ID and hem/onc.

## 2021-04-08 NOTE — PROGRESS NOTE ADULT - SUBJECTIVE AND OBJECTIVE BOX
#Fever 3/25 with sepsis, not present on admission with metabolic encephalopathy, EEG + seizure    LP not consistent with infection.. G/S NEGATIVE (on ABX)    Total Nucleated Cell Count, CSF: 0 /uL (04.01.21 @ 14:00)    Protein, CSF: 42 mg/dL (04.01.21 @ 14:00)    Glucose, CSF: 97 mg/dL (04.01.21 @ 14:00)    3/25 BCX 1/2 sets, 1/4 bottles Staphylococcus pettenkoferi, likely contaminant     cannot rule out meningitis     CXR no PNA   3/22 Blood & Urine cxs NGTD   #Sacral ulcer- necrotic     3/31 WCX GNR    3/29   Numerous Klebsiella pneumoniae ESBL    Few CRE Pseudomonas aeruginosa (high MICs to AGs)    Numerous Enterococcus faecalis (vancomycin resistant)- S amp    seen by Burn sacrum with full thickness ~4x5cm with dark /brown devitalized tissue at base; no purulent drainage, no bleeding  #Recent Fungemia    Blood Cx 2/27 Candida albicans    evaluated by optho - no ocular evidence     TTE no significant valvulopathy   #ABEBE  #L hand edema  < from: Xray Wrist 2 Views, Left (03.27.21 @ 11:13) >No acute fracture or dislocation. Diffuse soft tissue swelling. Nonaggressive appearing lucency in the distal radius, indeterminate. Nonemergent MRI may be obtained for further evaluation.    Creatinine, Serum: 0.9 mg/dL (04.05.21 @ 06:31)        - Polymyxin 535,000 units q12h IV 4/5- . Monitor Cr & lytes closely   - Meropenem 1g q8h IV and run over 4h extended infusion   - Plan for 7 days end 4/11  - Contact isolation  - poor prognosis  based on the above, the medical team noted a collection in the shoulder joint on ct of the chest and asked if a tap of this collection was indicated as it might provide another source for infection , blood cultures are currently negative .  no contraindication to have IR aspirate this collection

## 2021-04-08 NOTE — PROGRESS NOTE ADULT - SUBJECTIVE AND OBJECTIVE BOX
Patient is a 71y old  Male who presents with a chief complaint of Change in Mental Status (05 Apr 2021 13:36)    INTERVAL HPI/OVERNIGHT EVENTS: Patient was examined and seen at bedside. Events noted. Remains lethargic.    InitialHPI:  71 year old Male with past medical history of Parkinson's, dementia, CKD Stage 3, 1st degree AV block, HLD, gout, HTN, DM, fungal septicemia presenting from [facility] for acute decompensation in mental status.     As per documentation patient at baseline is verbal but for past 3 days, he has been worsening to state of being non verbal. Patient was admitted to [facility] for fungemia and discharged on the 11th March. At  he had left hand cellulitis and was treated for it.    In ED patient hemodynamically stable, afebrile, ABEBE with rise in Cr to 1.9 and BUN to 75, remains non verbal. CT head negative for any new strokes and UA negative. Family has not visited the patient recently so unaware of his status. (22 Mar 2021 23:59)    PAST MEDICAL & SURGICAL HISTORY:  Parkinson disease    Hypertension    Gout    Dyslipidemia    Prostatitis    Cataract    No significant past surgical history        General: lethargic, awake, tracking, chronically ill appearing  HEENT:  no LAD  CV: S1 S2  Resp: decreased breath sounds at bases  GI: NT/ND/S +BS  MS: no clubbing/cyanosis/edema, + pulses b/l  Neuro: unable to access  Skin: multiple skin decubs            MEDICATIONS  (STANDING):  atorvastatin 40 milliGRAM(s) Oral at bedtime  carbidopa/levodopa  25/100 2 Tablet(s) Oral <User Schedule>  chlorhexidine 4% Liquid 1 Application(s) Topical <User Schedule>  collagenase Ointment 1 Application(s) Topical two times a day  Dakins Solution - 1/2 Strength 1 Application(s) Topical two times a day  levETIRAcetam  IVPB 125 milliGRAM(s) IV Intermittent every 12 hours  meropenem  IVPB 1000 milliGRAM(s) IV Intermittent every 8 hours  midodrine 10 milliGRAM(s) Oral every 8 hours  polymyxin B IVPB [id number] Unit(s) IV Intermittent every 12 hours  senna 2 Tablet(s) Oral at bedtime  sodium zirconium cyclosilicate 5 Gram(s) Oral every 8 hours    MEDICATIONS  (PRN):  acetaminophen   Tablet .. 650 milliGRAM(s) Oral every 6 hours PRN Temp greater or equal to 38C (100.4F)    Home Medications:  atorvastatin 40 mg oral tablet: 1 tab(s) orally once a day (at bedtime) (23 Mar 2021 01:07)  carbidopa-levodopa 25 mg-100 mg oral tablet, extended release: 2 tab(s) orally 5 times a day at 6AM,10Am,2PM,6PM,10 PM (23 Mar 2021 01:07)  colchicine 0.6 mg oral tablet: 1 tab(s) orally once a day (23 Mar 2021 01:07)  Eliquis 5 mg oral tablet: 1 tab(s) orally 2 times a day (23 Mar 2021 01:07)  enalapril 5 mg oral tablet: 1 tab(s) orally once a day (23 Mar 2021 01:07)  omeprazole 40 mg oral delayed release capsule: 1 cap(s) orally once a day (23 Mar 2021 01:07)    Vital Signs Last 24 Hrs  T(C): 37.1 (08 Apr 2021 13:11), Max: 38.1 (07 Apr 2021 21:38)  T(F): 98.7 (08 Apr 2021 13:11), Max: 100.5 (07 Apr 2021 21:38)  HR: 72 (08 Apr 2021 13:11) (69 - 74)  BP: 130/60 (08 Apr 2021 13:11) (106/51 - 130/60)  BP(mean): 86 (08 Apr 2021 13:11) (78 - 87)  RR: 18 (08 Apr 2021 13:11) (17 - 18)  SpO2: 98% (08 Apr 2021 13:11) (98% - 100%)  CAPILLARY BLOOD GLUCOSE      POCT Blood Glucose.: 117 mg/dL (08 Apr 2021 16:54)  POCT Blood Glucose.: 117 mg/dL (08 Apr 2021 11:51)  POCT Blood Glucose.: 149 mg/dL (08 Apr 2021 06:38)  POCT Blood Glucose.: 81 mg/dL (07 Apr 2021 21:17)  POCT Blood Glucose.: 141 mg/dL (07 Apr 2021 17:55)    LABS:                        7.6    12.38 )-----------( 51       ( 08 Apr 2021 07:20 )             24.8     04-08    130<L>  |  96<L>  |  27<H>  ----------------------------<  99  5.3<H>   |  25  |  0.8    Ca    7.7<L>      08 Apr 2021 07:20    TPro  5.6<L>  /  Alb  2.0<L>  /  TBili  0.5  /  DBili  x   /  AST  47<H>  /  ALT  13  /  AlkPhos  154<H>  04-08    LIVER FUNCTIONS - ( 08 Apr 2021 07:20 )  Alb: 2.0 g/dL / Pro: 5.6 g/dL / ALK PHOS: 154 U/L / ALT: 13 U/L / AST: 47 U/L / GGT: x                           Consultant Notes Reviewed:  [x ] YES  [ ] NO  Care Discussed with Consultants/Other Providers/ Housestaff [ x] YES  [ ] NO  Radiology, labs, new studies personally reviewed.

## 2021-04-08 NOTE — PROGRESS NOTE ADULT - SUBJECTIVE AND OBJECTIVE BOX
Hospital Day:  17d    Subjective: Patient is a 71y old  Male who presents with a chief complaint of Change in Mental Status (08 Apr 2021 12:34)      Pt seen and evaluated at bedside.   Complaints:N/A  Over the night Events: Fever of 100.5, was given Tylenol, responsive to antipyretic      Past Medical Hx:   Parkinson disease    Hypertension    Gout    Dyslipidemia    Prostatitis    Cataract      Past Sx:  No significant past surgical history      Allergies:  No Known Allergies    Current Meds:   Standng Meds:  atorvastatin 40 milliGRAM(s) Oral at bedtime  carbidopa/levodopa  25/100 2 Tablet(s) Oral <User Schedule>  chlorhexidine 4% Liquid 1 Application(s) Topical <User Schedule>  collagenase Ointment 1 Application(s) Topical two times a day  Dakins Solution - 1/2 Strength 1 Application(s) Topical two times a day  levETIRAcetam  IVPB 125 milliGRAM(s) IV Intermittent every 12 hours  meropenem  IVPB 1000 milliGRAM(s) IV Intermittent every 8 hours  midodrine 10 milliGRAM(s) Oral every 8 hours  polymyxin B IVPB [id number] Unit(s) IV Intermittent every 12 hours  senna 2 Tablet(s) Oral at bedtime  sodium zirconium cyclosilicate 5 Gram(s) Oral every 8 hours    PRN Meds:  acetaminophen   Tablet .. 650 milliGRAM(s) Oral every 6 hours PRN Temp greater or equal to 38C (100.4F)      Vital Signs:   T(F): 97 (04-08-21 @ 05:01), Max: 100.5 (04-07-21 @ 21:38)  HR: 69 (04-08-21 @ 05:01) (63 - 74)  BP: 125/60 (04-08-21 @ 05:01) (80/47 - 125/60)  RR: 18 (04-08-21 @ 05:01) (17 - 18)  SpO2: 99% (04-07-21 @ 23:30) (99% - 100%)    Physical Exam:   GENERAL: NAD, Resting in bed  HEENT: NCAT  CHEST/LUNG: Clear to auscultation bilaterally; No wheezing or rubs.   HEART: Regular rate and rhythm; No murmurs, rubs, or gallops  ABDOMEN: Bowel sounds present; Soft, Nontender, Nondistended.   EXTREMITIES:  No clubbing, cyanosis, or edema  NERVOUS SYSTEM:  Alert & Oriented X0, Pt continues to be non-verbal and only capable of tracking with eye movement    FLUID BALANCE    04-06-21 @ 07:01  -  04-07-21 @ 07:00  --------------------------------------------------------  IN: 1500 mL / OUT: 2675 mL / NET: -1175 mL    04-07-21 @ 07:01  -  04-08-21 @ 07:00  --------------------------------------------------------  IN: 2030 mL / OUT: 650 mL / NET: 1380 mL    04-08-21 @ 07:01  -  04-08-21 @ 12:52  --------------------------------------------------------  IN: 360 mL / OUT: 0 mL / NET: 360 mL        Labs:                         7.6    12.38 )-----------( 51       ( 08 Apr 2021 07:20 )             24.8     Neutophil% 68.8, Lymphocyte% 12.8, Monocyte% 8.5, Bands% 7.6 04-08-21 @ 07:20    08 Apr 2021 07:20    130    |  96     |  27     ----------------------------<  99     5.3     |  25     |  0.8      Ca    7.7        08 Apr 2021 07:20    TPro  5.6    /  Alb  2.0    /  TBili  0.5    /  DBili  x      /  AST  47     /  ALT  13     /  AlkPhos  154    08 Apr 2021 07:20                Iron --, TIBC --, %Sat -- Ferritin 599 04-06-21 @ 16:50            Culture - Blood (collected 04-02-21 @ 12:55)  Source: .Blood None  Final Report (04-07-21 @ 23:01):    No Growth Final      D-Dimer Assay, Quantitative: 3441 ng/mL DDU (04-06-21 @ 12:32)    Procalcitonin, Serum: 0.16 ng/mL (04-06-21 @ 12:32)    Ferritin, Serum: 599 ng/mL (04-06-21 @ 16:50)

## 2021-04-08 NOTE — PROGRESS NOTE ADULT - SUBJECTIVE AND OBJECTIVE BOX
NIEVES LABOY  71y, Male  Allergy: No Known Allergies      LOS  17d    CHIEF COMPLAINT: Change in Mental Status (07 Apr 2021 13:53)      INTERVAL EVENTS/HPI  - No acute events overnight  - T(F): , Max: 100.5 (04-07-21 @ 21:38)  - Tolerating medication  - WBC Count: 12.38 (04-08-21 @ 07:20) downtrending   WBC Count: 15.60 (04-07-21 @ 11:59)  - Creatinine, Serum: 0.8 (04-08-21 @ 07:20)  Creatinine, Serum: 0.7 (04-07-21 @ 11:59)       ROS  unable to obtain history secondary to patient's mental status and/or sedation     VITALS:  T(F): 97, Max: 100.5 (04-07-21 @ 21:38)  HR: 69  BP: 125/60  RR: 18Vital Signs Last 24 Hrs  T(C): 36.1 (08 Apr 2021 05:01), Max: 38.1 (07 Apr 2021 21:38)  T(F): 97 (08 Apr 2021 05:01), Max: 100.5 (07 Apr 2021 21:38)  HR: 69 (08 Apr 2021 05:01) (63 - 74)  BP: 125/60 (08 Apr 2021 05:01) (80/47 - 125/60)  BP(mean): 87 (08 Apr 2021 05:01) (78 - 87)  RR: 18 (08 Apr 2021 05:01) (17 - 18)  SpO2: 99% (07 Apr 2021 23:30) (99% - 100%)      PHYSICAL EXAM:  Gen: NAD, chronically ill appearing, contracted  HEENT: Normocephalic, atraumatic NGT  Neck: supple, no lymphadenopathy  CV: Regular rate & regular rhythm  Lungs: decreased BS at bases, no fremitus  Abdomen: Soft, BS present  Ext: Warm, well perfused  Neuro: non focal, awake  Skin: unstageable sacral ulcer, some necrosis, bleeding  Lines: no phlebitis      FH: Non-contributory  Social Hx: Non-contributory    TESTS & MEASUREMENTS:                        7.6    12.38 )-----------( 51       ( 08 Apr 2021 07:20 )             24.8     04-08    130<L>  |  96<L>  |  27<H>  ----------------------------<  99  5.3<H>   |  25  |  0.8    Ca    7.7<L>      08 Apr 2021 07:20    TPro  5.6<L>  /  Alb  2.0<L>  /  TBili  0.5  /  DBili  x   /  AST  47<H>  /  ALT  13  /  AlkPhos  154<H>  04-08    eGFR if Non African American: 89 mL/min/1.73M2 (04-08-21 @ 07:20)  eGFR if : 103 mL/min/1.73M2 (04-08-21 @ 07:20)    LIVER FUNCTIONS - ( 08 Apr 2021 07:20 )  Alb: 2.0 g/dL / Pro: 5.6 g/dL / ALK PHOS: 154 U/L / ALT: 13 U/L / AST: 47 U/L / GGT: x               Culture - Blood (collected 04-02-21 @ 12:55)  Source: .Blood None  Final Report (04-07-21 @ 23:01):    No Growth Final    Culture - CSF with Gram Stain (collected 04-01-21 @ 14:00)  Source: .CSF CSF  Gram Stain (04-01-21 @ 21:44):    No polymorphonuclear cells seen    No organisms seen    by cytocentrifuge  Final Report (04-04-21 @ 15:36):    No growth    Culture - Acid Fast - Tissue w/Smear (collected 03-31-21 @ 16:18)  Source: .Tissue None  Preliminary Report (04-03-21 @ 15:04):    Culture is being performed.    Culture - Tissue with Gram Stain (collected 03-31-21 @ 16:18)  Source: .Tissue None  Gram Stain (04-01-21 @ 07:05):    Few polymorphonuclear leukocytes seen per low power field    Numerous Gram Negative Rods seen per oil power field  Final Report (04-06-21 @ 11:01):    Numerous Klebsiella pneumoniae ESBL    Few Enterococcus faecalis (vancomycin resistant)    Few Pseudomonas aeruginosa (Carbapenem Resistant)  Organism: Klebsiella pneumoniae ESBL  Enterococcus faecalis (vancomycin resistant)  Pseudomonas aeruginosa (Carbapenem Resistant) (04-06-21 @ 11:01)  Organism: Pseudomonas aeruginosa (Carbapenem Resistant) (04-06-21 @ 11:01)      -  Amikacin: S <=16      -  Aztreonam: S 8      -  Cefepime: I 16      -  Ceftazidime: I 16      -  Ciprofloxacin: R >2      -  Gentamicin: I 8      -  Imipenem: R >8      -  Levofloxacin: R >4      -  Meropenem: R 8      -  Piperacillin/Tazobactam: I 64      -  Tobramycin: S <=2      Method Type: JAZMIN  Organism: Enterococcus faecalis (vancomycin resistant) (04-06-21 @ 11:01)      -  Ampicillin: S 8 Predicts results to ampicillin/sulbactam, amoxacillin-clavulanate and  piperacillin-tazobactam.      -  Daptomycin: S 1      -  Levofloxacin: R >4      -  Linezolid: S 1      -  Tetra/Doxy: R >8      -  Vancomycin: R >16      Method Type: JAZMIN  Organism: Klebsiella pneumoniae ESBL (04-06-21 @ 11:01)      -  Amikacin: S <=16      -  Amoxicillin/Clavulanic Acid: S <=8/4      -  Ampicillin: R >16 These ampicillin results predict results for amoxicillin      -  Ampicillin/Sulbactam: R >16/8 Enterobacter, Citrobacter, and Serratia may develop resistance during prolonged therapy (3-4 days)      -  Aztreonam: R >16      -  Cefazolin: R >16 Enterobacter, Citrobacter, and Serratia may develop resistance during prolonged therapy (3-4 days)      -  Cefepime: R >16      -  Cefoxitin: S <=8      -  Ceftriaxone: R >32 Enterobacter, Citrobacter, and Serratia may develop resistance during prolonged therapy      -  Ciprofloxacin: R >2      -  Ertapenem: S <=0.5      -  Gentamicin: S <=2      -  Imipenem: S <=1      -  Levofloxacin: R 2      -  Meropenem: S <=1      -  Piperacillin/Tazobactam: R <=8      -  Tobramycin: S <=2      -  Trimethoprim/Sulfamethoxazole: R >2/38      Method Type: JAZMIN    Culture - Other (collected 03-29-21 @ 17:15)  Source: .Other sacrum  Final Report (03-31-21 @ 16:53):    Numerous Klebsiella pneumoniae ESBL    Few Pseudomonas aeruginosa    Numerous Enterococcus faecalis (vancomycin resistant)  Organism: Klebsiella pneumoniae ESBL  Pseudomonas aeruginosa  Enterococcus faecalis (vancomycin resistant) (03-31-21 @ 16:53)  Organism: Enterococcus faecalis (vancomycin resistant) (03-31-21 @ 16:53)      -  Ampicillin: S <=2 Predicts results to ampicillin/sulbactam, amoxacillin-clavulanate and  piperacillin-tazobactam.      -  Daptomycin: S 1      -  Levofloxacin: R >4      -  Linezolid: S 2      -  Tetra/Doxy: R >8      -  Vancomycin: R >16      Method Type: JAZMIN  Organism: Pseudomonas aeruginosa (03-31-21 @ 16:53)      -  Amikacin: S <=16      -  Aztreonam: I 16      -  Cefepime: S 8      -  Ceftazidime: S 4      -  Ciprofloxacin: R >2      -  Gentamicin: I 8      -  Imipenem: I 4      -  Levofloxacin: R >4      -  Meropenem: S <=1      -  Piperacillin/Tazobactam: S 16      -  Tobramycin: S <=2      Method Type: JAZMIN  Organism: Klebsiella pneumoniae ESBL (03-31-21 @ 16:53)      -  Amikacin: S <=16      -  Amoxicillin/Clavulanic Acid: S <=8/4      -  Ampicillin: R >16 These ampicillin results predict results for amoxicillin      -  Ampicillin/Sulbactam: R >16/8 Enterobacter, Citrobacter, and Serratia may develop resistance during prolonged therapy (3-4 days)      -  Aztreonam: R >16      -  Cefazolin: R >16 Enterobacter, Citrobacter, and Serratia may develop resistance during prolonged therapy (3-4 days)      -  Cefepime: R >16      -  Cefoxitin: S <=8      -  Ceftriaxone: R >32 Enterobacter, Citrobacter, and Serratia may develop resistance during prolonged therapy      -  Ciprofloxacin: R >2      -  Ertapenem: S <=0.5      -  Gentamicin: S <=2      -  Imipenem: S <=1      -  Levofloxacin: R 2      -  Meropenem: S <=1      -  Piperacillin/Tazobactam: R <=8      -  Tobramycin: S <=2      -  Trimethoprim/Sulfamethoxazole: R >2/38      Method Type: JAZMIN    Culture - Blood (collected 03-28-21 @ 01:24)  Source: .Blood None  Final Report (04-02-21 @ 13:00):    No Growth Final    Culture - Blood (collected 03-25-21 @ 11:09)  Source: .Blood None  Gram Stain (03-28-21 @ 18:04):    Growth in anaerobic bottle: Gram Positive Cocci in Clusters  Final Report (03-29-21 @ 17:13):    Growth in anaerobic bottle: Staphylococcus pettenkoferi    Coag Negative Staphylococcus    Single set isolate, possible contaminant. Contact    Microbiology if susceptibility testing clinically    indicated.    ***Blood Panel PCR results on this specimen areavailable    approximately 3 hours after the Gram stain result.***    Gram stain, PCR, and/or culture results may not always    correspond due to difference in methodologies.    ************************************************************    This PCR assay wasperformed by multiplex PCR. This    Assay tests for 66 bacterial and resistance gene targets.    Please refer to the [facility] test directory    at [email or web address] for details.  Organism: Blood Culture PCR (03-29-21 @ 17:13)  Organism: Blood Culture PCR (03-29-21 @ 17:13)      -  Coagulase negative Staphylococcus: Detec      Method Type: PCR    Culture - Blood (collected 03-25-21 @ 05:47)  Source: .Blood None  Final Report (03-30-21 @ 14:01):    No Growth Final    Culture - Urine (collected 03-22-21 @ 20:41)  Source: .Urine Clean Catch (Midstream)  Final Report (03-23-21 @ 20:53):    No growth    Culture - Blood (collected 03-22-21 @ 15:31)  Source: .Blood Blood-Peripheral  Final Report (03-28-21 @ 02:33):    No Growth Final    Culture - Blood (collected 03-22-21 @ 15:28)  Source: .Blood Blood-Peripheral  Final Report (03-28-21 @ 02:33):    No Growth Final            INFECTIOUS DISEASES TESTING  COVID-19 PCR: NotDetec (04-07-21 @ 14:55)  Procalcitonin, Serum: 0.16 (04-06-21 @ 12:32)  Vancomycin Level, Random: 11.5 (03-31-21 @ 07:00)  Vancomycin Level, Trough: 11.5 (03-31-21 @ 07:00)  COVID-19 PCR: NotDetec (03-30-21 @ 15:19)  COVID-19 PCR: NotDetec (03-29-21 @ 19:40)  Vancomycin Level, Trough: <4.0 (03-28-21 @ 13:00)  Procalcitonin, Serum: 0.61 (03-26-21 @ 10:20)  COVID-19 PCR: NotDetec (03-22-21 @ 20:13)  COVID-19 PCR: NotDetec (03-10-21 @ 12:22)  COVID-19 PCR: NotDetec (03-01-21 @ 16:49)  Fungitell: 62 (03-01-21 @ 11:00)  Procalcitonin, Serum: 0.29 (03-01-21 @ 05:32)  MRSA PCR Result.: Negative (02-27-21 @ 15:44)  COVID-19 PCR: NotDetec (02-25-21 @ 15:34)  Procalcitonin, Serum: 0.27 (02-18-21 @ 10:54)  MRSA PCR Result.: Negative (02-14-21 @ 18:29)  HIV-1/2 Combo Result: Nonreact (02-14-21 @ 05:07)  Procalcitonin, Serum: 0.46 (02-13-21 @ 16:00)  COVID-19 PCR: NotDetec (02-12-21 @ 10:17)      INFLAMMATORY MARKERS      RADIOLOGY & ADDITIONAL TESTS:  I have personally reviewed the last available Chest xray  CXR      CT      CARDIOLOGY TESTING      MEDICATIONS  atorvastatin 40 Oral at bedtime  carbidopa/levodopa  25/100 2 Oral <User Schedule>  chlorhexidine 4% Liquid 1 Topical <User Schedule>  collagenase Ointment 1 Topical two times a day  Dakins Solution - 1/2 Strength 1 Topical two times a day  levETIRAcetam  IVPB 125 IV Intermittent every 12 hours  meropenem  IVPB 1000 IV Intermittent every 8 hours  midodrine 10 Oral every 8 hours  polymyxin B IVPB [id number] IV Intermittent every 12 hours  senna 2 Oral at bedtime  sodium zirconium cyclosilicate 5 Oral every 8 hours      WEIGHT  Weight (kg): 79.4 (03-31-21 @ 15:48)  Creatinine, Serum: 0.8 mg/dL (04-08-21 @ 07:20)      ANTIBIOTICS:  meropenem  IVPB 1000 milliGRAM(s) IV Intermittent every 8 hours  polymyxin B IVPB [id number] Unit(s) IV Intermittent every 12 hours      All available historical records have been reviewed

## 2021-04-09 LAB
ALBUMIN SERPL ELPH-MCNC: 2.1 G/DL — LOW (ref 3.5–5.2)
ALP SERPL-CCNC: 172 U/L — HIGH (ref 30–115)
ALT FLD-CCNC: 6 U/L — SIGNIFICANT CHANGE UP (ref 0–41)
ANION GAP SERPL CALC-SCNC: 8 MMOL/L — SIGNIFICANT CHANGE UP (ref 7–14)
AST SERPL-CCNC: 39 U/L — SIGNIFICANT CHANGE UP (ref 0–41)
BASOPHILS # BLD AUTO: 0.04 K/UL — SIGNIFICANT CHANGE UP (ref 0–0.2)
BASOPHILS NFR BLD AUTO: 0.3 % — SIGNIFICANT CHANGE UP (ref 0–1)
BILIRUB SERPL-MCNC: 0.5 MG/DL — SIGNIFICANT CHANGE UP (ref 0.2–1.2)
BUN SERPL-MCNC: 28 MG/DL — HIGH (ref 10–20)
CALCIUM SERPL-MCNC: 7.9 MG/DL — LOW (ref 8.5–10.1)
CHLORIDE SERPL-SCNC: 94 MMOL/L — LOW (ref 98–110)
CO2 SERPL-SCNC: 25 MMOL/L — SIGNIFICANT CHANGE UP (ref 17–32)
CREAT SERPL-MCNC: 0.8 MG/DL — SIGNIFICANT CHANGE UP (ref 0.7–1.5)
EOSINOPHIL # BLD AUTO: 0.21 K/UL — SIGNIFICANT CHANGE UP (ref 0–0.7)
EOSINOPHIL NFR BLD AUTO: 1.7 % — SIGNIFICANT CHANGE UP (ref 0–8)
GLUCOSE BLDC GLUCOMTR-MCNC: 112 MG/DL — HIGH (ref 70–99)
GLUCOSE BLDC GLUCOMTR-MCNC: 118 MG/DL — HIGH (ref 70–99)
GLUCOSE BLDC GLUCOMTR-MCNC: 126 MG/DL — HIGH (ref 70–99)
GLUCOSE BLDC GLUCOMTR-MCNC: 95 MG/DL — SIGNIFICANT CHANGE UP (ref 70–99)
GLUCOSE SERPL-MCNC: 124 MG/DL — HIGH (ref 70–99)
HCT VFR BLD CALC: 24.4 % — LOW (ref 42–52)
HGB BLD-MCNC: 7.8 G/DL — LOW (ref 14–18)
IMM GRANULOCYTES NFR BLD AUTO: 4.1 % — HIGH (ref 0.1–0.3)
LYMPHOCYTES # BLD AUTO: 1.44 K/UL — SIGNIFICANT CHANGE UP (ref 1.2–3.4)
LYMPHOCYTES # BLD AUTO: 12 % — LOW (ref 20.5–51.1)
MCHC RBC-ENTMCNC: 26.4 PG — LOW (ref 27–31)
MCHC RBC-ENTMCNC: 32 G/DL — SIGNIFICANT CHANGE UP (ref 32–37)
MCV RBC AUTO: 82.4 FL — SIGNIFICANT CHANGE UP (ref 80–94)
MONOCYTES # BLD AUTO: 1.12 K/UL — HIGH (ref 0.1–0.6)
MONOCYTES NFR BLD AUTO: 9.3 % — SIGNIFICANT CHANGE UP (ref 1.7–9.3)
NEUTROPHILS # BLD AUTO: 8.71 K/UL — HIGH (ref 1.4–6.5)
NEUTROPHILS NFR BLD AUTO: 72.6 % — SIGNIFICANT CHANGE UP (ref 42.2–75.2)
NRBC # BLD: 0 /100 WBCS — SIGNIFICANT CHANGE UP (ref 0–0)
PLATELET # BLD AUTO: 51 K/UL — LOW (ref 130–400)
POTASSIUM SERPL-MCNC: 4.8 MMOL/L — SIGNIFICANT CHANGE UP (ref 3.5–5)
POTASSIUM SERPL-SCNC: 4.8 MMOL/L — SIGNIFICANT CHANGE UP (ref 3.5–5)
PROT SERPL-MCNC: 5.6 G/DL — LOW (ref 6–8)
RBC # BLD: 2.96 M/UL — LOW (ref 4.7–6.1)
RBC # FLD: 17.4 % — HIGH (ref 11.5–14.5)
SODIUM SERPL-SCNC: 127 MMOL/L — LOW (ref 135–146)
WBC # BLD: 12.01 K/UL — HIGH (ref 4.8–10.8)
WBC # FLD AUTO: 12.01 K/UL — HIGH (ref 4.8–10.8)

## 2021-04-09 PROCEDURE — 99233 SBSQ HOSP IP/OBS HIGH 50: CPT

## 2021-04-09 RX ORDER — AZTREONAM 2 G
2000 VIAL (EA) INJECTION EVERY 8 HOURS
Refills: 0 | Status: DISCONTINUED | OUTPATIENT
Start: 2021-04-09 | End: 2021-04-12

## 2021-04-09 RX ADMIN — POLYMYXIN B SULFATE 500 UNIT(S): 500000 INJECTION, POWDER, LYOPHILIZED, FOR SOLUTION INTRAMUSCULAR; INTRATHECAL; INTRAVENOUS; OPHTHALMIC at 05:03

## 2021-04-09 RX ADMIN — LEVETIRACETAM 405 MILLIGRAM(S): 250 TABLET, FILM COATED ORAL at 05:02

## 2021-04-09 RX ADMIN — Medication 1 APPLICATION(S): at 05:02

## 2021-04-09 RX ADMIN — Medication 100 MILLIGRAM(S): at 14:00

## 2021-04-09 RX ADMIN — SENNA PLUS 2 TABLET(S): 8.6 TABLET ORAL at 21:13

## 2021-04-09 RX ADMIN — CARBIDOPA AND LEVODOPA 2 TABLET(S): 25; 100 TABLET ORAL at 21:13

## 2021-04-09 RX ADMIN — Medication 1 APPLICATION(S): at 20:00

## 2021-04-09 RX ADMIN — Medication 100 MILLIGRAM(S): at 21:13

## 2021-04-09 RX ADMIN — CARBIDOPA AND LEVODOPA 2 TABLET(S): 25; 100 TABLET ORAL at 12:38

## 2021-04-09 RX ADMIN — SODIUM ZIRCONIUM CYCLOSILICATE 5 GRAM(S): 10 POWDER, FOR SUSPENSION ORAL at 05:02

## 2021-04-09 RX ADMIN — CARBIDOPA AND LEVODOPA 2 TABLET(S): 25; 100 TABLET ORAL at 14:00

## 2021-04-09 RX ADMIN — MIDODRINE HYDROCHLORIDE 10 MILLIGRAM(S): 2.5 TABLET ORAL at 21:13

## 2021-04-09 RX ADMIN — MIDODRINE HYDROCHLORIDE 10 MILLIGRAM(S): 2.5 TABLET ORAL at 14:00

## 2021-04-09 RX ADMIN — SODIUM ZIRCONIUM CYCLOSILICATE 5 GRAM(S): 10 POWDER, FOR SUSPENSION ORAL at 14:00

## 2021-04-09 RX ADMIN — Medication 1 APPLICATION(S): at 20:01

## 2021-04-09 RX ADMIN — MEROPENEM 100 MILLIGRAM(S): 1 INJECTION INTRAVENOUS at 05:03

## 2021-04-09 RX ADMIN — SODIUM ZIRCONIUM CYCLOSILICATE 5 GRAM(S): 10 POWDER, FOR SUSPENSION ORAL at 21:13

## 2021-04-09 RX ADMIN — ATORVASTATIN CALCIUM 40 MILLIGRAM(S): 80 TABLET, FILM COATED ORAL at 21:13

## 2021-04-09 RX ADMIN — CARBIDOPA AND LEVODOPA 2 TABLET(S): 25; 100 TABLET ORAL at 05:02

## 2021-04-09 RX ADMIN — MIDODRINE HYDROCHLORIDE 10 MILLIGRAM(S): 2.5 TABLET ORAL at 05:02

## 2021-04-09 RX ADMIN — MEROPENEM 100 MILLIGRAM(S): 1 INJECTION INTRAVENOUS at 14:00

## 2021-04-09 RX ADMIN — CHLORHEXIDINE GLUCONATE 1 APPLICATION(S): 213 SOLUTION TOPICAL at 05:03

## 2021-04-09 RX ADMIN — MEROPENEM 100 MILLIGRAM(S): 1 INJECTION INTRAVENOUS at 21:12

## 2021-04-09 NOTE — PROGRESS NOTE ADULT - SUBJECTIVE AND OBJECTIVE BOX
Hospital Day:  18d    Subjective: Patient is a 71y old  Male who presents with a chief complaint of Change in Mental Status (09 Apr 2021 10:07)      Pt seen and evaluated at bedside.   Complaints: N/A  Over the night Events: None    Past Medical Hx:   Parkinson disease    Hypertension    Gout    Dyslipidemia    Prostatitis    Cataract      Past Sx:  No significant past surgical history      Allergies:  No Known Allergies    Current Meds:   Standng Meds:  atorvastatin 40 milliGRAM(s) Oral at bedtime  aztreonam  IVPB 2000 milliGRAM(s) IV Intermittent every 8 hours  carbidopa/levodopa  25/100 2 Tablet(s) Oral <User Schedule>  chlorhexidine 4% Liquid 1 Application(s) Topical <User Schedule>  collagenase Ointment 1 Application(s) Topical two times a day  Dakins Solution - 1/2 Strength 1 Application(s) Topical two times a day  levETIRAcetam  IVPB 125 milliGRAM(s) IV Intermittent every 12 hours  meropenem  IVPB 1000 milliGRAM(s) IV Intermittent every 8 hours  midodrine 10 milliGRAM(s) Oral every 8 hours  senna 2 Tablet(s) Oral at bedtime  sodium zirconium cyclosilicate 5 Gram(s) Oral every 8 hours    PRN Meds:  acetaminophen   Tablet .. 650 milliGRAM(s) Oral every 6 hours PRN Temp greater or equal to 38C (100.4F)      Vital Signs:   T(F): 99.2 (04-09-21 @ 05:06), Max: 99.5 (04-08-21 @ 21:10)  HR: 75 (04-09-21 @ 05:06) (72 - 75)  BP: 135/61 (04-09-21 @ 05:06) (113/55 - 135/61)  RR: 18 (04-09-21 @ 05:06) (18 - 18)  SpO2: 98% (04-08-21 @ 13:11) (98% - 98%)    Physical Exam:   GENERAL: NAD, Resting in bed  HEENT: NCAT  CHEST/LUNG: Clear to auscultation bilaterally; No wheezing or rubs.   HEART: Regular rate and rhythm; No murmurs, rubs, or gallops  ABDOMEN: Bowel sounds present; Soft, Nontender, Nondistended.   EXTREMITIES:  No clubbing, cyanosis. Edema present on upper extremities (chronic finding)  NERVOUS SYSTEM:  Alert & Oriented X0, Pt continues to remain non-verbal and only tracks with eye movement.     FLUID BALANCE    04-07-21 @ 07:01  -  04-08-21 @ 07:00  --------------------------------------------------------  IN: 2030 mL / OUT: 650 mL / NET: 1380 mL    04-08-21 @ 07:01  -  04-09-21 @ 07:00  --------------------------------------------------------  IN: 1930 mL / OUT: 300 mL / NET: 1630 mL        Labs:                         7.8    12.01 )-----------( 51       ( 09 Apr 2021 06:34 )             24.4     Neutophil% 72.6, Lymphocyte% 12.0, Monocyte% 9.3, Bands% 4.1 04-09-21 @ 06:34    09 Apr 2021 06:34    127    |  94     |  28     ----------------------------<  124    4.8     |  25     |  0.8      Ca    7.9        09 Apr 2021 06:34    TPro  5.6    /  Alb  2.1    /  TBili  0.5    /  DBili  x      /  AST  39     /  ALT  6      /  AlkPhos  172    09 Apr 2021 06:34                Iron --, TIBC --, %Sat -- Ferritin 599 04-06-21 @ 16:50            Culture - Blood (collected 04-02-21 @ 12:55)  Source: .Blood None  Final Report (04-07-21 @ 23:01):    No Growth Final      D-Dimer Assay, Quantitative: 3441 ng/mL DDU (04-06-21 @ 12:32)    Procalcitonin, Serum: 0.16 ng/mL (04-06-21 @ 12:32)    Ferritin, Serum: 599 ng/mL (04-06-21 @ 16:50)

## 2021-04-09 NOTE — PROGRESS NOTE ADULT - ASSESSMENT
ASSESSMENT  71 year old Male with past medical history of Parkinson's, dementia, CKD Stage 3, 1st degree AV block, HLD, gout, HTN, DM, fungal septicemia presenting from [facility] for acute decompensation in mental status.     IMPRESSION  #Resolved Fever with sepsis, not present on admission with metabolic encephalopathy, EEG + seizure, possibly in setting of sepsis secondary to large sacral infected decub    LP not consistent with infection.. G/S NEGATIVE (on ABX)    Total Nucleated Cell Count, CSF: 0 /uL (04.01.21 @ 14:00)    Protein, CSF: 42 mg/dL (04.01.21 @ 14:00)    Glucose, CSF: 97 mg/dL (04.01.21 @ 14:00)    3/25 BCX 1/2 sets, 1/4 bottles Staphylococcus pettenkoferi, likely contaminant     cannot rule out meningitis     CXR no PNA   3/22 Blood & Urine cxs NGTD   #Sacral ulcer- necrotic     3/31 WCX GNR    3/29   Numerous Klebsiella pneumoniae ESBL    Few CRE Pseudomonas aeruginosa (high MICs to AGs)    Numerous Enterococcus faecalis (vancomycin resistant)- S amp    seen by Burn sacrum with full thickness ~4x5cm with dark /brown devitalized tissue at base; no purulent drainage, no bleeding  #Recent Fungemia    Blood Cx 2/27 Candida albicans    evaluated by optho - no ocular evidence     TTE no significant valvulopathy   #ABEBE  #L hand edema  < from: Xray Wrist 2 Views, Left (03.27.21 @ 11:13) >No acute fracture or dislocation. Diffuse soft tissue swelling. Nonaggressive appearing lucency in the distal radius, indeterminate. Nonemergent MRI may be obtained for further evaluation.    Creatinine, Serum: 0.9 mg/dL (04.05.21 @ 06:31)      RECOMMENDATIONS  - f/u repeat BCX as fever  - D/C Polymyxin as hyponatremia may be an early sign of toxicity 4/5- D5  - START Aztreonam 2g q8h IV (CRE is S)  - Continue Meropenem 1g q8h IV   - Plan for 7 days end 4/11  - Contact isolation  - poor prognosis    If any questions, please call or send a message on [employer] Teams  Spectra [phone] ASSESSMENT  71 year old Male with past medical history of Parkinson's, dementia, CKD Stage 3, 1st degree AV block, HLD, gout, HTN, DM, fungal septicemia presenting from [facility] for acute decompensation in mental status.     IMPRESSION  #Resolved Fever with sepsis, not present on admission with metabolic encephalopathy, EEG + seizure, possibly in setting of sepsis secondary to large sacral infected decub    LP not consistent with infection.. G/S NEGATIVE (on ABX)    Total Nucleated Cell Count, CSF: 0 /uL (04.01.21 @ 14:00)    Protein, CSF: 42 mg/dL (04.01.21 @ 14:00)    Glucose, CSF: 97 mg/dL (04.01.21 @ 14:00)    3/25 BCX 1/2 sets, 1/4 bottles Staphylococcus pettenkoferi, likely contaminant     cannot rule out meningitis     CXR no PNA   3/22 Blood & Urine cxs NGTD   #Sacral ulcer- necrotic     3/31 WCX GNR    3/29   Numerous Klebsiella pneumoniae ESBL    Few CRE Pseudomonas aeruginosa (high MICs to AGs)    Numerous Enterococcus faecalis (vancomycin resistant)- S amp    seen by Burn sacrum with full thickness ~4x5cm with dark /brown devitalized tissue at base; no purulent drainage, no bleeding  #Recent Fungemia    Blood Cx 2/27 Candida albicans    evaluated by optho - no ocular evidence     TTE no significant valvulopathy   #Shoulder dislocation with effusion- joint exam not consistent with a septic arthritis  < from: CT Chest w/ IV Cont (04.07.21 @ 20:31) >  Similar appearance of the left shoulder with complex joint effusion, described in detail on prior exam CT shoulder 2/20/2021.  #ABEBE, resolved  #L hand edema  < from: Xray Wrist 2 Views, Left (03.27.21 @ 11:13) >No acute fracture or dislocation. Diffuse soft tissue swelling. Nonaggressive appearing lucency in the distal radius, indeterminate. Nonemergent MRI may be obtained for further evaluation.    Creatinine, Serum: 0.9 mg/dL (04.05.21 @ 06:31)      RECOMMENDATIONS  - f/u repeat BCX as fever  - D/C Polymyxin as hyponatremia may be an early sign of toxicity 4/5- D5  - START Aztreonam 2g q8h IV (CRE is S)  - Continue Meropenem 1g q8h IV   - Plan for 7 days end 4/11  - Appreciate IR consult, Ortho consult, no plan for aspiration. Clinically not consistent with septic arthritis of the shoulder  - Contact isolation  - poor prognosis    If any questions, please call or send a message on [employer] Teams  Spectra [phone]

## 2021-04-09 NOTE — PROGRESS NOTE ADULT - SUBJECTIVE AND OBJECTIVE BOX
NIEVES LABOY  71y, Male  Allergy: No Known Allergies      LOS  18d    CHIEF COMPLAINT: Change in Mental Status (08 Apr 2021 17:07)      INTERVAL EVENTS/HPI  - No acute events overnight  - T(F): , Max: 99.5 (04-08-21 @ 21:10) afebrile   - Tolerating medication  - WBC Count: 12.01 (04-09-21 @ 06:34)  WBC Count: 12.38 (04-08-21 @ 07:20)  - Creatinine, Serum: 0.8 (04-09-21 @ 06:34)  Creatinine, Serum: 0.8 (04-08-21 @ 07:20)       ROS  unable to obtain history secondary to patient's mental status and/or sedation     VITALS:  T(F): 99.2, Max: 99.5 (04-08-21 @ 21:10)  HR: 75  BP: 135/61  RR: 18Vital Signs Last 24 Hrs  T(C): 37.3 (09 Apr 2021 05:06), Max: 37.5 (08 Apr 2021 21:10)  T(F): 99.2 (09 Apr 2021 05:06), Max: 99.5 (08 Apr 2021 21:10)  HR: 75 (09 Apr 2021 05:06) (72 - 75)  BP: 135/61 (09 Apr 2021 05:06) (113/55 - 135/61)  BP(mean): 88 (09 Apr 2021 05:06) (76 - 88)  RR: 18 (09 Apr 2021 05:06) (18 - 18)  SpO2: 98% (08 Apr 2021 13:11) (98% - 98%)    PHYSICAL EXAM:  Gen: NAD, chronically ill appearing, contracted  HEENT: Normocephalic, atraumatic NGT  Neck: supple, no lymphadenopathy  CV: Regular rate & regular rhythm  Lungs: decreased BS at bases, no fremitus  Abdomen: Soft, BS present  Ext: Warm, well perfused  Neuro: non focal, awake  Skin: unstageable sacral ulcer, some necrosis, bleeding  Lines: no phlebitis    FH: Non-contributory  Social Hx: Non-contributory    TESTS & MEASUREMENTS:                        7.8    12.01 )-----------( 51       ( 09 Apr 2021 06:34 )             24.4     04-09    127<L>  |  94<L>  |  28<H>  ----------------------------<  124<H>  4.8   |  25  |  0.8    Ca    7.9<L>      09 Apr 2021 06:34    TPro  5.6<L>  /  Alb  2.1<L>  /  TBili  0.5  /  DBili  x   /  AST  39  /  ALT  6   /  AlkPhos  172<H>  04-09    eGFR if Non African American: 89 mL/min/1.73M2 (04-09-21 @ 06:34)  eGFR if : 103 mL/min/1.73M2 (04-09-21 @ 06:34)    LIVER FUNCTIONS - ( 09 Apr 2021 06:34 )  Alb: 2.1 g/dL / Pro: 5.6 g/dL / ALK PHOS: 172 U/L / ALT: 6 U/L / AST: 39 U/L / GGT: x               Culture - Blood (collected 04-02-21 @ 12:55)  Source: .Blood None  Final Report (04-07-21 @ 23:01):    No Growth Final    Culture - CSF with Gram Stain (collected 04-01-21 @ 14:00)  Source: .CSF CSF  Gram Stain (04-01-21 @ 21:44):    No polymorphonuclear cells seen    No organisms seen    by cytocentrifuge  Final Report (04-04-21 @ 15:36):    No growth    Culture - Acid Fast - Tissue w/Smear (collected 03-31-21 @ 16:18)  Source: .Tissue None  Preliminary Report (04-03-21 @ 15:04):    Culture is being performed.    Culture - Tissue with Gram Stain (collected 03-31-21 @ 16:18)  Source: .Tissue None  Gram Stain (04-01-21 @ 07:05):    Few polymorphonuclear leukocytes seen per low power field    Numerous Gram Negative Rods seen per oil power field  Final Report (04-06-21 @ 11:01):    Numerous Klebsiella pneumoniae ESBL    Few Enterococcus faecalis (vancomycin resistant)    Few Pseudomonas aeruginosa (Carbapenem Resistant)  Organism: Klebsiella pneumoniae ESBL  Enterococcus faecalis (vancomycin resistant)  Pseudomonas aeruginosa (Carbapenem Resistant) (04-06-21 @ 11:01)  Organism: Pseudomonas aeruginosa (Carbapenem Resistant) (04-06-21 @ 11:01)      -  Amikacin: S <=16      -  Aztreonam: S 8      -  Cefepime: I 16      -  Ceftazidime: I 16      -  Ciprofloxacin: R >2      -  Gentamicin: I 8      -  Imipenem: R >8      -  Levofloxacin: R >4      -  Meropenem: R 8      -  Piperacillin/Tazobactam: I 64      -  Tobramycin: S <=2      Method Type: JAZMIN  Organism: Enterococcus faecalis (vancomycin resistant) (04-06-21 @ 11:01)      -  Ampicillin: S 8 Predicts results to ampicillin/sulbactam, amoxacillin-clavulanate and  piperacillin-tazobactam.      -  Daptomycin: S 1      -  Levofloxacin: R >4      -  Linezolid: S 1      -  Tetra/Doxy: R >8      -  Vancomycin: R >16      Method Type: JAZMIN  Organism: Klebsiella pneumoniae ESBL (04-06-21 @ 11:01)      -  Amikacin: S <=16      -  Amoxicillin/Clavulanic Acid: S <=8/4      -  Ampicillin: R >16 These ampicillin results predict results for amoxicillin      -  Ampicillin/Sulbactam: R >16/8 Enterobacter, Citrobacter, and Serratia may develop resistance during prolonged therapy (3-4 days)      -  Aztreonam: R >16      -  Cefazolin: R >16 Enterobacter, Citrobacter, and Serratia may develop resistance during prolonged therapy (3-4 days)      -  Cefepime: R >16      -  Cefoxitin: S <=8      -  Ceftriaxone: R >32 Enterobacter, Citrobacter, and Serratia may develop resistance during prolonged therapy      -  Ciprofloxacin: R >2      -  Ertapenem: S <=0.5      -  Gentamicin: S <=2      -  Imipenem: S <=1      -  Levofloxacin: R 2      -  Meropenem: S <=1      -  Piperacillin/Tazobactam: R <=8      -  Tobramycin: S <=2      -  Trimethoprim/Sulfamethoxazole: R >2/38      Method Type: JAZMIN    Culture - Other (collected 03-29-21 @ 17:15)  Source: .Other sacrum  Final Report (03-31-21 @ 16:53):    Numerous Klebsiella pneumoniae ESBL    Few Pseudomonas aeruginosa    Numerous Enterococcus faecalis (vancomycin resistant)  Organism: Klebsiella pneumoniae ESBL  Pseudomonas aeruginosa  Enterococcus faecalis (vancomycin resistant) (03-31-21 @ 16:53)  Organism: Enterococcus faecalis (vancomycin resistant) (03-31-21 @ 16:53)      -  Ampicillin: S <=2 Predicts results to ampicillin/sulbactam, amoxacillin-clavulanate and  piperacillin-tazobactam.      -  Daptomycin: S 1      -  Levofloxacin: R >4      -  Linezolid: S 2      -  Tetra/Doxy: R >8      -  Vancomycin: R >16      Method Type: JAZMIN  Organism: Pseudomonas aeruginosa (03-31-21 @ 16:53)      -  Amikacin: S <=16      -  Aztreonam: I 16      -  Cefepime: S 8      -  Ceftazidime: S 4      -  Ciprofloxacin: R >2      -  Gentamicin: I 8      -  Imipenem: I 4      -  Levofloxacin: R >4      -  Meropenem: S <=1      -  Piperacillin/Tazobactam: S 16      -  Tobramycin: S <=2      Method Type: JAZMIN  Organism: Klebsiella pneumoniae ESBL (03-31-21 @ 16:53)      -  Amikacin: S <=16      -  Amoxicillin/Clavulanic Acid: S <=8/4      -  Ampicillin: R >16 These ampicillin results predict results for amoxicillin      -  Ampicillin/Sulbactam: R >16/8 Enterobacter, Citrobacter, and Serratia may develop resistance during prolonged therapy (3-4 days)      -  Aztreonam: R >16      -  Cefazolin: R >16 Enterobacter, Citrobacter, and Serratia may develop resistance during prolonged therapy (3-4 days)      -  Cefepime: R >16      -  Cefoxitin: S <=8      -  Ceftriaxone: R >32 Enterobacter, Citrobacter, and Serratia may develop resistance during prolonged therapy      -  Ciprofloxacin: R >2      -  Ertapenem: S <=0.5      -  Gentamicin: S <=2      -  Imipenem: S <=1      -  Levofloxacin: R 2      -  Meropenem: S <=1      -  Piperacillin/Tazobactam: R <=8      -  Tobramycin: S <=2      -  Trimethoprim/Sulfamethoxazole: R >2/38      Method Type: JAZMIN    Culture - Blood (collected 03-28-21 @ 01:24)  Source: .Blood None  Final Report (04-02-21 @ 13:00):    No Growth Final    Culture - Blood (collected 03-25-21 @ 11:09)  Source: .Blood None  Gram Stain (03-28-21 @ 18:04):    Growth in anaerobic bottle: Gram Positive Cocci in Clusters  Final Report (03-29-21 @ 17:13):    Growth in anaerobic bottle: Staphylococcus pettenkoferi    Coag Negative Staphylococcus    Single set isolate, possible contaminant. Contact    Microbiology if susceptibility testing clinically    indicated.    ***Blood Panel PCR results on this specimen areavailable    approximately 3 hours after the Gram stain result.***    Gram stain, PCR, and/or culture results may not always    correspond due to difference in methodologies.    ************************************************************    This PCR assay wasperformed by multiplex PCR. This    Assay tests for 66 bacterial and resistance gene targets.    Please refer to the [facility] test directory    at [email or web address] for details.  Organism: Blood Culture PCR (03-29-21 @ 17:13)  Organism: Blood Culture PCR (03-29-21 @ 17:13)      -  Coagulase negative Staphylococcus: Detec      Method Type: PCR    Culture - Blood (collected 03-25-21 @ 05:47)  Source: .Blood None  Final Report (03-30-21 @ 14:01):    No Growth Final    Culture - Urine (collected 03-22-21 @ 20:41)  Source: .Urine Clean Catch (Midstream)  Final Report (03-23-21 @ 20:53):    No growth    Culture - Blood (collected 03-22-21 @ 15:31)  Source: .Blood Blood-Peripheral  Final Report (03-28-21 @ 02:33):    No Growth Final    Culture - Blood (collected 03-22-21 @ 15:28)  Source: .Blood Blood-Peripheral  Final Report (03-28-21 @ 02:33):    No Growth Final            INFECTIOUS DISEASES TESTING  COVID-19 PCR: NotDetec (04-07-21 @ 14:55)  Procalcitonin, Serum: 0.16 (04-06-21 @ 12:32)  Vancomycin Level, Random: 11.5 (03-31-21 @ 07:00)  Vancomycin Level, Trough: 11.5 (03-31-21 @ 07:00)  COVID-19 PCR: NotDetec (03-30-21 @ 15:19)  COVID-19 PCR: NotDetec (03-29-21 @ 19:40)  Vancomycin Level, Trough: <4.0 (03-28-21 @ 13:00)  Procalcitonin, Serum: 0.61 (03-26-21 @ 10:20)  COVID-19 PCR: NotDetec (03-22-21 @ 20:13)  COVID-19 PCR: NotDetec (03-10-21 @ 12:22)  COVID-19 PCR: NotDetec (03-01-21 @ 16:49)  Fungitell: 62 (03-01-21 @ 11:00)  Procalcitonin, Serum: 0.29 (03-01-21 @ 05:32)  MRSA PCR Result.: Negative (02-27-21 @ 15:44)  COVID-19 PCR: NotDetec (02-25-21 @ 15:34)  Procalcitonin, Serum: 0.27 (02-18-21 @ 10:54)  MRSA PCR Result.: Negative (02-14-21 @ 18:29)  HIV-1/2 Combo Result: Nonreact (02-14-21 @ 05:07)  Procalcitonin, Serum: 0.46 (02-13-21 @ 16:00)  COVID-19 PCR: NotDetec (02-12-21 @ 10:17)      INFLAMMATORY MARKERS      RADIOLOGY & ADDITIONAL TESTS:  I have personally reviewed the last available Chest xray  CXR      CT  CT Chest w/ IV Cont:   EXAM:  CT CHEST IC            PROCEDURE DATE:  04/07/2021            INTERPRETATION:  REASON FOR EXAM: Left chest wall collection on ultrasound.    TECHNIQUE: CT of the chest was performed from the thoracic inlet to the level of the adrenal glands following the administration of intravenous contrast. Coronal and sagittal images have been submitted, as well as MIP reformats.    COMPARISON: CT left shoulder 2/20/2021. CT chest 2/14/2021, 3/1/2021. CTA chest 9/25/2018.    FINDINGS:    SUPPORT DEVICES: Nasogastric tube is visualized terminating within the stomach lumen.    LUNGS, PLEURA, AND AIRWAYS: Patent central tracheobronchial tree. Small bilateral pleural effusions, left greater than right, with adjacent consolidative opacities, likely atelectasis. No evidence of pneumothorax.    MEDIASTINUM/LYMPH NODES: 1.5 cm right thyroid lobe hypodense nodule, less conspicuous on prior exams. Prominent left axillary lymph nodes measuring up to 1.2 cm in the short axis.    HEART/GREAT VESSELS: Mildly enlarged heart. No pericardial effusion. Normal caliber main pulmonary artery. Normal caliber thoracic aorta. Aortic calcifications.    VISUALIZED UPPER ABDOMEN: Unremarkable.    BONES AND SOFT TISSUES: Similar appearance of the left shoulder with anterior dislocation of the humeral head, osseous fragmentation and resorption, and complex joint effusion, described in prior CT shoulder 2/20/2021. Bony degenerative changes.    Likely reactive left axillary lymph nodes adjacent to dislocated shoulder joint. No discrete chest wall collections are identified.    IMPRESSION:    Small bilateral pleural effusions, left greater than right with adjacent consolidative opacities, likely atelectasis.    Similar appearance of the left shoulder with complex joint effusion, described in detail on prior exam CT shoulder 2/20/2021.      Likely reactive left axillary lymph nodes adjacent to dislocated shoulder joint. No discrete chest wall collections are identified.      1.5 cm right thyroid lobe nodule, less conspicuous on prior exams. Follow up outpatient thyroid ultrasound examination is recommended.            PAVITHRA LEONARD M.D., RESIDENT RADIOLOGIST  This document has been electronically signed.  MARY DAMON MD; Attending Radiologist  This document has been electronically signed. Apr 8 2021  1:09PM (04-07-21 @ 20:31)      CARDIOLOGY TESTING      MEDICATIONS  atorvastatin 40 Oral at bedtime  carbidopa/levodopa  25/100 2 Oral <User Schedule>  chlorhexidine 4% Liquid 1 Topical <User Schedule>  collagenase Ointment 1 Topical two times a day  Dakins Solution - 1/2 Strength 1 Topical two times a day  levETIRAcetam  IVPB 125 IV Intermittent every 12 hours  meropenem  IVPB 1000 IV Intermittent every 8 hours  midodrine 10 Oral every 8 hours  polymyxin B IVPB [id number] IV Intermittent every 12 hours  senna 2 Oral at bedtime  sodium zirconium cyclosilicate 5 Oral every 8 hours      WEIGHT  Weight (kg): 79.4 (03-31-21 @ 15:48)  Creatinine, Serum: 0.8 mg/dL (04-09-21 @ 06:34)      ANTIBIOTICS:  meropenem  IVPB 1000 milliGRAM(s) IV Intermittent every 8 hours  polymyxin B IVPB [id number] Unit(s) IV Intermittent every 12 hours      All available historical records have been reviewed

## 2021-04-09 NOTE — PROGRESS NOTE ADULT - ASSESSMENT
71 year old Male with past medical history of Parkinson's, dementia, CKD Stage 3, 1st degree AV block, HLD, gout, HTN, DM, fungal septicemia presenting from [facility] for acute decompensation in mental status. As per documentation patient at baseline is verbal but for past 3 days, he has been worsening to state of being non verbal. Patient was admitted to [facility] for fungemia and discharged on the 11th March. At  he had left hand cellulitis and was treated for it.    # Metabolic encephalopathy on top of baseline dementia  # Sepsis  # Necrotic sacral ulcer stage 4  # H/o recent fungemia  - CT Head No acute intracranial pathology.activity  -  Chest Xray No consolidation, effusion or pneumothorax.  -  Blood & Urine cxs NGTD   -  WCX GNR, 3/29   Numerous Klebsiella pneumoniae ESBL, Few Pseudomonas aeruginosa, Numerous Enterococcus faecalis (vancomycin resistant)  - evaluated by Burn: s/p debridement of sacrum on 3/30 .  no more new recommendation for surgery at this time .continue local wound care with santyl/dakins WTD.  - ID F/u: increase dose of meropenem to 1g q8hr run for 4 hrs, change Polymixin to Aztreonam due to hyponatremia  - S/p spinal tap --> CSF clear with no growths and neg PCR  -repeat EEG w/ no epileptiform activity but diffuse cerebral dysfxn  -d/w neuro, may d/c Keppra if Plts still dropping  -if no further recs from neuro, will d/w daughter PEG placement    #Lt shoulder dislocation w/ collection  -no need to tap as per IR and ID concurrs    # Hypotension  - c/w midodrine    # Thrombocytopenia ?sec to sepsis  - monitor platelets  -transfuse if <20K  -off Pepcid  -heme f/u appreciated  -f/u studies as per heme    # Hyponatremia off Polymixin  - monitor  BMP    # H/o parkinsons disease  - c/w sinemet    # H/o chronic DVT  - [facility] Duplex Lower Ext Vein Scan, Bilat (03.27.21 @ 18:48) >No evidence of deep venous thrombosis or superficial thrombophlebitis in the bilateral lower extremities.  - continue holding eliquis due to thrombocytopenia    # Dyslipidemia  - c/w statin    # Nutrition: Tube feeds. Will d/w daughter PEG placement    # DVT prophylaxis  -scd    Very high risk pt. D/w daughter GOC: she wants full code for now. Very poor Px (daughter aware).    #Pending: clinical improvement vs demise, resolution of sepsis, thrombocytopenia, improved mental status, ?PEG placement  # Discussed w/ daughter in details who wants to cont aggressive Tx   # Disposition: SNF when/if stable    d/w Housestaff, nursing, case mgmt, ID

## 2021-04-09 NOTE — CONSULT NOTE ADULT - ASSESSMENT
ASSESSMENT AND PLAN:  71 year old Male with past medical history of Parkinson's, dementia, CKD Stage 3, 1st degree AV block, HLD, gout, HTN, DM, fungal septicemia presenting from [facility] for acute decompensation in mental status. Patient with chronic left shoulder complex effusion associated with dislocation, IR consulted for possible drainage    # Left shoulder effusion - chronic appearing effusion with shoulder dislocation, smaller from last CT in Feb, unlikely to be source for active issues  - no intervention/drainage from IR at this time    Thank you for the courtesy of this consult, please call [phone] with any further questions.

## 2021-04-09 NOTE — PROGRESS NOTE ADULT - SUBJECTIVE AND OBJECTIVE BOX
Patient is a 71y old  Male who presents with a chief complaint of Change in Mental Status (05 Apr 2021 13:36)    INTERVAL HPI/OVERNIGHT EVENTS: Patient was examined and seen at bedside. Events noted. Remains lethargic.    InitialHPI:  71 year old Male with past medical history of Parkinson's, dementia, CKD Stage 3, 1st degree AV block, HLD, gout, HTN, DM, fungal septicemia presenting from [facility] for acute decompensation in mental status.     As per documentation patient at baseline is verbal but for past 3 days, he has been worsening to state of being non verbal. Patient was admitted to [facility] for fungemia and discharged on the 11th March. At  he had left hand cellulitis and was treated for it.    In ED patient hemodynamically stable, afebrile, ABEBE with rise in Cr to 1.9 and BUN to 75, remains non verbal. CT head negative for any new strokes and UA negative. Family has not visited the patient recently so unaware of his status. (22 Mar 2021 23:59)    PAST MEDICAL & SURGICAL HISTORY:  Parkinson disease    Hypertension    Gout    Dyslipidemia    Prostatitis    Cataract    No significant past surgical history        General: lethargic, awake, tracking, chronically ill appearing  HEENT:  no LAD  CV: S1 S2  Resp: decreased breath sounds at bases  GI: NT/ND/S +BS  MS: no clubbing/cyanosis/edema, + pulses b/l  Neuro: unable to access  Skin: multiple skin decubs            MEDICATIONS  (STANDING):  atorvastatin 40 milliGRAM(s) Oral at bedtime  aztreonam  IVPB 2000 milliGRAM(s) IV Intermittent every 8 hours  carbidopa/levodopa  25/100 2 Tablet(s) Oral <User Schedule>  chlorhexidine 4% Liquid 1 Application(s) Topical <User Schedule>  collagenase Ointment 1 Application(s) Topical two times a day  Dakins Solution - 1/2 Strength 1 Application(s) Topical two times a day  levETIRAcetam  IVPB 125 milliGRAM(s) IV Intermittent every 12 hours  meropenem  IVPB 1000 milliGRAM(s) IV Intermittent every 8 hours  midodrine 10 milliGRAM(s) Oral every 8 hours  senna 2 Tablet(s) Oral at bedtime  sodium zirconium cyclosilicate 5 Gram(s) Oral every 8 hours    MEDICATIONS  (PRN):  acetaminophen   Tablet .. 650 milliGRAM(s) Oral every 6 hours PRN Temp greater or equal to 38C (100.4F)    Home Medications:  atorvastatin 40 mg oral tablet: 1 tab(s) orally once a day (at bedtime) (23 Mar 2021 01:07)  carbidopa-levodopa 25 mg-100 mg oral tablet, extended release: 2 tab(s) orally 5 times a day at 6AM,10Am,2PM,6PM,10 PM (23 Mar 2021 01:07)  colchicine 0.6 mg oral tablet: 1 tab(s) orally once a day (23 Mar 2021 01:07)  Eliquis 5 mg oral tablet: 1 tab(s) orally 2 times a day (23 Mar 2021 01:07)  enalapril 5 mg oral tablet: 1 tab(s) orally once a day (23 Mar 2021 01:07)  omeprazole 40 mg oral delayed release capsule: 1 cap(s) orally once a day (23 Mar 2021 01:07)    Vital Signs Last 24 Hrs  T(C): 37.3 (09 Apr 2021 05:06), Max: 37.5 (08 Apr 2021 21:10)  T(F): 99.2 (09 Apr 2021 05:06), Max: 99.5 (08 Apr 2021 21:10)  HR: 75 (09 Apr 2021 05:06) (72 - 75)  BP: 135/61 (09 Apr 2021 05:06) (113/55 - 135/61)  BP(mean): 88 (09 Apr 2021 05:06) (76 - 88)  RR: 18 (09 Apr 2021 05:06) (18 - 18)  SpO2: 98% (08 Apr 2021 13:11) (98% - 98%)  CAPILLARY BLOOD GLUCOSE      POCT Blood Glucose.: 95 mg/dL (09 Apr 2021 12:16)  POCT Blood Glucose.: 118 mg/dL (09 Apr 2021 05:04)  POCT Blood Glucose.: 125 mg/dL (08 Apr 2021 21:15)  POCT Blood Glucose.: 117 mg/dL (08 Apr 2021 16:54)    LABS:                        7.8    12.01 )-----------( 51       ( 09 Apr 2021 06:34 )             24.4     04-09    127<L>  |  94<L>  |  28<H>  ----------------------------<  124<H>  4.8   |  25  |  0.8    Ca    7.9<L>      09 Apr 2021 06:34    TPro  5.6<L>  /  Alb  2.1<L>  /  TBili  0.5  /  DBili  x   /  AST  39  /  ALT  6   /  AlkPhos  172<H>  04-09    LIVER FUNCTIONS - ( 09 Apr 2021 06:34 )  Alb: 2.1 g/dL / Pro: 5.6 g/dL / ALK PHOS: 172 U/L / ALT: 6 U/L / AST: 39 U/L / GGT: x                           Consultant Notes Reviewed:  [x ] YES  [ ] NO  Care Discussed with Consultants/Other Providers/ Housestaff [ x] YES  [ ] NO  Radiology, labs, new studies personally reviewed.

## 2021-04-09 NOTE — CONSULT NOTE ADULT - SUBJECTIVE AND OBJECTIVE BOX
INTERVENTIONAL RADIOLOGY CONSULT:     Procedure Requested:     HPI:  71 year old Male with past medical history of Parkinson's, dementia, CKD Stage 3, 1st degree AV block, HLD, gout, HTN, DM, fungal septicemia presenting from [facility] for acute decompensation in mental status.     As per documentation patient at baseline is verbal but for past 3 days, he has been worsening to state of being non verbal. Patient was admitted to [facility] for fungemia and discharged on the 11th March. Also he had left hand cellulitis and was treated for it.    In ED patient hemodynamically stable, afebrile, ABEBE with rise in Cr to 1.9 and BUN to 75, remains non verbal. CT head negative for any new strokes and UA negative. Family has not visited the patient recently so unaware of his status. (22 Mar 2021 23:59)      PAST MEDICAL & SURGICAL HISTORY:  Parkinson disease    Hypertension    Gout    Dyslipidemia    Prostatitis    Cataract    No significant past surgical history        MEDICATIONS  (STANDING):  atorvastatin 40 milliGRAM(s) Oral at bedtime  aztreonam  IVPB 2000 milliGRAM(s) IV Intermittent every 8 hours  carbidopa/levodopa  25/100 2 Tablet(s) Oral <User Schedule>  chlorhexidine 4% Liquid 1 Application(s) Topical <User Schedule>  collagenase Ointment 1 Application(s) Topical two times a day  Dakins Solution - 1/2 Strength 1 Application(s) Topical two times a day  levETIRAcetam  IVPB 125 milliGRAM(s) IV Intermittent every 12 hours  meropenem  IVPB 1000 milliGRAM(s) IV Intermittent every 8 hours  midodrine 10 milliGRAM(s) Oral every 8 hours  senna 2 Tablet(s) Oral at bedtime  sodium zirconium cyclosilicate 5 Gram(s) Oral every 8 hours    MEDICATIONS  (PRN):  acetaminophen   Tablet .. 650 milliGRAM(s) Oral every 6 hours PRN Temp greater or equal to 38C (100.4F)      Allergies    No Known Allergies    Intolerances      Physical Exam:   Vital Signs Last 24 Hrs  T(C): 37.3 (09 Apr 2021 05:06), Max: 37.5 (08 Apr 2021 21:10)  T(F): 99.2 (09 Apr 2021 05:06), Max: 99.5 (08 Apr 2021 21:10)  HR: 75 (09 Apr 2021 05:06) (72 - 75)  BP: 135/61 (09 Apr 2021 05:06) (113/55 - 135/61)  BP(mean): 88 (09 Apr 2021 05:06) (76 - 88)  RR: 18 (09 Apr 2021 05:06) (18 - 18)  SpO2: 98% (08 Apr 2021 13:11) (98% - 98%)      Pertinent labs:                      7.8    12.01 )-----------( 51       ( 09 Apr 2021 06:34 )             24.4       04-09    127<L>  |  94<L>  |  28<H>  ----------------------------<  124<H>  4.8   |  25  |  0.8    Ca    7.9<L>      09 Apr 2021 06:34    TPro  5.6<L>  /  Alb  2.1<L>  /  TBili  0.5  /  DBili  x   /  AST  39  /  ALT  6   /  AlkPhos  172<H>  04-09          Radiology & Additional Studies:     Radiology imaging reviewed.       ASSESSMENT AND PLAN:  71 year old Male with past medical history of Parkinson's, dementia, CKD Stage 3, 1st degree AV block, HLD, gout, HTN, DM, fungal septicemia presenting from [facility] for acute decompensation in mental status. Patient with chronic left shoulder complext effusion, IR consulted for possible drainage    # Left shoulder effusion - chronic appearing effusion, unchanged from last CT in Feb, unlikely to be source for active issues  - no intervention/drainage from IR at this time    Thank you for the courtesy of this consult, please call [phone] with any further questions.    INTERVENTIONAL RADIOLOGY CONSULT:     Procedure Requested:     HPI:  71 year old Male with past medical history of Parkinson's, dementia, CKD Stage 3, 1st degree AV block, HLD, gout, HTN, DM, fungal septicemia presenting from [facility] for acute decompensation in mental status.     As per documentation patient at baseline is verbal but for past 3 days, he has been worsening to state of being non verbal. Patient was admitted to [facility] for fungemia and discharged on the 11th March. Also he had left hand cellulitis and was treated for it.    In ED patient hemodynamically stable, afebrile, ABEBE with rise in Cr to 1.9 and BUN to 75, remains non verbal. CT head negative for any new strokes and UA negative. Family has not visited the patient recently so unaware of his status. (22 Mar 2021 23:59)      PAST MEDICAL & SURGICAL HISTORY:  Parkinson disease    Hypertension    Gout    Dyslipidemia    Prostatitis    Cataract    No significant past surgical history        MEDICATIONS  (STANDING):  atorvastatin 40 milliGRAM(s) Oral at bedtime  aztreonam  IVPB 2000 milliGRAM(s) IV Intermittent every 8 hours  carbidopa/levodopa  25/100 2 Tablet(s) Oral <User Schedule>  chlorhexidine 4% Liquid 1 Application(s) Topical <User Schedule>  collagenase Ointment 1 Application(s) Topical two times a day  Dakins Solution - 1/2 Strength 1 Application(s) Topical two times a day  levETIRAcetam  IVPB 125 milliGRAM(s) IV Intermittent every 12 hours  meropenem  IVPB 1000 milliGRAM(s) IV Intermittent every 8 hours  midodrine 10 milliGRAM(s) Oral every 8 hours  senna 2 Tablet(s) Oral at bedtime  sodium zirconium cyclosilicate 5 Gram(s) Oral every 8 hours    MEDICATIONS  (PRN):  acetaminophen   Tablet .. 650 milliGRAM(s) Oral every 6 hours PRN Temp greater or equal to 38C (100.4F)      Allergies    No Known Allergies    Intolerances      Physical Exam:   Vital Signs Last 24 Hrs  T(C): 37.3 (09 Apr 2021 05:06), Max: 37.5 (08 Apr 2021 21:10)  T(F): 99.2 (09 Apr 2021 05:06), Max: 99.5 (08 Apr 2021 21:10)  HR: 75 (09 Apr 2021 05:06) (72 - 75)  BP: 135/61 (09 Apr 2021 05:06) (113/55 - 135/61)  BP(mean): 88 (09 Apr 2021 05:06) (76 - 88)  RR: 18 (09 Apr 2021 05:06) (18 - 18)  SpO2: 98% (08 Apr 2021 13:11) (98% - 98%)      Pertinent labs:                      7.8    12.01 )-----------( 51       ( 09 Apr 2021 06:34 )             24.4       04-09    127<L>  |  94<L>  |  28<H>  ----------------------------<  124<H>  4.8   |  25  |  0.8    Ca    7.9<L>      09 Apr 2021 06:34    TPro  5.6<L>  /  Alb  2.1<L>  /  TBili  0.5  /  DBili  x   /  AST  39  /  ALT  6   /  AlkPhos  172<H>  04-09          Radiology & Additional Studies: CT chest with chronic left shoulder dislocation and bursitis, improved from 3/21.    Radiology imaging reviewed.

## 2021-04-09 NOTE — PROGRESS NOTE ADULT - ASSESSMENT
71 year old Male with past medical history of Parkinson's, dementia, CKD Stage 3, 1st degree AV block, HLD, gout, HTN, DM, fungal septicemia presenting from [facility] for acute decompensation in mental status. As per documentation patient at baseline is verbal but for past 3 days, he has been worsening to state of being non verbal. Patient was admitted to [facility] for fungemia and discharged on the 11th March. At  he had left hand cellulitis and was treated for it. In this presentation, he was admitted for sudden AMS and sepsis secondary to stage 4 bed ulcers.       # Metabolic encephalopathy concurrent w/ baseline Parkinson's dementia  - EEG : repeat showed no epileptiform activity, presences of slowed brain activity consistent with metabolic encephalopathy and dementia     # Sepsis  - Contact isolation  - CT Head No acute intracranial pathology.  -  Chest Xray No consolidation, effusion or pneumothorax.  -  Blood & Urine cxs NGTD   -  WCX GNR, 3/29   Numerous Klebsiella pneumoniae ESBL, Few Pseudomonas aeruginosa, Numerous Enterococcus faecalis (vancomycin resistant)  - evaluated by Burn: s/p debridement of sacrumon 3/30 .  no more new recommendation for surgery at this time .continue local wound care with santyl/dakins WTD.  - per ID: meropenem 1g q8 hr, replaced polymixin to aztreonam due to hyponatremia and pot toxicity  - Neuro consult for thrombocytopenia potentially associated to Keppra vs bone marrow suppression-> will d/c keppra per neuro since no seizure activity in latest EEG  -evaluated by neurology:  c/w keppra, seizure precautions  - S/p spinal tap --> CSF clear with no growths and neg PCR  - repeat blood cultures pending    # Necrotic sacral ulcer stage4  # H/o recent fungemia      # Hypotension  - c/w midodrine    # Thrombocytopenia sec to sepsis  - monitor platelets    #  Acute kidney injury , prerenal-resolving  # Hyponatremia  - monitor  BMP    # H/o parkinsons disease  - c/w sinemet    # left Shoulder dislocation  - Xray Shoulder 2 Views, Left (03.23.21 @ 21:48) >Reidentified anterior dislocation of the humeral head overlying the left second rib with bony fragmentation along the glenoid and humeral head. AC joint degenerative change.  - Pt has chronic dislocation since june previous chart review.    - no ortho intervention  - pt had fluid accumulation -> Per ortho low risk for septic synovitis. IR will not intervene due to chronic nature of it.     # H/o chronic DVT  - VA Duplex Lower Ext Vein Scan, Bilat (03.27.21 @ 18:48) >No evidence of deep venous thrombosis or superficial thrombophlebitis in the bilateral lower extremities.  - continue holding eliquis    # Dyslipidemia  - c/w statin    # Nutrition: Tube feeds    # DVT prophylaxis  -scd    #Pending: clinical improvement. C/W current management. monitor cbc .  # Disposition: SNF when stable  Full code    Plan:  Pt continues to be nonverbal, the platelets have improved and increased to 51. Upper extremity duplex was neg for DVT/SVT. will order Blood culture per ID due to overnight fever. Pt Will continue to monitor for improvement and f/u with ID and hem/onc. Daughter and family will be consulted for PEG placement as no improvement in mental status is noted.

## 2021-04-10 LAB
ALBUMIN SERPL ELPH-MCNC: 2.1 G/DL — LOW (ref 3.5–5.2)
ALP SERPL-CCNC: 185 U/L — HIGH (ref 30–115)
ALT FLD-CCNC: 10 U/L — SIGNIFICANT CHANGE UP (ref 0–41)
ANION GAP SERPL CALC-SCNC: 10 MMOL/L — SIGNIFICANT CHANGE UP (ref 7–14)
APTT BLD: 42.3 SEC — HIGH (ref 27–39.2)
AST SERPL-CCNC: 34 U/L — SIGNIFICANT CHANGE UP (ref 0–41)
BASOPHILS # BLD AUTO: 0.03 K/UL — SIGNIFICANT CHANGE UP (ref 0–0.2)
BASOPHILS NFR BLD AUTO: 0.3 % — SIGNIFICANT CHANGE UP (ref 0–1)
BILIRUB SERPL-MCNC: 0.4 MG/DL — SIGNIFICANT CHANGE UP (ref 0.2–1.2)
BLD GP AB SCN SERPL QL: SIGNIFICANT CHANGE UP
BUN SERPL-MCNC: 30 MG/DL — HIGH (ref 10–20)
CALCIUM SERPL-MCNC: 8.6 MG/DL — SIGNIFICANT CHANGE UP (ref 8.5–10.1)
CHLORIDE SERPL-SCNC: 96 MMOL/L — LOW (ref 98–110)
CO2 SERPL-SCNC: 25 MMOL/L — SIGNIFICANT CHANGE UP (ref 17–32)
CREAT SERPL-MCNC: 0.9 MG/DL — SIGNIFICANT CHANGE UP (ref 0.7–1.5)
EOSINOPHIL # BLD AUTO: 0.15 K/UL — SIGNIFICANT CHANGE UP (ref 0–0.7)
EOSINOPHIL NFR BLD AUTO: 1.3 % — SIGNIFICANT CHANGE UP (ref 0–8)
GLUCOSE BLDC GLUCOMTR-MCNC: 108 MG/DL — HIGH (ref 70–99)
GLUCOSE BLDC GLUCOMTR-MCNC: 112 MG/DL — HIGH (ref 70–99)
GLUCOSE BLDC GLUCOMTR-MCNC: 120 MG/DL — HIGH (ref 70–99)
GLUCOSE BLDC GLUCOMTR-MCNC: 130 MG/DL — HIGH (ref 70–99)
GLUCOSE BLDC GLUCOMTR-MCNC: 130 MG/DL — HIGH (ref 70–99)
GLUCOSE SERPL-MCNC: 109 MG/DL — HIGH (ref 70–99)
HCT VFR BLD CALC: 24.1 % — LOW (ref 42–52)
HGB BLD-MCNC: 7.6 G/DL — LOW (ref 14–18)
IMM GRANULOCYTES NFR BLD AUTO: 1.7 % — HIGH (ref 0.1–0.3)
INR BLD: 1.17 RATIO — SIGNIFICANT CHANGE UP (ref 0.65–1.3)
LYMPHOCYTES # BLD AUTO: 1.03 K/UL — LOW (ref 1.2–3.4)
LYMPHOCYTES # BLD AUTO: 8.9 % — LOW (ref 20.5–51.1)
MCHC RBC-ENTMCNC: 26 PG — LOW (ref 27–31)
MCHC RBC-ENTMCNC: 31.5 G/DL — LOW (ref 32–37)
MCV RBC AUTO: 82.5 FL — SIGNIFICANT CHANGE UP (ref 80–94)
MONOCYTES # BLD AUTO: 1.3 K/UL — HIGH (ref 0.1–0.6)
MONOCYTES NFR BLD AUTO: 11.3 % — HIGH (ref 1.7–9.3)
NEUTROPHILS # BLD AUTO: 8.82 K/UL — HIGH (ref 1.4–6.5)
NEUTROPHILS NFR BLD AUTO: 76.5 % — HIGH (ref 42.2–75.2)
NRBC # BLD: 0 /100 WBCS — SIGNIFICANT CHANGE UP (ref 0–0)
PLATELET # BLD AUTO: 53 K/UL — LOW (ref 130–400)
POTASSIUM SERPL-MCNC: 4.5 MMOL/L — SIGNIFICANT CHANGE UP (ref 3.5–5)
POTASSIUM SERPL-SCNC: 4.5 MMOL/L — SIGNIFICANT CHANGE UP (ref 3.5–5)
PROT SERPL-MCNC: 6.1 G/DL — SIGNIFICANT CHANGE UP (ref 6–8)
PROTHROM AB SERPL-ACNC: 13.5 SEC — HIGH (ref 9.95–12.87)
RBC # BLD: 2.92 M/UL — LOW (ref 4.7–6.1)
RBC # FLD: 17.4 % — HIGH (ref 11.5–14.5)
SODIUM SERPL-SCNC: 131 MMOL/L — LOW (ref 135–146)
WBC # BLD: 11.53 K/UL — HIGH (ref 4.8–10.8)
WBC # FLD AUTO: 11.53 K/UL — HIGH (ref 4.8–10.8)

## 2021-04-10 PROCEDURE — 99233 SBSQ HOSP IP/OBS HIGH 50: CPT

## 2021-04-10 RX ORDER — POLYETHYLENE GLYCOL 3350 17 G/17G
17 POWDER, FOR SOLUTION ORAL ONCE
Refills: 0 | Status: COMPLETED | OUTPATIENT
Start: 2021-04-10 | End: 2021-04-10

## 2021-04-10 RX ADMIN — CARBIDOPA AND LEVODOPA 2 TABLET(S): 25; 100 TABLET ORAL at 09:54

## 2021-04-10 RX ADMIN — MIDODRINE HYDROCHLORIDE 10 MILLIGRAM(S): 2.5 TABLET ORAL at 21:38

## 2021-04-10 RX ADMIN — MIDODRINE HYDROCHLORIDE 10 MILLIGRAM(S): 2.5 TABLET ORAL at 06:33

## 2021-04-10 RX ADMIN — CARBIDOPA AND LEVODOPA 2 TABLET(S): 25; 100 TABLET ORAL at 06:33

## 2021-04-10 RX ADMIN — SENNA PLUS 2 TABLET(S): 8.6 TABLET ORAL at 21:37

## 2021-04-10 RX ADMIN — ATORVASTATIN CALCIUM 40 MILLIGRAM(S): 80 TABLET, FILM COATED ORAL at 21:38

## 2021-04-10 RX ADMIN — SODIUM ZIRCONIUM CYCLOSILICATE 5 GRAM(S): 10 POWDER, FOR SUSPENSION ORAL at 13:23

## 2021-04-10 RX ADMIN — MEROPENEM 100 MILLIGRAM(S): 1 INJECTION INTRAVENOUS at 06:31

## 2021-04-10 RX ADMIN — MIDODRINE HYDROCHLORIDE 10 MILLIGRAM(S): 2.5 TABLET ORAL at 13:23

## 2021-04-10 RX ADMIN — MEROPENEM 100 MILLIGRAM(S): 1 INJECTION INTRAVENOUS at 13:23

## 2021-04-10 RX ADMIN — Medication 1 APPLICATION(S): at 17:29

## 2021-04-10 RX ADMIN — Medication 100 MILLIGRAM(S): at 14:02

## 2021-04-10 RX ADMIN — Medication 1 APPLICATION(S): at 06:34

## 2021-04-10 RX ADMIN — CHLORHEXIDINE GLUCONATE 1 APPLICATION(S): 213 SOLUTION TOPICAL at 06:35

## 2021-04-10 RX ADMIN — Medication 100 MILLIGRAM(S): at 06:31

## 2021-04-10 RX ADMIN — Medication 100 MILLIGRAM(S): at 21:38

## 2021-04-10 RX ADMIN — SODIUM ZIRCONIUM CYCLOSILICATE 5 GRAM(S): 10 POWDER, FOR SUSPENSION ORAL at 06:35

## 2021-04-10 RX ADMIN — POLYETHYLENE GLYCOL 3350 17 GRAM(S): 17 POWDER, FOR SOLUTION ORAL at 17:30

## 2021-04-10 RX ADMIN — CARBIDOPA AND LEVODOPA 2 TABLET(S): 25; 100 TABLET ORAL at 21:38

## 2021-04-10 RX ADMIN — MEROPENEM 100 MILLIGRAM(S): 1 INJECTION INTRAVENOUS at 21:38

## 2021-04-10 RX ADMIN — SODIUM ZIRCONIUM CYCLOSILICATE 5 GRAM(S): 10 POWDER, FOR SUSPENSION ORAL at 21:37

## 2021-04-10 RX ADMIN — CARBIDOPA AND LEVODOPA 2 TABLET(S): 25; 100 TABLET ORAL at 13:23

## 2021-04-10 NOTE — PROGRESS NOTE ADULT - ASSESSMENT
71M PMHx parkinsons, dementia, HTN, DM2 here with altered mental status. Sepsis, not present on admission, due to sacral decub ulcer.    #Sepsis, not present on admission, due to sacral decub ulcer  resolved  s/p debridement  wcx 3/29, 3/31, kleb esbl, pseudomonas carbapenem resistant, vre  bcx ntd  aztreonam, radha  appreciate id  #Altered mental status, toxic metabolic encephalopathy  in setting of dementia  cth neg  reeg noted  npo, ngt in place  plan for peg with ir  #Thrombocytopenia  unclear etiology, may be due to keppra vs. sepsis  trend  scds  #Parkinsons  sinemet 50/ 200 qid  midodrine 10 tid  #HTN  controlled off medicaitons  #DM2  controlled off medications  #DVT ppx  scds    #Progress Note Handoff:  Pending (specify):  Consults_________, Tests________, Test Results_______, Other___peg______  Family discussion:  Disposition: Home___/SNF___/Other________/Unknown at this time____x____

## 2021-04-10 NOTE — PROGRESS NOTE ADULT - SUBJECTIVE AND OBJECTIVE BOX
INTERVAL HPI/OVERNIGHT EVENTS:    SUBJECTIVE: Patient seen and examined at bedside.     unable to obtain ros    OBJECTIVE:    VITAL SIGNS:  Vital Signs Last 24 Hrs  T(C): 36.5 (10 Apr 2021 13:05), Max: 37.2 (09 Apr 2021 21:07)  T(F): 97.7 (10 Apr 2021 13:05), Max: 99 (09 Apr 2021 21:07)  HR: 75 (10 Apr 2021 13:05) (75 - 78)  BP: 116/53 (10 Apr 2021 13:05) (107/53 - 116/53)  BP(mean): 79 (10 Apr 2021 05:27) (76 - 79)  RR: 18 (10 Apr 2021 13:05) (18 - 18)  SpO2: 99% (10 Apr 2021 13:05) (97% - 99%)      PHYSICAL EXAM:    General: NAD  HEENT: NC/AT; PERRL, clear conjunctiva  Neck: supple  Respiratory: CTA b/l  Cardiovascular: +S1/S2; RRR  Abdomen: soft, NT/ND; +BS x4  Extremities: WWP, 2+ peripheral pulses b/l; no LE edema  Skin: normal color and turgor; no rash  Neurological:    MEDICATIONS:  MEDICATIONS  (STANDING):  atorvastatin 40 milliGRAM(s) Oral at bedtime  aztreonam  IVPB 2000 milliGRAM(s) IV Intermittent every 8 hours  carbidopa/levodopa  25/100 2 Tablet(s) Oral <User Schedule>  chlorhexidine 4% Liquid 1 Application(s) Topical <User Schedule>  collagenase Ointment 1 Application(s) Topical two times a day  Dakins Solution - 1/2 Strength 1 Application(s) Topical two times a day  meropenem  IVPB 1000 milliGRAM(s) IV Intermittent every 8 hours  midodrine 10 milliGRAM(s) Oral every 8 hours  senna 2 Tablet(s) Oral at bedtime  sodium zirconium cyclosilicate 5 Gram(s) Oral every 8 hours    MEDICATIONS  (PRN):  acetaminophen   Tablet .. 650 milliGRAM(s) Oral every 6 hours PRN Temp greater or equal to 38C (100.4F)      ALLERGIES:  Allergies    No Known Allergies    Intolerances        LABS:                        7.6    11.53 )-----------( 53       ( 10 Apr 2021 04:30 )             24.1     Hemoglobin: 7.6 g/dL (04-10 @ 04:30)  Hemoglobin: 7.8 g/dL (04-09 @ 06:34)  Hemoglobin: 7.6 g/dL (04-08 @ 07:20)  Hemoglobin: 7.9 g/dL (04-07 @ 11:59)  Hemoglobin: 7.4 g/dL (04-06 @ 16:50)    CBC Full  -  ( 10 Apr 2021 04:30 )  WBC Count : 11.53 K/uL  RBC Count : 2.92 M/uL  Hemoglobin : 7.6 g/dL  Hematocrit : 24.1 %  Platelet Count - Automated : 53 K/uL  Mean Cell Volume : 82.5 fL  Mean Cell Hemoglobin : 26.0 pg  Mean Cell Hemoglobin Concentration : 31.5 g/dL  Auto Neutrophil # : 8.82 K/uL  Auto Lymphocyte # : 1.03 K/uL  Auto Monocyte # : 1.30 K/uL  Auto Eosinophil # : 0.15 K/uL  Auto Basophil # : 0.03 K/uL  Auto Neutrophil % : 76.5 %  Auto Lymphocyte % : 8.9 %  Auto Monocyte % : 11.3 %  Auto Eosinophil % : 1.3 %  Auto Basophil % : 0.3 %    04-10    131<L>  |  96<L>  |  30<H>  ----------------------------<  109<H>  4.5   |  25  |  0.9    Ca    8.6      10 Apr 2021 04:30    TPro  6.1  /  Alb  2.1<L>  /  TBili  0.4  /  DBili  x   /  AST  34  /  ALT  10  /  AlkPhos  185<H>  04-10    Creatinine Trend: 0.9<--, 0.8<--, 0.8<--, 0.7<--, 0.9<--, 0.9<--  LIVER FUNCTIONS - ( 10 Apr 2021 04:30 )  Alb: 2.1 g/dL / Pro: 6.1 g/dL / ALK PHOS: 185 U/L / ALT: 10 U/L / AST: 34 U/L / GGT: x           PT/INR - ( 10 Apr 2021 04:30 )   PT: 13.50 sec;   INR: 1.17 ratio         PTT - ( 10 Apr 2021 04:30 )  PTT:42.3 sec    hs Troponin:              CSF:    RBC Count - Spinal Fluid: 9 /uL (04-01-21 @ 14:00)  Total Nucleated Cell Count, CSF: 0 /uL (04-01-21 @ 14:00)          Glucose, CSF: 97 mg/dL (04-01-21 @ 14:00)  Protein, CSF: 42 mg/dL (04-01-21 @ 14:00)            EKG:   MICROBIOLOGY:    Culture - Blood (collected 08 Apr 2021 16:00)  Source: .Blood Blood  Preliminary Report (10 Apr 2021 01:02):    No growth to date.    Culture - Blood (collected 08 Apr 2021 16:00)  Source: .Blood Blood-Peripheral  Preliminary Report (10 Apr 2021 01:02):    No growth to date.      IMAGING:      Labs, imaging, EKG personally reviewed    RADIOLOGY & ADDITIONAL TESTS: Reviewed.

## 2021-04-11 LAB
-  STAPHYLOCOCCUS EPIDERMIDIS, METHICILLIN RESISTANT: SIGNIFICANT CHANGE UP
ALBUMIN SERPL ELPH-MCNC: 2.1 G/DL — LOW (ref 3.5–5.2)
ALP SERPL-CCNC: 184 U/L — HIGH (ref 30–115)
ALT FLD-CCNC: 5 U/L — SIGNIFICANT CHANGE UP (ref 0–41)
ANION GAP SERPL CALC-SCNC: 8 MMOL/L — SIGNIFICANT CHANGE UP (ref 7–14)
AST SERPL-CCNC: 31 U/L — SIGNIFICANT CHANGE UP (ref 0–41)
BASOPHILS # BLD AUTO: 0.03 K/UL — SIGNIFICANT CHANGE UP (ref 0–0.2)
BASOPHILS NFR BLD AUTO: 0.3 % — SIGNIFICANT CHANGE UP (ref 0–1)
BILIRUB SERPL-MCNC: 0.4 MG/DL — SIGNIFICANT CHANGE UP (ref 0.2–1.2)
BUN SERPL-MCNC: 33 MG/DL — HIGH (ref 10–20)
CALCIUM SERPL-MCNC: 8.6 MG/DL — SIGNIFICANT CHANGE UP (ref 8.5–10.1)
CHLORIDE SERPL-SCNC: 99 MMOL/L — SIGNIFICANT CHANGE UP (ref 98–110)
CO2 SERPL-SCNC: 25 MMOL/L — SIGNIFICANT CHANGE UP (ref 17–32)
CREAT SERPL-MCNC: 0.9 MG/DL — SIGNIFICANT CHANGE UP (ref 0.7–1.5)
EOSINOPHIL # BLD AUTO: 0.19 K/UL — SIGNIFICANT CHANGE UP (ref 0–0.7)
EOSINOPHIL NFR BLD AUTO: 1.9 % — SIGNIFICANT CHANGE UP (ref 0–8)
GLUCOSE BLDC GLUCOMTR-MCNC: 108 MG/DL — HIGH (ref 70–99)
GLUCOSE BLDC GLUCOMTR-MCNC: 112 MG/DL — HIGH (ref 70–99)
GLUCOSE BLDC GLUCOMTR-MCNC: 112 MG/DL — HIGH (ref 70–99)
GLUCOSE BLDC GLUCOMTR-MCNC: 113 MG/DL — HIGH (ref 70–99)
GLUCOSE BLDC GLUCOMTR-MCNC: 115 MG/DL — HIGH (ref 70–99)
GLUCOSE SERPL-MCNC: 119 MG/DL — HIGH (ref 70–99)
GRAM STN FLD: SIGNIFICANT CHANGE UP
HCT VFR BLD CALC: 22.6 % — LOW (ref 42–52)
HGB BLD-MCNC: 7.1 G/DL — LOW (ref 14–18)
IMM GRANULOCYTES NFR BLD AUTO: 1.3 % — HIGH (ref 0.1–0.3)
LYMPHOCYTES # BLD AUTO: 1 K/UL — LOW (ref 1.2–3.4)
LYMPHOCYTES # BLD AUTO: 9.9 % — LOW (ref 20.5–51.1)
MCHC RBC-ENTMCNC: 26.1 PG — LOW (ref 27–31)
MCHC RBC-ENTMCNC: 31.4 G/DL — LOW (ref 32–37)
MCV RBC AUTO: 83.1 FL — SIGNIFICANT CHANGE UP (ref 80–94)
METHOD TYPE: SIGNIFICANT CHANGE UP
MONOCYTES # BLD AUTO: 0.92 K/UL — HIGH (ref 0.1–0.6)
MONOCYTES NFR BLD AUTO: 9.1 % — SIGNIFICANT CHANGE UP (ref 1.7–9.3)
NEUTROPHILS # BLD AUTO: 7.87 K/UL — HIGH (ref 1.4–6.5)
NEUTROPHILS NFR BLD AUTO: 77.5 % — HIGH (ref 42.2–75.2)
NRBC # BLD: 0 /100 WBCS — SIGNIFICANT CHANGE UP (ref 0–0)
PLATELET # BLD AUTO: 60 K/UL — LOW (ref 130–400)
POTASSIUM SERPL-MCNC: 4.4 MMOL/L — SIGNIFICANT CHANGE UP (ref 3.5–5)
POTASSIUM SERPL-SCNC: 4.4 MMOL/L — SIGNIFICANT CHANGE UP (ref 3.5–5)
PROT SERPL-MCNC: 5.9 G/DL — LOW (ref 6–8)
RBC # BLD: 2.72 M/UL — LOW (ref 4.7–6.1)
RBC # FLD: 17.4 % — HIGH (ref 11.5–14.5)
SODIUM SERPL-SCNC: 132 MMOL/L — LOW (ref 135–146)
WBC # BLD: 10.14 K/UL — SIGNIFICANT CHANGE UP (ref 4.8–10.8)
WBC # FLD AUTO: 10.14 K/UL — SIGNIFICANT CHANGE UP (ref 4.8–10.8)

## 2021-04-11 PROCEDURE — 99233 SBSQ HOSP IP/OBS HIGH 50: CPT

## 2021-04-11 RX ADMIN — Medication 1 APPLICATION(S): at 05:01

## 2021-04-11 RX ADMIN — Medication 1 APPLICATION(S): at 05:02

## 2021-04-11 RX ADMIN — CARBIDOPA AND LEVODOPA 2 TABLET(S): 25; 100 TABLET ORAL at 05:01

## 2021-04-11 RX ADMIN — SODIUM ZIRCONIUM CYCLOSILICATE 5 GRAM(S): 10 POWDER, FOR SUSPENSION ORAL at 05:03

## 2021-04-11 RX ADMIN — SODIUM ZIRCONIUM CYCLOSILICATE 5 GRAM(S): 10 POWDER, FOR SUSPENSION ORAL at 14:45

## 2021-04-11 RX ADMIN — MIDODRINE HYDROCHLORIDE 10 MILLIGRAM(S): 2.5 TABLET ORAL at 14:45

## 2021-04-11 RX ADMIN — Medication 100 MILLIGRAM(S): at 21:46

## 2021-04-11 RX ADMIN — ATORVASTATIN CALCIUM 40 MILLIGRAM(S): 80 TABLET, FILM COATED ORAL at 21:47

## 2021-04-11 RX ADMIN — SENNA PLUS 2 TABLET(S): 8.6 TABLET ORAL at 21:47

## 2021-04-11 RX ADMIN — MEROPENEM 100 MILLIGRAM(S): 1 INJECTION INTRAVENOUS at 21:47

## 2021-04-11 RX ADMIN — CARBIDOPA AND LEVODOPA 2 TABLET(S): 25; 100 TABLET ORAL at 14:45

## 2021-04-11 RX ADMIN — MEROPENEM 100 MILLIGRAM(S): 1 INJECTION INTRAVENOUS at 14:41

## 2021-04-11 RX ADMIN — Medication 100 MILLIGRAM(S): at 14:44

## 2021-04-11 RX ADMIN — CHLORHEXIDINE GLUCONATE 1 APPLICATION(S): 213 SOLUTION TOPICAL at 05:02

## 2021-04-11 RX ADMIN — MEROPENEM 100 MILLIGRAM(S): 1 INJECTION INTRAVENOUS at 05:01

## 2021-04-11 RX ADMIN — MIDODRINE HYDROCHLORIDE 10 MILLIGRAM(S): 2.5 TABLET ORAL at 21:47

## 2021-04-11 RX ADMIN — CARBIDOPA AND LEVODOPA 2 TABLET(S): 25; 100 TABLET ORAL at 21:47

## 2021-04-11 RX ADMIN — MIDODRINE HYDROCHLORIDE 10 MILLIGRAM(S): 2.5 TABLET ORAL at 05:01

## 2021-04-11 RX ADMIN — SODIUM ZIRCONIUM CYCLOSILICATE 5 GRAM(S): 10 POWDER, FOR SUSPENSION ORAL at 21:46

## 2021-04-11 RX ADMIN — Medication 100 MILLIGRAM(S): at 05:01

## 2021-04-11 RX ADMIN — CARBIDOPA AND LEVODOPA 2 TABLET(S): 25; 100 TABLET ORAL at 10:27

## 2021-04-11 NOTE — PROGRESS NOTE ADULT - SUBJECTIVE AND OBJECTIVE BOX
INTERVAL HPI/OVERNIGHT EVENTS:    SUBJECTIVE: Patient seen and examined at bedside.     unable to obtain ros  OBJECTIVE:    VITAL SIGNS:  Vital Signs Last 24 Hrs  T(C): 36.8 (11 Apr 2021 05:11), Max: 37.1 (10 Apr 2021 20:51)  T(F): 98.3 (11 Apr 2021 05:11), Max: 98.8 (10 Apr 2021 20:51)  HR: 73 (11 Apr 2021 05:11) (73 - 75)  BP: 113/59 (11 Apr 2021 05:11) (101/53 - 116/53)  BP(mean): 80 (11 Apr 2021 05:11) (69 - 80)  RR: 18 (11 Apr 2021 05:11) (18 - 18)  SpO2: 99% (10 Apr 2021 13:05) (99% - 99%)      PHYSICAL EXAM:    General: NAD  HEENT: NC/AT; PERRL, clear conjunctiva; ngt in place  Neck: supple  Respiratory: CTA b/l  Cardiovascular: +S1/S2; RRR  Abdomen: soft, NT/ND; +BS x4  Extremities: WWP, 2+ peripheral pulses b/l; no LE edema  Skin: normal color and turgor; no rash  Neurological:    MEDICATIONS:  MEDICATIONS  (STANDING):  atorvastatin 40 milliGRAM(s) Oral at bedtime  aztreonam  IVPB 2000 milliGRAM(s) IV Intermittent every 8 hours  carbidopa/levodopa  25/100 2 Tablet(s) Oral <User Schedule>  chlorhexidine 4% Liquid 1 Application(s) Topical <User Schedule>  collagenase Ointment 1 Application(s) Topical two times a day  Dakins Solution - 1/2 Strength 1 Application(s) Topical two times a day  meropenem  IVPB 1000 milliGRAM(s) IV Intermittent every 8 hours  midodrine 10 milliGRAM(s) Oral every 8 hours  senna 2 Tablet(s) Oral at bedtime  sodium zirconium cyclosilicate 5 Gram(s) Oral every 8 hours    MEDICATIONS  (PRN):  acetaminophen   Tablet .. 650 milliGRAM(s) Oral every 6 hours PRN Temp greater or equal to 38C (100.4F)      ALLERGIES:  Allergies    No Known Allergies    Intolerances        LABS:                        7.1    10.14 )-----------( 60       ( 11 Apr 2021 07:55 )             22.6     Hemoglobin: 7.1 g/dL (04-11 @ 07:55)  Hemoglobin: 7.6 g/dL (04-10 @ 04:30)  Hemoglobin: 7.8 g/dL (04-09 @ 06:34)  Hemoglobin: 7.6 g/dL (04-08 @ 07:20)  Hemoglobin: 7.9 g/dL (04-07 @ 11:59)    CBC Full  -  ( 11 Apr 2021 07:55 )  WBC Count : 10.14 K/uL  RBC Count : 2.72 M/uL  Hemoglobin : 7.1 g/dL  Hematocrit : 22.6 %  Platelet Count - Automated : 60 K/uL  Mean Cell Volume : 83.1 fL  Mean Cell Hemoglobin : 26.1 pg  Mean Cell Hemoglobin Concentration : 31.4 g/dL  Auto Neutrophil # : 7.87 K/uL  Auto Lymphocyte # : 1.00 K/uL  Auto Monocyte # : 0.92 K/uL  Auto Eosinophil # : 0.19 K/uL  Auto Basophil # : 0.03 K/uL  Auto Neutrophil % : 77.5 %  Auto Lymphocyte % : 9.9 %  Auto Monocyte % : 9.1 %  Auto Eosinophil % : 1.9 %  Auto Basophil % : 0.3 %    04-11    132<L>  |  99  |  33<H>  ----------------------------<  119<H>  4.4   |  25  |  0.9    Ca    8.6      11 Apr 2021 07:55    TPro  5.9<L>  /  Alb  2.1<L>  /  TBili  0.4  /  DBili  x   /  AST  31  /  ALT  5   /  AlkPhos  184<H>  04-11    Creatinine Trend: 0.9<--, 0.9<--, 0.8<--, 0.8<--, 0.7<--, 0.9<--  LIVER FUNCTIONS - ( 11 Apr 2021 07:55 )  Alb: 2.1 g/dL / Pro: 5.9 g/dL / ALK PHOS: 184 U/L / ALT: 5 U/L / AST: 31 U/L / GGT: x           PT/INR - ( 10 Apr 2021 04:30 )   PT: 13.50 sec;   INR: 1.17 ratio         PTT - ( 10 Apr 2021 04:30 )  PTT:42.3 sec    hs Troponin:              CSF:    RBC Count - Spinal Fluid: 9 /uL (04-01-21 @ 14:00)  Total Nucleated Cell Count, CSF: 0 /uL (04-01-21 @ 14:00)          Glucose, CSF: 97 mg/dL (04-01-21 @ 14:00)  Protein, CSF: 42 mg/dL (04-01-21 @ 14:00)            EKG:   MICROBIOLOGY:    Culture - Blood (collected 08 Apr 2021 16:00)  Source: .Blood Blood  Gram Stain (11 Apr 2021 08:45):    Growth in anaerobic bottle: Gram Positive Cocci in Clusters  Preliminary Report (11 Apr 2021 08:45):    Growth in anaerobic bottle: Gram Positive Cocci in Clusters    ***Blood Panel PCR results on this specimen are available    approximately 3 hours after the Gram stain result.***    Gram stain, PCR, and/or culture results may not always    correspond due todifference in methodologies.    ************************************************************    This PCR assay was performed by multiplex PCR. This    Assay tests for 66 bacterial and resistance gene targets.    Please refer to the [facility] testdirectory    at [email or web address] for details.  Organism: Blood Culture PCR (11 Apr 2021 10:36)  Organism: Blood Culture PCR (11 Apr 2021 10:36)    Culture - Blood (collected 08 Apr 2021 16:00)  Source: .Blood Blood-Peripheral  Preliminary Report (10 Apr 2021 01:02):    No growth to date.      IMAGING:      Labs, imaging, EKG personally reviewed    RADIOLOGY & ADDITIONAL TESTS: Reviewed.

## 2021-04-11 NOTE — PROGRESS NOTE ADULT - ASSESSMENT
71M PMHx parkinsons, dementia, HTN, DM2 here with altered mental status. Sepsis, not present on admission, due to sacral decub ulcer.    #Sepsis, not present on admission, due to sacral decub ulcer  resolved  s/p debridement  wcx 3/29, 3/31, kleb esbl, pseudomonas carbapenem resistant, vre  bcx staph epi one bottle  repeat bcx  aztreonam, radha  appreciate id  #Altered mental status, toxic metabolic encephalopathy  in setting of dementia  cth neg  reeg noted  npo, ngt in place  plan for peg with ir  transfuse one unit prbc now  #Thrombocytopenia  unclear etiology, may be due to keppra vs. sepsis  trend  scds  #Parkinsons  sinemet 50/ 200 qid  midodrine 10 tid  #HTN  controlled off medications  #DM2  controlled off medications  #DVT ppx  scds    #Progress Note Handoff:  Pending (specify):  Consults_________, Tests________, Test Results_______, Other___peg______  Family discussion:  Disposition: Home___/SNF___/Other________/Unknown at this time____x____

## 2021-04-12 LAB
ALBUMIN SERPL ELPH-MCNC: 2.1 G/DL — LOW (ref 3.5–5.2)
ALP SERPL-CCNC: 156 U/L — HIGH (ref 30–115)
ALT FLD-CCNC: 5 U/L — SIGNIFICANT CHANGE UP (ref 0–41)
ANION GAP SERPL CALC-SCNC: 9 MMOL/L — SIGNIFICANT CHANGE UP (ref 7–14)
AST SERPL-CCNC: 31 U/L — SIGNIFICANT CHANGE UP (ref 0–41)
BASOPHILS # BLD AUTO: 0.04 K/UL — SIGNIFICANT CHANGE UP (ref 0–0.2)
BASOPHILS NFR BLD AUTO: 0.4 % — SIGNIFICANT CHANGE UP (ref 0–1)
BILIRUB SERPL-MCNC: 0.4 MG/DL — SIGNIFICANT CHANGE UP (ref 0.2–1.2)
BLD GP AB SCN SERPL QL: SIGNIFICANT CHANGE UP
BUN SERPL-MCNC: 32 MG/DL — HIGH (ref 10–20)
CALCIUM SERPL-MCNC: 8.4 MG/DL — LOW (ref 8.5–10.1)
CHLORIDE SERPL-SCNC: 100 MMOL/L — SIGNIFICANT CHANGE UP (ref 98–110)
CO2 SERPL-SCNC: 25 MMOL/L — SIGNIFICANT CHANGE UP (ref 17–32)
CREAT SERPL-MCNC: 0.9 MG/DL — SIGNIFICANT CHANGE UP (ref 0.7–1.5)
CULTURE RESULTS: SIGNIFICANT CHANGE UP
EOSINOPHIL # BLD AUTO: 0.25 K/UL — SIGNIFICANT CHANGE UP (ref 0–0.7)
EOSINOPHIL NFR BLD AUTO: 2.4 % — SIGNIFICANT CHANGE UP (ref 0–8)
GLUCOSE BLDC GLUCOMTR-MCNC: 102 MG/DL — HIGH (ref 70–99)
GLUCOSE BLDC GLUCOMTR-MCNC: 102 MG/DL — HIGH (ref 70–99)
GLUCOSE BLDC GLUCOMTR-MCNC: 108 MG/DL — HIGH (ref 70–99)
GLUCOSE SERPL-MCNC: 124 MG/DL — HIGH (ref 70–99)
HCT VFR BLD CALC: 23.2 % — LOW (ref 42–52)
HGB BLD-MCNC: 7.5 G/DL — LOW (ref 14–18)
IMM GRANULOCYTES NFR BLD AUTO: 1 % — HIGH (ref 0.1–0.3)
LYMPHOCYTES # BLD AUTO: 0.84 K/UL — LOW (ref 1.2–3.4)
LYMPHOCYTES # BLD AUTO: 8.1 % — LOW (ref 20.5–51.1)
MCHC RBC-ENTMCNC: 26.3 PG — LOW (ref 27–31)
MCHC RBC-ENTMCNC: 32.3 G/DL — SIGNIFICANT CHANGE UP (ref 32–37)
MCV RBC AUTO: 81.4 FL — SIGNIFICANT CHANGE UP (ref 80–94)
MONOCYTES # BLD AUTO: 1.13 K/UL — HIGH (ref 0.1–0.6)
MONOCYTES NFR BLD AUTO: 10.9 % — HIGH (ref 1.7–9.3)
NEUTROPHILS # BLD AUTO: 7.98 K/UL — HIGH (ref 1.4–6.5)
NEUTROPHILS NFR BLD AUTO: 77.2 % — HIGH (ref 42.2–75.2)
NRBC # BLD: 0 /100 WBCS — SIGNIFICANT CHANGE UP (ref 0–0)
ORGANISM # SPEC MICROSCOPIC CNT: SIGNIFICANT CHANGE UP
ORGANISM # SPEC MICROSCOPIC CNT: SIGNIFICANT CHANGE UP
PLATELET # BLD AUTO: 99 K/UL — LOW (ref 130–400)
POTASSIUM SERPL-MCNC: 4.3 MMOL/L — SIGNIFICANT CHANGE UP (ref 3.5–5)
POTASSIUM SERPL-SCNC: 4.3 MMOL/L — SIGNIFICANT CHANGE UP (ref 3.5–5)
PROT SERPL-MCNC: 5.8 G/DL — LOW (ref 6–8)
RBC # BLD: 2.85 M/UL — LOW (ref 4.7–6.1)
RBC # FLD: 17.2 % — HIGH (ref 11.5–14.5)
SODIUM SERPL-SCNC: 134 MMOL/L — LOW (ref 135–146)
SPECIMEN SOURCE: SIGNIFICANT CHANGE UP
WBC # BLD: 10.34 K/UL — SIGNIFICANT CHANGE UP (ref 4.8–10.8)
WBC # FLD AUTO: 10.34 K/UL — SIGNIFICANT CHANGE UP (ref 4.8–10.8)

## 2021-04-12 PROCEDURE — 99233 SBSQ HOSP IP/OBS HIGH 50: CPT

## 2021-04-12 RX ORDER — MEROPENEM 1 G/30ML
1000 INJECTION INTRAVENOUS EVERY 8 HOURS
Refills: 0 | Status: DISCONTINUED | OUTPATIENT
Start: 2021-04-12 | End: 2021-04-12

## 2021-04-12 RX ORDER — ENOXAPARIN SODIUM 100 MG/ML
40 INJECTION SUBCUTANEOUS DAILY
Refills: 0 | Status: DISCONTINUED | OUTPATIENT
Start: 2021-04-12 | End: 2021-04-18

## 2021-04-12 RX ADMIN — Medication 1 APPLICATION(S): at 05:08

## 2021-04-12 RX ADMIN — ATORVASTATIN CALCIUM 40 MILLIGRAM(S): 80 TABLET, FILM COATED ORAL at 21:43

## 2021-04-12 RX ADMIN — Medication 1 APPLICATION(S): at 17:04

## 2021-04-12 RX ADMIN — CHLORHEXIDINE GLUCONATE 1 APPLICATION(S): 213 SOLUTION TOPICAL at 05:08

## 2021-04-12 RX ADMIN — MEROPENEM 100 MILLIGRAM(S): 1 INJECTION INTRAVENOUS at 05:08

## 2021-04-12 RX ADMIN — Medication 1 APPLICATION(S): at 05:10

## 2021-04-12 RX ADMIN — MIDODRINE HYDROCHLORIDE 10 MILLIGRAM(S): 2.5 TABLET ORAL at 14:15

## 2021-04-12 RX ADMIN — CARBIDOPA AND LEVODOPA 2 TABLET(S): 25; 100 TABLET ORAL at 05:07

## 2021-04-12 RX ADMIN — CARBIDOPA AND LEVODOPA 2 TABLET(S): 25; 100 TABLET ORAL at 14:15

## 2021-04-12 RX ADMIN — CARBIDOPA AND LEVODOPA 2 TABLET(S): 25; 100 TABLET ORAL at 21:44

## 2021-04-12 RX ADMIN — MIDODRINE HYDROCHLORIDE 10 MILLIGRAM(S): 2.5 TABLET ORAL at 05:07

## 2021-04-12 RX ADMIN — ENOXAPARIN SODIUM 40 MILLIGRAM(S): 100 INJECTION SUBCUTANEOUS at 14:32

## 2021-04-12 RX ADMIN — MIDODRINE HYDROCHLORIDE 10 MILLIGRAM(S): 2.5 TABLET ORAL at 21:43

## 2021-04-12 RX ADMIN — CARBIDOPA AND LEVODOPA 2 TABLET(S): 25; 100 TABLET ORAL at 10:26

## 2021-04-12 RX ADMIN — Medication 100 MILLIGRAM(S): at 05:09

## 2021-04-12 RX ADMIN — SODIUM ZIRCONIUM CYCLOSILICATE 5 GRAM(S): 10 POWDER, FOR SUSPENSION ORAL at 05:07

## 2021-04-12 RX ADMIN — SENNA PLUS 2 TABLET(S): 8.6 TABLET ORAL at 21:44

## 2021-04-12 NOTE — PROGRESS NOTE ADULT - SUBJECTIVE AND OBJECTIVE BOX
SUBJECTIVE:    Patient is a 71y old Male who presents with a chief complaint of Change in Mental Status (12 Apr 2021 09:02)    Overnight Events: Patient is stable, no acute events overnight.  VS are stable, patient still awake, not alert nor oriented.  Non verbal, not able to follow commands.    PAST MEDICAL & SURGICAL HISTORY  Parkinson disease    Hypertension    Gout    Dyslipidemia    Prostatitis    Cataract    No significant past surgical history      SOCIAL HISTORY:  Negative for smoking/alcohol/drug use.     ALLERGIES:  No Known Allergies    MEDICATIONS:  STANDING MEDICATIONS  atorvastatin 40 milliGRAM(s) Oral at bedtime  carbidopa/levodopa  25/100 2 Tablet(s) Oral <User Schedule>  chlorhexidine 4% Liquid 1 Application(s) Topical <User Schedule>  collagenase Ointment 1 Application(s) Topical two times a day  Dakins Solution - 1/2 Strength 1 Application(s) Topical two times a day  midodrine 10 milliGRAM(s) Oral every 8 hours  senna 2 Tablet(s) Oral at bedtime  sodium zirconium cyclosilicate 5 Gram(s) Oral every 8 hours    PRN MEDICATIONS  acetaminophen   Tablet .. 650 milliGRAM(s) Oral every 6 hours PRN    VITALS:   T(F): 98.3, Max: 98.5 (04-11-21 @ 20:57)  HR: 71 (71 - 75)  BP: 118/56 (108/54 - 122/59)  RR: 18 (18 - 18)  SpO2: 99% (99% - 99%)    LABS:                        7.5    10.34 )-----------( 99       ( 12 Apr 2021 08:39 )             23.2     04-12    134<L>  |  100  |  32<H>  ----------------------------<  124<H>  4.3   |  25  |  0.9    Ca    8.4<L>      12 Apr 2021 08:39    TPro  5.8<L>  /  Alb  2.1<L>  /  TBili  0.4  /  DBili  x   /  AST  31  /  ALT  5   /  AlkPhos  156<H>  04-12 04-11-21 @ 07:01  -  04-12-21 @ 07:00  --------------------------------------------------------  IN: 1840 mL / OUT: 200 mL / NET: 1640 mL        PHYSICAL EXAM:  GEN: NAD, comfortable  LUNGS: CTAB, no w/r/r  HEART: RRR, s1 and s2 appreciated, no m/r/g  ABD: soft, NT/ND, +BS  EXT: no edema, PP b/l  NEURO: awake, not alert or oriented, non verbal, not able to follow commands, responds to verbal and painful stimuli

## 2021-04-12 NOTE — PROGRESS NOTE ADULT - SUBJECTIVE AND OBJECTIVE BOX
NIEVES LABOY  71y, Male  Allergy: No Known Allergies      LOS  21d    CHIEF COMPLAINT: Change in Mental Status (11 Apr 2021 11:37)      INTERVAL EVENTS/HPI  - No acute events overnight  - T(F): , Max: 98.5 (04-11-21 @ 20:57)  - Tolerating medication  - WBC Count: 10.14 (04-11-21 @ 07:55)  WBC Count: 11.53 (04-10-21 @ 04:30)  - Creatinine, Serum: 0.9 (04-11-21 @ 07:55)       ROS  unable to obtain history secondary to patient's mental status and/or sedation     VITALS:  T(F): 98.3, Max: 98.5 (04-11-21 @ 20:57)  HR: 71  BP: 118/56  RR: 18Vital Signs Last 24 Hrs  T(C): 36.8 (12 Apr 2021 06:52), Max: 36.9 (11 Apr 2021 20:57)  T(F): 98.3 (12 Apr 2021 06:52), Max: 98.5 (11 Apr 2021 20:57)  HR: 71 (12 Apr 2021 06:52) (71 - 75)  BP: 118/56 (12 Apr 2021 06:52) (108/54 - 122/59)  BP(mean): 85 (11 Apr 2021 20:57) (85 - 85)  RR: 18 (12 Apr 2021 06:52) (18 - 18)  SpO2: 99% (11 Apr 2021 20:57) (99% - 99%)    PHYSICAL EXAM:  Gen: chronically ill appearing , contracted  HEENT: Normocephalic, atraumatic  Neck: supple, no lymphadenopathy  CV: Regular rate & regular rhythm  Lungs: decreased BS at bases, no fremitus  Abdomen: Soft, BS present  Ext: Warm, well perfused  Neuro: non focal, awake  Skin: no rash, no erythema  Lines: no phlebitis     FH: Non-contributory  Social Hx: Non-contributory    TESTS & MEASUREMENTS:                        7.1    10.14 )-----------( 60       ( 11 Apr 2021 07:55 )             22.6     04-11    132<L>  |  99  |  33<H>  ----------------------------<  119<H>  4.4   |  25  |  0.9    Ca    8.6      11 Apr 2021 07:55    TPro  5.9<L>  /  Alb  2.1<L>  /  TBili  0.4  /  DBili  x   /  AST  31  /  ALT  5   /  AlkPhos  184<H>  04-11      LIVER FUNCTIONS - ( 11 Apr 2021 07:55 )  Alb: 2.1 g/dL / Pro: 5.9 g/dL / ALK PHOS: 184 U/L / ALT: 5 U/L / AST: 31 U/L / GGT: x               Culture - Blood (collected 04-08-21 @ 16:00)  Source: .Blood Blood  Gram Stain (04-11-21 @ 08:45):    Growth in anaerobic bottle: Gram Positive Cocci in Clusters  Preliminary Report (04-11-21 @ 08:45):    Growth in anaerobic bottle: Gram Positive Cocci in Clusters    ***Blood Panel PCR results on this specimen are available    approximately 3 hours after the Gram stain result.***    Gram stain, PCR, and/or culture results may not always    correspond due todifference in methodologies.    ************************************************************    This PCR assay was performed by multiplex PCR. This    Assay tests for 66 bacterial and resistance gene targets.    Please refer to the [facility] testdirectory    at [email or web address] for details.  Organism: Blood Culture PCR (04-11-21 @ 10:36)  Organism: Blood Culture PCR (04-11-21 @ 10:36)      -  Staphylococcus epidermidis, Methicillin resistant: Detec      Method Type: PCR    Culture - Blood (collected 04-08-21 @ 16:00)  Source: .Blood Blood-Peripheral  Preliminary Report (04-10-21 @ 01:02):    No growth to date.    Culture - Blood (collected 04-02-21 @ 12:55)  Source: .Blood None  Final Report (04-07-21 @ 23:01):    No Growth Final    Culture - CSF with Gram Stain (collected 04-01-21 @ 14:00)  Source: .CSF CSF  Gram Stain (04-01-21 @ 21:44):    No polymorphonuclear cells seen    No organisms seen    by cytocentrifuge  Final Report (04-04-21 @ 15:36):    No growth    Culture - Acid Fast - Tissue w/Smear (collected 03-31-21 @ 16:18)  Source: .Tissue None  Preliminary Report (04-10-21 @ 15:03):    No growth at 1 week.    Culture - Tissue with Gram Stain (collected 03-31-21 @ 16:18)  Source: .Tissue None  Gram Stain (04-01-21 @ 07:05):    Few polymorphonuclear leukocytes seen per low power field    Numerous Gram Negative Rods seen per oil power field  Final Report (04-06-21 @ 11:01):    Numerous Klebsiella pneumoniae ESBL    Few Enterococcus faecalis (vancomycin resistant)    Few Pseudomonas aeruginosa (Carbapenem Resistant)  Organism: Klebsiella pneumoniae ESBL  Enterococcus faecalis (vancomycin resistant)  Pseudomonas aeruginosa (Carbapenem Resistant) (04-06-21 @ 11:01)  Organism: Pseudomonas aeruginosa (Carbapenem Resistant) (04-06-21 @ 11:01)      -  Amikacin: S <=16      -  Aztreonam: S 8      -  Cefepime: I 16      -  Ceftazidime: I 16      -  Ciprofloxacin: R >2      -  Gentamicin: I 8      -  Imipenem: R >8      -  Levofloxacin: R >4      -  Meropenem: R 8      -  Piperacillin/Tazobactam: I 64      -  Tobramycin: S <=2      Method Type: JAZMIN  Organism: Enterococcus faecalis (vancomycin resistant) (04-06-21 @ 11:01)      -  Ampicillin: S 8 Predicts results to ampicillin/sulbactam, amoxacillin-clavulanate and  piperacillin-tazobactam.      -  Daptomycin: S 1      -  Levofloxacin: R >4      -  Linezolid: S 1      -  Tetra/Doxy: R >8      -  Vancomycin: R >16      Method Type: JAZMIN  Organism: Klebsiella pneumoniae ESBL (04-06-21 @ 11:01)      -  Amikacin: S <=16      -  Amoxicillin/Clavulanic Acid: S <=8/4      -  Ampicillin: R >16 These ampicillin results predict results for amoxicillin      -  Ampicillin/Sulbactam: R >16/8 Enterobacter, Citrobacter, and Serratia may develop resistance during prolonged therapy (3-4 days)      -  Aztreonam: R >16      -  Cefazolin: R >16 Enterobacter, Citrobacter, and Serratia may develop resistance during prolonged therapy (3-4 days)      -  Cefepime: R >16      -  Cefoxitin: S <=8      -  Ceftriaxone: R >32 Enterobacter, Citrobacter, and Serratia may develop resistance during prolonged therapy      -  Ciprofloxacin: R >2      -  Ertapenem: S <=0.5      -  Gentamicin: S <=2      -  Imipenem: S <=1      -  Levofloxacin: R 2      -  Meropenem: S <=1      -  Piperacillin/Tazobactam: R <=8      -  Tobramycin: S <=2      -  Trimethoprim/Sulfamethoxazole: R >2/38      Method Type: JAZMIN    Culture - Other (collected 03-29-21 @ 17:15)  Source: .Other sacrum  Final Report (03-31-21 @ 16:53):    Numerous Klebsiella pneumoniae ESBL    Few Pseudomonas aeruginosa    Numerous Enterococcus faecalis (vancomycin resistant)  Organism: Klebsiella pneumoniae ESBL  Pseudomonas aeruginosa  Enterococcus faecalis (vancomycin resistant) (03-31-21 @ 16:53)  Organism: Enterococcus faecalis (vancomycin resistant) (03-31-21 @ 16:53)      -  Ampicillin: S <=2 Predicts results to ampicillin/sulbactam, amoxacillin-clavulanate and  piperacillin-tazobactam.      -  Daptomycin: S 1      -  Levofloxacin: R >4      -  Linezolid: S 2      -  Tetra/Doxy: R >8      -  Vancomycin: R >16      Method Type: JAZMIN  Organism: Pseudomonas aeruginosa (03-31-21 @ 16:53)      -  Amikacin: S <=16      -  Aztreonam: I 16      -  Cefepime: S 8      -  Ceftazidime: S 4      -  Ciprofloxacin: R >2      -  Gentamicin: I 8      -  Imipenem: I 4      -  Levofloxacin: R >4      -  Meropenem: S <=1      -  Piperacillin/Tazobactam: S 16      -  Tobramycin: S <=2      Method Type: JAZMIN  Organism: Klebsiella pneumoniae ESBL (03-31-21 @ 16:53)      -  Amikacin: S <=16      -  Amoxicillin/Clavulanic Acid: S <=8/4      -  Ampicillin: R >16 These ampicillin results predict results for amoxicillin      -  Ampicillin/Sulbactam: R >16/8 Enterobacter, Citrobacter, and Serratia may develop resistance during prolonged therapy (3-4 days)      -  Aztreonam: R >16      -  Cefazolin: R >16 Enterobacter, Citrobacter, and Serratia may develop resistance during prolonged therapy (3-4 days)      -  Cefepime: R >16      -  Cefoxitin: S <=8      -  Ceftriaxone: R >32 Enterobacter, Citrobacter, and Serratia may develop resistance during prolonged therapy      -  Ciprofloxacin: R >2      -  Ertapenem: S <=0.5      -  Gentamicin: S <=2      -  Imipenem: S <=1      -  Levofloxacin: R 2      -  Meropenem: S <=1      -  Piperacillin/Tazobactam: R <=8      -  Tobramycin: S <=2      -  Trimethoprim/Sulfamethoxazole: R >2/38      Method Type: JAZMIN    Culture - Blood (collected 03-28-21 @ 01:24)  Source: .Blood None  Final Report (04-02-21 @ 13:00):    No Growth Final    Culture - Blood (collected 03-25-21 @ 11:09)  Source: .Blood None  Gram Stain (03-28-21 @ 18:04):    Growth in anaerobic bottle: Gram Positive Cocci in Clusters  Final Report (03-29-21 @ 17:13):    Growth in anaerobic bottle: Staphylococcus pettenkoferi    Coag Negative Staphylococcus    Single set isolate, possible contaminant. Contact    Microbiology if susceptibility testing clinically    indicated.    ***Blood Panel PCR results on this specimen areavailable    approximately 3 hours after the Gram stain result.***    Gram stain, PCR, and/or culture results may not always    correspond due to difference in methodologies.    ************************************************************    This PCR assay wasperformed by multiplex PCR. This    Assay tests for 66 bacterial and resistance gene targets.    Please refer to the [email or web address] test directory    at [email or web address] for details.  Organism: Blood Culture PCR (03-29-21 @ 17:13)  Organism: Blood Culture PCR (03-29-21 @ 17:13)      -  Coagulase negative Staphylococcus: Detec      Method Type: PCR    Culture - Blood (collected 03-25-21 @ 05:47)  Source: .Blood None  Final Report (03-30-21 @ 14:01):    No Growth Final    Culture - Urine (collected 03-22-21 @ 20:41)  Source: .Urine Clean Catch (Midstream)  Final Report (03-23-21 @ 20:53):    No growth    Culture - Blood (collected 03-22-21 @ 15:31)  Source: .Blood Blood-Peripheral  Final Report (03-28-21 @ 02:33):    No Growth Final    Culture - Blood (collected 03-22-21 @ 15:28)  Source: .Blood Blood-Peripheral  Final Report (03-28-21 @ 02:33):    No Growth Final            INFECTIOUS DISEASES TESTING  COVID-19 PCR: NotDetec (04-07-21 @ 14:55)  Procalcitonin, Serum: 0.16 (04-06-21 @ 12:32)  Vancomycin Level, Random: 11.5 (03-31-21 @ 07:00)  Vancomycin Level, Trough: 11.5 (03-31-21 @ 07:00)  COVID-19 PCR: NotDetec (03-30-21 @ 15:19)  COVID-19 PCR: NotDetec (03-29-21 @ 19:40)  Vancomycin Level, Trough: <4.0 (03-28-21 @ 13:00)  Procalcitonin, Serum: 0.61 (03-26-21 @ 10:20)  COVID-19 PCR: NotDetec (03-22-21 @ 20:13)  COVID-19 PCR: NotDetec (03-10-21 @ 12:22)  COVID-19 PCR: NotDetec (03-01-21 @ 16:49)  Fungitell: 62 (03-01-21 @ 11:00)  Procalcitonin, Serum: 0.29 (03-01-21 @ 05:32)  MRSA PCR Result.: Negative (02-27-21 @ 15:44)  COVID-19 PCR: NotDetec (02-25-21 @ 15:34)  Procalcitonin, Serum: 0.27 (02-18-21 @ 10:54)  MRSA PCR Result.: Negative (02-14-21 @ 18:29)  HIV-1/2 Combo Result: Nonreact (02-14-21 @ 05:07)  Procalcitonin, Serum: 0.46 (02-13-21 @ 16:00)  COVID-19 PCR: NotDetec (02-12-21 @ 10:17)      INFLAMMATORY MARKERS      RADIOLOGY & ADDITIONAL TESTS:  I have personally reviewed the last available Chest xray  CXR      CT      CARDIOLOGY TESTING      MEDICATIONS  atorvastatin 40 Oral at bedtime  aztreonam  IVPB 2000 IV Intermittent every 8 hours  carbidopa/levodopa  25/100 2 Oral <User Schedule>  chlorhexidine 4% Liquid 1 Topical <User Schedule>  collagenase Ointment 1 Topical two times a day  Dakins Solution - 1/2 Strength 1 Topical two times a day  meropenem  IVPB 1000 IV Intermittent every 8 hours  midodrine 10 Oral every 8 hours  senna 2 Oral at bedtime  sodium zirconium cyclosilicate 5 Oral every 8 hours      WEIGHT  Weight (kg): 79.4 (03-31-21 @ 15:48)      ANTIBIOTICS:  aztreonam  IVPB 2000 milliGRAM(s) IV Intermittent every 8 hours  meropenem  IVPB 1000 milliGRAM(s) IV Intermittent every 8 hours      All available historical records have been reviewed

## 2021-04-12 NOTE — PROGRESS NOTE ADULT - ASSESSMENT
71 year old Male with past medical history of Parkinson's, dementia, CKD Stage 3, 1st degree AV block, HLD, gout, HTN, DM, fungal septicemia presenting from [facility] for acute decompensation in mental status. As per documentation patient at baseline is verbal but for past 3 days, he has been worsening to state of being non verbal. Patient was admitted to [facility] for fungemia and discharged on the 11th March. At  he had left hand cellulitis and was treated for it.    # Metabolic encephalopathy on top of baseline dementia  # Sepsis  # Necrotic sacral ulcer stage 4  # H/o recent fungemia  - CT Head No acute intracranial pathology.activity  -  Chest Xray No consolidation, effusion or pneumothorax.  -  Blood & Urine cxs NGTD   -  WCX GNR, 3/29   Numerous Klebsiella pneumoniae ESBL, Few Pseudomonas aeruginosa, Numerous Enterococcus faecalis (vancomycin resistant)  - evaluated by Burn: s/p debridement of sacrum on 3/30 .  no more new recommendation for surgery at this time .continue local wound care with santyl/dakins WTD.  - ID F/u: increase dose of meropenem to 1g q8hr run for 4 hrs, change Polymixin to Aztreonam due to hyponatremia  - S/p spinal tap --> CSF clear with no growths and neg PCR  -repeat EEG w/ no epileptiform activity but diffuse cerebral dysfxn  -d/w neuro, may d/c Keppra if Plts still dropping    #+ Staph epi in BCx x1 ?contaminant  -repeat BCx---> if negative, can proceed w/ PEG    #Lt shoulder dislocation w/ collection  -no need to tap as per IR and ID concurrs    # Hypotension  - c/w midodrine    # Thrombocytopenia ?sec to sepsis  -improved  - monitor platelets  -transfuse if <20K  -off Pepcid  -heme f/u appreciated    # Hyponatremia off Polymixin  - monitor  BMP    # H/o parkinsons disease  - c/w sinemet    # H/o chronic DVT  - [facility] Duplex Lower Ext Vein Scan, Bilat (03.27.21 @ 18:48) >No evidence of deep venous thrombosis or superficial thrombophlebitis in the bilateral lower extremities.  - continue holding eliquis due to thrombocytopenia    # Dyslipidemia  - c/w statin    # Nutrition: Tube feeds for now  -daughter agrees w/ PEG  -PEG by IR once BCx comes back negative    # DVT prophylaxis  -scd  -will start Lovenox    Very high risk pt. D/w daughter GOC: she wants full code for now. Very poor Px (daughter aware).    #Pending: clinical improvement vs demise, resolution of sepsis, thrombocytopenia, improved mental status, PEG placement  # Discussed w/ daughter in details who wants to cont aggressive Tx   # Disposition: SNF when/if stable    d/w Housestaff, nursing, case mgmt

## 2021-04-12 NOTE — PROGRESS NOTE ADULT - ASSESSMENT
71 year old Male with past medical history of Parkinson's, dementia, CKD Stage 3, 1st degree AV block, HLD, gout, HTN, DM, fungal septicemia presenting from [facility] for acute decompensation in mental status. As per documentation patient at baseline is verbal but for past 3 days, he has been worsening to state of being non verbal. Patient was admitted to [facility] for fungemia and discharged on the 11th March. At  he had left hand cellulitis and was treated for it. In this presentation, he was admitted for sudden AMS and sepsis secondary to stage 4 bed ulcers.     # Sepsis  # Metabolic encephalopathy  - CT Head No acute intracranial pathology  - EEG showed no epileptiform activity  - Chest Xray No consolidation, effusion or pneumothorax.  - Blood & Urine cxs NGTD on admission  - WCX from sacral ulcer: GNR, 3/29   Numerous Klebsiella pneumoniae ESBL, Few Pseudomonas aeruginosa, Numerous Enterococcus faecalis (vancomycin resistant)  - evaluated by Burn: s/p debridement of sacrumon 3/30 .  no more new recommendation for surgery at this time .continue local wound care with santyl/dakins WTD.  - s/p 7 days of antibiotics, he received meropenem, polymixin and aztreonam,  - S/p spinal tap for suspicion of meningitis--> CSF clear with no growths and neg PCR  - Blood cultures on 4/9 showed stap epi MRSA, likely contamination, will repeat f/u results  - was started on keppra on admission for suspicion of seizure, stopped because of thrombocytopenia and negative findings on EEG  - was planned for PEG placement today, procedure postponed given positive for blood cultures on 4/9, repeat blood culture and reschedule peg if negative    # Necrotic sacral ulcer stage4  - s/p debridement by burn  - finished course of abx  - local wound care    # Hypotension  - c/w midodrine  - BP stable    # Thrombocytopenia sec to sepsis  - monitor platelets    # H/o parkinsons disease  - c/w sinemet    # left Shoulder dislocation\  - seen on Xray shoulder and CT chest  - Xray Shoulder 2 Views, Left (03.23.21 @ 21:48) >Reidentified anterior dislocation of the humeral head overlying the left second rib with bony fragmentation along the glenoid and humeral head. AC joint degenerative change.  - Pt has chronic dislocation since june previous chart review.    - no ortho intervention  - pt had fluid accumulation -> Per ortho low risk for septic synovitis. IR will not intervene due to chronic nature of it.     # H/o chronic DVT  - VA Duplex Lower Ext Vein Scan, Bilat (03.27.21 @ 18:48) >No evidence of deep venous thrombosis or superficial thrombophlebitis in the bilateral lower extremities.  - continue holding eliquis    # Dyslipidemia  - c/w statin    # Nutrition: Tube feeds    # DVT prophylaxis  -scd    #Pending: clinical improvement. PEG placement  # Disposition: SNF when stable  Full code

## 2021-04-12 NOTE — PROGRESS NOTE ADULT - SUBJECTIVE AND OBJECTIVE BOX
Patient is a 71y old  Male who presents with a chief complaint of Change in Mental Status (05 Apr 2021 13:36)    INTERVAL HPI/OVERNIGHT EVENTS: Patient was examined and seen at bedside. Events noted. Remains lethargic.    InitialHPI:  71 year old Male with past medical history of Parkinson's, dementia, CKD Stage 3, 1st degree AV block, HLD, gout, HTN, DM, fungal septicemia presenting from [facility] for acute decompensation in mental status.     As per documentation patient at baseline is verbal but for past 3 days, he has been worsening to state of being non verbal. Patient was admitted to [facility] for fungemia and discharged on the 11th March. At  he had left hand cellulitis and was treated for it.    In ED patient hemodynamically stable, afebrile, ABEBE with rise in Cr to 1.9 and BUN to 75, remains non verbal. CT head negative for any new strokes and UA negative. Family has not visited the patient recently so unaware of his status. (22 Mar 2021 23:59)    PAST MEDICAL & SURGICAL HISTORY:  Parkinson disease    Hypertension    Gout    Dyslipidemia    Prostatitis    Cataract    No significant past surgical history        General: lethargic, awake, tracking, chronically ill appearing  HEENT:  no LAD  CV: S1 S2  Resp: decreased breath sounds at bases  GI: NT/ND/S +BS  MS: no clubbing/cyanosis/edema, + pulses b/l, LUE>RUE swelling  Neuro: unable to access  Skin: multiple skin decubs            MEDICATIONS  (STANDING):  atorvastatin 40 milliGRAM(s) Oral at bedtime  carbidopa/levodopa  25/100 2 Tablet(s) Oral <User Schedule>  chlorhexidine 4% Liquid 1 Application(s) Topical <User Schedule>  collagenase Ointment 1 Application(s) Topical two times a day  Dakins Solution - 1/2 Strength 1 Application(s) Topical two times a day  enoxaparin Injectable 40 milliGRAM(s) SubCutaneous daily  midodrine 10 milliGRAM(s) Oral every 8 hours  senna 2 Tablet(s) Oral at bedtime    MEDICATIONS  (PRN):  acetaminophen   Tablet .. 650 milliGRAM(s) Oral every 6 hours PRN Temp greater or equal to 38C (100.4F)    Home Medications:  atorvastatin 40 mg oral tablet: 1 tab(s) orally once a day (at bedtime) (23 Mar 2021 01:07)  carbidopa-levodopa 25 mg-100 mg oral tablet, extended release: 2 tab(s) orally 5 times a day at 6AM,10Am,2PM,6PM,10 PM (23 Mar 2021 01:07)  colchicine 0.6 mg oral tablet: 1 tab(s) orally once a day (23 Mar 2021 01:07)  Eliquis 5 mg oral tablet: 1 tab(s) orally 2 times a day (23 Mar 2021 01:07)  enalapril 5 mg oral tablet: 1 tab(s) orally once a day (23 Mar 2021 01:07)  omeprazole 40 mg oral delayed release capsule: 1 cap(s) orally once a day (23 Mar 2021 01:07)    Vital Signs Last 24 Hrs  T(C): 36.9 (12 Apr 2021 13:56), Max: 36.9 (11 Apr 2021 20:57)  T(F): 98.5 (12 Apr 2021 13:56), Max: 98.5 (11 Apr 2021 20:57)  HR: 75 (12 Apr 2021 13:56) (71 - 75)  BP: 120/58 (12 Apr 2021 13:56) (118/56 - 122/59)  BP(mean): 83 (12 Apr 2021 13:56) (83 - 85)  RR: 18 (12 Apr 2021 13:56) (18 - 18)  SpO2: 99% (11 Apr 2021 20:57) (99% - 99%)  CAPILLARY BLOOD GLUCOSE      POCT Blood Glucose.: 102 mg/dL (12 Apr 2021 11:35)  POCT Blood Glucose.: 108 mg/dL (12 Apr 2021 05:37)  POCT Blood Glucose.: 112 mg/dL (11 Apr 2021 23:17)  POCT Blood Glucose.: 113 mg/dL (11 Apr 2021 17:02)    LABS:                        7.5    10.34 )-----------( 99       ( 12 Apr 2021 08:39 )             23.2     04-12    134<L>  |  100  |  32<H>  ----------------------------<  124<H>  4.3   |  25  |  0.9    Ca    8.4<L>      12 Apr 2021 08:39    TPro  5.8<L>  /  Alb  2.1<L>  /  TBili  0.4  /  DBili  x   /  AST  31  /  ALT  5   /  AlkPhos  156<H>  04-12    LIVER FUNCTIONS - ( 12 Apr 2021 08:39 )  Alb: 2.1 g/dL / Pro: 5.8 g/dL / ALK PHOS: 156 U/L / ALT: 5 U/L / AST: 31 U/L / GGT: x                           Consultant Notes Reviewed:  [x ] YES  [ ] NO  Care Discussed with Consultants/Other Providers/ Housestaff [ x] YES  [ ] NO  Radiology, labs, new studies personally reviewed.

## 2021-04-12 NOTE — PROGRESS NOTE ADULT - ASSESSMENT
ASSESSMENT  71 year old Male with past medical history of Parkinson's, dementia, CKD Stage 3, 1st degree AV block, HLD, gout, HTN, DM, fungal septicemia presenting from [facility] for acute decompensation in mental status.     IMPRESSION  #CoNS in blood, likely contaminant     only PIVs    4/8 BCX 1/2 sets   -  Staphylococcus epidermidis, Methicillin resistant: Detec     3/25 BCX 1/2 sets, 1/4 bottles Staphylococcus pettenkoferi, likely contaminant   #Resolved Fever with sepsis, not present on admission with metabolic encephalopathy, EEG + seizure, possibly in setting of sepsis secondary to large sacral infected decub    LP not consistent with infection.. G/S NEGATIVE (on ABX)    Total Nucleated Cell Count, CSF: 0 /uL (04.01.21 @ 14:00)    Protein, CSF: 42 mg/dL (04.01.21 @ 14:00)    Glucose, CSF: 97 mg/dL (04.01.21 @ 14:00)    3/25 BCX 1/2 sets, 1/4 bottles Staphylococcus pettenkoferi, likely contaminant     CXR no PNA   3/22 Blood & Urine cxs NGTD   #Sacral ulcer- necrotic     3/31 WCX GNR    3/29   Numerous Klebsiella pneumoniae ESBL    Few CRE Pseudomonas aeruginosa (high MICs to AGs)    Numerous Enterococcus faecalis (vancomycin resistant)- S amp    seen by Burn sacrum with full thickness ~4x5cm with dark /brown devitalized tissue at base; no purulent drainage, no bleeding  #Recent Fungemia    Blood Cx 2/27 Candida albicans    evaluated by optho - no ocular evidence     TTE no significant valvulopathy   #Shoulder dislocation with effusion- joint exam not consistent with a septic arthritis  < from: CT Chest w/ IV Cont (04.07.21 @ 20:31) >  Similar appearance of the left shoulder with complex joint effusion, described in detail on prior exam CT shoulder 2/20/2021.  #ABEBE, resolved  #L hand edema  < from: Xray Wrist 2 Views, Left (03.27.21 @ 11:13) >No acute fracture or dislocation. Diffuse soft tissue swelling. Nonaggressive appearing lucency in the distal radius, indeterminate. Nonemergent MRI may be obtained for further evaluation.    Creatinine, Serum: 0.9 mg/dL (04.05.21 @ 06:31)      RECOMMENDATIONS  - SEND repeat BCX as coNS 4/8 BCX, if BCX NG, then cleared for PEG from ID standpoint  - D/C ABX , s/p 7 days for SSTI  - Trend fever curve and WBC  - Appreciate IR consult, Ortho consult, no plan for aspiration of shoulder. Clinically not consistent with septic arthritis of the shoulder  - Appreciate Burn consult   - Contact isolation CRE  - poor prognosis    If any questions, please call or send a message on [employer]  Spectra [phone]

## 2021-04-13 LAB
ALBUMIN SERPL ELPH-MCNC: 2.1 G/DL — LOW (ref 3.5–5.2)
ALP SERPL-CCNC: 148 U/L — HIGH (ref 30–115)
ALT FLD-CCNC: 5 U/L — SIGNIFICANT CHANGE UP (ref 0–41)
ANION GAP SERPL CALC-SCNC: 9 MMOL/L — SIGNIFICANT CHANGE UP (ref 7–14)
APTT BLD: 44.9 SEC — HIGH (ref 27–39.2)
AST SERPL-CCNC: 40 U/L — SIGNIFICANT CHANGE UP (ref 0–41)
BASOPHILS # BLD AUTO: 0.02 K/UL — SIGNIFICANT CHANGE UP (ref 0–0.2)
BASOPHILS NFR BLD AUTO: 0.3 % — SIGNIFICANT CHANGE UP (ref 0–1)
BILIRUB SERPL-MCNC: 0.3 MG/DL — SIGNIFICANT CHANGE UP (ref 0.2–1.2)
BLD GP AB SCN SERPL QL: SIGNIFICANT CHANGE UP
BUN SERPL-MCNC: 31 MG/DL — HIGH (ref 10–20)
CALCIUM SERPL-MCNC: 8.3 MG/DL — LOW (ref 8.5–10.1)
CHLORIDE SERPL-SCNC: 99 MMOL/L — SIGNIFICANT CHANGE UP (ref 98–110)
CO2 SERPL-SCNC: 26 MMOL/L — SIGNIFICANT CHANGE UP (ref 17–32)
CREAT SERPL-MCNC: 0.7 MG/DL — SIGNIFICANT CHANGE UP (ref 0.7–1.5)
EOSINOPHIL # BLD AUTO: 0.18 K/UL — SIGNIFICANT CHANGE UP (ref 0–0.7)
EOSINOPHIL NFR BLD AUTO: 2.6 % — SIGNIFICANT CHANGE UP (ref 0–8)
GLUCOSE BLDC GLUCOMTR-MCNC: 100 MG/DL — HIGH (ref 70–99)
GLUCOSE BLDC GLUCOMTR-MCNC: 103 MG/DL — HIGH (ref 70–99)
GLUCOSE BLDC GLUCOMTR-MCNC: 103 MG/DL — HIGH (ref 70–99)
GLUCOSE BLDC GLUCOMTR-MCNC: 93 MG/DL — SIGNIFICANT CHANGE UP (ref 70–99)
GLUCOSE SERPL-MCNC: 130 MG/DL — HIGH (ref 70–99)
HCT VFR BLD CALC: 22.9 % — LOW (ref 42–52)
HGB BLD-MCNC: 7.2 G/DL — LOW (ref 14–18)
IMM GRANULOCYTES NFR BLD AUTO: 0.6 % — HIGH (ref 0.1–0.3)
INR BLD: 1.22 RATIO — SIGNIFICANT CHANGE UP (ref 0.65–1.3)
LYMPHOCYTES # BLD AUTO: 0.73 K/UL — LOW (ref 1.2–3.4)
LYMPHOCYTES # BLD AUTO: 10.6 % — LOW (ref 20.5–51.1)
MCHC RBC-ENTMCNC: 26 PG — LOW (ref 27–31)
MCHC RBC-ENTMCNC: 31.4 G/DL — LOW (ref 32–37)
MCV RBC AUTO: 82.7 FL — SIGNIFICANT CHANGE UP (ref 80–94)
MONOCYTES # BLD AUTO: 0.62 K/UL — HIGH (ref 0.1–0.6)
MONOCYTES NFR BLD AUTO: 9 % — SIGNIFICANT CHANGE UP (ref 1.7–9.3)
NEUTROPHILS # BLD AUTO: 5.3 K/UL — SIGNIFICANT CHANGE UP (ref 1.4–6.5)
NEUTROPHILS NFR BLD AUTO: 76.9 % — HIGH (ref 42.2–75.2)
NRBC # BLD: 0 /100 WBCS — SIGNIFICANT CHANGE UP (ref 0–0)
PLATELET # BLD AUTO: 127 K/UL — LOW (ref 130–400)
POTASSIUM SERPL-MCNC: 4.2 MMOL/L — SIGNIFICANT CHANGE UP (ref 3.5–5)
POTASSIUM SERPL-SCNC: 4.2 MMOL/L — SIGNIFICANT CHANGE UP (ref 3.5–5)
PROT SERPL-MCNC: 6 G/DL — SIGNIFICANT CHANGE UP (ref 6–8)
PROTHROM AB SERPL-ACNC: 14 SEC — HIGH (ref 9.95–12.87)
RBC # BLD: 2.77 M/UL — LOW (ref 4.7–6.1)
RBC # FLD: 17.1 % — HIGH (ref 11.5–14.5)
SARS-COV-2 RNA SPEC QL NAA+PROBE: SIGNIFICANT CHANGE UP
SODIUM SERPL-SCNC: 134 MMOL/L — LOW (ref 135–146)
WBC # BLD: 6.89 K/UL — SIGNIFICANT CHANGE UP (ref 4.8–10.8)
WBC # FLD AUTO: 6.89 K/UL — SIGNIFICANT CHANGE UP (ref 4.8–10.8)

## 2021-04-13 PROCEDURE — 99233 SBSQ HOSP IP/OBS HIGH 50: CPT

## 2021-04-13 RX ORDER — SODIUM CHLORIDE 9 MG/ML
1000 INJECTION, SOLUTION INTRAVENOUS
Refills: 0 | Status: DISCONTINUED | OUTPATIENT
Start: 2021-04-13 | End: 2021-04-21

## 2021-04-13 RX ADMIN — CARBIDOPA AND LEVODOPA 2 TABLET(S): 25; 100 TABLET ORAL at 05:27

## 2021-04-13 RX ADMIN — MIDODRINE HYDROCHLORIDE 10 MILLIGRAM(S): 2.5 TABLET ORAL at 13:34

## 2021-04-13 RX ADMIN — CHLORHEXIDINE GLUCONATE 1 APPLICATION(S): 213 SOLUTION TOPICAL at 05:27

## 2021-04-13 RX ADMIN — ENOXAPARIN SODIUM 40 MILLIGRAM(S): 100 INJECTION SUBCUTANEOUS at 11:02

## 2021-04-13 RX ADMIN — Medication 1 APPLICATION(S): at 05:28

## 2021-04-13 RX ADMIN — Medication 1 APPLICATION(S): at 05:26

## 2021-04-13 RX ADMIN — ATORVASTATIN CALCIUM 40 MILLIGRAM(S): 80 TABLET, FILM COATED ORAL at 21:06

## 2021-04-13 RX ADMIN — CARBIDOPA AND LEVODOPA 2 TABLET(S): 25; 100 TABLET ORAL at 21:06

## 2021-04-13 RX ADMIN — SENNA PLUS 2 TABLET(S): 8.6 TABLET ORAL at 21:06

## 2021-04-13 RX ADMIN — CARBIDOPA AND LEVODOPA 2 TABLET(S): 25; 100 TABLET ORAL at 13:34

## 2021-04-13 RX ADMIN — SODIUM CHLORIDE 60 MILLILITER(S): 9 INJECTION, SOLUTION INTRAVENOUS at 17:06

## 2021-04-13 RX ADMIN — Medication 1 APPLICATION(S): at 17:05

## 2021-04-13 RX ADMIN — CARBIDOPA AND LEVODOPA 2 TABLET(S): 25; 100 TABLET ORAL at 11:02

## 2021-04-13 RX ADMIN — MIDODRINE HYDROCHLORIDE 10 MILLIGRAM(S): 2.5 TABLET ORAL at 05:27

## 2021-04-13 RX ADMIN — MIDODRINE HYDROCHLORIDE 10 MILLIGRAM(S): 2.5 TABLET ORAL at 21:06

## 2021-04-13 NOTE — PROGRESS NOTE ADULT - ASSESSMENT
71 year old Male with past medical history of Parkinson's, dementia, CKD Stage 3, 1st degree AV block, HLD, gout, HTN, DM, fungal septicemia presenting from Health system for acute decompensation in mental status. As per documentation patient at baseline is verbal but for past 3 days, he has been worsening to state of being non verbal. Patient was admitted to [facility] for fungemia and discharged on the 11th March. At  he had left hand cellulitis and was treated for it.    # Metabolic encephalopathy on top of baseline dementia  # Sepsis  # Necrotic sacral ulcer stage 4  # H/o recent fungemia  - CT Head No acute intracranial pathology.activity  -  Chest Xray No consolidation, effusion or pneumothorax.  -  Blood & Urine cxs NGTD   -  WCX GNR, 3/29   Numerous Klebsiella pneumoniae ESBL, Few Pseudomonas aeruginosa, Numerous Enterococcus faecalis (vancomycin resistant)  - evaluated by Burn: s/p debridement of sacrum on 3/30 .  no more new recommendation for surgery at this time .continue local wound care with santyl/dakins WTD.  - ID F/u: increase dose of meropenem to 1g q8hr run for 4 hrs, change Polymixin to Aztreonam due to hyponatremia  - S/p spinal tap --> CSF clear with no growths and neg PCR  -repeat EEG w/ no epileptiform activity but diffuse cerebral dysfxn  -d/w neuro, may d/c Keppra if Plts still dropping    #+ Staph epi in BCx x1 ?contaminant  -repeat BCx---> if negative, can proceed w/ PEG    #Lt shoulder dislocation w/ collection  -no need to tap as per IR and ID concurrs    # Hypotension  - c/w midodrine    # Thrombocytopenia ?sec to sepsis  -improved  - monitor platelets  -transfuse if <20K  -off Pepcid  -heme f/u appreciated    # Hyponatremia off Polymixin  - monitor  BMP    # H/o parkinsons disease  - c/w sinemet    # H/o chronic DVT  - [facility] Duplex Lower Ext Vein Scan, Bilat (03.27.21 @ 18:48) >No evidence of deep venous thrombosis or superficial thrombophlebitis in the bilateral lower extremities.  - continue holding eliquis due to thrombocytopenia    # Dyslipidemia  - c/w statin    # Nutrition: Tube feeds for now  -daughter agrees w/ PEG  -PEG by IR once BCx comes back negative    # DVT prophylaxis  -scd  -Lovenox    Very high risk pt. D/w daughter GOC: she wants full code for now. Very poor Px (daughter aware).    #Pending: clinical improvement vs demise, resolution of sepsis, thrombocytopenia, improved mental status, PEG placement  # Discussed w/ daughter in details who wants to cont aggressive Tx   # Disposition: SNF when/if stable    d/w Housestaff, nursing, case mgmt

## 2021-04-13 NOTE — SWALLOW BEDSIDE ASSESSMENT ADULT - SWALLOW EVAL: DIAGNOSIS
mild to mod oral dysphagia for puree and nectar thick liquids w/o overt s/s penetration/aspiration. pt w/ insufficient acceptance of PO trials on this date, and not appropriate for a PO diet at this time.

## 2021-04-13 NOTE — SWALLOW BEDSIDE ASSESSMENT ADULT - SLP GENERAL OBSERVATIONS
Pt received sitting upright in bed, awake and alert. pt inconsistently following commands. +room air. verbally responsive at times (in Y[place]) to his children.

## 2021-04-13 NOTE — PROGRESS NOTE ADULT - SUBJECTIVE AND OBJECTIVE BOX
SUBJECTIVE:    Patient is a 71y old Male who presents with a chief complaint of Change in Mental Status (12 Apr 2021 15:40)    Overnight Events: Patient is stable, no acute events overnight.  He is more awake today, mumbling some incomprehensible words but not able to follow commands.  VS are stable. He does not look in distress.    PAST MEDICAL & SURGICAL HISTORY  Parkinson disease    Hypertension    Gout    Dyslipidemia    Prostatitis    Cataract    No significant past surgical history      SOCIAL HISTORY:  Negative for smoking/alcohol/drug use.     ALLERGIES:  No Known Allergies    MEDICATIONS:  STANDING MEDICATIONS  atorvastatin 40 milliGRAM(s) Oral at bedtime  carbidopa/levodopa  25/100 2 Tablet(s) Oral <User Schedule>  chlorhexidine 4% Liquid 1 Application(s) Topical <User Schedule>  collagenase Ointment 1 Application(s) Topical two times a day  Dakins Solution - 1/2 Strength 1 Application(s) Topical two times a day  enoxaparin Injectable 40 milliGRAM(s) SubCutaneous daily  midodrine 10 milliGRAM(s) Oral every 8 hours  senna 2 Tablet(s) Oral at bedtime    PRN MEDICATIONS  acetaminophen   Tablet .. 650 milliGRAM(s) Oral every 6 hours PRN    VITALS:   T(F): 98.2, Max: 98.5 (04-12-21 @ 13:56)  HR: 70 (70 - 75)  BP: 117/58 (117/58 - 132/58)  RR: 20 (18 - 20)  SpO2: --    LABS:                        7.5    10.34 )-----------( 99       ( 12 Apr 2021 08:39 )             23.2     04-12    134<L>  |  100  |  32<H>  ----------------------------<  124<H>  4.3   |  25  |  0.9    Ca    8.4<L>      12 Apr 2021 08:39    TPro  5.8<L>  /  Alb  2.1<L>  /  TBili  0.4  /  DBili  x   /  AST  31  /  ALT  5   /  AlkPhos  156<H>  04-12 04-12-21 @ 07:01  -  04-13-21 @ 07:00  --------------------------------------------------------  IN: 460 mL / OUT: 1000 mL / NET: -540 mL        PHYSICAL EXAM:  GEN: NAD, comfortable  LUNGS: CTAB, no w/r/r  HEART: RRR, s1 and s2 appreciated, no m/r/g  ABD: soft, NT/ND, +BS  EXT: no edema, PP b/l  NEURO: awake, alert, not oriented, mumbling incomprehensible words, not able to follow commands

## 2021-04-13 NOTE — PROGRESS NOTE ADULT - SUBJECTIVE AND OBJECTIVE BOX
Patient is a 71y old  Male who presents with a chief complaint of Change in Mental Status (05 Apr 2021 13:36)    INTERVAL HPI/OVERNIGHT EVENTS: Patient was examined and seen at bedside. Events noted. More awake and alert today but not following commands and not communicating.    InitialHPI:  71 year old Male with past medical history of Parkinson's, dementia, CKD Stage 3, 1st degree AV block, HLD, gout, HTN, DM, fungal septicemia presenting from [facility] for acute decompensation in mental status.     As per documentation patient at baseline is verbal but for past 3 days, he has been worsening to state of being non verbal. Patient was admitted to [facility] for fungemia and discharged on the 11th March. At  he had left hand cellulitis and was treated for it.    In ED patient hemodynamically stable, afebrile, ABEBE with rise in Cr to 1.9 and BUN to 75, remains non verbal. CT head negative for any new strokes and UA negative. Family has not visited the patient recently so unaware of his status. (22 Mar 2021 23:59)    PAST MEDICAL & SURGICAL HISTORY:  Parkinson disease    Hypertension    Gout    Dyslipidemia    Prostatitis    Cataract    No significant past surgical history        General: NAD, awake, tracking, chronically ill appearing  HEENT:  no LAD  CV: S1 S2  Resp: decreased breath sounds at bases  GI: NT/ND/S +BS  MS: no clubbing/cyanosis/edema, + pulses b/l, LUE>RUE swelling  Neuro: unable to access  Skin: multiple skin decubs            MEDICATIONS  (STANDING):  atorvastatin 40 milliGRAM(s) Oral at bedtime  carbidopa/levodopa  25/100 2 Tablet(s) Oral <User Schedule>  chlorhexidine 4% Liquid 1 Application(s) Topical <User Schedule>  collagenase Ointment 1 Application(s) Topical two times a day  Dakins Solution - 1/2 Strength 1 Application(s) Topical two times a day  dextrose 5% + sodium chloride 0.9%. 1000 milliLiter(s) (60 mL/Hr) IV Continuous <Continuous>  enoxaparin Injectable 40 milliGRAM(s) SubCutaneous daily  midodrine 10 milliGRAM(s) Oral every 8 hours  senna 2 Tablet(s) Oral at bedtime    MEDICATIONS  (PRN):  acetaminophen   Tablet .. 650 milliGRAM(s) Oral every 6 hours PRN Temp greater or equal to 38C (100.4F)    Home Medications:  atorvastatin 40 mg oral tablet: 1 tab(s) orally once a day (at bedtime) (23 Mar 2021 01:07)  carbidopa-levodopa 25 mg-100 mg oral tablet, extended release: 2 tab(s) orally 5 times a day at 6AM,10Am,2PM,6PM,10 PM (23 Mar 2021 01:07)  colchicine 0.6 mg oral tablet: 1 tab(s) orally once a day (23 Mar 2021 01:07)  Eliquis 5 mg oral tablet: 1 tab(s) orally 2 times a day (23 Mar 2021 01:07)  enalapril 5 mg oral tablet: 1 tab(s) orally once a day (23 Mar 2021 01:07)  omeprazole 40 mg oral delayed release capsule: 1 cap(s) orally once a day (23 Mar 2021 01:07)    Vital Signs Last 24 Hrs  T(C): 37.7 (13 Apr 2021 13:09), Max: 37.7 (13 Apr 2021 13:09)  T(F): 99.8 (13 Apr 2021 13:09), Max: 99.8 (13 Apr 2021 13:09)  HR: 81 (13 Apr 2021 13:09) (70 - 81)  BP: 112/62 (13 Apr 2021 13:09) (112/62 - 132/58)  BP(mean): 77 (13 Apr 2021 13:09) (77 - 84)  RR: 18 (13 Apr 2021 13:09) (18 - 20)  SpO2: --  CAPILLARY BLOOD GLUCOSE      POCT Blood Glucose.: 103 mg/dL (13 Apr 2021 11:28)  POCT Blood Glucose.: 103 mg/dL (13 Apr 2021 05:40)  POCT Blood Glucose.: 102 mg/dL (12 Apr 2021 16:59)    LABS:                        7.2    6.89  )-----------( 127      ( 13 Apr 2021 07:34 )             22.9     04-13    134<L>  |  99  |  31<H>  ----------------------------<  130<H>  4.2   |  26  |  0.7    Ca    8.3<L>      13 Apr 2021 07:34    TPro  6.0  /  Alb  2.1<L>  /  TBili  0.3  /  DBili  x   /  AST  40  /  ALT  5   /  AlkPhos  148<H>  04-13    LIVER FUNCTIONS - ( 13 Apr 2021 07:34 )  Alb: 2.1 g/dL / Pro: 6.0 g/dL / ALK PHOS: 148 U/L / ALT: 5 U/L / AST: 40 U/L / GGT: x               PT/INR - ( 13 Apr 2021 07:34 )   PT: 14.00 sec;   INR: 1.22 ratio         PTT - ( 13 Apr 2021 07:34 )  PTT:44.9 sec            Consultant Notes Reviewed:  [x ] YES  [ ] NO  Care Discussed with Consultants/Other Providers/ Housestaff [ x] YES  [ ] NO  Radiology, labs, new studies personally reviewed.

## 2021-04-13 NOTE — SWALLOW BEDSIDE ASSESSMENT ADULT - SLP PERTINENT HISTORY OF CURRENT PROBLEM
72 y/o M presented initially 3/22 from [facility] for AMS; sepsis 2' infected stage 4 sacral ulcer and metabolic encephalopathy. EEG (-), CTH (-), CXR (-), CSF (-). Pt known to acute SLP service this admission w/ most recent recs for NPO w/ NGT 2' pt completely unaware of feeding task. PMHx: Parkinson's, dementia, CKD Stage 3, HTN, DM. 70 y/o M presented initially 3/22 from [facility] for AMS; sepsis 2' infected stage 4 sacral ulcer and metabolic encephalopathy. EEG (-), CTH (-), CXR (-), CSF (-). Pt known to acute SLP service this admission w/ most recent recs for NPO w/ NGT 2' pt completely unaware of feeding task. Pt was planned for PEG tube 4/12, but +blood cultures 4/9 so placement postponed. PMHx: Parkinson's, dementia, CKD Stage 3, HTN, DM.

## 2021-04-13 NOTE — PROGRESS NOTE ADULT - ASSESSMENT
71 year old Male with past medical history of Parkinson's, dementia, CKD Stage 3, 1st degree AV block, HLD, gout, HTN, DM, fungal septicemia presenting from [facility] for acute decompensation in mental status. As per documentation patient at baseline is verbal but for past 3 days, he has been worsening to state of being non verbal. Patient was admitted to [facility] for fungemia and discharged on the 11th March. At  he had left hand cellulitis and was treated for it. In this presentation, he was admitted for sudden AMS and sepsis secondary to stage 4 bed ulcers.     # Sepsis  # Metabolic encephalopathy  - CT Head No acute intracranial pathology  - EEG showed no epileptiform activity  - Chest Xray No consolidation, effusion or pneumothorax.  - Blood & Urine cxs negative on admission  - WCX from sacral ulcer: GNR, 3/29   Numerous Klebsiella pneumoniae ESBL, Few Pseudomonas aeruginosa, Numerous Enterococcus faecalis (vancomycin resistant)  - evaluated by Burn: s/p debridement of sacrumon 3/30 .  no more new recommendation for surgery at this time .continue local wound care with santyl/dakins WTD.  - s/p 7 days of antibiotics, he received meropenem, polymixin and aztreonam,  - S/p spinal tap for suspicion of meningitis--> CSF clear with no growths and neg PCR  - Blood cultures on 4/9 showed stap epi MRSA, likely contamination, will repeat f/u results  - was started on keppra on admission for suspicion of seizure, stopped because of thrombocytopenia and negative findings on EEG  - was planned for PEG placement on Monday, procedure postponed given positive for blood cultures on 4/9, repeat blood culture and reschedule peg if negative    # Necrotic sacral ulcer stage4  - s/p debridement by burn  - finished course of abx  - local wound care    # Hypotension  - c/w midodrine  - BP stable    # Thrombocytopenia sec to sepsis  - monitor platelets    # H/o parkinsons disease  - c/w sinemet    # left Shoulder dislocation\  - seen on Xray shoulder and CT chest  - Xray Shoulder 2 Views, Left (03.23.21 @ 21:48) >Reidentified anterior dislocation of the humeral head overlying the left second rib with bony fragmentation along the glenoid and humeral head. AC joint degenerative change.  - Pt has chronic dislocation since june previous chart review.    - no ortho intervention  - pt had fluid accumulation -> Per ortho low risk for septic synovitis. IR will not intervene due to chronic nature of it.     # H/o chronic DVT  - VA Duplex Lower Ext Vein Scan, Bilat (03.27.21 @ 18:48) >No evidence of deep venous thrombosis or superficial thrombophlebitis in the bilateral lower extremities.  - continue holding eliquis    # Dyslipidemia  - c/w statin    # Nutrition: Tube feeds    # DVT prophylaxis  -scd    #Pending: clinical improvement. PEG placement  # Disposition: SNF when stable  Full code

## 2021-04-14 LAB
ALBUMIN SERPL ELPH-MCNC: 2.1 G/DL — LOW (ref 3.5–5.2)
ALP SERPL-CCNC: 157 U/L — HIGH (ref 30–115)
ALT FLD-CCNC: <5 U/L — SIGNIFICANT CHANGE UP (ref 0–41)
ANION GAP SERPL CALC-SCNC: 9 MMOL/L — SIGNIFICANT CHANGE UP (ref 7–14)
AST SERPL-CCNC: 41 U/L — SIGNIFICANT CHANGE UP (ref 0–41)
BASOPHILS # BLD AUTO: 0.03 K/UL — SIGNIFICANT CHANGE UP (ref 0–0.2)
BASOPHILS NFR BLD AUTO: 0.5 % — SIGNIFICANT CHANGE UP (ref 0–1)
BILIRUB SERPL-MCNC: 0.6 MG/DL — SIGNIFICANT CHANGE UP (ref 0.2–1.2)
BUN SERPL-MCNC: 27 MG/DL — HIGH (ref 10–20)
CALCIUM SERPL-MCNC: 8.4 MG/DL — LOW (ref 8.5–10.1)
CHLORIDE SERPL-SCNC: 99 MMOL/L — SIGNIFICANT CHANGE UP (ref 98–110)
CO2 SERPL-SCNC: 24 MMOL/L — SIGNIFICANT CHANGE UP (ref 17–32)
CREAT SERPL-MCNC: 0.7 MG/DL — SIGNIFICANT CHANGE UP (ref 0.7–1.5)
CULTURE RESULTS: SIGNIFICANT CHANGE UP
EOSINOPHIL # BLD AUTO: 0.12 K/UL — SIGNIFICANT CHANGE UP (ref 0–0.7)
EOSINOPHIL NFR BLD AUTO: 2.2 % — SIGNIFICANT CHANGE UP (ref 0–8)
GLUCOSE BLDC GLUCOMTR-MCNC: 102 MG/DL — HIGH (ref 70–99)
GLUCOSE BLDC GLUCOMTR-MCNC: 105 MG/DL — HIGH (ref 70–99)
GLUCOSE BLDC GLUCOMTR-MCNC: 94 MG/DL — SIGNIFICANT CHANGE UP (ref 70–99)
GLUCOSE BLDC GLUCOMTR-MCNC: 95 MG/DL — SIGNIFICANT CHANGE UP (ref 70–99)
GLUCOSE SERPL-MCNC: 82 MG/DL — SIGNIFICANT CHANGE UP (ref 70–99)
HCT VFR BLD CALC: 23 % — LOW (ref 42–52)
HGB BLD-MCNC: 7.1 G/DL — LOW (ref 14–18)
IMM GRANULOCYTES NFR BLD AUTO: 0.4 % — HIGH (ref 0.1–0.3)
LYMPHOCYTES # BLD AUTO: 0.87 K/UL — LOW (ref 1.2–3.4)
LYMPHOCYTES # BLD AUTO: 15.8 % — LOW (ref 20.5–51.1)
MCHC RBC-ENTMCNC: 26.1 PG — LOW (ref 27–31)
MCHC RBC-ENTMCNC: 30.9 G/DL — LOW (ref 32–37)
MCV RBC AUTO: 84.6 FL — SIGNIFICANT CHANGE UP (ref 80–94)
MONOCYTES # BLD AUTO: 0.47 K/UL — SIGNIFICANT CHANGE UP (ref 0.1–0.6)
MONOCYTES NFR BLD AUTO: 8.5 % — SIGNIFICANT CHANGE UP (ref 1.7–9.3)
NEUTROPHILS # BLD AUTO: 4.01 K/UL — SIGNIFICANT CHANGE UP (ref 1.4–6.5)
NEUTROPHILS NFR BLD AUTO: 72.6 % — SIGNIFICANT CHANGE UP (ref 42.2–75.2)
NRBC # BLD: 0 /100 WBCS — SIGNIFICANT CHANGE UP (ref 0–0)
PLATELET # BLD AUTO: 149 K/UL — SIGNIFICANT CHANGE UP (ref 130–400)
POTASSIUM SERPL-MCNC: 4 MMOL/L — SIGNIFICANT CHANGE UP (ref 3.5–5)
POTASSIUM SERPL-SCNC: 4 MMOL/L — SIGNIFICANT CHANGE UP (ref 3.5–5)
PROT SERPL-MCNC: 6.2 G/DL — SIGNIFICANT CHANGE UP (ref 6–8)
RBC # BLD: 2.72 M/UL — LOW (ref 4.7–6.1)
RBC # FLD: 17.1 % — HIGH (ref 11.5–14.5)
SARS-COV-2 RNA SPEC QL NAA+PROBE: SIGNIFICANT CHANGE UP
SODIUM SERPL-SCNC: 132 MMOL/L — LOW (ref 135–146)
SPECIMEN SOURCE: SIGNIFICANT CHANGE UP
WBC # BLD: 5.52 K/UL — SIGNIFICANT CHANGE UP (ref 4.8–10.8)
WBC # FLD AUTO: 5.52 K/UL — SIGNIFICANT CHANGE UP (ref 4.8–10.8)

## 2021-04-14 PROCEDURE — 99233 SBSQ HOSP IP/OBS HIGH 50: CPT

## 2021-04-14 RX ADMIN — MIDODRINE HYDROCHLORIDE 10 MILLIGRAM(S): 2.5 TABLET ORAL at 21:33

## 2021-04-14 RX ADMIN — CARBIDOPA AND LEVODOPA 2 TABLET(S): 25; 100 TABLET ORAL at 05:19

## 2021-04-14 RX ADMIN — CARBIDOPA AND LEVODOPA 2 TABLET(S): 25; 100 TABLET ORAL at 14:34

## 2021-04-14 RX ADMIN — ATORVASTATIN CALCIUM 40 MILLIGRAM(S): 80 TABLET, FILM COATED ORAL at 21:33

## 2021-04-14 RX ADMIN — MIDODRINE HYDROCHLORIDE 10 MILLIGRAM(S): 2.5 TABLET ORAL at 14:33

## 2021-04-14 RX ADMIN — Medication 1 APPLICATION(S): at 05:20

## 2021-04-14 RX ADMIN — Medication 1 APPLICATION(S): at 05:19

## 2021-04-14 RX ADMIN — CARBIDOPA AND LEVODOPA 2 TABLET(S): 25; 100 TABLET ORAL at 21:33

## 2021-04-14 RX ADMIN — Medication 1 APPLICATION(S): at 17:59

## 2021-04-14 RX ADMIN — SENNA PLUS 2 TABLET(S): 8.6 TABLET ORAL at 21:33

## 2021-04-14 RX ADMIN — CARBIDOPA AND LEVODOPA 2 TABLET(S): 25; 100 TABLET ORAL at 11:33

## 2021-04-14 RX ADMIN — CHLORHEXIDINE GLUCONATE 1 APPLICATION(S): 213 SOLUTION TOPICAL at 05:19

## 2021-04-14 RX ADMIN — MIDODRINE HYDROCHLORIDE 10 MILLIGRAM(S): 2.5 TABLET ORAL at 05:19

## 2021-04-14 RX ADMIN — Medication 1 APPLICATION(S): at 17:58

## 2021-04-14 NOTE — PROGRESS NOTE ADULT - SUBJECTIVE AND OBJECTIVE BOX
DAILY PROGRESS NOTE  ===========================================================    Patient Information:  NIEVES LABOY  /  71y  /  Male  /  MRN#: [id number]    Hospital Day: 23d   Location: [facility]93E Neuro 012 A ([facility] T9-3E Neuro)    |:::::::::::::::::::::::::::| SUBJECTIVE |:::::::::::::::::::::::::::|    OVERNIGHT EVENTS: none  TODAY: Patient was seen today at bedside. Pt is awake, NAD, but unable to follow commands.  Review of systems is otherwise negative.    |:::::::::::::::::::::::::::| ADMISSION HPI |:::::::::::::::::::::::::::|    HPI:  71 year old Male with past medical history of Parkinson's, dementia, CKD Stage 3, 1st degree AV block, HLD, gout, HTN, DM, fungal septicemia presenting from [facility] for acute decompensation in mental status.     As per documentation patient at baseline is verbal but for past 3 days, he has been worsening to state of being non verbal. Patient was admitted to [facility] for fungemia and discharged on the 11th March. At  he had left hand cellulitis and was treated for it.    In ED patient hemodynamically stable, afebrile, ABEBE with rise in Cr to 1.9 and BUN to 75, remains non verbal. CT head negative for any new strokes and UA negative. Family has not visited the patient recently so unaware of his status. (22 Mar 2021 23:59)      |:::::::::::::::::::::::::::| OBJECTIVE |:::::::::::::::::::::::::::|    STANDING MEDICATIONS  atorvastatin 40 milliGRAM(s) Oral at bedtime  carbidopa/levodopa  25/100 2 Tablet(s) Oral <User Schedule>  chlorhexidine 4% Liquid 1 Application(s) Topical <User Schedule>  collagenase Ointment 1 Application(s) Topical two times a day  Dakins Solution - 1/2 Strength 1 Application(s) Topical two times a day  dextrose 5% + sodium chloride 0.9%. 1000 milliLiter(s) IV Continuous <Continuous>  enoxaparin Injectable 40 milliGRAM(s) SubCutaneous daily  midodrine 10 milliGRAM(s) Oral every 8 hours  senna 2 Tablet(s) Oral at bedtime    PRN MEDICATIONS  acetaminophen   Tablet .. 650 milliGRAM(s) Oral every 6 hours PRN    HOME MEDICATIONS  atorvastatin 40 mg oral tablet: 1 tab(s) orally once a day (at bedtime)  carbidopa-levodopa 25 mg-100 mg oral tablet, extended release: 2 tab(s) orally 5 times a day at 6AM,10Am,2PM,6PM,10 PM  colchicine 0.6 mg oral tablet: 1 tab(s) orally once a day  Eliquis 5 mg oral tablet: 1 tab(s) orally 2 times a day  enalapril 5 mg oral tablet: 1 tab(s) orally once a day  gabapentin 100 mg oral capsule: 1 cap(s) orally 3 times a day  omeprazole 40 mg oral delayed release capsule: 1 cap(s) orally once a day  senna oral tablet: 2 tab(s) orally once a day (at bedtime), As Needed -for constipation       VITAL SIGNS: Last 24 Hours  T(C): 36.6 (14 Apr 2021 05:47), Max: 37.3 (13 Apr 2021 20:23)  T(F): 97.8 (14 Apr 2021 05:47), Max: 99.2 (13 Apr 2021 20:23)  HR: 70 (14 Apr 2021 05:47) (70 - 70)  BP: 120/57 (14 Apr 2021 05:47) (120/57 - 123/60)  BP(mean): 82 (14 Apr 2021 05:47) (82 - 87)  RR: 18 (14 Apr 2021 05:47) (16 - 18)  SpO2: --    Input-Output    04-13-21 @ 07:01  -  04-14-21 @ 07:00  --------------------------------------------------------  IN:    dextrose 5% + sodium chloride 0.9%: 60 mL  Total IN: 60 mL    OUT:    Incontinent per Condom Catheter (mL): 400 mL    Voided (mL): 360 mL  Total OUT: 760 mL    Total NET: -700 mL      04-14-21 @ 07:01  -  04-14-21 @ 13:57  --------------------------------------------------------  IN:    dextrose 5% + sodium chloride 0.9%: 300 mL  Total IN: 300 mL    OUT:  Total OUT: 0 mL    Total NET: 300 mL          PHYSICAL EXAM:  GEN: No acute distress.  LUNGS: Clear to auscultation bilaterally.  HEART: Regular rate and rhythm. No murmurs.  ABD: Soft, non-tender, non-distended.  EXT: Normal range of motion. No edema.  NEURO: Alert, attentive, oriented. Clear, fluent speech. Eye movements intact. Moves all extremities.  PSYCH: Cooperative, appropriate.    LAB RESULTS:                        7.1    5.52  )-----------( 149      ( 14 Apr 2021 06:45 )             23.0     04-14    132<L>  |  99  |  27<H>  ----------------------------<  82  4.0   |  24  |  0.7    Ca    8.4<L>      14 Apr 2021 06:45    TPro  6.2  /  Alb  2.1<L>  /  TBili  0.6  /  DBili  x   /  AST  41  /  ALT  <5  /  AlkPhos  157<H>  04-14    PT/INR - ( 13 Apr 2021 07:34 )   PT: 14.00 sec;   INR: 1.22 ratio         PTT - ( 13 Apr 2021 07:34 )  PTT:44.9 sec            MICROBIOLOGY:    Culture - Blood (collected 04-12-21 @ 21:38)  Source: .Blood None  Preliminary Report (04-14-21 @ 05:01):    No growth to date.    Culture - Blood (collected 04-12-21 @ 16:00)  Source: .Blood Blood  Preliminary Report (04-13-21 @ 22:01):    No growth to date.    Culture - Blood (collected 04-08-21 @ 16:00)  Source: .Blood Blood  Gram Stain (04-11-21 @ 08:45):    Growth in anaerobic bottle: Gram Positive Cocci in Clusters  Final Report (04-12-21 @ 10:51):    Growth in anaerobic bottle: Staphylococcus epidermidis Coag Negative    Staphylococcus    Single set isolate, possible contaminant. Contact    Microbiology if susceptibility testing clinically    indicated.    ***Blood Panel PCR results on this specimen are available    approximately 3 hours after the Gram stain result.***    Gram stain, PCR, and/or culture results may not always    correspond due to difference in methodologies.    ************************************************************    This PCR assay was performed by multiplex PCR. This    Assay tests for 66 bacterial and resistance gene targets.    Please refer to the [facility] [employer] [facility] test directory    at [email or web address] for details.  Organism: Blood Culture PCR (04-12-21 @ 10:51)  Organism: Blood Culture PCR (04-12-21 @ 10:51)      -  Staphylococcus epidermidis, Methicillin resistant: Detec      Method Type: PCR    Culture - Blood (collected 04-08-21 @ 16:00)  Source: .Blood Blood-Peripheral  Final Report (04-14-21 @ 01:00):    No Growth Final    Culture - Blood (collected 04-02-21 @ 12:55)  Source: .Blood None  Final Report (04-07-21 @ 23:01):    No Growth Final    Culture - CSF with Gram Stain (collected 04-01-21 @ 14:00)  Source: .CSF CSF  Gram Stain (04-01-21 @ 21:44):    No polymorphonuclear cells seen    No organisms seen    by cytocentrifuge  Final Report (04-04-21 @ 15:36):    No growth      IMAGING/STUDIES:  < from: CT Chest w/ IV Cont (04.07.21 @ 20:31) >  IMPRESSION:    Small bilateral pleural effusions, left greater than right with adjacent consolidative opacities, likely atelectasis.    Similar appearance of the left shoulder with complex joint effusion, described in detail on prior exam CT shoulder 2/20/2021.      Likely reactive left axillary lymph nodes adjacent to dislocated shoulder joint. No discrete chest wall collections are identified.      1.5 cm right thyroid lobe nodule, less conspicuous on prior exams. Follow up outpatient thyroid ultrasound examination is recommended.    < end of copied text >    ALLERGIES:  No Known Allergies      ===========================================================     DAILY PROGRESS NOTE  ===========================================================    Patient Information:  NIEVES LABOY  /  71y  /  Male  /  MRN#: [id number]    Hospital Day: 23d   Location: [facility]93E Neuro 012 A ([facility] T9-3E Neuro)    |:::::::::::::::::::::::::::| SUBJECTIVE |:::::::::::::::::::::::::::|    OVERNIGHT EVENTS: none  TODAY: Patient was seen today at bedside. Pt is awake, NAD, but unable to follow commands.  Review of systems is otherwise negative.    |:::::::::::::::::::::::::::| ADMISSION HPI |:::::::::::::::::::::::::::|    HPI:  71 year old Male with past medical history of Parkinson's, dementia, CKD Stage 3, 1st degree AV block, HLD, gout, HTN, DM, fungal septicemia presenting from [facility] for acute decompensation in mental status.     As per documentation patient at baseline is verbal but for past 3 days, he has been worsening to state of being non verbal. Patient was admitted to [facility] for fungemia and discharged on the 11th March. At  he had left hand cellulitis and was treated for it.    In ED patient hemodynamically stable, afebrile, ABEBE with rise in Cr to 1.9 and BUN to 75, remains non verbal. CT head negative for any new strokes and UA negative. Family has not visited the patient recently so unaware of his status. (22 Mar 2021 23:59)      |:::::::::::::::::::::::::::| OBJECTIVE |:::::::::::::::::::::::::::|    STANDING MEDICATIONS  atorvastatin 40 milliGRAM(s) Oral at bedtime  carbidopa/levodopa  25/100 2 Tablet(s) Oral <User Schedule>  chlorhexidine 4% Liquid 1 Application(s) Topical <User Schedule>  collagenase Ointment 1 Application(s) Topical two times a day  Dakins Solution - 1/2 Strength 1 Application(s) Topical two times a day  dextrose 5% + sodium chloride 0.9%. 1000 milliLiter(s) IV Continuous <Continuous>  enoxaparin Injectable 40 milliGRAM(s) SubCutaneous daily  midodrine 10 milliGRAM(s) Oral every 8 hours  senna 2 Tablet(s) Oral at bedtime    PRN MEDICATIONS  acetaminophen   Tablet .. 650 milliGRAM(s) Oral every 6 hours PRN    HOME MEDICATIONS  atorvastatin 40 mg oral tablet: 1 tab(s) orally once a day (at bedtime)  carbidopa-levodopa 25 mg-100 mg oral tablet, extended release: 2 tab(s) orally 5 times a day at 6AM,10Am,2PM,6PM,10 PM  colchicine 0.6 mg oral tablet: 1 tab(s) orally once a day  Eliquis 5 mg oral tablet: 1 tab(s) orally 2 times a day  enalapril 5 mg oral tablet: 1 tab(s) orally once a day  gabapentin 100 mg oral capsule: 1 cap(s) orally 3 times a day  omeprazole 40 mg oral delayed release capsule: 1 cap(s) orally once a day  senna oral tablet: 2 tab(s) orally once a day (at bedtime), As Needed -for constipation       VITAL SIGNS: Last 24 Hours  T(C): 36.6 (14 Apr 2021 05:47), Max: 37.3 (13 Apr 2021 20:23)  T(F): 97.8 (14 Apr 2021 05:47), Max: 99.2 (13 Apr 2021 20:23)  HR: 70 (14 Apr 2021 05:47) (70 - 70)  BP: 120/57 (14 Apr 2021 05:47) (120/57 - 123/60)  BP(mean): 82 (14 Apr 2021 05:47) (82 - 87)  RR: 18 (14 Apr 2021 05:47) (16 - 18)  SpO2: --    Input-Output    04-13-21 @ 07:01  -  04-14-21 @ 07:00  --------------------------------------------------------  IN:    dextrose 5% + sodium chloride 0.9%: 60 mL  Total IN: 60 mL    OUT:    Incontinent per Condom Catheter (mL): 400 mL    Voided (mL): 360 mL  Total OUT: 760 mL    Total NET: -700 mL      04-14-21 @ 07:01  -  04-14-21 @ 13:57  --------------------------------------------------------  IN:    dextrose 5% + sodium chloride 0.9%: 300 mL  Total IN: 300 mL    OUT:  Total OUT: 0 mL    Total NET: 300 mL          PHYSICAL EXAM:  GEN: No acute distress.  LUNGS: Clear to auscultation bilaterally.  HEART: Regular rate and rhythm. No murmurs.  ABD: Soft, non-tender, non-distended.  EXT: Normal range of motion. B/L UE edema   NEURO: Alert, attentive, oriented. mumbled speech. Eye movements intact.  PSYCH: Cooperative, appropriate.    LAB RESULTS:                        7.1    5.52  )-----------( 149      ( 14 Apr 2021 06:45 )             23.0     04-14    132<L>  |  99  |  27<H>  ----------------------------<  82  4.0   |  24  |  0.7    Ca    8.4<L>      14 Apr 2021 06:45    TPro  6.2  /  Alb  2.1<L>  /  TBili  0.6  /  DBili  x   /  AST  41  /  ALT  <5  /  AlkPhos  157<H>  04-14    PT/INR - ( 13 Apr 2021 07:34 )   PT: 14.00 sec;   INR: 1.22 ratio         PTT - ( 13 Apr 2021 07:34 )  PTT:44.9 sec            MICROBIOLOGY:    Culture - Blood (collected 04-12-21 @ 21:38)  Source: .Blood None  Preliminary Report (04-14-21 @ 05:01):    No growth to date.    Culture - Blood (collected 04-12-21 @ 16:00)  Source: .Blood Blood  Preliminary Report (04-13-21 @ 22:01):    No growth to date.    Culture - Blood (collected 04-08-21 @ 16:00)  Source: .Blood Blood  Gram Stain (04-11-21 @ 08:45):    Growth in anaerobic bottle: Gram Positive Cocci in Clusters  Final Report (04-12-21 @ 10:51):    Growth in anaerobic bottle: Staphylococcus epidermidis Coag Negative    Staphylococcus    Single set isolate, possible contaminant. Contact    Microbiology if susceptibility testing clinically    indicated.    ***Blood Panel PCR results on this specimen are available    approximately 3 hours after the Gram stain result.***    Gram stain, PCR, and/or culture results may not always    correspond due to difference in methodologies.    ************************************************************    This PCR assay was performed by multiplex PCR. This    Assay tests for 66 bacterial and resistance gene targets.    Please refer to the [facility] [employer] test directory    at [email or web address] for details.  Organism: Blood Culture PCR (04-12-21 @ 10:51)  Organism: Blood Culture PCR (04-12-21 @ 10:51)      -  Staphylococcus epidermidis, Methicillin resistant: Detec      Method Type: PCR    Culture - Blood (collected 04-08-21 @ 16:00)  Source: .Blood Blood-Peripheral  Final Report (04-14-21 @ 01:00):    No Growth Final    Culture - Blood (collected 04-02-21 @ 12:55)  Source: .Blood None  Final Report (04-07-21 @ 23:01):    No Growth Final    Culture - CSF with Gram Stain (collected 04-01-21 @ 14:00)  Source: .CSF CSF  Gram Stain (04-01-21 @ 21:44):    No polymorphonuclear cells seen    No organisms seen    by cytocentrifuge  Final Report (04-04-21 @ 15:36):    No growth      IMAGING/STUDIES:  < from: CT Chest w/ IV Cont (04.07.21 @ 20:31) >  IMPRESSION:    Small bilateral pleural effusions, left greater than right with adjacent consolidative opacities, likely atelectasis.    Similar appearance of the left shoulder with complex joint effusion, described in detail on prior exam CT shoulder 2/20/2021.      Likely reactive left axillary lymph nodes adjacent to dislocated shoulder joint. No discrete chest wall collections are identified.      1.5 cm right thyroid lobe nodule, less conspicuous on prior exams. Follow up outpatient thyroid ultrasound examination is recommended.    < end of copied text >    ALLERGIES:  No Known Allergies      ===========================================================

## 2021-04-14 NOTE — PROGRESS NOTE ADULT - ASSESSMENT
71 year old Male with past medical history of Parkinson's, dementia, CKD Stage 3, 1st degree AV block, HLD, gout, HTN, DM, fungal septicemia presenting from [facility] for acute decompensation in mental status. As per documentation patient at baseline is verbal but for past 3 days, he has been worsening to state of being non verbal. Patient was admitted to [facility] for fungemia and discharged on the 11th March. At  he had left hand cellulitis and was treated for it.    # Metabolic encephalopathy on top of baseline dementia  # Sepsis  # Necrotic sacral ulcer stage 4  # H/o recent fungemia  - CT Head No acute intracranial pathology.activity  -  Chest Xray No consolidation, effusion or pneumothorax.  -  Blood & Urine cxs NGTD   -  WCX GNR, 3/29   Numerous Klebsiella pneumoniae ESBL, Few Pseudomonas aeruginosa, Numerous Enterococcus faecalis (vancomycin resistant)  - evaluated by Burn: s/p debridement of sacrum on 3/30 .  no more new recommendation for surgery at this time .continue local wound care with santyl/dakins WTD.  - ID F/u: increase dose of meropenem to 1g q8hr run for 4 hrs, change Polymixin to Aztreonam due to hyponatremia  - S/p spinal tap --> CSF clear with no growths and neg PCR  -repeat EEG w/ no epileptiform activity but diffuse cerebral dysfxn  -d/w neuro, may d/c Keppra   -off ABx as per ID    #+ Staph epi in BCx x1 ?contaminant  -repeat BCx---> if negative, can proceed w/ PEG    # Nutrition: Tube feeds for now  -daughter agrees w/ PEG  -PEG by IR once BCx comes back negative and another reeval by S&S (even if passes, doubt pt will meet nutrition reqs)    #Lt shoulder dislocation w/ collection  -no need to tap as per IR and ID concurrs    # Hypotension  - c/w midodrine    #Anemia  -transfuse 1u PRBCs    # Thrombocytopenia ?sec to sepsis  -improved  - monitor platelets  -transfuse if <20K  -off Pepcid  -heme f/u appreciated    # Hyponatremia off Polymixin  - monitor  BMP    # H/o parkinsons disease  - c/w sinemet    # H/o chronic DVT  - [facility] Duplex Lower Ext Vein Scan, Bilat (03.27.21 @ 18:48) >No evidence of deep venous thrombosis or superficial thrombophlebitis in the bilateral lower extremities.  - continue holding eliquis due to thrombocytopenia    # Dyslipidemia  - c/w statin    # DVT prophylaxis  -scd  -Lovenox    Very high risk pt. D/w daughter GOC: she wants full code for now. Very poor Px (daughter aware).    #Pending: PEG placement  # Discussed w/ daughter in details who wants to cont aggressive Tx. Agrees to PEG  # Disposition: SNF when/if stable    d/w Housestaff, nursing, case mgmt

## 2021-04-14 NOTE — SWALLOW BEDSIDE ASSESSMENT ADULT - SLP GENERAL OBSERVATIONS
Pt received sitting upright in bed, awake and alert. pt inconsistently following commands. +room air. verbally responsive (in Vietnamese) to his children, garbled at times.

## 2021-04-14 NOTE — PROGRESS NOTE ADULT - ASSESSMENT
71 year old Male with past medical history of Parkinson's, dementia, CKD Stage 3, 1st degree AV block, HLD, gout, HTN, DM, fungal septicemia presenting from [facility] for acute decompensation in mental status. As per documentation patient at baseline is verbal but for past 3 days, he has been worsening to state of being non verbal. Patient was admitted to [facility] for fungemia and discharged on the 11th March. At  he had left hand cellulitis and was treated for it.    # Metabolic encephalopathy on top of baseline dementia  # Sepsis  # Necrotic sacral ulcer stage 4  # H/o recent fungemia  - CT Head No acute intracranial pathology.activity  -  Chest Xray No consolidation, effusion or pneumothorax.  -  Blood & Urine cxs NGTD   -  WCX GNR, 3/29   Numerous Klebsiella pneumoniae ESBL, Few Pseudomonas aeruginosa, Numerous Enterococcus faecalis (vancomycin resistant)  - evaluated by Burn: s/p debridement of sacrum on 3/30 .  no more new recommendation for surgery at this time .continue local wound care with santyl/dakins WTD.  - ID F/u: increase dose of meropenem to 1g q8hr run for 4 hrs, change Polymixin to Aztreonam due to hyponatremia  - S/p spinal tap --> CSF clear with no growths and neg PCR  -repeat EEG w/ no epileptiform activity but diffuse cerebral dysfxn  -d/w neuro, may d/c Keppra   -off ABx as per ID    #+ Staph epi in BCx x1 ?contaminant  -repeat BCx---> if negative, can proceed w/ PEG    # Nutrition: Tube feeds for now  -daughter agrees w/ PEG  -PEG by IR once BCx comes back negative and another reeval by S&S (even if passes, doubt pt will meet nutrition reqs)    #Lt shoulder dislocation w/ collection  -no need to tap as per IR and ID concurrs    # Hypotension  - c/w midodrine    #Anemia  -transfuse 1u PRBCs    # Thrombocytopenia ?sec to sepsis  -improved  - monitor platelets  -transfuse if <20K  -off Pepcid  -heme f/u appreciated    # Hyponatremia off Polymixin  - monitor  BMP    # H/o parkinsons disease  - c/w sinemet    # H/o chronic DVT  - VA Duplex Lower Ext Vein Scan, Bilat (03.27.21 @ 18:48) >No evidence of deep venous thrombosis or superficial thrombophlebitis in the bilateral lower extremities.  - continue holding eliquis due to thrombocytopenia    # Dyslipidemia  - c/w statin    # DVT prophylaxis  -scd  -Lovenox    Very high risk pt. D/w daughter GOC: she wants full code for now. Very poor Px (daughter aware).    #Pending: PEG placement  # Discussed w/ daughter in details who wants to cont aggressive Tx. Agrees to PEG  # Disposition: SNF when/if stable    d/w Housestaff, nursing, case mgmt

## 2021-04-14 NOTE — PROGRESS NOTE ADULT - SUBJECTIVE AND OBJECTIVE BOX
Patient is a 71y old  Male who presents with a chief complaint of Change in Mental Status (05 Apr 2021 13:36)    INTERVAL HPI/OVERNIGHT EVENTS: Patient was examined and seen at bedside. Events noted. Awake and alert today but not following commands and not communicating.    InitialHPI:  71 year old Male with past medical history of Parkinson's, dementia, CKD Stage 3, 1st degree AV block, HLD, gout, HTN, DM, fungal septicemia presenting from [facility] for acute decompensation in mental status.     As per documentation patient at baseline is verbal but for past 3 days, he has been worsening to state of being non verbal. Patient was admitted to [facility] for fungemia and discharged on the 11th March. At  he had left hand cellulitis and was treated for it.    In ED patient hemodynamically stable, afebrile, ABEBE with rise in Cr to 1.9 and BUN to 75, remains non verbal. CT head negative for any new strokes and UA negative. Family has not visited the patient recently so unaware of his status. (22 Mar 2021 23:59)    PAST MEDICAL & SURGICAL HISTORY:  Parkinson disease    Hypertension    Gout    Dyslipidemia    Prostatitis    Cataract    No significant past surgical history        General: NAD, awake, tracking, chronically ill appearing  HEENT:  no LAD  CV: S1 S2  Resp: decreased breath sounds at bases  GI: NT/ND/S +BS  MS: no clubbing/cyanosis/edema, + pulses b/l, LUE>RUE swelling  Neuro: unable to access  Skin: multiple skin decubs              MEDICATIONS  (STANDING):  atorvastatin 40 milliGRAM(s) Oral at bedtime  carbidopa/levodopa  25/100 2 Tablet(s) Oral <User Schedule>  chlorhexidine 4% Liquid 1 Application(s) Topical <User Schedule>  collagenase Ointment 1 Application(s) Topical two times a day  Dakins Solution - 1/2 Strength 1 Application(s) Topical two times a day  dextrose 5% + sodium chloride 0.9%. 1000 milliLiter(s) (60 mL/Hr) IV Continuous <Continuous>  enoxaparin Injectable 40 milliGRAM(s) SubCutaneous daily  midodrine 10 milliGRAM(s) Oral every 8 hours  senna 2 Tablet(s) Oral at bedtime    MEDICATIONS  (PRN):  acetaminophen   Tablet .. 650 milliGRAM(s) Oral every 6 hours PRN Temp greater or equal to 38C (100.4F)    Home Medications:  atorvastatin 40 mg oral tablet: 1 tab(s) orally once a day (at bedtime) (23 Mar 2021 01:07)  carbidopa-levodopa 25 mg-100 mg oral tablet, extended release: 2 tab(s) orally 5 times a day at 6AM,10Am,2PM,6PM,10 PM (23 Mar 2021 01:07)  colchicine 0.6 mg oral tablet: 1 tab(s) orally once a day (23 Mar 2021 01:07)  Eliquis 5 mg oral tablet: 1 tab(s) orally 2 times a day (23 Mar 2021 01:07)  enalapril 5 mg oral tablet: 1 tab(s) orally once a day (23 Mar 2021 01:07)  omeprazole 40 mg oral delayed release capsule: 1 cap(s) orally once a day (23 Mar 2021 01:07)    Vital Signs Last 24 Hrs  T(C): 36.6 (14 Apr 2021 05:47), Max: 37.3 (13 Apr 2021 20:23)  T(F): 97.8 (14 Apr 2021 05:47), Max: 99.2 (13 Apr 2021 20:23)  HR: 70 (14 Apr 2021 05:47) (70 - 70)  BP: 120/57 (14 Apr 2021 05:47) (120/57 - 123/60)  BP(mean): 82 (14 Apr 2021 05:47) (82 - 87)  RR: 18 (14 Apr 2021 05:47) (16 - 18)  SpO2: --  CAPILLARY BLOOD GLUCOSE      POCT Blood Glucose.: 94 mg/dL (14 Apr 2021 11:20)  POCT Blood Glucose.: 95 mg/dL (14 Apr 2021 07:33)  POCT Blood Glucose.: 100 mg/dL (13 Apr 2021 21:09)  POCT Blood Glucose.: 93 mg/dL (13 Apr 2021 17:28)    LABS:                        7.1    5.52  )-----------( 149      ( 14 Apr 2021 06:45 )             23.0     04-14    132<L>  |  99  |  27<H>  ----------------------------<  82  4.0   |  24  |  0.7    Ca    8.4<L>      14 Apr 2021 06:45    TPro  6.2  /  Alb  2.1<L>  /  TBili  0.6  /  DBili  x   /  AST  41  /  ALT  <5  /  AlkPhos  157<H>  04-14    LIVER FUNCTIONS - ( 14 Apr 2021 06:45 )  Alb: 2.1 g/dL / Pro: 6.2 g/dL / ALK PHOS: 157 U/L / ALT: <5 U/L / AST: 41 U/L / GGT: x               PT/INR - ( 13 Apr 2021 07:34 )   PT: 14.00 sec;   INR: 1.22 ratio         PTT - ( 13 Apr 2021 07:34 )  PTT:44.9 sec            Culture - Blood (collected 12 Apr 2021 21:38)  Source: .Blood None  Preliminary Report (14 Apr 2021 05:01):    No growth to date.    Culture - Blood (collected 12 Apr 2021 16:00)  Source: .Blood Blood  Preliminary Report (13 Apr 2021 22:01):    No growth to date.      Consultant Notes Reviewed:  [x ] YES  [ ] NO  Care Discussed with Consultants/Other Providers/ Housestaff [ x] YES  [ ] NO  Radiology, labs, new studies personally reviewed.

## 2021-04-14 NOTE — SWALLOW BEDSIDE ASSESSMENT ADULT - SLP PERTINENT HISTORY OF CURRENT PROBLEM
70 y/o M presented initially 3/22 from [facility] for AMS; sepsis 2' infected stage 4 sacral ulcer and metabolic encephalopathy. EEG (-), CTH (-), CXR (-), CSF (-). Pt known to acute SLP service this admission w/ most recent recs for NPO w/ NGT 2' pt completely unaware of feeding task. Pt was planned for PEG tube 4/12, but +blood cultures 4/9 so placement postponed. PMHx: Parkinson's, dementia, CKD Stage 3, HTN, DM.

## 2021-04-15 LAB
ALBUMIN SERPL ELPH-MCNC: 2.2 G/DL — LOW (ref 3.5–5.2)
ALP SERPL-CCNC: 150 U/L — HIGH (ref 30–115)
ALT FLD-CCNC: 8 U/L — SIGNIFICANT CHANGE UP (ref 0–41)
ANION GAP SERPL CALC-SCNC: 13 MMOL/L — SIGNIFICANT CHANGE UP (ref 7–14)
AST SERPL-CCNC: 33 U/L — SIGNIFICANT CHANGE UP (ref 0–41)
BASOPHILS # BLD AUTO: 0.03 K/UL — SIGNIFICANT CHANGE UP (ref 0–0.2)
BASOPHILS # BLD AUTO: 0.03 K/UL — SIGNIFICANT CHANGE UP (ref 0–0.2)
BASOPHILS NFR BLD AUTO: 0.5 % — SIGNIFICANT CHANGE UP (ref 0–1)
BASOPHILS NFR BLD AUTO: 0.6 % — SIGNIFICANT CHANGE UP (ref 0–1)
BILIRUB SERPL-MCNC: 0.6 MG/DL — SIGNIFICANT CHANGE UP (ref 0.2–1.2)
BLD GP AB SCN SERPL QL: SIGNIFICANT CHANGE UP
BUN SERPL-MCNC: 24 MG/DL — HIGH (ref 10–20)
CALCIUM SERPL-MCNC: 8.2 MG/DL — LOW (ref 8.5–10.1)
CHLORIDE SERPL-SCNC: 103 MMOL/L — SIGNIFICANT CHANGE UP (ref 98–110)
CO2 SERPL-SCNC: 20 MMOL/L — SIGNIFICANT CHANGE UP (ref 17–32)
CREAT SERPL-MCNC: 0.6 MG/DL — LOW (ref 0.7–1.5)
EOSINOPHIL # BLD AUTO: 0.12 K/UL — SIGNIFICANT CHANGE UP (ref 0–0.7)
EOSINOPHIL # BLD AUTO: 0.22 K/UL — SIGNIFICANT CHANGE UP (ref 0–0.7)
EOSINOPHIL NFR BLD AUTO: 2 % — SIGNIFICANT CHANGE UP (ref 0–8)
EOSINOPHIL NFR BLD AUTO: 4.8 % — SIGNIFICANT CHANGE UP (ref 0–8)
GLUCOSE BLDC GLUCOMTR-MCNC: 101 MG/DL — HIGH (ref 70–99)
GLUCOSE BLDC GLUCOMTR-MCNC: 110 MG/DL — HIGH (ref 70–99)
GLUCOSE BLDC GLUCOMTR-MCNC: 83 MG/DL — SIGNIFICANT CHANGE UP (ref 70–99)
GLUCOSE BLDC GLUCOMTR-MCNC: 99 MG/DL — SIGNIFICANT CHANGE UP (ref 70–99)
GLUCOSE SERPL-MCNC: 73 MG/DL — SIGNIFICANT CHANGE UP (ref 70–99)
HCT VFR BLD CALC: 24.8 % — LOW (ref 42–52)
HCT VFR BLD CALC: 24.8 % — LOW (ref 42–52)
HGB BLD-MCNC: 7.8 G/DL — LOW (ref 14–18)
HGB BLD-MCNC: 8 G/DL — LOW (ref 14–18)
IMM GRANULOCYTES NFR BLD AUTO: 0.3 % — SIGNIFICANT CHANGE UP (ref 0.1–0.3)
IMM GRANULOCYTES NFR BLD AUTO: 0.4 % — HIGH (ref 0.1–0.3)
LYMPHOCYTES # BLD AUTO: 0.67 K/UL — LOW (ref 1.2–3.4)
LYMPHOCYTES # BLD AUTO: 0.69 K/UL — LOW (ref 1.2–3.4)
LYMPHOCYTES # BLD AUTO: 11.4 % — LOW (ref 20.5–51.1)
LYMPHOCYTES # BLD AUTO: 14.5 % — LOW (ref 20.5–51.1)
MCHC RBC-ENTMCNC: 26.4 PG — LOW (ref 27–31)
MCHC RBC-ENTMCNC: 26.7 PG — LOW (ref 27–31)
MCHC RBC-ENTMCNC: 31.5 G/DL — LOW (ref 32–37)
MCHC RBC-ENTMCNC: 32.3 G/DL — SIGNIFICANT CHANGE UP (ref 32–37)
MCV RBC AUTO: 82.7 FL — SIGNIFICANT CHANGE UP (ref 80–94)
MCV RBC AUTO: 83.8 FL — SIGNIFICANT CHANGE UP (ref 80–94)
MONOCYTES # BLD AUTO: 0.36 K/UL — SIGNIFICANT CHANGE UP (ref 0.1–0.6)
MONOCYTES # BLD AUTO: 0.47 K/UL — SIGNIFICANT CHANGE UP (ref 0.1–0.6)
MONOCYTES NFR BLD AUTO: 7.8 % — SIGNIFICANT CHANGE UP (ref 1.7–9.3)
MONOCYTES NFR BLD AUTO: 7.8 % — SIGNIFICANT CHANGE UP (ref 1.7–9.3)
NEUTROPHILS # BLD AUTO: 3.33 K/UL — SIGNIFICANT CHANGE UP (ref 1.4–6.5)
NEUTROPHILS # BLD AUTO: 4.71 K/UL — SIGNIFICANT CHANGE UP (ref 1.4–6.5)
NEUTROPHILS NFR BLD AUTO: 71.9 % — SIGNIFICANT CHANGE UP (ref 42.2–75.2)
NEUTROPHILS NFR BLD AUTO: 78 % — HIGH (ref 42.2–75.2)
NRBC # BLD: 0 /100 WBCS — SIGNIFICANT CHANGE UP (ref 0–0)
NRBC # BLD: 0 /100 WBCS — SIGNIFICANT CHANGE UP (ref 0–0)
PLATELET # BLD AUTO: 155 K/UL — SIGNIFICANT CHANGE UP (ref 130–400)
PLATELET # BLD AUTO: 164 K/UL — SIGNIFICANT CHANGE UP (ref 130–400)
POTASSIUM SERPL-MCNC: 4.5 MMOL/L — SIGNIFICANT CHANGE UP (ref 3.5–5)
POTASSIUM SERPL-SCNC: 4.5 MMOL/L — SIGNIFICANT CHANGE UP (ref 3.5–5)
PROT SERPL-MCNC: 6.1 G/DL — SIGNIFICANT CHANGE UP (ref 6–8)
RBC # BLD: 2.96 M/UL — LOW (ref 4.7–6.1)
RBC # BLD: 3 M/UL — LOW (ref 4.7–6.1)
RBC # FLD: 16 % — HIGH (ref 11.5–14.5)
RBC # FLD: 16 % — HIGH (ref 11.5–14.5)
SODIUM SERPL-SCNC: 136 MMOL/L — SIGNIFICANT CHANGE UP (ref 135–146)
WBC # BLD: 4.63 K/UL — LOW (ref 4.8–10.8)
WBC # BLD: 6.04 K/UL — SIGNIFICANT CHANGE UP (ref 4.8–10.8)
WBC # FLD AUTO: 4.63 K/UL — LOW (ref 4.8–10.8)
WBC # FLD AUTO: 6.04 K/UL — SIGNIFICANT CHANGE UP (ref 4.8–10.8)

## 2021-04-15 PROCEDURE — 99233 SBSQ HOSP IP/OBS HIGH 50: CPT

## 2021-04-15 RX ADMIN — SENNA PLUS 2 TABLET(S): 8.6 TABLET ORAL at 21:46

## 2021-04-15 RX ADMIN — CARBIDOPA AND LEVODOPA 2 TABLET(S): 25; 100 TABLET ORAL at 05:20

## 2021-04-15 RX ADMIN — CHLORHEXIDINE GLUCONATE 1 APPLICATION(S): 213 SOLUTION TOPICAL at 05:18

## 2021-04-15 RX ADMIN — CARBIDOPA AND LEVODOPA 2 TABLET(S): 25; 100 TABLET ORAL at 21:44

## 2021-04-15 RX ADMIN — ATORVASTATIN CALCIUM 40 MILLIGRAM(S): 80 TABLET, FILM COATED ORAL at 21:44

## 2021-04-15 RX ADMIN — SODIUM CHLORIDE 60 MILLILITER(S): 9 INJECTION, SOLUTION INTRAVENOUS at 16:54

## 2021-04-15 RX ADMIN — MIDODRINE HYDROCHLORIDE 10 MILLIGRAM(S): 2.5 TABLET ORAL at 21:44

## 2021-04-15 RX ADMIN — CARBIDOPA AND LEVODOPA 2 TABLET(S): 25; 100 TABLET ORAL at 13:35

## 2021-04-15 RX ADMIN — Medication 1 APPLICATION(S): at 17:47

## 2021-04-15 RX ADMIN — ENOXAPARIN SODIUM 40 MILLIGRAM(S): 100 INJECTION SUBCUTANEOUS at 11:23

## 2021-04-15 RX ADMIN — Medication 1 APPLICATION(S): at 05:19

## 2021-04-15 RX ADMIN — MIDODRINE HYDROCHLORIDE 10 MILLIGRAM(S): 2.5 TABLET ORAL at 13:35

## 2021-04-15 RX ADMIN — Medication 1 APPLICATION(S): at 05:18

## 2021-04-15 RX ADMIN — CARBIDOPA AND LEVODOPA 2 TABLET(S): 25; 100 TABLET ORAL at 09:55

## 2021-04-15 RX ADMIN — MIDODRINE HYDROCHLORIDE 10 MILLIGRAM(S): 2.5 TABLET ORAL at 05:20

## 2021-04-15 NOTE — SWALLOW BEDSIDE ASSESSMENT ADULT - ORAL PHASE
mild-mod/Decreased anterior-posterior movement of the bolus/Delayed oral transit time/Lingual stasis

## 2021-04-15 NOTE — SWALLOW BEDSIDE ASSESSMENT ADULT - ADDITIONAL RECOMMENDATIONS
If patient unable to meets adequate nutrition/hydration means via PO intake, pt may still benefit from long term non oral means of nutrition/hydration to sustain life.

## 2021-04-15 NOTE — PROGRESS NOTE ADULT - SUBJECTIVE AND OBJECTIVE BOX
Patient is a 71y old  Male who presents with a chief complaint of Change in Mental Status (05 Apr 2021 13:36)    INTERVAL HPI/OVERNIGHT EVENTS: Patient was examined and seen at bedside. Events noted. Awake and alert today but not following commands and not communicating. Started on a diet yesterday but min PO intake.    InitialHPI:  71 year old Male with past medical history of Parkinson's, dementia, CKD Stage 3, 1st degree AV block, HLD, gout, HTN, DM, fungal septicemia presenting from [facility] for acute decompensation in mental status.   As per documentation patient at baseline is verbal but for past 3 days, he has been worsening to state of being non verbal. Patient was admitted to [facility] for fungemia and discharged on the 11th March. At  he had left hand cellulitis and was treated for it.  In ED patient hemodynamically stable, afebrile, ABEBE with rise in Cr to 1.9 and BUN to 75, remains non verbal. CT head negative for any new strokes and UA negative. Family has not visited the patient recently so unaware of his status. (22 Mar 2021 23:59)    PAST MEDICAL & SURGICAL HISTORY:  Parkinson disease    Hypertension    Gout    Dyslipidemia    Prostatitis    Cataract    No significant past surgical history        General: NAD, awake, tracking, chronically ill appearing  HEENT:  no LAD  CV: S1 S2  Resp: decreased breath sounds at bases  GI: NT/ND/S +BS  MS: no clubbing/cyanosis/edema, + pulses b/l, LUE>RUE swelling  Neuro: unable to access  Skin: multiple skin decubs            MEDICATIONS  (STANDING):  atorvastatin 40 milliGRAM(s) Oral at bedtime  carbidopa/levodopa  25/100 2 Tablet(s) Oral <User Schedule>  chlorhexidine 4% Liquid 1 Application(s) Topical <User Schedule>  collagenase Ointment 1 Application(s) Topical two times a day  Dakins Solution - 1/2 Strength 1 Application(s) Topical two times a day  dextrose 5% + sodium chloride 0.9%. 1000 milliLiter(s) (60 mL/Hr) IV Continuous <Continuous>  enoxaparin Injectable 40 milliGRAM(s) SubCutaneous daily  midodrine 10 milliGRAM(s) Oral every 8 hours  senna 2 Tablet(s) Oral at bedtime    MEDICATIONS  (PRN):  acetaminophen   Tablet .. 650 milliGRAM(s) Oral every 6 hours PRN Temp greater or equal to 38C (100.4F)    Home Medications:  atorvastatin 40 mg oral tablet: 1 tab(s) orally once a day (at bedtime) (23 Mar 2021 01:07)  carbidopa-levodopa 25 mg-100 mg oral tablet, extended release: 2 tab(s) orally 5 times a day at 6AM,10Am,2PM,6PM,10 PM (23 Mar 2021 01:07)  colchicine 0.6 mg oral tablet: 1 tab(s) orally once a day (23 Mar 2021 01:07)  Eliquis 5 mg oral tablet: 1 tab(s) orally 2 times a day (23 Mar 2021 01:07)  enalapril 5 mg oral tablet: 1 tab(s) orally once a day (23 Mar 2021 01:07)  omeprazole 40 mg oral delayed release capsule: 1 cap(s) orally once a day (23 Mar 2021 01:07)    Vital Signs Last 24 Hrs  T(C): 37.7 (15 Apr 2021 10:00), Max: 37.7 (15 Apr 2021 10:00)  T(F): 99.9 (15 Apr 2021 10:00), Max: 99.9 (15 Apr 2021 10:00)  HR: 68 (15 Apr 2021 05:25) (68 - 71)  BP: 135/60 (15 Apr 2021 05:25) (123/95 - 135/60)  BP(mean): --  RR: 18 (15 Apr 2021 05:25) (18 - 18)  SpO2: --  CAPILLARY BLOOD GLUCOSE      POCT Blood Glucose.: 101 mg/dL (15 Apr 2021 16:22)  POCT Blood Glucose.: 110 mg/dL (15 Apr 2021 11:14)  POCT Blood Glucose.: 83 mg/dL (15 Apr 2021 07:25)  POCT Blood Glucose.: 105 mg/dL (14 Apr 2021 21:08)    LABS:                        8.0    6.04  )-----------( 164      ( 15 Apr 2021 16:20 )             24.8     04-15    136  |  103  |  24<H>  ----------------------------<  73  4.5   |  20  |  0.6<L>    Ca    8.2<L>      15 Apr 2021 05:46    TPro  6.1  /  Alb  2.2<L>  /  TBili  0.6  /  DBili  x   /  AST  33  /  ALT  8   /  AlkPhos  150<H>  04-15    LIVER FUNCTIONS - ( 15 Apr 2021 05:46 )  Alb: 2.2 g/dL / Pro: 6.1 g/dL / ALK PHOS: 150 U/L / ALT: 8 U/L / AST: 33 U/L / GGT: x           Culture - Blood (collected 13 Apr 2021 07:34)  Source: .Blood None  Preliminary Report (14 Apr 2021 17:01):    No growth to date.    Culture - Blood (collected 12 Apr 2021 21:38)  Source: .Blood None  Preliminary Report (14 Apr 2021 05:01):    No growth to date.      Consultant Notes Reviewed:  [x ] YES  [ ] NO  Care Discussed with Consultants/Other Providers/ Housestaff [ x] YES  [ ] NO  Radiology, labs, new studies personally reviewed.

## 2021-04-15 NOTE — SWALLOW BEDSIDE ASSESSMENT ADULT - SLP PERTINENT HISTORY OF CURRENT PROBLEM
72 y/o M presented initially 3/22 from [facility] for AMS; sepsis 2' infected stage 4 sacral ulcer and metabolic encephalopathy. EEG (-), CTH (-), CXR (-), CSF (-). Pt known to acute SLP service this admission w/ most recent recs for NPO w/ NGT 2' pt completely unaware of feeding task. Pt was planned for PEG tube 4/12, but +blood cultures 4/9 so placement postponed. PMHx: Parkinson's, dementia, CKD Stage 3, HTN, DM.

## 2021-04-15 NOTE — PROGRESS NOTE ADULT - ASSESSMENT
71 year old Male with past medical history of Parkinson's, dementia, CKD Stage 3, 1st degree AV block, HLD, gout, HTN, DM, fungal septicemia presenting from [facility] for acute decompensation in mental status. As per documentation patient at baseline is verbal but for past 3 days, he has been worsening to state of being non verbal. Patient was admitted to [facility] for fungemia and discharged on the 11th March. At  he had left hand cellulitis and was treated for it.    # Metabolic encephalopathy on top of baseline dementia  # Sepsis  # Necrotic sacral ulcer stage 4  # H/o recent fungemia  - CT Head No acute intracranial pathology.activity  -  Chest Xray No consolidation, effusion or pneumothorax.  -  Blood & Urine cxs NGTD   -  WCX GNR, 3/29   Numerous Klebsiella pneumoniae ESBL, Few Pseudomonas aeruginosa, Numerous Enterococcus faecalis (vancomycin resistant)  - evaluated by Burn: s/p debridement of sacrum on 3/30 .  no more new recommendation for surgery at this time .continue local wound care with santyl/dakins WTD.  - ID F/u: increase dose of meropenem to 1g q8hr run for 4 hrs, change Polymixin to Aztreonam due to hyponatremia  - S/p spinal tap --> CSF clear with no growths and neg PCR  -repeat EEG w/ no epileptiform activity but diffuse cerebral dysfxn  -d/w neuro, may d/c Keppra   -off ABx as per ID    #+ Staph epi in BCx x1 ?contaminant  -repeat BCx---> if negative, can proceed w/ PEG    # Nutrition: Tube feeds for now  -daughter agrees w/ PEG  -PEG by IR once BCx comes back negative and another reeval by S&S (even if passes, doubt pt will meet nutrition reqs)  - passed S&S ( 4/14 ) . recommended consistencies : dysphagia 1 (puree) w/ nectar thick liquids.  - monitor calorie count for 3 days     #Lt shoulder dislocation w/ collection  -no need to tap as per IR and ID concurrs    # Hypotension  - c/w midodrine    #Anemia  -transfuse 1u PRBCs    # Thrombocytopenia ?sec to sepsis  -improved  - monitor platelets  -transfuse if <20K  -off Pepcid  -heme f/u appreciated    # Hyponatremia off Polymixin  - monitor  BMP    # H/o parkinsons disease  - c/w sinemet    # H/o chronic DVT  - VA Duplex Lower Ext Vein Scan, Bilat (03.27.21 @ 18:48) >No evidence of deep venous thrombosis or superficial thrombophlebitis in the bilateral lower extremities.  - continue holding eliquis due to thrombocytopenia    # Dyslipidemia  - c/w statin    # DVT prophylaxis  -scd  -Lovenox    Very high risk pt. D/w daughter GOC: she wants full code for now. Very poor Px (daughter aware).    #Pending: PEG placement  # Discussed w/ daughter in details who wants to cont aggressive Tx. Agrees to PEG  # Disposition: SNF when/if stable    d/w Housestaff, nursing, case mgmt

## 2021-04-15 NOTE — SWALLOW BEDSIDE ASSESSMENT ADULT - COMMENTS
Minimal responses w/ use of  phone.
Pt's son present at b/s and daughter present via [employer] to assist w/ Turkmen translation throughout evaluation.
Pt's son Hernesto present at b/s and daughter present via Facetime to assist w/ Welsh translation throughout evaluation.

## 2021-04-15 NOTE — SWALLOW BEDSIDE ASSESSMENT ADULT - ASR SWALLOW ASPIRATION MONITOR
oral hygiene/position upright (90Y)/cough/gurgly voice/throat clearing
oral hygiene/position upright (90Y)/cough/gurgly voice/throat clearing

## 2021-04-15 NOTE — SWALLOW BEDSIDE ASSESSMENT ADULT - SLP GENERAL OBSERVATIONS
Pt received sitting upright in bed, awake and alert. pt inconsistently following commands. +room air

## 2021-04-15 NOTE — PROGRESS NOTE ADULT - SUBJECTIVE AND OBJECTIVE BOX
DAILY PROGRESS NOTE  ===========================================================    Patient Information:  NIEVES LABOY  /  71y  /  Male  /  MRN#: [id number]    Hospital Day: 24d   Location: [facility] Neuro 012 A ([facility] T9-3E Neuro)    |:::::::::::::::::::::::::::| SUBJECTIVE |:::::::::::::::::::::::::::|    OVERNIGHT EVENTS: none  TODAY: Patient was seen today at bedside. Pt is awake, NAD, unable to follow commands, but looks better than yesterday. Review of systems is otherwise negative.    |:::::::::::::::::::::::::::| ADMISSION HPI |:::::::::::::::::::::::::::|    HPI:  71 year old Male with past medical history of Parkinson's, dementia, CKD Stage 3, 1st degree AV block, HLD, gout, HTN, DM, fungal septicemia presenting from [facility] for acute decompensation in mental status.     As per documentation patient at baseline is verbal but for past 3 days, he has been worsening to state of being non verbal. Patient was admitted to [facility] for fungemia and discharged on the 11th March. At  he had left hand cellulitis and was treated for it.    In ED patient hemodynamically stable, afebrile, ABEBE with rise in Cr to 1.9 and BUN to 75, remains non verbal. CT head negative for any new strokes and UA negative. Family has not visited the patient recently so unaware of his status. (22 Mar 2021 23:59)      |:::::::::::::::::::::::::::| OBJECTIVE |:::::::::::::::::::::::::::|    STANDING MEDICATIONS  atorvastatin 40 milliGRAM(s) Oral at bedtime  carbidopa/levodopa  25/100 2 Tablet(s) Oral <User Schedule>  chlorhexidine 4% Liquid 1 Application(s) Topical <User Schedule>  collagenase Ointment 1 Application(s) Topical two times a day  Dakins Solution - 1/2 Strength 1 Application(s) Topical two times a day  dextrose 5% + sodium chloride 0.9%. 1000 milliLiter(s) IV Continuous <Continuous>  enoxaparin Injectable 40 milliGRAM(s) SubCutaneous daily  midodrine 10 milliGRAM(s) Oral every 8 hours  senna 2 Tablet(s) Oral at bedtime    PRN MEDICATIONS  acetaminophen   Tablet .. 650 milliGRAM(s) Oral every 6 hours PRN    HOME MEDICATIONS  atorvastatin 40 mg oral tablet: 1 tab(s) orally once a day (at bedtime)  carbidopa-levodopa 25 mg-100 mg oral tablet, extended release: 2 tab(s) orally 5 times a day at 6AM,10Am,2PM,6PM,10 PM  colchicine 0.6 mg oral tablet: 1 tab(s) orally once a day  Eliquis 5 mg oral tablet: 1 tab(s) orally 2 times a day  enalapril 5 mg oral tablet: 1 tab(s) orally once a day  gabapentin 100 mg oral capsule: 1 cap(s) orally 3 times a day  omeprazole 40 mg oral delayed release capsule: 1 cap(s) orally once a day  senna oral tablet: 2 tab(s) orally once a day (at bedtime), As Needed -for constipation       VITAL SIGNS: Last 24 Hours  T(C): 37.7 (15 Apr 2021 10:00), Max: 37.7 (15 Apr 2021 10:00)  T(F): 99.9 (15 Apr 2021 10:00), Max: 99.9 (15 Apr 2021 10:00)  HR: 68 (15 Apr 2021 05:25) (61 - 71)  BP: 135/60 (15 Apr 2021 05:25) (123/95 - 135/60)  BP(mean): 99 (14 Apr 2021 14:00) (99 - 99)  RR: 18 (15 Apr 2021 05:25) (18 - 18)  SpO2: --    Input-Output    04-14-21 @ 07:01  -  04-15-21 @ 07:00  --------------------------------------------------------  IN:    dextrose 5% + sodium chloride 0.9%: 480 mL    IV PiggyBack: 326 mL    Oral Fluid: 210 mL  Total IN: 1016 mL    OUT:    Incontinent per Condom Catheter (mL): 300 mL    Intermittent Catheterization - Urethral (mL): 425 mL    Voided (mL): 50 mL  Total OUT: 775 mL    Total NET: 241 mL      04-15-21 @ 07:01  -  04-15-21 @ 12:28  --------------------------------------------------------  IN:    Oral Fluid: 480 mL  Total IN: 480 mL    OUT:  Total OUT: 0 mL    Total NET: 480 mL          PHYSICAL EXAM:  GEN: NAD, tracking, chronically ill-appearing  LUNGS: Clear to auscultation bilaterally.  HEART: Regular rate and rhythm. No murmurs.  ABD: Soft, non-tender, non-distended.  EXT: Normal range of motion. LUE>RUE swelling   NEURO: unable to access   PSYCH: Cooperative, appropriate.    LAB RESULTS:                        7.8    4.63  )-----------( 155      ( 15 Apr 2021 05:46 )             24.8     04-15    136  |  103  |  24<H>  ----------------------------<  73  4.5   |  20  |  0.6<L>    Ca    8.2<L>      15 Apr 2021 05:46    TPro  6.1  /  Alb  2.2<L>  /  TBili  0.6  /  DBili  x   /  AST  33  /  ALT  8   /  AlkPhos  150<H>  04-15                MICROBIOLOGY:    Culture - Blood (collected 04-13-21 @ 07:34)  Source: .Blood None  Preliminary Report (04-14-21 @ 17:01):    No growth to date.    Culture - Blood (collected 04-12-21 @ 21:38)  Source: .Blood None  Preliminary Report (04-14-21 @ 05:01):    No growth to date.    Culture - Blood (collected 04-12-21 @ 16:00)  Source: .Blood Blood  Preliminary Report (04-13-21 @ 22:01):    No growth to date.    Culture - Blood (collected 04-08-21 @ 16:00)  Source: .Blood Blood  Gram Stain (04-11-21 @ 08:45):    Growth in anaerobic bottle: Gram Positive Cocci in Clusters  Final Report (04-12-21 @ 10:51):    Growth in anaerobic bottle: Staphylococcus epidermidis Coag Negative    Staphylococcus    Single set isolate, possible contaminant. Contact    Microbiology if susceptibility testing clinically    indicated.    ***Blood Panel PCR results on this specimen are available    approximately 3 hours after the Gram stain result.***    Gram stain, PCR, and/or culture results may not always    correspond due to difference in methodologies.    ************************************************************    This PCR assay was performed by multiplex PCR. This    Assay tests for 66 bacterial and resistance gene targets.    Please refer to the [facility] test directory    at [email or web address] for details.  Organism: Blood Culture PCR (04-12-21 @ 10:51)  Organism: Blood Culture PCR (04-12-21 @ 10:51)      -  Staphylococcus epidermidis, Methicillin resistant: Detec      Method Type: PCR    Culture - Blood (collected 04-08-21 @ 16:00)  Source: .Blood Blood-Peripheral  Final Report (04-14-21 @ 01:00):    No Growth Final    Culture - Blood (collected 04-02-21 @ 12:55)  Source: .Blood None  Final Report (04-07-21 @ 23:01):    No Growth Final      IMAGING/STUDIES:    ALLERGIES:  No Known Allergies      ===========================================================

## 2021-04-15 NOTE — SWALLOW BEDSIDE ASSESSMENT ADULT - ASR SWALLOW REFERRAL
calorie count in progress (day 1/3)
initiate calorie count to determine if patient will be able to sustain adequate nutrition/hydration via PO intake, or if pt will still need PEG to sustain adequate nutrition/hydration/Registered Dietitian

## 2021-04-15 NOTE — SWALLOW BEDSIDE ASSESSMENT ADULT - SWALLOW EVAL: DIAGNOSIS
mild to mod oral dysphagia for min amounts of puree and nectar thick liquids w/o overt s/s penetration/aspiration.

## 2021-04-15 NOTE — SWALLOW BEDSIDE ASSESSMENT ADULT - DIET PRIOR TO ADMI
puree w/ nectar thick liquids, per [place] documentation
puree w/ nectar-thick liquids per [place] documentation
puree w/ nectar thick liquids, per [place] documentation
puree w/ nectar thick liquids, per [place] documentation

## 2021-04-15 NOTE — SWALLOW BEDSIDE ASSESSMENT ADULT - NS SPL SWALLOW CLINIC TRIAL FT
mild to mod oral dysphagia for puree and nectar thick liquids w/o overt s/s penetration/aspiration
mild to mod oral dysphagia for puree and nectar thick liquids w/o overt s/s penetration/aspiration
mild to mod oral dysphagia for puree and nectar thick liquids w/o overt s/s penetration/aspiration. required cues to "swallow" at times.

## 2021-04-15 NOTE — PROGRESS NOTE ADULT - ASSESSMENT
71 year old Male with past medical history of Parkinson's, dementia, CKD Stage 3, 1st degree AV block, HLD, gout, HTN, DM, fungal septicemia presenting from [facility] for acute decompensation in mental status. As per documentation patient at baseline is verbal but for past 3 days, he has been worsening to state of being non verbal. Patient was admitted to [facility] for fungemia and discharged on the 11th March. At  he had left hand cellulitis and was treated for it.    # Metabolic encephalopathy on top of baseline dementia  # Sepsis  # Necrotic sacral ulcer stage 4  # H/o recent fungemia  - CT Head No acute intracranial pathology.activity  -  Chest Xray No consolidation, effusion or pneumothorax.  -  Blood & Urine cxs NGTD   -  WCX GNR, 3/29   Numerous Klebsiella pneumoniae ESBL, Few Pseudomonas aeruginosa, Numerous Enterococcus faecalis (vancomycin resistant)  - evaluated by Burn: s/p debridement of sacrum on 3/30 .  no more new recommendation for surgery at this time .continue local wound care with santyl/dakins WTD.  - s/p meropenem, Polymixin and Aztreonam  - S/p spinal tap --> CSF clear with no growths and neg PCR  -repeat EEG w/ no epileptiform activity but diffuse cerebral dysfxn  -d/w neuro, may d/c Keppra   -off ABx as per ID  -somewhat improved mental status    #+ Staph epi in BCx x1 contaminant  -repeat BCx---> negative    # Nutrition:   -daughter agreed to PEG but delayed due to +BCx that turned out to be a contaminant  -4/14 passed S&S but limited PO intake.   -f/u calorie count  -4/15 needs PEG, daughter asked to wait till next week for PEG    #Lt shoulder dislocation w/ collection  -no need to tap as per IR and ID concurrs    # Hypotension  - c/w midodrine    #Anemia  -s/p 1u PRBCs    # Thrombocytopenia ?sec to sepsis  -resolved  -off Pepcid  -heme f/u appreciated    # Hyponatremia off Polymixin  - monitor  BMP    # H/o parkinsons disease  - c/w sinemet    # H/o chronic DVT  - [facility] Duplex Lower Ext Vein Scan, Bilat (03.27.21 @ 18:48) >No evidence of deep venous thrombosis or superficial thrombophlebitis in the bilateral lower extremities.  - continue holding eliquis due to thrombocytopenia    # Dyslipidemia  - c/w statin    # DVT prophylaxis  -scd  -Lovenox    Very high risk pt. D/w daughter GOC: she wants full code for now. Very poor Px (daughter aware).    #Pending: PEG placement  # Discussed w/ daughter in details who wants to cont aggressive Tx. Agrees to PEG but wants to wait till early next wk  # Disposition: SNF when/if stable    d/w Housestaff, nursing, case mgmt

## 2021-04-16 LAB
ALBUMIN SERPL ELPH-MCNC: 2.1 G/DL — LOW (ref 3.5–5.2)
ALP SERPL-CCNC: 142 U/L — HIGH (ref 30–115)
ALT FLD-CCNC: <5 U/L — SIGNIFICANT CHANGE UP (ref 0–41)
ANION GAP SERPL CALC-SCNC: 8 MMOL/L — SIGNIFICANT CHANGE UP (ref 7–14)
AST SERPL-CCNC: 28 U/L — SIGNIFICANT CHANGE UP (ref 0–41)
BASOPHILS # BLD AUTO: 0.04 K/UL — SIGNIFICANT CHANGE UP (ref 0–0.2)
BASOPHILS NFR BLD AUTO: 0.8 % — SIGNIFICANT CHANGE UP (ref 0–1)
BILIRUB SERPL-MCNC: 0.5 MG/DL — SIGNIFICANT CHANGE UP (ref 0.2–1.2)
BUN SERPL-MCNC: 20 MG/DL — SIGNIFICANT CHANGE UP (ref 10–20)
CALCIUM SERPL-MCNC: 8.4 MG/DL — LOW (ref 8.5–10.1)
CHLORIDE SERPL-SCNC: 104 MMOL/L — SIGNIFICANT CHANGE UP (ref 98–110)
CO2 SERPL-SCNC: 25 MMOL/L — SIGNIFICANT CHANGE UP (ref 17–32)
CREAT SERPL-MCNC: 0.7 MG/DL — SIGNIFICANT CHANGE UP (ref 0.7–1.5)
EOSINOPHIL # BLD AUTO: 0.27 K/UL — SIGNIFICANT CHANGE UP (ref 0–0.7)
EOSINOPHIL NFR BLD AUTO: 5.6 % — SIGNIFICANT CHANGE UP (ref 0–8)
GLUCOSE BLDC GLUCOMTR-MCNC: 103 MG/DL — HIGH (ref 70–99)
GLUCOSE BLDC GLUCOMTR-MCNC: 98 MG/DL — SIGNIFICANT CHANGE UP (ref 70–99)
GLUCOSE BLDC GLUCOMTR-MCNC: 98 MG/DL — SIGNIFICANT CHANGE UP (ref 70–99)
GLUCOSE SERPL-MCNC: 95 MG/DL — SIGNIFICANT CHANGE UP (ref 70–99)
HCT VFR BLD CALC: 24.6 % — LOW (ref 42–52)
HGB BLD-MCNC: 7.8 G/DL — LOW (ref 14–18)
IMM GRANULOCYTES NFR BLD AUTO: 0.2 % — SIGNIFICANT CHANGE UP (ref 0.1–0.3)
LYMPHOCYTES # BLD AUTO: 0.8 K/UL — LOW (ref 1.2–3.4)
LYMPHOCYTES # BLD AUTO: 16.5 % — LOW (ref 20.5–51.1)
MCHC RBC-ENTMCNC: 26.5 PG — LOW (ref 27–31)
MCHC RBC-ENTMCNC: 31.7 G/DL — LOW (ref 32–37)
MCV RBC AUTO: 83.7 FL — SIGNIFICANT CHANGE UP (ref 80–94)
MONOCYTES # BLD AUTO: 0.41 K/UL — SIGNIFICANT CHANGE UP (ref 0.1–0.6)
MONOCYTES NFR BLD AUTO: 8.5 % — SIGNIFICANT CHANGE UP (ref 1.7–9.3)
NEUTROPHILS # BLD AUTO: 3.31 K/UL — SIGNIFICANT CHANGE UP (ref 1.4–6.5)
NEUTROPHILS NFR BLD AUTO: 68.4 % — SIGNIFICANT CHANGE UP (ref 42.2–75.2)
NRBC # BLD: 0 /100 WBCS — SIGNIFICANT CHANGE UP (ref 0–0)
PLATELET # BLD AUTO: 163 K/UL — SIGNIFICANT CHANGE UP (ref 130–400)
POTASSIUM SERPL-MCNC: 3.9 MMOL/L — SIGNIFICANT CHANGE UP (ref 3.5–5)
POTASSIUM SERPL-SCNC: 3.9 MMOL/L — SIGNIFICANT CHANGE UP (ref 3.5–5)
PROT SERPL-MCNC: 6.2 G/DL — SIGNIFICANT CHANGE UP (ref 6–8)
RBC # BLD: 2.94 M/UL — LOW (ref 4.7–6.1)
RBC # FLD: 16 % — HIGH (ref 11.5–14.5)
SODIUM SERPL-SCNC: 137 MMOL/L — SIGNIFICANT CHANGE UP (ref 135–146)
WBC # BLD: 4.84 K/UL — SIGNIFICANT CHANGE UP (ref 4.8–10.8)
WBC # FLD AUTO: 4.84 K/UL — SIGNIFICANT CHANGE UP (ref 4.8–10.8)

## 2021-04-16 PROCEDURE — 99233 SBSQ HOSP IP/OBS HIGH 50: CPT

## 2021-04-16 RX ADMIN — ENOXAPARIN SODIUM 40 MILLIGRAM(S): 100 INJECTION SUBCUTANEOUS at 11:20

## 2021-04-16 RX ADMIN — CHLORHEXIDINE GLUCONATE 1 APPLICATION(S): 213 SOLUTION TOPICAL at 05:49

## 2021-04-16 RX ADMIN — Medication 1 APPLICATION(S): at 16:27

## 2021-04-16 RX ADMIN — MIDODRINE HYDROCHLORIDE 10 MILLIGRAM(S): 2.5 TABLET ORAL at 22:28

## 2021-04-16 RX ADMIN — SODIUM CHLORIDE 60 MILLILITER(S): 9 INJECTION, SOLUTION INTRAVENOUS at 05:52

## 2021-04-16 RX ADMIN — SENNA PLUS 2 TABLET(S): 8.6 TABLET ORAL at 22:28

## 2021-04-16 RX ADMIN — ATORVASTATIN CALCIUM 40 MILLIGRAM(S): 80 TABLET, FILM COATED ORAL at 22:28

## 2021-04-16 RX ADMIN — CARBIDOPA AND LEVODOPA 2 TABLET(S): 25; 100 TABLET ORAL at 05:50

## 2021-04-16 RX ADMIN — CARBIDOPA AND LEVODOPA 2 TABLET(S): 25; 100 TABLET ORAL at 16:28

## 2021-04-16 RX ADMIN — SODIUM CHLORIDE 60 MILLILITER(S): 9 INJECTION, SOLUTION INTRAVENOUS at 22:39

## 2021-04-16 RX ADMIN — Medication 1 APPLICATION(S): at 05:49

## 2021-04-16 RX ADMIN — CARBIDOPA AND LEVODOPA 2 TABLET(S): 25; 100 TABLET ORAL at 22:28

## 2021-04-16 RX ADMIN — MIDODRINE HYDROCHLORIDE 10 MILLIGRAM(S): 2.5 TABLET ORAL at 05:50

## 2021-04-16 NOTE — CHART NOTE - NSCHARTNOTEFT_GEN_A_CORE
T(F): 96.7 (04-16-21 @ 05:37), Max: 99.9 (04-15-21 @ 10:00)  HR: 69 (04-16-21 @ 05:37) (69 - 80)  BP: 139/88 (04-16-21 @ 05:37) (129/61 - 139/88)  RR: 18 (04-16-21 @ 05:37) (18 - 18)  SpO2: --    I&O's Detail    15 Apr 2021 07:01  -  16 Apr 2021 07:00  --------------------------------------------------------  IN:    dextrose 5% + sodium chloride 0.9%: 780 mL    Oral Fluid: 480 mL  Total IN: 1260 mL    OUT:    Incontinent per Condom Catheter (mL): 200 mL  Total OUT: 200 mL    Total NET: 1060 mL    04-16    137  |  104  |  20  ----------------------------<  95  3.9   |  25  |  0.7    Ca    8.4<L>      16 Apr 2021 06:28  Magnesium, Serum: 1.9 mg/dL (04.05.21 @ 06:31)    TPro  6.2  /  Alb  2.1<L>  /  TBili  0.5  /  DBili  x   /  AST  28  /  ALT  <5  /  AlkPhos  142<H>  04-16                        7.8    4.84  )-----------( 163      ( 16 Apr 2021 06:28 )             24.6     CAPILLARY BLOOD GLUCOSE  POCT Blood Glucose.: 99 mg/dL (15 Apr 2021 21:21)  POCT Blood Glucose.: 101 mg/dL (15 Apr 2021 16:22)  POCT Blood Glucose.: 110 mg/dL (15 Apr 2021 11:14)      Diet, Dysphagia 1 Pureed-Nectar Consistency Fluid:   Halal  Supplement Feeding Modality:  Oral  Ensure Clear Cans or Servings Per Day:  3       Frequency:  Three Times a day (04-14-21 @ 16:02)    IMP:  - altered mental status/ metabolic encephalopathy from sepsis  - large necrotic sacral ulcer stage 4  - post hemorrhagic anemia  - thrombocytopenia  - h/o Parkinson's disease, DM    PLAN:  - PO diet as tolerated per speech  - check west cts X 2d  - d/c Ensure clear    - add in phos and Mg levels to next blood draw  - please add zinc 220mg and vitamin C 500 mg once daily each x 2 weeks and then re-evaluate Sarmad cts in progress  PO intake poor, refused breakfast today and refused to open mouth to take meds  Needs NGT replaced for nutrition, hydration    T(F): 96.7 (04-16-21 @ 05:37), Max: 99.9 (04-15-21 @ 10:00)  HR: 69 (04-16-21 @ 05:37) (69 - 80)  BP: 139/88 (04-16-21 @ 05:37) (129/61 - 139/88)  RR: 18 (04-16-21 @ 05:37) (18 - 18)    I&O's Detail    15 Apr 2021 07:01  -  16 Apr 2021 07:00  --------------------------------------------------------  IN:    dextrose 5% + sodium chloride 0.9%: 780 mL    Oral Fluid: 480 mL  Total IN: 1260 mL    OUT:    Incontinent per Condom Catheter (mL): 200 mL  Total OUT: 200 mL    Total NET: 1060 mL    04-16    137  |  104  |  20  ----------------------------<  95  3.9   |  25  |  0.7    Ca    8.4<L>      16 Apr 2021 06:28  Magnesium, Serum: 1.9 mg/dL (04.05.21 @ 06:31)    TPro  6.2  /  Alb  2.1<L>  /  TBili  0.5  /  DBili  x   /  AST  28  /  ALT  <5  /  AlkPhos  142<H>  04-16                        7.8    4.84  )-----------( 163      ( 16 Apr 2021 06:28 )             24.6     CAPILLARY BLOOD GLUCOSE  POCT Blood Glucose.: 99 mg/dL (15 Apr 2021 21:21)  POCT Blood Glucose.: 101 mg/dL (15 Apr 2021 16:22)  POCT Blood Glucose.: 110 mg/dL (15 Apr 2021 11:14)      Diet, Dysphagia 1 Pureed-Nectar Consistency Fluid:   Halal  Supplement Feeding Modality:  Oral  Ensure Clear Cans or Servings Per Day:  3       Frequency:  Three Times a day (04-14-21 @ 16:02)    IMP:  - altered mental status/ metabolic encephalopathy from sepsis  - large necrotic sacral ulcer stage 4  - post hemorrhagic anemia  - thrombocytopenia  - h/o Parkinson's disease, DM    PLAN:  - PO diet as tolerated per speech  - f/u check sarmad cts  - d/c Ensure clear    - add in phos and Mg levels to next blood draw  - please add zinc 220mg and vitamin C 500 mg once daily each x 2 weeks and then re-evaluate  - replace NGT and give 360 ml Jevity 1.2 over 45 minutes x 4 feeds/d --> 79 gm protein & 1710 kcal/d  - add Moris 1 pack q12hrs via NGT once placed Sarmad cts in progress  pt awake  PO intake poor, refused breakfast today and refused to open mouth to take meds  Needs NGT replaced for nutrition, hydration    T(F): 96.7 (04-16-21 @ 05:37), Max: 99.9 (04-15-21 @ 10:00)  HR: 69 (04-16-21 @ 05:37) (69 - 80)  BP: 139/88 (04-16-21 @ 05:37) (129/61 - 139/88)  RR: 18 (04-16-21 @ 05:37) (18 - 18)    I&O's Detail    15 Apr 2021 07:01  -  16 Apr 2021 07:00  --------------------------------------------------------  IN:    dextrose 5% + sodium chloride 0.9%: 780 mL    Oral Fluid: 480 mL  Total IN: 1260 mL    OUT:    Incontinent per Condom Catheter (mL): 200 mL  Total OUT: 200 mL    Total NET: 1060 mL    04-16    137  |  104  |  20  ----------------------------<  95  3.9   |  25  |  0.7    Ca    8.4<L>      16 Apr 2021 06:28  Magnesium, Serum: 1.9 mg/dL (04.05.21 @ 06:31)    TPro  6.2  /  Alb  2.1<L>  /  TBili  0.5  /  DBili  x   /  AST  28  /  ALT  <5  /  AlkPhos  142<H>  04-16                        7.8    4.84  )-----------( 163      ( 16 Apr 2021 06:28 )             24.6     CAPILLARY BLOOD GLUCOSE  POCT Blood Glucose.: 99 mg/dL (15 Apr 2021 21:21)  POCT Blood Glucose.: 101 mg/dL (15 Apr 2021 16:22)  POCT Blood Glucose.: 110 mg/dL (15 Apr 2021 11:14)      Diet, Dysphagia 1 Pureed-Nectar Consistency Fluid:   Halal  Supplement Feeding Modality:  Oral  Ensure Clear Cans or Servings Per Day:  3       Frequency:  Three Times a day (04-14-21 @ 16:02)    IMP:  - altered mental status/ metabolic encephalopathy from sepsis  - large necrotic sacral ulcer stage 4  - post hemorrhagic anemia  - thrombocytopenia  - h/o Parkinson's disease, DM    PLAN:  - PO diet as tolerated per speech  - f/u check sarmad cts  - d/c Ensure clear    - add in phos and Mg levels to next blood draw  - please add zinc 220mg and vitamin C 500 mg once daily each x 2 weeks and then re-evaluate  - replace NG feeding tube and give 360 ml Jevity 1.2 over 45 minutes x 4 feeds/d --> 79 gm protein & 1710 kcal/d  - add Moris 1 pack q12hrs via NGT once placed  - d/w team

## 2021-04-16 NOTE — PROGRESS NOTE ADULT - SUBJECTIVE AND OBJECTIVE BOX
DAILY PROGRESS NOTE  ===========================================================    Patient Information:  NIEVES LABOY  /  71y  /  Male  /  MRN#: [id number]    Hospital Day: 25d   Location: Formerly named [facility] Neuro 012 A ([facility] T9-3E Neuro)    |:::::::::::::::::::::::::::| SUBJECTIVE |:::::::::::::::::::::::::::|    OVERNIGHT EVENTS: none  TODAY: Patient was seen today at bedside. Pt is awake, NAD, chronically-ill appearing, and not able to follow commands/nonverbal. Review of systems is otherwise negative.    |:::::::::::::::::::::::::::| ADMISSION HPI |:::::::::::::::::::::::::::|    HPI:  71 year old Male with past medical history of Parkinson's, dementia, CKD Stage 3, 1st degree AV block, HLD, gout, HTN, DM, fungal septicemia presenting from [facility] for acute decompensation in mental status.     As per documentation patient at baseline is verbal but for past 3 days, he has been worsening to state of being non verbal. Patient was admitted to [facility] for fungemia and discharged on the 11th March. At  he had left hand cellulitis and was treated for it.    In ED patient hemodynamically stable, afebrile, ABEBE with rise in Cr to 1.9 and BUN to 75, remains non verbal. CT head negative for any new strokes and UA negative. Family has not visited the patient recently so unaware of his status. (22 Mar 2021 23:59)      |:::::::::::::::::::::::::::| OBJECTIVE |:::::::::::::::::::::::::::|    STANDING MEDICATIONS  atorvastatin 40 milliGRAM(s) Oral at bedtime  carbidopa/levodopa  25/100 2 Tablet(s) Oral <User Schedule>  chlorhexidine 4% Liquid 1 Application(s) Topical <User Schedule>  collagenase Ointment 1 Application(s) Topical two times a day  Dakins Solution - 1/2 Strength 1 Application(s) Topical two times a day  dextrose 5% + sodium chloride 0.9%. 1000 milliLiter(s) IV Continuous <Continuous>  enoxaparin Injectable 40 milliGRAM(s) SubCutaneous daily  midodrine 10 milliGRAM(s) Oral every 8 hours  senna 2 Tablet(s) Oral at bedtime    PRN MEDICATIONS  acetaminophen   Tablet .. 650 milliGRAM(s) Oral every 6 hours PRN    HOME MEDICATIONS  atorvastatin 40 mg oral tablet: 1 tab(s) orally once a day (at bedtime)  carbidopa-levodopa 25 mg-100 mg oral tablet, extended release: 2 tab(s) orally 5 times a day at 6AM,10Am,2PM,6PM,10 PM  colchicine 0.6 mg oral tablet: 1 tab(s) orally once a day  Eliquis 5 mg oral tablet: 1 tab(s) orally 2 times a day  enalapril 5 mg oral tablet: 1 tab(s) orally once a day  gabapentin 100 mg oral capsule: 1 cap(s) orally 3 times a day  omeprazole 40 mg oral delayed release capsule: 1 cap(s) orally once a day  senna oral tablet: 2 tab(s) orally once a day (at bedtime), As Needed -for constipation       VITAL SIGNS: Last 24 Hours  T(C): 37 (16 Apr 2021 14:13), Max: 37 (16 Apr 2021 14:13)  T(F): 98.6 (16 Apr 2021 14:13), Max: 98.6 (16 Apr 2021 14:13)  HR: 70 (16 Apr 2021 14:13) (69 - 80)  BP: 135/92 (16 Apr 2021 14:13) (129/61 - 139/88)  BP(mean): 110 (16 Apr 2021 14:13) (107 - 110)  RR: 18 (16 Apr 2021 14:13) (18 - 18)  SpO2: --    Input-Output    04-15-21 @ 07:01  -  04-16-21 @ 07:00  --------------------------------------------------------  IN:    dextrose 5% + sodium chloride 0.9%: 780 mL    Oral Fluid: 480 mL  Total IN: 1260 mL    OUT:    Incontinent per Condom Catheter (mL): 200 mL  Total OUT: 200 mL    Total NET: 1060 mL          PHYSICAL EXAM:  GEN: No acute distress. awake, tracking, chronically ill appearing  LUNGS: Clear to auscultation bilaterally.  HEART: Regular rate and rhythm. No murmurs.  ABD: Soft, non-tender, non-distended.  EXT: Normal range of motion. LUE>RUE edema  NEURO: unable to access  PSYCH: Cooperative, appropriate.    LAB RESULTS:                        7.8    4.84  )-----------( 163      ( 16 Apr 2021 06:28 )             24.6     04-16    137  |  104  |  20  ----------------------------<  95  3.9   |  25  |  0.7    Ca    8.4<L>      16 Apr 2021 06:28    TPro  6.2  /  Alb  2.1<L>  /  TBili  0.5  /  DBili  x   /  AST  28  /  ALT  <5  /  AlkPhos  142<H>  04-16                MICROBIOLOGY:    Culture - Blood (collected 04-13-21 @ 07:34)  Source: .Blood None  Preliminary Report (04-14-21 @ 17:01):    No growth to date.    Culture - Blood (collected 04-12-21 @ 21:38)  Source: .Blood None  Preliminary Report (04-14-21 @ 05:01):    No growth to date.    Culture - Blood (collected 04-12-21 @ 16:00)  Source: .Blood Blood  Preliminary Report (04-13-21 @ 22:01):    No growth to date.    Culture - Blood (collected 04-08-21 @ 16:00)  Source: .Blood Blood  Gram Stain (04-11-21 @ 08:45):    Growth in anaerobic bottle: Gram Positive Cocci in Clusters  Final Report (04-12-21 @ 10:51):    Growth in anaerobic bottle: Staphylococcus epidermidis Coag Negative    Staphylococcus    Single set isolate, possible contaminant. Contact    Microbiology if susceptibility testing clinically    indicated.    ***Blood Panel PCR results on this specimen are available    approximately 3 hours after the Gram stain result.***    Gram stain, PCR, and/or culture results may not always    correspond due to difference in methodologies.    ************************************************************    This PCR assay was performed by multiplex PCR. This    Assay tests for 66 bacterial and resistance gene targets.    Please refer to the [facility] [employer] test directory    at [email or web address] for details.  Organism: Blood Culture PCR (04-12-21 @ 10:51)  Organism: Blood Culture PCR (04-12-21 @ 10:51)      -  Staphylococcus epidermidis, Methicillin resistant: Detec      Method Type: PCR    Culture - Blood (collected 04-08-21 @ 16:00)  Source: .Blood Blood-Peripheral  Final Report (04-14-21 @ 01:00):    No Growth Final      IMAGING/STUDIES:    ALLERGIES:  No Known Allergies      ===========================================================

## 2021-04-16 NOTE — PROGRESS NOTE ADULT - SUBJECTIVE AND OBJECTIVE BOX
INTERVENTIONAL RADIOLOGY PROGRESS NOTE      After family discussions, IR reconsulted for PEG tube placement.      Vitals: T(F): 98.6 (04-16-21 @ 14:13), Max: 98.6 (04-16-21 @ 14:13)  HR: 70 (04-16-21 @ 14:13) (69 - 80)  BP: 135/92 (04-16-21 @ 14:13) (129/61 - 139/88)  RR: 18 (04-16-21 @ 14:13) (18 - 18)  SpO2: --  Wt(kg): --    LABS:                        7.8    4.84  )-----------( 163      ( 16 Apr 2021 06:28 )             24.6     04-16    137  |  104  |  20  ----------------------------<  95  3.9   |  25  |  0.7    Ca    8.4<L>      16 Apr 2021 06:28    TPro  6.2  /  Alb  2.1<L>  /  TBili  0.5  /  DBili  x   /  AST  28  /  ALT  <5  /  AlkPhos  142<H>  04-16      ASSESSMENT AND PLAN:    #PEG tube placement    - Tentatively scheduled for OR monday 4/19  - Please have Covid test within 3 days of procedure  - please have PT/PTT/INR pre procedure  - NPO/hold feeds prior to procedure, have enteric tube in place  - hold dvt ppx AM of procedure  - patient should be off eliquis at least 2 days (or 3 if GFR<30) and off aspirin 5 days pre-procedure.    Please call [phone] with questions/concerns  Please call Interventional Radiology [phone] with any questions, concerns, or issues regarding above.

## 2021-04-16 NOTE — PROGRESS NOTE ADULT - ASSESSMENT
71 year old Male with past medical history of Parkinson's, dementia, CKD Stage 3, 1st degree AV block, HLD, gout, HTN, DM, fungal septicemia presenting from [facility] for acute decompensation in mental status. As per documentation patient at baseline is verbal but for past 3 days, he has been worsening to state of being non verbal. Patient was admitted to [facility] for fungemia and discharged on the 11th March. At  he had left hand cellulitis and was treated for it.    # Metabolic encephalopathy on top of baseline dementia  # Sepsis  # Necrotic sacral ulcer stage 4  # H/o recent fungemia  - CT Head No acute intracranial pathology.activity  -  Chest Xray No consolidation, effusion or pneumothorax.  -  Blood & Urine cxs NGTD   -  WCX GNR, 3/29   Numerous Klebsiella pneumoniae ESBL, Few Pseudomonas aeruginosa, Numerous Enterococcus faecalis (vancomycin resistant)  - evaluated by Burn: s/p debridement of sacrum on 3/30 .  no more new recommendation for surgery at this time .continue local wound care with santyl/dakins WTD.  - s/p meropenem, Polymixin and Aztreonam  - S/p spinal tap --> CSF clear with no growths and neg PCR  -repeat EEG w/ no epileptiform activity but diffuse cerebral dysfxn  -d/w neuro, may d/c Keppra   -off ABx as per ID  -somewhat improved mental status    #+ Staph epi in BCx x1 contaminant  -repeat BCx---> negative    # Nutrition:   -daughter agreed to PEG but delayed due to +BCx that turned out to be a contaminant  -4/14 & 4/15 passed S&S but limited PO intake.   -f/u calorie count  -4/15 needs PEG, daughter asked to wait till next week for PEG    #Lt shoulder dislocation w/ collection  -no need to tap as per IR and ID concurrs    # Hypotension  - c/w midodrine    #Anemia  -s/p 1u PRBCs    # Thrombocytopenia ?sec to sepsis  -resolved  -off Pepcid  -heme f/u appreciated    # Hyponatremia off Polymixin  - monitor  BMP    # H/o parkinsons disease  - c/w sinemet    # H/o chronic DVT  - VA Duplex Lower Ext Vein Scan, Bilat (03.27.21 @ 18:48) >No evidence of deep venous thrombosis or superficial thrombophlebitis in the bilateral lower extremities.  - continue holding eliquis due to thrombocytopenia    # Dyslipidemia  - c/w statin    # DVT prophylaxis  -scd  -Lovenox    Very high risk pt. D/w daughter GOC: she wants full code for now. Very poor Px (daughter aware).    #Pending: PEG placement ( most likely early next week)  # Discussed w/ daughter in details who wants to cont aggressive Tx. Agrees to PEG but wants to wait till early next wk  # Disposition: SNF when/if stable    d/w Housestaff, nursing, case mgmt

## 2021-04-16 NOTE — PROGRESS NOTE ADULT - SUBJECTIVE AND OBJECTIVE BOX
Patient is a 71y old  Male who presents with a chief complaint of Change in Mental Status (05 Apr 2021 13:36)    INTERVAL HPI/OVERNIGHT EVENTS: Patient was examined and seen at bedside. Events noted. Awake and alert today but not following commands and not communicating. Very min PO intake.    InitialHPI:  71 year old Male with past medical history of Parkinson's, dementia, CKD Stage 3, 1st degree AV block, HLD, gout, HTN, DM, fungal septicemia presenting from [facility] for acute decompensation in mental status.   As per documentation patient at baseline is verbal but for past 3 days, he has been worsening to state of being non verbal. Patient was admitted to [facility] for fungemia and discharged on the 11th March. At  he had left hand cellulitis and was treated for it.  In ED patient hemodynamically stable, afebrile, ABEBE with rise in Cr to 1.9 and BUN to 75, remains non verbal. CT head negative for any new strokes and UA negative. Family has not visited the patient recently so unaware of his status. (22 Mar 2021 23:59)    PAST MEDICAL & SURGICAL HISTORY:  Parkinson disease    Hypertension    Gout    Dyslipidemia    Prostatitis    Cataract    No significant past surgical history        General: NAD, awake, tracking, chronically ill appearing  HEENT:  no LAD  CV: S1 S2  Resp: decreased breath sounds at bases  GI: NT/ND/S +BS  MS: no clubbing/cyanosis/edema, + pulses b/l, LUE>RUE swelling  Neuro: unable to access  Skin: multiple skin decubs            MEDICATIONS  (STANDING):  atorvastatin 40 milliGRAM(s) Oral at bedtime  carbidopa/levodopa  25/100 2 Tablet(s) Oral <User Schedule>  chlorhexidine 4% Liquid 1 Application(s) Topical <User Schedule>  collagenase Ointment 1 Application(s) Topical two times a day  Dakins Solution - 1/2 Strength 1 Application(s) Topical two times a day  dextrose 5% + sodium chloride 0.9%. 1000 milliLiter(s) (60 mL/Hr) IV Continuous <Continuous>  enoxaparin Injectable 40 milliGRAM(s) SubCutaneous daily  midodrine 10 milliGRAM(s) Oral every 8 hours  senna 2 Tablet(s) Oral at bedtime    MEDICATIONS  (PRN):  acetaminophen   Tablet .. 650 milliGRAM(s) Oral every 6 hours PRN Temp greater or equal to 38C (100.4F)    Home Medications:  atorvastatin 40 mg oral tablet: 1 tab(s) orally once a day (at bedtime) (23 Mar 2021 01:07)  carbidopa-levodopa 25 mg-100 mg oral tablet, extended release: 2 tab(s) orally 5 times a day at 6AM,10Am,2PM,6PM,10 PM (23 Mar 2021 01:07)  colchicine 0.6 mg oral tablet: 1 tab(s) orally once a day (23 Mar 2021 01:07)  Eliquis 5 mg oral tablet: 1 tab(s) orally 2 times a day (23 Mar 2021 01:07)  enalapril 5 mg oral tablet: 1 tab(s) orally once a day (23 Mar 2021 01:07)  omeprazole 40 mg oral delayed release capsule: 1 cap(s) orally once a day (23 Mar 2021 01:07)    Vital Signs Last 24 Hrs  T(C): 37 (16 Apr 2021 14:13), Max: 37 (16 Apr 2021 14:13)  T(F): 98.6 (16 Apr 2021 14:13), Max: 98.6 (16 Apr 2021 14:13)  HR: 70 (16 Apr 2021 14:13) (69 - 80)  BP: 135/92 (16 Apr 2021 14:13) (129/61 - 139/88)  BP(mean): 110 (16 Apr 2021 14:13) (107 - 110)  RR: 18 (16 Apr 2021 14:13) (18 - 18)  SpO2: --  CAPILLARY BLOOD GLUCOSE      POCT Blood Glucose.: 98 mg/dL (16 Apr 2021 16:33)  POCT Blood Glucose.: 98 mg/dL (16 Apr 2021 11:18)  POCT Blood Glucose.: 99 mg/dL (15 Apr 2021 21:21)    LABS:                        7.8    4.84  )-----------( 163      ( 16 Apr 2021 06:28 )             24.6     04-16    137  |  104  |  20  ----------------------------<  95  3.9   |  25  |  0.7    Ca    8.4<L>      16 Apr 2021 06:28    TPro  6.2  /  Alb  2.1<L>  /  TBili  0.5  /  DBili  x   /  AST  28  /  ALT  <5  /  AlkPhos  142<H>  04-16    LIVER FUNCTIONS - ( 16 Apr 2021 06:28 )  Alb: 2.1 g/dL / Pro: 6.2 g/dL / ALK PHOS: 142 U/L / ALT: <5 U/L / AST: 28 U/L / GGT: x                           Consultant Notes Reviewed:  [x ] YES  [ ] NO  Care Discussed with Consultants/Other Providers/ Housestaff [ x] YES  [ ] NO  Radiology, labs, new studies personally reviewed.

## 2021-04-16 NOTE — CHART NOTE - NSCHARTNOTEFT_GEN_A_CORE
Was called by IR resident. Stated pt is scheduled for PEG tube placement Monday morning. However, family seems unsure if pt requires PEG placement as she has been eating small amounts. Per IR resident, pt will remain scheduled for Monday morning, requests primary team MUST confirm family's wishes and if it medically necessary to proceed with PEG tube placement.

## 2021-04-16 NOTE — PROGRESS NOTE ADULT - ASSESSMENT
71 year old Male with past medical history of Parkinson's, dementia, CKD Stage 3, 1st degree AV block, HLD, gout, HTN, DM, fungal septicemia presenting from [facility] for acute decompensation in mental status. As per documentation patient at baseline is verbal but for past 3 days, he has been worsening to state of being non verbal. Patient was admitted to [facility] for fungemia and discharged on the 11th March. At  he had left hand cellulitis and was treated for it.    # Metabolic encephalopathy on top of baseline dementia  # Sepsis  # Necrotic sacral ulcer stage 4  # H/o recent fungemia  - CT Head No acute intracranial pathology.activity  -  Chest Xray No consolidation, effusion or pneumothorax.  -  Blood & Urine cxs NGTD   -  WCX GNR, 3/29   Numerous Klebsiella pneumoniae ESBL, Few Pseudomonas aeruginosa, Numerous Enterococcus faecalis (vancomycin resistant)  - evaluated by Burn: s/p debridement of sacrum on 3/30 .  no more new recommendation for surgery at this time .continue local wound care with santyl/dakins WTD.  - s/p meropenem, Polymixin and Aztreonam  - S/p spinal tap --> CSF clear with no growths and neg PCR  -repeat EEG w/ no epileptiform activity but diffuse cerebral dysfxn  -d/w neuro, may d/c Keppra   -off ABx as per ID  -somewhat improved mental status    #+ Staph epi in BCx x1 contaminant  -repeat BCx---> negative    # Nutrition:   -daughter agreed to PEG but delayed due to +BCx that turned out to be a contaminant  -4/14 passed S&S but limited PO intake.   -f/u calorie count  -4/15 needs PEG as minimal PO intake, daughter asked to wait till next week for PEG  -if unable to take Sinemet, will need NGT insertion ASAP    #Lt shoulder dislocation w/ collection  -no need to tap as per IR and ID concurrs    # Hypotension  - c/w midodrine    #Anemia  -s/p 1u PRBCs    # Thrombocytopenia ?sec to sepsis  -resolved  -off Pepcid  -heme f/u appreciated    # Hyponatremia off Polymixin  - monitor  BMP    # H/o parkinsons disease  - c/w sinemet    # H/o chronic DVT  - [facility] Duplex Lower Ext Vein Scan, Bilat (03.27.21 @ 18:48) >No evidence of deep venous thrombosis or superficial thrombophlebitis in the bilateral lower extremities.  - continue holding eliquis due to thrombocytopenia    # Dyslipidemia  - c/w statin    # DVT prophylaxis  -scd  -Lovenox    Very high risk pt. D/w daughter GOC: she wants full code for now. Very poor Px (daughter aware).    #Pending:  PEG placement Monday unless pt is meeting nutritional reqs  # Discussed w/ daughter in details who wants to cont aggressive Tx. Agrees to PEG but wants to wait till early next wk  # Disposition: SNF when/if stable    d/w Housestaff, nursing, case mgmt

## 2021-04-17 LAB
ALBUMIN SERPL ELPH-MCNC: 2.3 G/DL — LOW (ref 3.5–5.2)
ALP SERPL-CCNC: 143 U/L — HIGH (ref 30–115)
ALT FLD-CCNC: 5 U/L — SIGNIFICANT CHANGE UP (ref 0–41)
ANION GAP SERPL CALC-SCNC: 13 MMOL/L — SIGNIFICANT CHANGE UP (ref 7–14)
APTT BLD: 40 SEC — HIGH (ref 27–39.2)
AST SERPL-CCNC: 23 U/L — SIGNIFICANT CHANGE UP (ref 0–41)
BASOPHILS # BLD AUTO: 0.04 K/UL — SIGNIFICANT CHANGE UP (ref 0–0.2)
BASOPHILS NFR BLD AUTO: 1 % — SIGNIFICANT CHANGE UP (ref 0–1)
BILIRUB SERPL-MCNC: 0.4 MG/DL — SIGNIFICANT CHANGE UP (ref 0.2–1.2)
BLD GP AB SCN SERPL QL: SIGNIFICANT CHANGE UP
BUN SERPL-MCNC: 17 MG/DL — SIGNIFICANT CHANGE UP (ref 10–20)
CALCIUM SERPL-MCNC: 8.3 MG/DL — LOW (ref 8.5–10.1)
CHLORIDE SERPL-SCNC: 106 MMOL/L — SIGNIFICANT CHANGE UP (ref 98–110)
CO2 SERPL-SCNC: 21 MMOL/L — SIGNIFICANT CHANGE UP (ref 17–32)
CREAT SERPL-MCNC: 0.6 MG/DL — LOW (ref 0.7–1.5)
CULTURE RESULTS: SIGNIFICANT CHANGE UP
EOSINOPHIL # BLD AUTO: 0.3 K/UL — SIGNIFICANT CHANGE UP (ref 0–0.7)
EOSINOPHIL NFR BLD AUTO: 7.4 % — SIGNIFICANT CHANGE UP (ref 0–8)
GLUCOSE BLDC GLUCOMTR-MCNC: 103 MG/DL — HIGH (ref 70–99)
GLUCOSE BLDC GLUCOMTR-MCNC: 113 MG/DL — HIGH (ref 70–99)
GLUCOSE BLDC GLUCOMTR-MCNC: 86 MG/DL — SIGNIFICANT CHANGE UP (ref 70–99)
GLUCOSE BLDC GLUCOMTR-MCNC: 91 MG/DL — SIGNIFICANT CHANGE UP (ref 70–99)
GLUCOSE SERPL-MCNC: 97 MG/DL — SIGNIFICANT CHANGE UP (ref 70–99)
HCT VFR BLD CALC: 28.2 % — LOW (ref 42–52)
HGB BLD-MCNC: 8.9 G/DL — LOW (ref 14–18)
IMM GRANULOCYTES NFR BLD AUTO: 0.2 % — SIGNIFICANT CHANGE UP (ref 0.1–0.3)
INR BLD: 1.19 RATIO — SIGNIFICANT CHANGE UP (ref 0.65–1.3)
LYMPHOCYTES # BLD AUTO: 0.76 K/UL — LOW (ref 1.2–3.4)
LYMPHOCYTES # BLD AUTO: 18.8 % — LOW (ref 20.5–51.1)
MCHC RBC-ENTMCNC: 26.6 PG — LOW (ref 27–31)
MCHC RBC-ENTMCNC: 31.6 G/DL — LOW (ref 32–37)
MCV RBC AUTO: 84.2 FL — SIGNIFICANT CHANGE UP (ref 80–94)
MONOCYTES # BLD AUTO: 0.41 K/UL — SIGNIFICANT CHANGE UP (ref 0.1–0.6)
MONOCYTES NFR BLD AUTO: 10.1 % — HIGH (ref 1.7–9.3)
NEUTROPHILS # BLD AUTO: 2.53 K/UL — SIGNIFICANT CHANGE UP (ref 1.4–6.5)
NEUTROPHILS NFR BLD AUTO: 62.5 % — SIGNIFICANT CHANGE UP (ref 42.2–75.2)
NRBC # BLD: 0 /100 WBCS — SIGNIFICANT CHANGE UP (ref 0–0)
PLATELET # BLD AUTO: 153 K/UL — SIGNIFICANT CHANGE UP (ref 130–400)
POTASSIUM SERPL-MCNC: 3.9 MMOL/L — SIGNIFICANT CHANGE UP (ref 3.5–5)
POTASSIUM SERPL-SCNC: 3.9 MMOL/L — SIGNIFICANT CHANGE UP (ref 3.5–5)
PROT SERPL-MCNC: 6.4 G/DL — SIGNIFICANT CHANGE UP (ref 6–8)
PROTHROM AB SERPL-ACNC: 13.7 SEC — HIGH (ref 9.95–12.87)
RBC # BLD: 3.35 M/UL — LOW (ref 4.7–6.1)
RBC # FLD: 15.9 % — HIGH (ref 11.5–14.5)
SARS-COV-2 RNA SPEC QL NAA+PROBE: SIGNIFICANT CHANGE UP
SODIUM SERPL-SCNC: 140 MMOL/L — SIGNIFICANT CHANGE UP (ref 135–146)
SPECIMEN SOURCE: SIGNIFICANT CHANGE UP
WBC # BLD: 4.05 K/UL — LOW (ref 4.8–10.8)
WBC # FLD AUTO: 4.05 K/UL — LOW (ref 4.8–10.8)

## 2021-04-17 PROCEDURE — 99231 SBSQ HOSP IP/OBS SF/LOW 25: CPT

## 2021-04-17 RX ADMIN — ATORVASTATIN CALCIUM 40 MILLIGRAM(S): 80 TABLET, FILM COATED ORAL at 21:11

## 2021-04-17 RX ADMIN — ENOXAPARIN SODIUM 40 MILLIGRAM(S): 100 INJECTION SUBCUTANEOUS at 12:11

## 2021-04-17 RX ADMIN — Medication 1 APPLICATION(S): at 17:52

## 2021-04-17 RX ADMIN — CARBIDOPA AND LEVODOPA 2 TABLET(S): 25; 100 TABLET ORAL at 06:03

## 2021-04-17 RX ADMIN — SODIUM CHLORIDE 60 MILLILITER(S): 9 INJECTION, SOLUTION INTRAVENOUS at 12:12

## 2021-04-17 RX ADMIN — Medication 1 APPLICATION(S): at 06:04

## 2021-04-17 RX ADMIN — CARBIDOPA AND LEVODOPA 2 TABLET(S): 25; 100 TABLET ORAL at 13:40

## 2021-04-17 RX ADMIN — MIDODRINE HYDROCHLORIDE 10 MILLIGRAM(S): 2.5 TABLET ORAL at 13:40

## 2021-04-17 RX ADMIN — MIDODRINE HYDROCHLORIDE 10 MILLIGRAM(S): 2.5 TABLET ORAL at 21:10

## 2021-04-17 RX ADMIN — CARBIDOPA AND LEVODOPA 2 TABLET(S): 25; 100 TABLET ORAL at 21:10

## 2021-04-17 RX ADMIN — CHLORHEXIDINE GLUCONATE 1 APPLICATION(S): 213 SOLUTION TOPICAL at 06:03

## 2021-04-17 RX ADMIN — MIDODRINE HYDROCHLORIDE 10 MILLIGRAM(S): 2.5 TABLET ORAL at 06:03

## 2021-04-17 RX ADMIN — CARBIDOPA AND LEVODOPA 2 TABLET(S): 25; 100 TABLET ORAL at 10:04

## 2021-04-17 RX ADMIN — Medication 1 APPLICATION(S): at 06:03

## 2021-04-17 NOTE — PROGRESS NOTE ADULT - SUBJECTIVE AND OBJECTIVE BOX
DAILY PROGRESS NOTE  ===========================================================    Patient Information:  NIEVES LABOY  /  71y  /  Male  /  MRN#: [id number]    Hospital Day: 26d   Location: [facility]E Neuro 012 A ([facility] T9-3E Neuro)    |:::::::::::::::::::::::::::| SUBJECTIVE |:::::::::::::::::::::::::::|    OVERNIGHT EVENTS: none   TODAY: Patient was seen today at bedside. Pt is awake, NAD, chronically ill appearing, and unable to follow commands. Review of systems is otherwise negative.    |:::::::::::::::::::::::::::| ADMISSION HPI |:::::::::::::::::::::::::::|    HPI:  71 year old Male with past medical history of Parkinson's, dementia, CKD Stage 3, 1st degree AV block, HLD, gout, HTN, DM, fungal septicemia presenting from [facility] for acute decompensation in mental status.     As per documentation patient at baseline is verbal but for past 3 days, he has been worsening to state of being non verbal. Patient was admitted to [facility] for fungemia and discharged on the 11th March. At  he had left hand cellulitis and was treated for it.    In ED patient hemodynamically stable, afebrile, ABEBE with rise in Cr to 1.9 and BUN to 75, remains non verbal. CT head negative for any new strokes and UA negative. Family has not visited the patient recently so unaware of his status. (22 Mar 2021 23:59)      |:::::::::::::::::::::::::::| OBJECTIVE |:::::::::::::::::::::::::::|    STANDING MEDICATIONS  atorvastatin 40 milliGRAM(s) Oral at bedtime  carbidopa/levodopa  25/100 2 Tablet(s) Oral <User Schedule>  chlorhexidine 4% Liquid 1 Application(s) Topical <User Schedule>  collagenase Ointment 1 Application(s) Topical two times a day  Dakins Solution - 1/2 Strength 1 Application(s) Topical two times a day  dextrose 5% + sodium chloride 0.9%. 1000 milliLiter(s) IV Continuous <Continuous>  enoxaparin Injectable 40 milliGRAM(s) SubCutaneous daily  midodrine 10 milliGRAM(s) Oral every 8 hours  senna 2 Tablet(s) Oral at bedtime    PRN MEDICATIONS  acetaminophen   Tablet .. 650 milliGRAM(s) Oral every 6 hours PRN    HOME MEDICATIONS  atorvastatin 40 mg oral tablet: 1 tab(s) orally once a day (at bedtime)  carbidopa-levodopa 25 mg-100 mg oral tablet, extended release: 2 tab(s) orally 5 times a day at 6AM,10Am,2PM,6PM,10 PM  colchicine 0.6 mg oral tablet: 1 tab(s) orally once a day  Eliquis 5 mg oral tablet: 1 tab(s) orally 2 times a day  enalapril 5 mg oral tablet: 1 tab(s) orally once a day  gabapentin 100 mg oral capsule: 1 cap(s) orally 3 times a day  omeprazole 40 mg oral delayed release capsule: 1 cap(s) orally once a day  senna oral tablet: 2 tab(s) orally once a day (at bedtime), As Needed -for constipation       VITAL SIGNS: Last 24 Hours  T(C): 35.4 (17 Apr 2021 05:29), Max: 37 (16 Apr 2021 14:13)  T(F): 95.8 (17 Apr 2021 05:29), Max: 98.6 (16 Apr 2021 14:13)  HR: 68 (17 Apr 2021 05:29) (68 - 70)  BP: 137/68 (17 Apr 2021 05:29) (130/62 - 137/68)  BP(mean): 94 (17 Apr 2021 05:29) (94 - 110)  RR: 17 (17 Apr 2021 05:29) (17 - 18)  SpO2: --    Input-Output      PHYSICAL EXAM:  GEN: No acute distress.  Awake, tracking, chronically ill appearing   LUNGS: Clear to auscultation bilaterally.  HEART: Regular rate and rhythm. No murmurs.  ABD: Soft, non-tender, non-distended.  EXT: LUE edema >RUE edema   NEURO: not able to access   PSYCH: Cooperative, appropriate.    LAB RESULTS:                        8.9    4.05  )-----------( 153      ( 17 Apr 2021 07:02 )             28.2     04-17    140  |  106  |  17  ----------------------------<  97  3.9   |  21  |  0.6<L>    Ca    8.3<L>      17 Apr 2021 07:02    TPro  6.4  /  Alb  2.3<L>  /  TBili  0.4  /  DBili  x   /  AST  23  /  ALT  5   /  AlkPhos  143<H>  04-17    PT/INR - ( 17 Apr 2021 07:02 )   PT: 13.70 sec;   INR: 1.19 ratio         PTT - ( 17 Apr 2021 07:02 )  PTT:40.0 sec            MICROBIOLOGY:    Culture - Blood (collected 04-13-21 @ 07:34)  Source: .Blood None  Preliminary Report (04-14-21 @ 17:01):    No growth to date.    Culture - Blood (collected 04-12-21 @ 21:38)  Source: .Blood None  Preliminary Report (04-14-21 @ 05:01):    No growth to date.    Culture - Blood (collected 04-12-21 @ 16:00)  Source: .Blood Blood  Preliminary Report (04-13-21 @ 22:01):    No growth to date.    Culture - Blood (collected 04-08-21 @ 16:00)  Source: .Blood Blood  Gram Stain (04-11-21 @ 08:45):    Growth in anaerobic bottle: Gram Positive Cocci in Clusters  Final Report (04-12-21 @ 10:51):    Growth in anaerobic bottle: Staphylococcus epidermidis Coag Negative    Staphylococcus    Single set isolate, possible contaminant. Contact    Microbiology if susceptibility testing clinically    indicated.    ***Blood Panel PCR results on this specimen are available    approximately 3 hours after the Gram stain result.***    Gram stain, PCR, and/or culture results may not always    correspond due to difference in methodologies.    ************************************************************    This PCR assay was performed by multiplex PCR. This    Assay tests for 66 bacterial and resistance gene targets.    Please refer to the [facility] [employer] test directory    at [email or web address] for details.  Organism: Blood Culture PCR (04-12-21 @ 10:51)  Organism: Blood Culture PCR (04-12-21 @ 10:51)      -  Staphylococcus epidermidis, Methicillin resistant: Detec      Method Type: PCR    Culture - Blood (collected 04-08-21 @ 16:00)  Source: .Blood Blood-Peripheral  Final Report (04-14-21 @ 01:00):    No Growth Final      IMAGING/STUDIES:    ALLERGIES:  No Known Allergies      ===========================================================

## 2021-04-17 NOTE — CHART NOTE - NSCHARTNOTEFT_GEN_A_CORE
3 day calorie count results     Day 1 ~970kcal, 119g, 78g protein   Day 2 ~300kcal, 8 g protein  Day 3 ~30kcal- only breakfast documented

## 2021-04-17 NOTE — PROGRESS NOTE ADULT - ASSESSMENT
71 year old Male with past medical history of Parkinson's, dementia, CKD Stage 3, 1st degree AV block, HLD, gout, HTN, DM, fungal septicemia presenting from [facility] for acute decompensation in mental status. As per documentation patient at baseline is verbal but for past 3 days, he has been worsening to state of being non verbal. Patient was admitted to [facility] for fungemia and discharged on the 11th March. At  he had left hand cellulitis and was treated for it.    # Metabolic encephalopathy on top of baseline dementia  # Sepsis  # Necrotic sacral ulcer stage 4  # H/o recent fungemia  - CT Head No acute intracranial pathology.activity  -  Chest Xray No consolidation, effusion or pneumothorax.  -  Blood & Urine cxs NGTD   -  WCX GNR, 3/29   Numerous Klebsiella pneumoniae ESBL, Few Pseudomonas aeruginosa, Numerous Enterococcus faecalis (vancomycin resistant)  - evaluated by Burn: s/p debridement of sacrum on 3/30 .  no more new recommendation for surgery at this time .continue local wound care with santyl/dakins WTD.  - s/p meropenem, Polymixin and Aztreonam  - S/p spinal tap --> CSF clear with no growths and neg PCR  -repeat EEG w/ no epileptiform activity but diffuse cerebral dysfxn  -d/w neuro, may d/c Keppra   -off ABx as per ID  -somewhat improved mental status    #+ Staph epi in BCx x1 contaminant  -repeat BCx---> negative    # Nutrition:   -daughter agreed to PEG but delayed due to +BCx that turned out to be a contaminant  -4/14 passed S&S but limited PO intake.   -f/u calorie count  -4/15 needs PEG as minimal PO intake, daughter asked to wait till next week for PEG. Plan is for Monday  -if unable to take Sinemet, will need NGT insertion ASAP    #Lt shoulder dislocation w/ collection  -no need to tap as per IR and ID concurrs    # Hypotension  - c/w midodrine    #Anemia  -s/p 1u PRBCs    # Thrombocytopenia ?sec to sepsis  -resolved  -off Pepcid  -heme f/u appreciated    # Hyponatremia off Polymixin  - monitor  BMP    # H/o parkinsons disease  - c/w sinemet    # H/o chronic DVT  - VA Duplex Lower Ext Vein Scan, Bilat (03.27.21 @ 18:48) >No evidence of deep venous thrombosis or superficial thrombophlebitis in the bilateral lower extremities.  - continue holding eliquis due to thrombocytopenia    # Dyslipidemia  - c/w statin    # DVT prophylaxis  -scd  -Lovenox    Very high risk pt. D/w daughter GOC: she wants full code for now. Very poor Px (daughter aware).    #Pending:  PEG placement Monday unless pt is meeting nutritional reqs  # Discussed w/ daughter in details who wants to cont aggressive Tx. Agrees to PEG but wants to wait till early next wk  # Disposition: SNF when/if stable    d/w Housestaff, nursing, case mgmt

## 2021-04-17 NOTE — PROGRESS NOTE ADULT - ATTENDING COMMENTS
Patient examined and discussed with medical team during AM round.  + Prolonged and complicated hosp course for this 70 yo M with hx of parkinson's, dementia, CKD 3, HLD, gout, HTN, type 2 DM, chronic LE DVT, 1st degree AVB who was recently txed for fungemia and then transferred to NH.  Presented to ED for worsening mentation and noted to be in ABEBE.  Apparently pt was verbal prior but now non-verbal.  During this hosp course, txed with IVF with resolution of ABEBE (?sepsis with pre-renal component).  Multiple w/up done for acute metabolic encephalopathy including evaluation and tx for sepsis and spinal tap with source being likely due to stage 4 necrotic DU at sacrum.  Bl c/s and urine c/s negative but wound c/s + multiple MDR bacterias as noted in housestaff documentation above.  Pt is being f/up with burn and underwent debridement and currently on local wound care.  No fever noted.  Vitals are stable.  PE as above.  Agree with assessment and plan.  Awaiting PEG placement on Monday as pt's oral intake is insufficent (calorie count in progress).  SW to assist with dc planning.

## 2021-04-18 LAB
ALBUMIN SERPL ELPH-MCNC: 2.1 G/DL — LOW (ref 3.5–5.2)
ALP SERPL-CCNC: 136 U/L — HIGH (ref 30–115)
ALT FLD-CCNC: 8 U/L — SIGNIFICANT CHANGE UP (ref 0–41)
ANION GAP SERPL CALC-SCNC: 11 MMOL/L — SIGNIFICANT CHANGE UP (ref 7–14)
AST SERPL-CCNC: 20 U/L — SIGNIFICANT CHANGE UP (ref 0–41)
BASOPHILS # BLD AUTO: 0.03 K/UL — SIGNIFICANT CHANGE UP (ref 0–0.2)
BASOPHILS NFR BLD AUTO: 0.9 % — SIGNIFICANT CHANGE UP (ref 0–1)
BILIRUB SERPL-MCNC: 0.4 MG/DL — SIGNIFICANT CHANGE UP (ref 0.2–1.2)
BUN SERPL-MCNC: 13 MG/DL — SIGNIFICANT CHANGE UP (ref 10–20)
CALCIUM SERPL-MCNC: 8.1 MG/DL — LOW (ref 8.5–10.1)
CHLORIDE SERPL-SCNC: 106 MMOL/L — SIGNIFICANT CHANGE UP (ref 98–110)
CO2 SERPL-SCNC: 21 MMOL/L — SIGNIFICANT CHANGE UP (ref 17–32)
CREAT SERPL-MCNC: 0.6 MG/DL — LOW (ref 0.7–1.5)
CULTURE RESULTS: SIGNIFICANT CHANGE UP
CULTURE RESULTS: SIGNIFICANT CHANGE UP
EOSINOPHIL # BLD AUTO: 0.26 K/UL — SIGNIFICANT CHANGE UP (ref 0–0.7)
EOSINOPHIL NFR BLD AUTO: 7.7 % — SIGNIFICANT CHANGE UP (ref 0–8)
GLUCOSE BLDC GLUCOMTR-MCNC: 109 MG/DL — HIGH (ref 70–99)
GLUCOSE BLDC GLUCOMTR-MCNC: 111 MG/DL — HIGH (ref 70–99)
GLUCOSE BLDC GLUCOMTR-MCNC: 88 MG/DL — SIGNIFICANT CHANGE UP (ref 70–99)
GLUCOSE BLDC GLUCOMTR-MCNC: 90 MG/DL — SIGNIFICANT CHANGE UP (ref 70–99)
GLUCOSE SERPL-MCNC: 83 MG/DL — SIGNIFICANT CHANGE UP (ref 70–99)
HCT VFR BLD CALC: 25.8 % — LOW (ref 42–52)
HGB BLD-MCNC: 8.1 G/DL — LOW (ref 14–18)
IMM GRANULOCYTES NFR BLD AUTO: 0.3 % — SIGNIFICANT CHANGE UP (ref 0.1–0.3)
LYMPHOCYTES # BLD AUTO: 0.62 K/UL — LOW (ref 1.2–3.4)
LYMPHOCYTES # BLD AUTO: 18.4 % — LOW (ref 20.5–51.1)
MCHC RBC-ENTMCNC: 26.6 PG — LOW (ref 27–31)
MCHC RBC-ENTMCNC: 31.4 G/DL — LOW (ref 32–37)
MCV RBC AUTO: 84.9 FL — SIGNIFICANT CHANGE UP (ref 80–94)
MONOCYTES # BLD AUTO: 0.29 K/UL — SIGNIFICANT CHANGE UP (ref 0.1–0.6)
MONOCYTES NFR BLD AUTO: 8.6 % — SIGNIFICANT CHANGE UP (ref 1.7–9.3)
NEUTROPHILS # BLD AUTO: 2.16 K/UL — SIGNIFICANT CHANGE UP (ref 1.4–6.5)
NEUTROPHILS NFR BLD AUTO: 64.1 % — SIGNIFICANT CHANGE UP (ref 42.2–75.2)
NRBC # BLD: 0 /100 WBCS — SIGNIFICANT CHANGE UP (ref 0–0)
PLATELET # BLD AUTO: 153 K/UL — SIGNIFICANT CHANGE UP (ref 130–400)
POTASSIUM SERPL-MCNC: 4.3 MMOL/L — SIGNIFICANT CHANGE UP (ref 3.5–5)
POTASSIUM SERPL-SCNC: 4.3 MMOL/L — SIGNIFICANT CHANGE UP (ref 3.5–5)
PROT SERPL-MCNC: 6.1 G/DL — SIGNIFICANT CHANGE UP (ref 6–8)
RBC # BLD: 3.04 M/UL — LOW (ref 4.7–6.1)
RBC # FLD: 15.9 % — HIGH (ref 11.5–14.5)
SODIUM SERPL-SCNC: 138 MMOL/L — SIGNIFICANT CHANGE UP (ref 135–146)
SPECIMEN SOURCE: SIGNIFICANT CHANGE UP
SPECIMEN SOURCE: SIGNIFICANT CHANGE UP
WBC # BLD: 3.37 K/UL — LOW (ref 4.8–10.8)
WBC # FLD AUTO: 3.37 K/UL — LOW (ref 4.8–10.8)

## 2021-04-18 PROCEDURE — 99232 SBSQ HOSP IP/OBS MODERATE 35: CPT

## 2021-04-18 RX ADMIN — ENOXAPARIN SODIUM 40 MILLIGRAM(S): 100 INJECTION SUBCUTANEOUS at 11:26

## 2021-04-18 RX ADMIN — CARBIDOPA AND LEVODOPA 2 TABLET(S): 25; 100 TABLET ORAL at 13:29

## 2021-04-18 RX ADMIN — Medication 1 APPLICATION(S): at 17:25

## 2021-04-18 RX ADMIN — Medication 1 APPLICATION(S): at 17:24

## 2021-04-18 RX ADMIN — CHLORHEXIDINE GLUCONATE 1 APPLICATION(S): 213 SOLUTION TOPICAL at 06:00

## 2021-04-18 RX ADMIN — ATORVASTATIN CALCIUM 40 MILLIGRAM(S): 80 TABLET, FILM COATED ORAL at 21:32

## 2021-04-18 RX ADMIN — MIDODRINE HYDROCHLORIDE 10 MILLIGRAM(S): 2.5 TABLET ORAL at 06:00

## 2021-04-18 RX ADMIN — CARBIDOPA AND LEVODOPA 2 TABLET(S): 25; 100 TABLET ORAL at 06:00

## 2021-04-18 RX ADMIN — CARBIDOPA AND LEVODOPA 2 TABLET(S): 25; 100 TABLET ORAL at 09:46

## 2021-04-18 RX ADMIN — Medication 1 APPLICATION(S): at 06:00

## 2021-04-18 RX ADMIN — Medication 1 APPLICATION(S): at 06:01

## 2021-04-18 RX ADMIN — SODIUM CHLORIDE 60 MILLILITER(S): 9 INJECTION, SOLUTION INTRAVENOUS at 21:30

## 2021-04-18 NOTE — PROGRESS NOTE ADULT - ASSESSMENT
summary: "Prolonged and complicated hosp course for this 70 yo M with hx of parkinson's, dementia, CKD 3, HLD, gout, HTN, type 2 DM, chronic LE DVT, 1st degree AVB who was recently txed for fungemia and then transferred to NH.  Presented to ED for worsening mentation and noted to be in Barbie.  During this hosp course, txed with IVF with resolution of ABEBE (?sepsis with pre-renal component).  Multiple w/up done for acute metabolic encephalopathy including evaluation and tx for sepsis and spinal tap with source being likely due to stage 4 necrotic DU at sacrum.  Bl c/s and urine c/s negative but wound c/s + multiple MDR bacterias as noted in housestaff documentation above.  Pt is being f/up with burn and underwent debridement and currently on local wound care."    ?Increased verbalization noted but not at baseline.  Poor oral intake for PEG which is scheduled for tomorrow.  rest of med problems stable and to continue meds and monitoring as ordered.  After PEG placement, team with SW for disposition planning.

## 2021-04-19 LAB
ALBUMIN SERPL ELPH-MCNC: 2.1 G/DL — LOW (ref 3.5–5.2)
ALP SERPL-CCNC: 131 U/L — HIGH (ref 30–115)
ALT FLD-CCNC: 8 U/L — SIGNIFICANT CHANGE UP (ref 0–41)
ANION GAP SERPL CALC-SCNC: 13 MMOL/L — SIGNIFICANT CHANGE UP (ref 7–14)
AST SERPL-CCNC: 18 U/L — SIGNIFICANT CHANGE UP (ref 0–41)
BASOPHILS # BLD AUTO: 0.02 K/UL — SIGNIFICANT CHANGE UP (ref 0–0.2)
BASOPHILS NFR BLD AUTO: 0.5 % — SIGNIFICANT CHANGE UP (ref 0–1)
BILIRUB SERPL-MCNC: 0.4 MG/DL — SIGNIFICANT CHANGE UP (ref 0.2–1.2)
BUN SERPL-MCNC: 10 MG/DL — SIGNIFICANT CHANGE UP (ref 10–20)
CALCIUM SERPL-MCNC: 7.8 MG/DL — LOW (ref 8.5–10.1)
CHLORIDE SERPL-SCNC: 106 MMOL/L — SIGNIFICANT CHANGE UP (ref 98–110)
CO2 SERPL-SCNC: 20 MMOL/L — SIGNIFICANT CHANGE UP (ref 17–32)
CREAT SERPL-MCNC: 0.6 MG/DL — LOW (ref 0.7–1.5)
EOSINOPHIL # BLD AUTO: 0.31 K/UL — SIGNIFICANT CHANGE UP (ref 0–0.7)
EOSINOPHIL NFR BLD AUTO: 8.2 % — HIGH (ref 0–8)
GLUCOSE SERPL-MCNC: 84 MG/DL — SIGNIFICANT CHANGE UP (ref 70–99)
HCT VFR BLD CALC: 25.8 % — LOW (ref 42–52)
HGB BLD-MCNC: 8 G/DL — LOW (ref 14–18)
IMM GRANULOCYTES NFR BLD AUTO: 0.3 % — SIGNIFICANT CHANGE UP (ref 0.1–0.3)
LYMPHOCYTES # BLD AUTO: 0.71 K/UL — LOW (ref 1.2–3.4)
LYMPHOCYTES # BLD AUTO: 18.8 % — LOW (ref 20.5–51.1)
MCHC RBC-ENTMCNC: 26.2 PG — LOW (ref 27–31)
MCHC RBC-ENTMCNC: 31 G/DL — LOW (ref 32–37)
MCV RBC AUTO: 84.6 FL — SIGNIFICANT CHANGE UP (ref 80–94)
MONOCYTES # BLD AUTO: 0.32 K/UL — SIGNIFICANT CHANGE UP (ref 0.1–0.6)
MONOCYTES NFR BLD AUTO: 8.5 % — SIGNIFICANT CHANGE UP (ref 1.7–9.3)
NEUTROPHILS # BLD AUTO: 2.41 K/UL — SIGNIFICANT CHANGE UP (ref 1.4–6.5)
NEUTROPHILS NFR BLD AUTO: 63.7 % — SIGNIFICANT CHANGE UP (ref 42.2–75.2)
NRBC # BLD: 0 /100 WBCS — SIGNIFICANT CHANGE UP (ref 0–0)
PLATELET # BLD AUTO: 135 K/UL — SIGNIFICANT CHANGE UP (ref 130–400)
POTASSIUM SERPL-MCNC: 3.7 MMOL/L — SIGNIFICANT CHANGE UP (ref 3.5–5)
POTASSIUM SERPL-SCNC: 3.7 MMOL/L — SIGNIFICANT CHANGE UP (ref 3.5–5)
PROT SERPL-MCNC: 5.9 G/DL — LOW (ref 6–8)
RBC # BLD: 3.05 M/UL — LOW (ref 4.7–6.1)
RBC # FLD: 16 % — HIGH (ref 11.5–14.5)
SODIUM SERPL-SCNC: 139 MMOL/L — SIGNIFICANT CHANGE UP (ref 135–146)
WBC # BLD: 3.78 K/UL — LOW (ref 4.8–10.8)
WBC # FLD AUTO: 3.78 K/UL — LOW (ref 4.8–10.8)

## 2021-04-19 PROCEDURE — 99152 MOD SED SAME PHYS/QHP 5/>YRS: CPT

## 2021-04-19 PROCEDURE — 49440 PLACE GASTROSTOMY TUBE PERC: CPT

## 2021-04-19 PROCEDURE — 99233 SBSQ HOSP IP/OBS HIGH 50: CPT

## 2021-04-19 RX ADMIN — Medication 1 APPLICATION(S): at 17:29

## 2021-04-19 RX ADMIN — Medication 1 APPLICATION(S): at 05:56

## 2021-04-19 RX ADMIN — MIDODRINE HYDROCHLORIDE 10 MILLIGRAM(S): 2.5 TABLET ORAL at 21:42

## 2021-04-19 RX ADMIN — CHLORHEXIDINE GLUCONATE 1 APPLICATION(S): 213 SOLUTION TOPICAL at 05:54

## 2021-04-19 RX ADMIN — MIDODRINE HYDROCHLORIDE 10 MILLIGRAM(S): 2.5 TABLET ORAL at 05:54

## 2021-04-19 RX ADMIN — CARBIDOPA AND LEVODOPA 2 TABLET(S): 25; 100 TABLET ORAL at 05:54

## 2021-04-19 NOTE — PROGRESS NOTE ADULT - SUBJECTIVE AND OBJECTIVE BOX
INTERVENTIONAL RADIOLOGY BRIEF-OPERATIVE NOTE    Procedure:  Radiologically Inserted Gastrostomy catheter placement    Pre-Op Diagnosis:  Altered mental status    Post-Op Diagnosis:  Same    Attending:   Dr. Jeffers  Resident:   None    Anesthesia (type):  [ ] General Anesthesia  [X] Sedation-- Versed, 3 mg and Fentanyl, 75, mcg, iv  [ ] Spinal Anesthesia  [X] Local/Regional-- 1% Lidocaine, SQ 10 cc    Total Face-to-Face Sedation Time: 37 minutes    Contrast:   Gastroview, 20 cc to stomach; 450 cc air to stomach, passed to small bowel    Blood Loss:  5 cc    Condition:   [ ] Critical  [ ] Serious  [ ] Fair   [X] Good    Findings/Follow up Plan of Care:  16-Nigerien gastrostomy catheter placed uneventfully using U/S and fluoro guidance.  Patient tolerated very well, without incident.    Will be ready for first use in 24 hours.    Specimens Removed:  None    Implants:  None    Complications:  None immediate    Disposition:  Back to floor.  Catheter will be ready for first use in 24 hours      Please call Interventional Radiology [phone] with any questions, concerns, or issues.

## 2021-04-19 NOTE — PROGRESS NOTE ADULT - SUBJECTIVE AND OBJECTIVE BOX
DAILY PROGRESS NOTE  ===========================================================    Patient Information:  NIEVES LABOY  /  71y  /  Male  /  MRN#: [id number]    Hospital Day: 28d   Location: [facility]93E Neuro 012 A ([facility] T9-3E Neuro)    |:::::::::::::::::::::::::::| SUBJECTIVE |:::::::::::::::::::::::::::|    OVERNIGHT EVENTS: none  TODAY: Patient was seen today at bedside. Pt got PEG today. He is awake, NAD, and chronically ill appearing.  Review of systems is otherwise negative.    |:::::::::::::::::::::::::::| ADMISSION HPI |:::::::::::::::::::::::::::|    HPI:  71 year old Male with past medical history of Parkinson's, dementia, CKD Stage 3, 1st degree AV block, HLD, gout, HTN, DM, fungal septicemia presenting from [facility] for acute decompensation in mental status.     As per documentation patient at baseline is verbal but for past 3 days, he has been worsening to state of being non verbal. Patient was admitted to [facility] for fungemia and discharged on the 11th March. At  he had left hand cellulitis and was treated for it.    In ED patient hemodynamically stable, afebrile, ABEBE with rise in Cr to 1.9 and BUN to 75, remains non verbal. CT head negative for any new strokes and UA negative. Family has not visited the patient recently so unaware of his status. (22 Mar 2021 23:59)      |:::::::::::::::::::::::::::| OBJECTIVE |:::::::::::::::::::::::::::|    STANDING MEDICATIONS  atorvastatin 40 milliGRAM(s) Oral at bedtime  carbidopa/levodopa  25/100 2 Tablet(s) Oral <User Schedule>  chlorhexidine 4% Liquid 1 Application(s) Topical <User Schedule>  collagenase Ointment 1 Application(s) Topical two times a day  Dakins Solution - 1/2 Strength 1 Application(s) Topical two times a day  dextrose 5% + sodium chloride 0.9%. 1000 milliLiter(s) IV Continuous <Continuous>  midodrine 10 milliGRAM(s) Oral every 8 hours  senna 2 Tablet(s) Oral at bedtime    PRN MEDICATIONS  acetaminophen   Tablet .. 650 milliGRAM(s) Oral every 6 hours PRN    HOME MEDICATIONS  atorvastatin 40 mg oral tablet: 1 tab(s) orally once a day (at bedtime)  carbidopa-levodopa 25 mg-100 mg oral tablet, extended release: 2 tab(s) orally 5 times a day at 6AM,10Am,2PM,6PM,10 PM  colchicine 0.6 mg oral tablet: 1 tab(s) orally once a day  Eliquis 5 mg oral tablet: 1 tab(s) orally 2 times a day  enalapril 5 mg oral tablet: 1 tab(s) orally once a day  gabapentin 100 mg oral capsule: 1 cap(s) orally 3 times a day  omeprazole 40 mg oral delayed release capsule: 1 cap(s) orally once a day  senna oral tablet: 2 tab(s) orally once a day (at bedtime), As Needed -for constipation       VITAL SIGNS: Last 24 Hours  T(C): 36.8 (19 Apr 2021 05:57), Max: 36.9 (18 Apr 2021 20:40)  T(F): 98.3 (19 Apr 2021 05:57), Max: 98.4 (18 Apr 2021 20:40)  HR: 66 (19 Apr 2021 05:57) (66 - 69)  BP: 124/68 (19 Apr 2021 05:57) (120/62 - 124/68)  BP(mean): 81 (18 Apr 2021 20:40) (81 - 81)  RR: 20 (19 Apr 2021 05:57) (20 - 20)  SpO2: --    Input-Output    04-18-21 @ 07:01  -  04-19-21 @ 07:00  --------------------------------------------------------  IN:    dextrose 5% + sodium chloride 0.9%: 540 mL    Oral Fluid: 600 mL  Total IN: 1140 mL    OUT:    Incontinent per Condom Catheter (mL): 150 mL  Total OUT: 150 mL    Total NET: 990 mL          PHYSICAL EXAM:  GEN: No acute distress. awake, tracking, chronically ill appearing  LUNGS: Clear to auscultation bilaterally.  HEART: Regular rate and rhythm. No murmurs.  ABD: Soft, non-tender, non-distended.  EXT: Normal range of motion. No edema.  NEURO: Not able to access.   PSYCH: Cooperative, appropriate.    LAB RESULTS:                        8.0    3.78  )-----------( 135      ( 19 Apr 2021 06:33 )             25.8     04-19    139  |  106  |  10  ----------------------------<  84  3.7   |  20  |  0.6<L>    Ca    7.8<L>      19 Apr 2021 06:33    TPro  5.9<L>  /  Alb  2.1<L>  /  TBili  0.4  /  DBili  x   /  AST  18  /  ALT  8   /  AlkPhos  131<H>  04-19                MICROBIOLOGY:    Culture - Blood (collected 04-13-21 @ 07:34)  Source: .Blood None  Final Report (04-18-21 @ 17:00):    No Growth Final    Culture - Blood (collected 04-12-21 @ 21:38)  Source: .Blood None  Final Report (04-18-21 @ 05:00):    No Growth Final    Culture - Blood (collected 04-12-21 @ 16:00)  Source: .Blood Blood  Final Report (04-17-21 @ 22:01):    No Growth Final    Culture - Blood (collected 04-08-21 @ 16:00)  Source: .Blood Blood  Gram Stain (04-11-21 @ 08:45):    Growth in anaerobic bottle: Gram Positive Cocci in Clusters  Final Report (04-12-21 @ 10:51):    Growth in anaerobic bottle: Staphylococcus epidermidis Coag Negative    Staphylococcus    Single set isolate, possible contaminant. Contact    Microbiology if susceptibility testing clinically    indicated.    ***Blood Panel PCR results on this specimen are available    approximately 3 hours after the Gram stain result.***    Gram stain, PCR, and/or culture results may not always    correspond due to difference in methodologies.    ************************************************************    This PCR assay was performed by multiplex PCR. This    Assay tests for 66 bacterial and resistance gene targets.    Please refer to the [facility] [employer] test directory    at [email or web address] for details.  Organism: Blood Culture PCR (04-12-21 @ 10:51)  Organism: Blood Culture PCR (04-12-21 @ 10:51)      -  Staphylococcus epidermidis, Methicillin resistant: Detec      Method Type: PCR    Culture - Blood (collected 04-08-21 @ 16:00)  Source: .Blood Blood-Peripheral  Final Report (04-14-21 @ 01:00):    No Growth Final      IMAGING/STUDIES:  Findings/Follow up Plan of Care:  16-French gastrostomy catheter placed uneventfully using U/S and fluoro guidance.  Patient tolerated very well, without incident.    Will be ready for first use in 24 hours.    Specimens Removed:  None    Implants:  None    Complications:  None immediate    Disposition:  Back to floor.  Catheter will be ready for first use in 24 hours    ALLERGIES:  No Known Allergies      ===========================================================

## 2021-04-19 NOTE — PROGRESS NOTE ADULT - ASSESSMENT
71 year old Male with past medical history of Parkinson's, dementia, CKD Stage 3, 1st degree AV block, HLD, gout, HTN, DM, fungal septicemia presenting from [facility] for acute decompensation in mental status. As per documentation patient at baseline is verbal but for past 3 days, he has been worsening to state of being non verbal. Patient was admitted to [facility] for fungemia and discharged on the 11th March. At  he had left hand cellulitis and was treated for it.    # Metabolic encephalopathy on top of baseline dementia  # Sepsis  # Necrotic sacral ulcer stage 4  # H/o recent fungemia  - CT Head No acute intracranial pathology.activity  -  Chest Xray No consolidation, effusion or pneumothorax.  -  Blood & Urine cxs NGTD   -  WCX GNR, 3/29   Numerous Klebsiella pneumoniae ESBL, Few Pseudomonas aeruginosa, Numerous Enterococcus faecalis (vancomycin resistant)  - evaluated by Burn: s/p debridement of sacrum on 3/30 .  no more new recommendation for surgery at this time .continue local wound care with santyl/dakins WTD.  - s/p meropenem, Polymixin and Aztreonam  - S/p spinal tap --> CSF clear with no growths and neg PCR  -repeat EEG w/ no epileptiform activity but diffuse cerebral dysfxn  -d/w neuro, may d/c Keppra   -off ABx as per ID  -somewhat improved mental status    #+ Staph epi in BCx x1 contaminant  -repeat BCx---> negative    # Nutrition/Dysphagia:   -daughter agreed to PEG but delayed due to +BCx that turned out to be a contaminant  -4/14 passed S&S but limited PO intake.   -s/p PEG on 4/19    #Lt shoulder dislocation w/ collection  -no need to tap as per IR and ID concurrs    # Hypotension  - c/w midodrine    #Anemia  -s/p 1u PRBCs    # Thrombocytopenia ?sec to sepsis  -resolved  -off Pepcid  -heme f/u appreciated    # Hyponatremia off Polymixin  - monitor  BMP    # H/o parkinsons disease  - c/w sinemet    # H/o chronic DVT  - VA Duplex Lower Ext Vein Scan, Bilat (03.27.21 @ 18:48) >No evidence of deep venous thrombosis or superficial thrombophlebitis in the bilateral lower extremities.  - continue holding eliquis due to thrombocytopenia    # Dyslipidemia  - c/w statin    # DVT prophylaxis  -scd  -Lovenox    Very high risk pt. D/w daughter GOC: she wants full code for now. Very poor Px (daughter aware).    #Pending:  tolerance of feeds, safe dispo  # Discussed w/ daughter in details who wants to cont aggressive Tx.   # Disposition: SNF when/if stable    d/w Housestaff, nursing, case mgmt

## 2021-04-19 NOTE — PROGRESS NOTE ADULT - SUBJECTIVE AND OBJECTIVE BOX
Patient is a 71y old  Male who presents with a chief complaint of Change in Mental Status (05 Apr 2021 13:36)    INTERVAL HPI/OVERNIGHT EVENTS: Patient was examined and seen at bedside. Events noted. Awake and alert today but not following commands and not communicating. S/p PEG.    InitialHPI:  71 year old Male with past medical history of Parkinson's, dementia, CKD Stage 3, 1st degree AV block, HLD, gout, HTN, DM, fungal septicemia presenting from [facility] for acute decompensation in mental status.   As per documentation patient at baseline is verbal but for past 3 days, he has been worsening to state of being non verbal. Patient was admitted to [facility] for fungemia and discharged on the 11th March. At  he had left hand cellulitis and was treated for it.  In ED patient hemodynamically stable, afebrile, ABEBE with rise in Cr to 1.9 and BUN to 75, remains non verbal. CT head negative for any new strokes and UA negative. Family has not visited the patient recently so unaware of his status. (22 Mar 2021 23:59)    PAST MEDICAL & SURGICAL HISTORY:  Parkinson disease    Hypertension    Gout    Dyslipidemia    Prostatitis    Cataract    No significant past surgical history        General: NAD, awake, tracking, chronically ill appearing  HEENT:  no LAD  CV: S1 S2  Resp: decreased breath sounds at bases  GI: NT/ND/S +BS, +PEG  MS: no clubbing/cyanosis/edema, + pulses b/l, LUE>RUE swelling  Neuro: unable to access  Skin: multiple skin decubs              MEDICATIONS  (STANDING):  atorvastatin 40 milliGRAM(s) Oral at bedtime  carbidopa/levodopa  25/100 2 Tablet(s) Oral <User Schedule>  chlorhexidine 4% Liquid 1 Application(s) Topical <User Schedule>  collagenase Ointment 1 Application(s) Topical two times a day  Dakins Solution - 1/2 Strength 1 Application(s) Topical two times a day  dextrose 5% + sodium chloride 0.9%. 1000 milliLiter(s) (60 mL/Hr) IV Continuous <Continuous>  midodrine 10 milliGRAM(s) Oral every 8 hours  senna 2 Tablet(s) Oral at bedtime    MEDICATIONS  (PRN):  acetaminophen   Tablet .. 650 milliGRAM(s) Oral every 6 hours PRN Temp greater or equal to 38C (100.4F)    Home Medications:  atorvastatin 40 mg oral tablet: 1 tab(s) orally once a day (at bedtime) (23 Mar 2021 01:07)  carbidopa-levodopa 25 mg-100 mg oral tablet, extended release: 2 tab(s) orally 5 times a day at 6AM,10Am,2PM,6PM,10 PM (23 Mar 2021 01:07)  colchicine 0.6 mg oral tablet: 1 tab(s) orally once a day (23 Mar 2021 01:07)  Eliquis 5 mg oral tablet: 1 tab(s) orally 2 times a day (23 Mar 2021 01:07)  enalapril 5 mg oral tablet: 1 tab(s) orally once a day (23 Mar 2021 01:07)  omeprazole 40 mg oral delayed release capsule: 1 cap(s) orally once a day (23 Mar 2021 01:07)    Vital Signs Last 24 Hrs  T(C): 36.8 (19 Apr 2021 05:57), Max: 36.9 (18 Apr 2021 20:40)  T(F): 98.3 (19 Apr 2021 05:57), Max: 98.4 (18 Apr 2021 20:40)  HR: 66 (19 Apr 2021 05:57) (66 - 69)  BP: 124/68 (19 Apr 2021 05:57) (120/62 - 124/68)  BP(mean): 81 (18 Apr 2021 20:40) (81 - 81)  RR: 20 (19 Apr 2021 05:57) (20 - 20)  SpO2: --  CAPILLARY BLOOD GLUCOSE      POCT Blood Glucose.: 90 mg/dL (18 Apr 2021 21:31)    LABS:                        8.0    3.78  )-----------( 135      ( 19 Apr 2021 06:33 )             25.8     04-19    139  |  106  |  10  ----------------------------<  84  3.7   |  20  |  0.6<L>    Ca    7.8<L>      19 Apr 2021 06:33    TPro  5.9<L>  /  Alb  2.1<L>  /  TBili  0.4  /  DBili  x   /  AST  18  /  ALT  8   /  AlkPhos  131<H>  04-19    LIVER FUNCTIONS - ( 19 Apr 2021 06:33 )  Alb: 2.1 g/dL / Pro: 5.9 g/dL / ALK PHOS: 131 U/L / ALT: 8 U/L / AST: 18 U/L / GGT: x                           Consultant Notes Reviewed:  [x ] YES  [ ] NO  Care Discussed with Consultants/Other Providers/ Housestaff [ x] YES  [ ] NO  Radiology, labs, new studies personally reviewed.

## 2021-04-19 NOTE — PROGRESS NOTE ADULT - ASSESSMENT
71 year old Male with past medical history of Parkinson's, dementia, CKD Stage 3, 1st degree AV block, HLD, gout, HTN, DM, fungal septicemia presenting from [facility] for acute decompensation in mental status. As per documentation patient at baseline is verbal but for past 3 days, he has been worsening to state of being non verbal. Patient was admitted to [facility] for fungemia and discharged on the 11th March. At  he had left hand cellulitis and was treated for it.    # Metabolic encephalopathy on top of baseline dementia  # Sepsis  # Necrotic sacral ulcer stage 4  # H/o recent fungemia  - CT Head No acute intracranial pathology.activity  -  Chest Xray No consolidation, effusion or pneumothorax.  -  Blood & Urine cxs NGTD   -  WCX GNR, 3/29   Numerous Klebsiella pneumoniae ESBL, Few Pseudomonas aeruginosa, Numerous Enterococcus faecalis (vancomycin resistant)  - evaluated by Burn: s/p debridement of sacrum on 3/30 .  no more new recommendation for surgery at this time .continue local wound care with santyl/dakins WTD.  - s/p meropenem, Polymixin and Aztreonam  - S/p spinal tap --> CSF clear with no growths and neg PCR  -repeat EEG w/ no epileptiform activity but diffuse cerebral dysfxn  -d/w neuro, may d/c Keppra   -off ABx as per ID  -somewhat improved mental status    #+ Staph epi in BCx x1 contaminant  -repeat BCx---> negative    # Nutrition:   -daughter agreed to PEG but delayed due to +BCx that turned out to be a contaminant  -4/14 & 4/15 passed S&S but limited PO intake.   -f/u calorie count  -4/19 patient  PEG. Will be ready for use in 24 hours.     #Lt shoulder dislocation w/ collection  -no need to tap as per IR and ID concurrs    # Hypotension  - c/w midodrine    #Anemia  -s/p 1u PRBCs    # Thrombocytopenia ?sec to sepsis  -resolved  -off Pepcid  -heme f/u appreciated    # Hyponatremia off Polymixin  - monitor  BMP    # H/o parkinsons disease  - c/w sinemet    # H/o chronic DVT  - [facility] Duplex Lower Ext Vein Scan, Bilat (03.27.21 @ 18:48) >No evidence of deep venous thrombosis or superficial thrombophlebitis in the bilateral lower extremities.  - continue holding eliquis due to thrombocytopenia    # Dyslipidemia  - c/w statin    # DVT prophylaxis  -scd  -Lovenox    Very high risk pt. D/w daughter GOC: she wants full code for now. Very poor Px (daughter aware).    #Pending: PEG placement ( most likely early next week)  # pt received PEG today (4/19), will be ready for use in 24 hrs.   # Disposition: SNF when/if stable    d/w Housestaff, nursing, case mgmt

## 2021-04-20 LAB
ALBUMIN SERPL ELPH-MCNC: 2.1 G/DL — LOW (ref 3.5–5.2)
ALP SERPL-CCNC: 137 U/L — HIGH (ref 30–115)
ALT FLD-CCNC: 14 U/L — SIGNIFICANT CHANGE UP (ref 0–41)
ANION GAP SERPL CALC-SCNC: 11 MMOL/L — SIGNIFICANT CHANGE UP (ref 7–14)
AST SERPL-CCNC: 29 U/L — SIGNIFICANT CHANGE UP (ref 0–41)
BILIRUB SERPL-MCNC: 0.5 MG/DL — SIGNIFICANT CHANGE UP (ref 0.2–1.2)
BUN SERPL-MCNC: 10 MG/DL — SIGNIFICANT CHANGE UP (ref 10–20)
CALCIUM SERPL-MCNC: 8 MG/DL — LOW (ref 8.5–10.1)
CHLORIDE SERPL-SCNC: 109 MMOL/L — SIGNIFICANT CHANGE UP (ref 98–110)
CO2 SERPL-SCNC: 21 MMOL/L — SIGNIFICANT CHANGE UP (ref 17–32)
CREAT SERPL-MCNC: 0.6 MG/DL — LOW (ref 0.7–1.5)
GLUCOSE BLDC GLUCOMTR-MCNC: 72 MG/DL — SIGNIFICANT CHANGE UP (ref 70–99)
GLUCOSE BLDC GLUCOMTR-MCNC: 75 MG/DL — SIGNIFICANT CHANGE UP (ref 70–99)
GLUCOSE BLDC GLUCOMTR-MCNC: 81 MG/DL — SIGNIFICANT CHANGE UP (ref 70–99)
GLUCOSE BLDC GLUCOMTR-MCNC: 82 MG/DL — SIGNIFICANT CHANGE UP (ref 70–99)
GLUCOSE SERPL-MCNC: 75 MG/DL — SIGNIFICANT CHANGE UP (ref 70–99)
MAGNESIUM SERPL-MCNC: 1.6 MG/DL — LOW (ref 1.8–2.4)
POTASSIUM SERPL-MCNC: 4.5 MMOL/L — SIGNIFICANT CHANGE UP (ref 3.5–5)
POTASSIUM SERPL-SCNC: 4.5 MMOL/L — SIGNIFICANT CHANGE UP (ref 3.5–5)
PROT SERPL-MCNC: 6.2 G/DL — SIGNIFICANT CHANGE UP (ref 6–8)
SODIUM SERPL-SCNC: 141 MMOL/L — SIGNIFICANT CHANGE UP (ref 135–146)

## 2021-04-20 PROCEDURE — 99232 SBSQ HOSP IP/OBS MODERATE 35: CPT

## 2021-04-20 RX ORDER — MAGNESIUM SULFATE 500 MG/ML
2 VIAL (ML) INJECTION ONCE
Refills: 0 | Status: COMPLETED | OUTPATIENT
Start: 2021-04-20 | End: 2021-04-20

## 2021-04-20 RX ORDER — ZINC SULFATE TAB 220 MG (50 MG ZINC EQUIVALENT) 220 (50 ZN) MG
220 TAB ORAL DAILY
Refills: 0 | Status: DISCONTINUED | OUTPATIENT
Start: 2021-04-20 | End: 2021-05-28

## 2021-04-20 RX ORDER — ENOXAPARIN SODIUM 100 MG/ML
40 INJECTION SUBCUTANEOUS DAILY
Refills: 0 | Status: DISCONTINUED | OUTPATIENT
Start: 2021-04-20 | End: 2021-04-21

## 2021-04-20 RX ORDER — MAGNESIUM OXIDE 400 MG ORAL TABLET 241.3 MG
400 TABLET ORAL EVERY 8 HOURS
Refills: 0 | Status: DISCONTINUED | OUTPATIENT
Start: 2021-04-20 | End: 2021-05-28

## 2021-04-20 RX ORDER — ASCORBIC ACID 60 MG
500 TABLET,CHEWABLE ORAL DAILY
Refills: 0 | Status: DISCONTINUED | OUTPATIENT
Start: 2021-04-20 | End: 2021-04-20

## 2021-04-20 RX ORDER — ASCORBIC ACID 60 MG
500 TABLET,CHEWABLE ORAL DAILY
Refills: 0 | Status: DISCONTINUED | OUTPATIENT
Start: 2021-04-20 | End: 2021-05-28

## 2021-04-20 RX ADMIN — CARBIDOPA AND LEVODOPA 2 TABLET(S): 25; 100 TABLET ORAL at 21:37

## 2021-04-20 RX ADMIN — Medication 1 APPLICATION(S): at 17:44

## 2021-04-20 RX ADMIN — MAGNESIUM OXIDE 400 MG ORAL TABLET 400 MILLIGRAM(S): 241.3 TABLET ORAL at 18:37

## 2021-04-20 RX ADMIN — MAGNESIUM OXIDE 400 MG ORAL TABLET 400 MILLIGRAM(S): 241.3 TABLET ORAL at 21:38

## 2021-04-20 RX ADMIN — Medication 1 APPLICATION(S): at 05:35

## 2021-04-20 RX ADMIN — ENOXAPARIN SODIUM 40 MILLIGRAM(S): 100 INJECTION SUBCUTANEOUS at 18:36

## 2021-04-20 RX ADMIN — ZINC SULFATE TAB 220 MG (50 MG ZINC EQUIVALENT) 220 MILLIGRAM(S): 220 (50 ZN) TAB at 18:36

## 2021-04-20 RX ADMIN — Medication 500 MILLIGRAM(S): at 21:38

## 2021-04-20 RX ADMIN — CHLORHEXIDINE GLUCONATE 1 APPLICATION(S): 213 SOLUTION TOPICAL at 05:35

## 2021-04-20 RX ADMIN — CARBIDOPA AND LEVODOPA 2 TABLET(S): 25; 100 TABLET ORAL at 15:37

## 2021-04-20 RX ADMIN — ATORVASTATIN CALCIUM 40 MILLIGRAM(S): 80 TABLET, FILM COATED ORAL at 21:37

## 2021-04-20 RX ADMIN — SENNA PLUS 2 TABLET(S): 8.6 TABLET ORAL at 21:37

## 2021-04-20 RX ADMIN — Medication 25 GRAM(S): at 18:37

## 2021-04-20 RX ADMIN — CARBIDOPA AND LEVODOPA 2 TABLET(S): 25; 100 TABLET ORAL at 11:47

## 2021-04-20 NOTE — CHART NOTE - NSCHARTNOTEFT_GEN_A_CORE
s/p GT placement by IR yesterday  Afebrile  Feeds starting today via GT  Previously was on PO diet and cleared by speech but refused to eat  NST consult called again today for feeding rec, please see previous note and rec from 4/16    T(F): 97.7 (04-20-21 @ 05:41), Max: 97.7 (04-20-21 @ 05:41)  HR: 69 (04-20-21 @ 05:41) (64 - 69)  BP: 114/62 (04-20-21 @ 05:41) (114/62 - 133/59)  RR: 18 (04-20-21 @ 05:41) (18 - 18)  SpO2: 97% (04-20-21 @ 05:41) (97% - 97%)    I&O's Detail    19 Apr 2021 07:01  -  20 Apr 2021 07:00  --------------------------------------------------------  IN:    dextrose 5% + sodium chloride 0.9%: 600 mL  Total IN: 600 mL    OUT:    Intermittent Catheterization - Urethral (mL): 1200 mL  Total OUT: 1200 mL    Total NET: -600 mL    04-20    141  |  109  |  10  ----------------------------<  75  4.5   |  21  |  0.6<L>    Ca    8.0<L>      20 Apr 2021 06:07  Mg     1.6     04-20    TPro  6.2  /  Alb  2.1<L>  /  TBili  0.5  /  DBili  x   /  AST  29  /  ALT  14  /  AlkPhos  137<H>  04-20                        8.0    3.78  )-----------( 135      ( 19 Apr 2021 06:33 )             25.8     CAPILLARY BLOOD GLUCOSE  POCT Blood Glucose.: 81 mg/dL (20 Apr 2021 08:00)    Diet, NPO with Tube Feed:   Tube Feeding Modality: Gastrostomy  Glucerna 1.2 Sarmad  Total Volume for 24 Hours (mL): 960  Bolus  Total Volume of Bolus (mL):  240  Tube Feed Frequency: Every 6 hours   Tube Feed Start Time: 10:00  Bolus Feed Rate (mL per Hour): 240   Bolus Feed Duration (in Hours): 1  Free Water Flush  Bolus   Total Volume per Flush (mL): 50   Frequency: Every 6 Hours (04-20-21 @ 09:02)    IMP:  - altered mental status/ metabolic encephalopathy from sepsis  - large necrotic sacral ulcer stage 4  - post hemorrhagic anemia  - thrombocytopenia  - h/o Parkinson's disease, DM    PLAN:  - d/c Glucerna 1.2. Start 240ml Jevity 1.2 X 2 feeds and if tolerated advance to goal of 360 ml Jevity 1.2 over 45 minutes x 4 feeds/d --> 79 gm protein & 1710 kcal/d  - add Moris 1 pack mixed with 4-6oz H2O q12hrs  - PO diet as tolerated per speech  - would give feeds 1 hour after each attempted PO meal and cont to attempt PO  - add in phos to next blood draw  - please add zinc 220mg and vitamin C 500 mg once daily each x 2 weeks and then re-evaluate

## 2021-04-20 NOTE — PROGRESS NOTE ADULT - SUBJECTIVE AND OBJECTIVE BOX
Patient is a 71y old  Male who presents with a chief complaint of Change in Mental Status (05 Apr 2021 13:36)    INTERVAL HPI/OVERNIGHT EVENTS: Patient was examined and seen at bedside. Events noted. Awake and alert today but not following commands and not communicating. S/p PEG.    InitialHPI:  71 year old Male with past medical history of Parkinson's, dementia, CKD Stage 3, 1st degree AV block, HLD, gout, HTN, DM, fungal septicemia presenting from [facility] for acute decompensation in mental status.   As per documentation patient at baseline is verbal but for past 3 days, he has been worsening to state of being non verbal. Patient was admitted to [facility] for fungemia and discharged on the 11th March. At  he had left hand cellulitis and was treated for it.  In ED patient hemodynamically stable, afebrile, ABEBE with rise in Cr to 1.9 and BUN to 75, remains non verbal. CT head negative for any new strokes and UA negative. Family has not visited the patient recently so unaware of his status. (22 Mar 2021 23:59)    PAST MEDICAL & SURGICAL HISTORY:  Parkinson disease    Hypertension    Gout    Dyslipidemia    Prostatitis    Cataract    No significant past surgical history        General: NAD, awake, tracking, chronically ill appearing  HEENT:  no LAD  CV: S1 S2  Resp: decreased breath sounds at bases  GI: NT/ND/S +BS, +PEG  MS: no clubbing/cyanosis/edema, + pulses b/l, LUE>RUE swelling  Neuro: unable to access  Skin: multiple skin decubs            MEDICATIONS  (STANDING):  atorvastatin 40 milliGRAM(s) Oral at bedtime  carbidopa/levodopa  25/100 2 Tablet(s) Oral <User Schedule>  chlorhexidine 4% Liquid 1 Application(s) Topical <User Schedule>  collagenase Ointment 1 Application(s) Topical two times a day  Dakins Solution - 1/2 Strength 1 Application(s) Topical two times a day  dextrose 5% + sodium chloride 0.9%. 1000 milliLiter(s) (60 mL/Hr) IV Continuous <Continuous>  senna 2 Tablet(s) Oral at bedtime    MEDICATIONS  (PRN):  acetaminophen   Tablet .. 650 milliGRAM(s) Oral every 6 hours PRN Temp greater or equal to 38C (100.4F)    Home Medications:  atorvastatin 40 mg oral tablet: 1 tab(s) orally once a day (at bedtime) (23 Mar 2021 01:07)  carbidopa-levodopa 25 mg-100 mg oral tablet, extended release: 2 tab(s) orally 5 times a day at 6AM,10Am,2PM,6PM,10 PM (23 Mar 2021 01:07)  colchicine 0.6 mg oral tablet: 1 tab(s) orally once a day (23 Mar 2021 01:07)  Eliquis 5 mg oral tablet: 1 tab(s) orally 2 times a day (23 Mar 2021 01:07)  enalapril 5 mg oral tablet: 1 tab(s) orally once a day (23 Mar 2021 01:07)  omeprazole 40 mg oral delayed release capsule: 1 cap(s) orally once a day (23 Mar 2021 01:07)    Vital Signs Last 24 Hrs  T(C): 35.9 (20 Apr 2021 13:30), Max: 36.5 (20 Apr 2021 05:41)  T(F): 96.7 (20 Apr 2021 13:30), Max: 97.7 (20 Apr 2021 05:41)  HR: 92 (20 Apr 2021 13:30) (64 - 92)  BP: 147/66 (20 Apr 2021 13:30) (114/62 - 147/66)  BP(mean): 98 (20 Apr 2021 13:30) (98 - 98)  RR: 18 (20 Apr 2021 13:30) (18 - 18)  SpO2: 97% (20 Apr 2021 09:58) (97% - 97%)  CAPILLARY BLOOD GLUCOSE      POCT Blood Glucose.: 82 mg/dL (20 Apr 2021 16:36)  POCT Blood Glucose.: 75 mg/dL (20 Apr 2021 12:10)  POCT Blood Glucose.: 81 mg/dL (20 Apr 2021 08:00)    LABS:                        8.0    3.78  )-----------( 135      ( 19 Apr 2021 06:33 )             25.8     04-20    141  |  109  |  10  ----------------------------<  75  4.5   |  21  |  0.6<L>    Ca    8.0<L>      20 Apr 2021 06:07  Mg     1.6     04-20    TPro  6.2  /  Alb  2.1<L>  /  TBili  0.5  /  DBili  x   /  AST  29  /  ALT  14  /  AlkPhos  137<H>  04-20    LIVER FUNCTIONS - ( 20 Apr 2021 06:07 )  Alb: 2.1 g/dL / Pro: 6.2 g/dL / ALK PHOS: 137 U/L / ALT: 14 U/L / AST: 29 U/L / GGT: x                           Consultant Notes Reviewed:  [x ] YES  [ ] NO  Care Discussed with Consultants/Other Providers/ Housestaff [ x] YES  [ ] NO  Radiology, labs, new studies personally reviewed.

## 2021-04-20 NOTE — PROGRESS NOTE ADULT - ASSESSMENT
71 year old Male with past medical history of Parkinson's, dementia, CKD Stage 3, 1st degree AV block, HLD, gout, HTN, DM, fungal septicemia presenting from [facility] for acute decompensation in mental status. As per documentation patient at baseline is verbal but for past 3 days, he has been worsening to state of being non verbal. Patient was admitted to [facility] for fungemia and discharged on the 11th March. At  he had left hand cellulitis and was treated for it.    # Metabolic encephalopathy on top of baseline dementia  # Sepsis  # Necrotic sacral ulcer stage 4  # H/o recent fungemia  - CT Head No acute intracranial pathology.activity  -  Chest Xray No consolidation, effusion or pneumothorax.  -  Blood & Urine cxs NGTD   -  WCX GNR, 3/29   Numerous Klebsiella pneumoniae ESBL, Few Pseudomonas aeruginosa, Numerous Enterococcus faecalis (vancomycin resistant)  - evaluated by Burn: s/p debridement of sacrum on 3/30 .  no more new recommendation for surgery at this time .continue local wound care with santyl/dakins WTD.  - s/p meropenem, Polymixin and Aztreonam  - S/p spinal tap --> CSF clear with no growths and neg PCR  -repeat EEG w/ no epileptiform activity but diffuse cerebral dysfxn  -d/w neuro, may d/c Keppra   -off ABx as per ID  -somewhat improved mental status    #+ Staph epi in BCx x1 contaminant  -repeat BCx---> negative    # Nutrition/Dysphagia:   -daughter agreed to PEG but delayed due to +BCx that turned out to be a contaminant  -4/14 passed S&S but limited PO intake.   -s/p PEG on 4/19  -starting feeds, family needs to be taught how to administer feeds and wound care    #Lt shoulder dislocation w/ collection  -no need to tap as per IR and ID concurrs    # Hypotension  - c/w midodrine    #Anemia  -s/p 1u PRBCs    # Thrombocytopenia ?sec to sepsis  -resolved  -off Pepcid  -heme f/u appreciated    # Hyponatremia off Polymixin  - monitor  BMP    # H/o parkinsons disease  - c/w sinemet    # H/o chronic DVT  - VA Duplex Lower Ext Vein Scan, Bilat (03.27.21 @ 18:48) >No evidence of deep venous thrombosis or superficial thrombophlebitis in the bilateral lower extremities.  - continue holding eliquis due to thrombocytopenia    # Dyslipidemia  - c/w statin    # DVT prophylaxis  -scd  -Lovenox    Very high risk pt. D/w daughter GOC: she wants full code for now. Very poor Px (daughter aware).    #Pending:  tolerance of feeds, daughter to learn PEG feeding and wound care  # Discussed w/ daughter in details who wants to cont aggressive Tx. Daughter wants to take pt home (she is HC aide)  # Disposition: home w/ services    d/w Housestaff, nursing, case mgmt

## 2021-04-20 NOTE — PROGRESS NOTE ADULT - ASSESSMENT
· Assessment	  71 year old Male with past medical history of Parkinson's, dementia, CKD Stage 3, 1st degree AV block, HLD, gout, HTN, DM, fungal septicemia presenting from [facility] for acute decompensation in mental status. As per documentation patient at baseline is verbal but for past 3 days, he has been worsening to state of being non verbal. Patient was admitted to [facility] for fungemia and discharged on the 11th March. At  he had left hand cellulitis and was treated for it.    # Metabolic encephalopathy on top of baseline dementia  # Sepsis  # Necrotic sacral ulcer stage 4  # H/o recent fungemia  - CT Head No acute intracranial pathology.activity  -  Chest Xray No consolidation, effusion or pneumothorax.  -  Blood & Urine cxs NGTD   -  WCX GNR, 3/29   Numerous Klebsiella pneumoniae ESBL, Few Pseudomonas aeruginosa, Numerous Enterococcus faecalis (vancomycin resistant)  - evaluated by Burn: s/p debridement of sacrum on 3/30 .  no more new recommendation for surgery at this time .continue local wound care with santyl/dakins WTD.  - s/p meropenem, Polymixin and Aztreonam  - S/p spinal tap --> CSF clear with no growths and neg PCR  -repeat EEG w/ no epileptiform activity but diffuse cerebral dysfxn  -d/w neuro, may d/c Keppra   -off ABx as per ID  -somewhat improved mental status    #+ Staph epi in BCx x1 contaminant  -repeat BCx---> negative    # Nutrition/Dysphagia:   -daughter agreed to PEG but delayed due to +BCx that turned out to be a contaminant  -4/14 passed S&S but limited PO intake.   -s/p PEG on 4/19  - monitor feeds and electrolytes     #Lt shoulder dislocation w/ collection  -no need to tap as per IR and ID concurrs    # Hypotension  - c/w midodrine    #Anemia  -s/p 1u PRBCs    # Thrombocytopenia ?sec to sepsis  -resolved  -off Pepcid  -heme f/u appreciated    # Hyponatremia off Polymixin  - monitor  BMP    # H/o parkinsons disease  - c/w sinemet    # H/o chronic DVT  - [facility] Duplex Lower Ext Vein Scan, Bilat (03.27.21 @ 18:48) >No evidence of deep venous thrombosis or superficial thrombophlebitis in the bilateral lower extremities.  - continue holding eliquis due to thrombocytopenia    # Dyslipidemia  - c/w statin    # DVT prophylaxis  -scd  -Lovenox    Very high risk pt. D/w daughter GOC: she wants full code for now. Very poor Px (daughter aware).    #Pending:  tolerance of feeds, safe dispo. Daughter will come tomorrow and will be taught tube feeds and wound care. Plan for D/C tomorrow.   # Discussed w/ daughter in details who wants to cont aggressive Tx.   # Disposition: SNF when/if stable    d/w Housestaff, nursing, case mgmt   · Assessment	  71 year old Male with past medical history of Parkinson's, dementia, CKD Stage 3, 1st degree AV block, HLD, gout, HTN, DM, fungal septicemia presenting from [facility] for acute decompensation in mental status. As per documentation patient at baseline is verbal but for past 3 days, he has been worsening to state of being non verbal. Patient was admitted to [facility] for fungemia and discharged on the 11th March. At  he had left hand cellulitis and was treated for it.    # Metabolic encephalopathy on top of baseline dementia  # Sepsis  # Necrotic sacral ulcer stage 4  # H/o recent fungemia  - CT Head No acute intracranial pathology.activity  -  Chest Xray No consolidation, effusion or pneumothorax.  -  Blood & Urine cxs NGTD   -  WCX GNR, 3/29   Numerous Klebsiella pneumoniae ESBL, Few Pseudomonas aeruginosa, Numerous Enterococcus faecalis (vancomycin resistant)  - evaluated by Burn: s/p debridement of sacrum on 3/30 .  no more new recommendation for surgery at this time .continue local wound care with santyl/dakins WTD.  - s/p meropenem, Polymixin and Aztreonam  - S/p spinal tap --> CSF clear with no growths and neg PCR  -repeat EEG w/ no epileptiform activity but diffuse cerebral dysfxn  -d/w neuro, may d/c Keppra   -off ABx as per ID  -somewhat improved mental status    #+ Staph epi in BCx x1 contaminant  -repeat BCx---> negative    # Nutrition/Dysphagia:   -daughter agreed to PEG but delayed due to +BCx that turned out to be a contaminant  -4/14 passed S&S but limited PO intake.   -s/p PEG on 4/19  - monitor feeds and electrolytes     #Lt shoulder dislocation w/ collection  -no need to tap as per IR and ID concurrs    # Hypotension  - c/w midodrine    #Anemia  -s/p 1u PRBCs    # Thrombocytopenia ?sec to sepsis  -resolved  -off Pepcid  -heme f/u appreciated    # Hyponatremia off Polymixin  - monitor  BMP    # H/o parkinsons disease  - c/w sinemet    # H/o chronic DVT  - VA Duplex Lower Ext Vein Scan, Bilat (03.27.21 @ 18:48) >No evidence of deep venous thrombosis or superficial thrombophlebitis in the bilateral lower extremities.  - continue holding eliquis due to thrombocytopenia    # Dyslipidemia  - c/w statin    # DVT prophylaxis  -scd  -Lovenox    Very high risk pt. D/w daughter GOC: she wants full code for now. Very poor Px (daughter aware).    #Pending:  tolerance of feeds, safe dispo. Daughter will come tomorrow and will be taught tube feeds and wound care.   # Discussed w/ daughter in details who wants to cont aggressive Tx.   # Disposition: home with home care     d/w Housestaff, nursing, case mgmt   · Assessment	  71 year old Male with past medical history of Parkinson's, dementia, CKD Stage 3, 1st degree AV block, HLD, gout, HTN, DM, fungal septicemia presenting from [facility] for acute decompensation in mental status. As per documentation patient at baseline is verbal but for past 3 days, he has been worsening to state of being non verbal. Patient was admitted to [facility] for fungemia and discharged on the 11th March. At  he had left hand cellulitis and was treated for it.    # Metabolic encephalopathy on top of baseline dementia  # Sepsis  # Necrotic sacral ulcer stage 4  # H/o recent fungemia  - CT Head No acute intracranial pathology.activity  -  Chest Xray No consolidation, effusion or pneumothorax.  -  Blood & Urine cxs NGTD   -  WCX GNR, 3/29   Numerous Klebsiella pneumoniae ESBL, Few Pseudomonas aeruginosa, Numerous Enterococcus faecalis (vancomycin resistant)  - evaluated by Burn: s/p debridement of sacrum on 3/30 .  no more new recommendation for surgery at this time .continue local wound care with santyl/dakins WTD.  - s/p meropenem, Polymixin and Aztreonam  - S/p spinal tap --> CSF clear with no growths and neg PCR  -repeat EEG w/ no epileptiform activity but diffuse cerebral dysfxn  -d/w neuro, may d/c Keppra   -off ABx as per ID  -somewhat improved mental status    #+ Staph epi in BCx x1 contaminant  -repeat BCx---> negative    # Nutrition/Dysphagia:   -daughter agreed to PEG but delayed due to +BCx that turned out to be a contaminant  -4/14 passed S&S but limited PO intake.   -s/p PEG on 4/19  - monitor feeds and electrolytes     #Lt shoulder dislocation w/ collection  -no need to tap as per IR and ID concurrs    # Hypotension  - c/w midodrine    #Anemia  -s/p 1u PRBCs    # Thrombocytopenia ?sec to sepsis  -resolved  -off Pepcid  -heme f/u appreciated    # Hyponatremia off Polymixin  - monitor  BMP    # H/o parkinsons disease  - c/w sinemet    # H/o chronic DVT  - VA Duplex Lower Ext Vein Scan, Bilat (03.27.21 @ 18:48) >No evidence of deep venous thrombosis or superficial thrombophlebitis in the bilateral lower extremities.  - continue holding eliquis due to thrombocytopenia    # Dyslipidemia  - c/w statin    # DVT prophylaxis  -scd  -Lovenox    Very high risk pt. D/w daughter GOC: she wants full code for now. Very poor Px (daughter aware).    #Pending:  tolerance of feeds, safe dispo. Daughter will come tomorrow and will be taught tube feeds and wound care. Daughter wants to take pt home home ( she is HC aide)  # Discussed w/ daughter in details who wants to cont aggressive Tx.   # Disposition: home with home services.     d/w Housestaff, nursing, case mgmt   · Assessment	  71 year old Male with past medical history of Parkinson's, dementia, CKD Stage 3, 1st degree AV block, HLD, gout, HTN, DM, fungal septicemia presenting from [facility] for acute decompensation in mental status. As per documentation patient at baseline is verbal but for past 3 days, he has been worsening to state of being non verbal. Patient was admitted to [facility] for fungemia and discharged on the 11th March. At  he had left hand cellulitis and was treated for it.    # Metabolic encephalopathy on top of baseline dementia  # Sepsis  # Necrotic sacral ulcer stage 4  # H/o recent fungemia  - CT Head No acute intracranial pathology.activity  -  Chest Xray No consolidation, effusion or pneumothorax.  -  Blood & Urine cxs NGTD   -  WCX GNR, 3/29   Numerous Klebsiella pneumoniae ESBL, Few Pseudomonas aeruginosa, Numerous Enterococcus faecalis (vancomycin resistant)  - evaluated by Burn: s/p debridement of sacrum on 3/30 .  no more new recommendation for surgery at this time .continue local wound care with santyl/dakins WTD.  - s/p meropenem, Polymixin and Aztreonam  - S/p spinal tap --> CSF clear with no growths and neg PCR  -repeat EEG w/ no epileptiform activity but diffuse cerebral dysfxn  -d/w neuro, may d/c Keppra   -off ABx as per ID  -somewhat improved mental status    #+ Staph epi in BCx x1 contaminant  -repeat BCx---> negative    # Nutrition/Dysphagia:   -daughter agreed to PEG but delayed due to +BCx that turned out to be a contaminant  -4/14 passed S&S but limited PO intake.   -s/p PEG on 4/19  - monitor feeds and electrolytes     #Lt shoulder dislocation w/ collection  -no need to tap as per IR and ID concurrs    # Hypotension  - c/w midodrine    #Anemia  -s/p 1u PRBCs    # Thrombocytopenia ?sec to sepsis  -resolved  -off Pepcid  -heme f/u appreciated    # Hyponatremia off Polymixin  - monitor  BMP    # H/o parkinsons disease  - c/w sinemet    # H/o chronic DVT  - VA Duplex Lower Ext Vein Scan, Bilat (03.27.21 @ 18:48) >No evidence of deep venous thrombosis or superficial thrombophlebitis in the bilateral lower extremities.  - continue holding eliquis due to thrombocytopenia    # Dyslipidemia  - c/w statin    # DVT prophylaxis  -scd  -Lovenox    Very high risk pt. D/w daughter GOC: she wants full code for now. Very poor Px (daughter aware).    #Pending:  tolerance of feeds, safe dispo. Daughter will come tomorrow and will be taught tube feeds and wound care. Daughter wants to take pt home home ( she is HC aide)  # Discussed w/ daughter in details who wants to cont aggressive Tx.   # Disposition: home with home services.   # Diet: patient will require tube feeding till the end of life due to advanced parkinsonism. Patient unable to take nutrition care for himself and will suffer from severe malnutrition without tube feeding.    d/w Housestaff, nursing, case mgmt

## 2021-04-20 NOTE — PROGRESS NOTE ADULT - SUBJECTIVE AND OBJECTIVE BOX
DAILY PROGRESS NOTE  ===========================================================    Patient Information:  NIEVES LABOY  /  71y  /  Male  /  MRN#: [id number]    Hospital Day: 29d   Location: [facility] 012 A ([facility] T9-3E Neuro)    |:::::::::::::::::::::::::::| SUBJECTIVE |:::::::::::::::::::::::::::|    OVERNIGHT EVENTS: none  TODAY: Patient was seen today at bedside.Pt is s/p PEG. He is awake, alert, and NAD. He looks better than yesterday. Review of systems is otherwise negative.    |:::::::::::::::::::::::::::| ADMISSION HPI |:::::::::::::::::::::::::::|    HPI:  71 year old Male with past medical history of Parkinson's, dementia, CKD Stage 3, 1st degree AV block, HLD, gout, HTN, DM, fungal septicemia presenting from [facility] for acute decompensation in mental status.     As per documentation patient at baseline is verbal but for past 3 days, he has been worsening to state of being non verbal. Patient was admitted to [facility] for fungemia and discharged on the 11th March. At  he had left hand cellulitis and was treated for it.    In ED patient hemodynamically stable, afebrile, ABEBE with rise in Cr to 1.9 and BUN to 75, remains non verbal. CT head negative for any new strokes and UA negative. Family has not visited the patient recently so unaware of his status. (22 Mar 2021 23:59)      |:::::::::::::::::::::::::::| OBJECTIVE |:::::::::::::::::::::::::::|    STANDING MEDICATIONS  atorvastatin 40 milliGRAM(s) Oral at bedtime  carbidopa/levodopa  25/100 2 Tablet(s) Oral <User Schedule>  chlorhexidine 4% Liquid 1 Application(s) Topical <User Schedule>  collagenase Ointment 1 Application(s) Topical two times a day  Dakins Solution - 1/2 Strength 1 Application(s) Topical two times a day  dextrose 5% + sodium chloride 0.9%. 1000 milliLiter(s) IV Continuous <Continuous>  senna 2 Tablet(s) Oral at bedtime    PRN MEDICATIONS  acetaminophen   Tablet .. 650 milliGRAM(s) Oral every 6 hours PRN    HOME MEDICATIONS  atorvastatin 40 mg oral tablet: 1 tab(s) orally once a day (at bedtime)  carbidopa-levodopa 25 mg-100 mg oral tablet, extended release: 2 tab(s) orally 5 times a day at 6AM,10Am,2PM,6PM,10 PM  colchicine 0.6 mg oral tablet: 1 tab(s) orally once a day  Eliquis 5 mg oral tablet: 1 tab(s) orally 2 times a day  enalapril 5 mg oral tablet: 1 tab(s) orally once a day  gabapentin 100 mg oral capsule: 1 cap(s) orally 3 times a day  omeprazole 40 mg oral delayed release capsule: 1 cap(s) orally once a day  senna oral tablet: 2 tab(s) orally once a day (at bedtime), As Needed -for constipation       VITAL SIGNS: Last 24 Hours  T(C): 35.9 (20 Apr 2021 13:30), Max: 36.5 (20 Apr 2021 05:41)  T(F): 96.7 (20 Apr 2021 13:30), Max: 97.7 (20 Apr 2021 05:41)  HR: 92 (20 Apr 2021 13:30) (64 - 92)  BP: 147/66 (20 Apr 2021 13:30) (114/62 - 147/66)  BP(mean): 98 (20 Apr 2021 13:30) (98 - 98)  RR: 18 (20 Apr 2021 13:30) (18 - 18)  SpO2: 97% (20 Apr 2021 09:58) (97% - 97%)    Input-Output    04-19-21 @ 07:01  -  04-20-21 @ 07:00  --------------------------------------------------------  IN:    dextrose 5% + sodium chloride 0.9%: 600 mL  Total IN: 600 mL    OUT:    Intermittent Catheterization - Urethral (mL): 1200 mL  Total OUT: 1200 mL    Total NET: -600 mL      04-20-21 @ 07:01  -  04-20-21 @ 15:40  --------------------------------------------------------  IN:    Glucerna: 240 mL  Total IN: 240 mL    OUT:  Total OUT: 0 mL    Total NET: 240 mL          PHYSICAL EXAM:  GEN: No acute distress.  LUNGS: Clear to auscultation bilaterally.  HEART: Regular rate and rhythm. No murmurs.  ABD: Soft, non-tender, non-distended.  EXT: Normal range of motion. LUE>RUE swelling  NEURO: Alert, attentive, oriented. Clear, fluent speech. Eye movements intact.   PSYCH: Cooperative, appropriate.    LAB RESULTS:                        8.0    3.78  )-----------( 135      ( 19 Apr 2021 06:33 )             25.8     04-20    141  |  109  |  10  ----------------------------<  75  4.5   |  21  |  0.6<L>    Ca    8.0<L>      20 Apr 2021 06:07  Mg     1.6     04-20    TPro  6.2  /  Alb  2.1<L>  /  TBili  0.5  /  DBili  x   /  AST  29  /  ALT  14  /  AlkPhos  137<H>  04-20                MICROBIOLOGY:    Culture - Blood (collected 04-13-21 @ 07:34)  Source: .Blood None  Final Report (04-18-21 @ 17:00):    No Growth Final    Culture - Blood (collected 04-12-21 @ 21:38)  Source: .Blood None  Final Report (04-18-21 @ 05:00):    No Growth Final    Culture - Blood (collected 04-12-21 @ 16:00)  Source: .Blood Blood  Final Report (04-17-21 @ 22:01):    No Growth Final    Culture - Blood (collected 04-08-21 @ 16:00)  Source: .Blood Blood  Gram Stain (04-11-21 @ 08:45):    Growth in anaerobic bottle: Gram Positive Cocci in Clusters  Final Report (04-12-21 @ 10:51):    Growth in anaerobic bottle: Staphylococcus epidermidis Coag Negative    Staphylococcus    Single set isolate, possible contaminant. Contact    Microbiology if susceptibility testing clinically    indicated.    ***Blood Panel PCR results on this specimen are available    approximately 3 hours after the Gram stain result.***    Gram stain, PCR, and/or culture results may not always    correspond due to difference in methodologies.    ************************************************************    This PCR assay was performed by multiplex PCR. This    Assay tests for 66 bacterial and resistance gene targets.    Please refer to the [email or web address] test directory    at [email or web address] for details.  Organism: Blood Culture PCR (04-12-21 @ 10:51)  Organism: Blood Culture PCR (04-12-21 @ 10:51)      -  Staphylococcus epidermidis, Methicillin resistant: Detec      Method Type: PCR    Culture - Blood (collected 04-08-21 @ 16:00)  Source: .Blood Blood-Peripheral  Final Report (04-14-21 @ 01:00):    No Growth Final      IMAGING/STUDIES:    ALLERGIES:  No Known Allergies      ===========================================================

## 2021-04-21 ENCOUNTER — TRANSCRIPTION ENCOUNTER (OUTPATIENT)
Age: 71
End: 2021-04-21

## 2021-04-21 LAB
ALBUMIN SERPL ELPH-MCNC: 2.1 G/DL — LOW (ref 3.5–5.2)
ALP SERPL-CCNC: 136 U/L — HIGH (ref 30–115)
ALT FLD-CCNC: <5 U/L — SIGNIFICANT CHANGE UP (ref 0–41)
ANION GAP SERPL CALC-SCNC: 8 MMOL/L — SIGNIFICANT CHANGE UP (ref 7–14)
AST SERPL-CCNC: 19 U/L — SIGNIFICANT CHANGE UP (ref 0–41)
BASOPHILS # BLD AUTO: 0.02 K/UL — SIGNIFICANT CHANGE UP (ref 0–0.2)
BASOPHILS NFR BLD AUTO: 0.6 % — SIGNIFICANT CHANGE UP (ref 0–1)
BILIRUB SERPL-MCNC: 0.3 MG/DL — SIGNIFICANT CHANGE UP (ref 0.2–1.2)
BUN SERPL-MCNC: 10 MG/DL — SIGNIFICANT CHANGE UP (ref 10–20)
CALCIUM SERPL-MCNC: 7.9 MG/DL — LOW (ref 8.5–10.1)
CHLORIDE SERPL-SCNC: 108 MMOL/L — SIGNIFICANT CHANGE UP (ref 98–110)
CO2 SERPL-SCNC: 23 MMOL/L — SIGNIFICANT CHANGE UP (ref 17–32)
CREAT SERPL-MCNC: 0.5 MG/DL — LOW (ref 0.7–1.5)
EOSINOPHIL # BLD AUTO: 0.16 K/UL — SIGNIFICANT CHANGE UP (ref 0–0.7)
EOSINOPHIL NFR BLD AUTO: 4.7 % — SIGNIFICANT CHANGE UP (ref 0–8)
GLUCOSE BLDC GLUCOMTR-MCNC: 108 MG/DL — HIGH (ref 70–99)
GLUCOSE BLDC GLUCOMTR-MCNC: 120 MG/DL — HIGH (ref 70–99)
GLUCOSE BLDC GLUCOMTR-MCNC: 121 MG/DL — HIGH (ref 70–99)
GLUCOSE BLDC GLUCOMTR-MCNC: 121 MG/DL — HIGH (ref 70–99)
GLUCOSE SERPL-MCNC: 115 MG/DL — HIGH (ref 70–99)
HCT VFR BLD CALC: 26.2 % — LOW (ref 42–52)
HGB BLD-MCNC: 8.2 G/DL — LOW (ref 14–18)
IMM GRANULOCYTES NFR BLD AUTO: 0.3 % — SIGNIFICANT CHANGE UP (ref 0.1–0.3)
LYMPHOCYTES # BLD AUTO: 0.57 K/UL — LOW (ref 1.2–3.4)
LYMPHOCYTES # BLD AUTO: 16.7 % — LOW (ref 20.5–51.1)
MAGNESIUM SERPL-MCNC: 1.8 MG/DL — SIGNIFICANT CHANGE UP (ref 1.8–2.4)
MCHC RBC-ENTMCNC: 26.3 PG — LOW (ref 27–31)
MCHC RBC-ENTMCNC: 31.3 G/DL — LOW (ref 32–37)
MCV RBC AUTO: 84 FL — SIGNIFICANT CHANGE UP (ref 80–94)
MONOCYTES # BLD AUTO: 0.28 K/UL — SIGNIFICANT CHANGE UP (ref 0.1–0.6)
MONOCYTES NFR BLD AUTO: 8.2 % — SIGNIFICANT CHANGE UP (ref 1.7–9.3)
NEUTROPHILS # BLD AUTO: 2.38 K/UL — SIGNIFICANT CHANGE UP (ref 1.4–6.5)
NEUTROPHILS NFR BLD AUTO: 69.5 % — SIGNIFICANT CHANGE UP (ref 42.2–75.2)
NRBC # BLD: 0 /100 WBCS — SIGNIFICANT CHANGE UP (ref 0–0)
PHOSPHATE SERPL-MCNC: 3.7 MG/DL — SIGNIFICANT CHANGE UP (ref 2.1–4.9)
PLATELET # BLD AUTO: 133 K/UL — SIGNIFICANT CHANGE UP (ref 130–400)
POTASSIUM SERPL-MCNC: 3.5 MMOL/L — SIGNIFICANT CHANGE UP (ref 3.5–5)
POTASSIUM SERPL-SCNC: 3.5 MMOL/L — SIGNIFICANT CHANGE UP (ref 3.5–5)
PROT SERPL-MCNC: 6 G/DL — SIGNIFICANT CHANGE UP (ref 6–8)
RBC # BLD: 3.12 M/UL — LOW (ref 4.7–6.1)
RBC # FLD: 15.9 % — HIGH (ref 11.5–14.5)
SODIUM SERPL-SCNC: 139 MMOL/L — SIGNIFICANT CHANGE UP (ref 135–146)
WBC # BLD: 3.42 K/UL — LOW (ref 4.8–10.8)
WBC # FLD AUTO: 3.42 K/UL — LOW (ref 4.8–10.8)

## 2021-04-21 PROCEDURE — 93970 EXTREMITY STUDY: CPT | Mod: 26

## 2021-04-21 PROCEDURE — 99232 SBSQ HOSP IP/OBS MODERATE 35: CPT

## 2021-04-21 RX ORDER — APIXABAN 2.5 MG/1
2.5 TABLET, FILM COATED ORAL
Refills: 0 | Status: DISCONTINUED | OUTPATIENT
Start: 2021-04-22 | End: 2021-04-27

## 2021-04-21 RX ADMIN — Medication 500 MILLIGRAM(S): at 12:15

## 2021-04-21 RX ADMIN — SODIUM CHLORIDE 60 MILLILITER(S): 9 INJECTION, SOLUTION INTRAVENOUS at 05:15

## 2021-04-21 RX ADMIN — CARBIDOPA AND LEVODOPA 2 TABLET(S): 25; 100 TABLET ORAL at 05:02

## 2021-04-21 RX ADMIN — CHLORHEXIDINE GLUCONATE 1 APPLICATION(S): 213 SOLUTION TOPICAL at 05:01

## 2021-04-21 RX ADMIN — Medication 1 APPLICATION(S): at 17:49

## 2021-04-21 RX ADMIN — MAGNESIUM OXIDE 400 MG ORAL TABLET 400 MILLIGRAM(S): 241.3 TABLET ORAL at 05:02

## 2021-04-21 RX ADMIN — ENOXAPARIN SODIUM 40 MILLIGRAM(S): 100 INJECTION SUBCUTANEOUS at 12:15

## 2021-04-21 RX ADMIN — Medication 1 APPLICATION(S): at 05:01

## 2021-04-21 RX ADMIN — CARBIDOPA AND LEVODOPA 2 TABLET(S): 25; 100 TABLET ORAL at 17:48

## 2021-04-21 RX ADMIN — ATORVASTATIN CALCIUM 40 MILLIGRAM(S): 80 TABLET, FILM COATED ORAL at 22:56

## 2021-04-21 RX ADMIN — CARBIDOPA AND LEVODOPA 2 TABLET(S): 25; 100 TABLET ORAL at 12:15

## 2021-04-21 RX ADMIN — MAGNESIUM OXIDE 400 MG ORAL TABLET 400 MILLIGRAM(S): 241.3 TABLET ORAL at 22:56

## 2021-04-21 RX ADMIN — MAGNESIUM OXIDE 400 MG ORAL TABLET 400 MILLIGRAM(S): 241.3 TABLET ORAL at 14:48

## 2021-04-21 RX ADMIN — Medication 1 APPLICATION(S): at 05:02

## 2021-04-21 RX ADMIN — SENNA PLUS 2 TABLET(S): 8.6 TABLET ORAL at 22:57

## 2021-04-21 RX ADMIN — CARBIDOPA AND LEVODOPA 2 TABLET(S): 25; 100 TABLET ORAL at 22:56

## 2021-04-21 RX ADMIN — ZINC SULFATE TAB 220 MG (50 MG ZINC EQUIVALENT) 220 MILLIGRAM(S): 220 (50 ZN) TAB at 12:16

## 2021-04-21 NOTE — DISCHARGE NOTE PROVIDER - NSDCCPCAREPLAN_GEN_ALL_CORE_FT
PRINCIPAL DISCHARGE DIAGNOSIS  Diagnosis: AMS (altered mental status)  Assessment and Plan of Treatment:        PRINCIPAL DISCHARGE DIAGNOSIS  Diagnosis: AMS (altered mental status)  Assessment and Plan of Treatment: Altered mental status is a change in cognition or level of consciousness. It can be caused by stroke, infections, medications, hyooglycemia, as well as other reasons.      SECONDARY DISCHARGE DIAGNOSES  Diagnosis: Dysphagia  Assessment and Plan of Treatment: Patient was having trouble swalling foods and liquids. He had very limited PO intake and  had a PEG placed on 4/19/21. The daughter was educated regardng proper tube feeds.     PRINCIPAL DISCHARGE DIAGNOSIS  Diagnosis: Metabolic encephalopathy  Assessment and Plan of Treatment: You came in with altered mental status likely due to infection. We believe this was due to your stage 4 sacral ulcer.      SECONDARY DISCHARGE DIAGNOSES  Diagnosis: Dysphagia  Assessment and Plan of Treatment: Patient was having trouble swalling foods and liquids. He had very limited PO intake and  had a PEG placed on 4/19/21. The daughter was educated regardng proper tube feeds.     PRINCIPAL DISCHARGE DIAGNOSIS  Diagnosis: Metabolic encephalopathy  Assessment and Plan of Treatment: You came in with altered mental status likely due to infection. We believe this was due to your stage 4 sacral ulcer. Your infection was treated with antibiotics and was resolved. Please follow up with your primary doctor.      SECONDARY DISCHARGE DIAGNOSES  Diagnosis: Dysphagia  Assessment and Plan of Treatment: Patient was having trouble swalling foods and liquids. He had very limited PO intake and  had a PEG placed on 4/19/21. A PEG is a flexible feeding tube that is placed through the abdominal wall into the stomach.   The daughter was educated regardng proper tube feeds.     PRINCIPAL DISCHARGE DIAGNOSIS  Diagnosis: Metabolic encephalopathy  Assessment and Plan of Treatment: You came in with altered mental status likely due to infection. We believe this was due to your stage 4 sacral ulcer. Your infection was treated with antibiotics and was resolved. Please follow up with your primary doctor and neurologist as outpt      SECONDARY DISCHARGE DIAGNOSES  Diagnosis: Dysphagia  Assessment and Plan of Treatment: Patient was having trouble swalling foods and liquids. He had very limited PO intake and  had a PEG placed on 4/19/21. A PEG is a flexible feeding tube that is placed through the abdominal wall into the stomach.   The daughter was educated regardng proper tube feeds.     PRINCIPAL DISCHARGE DIAGNOSIS  Diagnosis: Metabolic encephalopathy  Assessment and Plan of Treatment: You came in with altered mental status likely due to infection. We believe this was due to your stage 4 sacral ulcer. Your infection was treated with antibiotics and was resolved. Please follow up with your primary doctor and neurologist as outpt      SECONDARY DISCHARGE DIAGNOSES  Diagnosis: Dysphagia  Assessment and Plan of Treatment: Patient was having trouble swalling foods and liquids. He had very limited PO intake and  had a PEG placed on 4/19/21. A PEG is a flexible feeding tube that is placed through the abdominal wall into the stomach.   The daughter was educated regardng proper tube feeds.    Diagnosis: Sacral decubitus ulcer  Assessment and Plan of Treatment: Please wash wound with soap and water. Apply santyl/hydrogel, pack with dakins moist gauze, cover with ABD and tape twice daily. Follow up with Dr. Miller    Diagnosis: History of DVT of lower extremity  Assessment and Plan of Treatment: please discuss with the PMD the need to continue blood thinners    Diagnosis: Anemia  Assessment and Plan of Treatment: monitor blood counts     PRINCIPAL DISCHARGE DIAGNOSIS  Diagnosis: Metabolic encephalopathy  Assessment and Plan of Treatment: You came in with altered mental status likely due to infection. We believe this was due to your stage 4 sacral ulcer. Your infection was treated with antibiotics and was resolved. Please follow up with your primary doctor and neurologist as outpt      SECONDARY DISCHARGE DIAGNOSES  Diagnosis: Dysphagia  Assessment and Plan of Treatment: Patient was having trouble swallowing foods and liquids. He had very limited PO intake and  had a PEG placed on 4/19/21. A PEG is a flexible feeding tube that is placed through the abdominal wall into the stomach.   The daughter was educated regardng proper tube feeds.    Diagnosis: Sacral decubitus ulcer  Assessment and Plan of Treatment: Please wash wound with soap and water. Apply santyl/hydrogel, pack with dakins moist gauze, cover with ABD and tape twice daily. Follow up with Dr. Miller    Diagnosis: History of DVT of lower extremity  Assessment and Plan of Treatment: please discuss with the PMD the need to continue blood thinners    Diagnosis: Anemia  Assessment and Plan of Treatment: monitor blood counts     PRINCIPAL DISCHARGE DIAGNOSIS  Diagnosis: Metabolic encephalopathy  Assessment and Plan of Treatment: You came in with altered mental status likely due to infection. We believe this was due to your stage 4 sacral ulcer. Your infection was treated with antibiotics and was resolved. Please follow up with your primary doctor and neurologist as outpt. Also note due to progression of Parkinson's disease you had a tracheostomy performed during this hospitalization on 5/18. Trach care as per nursing routine.      SECONDARY DISCHARGE DIAGNOSES  Diagnosis: Dysphagia  Assessment and Plan of Treatment: Patient was having trouble swallowing foods and liquids. He had very limited PO intake and  had a PEG placed on 4/19/21. A PEG is a flexible feeding tube that is placed through the abdominal wall into the stomach.   The daughter was educated regardng proper tube feeds.    Diagnosis: Sacral decubitus ulcer  Assessment and Plan of Treatment: Please wash wound with soap and water. Apply santyl/hydrogel, pack with dakins moist gauze, cover with ABD and tape twice daily. Follow up with Dr. Miller    Diagnosis: History of DVT of lower extremity  Assessment and Plan of Treatment: please discuss with the PMD the need to continue blood thinners    Diagnosis: Anemia  Assessment and Plan of Treatment: monitor blood counts    Diagnosis: Acute cholecystitis  Assessment and Plan of Treatment: You were found to have a cholecystits that was treated medically with the placement of Cholestostomy drain. You will need to follow up with IR within 2 weeks to assess the drain

## 2021-04-21 NOTE — DISCHARGE NOTE PROVIDER - NSDCHHNEEDSERVICE_GEN_ALL_CORE
Medication teaching and assessment/Rehabilitation services/Teaching and training/Wound care and assessment

## 2021-04-21 NOTE — DISCHARGE NOTE PROVIDER - INSTRUCTIONS
Start Jevity 1.2 360 ml Jevity 1.2 over 45 minutes x 4 feeds/day   - add Moris 1 pack mixed with 4-6oz H2O every 12hrs  -  zinc 220mg and vitamin C 500 mg once daily each x 2 weeks and then re-evaluate Jevity 1.2 360 ml Jevity 1.2 over 45 minutes x 4 feeds/day   add Moris 1 pack mixed with 4-6oz H2O every 12hrs  zinc 220mg and vitamin C 500 mg once daily each x 2 weeks

## 2021-04-21 NOTE — PROGRESS NOTE ADULT - SUBJECTIVE AND OBJECTIVE BOX
DAILY PROGRESS NOTE  ===========================================================    Patient Information:  NIEVES LABOY  /  71y  /  Male  /  MRN#: [id number]    Hospital Day: 30d   Location: [facility]93E Neuro 012 A ([facility] T9-3E Neuro)    |:::::::::::::::::::::::::::| SUBJECTIVE |:::::::::::::::::::::::::::|    OVERNIGHT EVENTS: none  TODAY: Patient was seen today at bedside. Pt is awake, alert, and able to communicate. Review of systems is otherwise negative.    |:::::::::::::::::::::::::::| ADMISSION HPI |:::::::::::::::::::::::::::|    HPI:  71 year old Male with past medical history of Parkinson's, dementia, CKD Stage 3, 1st degree AV block, HLD, gout, HTN, DM, fungal septicemia presenting from [facility] for acute decompensation in mental status.     As per documentation patient at baseline is verbal but for past 3 days, he has been worsening to state of being non verbal. Patient was admitted to [facility] for fungemia and discharged on the 11th March. At  he had left hand cellulitis and was treated for it.    In ED patient hemodynamically stable, afebrile, ABEBE with rise in Cr to 1.9 and BUN to 75, remains non verbal. CT head negative for any new strokes and UA negative. Family has not visited the patient recently so unaware of his status. (22 Mar 2021 23:59)      |:::::::::::::::::::::::::::| OBJECTIVE |:::::::::::::::::::::::::::|    STANDING MEDICATIONS  ascorbic acid 500 milliGRAM(s) Oral daily  atorvastatin 40 milliGRAM(s) Oral at bedtime  carbidopa/levodopa  25/100 2 Tablet(s) Oral <User Schedule>  chlorhexidine 4% Liquid 1 Application(s) Topical <User Schedule>  collagenase Ointment 1 Application(s) Topical two times a day  Dakins Solution - 1/2 Strength 1 Application(s) Topical two times a day  dextrose 5% + sodium chloride 0.9%. 1000 milliLiter(s) IV Continuous <Continuous>  enoxaparin Injectable 40 milliGRAM(s) SubCutaneous daily  magnesium oxide 400 milliGRAM(s) Oral every 8 hours  senna 2 Tablet(s) Oral at bedtime  zinc sulfate 220 milliGRAM(s) Oral daily    PRN MEDICATIONS  acetaminophen   Tablet .. 650 milliGRAM(s) Oral every 6 hours PRN    HOME MEDICATIONS  atorvastatin 40 mg oral tablet: 1 tab(s) orally once a day (at bedtime)  carbidopa-levodopa 25 mg-100 mg oral tablet, extended release: 2 tab(s) orally 5 times a day at 6AM,10Am,2PM,6PM,10 PM  colchicine 0.6 mg oral tablet: 1 tab(s) orally once a day  Eliquis 5 mg oral tablet: 1 tab(s) orally 2 times a day  enalapril 5 mg oral tablet: 1 tab(s) orally once a day  gabapentin 100 mg oral capsule: 1 cap(s) orally 3 times a day  omeprazole 40 mg oral delayed release capsule: 1 cap(s) orally once a day  senna oral tablet: 2 tab(s) orally once a day (at bedtime), As Needed -for constipation       VITAL SIGNS: Last 24 Hours  T(C): 35.3 (21 Apr 2021 12:59), Max: 36 (21 Apr 2021 04:58)  T(F): 95.5 (21 Apr 2021 12:59), Max: 96.8 (21 Apr 2021 04:58)  HR: 65 (21 Apr 2021 12:59) (65 - 75)  BP: 145/61 (21 Apr 2021 12:59) (119/53 - 145/61)  BP(mean): 84 (21 Apr 2021 04:58) (84 - 84)  RR: 18 (21 Apr 2021 04:58) (18 - 18)  SpO2: 100% (21 Apr 2021 04:58) (100% - 100%)    Input-Output    04-20-21 @ 07:01  -  04-21-21 @ 07:00  --------------------------------------------------------  IN:    dextrose 5% + sodium chloride 0.9%: 780 mL    Glucerna: 240 mL    Jevity 1.2: 480 mL  Total IN: 1500 mL    OUT:    Incontinent per Condom Catheter (mL): 200 mL    Intermittent Catheterization - Urethral (mL): 300 mL  Total OUT: 500 mL    Total NET: 1000 mL          PHYSICAL EXAM:  GEN: No acute distress.  LUNGS: decreased breath sounds at bases   HEART: Regular rate and rhythm. No murmurs.  ABD: Soft, non-tender, non-distended.  EXT: Normal range of motion. LUE>RUE swelling   NEURO: Alert, attentive, oriented. Clear, fluent speech. Eye movements intact.  PSYCH: Cooperative, appropriate.    LAB RESULTS:                        8.2    3.42  )-----------( 133      ( 21 Apr 2021 06:37 )             26.2     04-21    139  |  108  |  10  ----------------------------<  115<H>  3.5   |  23  |  0.5<L>    Ca    7.9<L>      21 Apr 2021 06:37  Phos  3.7     04-21  Mg     1.8     04-21    TPro  6.0  /  Alb  2.1<L>  /  TBili  0.3  /  DBili  x   /  AST  19  /  ALT  <5  /  AlkPhos  136<H>  04-21                MICROBIOLOGY:    Culture - Blood (collected 04-13-21 @ 07:34)  Source: .Blood None  Final Report (04-18-21 @ 17:00):    No Growth Final    Culture - Blood (collected 04-12-21 @ 21:38)  Source: .Blood None  Final Report (04-18-21 @ 05:00):    No Growth Final    Culture - Blood (collected 04-12-21 @ 16:00)  Source: .Blood Blood  Final Report (04-17-21 @ 22:01):    No Growth Final    Culture - Blood (collected 04-08-21 @ 16:00)  Source: .Blood Blood  Gram Stain (04-11-21 @ 08:45):    Growth in anaerobic bottle: Gram Positive Cocci in Clusters  Final Report (04-12-21 @ 10:51):    Growth in anaerobic bottle: Staphylococcus epidermidis Coag Negative    Staphylococcus    Single set isolate, possible contaminant. Contact    Microbiology if susceptibility testing clinically    indicated.    ***Blood Panel PCR results on this specimen are available    approximately 3 hours after the Gram stain result.***    Gram stain, PCR, and/or culture results may not always    correspond due to difference in methodologies.    ************************************************************    This PCR assay was performed by multiplex PCR. This    Assay tests for 66 bacterial and resistance gene targets.    Please refer to the [facility] [employer] test directory    at [email or web address] for details.  Organism: Blood Culture PCR (04-12-21 @ 10:51)  Organism: Blood Culture PCR (04-12-21 @ 10:51)      -  Staphylococcus epidermidis, Methicillin resistant: Detec      Method Type: PCR    Culture - Blood (collected 04-08-21 @ 16:00)  Source: .Blood Blood-Peripheral  Final Report (04-14-21 @ 01:00):    No Growth Final      IMAGING/STUDIES:    ALLERGIES:  No Known Allergies      ===========================================================

## 2021-04-21 NOTE — DISCHARGE NOTE PROVIDER - NPI NUMBER (FOR SYSADMIN USE ONLY) :
[UNKNOWN] [[id number]],[[id number] [[id number]],[[id number]],[[id number] [[id number]] [[id number]],[[id number]] [[id number]],[[id number]],[[id number]],[[id number]]

## 2021-04-21 NOTE — DISCHARGE NOTE PROVIDER - HOSPITAL COURSE
71 year old Male with past medical history of Parkinson's, dementia, CKD Stage 3, 1st degree AV block, HLD, gout, HTN, DM, fungal septicemia presenting from [facility] for acute decompensation in mental status.   As per documentation patient at baseline is verbal but for past 3 days, he has been worsening to state of being non verbal. Patient was admitted to [facility] for fungemia and discharged on the 11th March. At  he had left hand cellulitis and was treated for it.  In ED patient hemodynamically stable, afebrile, ABEBE with rise in Cr to 1.9 and BUN to 75, remains non verbal. CT head negative for any new strokes and UA negative. Family has not visited the patient recently so unaware of his status. 71 year old Male with past medical history of Parkinson's, dementia, CKD Stage 3, 1st degree AV block, HLD, gout, HTN, DM, fungal septicemia presenting from [facility] for acute decompensation in mental status.   As per documentation patient at baseline is verbal but for 3 days before admission, he had been worsening to state of being non verbal. Patient was admitted to [facility] for fungemia and discharged on the 11th March. At  he had left hand cellulitis and was treated for it.  In ED patient hemodynamically stable, afebrile, ABEBE with rise in Cr to 1.9 and BUN to 75, remains non verbal. CT head negative for any new strokes and UA negative.  Pt has 71 year old Male with past medical history of Parkinson's, dementia, CKD Stage 3, 1st degree AV block, HLD, gout, HTN, DM, fungal septicemia who presented from [facility] for acute decompensation in mental status.   As per documentation patient at baseline is verbal but for 3 days before admission, he had been worsening to state of being non verbal. Patient was admitted to [facility] for fungemia and discharged on the 11th March. At  he had left hand cellulitis and was treated for it.  In ED patient was hemodynamically stable, afebrile, ABEBE with rise in Cr to 1.9 and BUN to 75, remains non verbal. CT head negative for any new strokes and UA negative.  Pt had a prolonged hospital stay.   CXR showed no acute infiltrates. REEG showed risk for seizures and pt was treated with keppra. Patient became hypotensive at 10:30PM on 3/27 --> s/p 2.5L NSS, BP remained 79/39mmHg, overnight intesivist was called and patient was started on levophed. Patient was monitered in the vent unit. Levophed was weaned down then discontinued. Pt also had thrombocytopenia at the time secondary to sepsis. He underwent I&D of large deep sacral ulcer by Burn team and had a Lumbar puncture done on 4/1 which was not consistent with infection. Pt was put in isolation due to MDR pseudomonas. Pt had PEG done on 4/19 and as per family request will be discharged home to family. His daughter will be educated on proper wound care/ tube feeds prior to his discharge. 71 year old Male with past medical history of Parkinson's, dementia, CKD Stage 3, 1st degree AV block, HLD, gout, HTN, DM, fungal septicemia who presented from [facility] for acute decompensation in mental status.   As per documentation patient at baseline is verbal but for 3 days before admission, he had been worsening to state of being non verbal. Patient was admitted to [facility] for fungemia and discharged on the 11th March. At  he had left hand cellulitis and was treated for it.  In ED patient was hemodynamically stable, afebrile, ABEBE with rise in Cr to 1.9 and BUN to 75, remains non verbal. CT head negative for any new strokes and UA negative.  Pt had a prolonged hospital stay.   CXR showed no acute infiltrates. REEG showed risk for seizures and pt was treated with keppra. Patient became hypotensive at 10:30PM on 3/27 --> s/p 2.5L NSS, BP remained 79/39mmHg, overnight intesivist was called and patient was started on levophed. Patient was monitered in the vent unit. Levophed was weaned down then discontinued. Pt also had thrombocytopenia at the time secondary to sepsis. He underwent I&D of large deep sacral ulcer by Burn team and had a Lumbar puncture done on 4/1 which was not consistent with infection. Pt was put in isolation due to MDR pseudomonas. Pt had PEG done on 4/19 and as per family request will be discharged home to family. His daughter was educated on proper wound care/ tube feeds prior to his discharge. 71 year old Male with past medical history of Parkinson's, dementia, CKD Stage 3, 1st degree AV block, HLD, gout, HTN, DM, fungal septicemia who presented from [facility] for acute decompensation in mental status.   As per documentation patient at baseline is verbal but for 3 days before admission, he had been worsening to state of being non verbal. Patient was admitted to [facility] for fungemia and discharged on the 11th March. At  he had left hand cellulitis and was treated for it.  In ED patient was hemodynamically stable, afebrile, ABEBE with rise in Cr to 1.9 and BUN to 75, remains non verbal. CT head negative for any new strokes and UA negative.  Pt had a prolonged hospital stay.   CXR showed no acute infiltrates. REEG showed risk for seizures and pt was treated with keppra. Patient became hypotensive at 10:30PM on 3/27 --> s/p 2.5L NSS, BP remained 79/39mmHg, overnight intesivist was called and patient was started on levophed. Patient was monitered in the vent unit. Levophed was weaned down then discontinued. Pt also had thrombocytopenia at the time secondary to sepsis. He underwent I&D of large deep sacral ulcer by Burn team and had a Lumbar puncture done on 4/1 which was not consistent with infection. Pt was put in isolation due to MDR pseudomonas. Pt had PEG done on 4/19. Subsequently developed acute cholecystitis s/p PTC. Developed ARF due to mucous plugging . S/p trach. Daughter not ready for hospice. Very poor Px.

## 2021-04-21 NOTE — PROGRESS NOTE ADULT - SUBJECTIVE AND OBJECTIVE BOX
Patient is a 71y old  Male who presents with a chief complaint of Change in Mental Status (05 Apr 2021 13:36)    INTERVAL HPI/OVERNIGHT EVENTS: Patient was examined and seen at bedside. Events noted. Awake and alert today but not following commands and not communicating. S/p PEG. tolerating feeds.    InitialHPI:  71 year old Male with past medical history of Parkinson's, dementia, CKD Stage 3, 1st degree AV block, HLD, gout, HTN, DM, fungal septicemia presenting from [facility] for acute decompensation in mental status.   As per documentation patient at baseline is verbal but for past 3 days, he has been worsening to state of being non verbal. Patient was admitted to [facility] for fungemia and discharged on the 11th March. At  he had left hand cellulitis and was treated for it.  In ED patient hemodynamically stable, afebrile, ABEBE with rise in Cr to 1.9 and BUN to 75, remains non verbal. CT head negative for any new strokes and UA negative. Family has not visited the patient recently so unaware of his status. (22 Mar 2021 23:59)    PAST MEDICAL & SURGICAL HISTORY:  Parkinson disease    Hypertension    Gout    Dyslipidemia    Prostatitis    Cataract    No significant past surgical history        General: NAD, awake, tracking, chronically ill appearing, tried to speak  HEENT:  no LAD  CV: S1 S2  Resp: decreased breath sounds at bases  GI: NT/ND/S +BS, +PEG  MS: no clubbing/cyanosis/edema, + pulses b/l, LUE>RUE swelling  Neuro: unable to access  Skin: multiple skin decubs            MEDICATIONS  (STANDING):  ascorbic acid 500 milliGRAM(s) Oral daily  atorvastatin 40 milliGRAM(s) Oral at bedtime  carbidopa/levodopa  25/100 2 Tablet(s) Oral <User Schedule>  chlorhexidine 4% Liquid 1 Application(s) Topical <User Schedule>  collagenase Ointment 1 Application(s) Topical two times a day  Dakins Solution - 1/2 Strength 1 Application(s) Topical two times a day  dextrose 5% + sodium chloride 0.9%. 1000 milliLiter(s) (60 mL/Hr) IV Continuous <Continuous>  enoxaparin Injectable 40 milliGRAM(s) SubCutaneous daily  magnesium oxide 400 milliGRAM(s) Oral every 8 hours  senna 2 Tablet(s) Oral at bedtime  zinc sulfate 220 milliGRAM(s) Oral daily    MEDICATIONS  (PRN):  acetaminophen   Tablet .. 650 milliGRAM(s) Oral every 6 hours PRN Temp greater or equal to 38C (100.4F)    Home Medications:  atorvastatin 40 mg oral tablet: 1 tab(s) orally once a day (at bedtime) (23 Mar 2021 01:07)  carbidopa-levodopa 25 mg-100 mg oral tablet, extended release: 2 tab(s) orally 5 times a day at 6AM,10Am,2PM,6PM,10 PM (23 Mar 2021 01:07)  colchicine 0.6 mg oral tablet: 1 tab(s) orally once a day (23 Mar 2021 01:07)  Eliquis 5 mg oral tablet: 1 tab(s) orally 2 times a day (23 Mar 2021 01:07)  enalapril 5 mg oral tablet: 1 tab(s) orally once a day (23 Mar 2021 01:07)  omeprazole 40 mg oral delayed release capsule: 1 cap(s) orally once a day (23 Mar 2021 01:07)    Vital Signs Last 24 Hrs  T(C): 35.3 (21 Apr 2021 12:59), Max: 36 (21 Apr 2021 04:58)  T(F): 95.5 (21 Apr 2021 12:59), Max: 96.8 (21 Apr 2021 04:58)  HR: 65 (21 Apr 2021 12:59) (65 - 75)  BP: 145/61 (21 Apr 2021 12:59) (119/53 - 145/61)  BP(mean): 84 (21 Apr 2021 04:58) (84 - 84)  RR: 18 (21 Apr 2021 04:58) (18 - 18)  SpO2: 100% (21 Apr 2021 04:58) (100% - 100%)  CAPILLARY BLOOD GLUCOSE      POCT Blood Glucose.: 121 mg/dL (21 Apr 2021 16:20)  POCT Blood Glucose.: 120 mg/dL (21 Apr 2021 11:46)  POCT Blood Glucose.: 121 mg/dL (21 Apr 2021 05:55)  POCT Blood Glucose.: 72 mg/dL (20 Apr 2021 21:50)  POCT Blood Glucose.: 82 mg/dL (20 Apr 2021 16:36)    LABS:                        8.2    3.42  )-----------( 133      ( 21 Apr 2021 06:37 )             26.2     04-21    139  |  108  |  10  ----------------------------<  115<H>  3.5   |  23  |  0.5<L>    Ca    7.9<L>      21 Apr 2021 06:37  Phos  3.7     04-21  Mg     1.8     04-21    TPro  6.0  /  Alb  2.1<L>  /  TBili  0.3  /  DBili  x   /  AST  19  /  ALT  <5  /  AlkPhos  136<H>  04-21    LIVER FUNCTIONS - ( 21 Apr 2021 06:37 )  Alb: 2.1 g/dL / Pro: 6.0 g/dL / ALK PHOS: 136 U/L / ALT: <5 U/L / AST: 19 U/L / GGT: x                           Consultant Notes Reviewed:  [x ] YES  [ ] NO  Care Discussed with Consultants/Other Providers/ Housestaff [ x] YES  [ ] NO  Radiology, labs, new studies personally reviewed.

## 2021-04-21 NOTE — DISCHARGE NOTE PROVIDER - PROVIDER TOKENS
FREE:[LAST:[jono],FIRST:[abner],PHONE:[(   )    -],FAX:[(   )    -]] PROVIDER:[TOKEN:[[id number]],FOLLOWUP:[1 week]],PROVIDER:[TOKEN:[[id number]],FOLLOWUP:[2 weeks]] PROVIDER:[TOKEN:[[id number]],FOLLOWUP:[1 week]],PROVIDER:[TOKEN:[[id number]],FOLLOWUP:[2 weeks]],PROVIDER:[TOKEN:[[id number]],FOLLOWUP:[2 weeks]] PROVIDER:[TOKEN:[[id number]],FOLLOWUP:[1 week]],PROVIDER:[TOKEN:[[id number]],FOLLOWUP:[2 weeks]],PROVIDER:[TOKEN:[[id number]],FOLLOWUP:[2 weeks]],PROVIDER:[TOKEN:[[id number]],FOLLOWUP:[1 week]] PROVIDER:[TOKEN:[[id number]],FOLLOWUP:[1 week]],PROVIDER:[TOKEN:[[id number]],FOLLOWUP:[2 weeks]],PROVIDER:[TOKEN:[[id number]],FOLLOWUP:[2 weeks]],PROVIDER:[TOKEN:[[id number]],FOLLOWUP:[1 week]],PROVIDER:[TOKEN:[[id number]],FOLLOWUP:[2 weeks]] PROVIDER:[TOKEN:[[id number]],FOLLOWUP:[1 week]],PROVIDER:[TOKEN:[[id number]],FOLLOWUP:[2 weeks]],PROVIDER:[TOKEN:[[id number]],FOLLOWUP:[2 weeks]],PROVIDER:[TOKEN:[[id number]],FOLLOWUP:[1 week]],PROVIDER:[TOKEN:[[id number]],FOLLOWUP:[2 weeks]],PROVIDER:[TOKEN:[[id number]],FOLLOWUP:[2 weeks]]

## 2021-04-21 NOTE — PROGRESS NOTE ADULT - ASSESSMENT
71 year old Male with past medical history of Parkinson's, dementia, CKD Stage 3, 1st degree AV block, HLD, gout, HTN, DM, fungal septicemia presenting from [facility] for acute decompensation in mental status. As per documentation patient at baseline is verbal but for past 3 days, he has been worsening to state of being non verbal. Patient was admitted to [facility] for fungemia and discharged on the 11th March. At  he had left hand cellulitis and was treated for it.    # Metabolic encephalopathy on top of baseline dementia  # Sepsis  # Necrotic sacral ulcer stage 4  # H/o recent fungemia  - CT Head No acute intracranial pathology.activity  -  Chest Xray No consolidation, effusion or pneumothorax.  -  Blood & Urine cxs NGTD   -  WCX GNR, 3/29   Numerous Klebsiella pneumoniae ESBL, Few Pseudomonas aeruginosa, Numerous Enterococcus faecalis (vancomycin resistant)  - evaluated by Burn: s/p debridement of sacrum on 3/30 .  no more new recommendation for surgery at this time .continue local wound care with santyl/dakins WTD.  - s/p meropenem, Polymixin and Aztreonam  - S/p spinal tap --> CSF clear with no growths and neg PCR  -repeat EEG w/ no epileptiform activity but diffuse cerebral dysfxn  -d/w neuro, may d/c Keppra   -off ABx as per ID  -somewhat improved mental status    # Nutrition/Dysphagia:   -daughter agreed to PEG but delayed due to +BCx that turned out to be a contaminant  -4/14 passed S&S but limited PO intake.   -s/p PEG on 4/19  -family needs to be taught how to administer feeds and wound care    #Lt shoulder dislocation w/ collection  -no need to tap as per IR and ID concurrs    # Hypotension  - c/w midodrine    #Anemia  -s/p 1u PRBCs  -monitor CBC    # Thrombocytopenia ?sec to sepsis  -resolved  -off Pepcid  -heme f/u appreciated    # H/o parkinsons disease  - c/w sinemet    # H/o chronic DVT  - VA Duplex Lower Ext Vein Scan, Bilat (03.27.21 @ 18:48) >No evidence of deep venous thrombosis or superficial thrombophlebitis in the bilateral lower extremities.  - unsure why was on Rx >2yrs  -will check w/ PMD Dr. Patterson  -d/w daughter who is aware of risks and benefits. Daughter requests prophylactic dose (aware of anemia and possible need for transfusions)    # Dyslipidemia  - c/w statin    # DVT prophylaxis  -scd  -Lovenox    Very high risk pt. D/w daughter GOC: she wants full code for now. Very poor Px (daughter aware).    #Pending:  tolerance of feeds, daughter to learn PEG feeding and wound care  # Discussed w/ daughter in details who wants to cont aggressive Tx. Daughter wants to take pt home (she is HC aide)  # Disposition: home w/ services    d/w Housestaff, nursing, case mgmt, daughter and male relative in details

## 2021-04-21 NOTE — DISCHARGE NOTE PROVIDER - CARE PROVIDER_API CALL
abner de la cruz  Phone: (   )    -  Fax: (   )    -  Follow Up Time:    Padmini Mixon)  Internal Medicine  [address] [place] [address]  Phone: [phone]  Fax: [phone]  Follow Up Time: 1 week    Rm Carnes)  Neurology  [address] [place] [address], [address]  [place] [address]  Phone: [phone]  Fax: [phone]  Follow Up Time: 2 weeks   Padmini Mixon)  Internal Medicine  [address] [place] [address]  Phone: [phone]  Fax: [phone]  Follow Up Time: 1 week    Fausto Snell)  EEGEpilepsy; Neurology  [address] [place] [address], [address]  [place] [address]  Phone: [phone]  Fax: [phone]  Follow Up Time: 2 weeks    Alexander Miller)  Plastic Surgery  [address] [place] [address]  Phone: [phone]  Fax: [phone]  Follow Up Time: 2 weeks   Padmini Mixon)  Internal Medicine  [address] [place] [address]  Phone: [phone]  Fax: [phone]  Follow Up Time: 1 week    Fausto Snell)  EEGEpilepsy; Neurology  [address] [facility], [address]  [place] [address]  Phone: [phone]  Fax: [phone]  Follow Up Time: 2 weeks    Alexander Miller)  Plastic Surgery  [address] [place] [address]  Phone: [phone]  Fax: [phone]  Follow Up Time: 2 weeks    SOO VELASQUEZ  [id number]  [address]  [facility],    Phone: ()-  Fax: ()-  Follow Up Time: 1 week   Padmini Mixon)  Internal Medicine  [address] [place] [address]  Phone: [phone]  Fax: [phone]  Follow Up Time: 1 week    Fausto Snell)  EEGEpilepsy; Neurology  [address] [facility], [address]  [place] [address]  Phone: [phone]  Fax: [phone]  Follow Up Time: 2 weeks    Alexander Miller)  Plastic Surgery  [address] [place] [address]  Phone: [phone]  Fax: [phone]  Follow Up Time: 2 weeks    SOO VELASQUEZ  [id number]  [address]  Gorge KAY,    Phone: ()-  Fax: ()-  Follow Up Time: 1 week    Karina Mcallister  General Surgery  [address] [place] [address]  Phone: [phone]  Fax: [phone]  Follow Up Time: 2 weeks   Padmini Mixon)  Internal Medicine  [address] [place] [address]  Phone: [phone]  Fax: [phone]  Follow Up Time: 1 week    Fausto Snell)  EEGEpilepsy; Neurology  [address] [facility], [address]  [place] [address]  Phone: [phone]  Fax: [phone]  Follow Up Time: 2 weeks    Alexander Miller)  Plastic Surgery  [address] [place] [address]  Phone: [phone]  Fax: [phone]  Follow Up Time: 2 weeks    SOO VELASQUEZ  [id number]  [address]  Gorge KAY,    Phone: ()-  Fax: ()-  Follow Up Time: 1 week    Karina Mcallister  General Surgery  [address] [place] [address]  Phone: [phone]  Fax: [phone]  Follow Up Time: 2 weeks    Lonnie Jeffers)  Radiology Physicians  [address] [place] [address]  Phone: [phone]  Fax: [phone]  Follow Up Time: 2 weeks

## 2021-04-21 NOTE — DISCHARGE NOTE PROVIDER - NSDCMRMEDTOKEN_GEN_ALL_CORE_FT
atorvastatin 40 mg oral tablet: 1 tab(s) orally once a day (at bedtime)  carbidopa-levodopa 25 mg-100 mg oral tablet, extended release: 2 tab(s) orally 5 times a day at 6AM,10Am,2PM,6PM,10 PM  colchicine 0.6 mg oral tablet: 1 tab(s) orally once a day  Eliquis 5 mg oral tablet: 1 tab(s) orally 2 times a day  enalapril 5 mg oral tablet: 1 tab(s) orally once a day  gabapentin 100 mg oral capsule: 1 cap(s) orally 3 times a day  omeprazole 40 mg oral delayed release capsule: 1 cap(s) orally once a day  senna oral tablet: 2 tab(s) orally once a day (at bedtime), As Needed -for constipation    apixaban 2.5 mg oral tablet: 1 tab(s) orally 2 times a day  ascorbic acid 500 mg oral tablet: 1 tab(s) orally once a day  atorvastatin 40 mg oral tablet: 1 tab(s) orally once a day (at bedtime)  carbidopa-levodopa 25 mg-100 mg oral tablet, extended release: 2 tab(s) orally 4 times a day at 6AM,10Am,2PM,6PM,10 PM  collagenase 250 units/g topical ointment: 1 application topically 2 times a day  magnesium oxide 500 mg oral tablet: 1 tab(s) orally once a day  omeprazole 40 mg oral delayed release capsule: 1 cap(s) orally once a day  senna oral tablet: 2 tab(s) orally once a day (at bedtime), As Needed -for constipation   sodium hypochlorite 0.25% topical solution: 1 application topically 2 times a day  zinc sulfate 220 mg oral capsule: 1 cap(s) orally once a day   apixaban 2.5 mg oral tablet: 1 tab(s) orally 2 times a day  ascorbic acid 500 mg oral tablet: 1 tab(s) orally once a day  atorvastatin 40 mg oral tablet: 1 tab(s) orally once a day (at bedtime)  carbidopa-levodopa 25 mg-100 mg oral tablet, extended release: 2 tab(s) orally 4 times a day at 6AM,10Am,2PM,6PM,10 PM  collagenase 250 units/g topical ointment: 1 application topically 2 times a day  magnesium oxide 500 mg oral tablet: 1 tab(s) orally once a day  midodrine 10 mg oral tablet: 1 tab(s) orally every 8 hours  omeprazole 40 mg oral delayed release capsule: 1 cap(s) orally once a day  senna oral tablet: 2 tab(s) orally once a day (at bedtime), As Needed -for constipation   sodium hypochlorite 0.25% topical solution: 1 application topically 2 times a day  zinc sulfate 220 mg oral capsule: 1 cap(s) orally once a day

## 2021-04-21 NOTE — PROGRESS NOTE ADULT - ASSESSMENT
71 year old Male with past medical history of Parkinson's, dementia, CKD Stage 3, 1st degree AV block, HLD, gout, HTN, DM, fungal septicemia presenting from [facility] for acute decompensation in mental status. As per documentation patient at baseline is verbal but for past 3 days, he has been worsening to state of being non verbal. Patient was admitted to [facility] for fungemia and discharged on the 11th March. At  he had left hand cellulitis and was treated for it.    # Metabolic encephalopathy on top of baseline dementia  # Sepsis  # Necrotic sacral ulcer stage 4  # H/o recent fungemia  - CT Head No acute intracranial pathology.activity  -  Chest Xray No consolidation, effusion or pneumothorax.  -  Blood & Urine cxs NGTD   -  WCX GNR, 3/29   Numerous Klebsiella pneumoniae ESBL, Few Pseudomonas aeruginosa, Numerous Enterococcus faecalis (vancomycin resistant)  - evaluated by Burn: s/p debridement of sacrum on 3/30 .  no more new recommendation for surgery at this time .continue local wound care with santyl/dakins WTD.  - s/p meropenem, Polymixin and Aztreonam  - S/p spinal tap --> CSF clear with no growths and neg PCR  -repeat EEG w/ no epileptiform activity but diffuse cerebral dysfxn  -d/w neuro, may d/c Keppra   -off ABx as per ID  - improved mental status    #+ Staph epi in BCx x1 contaminant  -repeat BCx---> negative    # Nutrition/Dysphagia:   -daughter agreed to PEG but delayed due to +BCx that turned out to be a contaminant  -4/14 passed S&S but limited PO intake.   -s/p PEG on 4/19  -starting feeds, family needs to be taught how to administer feeds and wound care    #Lt shoulder dislocation w/ collection  -no need to tap as per IR and ID concurrs    # Hypotension  - c/w midodrine    #Anemia  -s/p 1u PRBCs    # Thrombocytopenia ?sec to sepsis  -resolved  -off Pepcid  -heme f/u appreciated    # Hyponatremia off Polymixin  - monitor  BMP    # H/o parkinsons disease  - c/w sinemet    # H/o chronic DVT  - [facility] Duplex Lower Ext Vein Scan, Bilat (03.27.21 @ 18:48) >No evidence of deep venous thrombosis or superficial thrombophlebitis in the bilateral lower extremities.  - continue holding eliquis due to thrombocytopenia    # Dyslipidemia  - c/w statin    # DVT prophylaxis  -scd  -Lovenox    Very high risk pt. D/w daughter GOC: she wants full code for now. Very poor Px (daughter aware).    #Pending:  tolerance of feeds, daughter to learn PEG feeding and wound care. Patient will require tube feeds for the remainder of his life.   # Discussed w/ daughter in details who wants to cont aggressive Tx. Daughter wants to take pt home (she is HC aide)  # Disposition: home w/ services    d/w Housestaff, nursing, case mgmt

## 2021-04-21 NOTE — DISCHARGE NOTE PROVIDER - CARE PROVIDERS DIRECT ADDRESSES
,DirectAddress_Unknown ,DirectAddress_Unknown,[email or web address] ,DirectAddress_Unknown,[email or web address] ,DirectAddress_Unknown,[email or web address],DirectAddress_Unknown ,[email or web address],[email or web address],DirectAddress_Unknown,[email or web address] ,DirectAddress_Unknown,[email or web address],DirectAddress_Unknown,[email or web address],DirectAddress_Unknown

## 2021-04-22 LAB
GLUCOSE BLDC GLUCOMTR-MCNC: 109 MG/DL — HIGH (ref 70–99)
GLUCOSE BLDC GLUCOMTR-MCNC: 110 MG/DL — HIGH (ref 70–99)
GLUCOSE BLDC GLUCOMTR-MCNC: 117 MG/DL — HIGH (ref 70–99)
GLUCOSE BLDC GLUCOMTR-MCNC: 136 MG/DL — HIGH (ref 70–99)

## 2021-04-22 PROCEDURE — 99232 SBSQ HOSP IP/OBS MODERATE 35: CPT

## 2021-04-22 RX ADMIN — CARBIDOPA AND LEVODOPA 2 TABLET(S): 25; 100 TABLET ORAL at 09:30

## 2021-04-22 RX ADMIN — MAGNESIUM OXIDE 400 MG ORAL TABLET 400 MILLIGRAM(S): 241.3 TABLET ORAL at 13:10

## 2021-04-22 RX ADMIN — ATORVASTATIN CALCIUM 40 MILLIGRAM(S): 80 TABLET, FILM COATED ORAL at 21:10

## 2021-04-22 RX ADMIN — Medication 500 MILLIGRAM(S): at 11:08

## 2021-04-22 RX ADMIN — APIXABAN 2.5 MILLIGRAM(S): 2.5 TABLET, FILM COATED ORAL at 06:21

## 2021-04-22 RX ADMIN — Medication 1 APPLICATION(S): at 06:23

## 2021-04-22 RX ADMIN — MAGNESIUM OXIDE 400 MG ORAL TABLET 400 MILLIGRAM(S): 241.3 TABLET ORAL at 21:10

## 2021-04-22 RX ADMIN — ZINC SULFATE TAB 220 MG (50 MG ZINC EQUIVALENT) 220 MILLIGRAM(S): 220 (50 ZN) TAB at 11:08

## 2021-04-22 RX ADMIN — Medication 1 APPLICATION(S): at 06:22

## 2021-04-22 RX ADMIN — APIXABAN 2.5 MILLIGRAM(S): 2.5 TABLET, FILM COATED ORAL at 17:23

## 2021-04-22 RX ADMIN — MAGNESIUM OXIDE 400 MG ORAL TABLET 400 MILLIGRAM(S): 241.3 TABLET ORAL at 06:22

## 2021-04-22 RX ADMIN — Medication 1 APPLICATION(S): at 17:22

## 2021-04-22 RX ADMIN — CARBIDOPA AND LEVODOPA 2 TABLET(S): 25; 100 TABLET ORAL at 21:10

## 2021-04-22 RX ADMIN — CHLORHEXIDINE GLUCONATE 1 APPLICATION(S): 213 SOLUTION TOPICAL at 06:21

## 2021-04-22 RX ADMIN — CARBIDOPA AND LEVODOPA 2 TABLET(S): 25; 100 TABLET ORAL at 06:21

## 2021-04-22 RX ADMIN — CARBIDOPA AND LEVODOPA 2 TABLET(S): 25; 100 TABLET ORAL at 13:10

## 2021-04-22 RX ADMIN — SENNA PLUS 2 TABLET(S): 8.6 TABLET ORAL at 21:10

## 2021-04-22 NOTE — SWALLOW BEDSIDE ASSESSMENT ADULT - SLP PERTINENT HISTORY OF CURRENT PROBLEM
pt is a 71 year old Male with past medical history of Parkinson's, dementia, CKD Stage 3, 1st degree AV block, HLD, gout, HTN, DM, fungal septicemia presenting from [facility] for acute decompensation in mental status. pt is being teated for metabolic encephalopathy; s/p PEG tube on 4/19 2' failure to thrive. pt previously seen by SLP and cleared for puree w/ nectar-thick liquids.

## 2021-04-22 NOTE — SWALLOW BEDSIDE ASSESSMENT ADULT - SWALLOW EVAL: CURRENT DIET
TORREYO w/ estela diamond. MD removed estela diamond prior to SLP presenting PO trials.
NPO w/ PEG
NPO
NPO w/ alternate means of nutrition/hydration
NPO x meds crushed in puree
TORREYO w/ estela yeagerp in place
dysphagia 1 (puree) w/ nectar thick liquids

## 2021-04-22 NOTE — PROGRESS NOTE ADULT - ASSESSMENT
71 year old Male with past medical history of Parkinson's, dementia, CKD Stage 3, 1st degree AV block, HLD, gout, HTN, DM, fungal septicemia presenting from [facility] for acute decompensation in mental status. As per documentation patient at baseline is verbal but for past 3 days, he has been worsening to state of being non verbal. Patient was admitted to [facility] for fungemia and discharged on the 11th March. At  he had left hand cellulitis and was treated for it.    # Metabolic encephalopathy on top of baseline dementia  # Sepsis  # Necrotic sacral ulcer stage 4  # H/o recent fungemia  - CT Head No acute intracranial pathology.activity  -  Chest Xray No consolidation, effusion or pneumothorax.  -  Blood & Urine cxs NGTD   -  WCX GNR, 3/29   Numerous Klebsiella pneumoniae ESBL, Few Pseudomonas aeruginosa, Numerous Enterococcus faecalis (vancomycin resistant)  - evaluated by Burn: s/p debridement of sacrum on 3/30 .  no more new recommendation for surgery at this time .continue local wound care with santyl/dakins WTD.  - s/p meropenem, Polymixin and Aztreonam  - S/p spinal tap --> CSF clear with no growths and neg PCR  -repeat EEG w/ no epileptiform activity but diffuse cerebral dysfxn  -d/w neuro, may d/c Keppra   -off ABx as per ID  -somewhat improved mental status    # Nutrition/Dysphagia:   -daughter agreed to PEG but delayed due to +BCx that turned out to be a contaminant  -4/14 passed S&S but limited PO intake.   -s/p PEG on 4/19  -family needs to be taught how to administer feeds and wound care    #Lt shoulder dislocation w/ collection  -no need to tap as per IR and ID concurrs    # Hypotension  - c/w midodrine    #Anemia  -s/p 1u PRBCs  -monitor CBC    # Thrombocytopenia ?sec to sepsis  -resolved  -off Pepcid  -heme f/u appreciated    # H/o parkinsons disease  - c/w sinemet    # H/o chronic DVT  - [facility] Duplex Lower Ext Vein Scan, Bilat (03.27.21 @ 18:48) >No evidence of deep venous thrombosis or superficial thrombophlebitis in the bilateral lower extremities.  - unsure why was on Rx >2yrs  -will check w/ PMD Dr. Patterson  -d/w daughter who is aware of risks and benefits. Daughter requests prophylactic dose (aware of anemia and possible need for transfusions)    # Dyslipidemia  - c/w statin    # DVT prophylaxis  -scd  -Lovenox    Very high risk pt. D/w daughter GOC: she wants full code for now. Very poor Px (daughter aware).    #Pending:  tolerance of feeds,  continue teaching daughter to learn PEG feeding and wound care. Patient will require tube feeds for the remainder of his life.     # Discussed w/ daughter in details who wants to cont aggressive Tx. Daughter wants to take pt home (she is HC aide)  # Disposition: home w/ services    d/w Housestaff, nursing, case mgmt, daughter and male relative in details

## 2021-04-22 NOTE — SWALLOW BEDSIDE ASSESSMENT ADULT - SWALLOW EVAL: PROGNOSIS
pt w/ PEG tube as 1' means of nutrition/hydration; pt may have supplemental/pleasure feeds of puree w/ nectar-thick liquids

## 2021-04-22 NOTE — PROGRESS NOTE ADULT - SUBJECTIVE AND OBJECTIVE BOX
Patient is a 71y old  Male who presents with a chief complaint of Change in Mental Status (05 Apr 2021 13:36)    INTERVAL HPI/OVERNIGHT EVENTS: Patient was examined and seen at bedside. Events noted. Awake and alert today but not following commands, tries to speak. S/p PEG. tolerating feeds.    InitialHPI:  71 year old Male with past medical history of Parkinson's, dementia, CKD Stage 3, 1st degree AV block, HLD, gout, HTN, DM, fungal septicemia presenting from [facility] for acute decompensation in mental status.   As per documentation patient at baseline is verbal but for past 3 days, he has been worsening to state of being non verbal. Patient was admitted to [facility] for fungemia and discharged on the 11th March. At  he had left hand cellulitis and was treated for it.  In ED patient hemodynamically stable, afebrile, ABEBE with rise in Cr to 1.9 and BUN to 75, remains non verbal. CT head negative for any new strokes and UA negative. Family has not visited the patient recently so unaware of his status. (22 Mar 2021 23:59)    PAST MEDICAL & SURGICAL HISTORY:  Parkinson disease    Hypertension    Gout    Dyslipidemia    Prostatitis    Cataract    No significant past surgical history        General: NAD, awake, tracking, chronically ill appearing, tries to speak, +dysarthria  HEENT:  no LAD  CV: S1 S2  Resp: decreased breath sounds at bases  GI: NT/ND/S +BS, +PEG  MS: no clubbing/cyanosis/edema, + pulses b/l, LUE>RUE swelling  Neuro: unable to access  Skin: multiple skin decubs            MEDICATIONS  (STANDING):  apixaban 2.5 milliGRAM(s) Oral two times a day  ascorbic acid 500 milliGRAM(s) Oral daily  atorvastatin 40 milliGRAM(s) Oral at bedtime  carbidopa/levodopa  25/100 2 Tablet(s) Oral <User Schedule>  chlorhexidine 4% Liquid 1 Application(s) Topical <User Schedule>  collagenase Ointment 1 Application(s) Topical two times a day  Dakins Solution - 1/2 Strength 1 Application(s) Topical two times a day  magnesium oxide 400 milliGRAM(s) Oral every 8 hours  senna 2 Tablet(s) Oral at bedtime  zinc sulfate 220 milliGRAM(s) Oral daily    MEDICATIONS  (PRN):  acetaminophen   Tablet .. 650 milliGRAM(s) Oral every 6 hours PRN Temp greater or equal to 38C (100.4F)    Home Medications:  atorvastatin 40 mg oral tablet: 1 tab(s) orally once a day (at bedtime) (23 Mar 2021 01:07)  carbidopa-levodopa 25 mg-100 mg oral tablet, extended release: 2 tab(s) orally 5 times a day at 6AM,10Am,2PM,6PM,10 PM (23 Mar 2021 01:07)  colchicine 0.6 mg oral tablet: 1 tab(s) orally once a day (23 Mar 2021 01:07)  Eliquis 5 mg oral tablet: 1 tab(s) orally 2 times a day (23 Mar 2021 01:07)  enalapril 5 mg oral tablet: 1 tab(s) orally once a day (23 Mar 2021 01:07)  omeprazole 40 mg oral delayed release capsule: 1 cap(s) orally once a day (23 Mar 2021 01:07)    Vital Signs Last 24 Hrs  T(C): 35.9 (22 Apr 2021 13:12), Max: 36.4 (21 Apr 2021 21:04)  T(F): 96.7 (22 Apr 2021 13:12), Max: 97.5 (21 Apr 2021 21:04)  HR: 65 (22 Apr 2021 13:12) (65 - 72)  BP: 104/53 (22 Apr 2021 13:12) (104/53 - 145/65)  BP(mean): 77 (22 Apr 2021 13:12) (77 - 94)  RR: 18 (22 Apr 2021 13:12) (18 - 18)  SpO2: 100% (22 Apr 2021 13:12) (99% - 100%)  CAPILLARY BLOOD GLUCOSE      POCT Blood Glucose.: 136 mg/dL (22 Apr 2021 16:26)  POCT Blood Glucose.: 110 mg/dL (22 Apr 2021 11:25)  POCT Blood Glucose.: 109 mg/dL (22 Apr 2021 07:10)  POCT Blood Glucose.: 108 mg/dL (21 Apr 2021 20:58)    LABS:                        8.2    3.42  )-----------( 133      ( 21 Apr 2021 06:37 )             26.2     04-21    139  |  108  |  10  ----------------------------<  115<H>  3.5   |  23  |  0.5<L>    Ca    7.9<L>      21 Apr 2021 06:37  Phos  3.7     04-21  Mg     1.8     04-21    TPro  6.0  /  Alb  2.1<L>  /  TBili  0.3  /  DBili  x   /  AST  19  /  ALT  <5  /  AlkPhos  136<H>  04-21    LIVER FUNCTIONS - ( 21 Apr 2021 06:37 )  Alb: 2.1 g/dL / Pro: 6.0 g/dL / ALK PHOS: 136 U/L / ALT: <5 U/L / AST: 19 U/L / GGT: x                           Consultant Notes Reviewed:  [x ] YES  [ ] NO  Care Discussed with Consultants/Other Providers/ Housestaff [ x] YES  [ ] NO  Radiology, labs, new studies personally reviewed.

## 2021-04-22 NOTE — SWALLOW BEDSIDE ASSESSMENT ADULT - ORAL PREPARATORY PHASE
Reduced oral grading
aversive to PO/Refuses to accept bolus into oral cavity
Reduced oral grading
Reduced oral grading

## 2021-04-22 NOTE — SWALLOW BEDSIDE ASSESSMENT ADULT - SWALLOW EVAL: RECOMMENDED DIET
dysphagia 1 (puree) w/ nectar thick liquids
NPO w/ alternate means of nutrition/hydration
dysphagia 1 (puree) w/ nectar thick liquids
PEG tube s 1' means of nutrition/hydration; pt may have pleasure/supplemental feeds of puree w/ nectar-thick liquids
continue NPO w/ alternate means of nutrition/hydration, may give meds crushed in puree (as able)

## 2021-04-22 NOTE — PROGRESS NOTE ADULT - ASSESSMENT
71 year old Male with past medical history of Parkinson's, dementia, CKD Stage 3, 1st degree AV block, HLD, gout, HTN, DM, fungal septicemia presenting from [facility] for acute decompensation in mental status. As per documentation patient at baseline is verbal but for past 3 days, he has been worsening to state of being non verbal. Patient was admitted to [facility] for fungemia and discharged on the 11th March. At  he had left hand cellulitis and was treated for it.    # Metabolic encephalopathy on top of baseline dementia  # Sepsis  # Necrotic sacral ulcer stage 4  # H/o recent fungemia  - CT Head No acute intracranial pathology.activity  -  Chest Xray No consolidation, effusion or pneumothorax.  -  Blood & Urine cxs NGTD   -  WCX GNR, 3/29   Numerous Klebsiella pneumoniae ESBL, Few Pseudomonas aeruginosa, Numerous Enterococcus faecalis (vancomycin resistant)  - evaluated by Burn: s/p debridement of sacrum on 3/30 .  no more new recommendation for surgery at this time .continue local wound care with santyl/dakins WTD.  - s/p meropenem, Polymixin and Aztreonam  - S/p spinal tap --> CSF clear with no growths and neg PCR  -repeat EEG w/ no epileptiform activity but diffuse cerebral dysfxn  -d/w neuro, may d/c Keppra   -off ABx as per ID  -somewhat improved mental status    # Nutrition/Dysphagia:   -daughter agreed to PEG but delayed due to +BCx that turned out to be a contaminant  -4/14 passed S&S but limited PO intake.   -s/p PEG on 4/19  -family needs to be taught how to administer feeds and wound care    #Lt shoulder dislocation w/ collection  -no need to tap as per IR and ID concurrs    # Hypotension  - c/w midodrine    #Anemia  -s/p 1u PRBCs  -monitor CBC    # Thrombocytopenia ?sec to sepsis  -resolved  -off Pepcid  -heme f/u appreciated    # H/o parkinsons disease  - c/w sinemet    # H/o chronic DVT  - VA Duplex Lower Ext Vein Scan, Bilat (03.27.21 @ 18:48) >No evidence of deep venous thrombosis or superficial thrombophlebitis in the bilateral lower extremities.  - unsure why was on Rx >2yrs  -will check w/ PMD Dr. Patterson  -d/w daughter who is aware of risks and benefits. Daughter requests prophylactic dose (aware of anemia and possible need for transfusions)    # Dyslipidemia  - c/w statin    # DVT prophylaxis  -scd  -Lovenox    Very high risk pt. D/w daughter GOC: she wants full code for now. Very poor Px (daughter aware).    #Pending:  daughter to learn PEG feeding and wound care, DMEs  # Discussed w/ daughter in details who wants to cont aggressive Tx. Daughter wants to take pt home (she is HC aide)  # Disposition: home w/ services    d/w Housestaff, nursing, case mgmt

## 2021-04-22 NOTE — PROGRESS NOTE ADULT - SUBJECTIVE AND OBJECTIVE BOX
DAILY PROGRESS NOTE  ===========================================================    Patient Information:  NIEVES LABOY  /  71y  /  Male  /  MRN#: [id number]    Hospital Day: 31d   Location: [facility]93E Neuro 012 A ([facility] T9-3E Neuro)    |:::::::::::::::::::::::::::| SUBJECTIVE |:::::::::::::::::::::::::::|    OVERNIGHT EVENTS: none  TODAY: Patient was seen today at bedside. Review of systems is otherwise negative.    |:::::::::::::::::::::::::::| ADMISSION HPI |:::::::::::::::::::::::::::|    HPI:  71 year old Male with past medical history of Parkinson's, dementia, CKD Stage 3, 1st degree AV block, HLD, gout, HTN, DM, fungal septicemia presenting from [facility] for acute decompensation in mental status.     As per documentation patient at baseline is verbal but for past 3 days, he has been worsening to state of being non verbal. Patient was admitted to [facility] for fungemia and discharged on the 11th March. At  he had left hand cellulitis and was treated for it.    In ED patient hemodynamically stable, afebrile, ABEBE with rise in Cr to 1.9 and BUN to 75, remains non verbal. CT head negative for any new strokes and UA negative. (22 Mar 2021 23:59)      |:::::::::::::::::::::::::::| OBJECTIVE |:::::::::::::::::::::::::::|    STANDING MEDICATIONS  apixaban 2.5 milliGRAM(s) Oral two times a day  ascorbic acid 500 milliGRAM(s) Oral daily  atorvastatin 40 milliGRAM(s) Oral at bedtime  carbidopa/levodopa  25/100 2 Tablet(s) Oral <User Schedule>  chlorhexidine 4% Liquid 1 Application(s) Topical <User Schedule>  collagenase Ointment 1 Application(s) Topical two times a day  Dakins Solution - 1/2 Strength 1 Application(s) Topical two times a day  magnesium oxide 400 milliGRAM(s) Oral every 8 hours  senna 2 Tablet(s) Oral at bedtime  zinc sulfate 220 milliGRAM(s) Oral daily    PRN MEDICATIONS  acetaminophen   Tablet .. 650 milliGRAM(s) Oral every 6 hours PRN    HOME MEDICATIONS  atorvastatin 40 mg oral tablet: 1 tab(s) orally once a day (at bedtime)  carbidopa-levodopa 25 mg-100 mg oral tablet, extended release: 2 tab(s) orally 5 times a day at 6AM,10Am,2PM,6PM,10 PM  colchicine 0.6 mg oral tablet: 1 tab(s) orally once a day  Eliquis 5 mg oral tablet: 1 tab(s) orally 2 times a day  enalapril 5 mg oral tablet: 1 tab(s) orally once a day  gabapentin 100 mg oral capsule: 1 cap(s) orally 3 times a day  omeprazole 40 mg oral delayed release capsule: 1 cap(s) orally once a day  senna oral tablet: 2 tab(s) orally once a day (at bedtime), As Needed -for constipation       VITAL SIGNS: Last 24 Hours  T(C): 35.9 (22 Apr 2021 13:12), Max: 36.4 (21 Apr 2021 21:04)  T(F): 96.7 (22 Apr 2021 13:12), Max: 97.5 (21 Apr 2021 21:04)  HR: 65 (22 Apr 2021 13:12) (65 - 72)  BP: 104/53 (22 Apr 2021 13:12) (104/53 - 145/65)  BP(mean): 77 (22 Apr 2021 13:12) (77 - 94)  RR: 18 (22 Apr 2021 13:12) (18 - 18)  SpO2: 100% (22 Apr 2021 13:12) (99% - 100%)    Input-Output    04-21-21 @ 07:01  -  04-22-21 @ 07:00  --------------------------------------------------------  IN:    Enteral Tube Flush: 200 mL    Jevity 1.2: 480 mL  Total IN: 680 mL    OUT:  Total OUT: 0 mL    Total NET: 680 mL      04-22-21 @ 07:01  -  04-22-21 @ 14:52  --------------------------------------------------------  IN:    Enteral Tube Flush: 90 mL    Jevity 1.2: 560 mL  Total IN: 650 mL    OUT:  Total OUT: 0 mL    Total NET: 650 mL          PHYSICAL EXAM:  GEN: No acute distress.  LUNGS: Clear to auscultation bilaterally.  HEART: Regular rate and rhythm. No murmurs.  ABD: Soft, non-tender, non-distended.  EXT: Normal range of motion. No edema.  NEURO: Alert, attentive, oriented. Clear, fluent speech. Eye movements intact. Moves all extremities.  PSYCH: Cooperative, appropriate.    LAB RESULTS:                        8.2    3.42  )-----------( 133      ( 21 Apr 2021 06:37 )             26.2     04-21    139  |  108  |  10  ----------------------------<  115<H>  3.5   |  23  |  0.5<L>    Ca    7.9<L>      21 Apr 2021 06:37  Phos  3.7     04-21  Mg     1.8     04-21    TPro  6.0  /  Alb  2.1<L>  /  TBili  0.3  /  DBili  x   /  AST  19  /  ALT  <5  /  AlkPhos  136<H>  04-21                MICROBIOLOGY:    Culture - Blood (collected 04-13-21 @ 07:34)  Source: .Blood None  Final Report (04-18-21 @ 17:00):    No Growth Final    Culture - Blood (collected 04-12-21 @ 21:38)  Source: .Blood None  Final Report (04-18-21 @ 05:00):    No Growth Final    Culture - Blood (collected 04-12-21 @ 16:00)  Source: .Blood Blood  Final Report (04-17-21 @ 22:01):    No Growth Final      IMAGING/STUDIES:    ALLERGIES:  No Known Allergies      ===========================================================

## 2021-04-23 LAB
ANION GAP SERPL CALC-SCNC: 10 MMOL/L — SIGNIFICANT CHANGE UP (ref 7–14)
BUN SERPL-MCNC: 12 MG/DL — SIGNIFICANT CHANGE UP (ref 10–20)
CALCIUM SERPL-MCNC: 8 MG/DL — LOW (ref 8.5–10.1)
CHLORIDE SERPL-SCNC: 103 MMOL/L — SIGNIFICANT CHANGE UP (ref 98–110)
CO2 SERPL-SCNC: 23 MMOL/L — SIGNIFICANT CHANGE UP (ref 17–32)
CREAT SERPL-MCNC: 0.7 MG/DL — SIGNIFICANT CHANGE UP (ref 0.7–1.5)
GLUCOSE BLDC GLUCOMTR-MCNC: 113 MG/DL — HIGH (ref 70–99)
GLUCOSE BLDC GLUCOMTR-MCNC: 114 MG/DL — HIGH (ref 70–99)
GLUCOSE BLDC GLUCOMTR-MCNC: 129 MG/DL — HIGH (ref 70–99)
GLUCOSE BLDC GLUCOMTR-MCNC: 86 MG/DL — SIGNIFICANT CHANGE UP (ref 70–99)
GLUCOSE BLDC GLUCOMTR-MCNC: 91 MG/DL — SIGNIFICANT CHANGE UP (ref 70–99)
GLUCOSE SERPL-MCNC: 80 MG/DL — SIGNIFICANT CHANGE UP (ref 70–99)
HCT VFR BLD CALC: 28.4 % — LOW (ref 42–52)
HGB BLD-MCNC: 8.9 G/DL — LOW (ref 14–18)
MCHC RBC-ENTMCNC: 26.6 PG — LOW (ref 27–31)
MCHC RBC-ENTMCNC: 31.3 G/DL — LOW (ref 32–37)
MCV RBC AUTO: 84.8 FL — SIGNIFICANT CHANGE UP (ref 80–94)
NRBC # BLD: 0 /100 WBCS — SIGNIFICANT CHANGE UP (ref 0–0)
PLATELET # BLD AUTO: 112 K/UL — LOW (ref 130–400)
POTASSIUM SERPL-MCNC: 5 MMOL/L — SIGNIFICANT CHANGE UP (ref 3.5–5)
POTASSIUM SERPL-SCNC: 5 MMOL/L — SIGNIFICANT CHANGE UP (ref 3.5–5)
RBC # BLD: 3.35 M/UL — LOW (ref 4.7–6.1)
RBC # FLD: 16.1 % — HIGH (ref 11.5–14.5)
SODIUM SERPL-SCNC: 136 MMOL/L — SIGNIFICANT CHANGE UP (ref 135–146)
WBC # BLD: 4.76 K/UL — LOW (ref 4.8–10.8)
WBC # FLD AUTO: 4.76 K/UL — LOW (ref 4.8–10.8)

## 2021-04-23 PROCEDURE — 99232 SBSQ HOSP IP/OBS MODERATE 35: CPT

## 2021-04-23 RX ORDER — SODIUM HYPOCHLORITE 0.125 %
1 SOLUTION, NON-ORAL MISCELLANEOUS
Qty: 0 | Refills: 0 | DISCHARGE
Start: 2021-04-23

## 2021-04-23 RX ORDER — COLCHICINE 0.6 MG
1 TABLET ORAL
Qty: 0 | Refills: 0 | DISCHARGE

## 2021-04-23 RX ORDER — ATORVASTATIN CALCIUM 80 MG/1
1 TABLET, FILM COATED ORAL
Qty: 0 | Refills: 0 | DISCHARGE
Start: 2021-04-23

## 2021-04-23 RX ORDER — ZINC SULFATE TAB 220 MG (50 MG ZINC EQUIVALENT) 220 (50 ZN) MG
1 TAB ORAL
Qty: 0 | Refills: 0 | DISCHARGE
Start: 2021-04-23

## 2021-04-23 RX ORDER — MAGNESIUM OXIDE 400 MG ORAL TABLET 241.3 MG
1 TABLET ORAL
Qty: 0 | Refills: 0 | DISCHARGE
Start: 2021-04-23

## 2021-04-23 RX ORDER — COLLAGENASE CLOSTRIDIUM HIST. 250 UNIT/G
1 OINTMENT (GRAM) TOPICAL
Qty: 0 | Refills: 0 | DISCHARGE
Start: 2021-04-23

## 2021-04-23 RX ORDER — ASCORBIC ACID 60 MG
1 TABLET,CHEWABLE ORAL
Qty: 0 | Refills: 0 | DISCHARGE
Start: 2021-04-23

## 2021-04-23 RX ORDER — APIXABAN 2.5 MG/1
1 TABLET, FILM COATED ORAL
Qty: 0 | Refills: 0 | DISCHARGE

## 2021-04-23 RX ORDER — APIXABAN 2.5 MG/1
1 TABLET, FILM COATED ORAL
Qty: 0 | Refills: 0 | DISCHARGE
Start: 2021-04-23

## 2021-04-23 RX ADMIN — ATORVASTATIN CALCIUM 40 MILLIGRAM(S): 80 TABLET, FILM COATED ORAL at 21:21

## 2021-04-23 RX ADMIN — CARBIDOPA AND LEVODOPA 2 TABLET(S): 25; 100 TABLET ORAL at 05:26

## 2021-04-23 RX ADMIN — CARBIDOPA AND LEVODOPA 2 TABLET(S): 25; 100 TABLET ORAL at 13:11

## 2021-04-23 RX ADMIN — APIXABAN 2.5 MILLIGRAM(S): 2.5 TABLET, FILM COATED ORAL at 17:09

## 2021-04-23 RX ADMIN — Medication 1 APPLICATION(S): at 17:10

## 2021-04-23 RX ADMIN — Medication 500 MILLIGRAM(S): at 11:28

## 2021-04-23 RX ADMIN — Medication 1 APPLICATION(S): at 17:09

## 2021-04-23 RX ADMIN — APIXABAN 2.5 MILLIGRAM(S): 2.5 TABLET, FILM COATED ORAL at 05:26

## 2021-04-23 RX ADMIN — MAGNESIUM OXIDE 400 MG ORAL TABLET 400 MILLIGRAM(S): 241.3 TABLET ORAL at 21:21

## 2021-04-23 RX ADMIN — Medication 1 APPLICATION(S): at 05:27

## 2021-04-23 RX ADMIN — Medication 1 APPLICATION(S): at 05:26

## 2021-04-23 RX ADMIN — SENNA PLUS 2 TABLET(S): 8.6 TABLET ORAL at 21:21

## 2021-04-23 RX ADMIN — MAGNESIUM OXIDE 400 MG ORAL TABLET 400 MILLIGRAM(S): 241.3 TABLET ORAL at 13:11

## 2021-04-23 RX ADMIN — CARBIDOPA AND LEVODOPA 2 TABLET(S): 25; 100 TABLET ORAL at 09:21

## 2021-04-23 RX ADMIN — ZINC SULFATE TAB 220 MG (50 MG ZINC EQUIVALENT) 220 MILLIGRAM(S): 220 (50 ZN) TAB at 11:28

## 2021-04-23 RX ADMIN — CARBIDOPA AND LEVODOPA 2 TABLET(S): 25; 100 TABLET ORAL at 21:21

## 2021-04-23 RX ADMIN — MAGNESIUM OXIDE 400 MG ORAL TABLET 400 MILLIGRAM(S): 241.3 TABLET ORAL at 05:26

## 2021-04-23 RX ADMIN — CHLORHEXIDINE GLUCONATE 1 APPLICATION(S): 213 SOLUTION TOPICAL at 05:27

## 2021-04-23 NOTE — PROGRESS NOTE ADULT - SUBJECTIVE AND OBJECTIVE BOX
DAILY PROGRESS NOTE  ===========================================================    Patient Information:  NIEVES LABOY  /  71y  /  Male  /  MRN#: [id number]    Hospital Day: 32d   Location: [facility]93E Neuro 012 A ([facility] T9-3E Neuro)    |:::::::::::::::::::::::::::| SUBJECTIVE |:::::::::::::::::::::::::::|    OVERNIGHT EVENTS: none  TODAY: Patient was seen today at bedside. Awake and alert and follows commands. Speaks a little in Hungarian.  Review of systems is otherwise negative.    |:::::::::::::::::::::::::::| ADMISSION HPI |:::::::::::::::::::::::::::|    HPI:  71 year old Male with past medical history of Parkinson's, dementia, CKD Stage 3, 1st degree AV block, HLD, gout, HTN, DM, fungal septicemia presenting from [facility] for acute decompensation in mental status.     As per documentation patient at baseline is verbal but for past 3 days, he has been worsening to state of being non verbal. Patient was admitted to [facility] for fungemia and discharged on the 11th March. At  he had left hand cellulitis and was treated for it.    In ED patient hemodynamically stable, afebrile, ABEBE with rise in Cr to 1.9 and BUN to 75, remains non verbal. CT head negative for any new strokes and UA negative. Family has not visited the patient recently so unaware of his status. (22 Mar 2021 23:59)      |:::::::::::::::::::::::::::| OBJECTIVE |:::::::::::::::::::::::::::|    STANDING MEDICATIONS  apixaban 2.5 milliGRAM(s) Oral two times a day  ascorbic acid 500 milliGRAM(s) Oral daily  atorvastatin 40 milliGRAM(s) Oral at bedtime  carbidopa/levodopa  25/100 2 Tablet(s) Oral <User Schedule>  chlorhexidine 4% Liquid 1 Application(s) Topical <User Schedule>  collagenase Ointment 1 Application(s) Topical two times a day  Dakins Solution - 1/2 Strength 1 Application(s) Topical two times a day  magnesium oxide 400 milliGRAM(s) Oral every 8 hours  senna 2 Tablet(s) Oral at bedtime  zinc sulfate 220 milliGRAM(s) Oral daily    PRN MEDICATIONS  acetaminophen   Tablet .. 650 milliGRAM(s) Oral every 6 hours PRN    HOME MEDICATIONS  atorvastatin 40 mg oral tablet: 1 tab(s) orally once a day (at bedtime)  carbidopa-levodopa 25 mg-100 mg oral tablet, extended release: 2 tab(s) orally 5 times a day at 6AM,10Am,2PM,6PM,10 PM  colchicine 0.6 mg oral tablet: 1 tab(s) orally once a day  Eliquis 5 mg oral tablet: 1 tab(s) orally 2 times a day  enalapril 5 mg oral tablet: 1 tab(s) orally once a day  gabapentin 100 mg oral capsule: 1 cap(s) orally 3 times a day  omeprazole 40 mg oral delayed release capsule: 1 cap(s) orally once a day  senna oral tablet: 2 tab(s) orally once a day (at bedtime), As Needed -for constipation       VITAL SIGNS: Last 24 Hours  T(C): 36.4 (23 Apr 2021 13:30), Max: 36.9 (23 Apr 2021 06:13)  T(F): 97.6 (23 Apr 2021 13:30), Max: 98.4 (23 Apr 2021 06:13)  HR: 85 (23 Apr 2021 13:27) (77 - 85)  BP: 101/77 (23 Apr 2021 13:30) (101/77 - 158/72)  BP(mean): 86 (23 Apr 2021 13:30) (86 - 103)  RR: 18 (23 Apr 2021 13:30) (18 - 18)  SpO2: 100% (23 Apr 2021 06:13) (99% - 100%)    Input-Output    04-22-21 @ 07:01  -  04-23-21 @ 07:00  --------------------------------------------------------  IN:    Enteral Tube Flush: 370 mL    Jevity 1.2: 1680 mL  Total IN: 2050 mL    OUT:  Total OUT: 0 mL    Total NET: 2050 mL      04-23-21 @ 07:01  -  04-23-21 @ 15:02  --------------------------------------------------------  IN:    Enteral Tube Flush: 150 mL    Jevity 1.2: 560 mL  Total IN: 710 mL    OUT:  Total OUT: 0 mL    Total NET: 710 mL          PHYSICAL EXAM:  GEN: No acute distress.  LUNGS: Clear to auscultation bilaterally.  HEART: Regular rate and rhythm. No murmurs.  ABD: Soft, non-tender, non-distended.  EXT: Normal range of motion. LUe>RUE swelling  NEURO: Alert, attentive, oriented. Eye movements intact. Moves all extremities.  PSYCH: Cooperative, appropriate.    LAB RESULTS:                        8.9    4.76  )-----------( 112      ( 23 Apr 2021 06:09 )             28.4     04-23    136  |  103  |  12  ----------------------------<  80  5.0   |  23  |  0.7    Ca    8.0<L>      23 Apr 2021 06:09                  MICROBIOLOGY:    Culture - Blood (collected 04-13-21 @ 07:34)  Source: .Blood None  Final Report (04-18-21 @ 17:00):    No Growth Final    Culture - Blood (collected 04-12-21 @ 21:38)  Source: .Blood None  Final Report (04-18-21 @ 05:00):    No Growth Final    Culture - Blood (collected 04-12-21 @ 16:00)  Source: .Blood Blood  Final Report (04-17-21 @ 22:01):    No Growth Final      IMAGING/STUDIES:    ALLERGIES:  No Known Allergies      ===========================================================

## 2021-04-23 NOTE — PROGRESS NOTE ADULT - ASSESSMENT
71 year old Male with past medical history of Parkinson's, dementia, CKD Stage 3, 1st degree AV block, HLD, gout, HTN, DM, fungal septicemia presenting from [facility] for acute decompensation in mental status. As per documentation patient at baseline is verbal but for past 3 days, he has been worsening to state of being non verbal. Patient was admitted to [facility] for fungemia and discharged on the 11th March. At  he had left hand cellulitis and was treated for it.    # Metabolic encephalopathy on top of baseline dementia  # Sepsis  # Necrotic sacral ulcer stage 4  # H/o recent fungemia  - CT Head No acute intracranial pathology.activity  -  Chest Xray No consolidation, effusion or pneumothorax.  -  Blood & Urine cxs NGTD   -  WCX GNR, 3/29   Numerous Klebsiella pneumoniae ESBL, Few Pseudomonas aeruginosa, Numerous Enterococcus faecalis (vancomycin resistant)  - evaluated by Burn: s/p debridement of sacrum on 3/30 .  no more new recommendation for surgery at this time .continue local wound care with santyl/dakins WTD.  - s/p meropenem, Polymixin and Aztreonam  - S/p spinal tap --> CSF clear with no growths and neg PCR  -repeat EEG w/ no epileptiform activity but diffuse cerebral dysfxn  -d/w neuro, may d/c Keppra   -off ABx as per ID  -somewhat improved mental status    # Nutrition/Dysphagia:   -daughter agreed to PEG but delayed due to +BCx that turned out to be a contaminant  -4/14 passed S&S but limited PO intake.   -s/p PEG on 4/19  -family needs to be taught how to administer feeds and wound care    #Lt shoulder dislocation w/ collection  -no need to tap as per IR and ID concurrs    # Hypotension  - c/w midodrine    #Anemia  -s/p 1u PRBCs  -monitor CBC    # Thrombocytopenia ?sec to sepsis  -resolved  -off Pepcid  -heme f/u appreciated    # H/o parkinsons disease  - c/w sinemet    # H/o chronic DVT  - [facility] Duplex Lower Ext Vein Scan, Bilat (03.27.21 @ 18:48) >No evidence of deep venous thrombosis or superficial thrombophlebitis in the bilateral lower extremities.  - unsure why was on Rx >2yrs  -will check w/ PMD Dr. Patterson  -d/w daughter who is aware of risks and benefits. Daughter requests prophylactic dose (aware of anemia and possible need for transfusions)    # Dyslipidemia  - c/w statin    # DVT prophylaxis  -scd  -Lovenox    Very high risk pt. D/w daughter GOC: she wants full code for now. Very poor Px (daughter aware).    #Pending:  daughter to learn PEG feeding and wound care, DMEs  # Discussed w/ daughter in details who wants to cont aggressive Tx. Daughter wants to take pt home (she is HC aide)Patient will require tube feeds for the remainder of his life  # Disposition: home w/ services    d/w Housestaff, nursing, case mgmt

## 2021-04-23 NOTE — PROGRESS NOTE ADULT - SUBJECTIVE AND OBJECTIVE BOX
Patient is a 71y old  Male who presents with a chief complaint of Change in Mental Status (05 Apr 2021 13:36)    INTERVAL HPI/OVERNIGHT EVENTS: Patient was examined and seen at bedside. Events noted. Awake and alert today, tries to follow commands and to speak. Tolerating feeds.    InitialHPI:  71 year old Male with past medical history of Parkinson's, dementia, CKD Stage 3, 1st degree AV block, HLD, gout, HTN, DM, fungal septicemia presenting from [facility] for acute decompensation in mental status.   As per documentation patient at baseline is verbal but for past 3 days, he has been worsening to state of being non verbal. Patient was admitted to [facility] for fungemia and discharged on the 11th March. At  he had left hand cellulitis and was treated for it.  In ED patient hemodynamically stable, afebrile, ABEBE with rise in Cr to 1.9 and BUN to 75, remains non verbal. CT head negative for any new strokes and UA negative. Family has not visited the patient recently so unaware of his status. (22 Mar 2021 23:59)    PAST MEDICAL & SURGICAL HISTORY:  Parkinson disease    Hypertension    Gout    Dyslipidemia    Prostatitis    Cataract    No significant past surgical history        General: NAD, awake, alert chronically ill appearing, tries to speak and follow commands, +dysarthria  HEENT:  no LAD  CV: S1 S2  Resp: decreased breath sounds at bases  GI: NT/ND/S +BS, +PEG  MS: no clubbing/cyanosis/edema, + pulses b/l, LUE>RUE swelling  Neuro: unable to access  Skin: multiple skin decubs            MEDICATIONS  (STANDING):  apixaban 2.5 milliGRAM(s) Oral two times a day  ascorbic acid 500 milliGRAM(s) Oral daily  atorvastatin 40 milliGRAM(s) Oral at bedtime  carbidopa/levodopa  25/100 2 Tablet(s) Oral <User Schedule>  chlorhexidine 4% Liquid 1 Application(s) Topical <User Schedule>  collagenase Ointment 1 Application(s) Topical two times a day  Dakins Solution - 1/2 Strength 1 Application(s) Topical two times a day  magnesium oxide 400 milliGRAM(s) Oral every 8 hours  senna 2 Tablet(s) Oral at bedtime  zinc sulfate 220 milliGRAM(s) Oral daily    MEDICATIONS  (PRN):  acetaminophen   Tablet .. 650 milliGRAM(s) Oral every 6 hours PRN Temp greater or equal to 38C (100.4F)    Home Medications:  apixaban 2.5 mg oral tablet: 1 tab(s) orally 2 times a day (23 Apr 2021 15:32)  ascorbic acid 500 mg oral tablet: 1 tab(s) orally once a day (23 Apr 2021 15:32)  atorvastatin 40 mg oral tablet: 1 tab(s) orally once a day (at bedtime) (23 Apr 2021 15:32)  carbidopa-levodopa 25 mg-100 mg oral tablet, extended release: 2 tab(s) orally 4 times a day at 6AM,10Am,2PM,6PM,10 PM (23 Apr 2021 15:39)  collagenase 250 units/g topical ointment: 1 application topically 2 times a day (23 Apr 2021 15:32)  magnesium oxide 500 mg oral tablet: 1 tab(s) orally once a day (23 Apr 2021 15:32)  omeprazole 40 mg oral delayed release capsule: 1 cap(s) orally once a day (23 Mar 2021 01:07)  sodium hypochlorite 0.25% topical solution: 1 application topically 2 times a day (23 Apr 2021 15:32)  zinc sulfate 220 mg oral capsule: 1 cap(s) orally once a day (23 Apr 2021 15:32)    Vital Signs Last 24 Hrs  T(C): 36.4 (23 Apr 2021 13:30), Max: 36.9 (23 Apr 2021 06:13)  T(F): 97.6 (23 Apr 2021 13:30), Max: 98.4 (23 Apr 2021 06:13)  HR: 85 (23 Apr 2021 13:27) (77 - 85)  BP: 101/77 (23 Apr 2021 13:30) (101/77 - 158/72)  BP(mean): 86 (23 Apr 2021 13:30) (86 - 103)  RR: 18 (23 Apr 2021 13:30) (18 - 18)  SpO2: 100% (23 Apr 2021 06:13) (99% - 100%)  CAPILLARY BLOOD GLUCOSE      POCT Blood Glucose.: 129 mg/dL (23 Apr 2021 11:50)  POCT Blood Glucose.: 114 mg/dL (23 Apr 2021 08:02)  POCT Blood Glucose.: 91 mg/dL (23 Apr 2021 05:40)  POCT Blood Glucose.: 117 mg/dL (22 Apr 2021 20:50)    LABS:                        8.9    4.76  )-----------( 112      ( 23 Apr 2021 06:09 )             28.4     04-23    136  |  103  |  12  ----------------------------<  80  5.0   |  23  |  0.7    Ca    8.0<L>      23 Apr 2021 06:09                        Consultant Notes Reviewed:  [x ] YES  [ ] NO  Care Discussed with Consultants/Other Providers/ Housestaff [ x] YES  [ ] NO  Radiology, labs, new studies personally reviewed.

## 2021-04-23 NOTE — PROGRESS NOTE ADULT - ASSESSMENT
71 year old Male with past medical history of Parkinson's, dementia, CKD Stage 3, 1st degree AV block, HLD, gout, HTN, DM, fungal septicemia presenting from [facility] for acute decompensation in mental status. As per documentation patient at baseline is verbal but for past 3 days, he has been worsening to state of being non verbal. Patient was admitted to [facility] for fungemia and discharged on the 11th March. At  he had left hand cellulitis and was treated for it.    # Metabolic encephalopathy on top of baseline dementia  # Sepsis  # Necrotic sacral ulcer stage 4  # H/o recent fungemia  - CT Head No acute intracranial pathology.activity  -  Chest Xray No consolidation, effusion or pneumothorax.  -  Blood & Urine cxs NGTD   -  WCX GNR, 3/29   Numerous Klebsiella pneumoniae ESBL, Few Pseudomonas aeruginosa, Numerous Enterococcus faecalis (vancomycin resistant)  - evaluated by Burn: s/p debridement of sacrum on 3/30 .  no more new recommendation for surgery at this time .continue local wound care with santyl/dakins WTD.  - s/p meropenem, Polymixin and Aztreonam  - S/p spinal tap --> CSF clear with no growths and neg PCR  -repeat EEG w/ no epileptiform activity but diffuse cerebral dysfxn  -d/w neuro, may d/c Keppra   -off ABx as per ID  -mproved mental status    # Nutrition/Dysphagia:   -daughter agreed to PEG but delayed due to +BCx that turned out to be a contaminant  -4/14 passed S&S but limited PO intake.   -s/p PEG on 4/19  -family taught how to administer feeds and wound care    #Lt shoulder dislocation w/ collection  -no need to tap as per IR and ID concurrs    # Hypotension  - c/w midodrine    #Anemia  -s/p 1u PRBCs  -monitor CBC    # Thrombocytopenia ?sec to sepsis  -resolved  -off Pepcid  -heme f/u appreciated    # H/o parkinsons disease  - c/w sinemet    # H/o chronic DVT  - [facility] Duplex Lower Ext Vein Scan, Bilat (03.27.21 @ 18:48) >No evidence of deep venous thrombosis or superficial thrombophlebitis in the bilateral lower extremities.  - unsure why was on Rx >2yrs  -will check w/ PMD Dr. Patterson  -d/w daughter who is aware of risks and benefits. Daughter requests prophylactic dose (aware of anemia and possible need for transfusions)    # Dyslipidemia  - c/w statin    # DVT prophylaxis  -scd  -Lovenox    Very high risk pt. D/w daughter GOC: she wants full code for now. Very poor Px (daughter aware).    #Pending:  DMEs to be delivered. As per Case Mgmt, likely d/c Saturday  # Discussed w/ daughter in details who wants to cont aggressive Tx. Daughter wants to take pt home (she is HC aide)  # Disposition: home w/ services    d/w Housestaff, nursing, case mgmt

## 2021-04-24 LAB
APPEARANCE UR: ABNORMAL
BACTERIA # UR AUTO: ABNORMAL
BILIRUB UR-MCNC: NEGATIVE — SIGNIFICANT CHANGE UP
COLOR SPEC: YELLOW — SIGNIFICANT CHANGE UP
DIFF PNL FLD: SIGNIFICANT CHANGE UP
EPI CELLS # UR: 0 /HPF — SIGNIFICANT CHANGE UP (ref 0–5)
GLUCOSE BLDC GLUCOMTR-MCNC: 102 MG/DL — HIGH (ref 70–99)
GLUCOSE BLDC GLUCOMTR-MCNC: 105 MG/DL — HIGH (ref 70–99)
GLUCOSE BLDC GLUCOMTR-MCNC: 79 MG/DL — SIGNIFICANT CHANGE UP (ref 70–99)
GLUCOSE BLDC GLUCOMTR-MCNC: 91 MG/DL — SIGNIFICANT CHANGE UP (ref 70–99)
GLUCOSE BLDC GLUCOMTR-MCNC: 99 MG/DL — SIGNIFICANT CHANGE UP (ref 70–99)
GLUCOSE BLDC GLUCOMTR-MCNC: 99 MG/DL — SIGNIFICANT CHANGE UP (ref 70–99)
GLUCOSE UR QL: NEGATIVE — SIGNIFICANT CHANGE UP
HYALINE CASTS # UR AUTO: 0 /LPF — SIGNIFICANT CHANGE UP (ref 0–7)
KETONES UR-MCNC: NEGATIVE — SIGNIFICANT CHANGE UP
LEUKOCYTE ESTERASE UR-ACNC: ABNORMAL
NITRITE UR-MCNC: NEGATIVE — SIGNIFICANT CHANGE UP
PH UR: 7 — SIGNIFICANT CHANGE UP (ref 5–8)
PROT UR-MCNC: SIGNIFICANT CHANGE UP
RBC CASTS # UR COMP ASSIST: 1 /HPF — SIGNIFICANT CHANGE UP (ref 0–4)
SP GR SPEC: 1.01 — SIGNIFICANT CHANGE UP (ref 1.01–1.03)
UROBILINOGEN FLD QL: SIGNIFICANT CHANGE UP
WBC UR QL: 82 /HPF — HIGH (ref 0–5)

## 2021-04-24 PROCEDURE — 99232 SBSQ HOSP IP/OBS MODERATE 35: CPT

## 2021-04-24 RX ORDER — SODIUM CHLORIDE 9 MG/ML
500 INJECTION INTRAMUSCULAR; INTRAVENOUS; SUBCUTANEOUS ONCE
Refills: 0 | Status: COMPLETED | OUTPATIENT
Start: 2021-04-24 | End: 2021-04-24

## 2021-04-24 RX ORDER — MIDODRINE HYDROCHLORIDE 2.5 MG/1
10 TABLET ORAL EVERY 8 HOURS
Refills: 0 | Status: DISCONTINUED | OUTPATIENT
Start: 2021-04-24 | End: 2021-05-28

## 2021-04-24 RX ORDER — MIDODRINE HYDROCHLORIDE 2.5 MG/1
1 TABLET ORAL
Qty: 0 | Refills: 0 | DISCHARGE
Start: 2021-04-24

## 2021-04-24 RX ORDER — SODIUM CHLORIDE 9 MG/ML
1000 INJECTION INTRAMUSCULAR; INTRAVENOUS; SUBCUTANEOUS
Refills: 0 | Status: DISCONTINUED | OUTPATIENT
Start: 2021-04-24 | End: 2021-04-25

## 2021-04-24 RX ADMIN — CARBIDOPA AND LEVODOPA 2 TABLET(S): 25; 100 TABLET ORAL at 05:26

## 2021-04-24 RX ADMIN — Medication 1 APPLICATION(S): at 17:27

## 2021-04-24 RX ADMIN — MAGNESIUM OXIDE 400 MG ORAL TABLET 400 MILLIGRAM(S): 241.3 TABLET ORAL at 21:37

## 2021-04-24 RX ADMIN — SODIUM CHLORIDE 75 MILLILITER(S): 9 INJECTION INTRAMUSCULAR; INTRAVENOUS; SUBCUTANEOUS at 14:30

## 2021-04-24 RX ADMIN — CARBIDOPA AND LEVODOPA 2 TABLET(S): 25; 100 TABLET ORAL at 11:07

## 2021-04-24 RX ADMIN — CHLORHEXIDINE GLUCONATE 1 APPLICATION(S): 213 SOLUTION TOPICAL at 05:58

## 2021-04-24 RX ADMIN — SENNA PLUS 2 TABLET(S): 8.6 TABLET ORAL at 21:37

## 2021-04-24 RX ADMIN — APIXABAN 2.5 MILLIGRAM(S): 2.5 TABLET, FILM COATED ORAL at 17:26

## 2021-04-24 RX ADMIN — CARBIDOPA AND LEVODOPA 2 TABLET(S): 25; 100 TABLET ORAL at 13:13

## 2021-04-24 RX ADMIN — Medication 1 APPLICATION(S): at 05:59

## 2021-04-24 RX ADMIN — ATORVASTATIN CALCIUM 40 MILLIGRAM(S): 80 TABLET, FILM COATED ORAL at 21:37

## 2021-04-24 RX ADMIN — SODIUM CHLORIDE 1000 MILLILITER(S): 9 INJECTION INTRAMUSCULAR; INTRAVENOUS; SUBCUTANEOUS at 12:30

## 2021-04-24 RX ADMIN — APIXABAN 2.5 MILLIGRAM(S): 2.5 TABLET, FILM COATED ORAL at 05:26

## 2021-04-24 RX ADMIN — MAGNESIUM OXIDE 400 MG ORAL TABLET 400 MILLIGRAM(S): 241.3 TABLET ORAL at 05:26

## 2021-04-24 RX ADMIN — SODIUM CHLORIDE 1000 MILLILITER(S): 9 INJECTION INTRAMUSCULAR; INTRAVENOUS; SUBCUTANEOUS at 13:50

## 2021-04-24 RX ADMIN — MIDODRINE HYDROCHLORIDE 10 MILLIGRAM(S): 2.5 TABLET ORAL at 21:37

## 2021-04-24 RX ADMIN — Medication 500 MILLIGRAM(S): at 11:08

## 2021-04-24 RX ADMIN — ZINC SULFATE TAB 220 MG (50 MG ZINC EQUIVALENT) 220 MILLIGRAM(S): 220 (50 ZN) TAB at 11:08

## 2021-04-24 RX ADMIN — MIDODRINE HYDROCHLORIDE 10 MILLIGRAM(S): 2.5 TABLET ORAL at 13:12

## 2021-04-24 RX ADMIN — CARBIDOPA AND LEVODOPA 2 TABLET(S): 25; 100 TABLET ORAL at 21:37

## 2021-04-24 RX ADMIN — MAGNESIUM OXIDE 400 MG ORAL TABLET 400 MILLIGRAM(S): 241.3 TABLET ORAL at 13:12

## 2021-04-24 NOTE — PROGRESS NOTE ADULT - SUBJECTIVE AND OBJECTIVE BOX
NIEVES LABOY  71y, Male  Allergy: No Known Allergies      OVERNIGHT EVENTS: none noted    PAST MEDICAL & SURGICAL HISTORY:  Parkinson disease  Hypertension  Gout  Dyslipidemia  Prostatitis  Cataract  No significant past surgical history    VITALS:  T(F): 99.2 (04-24-21 @ 12:30), Max: 99.9 (04-24-21 @ 10:27)  HR: 79 (04-24-21 @ 15:55)  BP: 108/57 (04-24-21 @ 15:55) (85/50 - 130/79)  RR: 20 (04-24-21 @ 12:30)  SpO2: 100% (04-24-21 @ 15:55)    TESTS & MEASUREMENTS:      04-22-21 @ 07:01  -  04-23-21 @ 07:00  --------------------------------------------------------  IN: 2050 mL / OUT: 0 mL / NET: 2050 mL    04-23-21 @ 07:01  -  04-24-21 @ 07:00  --------------------------------------------------------  IN: 2230 mL / OUT: 0 mL / NET: 2230 mL    04-24-21 @ 07:01  -  04-24-21 @ 16:12  --------------------------------------------------------  IN: 360 mL / OUT: 0 mL / NET: 360 mL    PHYSICAL EXAM:      Constitutional:  awake, mumbles at times.  Poor mentation.    Respiratory: decreased BS b/l.    Cardiovascular: S1S2.    Gastrointestinal: + PEG, + BS, NT.    Extremities: contracted, + edema LE b/l.                        8.9    4.76  )-----------( 112      ( 23 Apr 2021 06:09 )             28.4       04-23    136  |  103  |  12  ----------------------------<  80  5.0   |  23  |  0.7    Ca    8.0<L>      23 Apr 2021 06:09    MEDICATIONS:  MEDICATIONS  (STANDING):  apixaban 2.5 milliGRAM(s) Oral two times a day  ascorbic acid 500 milliGRAM(s) Oral daily  atorvastatin 40 milliGRAM(s) Oral at bedtime  carbidopa/levodopa  25/100 2 Tablet(s) Oral <User Schedule>  chlorhexidine 4% Liquid 1 Application(s) Topical <User Schedule>  collagenase Ointment 1 Application(s) Topical two times a day  Dakins Solution - 1/2 Strength 1 Application(s) Topical two times a day  magnesium oxide 400 milliGRAM(s) Oral every 8 hours  midodrine 10 milliGRAM(s) Oral every 8 hours  senna 2 Tablet(s) Oral at bedtime  sodium chloride 0.9% Bolus 500 milliLiter(s) IV Bolus once  sodium chloride 0.9% Bolus 500 milliLiter(s) IV Bolus once  sodium chloride 0.9%. 1000 milliLiter(s) (75 mL/Hr) IV Continuous <Continuous>  zinc sulfate 220 milliGRAM(s) Oral daily    MEDICATIONS  (PRN):  acetaminophen   Tablet .. 650 milliGRAM(s) Oral every 6 hours PRN Temp greater or equal to 38C (100.4F)

## 2021-04-24 NOTE — PROGRESS NOTE ADULT - ASSESSMENT
Patient examined and discussed with medical team during AM round.   Chart summary:  + Prolonged and complicated hosp course for this 72 yo M with hx of parkinson's, dementia, CKD 3, HLD, gout, HTN, type 2 DM, chronic LE DVT, 1st degree AVB who was recently txed for fungemia and then transferred to NH.  Presented to ED for worsening mentation and noted to be in ABEBE.  Apparently pt was verbal prior but now non-verbal.  During this hosp course, txed with IVF with resolution of ABEBE (?sepsis with pre-renal component).  Multiple w/up done for acute metabolic encephalopathy including evaluation and tx for sepsis and spinal tap with source being likely due to stage 4 necrotic DU at sacrum.  Bl c/s and urine c/s negative but wound c/s + multiple MDR bacterias as noted in housestaff documentation above.  Pt is being f/up with burn and underwent debridement and currently on local wound care.  No fever noted.  PEG placed and pt is supposed to be dc'ed to home per family's request.  However, his SBP was in the 80's this AM.  CBC and BMP both stable.   500 cc IVF bolus given and pt was restarted back on Midodrine.  DC is now on hold due to above.  Will reassess tomorrow.    Condition guarded.  Pt is full code.

## 2021-04-25 LAB
GLUCOSE BLDC GLUCOMTR-MCNC: 104 MG/DL — HIGH (ref 70–99)
GLUCOSE BLDC GLUCOMTR-MCNC: 107 MG/DL — HIGH (ref 70–99)
GLUCOSE BLDC GLUCOMTR-MCNC: 112 MG/DL — HIGH (ref 70–99)
GLUCOSE BLDC GLUCOMTR-MCNC: 116 MG/DL — HIGH (ref 70–99)
GLUCOSE BLDC GLUCOMTR-MCNC: 140 MG/DL — HIGH (ref 70–99)

## 2021-04-25 PROCEDURE — 99232 SBSQ HOSP IP/OBS MODERATE 35: CPT

## 2021-04-25 RX ADMIN — CARBIDOPA AND LEVODOPA 2 TABLET(S): 25; 100 TABLET ORAL at 09:03

## 2021-04-25 RX ADMIN — APIXABAN 2.5 MILLIGRAM(S): 2.5 TABLET, FILM COATED ORAL at 05:41

## 2021-04-25 RX ADMIN — APIXABAN 2.5 MILLIGRAM(S): 2.5 TABLET, FILM COATED ORAL at 18:01

## 2021-04-25 RX ADMIN — MIDODRINE HYDROCHLORIDE 10 MILLIGRAM(S): 2.5 TABLET ORAL at 21:26

## 2021-04-25 RX ADMIN — MAGNESIUM OXIDE 400 MG ORAL TABLET 400 MILLIGRAM(S): 241.3 TABLET ORAL at 05:42

## 2021-04-25 RX ADMIN — CHLORHEXIDINE GLUCONATE 1 APPLICATION(S): 213 SOLUTION TOPICAL at 05:42

## 2021-04-25 RX ADMIN — Medication 500 MILLIGRAM(S): at 12:20

## 2021-04-25 RX ADMIN — MAGNESIUM OXIDE 400 MG ORAL TABLET 400 MILLIGRAM(S): 241.3 TABLET ORAL at 21:26

## 2021-04-25 RX ADMIN — CARBIDOPA AND LEVODOPA 2 TABLET(S): 25; 100 TABLET ORAL at 21:26

## 2021-04-25 RX ADMIN — SENNA PLUS 2 TABLET(S): 8.6 TABLET ORAL at 21:26

## 2021-04-25 RX ADMIN — ZINC SULFATE TAB 220 MG (50 MG ZINC EQUIVALENT) 220 MILLIGRAM(S): 220 (50 ZN) TAB at 12:20

## 2021-04-25 RX ADMIN — Medication 650 MILLIGRAM(S): at 21:26

## 2021-04-25 RX ADMIN — Medication 1 APPLICATION(S): at 18:01

## 2021-04-25 RX ADMIN — Medication 1 APPLICATION(S): at 18:00

## 2021-04-25 RX ADMIN — MIDODRINE HYDROCHLORIDE 10 MILLIGRAM(S): 2.5 TABLET ORAL at 05:42

## 2021-04-25 RX ADMIN — ATORVASTATIN CALCIUM 40 MILLIGRAM(S): 80 TABLET, FILM COATED ORAL at 21:26

## 2021-04-25 RX ADMIN — MAGNESIUM OXIDE 400 MG ORAL TABLET 400 MILLIGRAM(S): 241.3 TABLET ORAL at 13:22

## 2021-04-25 RX ADMIN — CARBIDOPA AND LEVODOPA 2 TABLET(S): 25; 100 TABLET ORAL at 13:22

## 2021-04-25 RX ADMIN — Medication 1 APPLICATION(S): at 05:42

## 2021-04-25 RX ADMIN — CARBIDOPA AND LEVODOPA 2 TABLET(S): 25; 100 TABLET ORAL at 05:42

## 2021-04-25 RX ADMIN — Medication 1 APPLICATION(S): at 05:43

## 2021-04-25 NOTE — PROGRESS NOTE ADULT - SUBJECTIVE AND OBJECTIVE BOX
OVERNIGHT EVENTS:  None reported    PAST MEDICAL & SURGICAL HISTORY:  Parkinson disease  Hypertension  Gout  Dyslipidemia  Prostatitis  Cataract    No significant past surgical history    Vital Signs Last 24 Hrs  T(C): 36.7 (25 Apr 2021 09:05), Max: 37.4 (24 Apr 2021 22:02)  T(F): 98 (25 Apr 2021 09:05), Max: 99.3 (24 Apr 2021 22:02)  HR: 67 (25 Apr 2021 12:22) (63 - 82)  BP: 142/64 (25 Apr 2021 12:22) (108/57 - 143/63)  BP(mean): 81 (25 Apr 2021 10:50) (79 - 89)  RR: 20 (25 Apr 2021 10:50) (18 - 21)  SpO2: 100% (25 Apr 2021 10:50) (100% - 100%)    PHYSICAL EXAM:      Constitutional: awake but not responding to verbal commands / stimuli.    Eyes: anicteric    Neck: + trach    Respiratory: CTA b/l    Cardiovascular: reg S1S2    Gastrointestinal: + BS, + PEG, soft    Genitourinary: + Miramontes    + Peripheral edema noted    MEDICATIONS:  MEDICATIONS  (STANDING):  apixaban 2.5 milliGRAM(s) Oral two times a day  ascorbic acid 500 milliGRAM(s) Oral daily  atorvastatin 40 milliGRAM(s) Oral at bedtime  carbidopa/levodopa  25/100 2 Tablet(s) Oral <User Schedule>  chlorhexidine 4% Liquid 1 Application(s) Topical <User Schedule>  collagenase Ointment 1 Application(s) Topical two times a day  Dakins Solution - 1/2 Strength 1 Application(s) Topical two times a day  magnesium oxide 400 milliGRAM(s) Oral every 8 hours  midodrine 10 milliGRAM(s) Oral every 8 hours  senna 2 Tablet(s) Oral at bedtime  zinc sulfate 220 milliGRAM(s) Oral daily    MEDICATIONS  (PRN):  acetaminophen   Tablet .. 650 milliGRAM(s) Oral every 6 hours PRN Temp greater or equal to 38C (100.4F)

## 2021-04-25 NOTE — PROGRESS NOTE ADULT - ASSESSMENT
Chart summary:  + Prolonged and complicated hosp course for this 72 yo M with hx of parkinson's, dementia, CKD 3, HLD, gout, HTN, type 2 DM, chronic LE DVT, 1st degree AVB who was recently txed for fungemia and then transferred to NH.  Presented to ED for worsening mentation and noted to be in ABEBE.  Apparently pt was verbal prior but now non-verbal.  During this hosp course, txed with IVF with resolution of ABEBE (?sepsis with pre-renal component).  Multiple w/up done for acute metabolic encephalopathy including evaluation and tx for sepsis and spinal tap with source being likely due to stage 4 necrotic DU at sacrum.  Bl c/s and urine c/s negative but wound c/s + multiple MDR bacterias as noted in housestaff documentation above.  Pt is being f/up with burn and underwent debridement and currently on local wound care.  No fever noted.  PEG placed and pt is supposed to be dc'ed to home per family's request.  However, his SBP was in the 80's yesterday's AM.  CBC and BMP both stable.   500 cc IVF bolus given, started on NS IVF support as well as Midodrine 10 mg TID.       Today's bp is much improved but overall much more edematous likely due to NS infusion.  Will dc NS ivf and monitor.  Will keep pt on Midodrine at 10 mg TID.    To re-assess for readiness for dc to home in AM.    Condition remains guarded with very poor prognosis.    He is a full code and family refusing LTC.

## 2021-04-26 LAB
ALBUMIN SERPL ELPH-MCNC: 2 G/DL — LOW (ref 3.5–5.2)
ALP SERPL-CCNC: 599 U/L — HIGH (ref 30–115)
ALT FLD-CCNC: 32 U/L — SIGNIFICANT CHANGE UP (ref 0–41)
ANION GAP SERPL CALC-SCNC: 7 MMOL/L — SIGNIFICANT CHANGE UP (ref 7–14)
ANISOCYTOSIS BLD QL: SLIGHT — SIGNIFICANT CHANGE UP
APPEARANCE UR: ABNORMAL
AST SERPL-CCNC: 230 U/L — HIGH (ref 0–41)
BACTERIA # UR AUTO: ABNORMAL
BASOPHILS # BLD AUTO: 0 K/UL — SIGNIFICANT CHANGE UP (ref 0–0.2)
BASOPHILS NFR BLD AUTO: 0 % — SIGNIFICANT CHANGE UP (ref 0–1)
BILIRUB SERPL-MCNC: 1 MG/DL — SIGNIFICANT CHANGE UP (ref 0.2–1.2)
BILIRUB UR-MCNC: NEGATIVE — SIGNIFICANT CHANGE UP
BUN SERPL-MCNC: 20 MG/DL — SIGNIFICANT CHANGE UP (ref 10–20)
CALCIUM SERPL-MCNC: 8 MG/DL — LOW (ref 8.5–10.1)
CHLORIDE SERPL-SCNC: 103 MMOL/L — SIGNIFICANT CHANGE UP (ref 98–110)
CK SERPL-CCNC: 50 U/L — SIGNIFICANT CHANGE UP (ref 0–225)
CO2 SERPL-SCNC: 26 MMOL/L — SIGNIFICANT CHANGE UP (ref 17–32)
COLOR SPEC: ABNORMAL
CREAT SERPL-MCNC: 0.8 MG/DL — SIGNIFICANT CHANGE UP (ref 0.7–1.5)
DIFF PNL FLD: NEGATIVE — SIGNIFICANT CHANGE UP
EOSINOPHIL # BLD AUTO: 0 K/UL — SIGNIFICANT CHANGE UP (ref 0–0.7)
EOSINOPHIL NFR BLD AUTO: 0 % — SIGNIFICANT CHANGE UP (ref 0–8)
EPI CELLS # UR: 0 /HPF — SIGNIFICANT CHANGE UP (ref 0–5)
GGT SERPL-CCNC: 53 U/L — HIGH (ref 1–40)
GIANT PLATELETS BLD QL SMEAR: PRESENT — SIGNIFICANT CHANGE UP
GLUCOSE BLDC GLUCOMTR-MCNC: 101 MG/DL — HIGH (ref 70–99)
GLUCOSE BLDC GLUCOMTR-MCNC: 102 MG/DL — HIGH (ref 70–99)
GLUCOSE BLDC GLUCOMTR-MCNC: 136 MG/DL — HIGH (ref 70–99)
GLUCOSE BLDC GLUCOMTR-MCNC: 98 MG/DL — SIGNIFICANT CHANGE UP (ref 70–99)
GLUCOSE BLDC GLUCOMTR-MCNC: 99 MG/DL — SIGNIFICANT CHANGE UP (ref 70–99)
GLUCOSE SERPL-MCNC: 107 MG/DL — HIGH (ref 70–99)
GLUCOSE UR QL: NEGATIVE — SIGNIFICANT CHANGE UP
HCT VFR BLD CALC: 24.1 % — LOW (ref 42–52)
HCT VFR BLD CALC: 24.8 % — LOW (ref 42–52)
HGB BLD-MCNC: 7.5 G/DL — LOW (ref 14–18)
HGB BLD-MCNC: 7.7 G/DL — LOW (ref 14–18)
HYALINE CASTS # UR AUTO: 4 /LPF — SIGNIFICANT CHANGE UP (ref 0–7)
KETONES UR-MCNC: SIGNIFICANT CHANGE UP
LACTATE SERPL-SCNC: 0.9 MMOL/L — SIGNIFICANT CHANGE UP (ref 0.7–2)
LEUKOCYTE ESTERASE UR-ACNC: ABNORMAL
LYMPHOCYTES # BLD AUTO: 0.25 K/UL — LOW (ref 1.2–3.4)
LYMPHOCYTES # BLD AUTO: 1.8 % — LOW (ref 20.5–51.1)
MANUAL SMEAR VERIFICATION: SIGNIFICANT CHANGE UP
MCHC RBC-ENTMCNC: 26.3 PG — LOW (ref 27–31)
MCHC RBC-ENTMCNC: 26.7 PG — LOW (ref 27–31)
MCHC RBC-ENTMCNC: 31 G/DL — LOW (ref 32–37)
MCHC RBC-ENTMCNC: 31.1 G/DL — LOW (ref 32–37)
MCV RBC AUTO: 84.6 FL — SIGNIFICANT CHANGE UP (ref 80–94)
MCV RBC AUTO: 86.1 FL — SIGNIFICANT CHANGE UP (ref 80–94)
MICROCYTES BLD QL: SLIGHT — SIGNIFICANT CHANGE UP
MONOCYTES # BLD AUTO: 1.33 K/UL — HIGH (ref 0.1–0.6)
MONOCYTES NFR BLD AUTO: 9.6 % — HIGH (ref 1.7–9.3)
NEUTROPHILS # BLD AUTO: 12.16 K/UL — HIGH (ref 1.4–6.5)
NEUTROPHILS NFR BLD AUTO: 87.7 % — HIGH (ref 42.2–75.2)
NITRITE UR-MCNC: NEGATIVE — SIGNIFICANT CHANGE UP
NRBC # BLD: 0 /100 WBCS — SIGNIFICANT CHANGE UP (ref 0–0)
OVALOCYTES BLD QL SMEAR: SLIGHT — SIGNIFICANT CHANGE UP
PH UR: 7.5 — SIGNIFICANT CHANGE UP (ref 5–8)
PLAT MORPH BLD: NORMAL — SIGNIFICANT CHANGE UP
PLATELET # BLD AUTO: 134 K/UL — SIGNIFICANT CHANGE UP (ref 130–400)
PLATELET # BLD AUTO: 136 K/UL — SIGNIFICANT CHANGE UP (ref 130–400)
POIKILOCYTOSIS BLD QL AUTO: SLIGHT — SIGNIFICANT CHANGE UP
POLYCHROMASIA BLD QL SMEAR: SLIGHT — SIGNIFICANT CHANGE UP
POTASSIUM SERPL-MCNC: 4.3 MMOL/L — SIGNIFICANT CHANGE UP (ref 3.5–5)
POTASSIUM SERPL-SCNC: 4.3 MMOL/L — SIGNIFICANT CHANGE UP (ref 3.5–5)
PROT SERPL-MCNC: 6.3 G/DL — SIGNIFICANT CHANGE UP (ref 6–8)
PROT UR-MCNC: ABNORMAL
RBC # BLD: 2.85 M/UL — LOW (ref 4.7–6.1)
RBC # BLD: 2.88 M/UL — LOW (ref 4.7–6.1)
RBC # FLD: 16.2 % — HIGH (ref 11.5–14.5)
RBC # FLD: 16.4 % — HIGH (ref 11.5–14.5)
RBC BLD AUTO: NORMAL — SIGNIFICANT CHANGE UP
RBC CASTS # UR COMP ASSIST: 1 /HPF — SIGNIFICANT CHANGE UP (ref 0–4)
SICKLE CELLS BLD QL SMEAR: SLIGHT — SIGNIFICANT CHANGE UP
SODIUM SERPL-SCNC: 136 MMOL/L — SIGNIFICANT CHANGE UP (ref 135–146)
SP GR SPEC: 1.02 — SIGNIFICANT CHANGE UP (ref 1.01–1.03)
TOXIC GRANULES BLD QL SMEAR: PRESENT — SIGNIFICANT CHANGE UP
UROBILINOGEN FLD QL: ABNORMAL
VARIANT LYMPHS # BLD: 0.9 % — SIGNIFICANT CHANGE UP (ref 0–5)
WBC # BLD: 13.64 K/UL — HIGH (ref 4.8–10.8)
WBC # BLD: 13.86 K/UL — HIGH (ref 4.8–10.8)
WBC # FLD AUTO: 13.64 K/UL — HIGH (ref 4.8–10.8)
WBC # FLD AUTO: 13.86 K/UL — HIGH (ref 4.8–10.8)
WBC UR QL: 45 /HPF — HIGH (ref 0–5)

## 2021-04-26 PROCEDURE — 76705 ECHO EXAM OF ABDOMEN: CPT | Mod: 26

## 2021-04-26 PROCEDURE — 99233 SBSQ HOSP IP/OBS HIGH 50: CPT

## 2021-04-26 PROCEDURE — 71045 X-RAY EXAM CHEST 1 VIEW: CPT | Mod: 26

## 2021-04-26 PROCEDURE — 78226 HEPATOBILIARY SYSTEM IMAGING: CPT | Mod: 26

## 2021-04-26 RX ORDER — PIPERACILLIN AND TAZOBACTAM 4; .5 G/20ML; G/20ML
3.38 INJECTION, POWDER, LYOPHILIZED, FOR SOLUTION INTRAVENOUS ONCE
Refills: 0 | Status: COMPLETED | OUTPATIENT
Start: 2021-04-26 | End: 2021-04-26

## 2021-04-26 RX ORDER — PIPERACILLIN AND TAZOBACTAM 4; .5 G/20ML; G/20ML
3.38 INJECTION, POWDER, LYOPHILIZED, FOR SOLUTION INTRAVENOUS EVERY 8 HOURS
Refills: 0 | Status: DISCONTINUED | OUTPATIENT
Start: 2021-04-26 | End: 2021-04-27

## 2021-04-26 RX ADMIN — MAGNESIUM OXIDE 400 MG ORAL TABLET 400 MILLIGRAM(S): 241.3 TABLET ORAL at 13:48

## 2021-04-26 RX ADMIN — PIPERACILLIN AND TAZOBACTAM 25 GRAM(S): 4; .5 INJECTION, POWDER, LYOPHILIZED, FOR SOLUTION INTRAVENOUS at 22:38

## 2021-04-26 RX ADMIN — Medication 650 MILLIGRAM(S): at 22:41

## 2021-04-26 RX ADMIN — ATORVASTATIN CALCIUM 40 MILLIGRAM(S): 80 TABLET, FILM COATED ORAL at 22:37

## 2021-04-26 RX ADMIN — PIPERACILLIN AND TAZOBACTAM 200 GRAM(S): 4; .5 INJECTION, POWDER, LYOPHILIZED, FOR SOLUTION INTRAVENOUS at 17:02

## 2021-04-26 RX ADMIN — Medication 650 MILLIGRAM(S): at 23:29

## 2021-04-26 RX ADMIN — CHLORHEXIDINE GLUCONATE 1 APPLICATION(S): 213 SOLUTION TOPICAL at 05:07

## 2021-04-26 RX ADMIN — CARBIDOPA AND LEVODOPA 2 TABLET(S): 25; 100 TABLET ORAL at 10:53

## 2021-04-26 RX ADMIN — CARBIDOPA AND LEVODOPA 2 TABLET(S): 25; 100 TABLET ORAL at 05:06

## 2021-04-26 RX ADMIN — SENNA PLUS 2 TABLET(S): 8.6 TABLET ORAL at 22:38

## 2021-04-26 RX ADMIN — Medication 1 APPLICATION(S): at 05:08

## 2021-04-26 RX ADMIN — Medication 500 MILLIGRAM(S): at 11:09

## 2021-04-26 RX ADMIN — Medication 1 APPLICATION(S): at 17:03

## 2021-04-26 RX ADMIN — MIDODRINE HYDROCHLORIDE 10 MILLIGRAM(S): 2.5 TABLET ORAL at 22:37

## 2021-04-26 RX ADMIN — MAGNESIUM OXIDE 400 MG ORAL TABLET 400 MILLIGRAM(S): 241.3 TABLET ORAL at 22:37

## 2021-04-26 RX ADMIN — ZINC SULFATE TAB 220 MG (50 MG ZINC EQUIVALENT) 220 MILLIGRAM(S): 220 (50 ZN) TAB at 11:09

## 2021-04-26 RX ADMIN — MIDODRINE HYDROCHLORIDE 10 MILLIGRAM(S): 2.5 TABLET ORAL at 05:06

## 2021-04-26 RX ADMIN — APIXABAN 2.5 MILLIGRAM(S): 2.5 TABLET, FILM COATED ORAL at 05:06

## 2021-04-26 RX ADMIN — CARBIDOPA AND LEVODOPA 2 TABLET(S): 25; 100 TABLET ORAL at 22:37

## 2021-04-26 RX ADMIN — Medication 650 MILLIGRAM(S): at 10:52

## 2021-04-26 RX ADMIN — CARBIDOPA AND LEVODOPA 2 TABLET(S): 25; 100 TABLET ORAL at 13:48

## 2021-04-26 RX ADMIN — MAGNESIUM OXIDE 400 MG ORAL TABLET 400 MILLIGRAM(S): 241.3 TABLET ORAL at 05:07

## 2021-04-26 RX ADMIN — APIXABAN 2.5 MILLIGRAM(S): 2.5 TABLET, FILM COATED ORAL at 17:02

## 2021-04-26 NOTE — PROGRESS NOTE ADULT - SUBJECTIVE AND OBJECTIVE BOX
Patient is a 71y old  Male who presents with a chief complaint of Change in Mental Status (2021 13:36)    INTERVAL HPI/OVERNIGHT EVENTS: Patient was examined and seen at bedside. Events noted. Developed fevers since yesterday. More lethargic today. Still able to follow some simple commands when awoken. Unable to obtain ROS.    InitialHPI:  71 year old Male with past medical history of Parkinson's, dementia, CKD Stage 3, 1st degree AV block, HLD, gout, HTN, DM, fungal septicemia presenting from [facility] for acute decompensation in mental status.   As per documentation patient at baseline is verbal but for past 3 days, he has been worsening to state of being non verbal. Patient was admitted to [facility] for fungemia and discharged on the . At  he had left hand cellulitis and was treated for it.  In ED patient hemodynamically stable, afebrile, ABEBE with rise in Cr to 1.9 and BUN to 75, remains non verbal. CT head negative for any new strokes and UA negative. Family has not visited the patient recently so unaware of his status. (22 Mar 2021 23:59)    PAST MEDICAL & SURGICAL HISTORY:  Parkinson disease    Hypertension    Gout    Dyslipidemia    Prostatitis    Cataract    No significant past surgical history        General: multiple skin decubiti  HEENT:  no LAD  CV: S1 S2  Resp: decreased breath sounds at bases  GI: NT/ND/S +BS, +PEG  MS: no clubbing/cyanosis/edema, + pulses b/l, LUE>RUE swelling  Neuro: unable to access  Skin: multiple skin decubs              MEDICATIONS  (STANDING):  apixaban 2.5 milliGRAM(s) Oral two times a day  ascorbic acid 500 milliGRAM(s) Oral daily  atorvastatin 40 milliGRAM(s) Oral at bedtime  carbidopa/levodopa  25/100 2 Tablet(s) Oral <User Schedule>  chlorhexidine 4% Liquid 1 Application(s) Topical <User Schedule>  collagenase Ointment 1 Application(s) Topical two times a day  Dakins Solution - 1/2 Strength 1 Application(s) Topical two times a day  magnesium oxide 400 milliGRAM(s) Oral every 8 hours  midodrine 10 milliGRAM(s) Oral every 8 hours  senna 2 Tablet(s) Oral at bedtime  zinc sulfate 220 milliGRAM(s) Oral daily    MEDICATIONS  (PRN):  acetaminophen   Tablet .. 650 milliGRAM(s) Oral every 6 hours PRN Temp greater or equal to 38C (100.4F)    Home Medications:  apixaban 2.5 mg oral tablet: 1 tab(s) orally 2 times a day (2021 15:32)  ascorbic acid 500 mg oral tablet: 1 tab(s) orally once a day (2021 15:32)  atorvastatin 40 mg oral tablet: 1 tab(s) orally once a day (at bedtime) (2021 15:32)  carbidopa-levodopa 25 mg-100 mg oral tablet, extended release: 2 tab(s) orally 4 times a day at 6AM,10Am,2PM,6PM,10 PM (2021 15:39)  collagenase 250 units/g topical ointment: 1 application topically 2 times a day (2021 15:32)  magnesium oxide 500 mg oral tablet: 1 tab(s) orally once a day (2021 15:32)  midodrine 10 mg oral tablet: 1 tab(s) orally every 8 hours (2021 16:30)  omeprazole 40 mg oral delayed release capsule: 1 cap(s) orally once a day (23 Mar 2021 01:07)  sodium hypochlorite 0.25% topical solution: 1 application topically 2 times a day (2021 15:32)  zinc sulfate 220 mg oral capsule: 1 cap(s) orally once a day (2021 15:32)    Vital Signs Last 24 Hrs  T(C): 37.8 (2021 14:01), Max: 38.2 (2021 10:53)  T(F): 100 (2021 14:01), Max: 100.7 (2021 10:53)  HR: 83 (2021 14:01) (69 - 83)  BP: 134/60 (2021 14:01) (116/56 - 134/60)  BP(mean): 86 (2021 14:01) (80 - 86)  RR: 19 (2021 14:01) (19 - 22)  SpO2: 100% (2021 05:15) (100% - 100%)  CAPILLARY BLOOD GLUCOSE      POCT Blood Glucose.: 99 mg/dL (2021 10:58)  POCT Blood Glucose.: 101 mg/dL (2021 05:26)  POCT Blood Glucose.: 136 mg/dL (2021 02:02)  POCT Blood Glucose.: 116 mg/dL (2021 21:52)  POCT Blood Glucose.: 112 mg/dL (2021 17:31)    LABS:                        7.5    13.64 )-----------( 136      ( 2021 12:17 )             24.1         136  |  103  |  20  ----------------------------<  107<H>  4.3   |  26  |  0.8    Ca    8.0<L>      2021 12:17    TPro  6.3  /  Alb  2.0<L>  /  TBili  1.0  /  DBili  x   /  AST  230<H>  /  ALT  32  /  AlkPhos  599<H>      LIVER FUNCTIONS - ( 2021 12:17 )  Alb: 2.0 g/dL / Pro: 6.3 g/dL / ALK PHOS: 599 U/L / ALT: 32 U/L / AST: 230 U/L / GGT: x                 Urinalysis Basic - ( 2021 14:39 )    Color: Marta / Appearance: Slightly Turbid / S.019 / pH: x  Gluc: x / Ketone: Trace  / Bili: Negative / Urobili: 12 mg/dL   Blood: x / Protein: 30 mg/dL / Nitrite: Negative   Leuk Esterase: Small / RBC: x / WBC x   Sq Epi: x / Non Sq Epi: x / Bacteria: x              Consultant Notes Reviewed:  [x ] YES  [ ] NO  Care Discussed with Consultants/Other Providers/ Housestaff [ x] YES  [ ] NO  Radiology, labs, new studies personally reviewed.

## 2021-04-26 NOTE — PROGRESS NOTE ADULT - SUBJECTIVE AND OBJECTIVE BOX
NIEVES LABOY  71y, Male  Allergy: No Known Allergies      LOS  35d    CHIEF COMPLAINT: Change in Mental Status (2021 14:18)      INTERVAL EVENTS/HPI  - ID reconsulted for fever  - cannot obtain further history from the patient secondary to altered mental status or sedation   - T(F): , Max: 100.7 (21 @ 10:53)  - Denies any worsening symptoms  - Tolerating medication  - WBC Count: 13.64 (21 @ 12:17)     - Creatinine, Serum: 0.8 (21 @ 12:17)       ROS  unable to obtain history secondary to patient's mental status and/or sedation     VITALS:  T(F): 100, Max: 100.7 (21 @ 10:53)  HR: 83  BP: 134/60  RR: 19Vital Signs Last 24 Hrs  T(C): 37.8 (2021 14:01), Max: 38.2 (2021 10:53)  T(F): 100 (2021 14:01), Max: 100.7 (2021 10:53)  HR: 83 (2021 14:01) (69 - 83)  BP: 134/60 (2021 14:01) (116/56 - 134/60)  BP(mean): 86 (2021 14:01) (80 - 86)  RR: 19 (2021 14:01) (19 - 22)  SpO2: 100% (2021 05:15) (100% - 100%)    PHYSICAL EXAM:  Gen: chronically ill appearing, heavy breathing  HEENT: Normocephalic, atraumatic  Neck: supple, no lymphadenopathy  CV: Regular rate & regular rhythm  Lungs: decreased BS at bases, no fremitus  Abdomen: Soft, BS present PEG  Ext: Warm, well perfused anasarca  Neuro: obtunded  Skin: no rash, no erythema  Lines: no phlebitis    FH: Non-contributory  Social Hx: Non-contributory    TESTS & MEASUREMENTS:                        7.5    13.64 )-----------( 136      ( 2021 12:17 )             24.1         136  |  103  |  20  ----------------------------<  107<H>  4.3   |  26  |  0.8    Ca    8.0<L>      2021 12:17    TPro  6.3  /  Alb  2.0<L>  /  TBili  1.0  /  DBili  x   /  AST  230<H>  /  ALT  32  /  AlkPhos  599<H>      eGFR if Non African American: 89 mL/min/1.73M2 (21 @ 12:17)  eGFR if : 103 mL/min/1.73M2 (21 @ 12:17)    LIVER FUNCTIONS - ( 2021 12:17 )  Alb: 2.0 g/dL / Pro: 6.3 g/dL / ALK PHOS: 599 U/L / ALT: 32 U/L / AST: 230 U/L / GGT: x           Urinalysis Basic - ( 2021 22:07 )    Color: Yellow / Appearance: Slightly Turbid / S.008 / pH: x  Gluc: x / Ketone: Negative  / Bili: Negative / Urobili: <2 mg/dL   Blood: x / Protein: Trace / Nitrite: Negative   Leuk Esterase: Large / RBC: 1 /HPF / WBC 82 /HPF   Sq Epi: x / Non Sq Epi: 0 /HPF / Bacteria: Many        Culture - Blood (collected 21 @ 07:34)  Source: .Blood None  Final Report (21 @ 17:00):    No Growth Final    Culture - Blood (collected 21 @ 21:38)  Source: .Blood None  Final Report (21 @ 05:00):    No Growth Final    Culture - Blood (collected 21 @ 16:00)  Source: .Blood Blood  Final Report (21 @ 22:01):    No Growth Final    Culture - Blood (collected 21 @ 16:00)  Source: .Blood Blood  Gram Stain (21 @ 08:45):    Growth in anaerobic bottle: Gram Positive Cocci in Clusters  Final Report (21 @ 10:51):    Growth in anaerobic bottle: Staphylococcus epidermidis Coag Negative    Staphylococcus    Single set isolate, possible contaminant. Contact    Microbiology if susceptibility testing clinically    indicated.    ***Blood Panel PCR results on this specimen are available    approximately 3 hours after the Gram stain result.***    Gram stain, PCR, and/or culture results may not always    correspond due to difference in methodologies.    ************************************************************    This PCR assay was performed by multiplex PCR. This    Assay tests for 66 bacterial and resistance gene targets.    Please refer to the [employer] test directory    at [email or web address] for details.  Organism: Blood Culture PCR (21 @ 10:51)  Organism: Blood Culture PCR (21 @ 10:51)      -  Staphylococcus epidermidis, Methicillin resistant: Detec      Method Type: PCR    Culture - Blood (collected 21 @ 16:00)  Source: .Blood Blood-Peripheral  Final Report (21 @ 01:00):    No Growth Final    Culture - Blood (collected 21 @ 12:55)  Source: .Blood None  Final Report (21 @ 23:01):    No Growth Final    Culture - CSF with Gram Stain (collected 21 @ 14:00)  Source: .CSF CSF  Gram Stain (21 @ 21:44):    No polymorphonuclear cells seen    No organisms seen    by cytocentrifuge  Final Report (21 @ 15:36):    No growth    Culture - Acid Fast - Tissue w/Smear (collected 21 @ 16:18)  Source: .Tissue None  Preliminary Report (04-10-21 @ 15:03):    No growth at 1 week.    Culture - Tissue with Gram Stain (collected 21 @ 16:18)  Source: .Tissue None  Gram Stain (21 @ 07:05):    Few polymorphonuclear leukocytes seen per low power field    Numerous Gram Negative Rods seen per oil power field  Final Report (21 @ 11:01):    Numerous Klebsiella pneumoniae ESBL    Few Enterococcus faecalis (vancomycin resistant)    Few Pseudomonas aeruginosa (Carbapenem Resistant)  Organism: Klebsiella pneumoniae ESBL  Enterococcus faecalis (vancomycin resistant)  Pseudomonas aeruginosa (Carbapenem Resistant) (21 @ 11:01)  Organism: Pseudomonas aeruginosa (Carbapenem Resistant) (21 @ 11:01)      -  Amikacin: S <=16      -  Aztreonam: S 8      -  Cefepime: I 16      -  Ceftazidime: I 16      -  Ciprofloxacin: R >2      -  Gentamicin: I 8      -  Imipenem: R >8      -  Levofloxacin: R >4      -  Meropenem: R 8      -  Piperacillin/Tazobactam: I 64      -  Tobramycin: S <=2      Method Type: JAZMIN  Organism: Enterococcus faecalis (vancomycin resistant) (21 @ 11:01)      -  Ampicillin: S 8 Predicts results to ampicillin/sulbactam, amoxacillin-clavulanate and  piperacillin-tazobactam.      -  Daptomycin: S 1      -  Levofloxacin: R >4      -  Linezolid: S 1      -  Tetra/Doxy: R >8      -  Vancomycin: R >16      Method Type: JAZMIN  Organism: Klebsiella pneumoniae ESBL (21 @ 11:01)      -  Amikacin: S <=16      -  Amoxicillin/Clavulanic Acid: S <=8/4      -  Ampicillin: R >16 These ampicillin results predict results for amoxicillin      -  Ampicillin/Sulbactam: R >16/8 Enterobacter, Citrobacter, and Serratia may develop resistance during prolonged therapy (3-4 days)      -  Aztreonam: R >16      -  Cefazolin: R >16 Enterobacter, Citrobacter, and Serratia may develop resistance during prolonged therapy (3-4 days)      -  Cefepime: R >16      -  Cefoxitin: S <=8      -  Ceftriaxone: R >32 Enterobacter, Citrobacter, and Serratia may develop resistance during prolonged therapy      -  Ciprofloxacin: R >2      -  Ertapenem: S <=0.5      -  Gentamicin: S <=2      -  Imipenem: S <=1      -  Levofloxacin: R 2      -  Meropenem: S <=1      -  Piperacillin/Tazobactam: R <=8      -  Tobramycin: S <=2      -  Trimethoprim/Sulfamethoxazole: R >2/38      Method Type: JAZMIN    Culture - Other (collected 21 @ 17:15)  Source: .Other sacrum  Final Report (21 @ 16:53):    Numerous Klebsiella pneumoniae ESBL    Few Pseudomonas aeruginosa    Numerous Enterococcus faecalis (vancomycin resistant)  Organism: Klebsiella pneumoniae ESBL  Pseudomonas aeruginosa  Enterococcus faecalis (vancomycin resistant) (21 @ 16:53)  Organism: Enterococcus faecalis (vancomycin resistant) (21 @ 16:53)      -  Ampicillin: S <=2 Predicts results to ampicillin/sulbactam, amoxacillin-clavulanate and  piperacillin-tazobactam.      -  Daptomycin: S 1      -  Levofloxacin: R >4      -  Linezolid: S 2      -  Tetra/Doxy: R >8      -  Vancomycin: R >16      Method Type: JAZMIN  Organism: Pseudomonas aeruginosa (21 @ 16:53)      -  Amikacin: S <=16      -  Aztreonam: I 16      -  Cefepime: S 8      -  Ceftazidime: S 4      -  Ciprofloxacin: R >2      -  Gentamicin: I 8      -  Imipenem: I 4      -  Levofloxacin: R >4      -  Meropenem: S <=1      -  Piperacillin/Tazobactam: S 16      -  Tobramycin: S <=2      Method Type: JAZMIN  Organism: Klebsiella pneumoniae ESBL (21 @ 16:53)      -  Amikacin: S <=16      -  Amoxicillin/Clavulanic Acid: S <=8/4      -  Ampicillin: R >16 These ampicillin results predict results for amoxicillin      -  Ampicillin/Sulbactam: R >16/8 Enterobacter, Citrobacter, and Serratia may develop resistance during prolonged therapy (3-4 days)      -  Aztreonam: R >16      -  Cefazolin: R >16 Enterobacter, Citrobacter, and Serratia may develop resistance during prolonged therapy (3-4 days)      -  Cefepime: R >16      -  Cefoxitin: S <=8      -  Ceftriaxone: R >32 Enterobacter, Citrobacter, and Serratia may develop resistance during prolonged therapy      -  Ciprofloxacin: R >2      -  Ertapenem: S <=0.5      -  Gentamicin: S <=2      -  Imipenem: S <=1      -  Levofloxacin: R 2      -  Meropenem: S <=1      -  Piperacillin/Tazobactam: R <=8      -  Tobramycin: S <=2      -  Trimethoprim/Sulfamethoxazole: R >2/38      Method Type: JAZMIN    Culture - Blood (collected 21 @ 01:24)  Source: .Blood None  Final Report (21 @ 13:00):    No Growth Final            INFECTIOUS DISEASES TESTING  COVID-19 PCR: NotDetec (21 @ 10:31)  COVID-19 PCR: NotDetec (21 @ 14:29)  COVID-19 PCR: NotDetec (21 @ 15:00)  COVID-19 PCR: NotDetec (21 @ 14:55)  Procalcitonin, Serum: 0.16 (21 @ 12:32)  Vancomycin Level, Random: 11.5 (21 @ 07:00)  Vancomycin Level, Trough: 11.5 (21 @ 07:00)  COVID-19 PCR: NotDetec (21 @ 15:19)  COVID-19 PCR: NotDetec (21 @ 19:40)  Vancomycin Level, Trough: <4.0 (21 @ 13:00)  Procalcitonin, Serum: 0.61 (21 @ 10:20)  COVID-19 PCR: NotDetec (21 @ 20:13)  COVID-19 PCR: NotDetec (03-10-21 @ 12:22)  COVID-19 PCR: NotDetec (21 @ 16:49)  Fungitell: 62 (21 @ 11:00)  Procalcitonin, Serum: 0.29 (21 @ 05:32)  MRSA PCR Result.: Negative (21 @ 15:44)  COVID-19 PCR: NotDetec (21 @ 15:34)  Procalcitonin, Serum: 0.27 (21 @ 10:54)  MRSA PCR Result.: Negative (21 @ 18:29)  HIV-1/2 Combo Result: Nonreact (21 @ 05:07)  Procalcitonin, Serum: 0.46 (21 @ 16:00)  COVID-19 PCR: NotDetec (21 @ 10:17)      INFLAMMATORY MARKERS      RADIOLOGY & ADDITIONAL TESTS:  I have personally reviewed the last available Chest xray  CXR      CT      CARDIOLOGY TESTING      MEDICATIONS  apixaban 2.5 Oral two times a day  ascorbic acid 500 Oral daily  atorvastatin 40 Oral at bedtime  carbidopa/levodopa  25/100 2 Oral <User Schedule>  chlorhexidine 4% Liquid 1 Topical <User Schedule>  collagenase Ointment 1 Topical two times a day  Dakins Solution - 1/2 Strength 1 Topical two times a day  magnesium oxide 400 Oral every 8 hours  midodrine 10 Oral every 8 hours  senna 2 Oral at bedtime  zinc sulfate 220 Oral daily      WEIGHT  Weight (kg): 79.4 (21 @ 15:48)  Creatinine, Serum: 0.8 mg/dL (21 @ 12:17)      ANTIBIOTICS:      All available historical records have been reviewed

## 2021-04-26 NOTE — PROGRESS NOTE ADULT - ASSESSMENT
ASSESSMENT  71 year old Male with past medical history of Parkinson's, dementia, CKD Stage 3, 1st degree AV block, HLD, gout, HTN, DM, fungal septicemia presenting from [facility] for acute decompensation in mental status.     IMPRESSION  #Fever, possible aspiration vs decub  < from: [facility] Duplex Lower Ext Vein Scan, Bilat (04.21.21 @ 11:41) >  No evidence of deep venousthrombosis or superficial thrombophlebitis in the bilateral lower extremities.    4/24 UA WBC 82  #CoNS in blood, likely contaminant     only PIVs    4/8 BCX 1/2 sets   -  Staphylococcus epidermidis, Methicillin resistant: Detec     3/25 BCX 1/2 sets, 1/4 bottles Staphylococcus pettenkoferi, likely contaminant   #Resolved Fever with sepsis, not present on admission with metabolic encephalopathy, EEG + seizure, possibly in setting of sepsis secondary to large sacral infected decub    LP not consistent with infection.. G/S NEGATIVE (on ABX)    Total Nucleated Cell Count, CSF: 0 /uL (04.01.21 @ 14:00)    Protein, CSF: 42 mg/dL (04.01.21 @ 14:00)    Glucose, CSF: 97 mg/dL (04.01.21 @ 14:00)    3/25 BCX 1/2 sets, 1/4 bottles Staphylococcus pettenkoferi, likely contaminant     CXR no PNA   3/22 Blood & Urine cxs NGTD   #Sacral ulcer- necrotic     3/31 WCX GNR    3/29   Numerous Klebsiella pneumoniae ESBL    Few CRE Pseudomonas aeruginosa (high MICs to AGs)    Numerous Enterococcus faecalis (vancomycin resistant)- S amp    seen by Burn sacrum with full thickness ~4x5cm with dark /brown devitalized tissue at base; no purulent drainage, no bleeding  #Recent Fungemia    Blood Cx 2/27 Candida albicans    evaluated by optho - no ocular evidence     TTE no significant valvulopathy   #Shoulder dislocation with effusion- joint exam not consistent with a septic arthritis  < from: CT Chest w/ IV Cont (04.07.21 @ 20:31) >  Similar appearance of the left shoulder with complex joint effusion, described in detail on prior exam CT shoulder 2/20/2021.  #ABEBE, resolved  #L hand edema  < from: Xray Wrist 2 Views, Left (03.27.21 @ 11:13) >No acute fracture or dislocation. Diffuse soft tissue swelling. Nonaggressive appearing lucency in the distal radius, indeterminate. Nonemergent MRI may be obtained for further evaluation.    Creatinine, Serum: 0.8 mg/dL (04.26.21 @ 12:17)      RECOMMENDATIONS  - Send BCX  - ONLY If repeat fever, start zosyn 3.375 q8h IV and dose Vanc 1g x1  - Appreciate Burn consult   - Contact isolation CRE  - GOC, grave prognosis    If any questions, please call or send a message on [employer] Teams  Spectra [phone]

## 2021-04-26 NOTE — CHART NOTE - NSCHARTNOTEFT_GEN_A_CORE
s/p GT placement 4/19  Tolerating GT feeds well, see previous note with goal feed  Tm 100.7  Lytes NL, gluc well controlled     T(F): 100 (04-26-21 @ 14:01), Max: 100.7 (04-26-21 @ 10:53)  HR: 83 (04-26-21 @ 14:01) (69 - 83)  BP: 134/60 (04-26-21 @ 14:01) (116/56 - 134/60)  RR: 19 (04-26-21 @ 14:01) (19 - 22)  SpO2: 100% (04-26-21 @ 05:15) (100% - 100%)    I&O's Detail    25 Apr 2021 07:01  -  26 Apr 2021 07:00  --------------------------------------------------------  IN:    Enteral Tube Flush: 340 mL    Jevity 1.2: 560 mL    sodium chloride 0.9%: 300 mL  Total IN: 1200 mL    OUT:  Total OUT: 0 mL    Total NET: 1200 mL    04-26    136  |  103  |  20  ----------------------------<  107<H>  4.3   |  26  |  0.8    Ca    8.0<L>      26 Apr 2021 12:17  Phosphorus Level, Serum: 3.7 mg/dL (04.21.21 @ 06:37)  Magnesium, Serum: 1.8 mg/dL (04.21.21 @ 06:37)    TPro  6.3  /  Alb  2.0<L>  /  TBili  1.0  /  DBili  x   /  AST  230<H>  /  ALT  32  /  AlkPhos  599<H>  04-26                        7.5    13.64 )-----------( 136      ( 26 Apr 2021 12:17 )             24.1     CAPILLARY BLOOD GLUCOSE  POCT Blood Glucose.: 99 mg/dL (26 Apr 2021 10:58)  POCT Blood Glucose.: 101 mg/dL (26 Apr 2021 05:26)  POCT Blood Glucose.: 136 mg/dL (26 Apr 2021 02:02)  POCT Blood Glucose.: 116 mg/dL (25 Apr 2021 21:52)  POCT Blood Glucose.: 112 mg/dL (25 Apr 2021 17:31)    Diet, NPO with Tube Feed:   Tube Feeding Modality: Nasogastric  Jevity 1.2 Sarmad  Total Volume for 24 Hours (mL): 1680  Bolus  Total Volume of Bolus (mL):  280  Total # of Feeds: 6  Tube Feed Frequency: Every 4 hours   Tube Feed Start Time: 00:00  Bolus Feed Rate (mL per Hour): 280   Bolus Feed Duration (in Hours): 0  Bolus Feed Instructions:  80 ml flushes before and after each feed  No Carb Prosource TF     Qty per Day:  1 (04-01-21 @ 11:52)    IMP:  - altered mental status/ metabolic encephalopathy from sepsis  - large necrotic sacral ulcer stage 4  - post hemorrhagic anemia  - thrombocytopenia  - h/o Parkinson's disease, DM    PLAN:  - cont Jevity 1.2 X 2 feeds and advance to goal of 360 ml Jevity 1.2 over 45 minutes x 4 feeds/d --> 79 gm protein & 1710 kcal/d  - add Moris 1 pack mixed with 4-6oz H2O q12hrs, d/c Prosource TF  - add in phos to next blood draw  - please add zinc 220mg and vitamin C 500 mg once daily each s/p GT placement 4/19  Tolerating GT feeds well, see previous note with goal feed  Tm 100.7  Lytes NL, gluc well controlled     T(F): 100 (04-26-21 @ 14:01), Max: 100.7 (04-26-21 @ 10:53)  HR: 83 (04-26-21 @ 14:01) (69 - 83)  BP: 134/60 (04-26-21 @ 14:01) (116/56 - 134/60)  RR: 19 (04-26-21 @ 14:01) (19 - 22)  SpO2: 100% (04-26-21 @ 05:15) (100% - 100%)    I&O's Detail    25 Apr 2021 07:01  -  26 Apr 2021 07:00  --------------------------------------------------------  IN:    Enteral Tube Flush: 340 mL    Jevity 1.2: 560 mL    sodium chloride 0.9%: 300 mL  Total IN: 1200 mL    OUT:  Total OUT: 0 mL - - - - ???    Total NET: 1200 mL    04-26    136  |  103  |  20  ----------------------------<  107<H>  4.3   |  26  |  0.8    Ca    8.0<L>      26 Apr 2021 12:17  Phosphorus Level, Serum: 3.7 mg/dL (04.21.21 @ 06:37)  Magnesium, Serum: 1.8 mg/dL (04.21.21 @ 06:37)    TPro  6.3  /  Alb  2.0<L>  /  TBili  1.0  /  DBili  x   /  AST  230<H>  /  ALT  32  /  AlkPhos  599<H>  04-26                        7.5    13.64 )-----------( 136      ( 26 Apr 2021 12:17 )             24.1     CAPILLARY BLOOD GLUCOSE  POCT Blood Glucose.: 99 mg/dL (26 Apr 2021 10:58)  POCT Blood Glucose.: 101 mg/dL (26 Apr 2021 05:26)  POCT Blood Glucose.: 136 mg/dL (26 Apr 2021 02:02)  POCT Blood Glucose.: 116 mg/dL (25 Apr 2021 21:52)  POCT Blood Glucose.: 112 mg/dL (25 Apr 2021 17:31)    Diet, NPO with Tube Feed:   Tube Feeding Modality: Nasogastric  Jevity 1.2 Sarmad  Total Volume for 24 Hours (mL): 1680  Bolus  Total Volume of Bolus (mL):  280  Total # of Feeds: 6  Tube Feed Frequency: Every 4 hours   Tube Feed Start Time: 00:00  Bolus Feed Rate (mL per Hour): 280   Bolus Feed Duration (in Hours): 0  Bolus Feed Instructions:  80 ml flushes before and after each feed  No Carb Prosource TF     Qty per Day:  1 (04-01-21 @ 11:52) - - - - - - Prosource TF not consistently documented as given - see below    IMP:  - altered mental status/ metabolic encephalopathy from sepsis  - large necrotic sacral ulcer stage 4  - post hemorrhagic anemia  - thrombocytopenia  - h/o Parkinson's disease, DM    PLAN:  - cont Jevity 1.2 X 2 feeds and advance to goal of 360 ml Jevity 1.2 over 45 minutes x 4 feeds/d --> 79 gm protein & 1710 kcal/d  - add Moris 1 pack mixed with 4-6oz H2O q12hrs, d/c Prosource TF  - add in phos to next blood draw  - please add zinc 220mg and vitamin C 500 mg once daily each

## 2021-04-26 NOTE — PROGRESS NOTE ADULT - SUBJECTIVE AND OBJECTIVE BOX
DAILY PROGRESS NOTE  ===========================================================    Patient Information:  NIEVES LABOY  /  71y  /  Male  /  MRN#: [id number]    Hospital Day: 35d   Location: [facility]93E Neuro 012 A ([facility] T9-3E Neuro)    |:::::::::::::::::::::::::::| SUBJECTIVE |:::::::::::::::::::::::::::|    OVERNIGHT EVENTS:   TODAY: Patient was seen today at bedside. Pt developed low grade fever. He is able to follow commands, but seems more lethargic today.  Review of systems is otherwise negative.    |:::::::::::::::::::::::::::| ADMISSION HPI |:::::::::::::::::::::::::::|    HPI:  71 year old Male with past medical history of Parkinson's, dementia, CKD Stage 3, 1st degree AV block, HLD, gout, HTN, DM, fungal septicemia presenting from [facility] for acute decompensation in mental status.     As per documentation patient at baseline is verbal but for past 3 days, he has been worsening to state of being non verbal. Patient was admitted to [facility] for fungemia and discharged on the . At  he had left hand cellulitis and was treated for it.    In ED patient hemodynamically stable, afebrile, ABEBE with rise in Cr to 1.9 and BUN to 75, remains non verbal. CT head negative for any new strokes and UA negative. Family has not visited the patient recently so unaware of his status. (22 Mar 2021 23:59)      |:::::::::::::::::::::::::::| OBJECTIVE |:::::::::::::::::::::::::::|    STANDING MEDICATIONS  apixaban 2.5 milliGRAM(s) Oral two times a day  ascorbic acid 500 milliGRAM(s) Oral daily  atorvastatin 40 milliGRAM(s) Oral at bedtime  carbidopa/levodopa  25/100 2 Tablet(s) Oral <User Schedule>  chlorhexidine 4% Liquid 1 Application(s) Topical <User Schedule>  collagenase Ointment 1 Application(s) Topical two times a day  Dakins Solution - 1/2 Strength 1 Application(s) Topical two times a day  magnesium oxide 400 milliGRAM(s) Oral every 8 hours  midodrine 10 milliGRAM(s) Oral every 8 hours  senna 2 Tablet(s) Oral at bedtime  zinc sulfate 220 milliGRAM(s) Oral daily    PRN MEDICATIONS  acetaminophen   Tablet .. 650 milliGRAM(s) Oral every 6 hours PRN    HOME MEDICATIONS  apixaban 2.5 mg oral tablet: 1 tab(s) orally 2 times a day  ascorbic acid 500 mg oral tablet: 1 tab(s) orally once a day  atorvastatin 40 mg oral tablet: 1 tab(s) orally once a day (at bedtime)  carbidopa-levodopa 25 mg-100 mg oral tablet, extended release: 2 tab(s) orally 4 times a day at 6AM,10Am,2PM,6PM,10 PM  collagenase 250 units/g topical ointment: 1 application topically 2 times a day  magnesium oxide 500 mg oral tablet: 1 tab(s) orally once a day  midodrine 10 mg oral tablet: 1 tab(s) orally every 8 hours  omeprazole 40 mg oral delayed release capsule: 1 cap(s) orally once a day  senna oral tablet: 2 tab(s) orally once a day (at bedtime), As Needed -for constipation   sodium hypochlorite 0.25% topical solution: 1 application topically 2 times a day  zinc sulfate 220 mg oral capsule: 1 cap(s) orally once a day      VITAL SIGNS: Last 24 Hours  T(C): 37.8 (2021 14:01), Max: 38.2 (2021 10:53)  T(F): 100 (2021 14:01), Max: 100.7 (2021 10:53)  HR: 83 (2021 14:01) (69 - 83)  BP: 134/60 (2021 14:01) (116/56 - 134/60)  BP(mean): 86 (2021 14:01) (80 - 86)  RR: 19 (2021 14:01) (19 - 22)  SpO2: 100% (2021 05:15) (100% - 100%)    Input-Output    21 @ 07:01  -  21 @ 07:00  --------------------------------------------------------  IN:    Enteral Tube Flush: 340 mL    Jevity 1.2: 560 mL    sodium chloride 0.9%: 300 mL  Total IN: 1200 mL    OUT:  Total OUT: 0 mL    Total NET: 1200 mL      21 @ 07:01  -  21 @ 16:09  --------------------------------------------------------  IN:    Enteral Tube Flush: 80 mL    Jevity 1.2: 280 mL  Total IN: 360 mL    OUT:  Total OUT: 0 mL    Total NET: 360 mL          PHYSICAL EXAM:  GEN: No acute distress. multiple skin decub  LUNGS: Clear to auscultation bilaterally.  HEART: Regular rate and rhythm. No murmurs.  ABD: Soft, non-tender, non-distended.  EXT: Normal range of motion. LUE>RUE swelling   NEURO: Alert and able to follow commands.   PSYCH: Cooperative, appropriate.    LAB RESULTS:                        7.5    13.64 )-----------( 136      ( 2021 12:17 )             24.1         136  |  103  |  20  ----------------------------<  107<H>  4.3   |  26  |  0.8    Ca    8.0<L>      2021 12:17    TPro  6.3  /  Alb  2.0<L>  /  TBili  1.0  /  DBili  x   /  AST  230<H>  /  ALT  32  /  AlkPhos  599<H>        Urinalysis Basic - ( 2021 14:39 )    Color: Marta / Appearance: Slightly Turbid / S.019 / pH: x  Gluc: x / Ketone: Trace  / Bili: Negative / Urobili: 12 mg/dL   Blood: x / Protein: 30 mg/dL / Nitrite: Negative   Leuk Esterase: Small / RBC: 1 /HPF / WBC 45 /HPF   Sq Epi: x / Non Sq Epi: 0 /HPF / Bacteria: Many              MICROBIOLOGY:    Culture - Blood (collected 21 @ 07:34)  Source: .Blood None  Final Report (21 @ 17:00):    No Growth Final      IMAGING/STUDIES:    ALLERGIES:  No Known Allergies      ===========================================================

## 2021-04-26 NOTE — PROGRESS NOTE ADULT - ASSESSMENT
71 year old Male with past medical history of Parkinson's, dementia, CKD Stage 3, 1st degree AV block, HLD, gout, HTN, DM, fungal septicemia presenting from [facility] for acute decompensation in mental status. As per documentation patient at baseline is verbal but for past 3 days, he has been worsening to state of being non verbal. Patient was admitted to [facility] for fungemia and discharged on the 11th March. At  he had left hand cellulitis and was treated for it.    # Metabolic encephalopathy on top of baseline dementia/Sepsis POA/Necrotic sacral ulcer stage 4/H/o recent fungemia  - CT Head No acute intracranial pathology.activity  -  Chest Xray No consolidation, effusion or pneumothorax.  -  Blood & Urine cxs NGTD   -  WCX GNR, 3/29   Numerous Klebsiella pneumoniae ESBL, Few Pseudomonas aeruginosa, Numerous Enterococcus faecalis (vancomycin resistant)  - evaluated by Burn: s/p debridement of sacrum on 3/30 .  no more new recommendation for surgery at this time .continue local wound care with santyl/dakins WTD.  - s/p meropenem, Polymixin and Aztreonam  - S/p spinal tap --> CSF clear with no growths and neg PCR  -repeat EEG w/ no epileptiform activity but diffuse cerebral dysfxn    #New Fevers/Leukocytosis since 4/25  -panculture, CXR, calcitonin, procal, LA  -appreciate ID f/u  -Start Vanco, Zosyn if spikes again    # Nutrition/Dysphagia:   -daughter agreed to PEG but delayed due to +BCx that turned out to be a contaminant  -4/14 passed S&S but limited PO intake.   -s/p PEG on 4/19  -family taught how to administer feeds and wound care    #Lt shoulder dislocation w/ collection  -no need to tap as per IR and ID concurrs    # Hypotension  - c/w midodrine    #Anemia  -s/p 1u PRBCs  -monitor CBC    # Thrombocytopenia ?sec to sepsis  -resolved  -off Pepcid  -heme f/u appreciated    # H/o parkinsons disease  - c/w sinemet    # H/o chronic DVT  - [facility] Duplex Lower Ext Vein Scan, Bilat (03.27.21 @ 18:48) >No evidence of deep venous thrombosis or superficial thrombophlebitis in the bilateral lower extremities.  - unsure why was on Rx >2yrs  -will check w/ PMD Dr. Patterson  -d/w daughter who is aware of risks and benefits. Daughter requests prophylactic dose (aware of anemia and possible need for transfusions)    # Dyslipidemia  - c/w statin    # DVT prophylaxis  -scd  -Lovenox    Very high risk pt. D/w daughter GOC: she wants full code for now. Very poor Px (daughter aware).    #Pending:  Resolution of fevers  # Discussed w/ daughter in details who wants to cont aggressive Tx. Daughter wants to take pt home (she is HC aide)  # Disposition: home w/ services    d/w Housestaff, nursing, case mgmt

## 2021-04-26 NOTE — PROGRESS NOTE ADULT - ASSESSMENT
71 year old Male with past medical history of Parkinson's, dementia, CKD Stage 3, 1st degree AV block, HLD, gout, HTN, DM, fungal septicemia presenting from [facility] for acute decompensation in mental status. As per documentation patient at baseline is verbal but for past 3 days, he has been worsening to state of being non verbal. Patient was admitted to [facility] for fungemia and discharged on the 11th March. At  he had left hand cellulitis and was treated for it.    # Metabolic encephalopathy on top of baseline dementia/Sepsis POA/Necrotic sacral ulcer stage 4/H/o recent fungemia  - CT Head No acute intracranial pathology.activity  -  Chest Xray No consolidation, effusion or pneumothorax.  -  Blood & Urine cxs NGTD   -  WCX GNR, 3/29   Numerous Klebsiella pneumoniae ESBL, Few Pseudomonas aeruginosa, Numerous Enterococcus faecalis (vancomycin resistant)  - evaluated by Burn: s/p debridement of sacrum on 3/30 .  no more new recommendation for surgery at this time .continue local wound care with santyl/dakins WTD.  - s/p meropenem, Polymixin and Aztreonam  - S/p spinal tap --> CSF clear with no growths and neg PCR  -repeat EEG w/ no epileptiform activity but diffuse cerebral dysfxn    #New Fevers/Leukocytosis since 4/25  -panculture, CXR, calcitonin, procal, LA  -appreciate ID f/u  -Start Vanco, Zosyn if spikes again    # Nutrition/Dysphagia:   -daughter agreed to PEG but delayed due to +BCx that turned out to be a contaminant  -4/14 passed S&S but limited PO intake.   -s/p PEG on 4/19  -family taught how to administer feeds and wound care    #Lt shoulder dislocation w/ collection  -no need to tap as per IR and ID concurrs    # Hypotension  - c/w midodrine    #Anemia  -s/p 1u PRBCs  -monitor CBC    # Thrombocytopenia ?sec to sepsis  -resolved  -off Pepcid  -heme f/u appreciated    # H/o parkinsons disease  - c/w sinemet    # H/o chronic DVT  - [facility] Duplex Lower Ext Vein Scan, Bilat (03.27.21 @ 18:48) >No evidence of deep venous thrombosis or superficial thrombophlebitis in the bilateral lower extremities.  - unsure why was on Rx >2yrs  -will check w/ PMD Dr. Patterson  -d/w daughter who is aware of risks and benefits. Daughter requests prophylactic dose (aware of anemia and possible need for transfusions)    # Dyslipidemia  - c/w statin    # DVT prophylaxis  -scd  -Lovenox    Very high risk pt. D/w daughter GOC: she wants full code for now. Very poor Px (daughter aware).    #Pending:  Resolution of fevers  # Discussed w/ daughter in details who wants to cont aggressive Tx. Daughter wants to take pt home (she is HC aide)  # Disposition: home w/ services    d/w Housestaff, nursing, case mgmt

## 2021-04-27 LAB
ALBUMIN SERPL ELPH-MCNC: 2.2 G/DL — LOW (ref 3.5–5.2)
ALP SERPL-CCNC: 476 U/L — HIGH (ref 30–115)
ALT FLD-CCNC: 25 U/L — SIGNIFICANT CHANGE UP (ref 0–41)
ANION GAP SERPL CALC-SCNC: 7 MMOL/L — SIGNIFICANT CHANGE UP (ref 7–14)
AST SERPL-CCNC: 120 U/L — HIGH (ref 0–41)
BASOPHILS # BLD AUTO: 0.03 K/UL — SIGNIFICANT CHANGE UP (ref 0–0.2)
BASOPHILS NFR BLD AUTO: 0.2 % — SIGNIFICANT CHANGE UP (ref 0–1)
BILIRUB SERPL-MCNC: 0.8 MG/DL — SIGNIFICANT CHANGE UP (ref 0.2–1.2)
BUN SERPL-MCNC: 24 MG/DL — HIGH (ref 10–20)
CALCIUM SERPL-MCNC: 8.3 MG/DL — LOW (ref 8.5–10.1)
CHLORIDE SERPL-SCNC: 101 MMOL/L — SIGNIFICANT CHANGE UP (ref 98–110)
CO2 SERPL-SCNC: 26 MMOL/L — SIGNIFICANT CHANGE UP (ref 17–32)
CREAT SERPL-MCNC: 1.1 MG/DL — SIGNIFICANT CHANGE UP (ref 0.7–1.5)
EOSINOPHIL # BLD AUTO: 0.07 K/UL — SIGNIFICANT CHANGE UP (ref 0–0.7)
EOSINOPHIL NFR BLD AUTO: 0.4 % — SIGNIFICANT CHANGE UP (ref 0–8)
GLUCOSE BLDC GLUCOMTR-MCNC: 94 MG/DL — SIGNIFICANT CHANGE UP (ref 70–99)
GLUCOSE SERPL-MCNC: 119 MG/DL — HIGH (ref 70–99)
HCT VFR BLD CALC: 26.3 % — LOW (ref 42–52)
HGB BLD-MCNC: 8.1 G/DL — LOW (ref 14–18)
IMM GRANULOCYTES NFR BLD AUTO: 1.1 % — HIGH (ref 0.1–0.3)
LYMPHOCYTES # BLD AUTO: 0.66 K/UL — LOW (ref 1.2–3.4)
LYMPHOCYTES # BLD AUTO: 3.5 % — LOW (ref 20.5–51.1)
MAGNESIUM SERPL-MCNC: 2 MG/DL — SIGNIFICANT CHANGE UP (ref 1.8–2.4)
MCHC RBC-ENTMCNC: 27 PG — SIGNIFICANT CHANGE UP (ref 27–31)
MCHC RBC-ENTMCNC: 30.8 G/DL — LOW (ref 32–37)
MCV RBC AUTO: 87.7 FL — SIGNIFICANT CHANGE UP (ref 80–94)
MONOCYTES # BLD AUTO: 2.28 K/UL — HIGH (ref 0.1–0.6)
MONOCYTES NFR BLD AUTO: 12.1 % — HIGH (ref 1.7–9.3)
NEUTROPHILS # BLD AUTO: 15.56 K/UL — HIGH (ref 1.4–6.5)
NEUTROPHILS NFR BLD AUTO: 82.7 % — HIGH (ref 42.2–75.2)
NRBC # BLD: 0 /100 WBCS — SIGNIFICANT CHANGE UP (ref 0–0)
PLATELET # BLD AUTO: 126 K/UL — LOW (ref 130–400)
POTASSIUM SERPL-MCNC: 4.8 MMOL/L — SIGNIFICANT CHANGE UP (ref 3.5–5)
POTASSIUM SERPL-SCNC: 4.8 MMOL/L — SIGNIFICANT CHANGE UP (ref 3.5–5)
PROCALCITONIN SERPL-MCNC: 0.19 NG/ML — HIGH (ref 0.02–0.1)
PROT SERPL-MCNC: 6.5 G/DL — SIGNIFICANT CHANGE UP (ref 6–8)
RBC # BLD: 3 M/UL — LOW (ref 4.7–6.1)
RBC # FLD: 16.4 % — HIGH (ref 11.5–14.5)
SODIUM SERPL-SCNC: 134 MMOL/L — LOW (ref 135–146)
WBC # BLD: 18.8 K/UL — HIGH (ref 4.8–10.8)
WBC # FLD AUTO: 18.8 K/UL — HIGH (ref 4.8–10.8)

## 2021-04-27 PROCEDURE — 99222 1ST HOSP IP/OBS MODERATE 55: CPT

## 2021-04-27 PROCEDURE — 99233 SBSQ HOSP IP/OBS HIGH 50: CPT

## 2021-04-27 RX ORDER — MEROPENEM 1 G/30ML
INJECTION INTRAVENOUS
Refills: 0 | Status: DISCONTINUED | OUTPATIENT
Start: 2021-04-27 | End: 2021-05-01

## 2021-04-27 RX ORDER — MEROPENEM 1 G/30ML
1000 INJECTION INTRAVENOUS ONCE
Refills: 0 | Status: COMPLETED | OUTPATIENT
Start: 2021-04-27 | End: 2021-04-27

## 2021-04-27 RX ORDER — MEROPENEM 1 G/30ML
1000 INJECTION INTRAVENOUS EVERY 8 HOURS
Refills: 0 | Status: DISCONTINUED | OUTPATIENT
Start: 2021-04-27 | End: 2021-05-01

## 2021-04-27 RX ADMIN — Medication 1 APPLICATION(S): at 17:36

## 2021-04-27 RX ADMIN — MAGNESIUM OXIDE 400 MG ORAL TABLET 400 MILLIGRAM(S): 241.3 TABLET ORAL at 05:39

## 2021-04-27 RX ADMIN — Medication 1 APPLICATION(S): at 05:38

## 2021-04-27 RX ADMIN — MEROPENEM 100 MILLIGRAM(S): 1 INJECTION INTRAVENOUS at 21:09

## 2021-04-27 RX ADMIN — MAGNESIUM OXIDE 400 MG ORAL TABLET 400 MILLIGRAM(S): 241.3 TABLET ORAL at 21:06

## 2021-04-27 RX ADMIN — SENNA PLUS 2 TABLET(S): 8.6 TABLET ORAL at 21:10

## 2021-04-27 RX ADMIN — Medication 1 APPLICATION(S): at 17:35

## 2021-04-27 RX ADMIN — MIDODRINE HYDROCHLORIDE 10 MILLIGRAM(S): 2.5 TABLET ORAL at 21:07

## 2021-04-27 RX ADMIN — MEROPENEM 100 MILLIGRAM(S): 1 INJECTION INTRAVENOUS at 08:58

## 2021-04-27 RX ADMIN — CHLORHEXIDINE GLUCONATE 1 APPLICATION(S): 213 SOLUTION TOPICAL at 05:38

## 2021-04-27 RX ADMIN — CARBIDOPA AND LEVODOPA 2 TABLET(S): 25; 100 TABLET ORAL at 21:07

## 2021-04-27 RX ADMIN — MIDODRINE HYDROCHLORIDE 10 MILLIGRAM(S): 2.5 TABLET ORAL at 14:24

## 2021-04-27 RX ADMIN — MEROPENEM 100 MILLIGRAM(S): 1 INJECTION INTRAVENOUS at 14:25

## 2021-04-27 RX ADMIN — MIDODRINE HYDROCHLORIDE 10 MILLIGRAM(S): 2.5 TABLET ORAL at 05:40

## 2021-04-27 RX ADMIN — ZINC SULFATE TAB 220 MG (50 MG ZINC EQUIVALENT) 220 MILLIGRAM(S): 220 (50 ZN) TAB at 11:23

## 2021-04-27 RX ADMIN — ATORVASTATIN CALCIUM 40 MILLIGRAM(S): 80 TABLET, FILM COATED ORAL at 21:07

## 2021-04-27 RX ADMIN — CARBIDOPA AND LEVODOPA 2 TABLET(S): 25; 100 TABLET ORAL at 09:55

## 2021-04-27 RX ADMIN — Medication 500 MILLIGRAM(S): at 11:23

## 2021-04-27 RX ADMIN — Medication 1 APPLICATION(S): at 05:39

## 2021-04-27 RX ADMIN — PIPERACILLIN AND TAZOBACTAM 25 GRAM(S): 4; .5 INJECTION, POWDER, LYOPHILIZED, FOR SOLUTION INTRAVENOUS at 05:40

## 2021-04-27 RX ADMIN — CARBIDOPA AND LEVODOPA 2 TABLET(S): 25; 100 TABLET ORAL at 05:37

## 2021-04-27 RX ADMIN — APIXABAN 2.5 MILLIGRAM(S): 2.5 TABLET, FILM COATED ORAL at 05:38

## 2021-04-27 RX ADMIN — CARBIDOPA AND LEVODOPA 2 TABLET(S): 25; 100 TABLET ORAL at 14:24

## 2021-04-27 RX ADMIN — MAGNESIUM OXIDE 400 MG ORAL TABLET 400 MILLIGRAM(S): 241.3 TABLET ORAL at 14:24

## 2021-04-27 NOTE — PROGRESS NOTE ADULT - ASSESSMENT
71 year old Male with past medical history of Parkinson's, dementia, CKD Stage 3, 1st degree AV block, HLD, gout, HTN, DM, fungal septicemia presenting from [facility] for acute decompensation in mental status. As per documentation patient at baseline is verbal but for past 3 days, he has been worsening to state of being non verbal. Patient was admitted to [facility] for fungemia and discharged on the 11th March. At  he had left hand cellulitis and was treated for it.    #New Fevers/Leukocytosis since 4/25. Acute gayle on HIDA  -f/u Cx  -Brady as per ID  -awaiting IR for GB perc    # Metabolic encephalopathy on top of baseline dementia/Sepsis POA/Necrotic sacral ulcer stage 4/H/o recent fungemia  - CT Head No acute intracranial pathology.activity  -  Chest Xray No consolidation, effusion or pneumothorax.  -  Blood & Urine cxs NGTD   -  WCX GNR, 3/29   Numerous Klebsiella pneumoniae ESBL, Few Pseudomonas aeruginosa, Numerous Enterococcus faecalis (vancomycin resistant)  - evaluated by Burn: s/p debridement of sacrum on 3/30 .  no more new recommendation for surgery at this time .continue local wound care with santyl/dakins WTD.  - s/p meropenem, Polymixin and Aztreonam  - S/p spinal tap --> CSF clear with no growths and neg PCR  -repeat EEG w/ no epileptiform activity but diffuse cerebral dysfxn    # Nutrition/Dysphagia:   -daughter agreed to PEG but delayed due to +BCx that turned out to be a contaminant  -4/14 passed S&S but limited PO intake.   -s/p PEG on 4/19  -family taught how to administer feeds and wound care    #Lt shoulder dislocation w/ collection  -no need to tap as per IR and ID concurrs    # Hypotension  - c/w midodrine    #Anemia  -s/p 1u PRBCs  -monitor CBC    # Thrombocytopenia ?sec to sepsis  -resolved  -off Pepcid  -heme f/u appreciated    # H/o parkinsons disease  - c/w sinemet    # H/o chronic DVT  - [facility] Duplex Lower Ext Vein Scan, Bilat (03.27.21 @ 18:48) >No evidence of deep venous thrombosis or superficial thrombophlebitis in the bilateral lower extremities.  - unsure why was on Rx >2yrs  -will check w/ PMD Dr. Leonardo  -d/w daughter who is aware of risks and benefits. Daughter requests prophylactic dose (aware of anemia and possible need for transfusions)    # Dyslipidemia  - c/w statin    # DVT prophylaxis  -scd      Very high risk pt. D/w daughter GOC: she wants full code for now. Very poor Px (daughter aware).    #Pending:  Resolution of fevers, IR for PTC  # Discussed w/ daughter in details who wants to cont aggressive Tx. Daughter wants to take pt home (she is HC aide)  # Disposition: home w/ services    d/w Housestaff, nursing, case mgmt, ID, Gen Sx

## 2021-04-27 NOTE — PROGRESS NOTE ADULT - ASSESSMENT
71 year old Male with past medical history of Parkinson's, dementia, CKD Stage 3, 1st degree AV block, HLD, gout, HTN, DM, fungal septicemia presenting from [facility] for acute decompensation in mental status. As per documentation patient at baseline is verbal but for past 3 days, he has been worsening to state of being non verbal. Patient was admitted to [facility] for fungemia and discharged on the 11th March. At  he had left hand cellulitis and was treated for it.    # cholecystitis  - patients with fevers overnight  - confirmed with HIDA scan and sonogram  - IR consulted for perc cholecystostomy  - plan for 4/29. WIll hold the AFIb till then ( last dose 4/27 AM)  - in case patient has hemodynamic compromise, will call IR stat  - antibiotic coverage broadened to IV MEropenem 4/27    # Metabolic encephalopathy on top of baseline dementia/Sepsis POA/Necrotic sacral ulcer stage 4/H/o recent fungemia  - CT Head No acute intracranial pathology.activity  -  Chest Xray No consolidation, effusion or pneumothorax.  -  Blood & Urine cxs NGTD   -  WCX GNR, 3/29   Numerous Klebsiella pneumoniae ESBL, Few Pseudomonas aeruginosa, Numerous Enterococcus faecalis (vancomycin resistant)  - evaluated by Burn: s/p debridement of sacrum on 3/30 .  no more new recommendation for surgery at this time .continue local wound care with santyl/dakins WTD.  - s/p meropenem, Polymixin and Aztreonam  - S/p spinal tap --> CSF clear with no growths and neg PCR  -repeat EEG w/ no epileptiform activity but diffuse cerebral dysfx    # Nutrition/Dysphagia:   -daughter agreed to PEG but delayed due to +BCx that turned out to be a contaminant  -4/14 passed S&S but limited PO intake.   -s/p PEG on 4/19  -family taught how to administer feeds and wound care    #Lt shoulder dislocation w/ collection  -no need to tap as per IR and ID concurrs    # Hypotension  - c/w midodrine    #Anemia  -s/p 1u PRBCs  -monitor CBC    # Thrombocytopenia ?sec to sepsis  -resolved  -off Pepcid  -heme f/u appreciated    # H/o parkinsons disease  - c/w sinemet    # H/o chronic DVT  - VA Duplex Lower Ext Vein Scan, Bilat (03.27.21 @ 18:48) >No evidence of deep venous thrombosis or superficial thrombophlebitis in the bilateral lower extremities.  - unsure why was on Rx >2yrs  -will check w/ PMD Dr. Patterson  -d/w daughter who is aware of risks and benefits. Daughter requests prophylactic dose (aware of anemia and possible need for transfusions)    # Dyslipidemia  - c/w statin    # DVT prophylaxis  -scd  -Lovenox    Very high risk pt. D/w daughter GOC: she wants full code for now. Very poor Px (daughter aware).    #Pending:  Resolution of fevers  # Discussed w/ daughter on phone and son at the bedside in details who wants to cont aggressive Tx. Daughter wants to take pt home (she is HC aide)

## 2021-04-27 NOTE — PROGRESS NOTE ADULT - SUBJECTIVE AND OBJECTIVE BOX
NIEVES LABOY  71y, Male  Allergy: No Known Allergies      LOS  36d    CHIEF COMPLAINT: Change in Mental Status (2021 16:08)      INTERVAL EVENTS/HPI  - febrile , UCX Kleb  - T(F): , Max: 101 (21 @ 22:45)  - Tolerating medication  - WBC Count: 13.86 (21 @ 17:42)  WBC Count: 13.64 (21 @ 12:17)  - Creatinine, Serum: 0.8 (21 @ 12:17)       ROS  unable to obtain history secondary to patient's mental status and/or sedation     VITALS:  T(F): 98.4, Max: 101 (21 @ 22:45)  HR: 88  BP: 132/58  RR: 22Vital Signs Last 24 Hrs  T(C): 36.9 (2021 05:45), Max: 38.3 (2021 22:45)  T(F): 98.4 (2021 05:45), Max: 101 (2021 22:45)  HR: 88 (2021 05:45) (83 - 94)  BP: 132/58 (2021 05:45) (132/58 - 137/58)  BP(mean): 85 (2021 05:45) (84 - 86)  RR: 22 (2021 05:45) (19 - 22)  SpO2: 99% (2021 05:45) (99% - 99%)    PHYSICAL EXAM:  Gen: chronically ill appearing, heavy breathing  HEENT: Normocephalic, atraumatic  Neck: supple, no lymphadenopathy  CV: Regular rate & regular rhythm  Lungs: decreased BS at bases, no fremitus  Abdomen: Soft, BS present PEG  Ext: Warm, well perfused anasarca  Neuro: obtunded  Skin: no rash, no erythema  Lines: no phlebitis      FH: Non-contributory  Social Hx: Non-contributory    TESTS & MEASUREMENTS:                        7.7    13.86 )-----------( 134      ( 2021 17:42 )             24.8         136  |  103  |  20  ----------------------------<  107<H>  4.3   |  26  |  0.8    Ca    8.0<L>      2021 12:17    TPro  6.3  /  Alb  2.0<L>  /  TBili  1.0  /  DBili  x   /  AST  230<H>  /  ALT  32  /  AlkPhos  599<H>      eGFR if Non African American: 89 mL/min/1.73M2 (21 @ 12:17)  eGFR if : 103 mL/min/1.73M2 (21 @ 12:17)    LIVER FUNCTIONS - ( 2021 17:42 )  Alb: x     / Pro: x     / ALK PHOS: x     / ALT: x     / AST: x     / GGT: 53 U/L       Urinalysis Basic - ( 2021 14:39 )    Color: Marta / Appearance: Slightly Turbid / S.019 / pH: x  Gluc: x / Ketone: Trace  / Bili: Negative / Urobili: 12 mg/dL   Blood: x / Protein: 30 mg/dL / Nitrite: Negative   Leuk Esterase: Small / RBC: 1 /HPF / WBC 45 /HPF   Sq Epi: x / Non Sq Epi: 0 /HPF / Bacteria: Many        Culture - Urine (collected 21 @ 22:07)  Source: .Urine Clean Catch (Midstream)  Preliminary Report (21 @ 17:32):    >100,000 CFU/ml Klebsiella pneumoniae    Culture - Blood (collected 21 @ 07:34)  Source: .Blood None  Final Report (21 @ 17:00):    No Growth Final    Culture - Blood (collected 21 @ 21:38)  Source: .Blood None  Final Report (21 @ 05:00):    No Growth Final    Culture - Blood (collected 21 @ 16:00)  Source: .Blood Blood  Final Report (21 @ 22:01):    No Growth Final    Culture - Blood (collected 21 @ 16:00)  Source: .Blood Blood  Gram Stain (21 @ 08:45):    Growth in anaerobic bottle: Gram Positive Cocci in Clusters  Final Report (21 @ 10:51):    Growth in anaerobic bottle: Staphylococcus epidermidis Coag Negative    Staphylococcus    Single set isolate, possible contaminant. Contact    Microbiology if susceptibility testing clinically    indicated.    ***Blood Panel PCR results on this specimen are available    approximately 3 hours after the Gram stain result.***    Gram stain, PCR, and/or culture results may not always    correspond due to difference in methodologies.    ************************************************************    This PCR assay was performed by multiplex PCR. This    Assay tests for 66 bacterial and resistance gene targets.    Please refer to the [facility] [employer] test directory    at [email or web address] for details.  Organism: Blood Culture PCR (21 @ 10:51)  Organism: Blood Culture PCR (21 @ 10:51)      -  Staphylococcus epidermidis, Methicillin resistant: Detec      Method Type: PCR    Culture - Blood (collected 21 @ 16:00)  Source: .Blood Blood-Peripheral  Final Report (21 @ 01:00):    No Growth Final    Culture - Blood (collected 21 @ 12:55)  Source: .Blood None  Final Report (21 @ 23:01):    No Growth Final    Culture - CSF with Gram Stain (collected 21 @ 14:00)  Source: .CSF CSF  Gram Stain (21 @ 21:44):    No polymorphonuclear cells seen    No organisms seen    by cytocentrifuge  Final Report (21 @ 15:36):    No growth    Culture - Acid Fast - Tissue w/Smear (collected 21 @ 16:18)  Source: .Tissue None  Preliminary Report (04-10-21 @ 15:03):    No growth at 1 week.    Culture - Tissue with Gram Stain (collected 21 @ 16:18)  Source: .Tissue None  Gram Stain (21 @ 07:05):    Few polymorphonuclear leukocytes seen per low power field    Numerous Gram Negative Rods seen per oil power field  Final Report (21 @ 11:01):    Numerous Klebsiella pneumoniae ESBL    Few Enterococcus faecalis (vancomycin resistant)    Few Pseudomonas aeruginosa (Carbapenem Resistant)  Organism: Klebsiella pneumoniae ESBL  Enterococcus faecalis (vancomycin resistant)  Pseudomonas aeruginosa (Carbapenem Resistant) (21 @ 11:01)  Organism: Pseudomonas aeruginosa (Carbapenem Resistant) (21 @ 11:01)      -  Amikacin: S <=16      -  Aztreonam: S 8      -  Cefepime: I 16      -  Ceftazidime: I 16      -  Ciprofloxacin: R >2      -  Gentamicin: I 8      -  Imipenem: R >8      -  Levofloxacin: R >4      -  Meropenem: R 8      -  Piperacillin/Tazobactam: I 64      -  Tobramycin: S <=2      Method Type: JAZMIN  Organism: Enterococcus faecalis (vancomycin resistant) (21 @ 11:01)      -  Ampicillin: S 8 Predicts results to ampicillin/sulbactam, amoxacillin-clavulanate and  piperacillin-tazobactam.      -  Daptomycin: S 1      -  Levofloxacin: R >4      -  Linezolid: S 1      -  Tetra/Doxy: R >8      -  Vancomycin: R >16      Method Type: JAZMIN  Organism: Klebsiella pneumoniae ESBL (21 @ 11:01)      -  Amikacin: S <=16      -  Amoxicillin/Clavulanic Acid: S <=8/4      -  Ampicillin: R >16 These ampicillin results predict results for amoxicillin      -  Ampicillin/Sulbactam: R >16/8 Enterobacter, Citrobacter, and Serratia may develop resistance during prolonged therapy (3-4 days)      -  Aztreonam: R >16      -  Cefazolin: R >16 Enterobacter, Citrobacter, and Serratia may develop resistance during prolonged therapy (3-4 days)      -  Cefepime: R >16      -  Cefoxitin: S <=8      -  Ceftriaxone: R >32 Enterobacter, Citrobacter, and Serratia may develop resistance during prolonged therapy      -  Ciprofloxacin: R >2      -  Ertapenem: S <=0.5      -  Gentamicin: S <=2      -  Imipenem: S <=1      -  Levofloxacin: R 2      -  Meropenem: S <=1      -  Piperacillin/Tazobactam: R <=8      -  Tobramycin: S <=2      -  Trimethoprim/Sulfamethoxazole: R >2/38      Method Type: JAZMIN    Culture - Other (collected 21 @ 17:15)  Source: .Other sacrum  Final Report (21 @ 16:53):    Numerous Klebsiella pneumoniae ESBL    Few Pseudomonas aeruginosa    Numerous Enterococcus faecalis (vancomycin resistant)  Organism: Klebsiella pneumoniae ESBL  Pseudomonas aeruginosa  Enterococcus faecalis (vancomycin resistant) (21 @ 16:53)  Organism: Enterococcus faecalis (vancomycin resistant) (21 @ 16:53)      -  Ampicillin: S <=2 Predicts results to ampicillin/sulbactam, amoxacillin-clavulanate and  piperacillin-tazobactam.      -  Daptomycin: S 1      -  Levofloxacin: R >4      -  Linezolid: S 2      -  Tetra/Doxy: R >8      -  Vancomycin: R >16      Method Type: JAZMIN  Organism: Pseudomonas aeruginosa (21 @ 16:53)      -  Amikacin: S <=16      -  Aztreonam: I 16      -  Cefepime: S 8      -  Ceftazidime: S 4      -  Ciprofloxacin: R >2      -  Gentamicin: I 8      -  Imipenem: I 4      -  Levofloxacin: R >4      -  Meropenem: S <=1      -  Piperacillin/Tazobactam: S 16      -  Tobramycin: S <=2      Method Type: JAZMIN  Organism: Klebsiella pneumoniae ESBL (21 @ 16:53)      -  Amikacin: S <=16      -  Amoxicillin/Clavulanic Acid: S <=8/4      -  Ampicillin: R >16 These ampicillin results predict results for amoxicillin      -  Ampicillin/Sulbactam: R >16/8 Enterobacter, Citrobacter, and Serratia may develop resistance during prolonged therapy (3-4 days)      -  Aztreonam: R >16      -  Cefazolin: R >16 Enterobacter, Citrobacter, and Serratia may develop resistance during prolonged therapy (3-4 days)      -  Cefepime: R >16      -  Cefoxitin: S <=8      -  Ceftriaxone: R >32 Enterobacter, Citrobacter, and Serratia may develop resistance during prolonged therapy      -  Ciprofloxacin: R >2      -  Ertapenem: S <=0.5      -  Gentamicin: S <=2      -  Imipenem: S <=1      -  Levofloxacin: R 2      -  Meropenem: S <=1      -  Piperacillin/Tazobactam: R <=8      -  Tobramycin: S <=2      -  Trimethoprim/Sulfamethoxazole: R >2/38      Method Type: JAZMIN        Lactate, Blood: 0.9 mmol/L (21 @ 17:42)      INFECTIOUS DISEASES TESTING  COVID-19 PCR: NotDetec (21 @ 10:31)  COVID-19 PCR: NotDetec (21 @ 14:29)  COVID-19 PCR: NotDetec (21 @ 15:00)  COVID-19 PCR: NotDetec (21 @ 14:55)  Procalcitonin, Serum: 0.16 (21 @ 12:32)  Vancomycin Level, Random: 11.5 (21 @ 07:00)  Vancomycin Level, Trough: 11.5 (21 @ 07:00)  COVID-19 PCR: NotDetec (21 @ 15:19)  COVID-19 PCR: NotDetec (21 @ 19:40)  Vancomycin Level, Trough: <4.0 (21 @ 13:00)  Procalcitonin, Serum: 0.61 (21 @ 10:20)  COVID-19 PCR: NotDetec (21 @ 20:13)  COVID-19 PCR: NotDetec (03-10-21 @ 12:22)  COVID-19 PCR: NotDetec (21 @ 16:49)  Fungitell: 62 (21 @ 11:00)  Procalcitonin, Serum: 0.29 (21 @ 05:32)  MRSA PCR Result.: Negative (21 @ 15:44)  COVID-19 PCR: NotDetec (21 @ 15:34)  Procalcitonin, Serum: 0.27 (21 @ 10:54)  MRSA PCR Result.: Negative (21 @ 18:29)  HIV-1/2 Combo Result: Nonreact (21 @ 05:07)  Procalcitonin, Serum: 0.46 (21 @ 16:00)  COVID-19 PCR: NotDetec (21 @ 10:17)      INFLAMMATORY MARKERS      RADIOLOGY & ADDITIONAL TESTS:  I have personally reviewed the last available Chest xray  CXR  Xray Chest 1 View- PORTABLE-Urgent:   EXAM:  XR CHEST PORTABLE URGENT 1V            PROCEDURE DATE:  2021            INTERPRETATION:  Clinical History / Reason for exam: Evaluate for pneumonia    Comparison : Chest radiograph 2021.    Technique/Positioning: Frontal chest radiograph.    Findings:    Support devices: A tubing is seen overlying the left upper quadrant of the abdomen/lower chest.    Cardiac/mediastinum/hilum: Unchanged.    Lung parenchyma/Pleura: Small bilateral pleural effusions and retrocardiac opacity noted. No pneumothorax. Low lung volumes.    Skeleton/soft tissues: Chronic left shoulder dislocation.    Impression:    Small bilateral pleural effusions and retrocardiac opacity.              GITA VERA MD; Attending Radiologist  This document has been electronically signed. 2021  2:51PM (21 @ 14:05)      CT      CARDIOLOGY TESTING      MEDICATIONS  apixaban 2.5 Oral two times a day  ascorbic acid 500 Oral daily  atorvastatin 40 Oral at bedtime  carbidopa/levodopa  25/100 2 Oral <User Schedule>  chlorhexidine 4% Liquid 1 Topical <User Schedule>  collagenase Ointment 1 Topical two times a day  Dakins Solution - 1/2 Strength 1 Topical two times a day  magnesium oxide 400 Oral every 8 hours  midodrine 10 Oral every 8 hours  piperacillin/tazobactam IVPB.. 3.375 IV Intermittent every 8 hours  senna 2 Oral at bedtime  zinc sulfate 220 Oral daily      WEIGHT  Weight (kg): 79.4 (21 @ 15:48)  Creatinine, Serum: 0.8 mg/dL (21 @ 12:17)      ANTIBIOTICS:  piperacillin/tazobactam IVPB.. 3.375 Gram(s) IV Intermittent every 8 hours      All available historical records have been reviewed       NIEVES LABOY  71y, Male  Allergy: No Known Allergies      LOS  36d    CHIEF COMPLAINT: Change in Mental Status (2021 16:08)      INTERVAL EVENTS/HPI  - febrile , UCX Kleb  - T(F): , Max: 101 (21 @ 22:45)  - Tolerating medication  - WBC Count: 13.86 (21 @ 17:42)  WBC Count: 13.64 (21 @ 12:17)  - Creatinine, Serum: 0.8 (21 @ 12:17)       ROS  unable to obtain history secondary to patient's mental status and/or sedation     VITALS:  T(F): 98.4, Max: 101 (21 @ 22:45)  HR: 88  BP: 132/58  RR: 22Vital Signs Last 24 Hrs  T(C): 36.9 (2021 05:45), Max: 38.3 (2021 22:45)  T(F): 98.4 (2021 05:45), Max: 101 (2021 22:45)  HR: 88 (2021 05:45) (83 - 94)  BP: 132/58 (2021 05:45) (132/58 - 137/58)  BP(mean): 85 (2021 05:45) (84 - 86)  RR: 22 (2021 05:45) (19 - 22)  SpO2: 99% (2021 05:45) (99% - 99%)    PHYSICAL EXAM:  Gen: chronically ill appearing, heavy breathing  HEENT: Normocephalic, atraumatic  Neck: supple, no lymphadenopathy  CV: Regular rate & regular rhythm  Lungs: decreased BS at bases, no fremitus  Abdomen: Soft, BS present PEG  Ext: Warm, well perfused anasarca  Neuro: obtunded  Skin: no rash, no erythema  Lines: no phlebitis      FH: Non-contributory  Social Hx: Non-contributory    TESTS & MEASUREMENTS:                        7.7    13.86 )-----------( 134      ( 2021 17:42 )             24.8         136  |  103  |  20  ----------------------------<  107<H>  4.3   |  26  |  0.8    Ca    8.0<L>      2021 12:17    TPro  6.3  /  Alb  2.0<L>  /  TBili  1.0  /  DBili  x   /  AST  230<H>  /  ALT  32  /  AlkPhos  599<H>      eGFR if Non African American: 89 mL/min/1.73M2 (21 @ 12:17)  eGFR if : 103 mL/min/1.73M2 (21 @ 12:17)    LIVER FUNCTIONS - ( 2021 17:42 )  Alb: x     / Pro: x     / ALK PHOS: x     / ALT: x     / AST: x     / GGT: 53 U/L       Urinalysis Basic - ( 2021 14:39 )    Color: Marta / Appearance: Slightly Turbid / S.019 / pH: x  Gluc: x / Ketone: Trace  / Bili: Negative / Urobili: 12 mg/dL   Blood: x / Protein: 30 mg/dL / Nitrite: Negative   Leuk Esterase: Small / RBC: 1 /HPF / WBC 45 /HPF   Sq Epi: x / Non Sq Epi: 0 /HPF / Bacteria: Many        Culture - Urine (collected 21 @ 22:07)  Source: .Urine Clean Catch (Midstream)  Preliminary Report (21 @ 17:32):    >100,000 CFU/ml Klebsiella pneumoniae    Culture - Blood (collected 21 @ 07:34)  Source: .Blood None  Final Report (21 @ 17:00):    No Growth Final    Culture - Blood (collected 21 @ 21:38)  Source: .Blood None  Final Report (21 @ 05:00):    No Growth Final    Culture - Blood (collected 21 @ 16:00)  Source: .Blood Blood  Final Report (21 @ 22:01):    No Growth Final    Culture - Blood (collected 21 @ 16:00)  Source: .Blood Blood  Gram Stain (21 @ 08:45):    Growth in anaerobic bottle: Gram Positive Cocci in Clusters  Final Report (21 @ 10:51):    Growth in anaerobic bottle: Staphylococcus epidermidis Coag Negative    Staphylococcus    Single set isolate, possible contaminant. Contact    Microbiology if susceptibility testing clinically    indicated.    ***Blood Panel PCR results on this specimen are available    approximately 3 hours after the Gram stain result.***    Gram stain, PCR, and/or culture results may not always    correspond due to difference in methodologies.    ************************************************************    This PCR assay was performed by multiplex PCR. This    Assay tests for 66 bacterial and resistance gene targets.    Please refer to the [facility] test directory    at [email or web address] for details.  Organism: Blood Culture PCR (21 @ 10:51)  Organism: Blood Culture PCR (21 @ 10:51)      -  Staphylococcus epidermidis, Methicillin resistant: Detec      Method Type: PCR    Culture - Blood (collected 21 @ 16:00)  Source: .Blood Blood-Peripheral  Final Report (21 @ 01:00):    No Growth Final    Culture - Blood (collected 21 @ 12:55)  Source: .Blood None  Final Report (21 @ 23:01):    No Growth Final    Culture - CSF with Gram Stain (collected 21 @ 14:00)  Source: .CSF CSF  Gram Stain (21 @ 21:44):    No polymorphonuclear cells seen    No organisms seen    by cytocentrifuge  Final Report (21 @ 15:36):    No growth    Culture - Acid Fast - Tissue w/Smear (collected 21 @ 16:18)  Source: .Tissue None  Preliminary Report (04-10-21 @ 15:03):    No growth at 1 week.    Culture - Tissue with Gram Stain (collected 21 @ 16:18)  Source: .Tissue None  Gram Stain (21 @ 07:05):    Few polymorphonuclear leukocytes seen per low power field    Numerous Gram Negative Rods seen per oil power field  Final Report (21 @ 11:01):    Numerous Klebsiella pneumoniae ESBL    Few Enterococcus faecalis (vancomycin resistant)    Few Pseudomonas aeruginosa (Carbapenem Resistant)  Organism: Klebsiella pneumoniae ESBL  Enterococcus faecalis (vancomycin resistant)  Pseudomonas aeruginosa (Carbapenem Resistant) (21 @ 11:01)  Organism: Pseudomonas aeruginosa (Carbapenem Resistant) (21 @ 11:01)      -  Amikacin: S <=16      -  Aztreonam: S 8      -  Cefepime: I 16      -  Ceftazidime: I 16      -  Ciprofloxacin: R >2      -  Gentamicin: I 8      -  Imipenem: R >8      -  Levofloxacin: R >4      -  Meropenem: R 8      -  Piperacillin/Tazobactam: I 64      -  Tobramycin: S <=2      Method Type: JAZMIN  Organism: Enterococcus faecalis (vancomycin resistant) (21 @ 11:01)      -  Ampicillin: S 8 Predicts results to ampicillin/sulbactam, amoxacillin-clavulanate and  piperacillin-tazobactam.      -  Daptomycin: S 1      -  Levofloxacin: R >4      -  Linezolid: S 1      -  Tetra/Doxy: R >8      -  Vancomycin: R >16      Method Type: JAZMIN  Organism: Klebsiella pneumoniae ESBL (21 @ 11:01)      -  Amikacin: S <=16      -  Amoxicillin/Clavulanic Acid: S <=8/4      -  Ampicillin: R >16 These ampicillin results predict results for amoxicillin      -  Ampicillin/Sulbactam: R >16/8 Enterobacter, Citrobacter, and Serratia may develop resistance during prolonged therapy (3-4 days)      -  Aztreonam: R >16      -  Cefazolin: R >16 Enterobacter, Citrobacter, and Serratia may develop resistance during prolonged therapy (3-4 days)      -  Cefepime: R >16      -  Cefoxitin: S <=8      -  Ceftriaxone: R >32 Enterobacter, Citrobacter, and Serratia may develop resistance during prolonged therapy      -  Ciprofloxacin: R >2      -  Ertapenem: S <=0.5      -  Gentamicin: S <=2      -  Imipenem: S <=1      -  Levofloxacin: R 2      -  Meropenem: S <=1      -  Piperacillin/Tazobactam: R <=8      -  Tobramycin: S <=2      -  Trimethoprim/Sulfamethoxazole: R >2/38      Method Type: JAZMIN    Culture - Other (collected 21 @ 17:15)  Source: .Other sacrum  Final Report (21 @ 16:53):    Numerous Klebsiella pneumoniae ESBL    Few Pseudomonas aeruginosa    Numerous Enterococcus faecalis (vancomycin resistant)  Organism: Klebsiella pneumoniae ESBL  Pseudomonas aeruginosa  Enterococcus faecalis (vancomycin resistant) (21 @ 16:53)  Organism: Enterococcus faecalis (vancomycin resistant) (21 @ 16:53)      -  Ampicillin: S <=2 Predicts results to ampicillin/sulbactam, amoxacillin-clavulanate and  piperacillin-tazobactam.      -  Daptomycin: S 1      -  Levofloxacin: R >4      -  Linezolid: S 2      -  Tetra/Doxy: R >8      -  Vancomycin: R >16      Method Type: JAZMIN  Organism: Pseudomonas aeruginosa (21 @ 16:53)      -  Amikacin: S <=16      -  Aztreonam: I 16      -  Cefepime: S 8      -  Ceftazidime: S 4      -  Ciprofloxacin: R >2      -  Gentamicin: I 8      -  Imipenem: I 4      -  Levofloxacin: R >4      -  Meropenem: S <=1      -  Piperacillin/Tazobactam: S 16      -  Tobramycin: S <=2      Method Type: JAZMIN  Organism: Klebsiella pneumoniae ESBL (21 @ 16:53)      -  Amikacin: S <=16      -  Amoxicillin/Clavulanic Acid: S <=8/4      -  Ampicillin: R >16 These ampicillin results predict results for amoxicillin      -  Ampicillin/Sulbactam: R >16/8 Enterobacter, Citrobacter, and Serratia may develop resistance during prolonged therapy (3-4 days)      -  Aztreonam: R >16      -  Cefazolin: R >16 Enterobacter, Citrobacter, and Serratia may develop resistance during prolonged therapy (3-4 days)      -  Cefepime: R >16      -  Cefoxitin: S <=8      -  Ceftriaxone: R >32 Enterobacter, Citrobacter, and Serratia may develop resistance during prolonged therapy      -  Ciprofloxacin: R >2      -  Ertapenem: S <=0.5      -  Gentamicin: S <=2      -  Imipenem: S <=1      -  Levofloxacin: R 2      -  Meropenem: S <=1      -  Piperacillin/Tazobactam: R <=8      -  Tobramycin: S <=2      -  Trimethoprim/Sulfamethoxazole: R >2/38      Method Type: JAZMIN        Lactate, Blood: 0.9 mmol/L (21 @ 17:42)      INFECTIOUS DISEASES TESTING  COVID-19 PCR: NotDetec (21 @ 10:31)  COVID-19 PCR: NotDetec (21 @ 14:29)  COVID-19 PCR: NotDetec (21 @ 15:00)  COVID-19 PCR: NotDetec (21 @ 14:55)  Procalcitonin, Serum: 0.16 (21 @ 12:32)  Vancomycin Level, Random: 11.5 (21 @ 07:00)  Vancomycin Level, Trough: 11.5 (21 @ 07:00)  COVID-19 PCR: NotDetec (21 @ 15:19)  COVID-19 PCR: NotDetec (21 @ 19:40)  Vancomycin Level, Trough: <4.0 (21 @ 13:00)  Procalcitonin, Serum: 0.61 (21 @ 10:20)  COVID-19 PCR: NotDetec (21 @ 20:13)  COVID-19 PCR: NotDetec (03-10-21 @ 12:22)  COVID-19 PCR: NotDetec (21 @ 16:49)  Fungitell: 62 (21 @ 11:00)  Procalcitonin, Serum: 0.29 (21 @ 05:32)  MRSA PCR Result.: Negative (21 @ 15:44)  COVID-19 PCR: NotDetec (21 @ 15:34)  Procalcitonin, Serum: 0.27 (21 @ 10:54)  MRSA PCR Result.: Negative (21 @ 18:29)  HIV-1/2 Combo Result: Nonreact (21 @ 05:07)  Procalcitonin, Serum: 0.46 (21 @ 16:00)  COVID-19 PCR: NotDetec (21 @ 10:17)      INFLAMMATORY MARKERS      RADIOLOGY & ADDITIONAL TESTS:  I have personally reviewed the last available Chest xray  CXR  Xray Chest 1 View- PORTABLE-Urgent:   EXAM:  XR CHEST PORTABLE URGENT 1V            PROCEDURE DATE:  2021            INTERPRETATION:  Clinical History / Reason for exam: Evaluate for pneumonia    Comparison : Chest radiograph 2021.    Technique/Positioning: Frontal chest radiograph.    Findings:    Support devices: A tubing is seen overlying the left upper quadrant of the abdomen/lower chest.    Cardiac/mediastinum/hilum: Unchanged.    Lung parenchyma/Pleura: Small bilateral pleural effusions and retrocardiac opacity noted. No pneumothorax. Low lung volumes.    Skeleton/soft tissues: Chronic left shoulder dislocation.    Impression:    Small bilateral pleural effusions and retrocardiac opacity.              GITA VERA MD; Attending Radiologist  This document has been electronically signed. 2021  2:51PM (21 @ 14:05)      CT      CARDIOLOGY TESTING      MEDICATIONS  apixaban 2.5 Oral two times a day  ascorbic acid 500 Oral daily  atorvastatin 40 Oral at bedtime  carbidopa/levodopa  25/100 2 Oral <User Schedule>  chlorhexidine 4% Liquid 1 Topical <User Schedule>  collagenase Ointment 1 Topical two times a day  Dakins Solution - 1/2 Strength 1 Topical two times a day  magnesium oxide 400 Oral every 8 hours  midodrine 10 Oral every 8 hours  piperacillin/tazobactam IVPB.. 3.375 IV Intermittent every 8 hours  senna 2 Oral at bedtime  zinc sulfate 220 Oral daily      WEIGHT  Weight (kg): 79.4 (21 @ 15:48)  Creatinine, Serum: 0.8 mg/dL (21 @ 12:17)      ANTIBIOTICS:  piperacillin/tazobactam IVPB.. 3.375 Gram(s) IV Intermittent every 8 hours      All available historical records have been reviewed

## 2021-04-27 NOTE — CONSULT NOTE ADULT - SUBJECTIVE AND OBJECTIVE BOX
NIEVES LABOY [id number]  71y Male  36d    HPI:  71 year old Male with past medical history of Parkinson's, dementia, CKD Stage 3, 1st degree AV block, HLD, gout, HTN, DM, fungal septicemia presenting from [facility] for acute decompensation in mental status.     As per documentation patient at baseline is verbal but for past 3 days, he has been worsening to state of being non verbal. Patient was admitted to [facility] for fungemia and discharged on the . At  he had left hand cellulitis and was treated for it.    In ED patient hemodynamically stable, afebrile, ABEBE with rise in Cr to 1.9 and BUN to 75, remains non verbal. CT head negative for any new strokes and UA negative. Family has not visited the patient recently so unaware of his status. (22 Mar 2021 23:59)    During patients hospitalization, he underwent PEG placement by IR on 2021. In the interim he developed elevated Transaminases, U/S revealing of sludge but otherwise equivocal for acute cholecystitis. HIDA scan performed  and did not show uptake within the gallbladder. Surgery consulted for consideration of cholecystectomy. On exam patient is non-verbal, lethargic, and unable to provide history of present illness.      PAST MEDICAL & SURGICAL HISTORY:  Parkinson disease    Hypertension    Gout    Dyslipidemia    Prostatitis    Cataract    No significant past surgical history          MEDICATIONS  (STANDING):  apixaban 2.5 milliGRAM(s) Oral two times a day  ascorbic acid 500 milliGRAM(s) Oral daily  atorvastatin 40 milliGRAM(s) Oral at bedtime  carbidopa/levodopa  25/100 2 Tablet(s) Oral <User Schedule>  chlorhexidine 4% Liquid 1 Application(s) Topical <User Schedule>  collagenase Ointment 1 Application(s) Topical two times a day  Dakins Solution - 1/2 Strength 1 Application(s) Topical two times a day  magnesium oxide 400 milliGRAM(s) Oral every 8 hours  meropenem  IVPB      meropenem  IVPB 1000 milliGRAM(s) IV Intermittent every 8 hours  midodrine 10 milliGRAM(s) Oral every 8 hours  senna 2 Tablet(s) Oral at bedtime  zinc sulfate 220 milliGRAM(s) Oral daily    MEDICATIONS  (PRN):  acetaminophen   Tablet .. 650 milliGRAM(s) Oral every 6 hours PRN Temp greater or equal to 38C (100.4F)      Allergies    No Known Allergies    Intolerances        REVIEW OF SYSTEMS    [ ] A ten-point review of systems was otherwise negative except as noted.  [x] Due to altered mental status/intubation, subjective information were not able to be obtained from the patient. History was obtained, to the extent possible, from review of the chart and collateral sources of information.      Vital Signs Last 24 Hrs  T(C): 36.9 (2021 05:45), Max: 38.3 (2021 22:45)  T(F): 98.4 (2021 05:45), Max: 101 (2021 22:45)  HR: 88 (2021 05:45) (83 - 94)  BP: 132/58 (2021 05:45) (132/58 - 137/58)  BP(mean): 85 (2021 05:45) (84 - 86)  RR: 22 (2021 05:45) (19 - 22)  SpO2: 99% (2021 05:45) (99% - 99%)    PHYSICAL EXAM:  GENERAL: Labored breathing, non-verbal, toxic appearing  CHEST/LUNG: Clear to auscultation bilaterally  HEART: Regular rate and rhythm by radial pulse  ABDOMEN: Soft, Nontender, Nondistended; PEG tube in place  EXTREMITIES:  No clubbing, cyanosis. + LUE Edema      LABS:  Labs:  CAPILLARY BLOOD GLUCOSE  93 (2021 05:45)      POCT Blood Glucose.: 98 mg/dL (2021 22:38)  POCT Blood Glucose.: 102 mg/dL (2021 17:05)                          8.1    18.80 )-----------( 126      ( 2021 08:28 )             26.3       Auto Immature Granulocyte %: 1.1 % (21 @ 08:28)  Auto Neutrophil %: 82.7 % (21 @ 08:28)  Auto Neutrophil %: 87.7 % (21 @ 17:42)        134<L>  |  101  |  24<H>  ----------------------------<  119<H>  4.8   |  26  |  1.1      Calcium, Total Serum: 8.3 mg/dL (21 @ 08:28)      LFTs:             6.5  | 0.8  | 120      ------------------[476     ( 2021 08:28 )  2.2  | x    | 25          Lipase:x      Amylase:x         Lactate, Blood: 0.9 mmol/L (21 @ 17:42)      Coags:    CARDIAC MARKERS ( 2021 17:42 )  x     / x     / 50 U/L / x     / x              Urinalysis Basic - ( 2021 14:39 )    Color: Marta / Appearance: Slightly Turbid / S.019 / pH: x  Gluc: x / Ketone: Trace  / Bili: Negative / Urobili: 12 mg/dL   Blood: x / Protein: 30 mg/dL / Nitrite: Negative   Leuk Esterase: Small / RBC: 1 /HPF / WBC 45 /HPF   Sq Epi: x / Non Sq Epi: 0 /HPF / Bacteria: Many        Culture - Urine (collected 2021 22:07)  Source: .Urine Clean Catch (Midstream)  Preliminary Report (2021 17:32):    >100,000 CFU/ml Klebsiella pneumoniae          RADIOLOGY & ADDITIONAL STUDIES:  < from: US Abdomen Limited (21 @ 15:36) >  IMPRESSION:    Gallbladder sludge with gallbladder wall thickening. Unable to evaluate for sonographic Giles's sign. Findings are equivocal for acute cholecystitis.    No biliary ductal dilatation.      < end of copied text >    < from: NM Hepatobiliary Imaging (21 @ 21:22) >  IMPRESSION:    NO DEFINITE VISUALIZATION OF THE GALLBLADDER THROUGH 4 HOURS POST-INJECTION, CONSISTENT WITH CHOLECYSTITIS, AS DESCRIBED ABOVE. CLINICAL CORRELATION IS SUGGESTED.    < end of copied text >

## 2021-04-27 NOTE — PROGRESS NOTE ADULT - ASSESSMENT
ASSESSMENT  71 year old Male with past medical history of Parkinson's, dementia, CKD Stage 3, 1st degree AV block, HLD, gout, HTN, DM, fungal septicemia presenting from [facility] for acute decompensation in mental status.     IMPRESSION  #Fever, suspected UTI    4/24 UCX Kleb   < from: [facility] Duplex Lower Ext Vein Scan, Bilat (04.21.21 @ 11:41) >  No evidence of deep venousthrombosis or superficial thrombophlebitis in the bilateral lower extremities.    4/24 UA WBC 82  #CoNS in blood, likely contaminant     only PIVs    4/8 BCX 1/2 sets   -  Staphylococcus epidermidis, Methicillin resistant: Detec     3/25 BCX 1/2 sets, 1/4 bottles Staphylococcus pettenkoferi, likely contaminant   #Resolved Fever with sepsis, not present on admission with metabolic encephalopathy, EEG + seizure, possibly in setting of sepsis secondary to large sacral infected decub    LP not consistent with infection.. G/S NEGATIVE (on ABX)    Total Nucleated Cell Count, CSF: 0 /uL (04.01.21 @ 14:00)    Protein, CSF: 42 mg/dL (04.01.21 @ 14:00)    Glucose, CSF: 97 mg/dL (04.01.21 @ 14:00)    3/25 BCX 1/2 sets, 1/4 bottles Staphylococcus pettenkoferi, likely contaminant     CXR no PNA   3/22 Blood & Urine cxs NGTD   #Sacral ulcer- necrotic     3/31 WCX GNR    3/29   Numerous Klebsiella pneumoniae ESBL    Few CRE Pseudomonas aeruginosa (high MICs to AGs)    Numerous Enterococcus faecalis (vancomycin resistant)- S amp    seen by Burn sacrum with full thickness ~4x5cm with dark /brown devitalized tissue at base; no purulent drainage, no bleeding  #Recent Fungemia    Blood Cx 2/27 Candida albicans    evaluated by optho - no ocular evidence     TTE no significant valvulopathy   #Shoulder dislocation with effusion- joint exam not consistent with a septic arthritis  < from: CT Chest w/ IV Cont (04.07.21 @ 20:31) >  Similar appearance of the left shoulder with complex joint effusion, described in detail on prior exam CT shoulder 2/20/2021.  #ABEBE, resolved  #L hand edema  < from: Xray Wrist 2 Views, Left (03.27.21 @ 11:13) >No acute fracture or dislocation. Diffuse soft tissue swelling. Nonaggressive appearing lucency in the distal radius, indeterminate. Nonemergent MRI may be obtained for further evaluation.    Creatinine, Serum: 0.8 mg/dL (04.26.21 @ 12:17)      RECOMMENDATIONS  - f/u BCX  - f/u UCX Kleb  - Given previous Kleb isolate was ESBL, d/ngoc zosyn and changed to Brady 1g q8h IV  - If hemodynamic compromise, dose amikacin 5mg/kg x1  - Appreciate Burn consult   - Contact isolation CRE  - GOC, grave prognosis    If any questions, please call or send a message on [employer] Teams  Spectra [phone] ASSESSMENT  71 year old Male with past medical history of Parkinson's, dementia, CKD Stage 3, 1st degree AV block, HLD, gout, HTN, DM, fungal septicemia presenting from [facility] for acute decompensation in mental status.     IMPRESSION  #Fever, suspected acute cholecystitis vs UTI    4/24 UCX Kleb    HIDA +  < from: [facility] Duplex Lower Ext Vein Scan, Bilat (04.21.21 @ 11:41) >  No evidence of deep venousthrombosis or superficial thrombophlebitis in the bilateral lower extremities.    4/24 UA WBC 82  #CoNS in blood, likely contaminant     only PIVs    4/8 BCX 1/2 sets   -  Staphylococcus epidermidis, Methicillin resistant: Detec     3/25 BCX 1/2 sets, 1/4 bottles Staphylococcus pettenkoferi, likely contaminant   #Resolved Fever with sepsis, not present on admission with metabolic encephalopathy, EEG + seizure, possibly in setting of sepsis secondary to large sacral infected decub    LP not consistent with infection.. G/S NEGATIVE (on ABX)    Total Nucleated Cell Count, CSF: 0 /uL (04.01.21 @ 14:00)    Protein, CSF: 42 mg/dL (04.01.21 @ 14:00)    Glucose, CSF: 97 mg/dL (04.01.21 @ 14:00)    3/25 BCX 1/2 sets, 1/4 bottles Staphylococcus pettenkoferi, likely contaminant     CXR no PNA   3/22 Blood & Urine cxs NGTD   #Sacral ulcer- necrotic     3/31 WCX GNR    3/29   Numerous Klebsiella pneumoniae ESBL    Few CRE Pseudomonas aeruginosa (high MICs to AGs)    Numerous Enterococcus faecalis (vancomycin resistant)- S amp    seen by Burn sacrum with full thickness ~4x5cm with dark /brown devitalized tissue at base; no purulent drainage, no bleeding  #Recent Fungemia    Blood Cx 2/27 Candida albicans    evaluated by optho - no ocular evidence     TTE no significant valvulopathy   #Shoulder dislocation with effusion- joint exam not consistent with a septic arthritis  < from: CT Chest w/ IV Cont (04.07.21 @ 20:31) >  Similar appearance of the left shoulder with complex joint effusion, described in detail on prior exam CT shoulder 2/20/2021.  #ABEBE, resolved  #L hand edema  < from: Xray Wrist 2 Views, Left (03.27.21 @ 11:13) >No acute fracture or dislocation. Diffuse soft tissue swelling. Nonaggressive appearing lucency in the distal radius, indeterminate. Nonemergent MRI may be obtained for further evaluation.    Creatinine, Serum: 0.8 mg/dL (04.26.21 @ 12:17)      RECOMMENDATIONS  - f/u BCX  - f/u UCX Kleb  - consult IR for perc gayle  - trend LFTs, AP  - Given previous Kleb isolate was ESBL, d/ngoc zosyn and changed to Brady 1g q8h IV  - If hemodynamic compromise, dose amikacin 5mg/kg x1  - Appreciate Burn consult   - Contact isolation CRE  - GOC, grave prognosis    If any questions, please call or send a message on [employer] Teams  Spectra [phone]

## 2021-04-27 NOTE — PROGRESS NOTE ADULT - SUBJECTIVE AND OBJECTIVE BOX
LENGTH OF HOSPITAL STAY: 36d    CHIEF COMPLAINT:   Patient is a 71y old  Male who presents with a chief complaint of Change in Mental Status (2021 12:32)      HISTORY OF PRESENTING ILLNESS:    HPI:  71 year old Male with past medical history of Parkinson's, dementia, CKD Stage 3, 1st degree AV block, HLD, gout, HTN, DM, fungal septicemia presenting from [facility] for acute decompensation in mental status.     As per documentation patient at baseline is verbal but for past 3 days, he has been worsening to state of being non verbal. Patient was admitted to [facility] for fungemia and discharged on the . At  he had left hand cellulitis and was treated for it.    In ED patient hemodynamically stable, afebrile, ABEBE with rise in Cr to 1.9 and BUN to 75, remains non verbal. CT head negative for any new strokes and UA negative. Family has not visited the patient recently so unaware of his status. (22 Mar 2021 23:59)    PAST MEDICAL & SURGICAL HISTORY  PAST MEDICAL & SURGICAL HISTORY:  Parkinson disease    Hypertension    Gout    Dyslipidemia    Prostatitis    Cataract    No significant past surgical history      SOCIAL HISTORY:    ALLERGIES:  No Known Allergies    MEDICATIONS:  STANDING MEDICATIONS  ascorbic acid 500 milliGRAM(s) Oral daily  atorvastatin 40 milliGRAM(s) Oral at bedtime  carbidopa/levodopa  25/100 2 Tablet(s) Oral <User Schedule>  chlorhexidine 4% Liquid 1 Application(s) Topical <User Schedule>  collagenase Ointment 1 Application(s) Topical two times a day  Dakins Solution - 1/2 Strength 1 Application(s) Topical two times a day  magnesium oxide 400 milliGRAM(s) Oral every 8 hours  meropenem  IVPB      meropenem  IVPB 1000 milliGRAM(s) IV Intermittent every 8 hours  midodrine 10 milliGRAM(s) Oral every 8 hours  senna 2 Tablet(s) Oral at bedtime  zinc sulfate 220 milliGRAM(s) Oral daily    PRN MEDICATIONS  acetaminophen   Tablet .. 650 milliGRAM(s) Oral every 6 hours PRN    VITALS:   T(F): 97.6  HR: 80  BP: 119/56  RR: 20  SpO2: 99%    LABS:                        8.1    18.80 )-----------( 126      ( 2021 08:28 )             26.3     04-27    134<L>  |  101  |  24<H>  ----------------------------<  119<H>  4.8   |  26  |  1.1    Ca    8.3<L>      2021 08:28  Mg     2.0         TPro  6.5  /  Alb  2.2<L>  /  TBili  0.8  /  DBili  x   /  AST  120<H>  /  ALT  25  /  AlkPhos  476<H>        Urinalysis Basic - ( 2021 14:39 )    Color: Marta / Appearance: Slightly Turbid / S.019 / pH: x  Gluc: x / Ketone: Trace  / Bili: Negative / Urobili: 12 mg/dL   Blood: x / Protein: 30 mg/dL / Nitrite: Negative   Leuk Esterase: Small / RBC: 1 /HPF / WBC 45 /HPF   Sq Epi: x / Non Sq Epi: 0 /HPF / Bacteria: Many        Creatine Kinase, Serum: 50 U/L (21 @ 17:42)  Lactate, Blood: 0.9 mmol/L (21 @ 17:42)      Culture - Urine (collected 2021 22:07)  Source: .Urine Clean Catch (Midstream)  Preliminary Report (2021 17:32):    >100,000 CFU/ml Klebsiella pneumoniae      CARDIAC MARKERS ( 2021 17:42 )  x     / x     / 50 U/L / x     / x          RADIOLOGY:    PHYSICAL EXAM:  GEN: No acute distress. multiple skin decub  LUNGS: Clear to auscultation bilaterally.  HEART: Regular rate and rhythm. No murmurs.  ABD: Soft, non-tender, non-distended.  EXT: Normal range of motion. LUE>RUE swelling   NEURO: Alert and able to follow commands.   PSYCH: Cooperative, appropriate.

## 2021-04-27 NOTE — CONSULT NOTE ADULT - SUBJECTIVE AND OBJECTIVE BOX
INTERVENTIONAL RADIOLOGY CONSULT:     Procedure Requested: Perc choly placement    HPI:  71 year old Male with past medical history of Parkinson's, dementia, CKD Stage 3, 1st degree AV block, HLD, gout, HTN, DM, fungal septicemia presenting from [facility] for acute decompensation in mental status.     S/p peg tube placement by IR. Primary team is reconsulting IR for possible percutaneous cholecystostomy tube placement. Patient with elevated wbc (~18 from 13), fevers, and ultrasound and HIDA scan showing acute cholecystitis.      PAST MEDICAL & SURGICAL HISTORY:  Parkinson disease    Hypertension    Gout    Dyslipidemia    Prostatitis    Cataract    No significant past surgical history    MEDICATIONS  (STANDING):  apixaban 2.5 milliGRAM(s) Oral two times a day  ascorbic acid 500 milliGRAM(s) Oral daily  atorvastatin 40 milliGRAM(s) Oral at bedtime  carbidopa/levodopa  25/100 2 Tablet(s) Oral <User Schedule>  chlorhexidine 4% Liquid 1 Application(s) Topical <User Schedule>  collagenase Ointment 1 Application(s) Topical two times a day  Dakins Solution - 1/2 Strength 1 Application(s) Topical two times a day  magnesium oxide 400 milliGRAM(s) Oral every 8 hours  meropenem  IVPB      meropenem  IVPB 1000 milliGRAM(s) IV Intermittent every 8 hours  midodrine 10 milliGRAM(s) Oral every 8 hours  senna 2 Tablet(s) Oral at bedtime  zinc sulfate 220 milliGRAM(s) Oral daily    MEDICATIONS  (PRN):  acetaminophen   Tablet .. 650 milliGRAM(s) Oral every 6 hours PRN Temp greater or equal to 38C (100.4F)      Allergies    No Known Allergies    Intolerances          Physical Exam:   Vital Signs Last 24 Hrs  T(C): 36.9 (27 Apr 2021 05:45), Max: 38.3 (26 Apr 2021 22:45)  T(F): 98.4 (27 Apr 2021 05:45), Max: 101 (26 Apr 2021 22:45)  HR: 88 (27 Apr 2021 05:45) (83 - 94)  BP: 132/58 (27 Apr 2021 05:45) (132/58 - 137/58)  BP(mean): 85 (27 Apr 2021 05:45) (84 - 86)  RR: 22 (27 Apr 2021 05:45) (19 - 22)  SpO2: 99% (27 Apr 2021 05:45) (99% - 99%)      Pertinent labs:                      8.1    18.80 )-----------( 126      ( 27 Apr 2021 08:28 )             26.3       04-27    134<L>  |  101  |  24<H>  ----------------------------<  119<H>  4.8   |  26  |  1.1    Ca    8.3<L>      27 Apr 2021 08:28  Mg     2.0     04-27    TPro  6.5  /  Alb  2.2<L>  /  TBili  0.8  /  DBili  x   /  AST  120<H>  /  ALT  25  /  AlkPhos  476<H>  04-27          Radiology & Additional Studies:     Radiology imaging reviewed.       ASSESSMENT AND PLAN:      Thank you for the courtesy of this consult, please call [phone] with any further questions.    INTERVENTIONAL RADIOLOGY CONSULT:     Procedure Requested: Perc choly placement    HPI:  71 year old Male with past medical history of Parkinson's, dementia, CKD Stage 3, 1st degree AV block, HLD, gout, HTN, DM, fungal septicemia presenting from [facility] for acute decompensation in mental status.     S/p peg tube placement by IR. Primary team is reconsulting IR for possible percutaneous cholecystostomy tube placement. Patient with elevated wbc (~18 from 13), fevers, and ultrasound and HIDA scan showing acute cholecystitis.      PAST MEDICAL & SURGICAL HISTORY:  Parkinson disease    Hypertension    Gout    Dyslipidemia    Prostatitis    Cataract    No significant past surgical history    MEDICATIONS  (STANDING):  apixaban 2.5 milliGRAM(s) Oral two times a day  ascorbic acid 500 milliGRAM(s) Oral daily  atorvastatin 40 milliGRAM(s) Oral at bedtime  carbidopa/levodopa  25/100 2 Tablet(s) Oral <User Schedule>  chlorhexidine 4% Liquid 1 Application(s) Topical <User Schedule>  collagenase Ointment 1 Application(s) Topical two times a day  Dakins Solution - 1/2 Strength 1 Application(s) Topical two times a day  magnesium oxide 400 milliGRAM(s) Oral every 8 hours  meropenem  IVPB      meropenem  IVPB 1000 milliGRAM(s) IV Intermittent every 8 hours  midodrine 10 milliGRAM(s) Oral every 8 hours  senna 2 Tablet(s) Oral at bedtime  zinc sulfate 220 milliGRAM(s) Oral daily    MEDICATIONS  (PRN):  acetaminophen   Tablet .. 650 milliGRAM(s) Oral every 6 hours PRN Temp greater or equal to 38C (100.4F)      Allergies    No Known Allergies    Intolerances          Physical Exam:   Vital Signs Last 24 Hrs  T(C): 36.9 (27 Apr 2021 05:45), Max: 38.3 (26 Apr 2021 22:45)  T(F): 98.4 (27 Apr 2021 05:45), Max: 101 (26 Apr 2021 22:45)  HR: 88 (27 Apr 2021 05:45) (83 - 94)  BP: 132/58 (27 Apr 2021 05:45) (132/58 - 137/58)  BP(mean): 85 (27 Apr 2021 05:45) (84 - 86)  RR: 22 (27 Apr 2021 05:45) (19 - 22)  SpO2: 99% (27 Apr 2021 05:45) (99% - 99%)      Pertinent labs:                      8.1    18.80 )-----------( 126      ( 27 Apr 2021 08:28 )             26.3       04-27    134<L>  |  101  |  24<H>  ----------------------------<  119<H>  4.8   |  26  |  1.1    Ca    8.3<L>      27 Apr 2021 08:28  Mg     2.0     04-27    TPro  6.5  /  Alb  2.2<L>  /  TBili  0.8  /  DBili  x   /  AST  120<H>  /  ALT  25  /  AlkPhos  476<H>  04-27          Radiology & Additional Studies:     Radiology imaging reviewed.       ASSESSMENT AND PLAN:    #Acute cholecystitis - u/s and HIDA positive with elevated wbc and fevers.  - given that patient is hd stable will plan for possible perc choly tube placement after eliquis is held appropriate amount of time.  - Last dose eliquis this AM (4/27). Please hold eliquis for procedure, will need to hold 4 doses (48 hours) based on GFR. As such, will tentatively plan for procedure on Thursday afternoon but will also follow up surgery recommendations.  - Please make NPO at midnight prior to procedure  - please have covid swab within 3 days of procedure (last negative 4/17, needs repeat)  - please have recent coags (pt/ptt/inr). INR 1.19 on 4/17      Thank you for the courtesy of this consult, please call [phone] with any further questions.

## 2021-04-27 NOTE — CONSULT NOTE ADULT - ASSESSMENT
71y male with multiple medical comorbidities, currently admitted for worsening mental status. Patient with multiple +blood cultures within last month, last + candida in february, and MRSA staph epi on 4/8, and Urine cx + for ESBL Klebsiella (currently on Meropenem. Now with elevated LFTs in the setting of + HIDA and no stones on U/S (only sludge)    -Patient is extremely poor operative candidate  -LFTs downtrending  -Recommend consideration of IR percutaneous cholecystectomy

## 2021-04-27 NOTE — PROGRESS NOTE ADULT - SUBJECTIVE AND OBJECTIVE BOX
Patient is a 71y old  Male who presents with a chief complaint of Change in Mental Status (2021 13:36)    INTERVAL HPI/OVERNIGHT EVENTS: Patient was examined and seen at bedside. Events noted. Still able to follow some simple commands when awoken. Unable to obtain ROS.    InitialHPI:  71 year old Male with past medical history of Parkinson's, dementia, CKD Stage 3, 1st degree AV block, HLD, gout, HTN, DM, fungal septicemia presenting from [facility] for acute decompensation in mental status.   As per documentation patient at baseline is verbal but for past 3 days, he has been worsening to state of being non verbal. Patient was admitted to [facility] for fungemia and discharged on the . At  he had left hand cellulitis and was treated for it.  In ED patient hemodynamically stable, afebrile, ABEBE with rise in Cr to 1.9 and BUN to 75, remains non verbal. CT head negative for any new strokes and UA negative. Family has not visited the patient recently so unaware of his status. (22 Mar 2021 23:59)    PAST MEDICAL & SURGICAL HISTORY:  Parkinson disease    Hypertension    Gout    Dyslipidemia    Prostatitis    Cataract    No significant past surgical history        General: multiple skin decubiti  HEENT:  no LAD  CV: S1 S2  Resp: decreased breath sounds at bases  GI: ?RUQ tend, ND/S +BS, +PEG  MS: no clubbing/cyanosis/edema, + pulses b/l, LUE>RUE swelling  Neuro: unable to access  Skin: multiple skin decubs            MEDICATIONS  (STANDING):  ascorbic acid 500 milliGRAM(s) Oral daily  atorvastatin 40 milliGRAM(s) Oral at bedtime  carbidopa/levodopa  25/100 2 Tablet(s) Oral <User Schedule>  chlorhexidine 4% Liquid 1 Application(s) Topical <User Schedule>  collagenase Ointment 1 Application(s) Topical two times a day  Dakins Solution - 1/2 Strength 1 Application(s) Topical two times a day  magnesium oxide 400 milliGRAM(s) Oral every 8 hours  meropenem  IVPB      meropenem  IVPB 1000 milliGRAM(s) IV Intermittent every 8 hours  midodrine 10 milliGRAM(s) Oral every 8 hours  senna 2 Tablet(s) Oral at bedtime  zinc sulfate 220 milliGRAM(s) Oral daily    MEDICATIONS  (PRN):  acetaminophen   Tablet .. 650 milliGRAM(s) Oral every 6 hours PRN Temp greater or equal to 38C (100.4F)    Home Medications:  apixaban 2.5 mg oral tablet: 1 tab(s) orally 2 times a day (2021 15:32)  ascorbic acid 500 mg oral tablet: 1 tab(s) orally once a day (2021 15:32)  atorvastatin 40 mg oral tablet: 1 tab(s) orally once a day (at bedtime) (2021 15:32)  carbidopa-levodopa 25 mg-100 mg oral tablet, extended release: 2 tab(s) orally 4 times a day at 6AM,10Am,2PM,6PM,10 PM (2021 15:39)  collagenase 250 units/g topical ointment: 1 application topically 2 times a day (2021 15:32)  magnesium oxide 500 mg oral tablet: 1 tab(s) orally once a day (2021 15:32)  midodrine 10 mg oral tablet: 1 tab(s) orally every 8 hours (2021 16:30)  omeprazole 40 mg oral delayed release capsule: 1 cap(s) orally once a day (23 Mar 2021 01:07)  sodium hypochlorite 0.25% topical solution: 1 application topically 2 times a day (2021 15:32)  zinc sulfate 220 mg oral capsule: 1 cap(s) orally once a day (2021 15:32)    Vital Signs Last 24 Hrs  T(C): 36.4 (2021 12:46), Max: 38.3 (2021 22:45)  T(F): 97.6 (2021 12:46), Max: 101 (2021 22:45)  HR: 80 (2021 12:46) (80 - 94)  BP: 119/56 (2021 12:46) (119/56 - 137/58)  BP(mean): 85 (2021 05:45) (84 - 85)  RR: 20 (2021 12:46) (20 - 22)  SpO2: 99% (2021 05:45) (99% - 99%)  CAPILLARY BLOOD GLUCOSE  93 (2021 05:45)      POCT Blood Glucose.: 98 mg/dL (2021 22:38)    LABS:                        8.1    18.80 )-----------( 126      ( 2021 08:28 )             26.3         134<L>  |  101  |  24<H>  ----------------------------<  119<H>  4.8   |  26  |  1.1    Ca    8.3<L>      2021 08:28  Mg     2.0         TPro  6.5  /  Alb  2.2<L>  /  TBili  0.8  /  DBili  x   /  AST  120<H>  /  ALT  25  /  AlkPhos  476<H>      LIVER FUNCTIONS - ( 2021 08:28 )  Alb: 2.2 g/dL / Pro: 6.5 g/dL / ALK PHOS: 476 U/L / ALT: 25 U/L / AST: 120 U/L / GGT: x           CARDIAC MARKERS ( 2021 17:42 )  x     / x     / 50 U/L / x     / x            Urinalysis Basic - ( 2021 14:39 )    Color: Marta / Appearance: Slightly Turbid / S.019 / pH: x  Gluc: x / Ketone: Trace  / Bili: Negative / Urobili: 12 mg/dL   Blood: x / Protein: 30 mg/dL / Nitrite: Negative   Leuk Esterase: Small / RBC: 1 /HPF / WBC 45 /HPF   Sq Epi: x / Non Sq Epi: 0 /HPF / Bacteria: Many              Culture - Urine (collected 2021 14:39)  Source: .Urine Clean Catch (Midstream)  Preliminary Report (2021 19:19):    >100,000 CFU/ml Klebsiella pneumoniae    Culture - Urine (collected 2021 22:07)  Source: .Urine Clean Catch (Midstream)  Preliminary Report (2021 17:32):    >100,000 CFU/ml Klebsiella pneumoniae      Consultant Notes Reviewed:  [x ] YES  [ ] NO  Care Discussed with Consultants/Other Providers/ Housestaff [ x] YES  [ ] NO  Radiology, labs, new studies personally reviewed.

## 2021-04-28 LAB
-  AMIKACIN: SIGNIFICANT CHANGE UP
-  AMIKACIN: SIGNIFICANT CHANGE UP
-  AMOXICILLIN/CLAVULANIC ACID: SIGNIFICANT CHANGE UP
-  AMOXICILLIN/CLAVULANIC ACID: SIGNIFICANT CHANGE UP
-  AMPICILLIN/SULBACTAM: SIGNIFICANT CHANGE UP
-  AMPICILLIN/SULBACTAM: SIGNIFICANT CHANGE UP
-  AMPICILLIN: SIGNIFICANT CHANGE UP
-  AMPICILLIN: SIGNIFICANT CHANGE UP
-  AZTREONAM: SIGNIFICANT CHANGE UP
-  AZTREONAM: SIGNIFICANT CHANGE UP
-  CEFAZOLIN: SIGNIFICANT CHANGE UP
-  CEFAZOLIN: SIGNIFICANT CHANGE UP
-  CEFEPIME: SIGNIFICANT CHANGE UP
-  CEFEPIME: SIGNIFICANT CHANGE UP
-  CEFOXITIN: SIGNIFICANT CHANGE UP
-  CEFOXITIN: SIGNIFICANT CHANGE UP
-  CEFTRIAXONE: SIGNIFICANT CHANGE UP
-  CEFTRIAXONE: SIGNIFICANT CHANGE UP
-  CIPROFLOXACIN: SIGNIFICANT CHANGE UP
-  CIPROFLOXACIN: SIGNIFICANT CHANGE UP
-  ERTAPENEM: SIGNIFICANT CHANGE UP
-  ERTAPENEM: SIGNIFICANT CHANGE UP
-  GENTAMICIN: SIGNIFICANT CHANGE UP
-  GENTAMICIN: SIGNIFICANT CHANGE UP
-  IMIPENEM: SIGNIFICANT CHANGE UP
-  IMIPENEM: SIGNIFICANT CHANGE UP
-  LEVOFLOXACIN: SIGNIFICANT CHANGE UP
-  LEVOFLOXACIN: SIGNIFICANT CHANGE UP
-  MEROPENEM: SIGNIFICANT CHANGE UP
-  MEROPENEM: SIGNIFICANT CHANGE UP
-  NITROFURANTOIN: SIGNIFICANT CHANGE UP
-  NITROFURANTOIN: SIGNIFICANT CHANGE UP
-  PIPERACILLIN/TAZOBACTAM: SIGNIFICANT CHANGE UP
-  PIPERACILLIN/TAZOBACTAM: SIGNIFICANT CHANGE UP
-  TIGECYCLINE: SIGNIFICANT CHANGE UP
-  TIGECYCLINE: SIGNIFICANT CHANGE UP
-  TOBRAMYCIN: SIGNIFICANT CHANGE UP
-  TOBRAMYCIN: SIGNIFICANT CHANGE UP
-  TRIMETHOPRIM/SULFAMETHOXAZOLE: SIGNIFICANT CHANGE UP
-  TRIMETHOPRIM/SULFAMETHOXAZOLE: SIGNIFICANT CHANGE UP
ALBUMIN SERPL ELPH-MCNC: 2.1 G/DL — LOW (ref 3.5–5.2)
ALP SERPL-CCNC: 421 U/L — HIGH (ref 30–115)
ALT FLD-CCNC: 11 U/L — SIGNIFICANT CHANGE UP (ref 0–41)
ANION GAP SERPL CALC-SCNC: 10 MMOL/L — SIGNIFICANT CHANGE UP (ref 7–14)
ANION GAP SERPL CALC-SCNC: 7 MMOL/L — SIGNIFICANT CHANGE UP (ref 7–14)
ANISOCYTOSIS BLD QL: SLIGHT — SIGNIFICANT CHANGE UP
APPEARANCE UR: ABNORMAL
APTT BLD: 50.6 SEC — HIGH (ref 27–39.2)
AST SERPL-CCNC: 62 U/L — HIGH (ref 0–41)
BACTERIA # UR AUTO: NEGATIVE — SIGNIFICANT CHANGE UP
BASOPHILS # BLD AUTO: 0.04 K/UL — SIGNIFICANT CHANGE UP (ref 0–0.2)
BASOPHILS NFR BLD AUTO: 0.2 % — SIGNIFICANT CHANGE UP (ref 0–1)
BILIRUB SERPL-MCNC: 0.5 MG/DL — SIGNIFICANT CHANGE UP (ref 0.2–1.2)
BILIRUB UR-MCNC: NEGATIVE — SIGNIFICANT CHANGE UP
BLD GP AB SCN SERPL QL: SIGNIFICANT CHANGE UP
BUN SERPL-MCNC: 28 MG/DL — HIGH (ref 10–20)
BUN SERPL-MCNC: 28 MG/DL — HIGH (ref 10–20)
CALCIUM SERPL-MCNC: 8.1 MG/DL — LOW (ref 8.5–10.1)
CALCIUM SERPL-MCNC: 8.3 MG/DL — LOW (ref 8.5–10.1)
CHLORIDE SERPL-SCNC: 102 MMOL/L — SIGNIFICANT CHANGE UP (ref 98–110)
CHLORIDE SERPL-SCNC: 103 MMOL/L — SIGNIFICANT CHANGE UP (ref 98–110)
CK MB CFR SERPL CALC: 3.1 NG/ML — SIGNIFICANT CHANGE UP (ref 0.6–6.3)
CO2 SERPL-SCNC: 25 MMOL/L — SIGNIFICANT CHANGE UP (ref 17–32)
CO2 SERPL-SCNC: 28 MMOL/L — SIGNIFICANT CHANGE UP (ref 17–32)
COLOR SPEC: ABNORMAL
CREAT SERPL-MCNC: 1.1 MG/DL — SIGNIFICANT CHANGE UP (ref 0.7–1.5)
CREAT SERPL-MCNC: 1.3 MG/DL — SIGNIFICANT CHANGE UP (ref 0.7–1.5)
CULTURE RESULTS: SIGNIFICANT CHANGE UP
CULTURE RESULTS: SIGNIFICANT CHANGE UP
DIFF PNL FLD: NEGATIVE — SIGNIFICANT CHANGE UP
EOSINOPHIL # BLD AUTO: 0.07 K/UL — SIGNIFICANT CHANGE UP (ref 0–0.7)
EOSINOPHIL NFR BLD AUTO: 0.4 % — SIGNIFICANT CHANGE UP (ref 0–8)
EPI CELLS # UR: 1 /HPF — SIGNIFICANT CHANGE UP (ref 0–5)
GLUCOSE BLDC GLUCOMTR-MCNC: 109 MG/DL — HIGH (ref 70–99)
GLUCOSE BLDC GLUCOMTR-MCNC: 136 MG/DL — HIGH (ref 70–99)
GLUCOSE BLDC GLUCOMTR-MCNC: 92 MG/DL — SIGNIFICANT CHANGE UP (ref 70–99)
GLUCOSE BLDC GLUCOMTR-MCNC: 93 MG/DL — SIGNIFICANT CHANGE UP (ref 70–99)
GLUCOSE SERPL-MCNC: 126 MG/DL — HIGH (ref 70–99)
GLUCOSE SERPL-MCNC: 87 MG/DL — SIGNIFICANT CHANGE UP (ref 70–99)
GLUCOSE UR QL: NEGATIVE — SIGNIFICANT CHANGE UP
HCT VFR BLD CALC: 25.5 % — LOW (ref 42–52)
HCT VFR BLD CALC: 26.2 % — LOW (ref 42–52)
HGB BLD-MCNC: 7.5 G/DL — LOW (ref 14–18)
HGB BLD-MCNC: 7.6 G/DL — LOW (ref 14–18)
HYALINE CASTS # UR AUTO: 18 /LPF — HIGH (ref 0–7)
IMM GRANULOCYTES NFR BLD AUTO: 3.3 % — HIGH (ref 0.1–0.3)
INR BLD: 1.52 RATIO — HIGH (ref 0.65–1.3)
KETONES UR-MCNC: SIGNIFICANT CHANGE UP
LACTATE SERPL-SCNC: 0.7 MMOL/L — SIGNIFICANT CHANGE UP (ref 0.7–2)
LEUKOCYTE ESTERASE UR-ACNC: ABNORMAL
LYMPHOCYTES # BLD AUTO: 0.89 K/UL — LOW (ref 1.2–3.4)
LYMPHOCYTES # BLD AUTO: 4.7 % — LOW (ref 20.5–51.1)
MAGNESIUM SERPL-MCNC: 2 MG/DL — SIGNIFICANT CHANGE UP (ref 1.8–2.4)
MAGNESIUM SERPL-MCNC: 2.2 MG/DL — SIGNIFICANT CHANGE UP (ref 1.8–2.4)
MANUAL SMEAR VERIFICATION: SIGNIFICANT CHANGE UP
MCHC RBC-ENTMCNC: 26 PG — LOW (ref 27–31)
MCHC RBC-ENTMCNC: 26.1 PG — LOW (ref 27–31)
MCHC RBC-ENTMCNC: 29 G/DL — LOW (ref 32–37)
MCHC RBC-ENTMCNC: 29.4 G/DL — LOW (ref 32–37)
MCV RBC AUTO: 88.9 FL — SIGNIFICANT CHANGE UP (ref 80–94)
MCV RBC AUTO: 89.7 FL — SIGNIFICANT CHANGE UP (ref 80–94)
METHOD TYPE: SIGNIFICANT CHANGE UP
METHOD TYPE: SIGNIFICANT CHANGE UP
MONOCYTES # BLD AUTO: 2.18 K/UL — HIGH (ref 0.1–0.6)
MONOCYTES NFR BLD AUTO: 11.5 % — HIGH (ref 1.7–9.3)
NEUTROPHILS # BLD AUTO: 15.22 K/UL — HIGH (ref 1.4–6.5)
NEUTROPHILS NFR BLD AUTO: 79.9 % — HIGH (ref 42.2–75.2)
NITRITE UR-MCNC: NEGATIVE — SIGNIFICANT CHANGE UP
NRBC # BLD: 0 /100 WBCS — SIGNIFICANT CHANGE UP (ref 0–0)
NRBC # BLD: 0 /100 WBCS — SIGNIFICANT CHANGE UP (ref 0–0)
NT-PROBNP SERPL-SCNC: HIGH PG/ML (ref 0–300)
ORGANISM # SPEC MICROSCOPIC CNT: SIGNIFICANT CHANGE UP
PH UR: 5.5 — SIGNIFICANT CHANGE UP (ref 5–8)
PHOSPHATE SERPL-MCNC: 6.3 MG/DL — HIGH (ref 2.1–4.9)
PLAT MORPH BLD: NORMAL — SIGNIFICANT CHANGE UP
PLATELET # BLD AUTO: 146 K/UL — SIGNIFICANT CHANGE UP (ref 130–400)
PLATELET # BLD AUTO: 153 K/UL — SIGNIFICANT CHANGE UP (ref 130–400)
POIKILOCYTOSIS BLD QL AUTO: SIGNIFICANT CHANGE UP
POLYCHROMASIA BLD QL SMEAR: SIGNIFICANT CHANGE UP
POTASSIUM SERPL-MCNC: 4.5 MMOL/L — SIGNIFICANT CHANGE UP (ref 3.5–5)
POTASSIUM SERPL-MCNC: 5.1 MMOL/L — HIGH (ref 3.5–5)
POTASSIUM SERPL-SCNC: 4.5 MMOL/L — SIGNIFICANT CHANGE UP (ref 3.5–5)
POTASSIUM SERPL-SCNC: 5.1 MMOL/L — HIGH (ref 3.5–5)
PROT SERPL-MCNC: 6.4 G/DL — SIGNIFICANT CHANGE UP (ref 6–8)
PROT UR-MCNC: ABNORMAL
PROTHROM AB SERPL-ACNC: 17.5 SEC — HIGH (ref 9.95–12.87)
RBC # BLD: 2.87 M/UL — LOW (ref 4.7–6.1)
RBC # BLD: 2.92 M/UL — LOW (ref 4.7–6.1)
RBC # FLD: 15.9 % — HIGH (ref 11.5–14.5)
RBC # FLD: 16.1 % — HIGH (ref 11.5–14.5)
RBC BLD AUTO: NORMAL — SIGNIFICANT CHANGE UP
RBC CASTS # UR COMP ASSIST: 52 /HPF — HIGH (ref 0–4)
SODIUM SERPL-SCNC: 137 MMOL/L — SIGNIFICANT CHANGE UP (ref 135–146)
SODIUM SERPL-SCNC: 138 MMOL/L — SIGNIFICANT CHANGE UP (ref 135–146)
SP GR SPEC: 1.03 — SIGNIFICANT CHANGE UP (ref 1.01–1.03)
SPECIMEN SOURCE: SIGNIFICANT CHANGE UP
SPECIMEN SOURCE: SIGNIFICANT CHANGE UP
TROPONIN T SERPL-MCNC: 0.11 NG/ML — CRITICAL HIGH
UROBILINOGEN FLD QL: ABNORMAL
WBC # BLD: 14.04 K/UL — HIGH (ref 4.8–10.8)
WBC # BLD: 19.03 K/UL — HIGH (ref 4.8–10.8)
WBC # FLD AUTO: 14.04 K/UL — HIGH (ref 4.8–10.8)
WBC # FLD AUTO: 19.03 K/UL — HIGH (ref 4.8–10.8)
WBC UR QL: 72 /HPF — HIGH (ref 0–5)

## 2021-04-28 PROCEDURE — 93970 EXTREMITY STUDY: CPT | Mod: 26

## 2021-04-28 PROCEDURE — 99291 CRITICAL CARE FIRST HOUR: CPT

## 2021-04-28 PROCEDURE — 71045 X-RAY EXAM CHEST 1 VIEW: CPT | Mod: 26

## 2021-04-28 PROCEDURE — 31500 INSERT EMERGENCY AIRWAY: CPT | Mod: GC

## 2021-04-28 PROCEDURE — 74018 RADEX ABDOMEN 1 VIEW: CPT | Mod: 26

## 2021-04-28 PROCEDURE — 36556 INSERT NON-TUNNEL CV CATH: CPT | Mod: RT,GC

## 2021-04-28 PROCEDURE — 93010 ELECTROCARDIOGRAM REPORT: CPT

## 2021-04-28 PROCEDURE — 99231 SBSQ HOSP IP/OBS SF/LOW 25: CPT | Mod: GC

## 2021-04-28 RX ORDER — CHLORHEXIDINE GLUCONATE 213 G/1000ML
15 SOLUTION TOPICAL EVERY 12 HOURS
Refills: 0 | Status: DISCONTINUED | OUTPATIENT
Start: 2021-04-28 | End: 2021-05-04

## 2021-04-28 RX ORDER — MIDAZOLAM HYDROCHLORIDE 1 MG/ML
0.02 INJECTION, SOLUTION INTRAMUSCULAR; INTRAVENOUS
Qty: 100 | Refills: 0 | Status: DISCONTINUED | OUTPATIENT
Start: 2021-04-28 | End: 2021-05-02

## 2021-04-28 RX ORDER — VANCOMYCIN HCL 1 G
1250 VIAL (EA) INTRAVENOUS ONCE
Refills: 0 | Status: COMPLETED | OUTPATIENT
Start: 2021-04-28 | End: 2021-04-28

## 2021-04-28 RX ORDER — SODIUM CHLORIDE 9 MG/ML
250 INJECTION INTRAMUSCULAR; INTRAVENOUS; SUBCUTANEOUS ONCE
Refills: 0 | Status: COMPLETED | OUTPATIENT
Start: 2021-04-28 | End: 2021-04-28

## 2021-04-28 RX ORDER — PHENYLEPHRINE HYDROCHLORIDE 10 MG/ML
0.1 INJECTION INTRAVENOUS
Qty: 40 | Refills: 0 | Status: DISCONTINUED | OUTPATIENT
Start: 2021-04-28 | End: 2021-05-08

## 2021-04-28 RX ORDER — ETOMIDATE 2 MG/ML
15 INJECTION INTRAVENOUS ONCE
Refills: 0 | Status: DISCONTINUED | OUTPATIENT
Start: 2021-04-28 | End: 2021-05-02

## 2021-04-28 RX ORDER — SODIUM CHLORIDE 9 MG/ML
1000 INJECTION INTRAMUSCULAR; INTRAVENOUS; SUBCUTANEOUS ONCE
Refills: 0 | Status: COMPLETED | OUTPATIENT
Start: 2021-04-28 | End: 2021-04-28

## 2021-04-28 RX ORDER — NOREPINEPHRINE BITARTRATE/D5W 8 MG/250ML
0.05 PLASTIC BAG, INJECTION (ML) INTRAVENOUS
Qty: 16 | Refills: 0 | Status: DISCONTINUED | OUTPATIENT
Start: 2021-04-28 | End: 2021-05-02

## 2021-04-28 RX ORDER — SODIUM CHLORIDE 9 MG/ML
1000 INJECTION INTRAMUSCULAR; INTRAVENOUS; SUBCUTANEOUS ONCE
Refills: 0 | Status: DISCONTINUED | OUTPATIENT
Start: 2021-04-28 | End: 2021-04-29

## 2021-04-28 RX ORDER — FENTANYL CITRATE 50 UG/ML
0.5 INJECTION INTRAVENOUS
Qty: 2500 | Refills: 0 | Status: DISCONTINUED | OUTPATIENT
Start: 2021-04-28 | End: 2021-05-02

## 2021-04-28 RX ORDER — AMIKACIN SULFATE 250 MG/ML
400 INJECTION, SOLUTION INTRAMUSCULAR; INTRAVENOUS ONCE
Refills: 0 | Status: COMPLETED | OUTPATIENT
Start: 2021-04-28 | End: 2021-04-28

## 2021-04-28 RX ORDER — SODIUM CHLORIDE 9 MG/ML
1000 INJECTION INTRAMUSCULAR; INTRAVENOUS; SUBCUTANEOUS ONCE
Refills: 0 | Status: DISCONTINUED | OUTPATIENT
Start: 2021-04-28 | End: 2021-04-28

## 2021-04-28 RX ORDER — FENTANYL CITRATE 50 UG/ML
0.5 INJECTION INTRAVENOUS
Qty: 5000 | Refills: 0 | Status: DISCONTINUED | OUTPATIENT
Start: 2021-04-28 | End: 2021-04-28

## 2021-04-28 RX ADMIN — CHLORHEXIDINE GLUCONATE 1 APPLICATION(S): 213 SOLUTION TOPICAL at 12:00

## 2021-04-28 RX ADMIN — MIDAZOLAM HYDROCHLORIDE 1.59 MG/KG/HR: 1 INJECTION, SOLUTION INTRAMUSCULAR; INTRAVENOUS at 21:38

## 2021-04-28 RX ADMIN — SODIUM CHLORIDE 1000 MILLILITER(S): 9 INJECTION INTRAMUSCULAR; INTRAVENOUS; SUBCUTANEOUS at 10:57

## 2021-04-28 RX ADMIN — Medication 1 APPLICATION(S): at 17:41

## 2021-04-28 RX ADMIN — MIDAZOLAM HYDROCHLORIDE 1.59 MG/KG/HR: 1 INJECTION, SOLUTION INTRAMUSCULAR; INTRAVENOUS at 10:21

## 2021-04-28 RX ADMIN — SODIUM CHLORIDE 1000 MILLILITER(S): 9 INJECTION INTRAMUSCULAR; INTRAVENOUS; SUBCUTANEOUS at 04:47

## 2021-04-28 RX ADMIN — FENTANYL CITRATE 3.97 MICROGRAM(S)/KG/HR: 50 INJECTION INTRAVENOUS at 10:35

## 2021-04-28 RX ADMIN — MEROPENEM 100 MILLIGRAM(S): 1 INJECTION INTRAVENOUS at 17:18

## 2021-04-28 RX ADMIN — Medication 166.67 MILLIGRAM(S): at 04:45

## 2021-04-28 RX ADMIN — MEROPENEM 100 MILLIGRAM(S): 1 INJECTION INTRAVENOUS at 21:40

## 2021-04-28 RX ADMIN — MEROPENEM 100 MILLIGRAM(S): 1 INJECTION INTRAVENOUS at 08:20

## 2021-04-28 RX ADMIN — FENTANYL CITRATE 3.97 MICROGRAM(S)/KG/HR: 50 INJECTION INTRAVENOUS at 20:04

## 2021-04-28 RX ADMIN — PHENYLEPHRINE HYDROCHLORIDE 2.98 MICROGRAM(S)/KG/MIN: 10 INJECTION INTRAVENOUS at 21:39

## 2021-04-28 RX ADMIN — AMIKACIN SULFATE 101.6 MILLIGRAM(S): 250 INJECTION, SOLUTION INTRAMUSCULAR; INTRAVENOUS at 09:33

## 2021-04-28 NOTE — PROGRESS NOTE ADULT - SUBJECTIVE AND OBJECTIVE BOX
NIEVES LABOY  71y, Male  Allergy: No Known Allergies      LOS  37d    CHIEF COMPLAINT: Change in Mental Status (2021 08:15)      INTERVAL EVENTS/HPI  - T(F): , Max: 98.9 (21 @ 07:00), septic shock requiring pressors   - WBC Count: 14.04 (21 @ 06:10) downtrending   WBC Count: 19.03 (21 @ 02:00)  - Creatinine, Serum: 1.1 (21 @ 06:10)  Creatinine, Serum: 1.3 (21 @ 02:00)    -     ROS  cannot obtain secondary to patient's sedation and/or mental status    VITALS:  T(F): 98.8, Max: 98.9 (21 @ 07:00)  HR: 73  BP: 99/48  RR: 22Vital Signs Last 24 Hrs  T(C): 37.1 (2021 08:00), Max: 37.2 (2021 07:00)  T(F): 98.8 (2021 08:00), Max: 98.9 (2021 07:00)  HR: 73 (2021 10:00) (73 - 138)  BP: 99/48 (2021 10:00) (79/53 - 158/93)  BP(mean): 69 (2021 10:00) (56 - 118)  RR: 22 (2021 10:00) (16 - 32)  SpO2: 100% (2021 10:00) (95% - 100%)    PHYSICAL EXAM:  Gen: Intubated  CV: RRR  Lungs: Decreased BS at bases  Abd: Soft  Neuro: Sedated  anasarca  Lines clean, no phlebitis    FH: Non-contributory  Social Hx: Non-contributory    TESTS & MEASUREMENTS:                        7.5    14.04 )-----------( 146      ( 2021 06:10 )             25.5     04    138  |  103  |  28<H>  ----------------------------<  87  4.5   |  25  |  1.1    Ca    8.1<L>      2021 06:10  Phos  6.3       Mg     2.0         TPro  6.4  /  Alb  2.1<L>  /  TBili  0.5  /  DBili  x   /  AST  62<H>  /  ALT  11  /  AlkPhos  421<H>      eGFR if Non African American: 67 mL/min/1.73M2 (21 @ 06:10)  eGFR if African American: 77 mL/min/1.73M2 (21 @ 06:10)  eGFR if Non African American: 55 mL/min/1.73M2 (21 @ 02:00)  eGFR if : 63 mL/min/1.73M2 (21 @ 02:00)    LIVER FUNCTIONS - ( 2021 02:00 )  Alb: 2.1 g/dL / Pro: 6.4 g/dL / ALK PHOS: 421 U/L / ALT: 11 U/L / AST: 62 U/L / GGT: x           Urinalysis Basic - ( 2021 02:00 )    Color: Marta / Appearance: Turbid / S.028 / pH: x  Gluc: x / Ketone: Trace  / Bili: Negative / Urobili: 3 mg/dL   Blood: x / Protein: 100 mg/dL / Nitrite: Negative   Leuk Esterase: Moderate / RBC: 52 /HPF / WBC 72 /HPF   Sq Epi: x / Non Sq Epi: 1 /HPF / Bacteria: Negative        Culture - Blood (collected 21 @ 17:42)  Source: .Blood None  Preliminary Report (21 @ 23:01):    No growth to date.    Culture - Urine (collected 21 @ 14:39)  Source: .Urine Clean Catch (Midstream)  Preliminary Report (21 @ 19:19):    >100,000 CFU/ml Klebsiella pneumoniae    Culture - Urine (collected 21 @ 22:07)  Source: .Urine Clean Catch (Midstream)  Preliminary Report (21 @ 17:32):    >100,000 CFU/ml Klebsiella pneumoniae    Culture - Blood (collected 21 @ 07:34)  Source: .Blood None  Final Report (21 @ 17:00):    No Growth Final    Culture - Blood (collected 21 @ 21:38)  Source: .Blood None  Final Report (21 @ 05:00):    No Growth Final    Culture - Blood (collected 21 @ 16:00)  Source: .Blood Blood  Final Report (21 @ 22:01):    No Growth Final    Culture - Blood (collected 21 @ 16:00)  Source: .Blood Blood  Gram Stain (21 @ 08:45):    Growth in anaerobic bottle: Gram Positive Cocci in Clusters  Final Report (21 @ 10:51):    Growth in anaerobic bottle: Staphylococcus epidermidis Coag Negative    Staphylococcus    Single set isolate, possible contaminant. Contact    Microbiology if susceptibility testing clinically    indicated.    ***Blood Panel PCR results on this specimen are available    approximately 3 hours after the Gram stain result.***    Gram stain, PCR, and/or culture results may not always    correspond due to difference in methodologies.    ************************************************************    This PCR assay was performed by multiplex PCR. This    Assay tests for 66 bacterial and resistance gene targets.    Please refer to the [facility] test directory    at [email or web address] for details.  Organism: Blood Culture PCR (21 @ 10:51)  Organism: Blood Culture PCR (21 @ 10:51)      -  Staphylococcus epidermidis, Methicillin resistant: Detec      Method Type: PCR    Culture - Blood (collected 21 @ 16:00)  Source: .Blood Blood-Peripheral  Final Report (21 @ 01:00):    No Growth Final    Culture - Blood (collected 21 @ 12:55)  Source: .Blood None  Final Report (21 @ 23:01):    No Growth Final    Culture - CSF with Gram Stain (collected 21 @ 14:00)  Source: .CSF CSF  Gram Stain (21 @ 21:44):    No polymorphonuclear cells seen    No organisms seen    by cytocentrifuge  Final Report (21 @ 15:36):    No growth    Culture - Acid Fast - Tissue w/Smear (collected 21 @ 16:18)  Source: .Tissue None  Preliminary Report (04-10-21 @ 15:03):    No growth at 1 week.    Culture - Tissue with Gram Stain (collected 21 @ 16:18)  Source: .Tissue None  Gram Stain (21 @ 07:05):    Few polymorphonuclear leukocytes seen per low power field    Numerous Gram Negative Rods seen per oil power field  Final Report (21 @ 11:01):    Numerous Klebsiella pneumoniae ESBL    Few Enterococcus faecalis (vancomycin resistant)    Few Pseudomonas aeruginosa (Carbapenem Resistant)  Organism: Klebsiella pneumoniae ESBL  Enterococcus faecalis (vancomycin resistant)  Pseudomonas aeruginosa (Carbapenem Resistant) (21 @ 11:01)  Organism: Pseudomonas aeruginosa (Carbapenem Resistant) (21 @ 11:01)      -  Amikacin: S <=16      -  Aztreonam: S 8      -  Cefepime: I 16      -  Ceftazidime: I 16      -  Ciprofloxacin: R >2      -  Gentamicin: I 8      -  Imipenem: R >8      -  Levofloxacin: R >4      -  Meropenem: R 8      -  Piperacillin/Tazobactam: I 64      -  Tobramycin: S <=2      Method Type: JAZMIN  Organism: Enterococcus faecalis (vancomycin resistant) (21 @ 11:01)      -  Ampicillin: S 8 Predicts results to ampicillin/sulbactam, amoxacillin-clavulanate and  piperacillin-tazobactam.      -  Daptomycin: S 1      -  Levofloxacin: R >4      -  Linezolid: S 1      -  Tetra/Doxy: R >8      -  Vancomycin: R >16      Method Type: JAZMIN  Organism: Klebsiella pneumoniae ESBL (21 @ 11:01)      -  Amikacin: S <=16      -  Amoxicillin/Clavulanic Acid: S <=8/4      -  Ampicillin: R >16 These ampicillin results predict results for amoxicillin      -  Ampicillin/Sulbactam: R >16/8 Enterobacter, Citrobacter, and Serratia may develop resistance during prolonged therapy (3-4 days)      -  Aztreonam: R >16      -  Cefazolin: R >16 Enterobacter, Citrobacter, and Serratia may develop resistance during prolonged therapy (3-4 days)      -  Cefepime: R >16      -  Cefoxitin: S <=8      -  Ceftriaxone: R >32 Enterobacter, Citrobacter, and Serratia may develop resistance during prolonged therapy      -  Ciprofloxacin: R >2      -  Ertapenem: S <=0.5      -  Gentamicin: S <=2      -  Imipenem: S <=1      -  Levofloxacin: R 2      -  Meropenem: S <=1      -  Piperacillin/Tazobactam: R <=8      -  Tobramycin: S <=2      -  Trimethoprim/Sulfamethoxazole: R >2/38      Method Type: JAZMIN    Culture - Other (collected 21 @ 17:15)  Source: .Other sacrum  Final Report (21 @ 16:53):    Numerous Klebsiella pneumoniae ESBL    Few Pseudomonas aeruginosa    Numerous Enterococcus faecalis (vancomycin resistant)  Organism: Klebsiella pneumoniae ESBL  Pseudomonas aeruginosa  Enterococcus faecalis (vancomycin resistant) (21 @ 16:53)  Organism: Enterococcus faecalis (vancomycin resistant) (21 @ 16:53)      -  Ampicillin: S <=2 Predicts results to ampicillin/sulbactam, amoxacillin-clavulanate and  piperacillin-tazobactam.      -  Daptomycin: S 1      -  Levofloxacin: R >4      -  Linezolid: S 2      -  Tetra/Doxy: R >8      -  Vancomycin: R >16      Method Type: JAZMIN  Organism: Pseudomonas aeruginosa (21 @ 16:53)      -  Amikacin: S <=16      -  Aztreonam: I 16      -  Cefepime: S 8      -  Ceftazidime: S 4      -  Ciprofloxacin: R >2      -  Gentamicin: I 8      -  Imipenem: I 4      -  Levofloxacin: R >4      -  Meropenem: S <=1      -  Piperacillin/Tazobactam: S 16      -  Tobramycin: S <=2      Method Type: JAZMIN  Organism: Klebsiella pneumoniae ESBL (21 @ 16:53)      -  Amikacin: S <=16      -  Amoxicillin/Clavulanic Acid: S <=8/4      -  Ampicillin: R >16 These ampicillin results predict results for amoxicillin      -  Ampicillin/Sulbactam: R >16/8 Enterobacter, Citrobacter, and Serratia may develop resistance during prolonged therapy (3-4 days)      -  Aztreonam: R >16      -  Cefazolin: R >16 Enterobacter, Citrobacter, and Serratia may develop resistance during prolonged therapy (3-4 days)      -  Cefepime: R >16      -  Cefoxitin: S <=8      -  Ceftriaxone: R >32 Enterobacter, Citrobacter, and Serratia may develop resistance during prolonged therapy      -  Ciprofloxacin: R >2      -  Ertapenem: S <=0.5      -  Gentamicin: S <=2      -  Imipenem: S <=1      -  Levofloxacin: R 2      -  Meropenem: S <=1      -  Piperacillin/Tazobactam: R <=8      -  Tobramycin: S <=2      -  Trimethoprim/Sulfamethoxazole: R >2/38      Method Type: JAZMIN        Lactate, Blood: 0.7 mmol/L (21 @ 02:00)  Lactate, Blood: 0.9 mmol/L (21 @ 17:42)      INFECTIOUS DISEASES TESTING  Procalcitonin, Serum: 0.19 (21 @ 17:42)  COVID-19 PCR: NotDetec (21 @ 10:31)  COVID-19 PCR: NotDetec (21 @ 14:29)  COVID-19 PCR: NotDetec (21 @ 15:00)  COVID-19 PCR: NotDetec (21 @ 14:55)  Procalcitonin, Serum: 0.16 (21 @ 12:32)  COVID-19 PCR: NotDetec (21 @ 15:19)  COVID- PCR: NotDetec (21 @ 19:40)  Procalcitonin, Serum: 0.61 (21 @ 10:20)  COVID-19 PCR: NotDetec (21 @ 20:13)  COVID-19 PCR: NotDetec (03-10-21 @ 12:22)  COVID-19 PCR: NotDetec (21 @ 16:49)  Fungitell: 62 (21 @ 11:00)  Procalcitonin, Serum: 0.29 (21 @ 05:32)  MRSA PCR Result.: Negative (21 @ 15:44)  COVID-19 PCR: NotDetec (21 @ 15:34)  Procalcitonin, Serum: 0.27 (02-18-21 @ 10:54)  MRSA PCR Result.: Negative (21 @ 18:29)  HIV-1/2 Combo Result: Nonreact (21 @ 05:07)  Procalcitonin, Serum: 0.46 (21 @ 16:00)  COVID-19 PCR: NotDetec (21 @ 10:17)      INFLAMMATORY MARKERS      RADIOLOGY & ADDITIONAL TESTS:  I have personally reviewed the last available Chest xray  CXR  Xray Chest 1 View AP/PA:   EXAM:  XR CHEST 1 VIEW            PROCEDURE DATE:  2021            INTERPRETATION:  Clinical History / Reason for exam: Sepsis.    Comparison : Chest radiograph 2 days prior.    Technique/Positioning: Frontal portable.    Findings:    Supportdevices: Patient is intubated. Telemetry leads overlie the thorax.    Cardiac/mediastinum/hilum: Heart is enlarged.    Lung parenchyma/Pleura: Bilateral opacifications, left. Telemetry, worsening.    Skeleton/soft tissues: Unchanged    Impression:    Worsening opacification left hemithorax. Support devices as described.                  ROGELIO SMITH MD; Attending Interventional Radiologist  This document has been electronically signed. 2021  9:11AM (21 @ 02:31)      CT      CARDIOLOGY TESTING      MEDICATIONS  amiKACIN  IVPB 400  ascorbic acid 500  atorvastatin 40  carbidopa/levodopa  25/100 2  chlorhexidine 4% Liquid 1  collagenase Ointment 1  Dakins Solution - 1/2 Strength 1  etomidate Injectable 15  fentaNYL   Infusion. 0.5  magnesium oxide 400  meropenem  IVPB   meropenem  IVPB 1000  midazolam Infusion 0.02  midodrine 10  norepinephrine Infusion 0.05  phenylephrine    Infusion 0.1  senna 2  sodium chloride 0.9% Bolus 250  sodium chloride 0.9% Bolus 1000  sodium chloride 0.9% Bolus 1000  zinc sulfate 220      WEIGHT  Weight (kg): 79.4 (21 @ 15:48)  Creatinine, Serum: 1.1 mg/dL (21 @ 06:10)  Creatinine, Serum: 1.3 mg/dL (21 @ 02:00)      ANTIBIOTICS:  amiKACIN  IVPB 400 milliGRAM(s) IV Intermittent once  meropenem  IVPB      meropenem  IVPB 1000 milliGRAM(s) IV Intermittent every 8 hours      All available historical records have been reviewed

## 2021-04-28 NOTE — PROGRESS NOTE ADULT - SUBJECTIVE AND OBJECTIVE BOX
GENERAL SURGERY PROGRESS NOTE     NIEVES LABOY  71y  Male  Hospital day :37d  POD:  Procedure: Debridement of ulcer of sacrum or ulcer of ischium      OVERNIGHT EVENTS: intubated overnight    T(F): 98.8 (21 @ 08:00), Max: 98.9 (21 @ 07:00)  HR: 73 (21 @ 10:00) (73 - 138)  BP: 99/48 (21 @ 10:00) (79/53 - 158/93)  RR: 22 (21 @ 10:00) (16 - 32)  SpO2: 100% (21 @ 10:00) (95% - 100%)    DIET/FLUIDS: ascorbic acid 500 milliGRAM(s) Oral daily  magnesium oxide 400 milliGRAM(s) Oral every 8 hours  sodium chloride 0.9% Bolus 250 milliLiter(s) IV Bolus once  sodium chloride 0.9% Bolus 1000 milliLiter(s) IV Bolus once  sodium chloride 0.9% Bolus 1000 milliLiter(s) IV Bolus once  zinc sulfate 220 milliGRAM(s) Oral daily    URINE:   21 @ 07:01  -  21 @ 07:00  --------------------------------------------------------  OUT: 400 mL     Indwelling Urethral Catheter:     Connect To:  Straight Drainage/Gravity    Indication:  Urine Output Monitoring in Critically Ill (21 @ 01:56)    GI proph:    AC/ proph:   ABx: amiKACIN  IVPB 400 milliGRAM(s) IV Intermittent once  meropenem  IVPB      meropenem  IVPB 1000 milliGRAM(s) IV Intermittent every 8 hours    LABS  CAPILLARY BLOOD GLUCOSE    POCT Blood Glucose.: 92 mg/dL (2021 07:57)  POCT Blood Glucose.: 136 mg/dL (2021 00:44)  POCT Blood Glucose.: 109 mg/dL (2021 23:54)  POCT Blood Glucose.: 94 mg/dL (2021 21:26)                        7.5    14.04 )-----------( 146      ( 2021 06:10 )             25.5       Auto Neutrophil %: 79.9 % (21 @ 02:00)  Auto Immature Granulocyte %: 3.3 % (21 @ 02:00)        138  |  103  |  28<H>  ----------------------------<  87  4.5   |  25  |  1.1      eGFR if Non African American: 67 mL/min/1.73M2 (21 @ 06:10)    LFTs:             6.4  | 0.5  | 62       ------------------[421     ( 2021 02:00 )  2.1  | x    | 11          Lipase:x      Amylase:x         Blood Gas Arterial, Lactate: 0.9 mmoL/L (21 @ 04:13)  Lactate, Blood: 0.7 mmol/L (21 @ 02:00)  Lactate, Blood: 0.9 mmol/L (21 @ 17:42)    ABG - ( 2021 04:13 )  pH: 7.36  /  pCO2: 52    /  pO2: 81    / HCO3: 30    / Base Excess: 3.2   /  SaO2: 97        ABG - ( 2021 00:49 )  pH: 7.14  /  pCO2: x     /  pO2: x     / HCO3: x     / Base Excess: x     /  SaO2: x           Coags:     17.50  ----< 1.52    ( 2021 02:00 )     50.6     CARDIAC MARKERS ( 2021 17:42 )  x     / x     / 50 U/L / x     / x        Urinalysis Basic - ( 2021 02:00 )    Color: Marta / Appearance: Turbid / S.028 / pH: x  Gluc: x / Ketone: Trace  / Bili: Negative / Urobili: 3 mg/dL   Blood: x / Protein: 100 mg/dL / Nitrite: Negative   Leuk Esterase: Moderate / RBC: 52 /HPF / WBC 72 /HPF   Sq Epi: x / Non Sq Epi: 1 /HPF / Bacteria: Negative        Culture - Blood (collected 2021 17:42)  Source: .Blood None  Preliminary Report (2021 23:01):    No growth to date.    Culture - Urine (collected 2021 14:39)  Source: .Urine Clean Catch (Midstream)  Preliminary Report (2021 19:19):    >100,000 CFU/ml Klebsiella pneumoniae

## 2021-04-28 NOTE — PROGRESS NOTE ADULT - ATTENDING COMMENTS
Patient seen and examined with surgery team on rounds and discussed management plans. Patient transferred to ICU for further management awaiting IR drainage

## 2021-04-28 NOTE — PROGRESS NOTE ADULT - ASSESSMENT
ASSESSMENT  71 year old Male with past medical history of Parkinson's, dementia, CKD Stage 3, 1st degree AV block, HLD, gout, HTN, DM, fungal septicemia presenting from [facility] for acute decompensation in mental status.     IMPRESSION  #Fever, suspected acute cholecystitis vs UTI    4/24 UCX Kleb    HIDA +  < from: [facility] Duplex Lower Ext Vein Scan, Bilat (04.21.21 @ 11:41) >  No evidence of deep venousthrombosis or superficial thrombophlebitis in the bilateral lower extremities.    4/24 UA WBC 82  #CoNS in blood, likely contaminant     only PIVs    4/8 BCX 1/2 sets   -  Staphylococcus epidermidis, Methicillin resistant: Detec     3/25 BCX 1/2 sets, 1/4 bottles Staphylococcus pettenkoferi, likely contaminant   #Resolved Fever with sepsis, not present on admission with metabolic encephalopathy, EEG + seizure, possibly in setting of sepsis secondary to large sacral infected decub    LP not consistent with infection.. G/S NEGATIVE (on ABX)    Total Nucleated Cell Count, CSF: 0 /uL (04.01.21 @ 14:00)    Protein, CSF: 42 mg/dL (04.01.21 @ 14:00)    Glucose, CSF: 97 mg/dL (04.01.21 @ 14:00)    3/25 BCX 1/2 sets, 1/4 bottles Staphylococcus pettenkoferi, likely contaminant     CXR no PNA   3/22 Blood & Urine cxs NGTD   #Sacral ulcer- necrotic     3/31 WCX GNR    3/29   Numerous Klebsiella pneumoniae ESBL    Few CRE Pseudomonas aeruginosa (high MICs to AGs)    Numerous Enterococcus faecalis (vancomycin resistant)- S amp    seen by Burn sacrum with full thickness ~4x5cm with dark /brown devitalized tissue at base; no purulent drainage, no bleeding  #Recent Fungemia    Blood Cx 2/27 Candida albicans    evaluated by optho - no ocular evidence     TTE no significant valvulopathy   #Shoulder dislocation with effusion- joint exam not consistent with a septic arthritis  < from: CT Chest w/ IV Cont (04.07.21 @ 20:31) >  Similar appearance of the left shoulder with complex joint effusion, described in detail on prior exam CT shoulder 2/20/2021.  #ABEBE, resolved  #L hand edema  < from: Xray Wrist 2 Views, Left (03.27.21 @ 11:13) >No acute fracture or dislocation. Diffuse soft tissue swelling. Nonaggressive appearing lucency in the distal radius, indeterminate. Nonemergent MRI may be obtained for further evaluation.    Creatinine, Serum: 0.8 mg/dL (04.26.21 @ 12:17)      RECOMMENDATIONS  - dose amikacin 5mg/kg x1  - f/u BCX  - f/u UCX Kleb  - consult IR for perc gayle  - trend LFTs, AP  - Appreciate Burn consult   - Contact isolation CRE  - GOC, grave prognosis    If any questions, please call or send a message on [employer] Teams  Spectra [phone]

## 2021-04-28 NOTE — CONSULT NOTE ADULT - ASSESSMENT
IMPRESSION:    acute hypoxemic resp failure/ L lung collapse  septic shock  infected ulcer/ UTI/ cholecysititis  parkinson sp PEG]  Anemia  DVT/ A fib      PLAN:    CNS: keep sedated  HEENT:  Oral care    PULMONARY:  HOB @ 45 degrees, dec FIO2 keep Sao2 92 to 96%. broncho today    CARDIOVASCULAR: cheetah, IVF, CE    GI: GI prophylaxis                                          Feeding NPO, IR f/up    RENAL:  F/u  lytes.  Correct as needed. accurate I/O    INFECTIOUS DISEASE: abr per ID    HEMATOLOGICAL:  DVT prophylaxis. LE doppler, hold eliquis    ENDOCRINE:  Follow up FS.  Insulin protocol if needed.    CODE STATUS: FULL CODE  very poor prognosis

## 2021-04-28 NOTE — CONSULT NOTE ADULT - SUBJECTIVE AND OBJECTIVE BOX
Patient is a 71y old  Male who presents with a chief complaint of Change in Mental Status (2021 20:01)      HPI:  71 year old Male with past medical history of Parkinson's, dementia, CKD Stage 3, 1st degree AV block, HLD, gout, HTN, DM, fungal septicemia presenting from [facility] for acute decompensation in mental status.     As per documentation patient at baseline is verbal but for past 3 days, he has been worsening to state of being non verbal. Patient was admitted to [facility] for fungemia and discharged on the . At  he had left hand cellulitis and was treated for it.    In ED patient hemodynamically stable, afebrile, ABEBE with rise in Cr to 1.9 and BUN to 75, remains non verbal. CT head negative for any new strokes and UA negative. Family has not visited the patient recently so unaware of his status. (22 Mar 2021 23:59), admiited to floor, sepsis infected sacral ulcer sp debridement, yest hypotensive, desaturation sp intubation, CXR complete collpase l lung, on shalom/ versed/ fentanyl, no IVF      PAST MEDICAL & SURGICAL HISTORY:  Parkinson disease    Hypertension    Gout    Dyslipidemia    Prostatitis    Cataract    No significant past surgical history        SOCIAL HX:   Smoking UTO    FAMILY HISTORY:  Family history of cerebrovascular accident (CVA) (Father)        REVIEW OF SYSTEMS UTO  	    Allergies    No Known Allergies    Intolerances        acetaminophen   Tablet .. 650 milliGRAM(s) Oral every 6 hours PRN  amiKACIN  IVPB 400 milliGRAM(s) IV Intermittent once  ascorbic acid 500 milliGRAM(s) Oral daily  atorvastatin 40 milliGRAM(s) Oral at bedtime  carbidopa/levodopa  25/100 2 Tablet(s) Oral <User Schedule>  chlorhexidine 4% Liquid 1 Application(s) Topical <User Schedule>  collagenase Ointment 1 Application(s) Topical two times a day  Dakins Solution - 1/2 Strength 1 Application(s) Topical two times a day  etomidate Injectable 15 milliGRAM(s) IV Push once  fentaNYL   Infusion. 0.5 MICROgram(s)/kG/Hr IV Continuous <Continuous>  magnesium oxide 400 milliGRAM(s) Oral every 8 hours  meropenem  IVPB 1000 milliGRAM(s) IV Intermittent every 8 hours  meropenem  IVPB      midazolam Infusion 0.02 mG/kG/Hr IV Continuous <Continuous>  midodrine 10 milliGRAM(s) Oral every 8 hours  norepinephrine Infusion 0.05 MICROgram(s)/kG/Min IV Continuous <Continuous>  phenylephrine    Infusion 0.1 MICROgram(s)/kG/Min IV Continuous <Continuous>  senna 2 Tablet(s) Oral at bedtime  sodium chloride 0.9% Bolus 1000 milliLiter(s) IV Bolus once  zinc sulfate 220 milliGRAM(s) Oral daily  : Home Meds:      PHYSICAL EXAM    ICU Vital Signs Last 24 Hrs  T(C): 37.2 (2021 07:00), Max: 37.2 (2021 07:00)  T(F): 98.9 (2021 07:00), Max: 98.9 (2021 07:00)  HR: 75 (:30) (74 - 138)  BP: 97/46 (2021 07:30) (79/53 - 158/93)  BP(mean): 67 (2021 07:30) (56 - 118)  RR: 16 (:) (16 - 32)  SpO2: 99% (2021 07:00) (95% - 99%)      General: ill looking  HEENT:  SARA              Lymph Nodes: No cervical LN   Lungs: dec bs l side  Cardiovascular: EVANS 3/6  Abdomen: Soft, Positive BS  Extremities: No clubbing  Skin: ulcer  Neurological:  sedated      21 @ 07:01  -  21 @ 07:00  --------------------------------------------------------  IN:    Enteral Tube Flush: 200 mL    FentaNYL: 136 mL    Jevity 1.2: 720 mL    Midazolam: 64 mL    Norepinephrine: 7.6 mL    Phenylephrine: 83 mL  Total IN: 1210.6 mL    OUT:    Indwelling Catheter - Urethral (mL): 400 mL  Total OUT: 400 mL    Total NET: 810.6 mL          LABS:                          7.5    14.04 )-----------( 146      ( 2021 06:10 )             25.5                                               04-    138  |  103  |  28<H>  ----------------------------<  87  4.5   |  25  |  1.1    Ca    8.1<L>      2021 06:10  Phos  6.3       Mg     2.0         TPro  6.4  /  Alb  2.1<L>  /  TBili  0.5  /  DBili  x   /  AST  62<H>  /  ALT  11  /  AlkPhos  421<H>        PT/INR - ( 2021 02:00 )   PT: 17.50 sec;   INR: 1.52 ratio         PTT - ( 2021 02:00 )  PTT:50.6 sec                                       Urinalysis Basic - ( 2021 02:00 )    Color: Marta / Appearance: Turbid / S.028 / pH: x  Gluc: x / Ketone: Trace  / Bili: Negative / Urobili: 3 mg/dL   Blood: x / Protein: 100 mg/dL / Nitrite: Negative   Leuk Esterase: Moderate / RBC: 52 /HPF / WBC 72 /HPF   Sq Epi: x / Non Sq Epi: 1 /HPF / Bacteria: Negative        CARDIAC MARKERS ( 2021 17:42 )  x     / x     / 50 U/L / x     / x                                                LIVER FUNCTIONS - ( 2021 02:00 )  Alb: 2.1 g/dL / Pro: 6.4 g/dL / ALK PHOS: 421 U/L / ALT: 11 U/L / AST: 62 U/L / GGT: x                                                  Culture - Blood (collected 2021 17:42)  Source: .Blood None  Preliminary Report (2021 23:01):    No growth to date.    Culture - Urine (collected 2021 14:39)  Source: .Urine Clean Catch (Midstream)  Preliminary Report (2021 19:19):    >100,000 CFU/ml Klebsiella pneumoniae                                                   Mode: AC/ CMV (Assist Control/ Continuous Mandatory Ventilation)  RR (machine): 22  TV (machine): 450  FiO2: 100  PEEP: 5  ITime: 1  MAP: 11  PIP: 47                                      ABG - ( 2021 04:13 )  pH, Arterial: 7.36  pH, Blood: x     /  pCO2: 52    /  pO2: 81    / HCO3: 30    / Base Excess: 3.2   /  SaO2: 97                  X-Rays   reviwwed    MEDICATIONS  (STANDING):  amiKACIN  IVPB 400 milliGRAM(s) IV Intermittent once  ascorbic acid 500 milliGRAM(s) Oral daily  atorvastatin 40 milliGRAM(s) Oral at bedtime  carbidopa/levodopa  25/100 2 Tablet(s) Oral <User Schedule>  chlorhexidine 4% Liquid 1 Application(s) Topical <User Schedule>  collagenase Ointment 1 Application(s) Topical two times a day  Dakins Solution - 1/2 Strength 1 Application(s) Topical two times a day  etomidate Injectable 15 milliGRAM(s) IV Push once  fentaNYL   Infusion. 0.5 MICROgram(s)/kG/Hr (3.97 mL/Hr) IV Continuous <Continuous>  magnesium oxide 400 milliGRAM(s) Oral every 8 hours  meropenem  IVPB      meropenem  IVPB 1000 milliGRAM(s) IV Intermittent every 8 hours  midazolam Infusion 0.02 mG/kG/Hr (1.59 mL/Hr) IV Continuous <Continuous>  midodrine 10 milliGRAM(s) Oral every 8 hours  norepinephrine Infusion 0.05 MICROgram(s)/kG/Min (3.72 mL/Hr) IV Continuous <Continuous>  phenylephrine    Infusion 0.1 MICROgram(s)/kG/Min (2.98 mL/Hr) IV Continuous <Continuous>  senna 2 Tablet(s) Oral at bedtime  sodium chloride 0.9% Bolus 1000 milliLiter(s) IV Bolus once  zinc sulfate 220 milliGRAM(s) Oral daily    MEDICATIONS  (PRN):  acetaminophen   Tablet .. 650 milliGRAM(s) Oral every 6 hours PRN Temp greater or equal to 38C (100.4F)

## 2021-04-28 NOTE — CHART NOTE - NSCHARTNOTEFT_GEN_A_CORE
71 year old Male with past medical history of Parkinson's, dementia, CKD Stage 3, 1st degree AV block, HLD, gout, HTN, DM, fungal septicemia presented from [facility] for acute decompensation in mental status. As per documentation patient at baseline was verbal but for past 3 days prior to admission on 3/22,, he has been worsening to state of being non verbal. Patient was recently admitted to [facility] for fungemia and discharged on the 11th March.    Pt admitted for Metabolic encephalopathy in the setting of baseline dementia/Sepsis POA/Necrotic sacral ulcer stage 4/H/o recent fungemia. WCX  GNR, 3/29   Numerous Klebsiella pneumoniae ESBL, Few Pseudomonas aeruginosa, Numerous Enterococcus faecalis (vancomycin resistant),  evaluated by Burn: s/p debridement of sacrum on 3/30. Pt treated w/ IV meropenem, Polymixin and Aztreonam. S/p spinal tap --> CSF clear with no growths and neg PCR. Repeat EEG w/ no epileptiform activity but diffuse cerebral dysfx. Hospital course was further complicated by cholecystitis confirmed with HIDA scan and sonogram. Pt restarted on meropenem for broad cvg.    Rapid response called on patient 4/28 overnight. Pt found to have BP 60s/40s w/ tachypnea/ increased work of breathing. Pt started on Levophed. Central line placed. Decision made to intubate. ABG collected. STAT CXR, labs ordered. Pt to be transferred to Vent unit.     ABG - ( 28 Apr 2021 00:49 )  pH, Arterial: 7.14  pH, Blood: x     /  pCO2: x     /  pO2: x     / HCO3: x     / Base Excess: x     /  SaO2: x Transfer Note    Transfer from:     Transfer to: ( x ) Medicine    (  ) Telemetry    (  ) RCU                               (  ) Palliative    (  ) Stroke Unit    ( x ) MICU / Vent    (  ) CCU    Signout given to:     ----------------------------------------------------------------------------------------------------------  HPI    71 year old Male with past medical history of Parkinson's, dementia, CKD Stage 3, 1st degree AV block, HLD, gout, HTN, DM, fungal septicemia presented from [facility] for acute decompensation in mental status. As per documentation patient at baseline was verbal but for past 3 days prior to admission on 3/22,, he has been worsening to state of being non verbal. Patient was recently admitted to [facility] for fungemia and discharged on the 11th March.    Pt admitted for Metabolic encephalopathy in the setting of baseline dementia/Sepsis POA/Necrotic sacral ulcer stage 4/H/o recent fungemia. WCX  GNR, 3/29   Numerous Klebsiella pneumoniae ESBL, Few Pseudomonas aeruginosa, Numerous Enterococcus faecalis (vancomycin resistant),  evaluated by Burn: s/p debridement of sacrum on 3/30. Pt treated w/ IV meropenem, Polymixin and Aztreonam. S/p spinal tap --> CSF clear with no growths and neg PCR. Repeat EEG w/ no epileptiform activity but diffuse cerebral dysfx. Hospital course was further complicated by cholecystitis confirmed with HIDA scan and sonogram. Pt restarted on meropenem for broad cvg.    Rapid response called on patient 4/28 overnight. Pt found to have BP 60s/40s w/ tachypnea/ increased work of breathing. Pt started on Levophed. Central line placed. Decision made to intubate. ABG collected. STAT CXR, labs ordered. Pt to be transferred to Vent unit.     ABG - ( 28 Apr 2021 00:49 )  pH, Arterial: 7.14  pH, Blood: x     /  pCO2: x     /  pO2: x     / HCO3: x     / Base Excess: x     /  SaO2: x          --------------------------------------------------------------------------------------------------------  PMH/PSH:  PAST MEDICAL & SURGICAL HISTORY:  Parkinson disease    Hypertension    Gout    Dyslipidemia    Prostatitis    Cataract    No significant past surgical history        -------------------------------------------------------------------------------------------------------  PHYSICAL EXAM:  General: NAD  HEENT: Atraumatic  Respiratory: CTAB  Cardiac: RRR  Abdomen: soft, non-tender, non-distended  Extremities: warm and well-perfused  Neuro: A+Ox4. No FND    Vital Signs Last 24 Hrs  T(C): 36.6 (27 Apr 2021 21:47), Max: 36.9 (27 Apr 2021 05:45)  T(F): 97.9 (27 Apr 2021 21:47), Max: 98.4 (27 Apr 2021 05:45)  HR: 82 (27 Apr 2021 21:47) (80 - 88)  BP: 102/52 (27 Apr 2021 21:47) (102/52 - 132/58)  BP(mean): 72 (27 Apr 2021 21:47) (72 - 85)  RR: 32 (27 Apr 2021 21:47) (20 - 32)  SpO2: 95% (27 Apr 2021 21:47) (95% - 99%)    I&O's Summary    26 Apr 2021 07:01  -  27 Apr 2021 07:00  --------------------------------------------------------  IN: 1280 mL / OUT: 0 mL / NET: 1280 mL    27 Apr 2021 07:01  -  28 Apr 2021 01:33  --------------------------------------------------------  IN: 920 mL / OUT: 0 mL / NET: 920 mL        --------------------------------------------------------------------------------------------------------  LABS:   CARDIAC MARKERS ( 26 Apr 2021 17:42 )  x     / x     / 50 U/L / x     / x                                  8.1    18.80 )-----------( 126      ( 27 Apr 2021 08:28 )             26.3       04-27    134<L>  |  101  |  24<H>  ----------------------------<  119<H>  4.8   |  26  |  1.1    Ca    8.3<L>      27 Apr 2021 08:28  Mg     2.0     04-27    TPro  6.5  /  Alb  2.2<L>  /  TBili  0.8  /  DBili  x   /  AST  120<H>  /  ALT  25  /  AlkPhos  476<H>  04-27          ABG - ( 28 Apr 2021 00:49 )  pH, Arterial: 7.14  pH, Blood: x     /  pCO2: x     /  pO2: x     / HCO3: x     / Base Excess: x     /  SaO2: x                   CULTURE RESULTS:                04-26-21 @ 17:42  Specimen Source: --  Method Type: --  Gram Stain - RRL: --  Gram Stain - Wound: --  Bacteria: --  Culture Results:   No growth to date.      Specimen Source:   Method Type:   Gram Stain:   Culture Results: Culture Results:   No growth to date. (04-26-21 @ 17:42)  Culture Results:   >100,000 CFU/ml Klebsiella pneumoniae (04-26-21 @ 14:39)  Culture Results:   >100,000 CFU/ml Klebsiella pneumoniae (04-24-21 @ 22:07)    Bacteria: Bacteria: Many (04-26-21 @ 14:39)  Bacteria: Many (04-24-21 @ 22:07)        -------------------------------------------------------------------------------------------------  RADIOLOGY:      ---------------------------------------------------------------------------------------------------  ASSESSMENT & PLAN:       FOR FOLLOW UP:  [ ]   [ ]   [ ] Transfer Note    Transfer from:     Transfer to: ( x ) Medicine    (  ) Telemetry    (  ) RCU                               (  ) Palliative    (  ) Stroke Unit    ( x ) MICU / Vent    (  ) CCU    Signout given to:     ----------------------------------------------------------------------------------------------------------  HPI    71 year old Male with past medical history of Parkinson's, dementia, CKD Stage 3, 1st degree AV block, HLD, gout, HTN, DM, fungal septicemia presented from [facility] for acute decompensation in mental status. As per documentation patient at baseline was verbal but for past 3 days prior to admission on 3/22,, he has been worsening to state of being non verbal. Patient was recently admitted to [facility] for fungemia and discharged on the 11th March.    Pt admitted for Metabolic encephalopathy in the setting of baseline dementia/Sepsis POA/Necrotic sacral ulcer stage 4/H/o recent fungemia. WCX  GNR, 3/29   Numerous Klebsiella pneumoniae ESBL, Few Pseudomonas aeruginosa, Numerous Enterococcus faecalis (vancomycin resistant),  evaluated by Burn: s/p debridement of sacrum on 3/30. Pt treated w/ IV meropenem, Polymixin and Aztreonam. S/p spinal tap --> CSF clear with no growths and neg PCR. Repeat EEG w/ no epileptiform activity but diffuse cerebral dysfx. Hospital course was further complicated by cholecystitis confirmed with HIDA scan and sonogram. Pt restarted on meropenem for broad cvg.    Rapid response called on patient 4/28 overnight. Pt found to have BP 60s/40s w/ tachypnea/ increased work of breathing (satting 95% on 15L NRB). Pt started on Levophed. Central line placed. Decision made to intubate. ABG collected. STAT CXR, labs ordered. Pt to be transferred to Vent unit.                 --------------------------------------------------------------------------------------------------------  PMH/PSH:  PAST MEDICAL & SURGICAL HISTORY:  Parkinson disease    Hypertension    Gout    Dyslipidemia    Prostatitis    Cataract    No significant past surgical history        -------------------------------------------------------------------------------------------------------  PHYSICAL EXAM:  General: intubated, sedated  HEENT: Atraumatic  Respiratory: +crackles b/l  Cardiac: RRR  Abdomen: soft, non-tender, non-distended  Extremities: warm and well-perfused  Neuro: sedated    Vital Signs Last 24 Hrs  T(C): 36.6 (27 Apr 2021 21:47), Max: 36.9 (27 Apr 2021 05:45)  T(F): 97.9 (27 Apr 2021 21:47), Max: 98.4 (27 Apr 2021 05:45)  HR: 82 (27 Apr 2021 21:47) (80 - 88)  BP: 102/52 (27 Apr 2021 21:47) (102/52 - 132/58)  BP(mean): 72 (27 Apr 2021 21:47) (72 - 85)  RR: 32 (27 Apr 2021 21:47) (20 - 32)  SpO2: 95% (27 Apr 2021 21:47) (95% - 99%)    I&O's Summary    26 Apr 2021 07:01  -  27 Apr 2021 07:00  --------------------------------------------------------  IN: 1280 mL / OUT: 0 mL / NET: 1280 mL    27 Apr 2021 07:01  -  28 Apr 2021 01:33  --------------------------------------------------------  IN: 920 mL / OUT: 0 mL / NET: 920 mL        --------------------------------------------------------------------------------------------------------  LABS:   CARDIAC MARKERS ( 26 Apr 2021 17:42 )  x     / x     / 50 U/L / x     / x                                  8.1    18.80 )-----------( 126      ( 27 Apr 2021 08:28 )             26.3       04-27    134<L>  |  101  |  24<H>  ----------------------------<  119<H>  4.8   |  26  |  1.1    Ca    8.3<L>      27 Apr 2021 08:28  Mg     2.0     04-27    TPro  6.5  /  Alb  2.2<L>  /  TBili  0.8  /  DBili  x   /  AST  120<H>  /  ALT  25  /  AlkPhos  476<H>  04-27          ABG - ( 28 Apr 2021 00:49 )  pH, Arterial: 7.14  pH, Blood: x     /  pCO2: x     /  pO2: x     / HCO3: x     / Base Excess: x     /  SaO2: x                   CULTURE RESULTS:                04-26-21 @ 17:42  Specimen Source: --  Method Type: --  Gram Stain - RRL: --  Gram Stain - Wound: --  Bacteria: --  Culture Results:   No growth to date.      Specimen Source:   Method Type:   Gram Stain:   Culture Results: Culture Results:   No growth to date. (04-26-21 @ 17:42)  Culture Results:   >100,000 CFU/ml Klebsiella pneumoniae (04-26-21 @ 14:39)  Culture Results:   >100,000 CFU/ml Klebsiella pneumoniae (04-24-21 @ 22:07)    Bacteria: Bacteria: Many (04-26-21 @ 14:39)  Bacteria: Many (04-24-21 @ 22:07)        -------------------------------------------------------------------------------------------------  ***FOLLOW-UP: STAT labs, AM CXR  ---------------------------------------------------------------------------------------------------  ASSESSMENT & PLAN:   71 year old Male with past medical history of Parkinson's, dementia, CKD Stage 3, 1st degree AV block, HLD, gout, HTN, DM, fungal septicemia presenting from [facility] for acute decompensation in mental status. As per documentation patient at baseline is verbal but for past 3 days, he has been worsening to state of being non verbal. Patient was admitted to [facility] for fungemia and discharged on the 11th March. At  he had left hand cellulitis and was treated for it.    # cholecystitis  - patients with fevers overnight  - confirmed with HIDA scan and sonogram  - IR consulted for perc cholecystostomy  - plan for 4/29. WIll hold the AFIb till then ( last dose 4/27 AM)  - in case patient has hemodynamic compromise, will call IR stat  - antibiotic coverage broadened to IV Meropenem 4/27    # Metabolic encephalopathy on top of baseline dementia/Sepsis POA/Necrotic sacral ulcer stage 4/H/o recent fungemia  - CT Head No acute intracranial pathology.activity  -  Chest Xray No consolidation, effusion or pneumothorax.  -  Blood & Urine cxs NGTD   -  WCX GNR, 3/29   Numerous Klebsiella pneumoniae ESBL, Few Pseudomonas aeruginosa, Numerous Enterococcus faecalis (vancomycin resistant)  - evaluated by Burn: s/p debridement of sacrum on 3/30 .  no more new recommendation for surgery at this time .continue local wound care with santyl/dakins WTD.  - s/p meropenem, Polymixin and Aztreonam  - S/p spinal tap --> CSF clear with no growths and neg PCR  -repeat EEG w/ no epileptiform activity but diffuse cerebral dysfx    # Nutrition/Dysphagia:   -daughter agreed to PEG but delayed due to +BCx that turned out to be a contaminant  -4/14 passed S&S but limited PO intake.   -s/p PEG on 4/19  -family taught how to administer feeds and wound care    #Lt shoulder dislocation w/ collection  -no need to tap as per IR and ID concurrs    #Anemia  -s/p 1u PRBCs  -monitor CBC    # Thrombocytopenia ?sec to sepsis  -resolved  -off Pepcid  -heme f/u appreciated    # H/o parkinsons disease  - c/w sinemet    # H/o chronic DVT  - [facility] Duplex Lower Ext Vein Scan, Bilat (03.27.21 @ 18:48) >No evidence of deep venous thrombosis or superficial thrombophlebitis in the bilateral lower extremities.  - unsure why was on Rx >2yrs  -will check w/ PMD Dr. Patterson  -d/w daughter who is aware of risks and benefits. Daughter requests prophylactic dose (aware of anemia and possible need for transfusions)    # Dyslipidemia  - c/w statin    # DVT prophylaxis  -scd  -Lovenox    Very high risk pt. D/w daughter GOC: she wants full code for now. Very poor Px (daughter aware).    #Pending:  Resolution of fevers  # Discussed w/ daughter on phone and son at the bedside in details who wants to cont aggressive Tx. Daughter wants to take pt home (she is HC aide)        FOR FOLLOW UP:  [ ]   [ ]   [ ] Transfer Note    Transfer from:     Transfer to: ( x ) Medicine    (  ) Telemetry    (  ) RCU                               (  ) Palliative    (  ) Stroke Unit    ( x ) MICU / Vent    (  ) CCU    Signout given to:     ----------------------------------------------------------------------------------------------------------  HPI    71 year old Male with past medical history of Parkinson's, dementia, CKD Stage 3, 1st degree AV block, HLD, gout, HTN, DM, fungal septicemia presented from [facility] for acute decompensation in mental status. As per documentation patient at baseline was verbal but for past 3 days prior to admission on 3/22,, he has been worsening to state of being non verbal. Patient was recently admitted to [facility] for fungemia and discharged on the 11th March.    Pt admitted for Metabolic encephalopathy in the setting of baseline dementia/Sepsis POA/Necrotic sacral ulcer stage 4/H/o recent fungemia. WCX  GNR, 3/29   Numerous Klebsiella pneumoniae ESBL, Few Pseudomonas aeruginosa, Numerous Enterococcus faecalis (vancomycin resistant),  evaluated by Burn: s/p debridement of sacrum on 3/30. Pt treated w/ IV meropenem, Polymixin and Aztreonam. S/p spinal tap --> CSF clear with no growths and neg PCR. Repeat EEG w/ no epileptiform activity but diffuse cerebral dysfx. Hospital course was further complicated by cholecystitis confirmed with HIDA scan and sonogram. Pt restarted on meropenem for broad cvg.    Rapid response called on patient 4/28 overnight. Pt found to have BP 60s/40s w/ tachypnea/ increased work of breathing (satting 95% on 15L NRB). Pt started on Levophed. Central line placed. Decision made to intubate. ABG collected. STAT CXR,KUB, labs ordered. Pt to be transferred to Vent unit. Family contacted (daughter, Kelton) and provided medical update, poor prognosis discussed, pt to remain full code.                 --------------------------------------------------------------------------------------------------------  PMH/PSH:  PAST MEDICAL & SURGICAL HISTORY:  Parkinson disease    Hypertension    Gout    Dyslipidemia    Prostatitis    Cataract    No significant past surgical history        -------------------------------------------------------------------------------------------------------  PHYSICAL EXAM:  General: intubated, sedated  HEENT: Atraumatic  Respiratory: +crackles b/l  Cardiac: RRR  Abdomen: soft, non-tender, non-distended  Extremities: warm and well-perfused  Neuro: sedated    Vital Signs Last 24 Hrs  T(C): 36.6 (27 Apr 2021 21:47), Max: 36.9 (27 Apr 2021 05:45)  T(F): 97.9 (27 Apr 2021 21:47), Max: 98.4 (27 Apr 2021 05:45)  HR: 82 (27 Apr 2021 21:47) (80 - 88)  BP: 102/52 (27 Apr 2021 21:47) (102/52 - 132/58)  BP(mean): 72 (27 Apr 2021 21:47) (72 - 85)  RR: 32 (27 Apr 2021 21:47) (20 - 32)  SpO2: 95% (27 Apr 2021 21:47) (95% - 99%)    I&O's Summary    26 Apr 2021 07:01  -  27 Apr 2021 07:00  --------------------------------------------------------  IN: 1280 mL / OUT: 0 mL / NET: 1280 mL    27 Apr 2021 07:01  -  28 Apr 2021 01:33  --------------------------------------------------------  IN: 920 mL / OUT: 0 mL / NET: 920 mL        --------------------------------------------------------------------------------------------------------  LABS:   CARDIAC MARKERS ( 26 Apr 2021 17:42 )  x     / x     / 50 U/L / x     / x                                  8.1    18.80 )-----------( 126      ( 27 Apr 2021 08:28 )             26.3       04-27    134<L>  |  101  |  24<H>  ----------------------------<  119<H>  4.8   |  26  |  1.1    Ca    8.3<L>      27 Apr 2021 08:28  Mg     2.0     04-27    TPro  6.5  /  Alb  2.2<L>  /  TBili  0.8  /  DBili  x   /  AST  120<H>  /  ALT  25  /  AlkPhos  476<H>  04-27          ABG - ( 28 Apr 2021 00:49 )  pH, Arterial: 7.14  pH, Blood: x     /  pCO2: x     /  pO2: x     / HCO3: x     / Base Excess: x     /  SaO2: x                   CULTURE RESULTS:                04-26-21 @ 17:42  Specimen Source: --  Method Type: --  Gram Stain - RRL: --  Gram Stain - Wound: --  Bacteria: --  Culture Results:   No growth to date.      Specimen Source:   Method Type:   Gram Stain:   Culture Results: Culture Results:   No growth to date. (04-26-21 @ 17:42)  Culture Results:   >100,000 CFU/ml Klebsiella pneumoniae (04-26-21 @ 14:39)  Culture Results:   >100,000 CFU/ml Klebsiella pneumoniae (04-24-21 @ 22:07)    Bacteria: Bacteria: Many (04-26-21 @ 14:39)  Bacteria: Many (04-24-21 @ 22:07)        -------------------------------------------------------------------------------------------------  ***FOLLOW-UP: STAT labs, AM CXR  ---------------------------------------------------------------------------------------------------  ASSESSMENT & PLAN:   71 year old Male with past medical history of Parkinson's, dementia, CKD Stage 3, 1st degree AV block, HLD, gout, HTN, DM, fungal septicemia presenting from [facility] for acute decompensation in mental status. As per documentation patient at baseline is verbal but for past 3 days, he has been worsening to state of being non verbal. Patient was admitted to [facility] for fungemia and discharged on the 11th March. At  he had left hand cellulitis and was treated for it.    # cholecystitis  - patients with fevers overnight  - confirmed with HIDA scan and sonogram  - IR consulted for perc cholecystostomy  - plan for 4/29. WIll hold the AFIb till then ( last dose 4/27 AM)  - in case patient has hemodynamic compromise, will call IR stat  - antibiotic coverage broadened to IV Meropenem 4/27    # Metabolic encephalopathy on top of baseline dementia/Sepsis POA/Necrotic sacral ulcer stage 4/H/o recent fungemia  - CT Head No acute intracranial pathology.activity  -  Chest Xray No consolidation, effusion or pneumothorax.  -  Blood & Urine cxs NGTD   -  WCX GNR, 3/29   Numerous Klebsiella pneumoniae ESBL, Few Pseudomonas aeruginosa, Numerous Enterococcus faecalis (vancomycin resistant)  - evaluated by Burn: s/p debridement of sacrum on 3/30 .  no more new recommendation for surgery at this time .continue local wound care with santyl/dakins WTD.  - s/p meropenem, Polymixin and Aztreonam  - S/p spinal tap --> CSF clear with no growths and neg PCR  -repeat EEG w/ no epileptiform activity but diffuse cerebral dysfx    # Nutrition/Dysphagia:   -daughter agreed to PEG but delayed due to +BCx that turned out to be a contaminant  -4/14 passed S&S but limited PO intake.   -s/p PEG on 4/19  -family taught how to administer feeds and wound care    #Lt shoulder dislocation w/ collection  -no need to tap as per IR and ID concurrs    #Anemia  -s/p 1u PRBCs  -monitor CBC    # Thrombocytopenia ?sec to sepsis  -resolved  -off Pepcid  -heme f/u appreciated    # H/o parkinsons disease  - c/w sinemet    # H/o chronic DVT  - [facility] Duplex Lower Ext Vein Scan, Bilat (03.27.21 @ 18:48) >No evidence of deep venous thrombosis or superficial thrombophlebitis in the bilateral lower extremities.  - unsure why was on Rx >2yrs  -will check w/ PMD Dr. Patterson  -d/w daughter who is aware of risks and benefits. Daughter requests prophylactic dose (aware of anemia and possible need for transfusions)    # Dyslipidemia  - c/w statin    # DVT prophylaxis  -scd  -Lovenox    Very high risk pt. D/w daughter GOC: she wants full code for now. Very poor Px (daughter aware).    #Pending:  Resolution of fevers  # Discussed w/ daughter on phone and son at the bedside in details who wants to cont aggressive Tx. Daughter wants to take pt home (she is HC aide)        FOR FOLLOW UP:  [ ]   [ ]   [ ]

## 2021-04-28 NOTE — PROGRESS NOTE ADULT - ASSESSMENT
71y male with multiple medical comorbidities, currently admitted for worsening mental status. Patient with multiple +blood cultures within last month, last + candida in february, and MRSA staph epi on 4/8, and Urine cx + for ESBL Klebsiella (currently on Meropenem. Now with elevated LFTs in the setting of + HIDA and no stones on U/S (only sludge). Intubated overnight, on MV, in critical condition.    -Patient is extremely poor operative candidate  -LFTs downtrending  -IR tentatively planning for perc gayle tomorrow  -no surgical intervention planned  -will continue to follow

## 2021-04-28 NOTE — CHART NOTE - NSCHARTNOTEFT_GEN_A_CORE
PREOPERATIVE DIAGNOSIS: Left lung whiteout    POSTOPERATIVE DIAGNOSES: mucous plug of left mainstem bronchus      PROCEDURE PERFORMED:  Bronchoscopy and washing.    CONSENT: After explaining the risk and benefit the consent was obtained from patients daughter    The patient had been previously intubated and was on mechanical ventilatory support. He was on sedation, which was continued and adjusted during the procedure. His FiO2 was increased to 100% during the procedure. The  fiberoptic bronchoscope was introduced through an endotracheal tube adaptor and the tip of the endotracheal tube was noted to be in good position above the park.  thick mucopurulent secretions noted at the park, Left main and right main bronchus. Bronchial washing done with removal of most of patients secretions.  The Right tracheobronchial tree was inspected closely to the level of the subsegmental bronchi. All bronchi are patent with no endobronchial lesions and no mucosal lesions noted.   The Left tracheobronchial tree was also patent and intact with the mucosa normal   The procedure was completed and all samples were submitted for appropriate studies  After adequate clearing of secretions was accomplished, the bronchoscope was removed from the patient and the procedure was terminated.  The patient tolerated the procedure well and there were no complications. PREOPERATIVE DIAGNOSIS: Left lung whiteout    POSTOPERATIVE DIAGNOSES: mucous plug of left mainstem bronchus      PROCEDURE PERFORMED:  Bronchoscopy and washing.    CONSENT: After explaining the risk and benefit the consent was obtained from patients daughter    The patient had been previously intubated and was on mechanical ventilatory support. He was on sedation, which was continued and adjusted during the procedure. His FiO2 was increased to 100% during the procedure. The  fiberoptic bronchoscope was introduced through an endotracheal tube adaptor and the tip of the endotracheal tube was noted to be in good position above the park.  thick mucopurulent secretions noted at the park, Left main and right main bronchus. Bronchial washing done with removal of most of patients secretions.  The Right tracheobronchial tree was inspected closely to the level of the subsegmental bronchi. All bronchi are patent with no endobronchial lesions and no mucosal lesions noted.   The Left tracheobronchial tree was also patent and intact with the mucosa normal   The procedure was completed and all samples were submitted for appropriate studies  After adequate clearing of secretions was accomplished, the bronchoscope was removed from the patient and the procedure was terminated.  The patient tolerated the procedure well and there were no complications.      I was present throughout the procedure

## 2021-04-28 NOTE — PROGRESS NOTE ADULT - SUBJECTIVE AND OBJECTIVE BOX
INTERVENTIONAL RADIOLOGY PROGRESS NOTE    71y Male with acute cholecystitis, poor surgical candidate, IR consulted for perc choly. Pt is s/p PEG tube placement on 4/19  in Interventional Radiology with Dr Jeffers.    Interval hx: patient required intubation after left lung collapse; now s/p bronch with f/u cxr showing continued whiteout of lower lung with improvement at upper lung.    Vitals: T(F): 98.6 (04-28-21 @ 12:00), Max: 98.9 (04-28-21 @ 07:00)  HR: 77 (04-28-21 @ 14:00) (68 - 138)  BP: 87/45 (04-28-21 @ 14:00) (79/53 - 158/93)  RR: 22 (04-28-21 @ 14:00) (16 - 32)  SpO2: 100% (04-28-21 @ 14:00) (95% - 100%)  Wt(kg): --    LABS:                        7.5    14.04 )-----------( 146      ( 28 Apr 2021 06:10 )             25.5     04-28    138  |  103  |  28<H>  ----------------------------<  87  4.5   |  25  |  1.1    Ca    8.1<L>      28 Apr 2021 06:10  Phos  6.3     04-28  Mg     2.0     04-28    TPro  6.4  /  Alb  2.1<L>  /  TBili  0.5  /  DBili  x   /  AST  62<H>  /  ALT  11  /  AlkPhos  421<H>  04-28    PT/INR - ( 28 Apr 2021 02:00 )   PT: 17.50 sec;   INR: 1.52 ratio         PTT - ( 28 Apr 2021 02:00 )  PTT:50.6 sec      ASSESSMENT AND PLAN:    #Acute cholecystitis - u/s and HIDA positive with elevated wbc and fevers. Now s/p bronch after left lung collapse; required intubation.  -  continue to hold eliquis, last dose eliquis AM 4/27. Please hold eliquis for procedure, will need to hold 4 doses (48 hours) based on GFR. First possible procedure time is Thursday afternoon. Given acute decompensation with lung collapse, will follow patient tomorrow to determine if procedure will be done/defered.  - no surgical intervention planned from surgery team standpoint  - Please make NPO at midnight prior to procedure  - please have covid swab within 3 days of procedure (last negative 4/17, needs repeat)  - please have recent coags (pt/ptt/inr). INR 1.19 on 4/17      Please call Interventional Radiology [phone] with any questions, concerns, or issues regarding above.

## 2021-04-29 LAB
-  COAGULASE NEGATIVE STAPHYLOCOCCUS, METHICILLIN RESISTANT: SIGNIFICANT CHANGE UP
ALBUMIN SERPL ELPH-MCNC: 1.8 G/DL — LOW (ref 3.5–5.2)
ALP SERPL-CCNC: 371 U/L — HIGH (ref 30–115)
ALT FLD-CCNC: 38 U/L — SIGNIFICANT CHANGE UP (ref 0–41)
ANION GAP SERPL CALC-SCNC: 7 MMOL/L — SIGNIFICANT CHANGE UP (ref 7–14)
AST SERPL-CCNC: 38 U/L — SIGNIFICANT CHANGE UP (ref 0–41)
BASOPHILS # BLD AUTO: 0.08 K/UL — SIGNIFICANT CHANGE UP (ref 0–0.2)
BASOPHILS NFR BLD AUTO: 0.6 % — SIGNIFICANT CHANGE UP (ref 0–1)
BILIRUB SERPL-MCNC: 0.5 MG/DL — SIGNIFICANT CHANGE UP (ref 0.2–1.2)
BUN SERPL-MCNC: 28 MG/DL — HIGH (ref 10–20)
CALCIUM SERPL-MCNC: 7.9 MG/DL — LOW (ref 8.5–10.1)
CHLORIDE SERPL-SCNC: 106 MMOL/L — SIGNIFICANT CHANGE UP (ref 98–110)
CK SERPL-CCNC: 29 U/L — SIGNIFICANT CHANGE UP (ref 0–225)
CO2 SERPL-SCNC: 27 MMOL/L — SIGNIFICANT CHANGE UP (ref 17–32)
CREAT SERPL-MCNC: 0.9 MG/DL — SIGNIFICANT CHANGE UP (ref 0.7–1.5)
CULTURE RESULTS: NO GROWTH — SIGNIFICANT CHANGE UP
EOSINOPHIL # BLD AUTO: 0.32 K/UL — SIGNIFICANT CHANGE UP (ref 0–0.7)
EOSINOPHIL NFR BLD AUTO: 2.4 % — SIGNIFICANT CHANGE UP (ref 0–8)
GLUCOSE BLDC GLUCOMTR-MCNC: 80 MG/DL — SIGNIFICANT CHANGE UP (ref 70–99)
GLUCOSE BLDC GLUCOMTR-MCNC: 81 MG/DL — SIGNIFICANT CHANGE UP (ref 70–99)
GLUCOSE SERPL-MCNC: 72 MG/DL — SIGNIFICANT CHANGE UP (ref 70–99)
GRAM STN FLD: SIGNIFICANT CHANGE UP
HCT VFR BLD CALC: 21.7 % — LOW (ref 42–52)
HCT VFR BLD CALC: 26 % — LOW (ref 42–52)
HGB BLD-MCNC: 6.7 G/DL — CRITICAL LOW (ref 14–18)
HGB BLD-MCNC: 8.1 G/DL — LOW (ref 14–18)
IMM GRANULOCYTES NFR BLD AUTO: 1.6 % — HIGH (ref 0.1–0.3)
INR BLD: 1.48 RATIO — HIGH (ref 0.65–1.3)
LYMPHOCYTES # BLD AUTO: 1 K/UL — LOW (ref 1.2–3.4)
LYMPHOCYTES # BLD AUTO: 7.5 % — LOW (ref 20.5–51.1)
MAGNESIUM SERPL-MCNC: 2 MG/DL — SIGNIFICANT CHANGE UP (ref 1.8–2.4)
MCHC RBC-ENTMCNC: 26.1 PG — LOW (ref 27–31)
MCHC RBC-ENTMCNC: 26.2 PG — LOW (ref 27–31)
MCHC RBC-ENTMCNC: 30.9 G/DL — LOW (ref 32–37)
MCHC RBC-ENTMCNC: 31.2 G/DL — LOW (ref 32–37)
MCV RBC AUTO: 83.9 FL — SIGNIFICANT CHANGE UP (ref 80–94)
MCV RBC AUTO: 84.8 FL — SIGNIFICANT CHANGE UP (ref 80–94)
METHOD TYPE: SIGNIFICANT CHANGE UP
MONOCYTES # BLD AUTO: 0.99 K/UL — HIGH (ref 0.1–0.6)
MONOCYTES NFR BLD AUTO: 7.4 % — SIGNIFICANT CHANGE UP (ref 1.7–9.3)
NEUTROPHILS # BLD AUTO: 10.77 K/UL — HIGH (ref 1.4–6.5)
NEUTROPHILS NFR BLD AUTO: 80.5 % — HIGH (ref 42.2–75.2)
NRBC # BLD: 0 /100 WBCS — SIGNIFICANT CHANGE UP (ref 0–0)
NRBC # BLD: 0 /100 WBCS — SIGNIFICANT CHANGE UP (ref 0–0)
PLATELET # BLD AUTO: 100 K/UL — LOW (ref 130–400)
PLATELET # BLD AUTO: 125 K/UL — LOW (ref 130–400)
POTASSIUM SERPL-MCNC: 4.7 MMOL/L — SIGNIFICANT CHANGE UP (ref 3.5–5)
POTASSIUM SERPL-SCNC: 4.7 MMOL/L — SIGNIFICANT CHANGE UP (ref 3.5–5)
PROCALCITONIN SERPL-MCNC: 2.03 NG/ML — HIGH (ref 0.02–0.1)
PROCALCITONIN SERPL-MCNC: 2.5 NG/ML — HIGH (ref 0.02–0.1)
PROT SERPL-MCNC: 5.5 G/DL — LOW (ref 6–8)
PROTHROM AB SERPL-ACNC: 17 SEC — HIGH (ref 9.95–12.87)
RBC # BLD: 2.56 M/UL — LOW (ref 4.7–6.1)
RBC # BLD: 3.1 M/UL — LOW (ref 4.7–6.1)
RBC # FLD: 15.8 % — HIGH (ref 11.5–14.5)
RBC # FLD: 16.1 % — HIGH (ref 11.5–14.5)
SODIUM SERPL-SCNC: 140 MMOL/L — SIGNIFICANT CHANGE UP (ref 135–146)
SPECIMEN SOURCE: SIGNIFICANT CHANGE UP
SPECIMEN SOURCE: SIGNIFICANT CHANGE UP
TROPONIN T SERPL-MCNC: 0.1 NG/ML — CRITICAL HIGH
TROPONIN T SERPL-MCNC: 0.11 NG/ML — CRITICAL HIGH
WBC # BLD: 13.37 K/UL — HIGH (ref 4.8–10.8)
WBC # BLD: 14.14 K/UL — HIGH (ref 4.8–10.8)
WBC # FLD AUTO: 13.37 K/UL — HIGH (ref 4.8–10.8)
WBC # FLD AUTO: 14.14 K/UL — HIGH (ref 4.8–10.8)

## 2021-04-29 PROCEDURE — 99232 SBSQ HOSP IP/OBS MODERATE 35: CPT

## 2021-04-29 PROCEDURE — 47490 INCISION OF GALLBLADDER: CPT | Mod: 79

## 2021-04-29 PROCEDURE — 71045 X-RAY EXAM CHEST 1 VIEW: CPT | Mod: 26

## 2021-04-29 RX ADMIN — Medication 1 APPLICATION(S): at 18:38

## 2021-04-29 RX ADMIN — FENTANYL CITRATE 3.97 MICROGRAM(S)/KG/HR: 50 INJECTION INTRAVENOUS at 06:32

## 2021-04-29 RX ADMIN — MEROPENEM 100 MILLIGRAM(S): 1 INJECTION INTRAVENOUS at 06:31

## 2021-04-29 RX ADMIN — MEROPENEM 100 MILLIGRAM(S): 1 INJECTION INTRAVENOUS at 21:26

## 2021-04-29 RX ADMIN — CHLORHEXIDINE GLUCONATE 1 APPLICATION(S): 213 SOLUTION TOPICAL at 06:36

## 2021-04-29 RX ADMIN — MIDODRINE HYDROCHLORIDE 10 MILLIGRAM(S): 2.5 TABLET ORAL at 21:22

## 2021-04-29 RX ADMIN — Medication 1 APPLICATION(S): at 06:35

## 2021-04-29 RX ADMIN — Medication 1 APPLICATION(S): at 06:37

## 2021-04-29 RX ADMIN — ATORVASTATIN CALCIUM 40 MILLIGRAM(S): 80 TABLET, FILM COATED ORAL at 21:20

## 2021-04-29 RX ADMIN — CHLORHEXIDINE GLUCONATE 15 MILLILITER(S): 213 SOLUTION TOPICAL at 18:00

## 2021-04-29 RX ADMIN — PHENYLEPHRINE HYDROCHLORIDE 2.98 MICROGRAM(S)/KG/MIN: 10 INJECTION INTRAVENOUS at 18:40

## 2021-04-29 RX ADMIN — SENNA PLUS 2 TABLET(S): 8.6 TABLET ORAL at 21:20

## 2021-04-29 RX ADMIN — CHLORHEXIDINE GLUCONATE 15 MILLILITER(S): 213 SOLUTION TOPICAL at 06:36

## 2021-04-29 RX ADMIN — CARBIDOPA AND LEVODOPA 2 TABLET(S): 25; 100 TABLET ORAL at 21:21

## 2021-04-29 RX ADMIN — MAGNESIUM OXIDE 400 MG ORAL TABLET 400 MILLIGRAM(S): 241.3 TABLET ORAL at 21:21

## 2021-04-29 RX ADMIN — MIDAZOLAM HYDROCHLORIDE 1.59 MG/KG/HR: 1 INJECTION, SOLUTION INTRAMUSCULAR; INTRAVENOUS at 13:00

## 2021-04-29 RX ADMIN — FENTANYL CITRATE 3.97 MICROGRAM(S)/KG/HR: 50 INJECTION INTRAVENOUS at 18:39

## 2021-04-29 NOTE — PROGRESS NOTE ADULT - SUBJECTIVE AND OBJECTIVE BOX
NIEVES LABOY  71y, Male  Allergy: No Known Allergies      LOS  38d    CHIEF COMPLAINT: Change in Mental Status (2021 15:27)      INTERVAL EVENTS/HPI  - T(F): , Max: 98.9 (21 @ 20:00), afebrile WBC downtrending   - WBC Count: 13.37 (21 @ 05:25)  WBC Count: 14.04 (21 @ 06:10)  - Creatinine, Serum: 0.9 (21 @ 05:25)  Creatinine, Serum: 1.1 (21 @ 06:10)  - Procalcitonin, Serum: 2.50 ng/mL (21 @ 16:00)      ROS  cannot obtain secondary to patient's sedation and/or mental status    VITALS:  T(F): 97.1, Max: 98.9 (21 @ 20:00)  HR: 66  BP: 105/52  RR: 24Vital Signs Last 24 Hrs  T(C): 36.2 (2021 04:00), Max: 37.2 (2021 20:00)  T(F): 97.1 (2021 04:00), Max: 98.9 (2021 20:00)  HR: 66 (2021 07:00) (66 - 92)  BP: 105/52 (2021 07:00) (86/48 - 127/60)  BP(mean): 75 (2021 07:00) (64 - 85)  RR: 24 (2021 07:00) (22 - 24)  SpO2: 98% (2021 07:00) (95% - 100%)    PHYSICAL EXAM:  Gen: Intubated  CV: RRR  Lungs: Decreased BS at bases  Abd: Soft PEG  Neuro: Sedated  anasarca   Lines clean, no phlebitis    FH: Non-contributory  Social Hx: Non-contributory    TESTS & MEASUREMENTS:                        6.7    13.37 )-----------( 125      ( 2021 05:25 )             21.7     04-29    140  |  106  |  28<H>  ----------------------------<  72  4.7   |  27  |  0.9    Ca    7.9<L>      2021 05:25  Phos  6.3       Mg     2.0         TPro  5.5<L>  /  Alb  1.8<L>  /  TBili  0.5  /  DBili  x   /  AST  38  /  ALT  38  /  AlkPhos  371<H>      eGFR if Non African American: 85 mL/min/1.73M2 (21 @ 05:25)  eGFR if African American: 99 mL/min/1.73M2 (21 @ 05:25)    LIVER FUNCTIONS - ( 2021 05:25 )  Alb: 1.8 g/dL / Pro: 5.5 g/dL / ALK PHOS: 371 U/L / ALT: 38 U/L / AST: 38 U/L / GGT: x           Urinalysis Basic - ( 2021 02:00 )    Color: Marta / Appearance: Turbid / S.028 / pH: x  Gluc: x / Ketone: Trace  / Bili: Negative / Urobili: 3 mg/dL   Blood: x / Protein: 100 mg/dL / Nitrite: Negative   Leuk Esterase: Moderate / RBC: 52 /HPF / WBC 72 /HPF   Sq Epi: x / Non Sq Epi: 1 /HPF / Bacteria: Negative        Culture - Bronchial (collected 21 @ 10:22)  Source: Bronch Wash None  Gram Stain (21 @ 23:36):    Numerous polymorphonuclear leukocytes per low power field    Few Squamous epithelial cells per low power field    Numerous Gram Negative Coccobacilli per oil power field    Culture - Urine (collected 21 @ 02:00)  Source: .Urine Catheterized  Final Report (21 @ 07:07):    No growth    Culture - Blood (collected 21 @ 02:00)  Source: .Blood Blood-Peripheral  Preliminary Report (21 @ 07:02):    No growth to date.    Culture - Blood (collected 21 @ 17:42)  Source: .Blood None  Preliminary Report (21 @ 23:01):    No growth to date.    Culture - Urine (collected 21 @ 14:39)  Source: .Urine Clean Catch (Midstream)  Final Report (21 @ 15:53):    >100,000 CFU/ml Klebsiella pneumoniae (Carbapenem Resistant)  Organism: Klepne MDRO (21 @ 15:53)  Organism: Klepne MDRO (21 @ 15:53)      -  Amikacin: S <=16      -  Amoxicillin/Clavulanic Acid: R >16/8      -  Ampicillin: R >16 These ampicillin results predict results for amoxicillin      -  Ampicillin/Sulbactam: R >16/8 Enterobacter, Citrobacter, and Serratia may develop resistance during prolonged therapy (3-4 days)      -  Aztreonam: R >16      -  Cefazolin: R >16 (MIC_CL_COM_ENTERIC_CEFAZU) For uncomplicated UTI with K. pneumoniae, E. coli, or P. mirablis: JAZMIN <=16 is sensitive and JAZMIN >=32 is resistant. This also predicts results for oral agents cefaclor, cefdinir, cefpodoxime, cefprozil, cefuroxime axetil, cephalexin and locarbef for uncomplicated UTI. Note that some isolates may be susceptible to these agents while testing resistant to cefazolin.      -  Cefepime: R >16      -  Cefoxitin: I 16      -  Ceftriaxone: R >32 Enterobacter, Citrobacter, and Serratia may develop resistance during prolonged therapy      -  Ciprofloxacin: R >2      -  Ertapenem: R >1      -  Gentamicin: R >8      -  Imipenem: R 8      -  Levofloxacin: I 1      -  Meropenem: R >8      -  Nitrofurantoin: R >64 Should not be used to treat pyelonephritis      -  Piperacillin/Tazobactam: R >64      -  Tigecycline: S <=2      -  Tobramycin: R >8      -  Trimethoprim/Sulfamethoxazole: R >2/38      Method Type: JAZMIN    Culture - Urine (collected 21 @ 22:07)  Source: .Urine Clean Catch (Midstream)  Final Report (21 @ 16:01):    >100,000 CFU/ml Klebsiella pneumoniae (Carbapenem Resistant)  Organism: Klepne MDRO (21 @ 16:01)  Organism: Klepne MDRO (21 @ 16:01)      -  Amikacin: S <=16      -  Amoxicillin/Clavulanic Acid: R >16/8      -  Ampicillin: R >16 These ampicillin results predict results for amoxicillin      -  Ampicillin/Sulbactam: R >16/8 Enterobacter, Citrobacter, and Serratia may develop resistance during prolonged therapy (3-4 days)      -  Aztreonam: R >16      -  Cefazolin: R >16 (MIC_CL_COM_ENTERIC_CEFAZU) For uncomplicated UTI with K. pneumoniae, E. coli, or P. mirablis: JAZMIN <=16 is sensitive and JAZMIN >=32 is resistant. This also predicts results for oral agents cefaclor, cefdinir, cefpodoxime, cefprozil, cefuroxime axetil, cephalexin and locarbef for uncomplicated UTI. Note that some isolates may be susceptible to these agents while testing resistant to cefazolin.      -  Cefepime: R >16      -  Cefoxitin: S <=8      -  Ceftriaxone: R >32 Enterobacter, Citrobacter, and Serratia may develop resistance during prolonged therapy      -  Ciprofloxacin: R >2      -  Ertapenem: R >1      -  Gentamicin: R >8      -  Imipenem: R 8      -  Levofloxacin: I 1      -  Meropenem: R >8      -  Nitrofurantoin: R >64 Should not be used to treat pyelonephritis      -  Piperacillin/Tazobactam: R >64      -  Tigecycline: S <=2      -  Tobramycin: R >8      -  Trimethoprim/Sulfamethoxazole: R >2/38      Method Type: JAZMIN    Culture - Blood (collected 21 @ 07:34)  Source: .Blood None  Final Report (21 @ 17:00):    No Growth Final    Culture - Blood (collected 21 @ 21:38)  Source: .Blood None  Final Report (21 @ 05:00):    No Growth Final    Culture - Blood (collected 21 @ 16:00)  Source: .Blood Blood  Final Report (21 @ 22:01):    No Growth Final    Culture - Blood (collected 21 @ 16:00)  Source: .Blood Blood  Gram Stain (21 @ 08:45):    Growth in anaerobic bottle: Gram Positive Cocci in Clusters  Final Report (21 @ 10:51):    Growth in anaerobic bottle: Staphylococcus epidermidis Coag Negative    Staphylococcus    Single set isolate, possible contaminant. Contact    Microbiology if susceptibility testing clinically    indicated.    ***Blood Panel PCR results on this specimen are available    approximately 3 hours after the Gram stain result.***    Gram stain, PCR, and/or culture results may not always    correspond due to difference in methodologies.    ************************************************************    This PCR assay was performed by multiplex PCR. This    Assay tests for 66 bacterial and resistance gene targets.    Please refer to the [facility] iList test directory    at [email or web address] for details.  Organism: Blood Culture PCR (21 @ 10:51)  Organism: Blood Culture PCR (21 @ 10:51)      -  Staphylococcus epidermidis, Methicillin resistant: Detec      Method Type: PCR    Culture - Blood (collected 21 @ 16:00)  Source: .Blood Blood-Peripheral  Final Report (21 @ 01:00):    No Growth Final    Culture - Blood (collected 21 @ 12:55)  Source: .Blood None  Final Report (21 @ 23:01):    No Growth Final    Culture - CSF with Gram Stain (collected 21 @ 14:00)  Source: .CSF CSF  Gram Stain (21 @ 21:44):    No polymorphonuclear cells seen    No organisms seen    by cytocentrifuge  Final Report (21 @ 15:36):    No growth    Culture - Acid Fast - Tissue w/Smear (collected 21 @ 16:18)  Source: .Tissue None  Preliminary Report (04-10-21 @ 15:03):    No growth at 1 week.    Culture - Tissue with Gram Stain (collected 21 @ 16:18)  Source: .Tissue None  Gram Stain (21 @ 07:05):    Few polymorphonuclear leukocytes seen per low power field    Numerous Gram Negative Rods seen per oil power field  Final Report (21 @ 11:01):    Numerous Klebsiella pneumoniae ESBL    Few Enterococcus faecalis (vancomycin resistant)    Few Pseudomonas aeruginosa (Carbapenem Resistant)  Organism: Klebsiella pneumoniae ESBL  Enterococcus faecalis (vancomycin resistant)  Pseudomonas aeruginosa (Carbapenem Resistant) (21 @ 11:01)  Organism: Pseudomonas aeruginosa (Carbapenem Resistant) (21 @ 11:01)      -  Amikacin: S <=16      -  Aztreonam: S 8      -  Cefepime: I 16      -  Ceftazidime: I 16      -  Ciprofloxacin: R >2      -  Gentamicin: I 8      -  Imipenem: R >8      -  Levofloxacin: R >4      -  Meropenem: R 8      -  Piperacillin/Tazobactam: I 64      -  Tobramycin: S <=2      Method Type: JAZMIN  Organism: Enterococcus faecalis (vancomycin resistant) (21 @ 11:01)      -  Ampicillin: S 8 Predicts results to ampicillin/sulbactam, amoxacillin-clavulanate and  piperacillin-tazobactam.      -  Daptomycin: S 1      -  Levofloxacin: R >4      -  Linezolid: S 1      -  Tetra/Doxy: R >8      -  Vancomycin: R >16      Method Type: JAZMIN  Organism: Klebsiella pneumoniae ESBL (21 @ 11:01)      -  Amikacin: S <=16      -  Amoxicillin/Clavulanic Acid: S <=8/4      -  Ampicillin: R >16 These ampicillin results predict results for amoxicillin      -  Ampicillin/Sulbactam: R >16/8 Enterobacter, Citrobacter, and Serratia may develop resistance during prolonged therapy (3-4 days)      -  Aztreonam: R >16      -  Cefazolin: R >16 Enterobacter, Citrobacter, and Serratia may develop resistance during prolonged therapy (3-4 days)      -  Cefepime: R >16      -  Cefoxitin: S <=8      -  Ceftriaxone: R >32 Enterobacter, Citrobacter, and Serratia may develop resistance during prolonged therapy      -  Ciprofloxacin: R >2      -  Ertapenem: S <=0.5      -  Gentamicin: S <=2      -  Imipenem: S <=1      -  Levofloxacin: R 2      -  Meropenem: S <=1      -  Piperacillin/Tazobactam: R <=8      -  Tobramycin: S <=2      -  Trimethoprim/Sulfamethoxazole: R >2/38      Method Type: JAZMIN        Lactate, Blood: 0.7 mmol/L (21 @ 02:00)  Lactate, Blood: 0.9 mmol/L (21 @ 17:42)      INFECTIOUS DISEASES TESTING  Procalcitonin, Serum: 2.50 (21 @ 16:00)  Procalcitonin, Serum: 0.19 (21 @ 17:42)  COVID-19 PCR: NotDetec (21 @ 10:31)  COVID-19 PCR: NotDetec (21 @ 14:29)  COVID-19 PCR: NotDetec (21 @ 15:00)  COVID-19 PCR: NotDetec (21 @ 14:55)  Procalcitonin, Serum: 0.16 (21 @ 12:32)  COVID-19 PCR: NotDetec (21 @ 15:19)  COVID-19 PCR: NotDetec (21 @ 19:40)  Procalcitonin, Serum: 0.61 (21 @ 10:20)  COVID-19 PCR: NotDetec (21 @ 20:13)  COVID-19 PCR: NotDetec (03-10-21 @ 12:22)  COVID-19 PCR: NotDetec (21 @ 16:49)  Fungitell: 62 (21 @ 11:00)  Procalcitonin, Serum: 0.29 (21 @ 05:32)  MRSA PCR Result.: Negative (21 @ 15:44)  COVID-19 PCR: NotDetec (21 @ 15:34)  Procalcitonin, Serum: 0.27 (21 @ 10:54)  MRSA PCR Result.: Negative (21 @ 18:29)  HIV-1/2 Combo Result: Nonreact (21 @ 05:07)  Procalcitonin, Serum: 0.46 (21 @ 16:00)  COVID-19 PCR: NotDetec (21 @ 10:17)      INFLAMMATORY MARKERS      RADIOLOGY & ADDITIONAL TESTS:  I have personally reviewed the last available Chest xray  CXR  Xray Chest 1 View AP/PA:   EXAM:  XR CHEST 1 VIEW            PROCEDURE DATE:  2021            INTERPRETATION:  Clinical History / Reason for exam: Sepsis.    Comparison : Chest radiograph 2 days prior.    Technique/Positioning: Frontal portable.    Findings:    Supportdevices: Patient is intubated. Telemetry leads overlie the thorax.    Cardiac/mediastinum/hilum: Heart is enlarged.    Lung parenchyma/Pleura: Bilateral opacifications, left. Telemetry, worsening.    Skeleton/soft tissues: Unchanged    Impression:    Worsening opacification left hemithorax. Support devices as described.                  ROGELIO SMITH MD; Attending Interventional Radiologist  This document has been electronically signed. 2021  9:11AM (21 @ 02:31)      CT      CARDIOLOGY TESTING  12 Lead ECG:   Ventricular Rate 94 BPM    Atrial Rate 94 BPM    P-R Interval 216 ms    QRS Duration 88 ms    Q-T Interval 364 ms    QTC Calculation(Bazett) 455 ms    P Axis 52 degrees    R Axis 27 degrees    T Axis 38 degrees    Diagnosis Line Sinus rhythm with 1st degree A-V block  Otherwise normal ECG    Confirmed by Nathan Argueta ([id number]) on 2021 9:42:25 PM (21 @ 20:05)      MEDICATIONS  ascorbic acid 500  atorvastatin 40  carbidopa/levodopa  25/100 2  chlorhexidine 0.12% Liquid 15  chlorhexidine 4% Liquid 1  collagenase Ointment 1  Dakins Solution - 1/2 Strength 1  etomidate Injectable 15  fentaNYL   Infusion. 0.5  magnesium oxide 400  meropenem  IVPB   meropenem  IVPB 1000  midazolam Infusion 0.02  midodrine 10  norepinephrine Infusion 0.05  phenylephrine    Infusion 0.1  senna 2  zinc sulfate 220      WEIGHT  Weight (kg): 79.4 (21 @ 15:48)  Creatinine, Serum: 0.9 mg/dL (21 @ 05:25)      ANTIBIOTICS:  meropenem  IVPB      meropenem  IVPB 1000 milliGRAM(s) IV Intermittent every 8 hours      All available historical records have been reviewed

## 2021-04-29 NOTE — PROGRESS NOTE ADULT - SUBJECTIVE AND OBJECTIVE BOX
INTERVENTIONAL RADIOLOGY BRIEF-OPERATIVE NOTE    Procedure:  Percutaneous cholecystostomy    Pre-Op Diagnosis:  Sepsis; Acute cholecystitis    Post-Op Diagnosis:  Same    Attending:   Dr. Jeffers  Resident:   None    Anesthesia (type):  [ ] General Anesthesia  [ ] Sedation  [ ] Spinal Anesthesia  [X] Local/Regional    Contrast:   Visipaque, 10 cc to gallbladder, drained    Blood Loss:  2 cc    Condition:   [ ] Critical  [ ] Serious  [X] Fair   [ ] Good    Findings/Follow up Plan of Care:  Gall bladder accessed with a 5-Frisian, 10 cm introducer using real-time ultrasound and fluoroscopic guidance via right transhepatic approach.  8.5-Frisian pigtail catheter placed, with aspiration of 50 cc serous, dark green fluid.  Cholecystography failed to show the cystic duct or extrahepatic bile ducts.  Catheter placed to AUGUSTINA bulb.  Patient tolerated well, without incident.    Specimens Removed:  50 cc bilious fluid --> cultures    Implants:  None    Complications:  None immediate    Disposition:  Monitor output q shift; f/u cultures.       Please call Interventional Radiology [phone] with any questions, concerns, or issues.

## 2021-04-29 NOTE — PROGRESS NOTE ADULT - SUBJECTIVE AND OBJECTIVE BOX
GENERAL SURGERY PROGRESS NOTE     NIEVES LABOY  71y  Male  Hospital day :38d  Procedure: Debridement of ulcer of sacrum or ulcer of ischium    OVERNIGHT EVENTS: no acute events overnight     T(F): 97.1 (21 @ 04:00), Max: 98.9 (21 @ 20:00)  HR: 64 (21 @ 08:00) (64 - 92)  BP: 120/58 (21 @ 08:00) (86/48 - 127/60)  RR: 23 (21 @ 08:00) (22 - 24)  SpO2: 98% (21 @ 08:00) (95% - 100%)    DIET/FLUIDS: ascorbic acid 500 milliGRAM(s) Oral daily  magnesium oxide 400 milliGRAM(s) Oral every 8 hours  zinc sulfate 220 milliGRAM(s) Oral daily    URINE:   21 @ 07:01  -  21 @ 07:00  --------------------------------------------------------  OUT: 900 mL    GI proph:    AC/ proph:   ABx: meropenem  IVPB      meropenem  IVPB 1000 milliGRAM(s) IV Intermittent every 8 hours    LABS  CAPILLARY BLOOD GLUCOSE    POCT Blood Glucose.: 80 mg/dL (2021 07:45)                          6.7    13.37 )-----------( 125      ( 2021 05:25 )             21.7       Auto Neutrophil %: 80.5 % (21 @ 05:25)  Auto Immature Granulocyte %: 1.6 % (21 @ 05:25)        140  |  106  |  28<H>  ----------------------------<  72  4.7   |  27  |  0.9    Calcium, Total Serum: 7.9 mg/dL (21 @ 05:25)    LFTs:             5.5  | 0.5  | 38       ------------------[371     ( 2021 05:25 )  1.8  | x    | 38          Lipase:x      Amylase:x         Blood Gas Arterial, Lactate: 1.1 mmoL/L (21 @ 01:59)  Blood Gas Arterial, Lactate: 0.9 mmoL/L (21 @ 04:13)  Lactate, Blood: 0.7 mmol/L (21 @ 02:00)  Lactate, Blood: 0.9 mmol/L (21 @ 17:42)    ABG - ( 2021 01:59 )  pH: 7.45  /  pCO2: 40    /  pO2: 150   / HCO3: 28    / Base Excess: 3.8   /  SaO2: 100       ABG - ( 2021 04:13 )  pH: 7.36  /  pCO2: 52    /  pO2: 81    / HCO3: 30    / Base Excess: 3.2   /  SaO2: 97        ABG - ( 2021 00:49 )  pH: 7.14  /  pCO2: x     /  pO2: x     / HCO3: x     / Base Excess: x     /  SaO2: x         Coags:     17.00  ----< 1.48    ( 2021 05:25 )     x         CARDIAC MARKERS ( 2021 05:25 )  x     / 0.11 ng/mL / 29 U/L / x     / x      CARDIAC MARKERS ( 2021 00:30 )  x     / 0.10 ng/mL / x     / x     / x      CARDIAC MARKERS ( 2021 16:00 )  x     / 0.11 ng/mL / x     / x     / 3.1 ng/mL      Serum Pro-Brain Natriuretic Peptide: 80246 pg/mL (21 @ 16:00)      Urinalysis Basic - ( 2021 02:00 )    Color: Marta / Appearance: Turbid / S.028 / pH: x  Gluc: x / Ketone: Trace  / Bili: Negative / Urobili: 3 mg/dL   Blood: x / Protein: 100 mg/dL / Nitrite: Negative   Leuk Esterase: Moderate / RBC: 52 /HPF / WBC 72 /HPF   Sq Epi: x / Non Sq Epi: 1 /HPF / Bacteria: Negative        Culture - Bronchial (collected 2021 10:22)  Source: Bronch Wash None  Gram Stain (2021 23:36):    Numerous polymorphonuclear leukocytes per low power field    Few Squamous epithelial cells per low power field    Numerous Gram Negative Coccobacilli per oil power field    Culture - Urine (collected 2021 02:00)  Source: .Urine Catheterized  Final Report (2021 07:07):    No growth    Culture - Blood (collected 2021 02:00)  Source: .Blood Blood-Peripheral  Preliminary Report (2021 07:02):    No growth to date.    Culture - Blood (collected 2021 17:42)  Source: .Blood None  Preliminary Report (2021 23:01):    No growth to date.    Culture - Urine (collected 2021 14:39)  Source: .Urine Clean Catch (Midstream)  Final Report (2021 15:53):    >100,000 CFU/ml Klebsiella pneumoniae (Carbapenem Resistant)  Organism: Klepne MDRO (2021 15:53)  Organism: Klepne MDRO (2021 15:53)

## 2021-04-29 NOTE — PROGRESS NOTE ADULT - ASSESSMENT
Assessment:  71y male with multiple medical comorbidities, currently admitted for worsening mental status. Patient with multiple +blood cultures within last month, last + candida in february, and MRSA staph epi on 4/8, and Urine cx + for ESBL Klebsiella (currently on Meropenem. Now with elevated LFTs in the setting of + HIDA and no stones on U/S (only sludge). Intubated overnight, on MV, in critical condition.    -Patient is extremely poor operative candidate  -LFTs downtrending  -IR tentatively planning for perc gayle today  -no surgical intervention planned  -will continue to follow

## 2021-04-29 NOTE — PROGRESS NOTE ADULT - ASSESSMENT
IMPRESSION:    acute hypoxemic resp failure/ L lung collapse sp bronch  septic shock on shalom  infected ulcer/ UTI/ cholecysititis/ MSR pneumonia  parkinson sp PEG]  Anemia  DVT/ A fib  DEC HB NO ACTIVE BLEED      PLAN:    CNS: keep sedated, SAT  HEENT:  Oral care    PULMONARY:  HOB @ 45 degrees, dec FIO2 keep Sao2 92 to 96%. dec TV    CARDIOVASCULAR:     GI: GI prophylaxis                                          Feeding NPO, IR f/up    RENAL:  F/u  lytes.  Correct as needed. accurate I/O    INFECTIOUS DISEASE: abr per ID    HEMATOLOGICAL:  DVT prophylaxis. LE doppler reviewed    ENDOCRINE:  Follow up FS.  Insulin protocol if needed.    CODE STATUS: FULL CODE  very poor prognosis

## 2021-04-29 NOTE — PROGRESS NOTE ADULT - ASSESSMENT
ASSESSMENT  71 year old Male with past medical history of Parkinson's, dementia, CKD Stage 3, 1st degree AV block, HLD, gout, HTN, DM, fungal septicemia presenting from [facility] for acute decompensation in mental status.     IMPRESSION  #Fever, suspected acute cholecystitis vs UTI    4/28 Blood & Urine cxs NGTD     4/26 BCX NGTD     4/24 UCX Kleb (Carbapenem Resistant)   HIDA +  < from: [facility] Duplex Lower Ext Vein Scan, Bilat (04.21.21 @ 11:41) >  No evidence of deep venousthrombosis or superficial thrombophlebitis in the bilateral lower extremities.    4/24 UA WBC 82  #Intubated L lung collapse s/p bronch with mucous plugs    4/28 bronch   Numerous Gram Negative Coccobacilli per oil power field  #RESOLVED CoNS in blood,  contaminant     only PIVs    4/8 BCX 1/2 sets   -  Staphylococcus epidermidis, Methicillin resistant: Detec    3/25 BCX 1/2 sets, 1/4 bottles Staphylococcus pettenkoferi, likely contaminant   #RESOLVED Fever with sepsis, not present on admission with metabolic encephalopathy, EEG + seizure, possibly in setting of sepsis secondary to large sacral infected decub    LP not consistent with infection.. G/S NEGATIVE (on ABX)    Total Nucleated Cell Count, CSF: 0 /uL (04.01.21 @ 14:00)    Protein, CSF: 42 mg/dL (04.01.21 @ 14:00)    Glucose, CSF: 97 mg/dL (04.01.21 @ 14:00)    3/25 BCX 1/2 sets, 1/4 bottles Staphylococcus pettenkoferi, likely contaminant     CXR no PNA   3/22 Blood & Urine cxs NGTD   #Sacral ulcer- necrotic     3/31 WCX GNR    3/29   Numerous Klebsiella pneumoniae ESBL    Few CRE Pseudomonas aeruginosa (high MICs to AGs)    Numerous Enterococcus faecalis (vancomycin resistant)- S amp    seen by Burn sacrum with full thickness ~4x5cm with dark /brown devitalized tissue at base; no purulent drainage, no bleeding  #Recent Fungemia    Blood Cx 2/27 Candida albicans    evaluated by optho - no ocular evidence     TTE no significant valvulopathy   #Shoulder dislocation with effusion- joint exam not consistent with a septic arthritis  < from: CT Chest w/ IV Cont (04.07.21 @ 20:31) >  Similar appearance of the left shoulder with complex joint effusion, described in detail on prior exam CT shoulder 2/20/2021.  #ABEBE, resolved  #L hand edema  < from: Xray Wrist 2 Views, Left (03.27.21 @ 11:13) >No acute fracture or dislocation. Diffuse soft tissue swelling. Nonaggressive appearing lucency in the distal radius, indeterminate. Nonemergent MRI may be obtained for further evaluation.    Creatinine, Serum: 0.8 mg/dL (04.26.21 @ 12:17)      RECOMMENDATIONS  - ADD tigecycline 100mg x1 then 50mg q12h IV  - INCREASE to Meropenem 2g q8h IV (extended infusion run over 4h each dose)  - f/u bronch   Numerous Gram Negative Coccobacilli per oil power field  - s/p 4/28 amikacin 5mg/kg x1  - Appreciate IR consult: possible perc gayle  - trend LFTs, AP  - Appreciate Burn consult   - Contact isolation CRE  - GOC, grave prognosis    If any questions, please call or send a message on [employer] Teams  Spectra [phone]

## 2021-04-29 NOTE — PROGRESS NOTE ADULT - SUBJECTIVE AND OBJECTIVE BOX
OVERNIGHT EVENTS: events noted, still intuabted, ventilated on shalom 1.2, versed, fentanyl, sp bronc    Vital Signs Last 24 Hrs  T(C): 36.2 (2021 04:00), Max: 37.2 (2021 20:00)  T(F): 97.1 (2021 04:00), Max: 98.9 (2021 20:00)  HR: 64 (2021 08:00) (64 - 92)  BP: 120/58 (2021 08:00) (86/48 - 127/60)  BP(mean): 83 (2021 08:00) (64 - 85)  RR: 23 (:00) (22 - 24)  SpO2: 98% (:00) (95% - 100%)    PHYSICAL EXAMINATION:    GENERAL: sedated    HEENT: Head is normocephalic and atraumatic. ETT    NECK: Supple.    LUNGS: DEC BS L BASE    HEART: Regular rate and rhythm without murmur.    ABDOMEN: Soft, nontender, and nondistended.      EXTREMITIES: Without any cyanosis, clubbing, rash, lesions or edema.    NEUROLOGIC: sedated    SKIN: stage 4 ulcer      LABS:                        6.7    13.37 )-----------( 125      ( 2021 05:25 )             21.7         140  |  106  |  28<H>  ----------------------------<  72  4.7   |  27  |  0.9    Ca    7.9<L>      2021 05:25  Phos  6.3       Mg     2.0         TPro  5.5<L>  /  Alb  1.8<L>  /  TBili  0.5  /  DBili  x   /  AST  38  /  ALT  38  /  AlkPhos  371<H>      PT/INR - ( 2021 05:25 )   PT: 17.00 sec;   INR: 1.48 ratio         PTT - ( 2021 02:00 )  PTT:50.6 sec  Urinalysis Basic - ( 2021 02:00 )    Color: Marta / Appearance: Turbid / S.028 / pH: x  Gluc: x / Ketone: Trace  / Bili: Negative / Urobili: 3 mg/dL   Blood: x / Protein: 100 mg/dL / Nitrite: Negative   Leuk Esterase: Moderate / RBC: 52 /HPF / WBC 72 /HPF   Sq Epi: x / Non Sq Epi: 1 /HPF / Bacteria: Negative      ABG - ( 2021 01:59 )  pH, Arterial: 7.45  pH, Blood: x     /  pCO2: 40    /  pO2: 150   / HCO3: 28    / Base Excess: 3.8   /  SaO2: 100         22/450/60/5      CARDIAC MARKERS ( 2021 05:25 )  x     / 0.11 ng/mL / 29 U/L / x     / x      CARDIAC MARKERS ( 2021 00:30 )  x     / 0.10 ng/mL / x     / x     / x      CARDIAC MARKERS ( 2021 16:00 )  x     / 0.11 ng/mL / x     / x     / 3.1 ng/mL        Serum Pro-Brain Natriuretic Peptide: 13321 pg/mL (21 @ 16:00)      Procalcitonin, Serum: 2.50 ng/mL (21 @ 16:00)  Procalcitonin, Serum: 0.19 ng/mL (21 @ 17:42)        21 @ 07:01  -  21 @ 07:00  --------------------------------------------------------  IN: 1095.4 mL / OUT: 900 mL / NET: 195.4 mL    21 @ 07:01  -  21 @ 08:26  --------------------------------------------------------  IN: 65.5 mL / OUT: 75 mL / NET: -9.5 mL        MICROBIOLOGY:  Culture Results:   No growth to date. ( @ 02:00)  Culture Results:   No growth ( @ 02:00)  Culture Results:   No growth to date. ( @ 17:42)  Culture Results:   >100,000 CFU/ml Klebsiella pneumoniae (Carbapenem Resistant) ( @ 14:39)      MEDICATIONS  (STANDING):  ascorbic acid 500 milliGRAM(s) Oral daily  atorvastatin 40 milliGRAM(s) Oral at bedtime  carbidopa/levodopa  25/100 2 Tablet(s) Oral <User Schedule>  chlorhexidine 0.12% Liquid 15 milliLiter(s) Oral Mucosa every 12 hours  chlorhexidine 4% Liquid 1 Application(s) Topical <User Schedule>  collagenase Ointment 1 Application(s) Topical two times a day  Dakins Solution - 1/2 Strength 1 Application(s) Topical two times a day  etomidate Injectable 15 milliGRAM(s) IV Push once  fentaNYL   Infusion. 0.5 MICROgram(s)/kG/Hr (3.97 mL/Hr) IV Continuous <Continuous>  magnesium oxide 400 milliGRAM(s) Oral every 8 hours  meropenem  IVPB      meropenem  IVPB 1000 milliGRAM(s) IV Intermittent every 8 hours  midazolam Infusion 0.02 mG/kG/Hr (1.59 mL/Hr) IV Continuous <Continuous>  midodrine 10 milliGRAM(s) Oral every 8 hours  norepinephrine Infusion 0.05 MICROgram(s)/kG/Min (3.72 mL/Hr) IV Continuous <Continuous>  phenylephrine    Infusion 0.1 MICROgram(s)/kG/Min (2.98 mL/Hr) IV Continuous <Continuous>  senna 2 Tablet(s) Oral at bedtime  zinc sulfate 220 milliGRAM(s) Oral daily    MEDICATIONS  (PRN):  acetaminophen   Tablet .. 650 milliGRAM(s) Oral every 6 hours PRN Temp greater or equal to 38C (100.4F)      RADIOLOGY & ADDITIONAL STUDIES:

## 2021-04-29 NOTE — PROGRESS NOTE ADULT - ATTENDING COMMENTS
Patient seen and examined with surgery PA on rounds and discussed management plans. Patient now intubated on pressors non responsive. Patient receiving transfusion now and awaiting IR drainage and cholecystostomy,

## 2021-04-30 LAB
-  ESBL: SIGNIFICANT CHANGE UP
-  STAPHYLOCOCCUS EPIDERMIDIS, METHICILLIN RESISTANT: SIGNIFICANT CHANGE UP
ALBUMIN SERPL ELPH-MCNC: 1.6 G/DL — LOW (ref 3.5–5.2)
ALP SERPL-CCNC: 395 U/L — HIGH (ref 30–115)
ALT FLD-CCNC: 19 U/L — SIGNIFICANT CHANGE UP (ref 0–41)
ANION GAP SERPL CALC-SCNC: 9 MMOL/L — SIGNIFICANT CHANGE UP (ref 7–14)
ANISOCYTOSIS BLD QL: SLIGHT — SIGNIFICANT CHANGE UP
AST SERPL-CCNC: 38 U/L — SIGNIFICANT CHANGE UP (ref 0–41)
BASOPHILS # BLD AUTO: 0 K/UL — SIGNIFICANT CHANGE UP (ref 0–0.2)
BASOPHILS NFR BLD AUTO: 0 % — SIGNIFICANT CHANGE UP (ref 0–1)
BILIRUB SERPL-MCNC: 0.6 MG/DL — SIGNIFICANT CHANGE UP (ref 0.2–1.2)
BUN SERPL-MCNC: 24 MG/DL — HIGH (ref 10–20)
CALCIUM SERPL-MCNC: 7.9 MG/DL — LOW (ref 8.5–10.1)
CHLORIDE SERPL-SCNC: 109 MMOL/L — SIGNIFICANT CHANGE UP (ref 98–110)
CO2 SERPL-SCNC: 25 MMOL/L — SIGNIFICANT CHANGE UP (ref 17–32)
CREAT SERPL-MCNC: 0.7 MG/DL — SIGNIFICANT CHANGE UP (ref 0.7–1.5)
CULTURE RESULTS: SIGNIFICANT CHANGE UP
EOSINOPHIL # BLD AUTO: 0.21 K/UL — SIGNIFICANT CHANGE UP (ref 0–0.7)
EOSINOPHIL NFR BLD AUTO: 1.7 % — SIGNIFICANT CHANGE UP (ref 0–8)
GLUCOSE SERPL-MCNC: 61 MG/DL — LOW (ref 70–99)
GRAM STN FLD: SIGNIFICANT CHANGE UP
HCT VFR BLD CALC: 25.3 % — LOW (ref 42–52)
HGB BLD-MCNC: 7.9 G/DL — LOW (ref 14–18)
INR BLD: 1.38 RATIO — HIGH (ref 0.65–1.3)
LYMPHOCYTES # BLD AUTO: 0.55 K/UL — LOW (ref 1.2–3.4)
LYMPHOCYTES # BLD AUTO: 4.4 % — LOW (ref 20.5–51.1)
MAGNESIUM SERPL-MCNC: 2 MG/DL — SIGNIFICANT CHANGE UP (ref 1.8–2.4)
MANUAL SMEAR VERIFICATION: SIGNIFICANT CHANGE UP
MCHC RBC-ENTMCNC: 26.1 PG — LOW (ref 27–31)
MCHC RBC-ENTMCNC: 31.2 G/DL — LOW (ref 32–37)
MCV RBC AUTO: 83.5 FL — SIGNIFICANT CHANGE UP (ref 80–94)
METHOD TYPE: SIGNIFICANT CHANGE UP
MICROCYTES BLD QL: SLIGHT — SIGNIFICANT CHANGE UP
MONOCYTES # BLD AUTO: 0.64 K/UL — HIGH (ref 0.1–0.6)
MONOCYTES NFR BLD AUTO: 5.2 % — SIGNIFICANT CHANGE UP (ref 1.7–9.3)
MYELOCYTES NFR BLD: 2.6 % — HIGH (ref 0–0)
NEUTROPHILS # BLD AUTO: 10.56 K/UL — HIGH (ref 1.4–6.5)
NEUTROPHILS NFR BLD AUTO: 85.2 % — HIGH (ref 42.2–75.2)
ORGANISM # SPEC MICROSCOPIC CNT: SIGNIFICANT CHANGE UP
ORGANISM # SPEC MICROSCOPIC CNT: SIGNIFICANT CHANGE UP
P MIRABILIS DNA BLD POS QL NAA+PROBE: SIGNIFICANT CHANGE UP
PLAT MORPH BLD: NORMAL — SIGNIFICANT CHANGE UP
PLATELET # BLD AUTO: 104 K/UL — LOW (ref 130–400)
POIKILOCYTOSIS BLD QL AUTO: SLIGHT — SIGNIFICANT CHANGE UP
POLYCHROMASIA BLD QL SMEAR: SLIGHT — SIGNIFICANT CHANGE UP
POTASSIUM SERPL-MCNC: 4.4 MMOL/L — SIGNIFICANT CHANGE UP (ref 3.5–5)
POTASSIUM SERPL-SCNC: 4.4 MMOL/L — SIGNIFICANT CHANGE UP (ref 3.5–5)
PROT SERPL-MCNC: 5.2 G/DL — LOW (ref 6–8)
PROTHROM AB SERPL-ACNC: 15.9 SEC — HIGH (ref 9.95–12.87)
RBC # BLD: 3.03 M/UL — LOW (ref 4.7–6.1)
RBC # FLD: 15.9 % — HIGH (ref 11.5–14.5)
RBC BLD AUTO: ABNORMAL
SODIUM SERPL-SCNC: 143 MMOL/L — SIGNIFICANT CHANGE UP (ref 135–146)
SPECIMEN SOURCE: SIGNIFICANT CHANGE UP
VARIANT LYMPHS # BLD: 0.9 % — SIGNIFICANT CHANGE UP (ref 0–5)
WBC # BLD: 12.39 K/UL — HIGH (ref 4.8–10.8)
WBC # FLD AUTO: 12.39 K/UL — HIGH (ref 4.8–10.8)

## 2021-04-30 PROCEDURE — 99232 SBSQ HOSP IP/OBS MODERATE 35: CPT | Mod: 25

## 2021-04-30 PROCEDURE — 31624 DX BRONCHOSCOPE/LAVAGE: CPT

## 2021-04-30 PROCEDURE — 71045 X-RAY EXAM CHEST 1 VIEW: CPT | Mod: 26,76

## 2021-04-30 RX ORDER — TIGECYCLINE 50 MG/5ML
100 INJECTION, POWDER, LYOPHILIZED, FOR SOLUTION INTRAVENOUS ONCE
Refills: 0 | Status: COMPLETED | OUTPATIENT
Start: 2021-04-30 | End: 2021-04-30

## 2021-04-30 RX ORDER — METOCLOPRAMIDE HCL 10 MG
10 TABLET ORAL ONCE
Refills: 0 | Status: DISCONTINUED | OUTPATIENT
Start: 2021-04-30 | End: 2021-05-02

## 2021-04-30 RX ORDER — TIGECYCLINE 50 MG/5ML
INJECTION, POWDER, LYOPHILIZED, FOR SOLUTION INTRAVENOUS
Refills: 0 | Status: DISCONTINUED | OUTPATIENT
Start: 2021-04-30 | End: 2021-05-03

## 2021-04-30 RX ORDER — TIGECYCLINE 50 MG/5ML
50 INJECTION, POWDER, LYOPHILIZED, FOR SOLUTION INTRAVENOUS EVERY 12 HOURS
Refills: 0 | Status: DISCONTINUED | OUTPATIENT
Start: 2021-04-30 | End: 2021-05-03

## 2021-04-30 RX ADMIN — Medication 1 APPLICATION(S): at 05:40

## 2021-04-30 RX ADMIN — MIDAZOLAM HYDROCHLORIDE 1.59 MG/KG/HR: 1 INJECTION, SOLUTION INTRAMUSCULAR; INTRAVENOUS at 21:20

## 2021-04-30 RX ADMIN — FENTANYL CITRATE 3.97 MICROGRAM(S)/KG/HR: 50 INJECTION INTRAVENOUS at 03:45

## 2021-04-30 RX ADMIN — SENNA PLUS 2 TABLET(S): 8.6 TABLET ORAL at 21:21

## 2021-04-30 RX ADMIN — TIGECYCLINE 105 MILLIGRAM(S): 50 INJECTION, POWDER, LYOPHILIZED, FOR SOLUTION INTRAVENOUS at 18:25

## 2021-04-30 RX ADMIN — Medication 1 APPLICATION(S): at 17:42

## 2021-04-30 RX ADMIN — CHLORHEXIDINE GLUCONATE 1 APPLICATION(S): 213 SOLUTION TOPICAL at 05:38

## 2021-04-30 RX ADMIN — CHLORHEXIDINE GLUCONATE 15 MILLILITER(S): 213 SOLUTION TOPICAL at 05:37

## 2021-04-30 RX ADMIN — MAGNESIUM OXIDE 400 MG ORAL TABLET 400 MILLIGRAM(S): 241.3 TABLET ORAL at 21:20

## 2021-04-30 RX ADMIN — ATORVASTATIN CALCIUM 40 MILLIGRAM(S): 80 TABLET, FILM COATED ORAL at 21:21

## 2021-04-30 RX ADMIN — CARBIDOPA AND LEVODOPA 2 TABLET(S): 25; 100 TABLET ORAL at 21:20

## 2021-04-30 RX ADMIN — MIDAZOLAM HYDROCHLORIDE 1.59 MG/KG/HR: 1 INJECTION, SOLUTION INTRAMUSCULAR; INTRAVENOUS at 03:45

## 2021-04-30 RX ADMIN — MIDODRINE HYDROCHLORIDE 10 MILLIGRAM(S): 2.5 TABLET ORAL at 05:39

## 2021-04-30 RX ADMIN — PHENYLEPHRINE HYDROCHLORIDE 2.98 MICROGRAM(S)/KG/MIN: 10 INJECTION INTRAVENOUS at 01:57

## 2021-04-30 RX ADMIN — Medication 1 APPLICATION(S): at 17:43

## 2021-04-30 RX ADMIN — PHENYLEPHRINE HYDROCHLORIDE 2.98 MICROGRAM(S)/KG/MIN: 10 INJECTION INTRAVENOUS at 21:19

## 2021-04-30 RX ADMIN — MAGNESIUM OXIDE 400 MG ORAL TABLET 400 MILLIGRAM(S): 241.3 TABLET ORAL at 05:38

## 2021-04-30 RX ADMIN — MIDODRINE HYDROCHLORIDE 10 MILLIGRAM(S): 2.5 TABLET ORAL at 21:20

## 2021-04-30 RX ADMIN — CHLORHEXIDINE GLUCONATE 15 MILLILITER(S): 213 SOLUTION TOPICAL at 17:43

## 2021-04-30 RX ADMIN — MEROPENEM 100 MILLIGRAM(S): 1 INJECTION INTRAVENOUS at 17:29

## 2021-04-30 RX ADMIN — MEROPENEM 100 MILLIGRAM(S): 1 INJECTION INTRAVENOUS at 05:36

## 2021-04-30 RX ADMIN — Medication 1 APPLICATION(S): at 05:42

## 2021-04-30 RX ADMIN — TIGECYCLINE 110 MILLIGRAM(S): 50 INJECTION, POWDER, LYOPHILIZED, FOR SOLUTION INTRAVENOUS at 11:35

## 2021-04-30 RX ADMIN — CARBIDOPA AND LEVODOPA 2 TABLET(S): 25; 100 TABLET ORAL at 05:37

## 2021-04-30 NOTE — PROGRESS NOTE ADULT - ASSESSMENT
ASSESSMENT  71 year old Male with past medical history of Parkinson's, dementia, CKD Stage 3, 1st degree AV block, HLD, gout, HTN, DM, fungal septicemia presenting from [facility] for acute decompensation in mental status.     IMPRESSION  #RESOLVED Sepsis/Fever, suspected acute cholecystitis vs UTI, HAP    s/p 4/29 perc gayle    4/28 Blood CX coNS,     4/28 UCX NG     4/26 BCX NGTD     4/24 UCX Kleb (Carbapenem Resistant)   HIDA +  < from: [facility] Duplex Lower Ext Vein Scan, Bilat (04.21.21 @ 11:41) >  No evidence of deep venousthrombosis or superficial thrombophlebitis in the bilateral lower extremities.    4/24 UA WBC 82  #Intubated L lung collapse s/p bronch with mucous plugs    4/28 bronch Kleb   #RESOLVED CoNS in blood,  contaminant     only PIVs    4/8 BCX 1/2 sets   -  Staphylococcus epidermidis, Methicillin resistant: Detec    3/25 BCX 1/2 sets, 1/4 bottles Staphylococcus pettenkoferi, likely contaminant   #RESOLVED Fever with sepsis, not present on admission with metabolic encephalopathy, EEG + seizure, possibly in setting of sepsis secondary to large sacral infected decub    LP not consistent with infection.. G/S NEGATIVE (on ABX)    Total Nucleated Cell Count, CSF: 0 /uL (04.01.21 @ 14:00)    Protein, CSF: 42 mg/dL (04.01.21 @ 14:00)    Glucose, CSF: 97 mg/dL (04.01.21 @ 14:00)    3/25 BCX 1/2 sets, 1/4 bottles Staphylococcus pettenkoferi, likely contaminant     CXR no PNA   3/22 Blood & Urine cxs NGTD   #Sacral ulcer- necrotic     3/31 WCX GNR    3/29   Numerous Klebsiella pneumoniae ESBL    Few CRE Pseudomonas aeruginosa (high MICs to AGs)    Numerous Enterococcus faecalis (vancomycin resistant)- S amp    seen by Burn sacrum with full thickness ~4x5cm with dark /brown devitalized tissue at base; no purulent drainage, no bleeding  #Recent Fungemia    Blood Cx 2/27 Candida albicans    evaluated by optho - no ocular evidence     TTE no significant valvulopathy   #Shoulder dislocation with effusion- joint exam not consistent with a septic arthritis  < from: CT Chest w/ IV Cont (04.07.21 @ 20:31) >  Similar appearance of the left shoulder with complex joint effusion, described in detail on prior exam CT shoulder 2/20/2021.  #ABEBE, resolved  #L hand edema  < from: Xray Wrist 2 Views, Left (03.27.21 @ 11:13) >No acute fracture or dislocation. Diffuse soft tissue swelling. Nonaggressive appearing lucency in the distal radius, indeterminate. Nonemergent MRI may be obtained for further evaluation.    Creatinine, Serum: 0.8 mg/dL (04.26.21 @ 12:17)      RECOMMENDATIONS  - Repeat BCX as coNS  - TLC 4/28-  - ADD tigecycline 100mg x1 then 50mg q12h IV  - INCREASE to Meropenem 2g q8h IV (extended infusion run over 4h each dose)  - f/u bronch kleb sensitivities, biliary cx   - s/p 4/28 amikacin 5mg/kg x1  - Appreciate IR consult: possible perc gayle  - trend LFTs, AP  - Appreciate Burn consult   - Contact isolation CRE  - GOC, grave prognosis    If any questions, please call or send a message on [employer] Teams  Spectra [phone]

## 2021-04-30 NOTE — PROGRESS NOTE ADULT - ASSESSMENT
IMPRESSION:    acute hypoxemic resp failure/ L lung collapse sp bronch again collapse l lung  septic shock on shalom  infected ulcer/ UTI/ cholecysititis/ MSR pneumonia  parkinson sp PEG]  Anemia  DVT/ A fib  DEC HB NO ACTIVE BLEED      PLAN:    CNS: keep sedated, SAT  HEENT:  Oral care    PULMONARY:  HOB @ 45 degrees, dec FIO2 keep Sao2 92 to 96%. dec TV, bronch today    CARDIOVASCULAR: keep equal    GI: GI prophylaxis                                          Feeding peg    RENAL:  F/u  lytes.  Correct as needed. accurate I/O    INFECTIOUS DISEASE: abr per ID    HEMATOLOGICAL:  DVT prophylaxis. LE doppler reviewed    ENDOCRINE:  Follow up FS.  Insulin protocol if needed.    CODE STATUS: FULL CODE  very poor prognosis

## 2021-04-30 NOTE — CHART NOTE - NSCHARTNOTEFT_GEN_A_CORE
PREOPERATIVE DIAGNOSIS: left lung whiteout    POSTOPERATIVE DIAGNOSES: left mucous plug      PROCEDURE PERFORMED:  Bronchoscopy, BAL, and washing.    CONSENT: After explaining the risk and benefit the consent was obtained from daughter Kelton    The patient had been previously intubated and was on mechanical ventilatory support. He was on sedation, which was continued and adjusted during the procedure. His FiO2 was increased to 100% during the procedure. The  fiberoptic bronchoscope was introduced through an endotracheal tube adaptor and the tip of the endotracheal tube was noted to be in good position above the park.   The Right tracheobronchial tree was inspected closely to the level of the subsegmental bronchi. Right mainstem with thick purulent secretions that were suctioned. post suctioning of mucous all bronchi are patent with no endobronchial lesions and no mucosal lesions noted.   The Left tracheobronchial tree was also patent and intact with the mucosa normal. some secretions noted on left side as well which were suctions.   After adequate clearing of secretions was accomplished, the bronchoscope was removed from the patient and the procedure was terminated.   The patient tolerated the procedure well and there were no complications. PREOPERATIVE DIAGNOSIS: left lung whiteout    POSTOPERATIVE DIAGNOSES: left mucous plug      PROCEDURE PERFORMED:  Bronchoscopy, BAL, and washing.    CONSENT: After explaining the risk and benefit the consent was obtained from daughter Kelton    The patient had been previously intubated and was on mechanical ventilatory support. He was on sedation, which was continued and adjusted during the procedure. His FiO2 was increased to 100% during the procedure. The  fiberoptic bronchoscope was introduced through an endotracheal tube adaptor and the tip of the endotracheal tube was noted to be in good position above the park.   The Right tracheobronchial tree was inspected closely to the level of the subsegmental bronchi. Right mainstem with thick purulent secretions that were suctioned. post suctioning of mucous all bronchi are patent with no endobronchial lesions and no mucosal lesions noted.   The Left tracheobronchial tree was also patent and intact with the mucosa normal. some secretions noted on left side as well which were suctions.   After adequate clearing of secretions was accomplished, the bronchoscope was removed from the patient and the procedure was terminated.   The patient tolerated the procedure well and there were no complications.    I was present throughout the procedure

## 2021-04-30 NOTE — PROGRESS NOTE ADULT - SUBJECTIVE AND OBJECTIVE BOX
OVERNIGHT EVENTS: events noted, on shalom 0.8, versed, fentanyl, sp perc gayle, sp 1 unit PRBC    Vital Signs Last 24 Hrs  T(C): 36.1 (30 Apr 2021 04:00), Max: 36.2 (29 Apr 2021 20:00)  T(F): 97 (30 Apr 2021 04:00), Max: 97.1 (29 Apr 2021 20:00)  HR: 61 (30 Apr 2021 06:00) (61 - 95)  BP: 109/53 (30 Apr 2021 06:00) (99/48 - 131/62)  BP(mean): 76 (30 Apr 2021 06:00) (69 - 89)  RR: 22 (30 Apr 2021 06:00) (20 - 24)  SpO2: 100% (30 Apr 2021 06:00) (95% - 100%)    PHYSICAL EXAMINATION:    GENERAL: ill looking    HEENT: Head is normocephalic and atraumatic. ETT    NECK: Supple.    LUNGS: dec bs l side    HEART: Regular rate and rhythm without murmur.    ABDOMEN: Soft, nontender, and nondistended.      EXTREMITIES: Without any cyanosis, clubbing, rash, lesions or edema.    NEUROLOGIC: sedated    SKIN: sacral ulcer      LABS:                        7.9    12.39 )-----------( 104      ( 30 Apr 2021 05:40 )             25.3     04-30    143  |  109  |  24<H>  ----------------------------<  61<L>  4.4   |  25  |  0.7    Ca    7.9<L>      30 Apr 2021 05:40  Mg     2.0     04-30    TPro  5.2<L>  /  Alb  1.6<L>  /  TBili  0.6  /  DBili  x   /  AST  38  /  ALT  19  /  AlkPhos  395<H>  04-30    PT/INR - ( 30 Apr 2021 05:40 )   PT: 15.90 sec;   INR: 1.38 ratio             ABG - ( 30 Apr 2021 03:36 )  pH, Arterial: 7.43  pH, Blood: x     /  pCO2: 41    /  pO2: 93    / HCO3: 28    / Base Excess: 2.9   /  SaO2: 98          22/420/50/5      CARDIAC MARKERS ( 29 Apr 2021 05:25 )  x     / 0.11 ng/mL / 29 U/L / x     / x      CARDIAC MARKERS ( 29 Apr 2021 00:30 )  x     / 0.10 ng/mL / x     / x     / x      CARDIAC MARKERS ( 28 Apr 2021 16:00 )  x     / 0.11 ng/mL / x     / x     / 3.1 ng/mL        Serum Pro-Brain Natriuretic Peptide: 25649 pg/mL (04-28-21 @ 16:00)      Procalcitonin, Serum: 2.03 ng/mL (04-29-21 @ 05:25)  Procalcitonin, Serum: 2.50 ng/mL (04-28-21 @ 16:00)        04-29-21 @ 07:01  -  04-30-21 @ 07:00  --------------------------------------------------------  IN: 904.3 mL / OUT: 1125 mL / NET: -220.7 mL        MICROBIOLOGY:  Culture Results:   Moderate Klebsiella pneumoniae (04-28 @ 10:22)  Culture Results:   Testing in progress (04-28 @ 10:22)  Culture Results:   Growth in aerobic bottle: Gram Positive Cocci in Clusters  ***Blood Panel PCR results on this specimen are available  approximately 3 hours after the Gram stain result.***  Gram stain, PCR, and/or culture results may not always  correspond due to difference in methodologies.  ************************************************************  This PCR assay was performed by multiplex PCR. This  Assay tests for 66 bacterial and resistance gene targets.  Please refer to the [facility] [employer] test directory  at [email or web address] for details. (04-28 @ 06:10)  Culture Results:   Growth in aerobic bottle: Gram Positive Cocci in Clusters  Growth in anaerobic bottle: Gram Negative Rods and Gram Positive Cocci in  Clusters  ***Blood Panel PCR results on this specimen are available  approximately 3 hours after the Gram stain result.***  Gram stain, PCR, and/or culture results may not always  correspond due to difference in methodologies.  ************************************************************  This PCR assay was performed by multiplex PCR. This  Assay tests for 66 bacterial and resistance gene targets.  Please refer to the [facility] [employer] test directory  at [email or web address] for details. (04-28 @ 06:10)  Culture Results:   No growth to date. (04-28 @ 02:00)  Culture Results:   No growth (04-28 @ 02:00)  Culture Results:   No growth to date. (04-26 @ 17:42)  Culture Results:   >100,000 CFU/ml Klebsiella pneumoniae (Carbapenem Resistant) (04-26 @ 14:39)      MEDICATIONS  (STANDING):  ascorbic acid 500 milliGRAM(s) Oral daily  atorvastatin 40 milliGRAM(s) Oral at bedtime  carbidopa/levodopa  25/100 2 Tablet(s) Oral <User Schedule>  chlorhexidine 0.12% Liquid 15 milliLiter(s) Oral Mucosa every 12 hours  chlorhexidine 4% Liquid 1 Application(s) Topical <User Schedule>  collagenase Ointment 1 Application(s) Topical two times a day  Dakins Solution - 1/2 Strength 1 Application(s) Topical two times a day  etomidate Injectable 15 milliGRAM(s) IV Push once  fentaNYL   Infusion. 0.5 MICROgram(s)/kG/Hr (3.97 mL/Hr) IV Continuous <Continuous>  magnesium oxide 400 milliGRAM(s) Oral every 8 hours  meropenem  IVPB      meropenem  IVPB 1000 milliGRAM(s) IV Intermittent every 8 hours  midazolam Infusion 0.02 mG/kG/Hr (1.59 mL/Hr) IV Continuous <Continuous>  midodrine 10 milliGRAM(s) Oral every 8 hours  norepinephrine Infusion 0.05 MICROgram(s)/kG/Min (3.72 mL/Hr) IV Continuous <Continuous>  phenylephrine    Infusion 0.1 MICROgram(s)/kG/Min (2.98 mL/Hr) IV Continuous <Continuous>  senna 2 Tablet(s) Oral at bedtime  zinc sulfate 220 milliGRAM(s) Oral daily    MEDICATIONS  (PRN):  acetaminophen   Tablet .. 650 milliGRAM(s) Oral every 6 hours PRN Temp greater or equal to 38C (100.4F)      RADIOLOGY & ADDITIONAL STUDIES:

## 2021-04-30 NOTE — PROGRESS NOTE ADULT - SUBJECTIVE AND OBJECTIVE BOX
NIEVES LABOY  71y, Male  Allergy: No Known Allergies      LOS  39d    CHIEF COMPLAINT: Change in Mental Status (29 Apr 2021 17:17)      INTERVAL EVENTS/HPI  - T(F): , Max: 97.1 (04-29-21 @ 20:00), s/p perc gayle  - WBC Count: 12.39 (04-30-21 @ 05:40)  WBC Count: 14.14 (04-29-21 @ 21:59)     - Creatinine, Serum: 0.7 (04-30-21 @ 05:40)  Creatinine, Serum: 0.9 (04-29-21 @ 05:25)     -   -   -     ROS  cannot obtain secondary to patient's sedation and/or mental status    VITALS:  T(F): 97, Max: 97.1 (04-29-21 @ 20:00)  HR: 61  BP: 109/53  RR: 22Vital Signs Last 24 Hrs  T(C): 36.1 (30 Apr 2021 04:00), Max: 36.2 (29 Apr 2021 20:00)  T(F): 97 (30 Apr 2021 04:00), Max: 97.1 (29 Apr 2021 20:00)  HR: 61 (30 Apr 2021 06:00) (61 - 95)  BP: 109/53 (30 Apr 2021 06:00) (99/48 - 131/62)  BP(mean): 76 (30 Apr 2021 06:00) (69 - 89)  RR: 22 (30 Apr 2021 06:00) (20 - 24)  SpO2: 100% (30 Apr 2021 06:00) (95% - 100%)    PHYSICAL EXAM:  Gen: Intubated  CV: RRR  Lungs: Decreased BS at bases  Abd: Soft PEG perc gayle bilious fluid  Neuro: Sedated  anasarca  Lines clean, no phlebitis    FH: Non-contributory  Social Hx: Non-contributory    TESTS & MEASUREMENTS:                        7.9    12.39 )-----------( 104      ( 30 Apr 2021 05:40 )             25.3     04-30    143  |  109  |  24<H>  ----------------------------<  61<L>  4.4   |  25  |  0.7    Ca    7.9<L>      30 Apr 2021 05:40  Mg     2.0     04-30    TPro  5.2<L>  /  Alb  1.6<L>  /  TBili  0.6  /  DBili  x   /  AST  38  /  ALT  19  /  AlkPhos  395<H>  04-30    eGFR if Non African American: 94 mL/min/1.73M2 (04-30-21 @ 05:40)  eGFR if : 109 mL/min/1.73M2 (04-30-21 @ 05:40)    LIVER FUNCTIONS - ( 30 Apr 2021 05:40 )  Alb: 1.6 g/dL / Pro: 5.2 g/dL / ALK PHOS: 395 U/L / ALT: 19 U/L / AST: 38 U/L / GGT: x               Culture - Body Fluid with Gram Stain (collected 04-29-21 @ 15:22)  Source: .Body Fluid gall bladder fluid  Gram Stain (04-30-21 @ 01:12):    No polymorphonuclear cells seen    No organisms seen    by cytocentrifuge    Culture - Fungal, Bronchial (collected 04-28-21 @ 10:22)  Source: .Bronchial None  Preliminary Report (04-29-21 @ 10:28):    Testing in progress    Culture - Acid Fast - Bronchial w/Smear (collected 04-28-21 @ 10:22)  Source: .Bronchial None    Culture - Bronchial (collected 04-28-21 @ 10:22)  Source: Bronch Wash None  Gram Stain (04-28-21 @ 23:36):    Numerous polymorphonuclear leukocytes per low power field    Few Squamous epithelial cells per low power field    Numerous Gram Negative Coccobacilli per oil power field  Preliminary Report (04-29-21 @ 17:07):    Moderate Klebsiella pneumoniae    Culture - Blood (collected 04-28-21 @ 06:10)  Source: .Blood None  Gram Stain (04-30-21 @ 05:13):    Growth in aerobic bottle: Gram Positive Cocci in Clusters    Growth in anaerobic bottle: Gram Negative Rods and Gram Positive Cocci in    Clusters  Preliminary Report (04-30-21 @ 05:15):    Growth in aerobic bottle: Gram Positive Cocci in Clusters    Growth in anaerobic bottle: Gram Negative Rods and Gram Positive Cocci in    Clusters    ***Blood Panel PCR results on this specimen are available    approximately 3 hours after the Gram stain result.***    Gram stain, PCR, and/or culture results may not always    correspond due to difference in methodologies.    ************************************************************    This PCR assay was performed by multiplex PCR. This    Assay tests for 66 bacterial and resistance gene targets.    Please refer to the [facility] test directory    at [email or web address] for details.    Culture - Blood (collected 04-28-21 @ 06:10)  Source: .Blood None  Gram Stain (04-29-21 @ 11:27):    Growth in aerobic bottle: Gram Positive Cocci in Clusters  Preliminary Report (04-29-21 @ 11:27):    Growth in aerobic bottle: Gram Positive Cocci in Clusters    ***Blood Panel PCR results on this specimen are available    approximately 3 hours after the Gram stain result.***    Gram stain, PCR, and/or culture results may not always    correspond due to difference in methodologies.    ************************************************************    This PCR assay was performed by multiplex PCR. This    Assay tests for 66 bacterial and resistance gene targets.    Please refer to the [facility] test directory    at [email or web address] for details.  Organism: Blood Culture PCR (04-29-21 @ 13:53)  Organism: Blood Culture PCR (04-29-21 @ 13:53)      -  Coagulase negative Staphylococcus, Methicillin resistant: Detec      Method Type: PCR    Culture - Urine (collected 04-28-21 @ 02:00)  Source: .Urine Catheterized  Final Report (04-29-21 @ 07:07):    No growth    Culture - Blood (collected 04-28-21 @ 02:00)  Source: .Blood Blood-Peripheral  Preliminary Report (04-29-21 @ 07:02):    No growth to date.    Culture - Blood (collected 04-26-21 @ 17:42)  Source: .Blood None  Preliminary Report (04-27-21 @ 23:01):    No growth to date.    Culture - Urine (collected 04-26-21 @ 14:39)  Source: .Urine Clean Catch (Midstream)  Final Report (04-28-21 @ 15:53):    >100,000 CFU/ml Klebsiella pneumoniae (Carbapenem Resistant)  Organism: Klepne MDRO (04-28-21 @ 15:53)  Organism: Klepne MDRO (04-28-21 @ 15:53)      -  Amikacin: S <=16      -  Amoxicillin/Clavulanic Acid: R >16/8      -  Ampicillin: R >16 These ampicillin results predict results for amoxicillin      -  Ampicillin/Sulbactam: R >16/8 Enterobacter, Citrobacter, and Serratia may develop resistance during prolonged therapy (3-4 days)      -  Aztreonam: R >16      -  Cefazolin: R >16 (MIC_CL_COM_ENTERIC_CEFAZU) For uncomplicated UTI with K. pneumoniae, E. coli, or P. mirablis: JAZMIN <=16 is sensitive and JAZMIN >=32 is resistant. This also predicts results for oral agents cefaclor, cefdinir, cefpodoxime, cefprozil, cefuroxime axetil, cephalexin and locarbef for uncomplicated UTI. Note that some isolates may be susceptible to these agents while testing resistant to cefazolin.      -  Cefepime: R >16      -  Cefoxitin: I 16      -  Ceftriaxone: R >32 Enterobacter, Citrobacter, and Serratia may develop resistance during prolonged therapy      -  Ciprofloxacin: R >2      -  Ertapenem: R >1      -  Gentamicin: R >8      -  Imipenem: R 8      -  Levofloxacin: I 1      -  Meropenem: R >8      -  Nitrofurantoin: R >64 Should not be used to treat pyelonephritis      -  Piperacillin/Tazobactam: R >64      -  Tigecycline: S <=2      -  Tobramycin: R >8      -  Trimethoprim/Sulfamethoxazole: R >2/38      Method Type: JAZMIN    Culture - Urine (collected 04-24-21 @ 22:07)  Source: .Urine Clean Catch (Midstream)  Final Report (04-28-21 @ 16:01):    >100,000 CFU/ml Klebsiella pneumoniae (Carbapenem Resistant)  Organism: Klepne MDRO (04-28-21 @ 16:01)  Organism: Klepne MDRO (04-28-21 @ 16:01)      -  Amikacin: S <=16      -  Amoxicillin/Clavulanic Acid: R >16/8      -  Ampicillin: R >16 These ampicillin results predict results for amoxicillin      -  Ampicillin/Sulbactam: R >16/8 Enterobacter, Citrobacter, and Serratia may develop resistance during prolonged therapy (3-4 days)      -  Aztreonam: R >16      -  Cefazolin: R >16 (MIC_CL_COM_ENTERIC_CEFAZU) For uncomplicated UTI with K. pneumoniae, E. coli, or P. mirablis: JAZMIN <=16 is sensitive and JAZMIN >=32 is resistant. This also predicts results for oral agents cefaclor, cefdinir, cefpodoxime, cefprozil, cefuroxime axetil, cephalexin and locarbef for uncomplicated UTI. Note that some isolates may be susceptible to these agents while testing resistant to cefazolin.      -  Cefepime: R >16      -  Cefoxitin: S <=8      -  Ceftriaxone: R >32 Enterobacter, Citrobacter, and Serratia may develop resistance during prolonged therapy      -  Ciprofloxacin: R >2      -  Ertapenem: R >1      -  Gentamicin: R >8      -  Imipenem: R 8      -  Levofloxacin: I 1      -  Meropenem: R >8      -  Nitrofurantoin: R >64 Should not be used to treat pyelonephritis      -  Piperacillin/Tazobactam: R >64      -  Tigecycline: S <=2      -  Tobramycin: R >8      -  Trimethoprim/Sulfamethoxazole: R >2/38      Method Type: JAZMIN    Culture - Blood (collected 04-13-21 @ 07:34)  Source: .Blood None  Final Report (04-18-21 @ 17:00):    No Growth Final    Culture - Blood (collected 04-12-21 @ 21:38)  Source: .Blood None  Final Report (04-18-21 @ 05:00):    No Growth Final    Culture - Blood (collected 04-12-21 @ 16:00)  Source: .Blood Blood  Final Report (04-17-21 @ 22:01):    No Growth Final    Culture - Blood (collected 04-08-21 @ 16:00)  Source: .Blood Blood  Gram Stain (04-11-21 @ 08:45):    Growth in anaerobic bottle: Gram Positive Cocci in Clusters  Final Report (04-12-21 @ 10:51):    Growth in anaerobic bottle: Staphylococcus epidermidis Coag Negative    Staphylococcus    Single set isolate, possible contaminant. Contact    Microbiology if susceptibility testing clinically    indicated.    ***Blood Panel PCR results on this specimen are available    approximately 3 hours after the Gram stain result.***    Gram stain, PCR, and/or culture results may not always    correspond due to difference in methodologies.    ************************************************************    This PCR assay was performed by multiplex PCR. This    Assay tests for 66 bacterial and resistance gene targets.    Please refer to the [facility] test directory    at [email or web address] for details.  Organism: Blood Culture PCR (04-12-21 @ 10:51)  Organism: Blood Culture PCR (04-12-21 @ 10:51)      -  Staphylococcus epidermidis, Methicillin resistant: Detec      Method Type: PCR    Culture - Blood (collected 04-08-21 @ 16:00)  Source: .Blood Blood-Peripheral  Final Report (04-14-21 @ 01:00):    No Growth Final    Culture - Blood (collected 04-02-21 @ 12:55)  Source: .Blood None  Final Report (04-07-21 @ 23:01):    No Growth Final    Culture - CSF with Gram Stain (collected 04-01-21 @ 14:00)  Source: .CSF CSF  Gram Stain (04-01-21 @ 21:44):    No polymorphonuclear cells seen    No organisms seen    by cytocentrifuge  Final Report (04-04-21 @ 15:36):    No growth    Culture - Acid Fast - Tissue w/Smear (collected 03-31-21 @ 16:18)  Source: .Tissue None  Preliminary Report (04-10-21 @ 15:03):    No growth at 1 week.    Culture - Tissue with Gram Stain (collected 03-31-21 @ 16:18)  Source: .Tissue None  Gram Stain (04-01-21 @ 07:05):    Few polymorphonuclear leukocytes seen per low power field    Numerous Gram Negative Rods seen per oil power field  Final Report (04-06-21 @ 11:01):    Numerous Klebsiella pneumoniae ESBL    Few Enterococcus faecalis (vancomycin resistant)    Few Pseudomonas aeruginosa (Carbapenem Resistant)  Organism: Klebsiella pneumoniae ESBL  Enterococcus faecalis (vancomycin resistant)  Pseudomonas aeruginosa (Carbapenem Resistant) (04-06-21 @ 11:01)  Organism: Pseudomonas aeruginosa (Carbapenem Resistant) (04-06-21 @ 11:01)      -  Amikacin: S <=16      -  Aztreonam: S 8      -  Cefepime: I 16      -  Ceftazidime: I 16      -  Ciprofloxacin: R >2      -  Gentamicin: I 8      -  Imipenem: R >8      -  Levofloxacin: R >4      -  Meropenem: R 8      -  Piperacillin/Tazobactam: I 64      -  Tobramycin: S <=2      Method Type: JAZMIN  Organism: Enterococcus faecalis (vancomycin resistant) (04-06-21 @ 11:01)      -  Ampicillin: S 8 Predicts results to ampicillin/sulbactam, amoxacillin-clavulanate and  piperacillin-tazobactam.      -  Daptomycin: S 1      -  Levofloxacin: R >4      -  Linezolid: S 1      -  Tetra/Doxy: R >8      -  Vancomycin: R >16      Method Type: JAZMIN  Organism: Klebsiella pneumoniae ESBL (04-06-21 @ 11:01)      -  Amikacin: S <=16      -  Amoxicillin/Clavulanic Acid: S <=8/4      -  Ampicillin: R >16 These ampicillin results predict results for amoxicillin      -  Ampicillin/Sulbactam: R >16/8 Enterobacter, Citrobacter, and Serratia may develop resistance during prolonged therapy (3-4 days)      -  Aztreonam: R >16      -  Cefazolin: R >16 Enterobacter, Citrobacter, and Serratia may develop resistance during prolonged therapy (3-4 days)      -  Cefepime: R >16      -  Cefoxitin: S <=8      -  Ceftriaxone: R >32 Enterobacter, Citrobacter, and Serratia may develop resistance during prolonged therapy      -  Ciprofloxacin: R >2      -  Ertapenem: S <=0.5      -  Gentamicin: S <=2      -  Imipenem: S <=1      -  Levofloxacin: R 2      -  Meropenem: S <=1      -  Piperacillin/Tazobactam: R <=8      -  Tobramycin: S <=2      -  Trimethoprim/Sulfamethoxazole: R >2/38      Method Type: JAZMIN        Lactate, Blood: 0.7 mmol/L (04-28-21 @ 02:00)  Lactate, Blood: 0.9 mmol/L (04-26-21 @ 17:42)      INFECTIOUS DISEASES TESTING  Procalcitonin, Serum: 2.03 (04-29-21 @ 05:25)  Procalcitonin, Serum: 2.50 (04-28-21 @ 16:00)  Procalcitonin, Serum: 0.19 (04-26-21 @ 17:42)  COVID-19 PCR: NotDetec (04-17-21 @ 10:31)  COVID-19 PCR: NotDetec (04-14-21 @ 14:29)  COVID-19 PCR: NotDetec (04-12-21 @ 15:00)  COVID-19 PCR: NotDetec (04-07-21 @ 14:55)  Procalcitonin, Serum: 0.16 (04-06-21 @ 12:32)  COVID-19 PCR: NotDetec (03-30-21 @ 15:19)  COVID-19 PCR: NotDetec (03-29-21 @ 19:40)  Procalcitonin, Serum: 0.61 (03-26-21 @ 10:20)  COVID-19 PCR: NotDetec (03-22-21 @ 20:13)  COVID-19 PCR: NotDetec (03-10-21 @ 12:22)  COVID-19 PCR: NotDetec (03-01-21 @ 16:49)  Fungitell: 62 (03-01-21 @ 11:00)  Procalcitonin, Serum: 0.29 (03-01-21 @ 05:32)  MRSA PCR Result.: Negative (02-27-21 @ 15:44)  COVID-19 PCR: NotDetec (02-25-21 @ 15:34)  Procalcitonin, Serum: 0.27 (02-18-21 @ 10:54)  MRSA PCR Result.: Negative (02-14-21 @ 18:29)  HIV-1/2 Combo Result: Nonreact (02-14-21 @ 05:07)  Procalcitonin, Serum: 0.46 (02-13-21 @ 16:00)  COVID-19 PCR: NotDetec (02-12-21 @ 10:17)      INFLAMMATORY MARKERS      RADIOLOGY & ADDITIONAL TESTS:  I have personally reviewed the last available Chest xray  CXR  Xray Chest 1 View AP/PA:   EXAM:  XR CHEST 1 VIEW            PROCEDURE DATE:  04/28/2021            INTERPRETATION:  Clinical History / Reason for exam: Sepsis.    Comparison : Chest radiograph 2 days prior.    Technique/Positioning: Frontal portable.    Findings:    Supportdevices: Patient is intubated. Telemetry leads overlie the thorax.    Cardiac/mediastinum/hilum: Heart is enlarged.    Lung parenchyma/Pleura: Bilateral opacifications, left. Telemetry, worsening.    Skeleton/soft tissues: Unchanged    Impression:    Worsening opacification left hemithorax. Support devices as described.                  ROGELIO SMITH MD; Attending Interventional Radiologist  This document has been electronically signed. Apr 28 2021  9:11AM (04-28-21 @ 02:31)      CT      CARDIOLOGY TESTING  12 Lead ECG:   Ventricular Rate 94 BPM    Atrial Rate 94 BPM    P-R Interval 216 ms    QRS Duration 88 ms    Q-T Interval 364 ms    QTC Calculation(Bazett) 455 ms    P Axis 52 degrees    R Axis 27 degrees    T Axis 38 degrees    Diagnosis Line Sinus rhythm with 1st degree A-V block  Otherwise normal ECG    Confirmed by Nathan Argueta ([id number]) on 4/28/2021 9:42:25 PM (04-28-21 @ 20:05)      MEDICATIONS  ascorbic acid 500  atorvastatin 40  carbidopa/levodopa  25/100 2  chlorhexidine 0.12% Liquid 15  chlorhexidine 4% Liquid 1  collagenase Ointment 1  Dakins Solution - 1/2 Strength 1  etomidate Injectable 15  fentaNYL   Infusion. 0.5  magnesium oxide 400  meropenem  IVPB   meropenem  IVPB 1000  midazolam Infusion 0.02  midodrine 10  norepinephrine Infusion 0.05  phenylephrine    Infusion 0.1  senna 2  zinc sulfate 220      WEIGHT  Weight (kg): 79.4 (03-31-21 @ 15:48)  Creatinine, Serum: 0.7 mg/dL (04-30-21 @ 05:40)      ANTIBIOTICS:  meropenem  IVPB      meropenem  IVPB 1000 milliGRAM(s) IV Intermittent every 8 hours      All available historical records have been reviewed

## 2021-05-01 LAB
-  STAPHYLOCOCCUS EPIDERMIDIS, METHICILLIN RESISTANT: SIGNIFICANT CHANGE UP
ALBUMIN SERPL ELPH-MCNC: 1.5 G/DL — LOW (ref 3.5–5.2)
ALP SERPL-CCNC: 374 U/L — HIGH (ref 30–115)
ALT FLD-CCNC: 14 U/L — SIGNIFICANT CHANGE UP (ref 0–41)
ANION GAP SERPL CALC-SCNC: 9 MMOL/L — SIGNIFICANT CHANGE UP (ref 7–14)
AST SERPL-CCNC: 31 U/L — SIGNIFICANT CHANGE UP (ref 0–41)
BASOPHILS # BLD AUTO: 0.14 K/UL — SIGNIFICANT CHANGE UP (ref 0–0.2)
BASOPHILS NFR BLD AUTO: 0.9 % — SIGNIFICANT CHANGE UP (ref 0–1)
BILIRUB SERPL-MCNC: 0.5 MG/DL — SIGNIFICANT CHANGE UP (ref 0.2–1.2)
BUN SERPL-MCNC: 29 MG/DL — HIGH (ref 10–20)
CALCIUM SERPL-MCNC: 7.8 MG/DL — LOW (ref 8.5–10.1)
CHLORIDE SERPL-SCNC: 110 MMOL/L — SIGNIFICANT CHANGE UP (ref 98–110)
CO2 SERPL-SCNC: 24 MMOL/L — SIGNIFICANT CHANGE UP (ref 17–32)
CREAT SERPL-MCNC: 0.7 MG/DL — SIGNIFICANT CHANGE UP (ref 0.7–1.5)
CULTURE RESULTS: SIGNIFICANT CHANGE UP
CULTURE RESULTS: SIGNIFICANT CHANGE UP
EOSINOPHIL # BLD AUTO: 0.33 K/UL — SIGNIFICANT CHANGE UP (ref 0–0.7)
EOSINOPHIL NFR BLD AUTO: 2.1 % — SIGNIFICANT CHANGE UP (ref 0–8)
GLUCOSE SERPL-MCNC: 58 MG/DL — LOW (ref 70–99)
GRAM STN FLD: SIGNIFICANT CHANGE UP
HCT VFR BLD CALC: 25.1 % — LOW (ref 42–52)
HGB BLD-MCNC: 8 G/DL — LOW (ref 14–18)
IMM GRANULOCYTES NFR BLD AUTO: 5.3 % — HIGH (ref 0.1–0.3)
INR BLD: 1.44 RATIO — HIGH (ref 0.65–1.3)
LYMPHOCYTES # BLD AUTO: 1.42 K/UL — SIGNIFICANT CHANGE UP (ref 1.2–3.4)
LYMPHOCYTES # BLD AUTO: 9.1 % — LOW (ref 20.5–51.1)
MAGNESIUM SERPL-MCNC: 1.9 MG/DL — SIGNIFICANT CHANGE UP (ref 1.8–2.4)
MCHC RBC-ENTMCNC: 27 PG — SIGNIFICANT CHANGE UP (ref 27–31)
MCHC RBC-ENTMCNC: 31.9 G/DL — LOW (ref 32–37)
MCV RBC AUTO: 84.8 FL — SIGNIFICANT CHANGE UP (ref 80–94)
METHOD TYPE: SIGNIFICANT CHANGE UP
MONOCYTES # BLD AUTO: 0.77 K/UL — HIGH (ref 0.1–0.6)
MONOCYTES NFR BLD AUTO: 5 % — SIGNIFICANT CHANGE UP (ref 1.7–9.3)
NEUTROPHILS # BLD AUTO: 12.06 K/UL — HIGH (ref 1.4–6.5)
NEUTROPHILS NFR BLD AUTO: 77.6 % — HIGH (ref 42.2–75.2)
NRBC # BLD: 0 /100 WBCS — SIGNIFICANT CHANGE UP (ref 0–0)
ORGANISM # SPEC MICROSCOPIC CNT: SIGNIFICANT CHANGE UP
ORGANISM # SPEC MICROSCOPIC CNT: SIGNIFICANT CHANGE UP
PLATELET # BLD AUTO: 69 K/UL — LOW (ref 130–400)
POTASSIUM SERPL-MCNC: 4.4 MMOL/L — SIGNIFICANT CHANGE UP (ref 3.5–5)
POTASSIUM SERPL-SCNC: 4.4 MMOL/L — SIGNIFICANT CHANGE UP (ref 3.5–5)
PROT SERPL-MCNC: 5 G/DL — LOW (ref 6–8)
PROTHROM AB SERPL-ACNC: 16.6 SEC — HIGH (ref 9.95–12.87)
RBC # BLD: 2.96 M/UL — LOW (ref 4.7–6.1)
RBC # FLD: 15.9 % — HIGH (ref 11.5–14.5)
SODIUM SERPL-SCNC: 143 MMOL/L — SIGNIFICANT CHANGE UP (ref 135–146)
SPECIMEN SOURCE: SIGNIFICANT CHANGE UP
WBC # BLD: 15.55 K/UL — HIGH (ref 4.8–10.8)
WBC # FLD AUTO: 15.55 K/UL — HIGH (ref 4.8–10.8)

## 2021-05-01 PROCEDURE — 99232 SBSQ HOSP IP/OBS MODERATE 35: CPT

## 2021-05-01 PROCEDURE — 71045 X-RAY EXAM CHEST 1 VIEW: CPT | Mod: 26

## 2021-05-01 RX ORDER — MEROPENEM 1 G/30ML
2000 INJECTION INTRAVENOUS EVERY 8 HOURS
Refills: 0 | Status: DISCONTINUED | OUTPATIENT
Start: 2021-05-01 | End: 2021-05-04

## 2021-05-01 RX ADMIN — MAGNESIUM OXIDE 400 MG ORAL TABLET 400 MILLIGRAM(S): 241.3 TABLET ORAL at 22:13

## 2021-05-01 RX ADMIN — FENTANYL CITRATE 3.97 MICROGRAM(S)/KG/HR: 50 INJECTION INTRAVENOUS at 01:16

## 2021-05-01 RX ADMIN — Medication 500 MILLIGRAM(S): at 12:12

## 2021-05-01 RX ADMIN — TIGECYCLINE 105 MILLIGRAM(S): 50 INJECTION, POWDER, LYOPHILIZED, FOR SOLUTION INTRAVENOUS at 05:37

## 2021-05-01 RX ADMIN — MIDODRINE HYDROCHLORIDE 10 MILLIGRAM(S): 2.5 TABLET ORAL at 05:38

## 2021-05-01 RX ADMIN — PHENYLEPHRINE HYDROCHLORIDE 2.98 MICROGRAM(S)/KG/MIN: 10 INJECTION INTRAVENOUS at 09:00

## 2021-05-01 RX ADMIN — SENNA PLUS 2 TABLET(S): 8.6 TABLET ORAL at 22:13

## 2021-05-01 RX ADMIN — CHLORHEXIDINE GLUCONATE 15 MILLILITER(S): 213 SOLUTION TOPICAL at 18:30

## 2021-05-01 RX ADMIN — ZINC SULFATE TAB 220 MG (50 MG ZINC EQUIVALENT) 220 MILLIGRAM(S): 220 (50 ZN) TAB at 12:12

## 2021-05-01 RX ADMIN — CARBIDOPA AND LEVODOPA 2 TABLET(S): 25; 100 TABLET ORAL at 22:13

## 2021-05-01 RX ADMIN — CARBIDOPA AND LEVODOPA 2 TABLET(S): 25; 100 TABLET ORAL at 05:38

## 2021-05-01 RX ADMIN — MIDODRINE HYDROCHLORIDE 10 MILLIGRAM(S): 2.5 TABLET ORAL at 15:33

## 2021-05-01 RX ADMIN — CHLORHEXIDINE GLUCONATE 15 MILLILITER(S): 213 SOLUTION TOPICAL at 05:39

## 2021-05-01 RX ADMIN — Medication 1 APPLICATION(S): at 05:38

## 2021-05-01 RX ADMIN — MAGNESIUM OXIDE 400 MG ORAL TABLET 400 MILLIGRAM(S): 241.3 TABLET ORAL at 05:38

## 2021-05-01 RX ADMIN — MIDODRINE HYDROCHLORIDE 10 MILLIGRAM(S): 2.5 TABLET ORAL at 22:13

## 2021-05-01 RX ADMIN — Medication 1 APPLICATION(S): at 18:29

## 2021-05-01 RX ADMIN — CHLORHEXIDINE GLUCONATE 1 APPLICATION(S): 213 SOLUTION TOPICAL at 05:40

## 2021-05-01 RX ADMIN — Medication 1 APPLICATION(S): at 18:28

## 2021-05-01 RX ADMIN — MAGNESIUM OXIDE 400 MG ORAL TABLET 400 MILLIGRAM(S): 241.3 TABLET ORAL at 15:32

## 2021-05-01 RX ADMIN — TIGECYCLINE 105 MILLIGRAM(S): 50 INJECTION, POWDER, LYOPHILIZED, FOR SOLUTION INTRAVENOUS at 18:28

## 2021-05-01 RX ADMIN — ATORVASTATIN CALCIUM 40 MILLIGRAM(S): 80 TABLET, FILM COATED ORAL at 22:13

## 2021-05-01 NOTE — PROGRESS NOTE ADULT - ASSESSMENT
IMPRESSION:    acute hypoxemic resp failure/ L lung collapse sp bronch again collapse l lung  septic shock on shalom  infected ulcer/ UTI/ cholecysititis/ MSR pneumonia  parkinson sp PEG  Anemia  DVT/ A fib  DEC HB NO ACTIVE BLEED      PLAN:    CNS: keep sedated, SAT    HEENT:  Oral care    PULMONARY:  HOB @ 45 degrees, dec FIO2 keep Sao2 92 to 96%.     CARDIOVASCULAR: keep equal. Avoid volume overload    GI: GI prophylaxis                                          Feeding resume feeding when okay with surgery?    RENAL:  F/u  lytes.  Correct as needed. accurate I/O    INFECTIOUS DISEASE: abx per ID    HEMATOLOGICAL:  DVT prophylaxis. LE doppler reviewed    ENDOCRINE:  Follow up FS.  Insulin protocol if needed.    CODE STATUS: FULL CODE  very poor prognosis     IMPRESSION:    acute hypoxemic resp failure/ L lung collapse sp bronch   septic shock on shalom  infected ulcer/ UTI/ cholecystitis MDR pneumonia  HO parkinson's sp PEG  Anemia  HO DVT/ A fib  DEC HB NO ACTIVE BLEED      PLAN:    CNS: keep sedated, SAT    HEENT:  Oral care    PULMONARY:  HOB @ 45 degrees, Wean FIO2 keep Sao2 92 to 96%.   Aggressive pulmonary toilet.  Might need repeat bronch.  PEEP 7     CARDIOVASCULAR: keep equal. Avoid volume overload    GI: GI prophylaxis                                          Feeding resume feeding.  DC OGT     RENAL:  F/u  lytes.  Correct as needed. accurate I/O    INFECTIOUS DISEASE: abx per ID    HEMATOLOGICAL:  DVT prophylaxis. LE doppler negative     ENDOCRINE:  Follow up FS.  Insulin protocol if needed.    CODE STATUS: FULL CODE  very poor prognosis

## 2021-05-01 NOTE — PROGRESS NOTE ADULT - SUBJECTIVE AND OBJECTIVE BOX
Patient is a 71y old  Male who presents with a chief complaint of Change in Mental Status (30 Apr 2021 08:21)        SUBJECTIVE: Remains critically ill and on MV. ON VERSED 0.06, FENTANYL 3, NEOSYNPHRINE  0.8      REVIEW OF SYSTEMS: ROS -ve except as in HPI    PHYSICAL EXAM  Vital Signs Last 24 Hrs  T(C): 37.2 (01 May 2021 04:00), Max: 37.3 (01 May 2021 00:00)  T(F): 98.9 (01 May 2021 04:00), Max: 99.2 (01 May 2021 00:00)  HR: 56 (01 May 2021 09:00) (56 - 62)  BP: 95/52 (01 May 2021 09:00) (85/44 - 117/56)  BP(mean): 68 (01 May 2021 09:00) (61 - 82)  RR: 23 (01 May 2021 09:00) (22 - 24)  SpO2: 98% (01 May 2021 09:00) (96% - 100%)    CONSTITUTIONAL:  Ill appearing.  NAD    ENT:   ETT+  No thrush    CARDIAC:   Normal rate,   regular rhythm.    no edema      RESPIRATORY:   NO Wheezing  normal chest expansion  Nnot tachypneic,  No use of accessory muscles    GASTROINTESTINAL:  Abdomen soft,   non-tender,   no guarding,   + BS    MUSCULOSKELETAL:   range of motion is not limited,  no clubbing, cyanosis    NEUROLOGICAL:   On sedation does not follow commands        SKIN:   Skin normal color for race,   warm, dry   No evidence of rash.      04-30-21 @ 07:01  -  05-01-21 @ 07:00  --------------------------------------------------------  IN:    FentaNYL: 285.6 mL    IV PiggyBack: 420 mL    Midazolam: 106.4 mL    Phenylephrine: 452.2 mL  Total IN: 1264.2 mL    OUT:    Drain (mL): 50 mL    Indwelling Catheter - Urethral (mL): 1250 mL  Total OUT: 1300 mL    Total NET: -35.8 mL      05-01-21 @ 07:01  -  05-01-21 @ 09:26  --------------------------------------------------------  IN:    FentaNYL: 71.4 mL    Midazolam: 16.5 mL    Phenylephrine: 71.4 mL  Total IN: 159.3 mL    OUT:  Total OUT: 0 mL    Total NET: 159.3 mL          LABS:                          8.0    15.55 )-----------( 69       ( 01 May 2021 04:50 )             25.1                                               05-01    143  |  110  |  29<H>  ----------------------------<  58<L>  4.4   |  24  |  0.7    Ca    7.8<L>      01 May 2021 04:50  Mg     1.9     05-01    TPro  5.0<L>  /  Alb  1.5<L>  /  TBili  0.5  /  DBili  x   /  AST  31  /  ALT  14  /  AlkPhos  374<H>  05-01      PT/INR - ( 01 May 2021 04:50 )   PT: 16.60 sec;   INR: 1.44 ratio                                                                                              LIVER FUNCTIONS - ( 01 May 2021 04:50 )  Alb: 1.5 g/dL / Pro: 5.0 g/dL / ALK PHOS: 374 U/L / ALT: 14 U/L / AST: 31 U/L / GGT: x                                                  Culture - Body Fluid with Gram Stain (collected 29 Apr 2021 15:22)  Source: .Body Fluid gall bladder fluid  Gram Stain (30 Apr 2021 01:12):    No polymorphonuclear cells seen    No organisms seen    by cytocentrifuge  Preliminary Report (30 Apr 2021 20:21):    No growth    Culture - Blood (collected 29 Apr 2021 06:43)  Source: .Blood None  Preliminary Report (30 Apr 2021 18:01):    No growth to date.    Culture - Fungal, Bronchial (collected 28 Apr 2021 10:22)  Source: .Bronchial None  Preliminary Report (29 Apr 2021 10:28):    Testing in progress    Culture - Acid Fast - Bronchial w/Smear (collected 28 Apr 2021 10:22)  Source: .Bronchial None    Culture - Bronchial (collected 28 Apr 2021 10:22)  Source: Bronch Wash None  Gram Stain (28 Apr 2021 23:36):    Numerous polymorphonuclear leukocytes per low power field    Few Squamous epithelial cells per low power field    Numerous Gram Negative Coccobacilli per oil power field  Preliminary Report (29 Apr 2021 17:07):    Moderate Klebsiella pneumoniae                                                ABG - ( 01 May 2021 02:44 )  pH, Arterial: 7.45  pH, Blood: x     /  pCO2: 40    /  pO2: 108   / HCO3: 28    / Base Excess: 3.4   /  SaO2: 98              MEDICATIONS  (STANDING):  ascorbic acid 500 milliGRAM(s) Oral daily  atorvastatin 40 milliGRAM(s) Oral at bedtime  carbidopa/levodopa  25/100 2 Tablet(s) Oral <User Schedule>  chlorhexidine 0.12% Liquid 15 milliLiter(s) Oral Mucosa every 12 hours  chlorhexidine 4% Liquid 1 Application(s) Topical <User Schedule>  collagenase Ointment 1 Application(s) Topical two times a day  Dakins Solution - 1/2 Strength 1 Application(s) Topical two times a day  etomidate Injectable 15 milliGRAM(s) IV Push once  fentaNYL   Infusion. 0.5 MICROgram(s)/kG/Hr (3.97 mL/Hr) IV Continuous <Continuous>  magnesium oxide 400 milliGRAM(s) Oral every 8 hours  meropenem  IVPB      meropenem  IVPB 1000 milliGRAM(s) IV Intermittent every 8 hours  metoclopramide Injectable 10 milliGRAM(s) IV Push once  midazolam Infusion 0.02 mG/kG/Hr (1.59 mL/Hr) IV Continuous <Continuous>  midodrine 10 milliGRAM(s) Oral every 8 hours  norepinephrine Infusion 0.05 MICROgram(s)/kG/Min (3.72 mL/Hr) IV Continuous <Continuous>  phenylephrine    Infusion 0.1 MICROgram(s)/kG/Min (2.98 mL/Hr) IV Continuous <Continuous>  senna 2 Tablet(s) Oral at bedtime  tigecycline IVPB      tigecycline IVPB 50 milliGRAM(s) IV Intermittent every 12 hours  zinc sulfate 220 milliGRAM(s) Oral daily    MEDICATIONS  (PRN):  acetaminophen   Tablet .. 650 milliGRAM(s) Oral every 6 hours PRN Temp greater or equal to 38C (100.4F)      X-Rays reviewed left side whiteout, ETT+, OG okay

## 2021-05-01 NOTE — PROGRESS NOTE ADULT - ATTENDING COMMENTS
IMPRESSION:    acute hypoxemic resp failure/ L lung collapse sp bronch   septic shock on shalom  infected ulcer/ UTI/ cholecystitis MDR pneumonia  HO parkinson's sp PEG  Anemia  HO DVT/ A fib  DEC HB NO ACTIVE BLEED    Recommend    Pulm toilet  Might need repeat bronch   PEEP 7  ABX per ID

## 2021-05-02 LAB
-  AMIKACIN: SIGNIFICANT CHANGE UP
-  AMPICILLIN/SULBACTAM: SIGNIFICANT CHANGE UP
-  AMPICILLIN/SULBACTAM: SIGNIFICANT CHANGE UP
-  AMPICILLIN: SIGNIFICANT CHANGE UP
-  AZTREONAM: SIGNIFICANT CHANGE UP
-  CEFAZOLIN: SIGNIFICANT CHANGE UP
-  CEFAZOLIN: SIGNIFICANT CHANGE UP
-  CEFEPIME: SIGNIFICANT CHANGE UP
-  CEFOXITIN: SIGNIFICANT CHANGE UP
-  CEFTRIAXONE: SIGNIFICANT CHANGE UP
-  CIPROFLOXACIN: SIGNIFICANT CHANGE UP
-  CLINDAMYCIN: SIGNIFICANT CHANGE UP
-  ERTAPENEM: SIGNIFICANT CHANGE UP
-  ERYTHROMYCIN: SIGNIFICANT CHANGE UP
-  GENTAMICIN: SIGNIFICANT CHANGE UP
-  GENTAMICIN: SIGNIFICANT CHANGE UP
-  LEVOFLOXACIN: SIGNIFICANT CHANGE UP
-  MEROPENEM: SIGNIFICANT CHANGE UP
-  OXACILLIN: SIGNIFICANT CHANGE UP
-  PENICILLIN: SIGNIFICANT CHANGE UP
-  PIPERACILLIN/TAZOBACTAM: SIGNIFICANT CHANGE UP
-  RIFAMPIN: SIGNIFICANT CHANGE UP
-  TETRACYCLINE: SIGNIFICANT CHANGE UP
-  TOBRAMYCIN: SIGNIFICANT CHANGE UP
-  TRIMETHOPRIM/SULFAMETHOXAZOLE: SIGNIFICANT CHANGE UP
-  TRIMETHOPRIM/SULFAMETHOXAZOLE: SIGNIFICANT CHANGE UP
-  VANCOMYCIN: SIGNIFICANT CHANGE UP
ALBUMIN SERPL ELPH-MCNC: 1.5 G/DL — LOW (ref 3.5–5.2)
ALP SERPL-CCNC: 336 U/L — HIGH (ref 30–115)
ALT FLD-CCNC: 19 U/L — SIGNIFICANT CHANGE UP (ref 0–41)
ANION GAP SERPL CALC-SCNC: 8 MMOL/L — SIGNIFICANT CHANGE UP (ref 7–14)
AST SERPL-CCNC: 27 U/L — SIGNIFICANT CHANGE UP (ref 0–41)
BASOPHILS # BLD AUTO: 0.1 K/UL — SIGNIFICANT CHANGE UP (ref 0–0.2)
BASOPHILS NFR BLD AUTO: 0.6 % — SIGNIFICANT CHANGE UP (ref 0–1)
BILIRUB SERPL-MCNC: 0.4 MG/DL — SIGNIFICANT CHANGE UP (ref 0.2–1.2)
BUN SERPL-MCNC: 38 MG/DL — HIGH (ref 10–20)
CALCIUM SERPL-MCNC: 7.6 MG/DL — LOW (ref 8.5–10.1)
CHLORIDE SERPL-SCNC: 111 MMOL/L — HIGH (ref 98–110)
CO2 SERPL-SCNC: 26 MMOL/L — SIGNIFICANT CHANGE UP (ref 17–32)
CREAT SERPL-MCNC: 0.7 MG/DL — SIGNIFICANT CHANGE UP (ref 0.7–1.5)
CULTURE RESULTS: SIGNIFICANT CHANGE UP
EOSINOPHIL # BLD AUTO: 0.25 K/UL — SIGNIFICANT CHANGE UP (ref 0–0.7)
EOSINOPHIL NFR BLD AUTO: 1.4 % — SIGNIFICANT CHANGE UP (ref 0–8)
GLUCOSE BLDC GLUCOMTR-MCNC: 109 MG/DL — HIGH (ref 70–99)
GLUCOSE BLDC GLUCOMTR-MCNC: 131 MG/DL — HIGH (ref 70–99)
GLUCOSE SERPL-MCNC: 137 MG/DL — HIGH (ref 70–99)
HCT VFR BLD CALC: 25.2 % — LOW (ref 42–52)
HGB BLD-MCNC: 7.9 G/DL — LOW (ref 14–18)
IMM GRANULOCYTES NFR BLD AUTO: 4.9 % — HIGH (ref 0.1–0.3)
INR BLD: 1.41 RATIO — HIGH (ref 0.65–1.3)
LYMPHOCYTES # BLD AUTO: 1.22 K/UL — SIGNIFICANT CHANGE UP (ref 1.2–3.4)
LYMPHOCYTES # BLD AUTO: 7 % — LOW (ref 20.5–51.1)
MAGNESIUM SERPL-MCNC: 1.8 MG/DL — SIGNIFICANT CHANGE UP (ref 1.8–2.4)
MCHC RBC-ENTMCNC: 26.4 PG — LOW (ref 27–31)
MCHC RBC-ENTMCNC: 31.3 G/DL — LOW (ref 32–37)
MCV RBC AUTO: 84.3 FL — SIGNIFICANT CHANGE UP (ref 80–94)
METHOD TYPE: SIGNIFICANT CHANGE UP
METHOD TYPE: SIGNIFICANT CHANGE UP
MONOCYTES # BLD AUTO: 0.96 K/UL — HIGH (ref 0.1–0.6)
MONOCYTES NFR BLD AUTO: 5.5 % — SIGNIFICANT CHANGE UP (ref 1.7–9.3)
NEUTROPHILS # BLD AUTO: 14.14 K/UL — HIGH (ref 1.4–6.5)
NEUTROPHILS NFR BLD AUTO: 80.6 % — HIGH (ref 42.2–75.2)
NRBC # BLD: 0 /100 WBCS — SIGNIFICANT CHANGE UP (ref 0–0)
ORGANISM # SPEC MICROSCOPIC CNT: SIGNIFICANT CHANGE UP
PLATELET # BLD AUTO: 57 K/UL — LOW (ref 130–400)
POTASSIUM SERPL-MCNC: 4.3 MMOL/L — SIGNIFICANT CHANGE UP (ref 3.5–5)
POTASSIUM SERPL-SCNC: 4.3 MMOL/L — SIGNIFICANT CHANGE UP (ref 3.5–5)
PROT SERPL-MCNC: 4.9 G/DL — LOW (ref 6–8)
PROTHROM AB SERPL-ACNC: 16.2 SEC — HIGH (ref 9.95–12.87)
RBC # BLD: 2.99 M/UL — LOW (ref 4.7–6.1)
RBC # FLD: 16 % — HIGH (ref 11.5–14.5)
SODIUM SERPL-SCNC: 145 MMOL/L — SIGNIFICANT CHANGE UP (ref 135–146)
SPECIMEN SOURCE: SIGNIFICANT CHANGE UP
SPECIMEN SOURCE: SIGNIFICANT CHANGE UP
WBC # BLD: 17.53 K/UL — HIGH (ref 4.8–10.8)
WBC # FLD AUTO: 17.53 K/UL — HIGH (ref 4.8–10.8)

## 2021-05-02 PROCEDURE — 99232 SBSQ HOSP IP/OBS MODERATE 35: CPT

## 2021-05-02 PROCEDURE — 71045 X-RAY EXAM CHEST 1 VIEW: CPT | Mod: 26,77

## 2021-05-02 PROCEDURE — 71045 X-RAY EXAM CHEST 1 VIEW: CPT | Mod: 26

## 2021-05-02 RX ORDER — POLYETHYLENE GLYCOL 3350 17 G/17G
17 POWDER, FOR SOLUTION ORAL DAILY
Refills: 0 | Status: DISCONTINUED | OUTPATIENT
Start: 2021-05-02 | End: 2021-05-28

## 2021-05-02 RX ADMIN — CARBIDOPA AND LEVODOPA 2 TABLET(S): 25; 100 TABLET ORAL at 05:09

## 2021-05-02 RX ADMIN — POLYETHYLENE GLYCOL 3350 17 GRAM(S): 17 POWDER, FOR SOLUTION ORAL at 15:27

## 2021-05-02 RX ADMIN — Medication 1 APPLICATION(S): at 19:26

## 2021-05-02 RX ADMIN — MAGNESIUM OXIDE 400 MG ORAL TABLET 400 MILLIGRAM(S): 241.3 TABLET ORAL at 15:27

## 2021-05-02 RX ADMIN — CHLORHEXIDINE GLUCONATE 15 MILLILITER(S): 213 SOLUTION TOPICAL at 19:26

## 2021-05-02 RX ADMIN — MIDODRINE HYDROCHLORIDE 10 MILLIGRAM(S): 2.5 TABLET ORAL at 05:09

## 2021-05-02 RX ADMIN — MIDODRINE HYDROCHLORIDE 10 MILLIGRAM(S): 2.5 TABLET ORAL at 23:06

## 2021-05-02 RX ADMIN — MAGNESIUM OXIDE 400 MG ORAL TABLET 400 MILLIGRAM(S): 241.3 TABLET ORAL at 05:09

## 2021-05-02 RX ADMIN — PHENYLEPHRINE HYDROCHLORIDE 2.98 MICROGRAM(S)/KG/MIN: 10 INJECTION INTRAVENOUS at 06:23

## 2021-05-02 RX ADMIN — ATORVASTATIN CALCIUM 40 MILLIGRAM(S): 80 TABLET, FILM COATED ORAL at 23:05

## 2021-05-02 RX ADMIN — CHLORHEXIDINE GLUCONATE 1 APPLICATION(S): 213 SOLUTION TOPICAL at 05:10

## 2021-05-02 RX ADMIN — ZINC SULFATE TAB 220 MG (50 MG ZINC EQUIVALENT) 220 MILLIGRAM(S): 220 (50 ZN) TAB at 15:27

## 2021-05-02 RX ADMIN — TIGECYCLINE 105 MILLIGRAM(S): 50 INJECTION, POWDER, LYOPHILIZED, FOR SOLUTION INTRAVENOUS at 19:26

## 2021-05-02 RX ADMIN — PHENYLEPHRINE HYDROCHLORIDE 2.98 MICROGRAM(S)/KG/MIN: 10 INJECTION INTRAVENOUS at 21:30

## 2021-05-02 RX ADMIN — CHLORHEXIDINE GLUCONATE 15 MILLILITER(S): 213 SOLUTION TOPICAL at 05:10

## 2021-05-02 RX ADMIN — Medication 500 MILLIGRAM(S): at 15:26

## 2021-05-02 RX ADMIN — MEROPENEM 200 MILLIGRAM(S): 1 INJECTION INTRAVENOUS at 15:27

## 2021-05-02 RX ADMIN — MEROPENEM 200 MILLIGRAM(S): 1 INJECTION INTRAVENOUS at 00:26

## 2021-05-02 RX ADMIN — Medication 1 APPLICATION(S): at 05:10

## 2021-05-02 RX ADMIN — MIDODRINE HYDROCHLORIDE 10 MILLIGRAM(S): 2.5 TABLET ORAL at 15:27

## 2021-05-02 RX ADMIN — CARBIDOPA AND LEVODOPA 2 TABLET(S): 25; 100 TABLET ORAL at 23:05

## 2021-05-02 RX ADMIN — TIGECYCLINE 105 MILLIGRAM(S): 50 INJECTION, POWDER, LYOPHILIZED, FOR SOLUTION INTRAVENOUS at 05:27

## 2021-05-02 RX ADMIN — CARBIDOPA AND LEVODOPA 2 TABLET(S): 25; 100 TABLET ORAL at 10:33

## 2021-05-02 RX ADMIN — MEROPENEM 200 MILLIGRAM(S): 1 INJECTION INTRAVENOUS at 05:11

## 2021-05-02 NOTE — PROGRESS NOTE ADULT - ASSESSMENT
IMPRESSION:    acute hypoxemic resp failure/ L lung collapse sp bronch   septic shock on shalom  infected ulcer/ UTI/ cholecystitis MDR pneumonia  HO parkinson's sp PEG  Anemia  HO DVT/ A fib  DEC HB NO ACTIVE BLEED      PLAN:    CNS: keep sedated, SAT    HEENT:  Oral care    PULMONARY:  HOB @ 45 degrees, Wean FIO2 keep Sao2 92 to 96%.   Aggressive pulmonary toilet.  Might need repeat bronch.  PEEP 7   decrease Fio2 to 30%  SBT, decrease RR to 16.    CARDIOVASCULAR: keep equal. Avoid volume overload. wean shalom.    GI: GI prophylaxis                                          Feeding resume feeding.      RENAL:  F/u  lytes.  Correct as needed. accurate I/O    INFECTIOUS DISEASE: Cont abx per ID    HEMATOLOGICAL:  DVT prophylaxis.     ENDOCRINE:  Follow up FS.  Insulin protocol if needed.    CODE STATUS: FULL CODE  very poor prognosis IMPRESSION:    Acute hypoxemic resp failure/ L lung collapse sp bronch   septic shock on shalom  infected ulcer/ UTI/ cholecystitis MDR pneumonia  HO parkinson's sp PEG  Anemia  HO DVT/ A fib  DEC HB NO ACTIVE BLEED      PLAN:    CNS: SAT and assess MS     HEENT:  Oral care    PULMONARY:  HOB @ 45 degrees, Wean FIO2 keep Sao2 92 to 96%.   Aggressive pulmonary toilet.  Might need repeat bronch.  PEEP 7   decrease Fio2 to 30%  SBT, decrease RR to 16.    CARDIOVASCULAR: keep equal. Avoid volume overload. wean shalom.    GI: GI prophylaxis                                          Feeding resume feeding.      RENAL:  F/u  lytes.  Correct as needed. accurate I/O    INFECTIOUS DISEASE: Cont abx per ID    HEMATOLOGICAL:  DVT prophylaxis.     ENDOCRINE:  Follow up FS.  Insulin protocol if needed.    CODE STATUS: FULL CODE  very poor prognosis

## 2021-05-02 NOTE — PROGRESS NOTE ADULT - SUBJECTIVE AND OBJECTIVE BOX
Patient is a 71y old  Male who presents with a chief complaint of Change in Mental Status (01 May 2021 09:26)        Over Night Events:  no events overnight  on shalom 0.6    ROS:     All ROS are negative except HPI         PHYSICAL EXAM    ICU Vital Signs Last 24 Hrs  T(C): 35.6 (02 May 2021 00:00), Max: 36.8 (01 May 2021 12:00)  T(F): 96.1 (02 May 2021 00:00), Max: 98.3 (01 May 2021 12:00)  HR: 54 (02 May 2021 08:00) (52 - 58)  BP: 106/55 (02 May 2021 08:00) (91/52 - 126/58)  BP(mean): 76 (02 May 2021 08:00) (67 - 93)  RR: 22 (02 May 2021 08:00) (22 - 23)  SpO2: 100% (02 May 2021 08:00) (99% - 100%)      CONSTITUTIONAL:  intubated    ENT:   Airway patent,   Mouth with normal mucosa.   No thrush    EYES:   Pupils equal,   Round and reactive to light.    CARDIAC:   Normal rate,   Regular rhythm.    No edema      Vascular:  Normal systolic impulse  No Carotid bruits    RESPIRATORY:   No wheezing  Bilateral BS  Normal chest expansion  Not tachypneic,  No use of accessory muscles    GASTROINTESTINAL:  Abdomen soft,   Non-tender,   No guarding,   + BS    MUSCULOSKELETAL:   Range of motion is not limited,  No clubbing, cyanosis    NEUROLOGICAL:   Alert and oriented   No motor  deficits.    SKIN:   Skin normal color for race,   Warm and dry and intact.   No evidence of rash.    PSYCHIATRIC:   Normal mood and affect.   No apparent risk to self or others.    HEMATOLOGICAL:  No cervical  lymphadenopathy.  no inguinal lymphadenopathy      05-01-21 @ 07:01  -  05-02-21 @ 07:00  --------------------------------------------------------  IN:    Enteral Tube Flush: 410 mL    FentaNYL: 190.4 mL    IV PiggyBack: 405 mL    Jevity 1.2: 1440 mL    Midazolam: 22.3 mL    Phenylephrine: 541.1 mL  Total IN: 3008.8 mL    OUT:    Drain (mL): 25 mL    Intermittent Catheterization - Urethral (mL): 1300 mL  Total OUT: 1325 mL    Total NET: 1683.8 mL      05-02-21 @ 07:01  -  05-02-21 @ 09:20  --------------------------------------------------------  IN:    Phenylephrine: 17.9 mL  Total IN: 17.9 mL    OUT:  Total OUT: 0 mL    Total NET: 17.9 mL          LABS:                            7.9    17.53 )-----------( 57       ( 02 May 2021 04:30 )             25.2                                               05-02    145  |  111<H>  |  38<H>  ----------------------------<  137<H>  4.3   |  26  |  0.7    Ca    7.6<L>      02 May 2021 04:30  Mg     1.8     05-02    TPro  4.9<L>  /  Alb  1.5<L>  /  TBili  0.4  /  DBili  x   /  AST  27  /  ALT  19  /  AlkPhos  336<H>  05-02      PT/INR - ( 02 May 2021 04:30 )   PT: 16.20 sec;   INR: 1.41 ratio                                                                                              LIVER FUNCTIONS - ( 02 May 2021 04:30 )  Alb: 1.5 g/dL / Pro: 4.9 g/dL / ALK PHOS: 336 U/L / ALT: 19 U/L / AST: 27 U/L / GGT: x                                                  Culture - Body Fluid with Gram Stain (collected 29 Apr 2021 15:22)  Source: .Body Fluid gall bladder fluid  Gram Stain (30 Apr 2021 01:12):    No polymorphonuclear cells seen    No organisms seen    by cytocentrifuge  Preliminary Report (30 Apr 2021 20:21):    No growth                                                   Mode: AC/ CMV (Assist Control/ Continuous Mandatory Ventilation)  RR (machine): 22  TV (machine): 420  FiO2: 40  PEEP: 5  ITime: 1  MAP: 10  PIP: 26                                      ABG - ( 02 May 2021 05:50 )  pH, Arterial: 7.42  pH, Blood: x     /  pCO2: 42    /  pO2: 133   / HCO3: 27    / Base Excess: 2.4   /  SaO2: 99                  MEDICATIONS  (STANDING):  ascorbic acid 500 milliGRAM(s) Oral daily  atorvastatin 40 milliGRAM(s) Oral at bedtime  carbidopa/levodopa  25/100 2 Tablet(s) Oral <User Schedule>  chlorhexidine 0.12% Liquid 15 milliLiter(s) Oral Mucosa every 12 hours  chlorhexidine 4% Liquid 1 Application(s) Topical <User Schedule>  collagenase Ointment 1 Application(s) Topical two times a day  Dakins Solution - 1/2 Strength 1 Application(s) Topical two times a day  etomidate Injectable 15 milliGRAM(s) IV Push once  fentaNYL   Infusion. 0.5 MICROgram(s)/kG/Hr (3.97 mL/Hr) IV Continuous <Continuous>  magnesium oxide 400 milliGRAM(s) Oral every 8 hours  meropenem  IVPB 2000 milliGRAM(s) IV Intermittent every 8 hours  metoclopramide Injectable 10 milliGRAM(s) IV Push once  midazolam Infusion 0.02 mG/kG/Hr (1.59 mL/Hr) IV Continuous <Continuous>  midodrine 10 milliGRAM(s) Oral every 8 hours  norepinephrine Infusion 0.05 MICROgram(s)/kG/Min (3.72 mL/Hr) IV Continuous <Continuous>  phenylephrine    Infusion 0.1 MICROgram(s)/kG/Min (2.98 mL/Hr) IV Continuous <Continuous>  senna 2 Tablet(s) Oral at bedtime  tigecycline IVPB      tigecycline IVPB 50 milliGRAM(s) IV Intermittent every 12 hours  zinc sulfate 220 milliGRAM(s) Oral daily    MEDICATIONS  (PRN):  acetaminophen   Tablet .. 650 milliGRAM(s) Oral every 6 hours PRN Temp greater or equal to 38C (100.4F)      New X-rays reviewed:                                                                                  ECHO    CXR interpreted by me:  B/l opacities  improved     Patient is a 71y old  Male who presents with a chief complaint of Change in Mental Status (01 May 2021 09:26)        Over Night Events:  no events overnight  on shalom 0.6    ROS:     All ROS are negative except HPI         PHYSICAL EXAM    ICU Vital Signs Last 24 Hrs  T(C): 35.6 (02 May 2021 00:00), Max: 36.8 (01 May 2021 12:00)  T(F): 96.1 (02 May 2021 00:00), Max: 98.3 (01 May 2021 12:00)  HR: 54 (02 May 2021 08:00) (52 - 58)  BP: 106/55 (02 May 2021 08:00) (91/52 - 126/58)  BP(mean): 76 (02 May 2021 08:00) (67 - 93)  RR: 22 (02 May 2021 08:00) (22 - 23)  SpO2: 100% (02 May 2021 08:00) (99% - 100%)      CONSTITUTIONAL:  intubated    ENT:   Airway patent,   Mouth with normal mucosa.   No thrush    EYES:   Pupils equal,   Round and reactive to light.    CARDIAC:   Normal rate,   Regular rhythm.    No edema      Vascular:  Normal systolic impulse  No Carotid bruits    RESPIRATORY:   No wheezing  Bilateral BS  Normal chest expansion  Not tachypneic,  No use of accessory muscles    GASTROINTESTINAL:  Abdomen soft,   Non-tender,   No guarding,   + BS    MUSCULOSKELETAL:   Range of motion is not limited,  No clubbing, cyanosis    NEUROLOGICAL:   No motor  deficits.  Sedated     SKIN:   Skin normal color for race,   Warm and dry   No evidence of rash.    PSYCHIATRIC:   No apparent risk to self or others.    HEMATOLOGICAL:  No cervical  lymphadenopathy.  no inguinal lymphadenopathy      05-01-21 @ 07:01  -  05-02-21 @ 07:00  --------------------------------------------------------  IN:    Enteral Tube Flush: 410 mL    FentaNYL: 190.4 mL    IV PiggyBack: 405 mL    Jevity 1.2: 1440 mL    Midazolam: 22.3 mL    Phenylephrine: 541.1 mL  Total IN: 3008.8 mL    OUT:    Drain (mL): 25 mL    Intermittent Catheterization - Urethral (mL): 1300 mL  Total OUT: 1325 mL    Total NET: 1683.8 mL      05-02-21 @ 07:01  -  05-02-21 @ 09:20  --------------------------------------------------------  IN:    Phenylephrine: 17.9 mL  Total IN: 17.9 mL    OUT:  Total OUT: 0 mL    Total NET: 17.9 mL          LABS:                            7.9    17.53 )-----------( 57       ( 02 May 2021 04:30 )             25.2                                               05-02    145  |  111<H>  |  38<H>  ----------------------------<  137<H>  4.3   |  26  |  0.7    Ca    7.6<L>      02 May 2021 04:30  Mg     1.8     05-02    TPro  4.9<L>  /  Alb  1.5<L>  /  TBili  0.4  /  DBili  x   /  AST  27  /  ALT  19  /  AlkPhos  336<H>  05-02      PT/INR - ( 02 May 2021 04:30 )   PT: 16.20 sec;   INR: 1.41 ratio                                                                                              LIVER FUNCTIONS - ( 02 May 2021 04:30 )  Alb: 1.5 g/dL / Pro: 4.9 g/dL / ALK PHOS: 336 U/L / ALT: 19 U/L / AST: 27 U/L / GGT: x                                                  Culture - Body Fluid with Gram Stain (collected 29 Apr 2021 15:22)  Source: .Body Fluid gall bladder fluid  Gram Stain (30 Apr 2021 01:12):    No polymorphonuclear cells seen    No organisms seen    by cytocentrifuge  Preliminary Report (30 Apr 2021 20:21):    No growth                                                   Mode: AC/ CMV (Assist Control/ Continuous Mandatory Ventilation)  RR (machine): 22  TV (machine): 420  FiO2: 40  PEEP: 5  ITime: 1  MAP: 10  PIP: 26                                      ABG - ( 02 May 2021 05:50 )  pH, Arterial: 7.42  pH, Blood: x     /  pCO2: 42    /  pO2: 133   / HCO3: 27    / Base Excess: 2.4   /  SaO2: 99                  MEDICATIONS  (STANDING):  ascorbic acid 500 milliGRAM(s) Oral daily  atorvastatin 40 milliGRAM(s) Oral at bedtime  carbidopa/levodopa  25/100 2 Tablet(s) Oral <User Schedule>  chlorhexidine 0.12% Liquid 15 milliLiter(s) Oral Mucosa every 12 hours  chlorhexidine 4% Liquid 1 Application(s) Topical <User Schedule>  collagenase Ointment 1 Application(s) Topical two times a day  Dakins Solution - 1/2 Strength 1 Application(s) Topical two times a day  etomidate Injectable 15 milliGRAM(s) IV Push once  fentaNYL   Infusion. 0.5 MICROgram(s)/kG/Hr (3.97 mL/Hr) IV Continuous <Continuous>  magnesium oxide 400 milliGRAM(s) Oral every 8 hours  meropenem  IVPB 2000 milliGRAM(s) IV Intermittent every 8 hours  metoclopramide Injectable 10 milliGRAM(s) IV Push once  midazolam Infusion 0.02 mG/kG/Hr (1.59 mL/Hr) IV Continuous <Continuous>  midodrine 10 milliGRAM(s) Oral every 8 hours  norepinephrine Infusion 0.05 MICROgram(s)/kG/Min (3.72 mL/Hr) IV Continuous <Continuous>  phenylephrine    Infusion 0.1 MICROgram(s)/kG/Min (2.98 mL/Hr) IV Continuous <Continuous>  senna 2 Tablet(s) Oral at bedtime  tigecycline IVPB      tigecycline IVPB 50 milliGRAM(s) IV Intermittent every 12 hours  zinc sulfate 220 milliGRAM(s) Oral daily    MEDICATIONS  (PRN):  acetaminophen   Tablet .. 650 milliGRAM(s) Oral every 6 hours PRN Temp greater or equal to 38C (100.4F)      New X-rays reviewed:                                                                                  ECHO    CXR interpreted by me:  ET high.  Left lung collapse improved

## 2021-05-02 NOTE — PROGRESS NOTE ADULT - SUBJECTIVE AND OBJECTIVE BOX
NIEVES LABOY  71y, Male  Allergy: No Known Allergies      LOS  41d    CHIEF COMPLAINT: Change in Mental Status (02 May 2021 09:19)      INTERVAL EVENTS/HPI  - No acute events overnight  - T(F): , Max: 98 (05-01-21 @ 20:00)  - Pressor requirements worsening, although WBC is increased  - WBC Count: 17.53 (05-02-21 @ 04:30)  WBC Count: 15.55 (05-01-21 @ 04:50)     - Creatinine, Serum: 0.7 (05-02-21 @ 04:30)  Creatinine, Serum: 0.7 (05-01-21 @ 04:50)       ROS  unable to obtain history secondary to patient's mental status    VITALS:  T(F): 96.1, Max: 98 (05-01-21 @ 20:00)  HR: 54  BP: 113/54  RR: 18Vital Signs Last 24 Hrs  T(C): 35.6 (02 May 2021 00:00), Max: 36.7 (01 May 2021 20:00)  T(F): 96.1 (02 May 2021 00:00), Max: 98 (01 May 2021 20:00)  HR: 54 (02 May 2021 11:00) (52 - 58)  BP: 113/54 (02 May 2021 11:00) (97/49 - 126/58)  BP(mean): 78 (02 May 2021 11:00) (69 - 93)  RR: 18 (02 May 2021 11:00) (18 - 22)  SpO2: 100% (02 May 2021 11:00) (99% - 100%)    PHYSICAL EXAM:  Gen: intubated  Neck: supple, no lymphadenopathy  CV: Regular rate & regular rhythm  Lungs: decreased BS at bases, no fremitus  Abdomen: Soft, BS present  Ext: Warm, well perfused  Neuro: non focal  Skin: no rash, no erythema  Lines: no phlebitis    FH: Non-contributory  Social Hx: Non-contributory    TESTS & MEASUREMENTS:                        7.9    17.53 )-----------( 57       ( 02 May 2021 04:30 )             25.2     05-02    145  |  111<H>  |  38<H>  ----------------------------<  137<H>  4.3   |  26  |  0.7    Ca    7.6<L>      02 May 2021 04:30  Mg     1.8     05-02    TPro  4.9<L>  /  Alb  1.5<L>  /  TBili  0.4  /  DBili  x   /  AST  27  /  ALT  19  /  AlkPhos  336<H>  05-02    eGFR if Non African American: 94 mL/min/1.73M2 (05-02-21 @ 04:30)  eGFR if African American: 109 mL/min/1.73M2 (05-02-21 @ 04:30)    LIVER FUNCTIONS - ( 02 May 2021 04:30 )  Alb: 1.5 g/dL / Pro: 4.9 g/dL / ALK PHOS: 336 U/L / ALT: 19 U/L / AST: 27 U/L / GGT: x               Culture - Body Fluid with Gram Stain (collected 04-29-21 @ 15:22)  Source: .Body Fluid gall bladder fluid  Gram Stain (04-30-21 @ 01:12):    No polymorphonuclear cells seen    No organisms seen    by cytocentrifuge  Preliminary Report (04-30-21 @ 20:21):    No growth    Culture - Blood (collected 04-29-21 @ 06:43)  Source: .Blood None  Preliminary Report (04-30-21 @ 18:01):    No growth to date.    Culture - Fungal, Bronchial (collected 04-28-21 @ 10:22)  Source: .Bronchial None  Preliminary Report (04-29-21 @ 10:28):    Testing in progress    Culture - Acid Fast - Bronchial w/Smear (collected 04-28-21 @ 10:22)  Source: .Bronchial None  Preliminary Report (05-01-21 @ 15:04):    Culture is being performed.    Culture - Bronchial (collected 04-28-21 @ 10:22)  Source: Bronch Wash None  Gram Stain (04-28-21 @ 23:36):    Numerous polymorphonuclear leukocytes per low power field    Few Squamous epithelial cells per low power field    Numerous Gram Negative Coccobacilli per oil power field  Final Report (05-01-21 @ 16:31):    Moderate Klebsiella pneumoniae (Carbapenem Resistant)    Normal Respiratory Chelle absent  Organism: Klepne MDRO (05-01-21 @ 16:31)  Organism: Klepne MDRO (05-01-21 @ 16:31)      -  Amikacin: R >32      -  Amoxicillin/Clavulanic Acid: R >16/8      -  Ampicillin: R >16 These ampicillin results predict results for amoxicillin      -  Ampicillin/Sulbactam: R >16/8 Enterobacter, Citrobacter, and Serratia may develop resistance during prolonged therapy (3-4 days)      -  Aztreonam: R >16      -  Cefazolin: R >16 Enterobacter, Citrobacter, and Serratia may develop resistance during prolonged therapy (3-4 days)      -  Cefepime: R >16      -  Cefoxitin: R >16      -  Ceftazidime/Avibactam: S <=4      -  Ceftolozane/tazobactam: R >8      -  Ceftriaxone: R >32 Enterobacter, Citrobacter, and Serratia may develop resistance during prolonged therapy      -  Ciprofloxacin: R >2      -  Ertapenem: R >1      -  Gentamicin: R >8      -  Imipenem: R 4      -  Levofloxacin: R >4      -  Meropenem: R 8      -  Minocycline: S <=4      -  Piperacillin/Tazobactam: R >64      -  Tobramycin: R >8      -  Trimethoprim/Sulfamethoxazole: R >2/38      Method Type: JAZMIN    Culture - Blood (collected 04-28-21 @ 06:10)  Source: .Blood None  Gram Stain (04-30-21 @ 05:13):    Growth in aerobic bottle: Gram Positive Cocci in Clusters    Growth in anaerobic bottle: Gram Negative Rods and Gram Positive Cocci in    Clusters  Final Report (05-02-21 @ 13:23):    Growth in aerobic and anaerobic bottles: Staphylococcus epidermidis    Coag Negative Staphylococcus    Single set isolate, possible contaminant. Contact    Microbiology if susceptibility testing clinically    indicated.    Growth in anaerobic bottle: Proteusmirabilis ESBL    MDRO detected in BCID PCR, resistance marker = CTX-M (ESBL)    ***Blood Panel PCR results on this specimen are available    approximately 3 hours after the Gram stain result.***    Gram stain, PCR, and/or culture results may not always    correspond due to difference in methodologies.    ************************************************************    This PCR assay was performed by multiplex PCR. This    Assay tests for 66 bacterial and resistance gene targets.    Please refer to the [facility] [employer] test directory    at [email or web address] for details.  Organism: Blood Culture PCR  Proteus mirabilis ESBL (05-02-21 @ 13:23)  Organism: Proteus mirabilis ESBL (05-02-21 @ 13:23)      -  Amikacin: S <=16      -  Ampicillin: R >16 These ampicillin results predict results for amoxicillin      -  Ampicillin/Sulbactam: R 8/4 Enterobacter, Citrobacter, and Serratia may develop resistance during prolonged therapy (3-4 days)      -  Aztreonam: R >16      -  Cefazolin: R >16 Enterobacter, Citrobacter, and Serratia may develop resistance during prolonged therapy (3-4 days)      -  Cefepime: R >16      -  Cefoxitin: S <=8      -  Ceftriaxone: R >32 Enterobacter, Citrobacter, and Serratia may develop resistance during prolonged therapy      -  Ciprofloxacin: R >2      -  Ertapenem: S <=0.5      -  Gentamicin: S <=2      -  Levofloxacin: R >4      -  Meropenem: S <=1      -  Piperacillin/Tazobactam: R <=8      -  Tobramycin: S <=2      -  Trimethoprim/Sulfamethoxazole: S <=0.5/9.5      Method Type: JAZMIN  Organism: Blood Culture PCR (05-02-21 @ 13:23)      -  ESBL: Detec      -  Proteus mirabilis: Detec      -  Staphylococcus epidermidis, Methicillin resistant: Detec      Method Type: PCR    Culture - Blood (collected 04-28-21 @ 06:10)  Source: .Blood None  Gram Stain (05-01-21 @ 09:17):    Growth in aerobic bottle: Gram Positive Cocci in Clusters    Growth in anaerobic bottle: Gram Positive Cocci in Clusters and Gram    Positive Rods  Final Report (05-02-21 @ 14:31):    Growth in aerobic bottle: Staphylococcus haemolyticus Coag Negative    Staphylococcus    Single set isolate, possible contaminant. Contact    Microbiology if susceptibility testing clinically    indicated.    Growth in anaerobic bottle: Staphylococcus epidermidis    Growth in anaerobic bottle: Gram Positive Rods    ***Blood Panel PCR results on this specimen are available    approximately 3 hours after the Gram stain result.***    Gram stain, PCR, and/or culture results may not always    correspond due to difference in methodologies.    ************************************************************    This PCR assay was performed by multiplex PCR. This    Assay tests for 66 bacterial and resistance gene targets.    Please refer to the [facility] [employer] test directory    at [email or web address] for details.  Organism: Blood Culture PCR  Blood Culture PCR (05-01-21 @ 11:36)  Organism: Blood Culture PCR (05-01-21 @ 11:36)      -  Staphylococcus epidermidis, Methicillin resistant: Detec      Method Type: PCR  Organism: Blood Culture PCR (04-30-21 @ 13:20)      -  Coagulase negative Staphylococcus, Methicillin resistant: Detec      Method Type: PCR    Culture - Urine (collected 04-28-21 @ 02:00)  Source: .Urine Catheterized  Final Report (04-29-21 @ 07:07):    No growth    Culture - Blood (collected 04-28-21 @ 02:00)  Source: .Blood Blood-Peripheral  Gram Stain (04-30-21 @ 11:26):    Growth in anaerobic bottle: Gram Positive Cocci in Clusters  Final Report (05-02-21 @ 13:45):    Growth in anaerobic bottle: Staphylococcus epidermidis  Organism: Staphylococcus epidermidis (05-02-21 @ 13:45)  Organism: Staphylococcus epidermidis (05-02-21 @ 13:45)      -  Ampicillin/Sulbactam: R <=8/4      -  Cefazolin: R >16      -  Clindamycin: R >4      -  Erythromycin: R >4      -  Gentamicin: R >8 Should not be used as monotherapy      -  Oxacillin: R >2      -  Penicillin: R >8      -  RIF- Rifampin: S <=1 Should not be used as monotherapy      -  Tetra/Doxy: R >8      -  Trimethoprim/Sulfamethoxazole: R >2/38      -  Vancomycin: S 2      Method Type: JAZMIN    Culture - Blood (collected 04-26-21 @ 17:42)  Source: .Blood None  Final Report (05-01-21 @ 23:01):    No Growth Final    Culture - Urine (collected 04-26-21 @ 14:39)  Source: .Urine Clean Catch (Midstream)  Final Report (04-28-21 @ 15:53):    >100,000 CFU/ml Klebsiella pneumoniae (Carbapenem Resistant)  Organism: Klepne MDRO (04-28-21 @ 15:53)  Organism: Klepne MDRO (04-28-21 @ 15:53)      -  Amikacin: S <=16      -  Amoxicillin/Clavulanic Acid: R >16/8      -  Ampicillin: R >16 These ampicillin results predict results for amoxicillin      -  Ampicillin/Sulbactam: R >16/8 Enterobacter, Citrobacter, and Serratia may develop resistance during prolonged therapy (3-4 days)      -  Aztreonam: R >16      -  Cefazolin: R >16 (MIC_CL_COM_ENTERIC_CEFAZU) For uncomplicated UTI with K. pneumoniae, E. coli, or P. mirablis: JAZMIN <=16 is sensitive and JAZMIN >=32 is resistant. This also predicts results for oral agents cefaclor, cefdinir, cefpodoxime, cefprozil, cefuroxime axetil, cephalexin and locarbef for uncomplicated UTI. Note that some isolates may be susceptible to these agents while testing resistant to cefazolin.      -  Cefepime: R >16      -  Cefoxitin: I 16      -  Ceftriaxone: R >32 Enterobacter, Citrobacter, and Serratia may develop resistance during prolonged therapy      -  Ciprofloxacin: R >2      -  Ertapenem: R >1      -  Gentamicin: R >8      -  Imipenem: R 8      -  Levofloxacin: I 1      -  Meropenem: R >8      -  Nitrofurantoin: R >64 Should not be used to treat pyelonephritis      -  Piperacillin/Tazobactam: R >64      -  Tigecycline: S <=2      -  Tobramycin: R >8      -  Trimethoprim/Sulfamethoxazole: R >2/38      Method Type: JAZMIN    Culture - Urine (collected 04-24-21 @ 22:07)  Source: .Urine Clean Catch (Midstream)  Final Report (04-28-21 @ 16:01):    >100,000 CFU/ml Klebsiella pneumoniae (Carbapenem Resistant)  Organism: Klepne MDRO (04-28-21 @ 16:01)  Organism: Klepne MDRO (04-28-21 @ 16:01)      -  Amikacin: S <=16      -  Amoxicillin/Clavulanic Acid: R >16/8      -  Ampicillin: R >16 These ampicillin results predict results for amoxicillin      -  Ampicillin/Sulbactam: R >16/8 Enterobacter, Citrobacter, and Serratia may develop resistance during prolonged therapy (3-4 days)      -  Aztreonam: R >16      -  Cefazolin: R >16 (MIC_CL_COM_ENTERIC_CEFAZU) For uncomplicated UTI with K. pneumoniae, E. coli, or P. mirablis: JAZMIN <=16 is sensitive and JAZMIN >=32 is resistant. This also predicts results for oral agents cefaclor, cefdinir, cefpodoxime, cefprozil, cefuroxime axetil, cephalexin and locarbef for uncomplicated UTI. Note that some isolates may be susceptible to these agents while testing resistant to cefazolin.      -  Cefepime: R >16      -  Cefoxitin: S <=8      -  Ceftriaxone: R >32 Enterobacter, Citrobacter, and Serratia may develop resistance during prolonged therapy      -  Ciprofloxacin: R >2      -  Ertapenem: R >1      -  Gentamicin: R >8      -  Imipenem: R 8      -  Levofloxacin: I 1      -  Meropenem: R >8      -  Nitrofurantoin: R >64 Should not be used to treat pyelonephritis      -  Piperacillin/Tazobactam: R >64      -  Tigecycline: S <=2      -  Tobramycin: R >8      -  Trimethoprim/Sulfamethoxazole: R >2/38      Method Type: JAZMIN    Culture - Blood (collected 04-13-21 @ 07:34)  Source: .Blood None  Final Report (04-18-21 @ 17:00):    No Growth Final    Culture - Blood (collected 04-12-21 @ 21:38)  Source: .Blood None  Final Report (04-18-21 @ 05:00):    No Growth Final    Culture - Blood (collected 04-12-21 @ 16:00)  Source: .Blood Blood  Final Report (04-17-21 @ 22:01):    No Growth Final    Culture - Blood (collected 04-08-21 @ 16:00)  Source: .Blood Blood  Gram Stain (04-11-21 @ 08:45):    Growth in anaerobic bottle: Gram Positive Cocci in Clusters  Final Report (04-12-21 @ 10:51):    Growth in anaerobic bottle: Staphylococcus epidermidis Coag Negative    Staphylococcus    Single set isolate, possible contaminant. Contact    Microbiology if susceptibility testing clinically    indicated.    ***Blood Panel PCR results on this specimen are available    approximately 3 hours after the Gram stain result.***    Gram stain, PCR, and/or culture results may not always    correspond due to difference in methodologies.    ************************************************************    This PCR assay was performed by multiplex PCR. This    Assay tests for 66 bacterial and resistance gene targets.    Please refer to the [facility] [employer] test directory    at [email or web address] for details.  Organism: Blood Culture PCR (04-12-21 @ 10:51)  Organism: Blood Culture PCR (04-12-21 @ 10:51)      -  Staphylococcus epidermidis, Methicillin resistant: Detec      Method Type: PCR    Culture - Blood (collected 04-08-21 @ 16:00)  Source: .Blood Blood-Peripheral  Final Report (04-14-21 @ 01:00):    No Growth Final        Lactate, Blood: 0.7 mmol/L (04-28-21 @ 02:00)      INFECTIOUS DISEASES TESTING  Procalcitonin, Serum: 2.03 (04-29-21 @ 05:25)  Procalcitonin, Serum: 2.50 (04-28-21 @ 16:00)  Procalcitonin, Serum: 0.19 (04-26-21 @ 17:42)  COVID-19 PCR: NotDetec (04-17-21 @ 10:31)  COVID-19 PCR: NotDetec (04-14-21 @ 14:29)  COVID-19 PCR: NotDetec (04-12-21 @ 15:00)  COVID-19 PCR: NotDetec (04-07-21 @ 14:55)  Procalcitonin, Serum: 0.16 (04-06-21 @ 12:32)  COVID-19 PCR: NotDetec (03-30-21 @ 15:19)  COVID-19 PCR: NotDetec (03-29-21 @ 19:40)  Procalcitonin, Serum: 0.61 (03-26-21 @ 10:20)  COVID-19 PCR: NotDetec (03-22-21 @ 20:13)  COVID-19 PCR: NotDetec (03-10-21 @ 12:22)  COVID-19 PCR: NotDetec (03-01-21 @ 16:49)  Fungitell: 62 (03-01-21 @ 11:00)  Procalcitonin, Serum: 0.29 (03-01-21 @ 05:32)  MRSA PCR Result.: Negative (02-27-21 @ 15:44)  COVID-19 PCR: NotDetec (02-25-21 @ 15:34)  Procalcitonin, Serum: 0.27 (02-18-21 @ 10:54)  MRSA PCR Result.: Negative (02-14-21 @ 18:29)  HIV-1/2 Combo Result: Nonreact (02-14-21 @ 05:07)  Procalcitonin, Serum: 0.46 (02-13-21 @ 16:00)  COVID-19 PCR: NotDetec (02-12-21 @ 10:17)      INFLAMMATORY MARKERS      RADIOLOGY & ADDITIONAL TESTS:  I have personally reviewed the last available Chest xray  CXR      CT      CARDIOLOGY TESTING  12 Lead ECG:   Ventricular Rate 94 BPM    Atrial Rate 94 BPM    P-R Interval 216 ms    QRS Duration 88 ms    Q-T Interval 364 ms    QTC Calculation(Bazett) 455 ms    P Axis 52 degrees    R Axis 27 degrees    T Axis 38 degrees    Diagnosis Line Sinus rhythm with 1st degree A-V block  Otherwise normal ECG    Confirmed by Nathan Argueta ([id number]) on 4/28/2021 9:42:25 PM (04-28-21 @ 20:05)      MEDICATIONS  ascorbic acid 500 Oral daily  atorvastatin 40 Oral at bedtime  carbidopa/levodopa  25/100 2 Oral <User Schedule>  chlorhexidine 0.12% Liquid 15 Oral Mucosa every 12 hours  chlorhexidine 4% Liquid 1 Topical <User Schedule>  collagenase Ointment 1 Topical two times a day  Dakins Solution - 1/2 Strength 1 Topical two times a day  magnesium oxide 400 Oral every 8 hours  meropenem  IVPB 2000 IV Intermittent every 8 hours  midodrine 10 Oral every 8 hours  phenylephrine    Infusion 0.1 IV Continuous <Continuous>  polyethylene glycol 3350 17 Oral daily  senna 2 Oral at bedtime  tigecycline IVPB     tigecycline IVPB 50 IV Intermittent every 12 hours  zinc sulfate 220 Oral daily      WEIGHT  Weight (kg): 79.4 (03-31-21 @ 15:48)  Creatinine, Serum: 0.7 mg/dL (05-02-21 @ 04:30)      ANTIBIOTICS:  meropenem  IVPB 2000 milliGRAM(s) IV Intermittent every 8 hours  tigecycline IVPB      tigecycline IVPB 50 milliGRAM(s) IV Intermittent every 12 hours      All available historical records have been reviewed

## 2021-05-02 NOTE — PROGRESS NOTE ADULT - ATTENDING COMMENTS
IMPRESSION:    Acute hypoxemic resp failure/ L lung collapse sp bronch   septic shock on shalom  infected ulcer/ UTI/ cholecystitis MDR pneumonia  HO parkinson's sp PEG  Anemia  HO DVT/ A fib  DEC HB NO ACTIVE BLEED    Plan     Pulmonary toilet possible bronchoscopy next week  SAT and assess MS  Wean [employer]ors

## 2021-05-02 NOTE — PROGRESS NOTE ADULT - ASSESSMENT
ASSESSMENT  71 year old Male with past medical history of Parkinson's, dementia, CKD Stage 3, 1st degree AV block, HLD, gout, HTN, DM, fungal septicemia presenting from [facility] for acute decompensation in mental status.     IMPRESSION  #RESOLVED Sepsis/Fever, suspected acute cholecystitis vs UTI, HAP    Bronch Cx 4/28 with CR Klebsiella pneumoniae    s/p 4/29 perc gayle; Biliary fluid NG    4/28 Blood CX coNS, Proteus mirabilis ESBL     4/29 Blood Cx NG    4/28 UCX NG     4/26 BCX NGTD     4/24 UCX Kleb (Carbapenem Resistant)    HIDA +  < from: [facility] Duplex Lower Ext Vein Scan, Bilat (04.21.21 @ 11:41) >  No evidence of deep venousthrombosis or superficial thrombophlebitis in the bilateral lower extremities.    4/24 UA WBC 82  #Intubated L lung collapse s/p bronch with mucous plugs    4/28 bronch Kleb   #RESOLVED CoNS in blood,  contaminant     only PIVs    4/8 BCX 1/2 sets   -  Staphylococcus epidermidis, Methicillin resistant: Detec    3/25 BCX 1/2 sets, 1/4 bottles Staphylococcus pettenkoferi, likely contaminant   #RESOLVED Fever with sepsis, not present on admission with metabolic encephalopathy, EEG + seizure, possibly in setting of sepsis secondary to large sacral infected decub    LP not consistent with infection.. G/S NEGATIVE (on ABX)    Total Nucleated Cell Count, CSF: 0 /uL (04.01.21 @ 14:00)    Protein, CSF: 42 mg/dL (04.01.21 @ 14:00)    Glucose, CSF: 97 mg/dL (04.01.21 @ 14:00)    3/25 BCX 1/2 sets, 1/4 bottles Staphylococcus pettenkoferi, likely contaminant     CXR no PNA   3/22 Blood & Urine cxs NGTD   #Sacral ulcer- necrotic     3/31 WCX GNR    3/29   Numerous Klebsiella pneumoniae ESBL    Few CRE Pseudomonas aeruginosa (high MICs to AGs)    Numerous Enterococcus faecalis (vancomycin resistant)- S amp    seen by Burn sacrum with full thickness ~4x5cm with dark /brown devitalized tissue at base; no purulent drainage, no bleeding  #Recent Fungemia    Blood Cx 2/27 Candida albicans    evaluated by optho - no ocular evidence     TTE no significant valvulopathy   #Shoulder dislocation with effusion- joint exam not consistent with a septic arthritis  < from: CT Chest w/ IV Cont (04.07.21 @ 20:31) >  Similar appearance of the left shoulder with complex joint effusion, described in detail on prior exam CT shoulder 2/20/2021.  #ABEBE, resolved  #L hand edema  < from: Xray Wrist 2 Views, Left (03.27.21 @ 11:13) >No acute fracture or dislocation. Diffuse soft tissue swelling. Nonaggressive appearing lucency in the distal radius, indeterminate. Nonemergent MRI may be obtained for further evaluation.    Creatinine, Serum: 0.8 mg/dL (04.26.21 @ 12:17)      RECOMMENDATIONS  - discussed with micro -- MDR Klebsiella pneumoniae is susceptible to tigecycline  - TLC 4/28-  - continue tigecycline 50 mg q 12 hours to cover MDR Kleb in sputum  - continue Meropenem 2g q8h IV (extended infusion run over 4h each dose) to cover 4/28 ESBL Proteus mirabilis in blood cultures  - please repeat procalcitonin and trend WBC  - can continue antibiotics as pressor requirements are improving -- but if with worsening lactate acidosis or pressor requirements, can give Avycaz 2.5 mg x 1   - s/p 4/28 amikacin 5mg/kg x1  - trend LFTs, AP  - Appreciate Burn consult   - Contact isolation CRE  - GOC, grave prognosis    Please call or message on [employer] Teams if with any questions.  Spectra [phone]

## 2021-05-03 LAB
-  AMPICILLIN/SULBACTAM: SIGNIFICANT CHANGE UP
-  CEFAZOLIN: SIGNIFICANT CHANGE UP
-  CLINDAMYCIN: SIGNIFICANT CHANGE UP
-  ERYTHROMYCIN: SIGNIFICANT CHANGE UP
-  GENTAMICIN: SIGNIFICANT CHANGE UP
-  OXACILLIN: SIGNIFICANT CHANGE UP
-  PENICILLIN: SIGNIFICANT CHANGE UP
-  RIFAMPIN: SIGNIFICANT CHANGE UP
-  TETRACYCLINE: SIGNIFICANT CHANGE UP
-  TRIMETHOPRIM/SULFAMETHOXAZOLE: SIGNIFICANT CHANGE UP
-  VANCOMYCIN: SIGNIFICANT CHANGE UP
ALBUMIN SERPL ELPH-MCNC: 1.7 G/DL — LOW (ref 3.5–5.2)
ALP SERPL-CCNC: 335 U/L — HIGH (ref 30–115)
ALT FLD-CCNC: 9 U/L — SIGNIFICANT CHANGE UP (ref 0–41)
ANION GAP SERPL CALC-SCNC: 8 MMOL/L — SIGNIFICANT CHANGE UP (ref 7–14)
AST SERPL-CCNC: 35 U/L — SIGNIFICANT CHANGE UP (ref 0–41)
BASOPHILS # BLD AUTO: 0.1 K/UL — SIGNIFICANT CHANGE UP (ref 0–0.2)
BASOPHILS NFR BLD AUTO: 0.5 % — SIGNIFICANT CHANGE UP (ref 0–1)
BILIRUB SERPL-MCNC: 0.4 MG/DL — SIGNIFICANT CHANGE UP (ref 0.2–1.2)
BUN SERPL-MCNC: 44 MG/DL — HIGH (ref 10–20)
CALCIUM SERPL-MCNC: 7.5 MG/DL — LOW (ref 8.5–10.1)
CHLORIDE SERPL-SCNC: 113 MMOL/L — HIGH (ref 98–110)
CO2 SERPL-SCNC: 25 MMOL/L — SIGNIFICANT CHANGE UP (ref 17–32)
CREAT SERPL-MCNC: 0.7 MG/DL — SIGNIFICANT CHANGE UP (ref 0.7–1.5)
EOSINOPHIL # BLD AUTO: 0.22 K/UL — SIGNIFICANT CHANGE UP (ref 0–0.7)
EOSINOPHIL NFR BLD AUTO: 1.1 % — SIGNIFICANT CHANGE UP (ref 0–8)
GLUCOSE BLDC GLUCOMTR-MCNC: 113 MG/DL — HIGH (ref 70–99)
GLUCOSE BLDC GLUCOMTR-MCNC: 113 MG/DL — HIGH (ref 70–99)
GLUCOSE BLDC GLUCOMTR-MCNC: 116 MG/DL — HIGH (ref 70–99)
GLUCOSE BLDC GLUCOMTR-MCNC: 117 MG/DL — HIGH (ref 70–99)
GLUCOSE SERPL-MCNC: 123 MG/DL — HIGH (ref 70–99)
HCT VFR BLD CALC: 26.2 % — LOW (ref 42–52)
HGB BLD-MCNC: 8.1 G/DL — LOW (ref 14–18)
IMM GRANULOCYTES NFR BLD AUTO: 4.2 % — HIGH (ref 0.1–0.3)
INR BLD: 1.31 RATIO — HIGH (ref 0.65–1.3)
LYMPHOCYTES # BLD AUTO: 1.33 K/UL — SIGNIFICANT CHANGE UP (ref 1.2–3.4)
LYMPHOCYTES # BLD AUTO: 6.6 % — LOW (ref 20.5–51.1)
MAGNESIUM SERPL-MCNC: 1.8 MG/DL — SIGNIFICANT CHANGE UP (ref 1.8–2.4)
MCHC RBC-ENTMCNC: 26.1 PG — LOW (ref 27–31)
MCHC RBC-ENTMCNC: 30.9 G/DL — LOW (ref 32–37)
MCV RBC AUTO: 84.5 FL — SIGNIFICANT CHANGE UP (ref 80–94)
METHOD TYPE: SIGNIFICANT CHANGE UP
MONOCYTES # BLD AUTO: 1.19 K/UL — HIGH (ref 0.1–0.6)
MONOCYTES NFR BLD AUTO: 5.9 % — SIGNIFICANT CHANGE UP (ref 1.7–9.3)
NEUTROPHILS # BLD AUTO: 16.51 K/UL — HIGH (ref 1.4–6.5)
NEUTROPHILS NFR BLD AUTO: 81.7 % — HIGH (ref 42.2–75.2)
NRBC # BLD: 0 /100 WBCS — SIGNIFICANT CHANGE UP (ref 0–0)
ORGANISM # SPEC MICROSCOPIC CNT: SIGNIFICANT CHANGE UP
ORGANISM # SPEC MICROSCOPIC CNT: SIGNIFICANT CHANGE UP
PLATELET # BLD AUTO: 50 K/UL — LOW (ref 130–400)
POTASSIUM SERPL-MCNC: 4.6 MMOL/L — SIGNIFICANT CHANGE UP (ref 3.5–5)
POTASSIUM SERPL-SCNC: 4.6 MMOL/L — SIGNIFICANT CHANGE UP (ref 3.5–5)
PROT SERPL-MCNC: 5.2 G/DL — LOW (ref 6–8)
PROTHROM AB SERPL-ACNC: 15.1 SEC — HIGH (ref 9.95–12.87)
RBC # BLD: 3.1 M/UL — LOW (ref 4.7–6.1)
RBC # FLD: 16.5 % — HIGH (ref 11.5–14.5)
SODIUM SERPL-SCNC: 146 MMOL/L — SIGNIFICANT CHANGE UP (ref 135–146)
WBC # BLD: 20.19 K/UL — HIGH (ref 4.8–10.8)
WBC # FLD AUTO: 20.19 K/UL — HIGH (ref 4.8–10.8)

## 2021-05-03 PROCEDURE — 71045 X-RAY EXAM CHEST 1 VIEW: CPT | Mod: 26

## 2021-05-03 PROCEDURE — 99232 SBSQ HOSP IP/OBS MODERATE 35: CPT

## 2021-05-03 RX ORDER — TIGECYCLINE 50 MG/5ML
50 INJECTION, POWDER, LYOPHILIZED, FOR SOLUTION INTRAVENOUS EVERY 12 HOURS
Refills: 0 | Status: DISCONTINUED | OUTPATIENT
Start: 2021-05-03 | End: 2021-05-04

## 2021-05-03 RX ORDER — ENOXAPARIN SODIUM 100 MG/ML
40 INJECTION SUBCUTANEOUS DAILY
Refills: 0 | Status: DISCONTINUED | OUTPATIENT
Start: 2021-05-03 | End: 2021-05-04

## 2021-05-03 RX ORDER — MAGNESIUM SULFATE 500 MG/ML
2 VIAL (ML) INJECTION ONCE
Refills: 0 | Status: COMPLETED | OUTPATIENT
Start: 2021-05-03 | End: 2021-05-03

## 2021-05-03 RX ADMIN — MEROPENEM 200 MILLIGRAM(S): 1 INJECTION INTRAVENOUS at 00:10

## 2021-05-03 RX ADMIN — MEROPENEM 200 MILLIGRAM(S): 1 INJECTION INTRAVENOUS at 13:09

## 2021-05-03 RX ADMIN — Medication 50 GRAM(S): at 13:45

## 2021-05-03 RX ADMIN — CARBIDOPA AND LEVODOPA 2 TABLET(S): 25; 100 TABLET ORAL at 10:00

## 2021-05-03 RX ADMIN — Medication 1 APPLICATION(S): at 18:00

## 2021-05-03 RX ADMIN — Medication 1 APPLICATION(S): at 17:41

## 2021-05-03 RX ADMIN — CHLORHEXIDINE GLUCONATE 15 MILLILITER(S): 213 SOLUTION TOPICAL at 06:13

## 2021-05-03 RX ADMIN — MIDODRINE HYDROCHLORIDE 10 MILLIGRAM(S): 2.5 TABLET ORAL at 21:10

## 2021-05-03 RX ADMIN — MEROPENEM 200 MILLIGRAM(S): 1 INJECTION INTRAVENOUS at 21:12

## 2021-05-03 RX ADMIN — MAGNESIUM OXIDE 400 MG ORAL TABLET 400 MILLIGRAM(S): 241.3 TABLET ORAL at 21:10

## 2021-05-03 RX ADMIN — MIDODRINE HYDROCHLORIDE 10 MILLIGRAM(S): 2.5 TABLET ORAL at 13:10

## 2021-05-03 RX ADMIN — CHLORHEXIDINE GLUCONATE 15 MILLILITER(S): 213 SOLUTION TOPICAL at 17:41

## 2021-05-03 RX ADMIN — ENOXAPARIN SODIUM 40 MILLIGRAM(S): 100 INJECTION SUBCUTANEOUS at 13:08

## 2021-05-03 RX ADMIN — Medication 500 MILLIGRAM(S): at 11:26

## 2021-05-03 RX ADMIN — CHLORHEXIDINE GLUCONATE 1 APPLICATION(S): 213 SOLUTION TOPICAL at 06:14

## 2021-05-03 RX ADMIN — MIDODRINE HYDROCHLORIDE 10 MILLIGRAM(S): 2.5 TABLET ORAL at 06:13

## 2021-05-03 RX ADMIN — CARBIDOPA AND LEVODOPA 2 TABLET(S): 25; 100 TABLET ORAL at 13:45

## 2021-05-03 RX ADMIN — ZINC SULFATE TAB 220 MG (50 MG ZINC EQUIVALENT) 220 MILLIGRAM(S): 220 (50 ZN) TAB at 11:27

## 2021-05-03 RX ADMIN — CARBIDOPA AND LEVODOPA 2 TABLET(S): 25; 100 TABLET ORAL at 21:10

## 2021-05-03 RX ADMIN — TIGECYCLINE 105 MILLIGRAM(S): 50 INJECTION, POWDER, LYOPHILIZED, FOR SOLUTION INTRAVENOUS at 17:42

## 2021-05-03 RX ADMIN — Medication 1 APPLICATION(S): at 06:53

## 2021-05-03 RX ADMIN — ATORVASTATIN CALCIUM 40 MILLIGRAM(S): 80 TABLET, FILM COATED ORAL at 21:10

## 2021-05-03 RX ADMIN — CARBIDOPA AND LEVODOPA 2 TABLET(S): 25; 100 TABLET ORAL at 06:12

## 2021-05-03 RX ADMIN — SENNA PLUS 2 TABLET(S): 8.6 TABLET ORAL at 21:09

## 2021-05-03 RX ADMIN — Medication 1 APPLICATION(S): at 06:14

## 2021-05-03 RX ADMIN — MEROPENEM 200 MILLIGRAM(S): 1 INJECTION INTRAVENOUS at 06:14

## 2021-05-03 NOTE — PROGRESS NOTE ADULT - SUBJECTIVE AND OBJECTIVE BOX
Patient is a 71y old  Male who presents with a chief complaint of Change in Mental Status (03 May 2021 07:25)        Over Night Events:  Remains on MV.  Off Sedation for 24 hours        ROS:     All ROS are negative except HPI         PHYSICAL EXAM    ICU Vital Signs Last 24 Hrs  T(C): 37.3 (03 May 2021 08:00), Max: 37.7 (03 May 2021 04:00)  T(F): 99.1 (03 May 2021 08:00), Max: 99.8 (03 May 2021 04:00)  HR: 65 (03 May 2021 08:00) (53 - 69)  BP: 105/55 (03 May 2021 08:00) (95/51 - 115/58)  BP(mean): 78 (03 May 2021 08:00) (68 - 81)  ABP: --  ABP(mean): --  RR: 22 (03 May 2021 08:00) (18 - 25)  SpO2: 99% (03 May 2021 08:00) (98% - 100%)      CONSTITUTIONAL:  Well nourished.  NAD    ENT:   Airway patent,   Mouth with normal mucosa.   No thrush    EYES:   Pupils equal,   Round and reactive to light.    CARDIAC:   Normal rate,   Regular rhythm.    No edema      Vascular:  Normal systolic impulse  No Carotid bruits    RESPIRATORY:   No wheezing  Bilateral BS  Normal chest expansion  Not tachypneic,  No use of accessory muscles    GASTROINTESTINAL:  Abdomen soft,   Non-tender,   No guarding,   + BS    MUSCULOSKELETAL:   Range of motion is not limited,  No clubbing, cyanosis    NEUROLOGICAL:   NO response to pain     SKIN:   Skin normal color for race,   Warm and dry  No evidence of rash.    PSYCHIATRIC:   No apparent risk to self or others.    HEMATOLOGICAL:  No cervical  lymphadenopathy.  no inguinal lymphadenopathy      05-02-21 @ 07:01  -  05-03-21 @ 07:00  --------------------------------------------------------  IN:    Enteral Tube Flush: 225 mL    IV PiggyBack: 400 mL    Jevity 1.2: 1440 mL    Phenylephrine: 291.1 mL  Total IN: 2356.1 mL    OUT:    Drain (mL): 5 mL    Indwelling Catheter - Urethral (mL): 840 mL  Total OUT: 845 mL    Total NET: 1511.1 mL      05-03-21 @ 07:01  -  05-03-21 @ 09:08  --------------------------------------------------------  IN:  Total IN: 0 mL    OUT:    Indwelling Catheter - Urethral (mL): 75 mL    Phenylephrine: 0 mL  Total OUT: 75 mL    Total NET: -75 mL          LABS:                            8.1    20.19 )-----------( 50       ( 03 May 2021 05:00 )             26.2                                               05-03    146  |  113<H>  |  44<H>  ----------------------------<  123<H>  4.6   |  25  |  0.7    Ca    7.5<L>      03 May 2021 05:00  Mg     1.8     05-03    TPro  5.2<L>  /  Alb  1.7<L>  /  TBili  0.4  /  DBili  x   /  AST  35  /  ALT  9   /  AlkPhos  335<H>  05-03      PT/INR - ( 03 May 2021 05:00 )   PT: 15.10 sec;   INR: 1.31 ratio                                                                                              LIVER FUNCTIONS - ( 03 May 2021 05:00 )  Alb: 1.7 g/dL / Pro: 5.2 g/dL / ALK PHOS: 335 U/L / ALT: 9 U/L / AST: 35 U/L / GGT: x                                                  Culture - Blood (collected 01 May 2021 07:52)  Source: .Blood None  Preliminary Report (02 May 2021 18:01):    No growth to date.                                                   Mode: AC/ CMV (Assist Control/ Continuous Mandatory Ventilation)  RR (machine): 16  TV (machine): 420  FiO2: 30  PEEP: 5  ITime: 1  MAP: 11  PIP: 25                                      ABG - ( 03 May 2021 03:35 )  pH, Arterial: 7.50  pH, Blood: x     /  pCO2: 36    /  pO2: 81    / HCO3: 28    / Base Excess: 4.7   /  SaO2: 96                  MEDICATIONS  (STANDING):  ascorbic acid 500 milliGRAM(s) Oral daily  atorvastatin 40 milliGRAM(s) Oral at bedtime  carbidopa/levodopa  25/100 2 Tablet(s) Oral <User Schedule>  chlorhexidine 0.12% Liquid 15 milliLiter(s) Oral Mucosa every 12 hours  chlorhexidine 4% Liquid 1 Application(s) Topical <User Schedule>  collagenase Ointment 1 Application(s) Topical two times a day  Dakins Solution - 1/2 Strength 1 Application(s) Topical two times a day  magnesium oxide 400 milliGRAM(s) Oral every 8 hours  meropenem  IVPB 2000 milliGRAM(s) IV Intermittent every 8 hours  midodrine 10 milliGRAM(s) Oral every 8 hours  phenylephrine    Infusion 0.1 MICROgram(s)/kG/Min (2.98 mL/Hr) IV Continuous <Continuous>  polyethylene glycol 3350 17 Gram(s) Oral daily  senna 2 Tablet(s) Oral at bedtime  tigecycline IVPB      tigecycline IVPB 50 milliGRAM(s) IV Intermittent every 12 hours  zinc sulfate 220 milliGRAM(s) Oral daily    MEDICATIONS  (PRN):  acetaminophen   Tablet .. 650 milliGRAM(s) Oral every 6 hours PRN Temp greater or equal to 38C (100.4F)      New X-rays reviewed:                                                                                  ECHO    CXR interpreted by me:  ET OK/  Improving left sided atelectasis

## 2021-05-03 NOTE — PROGRESS NOTE ADULT - SUBJECTIVE AND OBJECTIVE BOX
NIEVES LABOY  71y, Male  Allergy: No Known Allergies      LOS  42d    CHIEF COMPLAINT: Change in Mental Status (02 May 2021 15:02)      INTERVAL EVENTS/HPI  - T(F): , Max: 99.3 (05-03-21 @ 00:00)  -   AlkPhos  335<H>  - WBC Count: 20.19 (05-03-21 @ 05:00) uptrending   WBC Count: 17.53 (05-02-21 @ 04:30)  - Creatinine, Serum: 0.7 (05-03-21 @ 05:00)  Creatinine, Serum: 0.7 (05-02-21 @ 04:30)      ROS  cannot obtain secondary to patient's sedation and/or mental status    VITALS:  T(F): 99.3, Max: 99.3 (05-03-21 @ 00:00)  HR: 67  BP: 99/52  RR: 24Vital Signs Last 24 Hrs  T(C): 37.4 (03 May 2021 00:00), Max: 37.4 (03 May 2021 00:00)  T(F): 99.3 (03 May 2021 00:00), Max: 99.3 (03 May 2021 00:00)  HR: 67 (03 May 2021 07:00) (53 - 69)  BP: 99/52 (03 May 2021 07:00) (95/51 - 115/58)  BP(mean): 74 (03 May 2021 07:00) (68 - 81)  RR: 24 (03 May 2021 07:00) (18 - 25)  SpO2: 98% (03 May 2021 07:00) (98% - 100%)    PHYSICAL EXAM:  Gen: Intubated  CV: RRR  Lungs: Decreased BS at bases  Abd: Soft PEG, AUGUSTINA bilious fluid  Neuro: Sedated  anasarca  Lines clean, no phlebitis    FH: Non-contributory  Social Hx: Non-contributory    TESTS & MEASUREMENTS:                        8.1    20.19 )-----------( 50       ( 03 May 2021 05:00 )             26.2     05-03    146  |  113<H>  |  44<H>  ----------------------------<  123<H>  4.6   |  25  |  0.7    Ca    7.5<L>      03 May 2021 05:00  Mg     1.8     05-03    TPro  5.2<L>  /  Alb  1.7<L>  /  TBili  0.4  /  DBili  x   /  AST  35  /  ALT  9   /  AlkPhos  335<H>  05-03    eGFR if Non African American: 94 mL/min/1.73M2 (05-03-21 @ 05:00)  eGFR if : 109 mL/min/1.73M2 (05-03-21 @ 05:00)    LIVER FUNCTIONS - ( 03 May 2021 05:00 )  Alb: 1.7 g/dL / Pro: 5.2 g/dL / ALK PHOS: 335 U/L / ALT: 9 U/L / AST: 35 U/L / GGT: x               Culture - Blood (collected 05-01-21 @ 07:52)  Source: .Blood None  Preliminary Report (05-02-21 @ 18:01):    No growth to date.    Culture - Body Fluid with Gram Stain (collected 04-29-21 @ 15:22)  Source: .Body Fluid gall bladder fluid  Gram Stain (04-30-21 @ 01:12):    No polymorphonuclear cells seen    No organisms seen    by cytocentrifuge  Preliminary Report (04-30-21 @ 20:21):    No growth    Culture - Blood (collected 04-29-21 @ 06:43)  Source: .Blood None  Preliminary Report (04-30-21 @ 18:01):    No growth to date.    Culture - Fungal, Bronchial (collected 04-28-21 @ 10:22)  Source: .Bronchial None  Preliminary Report (05-03-21 @ 07:17):    Few Yeast    Culture - Acid Fast - Bronchial w/Smear (collected 04-28-21 @ 10:22)  Source: .Bronchial None  Preliminary Report (05-01-21 @ 15:04):    Culture is being performed.    Culture - Bronchial (collected 04-28-21 @ 10:22)  Source: Bronch Wash None  Gram Stain (04-28-21 @ 23:36):    Numerous polymorphonuclear leukocytes per low power field    Few Squamous epithelial cells per low power field    Numerous Gram Negative Coccobacilli per oil power field  Final Report (05-01-21 @ 16:31):    Moderate Klebsiella pneumoniae (Carbapenem Resistant)    Normal Respiratory Chelle absent  Organism: Klepne MDRO (05-02-21 @ 15:12)  Organism: Klepne MDRO (05-02-21 @ 15:12)      -  Amikacin: R >32      -  Amoxicillin/Clavulanic Acid: R >16/8      -  Ampicillin: R >16 These ampicillin results predict results for amoxicillin      -  Ampicillin/Sulbactam: R >16/8 Enterobacter, Citrobacter, and Serratia may develop resistance during prolonged therapy (3-4 days)      -  Aztreonam: R >16      -  Cefazolin: R >16 Enterobacter, Citrobacter, and Serratia may develop resistance during prolonged therapy (3-4 days)      -  Cefepime: R >16      -  Cefoxitin: R >16      -  Ceftazidime/Avibactam: S <=4      -  Ceftolozane/tazobactam: R >8      -  Ceftriaxone: R >32 Enterobacter, Citrobacter, and Serratia may develop resistance during prolonged therapy      -  Ciprofloxacin: R >2      -  Ertapenem: R >1      -  Gentamicin: R >8      -  Imipenem: R 4      -  Levofloxacin: R >4      -  Meropenem: R 8      -  Minocycline: S <=4      -  Piperacillin/Tazobactam: R >64      -  Tigecycline: S <=2      -  Tobramycin: R >8      -  Trimethoprim/Sulfamethoxazole: R >2/38      Method Type: JAZMIN    Culture - Blood (collected 04-28-21 @ 06:10)  Source: .Blood None  Gram Stain (04-30-21 @ 05:13):    Growth in aerobic bottle: Gram Positive Cocci in Clusters    Growth in anaerobic bottle: Gram Negative Rods and Gram Positive Cocci in    Clusters  Final Report (05-02-21 @ 13:23):    Growth in aerobic and anaerobic bottles: Staphylococcus epidermidis    Coag Negative Staphylococcus    Single set isolate, possible contaminant. Contact    Microbiology if susceptibility testing clinically    indicated.    Growth in anaerobic bottle: Proteusmirabilis ESBL    MDRO detected in BCID PCR, resistance marker = CTX-M (ESBL)    ***Blood Panel PCR results on this specimen are available    approximately 3 hours after the Gram stain result.***    Gram stain, PCR, and/or culture results may not always    correspond due to difference in methodologies.    ************************************************************    This PCR assay was performed by multiplex PCR. This    Assay tests for 66 bacterial and resistance gene targets.    Please refer to the [email or web address] test directory    at [email or web address] for details.  Organism: Blood Culture PCR  Proteus mirabilis ESBL (05-02-21 @ 13:23)  Organism: Proteus mirabilis ESBL (05-02-21 @ 13:23)      -  Amikacin: S <=16      -  Ampicillin: R >16 These ampicillin results predict results for amoxicillin      -  Ampicillin/Sulbactam: R 8/4 Enterobacter, Citrobacter, and Serratia may develop resistance during prolonged therapy (3-4 days)      -  Aztreonam: R >16      -  Cefazolin: R >16 Enterobacter, Citrobacter, and Serratia may develop resistance during prolonged therapy (3-4 days)      -  Cefepime: R >16      -  Cefoxitin: S <=8      -  Ceftriaxone: R >32 Enterobacter, Citrobacter, and Serratia may develop resistance during prolonged therapy      -  Ciprofloxacin: R >2      -  Ertapenem: S <=0.5      -  Gentamicin: S <=2      -  Levofloxacin: R >4      -  Meropenem: S <=1      -  Piperacillin/Tazobactam: R <=8      -  Tobramycin: S <=2      -  Trimethoprim/Sulfamethoxazole: S <=0.5/9.5      Method Type: JAZMIN  Organism: Blood Culture PCR (05-02-21 @ 13:23)      -  ESBL: Detec      -  Proteus mirabilis: Detec      -  Staphylococcus epidermidis, Methicillin resistant: Detec      Method Type: PCR    Culture - Blood (collected 04-28-21 @ 06:10)  Source: .Blood None  Gram Stain (05-01-21 @ 09:17):    Growth in aerobic bottle: Gram Positive Cocci in Clusters    Growth in anaerobic bottle: Gram Positive Cocci in Clusters and Gram    Positive Rods  Final Report (05-02-21 @ 14:31):    Growth in aerobic bottle: Staphylococcus haemolyticus Coag Negative    Staphylococcus    Single set isolate, possible contaminant. Contact    Microbiology if susceptibility testing clinically    indicated.    Growth in anaerobic bottle: Staphylococcus epidermidis    Growth in anaerobic bottle: Gram Positive Rods    ***Blood Panel PCR results on this specimen are available    approximately 3 hours after the Gram stain result.***    Gram stain, PCR, and/or culture results may not always    correspond due to difference in methodologies.    ************************************************************    This PCR assay was performed by multiplex PCR. This    Assay tests for 66 bacterial and resistance gene targets.    Please refer to the [facility] [employer] test directory    at [email or web address] for details.  Organism: Blood Culture PCR  Blood Culture PCR (05-01-21 @ 11:36)  Organism: Blood Culture PCR (05-01-21 @ 11:36)      -  Staphylococcus epidermidis, Methicillin resistant: Detec      Method Type: PCR  Organism: Blood Culture PCR (04-30-21 @ 13:20)      -  Coagulase negative Staphylococcus, Methicillin resistant: Detec      Method Type: PCR    Culture - Urine (collected 04-28-21 @ 02:00)  Source: .Urine Catheterized  Final Report (04-29-21 @ 07:07):    No growth    Culture - Blood (collected 04-28-21 @ 02:00)  Source: .Blood Blood-Peripheral  Gram Stain (04-30-21 @ 11:26):    Growth in anaerobic bottle: Gram Positive Cocci in Clusters  Final Report (05-02-21 @ 13:45):    Growth in anaerobic bottle: Staphylococcus epidermidis  Organism: Staphylococcus epidermidis (05-02-21 @ 13:45)  Organism: Staphylococcus epidermidis (05-02-21 @ 13:45)      -  Ampicillin/Sulbactam: R <=8/4      -  Cefazolin: R >16      -  Clindamycin: R >4      -  Erythromycin: R >4      -  Gentamicin: R >8 Should not be used as monotherapy      -  Oxacillin: R >2      -  Penicillin: R >8      -  RIF- Rifampin: S <=1 Should not be used as monotherapy      -  Tetra/Doxy: R >8      -  Trimethoprim/Sulfamethoxazole: R >2/38      -  Vancomycin: S 2      Method Type: JAZMIN    Culture - Blood (collected 04-26-21 @ 17:42)  Source: .Blood None  Final Report (05-01-21 @ 23:01):    No Growth Final    Culture - Urine (collected 04-26-21 @ 14:39)  Source: .Urine Clean Catch (Midstream)  Final Report (04-28-21 @ 15:53):    >100,000 CFU/ml Klebsiella pneumoniae (Carbapenem Resistant)  Organism: Mishelpne MDRO (04-28-21 @ 15:53)  Organism: Klepne MDRO (04-28-21 @ 15:53)      -  Amikacin: S <=16      -  Amoxicillin/Clavulanic Acid: R >16/8      -  Ampicillin: R >16 These ampicillin results predict results for amoxicillin      -  Ampicillin/Sulbactam: R >16/8 Enterobacter, Citrobacter, and Serratia may develop resistance during prolonged therapy (3-4 days)      -  Aztreonam: R >16      -  Cefazolin: R >16 (MIC_CL_COM_ENTERIC_CEFAZU) For uncomplicated UTI with K. pneumoniae, E. coli, or P. mirablis: JAZMIN <=16 is sensitive and JAZMIN >=32 is resistant. This also predicts results for oral agents cefaclor, cefdinir, cefpodoxime, cefprozil, cefuroxime axetil, cephalexin and locarbef for uncomplicated UTI. Note that some isolates may be susceptible to these agents while testing resistant to cefazolin.      -  Cefepime: R >16      -  Cefoxitin: I 16      -  Ceftriaxone: R >32 Enterobacter, Citrobacter, and Serratia may develop resistance during prolonged therapy      -  Ciprofloxacin: R >2      -  Ertapenem: R >1      -  Gentamicin: R >8      -  Imipenem: R 8      -  Levofloxacin: I 1      -  Meropenem: R >8      -  Nitrofurantoin: R >64 Should not be used to treat pyelonephritis      -  Piperacillin/Tazobactam: R >64      -  Tigecycline: S <=2      -  Tobramycin: R >8      -  Trimethoprim/Sulfamethoxazole: R >2/38      Method Type: JAZMIN    Culture - Urine (collected 04-24-21 @ 22:07)  Source: .Urine Clean Catch (Midstream)  Final Report (04-28-21 @ 16:01):    >100,000 CFU/ml Klebsiella pneumoniae (Carbapenem Resistant)  Organism: Klepne MDRO (04-28-21 @ 16:01)  Organism: Klepne MDRO (04-28-21 @ 16:01)      -  Amikacin: S <=16      -  Amoxicillin/Clavulanic Acid: R >16/8      -  Ampicillin: R >16 These ampicillin results predict results for amoxicillin      -  Ampicillin/Sulbactam: R >16/8 Enterobacter, Citrobacter, and Serratia may develop resistance during prolonged therapy (3-4 days)      -  Aztreonam: R >16      -  Cefazolin: R >16 (MIC_CL_COM_ENTERIC_CEFAZU) For uncomplicated UTI with K. pneumoniae, E. coli, or P. mirablis: JAZMIN <=16 is sensitive and JAZMIN >=32 is resistant. This also predicts results for oral agents cefaclor, cefdinir, cefpodoxime, cefprozil, cefuroxime axetil, cephalexin and locarbef for uncomplicated UTI. Note that some isolates may be susceptible to these agents while testing resistant to cefazolin.      -  Cefepime: R >16      -  Cefoxitin: S <=8      -  Ceftriaxone: R >32 Enterobacter, Citrobacter, and Serratia may develop resistance during prolonged therapy      -  Ciprofloxacin: R >2      -  Ertapenem: R >1      -  Gentamicin: R >8      -  Imipenem: R 8      -  Levofloxacin: I 1      -  Meropenem: R >8      -  Nitrofurantoin: R >64 Should not be used to treat pyelonephritis      -  Piperacillin/Tazobactam: R >64      -  Tigecycline: S <=2      -  Tobramycin: R >8      -  Trimethoprim/Sulfamethoxazole: R >2/38      Method Type: JAZMIN    Culture - Blood (collected 04-13-21 @ 07:34)  Source: .Blood None  Final Report (04-18-21 @ 17:00):    No Growth Final    Culture - Blood (collected 04-12-21 @ 21:38)  Source: .Blood None  Final Report (04-18-21 @ 05:00):    No Growth Final    Culture - Blood (collected 04-12-21 @ 16:00)  Source: .Blood Blood  Final Report (04-17-21 @ 22:01):    No Growth Final    Culture - Blood (collected 04-08-21 @ 16:00)  Source: .Blood Blood  Gram Stain (04-11-21 @ 08:45):    Growth in anaerobic bottle: Gram Positive Cocci in Clusters  Final Report (04-12-21 @ 10:51):    Growth in anaerobic bottle: Staphylococcus epidermidis Coag Negative    Staphylococcus    Single set isolate, possible contaminant. Contact    Microbiology if susceptibility testing clinically    indicated.    ***Blood Panel PCR results on this specimen are available    approximately 3 hours after the Gram stain result.***    Gram stain, PCR, and/or culture results may not always    correspond due to difference in methodologies.    ************************************************************    This PCR assay was performed by multiplex PCR. This    Assay tests for 66 bacterial and resistance gene targets.    Please refer to the [facility] [employer] test directory    at [email or web address] for details.  Organism: Blood Culture PCR (04-12-21 @ 10:51)  Organism: Blood Culture PCR (04-12-21 @ 10:51)      -  Staphylococcus epidermidis, Methicillin resistant: Detec      Method Type: PCR    Culture - Blood (collected 04-08-21 @ 16:00)  Source: .Blood Blood-Peripheral  Final Report (04-14-21 @ 01:00):    No Growth Final            INFECTIOUS DISEASES TESTING  Procalcitonin, Serum: 2.03 (04-29-21 @ 05:25)  Procalcitonin, Serum: 2.50 (04-28-21 @ 16:00)  Procalcitonin, Serum: 0.19 (04-26-21 @ 17:42)  COVID-19 PCR: NotDetec (04-17-21 @ 10:31)  COVID-19 PCR: NotDetec (04-14-21 @ 14:29)  COVID-19 PCR: NotDetec (04-12-21 @ 15:00)  COVID-19 PCR: NotDetec (04-07-21 @ 14:55)  Procalcitonin, Serum: 0.16 (04-06-21 @ 12:32)  COVID-19 PCR: NotDetec (03-30-21 @ 15:19)  COVID-19 PCR: NotDetec (03-29-21 @ 19:40)  Procalcitonin, Serum: 0.61 (03-26-21 @ 10:20)  COVID-19 PCR: NotDetec (03-22-21 @ 20:13)  COVID-19 PCR: NotDetec (03-10-21 @ 12:22)  COVID-19 PCR: NotDetec (03-01-21 @ 16:49)  Fungitell: 62 (03-01-21 @ 11:00)  Procalcitonin, Serum: 0.29 (03-01-21 @ 05:32)  MRSA PCR Result.: Negative (02-27-21 @ 15:44)  COVID-19 PCR: NotDetec (02-25-21 @ 15:34)  Procalcitonin, Serum: 0.27 (02-18-21 @ 10:54)  MRSA PCR Result.: Negative (02-14-21 @ 18:29)  HIV-1/2 Combo Result: Nonreact (02-14-21 @ 05:07)  Procalcitonin, Serum: 0.46 (02-13-21 @ 16:00)  COVID-19 PCR: NotDetec (02-12-21 @ 10:17)      INFLAMMATORY MARKERS      RADIOLOGY & ADDITIONAL TESTS:  I have personally reviewed the last available Chest xray  CXR  Xray Chest 1 View- PORTABLE-Urgent:   EXAM:  XR CHEST PORTABLE URGENT 1V            PROCEDURE DATE:  05/02/2021            INTERPRETATION:  Clinical History / Reason for exam: Line placement    Comparison : Chest radiograph May 2, 2021.    Technique/Positioning: Single AP view of the chest.    Findings:    Support devices: Endotracheal tube in place.    Cardiac/mediastinum/hilum: Unchanged.    Lung parenchyma/Pleura: Unchanged bilateral opacities and left effusion. No pneumothorax.    Skeleton/soft tissues: Stable.    Impression:    Stable bilateral opacities and left effusion.                  ELLIOT LANDAU MD; Attending Radiologist  This document has been electronically signed. May  2 2021  3:39PM (05-02-21 @ 14:58)      CT      CARDIOLOGY TESTING  Diagnosis Line Sinus rhythm with 1st degree A-V block  Otherwise normal ECG    MEDICATIONS  ascorbic acid 500  atorvastatin 40  carbidopa/levodopa  25/100 2  chlorhexidine 0.12% Liquid 15  chlorhexidine 4% Liquid 1  collagenase Ointment 1  Dakins Solution - 1/2 Strength 1  magnesium oxide 400  meropenem  IVPB 2000  midodrine 10  phenylephrine    Infusion 0.1  polyethylene glycol 3350 17  senna 2  tigecycline IVPB   tigecycline IVPB 50  zinc sulfate 220      WEIGHT  Weight (kg): 79.4 (03-31-21 @ 15:48)  Creatinine, Serum: 0.7 mg/dL (05-03-21 @ 05:00)      ANTIBIOTICS:  meropenem  IVPB 2000 milliGRAM(s) IV Intermittent every 8 hours  tigecycline IVPB      tigecycline IVPB 50 milliGRAM(s) IV Intermittent every 12 hours      All available historical records have been reviewed

## 2021-05-03 NOTE — PROGRESS NOTE ADULT - ASSESSMENT
ASSESSMENT  71 year old Male with past medical history of Parkinson's, dementia, CKD Stage 3, 1st degree AV block, HLD, gout, HTN, DM, fungal septicemia presenting from [facility] for acute decompensation in mental status.     IMPRESSION  #RESOLVED Sepsis/Fever, suspected acute cholecystitis vs UTI, HAP    5/1 BCX NGTD     s/p 4/29 perc gayle, CX NG     4/28 Blood CX coNS,     4/28 UCX NG     4/26 BCX NGTD     4/24 UCX Kleb (Carbapenem Resistant)   HIDA +  < from: [facility] Dup[facility] Lower Ext Vein Scan, Bilat (04.21.21 @ 11:41) >  No evidence of deep venous thrombosis or superficial thrombophlebitis in the bilateral lower extremities.    4/24 UA WBC 82  #Intubated L lung collapse s/p bronch with mucous plugs    4/28 bronch Kleb   #RESOLVED CoNS in blood,  contaminant     only PIVs    4/8 BCX 1/2 sets   -  Staphylococcus epidermidis, Methicillin resistant: Detec    3/25 BCX 1/2 sets, 1/4 bottles Staphylococcus pettenkoferi, likely contaminant   #RESOLVED Fever with sepsis, not present on admission with metabolic encephalopathy, EEG + seizure, possibly in setting of sepsis secondary to large sacral infected decub    LP not consistent with infection.. G/S NEGATIVE (on ABX)    Total Nucleated Cell Count, CSF: 0 /uL (04.01.21 @ 14:00)    Protein, CSF: 42 mg/dL (04.01.21 @ 14:00)    Glucose, CSF: 97 mg/dL (04.01.21 @ 14:00)    3/25 BCX 1/2 sets, 1/4 bottles Staphylococcus pettenkoferi, likely contaminant     CXR no PNA   3/22 Blood & Urine cxs NGTD   #Sacral ulcer- necrotic     3/31 WCX GNR    3/29   Numerous Klebsiella pneumoniae ESBL    Few CRE Pseudomonas aeruginosa (high MICs to AGs)    Numerous Enterococcus faecalis (vancomycin resistant)- S amp    seen by Burn sacrum with full thickness ~4x5cm with dark /brown devitalized tissue at base; no purulent drainage, no bleeding  #Recent Fungemia    Blood Cx 2/27 Candida albicans    evaluated by optho - no ocular evidence     TTE no significant valvulopathy   #Shoulder dislocation with effusion- joint exam not consistent with a septic arthritis  < from: CT Chest w/ IV Cont (04.07.21 @ 20:31) >  Similar appearance of the left shoulder with complex joint effusion, described in detail on prior exam CT shoulder 2/20/2021.  #ABEBE, resolved  #L hand edema  < from: Xray Wrist 2 Views, Left (03.27.21 @ 11:13) >No acute fracture or dislocation. Diffuse soft tissue swelling. Nonaggressive appearing lucency in the distal radius, indeterminate. Nonemergent MRI may be obtained for further evaluation.    Creatinine, Serum: 0.8 mg/dL (04.26.21 @ 12:17)      RECOMMENDATIONS  - TLC 4/28-  - Tigecycline  50mg q12h IV, monitor Alk Ph  - Meropenem 2g q8h IV (extended infusion run over 4h each dose)  - If continued rise in WBC will change to Avycaz (non-formulary)  - s/p 4/28 amikacin 5mg/kg x1  - Appreciate IR consult: possible perc gayle  - trend LFTs, AP  - Appreciate Burn consult   - Contact isolation CRE  - GOC, grave prognosis    If any questions, please call or send a message on Microsoft Teams  Spectra [phone]

## 2021-05-03 NOTE — PROGRESS NOTE ADULT - ASSESSMENT
IMPRESSION:    Acute hypoxemic resp failure/ L lung collapse sp bronch   septic shock on shalom  infected ulcer/ UTI/ cholecystitis MDR pneumonia  HO parkinson's sp PEG  Anemia  HO DVT/ A fib  DEC HB NO ACTIVE BLEED      PLAN:    CNS: SAT and assess MS     HEENT:  Oral care    PULMONARY:  HOB @ 45 degrees, Wean FIO2 keep Sao2 92 to 96%.   Aggressive pulmonary toilet.  Repeat bronch in .  PEEP 7      CARDIOVASCULAR: keep equal. Avoid volume overload.    GI: GI prophylaxis                                          Feeding PEG.  Free H2O     RENAL:  F/u  lytes.  Correct as needed. accurate I/O    INFECTIOUS DISEASE: Cont abx per ID    HEMATOLOGICAL:  DVT prophylaxis.       ENDOCRINE:  Follow up FS.  Insulin protocol if needed.    CODE STATUS: FULL CODE  very poor prognosis

## 2021-05-04 LAB
ALBUMIN SERPL ELPH-MCNC: 1.5 G/DL — LOW (ref 3.5–5.2)
ALP SERPL-CCNC: 344 U/L — HIGH (ref 30–115)
ALT FLD-CCNC: 21 U/L — SIGNIFICANT CHANGE UP (ref 0–41)
ANION GAP SERPL CALC-SCNC: 5 MMOL/L — LOW (ref 7–14)
AST SERPL-CCNC: 69 U/L — HIGH (ref 0–41)
BASE EXCESS BLDA CALC-SCNC: 4.1 MMOL/L — HIGH (ref -2–2)
BASOPHILS # BLD AUTO: 0.1 K/UL — SIGNIFICANT CHANGE UP (ref 0–0.2)
BASOPHILS NFR BLD AUTO: 0.4 % — SIGNIFICANT CHANGE UP (ref 0–1)
BILIRUB SERPL-MCNC: 0.6 MG/DL — SIGNIFICANT CHANGE UP (ref 0.2–1.2)
BUN SERPL-MCNC: 46 MG/DL — HIGH (ref 10–20)
CALCIUM SERPL-MCNC: 7.6 MG/DL — LOW (ref 8.5–10.1)
CHLORIDE SERPL-SCNC: 109 MMOL/L — SIGNIFICANT CHANGE UP (ref 98–110)
CO2 SERPL-SCNC: 26 MMOL/L — SIGNIFICANT CHANGE UP (ref 17–32)
CREAT SERPL-MCNC: 0.8 MG/DL — SIGNIFICANT CHANGE UP (ref 0.7–1.5)
CULTURE RESULTS: SIGNIFICANT CHANGE UP
EOSINOPHIL # BLD AUTO: 0.2 K/UL — SIGNIFICANT CHANGE UP (ref 0–0.7)
EOSINOPHIL NFR BLD AUTO: 0.8 % — SIGNIFICANT CHANGE UP (ref 0–8)
GLUCOSE BLDC GLUCOMTR-MCNC: 129 MG/DL — HIGH (ref 70–99)
GLUCOSE BLDC GLUCOMTR-MCNC: 96 MG/DL — SIGNIFICANT CHANGE UP (ref 70–99)
GLUCOSE SERPL-MCNC: 89 MG/DL — SIGNIFICANT CHANGE UP (ref 70–99)
HCO3 BLDA-SCNC: 27 MMOL/L — SIGNIFICANT CHANGE UP (ref 23–27)
HCT VFR BLD CALC: 25.9 % — LOW (ref 42–52)
HGB BLD-MCNC: 8.2 G/DL — LOW (ref 14–18)
IMM GRANULOCYTES NFR BLD AUTO: 4.9 % — HIGH (ref 0.1–0.3)
INR BLD: 1.44 RATIO — HIGH (ref 0.65–1.3)
LYMPHOCYTES # BLD AUTO: 1.97 K/UL — SIGNIFICANT CHANGE UP (ref 1.2–3.4)
LYMPHOCYTES # BLD AUTO: 7.7 % — LOW (ref 20.5–51.1)
MAGNESIUM SERPL-MCNC: 2.1 MG/DL — SIGNIFICANT CHANGE UP (ref 1.8–2.4)
MCHC RBC-ENTMCNC: 26.9 PG — LOW (ref 27–31)
MCHC RBC-ENTMCNC: 31.7 G/DL — LOW (ref 32–37)
MCV RBC AUTO: 84.9 FL — SIGNIFICANT CHANGE UP (ref 80–94)
MONOCYTES # BLD AUTO: 1.44 K/UL — HIGH (ref 0.1–0.6)
MONOCYTES NFR BLD AUTO: 5.6 % — SIGNIFICANT CHANGE UP (ref 1.7–9.3)
NEUTROPHILS # BLD AUTO: 20.58 K/UL — HIGH (ref 1.4–6.5)
NEUTROPHILS NFR BLD AUTO: 80.6 % — HIGH (ref 42.2–75.2)
NRBC # BLD: 0 /100 WBCS — SIGNIFICANT CHANGE UP (ref 0–0)
PCO2 BLDA: 33 MMHG — LOW (ref 38–42)
PH BLDA: 7.52 — HIGH (ref 7.38–7.42)
PHOSPHATE SERPL-MCNC: 3.5 MG/DL — SIGNIFICANT CHANGE UP (ref 2.1–4.9)
PLATELET # BLD AUTO: 55 K/UL — LOW (ref 130–400)
PO2 BLDA: 88 MMHG — SIGNIFICANT CHANGE UP (ref 78–95)
POTASSIUM SERPL-MCNC: 4.9 MMOL/L — SIGNIFICANT CHANGE UP (ref 3.5–5)
POTASSIUM SERPL-SCNC: 4.9 MMOL/L — SIGNIFICANT CHANGE UP (ref 3.5–5)
PROT SERPL-MCNC: 5.7 G/DL — LOW (ref 6–8)
PROTHROM AB SERPL-ACNC: 16.6 SEC — HIGH (ref 9.95–12.87)
RBC # BLD: 3.05 M/UL — LOW (ref 4.7–6.1)
RBC # FLD: 16.9 % — HIGH (ref 11.5–14.5)
SAO2 % BLDA: 98 % — SIGNIFICANT CHANGE UP (ref 94–98)
SODIUM SERPL-SCNC: 140 MMOL/L — SIGNIFICANT CHANGE UP (ref 135–146)
SPECIMEN SOURCE: SIGNIFICANT CHANGE UP
WBC # BLD: 25.53 K/UL — HIGH (ref 4.8–10.8)
WBC # FLD AUTO: 25.53 K/UL — HIGH (ref 4.8–10.8)

## 2021-05-04 PROCEDURE — 71045 X-RAY EXAM CHEST 1 VIEW: CPT | Mod: 26

## 2021-05-04 PROCEDURE — 99232 SBSQ HOSP IP/OBS MODERATE 35: CPT

## 2021-05-04 PROCEDURE — 70450 CT HEAD/BRAIN W/O DYE: CPT | Mod: 26

## 2021-05-04 PROCEDURE — 93970 EXTREMITY STUDY: CPT | Mod: 26

## 2021-05-04 RX ORDER — LIDOCAINE 4 G/100G
1 CREAM TOPICAL ONCE
Refills: 0 | Status: DISCONTINUED | OUTPATIENT
Start: 2021-05-04 | End: 2021-05-28

## 2021-05-04 RX ORDER — LIDOCAINE 4 G/100G
100 CREAM TOPICAL ONCE
Refills: 0 | Status: DISCONTINUED | OUTPATIENT
Start: 2021-05-04 | End: 2021-05-28

## 2021-05-04 RX ORDER — METRONIDAZOLE 500 MG
500 TABLET ORAL EVERY 8 HOURS
Refills: 0 | Status: COMPLETED | OUTPATIENT
Start: 2021-05-04 | End: 2021-05-11

## 2021-05-04 RX ORDER — SODIUM CHLORIDE 9 MG/ML
500 INJECTION, SOLUTION INTRAVENOUS ONCE
Refills: 0 | Status: COMPLETED | OUTPATIENT
Start: 2021-05-04 | End: 2021-05-04

## 2021-05-04 RX ORDER — CHLORHEXIDINE GLUCONATE 213 G/1000ML
15 SOLUTION TOPICAL EVERY 12 HOURS
Refills: 0 | Status: DISCONTINUED | OUTPATIENT
Start: 2021-05-04 | End: 2021-05-28

## 2021-05-04 RX ADMIN — Medication 1 APPLICATION(S): at 06:08

## 2021-05-04 RX ADMIN — SENNA PLUS 2 TABLET(S): 8.6 TABLET ORAL at 22:02

## 2021-05-04 RX ADMIN — MAGNESIUM OXIDE 400 MG ORAL TABLET 400 MILLIGRAM(S): 241.3 TABLET ORAL at 13:23

## 2021-05-04 RX ADMIN — MIDODRINE HYDROCHLORIDE 10 MILLIGRAM(S): 2.5 TABLET ORAL at 22:02

## 2021-05-04 RX ADMIN — Medication 1 APPLICATION(S): at 18:08

## 2021-05-04 RX ADMIN — CARBIDOPA AND LEVODOPA 2 TABLET(S): 25; 100 TABLET ORAL at 13:20

## 2021-05-04 RX ADMIN — CHLORHEXIDINE GLUCONATE 15 MILLILITER(S): 213 SOLUTION TOPICAL at 18:08

## 2021-05-04 RX ADMIN — ATORVASTATIN CALCIUM 40 MILLIGRAM(S): 80 TABLET, FILM COATED ORAL at 22:02

## 2021-05-04 RX ADMIN — Medication 500 MILLIGRAM(S): at 13:21

## 2021-05-04 RX ADMIN — MIDODRINE HYDROCHLORIDE 10 MILLIGRAM(S): 2.5 TABLET ORAL at 06:07

## 2021-05-04 RX ADMIN — ZINC SULFATE TAB 220 MG (50 MG ZINC EQUIVALENT) 220 MILLIGRAM(S): 220 (50 ZN) TAB at 13:21

## 2021-05-04 RX ADMIN — SODIUM CHLORIDE 2000 MILLILITER(S): 9 INJECTION, SOLUTION INTRAVENOUS at 13:53

## 2021-05-04 RX ADMIN — POLYETHYLENE GLYCOL 3350 17 GRAM(S): 17 POWDER, FOR SOLUTION ORAL at 13:21

## 2021-05-04 RX ADMIN — Medication 100 MILLIGRAM(S): at 22:02

## 2021-05-04 RX ADMIN — ENOXAPARIN SODIUM 40 MILLIGRAM(S): 100 INJECTION SUBCUTANEOUS at 13:20

## 2021-05-04 RX ADMIN — MAGNESIUM OXIDE 400 MG ORAL TABLET 400 MILLIGRAM(S): 241.3 TABLET ORAL at 06:07

## 2021-05-04 RX ADMIN — CARBIDOPA AND LEVODOPA 2 TABLET(S): 25; 100 TABLET ORAL at 06:07

## 2021-05-04 RX ADMIN — CHLORHEXIDINE GLUCONATE 1 APPLICATION(S): 213 SOLUTION TOPICAL at 06:09

## 2021-05-04 RX ADMIN — MIDODRINE HYDROCHLORIDE 10 MILLIGRAM(S): 2.5 TABLET ORAL at 13:22

## 2021-05-04 RX ADMIN — CARBIDOPA AND LEVODOPA 2 TABLET(S): 25; 100 TABLET ORAL at 22:02

## 2021-05-04 RX ADMIN — MAGNESIUM OXIDE 400 MG ORAL TABLET 400 MILLIGRAM(S): 241.3 TABLET ORAL at 22:02

## 2021-05-04 RX ADMIN — TIGECYCLINE 105 MILLIGRAM(S): 50 INJECTION, POWDER, LYOPHILIZED, FOR SOLUTION INTRAVENOUS at 06:08

## 2021-05-04 RX ADMIN — MEROPENEM 200 MILLIGRAM(S): 1 INJECTION INTRAVENOUS at 06:08

## 2021-05-04 RX ADMIN — Medication 100 MILLIGRAM(S): at 13:24

## 2021-05-04 RX ADMIN — CHLORHEXIDINE GLUCONATE 15 MILLILITER(S): 213 SOLUTION TOPICAL at 06:07

## 2021-05-04 RX ADMIN — CARBIDOPA AND LEVODOPA 2 TABLET(S): 25; 100 TABLET ORAL at 11:42

## 2021-05-04 NOTE — PROGRESS NOTE ADULT - SUBJECTIVE AND OBJECTIVE BOX
Patient is a 71y old  Male who presents with a chief complaint of Change in Mental Status (04 May 2021 07:49)        Over Night Events:  Remains on MV.  on David.  Off sedation 48 hours.          ROS:     All ROS are negative except HPI         PHYSICAL EXAM    ICU Vital Signs Last 24 Hrs  T(C): 36.7 (04 May 2021 08:00), Max: 37.6 (04 May 2021 04:00)  T(F): 98.1 (04 May 2021 08:00), Max: 99.6 (04 May 2021 04:00)  HR: 97 (04 May 2021 08:00) (62 - 98)  BP: 95/51 (04 May 2021 08:00) (72/44 - 122/65)  BP(mean): 67 (04 May 2021 08:00) (62 - 97)  ABP: --  ABP(mean): --  RR: 18 (04 May 2021 08:00) (18 - 25)  SpO2: 98% (04 May 2021 08:00) (96% - 100%)      CONSTITUTIONAL:  Well nourished.  NAD    ENT:   Airway patent,   Mouth with normal mucosa.   No thrush    EYES:   Pupils equal,   Round and reactive to light.    CARDIAC:   Normal rate,   Regular rhythm.    edema      Vascular:  Normal systolic impulse  No Carotid bruits    RESPIRATORY:   No wheezing  Bilateral BS  Normal chest expansion  Not tachypneic,  No use of accessory muscles    GASTROINTESTINAL:  Abdomen soft,   Non-tender,   No guarding,   + BS    MUSCULOSKELETAL:   Range of motion is not limited,  No clubbing, cyanosis    NEUROLOGICAL:   NO response to pain .    SKIN:   Skin normal color for race,   Warm and dry.   No evidence of rash.    PSYCHIATRIC:   No apparent risk to self or others.    HEMATOLOGICAL:  No cervical  lymphadenopathy.  no inguinal lymphadenopathy      05-03-21 @ 07:01  -  05-04-21 @ 07:00  --------------------------------------------------------  IN:    Enteral Tube Flush: 1200 mL    IV PiggyBack: 250 mL    Jevity 1.2: 1440 mL    Phenylephrine: 69.7 mL  Total IN: 2959.7 mL    OUT:    Drain (mL): 15 mL    Indwelling Catheter - Urethral (mL): 1430 mL  Total OUT: 1445 mL    Total NET: 1514.7 mL      05-04-21 @ 07:01  -  05-04-21 @ 08:40  --------------------------------------------------------  IN:    Phenylephrine: 13.1 mL  Total IN: 13.1 mL    OUT:    Indwelling Catheter - Urethral (mL): 100 mL  Total OUT: 100 mL    Total NET: -86.9 mL          LABS:                            8.2    25.53 )-----------( 55       ( 04 May 2021 06:59 )             25.9                    8.2    25.53 )-----------( 55       ( 05-04 @ 06:59 )             25.9                8.1    20.19 )-----------( 50       ( 05-03 @ 05:00 )             26.2                7.9    17.53 )-----------( 57       ( 05-02 @ 04:30 )             25.2                                               05-04    140  |  109  |  46<H>  ----------------------------<  89  4.9   |  26  |  0.8    Ca    7.6<L>      04 May 2021 06:59  Phos  3.5     05-04  Mg     2.1     05-04    TPro  5.7<L>  /  Alb  1.5<L>  /  TBili  0.6  /  DBili  x   /  AST  69<H>  /  ALT  21  /  AlkPhos  344<H>  05-04      PT/INR - ( 04 May 2021 06:59 )   PT: 16.60 sec;   INR: 1.44 ratio                                                                                              LIVER FUNCTIONS - ( 04 May 2021 06:59 )  Alb: 1.5 g/dL / Pro: 5.7 g/dL / ALK PHOS: 344 U/L / ALT: 21 U/L / AST: 69 U/L / GGT: x                                                                                               Mode: AC/ CMV (Assist Control/ Continuous Mandatory Ventilation)  RR (machine): 16  TV (machine): 370  FiO2: 30  PEEP: 5  ITime: 1  MAP: 10  PIP: 24                                      ABG - ( 04 May 2021 02:26 )  pH, Arterial: 7.52  pH, Blood: x     /  pCO2: 33    /  pO2: 88    / HCO3: 27    / Base Excess: 4.1   /  SaO2: 98                  MEDICATIONS  (STANDING):  ascorbic acid 500 milliGRAM(s) Oral daily  atorvastatin 40 milliGRAM(s) Oral at bedtime  carbidopa/levodopa  25/100 2 Tablet(s) Oral <User Schedule>  chlorhexidine 0.12% Liquid 15 milliLiter(s) Oral Mucosa every 12 hours  chlorhexidine 4% Liquid 1 Application(s) Topical <User Schedule>  collagenase Ointment 1 Application(s) Topical two times a day  Dakins Solution - 1/2 Strength 1 Application(s) Topical two times a day  enoxaparin Injectable 40 milliGRAM(s) SubCutaneous daily  magnesium oxide 400 milliGRAM(s) Oral every 8 hours  meropenem  IVPB 2000 milliGRAM(s) IV Intermittent every 8 hours  midodrine 10 milliGRAM(s) Oral every 8 hours  phenylephrine    Infusion 0.1 MICROgram(s)/kG/Min (2.98 mL/Hr) IV Continuous <Continuous>  polyethylene glycol 3350 17 Gram(s) Oral daily  senna 2 Tablet(s) Oral at bedtime  tigecycline IVPB 50 milliGRAM(s) IV Intermittent every 12 hours  zinc sulfate 220 milliGRAM(s) Oral daily    MEDICATIONS  (PRN):  acetaminophen   Tablet .. 650 milliGRAM(s) Oral every 6 hours PRN Temp greater or equal to 38C (100.4F)      New X-rays reviewed:                                                                                  ECHO    CXR interpreted by me:  ET OG OK>  LLL atelectasis

## 2021-05-04 NOTE — PROGRESS NOTE ADULT - SUBJECTIVE AND OBJECTIVE BOX
NIEVES LABOY  71y, Male  Allergy: No Known Allergies      LOS  43d    CHIEF COMPLAINT: Change in Mental Status (03 May 2021 09:07)      INTERVAL EVENTS/HPI  - No acute events overnight, rising WBC and Alk Ph  - T(F): , Max: 99.6 (05-04-21 @ 04:00)  - WBC Count: 25.53 (05-04-21 @ 06:59)  WBC Count: 20.19 (05-03-21 @ 05:00)     - Creatinine, Serum: 0.8 (05-04-21 @ 06:59)  Creatinine, Serum: 0.7 (05-03-21 @ 05:00)       ROS  unable to obtain history secondary to patient's mental status and/or sedation     VITALS:  T(F): 99.6, Max: 99.6 (05-04-21 @ 04:00)  HR: 62  BP: 91/50  RR: 19Vital Signs Last 24 Hrs  T(C): 37.6 (04 May 2021 04:00), Max: 37.6 (04 May 2021 04:00)  T(F): 99.6 (04 May 2021 04:00), Max: 99.6 (04 May 2021 04:00)  HR: 62 (04 May 2021 06:00) (62 - 98)  BP: 91/50 (04 May 2021 06:00) (72/44 - 122/65)  BP(mean): 63 (04 May 2021 06:00) (62 - 97)  RR: 19 (04 May 2021 06:00) (19 - 25)  SpO2: 97% (04 May 2021 06:00) (96% - 100%)      PHYSICAL EXAM:  Gen: Intubated  CV: RRR  Lungs: Decreased BS at bases  Abd: Soft PEG, AUGUSTINA bilious fluid  Neuro: Sedated  anasarca  Lines clean, no phlebitis      FH: Non-contributory  Social Hx: Non-contributory    TESTS & MEASUREMENTS:                        8.2    25.53 )-----------( x        ( 04 May 2021 06:59 )             25.9     05-04    140  |  109  |  46<H>  ----------------------------<  89  4.9   |  26  |  0.8    Ca    7.6<L>      04 May 2021 06:59  Phos  3.5     05-04  Mg     2.1     05-04    TPro  5.7<L>  /  Alb  1.5<L>  /  TBili  0.6  /  DBili  x   /  AST  69<H>  /  ALT  21  /  AlkPhos  344<H>  05-04    eGFR if Non African American: 89 mL/min/1.73M2 (05-04-21 @ 06:59)  eGFR if : 103 mL/min/1.73M2 (05-04-21 @ 06:59)    LIVER FUNCTIONS - ( 04 May 2021 06:59 )  Alb: 1.5 g/dL / Pro: 5.7 g/dL / ALK PHOS: 344 U/L / ALT: 21 U/L / AST: 69 U/L / GGT: x               Culture - Blood (collected 05-01-21 @ 07:52)  Source: .Blood None  Preliminary Report (05-02-21 @ 18:01):    No growth to date.    Culture - Body Fluid with Gram Stain (collected 04-29-21 @ 15:22)  Source: .Body Fluid gall bladder fluid  Gram Stain (04-30-21 @ 01:12):    No polymorphonuclear cells seen    No organisms seen    by cytocentrifuge  Preliminary Report (04-30-21 @ 20:21):    No growth  Culture - Blood (collected 04-12-21 @ 21:38)  Source: .Blood None  Final Report (04-18-21 @ 05:00):    No Growth Final    Culture - Blood (collected 04-12-21 @ 16:00)  Source: .Blood Blood  Final Report (04-17-21 @ 22:01):    No Growth Final    Culture - Blood (collected 04-08-21 @ 16:00)  Source: .Blood Blood  Gram Stain (04-11-21 @ 08:45):    Growth in anaerobic bottle: Gram Positive Cocci in Clusters  Final Report (04-12-21 @ 10:51):    Growth in anaerobic bottle: Staphylococcus epidermidis Coag Negative    Staphylococcus    Single set isolate, possible contaminant. Contact    Microbiology if susceptibility testing clinically    indicated.    ***Blood Panel PCR results on this specimen are available    approximately 3 hours after the Gram stain result.***    Gram stain, PCR, and/or culture results may not always    correspond due to difference in methodologies.    ************************************************************    This PCR assay was performed by multiplex PCR. This    Assay tests for 66 bacterial and resistance gene targets.    Please refer to the [facility] test directory    at [email or web address] for details.  Organism: Blood Culture PCR (04-12-21 @ 10:51)  Organism: Blood Culture PCR (04-12-21 @ 10:51)      -  Staphylococcus epidermidis, Methicillin resistant: Detec      Method Type: PCR    Culture - Blood (collected 04-08-21 @ 16:00)  Source: .Blood Blood-Peripheral  Final Report (04-14-21 @ 01:00):    No Growth Final            INFECTIOUS DISEASES TESTING  Procalcitonin, Serum: 2.03 (04-29-21 @ 05:25)  Procalcitonin, Serum: 2.50 (04-28-21 @ 16:00)  Procalcitonin, Serum: 0.19 (04-26-21 @ 17:42)  COVID-19 PCR: NotDetec (04-17-21 @ 10:31)  COVID-19 PCR: NotDetec (04-14-21 @ 14:29)  COVID-19 PCR: NotDetec (04-12-21 @ 15:00)  COVID-19 PCR: NotDetec (04-07-21 @ 14:55)  Procalcitonin, Serum: 0.16 (04-06-21 @ 12:32)  Vancomycin Level, Random: 11.5 (03-31-21 @ 07:00)  Vancomycin Level, Trough: 11.5 (03-31-21 @ 07:00)  COVID-19 PCR: NotDetec (03-30-21 @ 15:19)  COVID-19 PCR: NotDetec (03-29-21 @ 19:40)  Vancomycin Level, Trough: <4.0 (03-28-21 @ 13:00)  Procalcitonin, Serum: 0.61 (03-26-21 @ 10:20)  COVID-19 PCR: NotDetec (03-22-21 @ 20:13)  COVID-19 PCR: NotDetec (03-10-21 @ 12:22)  COVID-19 PCR: NotDetec (03-01-21 @ 16:49)  Fungitell: 62 (03-01-21 @ 11:00)  Procalcitonin, Serum: 0.29 (03-01-21 @ 05:32)  MRSA PCR Result.: Negative (02-27-21 @ 15:44)  COVID-19 PCR: NotDetec (02-25-21 @ 15:34)  Procalcitonin, Serum: 0.27 (02-18-21 @ 10:54)  MRSA PCR Result.: Negative (02-14-21 @ 18:29)  HIV-1/2 Combo Result: Nonreact (02-14-21 @ 05:07)  Procalcitonin, Serum: 0.46 (02-13-21 @ 16:00)  COVID-19 PCR: NotDetec (02-12-21 @ 10:17)      INFLAMMATORY MARKERS      RADIOLOGY & ADDITIONAL TESTS:  I have personally reviewed the last available Chest xray  CXR  Xray Chest 1 View- PORTABLE-Urgent:   EXAM:  XR CHEST PORTABLE URGENT 1V            PROCEDURE DATE:  05/02/2021            INTERPRETATION:  Clinical History / Reason for exam: Line placement    Comparison : Chest radiograph May 2, 2021.    Technique/Positioning: Single AP view of the chest.    Findings:    Support devices: Endotracheal tube in place.    Cardiac/mediastinum/hilum: Unchanged.    Lung parenchyma/Pleura: Unchanged bilateral opacities and left effusion. No pneumothorax.    Skeleton/soft tissues: Stable.    Impression:    Stable bilateral opacities and left effusion.                  ELLIOT LANDAU MD; Attending Radiologist  This document has been electronically signed. May  2 2021  3:39PM (05-02-21 @ 14:58)      CT      CARDIOLOGY TESTING  12 Lead ECG:   Ventricular Rate 94 BPM    Atrial Rate 94 BPM    P-R Interval 216 ms    QRS Duration 88 ms    Q-T Interval 364 ms    QTC Calculation(Bazett) 455 ms    P Axis 52 degrees    R Axis 27 degrees    T Axis 38 degrees    Diagnosis Line Sinus rhythm with 1st degree A-V block  Otherwise normal ECG    Confirmed by Nathan Argueta ([id number]) on 4/28/2021 9:42:25 PM (04-28-21 @ 20:05)      MEDICATIONS  ascorbic acid 500 Oral daily  atorvastatin 40 Oral at bedtime  carbidopa/levodopa  25/100 2 Oral <User Schedule>  chlorhexidine 0.12% Liquid 15 Oral Mucosa every 12 hours  chlorhexidine 4% Liquid 1 Topical <User Schedule>  collagenase Ointment 1 Topical two times a day  Dakins Solution - 1/2 Strength 1 Topical two times a day  enoxaparin Injectable 40 SubCutaneous daily  magnesium oxide 400 Oral every 8 hours  meropenem  IVPB 2000 IV Intermittent every 8 hours  midodrine 10 Oral every 8 hours  phenylephrine    Infusion 0.1 IV Continuous <Continuous>  polyethylene glycol 3350 17 Oral daily  senna 2 Oral at bedtime  tigecycline IVPB 50 IV Intermittent every 12 hours  zinc sulfate 220 Oral daily      WEIGHT  Weight (kg): 79.4 (03-31-21 @ 15:48)  Creatinine, Serum: 0.8 mg/dL (05-04-21 @ 06:59)      ANTIBIOTICS:  meropenem  IVPB 2000 milliGRAM(s) IV Intermittent every 8 hours  tigecycline IVPB 50 milliGRAM(s) IV Intermittent every 12 hours      All available historical records have been reviewed

## 2021-05-04 NOTE — PROCEDURAL SAFETY CHECKLIST WITH OR WITHOUT SEDATION - NSPRESURGSED_GEN_ALL_CORE
Art Therapy Assessment:   11/20/20 9692   Reason for Consult   Reason for Consult Potential for extended/repeated hospitalizations;Medical diagnosis/status   Referral Source Child Life Specialist   Art Therapy Assessment   Person being assessed Patient   Others Present Mom   Family/Social/Cultural considerations Per pt, pt lives at home with parents. Pt has no siblings and no pets.   Development   Development Physical Considerations   Describe physical considerations Per H&P, pt has hx of suprasellar juvenile pilocytic astrocytoma, hydrocephalus, gait difficulty, vision impairment, and adrenal insufficiency.   Mood/Affect/Behavior   Mood \"happy\"   Affect Appropriate Range   Behavior Engaged;Focused   Art Therapy Interventions   Intervention Art Therapy   Art Therapy utilizing Other (comment)  (Per pt's request, pt decorated wooden craft box with gem stickers)   Treatment Plan   Treatment plan No further sessions planned at this time  (Per Mom, pt anticipating discharge later today.)   Goals and Recommendations   Goals Strengthen therapeutic relationship;Encourage normalization and engagement in age-appropriate activities;Identify and/or express:   Specify goal - Identify and/or express Interests and coping skills;Personal experience and emotions related to hospitalization;Emotions     Per plan made with Mom the day prior, Art Therapist (AT) arrived this afternoon to assess coping and offer session. Pt greeted AT and presented as eager to engage in art activity.      During session, AT engaged pt in conversation around pt's interests, home life, and experiences during hospitalization. Mom reported that pt recently had an extended hospitalization, though pt continues to express positive feelings associated with hospital environment. Pt disclosed intermittently experiencing headaches and hip pain, though reported no pain at time of session. In addition to art making, pt reported passing time by \"playing with Mom\" and  working with beads.    AT excused self upon completion of activity. Pt and Mom appreciative of services.     Nevaeh Up MA  Art Therapist  [phone]     Present, accurate, and signed

## 2021-05-04 NOTE — PROGRESS NOTE ADULT - ASSESSMENT
IMPRESSION:    Acute hypoxemic resp failure/ L lung collapse sp bronch   Septic shock on shalom  Infected sacral ulcer/ UTI  Cholecystitis sp Perc gayle  MDR pneumonia  HO parkinson's sp PEG  Anemia  HO DVT/ A fib  DEC HB NO ACTIVE BLEED      PLAN:    CNS: Keep off sedation.  CTH.  EEG.       HEENT:  Oral care    PULMONARY:  HOB @ 45 degrees, Wean FIO2 keep Sao2 92 to 96%.   .  Aggressive pulmonary toilet.  Repeat bronch today.       CARDIOVASCULAR: Keep equal. Avoid volume overload.    GI: GI prophylaxis                                          Feeding PEG.      RENAL:  F/u  lytes.  Correct as needed. accurate I/O    INFECTIOUS DISEASE: Cont abx per ID    HEMATOLOGICAL:  DVT prophylaxis.  Repeat Duplex in am     ENDOCRINE:  Follow up FS.  Insulin protocol if needed.    CODE STATUS: FULL CODE    Very poor prognosis IMPRESSION:    Acute hypoxemic resp failure/ L lung collapse sp bronch   Septic shock on shalom  Infected sacral ulcer/ UTI  Cholecystitis sp Perc gayle  MDR pneumonia  HO parkinson's sp PEG  Anemia  HO DVT/ A fib  DEC HB NO ACTIVE BLEED      PLAN:    CNS: Keep off sedation.  CTH.  EEG.       HEENT:  Oral care    PULMONARY:  HOB @ 45 degrees, Wean FIO2 keep Sao2 92 to 96%.   .  Aggressive pulmonary toilet.  Repeat bronch today.       CARDIOVASCULAR: Keep equal. Avoid volume overload.    GI: GI prophylaxis                                          Feeding PEG.      RENAL:  F/u  lytes.  Correct as needed. accurate I/O    INFECTIOUS DISEASE: Cont abx per ID    HEMATOLOGICAL:  DVT prophylaxis seq.  HIT and DIC.  Repeat Duplex in am     ENDOCRINE:  Follow up FS.  Insulin protocol if needed.    CODE STATUS: FULL CODE    Very poor prognosis

## 2021-05-04 NOTE — PROGRESS NOTE ADULT - ASSESSMENT
ASSESSMENT  71 year old Male with past medical history of Parkinson's, dementia, CKD Stage 3, 1st degree AV block, HLD, gout, HTN, DM, fungal septicemia presenting from [facility] for acute decompensation in mental status.     IMPRESSION  #RESOLVED Sepsis/Fever, suspected acute cholecystitis vs UTI, HAP    5/1 BCX NGTD     s/p 4/29 perc gayle, CX NG     4/28 Blood CX coNS,     4/28 UCX NG     4/26 BCX NGTD     4/24 UCX Kleb (Carbapenem Resistant)   HIDA +  < from: [facility] Duplex Lower Ext Vein Scan, Bilat (04.21.21 @ 11:41) >  No evidence of deep venous thrombosis or superficial thrombophlebitis in the bilateral lower extremities.    4/24 UA WBC 82  #Intubated L lung collapse s/p bronch with mucous plugs    4/28 bronch Kleb   #RESOLVED CoNS in blood,  contaminant     only PIVs    4/8 BCX 1/2 sets   -  Staphylococcus epidermidis, Methicillin resistant: Detec    3/25 BCX 1/2 sets, 1/4 bottles Staphylococcus pettenkoferi, likely contaminant   #RESOLVED Fever with sepsis, not present on admission with metabolic encephalopathy, EEG + seizure, possibly in setting of sepsis secondary to large sacral infected decub    LP not consistent with infection.. G/S NEGATIVE (on ABX)    Total Nucleated Cell Count, CSF: 0 /uL (04.01.21 @ 14:00)    Protein, CSF: 42 mg/dL (04.01.21 @ 14:00)    Glucose, CSF: 97 mg/dL (04.01.21 @ 14:00)    3/25 BCX 1/2 sets, 1/4 bottles Staphylococcus pettenkoferi, likely contaminant     CXR no PNA   3/22 Blood & Urine cxs NGTD   #Sacral ulcer- necrotic     3/31 WCX GNR    3/29   Numerous Klebsiella pneumoniae ESBL    Few CRE Pseudomonas aeruginosa (high MICs to AGs)    Numerous Enterococcus faecalis (vancomycin resistant)- S amp    seen by Burn sacrum with full thickness ~4x5cm with dark /brown devitalized tissue at base; no purulent drainage, no bleeding  #Recent Fungemia    Blood Cx 2/27 Candida albicans    evaluated by optho - no ocular evidence     TTE no significant valvulopathy   #Shoulder dislocation with effusion- joint exam not consistent with a septic arthritis  < from: CT Chest w/ IV Cont (04.07.21 @ 20:31) >  Similar appearance of the left shoulder with complex joint effusion, described in detail on prior exam CT shoulder 2/20/2021.  #ABEBE, resolved  #L hand edema  < from: Xray Wrist 2 Views, Left (03.27.21 @ 11:13) >No acute fracture or dislocation. Diffuse soft tissue swelling. Nonaggressive appearing lucency in the distal radius, indeterminate. Nonemergent MRI may be obtained for further evaluation.    Creatinine, Serum: 0.8 mg/dL (04.26.21 @ 12:17)      RECOMMENDATIONS  - D/C Brady/Tigecycline, start Avycaz (non-formulary form) 2.5g q8h IV & Flagyl 500mg q8h IV x 7 days  - TLC 4/28-  - Appreciate IR consult  - Appreciate Burn consult   - Contact isolation CRE  - GOC, grave prognosis MDROs    If any questions, please call or send a message on [employer] Teams  Spectra [phone]

## 2021-05-04 NOTE — CHART NOTE - NSCHARTNOTEFT_GEN_A_CORE
DATE OF PROCEDURE: may/4/2021    PREOPERATIVE DIAGNOSIS: atelectasis    POSTOPERATIVE DIAGNOSES: mucus plug      PROCEDURE PERFORMED:  Bronchoscopy,  brushing and washing.         Attending: Dr Gonzalez         ASSISTANT:    JACKIE       ANESTHESIA: none    CONSENT: After explaining the risk and benefit the consent was obtained from daughter mircale      The patient had been previously intubated and was on mechanical ventilatory support. No sedation was used during bronchoscopy. His FiO2 was increased to 100% during the procedure. The  fiberoptic bronchoscope was introduced through an endotracheal tube adaptor and the tip of the endotracheal tube was noted to be in good position above the park.   The Right tracheobronchial tree was inspected closely to the level of the subsegmental bronchi. All bronchi are patent with no endobronchial lesions and no mucosal lesions noted.   The Left tracheobronchial tree was filled with thick mucus.  The bronchoscope was then introduced to the _Left_______lobe and washings were taken from that area.  The procedure was completed and all samples were submitted for appropriate studies  After adequate clearing of secretions was accomplished, the bronchoscope was removed from the patient and the procedure was ended.   The patient tolerated the procedure well and there were no complications.

## 2021-05-05 LAB
ALBUMIN SERPL ELPH-MCNC: 1.4 G/DL — LOW (ref 3.5–5.2)
ALP SERPL-CCNC: 271 U/L — HIGH (ref 30–115)
ALT FLD-CCNC: 7 U/L — SIGNIFICANT CHANGE UP (ref 0–41)
ANION GAP SERPL CALC-SCNC: 5 MMOL/L — LOW (ref 7–14)
APTT BLD: 44.7 SEC — HIGH (ref 27–39.2)
AST SERPL-CCNC: 51 U/L — HIGH (ref 0–41)
BASE EXCESS BLDA CALC-SCNC: 3.5 MMOL/L — HIGH (ref -2–2)
BASOPHILS # BLD AUTO: 0.05 K/UL — SIGNIFICANT CHANGE UP (ref 0–0.2)
BASOPHILS NFR BLD AUTO: 0.3 % — SIGNIFICANT CHANGE UP (ref 0–1)
BILIRUB SERPL-MCNC: 0.6 MG/DL — SIGNIFICANT CHANGE UP (ref 0.2–1.2)
BUN SERPL-MCNC: 46 MG/DL — HIGH (ref 10–20)
CALCIUM SERPL-MCNC: 7.5 MG/DL — LOW (ref 8.5–10.1)
CHLORIDE SERPL-SCNC: 111 MMOL/L — HIGH (ref 98–110)
CO2 SERPL-SCNC: 27 MMOL/L — SIGNIFICANT CHANGE UP (ref 17–32)
CREAT SERPL-MCNC: 0.7 MG/DL — SIGNIFICANT CHANGE UP (ref 0.7–1.5)
CULTURE RESULTS: SIGNIFICANT CHANGE UP
D DIMER BLD IA.RAPID-MCNC: 6747 NG/ML DDU — HIGH (ref 0–230)
EOSINOPHIL # BLD AUTO: 0.19 K/UL — SIGNIFICANT CHANGE UP (ref 0–0.7)
EOSINOPHIL NFR BLD AUTO: 1.1 % — SIGNIFICANT CHANGE UP (ref 0–8)
FIBRINOGEN PPP-MCNC: 314 MG/DL — SIGNIFICANT CHANGE UP (ref 204.4–570.6)
GLUCOSE BLDC GLUCOMTR-MCNC: 108 MG/DL — HIGH (ref 70–99)
GLUCOSE BLDC GLUCOMTR-MCNC: 89 MG/DL — SIGNIFICANT CHANGE UP (ref 70–99)
GLUCOSE BLDC GLUCOMTR-MCNC: 91 MG/DL — SIGNIFICANT CHANGE UP (ref 70–99)
GLUCOSE SERPL-MCNC: 116 MG/DL — HIGH (ref 70–99)
HCO3 BLDA-SCNC: 27 MMOL/L — SIGNIFICANT CHANGE UP (ref 23–27)
HCT VFR BLD CALC: 23.4 % — LOW (ref 42–52)
HEPARIN-PF4 AB RESULT: <0.6 U/ML — SIGNIFICANT CHANGE UP (ref 0–0.9)
HGB BLD-MCNC: 7.3 G/DL — LOW (ref 14–18)
IMM GRANULOCYTES NFR BLD AUTO: 3.9 % — HIGH (ref 0.1–0.3)
INR BLD: 1.27 RATIO — SIGNIFICANT CHANGE UP (ref 0.65–1.3)
LYMPHOCYTES # BLD AUTO: 1.36 K/UL — SIGNIFICANT CHANGE UP (ref 1.2–3.4)
LYMPHOCYTES # BLD AUTO: 8.1 % — LOW (ref 20.5–51.1)
MAGNESIUM SERPL-MCNC: 2 MG/DL — SIGNIFICANT CHANGE UP (ref 1.8–2.4)
MCHC RBC-ENTMCNC: 26.4 PG — LOW (ref 27–31)
MCHC RBC-ENTMCNC: 31.2 G/DL — LOW (ref 32–37)
MCV RBC AUTO: 84.8 FL — SIGNIFICANT CHANGE UP (ref 80–94)
MONOCYTES # BLD AUTO: 0.84 K/UL — HIGH (ref 0.1–0.6)
MONOCYTES NFR BLD AUTO: 5 % — SIGNIFICANT CHANGE UP (ref 1.7–9.3)
NEUTROPHILS # BLD AUTO: 13.62 K/UL — HIGH (ref 1.4–6.5)
NEUTROPHILS NFR BLD AUTO: 81.6 % — HIGH (ref 42.2–75.2)
NRBC # BLD: 0 /100 WBCS — SIGNIFICANT CHANGE UP (ref 0–0)
PCO2 BLDA: 35 MMHG — LOW (ref 38–42)
PF4 HEPARIN CMPLX AB SER-ACNC: NEGATIVE — SIGNIFICANT CHANGE UP
PH BLDA: 7.5 — HIGH (ref 7.38–7.42)
PLATELET # BLD AUTO: 47 K/UL — LOW (ref 130–400)
PO2 BLDA: 113 MMHG — HIGH (ref 78–95)
POTASSIUM SERPL-MCNC: 4.4 MMOL/L — SIGNIFICANT CHANGE UP (ref 3.5–5)
POTASSIUM SERPL-SCNC: 4.4 MMOL/L — SIGNIFICANT CHANGE UP (ref 3.5–5)
PROT SERPL-MCNC: 5.2 G/DL — LOW (ref 6–8)
PROTHROM AB SERPL-ACNC: 14.6 SEC — HIGH (ref 9.95–12.87)
RBC # BLD: 2.76 M/UL — LOW (ref 4.7–6.1)
RBC # FLD: 17.2 % — HIGH (ref 11.5–14.5)
SAO2 % BLDA: 99 % — HIGH (ref 94–98)
SODIUM SERPL-SCNC: 143 MMOL/L — SIGNIFICANT CHANGE UP (ref 135–146)
WBC # BLD: 16.71 K/UL — HIGH (ref 4.8–10.8)
WBC # FLD AUTO: 16.71 K/UL — HIGH (ref 4.8–10.8)

## 2021-05-05 PROCEDURE — 99231 SBSQ HOSP IP/OBS SF/LOW 25: CPT

## 2021-05-05 PROCEDURE — 99232 SBSQ HOSP IP/OBS MODERATE 35: CPT

## 2021-05-05 PROCEDURE — 71045 X-RAY EXAM CHEST 1 VIEW: CPT | Mod: 26

## 2021-05-05 RX ORDER — ENOXAPARIN SODIUM 100 MG/ML
40 INJECTION SUBCUTANEOUS DAILY
Refills: 0 | Status: DISCONTINUED | OUTPATIENT
Start: 2021-05-05 | End: 2021-05-28

## 2021-05-05 RX ADMIN — CHLORHEXIDINE GLUCONATE 15 MILLILITER(S): 213 SOLUTION TOPICAL at 19:17

## 2021-05-05 RX ADMIN — Medication 500 MILLIGRAM(S): at 14:20

## 2021-05-05 RX ADMIN — Medication 1 APPLICATION(S): at 16:54

## 2021-05-05 RX ADMIN — Medication 100 MILLIGRAM(S): at 06:16

## 2021-05-05 RX ADMIN — CARBIDOPA AND LEVODOPA 2 TABLET(S): 25; 100 TABLET ORAL at 14:20

## 2021-05-05 RX ADMIN — CHLORHEXIDINE GLUCONATE 1 APPLICATION(S): 213 SOLUTION TOPICAL at 06:11

## 2021-05-05 RX ADMIN — Medication 100 MILLIGRAM(S): at 22:14

## 2021-05-05 RX ADMIN — MAGNESIUM OXIDE 400 MG ORAL TABLET 400 MILLIGRAM(S): 241.3 TABLET ORAL at 06:12

## 2021-05-05 RX ADMIN — ATORVASTATIN CALCIUM 40 MILLIGRAM(S): 80 TABLET, FILM COATED ORAL at 22:11

## 2021-05-05 RX ADMIN — CARBIDOPA AND LEVODOPA 2 TABLET(S): 25; 100 TABLET ORAL at 10:55

## 2021-05-05 RX ADMIN — Medication 1 APPLICATION(S): at 06:23

## 2021-05-05 RX ADMIN — CHLORHEXIDINE GLUCONATE 15 MILLILITER(S): 213 SOLUTION TOPICAL at 06:11

## 2021-05-05 RX ADMIN — MAGNESIUM OXIDE 400 MG ORAL TABLET 400 MILLIGRAM(S): 241.3 TABLET ORAL at 22:11

## 2021-05-05 RX ADMIN — MAGNESIUM OXIDE 400 MG ORAL TABLET 400 MILLIGRAM(S): 241.3 TABLET ORAL at 14:21

## 2021-05-05 RX ADMIN — MIDODRINE HYDROCHLORIDE 10 MILLIGRAM(S): 2.5 TABLET ORAL at 22:11

## 2021-05-05 RX ADMIN — CARBIDOPA AND LEVODOPA 2 TABLET(S): 25; 100 TABLET ORAL at 22:11

## 2021-05-05 RX ADMIN — Medication 100 MILLIGRAM(S): at 14:29

## 2021-05-05 RX ADMIN — SENNA PLUS 2 TABLET(S): 8.6 TABLET ORAL at 22:11

## 2021-05-05 RX ADMIN — CARBIDOPA AND LEVODOPA 2 TABLET(S): 25; 100 TABLET ORAL at 06:12

## 2021-05-05 RX ADMIN — ZINC SULFATE TAB 220 MG (50 MG ZINC EQUIVALENT) 220 MILLIGRAM(S): 220 (50 ZN) TAB at 14:21

## 2021-05-05 RX ADMIN — MIDODRINE HYDROCHLORIDE 10 MILLIGRAM(S): 2.5 TABLET ORAL at 14:20

## 2021-05-05 RX ADMIN — MIDODRINE HYDROCHLORIDE 10 MILLIGRAM(S): 2.5 TABLET ORAL at 06:12

## 2021-05-05 NOTE — PROGRESS NOTE ADULT - SUBJECTIVE AND OBJECTIVE BOX
NIEVES LABOY  71y, Male  Allergy: No Known Allergies      LOS  44d    CHIEF COMPLAINT: Change in Mental Status (05 May 2021 08:39)      INTERVAL EVENTS/HPI  - T(F): , Max: 99.1 (05-05-21 @ 08:00) on MV 30%  - WBC Count: 16.71 (05-05-21 @ 05:05) downtrending   WBC Count: 25.53 (05-04-21 @ 06:59)  - Creatinine, Serum: 0.7 (05-05-21 @ 05:05)  Creatinine, Serum: 0.8 (05-04-21 @ 06:59)     -   - D-Dimer Assay, Quantitative: 6747 ng/mL DDU (05-05-21 @ 10:48)    -     ROS  cannot obtain secondary to patient's sedation and/or mental status    VITALS:  T(F): 99.1, Max: 99.1 (05-05-21 @ 08:00)  HR: 60  BP: 115/57  RR: 20Vital Signs Last 24 Hrs  T(C): 37.3 (05 May 2021 08:00), Max: 37.3 (05 May 2021 08:00)  T(F): 99.1 (05 May 2021 08:00), Max: 99.1 (05 May 2021 08:00)  HR: 60 (05 May 2021 11:00) (50 - 68)  BP: 115/57 (05 May 2021 11:00) (95/51 - 128/68)  BP(mean): 80 (05 May 2021 11:00) (68 - 92)  RR: 20 (05 May 2021 11:00) (16 - 22)  SpO2: 100% (05 May 2021 11:00) (97% - 100%)    PHYSICAL EXAM:  Gen: Intubated  CV: RRR  Lungs: Decreased BS at bases  Abd: Soft  Neuro: Sedated  Lines clean, no phlebitis    FH: Non-contributory  Social Hx: Non-contributory    TESTS & MEASUREMENTS:                        7.3    16.71 )-----------( 47       ( 05 May 2021 05:05 )             23.4     05-05    143  |  111<H>  |  46<H>  ----------------------------<  116<H>  4.4   |  27  |  0.7    Ca    7.5<L>      05 May 2021 05:05  Phos  3.5     05-04  Mg     2.0     05-05    TPro  5.2<L>  /  Alb  1.4<L>  /  TBili  0.6  /  DBili  x   /  AST  51<H>  /  ALT  7   /  AlkPhos  271<H>  05-05    eGFR if Non African American: 94 mL/min/1.73M2 (05-05-21 @ 05:05)  eGFR if African American: 109 mL/min/1.73M2 (05-05-21 @ 05:05)    LIVER FUNCTIONS - ( 05 May 2021 05:05 )  Alb: 1.4 g/dL / Pro: 5.2 g/dL / ALK PHOS: 271 U/L / ALT: 7 U/L / AST: 51 U/L / GGT: x               Culture - Fungal, Bronchial (collected 05-04-21 @ 17:00)  Source: .Bronchial None  Preliminary Report (05-05-21 @ 08:19):    Testing in progress    Culture - Bronchial (collected 05-04-21 @ 17:00)  Source: .Bronchial None  Gram Stain (05-05-21 @ 03:49):    Rare polymorphonuclear leukocytes seen per low power field    No Squamous epithelial cells seen per low power field    Moderate Gram Negative Rods seen per oil power field    Culture - Blood (collected 04-28-21 @ 02:00)  Source: .Blood Blood-Peripheral  Gram Stain (04-30-21 @ 11:26):    Growth in anaerobic bottle: Gram Positive Cocci in Clusters  Final Report (05-02-21 @ 13:45):    Growth in anaerobic bottle: Staphylococcus epidermidis  Organism: Staphylococcus epidermidis (05-02-21 @ 13:45)  Organism: Staphylococcus epidermidis (05-02-21 @ 13:45)      -  Ampicillin/Sulbactam: R <=8/4      -  Cefazolin: R >16      -  Clindamycin: R >4      -  Erythromycin: R >4      -  Gentamicin: R >8 Should not be used as monotherapy      -  Oxacillin: R >2      -  Penicillin: R >8      -  RIF- Rifampin: S <=1 Should not be used as monotherapy      -  Tetra/Doxy: R >8      -  Trimethoprim/Sulfamethoxazole: R >2/38      -  Vancomycin: S 2      Method Type: JAZMIN    Culture - Blood (collected 04-26-21 @ 17:42)  Source: .Blood None  Final Report (05-01-21 @ 23:01):    No Growth Final    Culture - Urine (collected 04-26-21 @ 14:39)  Source: .Urine Clean Catch (Midstream)  Final Report (04-28-21 @ 15:53):    >100,000 CFU/ml Klebsiella pneumoniae (Carbapenem Resistant)  Organism: Klepne MDRO (04-28-21 @ 15:53)  Organism: Klepne MDRO (04-28-21 @ 15:53)      -  Amikacin: S <=16      -  Amoxicillin/Clavulanic Acid: R >16/8      -  Ampicillin: R >16 These ampicillin results predict results for amoxicillin      -  Ampicillin/Sulbactam: R >16/8 Enterobacter, Citrobacter, and Serratia may develop resistance during prolonged therapy (3-4 days)      -  Aztreonam: R >16      -  Cefazolin: R >16 (MIC_CL_COM_ENTERIC_CEFAZU) For uncomplicated UTI with K. pneumoniae, E. coli, or P. mirablis: JAZMIN <=16 is sensitive and JAZMIN >=32 is resistant. This also predicts results for oral agents cefaclor, cefdinir, cefpodoxime, cefprozil, cefuroxime axetil, cephalexin and locarbef for uncomplicated UTI. Note that some isolates may be susceptible to these agents while testing resistant to cefazolin.      -  Cefepime: R >16      -  Cefoxitin: I 16      -  Ceftriaxone: R >32 Enterobacter, Citrobacter, and Serratia may develop resistance during prolonged therapy      -  Ciprofloxacin: R >2      -  Ertapenem: R >1      -  Gentamicin: R >8      -  Imipenem: R 8      -  Levofloxacin: I 1      -  Meropenem: R >8      -  Nitrofurantoin: R >64 Should not be used to treat pyelonephritis      -  Piperacillin/Tazobactam: R >64      -  Tigecycline: S <=2      -  Tobramycin: R >8      -  Trimethoprim/Sulfamethoxazole: R >2/38      Method Type: JAZMIN    Culture - Urine (collected 04-24-21 @ 22:07)  Source: .Urine Clean Catch (Midstream)  Final Report (04-28-21 @ 16:01):    >100,000 CFU/ml Klebsiella pneumoniae (Carbapenem Resistant)  Organism: Klepne MDRO (04-28-21 @ 16:01)  Organism: Klepne MDRO (04-28-21 @ 16:01)      -  Amikacin: S <=16      -  Amoxicillin/Clavulanic Acid: R >16/8      -  Ampicillin: R >16 These ampicillin results predict results for amoxicillin      -  Ampicillin/Sulbactam: R >16/8 Enterobacter, Citrobacter, and Serratia may develop resistance during prolonged therapy (3-4 days)      -  Aztreonam: R >16      -  Cefazolin: R >16 (MIC_CL_COM_ENTERIC_CEFAZU) For uncomplicated UTI with K. pneumoniae, E. coli, or P. mirablis: JAZMIN <=16 is sensitive and JAZMIN >=32 is resistant. This also predicts results for oral agents cefaclor, cefdinir, cefpodoxime, cefprozil, cefuroxime axetil, cephalexin and locarbef for uncomplicated UTI. Note that some isolates may be susceptible to these agents while testing resistant to cefazolin.      -  Cefepime: R >16      -  Cefoxitin: S <=8      -  Ceftriaxone: R >32 Enterobacter, Citrobacter, and Serratia may develop resistance during prolonged therapy      -  Ciprofloxacin: R >2      -  Ertapenem: R >1      -  Gentamicin: R >8      -  Imipenem: R 8      -  Levofloxacin: I 1      -  Meropenem: R >8      -  Nitrofurantoin: R >64 Should not be used to treat pyelonephritis      -  Piperacillin/Tazobactam: R >64      -  Tigecycline: S <=2      -  Tobramycin: R >8      -  Trimethoprim/Sulfamethoxazole: R >2/38      Method Type: JAZMIN    Culture - Blood (collected 04-13-21 @ 07:34)  Source: .Blood None  Final Report (04-18-21 @ 17:00):    No Growth Final    Culture - Blood (collected 04-12-21 @ 21:38)  Source: .Blood None  Final Report (04-18-21 @ 05:00):    No Growth Final    Culture - Blood (collected 04-12-21 @ 16:00)  Source: .Blood Blood  Final Report (04-17-21 @ 22:01):    No Growth Final    Culture - Blood (collected 04-08-21 @ 16:00)  Source: .Blood Blood  Gram Stain (04-11-21 @ 08:45):    Growth in anaerobic bottle: Gram Positive Cocci in Clusters  Final Report (04-12-21 @ 10:51):    Growth in anaerobic bottle: Staphylococcus epidermidis Coag Negative    Staphylococcus    Single set isolate, possible contaminant. Contact    Microbiology if susceptibility testing clinically    indicated.    ***Blood Panel PCR results on this specimen are available    approximately 3 hours after the Gram stain result.***    Gram stain, PCR, and/or culture results may not always    correspond due to difference in methodologies.    ************************************************************    This PCR assay was performed by multiplex PCR. This    Assay tests for 66 bacterial and resistance gene targets.    Please refer to the [facility] test directory    at [email or web address] for details.  Organism: Blood Culture PCR (04-12-21 @ 10:51)  Organism: Blood Culture PCR (04-12-21 @ 10:51)      -  Staphylococcus epidermidis, Methicillin resistant: Detec      Method Type: PCR    Culture - Blood (collected 04-08-21 @ 16:00)  Source: .Blood Blood-Peripheral  Final Report (04-14-21 @ 01:00):    No Growth Final            INFECTIOUS DISEASES TESTING  Procalcitonin, Serum: 2.03 (04-29-21 @ 05:25)  Procalcitonin, Serum: 2.50 (04-28-21 @ 16:00)  Procalcitonin, Serum: 0.19 (04-26-21 @ 17:42)  COVID-19 PCR: NotDetec (04-17-21 @ 10:31)  COVID-19 PCR: NotDetec (04-14-21 @ 14:29)  COVID-19 PCR: NotDetec (04-12-21 @ 15:00)  COVID-19 PCR: NotDetec (04-07-21 @ 14:55)  Procalcitonin, Serum: 0.16 (04-06-21 @ 12:32)  COVID-19 PCR: NotDetec (03-30-21 @ 15:19)  COVID-19 PCR: NotDetec (03-29-21 @ 19:40)  Procalcitonin, Serum: 0.61 (03-26-21 @ 10:20)  COVID-19 PCR: NotDetec (03-22-21 @ 20:13)  COVID-19 PCR: NotDetec (03-10-21 @ 12:22)  COVID-19 PCR: NotDetec (03-01-21 @ 16:49)  Fungitell: 62 (03-01-21 @ 11:00)  Procalcitonin, Serum: 0.29 (03-01-21 @ 05:32)  MRSA PCR Result.: Negative (02-27-21 @ 15:44)  COVID-19 PCR: NotDetec (02-25-21 @ 15:34)  Procalcitonin, Serum: 0.27 (02-18-21 @ 10:54)  MRSA PCR Result.: Negative (02-14-21 @ 18:29)  HIV-1/2 Combo Result: Nonreact (02-14-21 @ 05:07)  Procalcitonin, Serum: 0.46 (02-13-21 @ 16:00)  COVID-19 PCR: NotDetec (02-12-21 @ 10:17)      INFLAMMATORY MARKERS      RADIOLOGY & ADDITIONAL TESTS:  I have personally reviewed the last available Chest xray  CXR  Xray Chest 1 View- PORTABLE-Urgent:   EXAM:  XR CHEST PORTABLE URGENT 1V            PROCEDURE DATE:  05/02/2021            INTERPRETATION:  Clinical History / Reason for exam: Line placement    Comparison : Chest radiograph May 2, 2021.    Technique/Positioning: Single AP view of the chest.    Findings:    Support devices: Endotracheal tube in place.    Cardiac/mediastinum/hilum: Unchanged.    Lung parenchyma/Pleura: Unchanged bilateral opacities and left effusion. No pneumothorax.    Skeleton/soft tissues: Stable.    Impression:    Stable bilateral opacities and left effusion.                  ELLIOT LANDAU MD; Attending Radiologist  This document has been electronically signed. May  2 2021  3:39PM (05-02-21 @ 14:58)      CT      CARDIOLOGY TESTING  12 Lead ECG:   Ventricular Rate 94 BPM    Atrial Rate 94 BPM    P-R Interval 216 ms    QRS Duration 88 ms    Q-T Interval 364 ms    QTC Calculation(Bazett) 455 ms    P Axis 52 degrees    R Axis 27 degrees    T Axis 38 degrees    Diagnosis Line Sinus rhythm with 1st degree A-V block  Otherwise normal ECG    Confirmed by Nathan Argueta ([id number]) on 4/28/2021 9:42:25 PM (04-28-21 @ 20:05)      MEDICATIONS  ascorbic acid 500  atorvastatin 40  carbidopa/levodopa  25/100 2  ceftazidime/avibactam IVPB 2.5  chlorhexidine 0.12% Liquid 15  chlorhexidine 4% Liquid 1  collagenase Ointment 1  Dakins Solution - 1/2 Strength 1  enoxaparin Injectable 40  lidocaine 2% Gel 1  lidocaine 2% Viscous 100  magnesium oxide 400  metroNIDAZOLE  IVPB 500  midodrine 10  phenylephrine    Infusion 0.1  polyethylene glycol 3350 17  senna 2  zinc sulfate 220      WEIGHT  Weight (kg): 79.4 (03-31-21 @ 15:48)  Creatinine, Serum: 0.7 mg/dL (05-05-21 @ 05:05)      ANTIBIOTICS:  ceftazidime/avibactam IVPB 2.5 Gram(s) IV Intermittent every 8 hours  metroNIDAZOLE  IVPB 500 milliGRAM(s) IV Intermittent every 8 hours      All available historical records have been reviewed

## 2021-05-05 NOTE — PROGRESS NOTE ADULT - SUBJECTIVE AND OBJECTIVE BOX
pt seen this morning in VU, d/w HO and RN  pt remains on vent, settings/ requirements slightly reduced  pt opens eyes but not following commands  abd soft, ND, pt tolerating enteral feeding, +GT  + BMs - brown, not large volume, more liquidy   ++ edema of forearms, sacrum, thighs  urine output 100-150 ml/h - urine clear    Vital Signs Last 24 Hrs  T(C): 37.3 (05 May 2021 16:00), Max: 37.3 (05 May 2021 08:00)  T(F): 99.1 (05 May 2021 16:00), Max: 99.1 (05 May 2021 08:00)  HR: 56 (05 May 2021 19:00) (50 - 65)  BP: 106/60 (05 May 2021 19:00) (99/56 - 127/61)  BP(mean): 74 (05 May 2021 19:00) (74 - 90)  RR: 20 (05 May 2021 19:00) (16 - 22)  SpO2: 100% (05 May 2021 19:00) (100% - 100%)    MEDICATIONS  (STANDING):  ascorbic acid 500 milliGRAM(s) Oral daily  atorvastatin 40 milliGRAM(s) Oral at bedtime  carbidopa/levodopa  25/100 2 Tablet(s) Oral <User Schedule>  ceftazidime/avibactam IVPB 2.5 Gram(s) IV Intermittent every 8 hours  chlorhexidine 0.12% Liquid 15 milliLiter(s) Oral Mucosa every 12 hours  chlorhexidine 4% Liquid 1 Application(s) Topical <User Schedule>  collagenase Ointment 1 Application(s) Topical two times a day  Dakins Solution - 1/2 Strength 1 Application(s) Topical two times a day  enoxaparin Injectable 40 milliGRAM(s) SubCutaneous daily  lidocaine 2% Gel 1 Application(s) Topical once  lidocaine 2% Viscous 100 milliLiter(s) Swish and Spit once  magnesium oxide 400 milliGRAM(s) Oral every 8 hours  metroNIDAZOLE  IVPB 500 milliGRAM(s) IV Intermittent every 8 hours  midodrine 10 milliGRAM(s) Oral every 8 hours  phenylephrine    Infusion 0.1 MICROgram(s)/kG/Min (2.98 mL/Hr) IV Continuous <Continuous>  polyethylene glycol 3350 17 Gram(s) Oral daily  senna 2 Tablet(s) Oral at bedtime  zinc sulfate 220 milliGRAM(s) Oral daily                        7.3    16.71 )-----------( 47       ( 05 May 2021 05:05 )             23.4   05-05    143  |  111<H>  |  46<H>  ----------------------------<  116<H>  4.4   |  27  |  0.7    Ca    7.5<L>      05 May 2021 05:05  Phos  3.5     05-04  Mg     2.0     05-05    TPro  5.2<L>  /  Alb  1.4<L>  /  TBili  0.6  /  DBili  x   /  AST  51<H>  /  ALT  7   /  AlkPhos  271<H>  05-05

## 2021-05-05 NOTE — PROGRESS NOTE ADULT - ASSESSMENT
IMPRESSION:    Acute hypoxemic resp failure/ L lung collapse sp bronch   Septic shock on shalom  Infected sacral ulcer/ UTI  Cholecystitis sp Perc gayle  MDR pneumonia Klebsiella   HO parkinson's sp PEG  Anemia  HO DVT/ A fib  DEC HB NO ACTIVE BLEED      PLAN:    CNS: Keep off sedation.  FU CTH.  EEG.  FU MS.      HEENT:  Oral care    PULMONARY:  HOB @ 45 degrees, Wean FIO2 keep Sao2 92 to 96%.   Aggressive pulmonary toilet.  PEEP 7.       CARDIOVASCULAR: Keep equal. Avoid volume overload.    GI: GI prophylaxis                                          Feeding PEG.      RENAL:  F/u  lytes.  Correct as needed. accurate I/O    INFECTIOUS DISEASE: Cont abx per ID    HEMATOLOGICAL:  DVT prophylaxis seq. FU Duplex     ENDOCRINE:  Follow up FS.  Insulin protocol if needed.    CODE STATUS: FULL CODE    Very poor prognosis IMPRESSION:    Acute hypoxemic resp failure/ L lung collapse sp bronch   Septic shock on shalom  Infected sacral ulcer/ UTI  Cholecystitis sp Perc gayle  MDR pneumonia Klebsiella   HO parkinson's sp PEG  Anemia  HO DVT/ A fib  DEC HB NO ACTIVE BLEED      PLAN:    CNS: Keep off sedation.  FU CTH.  EEG.  FU MS.      HEENT:  Oral care    PULMONARY:  HOB @ 45 degrees, Wean FIO2 keep Sao2 92 to 96%.   Aggressive pulmonary toilet.  PEEP 7.       CARDIOVASCULAR: Keep equal. Avoid volume overload.    GI: GI prophylaxis                                          Feeding PEG.      RENAL:  F/u  lytes.  Correct as needed. accurate I/O    INFECTIOUS DISEASE: Cont abx per ID    HEMATOLOGICAL:  DVT prophylaxis seq. FU Duplex     ENDOCRINE:  Follow up FS.  Insulin protocol if needed.    CODE STATUS: FULL CODE    DC TLC     Very poor prognosis

## 2021-05-05 NOTE — PROGRESS NOTE ADULT - ASSESSMENT
IMP:  - altered mental status/ metabolic encephalopathy from sepsis  - large necrotic sacral ulcer stage 4  - post hemorrhagic anemia  - thrombocytopenia  - h/o Parkinson's disease, DM    PLAN:  - cont Jevity 1.2 X 2 feeds and advance to goal of 360 ml Jevity 1.2 over 45 minutes x 4 feeds/d --> 79 gm protein & 1710 kcal/d  - Moris 1 pack mixed with 4-6oz H2O q12hrs, d/c Prosource TF  - cont zinc 220mg and vitamin C 500 mg once daily each.    - d/c senna  - hold miralax 24h, then resume at half dose  - d/c oral/ enteral Mg as this will worsen diarrhea  - cont current feeding  - consider adding lactobacillus and FLoraStor

## 2021-05-05 NOTE — PROGRESS NOTE ADULT - SUBJECTIVE AND OBJECTIVE BOX
Patient is a 71y old  Male who presents with a chief complaint of Change in Mental Status (04 May 2021 08:40)        Over Night Events:  Remains on MV.  SP bronch and CTH.  Off Sedation for 3 days         ROS:     All ROS are negative except HPI         PHYSICAL EXAM    ICU Vital Signs Last 24 Hrs  T(C): 36.2 (05 May 2021 04:00), Max: 36.6 (04 May 2021 16:00)  T(F): 97.2 (05 May 2021 04:00), Max: 97.8 (04 May 2021 16:00)  HR: 55 (05 May 2021 08:00) (50 - 68)  BP: 113/58 (05 May 2021 08:00) (95/51 - 128/68)  BP(mean): 81 (05 May 2021 08:00) (68 - 92)  ABP: --  ABP(mean): --  RR: 22 (05 May 2021 08:00) (16 - 22)  SpO2: 100% (05 May 2021 08:00) (97% - 100%)      CONSTITUTIONAL:  Well nourished.  NAD    ENT:   Airway patent,   Mouth with normal mucosa.   No thrush    EYES:   Pupils equal,   Round and reactive to light.    CARDIAC:   Normal rate,   Regular rhythm.    No edema      Vascular:  Normal systolic impulse  No Carotid bruits    RESPIRATORY:   No wheezing  Bilateral BS  Normal chest expansion  Not tachypneic,  No use of accessory muscles    GASTROINTESTINAL:  Abdomen soft,   Non-tender,   No guarding,   + BS    MUSCULOSKELETAL:   Range of motion is not limited,  No clubbing, cyanosis    NEUROLOGICAL:   MOre responsive to pain     SKIN:   Skin normal color for race,   Warm and dry   No evidence of rash.    PSYCHIATRIC:   No apparent risk to self or others.    HEMATOLOGICAL:  No cervical  lymphadenopathy.  no inguinal lymphadenopathy      05-04-21 @ 07:01  -  05-05-21 @ 07:00  --------------------------------------------------------  IN:    Enteral Tube Flush: 530 mL    IV PiggyBack: 200 mL    IV PiggyBack: 200 mL    Jevity 1.2: 1080 mL    Lactated Ringers Bolus: 500 mL    Phenylephrine: 59.2 mL  Total IN: 2569.2 mL    OUT:    Indwelling Catheter - Urethral (mL): 1895 mL  Total OUT: 1895 mL    Total NET: 674.2 mL          LABS:                            7.3    16.71 )-----------( 47       ( 05 May 2021 05:05 )             23.4                         7.3    16.71 )-----------( 47       ( 05-05 @ 05:05 )             23.4                8.2    25.53 )-----------( 55       ( 05-04 @ 06:59 )             25.9                8.1    20.19 )-----------( 50       ( 05-03 @ 05:00 )             26.2                                            05-05    143  |  111<H>  |  46<H>  ----------------------------<  116<H>  4.4   |  27  |  0.7    Ca    7.5<L>      05 May 2021 05:05  Phos  3.5     05-04  Mg     2.0     05-05    TPro  5.2<L>  /  Alb  1.4<L>  /  TBili  0.6  /  DBili  x   /  AST  51<H>  /  ALT  7   /  AlkPhos  271<H>  05-05      PT/INR - ( 04 May 2021 06:59 )   PT: 16.60 sec;   INR: 1.44 ratio                                                                                              LIVER FUNCTIONS - ( 05 May 2021 05:05 )  Alb: 1.4 g/dL / Pro: 5.2 g/dL / ALK PHOS: 271 U/L / ALT: 7 U/L / AST: 51 U/L / GGT: x                                                  Culture - Fungal, Bronchial (collected 04 May 2021 17:00)  Source: .Bronchial None  Preliminary Report (05 May 2021 08:19):    Testing in progress    Culture - Bronchial (collected 04 May 2021 17:00)  Source: .Bronchial None  Gram Stain (05 May 2021 03:49):    Rare polymorphonuclear leukocytes seen per low power field    No Squamous epithelial cells seen per low power field    Moderate Gram Negative Rods seen per oil power field                                                   Mode: AC/ CMV (Assist Control/ Continuous Mandatory Ventilation)  RR (machine): 16  TV (machine): 350  FiO2: 30  PEEP: 9  ITime: 1  MAP: 8  PIP: 17                                      ABG - ( 05 May 2021 03:37 )  pH, Arterial: 7.50  pH, Blood: x     /  pCO2: 35    /  pO2: 113   / HCO3: 27    / Base Excess: 3.5   /  SaO2: 99                  MEDICATIONS  (STANDING):  ascorbic acid 500 milliGRAM(s) Oral daily  atorvastatin 40 milliGRAM(s) Oral at bedtime  carbidopa/levodopa  25/100 2 Tablet(s) Oral <User Schedule>  ceftazidime/avibactam IVPB 2.5 Gram(s) IV Intermittent every 8 hours  chlorhexidine 0.12% Liquid 15 milliLiter(s) Oral Mucosa every 12 hours  chlorhexidine 4% Liquid 1 Application(s) Topical <User Schedule>  collagenase Ointment 1 Application(s) Topical two times a day  Dakins Solution - 1/2 Strength 1 Application(s) Topical two times a day  lidocaine 2% Gel 1 Application(s) Topical once  lidocaine 2% Viscous 100 milliLiter(s) Swish and Spit once  magnesium oxide 400 milliGRAM(s) Oral every 8 hours  metroNIDAZOLE  IVPB 500 milliGRAM(s) IV Intermittent every 8 hours  midodrine 10 milliGRAM(s) Oral every 8 hours  phenylephrine    Infusion 0.1 MICROgram(s)/kG/Min (2.98 mL/Hr) IV Continuous <Continuous>  polyethylene glycol 3350 17 Gram(s) Oral daily  senna 2 Tablet(s) Oral at bedtime  zinc sulfate 220 milliGRAM(s) Oral daily    MEDICATIONS  (PRN):  acetaminophen   Tablet .. 650 milliGRAM(s) Oral every 6 hours PRN Temp greater or equal to 38C (100.4F)      New X-rays reviewed:                                                                                  ECHO    CXR interpreted by me:  ET OK>  LLL atelectasis / infiltrate

## 2021-05-05 NOTE — PROGRESS NOTE ADULT - ASSESSMENT
ASSESSMENT  71 year old Male with past medical history of Parkinson's, dementia, CKD Stage 3, 1st degree AV block, HLD, gout, HTN, DM, fungal septicemia presenting from [facility] for acute decompensation in mental status.     IMPRESSION  #RESOLVED Sepsis/Fever, suspected acute cholecystitis vs UTI, HAP    5/1 BCX NGTD     s/p 4/29 perc gayle, CX NG     4/28 Blood CX coNS,     4/28 UCX NG     4/26 BCX NGTD     4/24 UCX Kleb (Carbapenem Resistant)   HIDA +  < from: [facility] Dup[facility] Lower Ext Vein Scan, Bilat (04.21.21 @ 11:41) >  No evidence of deep venous thrombosis or superficial thrombophlebitis in the bilateral lower extremities.    4/24 UA WBC 82  #Intubated L lung collapse s/p bronch with mucous plugs    4/28 bronch Kleb   #RESOLVED CoNS in blood,  contaminant     only PIVs    4/8 BCX 1/2 sets   -  Staphylococcus epidermidis, Methicillin resistant: Detec    3/25 BCX 1/2 sets, 1/4 bottles Staphylococcus pettenkoferi, likely contaminant   #RESOLVED Fever with sepsis, not present on admission with metabolic encephalopathy, EEG + seizure, possibly in setting of sepsis secondary to large sacral infected decub    LP not consistent with infection.. G/S NEGATIVE (on ABX)    Total Nucleated Cell Count, CSF: 0 /uL (04.01.21 @ 14:00)    Protein, CSF: 42 mg/dL (04.01.21 @ 14:00)    Glucose, CSF: 97 mg/dL (04.01.21 @ 14:00)    3/25 BCX 1/2 sets, 1/4 bottles Staphylococcus pettenkoferi, likely contaminant     CXR no PNA   3/22 Blood & Urine cxs NGTD   #Sacral ulcer- necrotic     3/31 WCX GNR    3/29   Numerous Klebsiella pneumoniae ESBL    Few CRE Pseudomonas aeruginosa (high MICs to AGs)    Numerous Enterococcus faecalis (vancomycin resistant)- S amp    seen by Burn sacrum with full thickness ~4x5cm with dark /brown devitalized tissue at base; no purulent drainage, no bleeding  #Recent Fungemia    Blood Cx 2/27 Candida albicans    evaluated by optho - no ocular evidence     TTE no significant valvulopathy   #Shoulder dislocation with effusion- joint exam not consistent with a septic arthritis  < from: CT Chest w/ IV Cont (04.07.21 @ 20:31) >  Similar appearance of the left shoulder with complex joint effusion, described in detail on prior exam CT shoulder 2/20/2021.  #ABEBE, resolved  #L hand edema  < from: Xray Wrist 2 Views, Left (03.27.21 @ 11:13) >No acute fracture or dislocation. Diffuse soft tissue swelling. Nonaggressive appearing lucency in the distal radius, indeterminate. Nonemergent MRI may be obtained for further evaluation.    Creatinine, Serum: 0.8 mg/dL (04.26.21 @ 12:17)      RECOMMENDATIONS  - Avycaz (non-formulary form) 2.5g q8h IV & Flagyl 500mg q8h IV x 7 days end 5/11  - TLC 4/28-  - Appreciate IR consult  - Appreciate Burn consult   - Contact isolation CRE  - GOC, grave prognosis MDROs    If any questions, please call or send a message on [employer] Teams  Spectra [phone]

## 2021-05-05 NOTE — PROGRESS NOTE ADULT - ASSESSMENT
Patient is a 71y old  Male who presents with a chief complaint of Change in Mental Status. Pt seen by burn for f/u sacral wound.    - No surgical intervention at this time  - Culture take (5/5): follow results  - Local wound care:  Please wash with soap and water. Apply Wound care: Zantyl, Dakins WTD and ABD.  - Turning and position changes  - Air Mattress    - Remainder of care per primary team  - Recall PRN

## 2021-05-05 NOTE — PROGRESS NOTE ADULT - SUBJECTIVE AND OBJECTIVE BOX
Patient is a 71y old  Male who presents with a chief complaint of Change in Mental Status. Pt seen by burn for f/u sacral wound    Vital Signs Last 24 Hrs  T(C): 36.8 (05 May 2021 12:00), Max: 37.3 (05 May 2021 08:00)  T(F): 98.3 (05 May 2021 12:00), Max: 99.1 (05 May 2021 08:00)  HR: 55 (05 May 2021 13:00) (50 - 68)  BP: 124/60 (05 May 2021 13:00) (95/51 - 128/68)  BP(mean): 86 (05 May 2021 13:00) (68 - 92)  RR: 20 (05 May 2021 13:00) (16 - 22)  SpO2: 100% (05 May 2021 13:00) (97% - 100%)    Labs:                        7.3    16.71 )-----------( 47       ( 05 May 2021 05:05 )             23.4           PE: Wound is pink and moist with granulation tissue. Few areas of brown devitalized tissue located central superior area. No active bleeding, serosanguinous drainage, erythema or purulent discharge.    Wound care: Zantyl, Dakins WTD and ABD.

## 2021-05-06 LAB
ALBUMIN SERPL ELPH-MCNC: 1.5 G/DL — LOW (ref 3.5–5.2)
ALP SERPL-CCNC: 300 U/L — HIGH (ref 30–115)
ALT FLD-CCNC: 9 U/L — SIGNIFICANT CHANGE UP (ref 0–41)
ANION GAP SERPL CALC-SCNC: 8 MMOL/L — SIGNIFICANT CHANGE UP (ref 7–14)
AST SERPL-CCNC: 87 U/L — HIGH (ref 0–41)
BASOPHILS # BLD AUTO: 0.05 K/UL — SIGNIFICANT CHANGE UP (ref 0–0.2)
BASOPHILS NFR BLD AUTO: 0.4 % — SIGNIFICANT CHANGE UP (ref 0–1)
BILIRUB SERPL-MCNC: 0.5 MG/DL — SIGNIFICANT CHANGE UP (ref 0.2–1.2)
BUN SERPL-MCNC: 37 MG/DL — HIGH (ref 10–20)
CALCIUM SERPL-MCNC: 7.3 MG/DL — LOW (ref 8.5–10.1)
CHLORIDE SERPL-SCNC: 111 MMOL/L — HIGH (ref 98–110)
CO2 SERPL-SCNC: 22 MMOL/L — SIGNIFICANT CHANGE UP (ref 17–32)
CREAT SERPL-MCNC: 0.7 MG/DL — SIGNIFICANT CHANGE UP (ref 0.7–1.5)
CULTURE RESULTS: SIGNIFICANT CHANGE UP
EOSINOPHIL # BLD AUTO: 0.29 K/UL — SIGNIFICANT CHANGE UP (ref 0–0.7)
EOSINOPHIL NFR BLD AUTO: 2.1 % — SIGNIFICANT CHANGE UP (ref 0–8)
GLUCOSE BLDC GLUCOMTR-MCNC: 87 MG/DL — SIGNIFICANT CHANGE UP (ref 70–99)
GLUCOSE BLDC GLUCOMTR-MCNC: 88 MG/DL — SIGNIFICANT CHANGE UP (ref 70–99)
GLUCOSE SERPL-MCNC: 123 MG/DL — HIGH (ref 70–99)
HCT VFR BLD CALC: 24.6 % — LOW (ref 42–52)
HGB BLD-MCNC: 7.7 G/DL — LOW (ref 14–18)
IMM GRANULOCYTES NFR BLD AUTO: 4.3 % — HIGH (ref 0.1–0.3)
LYMPHOCYTES # BLD AUTO: 1.39 K/UL — SIGNIFICANT CHANGE UP (ref 1.2–3.4)
LYMPHOCYTES # BLD AUTO: 10 % — LOW (ref 20.5–51.1)
MAGNESIUM SERPL-MCNC: 2 MG/DL — SIGNIFICANT CHANGE UP (ref 1.8–2.4)
MCHC RBC-ENTMCNC: 26.4 PG — LOW (ref 27–31)
MCHC RBC-ENTMCNC: 31.3 G/DL — LOW (ref 32–37)
MCV RBC AUTO: 84.2 FL — SIGNIFICANT CHANGE UP (ref 80–94)
MONOCYTES # BLD AUTO: 0.7 K/UL — HIGH (ref 0.1–0.6)
MONOCYTES NFR BLD AUTO: 5.1 % — SIGNIFICANT CHANGE UP (ref 1.7–9.3)
NEUTROPHILS # BLD AUTO: 10.82 K/UL — HIGH (ref 1.4–6.5)
NEUTROPHILS NFR BLD AUTO: 78.1 % — HIGH (ref 42.2–75.2)
NRBC # BLD: 0 /100 WBCS — SIGNIFICANT CHANGE UP (ref 0–0)
PLATELET # BLD AUTO: 68 K/UL — LOW (ref 130–400)
POTASSIUM SERPL-MCNC: 4.1 MMOL/L — SIGNIFICANT CHANGE UP (ref 3.5–5)
POTASSIUM SERPL-SCNC: 4.1 MMOL/L — SIGNIFICANT CHANGE UP (ref 3.5–5)
PROT SERPL-MCNC: 5.4 G/DL — LOW (ref 6–8)
RBC # BLD: 2.92 M/UL — LOW (ref 4.7–6.1)
RBC # FLD: 17.2 % — HIGH (ref 11.5–14.5)
SODIUM SERPL-SCNC: 141 MMOL/L — SIGNIFICANT CHANGE UP (ref 135–146)
SPECIMEN SOURCE: SIGNIFICANT CHANGE UP
WBC # BLD: 13.85 K/UL — HIGH (ref 4.8–10.8)
WBC # FLD AUTO: 13.85 K/UL — HIGH (ref 4.8–10.8)

## 2021-05-06 PROCEDURE — 99232 SBSQ HOSP IP/OBS MODERATE 35: CPT

## 2021-05-06 PROCEDURE — 71045 X-RAY EXAM CHEST 1 VIEW: CPT | Mod: 26

## 2021-05-06 RX ORDER — DEXMEDETOMIDINE HYDROCHLORIDE IN 0.9% SODIUM CHLORIDE 4 UG/ML
0.2 INJECTION INTRAVENOUS
Qty: 400 | Refills: 0 | Status: DISCONTINUED | OUTPATIENT
Start: 2021-05-06 | End: 2021-05-08

## 2021-05-06 RX ADMIN — Medication 1 APPLICATION(S): at 05:31

## 2021-05-06 RX ADMIN — CHLORHEXIDINE GLUCONATE 15 MILLILITER(S): 213 SOLUTION TOPICAL at 05:28

## 2021-05-06 RX ADMIN — DEXMEDETOMIDINE HYDROCHLORIDE IN 0.9% SODIUM CHLORIDE 3.97 MICROGRAM(S)/KG/HR: 4 INJECTION INTRAVENOUS at 20:40

## 2021-05-06 RX ADMIN — CARBIDOPA AND LEVODOPA 2 TABLET(S): 25; 100 TABLET ORAL at 05:28

## 2021-05-06 RX ADMIN — ZINC SULFATE TAB 220 MG (50 MG ZINC EQUIVALENT) 220 MILLIGRAM(S): 220 (50 ZN) TAB at 11:38

## 2021-05-06 RX ADMIN — CARBIDOPA AND LEVODOPA 2 TABLET(S): 25; 100 TABLET ORAL at 22:34

## 2021-05-06 RX ADMIN — MIDODRINE HYDROCHLORIDE 10 MILLIGRAM(S): 2.5 TABLET ORAL at 14:43

## 2021-05-06 RX ADMIN — MIDODRINE HYDROCHLORIDE 10 MILLIGRAM(S): 2.5 TABLET ORAL at 05:28

## 2021-05-06 RX ADMIN — CHLORHEXIDINE GLUCONATE 1 APPLICATION(S): 213 SOLUTION TOPICAL at 05:28

## 2021-05-06 RX ADMIN — POLYETHYLENE GLYCOL 3350 17 GRAM(S): 17 POWDER, FOR SOLUTION ORAL at 11:40

## 2021-05-06 RX ADMIN — Medication 100 MILLIGRAM(S): at 22:39

## 2021-05-06 RX ADMIN — MAGNESIUM OXIDE 400 MG ORAL TABLET 400 MILLIGRAM(S): 241.3 TABLET ORAL at 05:28

## 2021-05-06 RX ADMIN — ATORVASTATIN CALCIUM 40 MILLIGRAM(S): 80 TABLET, FILM COATED ORAL at 22:34

## 2021-05-06 RX ADMIN — CARBIDOPA AND LEVODOPA 2 TABLET(S): 25; 100 TABLET ORAL at 11:38

## 2021-05-06 RX ADMIN — Medication 500 MILLIGRAM(S): at 11:37

## 2021-05-06 RX ADMIN — CARBIDOPA AND LEVODOPA 2 TABLET(S): 25; 100 TABLET ORAL at 17:16

## 2021-05-06 RX ADMIN — Medication 100 MILLIGRAM(S): at 05:31

## 2021-05-06 RX ADMIN — Medication 1 APPLICATION(S): at 17:16

## 2021-05-06 RX ADMIN — MAGNESIUM OXIDE 400 MG ORAL TABLET 400 MILLIGRAM(S): 241.3 TABLET ORAL at 22:33

## 2021-05-06 RX ADMIN — MAGNESIUM OXIDE 400 MG ORAL TABLET 400 MILLIGRAM(S): 241.3 TABLET ORAL at 14:42

## 2021-05-06 RX ADMIN — CHLORHEXIDINE GLUCONATE 15 MILLILITER(S): 213 SOLUTION TOPICAL at 17:16

## 2021-05-06 RX ADMIN — Medication 100 MILLIGRAM(S): at 14:52

## 2021-05-06 RX ADMIN — ENOXAPARIN SODIUM 40 MILLIGRAM(S): 100 INJECTION SUBCUTANEOUS at 11:40

## 2021-05-06 RX ADMIN — SENNA PLUS 2 TABLET(S): 8.6 TABLET ORAL at 22:33

## 2021-05-06 RX ADMIN — MIDODRINE HYDROCHLORIDE 10 MILLIGRAM(S): 2.5 TABLET ORAL at 22:34

## 2021-05-06 RX ADMIN — Medication 1 APPLICATION(S): at 17:17

## 2021-05-06 NOTE — PROGRESS NOTE ADULT - ASSESSMENT
IMPRESSION:    Acute hypoxemic resp failure/ L lung collapse sp bronch   Septic shock resolved   Infected sacral ulcer/ UTI  Cholecystitis sp Perc gayle  MDR pneumonia Klebsiella   HO parkinson's sp PEG  Anemia  HO DVT/ A fib  DEC HB NO ACTIVE BLEED  Thrombocytopenia HIT negative     PLAN:    CNS: Keep off sedation.  Precedex if needed       HEENT:  Oral care    PULMONARY:  HOB @ 45 degrees, Wean FIO2 keep Sao2 92 to 96%.   Aggressive pulmonary toilet.  SBT.  Bronchoscopy prior to extubation        CARDIOVASCULAR: Keep equal. Avoid volume overload.    GI: GI prophylaxis                                          Feeding PEG.      RENAL:  F/u  lytes.  Correct as needed. accurate I/O    INFECTIOUS DISEASE: Cont abx per ID    HEMATOLOGICAL:  DVT prophylaxis seq and LMWH     ENDOCRINE:  Follow up FS.  Insulin protocol if needed.    CODE STATUS: FULL CODE    Very poor prognosis

## 2021-05-06 NOTE — PROGRESS NOTE ADULT - ASSESSMENT
ASSESSMENT  71 year old Male with past medical history of Parkinson's, dementia, CKD Stage 3, 1st degree AV block, HLD, gout, HTN, DM, fungal septicemia presenting from [facility] for acute decompensation in mental status.     IMPRESSION  #RESOLVED Sepsis/Fever, suspected acute cholecystitis vs UTI, HAP    5/1 BCX NGTD     s/p 4/29 perc gayle, CX NG     4/28 Blood CX coNS,     4/28 UCX NG     4/26 BCX NGTD     4/24 UCX Kleb (Carbapenem Resistant)   HIDA +  < from: [facility] Duplex Lower Ext Vein Scan, Bilat (04.21.21 @ 11:41) >  No evidence of deep venous thrombosis or superficial thrombophlebitis in the bilateral lower extremities.    4/24 UA WBC 82  #Intubated L lung collapse s/p bronch with mucous plugs    4/28 bronch Kleb   #RESOLVED CoNS in blood,  contaminant     only PIVs    4/8 BCX 1/2 sets   -  Staphylococcus epidermidis, Methicillin resistant: Detec    3/25 BCX 1/2 sets, 1/4 bottles Staphylococcus pettenkoferi, likely contaminant   #RESOLVED Fever with sepsis, not present on admission with metabolic encephalopathy, EEG + seizure, possibly in setting of sepsis secondary to large sacral infected decub    LP not consistent with infection.. G/S NEGATIVE (on ABX)    Total Nucleated Cell Count, CSF: 0 /uL (04.01.21 @ 14:00)    Protein, CSF: 42 mg/dL (04.01.21 @ 14:00)    Glucose, CSF: 97 mg/dL (04.01.21 @ 14:00)    3/25 BCX 1/2 sets, 1/4 bottles Staphylococcus pettenkoferi, likely contaminant     CXR no PNA   3/22 Blood & Urine cxs NGTD   #Sacral ulcer- necrotic     3/31 WCX GNR    3/29   Numerous Klebsiella pneumoniae ESBL    Few CRE Pseudomonas aeruginosa (high MICs to AGs)    Numerous Enterococcus faecalis (vancomycin resistant)- S amp    seen by Burn sacrum with full thickness ~4x5cm with dark /brown devitalized tissue at base; no purulent drainage, no bleeding  #Recent Fungemia    Blood Cx 2/27 Candida albicans    evaluated by optho - no ocular evidence     TTE no significant valvulopathy   #Shoulder dislocation with effusion- joint exam not consistent with a septic arthritis  < from: CT Chest w/ IV Cont (04.07.21 @ 20:31) >  Similar appearance of the left shoulder with complex joint effusion, described in detail on prior exam CT shoulder 2/20/2021.  #ABEBE, resolved  #L hand edema  < from: Xray Wrist 2 Views, Left (03.27.21 @ 11:13) >No acute fracture or dislocation. Diffuse soft tissue swelling. Nonaggressive appearing lucency in the distal radius, indeterminate. Nonemergent MRI may be obtained for further evaluation.    Creatinine, Serum: 0.8 mg/dL (04.26.21 @ 12:17)      RECOMMENDATIONS  - Avycaz (non-formulary form) 2.5g q8h IV & Flagyl 500mg q8h IV x 7 days end 5/11  - f/u WCX  - TLC 4/28-  - Appreciate IR consult  - Appreciate Burn consult   - Contact isolation CRE  - GOC, grave prognosis MDROs    If any questions, please call or send a message on [employer] Teams  Spectra [phone]

## 2021-05-06 NOTE — PROGRESS NOTE ADULT - SUBJECTIVE AND OBJECTIVE BOX
Patient is a 71y old  Male who presents with a chief complaint of Change in Mental Status (05 May 2021 20:17)        Over Night Events:  Remains on MV.  More awake.  Off pressors         ROS:     All ROS are negative except HPI         PHYSICAL EXAM    ICU Vital Signs Last 24 Hrs  T(C): 35.4 (06 May 2021 04:00), Max: 37.3 (05 May 2021 16:00)  T(F): 95.7 (06 May 2021 04:00), Max: 99.1 (05 May 2021 16:00)  HR: 55 (06 May 2021 06:00) (52 - 65)  BP: 117/81 (06 May 2021 06:00) (104/58 - 144/70)  BP(mean): 117 (06 May 2021 06:00) (74 - 117)  ABP: --  ABP(mean): --  RR: 19 (06 May 2021 06:00) (19 - 22)  SpO2: 100% (06 May 2021 06:00) (97% - 100%)      CONSTITUTIONAL:  Well nourished.  NAD    ENT:   Airway patent,   Mouth with normal mucosa.   No thrush    EYES:   Pupils equal,   Round and reactive to light.    CARDIAC:   Normal rate,   Regular rhythm.    No edema      Vascular:  Normal systolic impulse  No Carotid bruits    RESPIRATORY:   No wheezing  Bilateral BS  Normal chest expansion  Not tachypneic,  No use of accessory muscles    GASTROINTESTINAL:  Abdomen soft,   Non-tender,   No guarding,   + BS    MUSCULOSKELETAL:   Range of motion is not limited,  No clubbing, cyanosis    NEUROLOGICAL:   Alert  Generalized weakness  Follows simple commands     SKIN:   Skin normal color for race,   Warm and dry  No evidence of rash.    PSYCHIATRIC:   Normal mood and affect.   No apparent risk to self or others.    HEMATOLOGICAL:  No cervical  lymphadenopathy.  no inguinal lymphadenopathy      05-05-21 @ 07:01  -  05-06-21 @ 07:00  --------------------------------------------------------  IN:    Enteral Tube Flush: 800 mL    IV PiggyBack: 200 mL    IV PiggyBack: 200 mL    Jevity 1.2: 1440 mL  Total IN: 2640 mL    OUT:    Indwelling Catheter - Urethral (mL): 1860 mL  Total OUT: 1860 mL    Total NET: 780 mL          LABS:                            7.3    16.71 )-----------( 47       ( 05 May 2021 05:05 )             23.4                                               05-05    143  |  111<H>  |  46<H>  ----------------------------<  116<H>  4.4   |  27  |  0.7    Ca    7.5<L>      05 May 2021 05:05  Mg     2.0     05-05    TPro  5.2<L>  /  Alb  1.4<L>  /  TBili  0.6  /  DBili  x   /  AST  51<H>  /  ALT  7   /  AlkPhos  271<H>  05-05      PT/INR - ( 05 May 2021 10:48 )   PT: 14.60 sec;   INR: 1.27 ratio         PTT - ( 05 May 2021 10:48 )  PTT:44.7 sec                                                                                     LIVER FUNCTIONS - ( 05 May 2021 05:05 )  Alb: 1.4 g/dL / Pro: 5.2 g/dL / ALK PHOS: 271 U/L / ALT: 7 U/L / AST: 51 U/L / GGT: x                                                  Culture - Fungal, Bronchial (collected 04 May 2021 17:00)  Source: .Bronchial None  Preliminary Report (05 May 2021 08:19):    Testing in progress    Culture - Acid Fast - Bronchial w/Smear (collected 04 May 2021 17:00)  Source: .Bronchial None    Culture - Bronchial (collected 04 May 2021 17:00)  Source: .Bronchial None  Gram Stain (05 May 2021 03:49):    Rare polymorphonuclear leukocytes seen per low power field    No Squamous epithelial cells seen per low power field    Moderate Gram Negative Rods seen per oil power field  Preliminary Report (05 May 2021 22:08):    Moderate Klebsiella pneumoniae                                                   Mode: AC/ CMV (Assist Control/ Continuous Mandatory Ventilation)  RR (machine): 16  TV (machine): 350  FiO2: 30  PEEP: 7  ITime: 1  MAP: 14  PIP: 26                                      ABG - ( 05 May 2021 03:37 )  pH, Arterial: 7.50  pH, Blood: x     /  pCO2: 35    /  pO2: 113   / HCO3: 27    / Base Excess: 3.5   /  SaO2: 99                  MEDICATIONS  (STANDING):  ascorbic acid 500 milliGRAM(s) Oral daily  atorvastatin 40 milliGRAM(s) Oral at bedtime  carbidopa/levodopa  25/100 2 Tablet(s) Oral <User Schedule>  ceftazidime/avibactam IVPB 2.5 Gram(s) IV Intermittent every 8 hours  chlorhexidine 0.12% Liquid 15 milliLiter(s) Oral Mucosa every 12 hours  chlorhexidine 4% Liquid 1 Application(s) Topical <User Schedule>  collagenase Ointment 1 Application(s) Topical two times a day  Dakins Solution - 1/2 Strength 1 Application(s) Topical two times a day  enoxaparin Injectable 40 milliGRAM(s) SubCutaneous daily  lidocaine 2% Gel 1 Application(s) Topical once  lidocaine 2% Viscous 100 milliLiter(s) Swish and Spit once  magnesium oxide 400 milliGRAM(s) Oral every 8 hours  metroNIDAZOLE  IVPB 500 milliGRAM(s) IV Intermittent every 8 hours  midodrine 10 milliGRAM(s) Oral every 8 hours  phenylephrine    Infusion 0.1 MICROgram(s)/kG/Min (2.98 mL/Hr) IV Continuous <Continuous>  polyethylene glycol 3350 17 Gram(s) Oral daily  senna 2 Tablet(s) Oral at bedtime  zinc sulfate 220 milliGRAM(s) Oral daily    MEDICATIONS  (PRN):  acetaminophen   Tablet .. 650 milliGRAM(s) Oral every 6 hours PRN Temp greater or equal to 38C (100.4F)      New X-rays reviewed:                                                                                  ECHO    CXR interpreted by me:  ET OK>  LLL infiltrate

## 2021-05-06 NOTE — PROGRESS NOTE ADULT - SUBJECTIVE AND OBJECTIVE BOX
NIEVES LABOY  71y, Male  Allergy: No Known Allergies      LOS  45d    CHIEF COMPLAINT: Change in Mental Status (06 May 2021 09:04)      INTERVAL EVENTS/HPI  - T(F): , Max: 99.1 (05-05-21 @ 16:00)  - WBC Count: 13.85 (05-06-21 @ 07:31) downtrending   WBC Count: 16.71 (05-05-21 @ 05:05)  - Creatinine, Serum: 0.7 (05-06-21 @ 07:31)  Creatinine, Serum: 0.7 (05-05-21 @ 05:05)     -   -   -     ROS  cannot obtain secondary to patient's sedation and/or mental status    VITALS:  T(F): 97.6, Max: 99.1 (05-05-21 @ 16:00)  HR: 67  BP: 161/76  RR: 19Vital Signs Last 24 Hrs  T(C): 36.4 (06 May 2021 12:00), Max: 37.3 (05 May 2021 16:00)  T(F): 97.6 (06 May 2021 12:00), Max: 99.1 (05 May 2021 16:00)  HR: 67 (06 May 2021 12:00) (54 - 89)  BP: 161/76 (06 May 2021 12:00) (104/58 - 161/76)  BP(mean): 108 (06 May 2021 12:00) (74 - 118)  RR: 19 (06 May 2021 12:00) (17 - 22)  SpO2: 99% (06 May 2021 12:00) (97% - 100%)    PHYSICAL EXAM:  Gen: Intubated  CV: RRR  Lungs: Decreased BS at bases  Abd: Soft  Neuro: Sedated  Lines clean, no phlebitis    FH: Non-contributory  Social Hx: Non-contributory    TESTS & MEASUREMENTS:                        7.7    13.85 )-----------( 68       ( 06 May 2021 07:31 )             24.6     05-06    141  |  111<H>  |  37<H>  ----------------------------<  123<H>  4.1   |  22  |  0.7    Ca    7.3<L>      06 May 2021 07:31  Mg     2.0     05-06    TPro  5.4<L>  /  Alb  1.5<L>  /  TBili  0.5  /  DBili  x   /  AST  87<H>  /  ALT  9   /  AlkPhos  300<H>  05-06    eGFR if Non African American: 94 mL/min/1.73M2 (05-06-21 @ 07:31)  eGFR if : 109 mL/min/1.73M2 (05-06-21 @ 07:31)    LIVER FUNCTIONS - ( 06 May 2021 07:31 )  Alb: 1.5 g/dL / Pro: 5.4 g/dL / ALK PHOS: 300 U/L / ALT: 9 U/L / AST: 87 U/L / GGT: x               Culture - Fungal, Bronchial (collected 05-04-21 @ 17:00)  Source: .Bronchial None  Preliminary Report (05-05-21 @ 08:19):    Testing in progress    Culture - Acid Fast - Bronchial w/Smear (collected 05-04-21 @ 17:00)  Source: .Bronchial None    Culture - Bronchial (collected 05-04-21 @ 17:00)  Source: .Bronchial None  Gram Stain (05-05-21 @ 03:49):    Rare polymorphonuclear leukocytes seen per low power field    No Squamous epithelial cells seen per low power field    Moderate Gram Negative Rods seen per oil power field  Preliminary Report (05-05-21 @ 22:08):    Moderate Klebsiella pneumoniae    Culture - Blood (collected 05-01-21 @ 07:52)  Source: .Blood None  Preliminary Report (05-02-21 @ 18:01):    No growth to date.    Culture - Body Fluid with Gram Stain (collected 04-29-21 @ 15:22)  Source: .Body Fluid gall bladder fluid  Gram Stain (04-30-21 @ 01:12):    No polymorphonuclear cells seen    No organisms seen    by cytocentrifuge  Final Report (05-04-21 @ 17:47):    No growth at 5 days    Culture - Blood (collected 04-29-21 @ 06:43)  Source: .Blood None  Final Report (05-04-21 @ 18:00):    No Growth Final    Culture - Fungal, Bronchial (collected 04-28-21 @ 10:22)  Source: .Bronchial None  Preliminary Report (05-03-21 @ 07:17):    Few Yeast    Culture - Acid Fast - Bronchial w/Smear (collected 04-28-21 @ 10:22)  Source: .Bronchial None  Preliminary Report (05-01-21 @ 15:04):    Culture is being performed.    Culture - Bronchial (collected 04-28-21 @ 10:22)  Source: Bronch Wash None  Gram Stain (04-28-21 @ 23:36):    Numerous polymorphonuclear leukocytes per low power field    Few Squamous epithelial cells per low power field    Numerous Gram Negative Coccobacilli per oil power field  Final Report (05-01-21 @ 16:31):    Moderate Klebsiella pneumoniae (Carbapenem Resistant)    Normal Respiratory Chelle absent  Organism: Klepne MDRO (05-02-21 @ 15:12)  Organism: Klepne MDRO (05-02-21 @ 15:12)      -  Amikacin: R >32      -  Amoxicillin/Clavulanic Acid: R >16/8      -  Ampicillin: R >16 These ampicillin results predict results for amoxicillin      -  Ampicillin/Sulbactam: R >16/8 Enterobacter, Citrobacter, and Serratia may develop resistance during prolonged therapy (3-4 days)      -  Aztreonam: R >16      -  Cefazolin: R >16 Enterobacter, Citrobacter, and Serratia may develop resistance during prolonged therapy (3-4 days)      -  Cefepime: R >16      -  Cefoxitin: R >16      -  Ceftazidime/Avibactam: S <=4      -  Ceftolozane/tazobactam: R >8      -  Ceftriaxone: R >32 Enterobacter, Citrobacter, and Serratia may develop resistance during prolonged therapy      -  Ciprofloxacin: R >2      -  Ertapenem: R >1      -  Gentamicin: R >8      -  Imipenem: R 4      -  Levofloxacin: R >4      -  Meropenem: R 8      -  Minocycline: S <=4      -  Piperacillin/Tazobactam: R >64      -  Tigecycline: S <=2      -  Tobramycin: R >8      -  Trimethoprim/Sulfamethoxazole: R >2/38      Method Type: JAZMIN    Culture - Blood (collected 04-28-21 @ 06:10)  Source: .Blood None  Gram Stain (04-30-21 @ 05:13):    Growth in aerobic bottle: Gram Positive Cocci in Clusters    Growth in anaerobic bottle: Gram Negative Rods and Gram Positive Cocci in    Clusters  Final Report (05-02-21 @ 13:23):    Growth in aerobic and anaerobic bottles: Staphylococcus epidermidis    Coag Negative Staphylococcus    Single set isolate, possible contaminant. Contact    Microbiology if susceptibility testing clinically    indicated.    Growth in anaerobic bottle: Proteusmirabilis ESBL    MDRO detected in BCID PCR, resistance marker = CTX-M (ESBL)    ***Blood Panel PCR results on this specimen are available    approximately 3 hours after the Gram stain result.***    Gram stain, PCR, and/or culture results may not always    correspond due to difference in methodologies.    ************************************************************    This PCR assay was performed by multiplex PCR. This    Assay tests for 66 bacterial and resistance gene targets.    Please refer to the [facility] test directory    at [email or web address] for details.  Organism: Blood Culture PCR  Proteus mirabilis ESBL (05-02-21 @ 13:23)  Organism: Proteus mirabilis ESBL (05-02-21 @ 13:23)      -  Amikacin: S <=16      -  Ampicillin: R >16 These ampicillin results predict results for amoxicillin      -  Ampicillin/Sulbactam: R 8/4 Enterobacter, Citrobacter, and Serratia may develop resistance during prolonged therapy (3-4 days)      -  Aztreonam: R >16      -  Cefazolin: R >16 Enterobacter, Citrobacter, and Serratia may develop resistance during prolonged therapy (3-4 days)      -  Cefepime: R >16      -  Cefoxitin: S <=8      -  Ceftriaxone: R >32 Enterobacter, Citrobacter, and Serratia may develop resistance during prolonged therapy      -  Ciprofloxacin: R >2      -  Ertapenem: S <=0.5      -  Gentamicin: S <=2      -  Levofloxacin: R >4      -  Meropenem: S <=1      -  Piperacillin/Tazobactam: R <=8      -  Tobramycin: S <=2      -  Trimethoprim/Sulfamethoxazole: S <=0.5/9.5      Method Type: JAZMIN  Organism: Blood Culture PCR (05-02-21 @ 13:23)      -  ESBL: Detec      -  Proteus mirabilis: Detec      -  Staphylococcus epidermidis, Methicillin resistant: Detec      Method Type: PCR    Culture - Blood (collected 04-28-21 @ 06:10)  Source: .Blood None  Gram Stain (05-01-21 @ 09:17):    Growth in aerobic bottle: Gram Positive Cocci in Clusters    Growth in anaerobic bottle: Gram Positive Cocci in Clusters and Gram    Positive Rods  Final Report (05-05-21 @ 13:37):    **Please Note**: This is a Corrected Report**    Growth in aerobic bottle: Staphylococcus haemolyticus Coag Negative    Staphylococcus    Single set isolate, possible contaminant. Contact    Microbiology if susceptibility testing clinically    indicated.    Growth in anaerobic bottle: Staphylococcus epidermidis    Previously reported as:    Growth in anaerobic bottle: Gram Positive Cocci in Clusters and Gram    Positive Rods  Organism: Blood Culture PCR  Blood Culture PCR  Staphylococcus epidermidis (05-03-21 @ 10:01)  Organism: Staphylococcus epidermidis (05-03-21 @ 10:01)      -  Ampicillin/Sulbactam: R <=8/4      -  Cefazolin: R 16      -  Clindamycin: R >4      -  Erythromycin: R >4      -  Gentamicin: R >8 Should not be used as monotherapy      -  Oxacillin: R >2      -  Penicillin: R >8      -  RIF- Rifampin: S <=1 Should not be used as monotherapy      -  Tetra/Doxy: S 2      -  Trimethoprim/Sulfamethoxazole: R >2/38      -  Vancomycin: S 2      Method Type: JAZMIN  Organism: Blood Culture PCR (05-01-21 @ 11:36)      -  Staphylococcus epidermidis, Methicillin resistant: Detec      Method Type: PCR  Organism: Blood Culture PCR (04-30-21 @ 13:20)      -  Coagulase negative Staphylococcus, Methicillin resistant: Detec      Method Type: PCR    Culture - Urine (collected 04-28-21 @ 02:00)  Source: .Urine Catheterized  Final Report (04-29-21 @ 07:07):    No growth    Culture - Blood (collected 04-28-21 @ 02:00)  Source: .Blood Blood-Peripheral  Gram Stain (04-30-21 @ 11:26):    Growth in anaerobic bottle: Gram Positive Cocci in Clusters  Final Report (05-02-21 @ 13:45):    Growth in anaerobic bottle: Staphylococcus epidermidis  Organism: Staphylococcus epidermidis (05-02-21 @ 13:45)  Organism: Staphylococcus epidermidis (05-02-21 @ 13:45)      -  Ampicillin/Sulbactam: R <=8/4      -  Cefazolin: R >16      -  Clindamycin: R >4      -  Erythromycin: R >4      -  Gentamicin: R >8 Should not be used as monotherapy      -  Oxacillin: R >2      -  Penicillin: R >8      -  RIF- Rifampin: S <=1 Should not be used as monotherapy      -  Tetra/Doxy: R >8      -  Trimethoprim/Sulfamethoxazole: R >2/38      -  Vancomycin: S 2      Method Type: JAZMIN    Culture - Blood (collected 04-26-21 @ 17:42)  Source: .Blood None  Final Report (05-01-21 @ 23:01):    No Growth Final    Culture - Urine (collected 04-26-21 @ 14:39)  Source: .Urine Clean Catch (Midstream)  Final Report (04-28-21 @ 15:53):    >100,000 CFU/ml Klebsiella pneumoniae (Carbapenem Resistant)  Organism: Klepne MDRO (04-28-21 @ 15:53)  Organism: Klepne MDRO (04-28-21 @ 15:53)      -  Amikacin: S <=16      -  Amoxicillin/Clavulanic Acid: R >16/8      -  Ampicillin: R >16 These ampicillin results predict results for amoxicillin      -  Ampicillin/Sulbactam: R >16/8 Enterobacter, Citrobacter, and Serratia may develop resistance during prolonged therapy (3-4 days)      -  Aztreonam: R >16      -  Cefazolin: R >16 (MIC_CL_COM_ENTERIC_CEFAZU) For uncomplicated UTI with K. pneumoniae, E. coli, or P. mirablis: JAZMIN <=16 is sensitive and JAZMIN >=32 is resistant. This also predicts results for oral agents cefaclor, cefdinir, cefpodoxime, cefprozil, cefuroxime axetil, cephalexin and locarbef for uncomplicated UTI. Note that some isolates may be susceptible to these agents while testing resistant to cefazolin.      -  Cefepime: R >16      -  Cefoxitin: I 16      -  Ceftriaxone: R >32 Enterobacter, Citrobacter, and Serratia may develop resistance during prolonged therapy      -  Ciprofloxacin: R >2      -  Ertapenem: R >1      -  Gentamicin: R >8      -  Imipenem: R 8      -  Levofloxacin: I 1      -  Meropenem: R >8      -  Nitrofurantoin: R >64 Should not be used to treat pyelonephritis      -  Piperacillin/Tazobactam: R >64      -  Tigecycline: S <=2      -  Tobramycin: R >8      -  Trimethoprim/Sulfamethoxazole: R >2/38      Method Type: JAZMIN    Culture - Urine (collected 04-24-21 @ 22:07)  Source: .Urine Clean Catch (Midstream)  Final Report (04-28-21 @ 16:01):    >100,000 CFU/ml Klebsiella pneumoniae (Carbapenem Resistant)  Organism: Klepne MDRO (04-28-21 @ 16:01)  Organism: Klepne MDRO (04-28-21 @ 16:01)      -  Amikacin: S <=16      -  Amoxicillin/Clavulanic Acid: R >16/8      -  Ampicillin: R >16 These ampicillin results predict results for amoxicillin      -  Ampicillin/Sulbactam: R >16/8 Enterobacter, Citrobacter, and Serratia may develop resistance during prolonged therapy (3-4 days)      -  Aztreonam: R >16      -  Cefazolin: R >16 (MIC_CL_COM_ENTERIC_CEFAZU) For uncomplicated UTI with K. pneumoniae, E. coli, or P. mirablis: JAZMIN <=16 is sensitive and JAZMIN >=32 is resistant. This also predicts results for oral agents cefaclor, cefdinir, cefpodoxime, cefprozil, cefuroxime axetil, cephalexin and locarbef for uncomplicated UTI. Note that some isolates may be susceptible to these agents while testing resistant to cefazolin.      -  Cefepime: R >16      -  Cefoxitin: S <=8      -  Ceftriaxone: R >32 Enterobacter, Citrobacter, and Serratia may develop resistance during prolonged therapy      -  Ciprofloxacin: R >2      -  Ertapenem: R >1      -  Gentamicin: R >8      -  Imipenem: R 8      -  Levofloxacin: I 1      -  Meropenem: R >8      -  Nitrofurantoin: R >64 Should not be used to treat pyelonephritis      -  Piperacillin/Tazobactam: R >64      -  Tigecycline: S <=2      -  Tobramycin: R >8      -  Trimethoprim/Sulfamethoxazole: R >2/38      Method Type: JAZMIN    Culture - Blood (collected 04-13-21 @ 07:34)  Source: .Blood None  Final Report (04-18-21 @ 17:00):    No Growth Final    Culture - Blood (collected 04-12-21 @ 21:38)  Source: .Blood None  Final Report (04-18-21 @ 05:00):    No Growth Final    Culture - Blood (collected 04-12-21 @ 16:00)  Source: .Blood Blood  Final Report (04-17-21 @ 22:01):    No Growth Final    Culture - Blood (collected 04-08-21 @ 16:00)  Source: .Blood Blood  Gram Stain (04-11-21 @ 08:45):    Growth in anaerobic bottle: Gram Positive Cocci in Clusters  Final Report (04-12-21 @ 10:51):    Growth in anaerobic bottle: Staphylococcus epidermidis Coag Negative    Staphylococcus    Single set isolate, possible contaminant. Contact    Microbiology if susceptibility testing clinically    indicated.    ***Blood Panel PCR results on this specimen are available    approximately 3 hours after the Gram stain result.***    Gram stain, PCR, and/or culture results may not always    correspond due to difference in methodologies.    ************************************************************    This PCR assay was performed by multiplex PCR. This    Assay tests for 66 bacterial and resistance gene targets.    Please refer to the [facility] test directory    at [email or web address] for details.  Organism: Blood Culture PCR (04-12-21 @ 10:51)  Organism: Blood Culture PCR (04-12-21 @ 10:51)      -  Staphylococcus epidermidis, Methicillin resistant: Detec      Method Type: PCR    Culture - Blood (collected 04-08-21 @ 16:00)  Source: .Blood Blood-Peripheral  Final Report (04-14-21 @ 01:00):    No Growth Final            INFECTIOUS DISEASES TESTING  Procalcitonin, Serum: 2.03 (04-29-21 @ 05:25)  Procalcitonin, Serum: 2.50 (04-28-21 @ 16:00)  Procalcitonin, Serum: 0.19 (04-26-21 @ 17:42)  COVID-19 PCR: NotDetec (04-17-21 @ 10:31)  COVID-19 PCR: NotDetec (04-14-21 @ 14:29)  COVID-19 PCR: NotDetec (04-12-21 @ 15:00)  COVID-19 PCR: NotDetec (04-07-21 @ 14:55)  Procalcitonin, Serum: 0.16 (04-06-21 @ 12:32)  COVID-19 PCR: NotDetec (03-30-21 @ 15:19)  COVID-19 PCR: NotDetec (03-29-21 @ 19:40)  Procalcitonin, Serum: 0.61 (03-26-21 @ 10:20)  COVID-19 PCR: NotDetec (03-22-21 @ 20:13)  COVID-19 PCR: NotDetec (03-10-21 @ 12:22)  COVID-19 PCR: NotDetec (03-01-21 @ 16:49)  Fungitell: 62 (03-01-21 @ 11:00)  Procalcitonin, Serum: 0.29 (03-01-21 @ 05:32)  MRSA PCR Result.: Negative (02-27-21 @ 15:44)  COVID-19 PCR: NotDetec (02-25-21 @ 15:34)  Procalcitonin, Serum: 0.27 (02-18-21 @ 10:54)  MRSA PCR Result.: Negative (02-14-21 @ 18:29)  HIV-1/2 Combo Result: Nonreact (02-14-21 @ 05:07)  Procalcitonin, Serum: 0.46 (02-13-21 @ 16:00)  COVID-19 PCR: NotDetec (02-12-21 @ 10:17)      INFLAMMATORY MARKERS      RADIOLOGY & ADDITIONAL TESTS:  I have personally reviewed the last available Chest xray  CXR      CT      CARDIOLOGY TESTING  12 Lead ECG:   Ventricular Rate 94 BPM    Atrial Rate 94 BPM    P-R Interval 216 ms    QRS Duration 88 ms    Q-T Interval 364 ms    QTC Calculation(Bazett) 455 ms    P Axis 52 degrees    R Axis 27 degrees    T Axis 38 degrees    Diagnosis Line Sinus rhythm with 1st degree A-V block  Otherwise normal ECG    Confirmed by Nathan Argueta ([id number]) on 4/28/2021 9:42:25 PM (04-28-21 @ 20:05)      MEDICATIONS  ascorbic acid 500  atorvastatin 40  carbidopa/levodopa  25/100 2  ceftazidime/avibactam IVPB 2.5  chlorhexidine 0.12% Liquid 15  chlorhexidine 4% Liquid 1  collagenase Ointment 1  Dakins Solution - 1/2 Strength 1  enoxaparin Injectable 40  lidocaine 2% Gel 1  lidocaine 2% Viscous 100  magnesium oxide 400  metroNIDAZOLE  IVPB 500  midodrine 10  phenylephrine    Infusion 0.1  polyethylene glycol 3350 17  senna 2  zinc sulfate 220      WEIGHT  Weight (kg): 79.4 (03-31-21 @ 15:48)  Creatinine, Serum: 0.7 mg/dL (05-06-21 @ 07:31)      ANTIBIOTICS:  ceftazidime/avibactam IVPB 2.5 Gram(s) IV Intermittent every 8 hours  metroNIDAZOLE  IVPB 500 milliGRAM(s) IV Intermittent every 8 hours      All available historical records have been reviewed

## 2021-05-07 LAB
-  AMIKACIN: SIGNIFICANT CHANGE UP
-  AZTREONAM: SIGNIFICANT CHANGE UP
-  CEFEPIME: SIGNIFICANT CHANGE UP
-  CEFTAZIDIME: SIGNIFICANT CHANGE UP
-  CIPROFLOXACIN: SIGNIFICANT CHANGE UP
-  GENTAMICIN: SIGNIFICANT CHANGE UP
-  IMIPENEM: SIGNIFICANT CHANGE UP
-  LEVOFLOXACIN: SIGNIFICANT CHANGE UP
-  MEROPENEM: SIGNIFICANT CHANGE UP
-  PIPERACILLIN/TAZOBACTAM: SIGNIFICANT CHANGE UP
-  TOBRAMYCIN: SIGNIFICANT CHANGE UP
ALBUMIN SERPL ELPH-MCNC: 1.7 G/DL — LOW (ref 3.5–5.2)
ALP SERPL-CCNC: 261 U/L — HIGH (ref 30–115)
ALT FLD-CCNC: 10 U/L — SIGNIFICANT CHANGE UP (ref 0–41)
ANION GAP SERPL CALC-SCNC: 8 MMOL/L — SIGNIFICANT CHANGE UP (ref 7–14)
ANISOCYTOSIS BLD QL: SLIGHT — SIGNIFICANT CHANGE UP
AST SERPL-CCNC: 64 U/L — HIGH (ref 0–41)
BASE EXCESS BLDA CALC-SCNC: 2.4 MMOL/L — HIGH (ref -2–2)
BASOPHILS # BLD AUTO: 0 K/UL — SIGNIFICANT CHANGE UP (ref 0–0.2)
BASOPHILS NFR BLD AUTO: 0 % — SIGNIFICANT CHANGE UP (ref 0–1)
BILIRUB SERPL-MCNC: 0.4 MG/DL — SIGNIFICANT CHANGE UP (ref 0.2–1.2)
BUN SERPL-MCNC: 29 MG/DL — HIGH (ref 10–20)
BURR CELLS BLD QL SMEAR: PRESENT — SIGNIFICANT CHANGE UP
CALCIUM SERPL-MCNC: 7.8 MG/DL — LOW (ref 8.5–10.1)
CHLORIDE SERPL-SCNC: 111 MMOL/L — HIGH (ref 98–110)
CO2 SERPL-SCNC: 22 MMOL/L — SIGNIFICANT CHANGE UP (ref 17–32)
CREAT SERPL-MCNC: 0.6 MG/DL — LOW (ref 0.7–1.5)
CULTURE RESULTS: SIGNIFICANT CHANGE UP
ELLIPTOCYTES BLD QL SMEAR: SLIGHT — SIGNIFICANT CHANGE UP
EOSINOPHIL # BLD AUTO: 0 K/UL — SIGNIFICANT CHANGE UP (ref 0–0.7)
EOSINOPHIL NFR BLD AUTO: 0 % — SIGNIFICANT CHANGE UP (ref 0–8)
GIANT PLATELETS BLD QL SMEAR: PRESENT — SIGNIFICANT CHANGE UP
GLUCOSE BLDC GLUCOMTR-MCNC: 138 MG/DL — HIGH (ref 70–99)
GLUCOSE SERPL-MCNC: 149 MG/DL — HIGH (ref 70–99)
HCO3 BLDA-SCNC: 26 MMOL/L — SIGNIFICANT CHANGE UP (ref 23–27)
HCT VFR BLD CALC: 25.3 % — LOW (ref 42–52)
HGB BLD-MCNC: 7.7 G/DL — LOW (ref 14–18)
HOWELL-JOLLY BOD BLD QL SMEAR: PRESENT — SIGNIFICANT CHANGE UP
LYMPHOCYTES # BLD AUTO: 0.31 K/UL — LOW (ref 1.2–3.4)
LYMPHOCYTES # BLD AUTO: 2.7 % — LOW (ref 20.5–51.1)
MAGNESIUM SERPL-MCNC: 1.9 MG/DL — SIGNIFICANT CHANGE UP (ref 1.8–2.4)
MANUAL SMEAR VERIFICATION: SIGNIFICANT CHANGE UP
MCHC RBC-ENTMCNC: 25.8 PG — LOW (ref 27–31)
MCHC RBC-ENTMCNC: 30.4 G/DL — LOW (ref 32–37)
MCV RBC AUTO: 84.9 FL — SIGNIFICANT CHANGE UP (ref 80–94)
METHOD TYPE: SIGNIFICANT CHANGE UP
MONOCYTES # BLD AUTO: 0.21 K/UL — SIGNIFICANT CHANGE UP (ref 0.1–0.6)
MONOCYTES NFR BLD AUTO: 1.8 % — SIGNIFICANT CHANGE UP (ref 1.7–9.3)
NEUTROPHILS # BLD AUTO: 11.12 K/UL — HIGH (ref 1.4–6.5)
NEUTROPHILS NFR BLD AUTO: 95.5 % — HIGH (ref 42.2–75.2)
ORGANISM # SPEC MICROSCOPIC CNT: SIGNIFICANT CHANGE UP
ORGANISM # SPEC MICROSCOPIC CNT: SIGNIFICANT CHANGE UP
PCO2 BLDA: 34 MMHG — LOW (ref 38–42)
PH BLDA: 7.49 — HIGH (ref 7.38–7.42)
PLAT MORPH BLD: NORMAL — SIGNIFICANT CHANGE UP
PLATELET # BLD AUTO: 73 K/UL — LOW (ref 130–400)
PO2 BLDA: 131 MMHG — HIGH (ref 78–95)
POIKILOCYTOSIS BLD QL AUTO: SLIGHT — SIGNIFICANT CHANGE UP
POLYCHROMASIA BLD QL SMEAR: SIGNIFICANT CHANGE UP
POTASSIUM SERPL-MCNC: 4.1 MMOL/L — SIGNIFICANT CHANGE UP (ref 3.5–5)
POTASSIUM SERPL-SCNC: 4.1 MMOL/L — SIGNIFICANT CHANGE UP (ref 3.5–5)
PROT SERPL-MCNC: 5.8 G/DL — LOW (ref 6–8)
RBC # BLD: 2.98 M/UL — LOW (ref 4.7–6.1)
RBC # FLD: 17.3 % — HIGH (ref 11.5–14.5)
RBC BLD AUTO: ABNORMAL
SAO2 % BLDA: 99 % — HIGH (ref 94–98)
SCHISTOCYTES BLD QL AUTO: SLIGHT — SIGNIFICANT CHANGE UP
SMUDGE CELLS # BLD: PRESENT — SIGNIFICANT CHANGE UP
SODIUM SERPL-SCNC: 141 MMOL/L — SIGNIFICANT CHANGE UP (ref 135–146)
SPECIMEN SOURCE: SIGNIFICANT CHANGE UP
WBC # BLD: 11.64 K/UL — HIGH (ref 4.8–10.8)
WBC # FLD AUTO: 11.64 K/UL — HIGH (ref 4.8–10.8)

## 2021-05-07 PROCEDURE — 31622 DX BRONCHOSCOPE/WASH: CPT

## 2021-05-07 PROCEDURE — 71045 X-RAY EXAM CHEST 1 VIEW: CPT | Mod: 26

## 2021-05-07 PROCEDURE — 99232 SBSQ HOSP IP/OBS MODERATE 35: CPT | Mod: 25

## 2021-05-07 RX ORDER — HYDRALAZINE HCL 50 MG
5 TABLET ORAL ONCE
Refills: 0 | Status: COMPLETED | OUTPATIENT
Start: 2021-05-07 | End: 2021-05-07

## 2021-05-07 RX ADMIN — Medication 100 MILLIGRAM(S): at 06:35

## 2021-05-07 RX ADMIN — CHLORHEXIDINE GLUCONATE 15 MILLILITER(S): 213 SOLUTION TOPICAL at 05:13

## 2021-05-07 RX ADMIN — CHLORHEXIDINE GLUCONATE 1 APPLICATION(S): 213 SOLUTION TOPICAL at 05:10

## 2021-05-07 RX ADMIN — MIDODRINE HYDROCHLORIDE 10 MILLIGRAM(S): 2.5 TABLET ORAL at 21:54

## 2021-05-07 RX ADMIN — MAGNESIUM OXIDE 400 MG ORAL TABLET 400 MILLIGRAM(S): 241.3 TABLET ORAL at 21:54

## 2021-05-07 RX ADMIN — Medication 1 APPLICATION(S): at 17:43

## 2021-05-07 RX ADMIN — Medication 1 APPLICATION(S): at 05:13

## 2021-05-07 RX ADMIN — Medication 100 MILLIGRAM(S): at 14:30

## 2021-05-07 RX ADMIN — Medication 100 MILLIGRAM(S): at 21:53

## 2021-05-07 RX ADMIN — SENNA PLUS 2 TABLET(S): 8.6 TABLET ORAL at 21:54

## 2021-05-07 RX ADMIN — MIDODRINE HYDROCHLORIDE 10 MILLIGRAM(S): 2.5 TABLET ORAL at 14:29

## 2021-05-07 RX ADMIN — POLYETHYLENE GLYCOL 3350 17 GRAM(S): 17 POWDER, FOR SOLUTION ORAL at 12:19

## 2021-05-07 RX ADMIN — ENOXAPARIN SODIUM 40 MILLIGRAM(S): 100 INJECTION SUBCUTANEOUS at 12:19

## 2021-05-07 RX ADMIN — MAGNESIUM OXIDE 400 MG ORAL TABLET 400 MILLIGRAM(S): 241.3 TABLET ORAL at 14:30

## 2021-05-07 RX ADMIN — Medication 1 APPLICATION(S): at 05:10

## 2021-05-07 RX ADMIN — CARBIDOPA AND LEVODOPA 2 TABLET(S): 25; 100 TABLET ORAL at 14:30

## 2021-05-07 RX ADMIN — CARBIDOPA AND LEVODOPA 2 TABLET(S): 25; 100 TABLET ORAL at 05:12

## 2021-05-07 RX ADMIN — Medication 5 MILLIGRAM(S): at 05:16

## 2021-05-07 RX ADMIN — ZINC SULFATE TAB 220 MG (50 MG ZINC EQUIVALENT) 220 MILLIGRAM(S): 220 (50 ZN) TAB at 12:19

## 2021-05-07 RX ADMIN — ATORVASTATIN CALCIUM 40 MILLIGRAM(S): 80 TABLET, FILM COATED ORAL at 21:54

## 2021-05-07 RX ADMIN — Medication 500 MILLIGRAM(S): at 12:19

## 2021-05-07 RX ADMIN — MAGNESIUM OXIDE 400 MG ORAL TABLET 400 MILLIGRAM(S): 241.3 TABLET ORAL at 05:13

## 2021-05-07 RX ADMIN — CARBIDOPA AND LEVODOPA 2 TABLET(S): 25; 100 TABLET ORAL at 10:47

## 2021-05-07 RX ADMIN — CARBIDOPA AND LEVODOPA 2 TABLET(S): 25; 100 TABLET ORAL at 21:54

## 2021-05-07 NOTE — PROGRESS NOTE ADULT - SUBJECTIVE AND OBJECTIVE BOX
Patient is a 71y old  Male who presents with a chief complaint of Change in Mental Status (06 May 2021 13:20)        Over Night Events:  Remains on MV.  Off pressors.  ON Precedex.           ROS:     All ROS are negative except HPI         PHYSICAL EXAM    ICU Vital Signs Last 24 Hrs  T(C): 35.1 (07 May 2021 04:00), Max: 36.7 (06 May 2021 16:00)  T(F): 95.2 (07 May 2021 04:00), Max: 98.1 (06 May 2021 16:00)  HR: 57 (07 May 2021 07:00) (47 - 89)  BP: 144/56 (07 May 2021 07:00) (113/57 - 184/94)  BP(mean): 95 (07 May 2021 07:00) (79 - 130)  ABP: --  ABP(mean): --  RR: 24 (07 May 2021 07:00) (17 - 24)  SpO2: 100% (07 May 2021 07:00) (95% - 100%)      CONSTITUTIONAL:  Well nourished.  NAD    ENT:   Airway patent,   Mouth with normal mucosa.   No thrush    EYES:   Pupils equal,   Round and reactive to light.    CARDIAC:   Normal rate,   Regular rhythm.     edema      Vascular:  Normal systolic impulse  No Carotid bruits    RESPIRATORY:   No wheezing  Bilateral BS  Normal chest expansion  Not tachypneic,  No use of accessory muscles    GASTROINTESTINAL:  Abdomen soft,   Non-tender,   No guarding,   + BS    MUSCULOSKELETAL:   Range of motion is not limited,  No clubbing, cyanosis    NEUROLOGICAL:   Sedated  No motor  deficits.    SKIN:   Skin normal color for race,   Warm and dry and intact.   No evidence of rash.    PSYCHIATRIC:   Sedated   No apparent risk to self or others.    HEMATOLOGICAL:  No cervical  lymphadenopathy.  no inguinal lymphadenopathy      05-06-21 @ 07:01  -  05-07-21 @ 07:00  --------------------------------------------------------  IN:    Dexmedetomidine: 70 mL    Enteral Tube Flush: 100 mL    IV PiggyBack: 200 mL    IV PiggyBack: 200 mL    Jevity 1.2: 720 mL  Total IN: 1290 mL    OUT:    Indwelling Catheter - Urethral (mL): 1545 mL  Total OUT: 1545 mL    Total NET: -255 mL          LABS:                            7.7    13.85 )-----------( 68       ( 06 May 2021 07:31 )             24.6                                               05-06    141  |  111<H>  |  37<H>  ----------------------------<  123<H>  4.1   |  22  |  0.7    Ca    7.3<L>      06 May 2021 07:31  Mg     2.0     05-06    TPro  5.4<L>  /  Alb  1.5<L>  /  TBili  0.5  /  DBili  x   /  AST  87<H>  /  ALT  9   /  AlkPhos  300<H>  05-06      PT/INR - ( 05 May 2021 10:48 )   PT: 14.60 sec;   INR: 1.27 ratio         PTT - ( 05 May 2021 10:48 )  PTT:44.7 sec                                                                                     LIVER FUNCTIONS - ( 06 May 2021 07:31 )  Alb: 1.5 g/dL / Pro: 5.4 g/dL / ALK PHOS: 300 U/L / ALT: 9 U/L / AST: 87 U/L / GGT: x                                                  Culture - Other (collected 05 May 2021 15:05)  Source: .Other sacrum  Preliminary Report (06 May 2021 20:22):    Culture yields growth of greater than 3 colony types of    bacteria,  which may indicate contamination and normal srikanth    Call client services within 7 days if further workup is clinically    indicated. Culture includes    Moderate Pseudomonas aeruginosa Susceptibility to follow.    Culture - Fungal, Bronchial (collected 04 May 2021 17:00)  Source: .Bronchial None  Preliminary Report (05 May 2021 08:19):    Testing in progress    Culture - Acid Fast - Bronchial w/Smear (collected 04 May 2021 17:00)  Source: .Bronchial None    Culture - Bronchial (collected 04 May 2021 17:00)  Source: .Bronchial None  Gram Stain (05 May 2021 03:49):    Rare polymorphonuclear leukocytes seen per low power field    No Squamous epithelial cells seen per low power field    Moderate Gram Negative Rods seen per oil power field  Final Report (06 May 2021 17:54):    Moderate Klebsiella pneumoniae (Carbapenem Resistant)    Normal Respiratory Srikanth absent  Organism: Klepne MDRO (06 May 2021 17:54)  Organism: Klepne MDRO (06 May 2021 17:54)                                                   Mode: AC/ CMV (Assist Control/ Continuous Mandatory Ventilation)  RR (machine): 16  TV (machine): 350  FiO2: 30  PEEP 7   ITime: 1  MAP: 11  PIP: 19                                      ABG - ( 07 May 2021 04:26 )  pH, Arterial: 7.43  pH, Blood: x     /  pCO2: 38    /  pO2: 116   / HCO3: 26    / Base Excess: 1.5   /  SaO2: 99                  MEDICATIONS  (STANDING):  ascorbic acid 500 milliGRAM(s) Oral daily  atorvastatin 40 milliGRAM(s) Oral at bedtime  carbidopa/levodopa  25/100 2 Tablet(s) Oral <User Schedule>  ceftazidime/avibactam IVPB 2.5 Gram(s) IV Intermittent every 8 hours  chlorhexidine 0.12% Liquid 15 milliLiter(s) Oral Mucosa every 12 hours  chlorhexidine 4% Liquid 1 Application(s) Topical <User Schedule>  collagenase Ointment 1 Application(s) Topical two times a day  Dakins Solution - 1/2 Strength 1 Application(s) Topical two times a day  dexMEDEtomidine Infusion 0.2 MICROgram(s)/kG/Hr (3.97 mL/Hr) IV Continuous <Continuous>  enoxaparin Injectable 40 milliGRAM(s) SubCutaneous daily  lidocaine 2% Gel 1 Application(s) Topical once  lidocaine 2% Viscous 100 milliLiter(s) Swish and Spit once  magnesium oxide 400 milliGRAM(s) Oral every 8 hours  metroNIDAZOLE  IVPB 500 milliGRAM(s) IV Intermittent every 8 hours  midodrine 10 milliGRAM(s) Oral every 8 hours  phenylephrine    Infusion 0.1 MICROgram(s)/kG/Min (2.98 mL/Hr) IV Continuous <Continuous>  polyethylene glycol 3350 17 Gram(s) Oral daily  senna 2 Tablet(s) Oral at bedtime  zinc sulfate 220 milliGRAM(s) Oral daily    MEDICATIONS  (PRN):  acetaminophen   Tablet .. 650 milliGRAM(s) Oral every 6 hours PRN Temp greater or equal to 38C (100.4F)      New X-rays reviewed:                                                                                  ECHO    CXR interpreted by me:  ET OK>  Improved LLL atelectasis

## 2021-05-07 NOTE — CHART NOTE - NSCHARTNOTEFT_GEN_A_CORE
DATE OF PROCEDURE: 5/7/2021    PREOPERATIVE DIAGNOSIS: r/o mucus plug    POSTOPERATIVE DIAGNOSES: mucus plug      PROCEDURE PERFORMED:  Bronchoscopy,  brushing and washing.         Attending: Dr adler         ASSISTANT:  JACKIE         ANESTHESIA: none  CONSENT: After explaining the risk and benefit the consent was obtained from daughter miracle      The patient had been previously intubated and was on mechanical ventilatory support. No sedation was used during bronchoscopy. His FiO2 was increased to 100% during the procedure. The  fiberoptic bronchoscope was introduced through an endotracheal tube adaptor and the tip of the endotracheal tube was noted to be in good position above the park.   The Right tracheobronchial tree was inspected closely to the level of the subsegmental bronchi. All bronchi are patent with no endobronchial lesions and no mucosal lesions noted.   The Left tracheobronchial tree had small amount of mucus which was sunctioned.  The bronchoscope was then introduced to the _Left_______lobe and washings were taken from that area.  The procedure was completed and all samples were submitted for appropriate studies  After adequate clearing of secretions was accomplished, the bronchoscope was removed from the patient and the procedure was ended.   The patient tolerated the procedure well and there were no complications.

## 2021-05-07 NOTE — CHART NOTE - NSCHARTNOTEFT_GEN_A_CORE
71 year old Male with past medical history of Parkinson's, dementia, CKD Stage 3, 1st degree AV block, HLD, gout, HTN, DM, fungal septicemia presented from [facility] for acute decompensation in mental status. As per documentation patient at baseline was verbal but for past 3 days prior to admission on 3/22,, he has been worsening to state of being non verbal. Patient was recently admitted to [facility] for fungemia and discharged on the 11th March.    Pt admitted for Metabolic encephalopathy in the setting of baseline dementia/Sepsis POA/Necrotic sacral ulcer stage 4/H/o recent fungemia. WCX  GNR, 3/29   Numerous Klebsiella pneumoniae ESBL, Few Pseudomonas aeruginosa, Numerous Enterococcus faecalis (vancomycin resistant),  evaluated by Burn: s/p debridement of sacrum on 3/30. Pt treated w/ IV meropenem, Polymixin and Aztreonam. S/p spinal tap --> CSF clear with no growths and neg PCR. Repeat EEG w/ no epileptiform activity but diffuse cerebral dysfx. Hospital course was further complicated by cholecystitis confirmed with HIDA scan and sonogram. Pt restarted on meropenem for broad cvg.    Rapid response called on patient 4/28 overnight. Pt found to have BP 60s/40s w/ tachypnea/ increased work of breathing (satting 95% on 15L NRB). Pt started on Levophed. Central line placed. Decision made to intubate. ABG collected. STAT CXR,KUB, labs ordered. Pt was transferred to Vent unit 4/28.  Seen by ID.  Changed ABX to IV flagyl and Avycaz with end date of 5/11.  S/p Bronch 5/7 for increased secretions and successfully extubate on 5/7.  On Venti mask.  Being Downgraded to the floor.     71 year old Male with past medical history of Parkinson's, dementia, CKD Stage 3, 1st degree AV block, HLD, gout, HTN, DM, fungal septicemia presenting from [facility] for acute decompensation in mental status. As per documentation patient at baseline is verbal but for past 3 days, he has been worsening to state of being non verbal. Patient was admitted to [facility] for fungemia and discharged on the 11th March. At  he had left hand cellulitis and was treated for it.    # cholecystitis  - patients with fevers overnight  - confirmed with HIDA scan and sonogram  - s/p IR perc cholecystostomy  - antibiotic changed to IV flagyl and Avycaz with end date of 5/11    # Metabolic encephalopathy on top of baseline dementia/Sepsis POA/Necrotic sacral ulcer stage 4/H/o recent fungemia  - CT Head No acute intracranial pathology.activity  -  Chest Xray No consolidation, effusion or pneumothorax.  -  Blood & Urine cxs NGTD   -  WCX GNR, 3/29   Numerous Klebsiella pneumoniae ESBL, Few Pseudomonas aeruginosa, Numerous Enterococcus faecalis (vancomycin resistant)  - evaluated by Burn: s/p debridement of sacrum on 3/30 .  no more new recommendation for surgery at this time .continue local wound care with santyl/dakins WTD.  - s/p meropenem, Polymixin and Aztreonam  - S/p spinal tap --> CSF clear with no growths and neg PCR  -repeat EEG w/ no epileptiform activity but diffuse cerebral dysfx    # Nutrition/Dysphagia:   -daughter agreed to PEG but delayed due to +BCx that turned out to be a contaminant  -4/14 passed S&S but limited PO intake.   -s/p PEG on 4/19  -family taught how to administer feeds and wound care    #Lt shoulder dislocation w/ collection  -no need to tap as per IR and ID concurrs    #Anemia  -s/p 1u PRBCs  -monitor CBC    # Thrombocytopenia ?sec to sepsis  -resolved  -off Pepcid  -heme f/u appreciated    # H/o parkinsons disease  - c/w sinemet    # H/o chronic DVT  - [facility] Duplex Lower Ext Vein Scan, Bilat (03.27.21 @ 18:48) >No evidence of deep venous thrombosis or superficial thrombophlebitis in the bilateral lower extremities.  - unsure why was on Rx >2yrs  -will check w/ PMD Dr. Patterson  -d/w daughter who is aware of risks and benefits. Daughter requests prophylactic dose (aware of anemia and possible need for transfusions)    # Dyslipidemia  - c/w statin    # DVT prophylaxis  -scd  -Lovenox    For Follow Up:   Wound Cx  Burn reccs  ID reccs

## 2021-05-08 LAB
ALBUMIN SERPL ELPH-MCNC: 1.9 G/DL — LOW (ref 3.5–5.2)
ALP SERPL-CCNC: 246 U/L — HIGH (ref 30–115)
ALT FLD-CCNC: 18 U/L — SIGNIFICANT CHANGE UP (ref 0–41)
ANION GAP SERPL CALC-SCNC: 5 MMOL/L — LOW (ref 7–14)
AST SERPL-CCNC: 56 U/L — HIGH (ref 0–41)
BASOPHILS # BLD AUTO: 0.02 K/UL — SIGNIFICANT CHANGE UP (ref 0–0.2)
BASOPHILS NFR BLD AUTO: 0.1 % — SIGNIFICANT CHANGE UP (ref 0–1)
BILIRUB SERPL-MCNC: 0.4 MG/DL — SIGNIFICANT CHANGE UP (ref 0.2–1.2)
BUN SERPL-MCNC: 21 MG/DL — HIGH (ref 10–20)
CALCIUM SERPL-MCNC: 7.5 MG/DL — LOW (ref 8.5–10.1)
CHLORIDE SERPL-SCNC: 109 MMOL/L — SIGNIFICANT CHANGE UP (ref 98–110)
CO2 SERPL-SCNC: 26 MMOL/L — SIGNIFICANT CHANGE UP (ref 17–32)
CREAT SERPL-MCNC: 0.6 MG/DL — LOW (ref 0.7–1.5)
EOSINOPHIL # BLD AUTO: 0.14 K/UL — SIGNIFICANT CHANGE UP (ref 0–0.7)
EOSINOPHIL NFR BLD AUTO: 1 % — SIGNIFICANT CHANGE UP (ref 0–8)
GLUCOSE BLDC GLUCOMTR-MCNC: 106 MG/DL — HIGH (ref 70–99)
GLUCOSE BLDC GLUCOMTR-MCNC: 114 MG/DL — HIGH (ref 70–99)
GLUCOSE BLDC GLUCOMTR-MCNC: 118 MG/DL — HIGH (ref 70–99)
GLUCOSE BLDC GLUCOMTR-MCNC: 89 MG/DL — SIGNIFICANT CHANGE UP (ref 70–99)
GLUCOSE BLDC GLUCOMTR-MCNC: 95 MG/DL — SIGNIFICANT CHANGE UP (ref 70–99)
GLUCOSE SERPL-MCNC: 103 MG/DL — HIGH (ref 70–99)
HCT VFR BLD CALC: 25.1 % — LOW (ref 42–52)
HGB BLD-MCNC: 7.8 G/DL — LOW (ref 14–18)
IMM GRANULOCYTES NFR BLD AUTO: 2.1 % — HIGH (ref 0.1–0.3)
LYMPHOCYTES # BLD AUTO: 1.21 K/UL — SIGNIFICANT CHANGE UP (ref 1.2–3.4)
LYMPHOCYTES # BLD AUTO: 8.9 % — LOW (ref 20.5–51.1)
MAGNESIUM SERPL-MCNC: 1.9 MG/DL — SIGNIFICANT CHANGE UP (ref 1.8–2.4)
MCHC RBC-ENTMCNC: 26.5 PG — LOW (ref 27–31)
MCHC RBC-ENTMCNC: 31.1 G/DL — LOW (ref 32–37)
MCV RBC AUTO: 85.4 FL — SIGNIFICANT CHANGE UP (ref 80–94)
MONOCYTES # BLD AUTO: 0.81 K/UL — HIGH (ref 0.1–0.6)
MONOCYTES NFR BLD AUTO: 5.9 % — SIGNIFICANT CHANGE UP (ref 1.7–9.3)
NEUTROPHILS # BLD AUTO: 11.17 K/UL — HIGH (ref 1.4–6.5)
NEUTROPHILS NFR BLD AUTO: 82 % — HIGH (ref 42.2–75.2)
NRBC # BLD: 0 /100 WBCS — SIGNIFICANT CHANGE UP (ref 0–0)
PLATELET # BLD AUTO: 108 K/UL — LOW (ref 130–400)
POTASSIUM SERPL-MCNC: 4.2 MMOL/L — SIGNIFICANT CHANGE UP (ref 3.5–5)
POTASSIUM SERPL-SCNC: 4.2 MMOL/L — SIGNIFICANT CHANGE UP (ref 3.5–5)
PROT SERPL-MCNC: 6 G/DL — SIGNIFICANT CHANGE UP (ref 6–8)
RBC # BLD: 2.94 M/UL — LOW (ref 4.7–6.1)
RBC # FLD: 17.9 % — HIGH (ref 11.5–14.5)
SODIUM SERPL-SCNC: 140 MMOL/L — SIGNIFICANT CHANGE UP (ref 135–146)
WBC # BLD: 13.63 K/UL — HIGH (ref 4.8–10.8)
WBC # FLD AUTO: 13.63 K/UL — HIGH (ref 4.8–10.8)

## 2021-05-08 PROCEDURE — 71045 X-RAY EXAM CHEST 1 VIEW: CPT | Mod: 26

## 2021-05-08 PROCEDURE — 99232 SBSQ HOSP IP/OBS MODERATE 35: CPT

## 2021-05-08 RX ADMIN — MIDODRINE HYDROCHLORIDE 10 MILLIGRAM(S): 2.5 TABLET ORAL at 05:11

## 2021-05-08 RX ADMIN — CHLORHEXIDINE GLUCONATE 1 APPLICATION(S): 213 SOLUTION TOPICAL at 05:12

## 2021-05-08 RX ADMIN — Medication 1 APPLICATION(S): at 16:44

## 2021-05-08 RX ADMIN — ATORVASTATIN CALCIUM 40 MILLIGRAM(S): 80 TABLET, FILM COATED ORAL at 21:56

## 2021-05-08 RX ADMIN — SENNA PLUS 2 TABLET(S): 8.6 TABLET ORAL at 21:57

## 2021-05-08 RX ADMIN — ENOXAPARIN SODIUM 40 MILLIGRAM(S): 100 INJECTION SUBCUTANEOUS at 12:48

## 2021-05-08 RX ADMIN — Medication 100 MILLIGRAM(S): at 14:27

## 2021-05-08 RX ADMIN — MIDODRINE HYDROCHLORIDE 10 MILLIGRAM(S): 2.5 TABLET ORAL at 14:27

## 2021-05-08 RX ADMIN — MAGNESIUM OXIDE 400 MG ORAL TABLET 400 MILLIGRAM(S): 241.3 TABLET ORAL at 14:27

## 2021-05-08 RX ADMIN — CHLORHEXIDINE GLUCONATE 15 MILLILITER(S): 213 SOLUTION TOPICAL at 16:44

## 2021-05-08 RX ADMIN — MAGNESIUM OXIDE 400 MG ORAL TABLET 400 MILLIGRAM(S): 241.3 TABLET ORAL at 21:58

## 2021-05-08 RX ADMIN — ZINC SULFATE TAB 220 MG (50 MG ZINC EQUIVALENT) 220 MILLIGRAM(S): 220 (50 ZN) TAB at 12:47

## 2021-05-08 RX ADMIN — Medication 1 APPLICATION(S): at 16:45

## 2021-05-08 RX ADMIN — Medication 500 MILLIGRAM(S): at 12:48

## 2021-05-08 RX ADMIN — CARBIDOPA AND LEVODOPA 2 TABLET(S): 25; 100 TABLET ORAL at 05:11

## 2021-05-08 RX ADMIN — CARBIDOPA AND LEVODOPA 2 TABLET(S): 25; 100 TABLET ORAL at 14:26

## 2021-05-08 RX ADMIN — CHLORHEXIDINE GLUCONATE 15 MILLILITER(S): 213 SOLUTION TOPICAL at 05:12

## 2021-05-08 RX ADMIN — Medication 1 APPLICATION(S): at 05:26

## 2021-05-08 RX ADMIN — CARBIDOPA AND LEVODOPA 2 TABLET(S): 25; 100 TABLET ORAL at 21:57

## 2021-05-08 RX ADMIN — CARBIDOPA AND LEVODOPA 2 TABLET(S): 25; 100 TABLET ORAL at 11:03

## 2021-05-08 RX ADMIN — Medication 100 MILLIGRAM(S): at 21:57

## 2021-05-08 RX ADMIN — Medication 100 MILLIGRAM(S): at 05:11

## 2021-05-08 RX ADMIN — MAGNESIUM OXIDE 400 MG ORAL TABLET 400 MILLIGRAM(S): 241.3 TABLET ORAL at 05:11

## 2021-05-08 NOTE — PROGRESS NOTE ADULT - ASSESSMENT
IMPRESSION:    Acute hypoxemic resp failure/ L lung collapse sp bronch extubated 5/7  Septic shock resolved   Infected sacral ulcer/ UTI  Cholecystitis sp Perc gayle  MDR pneumonia Klebsiella improved  HO parkinson's sp PEG  Anemia  HO DVT/ A fib  DEC HB NO ACTIVE BLEED  Thrombocytopenia HIT negative     PLAN:    CNS: Keep off sedation.      HEENT:  Oral care    PULMONARY:  HOB @ 45 degrees, on room air         CARDIOVASCULAR: Keep equal. Avoid volume overload.    GI: GI prophylaxis         Feeding PEG.  IR fup for AUGUSTINA drain removal    RENAL:  F/u  lytes.  Correct as needed. accurate I/O    INFECTIOUS DISEASE: Cont abx per ID    HEMATOLOGICAL:  DVT prophylaxis seq and LMWH     ENDOCRINE:  Follow up FS.  Insulin protocol if needed.    CODE STATUS: FULL CODE    remove hancock TOV  transfer to floor  Very poor prognosis    transfer to floor IMPRESSION:    Acute hypoxemic resp failure/ L lung collapse sp bronch extubated 5/7  Septic shock resolved   Infected sacral ulcer/ UTI  Cholecystitis sp Perc gayle  MDR pneumonia Klebsiella/ pseudomonas improved  HO parkinson's sp PEG  Anemia  HO DVT/ A fib  DEC HB NO ACTIVE BLEED  Thrombocytopenia HIT negative     PLAN:    CNS: Keep off sedation.      HEENT:  Oral care    PULMONARY:  HOB @ 45 degrees, on room air         CARDIOVASCULAR: Keep equal. Avoid volume overload.    GI: GI prophylaxis         Feeding PEG.  IR fup for AUGUSTINA drain removal    RENAL:  F/u  lytes.  Correct as needed. accurate I/O    INFECTIOUS DISEASE: Cont abx per ID    HEMATOLOGICAL:  DVT prophylaxis seq and LMWH     ENDOCRINE:  Follow up FS.  Insulin protocol if needed.    CODE STATUS: FULL CODE    remove hancock TOV  transfer to floor  Very poor prognosis

## 2021-05-08 NOTE — PROGRESS NOTE ADULT - SUBJECTIVE AND OBJECTIVE BOX
Patient is a 71y old  Male who presents with a chief complaint of Change in Mental Status (07 May 2021 08:39)      Over Night Events: extubated yesterday, doing well on room air, no drips    I&O's Detail    07 May 2021 07:01  -  08 May 2021 07:00  --------------------------------------------------------  IN:    Dexmedetomidine: 9 mL    Enteral Tube Flush: 330 mL    IV PiggyBack: 100 mL    IV PiggyBack: 100 mL    Jevity 1.2: 1440 mL  Total IN: 1979 mL    OUT:    Drain (mL): 0 mL    Indwelling Catheter - Urethral (mL): 1710 mL  Total OUT: 1710 mL    Total NET: 269 mL      08 May 2021 07:01  -  08 May 2021 09:12  --------------------------------------------------------  IN:  Total IN: 0 mL    OUT:    Indwelling Catheter - Urethral (mL): 75 mL  Total OUT: 75 mL    Total NET: -75 mL          PHYSICAL EXAM    ICU Vital Signs Last 24 Hrs  T(C): 36.2 (08 May 2021 08:00), Max: 36.2 (07 May 2021 20:00)  T(F): 97.1 (08 May 2021 08:00), Max: 97.2 (07 May 2021 20:00)  HR: 75 (08 May 2021 08:00) (57 - 78)  BP: 133/65 (08 May 2021 08:00) (114/55 - 168/67)  BP(mean): 91 (08 May 2021 08:00) (73 - 97)  ABP: --  ABP(mean): --  RR: 18 (08 May 2021 08:00) (16 - 48)  SpO2: 100% (08 May 2021 08:00) (99% - 100%)      CONSTITUTIONAL:   Ill appearing.  Well nourished.  NAD    ENT:   Airway patent,   Mouth with normal mucosa.   No thrush    EYES:   Pupils equal,   Round and reactive to light.    CARDIAC:   Normal rate,   Regular rhythm.    No edema    Vascular:  Normal systolic impulse  No Carotid bruits    RESPIRATORY:   No wheezing  Bilateral BS  Normal chest expansion  Not tachypneic,  No use of accessory muscles    GASTROINTESTINAL:  Abdomen soft,   Non-tender,   No guarding,   + BS    GENITOURINARY  normal genitalia for sex  no edema    MUSCULOSKELETAL:   Range of motion is not limited,  No clubbing, cyanosis    NEUROLOGICAL:   Alert and oriented   follows commands  No motor  deficits.  pertinent DTRs normal    SKIN:   Skin normal color for race,   Warm and dry  No evidence of rash  stage 4 sacral ulcer.      HEMATOLOGICAL:  No cervical  lymphadenopathy.  no inguinal lymphadenopathy      LABS:                          7.7    11.64 )-----------( 73       ( 07 May 2021 08:30 )             25.3                                               05-07    141  |  111<H>  |  29<H>  ----------------------------<  149<H>  4.1   |  22  |  0.6<L>    Ca    7.8<L>      07 May 2021 08:30  Mg     1.9     05-07    TPro  5.8<L>  /  Alb  1.7<L>  /  TBili  0.4  /  DBili  x   /  AST  64<H>  /  ALT  10  /  AlkPhos  261<H>  05-07                                                                                           LIVER FUNCTIONS - ( 07 May 2021 08:30 )  Alb: 1.7 g/dL / Pro: 5.8 g/dL / ALK PHOS: 261 U/L / ALT: 10 U/L / AST: 64 U/L / GGT: x                                                  Culture - Other (collected 05 May 2021 15:05)  Source: .Other sacrum  Final Report (07 May 2021 17:08):    Culture yields growth of greater than 3 colony types of    bacteria,  which may indicate contamination and normal srikanth    Call client services within 7 days if further workup is clinically    indicated. Culture includes    Moderate Pseudomonas aeruginosa (Carbapenem Resistant)  Organism: Pseudomonas aeruginosa (Carbapenem Resistant) (07 May 2021 17:08)  Organism: Pseudomonas aeruginosa (Carbapenem Resistant) (07 May 2021 17:08)                     D-Dimer Assay, Quantitative: 6747 ng/mL DDU (05-05-21 @ 10:48)                                                        Mode: CPAP with PS  FiO2: 30  PEEP: 5  PS: 6                                      ABG - ( 07 May 2021 13:25 )  pH, Arterial: 7.49  pH, Blood: x     /  pCO2: 34    /  pO2: 131   / HCO3: 26    / Base Excess: 2.4   /  SaO2: 99                  MEDICATIONS  (STANDING):  ascorbic acid 500 milliGRAM(s) Oral daily  atorvastatin 40 milliGRAM(s) Oral at bedtime  carbidopa/levodopa  25/100 2 Tablet(s) Oral <User Schedule>  ceftazidime/avibactam IVPB 2.5 Gram(s) IV Intermittent every 8 hours  chlorhexidine 0.12% Liquid 15 milliLiter(s) Oral Mucosa every 12 hours  chlorhexidine 4% Liquid 1 Application(s) Topical <User Schedule>  collagenase Ointment 1 Application(s) Topical two times a day  Dakins Solution - 1/2 Strength 1 Application(s) Topical two times a day  dexMEDEtomidine Infusion 0.2 MICROgram(s)/kG/Hr (3.97 mL/Hr) IV Continuous <Continuous>  enoxaparin Injectable 40 milliGRAM(s) SubCutaneous daily  lidocaine 2% Gel 1 Application(s) Topical once  lidocaine 2% Viscous 100 milliLiter(s) Swish and Spit once  magnesium oxide 400 milliGRAM(s) Oral every 8 hours  metroNIDAZOLE  IVPB 500 milliGRAM(s) IV Intermittent every 8 hours  midodrine 10 milliGRAM(s) Oral every 8 hours  phenylephrine    Infusion 0.1 MICROgram(s)/kG/Min (2.98 mL/Hr) IV Continuous <Continuous>  polyethylene glycol 3350 17 Gram(s) Oral daily  senna 2 Tablet(s) Oral at bedtime  zinc sulfate 220 milliGRAM(s) Oral daily    MEDICATIONS  (PRN):  acetaminophen   Tablet .. 650 milliGRAM(s) Oral every 6 hours PRN Temp greater or equal to 38C (100.4F)      CXR interpreted by me:       Patient is a 71y old  Male who presents with a chief complaint of Change in Mental Status (07 May 2021 08:39)      Over Night Events: extubated yesterday, doing well on room air, no drips    I&O's Detail    07 May 2021 07:01  -  08 May 2021 07:00  --------------------------------------------------------  IN:    Dexmedetomidine: 9 mL    Enteral Tube Flush: 330 mL    IV PiggyBack: 100 mL    IV PiggyBack: 100 mL    Jevity 1.2: 1440 mL  Total IN: 1979 mL    OUT:    Drain (mL): 0 mL    Indwelling Catheter - Urethral (mL): 1710 mL  Total OUT: 1710 mL    Total NET: 269 mL      08 May 2021 07:01  -  08 May 2021 09:12  --------------------------------------------------------  IN:  Total IN: 0 mL    OUT:    Indwelling Catheter - Urethral (mL): 75 mL  Total OUT: 75 mL    Total NET: -75 mL          PHYSICAL EXAM    ICU Vital Signs Last 24 Hrs  T(C): 36.2 (08 May 2021 08:00), Max: 36.2 (07 May 2021 20:00)  T(F): 97.1 (08 May 2021 08:00), Max: 97.2 (07 May 2021 20:00)  HR: 75 (08 May 2021 08:00) (57 - 78)  BP: 133/65 (08 May 2021 08:00) (114/55 - 168/67)  BP(mean): 91 (08 May 2021 08:00) (73 - 97)  RR: 18 (08 May 2021 08:00) (16 - 48)  SpO2: 100% (08 May 2021 08:00) (99% - 100%)      CONSTITUTIONAL:   Ill appearing.      ENT:   Airway patent,   Mouth with normal mucosa.   No thrush    EYES:   Pupils equal,   Round and reactive to light.    CARDIAC:   EVANS 3/6  RESPIRATORY:   No wheezing  Bilateral BS  Normal chest expansion  Not tachypneic,  No use of accessory muscles    GASTROINTESTINAL:  Abdomen soft,   Non-tender,   No guarding,   + BS      MUSCULOSKELETAL:   Range of motion is not limited,  No clubbing, cyanosis    NEUROLOGICAL:   Alert and oriented   follows commands  No motor  deficits.  pertinent DTRs normal    SKIN:   Skin normal color for race,   Warm and dry  No evidence of rash  stage 4 sacral ulcer.            LABS:                          7.7    11.64 )-----------( 73       ( 07 May 2021 08:30 )             25.3                                               05-07    141  |  111<H>  |  29<H>  ----------------------------<  149<H>  4.1   |  22  |  0.6<L>    Ca    7.8<L>      07 May 2021 08:30  Mg     1.9     05-07    TPro  5.8<L>  /  Alb  1.7<L>  /  TBili  0.4  /  DBili  x   /  AST  64<H>  /  ALT  10  /  AlkPhos  261<H>  05-07                                                                                           LIVER FUNCTIONS - ( 07 May 2021 08:30 )  Alb: 1.7 g/dL / Pro: 5.8 g/dL / ALK PHOS: 261 U/L / ALT: 10 U/L / AST: 64 U/L / GGT: x                                                  Culture - Other (collected 05 May 2021 15:05)  Source: .Other sacrum  Final Report (07 May 2021 17:08):    Culture yields growth of greater than 3 colony types of    bacteria,  which may indicate contamination and normal srikanth    Call client services within 7 days if further workup is clinically    indicated. Culture includes    Moderate Pseudomonas aeruginosa (Carbapenem Resistant)  Organism: Pseudomonas aeruginosa (Carbapenem Resistant) (07 May 2021 17:08)  Organism: Pseudomonas aeruginosa (Carbapenem Resistant) (07 May 2021 17:08)                     D-Dimer Assay, Quantitative: 6747 ng/mL DDU (05-05-21 @ 10:48)                                                        Mode: CPAP with PS  FiO2: 30  PEEP: 5  PS: 6                                      ABG - ( 07 May 2021 13:25 )  pH, Arterial: 7.49  pH, Blood: x     /  pCO2: 34    /  pO2: 131   / HCO3: 26    / Base Excess: 2.4   /  SaO2: 99                  MEDICATIONS  (STANDING):  ascorbic acid 500 milliGRAM(s) Oral daily  atorvastatin 40 milliGRAM(s) Oral at bedtime  carbidopa/levodopa  25/100 2 Tablet(s) Oral <User Schedule>  ceftazidime/avibactam IVPB 2.5 Gram(s) IV Intermittent every 8 hours  chlorhexidine 0.12% Liquid 15 milliLiter(s) Oral Mucosa every 12 hours  chlorhexidine 4% Liquid 1 Application(s) Topical <User Schedule>  collagenase Ointment 1 Application(s) Topical two times a day  Dakins Solution - 1/2 Strength 1 Application(s) Topical two times a day  dexMEDEtomidine Infusion 0.2 MICROgram(s)/kG/Hr (3.97 mL/Hr) IV Continuous <Continuous>  enoxaparin Injectable 40 milliGRAM(s) SubCutaneous daily  lidocaine 2% Gel 1 Application(s) Topical once  lidocaine 2% Viscous 100 milliLiter(s) Swish and Spit once  magnesium oxide 400 milliGRAM(s) Oral every 8 hours  metroNIDAZOLE  IVPB 500 milliGRAM(s) IV Intermittent every 8 hours  midodrine 10 milliGRAM(s) Oral every 8 hours  phenylephrine    Infusion 0.1 MICROgram(s)/kG/Min (2.98 mL/Hr) IV Continuous <Continuous>  polyethylene glycol 3350 17 Gram(s) Oral daily  senna 2 Tablet(s) Oral at bedtime  zinc sulfate 220 milliGRAM(s) Oral daily    MEDICATIONS  (PRN):  acetaminophen   Tablet .. 650 milliGRAM(s) Oral every 6 hours PRN Temp greater or equal to 38C (100.4F)

## 2021-05-08 NOTE — PROGRESS NOTE ADULT - ATTENDING COMMENTS
Events noted, sp extubation, however high risk for reintubation will need trach, abx, floor, poor prognosis

## 2021-05-09 LAB
GLUCOSE BLDC GLUCOMTR-MCNC: 78 MG/DL — SIGNIFICANT CHANGE UP (ref 70–99)
GLUCOSE BLDC GLUCOMTR-MCNC: 80 MG/DL — SIGNIFICANT CHANGE UP (ref 70–99)
GLUCOSE BLDC GLUCOMTR-MCNC: 86 MG/DL — SIGNIFICANT CHANGE UP (ref 70–99)
GLUCOSE BLDC GLUCOMTR-MCNC: 88 MG/DL — SIGNIFICANT CHANGE UP (ref 70–99)

## 2021-05-09 PROCEDURE — 71045 X-RAY EXAM CHEST 1 VIEW: CPT | Mod: 26

## 2021-05-09 PROCEDURE — 99233 SBSQ HOSP IP/OBS HIGH 50: CPT

## 2021-05-09 RX ADMIN — ATORVASTATIN CALCIUM 40 MILLIGRAM(S): 80 TABLET, FILM COATED ORAL at 21:11

## 2021-05-09 RX ADMIN — MAGNESIUM OXIDE 400 MG ORAL TABLET 400 MILLIGRAM(S): 241.3 TABLET ORAL at 06:28

## 2021-05-09 RX ADMIN — Medication 1 APPLICATION(S): at 06:29

## 2021-05-09 RX ADMIN — CHLORHEXIDINE GLUCONATE 1 APPLICATION(S): 213 SOLUTION TOPICAL at 05:39

## 2021-05-09 RX ADMIN — Medication 500 MILLIGRAM(S): at 12:03

## 2021-05-09 RX ADMIN — SENNA PLUS 2 TABLET(S): 8.6 TABLET ORAL at 21:15

## 2021-05-09 RX ADMIN — ZINC SULFATE TAB 220 MG (50 MG ZINC EQUIVALENT) 220 MILLIGRAM(S): 220 (50 ZN) TAB at 12:02

## 2021-05-09 RX ADMIN — CARBIDOPA AND LEVODOPA 2 TABLET(S): 25; 100 TABLET ORAL at 06:28

## 2021-05-09 RX ADMIN — Medication 1 APPLICATION(S): at 17:12

## 2021-05-09 RX ADMIN — ENOXAPARIN SODIUM 40 MILLIGRAM(S): 100 INJECTION SUBCUTANEOUS at 12:03

## 2021-05-09 RX ADMIN — CHLORHEXIDINE GLUCONATE 15 MILLILITER(S): 213 SOLUTION TOPICAL at 17:12

## 2021-05-09 RX ADMIN — CARBIDOPA AND LEVODOPA 2 TABLET(S): 25; 100 TABLET ORAL at 09:08

## 2021-05-09 RX ADMIN — POLYETHYLENE GLYCOL 3350 17 GRAM(S): 17 POWDER, FOR SOLUTION ORAL at 12:03

## 2021-05-09 RX ADMIN — Medication 100 MILLIGRAM(S): at 12:03

## 2021-05-09 RX ADMIN — MAGNESIUM OXIDE 400 MG ORAL TABLET 400 MILLIGRAM(S): 241.3 TABLET ORAL at 21:11

## 2021-05-09 RX ADMIN — Medication 100 MILLIGRAM(S): at 05:37

## 2021-05-09 RX ADMIN — MIDODRINE HYDROCHLORIDE 10 MILLIGRAM(S): 2.5 TABLET ORAL at 06:30

## 2021-05-09 RX ADMIN — CHLORHEXIDINE GLUCONATE 15 MILLILITER(S): 213 SOLUTION TOPICAL at 05:39

## 2021-05-09 RX ADMIN — CARBIDOPA AND LEVODOPA 2 TABLET(S): 25; 100 TABLET ORAL at 12:04

## 2021-05-09 RX ADMIN — Medication 100 MILLIGRAM(S): at 21:11

## 2021-05-09 RX ADMIN — CARBIDOPA AND LEVODOPA 2 TABLET(S): 25; 100 TABLET ORAL at 21:11

## 2021-05-09 RX ADMIN — MAGNESIUM OXIDE 400 MG ORAL TABLET 400 MILLIGRAM(S): 241.3 TABLET ORAL at 12:03

## 2021-05-09 NOTE — PROGRESS NOTE ADULT - SUBJECTIVE AND OBJECTIVE BOX
NIEVES LABOY  71y  Male      Patient is a 71y old  Male who presents with a chief complaint of Change in Mental Status (09 May 2021 08:48)      INTERVAL HPI/OVERNIGHT EVENTS:  Patient is altered, doesn't follow commands, no fever,   Vital Signs Last 24 Hrs  T(C): 36.4 (09 May 2021 12:05), Max: 36.4 (09 May 2021 12:05)  T(F): 97.6 (09 May 2021 12:05), Max: 97.6 (09 May 2021 12:05)  HR: 73 (09 May 2021 12:05) (69 - 73)  BP: 131/78 (09 May 2021 12:05) (127/60 - 168/82)  BP(mean): --  RR: 18 (09 May 2021 12:05) (18 - 20)  SpO2: 100% (08 May 2021 22:15) (100% - 100%)      05-08-21 @ 07:01  -  05-09-21 @ 07:00  --------------------------------------------------------  IN: 860 mL / OUT: 590 mL / NET: 270 mL    05-09-21 @ 07:01  -  05-09-21 @ 15:00  --------------------------------------------------------  IN: 410 mL / OUT: 0 mL / NET: 410 mL            Consultant(s) Notes Reviewed:  [x ] YES  [ ] NO          MEDICATIONS  (STANDING):  ascorbic acid 500 milliGRAM(s) Oral daily  atorvastatin 40 milliGRAM(s) Oral at bedtime  carbidopa/levodopa  25/100 2 Tablet(s) Oral <User Schedule>  ceftazidime/avibactam IVPB 2.5 Gram(s) IV Intermittent every 8 hours  chlorhexidine 0.12% Liquid 15 milliLiter(s) Oral Mucosa every 12 hours  chlorhexidine 4% Liquid 1 Application(s) Topical <User Schedule>  collagenase Ointment 1 Application(s) Topical two times a day  Dakins Solution - 1/2 Strength 1 Application(s) Topical two times a day  enoxaparin Injectable 40 milliGRAM(s) SubCutaneous daily  lidocaine 2% Gel 1 Application(s) Topical once  lidocaine 2% Viscous 100 milliLiter(s) Swish and Spit once  magnesium oxide 400 milliGRAM(s) Oral every 8 hours  metroNIDAZOLE  IVPB 500 milliGRAM(s) IV Intermittent every 8 hours  midodrine 10 milliGRAM(s) Oral every 8 hours  polyethylene glycol 3350 17 Gram(s) Oral daily  senna 2 Tablet(s) Oral at bedtime  zinc sulfate 220 milliGRAM(s) Oral daily    MEDICATIONS  (PRN):  acetaminophen   Tablet .. 650 milliGRAM(s) Oral every 6 hours PRN Temp greater or equal to 38C (100.4F)      LABS                          7.8    13.63 )-----------( 108      ( 08 May 2021 09:50 )             25.1     05-08    140  |  109  |  21<H>  ----------------------------<  103<H>  4.2   |  26  |  0.6<L>    Ca    7.5<L>      08 May 2021 09:50  Mg     1.9     05-08    TPro  6.0  /  Alb  1.9<L>  /  TBili  0.4  /  DBili  x   /  AST  56<H>  /  ALT  18  /  AlkPhos  246<H>  05-08          Lactate Trend        CAPILLARY BLOOD GLUCOSE      POCT Blood Glucose.: 80 mg/dL (09 May 2021 11:35)      Culture - Other (collected 05-05-21 @ 15:05)  Source: .Other sacrum  Final Report (05-07-21 @ 17:08):    Culture yields growth of greater than 3 colony types of    bacteria,  which may indicate contamination and normal srikanth    Call client services within 7 days if further workup is clinically    indicated. Culture includes    Moderate Pseudomonas aeruginosa (Carbapenem Resistant)  Organism: Pseudomonas aeruginosa (Carbapenem Resistant) (05-07-21 @ 17:08)  Organism: Pseudomonas aeruginosa (Carbapenem Resistant) (05-07-21 @ 17:08)      -  Amikacin: S <=16      -  Aztreonam: I 16      -  Cefepime: I 16      -  Ceftazidime: I 16      -  Ciprofloxacin: R >2      -  Gentamicin: I 8      -  Imipenem: R >8      -  Levofloxacin: R >4      -  Meropenem: R 8      -  Piperacillin/Tazobactam: I 64      -  Tobramycin: S <=2      Method Type: JAZMIN    Culture - Fungal, Bronchial (collected 05-04-21 @ 17:00)  Source: .Bronchial None  Preliminary Report (05-05-21 @ 08:19):    Testing in progress    Culture - Acid Fast - Bronchial w/Smear (collected 05-04-21 @ 17:00)  Source: .Bronchial None  Preliminary Report (05-08-21 @ 15:04):    Culture is being performed.    Culture - Bronchial (collected 05-04-21 @ 17:00)  Source: .Bronchial None  Gram Stain (05-05-21 @ 03:49):    Rare polymorphonuclear leukocytes seen per low power field    No Squamous epithelial cells seen per low power field    Moderate Gram Negative Rods seen per oil power field  Final Report (05-06-21 @ 17:54):    Moderate Klebsiella pneumoniae (Carbapenem Resistant)    Normal Respiratory Srikanth absent  Organism: Klepne MDRO (05-06-21 @ 17:54)  Organism: Klepne MDRO (05-06-21 @ 17:54)      -  Amikacin: R >32      -  Amoxicillin/Clavulanic Acid: R >16/8      -  Ampicillin: R >16 These ampicillin results predict results for amoxicillin      -  Ampicillin/Sulbactam: R >16/8 Enterobacter, Citrobacter, and Serratia may develop resistance during prolonged therapy (3-4 days)      -  Aztreonam: R >16      -  Cefazolin: R >16 Enterobacter, Citrobacter, and Serratia may develop resistance during prolonged therapy (3-4 days)      -  Cefepime: R >16      -  Cefoxitin: R >16      -  Ceftazidime/Avibactam: S <=4      -  Ceftolozane/tazobactam: R >8      -  Ceftriaxone: R >32 Enterobacter, Citrobacter, and Serratia may develop resistance during prolonged therapy      -  Ciprofloxacin: R >2      -  Ertapenem: R >1      -  Gentamicin: R >8      -  Imipenem: R 4      -  Levofloxacin: R >4      -  Meropenem: R 8      -  Piperacillin/Tazobactam: R >64      -  Tobramycin: R >8      -  Trimethoprim/Sulfamethoxazole: R >2/38      Method Type: JAZMIN        RADIOLOGY & ADDITIONAL TESTS:    Imaging Personally Reviewed:  [ ] YES  [ ] NO    HEALTH ISSUES - PROBLEM Dx:          PHYSICAL EXAM:  GENERAL: confused, altered,   HEAD:  Atraumatic, turned to left side.   EYES: EOMI, PERRLA, conjunctiva and sclera clear.  NECK: Supple, No JVD.  CHEST/LUNG: Bilateral lower lung rales.   HEART: Regular rate and rhythm; S1 S2. Distant heart sounds.   ABDOMEN: Soft, mildly distended, PEG tube in place, right upper abdomen with cholecystomy tube in place.   EXTREMITIES:  feet in flexure position,   PSYCH: AAOx3.  NEUROLOGY: patient with dysarthria, left arm noted weak, can move right arm, didn't move legs.   SKIN: No rashes or lesions.

## 2021-05-09 NOTE — PROGRESS NOTE ADULT - SUBJECTIVE AND OBJECTIVE BOX
----------Daily Progress Note----------    HISTORY OF PRESENT ILLNESS:  Patient is a 71y old Male who presents with a chief complaint of Change in Mental Status (08 May 2021 09:12)    Currently admitted to medicine with the primary diagnosis of Metabolic encephalopathy       Today is hospital day 48d.     INTERVAL HOSPITAL COURSE / OVERNIGHT EVENTS:    Patient was examined and seen at bedside. This morning he is resting comfortably in bed and reports no new issues or overnight events.     Review of Systems: Otherwise unremarkable     <<<<<PAST MEDICAL & SURGICAL HISTORY>>>>>  Parkinson disease    Hypertension    Gout    Dyslipidemia    Prostatitis    Cataract    No significant past surgical history      ALLERGIES  No Known Allergies    MEDICATIONS  STANDING MEDICATIONS  ascorbic acid 500 milliGRAM(s) Oral daily  atorvastatin 40 milliGRAM(s) Oral at bedtime  carbidopa/levodopa  25/100 2 Tablet(s) Oral <User Schedule>  ceftazidime/avibactam IVPB 2.5 Gram(s) IV Intermittent every 8 hours  chlorhexidine 0.12% Liquid 15 milliLiter(s) Oral Mucosa every 12 hours  chlorhexidine 4% Liquid 1 Application(s) Topical <User Schedule>  collagenase Ointment 1 Application(s) Topical two times a day  Dakins Solution - 1/2 Strength 1 Application(s) Topical two times a day  enoxaparin Injectable 40 milliGRAM(s) SubCutaneous daily  lidocaine 2% Gel 1 Application(s) Topical once  lidocaine 2% Viscous 100 milliLiter(s) Swish and Spit once  magnesium oxide 400 milliGRAM(s) Oral every 8 hours  metroNIDAZOLE  IVPB 500 milliGRAM(s) IV Intermittent every 8 hours  midodrine 10 milliGRAM(s) Oral every 8 hours  polyethylene glycol 3350 17 Gram(s) Oral daily  senna 2 Tablet(s) Oral at bedtime  zinc sulfate 220 milliGRAM(s) Oral daily    PRN MEDICATIONS  acetaminophen   Tablet .. 650 milliGRAM(s) Oral every 6 hours PRN    VITALS:  T(F): 97  HR: 72  BP: 127/60  RR: 20  SpO2: 100%    <<<<<LABS>>>>>                        7.8    13.63 )-----------( 108      ( 08 May 2021 09:50 )             25.1     05-08    140  |  109  |  21<H>  ----------------------------<  103<H>  4.2   |  26  |  0.6<L>    Ca    7.5<L>      08 May 2021 09:50  Mg     1.9     05-08    TPro  6.0  /  Alb  1.9<L>  /  TBili  0.4  /  DBili  x   /  AST  56<H>  /  ALT  18  /  AlkPhos  246<H>  05-08        ABG - ( 07 May 2021 13:25 )  pH, Arterial: 7.49  pH, Blood: x     /  pCO2: 34    /  pO2: 131   / HCO3: 26    / Base Excess: 2.4   /  SaO2: 99                  [id number]        <<<<<RADIOLOGY>>>>>    ASSESSMENT/PLAN  71 year old Male with past medical history of Parkinson's, dementia, CKD Stage 3, 1st degree AV block, HLD, gout, HTN, DM, fungal septicemia presenting from [facility] for acute decompensation in mental status. As per documentation patient at baseline is verbal but for past 3 days, he has been worsening to state of being non verbal. Patient was admitted to [facility] for fungemia and discharged on the 11th March. At  he had left hand cellulitis and was treated for it.    # cholecystitis  - patients with fevers overnight  - confirmed with HIDA scan and sonogram  - s/p IR perc cholecystostomy  - antibiotic changed to IV flagyl and Avycaz with end date of 5/11    # Metabolic encephalopathy on top of baseline dementia/Sepsis POA/Necrotic sacral ulcer stage 4/H/o recent fungemia  - CT Head No acute intracranial pathology.activity  -  Chest Xray No consolidation, effusion or pneumothorax.  -  Blood & Urine cxs NGTD   -  WCX GNR, 3/29   Numerous Klebsiella pneumoniae ESBL, Few Pseudomonas aeruginosa, Numerous Enterococcus faecalis (vancomycin resistant)  - evaluated by Burn: s/p debridement of sacrum on 3/30 .  no more new recommendation for surgery at this time .continue local wound care with santyl/dakins WTD.  - s/p meropenem, Polymixin and Aztreonam  - S/p spinal tap --> CSF clear with no growths and neg PCR  -repeat EEG w/ no epileptiform activity but diffuse cerebral dysfx    # Nutrition/Dysphagia:   -daughter agreed to PEG but delayed due to +BCx that turned out to be a contaminant  -4/14 passed S&S but limited PO intake.   -s/p PEG on 4/19  -family taught how to administer feeds and wound care    #Lt shoulder dislocation w/ collection  -no need to tap as per IR and ID concurrs    #Anemia  -s/p 1u PRBCs  -monitor CBC    # Thrombocytopenia ?sec to sepsis  -resolved  -off Pepcid  -heme f/u appreciated    # H/o parkinsons disease  - c/w sinemet    # H/o chronic DVT  - [facility] Duplex Lower Ext Vein Scan, Bilat (03.27.21 @ 18:48) >No evidence of deep venous thrombosis or superficial thrombophlebitis in the bilateral lower extremities.  - unsure why was on Rx >2yrs  -will check w/ PMD Dr. Patterson  -d/w daughter who is aware of risks and benefits. Daughter requests prophylactic dose (aware of anemia and possible need for transfusions)    # Dyslipidemia  - c/w statin    # DVT prophylaxis  -scd  -Lovenox

## 2021-05-09 NOTE — PROGRESS NOTE ADULT - ASSESSMENT
ASSESSMENT  71 year old Male with past medical history of Parkinson's, dementia, CKD Stage 3, 1st degree AV block, HLD, gout, HTN, DM, fungal septicemia presenting from [facility] for acute decompensation in mental status.     IMPRESSION  #RESOLVED Sepsis/Fever, suspected acute cholecystitis vs UTI, HAP    5/5 decub cx Pseudomonas aeruginosa (Carbapenem Resistant) and other bacteria    5/1 BCX NGTD     s/p 4/29 perc gayle, CX NG     4/28 Blood CX coNS,     4/28 UCX NG     4/26 BCX NGTD     4/24 UCX Kleb (Carbapenem Resistant)   HIDA +  < from: [facility] Duplex Lower Ext Vein Scan, Bilat (04.21.21 @ 11:41) >  No evidence of deep venous thrombosis or superficial thrombophlebitis in the bilateral lower extremities.    4/24 UA WBC 82  #Intubated L lung collapse s/p bronch with mucous plugs    4/28 bronch Kleb   #RESOLVED CoNS in blood,  contaminant     only PIVs    4/8 BCX 1/2 sets   -  Staphylococcus epidermidis, Methicillin resistant: Detec    3/25 BCX 1/2 sets, 1/4 bottles Staphylococcus pettenkoferi, likely contaminant   #RESOLVED Fever with sepsis, not present on admission with metabolic encephalopathy, EEG + seizure, possibly in setting of sepsis secondary to large sacral infected decub    LP not consistent with infection.. G/S NEGATIVE (on ABX)    Total Nucleated Cell Count, CSF: 0 /uL (04.01.21 @ 14:00)    Protein, CSF: 42 mg/dL (04.01.21 @ 14:00)    Glucose, CSF: 97 mg/dL (04.01.21 @ 14:00)    3/25 BCX 1/2 sets, 1/4 bottles Staphylococcus pettenkoferi, likely contaminant     CXR no PNA   3/22 Blood & Urine cxs NGTD   #Sacral ulcer- necrotic     3/31 WCX GNR    3/29   Numerous Klebsiella pneumoniae ESBL    Few CRE Pseudomonas aeruginosa (high MICs to AGs)    Numerous Enterococcus faecalis (vancomycin resistant)- S amp    seen by Burn sacrum with full thickness ~4x5cm with dark /brown devitalized tissue at base; no purulent drainage, no bleeding  #Recent Fungemia    Blood Cx 2/27 Candida albicans    evaluated by optho - no ocular evidence     TTE no significant valvulopathy   #Shoulder dislocation with effusion- joint exam not consistent with a septic arthritis  < from: CT Chest w/ IV Cont (04.07.21 @ 20:31) >  Similar appearance of the left shoulder with complex joint effusion, described in detail on prior exam CT shoulder 2/20/2021.  #ABEBE, resolved  #L hand edema  < from: Xray Wrist 2 Views, Left (03.27.21 @ 11:13) >No acute fracture or dislocation. Diffuse soft tissue swelling. Nonaggressive appearing lucency in the distal radius, indeterminate. Nonemergent MRI may be obtained for further evaluation.    Creatinine, Serum: 0.8 mg/dL (04.26.21 @ 12:17)      RECOMMENDATIONS  - Avycaz (non-formulary form) 2.5g q8h IV & Flagyl 500mg q8h IV x 7 days end 5/11  - TLC 4/28-  - Appreciate IR consult  - Appreciate Burn consult   - Contact isolation CRE  - GOC, grave prognosis MDROs    If any questions, please call or send a message on [employer] Teams  Spectra [phone]

## 2021-05-09 NOTE — PROGRESS NOTE ADULT - SUBJECTIVE AND OBJECTIVE BOX
NIEVES LABOY  71y, Male  Allergy: No Known Allergies      LOS  48d    CHIEF COMPLAINT: Change in Mental Status (09 May 2021 07:53)      INTERVAL EVENTS/HPI  - No acute events overnight  - T(F): , Max: 97 (05-09-21 @ 05:30)  - Tolerating medication  - WBC Count: 13.63 (05-08-21 @ 09:50)  WBC Count: 11.64 (05-07-21 @ 08:30)  - Creatinine, Serum: 0.6 (05-08-21 @ 09:50)       ROS  ***    VITALS:  T(F): 97, Max: 97 (05-09-21 @ 05:30)  HR: 72  BP: 127/60  RR: 20Vital Signs Last 24 Hrs  T(C): 36.1 (09 May 2021 05:30), Max: 36.1 (09 May 2021 05:30)  T(F): 97 (09 May 2021 05:30), Max: 97 (09 May 2021 05:30)  HR: 72 (09 May 2021 05:30) (69 - 75)  BP: 127/60 (09 May 2021 05:30) (103/50 - 168/82)  BP(mean): 82 (08 May 2021 13:00) (80 - 93)  RR: 20 (09 May 2021 05:30) (16 - 20)  SpO2: 100% (08 May 2021 22:15) (99% - 100%)    PHYSICAL EXAM:  ***    FH: Non-contributory  Social Hx: Non-contributory    TESTS & MEASUREMENTS:                        7.8    13.63 )-----------( 108      ( 08 May 2021 09:50 )             25.1     05-08    140  |  109  |  21<H>  ----------------------------<  103<H>  4.2   |  26  |  0.6<L>    Ca    7.5<L>      08 May 2021 09:50  Mg     1.9     05-08    TPro  6.0  /  Alb  1.9<L>  /  TBili  0.4  /  DBili  x   /  AST  56<H>  /  ALT  18  /  AlkPhos  246<H>  05-08    eGFR if Non African American: 100 mL/min/1.73M2 (05-08-21 @ 09:50)  eGFR if : 116 mL/min/1.73M2 (05-08-21 @ 09:50)    LIVER FUNCTIONS - ( 08 May 2021 09:50 )  Alb: 1.9 g/dL / Pro: 6.0 g/dL / ALK PHOS: 246 U/L / ALT: 18 U/L / AST: 56 U/L / GGT: x               Culture - Other (collected 05-05-21 @ 15:05)  Source: .Other sacrum  Final Report (05-07-21 @ 17:08):    Culture yields growth of greater than 3 colony types of    bacteria,  which may indicate contamination and normal srikanth    Call client services within 7 days if further workup is clinically    indicated. Culture includes    Moderate Pseudomonas aeruginosa (Carbapenem Resistant)  Organism: Pseudomonas aeruginosa (Carbapenem Resistant) (05-07-21 @ 17:08)  Organism: Pseudomonas aeruginosa (Carbapenem Resistant) (05-07-21 @ 17:08)      -  Amikacin: S <=16      -  Aztreonam: I 16      -  Cefepime: I 16      -  Ceftazidime: I 16      -  Ciprofloxacin: R >2      -  Gentamicin: I 8      -  Imipenem: R >8      -  Levofloxacin: R >4      -  Meropenem: R 8      -  Piperacillin/Tazobactam: I 64      -  Tobramycin: S <=2      Method Type: [facility]      INFECTIOUS DISEASES TESTING  Procalcitonin, Serum: 2.03 (04-29-21 @ 05:25)  Procalcitonin, Serum: 2.50 (04-28-21 @ 16:00)  Procalcitonin, Serum: 0.19 (04-26-21 @ 17:42)  COVID-19 PCR: NotDetec (04-17-21 @ 10:31)  COVID-19 PCR: NotDetec (04-14-21 @ 14:29)  COVID-19 PCR: NotDetec (04-12-21 @ 15:00)  COVID-19 PCR: NotDetec (04-07-21 @ 14:55)  Procalcitonin, Serum: 0.16 (04-06-21 @ 12:32)  Vancomycin Level, Random: 11.5 (03-31-21 @ 07:00)  Vancomycin Level, Trough: 11.5 (03-31-21 @ 07:00)  COVID-19 PCR: NotDetec (03-30-21 @ 15:19)  COVID-19 PCR: NotDetec (03-29-21 @ 19:40)  Vancomycin Level, Trough: <4.0 (03-28-21 @ 13:00)  Procalcitonin, Serum: 0.61 (03-26-21 @ 10:20)  COVID-19 PCR: NotDetec (03-22-21 @ 20:13)  COVID-19 PCR: NotDetec (03-10-21 @ 12:22)  COVID-19 PCR: NotDetec (03-01-21 @ 16:49)  Fungitell: 62 (03-01-21 @ 11:00)  Procalcitonin, Serum: 0.29 (03-01-21 @ 05:32)  MRSA PCR Result.: Negative (02-27-21 @ 15:44)  COVID-19 PCR: NotDetec (02-25-21 @ 15:34)  Procalcitonin, Serum: 0.27 (02-18-21 @ 10:54)  MRSA PCR Result.: Negative (02-14-21 @ 18:29)  HIV-1/2 Combo Result: Nonreact (02-14-21 @ 05:07)  Procalcitonin, Serum: 0.46 (02-13-21 @ 16:00)  COVID-19 PCR: NotDetec (02-12-21 @ 10:17)      INFLAMMATORY MARKERS      RADIOLOGY & ADDITIONAL TESTS:  I have personally reviewed the last available Chest xray  CXR      CT      CARDIOLOGY TESTING  12 Lead ECG:   Ventricular Rate 94 BPM    Atrial Rate 94 BPM    P-R Interval 216 ms    QRS Duration 88 ms    Q-T Interval 364 ms    QTC Calculation(Bazett) 455 ms    P Axis 52 degrees    R Axis 27 degrees    T Axis 38 degrees    Diagnosis Line Sinus rhythm with 1st degree A-V block  Otherwise normal ECG    Confirmed by Nathan Argueta ([id number]) on 4/28/2021 9:42:25 PM (04-28-21 @ 20:05)      MEDICATIONS  ascorbic acid 500 Oral daily  atorvastatin 40 Oral at bedtime  carbidopa/levodopa  25/100 2 Oral <User Schedule>  ceftazidime/avibactam IVPB 2.5 IV Intermittent every 8 hours  chlorhexidine 0.12% Liquid 15 Oral Mucosa every 12 hours  chlorhexidine 4% Liquid 1 Topical <User Schedule>  collagenase Ointment 1 Topical two times a day  Dakins Solution - 1/2 Strength 1 Topical two times a day  enoxaparin Injectable 40 SubCutaneous daily  lidocaine 2% Gel 1 Topical once  lidocaine 2% Viscous 100 Swish and Spit once  magnesium oxide 400 Oral every 8 hours  metroNIDAZOLE  IVPB 500 IV Intermittent every 8 hours  midodrine 10 Oral every 8 hours  polyethylene glycol 3350 17 Oral daily  senna 2 Oral at bedtime  zinc sulfate 220 Oral daily      WEIGHT  Weight (kg): 79.4 (03-31-21 @ 15:48)  Creatinine, Serum: 0.6 mg/dL (05-08-21 @ 09:50)      ANTIBIOTICS:  ceftazidime/avibactam IVPB 2.5 Gram(s) IV Intermittent every 8 hours  metroNIDAZOLE  IVPB 500 milliGRAM(s) IV Intermittent every 8 hours      All available historical records have been reviewed       NIEVES LABOY  71y, Male  Allergy: No Known Allergies      LOS  48d    CHIEF COMPLAINT: Change in Mental Status (09 May 2021 07:53)      INTERVAL EVENTS/HPI  - No acute events overnight  - T(F): , Max: 97 (05-09-21 @ 05:30)  - Tolerating medication  - WBC Count: 13.63 (05-08-21 @ 09:50)  WBC Count: 11.64 (05-07-21 @ 08:30)  - Creatinine, Serum: 0.6 (05-08-21 @ 09:50)       ROS  unable to obtain history secondary to patient's mental status and/or sedation     VITALS:  T(F): 97, Max: 97 (05-09-21 @ 05:30)  HR: 72  BP: 127/60  RR: 20Vital Signs Last 24 Hrs  T(C): 36.1 (09 May 2021 05:30), Max: 36.1 (09 May 2021 05:30)  T(F): 97 (09 May 2021 05:30), Max: 97 (09 May 2021 05:30)  HR: 72 (09 May 2021 05:30) (69 - 75)  BP: 127/60 (09 May 2021 05:30) (103/50 - 168/82)  BP(mean): 82 (08 May 2021 13:00) (80 - 93)  RR: 20 (09 May 2021 05:30) (16 - 20)  SpO2: 100% (08 May 2021 22:15) (99% - 100%)    PHYSICAL EXAM:  Gen: NAD, resting in bed chronically ill appearing   HEENT: Normocephalic, atraumatic lip ulcers  Neck: supple, no lymphadenopathy  CV: Regular rate & regular rhythm  Lungs: decreased BS at bases, no fremitus  Abdomen: Soft, BS present AUGUSTINA minimal drainage  Ext: Warm, well perfused  Neuro: non focal, awake, tracks, not following commands  Skin: no rash, no erythema  Lines: no phlebitis     FH: Non-contributory  Social Hx: Non-contributory    TESTS & MEASUREMENTS:                        7.8    13.63 )-----------( 108      ( 08 May 2021 09:50 )             25.1     05-08    140  |  109  |  21<H>  ----------------------------<  103<H>  4.2   |  26  |  0.6<L>    Ca    7.5<L>      08 May 2021 09:50  Mg     1.9     05-08    TPro  6.0  /  Alb  1.9<L>  /  TBili  0.4  /  DBili  x   /  AST  56<H>  /  ALT  18  /  AlkPhos  246<H>  05-08    eGFR if Non African American: 100 mL/min/1.73M2 (05-08-21 @ 09:50)  eGFR if : 116 mL/min/1.73M2 (05-08-21 @ 09:50)    LIVER FUNCTIONS - ( 08 May 2021 09:50 )  Alb: 1.9 g/dL / Pro: 6.0 g/dL / ALK PHOS: 246 U/L / ALT: 18 U/L / AST: 56 U/L / GGT: x               Culture - Other (collected 05-05-21 @ 15:05)  Source: .Other sacrum  Final Report (05-07-21 @ 17:08):    Culture yields growth of greater than 3 colony types of    bacteria,  which may indicate contamination and normal srikanth    Call client services within 7 days if further workup is clinically    indicated. Culture includes    Moderate Pseudomonas aeruginosa (Carbapenem Resistant)  Organism: Pseudomonas aeruginosa (Carbapenem Resistant) (05-07-21 @ 17:08)  Organism: Pseudomonas aeruginosa (Carbapenem Resistant) (05-07-21 @ 17:08)      -  Amikacin: S <=16      -  Aztreonam: I 16      -  Cefepime: I 16      -  Ceftazidime: I 16      -  Ciprofloxacin: R >2      -  Gentamicin: I 8      -  Imipenem: R >8      -  Levofloxacin: R >4      -  Meropenem: R 8      -  Piperacillin/Tazobactam: I 64      -  Tobramycin: S <=2      Method Type: JAZMIN      INFECTIOUS DISEASES TESTING  Procalcitonin, Serum: 2.03 (04-29-21 @ 05:25)  Procalcitonin, Serum: 2.50 (04-28-21 @ 16:00)  Procalcitonin, Serum: 0.19 (04-26-21 @ 17:42)  COVID-19 PCR: NotDetec (04-17-21 @ 10:31)  COVID-19 PCR: NotDetec (04-14-21 @ 14:29)  COVID-19 PCR: NotDetec (04-12-21 @ 15:00)  COVID-19 PCR: NotDetec (04-07-21 @ 14:55)  Procalcitonin, Serum: 0.16 (04-06-21 @ 12:32)  Vancomycin Level, Random: 11.5 (03-31-21 @ 07:00)  Vancomycin Level, Trough: 11.5 (03-31-21 @ 07:00)  COVID-19 PCR: NotDetec (03-30-21 @ 15:19)  COVID-19 PCR: NotDetec (03-29-21 @ 19:40)  Vancomycin Level, Trough: <4.0 (03-28-21 @ 13:00)  Procalcitonin, Serum: 0.61 (03-26-21 @ 10:20)  COVID-19 PCR: NotDetec (03-22-21 @ 20:13)  COVID-19 PCR: NotDetec (03-10-21 @ 12:22)  COVID-19 PCR: NotDetec (03-01-21 @ 16:49)  Fungitell: 62 (03-01-21 @ 11:00)  Procalcitonin, Serum: 0.29 (03-01-21 @ 05:32)  MRSA PCR Result.: Negative (02-27-21 @ 15:44)  COVID-19 PCR: NotDetec (02-25-21 @ 15:34)  Procalcitonin, Serum: 0.27 (02-18-21 @ 10:54)  MRSA PCR Result.: Negative (02-14-21 @ 18:29)  HIV-1/2 Combo Result: Nonreact (02-14-21 @ 05:07)  Procalcitonin, Serum: 0.46 (02-13-21 @ 16:00)  COVID-19 PCR: NotDetec (02-12-21 @ 10:17)      INFLAMMATORY MARKERS      RADIOLOGY & ADDITIONAL TESTS:  I have personally reviewed the last available Chest xray  CXR      CT      CARDIOLOGY TESTING  12 Lead ECG:   Ventricular Rate 94 BPM    Atrial Rate 94 BPM    P-R Interval 216 ms    QRS Duration 88 ms    Q-T Interval 364 ms    QTC Calculation(Bazett) 455 ms    P Axis 52 degrees    R Axis 27 degrees    T Axis 38 degrees    Diagnosis Line Sinus rhythm with 1st degree A-V block  Otherwise normal ECG    Confirmed by Nathan Argueta ([id number]) on 4/28/2021 9:42:25 PM (04-28-21 @ 20:05)      MEDICATIONS  ascorbic acid 500 Oral daily  atorvastatin 40 Oral at bedtime  carbidopa/levodopa  25/100 2 Oral <User Schedule>  ceftazidime/avibactam IVPB 2.5 IV Intermittent every 8 hours  chlorhexidine 0.12% Liquid 15 Oral Mucosa every 12 hours  chlorhexidine 4% Liquid 1 Topical <User Schedule>  collagenase Ointment 1 Topical two times a day  Dakins Solution - 1/2 Strength 1 Topical two times a day  enoxaparin Injectable 40 SubCutaneous daily  lidocaine 2% Gel 1 Topical once  lidocaine 2% Viscous 100 Swish and Spit once  magnesium oxide 400 Oral every 8 hours  metroNIDAZOLE  IVPB 500 IV Intermittent every 8 hours  midodrine 10 Oral every 8 hours  polyethylene glycol 3350 17 Oral daily  senna 2 Oral at bedtime  zinc sulfate 220 Oral daily      WEIGHT  Weight (kg): 79.4 (03-31-21 @ 15:48)  Creatinine, Serum: 0.6 mg/dL (05-08-21 @ 09:50)      ANTIBIOTICS:  ceftazidime/avibactam IVPB 2.5 Gram(s) IV Intermittent every 8 hours  metroNIDAZOLE  IVPB 500 milliGRAM(s) IV Intermittent every 8 hours      All available historical records have been reviewed

## 2021-05-09 NOTE — PROGRESS NOTE ADULT - ASSESSMENT
70 yo male with PMH of HTN, DM, CKD stage 3, Parkinson's, dementia and gout was sen from [facility] for AMS, he became lethargic and non-verbal, he has recent admission and found with fungemia and discharged on the 3/11. Work up for AMS leaned toward metabolic encephalopathy from Sepsis, ABEBE and necrotic sacral wound infection. LP negative for meningitis, Head CT no acute stroke, EEG no seizure, he was treated with multiple broad spectrum antibiotics. He has prolonged hospitalization, on 4/26 found with acute cholecystitis on 4/28 he was found hypotensive, became hypoxic, he was intubated, transferred to vent unit,  CXR showed white left lung, underwent bronchoscopy for atelectasis and underwent percutaneous cholecystomy on 4/29, he had bronchoscopy on 5/7, he was extubated on 5/7.     A/P:   Acute Hypoxic respiratory failure: s/p intubation on 4/28   Septic shock and Sepsis: improved  Compete left Lung collapse and atelectasis, with Pneumonia due mucus plug. Multiple Bronchoscopies on 4/28, 4/30, 5/4 and 5/7.   Bronchial cx grew Klebsiella Pneumonia (carbapenem resistant).   CXR 5/9 showed left lower lung opacity.     Acute Cholecystitis:   Abdomen US 4/26 showed gallbladder wall thickening. HIDA scan was compatible with acute cholecystitis.   s/p percutaneous cholecystomy tube drainage on 4/29  gallbladder fluid cx no growth.     Necrotic sacral wound ulcer:   s/p Debridement 3/21.   Wound cx on 5/5 grew Pseudomonas aeruginosa (Carbapenem Resistant).   Continue Avycaz and Flagyl until 5/11  ID follow up.     Metabolic encephalopathy: likely from Sepsis,   History of Dementia, Parkinson's disease.   On exam, patient seems dysarthric, he with unknown words. Head turned to left side. Negligent to right side. Moves right arm, but seems left arm weak. He doesn't follow commands (doesn't speak English??). Head CT was repeated on 5/4, no acute pathology, stroke ruled out.   On admission he had LP, negative. EEG no evidence of epileptiform waves.   Continue Sinemet.     Dysphagia:   s/p PEG on 4/19, continue Enteral feeding.     ABEBE: likely from Sepsis.   Improved, Cr 0.6 from 2.4     Chronic Anemia  He received multiple PRBCs during prolonged hospitalization.     Thrombocytopenia, possibly due to sepsis  Improving.     History of DVT  VA Duplex Lower Ext Vein Scan, Bilat (03.27.21 @ 18:48) >No evidence of deep venous thrombosis or superficial thrombophlebitis in the bilateral lower extremities.  Eliquis discontinued, it seems he was treated for over>2 years.     Left shoulder dislocation with fluid collection Jun 2020  Seen by orthopedic, outpatient follow up.     DVT prophylaxis: Lovenox SC.

## 2021-05-10 LAB
ALBUMIN SERPL ELPH-MCNC: 1.8 G/DL — LOW (ref 3.5–5.2)
ALP SERPL-CCNC: 219 U/L — HIGH (ref 30–115)
ALT FLD-CCNC: 12 U/L — SIGNIFICANT CHANGE UP (ref 0–41)
ANION GAP SERPL CALC-SCNC: 10 MMOL/L — SIGNIFICANT CHANGE UP (ref 7–14)
AST SERPL-CCNC: 59 U/L — HIGH (ref 0–41)
BILIRUB SERPL-MCNC: 0.4 MG/DL — SIGNIFICANT CHANGE UP (ref 0.2–1.2)
BLD GP AB SCN SERPL QL: SIGNIFICANT CHANGE UP
BUN SERPL-MCNC: 15 MG/DL — SIGNIFICANT CHANGE UP (ref 10–20)
CALCIUM SERPL-MCNC: 7.7 MG/DL — LOW (ref 8.5–10.1)
CHLORIDE SERPL-SCNC: 104 MMOL/L — SIGNIFICANT CHANGE UP (ref 98–110)
CO2 SERPL-SCNC: 23 MMOL/L — SIGNIFICANT CHANGE UP (ref 17–32)
CREAT SERPL-MCNC: 0.5 MG/DL — LOW (ref 0.7–1.5)
GLUCOSE BLDC GLUCOMTR-MCNC: 103 MG/DL — HIGH (ref 70–99)
GLUCOSE BLDC GLUCOMTR-MCNC: 86 MG/DL — SIGNIFICANT CHANGE UP (ref 70–99)
GLUCOSE BLDC GLUCOMTR-MCNC: 90 MG/DL — SIGNIFICANT CHANGE UP (ref 70–99)
GLUCOSE BLDC GLUCOMTR-MCNC: 98 MG/DL — SIGNIFICANT CHANGE UP (ref 70–99)
GLUCOSE BLDC GLUCOMTR-MCNC: 99 MG/DL — SIGNIFICANT CHANGE UP (ref 70–99)
GLUCOSE SERPL-MCNC: 80 MG/DL — SIGNIFICANT CHANGE UP (ref 70–99)
HCT VFR BLD CALC: 21.7 % — LOW (ref 42–52)
HGB BLD-MCNC: 6.8 G/DL — CRITICAL LOW (ref 14–18)
MAGNESIUM SERPL-MCNC: 1.8 MG/DL — SIGNIFICANT CHANGE UP (ref 1.8–2.4)
MCHC RBC-ENTMCNC: 26.4 PG — LOW (ref 27–31)
MCHC RBC-ENTMCNC: 31.3 G/DL — LOW (ref 32–37)
MCV RBC AUTO: 84.1 FL — SIGNIFICANT CHANGE UP (ref 80–94)
NRBC # BLD: 0 /100 WBCS — SIGNIFICANT CHANGE UP (ref 0–0)
PLATELET # BLD AUTO: 131 K/UL — SIGNIFICANT CHANGE UP (ref 130–400)
POTASSIUM SERPL-MCNC: 4.5 MMOL/L — SIGNIFICANT CHANGE UP (ref 3.5–5)
POTASSIUM SERPL-SCNC: 4.5 MMOL/L — SIGNIFICANT CHANGE UP (ref 3.5–5)
PROT SERPL-MCNC: 6 G/DL — SIGNIFICANT CHANGE UP (ref 6–8)
RBC # BLD: 2.58 M/UL — LOW (ref 4.7–6.1)
RBC # FLD: 18.5 % — HIGH (ref 11.5–14.5)
SODIUM SERPL-SCNC: 137 MMOL/L — SIGNIFICANT CHANGE UP (ref 135–146)
WBC # BLD: 8.97 K/UL — SIGNIFICANT CHANGE UP (ref 4.8–10.8)
WBC # FLD AUTO: 8.97 K/UL — SIGNIFICANT CHANGE UP (ref 4.8–10.8)

## 2021-05-10 PROCEDURE — 71045 X-RAY EXAM CHEST 1 VIEW: CPT | Mod: 26

## 2021-05-10 PROCEDURE — 99232 SBSQ HOSP IP/OBS MODERATE 35: CPT

## 2021-05-10 PROCEDURE — 99233 SBSQ HOSP IP/OBS HIGH 50: CPT

## 2021-05-10 RX ADMIN — Medication 100 MILLIGRAM(S): at 13:09

## 2021-05-10 RX ADMIN — CHLORHEXIDINE GLUCONATE 15 MILLILITER(S): 213 SOLUTION TOPICAL at 18:12

## 2021-05-10 RX ADMIN — ZINC SULFATE TAB 220 MG (50 MG ZINC EQUIVALENT) 220 MILLIGRAM(S): 220 (50 ZN) TAB at 11:26

## 2021-05-10 RX ADMIN — MAGNESIUM OXIDE 400 MG ORAL TABLET 400 MILLIGRAM(S): 241.3 TABLET ORAL at 22:04

## 2021-05-10 RX ADMIN — Medication 100 MILLIGRAM(S): at 22:04

## 2021-05-10 RX ADMIN — CARBIDOPA AND LEVODOPA 2 TABLET(S): 25; 100 TABLET ORAL at 05:21

## 2021-05-10 RX ADMIN — Medication 1 APPLICATION(S): at 18:12

## 2021-05-10 RX ADMIN — CARBIDOPA AND LEVODOPA 2 TABLET(S): 25; 100 TABLET ORAL at 13:10

## 2021-05-10 RX ADMIN — ENOXAPARIN SODIUM 40 MILLIGRAM(S): 100 INJECTION SUBCUTANEOUS at 11:25

## 2021-05-10 RX ADMIN — Medication 1 APPLICATION(S): at 05:21

## 2021-05-10 RX ADMIN — MAGNESIUM OXIDE 400 MG ORAL TABLET 400 MILLIGRAM(S): 241.3 TABLET ORAL at 13:10

## 2021-05-10 RX ADMIN — MAGNESIUM OXIDE 400 MG ORAL TABLET 400 MILLIGRAM(S): 241.3 TABLET ORAL at 05:21

## 2021-05-10 RX ADMIN — POLYETHYLENE GLYCOL 3350 17 GRAM(S): 17 POWDER, FOR SOLUTION ORAL at 11:25

## 2021-05-10 RX ADMIN — CHLORHEXIDINE GLUCONATE 1 APPLICATION(S): 213 SOLUTION TOPICAL at 05:21

## 2021-05-10 RX ADMIN — MIDODRINE HYDROCHLORIDE 10 MILLIGRAM(S): 2.5 TABLET ORAL at 05:45

## 2021-05-10 RX ADMIN — CARBIDOPA AND LEVODOPA 2 TABLET(S): 25; 100 TABLET ORAL at 22:04

## 2021-05-10 RX ADMIN — SENNA PLUS 2 TABLET(S): 8.6 TABLET ORAL at 22:04

## 2021-05-10 RX ADMIN — Medication 500 MILLIGRAM(S): at 11:25

## 2021-05-10 RX ADMIN — Medication 100 MILLIGRAM(S): at 05:21

## 2021-05-10 RX ADMIN — MIDODRINE HYDROCHLORIDE 10 MILLIGRAM(S): 2.5 TABLET ORAL at 22:04

## 2021-05-10 RX ADMIN — CARBIDOPA AND LEVODOPA 2 TABLET(S): 25; 100 TABLET ORAL at 11:25

## 2021-05-10 RX ADMIN — CHLORHEXIDINE GLUCONATE 15 MILLILITER(S): 213 SOLUTION TOPICAL at 05:21

## 2021-05-10 RX ADMIN — ATORVASTATIN CALCIUM 40 MILLIGRAM(S): 80 TABLET, FILM COATED ORAL at 22:04

## 2021-05-10 RX ADMIN — MIDODRINE HYDROCHLORIDE 10 MILLIGRAM(S): 2.5 TABLET ORAL at 13:10

## 2021-05-10 NOTE — PROGRESS NOTE ADULT - ASSESSMENT
72 yo male with PMH of HTN, DM, CKD stage 3, Parkinson's, dementia and gout was sen from [facility] for AMS, he became lethargic and non-verbal, he has recent admission and found with fungemia and discharged on the 3/11. Work up for AMS leaned toward metabolic encephalopathy from Sepsis, ABEBE and necrotic sacral wound infection. LP negative for meningitis, Head CT no acute stroke, EEG no seizure, he was treated with multiple broad spectrum antibiotics. He has prolonged hospitalization, on 4/26 found with acute cholecystitis on 4/28 he was found hypotensive, became hypoxic, he was intubated, transferred to vent unit,  CXR showed white left lung, underwent bronchoscopy for atelectasis and underwent percutaneous cholecystomy on 4/29, he had bronchoscopy on 5/7, he was extubated on 5/7.     A/P:   Acute Hypoxic respiratory failure: s/p intubation on 4/28   Septic shock and Sepsis: improved  Compete left Lung collapse and atelectasis, with Pneumonia due mucus plug. Multiple Bronchoscopies on 4/28, 4/30, 5/4 and 5/7.   Bronchial cx grew Klebsiella Pneumonia (carbapenem resistant).   CXR 5/9 showed left lower lung opacity.     Acute Cholecystitis:   Abdomen US 4/26 showed gallbladder wall thickening. HIDA scan was compatible with acute cholecystitis.   s/p percutaneous cholecystomy tube drainage on 4/29  gallbladder fluid cx no growth.     Necrotic sacral wound ulcer:   s/p Debridement 3/21.   Wound cx on 5/5 grew Pseudomonas aeruginosa (Carbapenem Resistant).   Continue Avycaz and Flagyl until 5/11  ID follow up.     Metabolic encephalopathy: likely from Sepsis,   History of Dementia, Parkinson's disease.   On exam, patient seems dysarthric, he with unknown words. Head turned to left side. Negligent to right side. Moves right arm, but seems left arm weak. He doesn't follow commands (doesn't speak English??). Head CT was repeated on 5/4, no acute pathology, stroke ruled out.   On admission he had LP, negative. EEG no evidence of epileptiform waves.   Continue Sinemet.     Dysphagia:   s/p PEG on 4/19, continue Enteral feeding.     ABEBE: likely from Sepsis.   Improved, Cr 0.6 from 2.4     Chronic Anemia  He received multiple PRBCs during prolonged hospitalization.     Thrombocytopenia, possibly due to sepsis  Improving.     History of DVT  VA Duplex Lower Ext Vein Scan, Bilat (03.27.21 @ 18:48) >No evidence of deep venous thrombosis or superficial thrombophlebitis in the bilateral lower extremities.  Eliquis discontinued, it seems he was treated for over>2 years.     Left shoulder dislocation with fluid collection Jun 2020  Seen by orthopedic, outpatient follow up.     DVT prophylaxis: Lovenox SC.

## 2021-05-10 NOTE — PROGRESS NOTE ADULT - SUBJECTIVE AND OBJECTIVE BOX
Hospital Day:  49d    Chief Complaint: Patient is a 71y old  Male who presents with a chief complaint of Change in Mental Status (10 May 2021 08:22)    24 hour events: No acute overnight events. Patient seen this AM resting comfortably in bed.     Past Medical Hx:   Parkinson disease    Hypertension    Gout    Dyslipidemia    Prostatitis    Cataract      Past Sx:  No significant past surgical history      Allergies:  No Known Allergies    Current Meds:   Standing Meds:  ascorbic acid 500 milliGRAM(s) Oral daily  atorvastatin 40 milliGRAM(s) Oral at bedtime  carbidopa/levodopa  25/100 2 Tablet(s) Oral <User Schedule>  ceftazidime/avibactam IVPB 2.5 Gram(s) IV Intermittent every 8 hours  chlorhexidine 0.12% Liquid 15 milliLiter(s) Oral Mucosa every 12 hours  chlorhexidine 4% Liquid 1 Application(s) Topical <User Schedule>  collagenase Ointment 1 Application(s) Topical two times a day  Dakins Solution - 1/2 Strength 1 Application(s) Topical two times a day  enoxaparin Injectable 40 milliGRAM(s) SubCutaneous daily  lidocaine 2% Gel 1 Application(s) Topical once  lidocaine 2% Viscous 100 milliLiter(s) Swish and Spit once  magnesium oxide 400 milliGRAM(s) Oral every 8 hours  metroNIDAZOLE  IVPB 500 milliGRAM(s) IV Intermittent every 8 hours  midodrine 10 milliGRAM(s) Oral every 8 hours  polyethylene glycol 3350 17 Gram(s) Oral daily  senna 2 Tablet(s) Oral at bedtime  zinc sulfate 220 milliGRAM(s) Oral daily    PRN Meds:  acetaminophen   Tablet .. 650 milliGRAM(s) Oral every 6 hours PRN Temp greater or equal to 38C (100.4F)    HOME MEDICATIONS:  apixaban 2.5 mg oral tablet: 1 tab(s) orally 2 times a day  ascorbic acid 500 mg oral tablet: 1 tab(s) orally once a day  atorvastatin 40 mg oral tablet: 1 tab(s) orally once a day (at bedtime)  carbidopa-levodopa 25 mg-100 mg oral tablet, extended release: 2 tab(s) orally 4 times a day at 6AM,10Am,2PM,6PM,10 PM  collagenase 250 units/g topical ointment: 1 application topically 2 times a day  magnesium oxide 500 mg oral tablet: 1 tab(s) orally once a day  midodrine 10 mg oral tablet: 1 tab(s) orally every 8 hours  omeprazole 40 mg oral delayed release capsule: 1 cap(s) orally once a day  sodium hypochlorite 0.25% topical solution: 1 application topically 2 times a day  zinc sulfate 220 mg oral capsule: 1 cap(s) orally once a day      Vital Signs:   T(F): 98.2 (05-10-21 @ 05:25), Max: 98.2 (05-10-21 @ 05:25)  HR: 81 (05-10-21 @ 05:25) (73 - 81)  BP: 104/59 (05-10-21 @ 05:25) (104/59 - 146/77)  RR: 18 (05-10-21 @ 05:25) (18 - 19)  SpO2: --      05-09-21 @ 07:01  -  05-10-21 @ 07:00  --------------------------------------------------------  IN: 820 mL / OUT: 5 mL / NET: 815 mL    Physical Exam:   GENERAL: NAD  HEENT: NCAT  CHEST/LUNG: audible breath sounds bilateral    HEART: Regular rate and rhythm; s1 s2 appreciated  ABDOMEN: +PEG  EXTREMITIES: No LE edema b/l  SKIN: no rashes, no new lesions  NERVOUS SYSTEM:  minimally responsive, at baseline as per son     Labs:

## 2021-05-10 NOTE — PROGRESS NOTE ADULT - ASSESSMENT
ASSESSMENT/PLAN  71 year old Male with past medical history of Parkinson's, dementia, CKD Stage 3, 1st degree AV block, HLD, gout, HTN, DM, fungal septicemia presenting from [facility] for acute decompensation in mental status. As per documentation patient at baseline is verbal but for past 3 days, he has been worsening to state of being non verbal. Patient was admitted to [facility] for fungemia and discharged on the 11th March. At  he had left hand cellulitis and was treated for it.    # Sepsis and septic shock - resolved  # Hypoxemic respiratory failure s/p intubation on 4/28 - currently extubated   # Complete L lung collapse and atelectasis with PNA secondary to mucus plug  - s/p multiple bronchoscopies on 4/28, 4/30, 5/4, and 5/7  - Bronchial Cx grew  Klebsiella Pneumonia   - CXR    # Acute cholecystitis   - confirmed with HIDA scan and sonogram  - s/p IR perc cholecystostomy on 4/29  - gallbladder fluid cx showed NGTD  - antibiotic changed to IV flagyl and Avycaz with end date of 5/11. ID following     # Metabolic encephalopathy on top of baseline dementia/Sepsis POA/Necrotic sacral ulcer stage 4/H/o recent fungemia - improved?  - CT Head No acute intracranial pathology.  - Blood & Urine cxs NGTD   - WCX GNR, 3/29   Numerous Klebsiella pneumoniae ESBL, , Few Pseudomonas aeruginosa, Numerous Enterococcus faecalis (vancomycin resistant)  - evaluated by Burn: s/p debridement of sacrum on 3/30 .  no more new recommendation for surgery at this time .continue local wound care with santyl/dakins WTD.  - s/p meropenem, Polymixin and Aztreonam  - S/p spinal tap --> CSF clear with no growths and neg PCR  - repeat EEG w/ no epileptiform activity but diffuse cerebral dysfx  - as per RN, son stated patient is currently at baseline     # Nutrition/Dysphagia:   - 4/14 passed S&S but limited PO intake.   - s/p PEG on 4/19  - family taught how to administer feeds and wound care    #Lt shoulder dislocation w/ collection  - no need to tap as per IR and ID concurs    #Anemia  -s/p 1u PRBCs  -monitor CBC    # Thrombocytopenia ?sec to sepsis  - resolved  -off Pepcid  -heme f/u appreciated    # H/o parkinsons disease  - c/w sinemet    # H/o chronic DVT  - VA Duplex Lower Ext Vein Scan, Bilat (03.27.21 @ 18:48) >No evidence of deep venous thrombosis or superficial thrombophlebitis in the bilateral lower extremities.  - unsure why was on Rx >2yrs Eliquis d/c.     # Dyslipidemia  - c/w statin    # DVT prophylaxis: Lovenox  # Diet: PEG feeds  # Dispo: acute

## 2021-05-10 NOTE — PROGRESS NOTE ADULT - SUBJECTIVE AND OBJECTIVE BOX
SUBJECTIVE:    Patient is a 71y old Male who presents with a chief complaint of Change in Mental Status (10 May 2021 09:56)    Currently admitted to medicine with the primary diagnosis of Metabolic encephalopathy       Today is hospital day 49d. This morning he is resting comfortably in bed. Pt is nonverbal    PAST MEDICAL & SURGICAL HISTORY  Parkinson disease    Hypertension    Gout    Dyslipidemia    Prostatitis    Cataract    No significant past surgical history      SOCIAL HISTORY:  Negative for smoking/alcohol/drug use.     Home Medications:  Home Medications:  apixaban 2.5 mg oral tablet: 1 tab(s) orally 2 times a day (23 Apr 2021 15:32)  ascorbic acid 500 mg oral tablet: 1 tab(s) orally once a day (23 Apr 2021 15:32)  atorvastatin 40 mg oral tablet: 1 tab(s) orally once a day (at bedtime) (23 Apr 2021 15:32)  carbidopa-levodopa 25 mg-100 mg oral tablet, extended release: 2 tab(s) orally 4 times a day at 6AM,10Am,2PM,6PM,10 PM (23 Apr 2021 15:39)  collagenase 250 units/g topical ointment: 1 application topically 2 times a day (23 Apr 2021 15:32)  magnesium oxide 500 mg oral tablet: 1 tab(s) orally once a day (23 Apr 2021 15:32)  midodrine 10 mg oral tablet: 1 tab(s) orally every 8 hours (24 Apr 2021 16:30)  omeprazole 40 mg oral delayed release capsule: 1 cap(s) orally once a day (23 Mar 2021 01:07)  sodium hypochlorite 0.25% topical solution: 1 application topically 2 times a day (23 Apr 2021 15:32)  zinc sulfate 220 mg oral capsule: 1 cap(s) orally once a day (23 Apr 2021 15:32)      ALLERGIES:  No Known Allergies    MEDICATIONS:  STANDING MEDICATIONS  ascorbic acid 500 milliGRAM(s) Oral daily  atorvastatin 40 milliGRAM(s) Oral at bedtime  carbidopa/levodopa  25/100 2 Tablet(s) Oral <User Schedule>  ceftazidime/avibactam IVPB 2.5 Gram(s) IV Intermittent every 8 hours  chlorhexidine 0.12% Liquid 15 milliLiter(s) Oral Mucosa every 12 hours  chlorhexidine 4% Liquid 1 Application(s) Topical <User Schedule>  collagenase Ointment 1 Application(s) Topical two times a day  Dakins Solution - 1/2 Strength 1 Application(s) Topical two times a day  enoxaparin Injectable 40 milliGRAM(s) SubCutaneous daily  lidocaine 2% Gel 1 Application(s) Topical once  lidocaine 2% Viscous 100 milliLiter(s) Swish and Spit once  magnesium oxide 400 milliGRAM(s) Oral every 8 hours  metroNIDAZOLE  IVPB 500 milliGRAM(s) IV Intermittent every 8 hours  midodrine 10 milliGRAM(s) Oral every 8 hours  polyethylene glycol 3350 17 Gram(s) Oral daily  senna 2 Tablet(s) Oral at bedtime  zinc sulfate 220 milliGRAM(s) Oral daily    PRN MEDICATIONS  acetaminophen   Tablet .. 650 milliGRAM(s) Oral every 6 hours PRN    VITALS:   Vital Signs Last 24 Hrs  T(C): 36.4 (10 May 2021 15:00), Max: 36.8 (10 May 2021 05:25)  T(F): 97.6 (10 May 2021 15:00), Max: 98.2 (10 May 2021 05:25)  HR: 74 (10 May 2021 15:00) (74 - 81)  BP: 127/58 (10 May 2021 15:00) (104/59 - 146/77)  BP(mean): --  RR: 19 (10 May 2021 15:00) (18 - 19)  SpO2: --  CAPILLARY BLOOD GLUCOSE      POCT Blood Glucose.: 90 mg/dL (10 May 2021 16:38)  POCT Blood Glucose.: 98 mg/dL (10 May 2021 12:32)  POCT Blood Glucose.: 99 mg/dL (10 May 2021 07:53)  POCT Blood Glucose.: 86 mg/dL (10 May 2021 05:50)  POCT Blood Glucose.: 78 mg/dL (09 May 2021 23:36)      LABS:                        RADIOLOGY:    PHYSICAL EXAM:  GEN: No acute distress  LUNGS: Clear to auscultation bilaterally   HEART: S1/S2 present. RRR.   ABD: Soft, non-tender, non-distended. Bowel sounds present. PEG in place  EXT: 2+ b/l UE edema. Contracted LE  NEURO: Nonverbal. open eyes on command only

## 2021-05-10 NOTE — PROGRESS NOTE ADULT - ATTENDING COMMENTS
IMPRESSION:  Acute hypoxemic resp failure/ L lung collapse sp bronch extubated 5/7  Septic shock resolved   Necrotic sacral wound culture s/p debridement  Cholecystitis sp CCY  MDR pneumonia Klebsiella/ pseudomonas improved  HO parkinson's dementia sp PEG  HO DVT/ A fib  DEC HB NO ACTIVE BLEED  Thrombocytopenia, HIT negative, improving    Plan as outlined above

## 2021-05-10 NOTE — PROGRESS NOTE ADULT - ASSESSMENT
IMPRESSION:  Acute hypoxemic resp failure/ L lung collapse sp bronch extubated 5/7  Septic shock resolved   Necrotic sacral wound culture s/p debridement  Cholecystitis sp CCY  MDR pneumonia Klebsiella/ pseudomonas improved  HO parkinson's dementia sp PEG  Anemia  HO DVT/ A fib  DEC HB NO ACTIVE BLEED  Thrombocytopenia, HIT negative, improving    PLAN:    CNS: Keep off sedation.      HEENT:  Oral care    PULMONARY:  HOB @ 45 degrees, on room air         CARDIOVASCULAR: Keep equal. Avoid volume overload.    GI: GI prophylaxis. PEG Feeding. FU IR.     RENAL:  F/u  lytes.  Correct as needed. accurate I/O    INFECTIOUS DISEASE: Abx per ID    HEMATOLOGICAL:  DVT prophylaxis    ENDOCRINE:  Follow up FS.  Insulin protocol if needed.    CODE STATUS: FULL CODE    Very poor prognosis IMPRESSION:  Acute hypoxemic resp failure/ L lung collapse sp bronch extubated 5/7  Septic shock resolved   Necrotic sacral wound culture s/p debridement  Cholecystitis sp CCY  MDR pneumonia Klebsiella/ pseudomonas improved  HO parkinson's dementia sp PEG  HO DVT/ A fib  DEC HB NO ACTIVE BLEED  Thrombocytopenia, HIT negative, improving    PLAN:    CNS: Keep off sedation.      HEENT:  Oral care    PULMONARY:  HOB @ 45 degrees.  Aggressive pulmonary toilet.  Nebs Q4 and PRN       CARDIOVASCULAR: Keep equal. Avoid volume overload.    GI: GI prophylaxis. PEG Feeding. FU IR.     RENAL:  F/u  lytes.  Correct as needed. accurate I/O    INFECTIOUS DISEASE: Abx per ID    HEMATOLOGICAL:  DVT prophylaxis    ENDOCRINE:  Follow up FS.  Insulin protocol if needed.    CODE STATUS: FULL CODE    Very poor prognosis

## 2021-05-11 LAB
ALBUMIN SERPL ELPH-MCNC: 2.1 G/DL — LOW (ref 3.5–5.2)
ALP SERPL-CCNC: 194 U/L — HIGH (ref 30–115)
ALT FLD-CCNC: 9 U/L — SIGNIFICANT CHANGE UP (ref 0–41)
ANION GAP SERPL CALC-SCNC: 9 MMOL/L — SIGNIFICANT CHANGE UP (ref 7–14)
AST SERPL-CCNC: 50 U/L — HIGH (ref 0–41)
BASOPHILS # BLD AUTO: 0.03 K/UL — SIGNIFICANT CHANGE UP (ref 0–0.2)
BASOPHILS NFR BLD AUTO: 0.4 % — SIGNIFICANT CHANGE UP (ref 0–1)
BILIRUB SERPL-MCNC: 0.5 MG/DL — SIGNIFICANT CHANGE UP (ref 0.2–1.2)
BLD GP AB SCN SERPL QL: SIGNIFICANT CHANGE UP
BUN SERPL-MCNC: 14 MG/DL — SIGNIFICANT CHANGE UP (ref 10–20)
CALCIUM SERPL-MCNC: 7.9 MG/DL — LOW (ref 8.5–10.1)
CHLORIDE SERPL-SCNC: 103 MMOL/L — SIGNIFICANT CHANGE UP (ref 98–110)
CO2 SERPL-SCNC: 22 MMOL/L — SIGNIFICANT CHANGE UP (ref 17–32)
CREAT SERPL-MCNC: 0.6 MG/DL — LOW (ref 0.7–1.5)
EOSINOPHIL # BLD AUTO: 0.27 K/UL — SIGNIFICANT CHANGE UP (ref 0–0.7)
EOSINOPHIL NFR BLD AUTO: 3.5 % — SIGNIFICANT CHANGE UP (ref 0–8)
GLUCOSE BLDC GLUCOMTR-MCNC: 111 MG/DL — HIGH (ref 70–99)
GLUCOSE BLDC GLUCOMTR-MCNC: 77 MG/DL — SIGNIFICANT CHANGE UP (ref 70–99)
GLUCOSE BLDC GLUCOMTR-MCNC: 82 MG/DL — SIGNIFICANT CHANGE UP (ref 70–99)
GLUCOSE SERPL-MCNC: 79 MG/DL — SIGNIFICANT CHANGE UP (ref 70–99)
HCT VFR BLD CALC: 26.9 % — LOW (ref 42–52)
HGB BLD-MCNC: 8.6 G/DL — LOW (ref 14–18)
IMM GRANULOCYTES NFR BLD AUTO: 1.4 % — HIGH (ref 0.1–0.3)
LYMPHOCYTES # BLD AUTO: 1.03 K/UL — LOW (ref 1.2–3.4)
LYMPHOCYTES # BLD AUTO: 13.5 % — LOW (ref 20.5–51.1)
MAGNESIUM SERPL-MCNC: 1.7 MG/DL — LOW (ref 1.8–2.4)
MCHC RBC-ENTMCNC: 27.1 PG — SIGNIFICANT CHANGE UP (ref 27–31)
MCHC RBC-ENTMCNC: 32 G/DL — SIGNIFICANT CHANGE UP (ref 32–37)
MCV RBC AUTO: 84.9 FL — SIGNIFICANT CHANGE UP (ref 80–94)
MONOCYTES # BLD AUTO: 0.82 K/UL — HIGH (ref 0.1–0.6)
MONOCYTES NFR BLD AUTO: 10.8 % — HIGH (ref 1.7–9.3)
NEUTROPHILS # BLD AUTO: 5.36 K/UL — SIGNIFICANT CHANGE UP (ref 1.4–6.5)
NEUTROPHILS NFR BLD AUTO: 70.4 % — SIGNIFICANT CHANGE UP (ref 42.2–75.2)
NRBC # BLD: 0 /100 WBCS — SIGNIFICANT CHANGE UP (ref 0–0)
PLATELET # BLD AUTO: 138 K/UL — SIGNIFICANT CHANGE UP (ref 130–400)
POTASSIUM SERPL-MCNC: 4.5 MMOL/L — SIGNIFICANT CHANGE UP (ref 3.5–5)
POTASSIUM SERPL-SCNC: 4.5 MMOL/L — SIGNIFICANT CHANGE UP (ref 3.5–5)
PROT SERPL-MCNC: 6.2 G/DL — SIGNIFICANT CHANGE UP (ref 6–8)
RBC # BLD: 3.17 M/UL — LOW (ref 4.7–6.1)
RBC # FLD: 17.2 % — HIGH (ref 11.5–14.5)
SODIUM SERPL-SCNC: 134 MMOL/L — LOW (ref 135–146)
WBC # BLD: 7.62 K/UL — SIGNIFICANT CHANGE UP (ref 4.8–10.8)
WBC # FLD AUTO: 7.62 K/UL — SIGNIFICANT CHANGE UP (ref 4.8–10.8)

## 2021-05-11 PROCEDURE — 99233 SBSQ HOSP IP/OBS HIGH 50: CPT

## 2021-05-11 PROCEDURE — 71045 X-RAY EXAM CHEST 1 VIEW: CPT | Mod: 26

## 2021-05-11 RX ORDER — MAGNESIUM SULFATE 500 MG/ML
2 VIAL (ML) INJECTION ONCE
Refills: 0 | Status: COMPLETED | OUTPATIENT
Start: 2021-05-11 | End: 2021-05-11

## 2021-05-11 RX ADMIN — MIDODRINE HYDROCHLORIDE 10 MILLIGRAM(S): 2.5 TABLET ORAL at 13:07

## 2021-05-11 RX ADMIN — POLYETHYLENE GLYCOL 3350 17 GRAM(S): 17 POWDER, FOR SOLUTION ORAL at 11:16

## 2021-05-11 RX ADMIN — CARBIDOPA AND LEVODOPA 2 TABLET(S): 25; 100 TABLET ORAL at 05:05

## 2021-05-11 RX ADMIN — CHLORHEXIDINE GLUCONATE 15 MILLILITER(S): 213 SOLUTION TOPICAL at 05:05

## 2021-05-11 RX ADMIN — Medication 1 APPLICATION(S): at 05:07

## 2021-05-11 RX ADMIN — Medication 500 MILLIGRAM(S): at 11:17

## 2021-05-11 RX ADMIN — Medication 100 MILLIGRAM(S): at 05:06

## 2021-05-11 RX ADMIN — MIDODRINE HYDROCHLORIDE 10 MILLIGRAM(S): 2.5 TABLET ORAL at 22:01

## 2021-05-11 RX ADMIN — Medication 1 APPLICATION(S): at 17:25

## 2021-05-11 RX ADMIN — CHLORHEXIDINE GLUCONATE 1 APPLICATION(S): 213 SOLUTION TOPICAL at 05:08

## 2021-05-11 RX ADMIN — CARBIDOPA AND LEVODOPA 2 TABLET(S): 25; 100 TABLET ORAL at 10:48

## 2021-05-11 RX ADMIN — MIDODRINE HYDROCHLORIDE 10 MILLIGRAM(S): 2.5 TABLET ORAL at 05:05

## 2021-05-11 RX ADMIN — ENOXAPARIN SODIUM 40 MILLIGRAM(S): 100 INJECTION SUBCUTANEOUS at 11:16

## 2021-05-11 RX ADMIN — Medication 1 APPLICATION(S): at 05:05

## 2021-05-11 RX ADMIN — ATORVASTATIN CALCIUM 40 MILLIGRAM(S): 80 TABLET, FILM COATED ORAL at 22:00

## 2021-05-11 RX ADMIN — CARBIDOPA AND LEVODOPA 2 TABLET(S): 25; 100 TABLET ORAL at 13:07

## 2021-05-11 RX ADMIN — MAGNESIUM OXIDE 400 MG ORAL TABLET 400 MILLIGRAM(S): 241.3 TABLET ORAL at 05:05

## 2021-05-11 RX ADMIN — CHLORHEXIDINE GLUCONATE 15 MILLILITER(S): 213 SOLUTION TOPICAL at 17:25

## 2021-05-11 RX ADMIN — ZINC SULFATE TAB 220 MG (50 MG ZINC EQUIVALENT) 220 MILLIGRAM(S): 220 (50 ZN) TAB at 11:16

## 2021-05-11 RX ADMIN — MAGNESIUM OXIDE 400 MG ORAL TABLET 400 MILLIGRAM(S): 241.3 TABLET ORAL at 13:08

## 2021-05-11 RX ADMIN — Medication 25 GRAM(S): at 10:47

## 2021-05-11 RX ADMIN — MAGNESIUM OXIDE 400 MG ORAL TABLET 400 MILLIGRAM(S): 241.3 TABLET ORAL at 22:00

## 2021-05-11 RX ADMIN — CARBIDOPA AND LEVODOPA 2 TABLET(S): 25; 100 TABLET ORAL at 22:00

## 2021-05-11 RX ADMIN — SENNA PLUS 2 TABLET(S): 8.6 TABLET ORAL at 22:00

## 2021-05-11 NOTE — PROGRESS NOTE ADULT - ASSESSMENT
ASSESSMENT/PLAN  71 year old Male with past medical history of Parkinson's, dementia, CKD Stage 3, 1st degree AV block, HLD, gout, HTN, DM, fungal septicemia presenting from [facility] for acute decompensation in mental status. As per documentation patient at baseline is verbal but for past 3 days, he has been worsening to state of being non verbal. Patient was admitted to [facility] for fungemia and discharged on the 11th March. At  he had left hand cellulitis and was treated for it.    # Sepsis and septic shock - resolved  # Hypoxemic respiratory failure s/p intubation on 4/28 - currently extubated   # Complete L lung collapse and atelectasis with PNA secondary to mucus plug  - s/p multiple bronchoscopies on 4/28, 4/30, 5/4, and 5/7  - Bronchial Cx grew  Klebsiella Pneumonia   - CXR showed new small right basilar opacity. Follow up AM cxr   - finished avycaz and flagyl 5/11    # Acute cholecystitis   - confirmed with HIDA scan and sonogram  - s/p IR perc cholecystostomy on 4/29  - gallbladder fluid cx showed NGTD  - antibiotic changed to IV flagyl and Avycaz with end date of 5/11. ID following     # Metabolic encephalopathy on top of baseline dementia/Sepsis POA/Necrotic sacral ulcer stage 4/H/o recent fungemia - improved?  - CT Head No acute intracranial pathology.  - Blood & Urine cxs NGTD   - WCX GNR, 3/29   Numerous Klebsiella pneumoniae ESBL, , Few Pseudomonas aeruginosa, Numerous Enterococcus faecalis (vancomycin resistant)  - evaluated by Burn: s/p debridement of sacrum on 3/30 .  no more new recommendation for surgery at this time .continue local wound care with santyl/dakins WTD.  - s/p meropenem, Polymixin and Aztreonam  - S/p spinal tap --> CSF clear with no growths and neg PCR  - repeat EEG w/ no epileptiform activity but diffuse cerebral dysfx  - as per RN, son stated patient is currently at baseline     # Nutrition/Dysphagia:   - 4/14 passed S&S but limited PO intake.   - s/p PEG on 4/19  - family taught how to administer feeds and wound care    #Lt shoulder dislocation w/ collection  - no need to tap as per IR and ID concurs    #Anemia  - s/p 1u PRBC on   - monitor CBC    # Thrombocytopenia ?sec to sepsis - resolved  - off Pepcid  - heme f/u appreciated    # H/o parkinsons disease  - c/w sinemet    # H/o chronic DVT  - VA Duplex Lower Ext Vein Scan, Bilat (03.27.21 @ 18:48) >No evidence of deep venous thrombosis or superficial thrombophlebitis in the bilateral lower extremities.  - unsure why was on Rx >2yrs Eliquis d/c.     # Dyslipidemia  - c/w statin    # Family contact: Spoke to Kelton on 5/11 to update on the patient's condition and plan. Had an extensive conversation with Kelton and the patient's son about the patient's prognosis and the course of his disease. As per the patient's family he started experiencing significant decline in the past few months. They verbalized understanding of his current state and would like to keep him full code at this time.     # DVT prophylaxis: Lovenox  # Diet: PEG feeds  # Dispo: acute

## 2021-05-11 NOTE — PROGRESS NOTE ADULT - ATTENDING COMMENTS
71 year old male with pmx Parkinson's dementia, CKD 3, DLD, gout, HTN, DM, history of fungemia in March, presented from [facility] due to change in mental status. Patient had prolonged stay in the step down unit for acute hypoxemic respiratory failure due to MDR Klebsiella PNA s/p intubation, no extubated on room air, septic shock, complete left lung collapse, underwent multiple bronchoscopies and bronchial washings cx + for MDR Kleb Pneumo s/p course of antibiotics. His hospital course was further complicated by Acute cholecystitis, for which he underwent a per gayle with IR on 4/29 and also completed a course of abx Flagyl and Avycaz today. He also has a stage 4 necrotic sacral ulcer whch was debrieded by burn and treated with aztreonam, polymyxin and meropenem. Patient was found to have very limited PO intake and dysphagia and had a peg tube place 4/19, he is tolerating his feeds.   on 5/10 Hb was noted to be 6.8 for which patient received 2u PRBC and has remained hemodynamically stable  chest xray 5/10 with possible new Right sided opacity. patient remains stable on RA, not requiring any supplemental oxygen and no leukocytosis.  Repeat Cxray in AM and monitor CBC in AM    #Progress Note Handoff  Pending (specify):  repeat AM chest xray and CBC  Family discussion: daugher aware of plan  Disposition:  possible dc home tomorrow if remains stable     Leann Gomez MD  s. [phone] 71 year old male with pmx Parkinson's dementia, CKD 3, DLD, gout, HTN, DM, history of fungemia in March, presented from [facility] due to change in mental status. Patient had prolonged stay in the step down unit for acute hypoxemic respiratory failure due to MDR Klebsiella PNA s/p intubation, no extubated on room air, septic shock, complete left lung collapse, underwent multiple bronchoscopies and bronchial washings cx + for MDR Kleb Pneumo s/p course of antibiotics. His hospital course was further complicated by Acute cholecystitis, for which he underwent a per gayle with IR on 4/29 and also completed a course of abx Flagyl and Avycaz today. He also has a stage 4 necrotic sacral ulcer whch was debrieded by burn and treated with aztreonam, polymyxin and meropenem. Patient was found to have very limited PO intake and dysphagia and had a peg tube place 4/19, he is tolerating his feeds.   on 5/10 Hb was noted to be 6.8 for which patient received 2u PRBC and has remained hemodynamically stable  chest xray 5/10 with possible new Right sided opacity. patient remains stable on RA, not requiring any supplemental oxygen and no leukocytosis.  Repeat Cxray in AM and monitor CBC in AM    Overall prognosis is very poor    #Progress Note Handoff  Pending (specify):  repeat AM chest xray and CBC  Family discussion: daugher aware of plan  Disposition:  possible dc home tomorrow if remains stable     Leann Gomez MD  s. [phone]

## 2021-05-11 NOTE — PROGRESS NOTE ADULT - SUBJECTIVE AND OBJECTIVE BOX
Hospital Day:  50d    Chief Complaint: Patient is a 71y old  Male who presents with a chief complaint of Change in Mental Status (10 May 2021 18:48)    24 hour events: No acute overnight events. Patient seen this AM resting comfortably in bed. Hb dropped to 6.8, received 1 unit PRBC.     Past Medical Hx:   Parkinson disease    Hypertension    Gout    Dyslipidemia    Prostatitis    Cataract      Past Sx:  No significant past surgical history      Allergies:  No Known Allergies    Current Meds:   Standing Meds:  ascorbic acid 500 milliGRAM(s) Oral daily  atorvastatin 40 milliGRAM(s) Oral at bedtime  carbidopa/levodopa  25/100 2 Tablet(s) Oral <User Schedule>  ceftazidime/avibactam IVPB 2.5 Gram(s) IV Intermittent every 8 hours  chlorhexidine 0.12% Liquid 15 milliLiter(s) Oral Mucosa every 12 hours  chlorhexidine 4% Liquid 1 Application(s) Topical <User Schedule>  collagenase Ointment 1 Application(s) Topical two times a day  Dakins Solution - 1/2 Strength 1 Application(s) Topical two times a day  enoxaparin Injectable 40 milliGRAM(s) SubCutaneous daily  lidocaine 2% Gel 1 Application(s) Topical once  lidocaine 2% Viscous 100 milliLiter(s) Swish and Spit once  magnesium oxide 400 milliGRAM(s) Oral every 8 hours  midodrine 10 milliGRAM(s) Oral every 8 hours  polyethylene glycol 3350 17 Gram(s) Oral daily  senna 2 Tablet(s) Oral at bedtime  zinc sulfate 220 milliGRAM(s) Oral daily    PRN Meds:  acetaminophen   Tablet .. 650 milliGRAM(s) Oral every 6 hours PRN Temp greater or equal to 38C (100.4F)    HOME MEDICATIONS:  apixaban 2.5 mg oral tablet: 1 tab(s) orally 2 times a day  ascorbic acid 500 mg oral tablet: 1 tab(s) orally once a day  atorvastatin 40 mg oral tablet: 1 tab(s) orally once a day (at bedtime)  carbidopa-levodopa 25 mg-100 mg oral tablet, extended release: 2 tab(s) orally 4 times a day at 6AM,10Am,2PM,6PM,10 PM  collagenase 250 units/g topical ointment: 1 application topically 2 times a day  magnesium oxide 500 mg oral tablet: 1 tab(s) orally once a day  midodrine 10 mg oral tablet: 1 tab(s) orally every 8 hours  omeprazole 40 mg oral delayed release capsule: 1 cap(s) orally once a day  sodium hypochlorite 0.25% topical solution: 1 application topically 2 times a day  zinc sulfate 220 mg oral capsule: 1 cap(s) orally once a day      Vital Signs:   T(F): 96.6 (05-11-21 @ 05:25), Max: 98.2 (05-10-21 @ 19:39)  HR: 73 (05-11-21 @ 05:25) (73 - 74)  BP: 145/65 (05-11-21 @ 05:25) (125/63 - 145/65)  RR: 18 (05-11-21 @ 05:25) (18 - 19)  SpO2: --      05-10-21 @ 07:01  -  05-11-21 @ 07:00  --------------------------------------------------------  IN: 1830 mL / OUT: 0 mL / NET: 1830 mL    Physical Exam:   GENERAL: NAD  HEENT: NCAT  CHEST/LUNG: audible breath sounds bilaterally, decreased bs   HEART: Regular rate and rhythm; s1 s2 appreciated  ABDOMEN: Soft, Nontender, Nondistended; Bowel sounds present  EXTREMITIES: anasarca   SKIN: necrotic sacral wound   NERVOUS SYSTEM: minimally responsive      Labs:                         8.6    7.62  )-----------( 138      ( 11 May 2021 06:27 )             26.9     Neutophil% 70.4, Lymphocyte% 13.5, Monocyte% 10.8, Bands% 1.4 05-11-21 @ 06:27    11 May 2021 06:27    134    |  103    |  14     ----------------------------<  79     4.5     |  22     |  0.6      Ca    7.9        11 May 2021 06:27  Mg     1.7       11 May 2021 06:27    TPro  6.2    /  Alb  2.1    /  TBili  0.5    /  DBili  x      /  AST  50     /  ALT  9      /  AlkPhos  194    11 May 2021 06:27      Radiology:   < from: Xray Chest 1 View- PORTABLE-Routine (Xray Chest 1 View- PORTABLE-Routine in AM.) (05.10.21 @ 07:39) >  Impression:    Unchanged left basilar opacity/effusion and new small right basilar opacity.    < end of copied text >

## 2021-05-12 LAB
ALBUMIN SERPL ELPH-MCNC: 2 G/DL — LOW (ref 3.5–5.2)
ALBUMIN SERPL ELPH-MCNC: 2 G/DL — LOW (ref 3.5–5.2)
ALP SERPL-CCNC: 182 U/L — HIGH (ref 30–115)
ALP SERPL-CCNC: 194 U/L — HIGH (ref 30–115)
ALT FLD-CCNC: 14 U/L — SIGNIFICANT CHANGE UP (ref 0–41)
ALT FLD-CCNC: <5 U/L — SIGNIFICANT CHANGE UP (ref 0–41)
ANION GAP SERPL CALC-SCNC: 12 MMOL/L — SIGNIFICANT CHANGE UP (ref 7–14)
ANION GAP SERPL CALC-SCNC: 8 MMOL/L — SIGNIFICANT CHANGE UP (ref 7–14)
APPEARANCE UR: CLEAR — SIGNIFICANT CHANGE UP
APTT BLD: 46 SEC — HIGH (ref 27–39.2)
AST SERPL-CCNC: 30 U/L — SIGNIFICANT CHANGE UP (ref 0–41)
AST SERPL-CCNC: 37 U/L — SIGNIFICANT CHANGE UP (ref 0–41)
BACTERIA # UR AUTO: NEGATIVE — SIGNIFICANT CHANGE UP
BASOPHILS # BLD AUTO: 0.03 K/UL — SIGNIFICANT CHANGE UP (ref 0–0.2)
BASOPHILS NFR BLD AUTO: 0.4 % — SIGNIFICANT CHANGE UP (ref 0–1)
BILIRUB SERPL-MCNC: 0.4 MG/DL — SIGNIFICANT CHANGE UP (ref 0.2–1.2)
BILIRUB SERPL-MCNC: 0.4 MG/DL — SIGNIFICANT CHANGE UP (ref 0.2–1.2)
BILIRUB UR-MCNC: NEGATIVE — SIGNIFICANT CHANGE UP
BUN SERPL-MCNC: 13 MG/DL — SIGNIFICANT CHANGE UP (ref 10–20)
BUN SERPL-MCNC: 14 MG/DL — SIGNIFICANT CHANGE UP (ref 10–20)
CALCIUM SERPL-MCNC: 7.7 MG/DL — LOW (ref 8.5–10.1)
CALCIUM SERPL-MCNC: 7.9 MG/DL — LOW (ref 8.5–10.1)
CHLORIDE SERPL-SCNC: 100 MMOL/L — SIGNIFICANT CHANGE UP (ref 98–110)
CHLORIDE SERPL-SCNC: 101 MMOL/L — SIGNIFICANT CHANGE UP (ref 98–110)
CO2 SERPL-SCNC: 21 MMOL/L — SIGNIFICANT CHANGE UP (ref 17–32)
CO2 SERPL-SCNC: 24 MMOL/L — SIGNIFICANT CHANGE UP (ref 17–32)
COLOR SPEC: YELLOW — SIGNIFICANT CHANGE UP
CREAT SERPL-MCNC: 0.5 MG/DL — LOW (ref 0.7–1.5)
CREAT SERPL-MCNC: 0.6 MG/DL — LOW (ref 0.7–1.5)
DIFF PNL FLD: SIGNIFICANT CHANGE UP
EOSINOPHIL # BLD AUTO: 0.24 K/UL — SIGNIFICANT CHANGE UP (ref 0–0.7)
EOSINOPHIL NFR BLD AUTO: 2.9 % — SIGNIFICANT CHANGE UP (ref 0–8)
EPI CELLS # UR: 1 /HPF — SIGNIFICANT CHANGE UP (ref 0–5)
GAS PNL BLDA: SIGNIFICANT CHANGE UP
GLUCOSE BLDC GLUCOMTR-MCNC: 93 MG/DL — SIGNIFICANT CHANGE UP (ref 70–99)
GLUCOSE SERPL-MCNC: 81 MG/DL — SIGNIFICANT CHANGE UP (ref 70–99)
GLUCOSE SERPL-MCNC: 88 MG/DL — SIGNIFICANT CHANGE UP (ref 70–99)
GLUCOSE UR QL: NEGATIVE — SIGNIFICANT CHANGE UP
HCT VFR BLD CALC: 25.8 % — LOW (ref 42–52)
HCT VFR BLD CALC: 26.2 % — LOW (ref 42–52)
HGB BLD-MCNC: 8 G/DL — LOW (ref 14–18)
HGB BLD-MCNC: 8.4 G/DL — LOW (ref 14–18)
HYALINE CASTS # UR AUTO: 3 /LPF — SIGNIFICANT CHANGE UP (ref 0–7)
IMM GRANULOCYTES NFR BLD AUTO: 0.8 % — HIGH (ref 0.1–0.3)
INR BLD: 1.15 RATIO — SIGNIFICANT CHANGE UP (ref 0.65–1.3)
KETONES UR-MCNC: NEGATIVE — SIGNIFICANT CHANGE UP
LACTATE SERPL-SCNC: 0.7 MMOL/L — SIGNIFICANT CHANGE UP (ref 0.7–2)
LEUKOCYTE ESTERASE UR-ACNC: NEGATIVE — SIGNIFICANT CHANGE UP
LYMPHOCYTES # BLD AUTO: 0.97 K/UL — LOW (ref 1.2–3.4)
LYMPHOCYTES # BLD AUTO: 11.6 % — LOW (ref 20.5–51.1)
MAGNESIUM SERPL-MCNC: 2 MG/DL — SIGNIFICANT CHANGE UP (ref 1.8–2.4)
MAGNESIUM SERPL-MCNC: 2 MG/DL — SIGNIFICANT CHANGE UP (ref 1.8–2.4)
MCHC RBC-ENTMCNC: 26.7 PG — LOW (ref 27–31)
MCHC RBC-ENTMCNC: 27.1 PG — SIGNIFICANT CHANGE UP (ref 27–31)
MCHC RBC-ENTMCNC: 31 G/DL — LOW (ref 32–37)
MCHC RBC-ENTMCNC: 32.1 G/DL — SIGNIFICANT CHANGE UP (ref 32–37)
MCV RBC AUTO: 84.5 FL — SIGNIFICANT CHANGE UP (ref 80–94)
MCV RBC AUTO: 86 FL — SIGNIFICANT CHANGE UP (ref 80–94)
MONOCYTES # BLD AUTO: 0.89 K/UL — HIGH (ref 0.1–0.6)
MONOCYTES NFR BLD AUTO: 10.7 % — HIGH (ref 1.7–9.3)
NEUTROPHILS # BLD AUTO: 6.13 K/UL — SIGNIFICANT CHANGE UP (ref 1.4–6.5)
NEUTROPHILS NFR BLD AUTO: 73.6 % — SIGNIFICANT CHANGE UP (ref 42.2–75.2)
NITRITE UR-MCNC: NEGATIVE — SIGNIFICANT CHANGE UP
NRBC # BLD: 0 /100 WBCS — SIGNIFICANT CHANGE UP (ref 0–0)
NRBC # BLD: 0 /100 WBCS — SIGNIFICANT CHANGE UP (ref 0–0)
NT-PROBNP SERPL-SCNC: 3716 PG/ML — HIGH (ref 0–300)
PH UR: 7 — SIGNIFICANT CHANGE UP (ref 5–8)
PLATELET # BLD AUTO: 139 K/UL — SIGNIFICANT CHANGE UP (ref 130–400)
PLATELET # BLD AUTO: 150 K/UL — SIGNIFICANT CHANGE UP (ref 130–400)
POTASSIUM SERPL-MCNC: 4.7 MMOL/L — SIGNIFICANT CHANGE UP (ref 3.5–5)
POTASSIUM SERPL-MCNC: 4.7 MMOL/L — SIGNIFICANT CHANGE UP (ref 3.5–5)
POTASSIUM SERPL-SCNC: 4.7 MMOL/L — SIGNIFICANT CHANGE UP (ref 3.5–5)
POTASSIUM SERPL-SCNC: 4.7 MMOL/L — SIGNIFICANT CHANGE UP (ref 3.5–5)
PROT SERPL-MCNC: 5.8 G/DL — LOW (ref 6–8)
PROT SERPL-MCNC: 6.2 G/DL — SIGNIFICANT CHANGE UP (ref 6–8)
PROT UR-MCNC: ABNORMAL
PROTHROM AB SERPL-ACNC: 13.2 SEC — HIGH (ref 9.95–12.87)
RBC # BLD: 3 M/UL — LOW (ref 4.7–6.1)
RBC # BLD: 3.1 M/UL — LOW (ref 4.7–6.1)
RBC # FLD: 17.8 % — HIGH (ref 11.5–14.5)
RBC # FLD: 18 % — HIGH (ref 11.5–14.5)
RBC CASTS # UR COMP ASSIST: 1 /HPF — SIGNIFICANT CHANGE UP (ref 0–4)
SODIUM SERPL-SCNC: 133 MMOL/L — LOW (ref 135–146)
SODIUM SERPL-SCNC: 133 MMOL/L — LOW (ref 135–146)
SP GR SPEC: 1.01 — SIGNIFICANT CHANGE UP (ref 1.01–1.03)
TROPONIN T SERPL-MCNC: 0.11 NG/ML — CRITICAL HIGH
UROBILINOGEN FLD QL: SIGNIFICANT CHANGE UP
WBC # BLD: 7.78 K/UL — SIGNIFICANT CHANGE UP (ref 4.8–10.8)
WBC # BLD: 8.33 K/UL — SIGNIFICANT CHANGE UP (ref 4.8–10.8)
WBC # FLD AUTO: 7.78 K/UL — SIGNIFICANT CHANGE UP (ref 4.8–10.8)
WBC # FLD AUTO: 8.33 K/UL — SIGNIFICANT CHANGE UP (ref 4.8–10.8)
WBC UR QL: 0 /HPF — SIGNIFICANT CHANGE UP (ref 0–5)

## 2021-05-12 PROCEDURE — 99233 SBSQ HOSP IP/OBS HIGH 50: CPT

## 2021-05-12 PROCEDURE — 99291 CRITICAL CARE FIRST HOUR: CPT | Mod: 25

## 2021-05-12 PROCEDURE — 93010 ELECTROCARDIOGRAM REPORT: CPT

## 2021-05-12 PROCEDURE — 71045 X-RAY EXAM CHEST 1 VIEW: CPT | Mod: 26

## 2021-05-12 PROCEDURE — 73030 X-RAY EXAM OF SHOULDER: CPT | Mod: 26,LT

## 2021-05-12 PROCEDURE — 70450 CT HEAD/BRAIN W/O DYE: CPT | Mod: 26

## 2021-05-12 PROCEDURE — 71045 X-RAY EXAM CHEST 1 VIEW: CPT | Mod: 26,59,77

## 2021-05-12 PROCEDURE — 99232 SBSQ HOSP IP/OBS MODERATE 35: CPT

## 2021-05-12 RX ORDER — METRONIDAZOLE 500 MG
500 TABLET ORAL EVERY 8 HOURS
Refills: 0 | Status: DISCONTINUED | OUTPATIENT
Start: 2021-05-12 | End: 2021-05-17

## 2021-05-12 RX ORDER — MIDAZOLAM HYDROCHLORIDE 1 MG/ML
0.02 INJECTION, SOLUTION INTRAMUSCULAR; INTRAVENOUS
Qty: 100 | Refills: 0 | Status: DISCONTINUED | OUTPATIENT
Start: 2021-05-12 | End: 2021-05-12

## 2021-05-12 RX ORDER — DEXMEDETOMIDINE HYDROCHLORIDE IN 0.9% SODIUM CHLORIDE 4 UG/ML
0.2 INJECTION INTRAVENOUS
Qty: 400 | Refills: 0 | Status: DISCONTINUED | OUTPATIENT
Start: 2021-05-12 | End: 2021-05-13

## 2021-05-12 RX ORDER — FENTANYL CITRATE 50 UG/ML
0.5 INJECTION INTRAVENOUS
Qty: 2500 | Refills: 0 | Status: DISCONTINUED | OUTPATIENT
Start: 2021-05-12 | End: 2021-05-17

## 2021-05-12 RX ORDER — NOREPINEPHRINE BITARTRATE/D5W 8 MG/250ML
0.05 PLASTIC BAG, INJECTION (ML) INTRAVENOUS
Qty: 8 | Refills: 0 | Status: DISCONTINUED | OUTPATIENT
Start: 2021-05-12 | End: 2021-05-17

## 2021-05-12 RX ORDER — METRONIDAZOLE 500 MG
500 TABLET ORAL ONCE
Refills: 0 | Status: COMPLETED | OUTPATIENT
Start: 2021-05-12 | End: 2021-05-12

## 2021-05-12 RX ORDER — PANTOPRAZOLE SODIUM 20 MG/1
40 TABLET, DELAYED RELEASE ORAL DAILY
Refills: 0 | Status: DISCONTINUED | OUTPATIENT
Start: 2021-05-12 | End: 2021-05-28

## 2021-05-12 RX ORDER — METRONIDAZOLE 500 MG
TABLET ORAL
Refills: 0 | Status: DISCONTINUED | OUTPATIENT
Start: 2021-05-16 | End: 2021-05-17

## 2021-05-12 RX ORDER — MORPHINE SULFATE 50 MG/1
2 CAPSULE, EXTENDED RELEASE ORAL EVERY 4 HOURS
Refills: 0 | Status: DISCONTINUED | OUTPATIENT
Start: 2021-05-12 | End: 2021-05-12

## 2021-05-12 RX ADMIN — Medication 1 APPLICATION(S): at 17:05

## 2021-05-12 RX ADMIN — CHLORHEXIDINE GLUCONATE 1 APPLICATION(S): 213 SOLUTION TOPICAL at 06:11

## 2021-05-12 RX ADMIN — ENOXAPARIN SODIUM 40 MILLIGRAM(S): 100 INJECTION SUBCUTANEOUS at 12:02

## 2021-05-12 RX ADMIN — CARBIDOPA AND LEVODOPA 2 TABLET(S): 25; 100 TABLET ORAL at 14:45

## 2021-05-12 RX ADMIN — MAGNESIUM OXIDE 400 MG ORAL TABLET 400 MILLIGRAM(S): 241.3 TABLET ORAL at 14:45

## 2021-05-12 RX ADMIN — MAGNESIUM OXIDE 400 MG ORAL TABLET 400 MILLIGRAM(S): 241.3 TABLET ORAL at 22:12

## 2021-05-12 RX ADMIN — DEXMEDETOMIDINE HYDROCHLORIDE IN 0.9% SODIUM CHLORIDE 3.97 MICROGRAM(S)/KG/HR: 4 INJECTION INTRAVENOUS at 16:00

## 2021-05-12 RX ADMIN — MIDODRINE HYDROCHLORIDE 10 MILLIGRAM(S): 2.5 TABLET ORAL at 06:10

## 2021-05-12 RX ADMIN — MAGNESIUM OXIDE 400 MG ORAL TABLET 400 MILLIGRAM(S): 241.3 TABLET ORAL at 06:10

## 2021-05-12 RX ADMIN — CHLORHEXIDINE GLUCONATE 15 MILLILITER(S): 213 SOLUTION TOPICAL at 06:11

## 2021-05-12 RX ADMIN — Medication 500 MILLIGRAM(S): at 12:02

## 2021-05-12 RX ADMIN — Medication 1 APPLICATION(S): at 06:11

## 2021-05-12 RX ADMIN — CARBIDOPA AND LEVODOPA 2 TABLET(S): 25; 100 TABLET ORAL at 22:11

## 2021-05-12 RX ADMIN — CARBIDOPA AND LEVODOPA 2 TABLET(S): 25; 100 TABLET ORAL at 06:11

## 2021-05-12 RX ADMIN — CARBIDOPA AND LEVODOPA 2 TABLET(S): 25; 100 TABLET ORAL at 12:02

## 2021-05-12 RX ADMIN — ATORVASTATIN CALCIUM 40 MILLIGRAM(S): 80 TABLET, FILM COATED ORAL at 22:11

## 2021-05-12 RX ADMIN — Medication 100 MILLIGRAM(S): at 22:12

## 2021-05-12 RX ADMIN — MIDODRINE HYDROCHLORIDE 10 MILLIGRAM(S): 2.5 TABLET ORAL at 22:12

## 2021-05-12 RX ADMIN — CHLORHEXIDINE GLUCONATE 15 MILLILITER(S): 213 SOLUTION TOPICAL at 17:05

## 2021-05-12 RX ADMIN — ZINC SULFATE TAB 220 MG (50 MG ZINC EQUIVALENT) 220 MILLIGRAM(S): 220 (50 ZN) TAB at 12:02

## 2021-05-12 RX ADMIN — MIDODRINE HYDROCHLORIDE 10 MILLIGRAM(S): 2.5 TABLET ORAL at 14:45

## 2021-05-12 RX ADMIN — SENNA PLUS 2 TABLET(S): 8.6 TABLET ORAL at 22:11

## 2021-05-12 RX ADMIN — MORPHINE SULFATE 2 MILLIGRAM(S): 50 CAPSULE, EXTENDED RELEASE ORAL at 10:07

## 2021-05-12 NOTE — AIRWAY PLACEMENT NOTE ADULT - POST AIRWAY PLACEMENT ASSESSMENT:
breath sounds bilateral/positive end tidal CO2 noted
breath sounds bilateral/breath sounds equal/positive end tidal CO2 noted/CXR pending/chest excursion noted

## 2021-05-12 NOTE — PROGRESS NOTE ADULT - ATTENDING COMMENTS
IMPRESSION:  Acute hypoxemic respiratory failure  AMS  HO L lung collapse sp bronch extubated on 5/7,  Septic shock resolved   Necrotic sacral wound culture s/p debridement  Cholecystitis sp CCY  MDR pneumonia Klebsiella/ pseudomonas   HO parkinson's dementia sp PEG  HO DVT/ A fib  DEC HB NO ACTIVE BLEED  Thrombocytopenia, HIT negative, improving    Plan as outlined above

## 2021-05-12 NOTE — AIRWAY PLACEMENT NOTE ADULT - INDICATIONS:
Route for mechanical ventilation/Respiratory distress
Route for mechanical ventilation/Respiratory distress

## 2021-05-12 NOTE — PROGRESS NOTE ADULT - SUBJECTIVE AND OBJECTIVE BOX
INTERVAL HPI/OVERNIGHT EVENTS:    SUBJECTIVE: Patient seen and examined at bedside.     unable to obtain ros    OBJECTIVE:    VITAL SIGNS:  Vital Signs Last 24 Hrs  T(C): 36.7 (12 May 2021 13:00), Max: 37.4 (12 May 2021 05:00)  T(F): 98.1 (12 May 2021 13:00), Max: 99.4 (12 May 2021 05:00)  HR: 68 (12 May 2021 14:00) (66 - 84)  BP: 104/70 (12 May 2021 14:00) (100/55 - 147/65)  BP(mean): 87 (12 May 2021 14:00) (77 - 91)  RR: 17 (12 May 2021 14:00) (14 - 27)  SpO2: 100% (12 May 2021 14:00) (97% - 100%)      PHYSICAL EXAM:    General: severe resp distress, +wob, +use of accessory muscles of inspiration  HEENT: NC/AT; PERRL, clear conjunctiva  Neck: supple  Respiratory: L crackles  Cardiovascular: +S1/S2; RRR  Abdomen: soft, NT/ND; +BS x4  Extremities: WWP, 2+ peripheral pulses b/l; no LE edema  Skin: normal color and turgor; no rash  Neurological:    MEDICATIONS:  MEDICATIONS  (STANDING):  ascorbic acid 500 milliGRAM(s) Oral daily  atorvastatin 40 milliGRAM(s) Oral at bedtime  carbidopa/levodopa  25/100 2 Tablet(s) Oral <User Schedule>  chlorhexidine 0.12% Liquid 15 milliLiter(s) Oral Mucosa every 12 hours  chlorhexidine 4% Liquid 1 Application(s) Topical <User Schedule>  collagenase Ointment 1 Application(s) Topical two times a day  Dakins Solution - 1/2 Strength 1 Application(s) Topical two times a day  dexMEDEtomidine Infusion 0.2 MICROgram(s)/kG/Hr (3.97 mL/Hr) IV Continuous <Continuous>  enoxaparin Injectable 40 milliGRAM(s) SubCutaneous daily  fentaNYL   Infusion. 0.5 MICROgram(s)/kG/Hr (3.97 mL/Hr) IV Continuous <Continuous>  lidocaine 2% Gel 1 Application(s) Topical once  lidocaine 2% Viscous 100 milliLiter(s) Swish and Spit once  magnesium oxide 400 milliGRAM(s) Oral every 8 hours  metroNIDAZOLE  IVPB      metroNIDAZOLE  IVPB 500 milliGRAM(s) IV Intermittent once  metroNIDAZOLE  IVPB 500 milliGRAM(s) IV Intermittent every 8 hours  midodrine 10 milliGRAM(s) Oral every 8 hours  norepinephrine Infusion 0.05 MICROgram(s)/kG/Min (7.44 mL/Hr) IV Continuous <Continuous>  polyethylene glycol 3350 17 Gram(s) Oral daily  senna 2 Tablet(s) Oral at bedtime  zinc sulfate 220 milliGRAM(s) Oral daily    MEDICATIONS  (PRN):  acetaminophen   Tablet .. 650 milliGRAM(s) Oral every 6 hours PRN Temp greater or equal to 38C (100.4F)      ALLERGIES:  Allergies    No Known Allergies    Intolerances        LABS:                        8.4    8.33  )-----------( 150      ( 12 May 2021 07:20 )             26.2     Hemoglobin: 8.4 g/dL (05-12 @ 07:20)  Hemoglobin: 8.6 g/dL (05-11 @ 06:27)  Hemoglobin: 6.8 g/dL (05-10 @ 18:16)  Hemoglobin: 7.8 g/dL (05-08 @ 09:50)    CBC Full  -  ( 12 May 2021 07:20 )  WBC Count : 8.33 K/uL  RBC Count : 3.10 M/uL  Hemoglobin : 8.4 g/dL  Hematocrit : 26.2 %  Platelet Count - Automated : 150 K/uL  Mean Cell Volume : 84.5 fL  Mean Cell Hemoglobin : 27.1 pg  Mean Cell Hemoglobin Concentration : 32.1 g/dL  Auto Neutrophil # : 6.13 K/uL  Auto Lymphocyte # : 0.97 K/uL  Auto Monocyte # : 0.89 K/uL  Auto Eosinophil # : 0.24 K/uL  Auto Basophil # : 0.03 K/uL  Auto Neutrophil % : 73.6 %  Auto Lymphocyte % : 11.6 %  Auto Monocyte % : 10.7 %  Auto Eosinophil % : 2.9 %  Auto Basophil % : 0.4 %    05-12    133<L>  |  100  |  14  ----------------------------<  88  4.7   |  21  |  0.5<L>    Ca    7.7<L>      12 May 2021 07:20  Mg     2.0     05-12    TPro  6.2  /  Alb  2.0<L>  /  TBili  0.4  /  DBili  x   /  AST  37  /  ALT  14  /  AlkPhos  194<H>  05-12    Creatinine Trend: 0.5<--, 0.6<--, 0.5<--, 0.6<--, 0.6<--, 0.7<--  LIVER FUNCTIONS - ( 12 May 2021 07:20 )  Alb: 2.0 g/dL / Pro: 6.2 g/dL / ALK PHOS: 194 U/L / ALT: 14 U/L / AST: 37 U/L / GGT: x               hs Troponin:    ABG - ( 12 May 2021 11:54 )  pH, Arterial: 7.41  pH, Blood: x     /  pCO2: 43    /  pO2: 321   / HCO3: 28    / Base Excess: 2.7   /  SaO2: 100                 11:54 - ABG - pH: 7.41  |  pCO2: 43    |  pO2: 321   | Lactate:       | BE: 2.7    09:08 - ABG - pH: 7.42  |  pCO2: 42    |  pO2: 82    | Lactate:       | BE: 2.3          CSF:                      EKG:   MICROBIOLOGY:    IMAGING:      Labs, imaging, EKG personally reviewed    RADIOLOGY & ADDITIONAL TESTS: Reviewed. INTERVAL HPI/OVERNIGHT EVENTS:    SUBJECTIVE: Patient seen and examined at bedside.     unable to obtain ros    OBJECTIVE:    VITAL SIGNS:  Vital Signs Last 24 Hrs  T(C): 36.7 (12 May 2021 13:00), Max: 37.4 (12 May 2021 05:00)  T(F): 98.1 (12 May 2021 13:00), Max: 99.4 (12 May 2021 05:00)  HR: 68 (12 May 2021 14:00) (66 - 84)  BP: 104/70 (12 May 2021 14:00) (100/55 - 147/65)  BP(mean): 87 (12 May 2021 14:00) (77 - 91)  RR: 17 (12 May 2021 14:00) (14 - 27)  SpO2: 100% (12 May 2021 14:00) (97% - 100%)      PHYSICAL EXAM:    General: severe resp distress, +wob, +use of accessory muscles of inspiration  HEENT: NC/AT; PERRL, clear conjunctiva  Neck: supple  Respiratory: BL crackles  Cardiovascular: +S1/S2; RRR  Abdomen: soft, NT/ND; +BS x4  Extremities: WWP, 2+ peripheral pulses b/l; no LE edema  Skin: normal color and turgor; no rash  Neurological:    MEDICATIONS:  MEDICATIONS  (STANDING):  ascorbic acid 500 milliGRAM(s) Oral daily  atorvastatin 40 milliGRAM(s) Oral at bedtime  carbidopa/levodopa  25/100 2 Tablet(s) Oral <User Schedule>  chlorhexidine 0.12% Liquid 15 milliLiter(s) Oral Mucosa every 12 hours  chlorhexidine 4% Liquid 1 Application(s) Topical <User Schedule>  collagenase Ointment 1 Application(s) Topical two times a day  Dakins Solution - 1/2 Strength 1 Application(s) Topical two times a day  dexMEDEtomidine Infusion 0.2 MICROgram(s)/kG/Hr (3.97 mL/Hr) IV Continuous <Continuous>  enoxaparin Injectable 40 milliGRAM(s) SubCutaneous daily  fentaNYL   Infusion. 0.5 MICROgram(s)/kG/Hr (3.97 mL/Hr) IV Continuous <Continuous>  lidocaine 2% Gel 1 Application(s) Topical once  lidocaine 2% Viscous 100 milliLiter(s) Swish and Spit once  magnesium oxide 400 milliGRAM(s) Oral every 8 hours  metroNIDAZOLE  IVPB      metroNIDAZOLE  IVPB 500 milliGRAM(s) IV Intermittent once  metroNIDAZOLE  IVPB 500 milliGRAM(s) IV Intermittent every 8 hours  midodrine 10 milliGRAM(s) Oral every 8 hours  norepinephrine Infusion 0.05 MICROgram(s)/kG/Min (7.44 mL/Hr) IV Continuous <Continuous>  polyethylene glycol 3350 17 Gram(s) Oral daily  senna 2 Tablet(s) Oral at bedtime  zinc sulfate 220 milliGRAM(s) Oral daily    MEDICATIONS  (PRN):  acetaminophen   Tablet .. 650 milliGRAM(s) Oral every 6 hours PRN Temp greater or equal to 38C (100.4F)      ALLERGIES:  Allergies    No Known Allergies    Intolerances        LABS:                        8.4    8.33  )-----------( 150      ( 12 May 2021 07:20 )             26.2     Hemoglobin: 8.4 g/dL (05-12 @ 07:20)  Hemoglobin: 8.6 g/dL (05-11 @ 06:27)  Hemoglobin: 6.8 g/dL (05-10 @ 18:16)  Hemoglobin: 7.8 g/dL (05-08 @ 09:50)    CBC Full  -  ( 12 May 2021 07:20 )  WBC Count : 8.33 K/uL  RBC Count : 3.10 M/uL  Hemoglobin : 8.4 g/dL  Hematocrit : 26.2 %  Platelet Count - Automated : 150 K/uL  Mean Cell Volume : 84.5 fL  Mean Cell Hemoglobin : 27.1 pg  Mean Cell Hemoglobin Concentration : 32.1 g/dL  Auto Neutrophil # : 6.13 K/uL  Auto Lymphocyte # : 0.97 K/uL  Auto Monocyte # : 0.89 K/uL  Auto Eosinophil # : 0.24 K/uL  Auto Basophil # : 0.03 K/uL  Auto Neutrophil % : 73.6 %  Auto Lymphocyte % : 11.6 %  Auto Monocyte % : 10.7 %  Auto Eosinophil % : 2.9 %  Auto Basophil % : 0.4 %    05-12    133<L>  |  100  |  14  ----------------------------<  88  4.7   |  21  |  0.5<L>    Ca    7.7<L>      12 May 2021 07:20  Mg     2.0     05-12    TPro  6.2  /  Alb  2.0<L>  /  TBili  0.4  /  DBili  x   /  AST  37  /  ALT  14  /  AlkPhos  194<H>  05-12    Creatinine Trend: 0.5<--, 0.6<--, 0.5<--, 0.6<--, 0.6<--, 0.7<--  LIVER FUNCTIONS - ( 12 May 2021 07:20 )  Alb: 2.0 g/dL / Pro: 6.2 g/dL / ALK PHOS: 194 U/L / ALT: 14 U/L / AST: 37 U/L / GGT: x               hs Troponin:    ABG - ( 12 May 2021 11:54 )  pH, Arterial: 7.41  pH, Blood: x     /  pCO2: 43    /  pO2: 321   / HCO3: 28    / Base Excess: 2.7   /  SaO2: 100                 11:54 - ABG - pH: 7.41  |  pCO2: 43    |  pO2: 321   | Lactate:       | BE: 2.7    09:08 - ABG - pH: 7.42  |  pCO2: 42    |  pO2: 82    | Lactate:       | BE: 2.3          CSF:                      EKG:   MICROBIOLOGY:    IMAGING:      Labs, imaging, EKG personally reviewed    RADIOLOGY & ADDITIONAL TESTS: Reviewed.

## 2021-05-12 NOTE — PROGRESS NOTE ADULT - ASSESSMENT
IMPRESSION:  Acute hypoxemic resp failure/ L lung collapse sp bronch extubated 5/7, now worsening  Septic shock resolved   Necrotic sacral wound culture s/p debridement  Cholecystitis sp CCY  MDR pneumonia Klebsiella/ pseudomonas improved  HO parkinson's dementia sp PEG  HO DVT/ A fib  DEC HB NO ACTIVE BLEED  Thrombocytopenia, HIT negative, improving    PLAN:    CNS: Sedation as needed.     HEENT:  Oral care    PULMONARY:  HOB @ 45 degrees.  Aspiration precautions. Intubate patient. Vent changes s/p intubation ABG.       CARDIOVASCULAR: Keep equal. Avoid volume overload.    GI: GI prophylaxis. PEG feeding as tolerated.    RENAL:  F/u  lytes. Correct as needed. Accurate I/O    INFECTIOUS DISEASE: Abx per ID    HEMATOLOGICAL:  DVT prophylaxis    ENDOCRINE:  Follow up FS.  Insulin protocol if needed.    CODE STATUS: FULL CODE    Very poor prognosis    Step Down Unit monitoring for now IMPRESSION:  Acute hypoxemic respiratory failure  AMS  HO L lung collapse sp bronch extubated on 5/7,  Septic shock resolved   Necrotic sacral wound culture s/p debridement  Cholecystitis sp CCY  MDR pneumonia Klebsiella/ pseudomonas   HO parkinson's dementia sp PEG  HO DVT/ A fib  DEC HB NO ACTIVE BLEED  Thrombocytopenia, HIT negative, improving    PLAN:    CNS: Sedation as needed. EEG    HEENT:  Oral care    PULMONARY:  HOB @ 45 degrees.  Aspiration precautions.  ARDS network MV Setting.  Repeat ABG and adjust vent setting     CARDIOVASCULAR: Keep equal. Avoid volume overload.    GI: GI prophylaxis. PEG feeding as tolerated.    RENAL:  F/u  lytes. Correct as needed. Accurate I/O    INFECTIOUS DISEASE: Abx per ID.      HEMATOLOGICAL:  DVT prophylaxis    ENDOCRINE:  Follow up FS.  Insulin protocol if needed.    CODE STATUS: FULL CODE    Very poor prognosis    MICU for now

## 2021-05-12 NOTE — CHART NOTE - NSCHARTNOTEFT_GEN_A_CORE
Transfer Note    Transfer from: Medicine   Transfer to:  (  ) Medicine    (  ) Telemetry    (  ) RCU    (  ) Palliative    (  ) Stroke Unit    ( x ) Stepdown Unit     Upstgstrstastdstest:st st7st1st year old Male with past medical history of Parkinson's, dementia, CKD Stage 3, 1st degree AV block, HLD, gout, HTN, DM, fungal septicemia presented from [facility] for acute decompensation in mental status. As per documentation patient at baseline was verbal but for past 3 days prior to admission on 3/22,, he has been worsening to state of being non verbal. Patient was recently admitted to [facility] for fungemia and discharged on the 11th March.    Pt admitted for Metabolic encephalopathy in the setting of baseline dementia/Sepsis POA/Necrotic sacral ulcer stage 4/H/o recent fungemia. WCX  GNR, 3/29   Numerous Klebsiella pneumoniae ESBL, Few Pseudomonas aeruginosa, Numerous Enterococcus faecalis (vancomycin resistant),  evaluated by Burn: s/p debridement of sacrum on 3/30. Pt treated w/ IV meropenem, Polymixin and Aztreonam. S/p spinal tap --> CSF clear with no growths and neg PCR. Repeat EEG w/ no epileptiform activity but diffuse cerebral dysfx. Hospital course was further complicated by cholecystitis confirmed with HIDA scan and sonogram. Pt restarted on meropenem for broad cvg.    Rapid response called on patient 4/28 overnight. Pt found to have BP 60s/40s w/ tachypnea/ increased work of breathing (satting 95% on 15L NRB). Pt started on Levophed. Central line placed. Decision made to intubate. ABG collected. STAT CXR,KUB, labs ordered. Pt was transferred to Vent unit 4/28.  Seen by ID.  Changed ABX to IV flagyl and Avycaz with end date of 5/11.  S/p Bronch 5/7 for increased secretions and successfully extubate on 5/7.  He was downgraded to the floor.     Patient was not desaturating on the floor, O2 saturations were wnl. Patient was completing his abx course, as per family it appeared that he was returning to the baseline. He was minimally verbal and unable to move. Anasarca was noted. In the morning of 5/12 patient was seen with increasing work of breathing. RR was 20s-30s with significant accessory muscle use. ABG and STAT CXR were ordered. Duonebs and humidified air given with no improvement. Spoke to patient's daughter Kelton ([phone]) and patient reiterated that she wanted everything done, including intubation. CXR showed worsening opacities. Reintubated for respiratory distress possibly secondary to aspiration PNA and airway protection.  ID recalled, continue avycaz and flagyl.     MEDICATIONS:  STANDING MEDICATIONS  ascorbic acid 500 milliGRAM(s) Oral daily  atorvastatin 40 milliGRAM(s) Oral at bedtime  carbidopa/levodopa  25/100 2 Tablet(s) Oral <User Schedule>  chlorhexidine 0.12% Liquid 15 milliLiter(s) Oral Mucosa every 12 hours  chlorhexidine 4% Liquid 1 Application(s) Topical <User Schedule>  collagenase Ointment 1 Application(s) Topical two times a day  Dakins Solution - 1/2 Strength 1 Application(s) Topical two times a day  enoxaparin Injectable 40 milliGRAM(s) SubCutaneous daily  fentaNYL   Infusion. 0.5 MICROgram(s)/kG/Hr IV Continuous <Continuous>  lidocaine 2% Gel 1 Application(s) Topical once  lidocaine 2% Viscous 100 milliLiter(s) Swish and Spit once  magnesium oxide 400 milliGRAM(s) Oral every 8 hours  midazolam Infusion 0.02 mG/kG/Hr IV Continuous <Continuous>  midodrine 10 milliGRAM(s) Oral every 8 hours  norepinephrine Infusion 0.05 MICROgram(s)/kG/Min IV Continuous <Continuous>  polyethylene glycol 3350 17 Gram(s) Oral daily  senna 2 Tablet(s) Oral at bedtime  zinc sulfate 220 milliGRAM(s) Oral daily    PRN MEDICATIONS  acetaminophen   Tablet .. 650 milliGRAM(s) Oral every 6 hours PRN      VITAL SIGNS: Last 24 Hours  T(C): 36.4 (12 May 2021 09:13), Max: 37.4 (12 May 2021 05:00)  T(F): 97.5 (12 May 2021 09:13), Max: 99.4 (12 May 2021 05:00)  HR: 77 (12 May 2021 09:13) (71 - 77)  BP: 147/65 (12 May 2021 09:13) (105/52 - 147/65)  BP(mean): --  RR: 18 (12 May 2021 05:00) (18 - 18)  SpO2: 97% (12 May 2021 09:13) (97% - 98%)    LABS:                        8.4    8.33  )-----------( 150      ( 12 May 2021 07:20 )             26.2     05-12    133<L>  |  100  |  14  ----------------------------<  88  4.7   |  21  |  0.5<L>    Ca    7.7<L>      12 May 2021 07:20  Mg     2.0     05-12    TPro  6.2  /  Alb  2.0<L>  /  TBili  0.4  /  DBili  x   /  AST  37  /  ALT  14  /  AlkPhos  194<H>  05-12        ABG - ( 12 May 2021 09:08 )  pH, Arterial: 7.42  pH, Blood: x     /  pCO2: 42    /  pO2: 82    / HCO3: 27    / Base Excess: 2.3   /  SaO2: 97          RADIOLOGY:          ASSESSMENT & PLAN:     71 year old Male with past medical history of Parkinson's, dementia, CKD Stage 3, 1st degree AV block, HLD, gout, HTN, DM, fungal septicemia presenting from [facility] for acute decompensation in mental status. As per documentation patient at baseline is verbal but for past 3 days, he has been worsening to state of being non verbal. Patient was admitted to [facility] for fungemia and discharged on the 11th March. At  he had left hand cellulitis and was treated for it.    # Sepsis and septic shock - resolved  # Hypoxemic respiratory failure s/p intubation on 4/28 - currently extubated   # Complete L lung collapse and atelectasis with PNA secondary to mucus plug  - s/p multiple bronchoscopies on 4/28, 4/30, 5/4, and 5/7  - Bronchial Cx grew  Klebsiella Pneumonia   - CXR worsening  - reintubated for respiratory distress possibly secondary to aspiration PNA vs possible fluid overload.   - continue with avycaz and flagyl as per ID.   - pulmonary following     # Acute cholecystitis   - confirmed with HIDA scan and sonogram  - s/p IR perc cholecystostomy on 4/29  - gallbladder fluid cx showed NGTD    # Metabolic encephalopathy on top of baseline dementia/Sepsis POA/Necrotic sacral ulcer stage 4/H/o recent fungemia - improved?  - CT Head No acute intracranial pathology.  - Blood & Urine cxs NGTD   - WCX GNR, 3/29   Numerous Klebsiella pneumoniae ESBL, , Few Pseudomonas aeruginosa, Numerous Enterococcus faecalis (vancomycin resistant)  - evaluated by Burn: s/p debridement of sacrum on 3/30 .  no more new recommendation for surgery at this time .continue local wound care with santyl/dakins WTD.  - s/p meropenem, Polymixin and Aztreonam  - S/p spinal tap --> CSF clear with no growths and neg PCR  - repeat EEG w/ no epileptiform activity but diffuse cerebral dysfx    # Nutrition/Dysphagia:   - 4/14 passed S&S but limited PO intake.   - s/p PEG on 4/19  - family taught how to administer feeds and wound care    #Lt shoulder dislocation w/ collection  - no need to tap as per IR and ID concurs    #Anemia  - s/p 1u PRBC on   - monitor CBC    # Thrombocytopenia ?sec to sepsis - resolved  - off Pepcid  - heme f/u appreciated    # H/o parkinsons disease  - c/w sinemet    # H/o chronic DVT  - [facility] Duplex Lower Ext Vein Scan, Bilat (03.27.21 @ 18:48) >No evidence of deep venous thrombosis or superficial thrombophlebitis in the bilateral lower extremities.  - unsure why was on Rx >2yrs Eliquis d/c.     # Dyslipidemia  - c/w statin    # Family contact: Spoke to Kelton on 5/11 to update on the patient's condition and plan. Had an extensive conversation with Kelton and the patient's son about the patient's prognosis and the course of his disease. As per the patient's family he started experiencing significant decline in the past few months. They verbalized understanding of his current state and would like to keep him full code at this time.   - Family updated that patient had to reintubated.     # DVT prophylaxis: Lovenox  # Diet: PEG feeds  # Dispo: acute         For Follow-Up:  - CXR Transfer Note    Transfer from: Medicine   Transfer to:  (  ) Medicine    (  ) Telemetry    (  ) RCU    (  ) Palliative    (  ) Stroke Unit    ( x ) ICU     Upstgstrstastdstest:st st7st1st year old Male with past medical history of Parkinson's, dementia, CKD Stage 3, 1st degree AV block, HLD, gout, HTN, DM, fungal septicemia presented from [facility] for acute decompensation in mental status. As per documentation patient at baseline was verbal but for past 3 days prior to admission on 3/22,, he has been worsening to state of being non verbal. Patient was recently admitted to [facility] for fungemia and discharged on the 11th March.    Pt admitted for Metabolic encephalopathy in the setting of baseline dementia/Sepsis POA/Necrotic sacral ulcer stage 4/H/o recent fungemia. WCX  GNR, 3/29   Numerous Klebsiella pneumoniae ESBL, Few Pseudomonas aeruginosa, Numerous Enterococcus faecalis (vancomycin resistant),  evaluated by Burn: s/p debridement of sacrum on 3/30. Pt treated w/ IV meropenem, Polymixin and Aztreonam. S/p spinal tap --> CSF clear with no growths and neg PCR. Repeat EEG w/ no epileptiform activity but diffuse cerebral dysfx. Hospital course was further complicated by cholecystitis confirmed with HIDA scan and sonogram. Pt restarted on meropenem for broad cvg.    Rapid response called on patient 4/28 overnight. Pt found to have BP 60s/40s w/ tachypnea/ increased work of breathing (satting 95% on 15L NRB). Pt started on Levophed. Central line placed. Decision made to intubate. ABG collected. STAT CXR,KUB, labs ordered. Pt was transferred to Vent unit 4/28.  Seen by ID.  Changed ABX to IV flagyl and Avycaz with end date of 5/11.  S/p Bronch 5/7 for increased secretions and successfully extubate on 5/7.  He was downgraded to the floor.     Patient was not desaturating on the floor, O2 saturations were wnl. Patient was completing his abx course, as per family it appeared that he was returning to the baseline. He was minimally verbal and unable to move. Anasarca was noted. In the morning of 5/12 patient was seen with increasing work of breathing. RR was 20s-30s with significant accessory muscle use. ABG and STAT CXR were ordered. Duonebs and humidified air given with no improvement. Spoke to patient's daughter Kelton ([phone]) and patient reiterated that she wanted everything done, including intubation. CXR showed worsening opacities. Reintubated for respiratory distress possibly secondary to aspiration PNA and airway protection.  ID recalled, continue avycaz and flagyl.     MEDICATIONS:  STANDING MEDICATIONS  ascorbic acid 500 milliGRAM(s) Oral daily  atorvastatin 40 milliGRAM(s) Oral at bedtime  carbidopa/levodopa  25/100 2 Tablet(s) Oral <User Schedule>  chlorhexidine 0.12% Liquid 15 milliLiter(s) Oral Mucosa every 12 hours  chlorhexidine 4% Liquid 1 Application(s) Topical <User Schedule>  collagenase Ointment 1 Application(s) Topical two times a day  Dakins Solution - 1/2 Strength 1 Application(s) Topical two times a day  enoxaparin Injectable 40 milliGRAM(s) SubCutaneous daily  fentaNYL   Infusion. 0.5 MICROgram(s)/kG/Hr IV Continuous <Continuous>  lidocaine 2% Gel 1 Application(s) Topical once  lidocaine 2% Viscous 100 milliLiter(s) Swish and Spit once  magnesium oxide 400 milliGRAM(s) Oral every 8 hours  midazolam Infusion 0.02 mG/kG/Hr IV Continuous <Continuous>  midodrine 10 milliGRAM(s) Oral every 8 hours  norepinephrine Infusion 0.05 MICROgram(s)/kG/Min IV Continuous <Continuous>  polyethylene glycol 3350 17 Gram(s) Oral daily  senna 2 Tablet(s) Oral at bedtime  zinc sulfate 220 milliGRAM(s) Oral daily    PRN MEDICATIONS  acetaminophen   Tablet .. 650 milliGRAM(s) Oral every 6 hours PRN      VITAL SIGNS: Last 24 Hours  T(C): 36.4 (12 May 2021 09:13), Max: 37.4 (12 May 2021 05:00)  T(F): 97.5 (12 May 2021 09:13), Max: 99.4 (12 May 2021 05:00)  HR: 77 (12 May 2021 09:13) (71 - 77)  BP: 147/65 (12 May 2021 09:13) (105/52 - 147/65)  BP(mean): --  RR: 18 (12 May 2021 05:00) (18 - 18)  SpO2: 97% (12 May 2021 09:13) (97% - 98%)    LABS:                        8.4    8.33  )-----------( 150      ( 12 May 2021 07:20 )             26.2     05-12    133<L>  |  100  |  14  ----------------------------<  88  4.7   |  21  |  0.5<L>    Ca    7.7<L>      12 May 2021 07:20  Mg     2.0     05-12    TPro  6.2  /  Alb  2.0<L>  /  TBili  0.4  /  DBili  x   /  AST  37  /  ALT  14  /  AlkPhos  194<H>  05-12        ABG - ( 12 May 2021 09:08 )  pH, Arterial: 7.42  pH, Blood: x     /  pCO2: 42    /  pO2: 82    / HCO3: 27    / Base Excess: 2.3   /  SaO2: 97          RADIOLOGY:          ASSESSMENT & PLAN:     71 year old Male with past medical history of Parkinson's, dementia, CKD Stage 3, 1st degree AV block, HLD, gout, HTN, DM, fungal septicemia presenting from [facility] for acute decompensation in mental status. As per documentation patient at baseline is verbal but for past 3 days, he has been worsening to state of being non verbal. Patient was admitted to [facility] for fungemia and discharged on the 11th March. At  he had left hand cellulitis and was treated for it.    # Sepsis and septic shock - resolved  # Hypoxemic respiratory failure s/p intubation on 4/28 - currently extubated   # Complete L lung collapse and atelectasis with PNA secondary to mucus plug  - s/p multiple bronchoscopies on 4/28, 4/30, 5/4, and 5/7  - Bronchial Cx grew  Klebsiella Pneumonia   - CXR worsening  - reintubated for respiratory distress possibly secondary to aspiration PNA vs possible fluid overload.   - Follow up CTH  - continue with avycaz and flagyl as per ID.   - pulmonary following     # Acute cholecystitis   - confirmed with HIDA scan and sonogram  - s/p IR perc cholecystostomy on 4/29  - gallbladder fluid cx showed NGTD    # Metabolic encephalopathy on top of baseline dementia/Sepsis POA/Necrotic sacral ulcer stage 4/H/o recent fungemia - improved?  - CT Head No acute intracranial pathology.  - Blood & Urine cxs NGTD   - WCX GNR, 3/29   Numerous Klebsiella pneumoniae ESBL, , Few Pseudomonas aeruginosa, Numerous Enterococcus faecalis (vancomycin resistant)  - evaluated by Burn: s/p debridement of sacrum on 3/30 .  no more new recommendation for surgery at this time .continue local wound care with santyl/dakins WTD.  - s/p meropenem, Polymixin and Aztreonam  - S/p spinal tap --> CSF clear with no growths and neg PCR  - repeat EEG w/ no epileptiform activity but diffuse cerebral dysfx  - Follow up repeat CTH     # Nutrition/Dysphagia:   - 4/14 passed S&S but limited PO intake.   - s/p PEG on 4/19  - family taught how to administer feeds and wound care    #Lt shoulder dislocation w/ collection  - no need to tap as per IR and ID concurs    #Anemia  - s/p 1u PRBC on   - monitor CBC    # Thrombocytopenia ?sec to sepsis - resolved  - off Pepcid  - heme f/u appreciated    # H/o parkinsons disease  - c/w sinemet    # H/o chronic DVT  - [facility] Duplex Lower Ext Vein Scan, Bilat (03.27.21 @ 18:48) >No evidence of deep venous thrombosis or superficial thrombophlebitis in the bilateral lower extremities.  - unsure why was on Rx >2yrs Eliquis d/c.     # Dyslipidemia  - c/w statin    # Family contact: Spoke to Kelton on 5/11 to update on the patient's condition and plan. Had an extensive conversation with eKlton and the patient's son about the patient's prognosis and the course of his disease. As per the patient's family he started experiencing significant decline in the past few months. They verbalized understanding of his current state and would like to keep him full code at this time.   - Family updated that patient had to reintubated.     # DVT prophylaxis: Lovenox  # Diet: PEG feeds  # Dispo: acute         For Follow-Up:  - CTH Transfer Note    Transfer from: Medicine   Transfer to:  (  ) Medicine    (  ) Telemetry    (  ) RCU    (  ) Palliative    (  ) Stroke Unit    ( x ) ICU     Upstgstrstastdstest:st st7st1st year old Male with past medical history of Parkinson's, dementia, CKD Stage 3, 1st degree AV block, HLD, gout, HTN, DM, fungal septicemia presented from [facility] for acute decompensation in mental status. As per documentation patient at baseline was verbal but for past 3 days prior to admission on 3/22,, he has been worsening to state of being non verbal. Patient was recently admitted to [facility] for fungemia and discharged on the 11th March.    Pt admitted for Metabolic encephalopathy in the setting of baseline dementia/Sepsis POA/Necrotic sacral ulcer stage 4/H/o recent fungemia. WCX  GNR, 3/29   Numerous Klebsiella pneumoniae ESBL, Few Pseudomonas aeruginosa, Numerous Enterococcus faecalis (vancomycin resistant),  evaluated by Burn: s/p debridement of sacrum on 3/30. Pt treated w/ IV meropenem, Polymixin and Aztreonam. S/p spinal tap --> CSF clear with no growths and neg PCR. Repeat EEG w/ no epileptiform activity but diffuse cerebral dysfx. Hospital course was further complicated by cholecystitis confirmed with HIDA scan and sonogram. Pt restarted on meropenem for broad cvg.    Rapid response called on patient 4/28 overnight. Pt found to have BP 60s/40s w/ tachypnea/ increased work of breathing (satting 95% on 15L NRB). Pt started on Levophed. Central line placed. Decision made to intubate. ABG collected. STAT CXR,KUB, labs ordered. Pt was transferred to Vent unit 4/28.  Seen by ID.  Changed ABX to IV flagyl and Avycaz with end date of 5/11.  S/p Bronch 5/7 for increased secretions and successfully extubate on 5/7.  He was downgraded to the floor.     Patient was not desaturating on the floor, O2 saturations were wnl. Patient was completing his abx course, as per family it appeared that he was returning to the baseline. He was minimally verbal and unable to move. Anasarca was noted. In the morning of 5/12 patient was seen with increasing work of breathing. RR was 20s-30s with significant accessory muscle use. ABG and STAT CXR were ordered. Duonebs and humidified air given with no improvement. Spoke to patient's daughter Kelton ([phone]) and patient reiterated that she wanted everything done, including intubation. CXR showed worsening opacities. Reintubated for respiratory distress possibly secondary to aspiration PNA and airway protection.  ID recalled, continue avycaz and flagyl.     MEDICATIONS:  STANDING MEDICATIONS  ascorbic acid 500 milliGRAM(s) Oral daily  atorvastatin 40 milliGRAM(s) Oral at bedtime  carbidopa/levodopa  25/100 2 Tablet(s) Oral <User Schedule>  chlorhexidine 0.12% Liquid 15 milliLiter(s) Oral Mucosa every 12 hours  chlorhexidine 4% Liquid 1 Application(s) Topical <User Schedule>  collagenase Ointment 1 Application(s) Topical two times a day  Dakins Solution - 1/2 Strength 1 Application(s) Topical two times a day  enoxaparin Injectable 40 milliGRAM(s) SubCutaneous daily  fentaNYL   Infusion. 0.5 MICROgram(s)/kG/Hr IV Continuous <Continuous>  lidocaine 2% Gel 1 Application(s) Topical once  lidocaine 2% Viscous 100 milliLiter(s) Swish and Spit once  magnesium oxide 400 milliGRAM(s) Oral every 8 hours  midazolam Infusion 0.02 mG/kG/Hr IV Continuous <Continuous>  midodrine 10 milliGRAM(s) Oral every 8 hours  norepinephrine Infusion 0.05 MICROgram(s)/kG/Min IV Continuous <Continuous>  polyethylene glycol 3350 17 Gram(s) Oral daily  senna 2 Tablet(s) Oral at bedtime  zinc sulfate 220 milliGRAM(s) Oral daily    PRN MEDICATIONS  acetaminophen   Tablet .. 650 milliGRAM(s) Oral every 6 hours PRN      VITAL SIGNS: Last 24 Hours  T(C): 36.4 (12 May 2021 09:13), Max: 37.4 (12 May 2021 05:00)  T(F): 97.5 (12 May 2021 09:13), Max: 99.4 (12 May 2021 05:00)  HR: 77 (12 May 2021 09:13) (71 - 77)  BP: 147/65 (12 May 2021 09:13) (105/52 - 147/65)  BP(mean): --  RR: 18 (12 May 2021 05:00) (18 - 18)  SpO2: 97% (12 May 2021 09:13) (97% - 98%)    LABS:                        8.4    8.33  )-----------( 150      ( 12 May 2021 07:20 )             26.2     05-12    133<L>  |  100  |  14  ----------------------------<  88  4.7   |  21  |  0.5<L>    Ca    7.7<L>      12 May 2021 07:20  Mg     2.0     05-12    TPro  6.2  /  Alb  2.0<L>  /  TBili  0.4  /  DBili  x   /  AST  37  /  ALT  14  /  AlkPhos  194<H>  05-12        ABG - ( 12 May 2021 09:08 )  pH, Arterial: 7.42  pH, Blood: x     /  pCO2: 42    /  pO2: 82    / HCO3: 27    / Base Excess: 2.3   /  SaO2: 97          RADIOLOGY:          ASSESSMENT & PLAN:     71 year old Male with past medical history of Parkinson's, dementia, CKD Stage 3, 1st degree AV block, HLD, gout, HTN, DM, fungal septicemia presenting from [facility] for acute decompensation in mental status. As per documentation patient at baseline is verbal but for past 3 days, he has been worsening to state of being non verbal. Patient was admitted to [facility] for fungemia and discharged on the 11th March. At  he had left hand cellulitis and was treated for it.    # Sepsis and septic shock - resolved  # Hypoxemic respiratory failure s/p intubation on 4/28 - currently extubated   # Complete L lung collapse and atelectasis with PNA secondary to mucus plug  - s/p multiple bronchoscopies on 4/28, 4/30, 5/4, and 5/7  - Bronchial Cx grew  Klebsiella Pneumonia   - CXR worsening  - reintubated for respiratory distress possibly secondary to aspiration PNA vs possible fluid overload.   - Follow up C[facility]  - continue with avycaz and flagyl as per ID.   - pulmonary following     # Acute cholecystitis   - confirmed with HIDA scan and sonogram  - s/p IR perc cholecystostomy on 4/29  - gallbladder fluid cx showed NGTD    # Metabolic encephalopathy on top of baseline dementia/Sepsis POA/Necrotic sacral ulcer stage 4/H/o recent fungemia - improved?  - CT Head No acute intracranial pathology.  - Blood & Urine cxs NGTD   - WCX GNR, 3/29   Numerous Klebsiella pneumoniae ESBL, , Few Pseudomonas aeruginosa, Numerous Enterococcus faecalis (vancomycin resistant)  - evaluated by Burn: s/p debridement of sacrum on 3/30 .  no more new recommendation for surgery at this time .continue local wound care with santyl/dakins WTD.  - s/p meropenem, Polymixin and Aztreonam  - S/p spinal tap --> CSF clear with no growths and neg PCR  - repeat EEG w/ no epileptiform activity but diffuse cerebral dysfx  - Follow up repeat C[facility]     # Nutrition/Dysphagia:   - 4/14 passed S&S but limited PO intake.   - s/p PEG on 4/19  - family taught how to administer feeds and wound care    #Lt shoulder dislocation w/ collection  - no need to tap as per IR and ID concurs    #Anemia  - s/p 1u PRBC on   - monitor CBC    # Thrombocytopenia ?sec to sepsis - resolved  - off Pepcid  - heme f/u appreciated    # H/o parkinsons disease  - c/w sinemet    # H/o chronic DVT  - [facility] Duplex Lower Ext Vein Scan, Bilat (03.27.21 @ 18:48) >No evidence of deep venous thrombosis or superficial thrombophlebitis in the bilateral lower extremities.  - unsure why was on Rx >2yrs Eliquis d/c.     # Dyslipidemia  - c/w statin    # Family contact: Spoke to Keltno on 5/11 to update on the patient's condition and plan. Had an extensive conversation with Kelton and the patient's son about the patient's prognosis and the course of his disease. As per the patient's family he started experiencing significant decline in the past few months. They verbalized understanding of his current state and would like to keep him full code at this time.   - Family updated that patient had to reintubated.     # DVT prophylaxis: Lovenox  # Diet: PEG feeds  # Dispo: acute         For Follow-Up:  - CTH    ATTENDING:  This am pt with severe wob, resp distress/ tachypnea; recently extubated 3 days prior. Suctioning applied with minimal improvement. CXR with increased haziness L side. ABG with no hypercapnia, +A-a gradient. D/w family re: intubation; decision made for aggressive care including intubation.   Pt intubated with anesthesia, to be transferred to step down unit. D/w pulm.    35 minutes spent on critical care planning.

## 2021-05-12 NOTE — PROGRESS NOTE ADULT - ASSESSMENT
71M PMHx parkinsons, dementia, HTN, DM2 here with altered mental status. Sepsis/ septic shock. Acute hypoxic resp failure s/p intubation; since extubated. Acute gayle s/p perc cholecystostomy.      #Acute hypoxic resp failure  severe resp distress  cxr with L opacities  abg with no hypercapnia, +A-a gradient  d/w family, pt full code  intubate  transfer to icu  #Sepsis/ septic shock, in setting of uti, VAP  resolving, off pressors  s/p debridement  s/p bronch; 5/4 cx pseudomonas, carb resistant; 5/3, 4/28 kleb carbapenem resistant  ucx 4/24, 4/26 kleb   wcx 3/29, 3/31, kleb esbl, pseudomonas carbapenem resistant, vre  avycaz, flagyl  f/u id  #Altered mental status, toxic metabolic encephalopathy  in setting of dementia  cth neg  reeg noted  #Parkinsons  sinemet 25/ 100 qid  midodrine 10 tid  #HTN  controlled off medications  #DM2  controlled off medications  #DVT ppx  lovenox    #Progress Note Handoff:  Pending (specify):  Consults_________, Tests________, Test Results_______, Other___intbuation______  Family discussion:  Disposition: Home___/SNF___/Other________/Unknown at this time____x____

## 2021-05-12 NOTE — CHART NOTE - NSCHARTNOTEFT_GEN_A_CORE
T(F): 97.5 (05-12-21 @ 09:13), Max: 99.4 (05-12-21 @ 05:00)  HR: 77 (05-12-21 @ 09:13) (71 - 77)  BP: 147/65 (05-12-21 @ 09:13) (105/52 - 147/65)  RR: 27 (05-12-21 @ 09:13) (18 - 27)  SpO2: 97% (05-12-21 @ 09:13) (97% - 98%)    I&O's Detail    05-12    133<L>  |  100  |  14  ----------------------------<  88  4.7   |  21  |  0.5<L>    Ca    7.7<L>      12 May 2021 07:20  Mg     2.0     05-12    TPro  6.2  /  Alb  2.0<L>  /  TBili  0.4  /  DBili  x   /  AST  37  /  ALT  14  /  AlkPhos  194<H>  05-12                        8.4    8.33  )-----------( 150      ( 12 May 2021 07:20 )             26.2     CAPILLARY BLOOD GLUCOSE  POCT Blood Glucose.: 111 mg/dL (11 May 2021 21:10)  POCT Blood Glucose.: 77 mg/dL (11 May 2021 16:58)     Diet, NPO with Tube Feed:   Tube Feeding Modality: Gastrostomy  Jevity 1.2 Sarmad  Total Volume for 24 Hours (mL): 1440  Bolus  Total Volume of Bolus (mL):  360  Tube Feed Frequency: Every 6 hours   Tube Feed Start Time: 12:00  Bolus Feed Rate (mL per Hour): 500   Bolus Feed Duration (in Hours): 0.75  Bolus Feed Instructions:  50 ml free water flush before and after feed  Free Water Flush  Moris(7 Gm Arginine/7 Gm Glut/1.2 Gm HMB     Qty per Day:  2 (05-01-21 @ 11:32)    IMP:  - altered mental status/ metabolic encephalopathy from sepsis  - large necrotic sacral ulcer stage 4  - post hemorrhagic anemia  - thrombocytopenia  - h/o Parkinson's disease, DM    PLAN:  - cont 360 ml Jevity 1.2 over 45 minutes x 4 feeds/d --> 79 gm protein & 1710 kcal/d  - Moris 1 pack mixed with 4-6oz H2O q12hrs, d/c Prosource TF  - cont zinc 220mg once daily each.  - consider adding lactobacillus and FLoraStor Pt with respiratory distress this am and required intubation  Tm 99.4  + levophed  GT in place and had been tolerating feeds well     T(F): 97.5 (05-12-21 @ 09:13), Max: 99.4 (05-12-21 @ 05:00)  HR: 77 (05-12-21 @ 09:13) (71 - 77)  BP: 147/65 (05-12-21 @ 09:13) (105/52 - 147/65)  RR: 27 (05-12-21 @ 09:13) (18 - 27)  SpO2: 97% (05-12-21 @ 09:13) (97% - 98%)    I&O's Detail    05-12    133<L>  |  100  |  14  ----------------------------<  88  4.7   |  21  |  0.5<L>    Ca    7.7<L>      12 May 2021 07:20  Mg     2.0     05-12  Phosphorus Level, Serum in AM (05.04.21 @ 06:59)    Phosphorus Level, Serum: 3.5 mg/dL    TPro  6.2  /  Alb  2.0<L>  /  TBili  0.4  /  DBili  x   /  AST  37  /  ALT  14  /  AlkPhos  194<H>  05-12                        8.4    8.33  )-----------( 150      ( 12 May 2021 07:20 )             26.2     CAPILLARY BLOOD GLUCOSE  POCT Blood Glucose.: 111 mg/dL (11 May 2021 21:10)  POCT Blood Glucose.: 77 mg/dL (11 May 2021 16:58)     Diet, NPO with Tube Feed:   Tube Feeding Modality: Gastrostomy  Jevity 1.2 Sarmad  Total Volume for 24 Hours (mL): 1440  Bolus  Total Volume of Bolus (mL):  360  Tube Feed Frequency: Every 6 hours   Tube Feed Start Time: 12:00  Bolus Feed Rate (mL per Hour): 500   Bolus Feed Duration (in Hours): 0.75  Bolus Feed Instructions:  50 ml free water flush before and after feed  Free Water Flush  Moris(7 Gm Arginine/7 Gm Glut/1.2 Gm HMB     Qty per Day:  2 (05-01-21 @ 11:32)    IMP:  - altered mental status/ metabolic encephalopathy from sepsis  - large necrotic sacral ulcer stage 4  - post hemorrhagic anemia  - thrombocytopenia  - h/o Parkinson's disease, DM    PLAN:  - cont 360 ml Jevity 1.2 over 45 minutes x 4 feeds/d --> 79 gm protein & 1710 kcal/d  - Moris 1 pack mixed with 4-6oz H2O q12hrs, d/c Prosource TF  - cont zinc 220mg once daily each.  - consider adding lactobacillus and FLoraStor Pt with respiratory distress this am and required intubation - seen this morning on floor and later in ICU  Tm 99.4  + levophed  GT in place and had been tolerating feeds well     T(F): 97.5 (05-12-21 @ 09:13), Max: 99.4 (05-12-21 @ 05:00)  HR: 77 (05-12-21 @ 09:13) (71 - 77)  BP: 147/65 (05-12-21 @ 09:13) (105/52 - 147/65)  RR: 27 (05-12-21 @ 09:13) (18 - 27)  SpO2: 97% (05-12-21 @ 09:13) (97% - 98%)    I&O's Detail    05-12    133<L>  |  100  |  14  ----------------------------<  88  4.7   |  21  |  0.5<L>    Ca    7.7<L>      12 May 2021 07:20  Mg     2.0     05-12  Phosphorus Level, Serum in AM (05.04.21 @ 06:59)    Phosphorus Level, Serum: 3.5 mg/dL    TPro  6.2  /  Alb  2.0<L>  /  TBili  0.4  /  DBili  x   /  AST  37  /  ALT  14  /  AlkPhos  194<H>  05-12                        8.4    8.33  )-----------( 150      ( 12 May 2021 07:20 )             26.2     CAPILLARY BLOOD GLUCOSE  POCT Blood Glucose.: 111 mg/dL (11 May 2021 21:10)  POCT Blood Glucose.: 77 mg/dL (11 May 2021 16:58)     Diet, NPO with Tube Feed:   Tube Feeding Modality: Gastrostomy  Jevity 1.2 Sarmad  Total Volume for 24 Hours (mL): 1440  Bolus  Total Volume of Bolus (mL):  360  Tube Feed Frequency: Every 6 hours   Tube Feed Start Time: 12:00  Bolus Feed Rate (mL per Hour): 500   Bolus Feed Duration (in Hours): 0.75  Bolus Feed Instructions:  50 ml free water flush before and after feed  Free Water Flush  Moris(7 Gm Arginine/7 Gm Glut/1.2 Gm HMB     Qty per Day:  2 (05-01-21 @ 11:32)    IMP:  - altered mental status/ metabolic encephalopathy from sepsis  - large necrotic sacral ulcer stage 4  - post hemorrhagic anemia  - thrombocytopenia  - h/o Parkinson's disease, DM    PLAN:  - cont 360 ml Jevity 1.2 over 45 minutes x 4 feeds/d --> 79 gm protein & 1710 kcal/d  - Moris 1 pack mixed with 4-6oz H2O q12hrs, d/c Prosource TF  - cont zinc 220mg once daily each.  - consider adding lactobacillus and FLoraStor  f/u labs with phos in am, f/u abg

## 2021-05-12 NOTE — PROGRESS NOTE ADULT - SUBJECTIVE AND OBJECTIVE BOX
Patient is a 71y old  Male who presents with a chief complaint of Change in Mental Status (11 May 2021 13:02)        Over Night Events:  Patient having increased work of breathing, decision made to intubate.       ROS:     All pertinent ROS are negative except HPI         PHYSICAL EXAM    ICU Vital Signs Last 24 Hrs  T(C): 36.4 (12 May 2021 09:13), Max: 37.4 (12 May 2021 05:00)  T(F): 97.5 (12 May 2021 09:13), Max: 99.4 (12 May 2021 05:00)  HR: 77 (12 May 2021 09:13) (71 - 77)  BP: 147/65 (12 May 2021 09:13) (105/52 - 147/65)  RR: 18 (12 May 2021 05:00) (18 - 18)  SpO2: 97% (12 May 2021 09:13) (97% - 98%)      CONSTITUTIONAL:  Ill appearing.    ENT:   Airway patent,   Mouth with normal mucosa.   No thrush    EYES:   Pupils equal,   Round and reactive to light.    CARDIAC:   Normal rate,   Regular rhythm.      RESPIRATORY:   Moderate to severe respiratory distress  Use of accessory muscles    GASTROINTESTINAL:  Abdomen soft,   Non-tender,   No guarding,   + BS    MUSCULOSKELETAL:   Range of motion is not limited,  No clubbing, cyanosis    NEUROLOGICAL:   Doesn't follow commands    SKIN:   Skin normal color for race,   Warm and dry    PSYCHIATRIC:   No apparent risk to self or others.        LABS:                            8.4    8.33  )-----------( 150      ( 12 May 2021 07:20 )             26.2                                               05-12    133<L>  |  100  |  14  ----------------------------<  88  4.7   |  21  |  0.5<L>    Ca    7.7<L>      12 May 2021 07:20  Mg     2.0     05-12    TPro  6.2  /  Alb  2.0<L>  /  TBili  0.4  /  DBili  x   /  AST  37  /  ALT  14  /  AlkPhos  194<H>  05-12                                                                        LIVER FUNCTIONS - ( 12 May 2021 07:20 )  Alb: 2.0 g/dL / Pro: 6.2 g/dL / ALK PHOS: 194 U/L / ALT: 14 U/L / AST: 37 U/L / GGT: x                                                                                                                                   ABG - ( 12 May 2021 09:08 )  pH, Arterial: 7.42  pH, Blood: x     /  pCO2: 42    /  pO2: 82    / HCO3: 27    / Base Excess: 2.3   /  SaO2: 97            MEDICATIONS  (STANDING):  ascorbic acid 500 milliGRAM(s) Oral daily  atorvastatin 40 milliGRAM(s) Oral at bedtime  carbidopa/levodopa  25/100 2 Tablet(s) Oral <User Schedule>  chlorhexidine 0.12% Liquid 15 milliLiter(s) Oral Mucosa every 12 hours  chlorhexidine 4% Liquid 1 Application(s) Topical <User Schedule>  collagenase Ointment 1 Application(s) Topical two times a day  Dakins Solution - 1/2 Strength 1 Application(s) Topical two times a day  enoxaparin Injectable 40 milliGRAM(s) SubCutaneous daily  lidocaine 2% Gel 1 Application(s) Topical once  lidocaine 2% Viscous 100 milliLiter(s) Swish and Spit once  magnesium oxide 400 milliGRAM(s) Oral every 8 hours  midodrine 10 milliGRAM(s) Oral every 8 hours  norepinephrine Infusion 0.05 MICROgram(s)/kG/Min (7.44 mL/Hr) IV Continuous <Continuous>  polyethylene glycol 3350 17 Gram(s) Oral daily  senna 2 Tablet(s) Oral at bedtime  zinc sulfate 220 milliGRAM(s) Oral daily    MEDICATIONS  (PRN):  acetaminophen   Tablet .. 650 milliGRAM(s) Oral every 6 hours PRN Temp greater or equal to 38C (100.4F)  morphine  - Injectable 2 milliGRAM(s) IV Push every 4 hours PRN Severe Pain (7 - 10)      New X-rays reviewed:                                                                                  ECHO    CXR interpreted by me:  bilateral interstitial opacities     Patient is a 71y old  Male who presents with a chief complaint of Change in Mental Status (11 May 2021 13:02)        Over Night Events:  Patient having increased work of breathing, decision made to intubate.       ROS:     All pertinent ROS are negative except HPI         PHYSICAL EXAM    ICU Vital Signs Last 24 Hrs  T(C): 36.4 (12 May 2021 09:13), Max: 37.4 (12 May 2021 05:00)  T(F): 97.5 (12 May 2021 09:13), Max: 99.4 (12 May 2021 05:00)  HR: 77 (12 May 2021 09:13) (71 - 77)  BP: 147/65 (12 May 2021 09:13) (105/52 - 147/65)  RR: 18 (12 May 2021 05:00) (18 - 18)  SpO2: 97% (12 May 2021 09:13) (97% - 98%)      CONSTITUTIONAL:  Ill appearing.    ENT:   Airway patent,   Mouth with normal mucosa.   No thrush    EYES:   Pupils equal,   Round and reactive to light.    CARDIAC:   Normal rate,   Regular rhythm.      RESPIRATORY:   Moderate to severe respiratory distress  Use of accessory muscles    GASTROINTESTINAL:  Abdomen soft,   Non-tender,   No guarding,   + BS    MUSCULOSKELETAL:   Range of motion is not limited,  No clubbing, cyanosis    NEUROLOGICAL:   Doesn't follow commands  Sedated     SKIN:   Skin normal color for race,   Warm and dry    PSYCHIATRIC:   No apparent risk to self or others.        LABS:                            8.4    8.33  )-----------( 150      ( 12 May 2021 07:20 )             26.2                                               05-12    133<L>  |  100  |  14  ----------------------------<  88  4.7   |  21  |  0.5<L>    Ca    7.7<L>      12 May 2021 07:20  Mg     2.0     05-12    TPro  6.2  /  Alb  2.0<L>  /  TBili  0.4  /  DBili  x   /  AST  37  /  ALT  14  /  AlkPhos  194<H>  05-12                                                                        LIVER FUNCTIONS - ( 12 May 2021 07:20 )  Alb: 2.0 g/dL / Pro: 6.2 g/dL / ALK PHOS: 194 U/L / ALT: 14 U/L / AST: 37 U/L / GGT: x                                                                                                                                   ABG - ( 12 May 2021 09:08 )  pH, Arterial: 7.42  pH, Blood: x     /  pCO2: 42    /  pO2: 82    / HCO3: 27    / Base Excess: 2.3   /  SaO2: 97            MEDICATIONS  (STANDING):  ascorbic acid 500 milliGRAM(s) Oral daily  atorvastatin 40 milliGRAM(s) Oral at bedtime  carbidopa/levodopa  25/100 2 Tablet(s) Oral <User Schedule>  chlorhexidine 0.12% Liquid 15 milliLiter(s) Oral Mucosa every 12 hours  chlorhexidine 4% Liquid 1 Application(s) Topical <User Schedule>  collagenase Ointment 1 Application(s) Topical two times a day  Dakins Solution - 1/2 Strength 1 Application(s) Topical two times a day  enoxaparin Injectable 40 milliGRAM(s) SubCutaneous daily  lidocaine 2% Gel 1 Application(s) Topical once  lidocaine 2% Viscous 100 milliLiter(s) Swish and Spit once  magnesium oxide 400 milliGRAM(s) Oral every 8 hours  midodrine 10 milliGRAM(s) Oral every 8 hours  norepinephrine Infusion 0.05 MICROgram(s)/kG/Min (7.44 mL/Hr) IV Continuous <Continuous>  polyethylene glycol 3350 17 Gram(s) Oral daily  senna 2 Tablet(s) Oral at bedtime  zinc sulfate 220 milliGRAM(s) Oral daily    MEDICATIONS  (PRN):  acetaminophen   Tablet .. 650 milliGRAM(s) Oral every 6 hours PRN Temp greater or equal to 38C (100.4F)  morphine  - Injectable 2 milliGRAM(s) IV Push every 4 hours PRN Severe Pain (7 - 10)      New X-rays reviewed:                                                                                  ECHO    CXR interpreted by me:  bilateral interstitial opacities

## 2021-05-13 LAB
ALBUMIN SERPL ELPH-MCNC: 2.1 G/DL — LOW (ref 3.5–5.2)
ALP SERPL-CCNC: 180 U/L — HIGH (ref 30–115)
ALT FLD-CCNC: 10 U/L — SIGNIFICANT CHANGE UP (ref 0–41)
ANION GAP SERPL CALC-SCNC: 8 MMOL/L — SIGNIFICANT CHANGE UP (ref 7–14)
AST SERPL-CCNC: 27 U/L — SIGNIFICANT CHANGE UP (ref 0–41)
BASE EXCESS BLDA CALC-SCNC: 2.9 MMOL/L — HIGH (ref -2–2)
BASOPHILS # BLD AUTO: 0.04 K/UL — SIGNIFICANT CHANGE UP (ref 0–0.2)
BASOPHILS NFR BLD AUTO: 0.8 % — SIGNIFICANT CHANGE UP (ref 0–1)
BILIRUB SERPL-MCNC: 0.3 MG/DL — SIGNIFICANT CHANGE UP (ref 0.2–1.2)
BUN SERPL-MCNC: 13 MG/DL — SIGNIFICANT CHANGE UP (ref 10–20)
CALCIUM SERPL-MCNC: 8.2 MG/DL — LOW (ref 8.5–10.1)
CHLORIDE SERPL-SCNC: 104 MMOL/L — SIGNIFICANT CHANGE UP (ref 98–110)
CO2 SERPL-SCNC: 23 MMOL/L — SIGNIFICANT CHANGE UP (ref 17–32)
CREAT SERPL-MCNC: 0.6 MG/DL — LOW (ref 0.7–1.5)
D DIMER BLD IA.RAPID-MCNC: 2762 NG/ML DDU — HIGH (ref 0–230)
EOSINOPHIL # BLD AUTO: 0.33 K/UL — SIGNIFICANT CHANGE UP (ref 0–0.7)
EOSINOPHIL NFR BLD AUTO: 6.3 % — SIGNIFICANT CHANGE UP (ref 0–8)
GLUCOSE BLDC GLUCOMTR-MCNC: 104 MG/DL — HIGH (ref 70–99)
GLUCOSE BLDC GLUCOMTR-MCNC: 123 MG/DL — HIGH (ref 70–99)
GLUCOSE BLDC GLUCOMTR-MCNC: 93 MG/DL — SIGNIFICANT CHANGE UP (ref 70–99)
GLUCOSE SERPL-MCNC: 107 MG/DL — HIGH (ref 70–99)
HCO3 BLDA-SCNC: 28 MMOL/L — HIGH (ref 23–27)
HCT VFR BLD CALC: 27.9 % — LOW (ref 42–52)
HGB BLD-MCNC: 8.6 G/DL — LOW (ref 14–18)
IMM GRANULOCYTES NFR BLD AUTO: 0.6 % — HIGH (ref 0.1–0.3)
LYMPHOCYTES # BLD AUTO: 0.76 K/UL — LOW (ref 1.2–3.4)
LYMPHOCYTES # BLD AUTO: 14.6 % — LOW (ref 20.5–51.1)
MAGNESIUM SERPL-MCNC: 2 MG/DL — SIGNIFICANT CHANGE UP (ref 1.8–2.4)
MCHC RBC-ENTMCNC: 26.6 PG — LOW (ref 27–31)
MCHC RBC-ENTMCNC: 30.8 G/DL — LOW (ref 32–37)
MCV RBC AUTO: 86.4 FL — SIGNIFICANT CHANGE UP (ref 80–94)
MONOCYTES # BLD AUTO: 0.7 K/UL — HIGH (ref 0.1–0.6)
MONOCYTES NFR BLD AUTO: 13.5 % — HIGH (ref 1.7–9.3)
NEUTROPHILS # BLD AUTO: 3.34 K/UL — SIGNIFICANT CHANGE UP (ref 1.4–6.5)
NEUTROPHILS NFR BLD AUTO: 64.2 % — SIGNIFICANT CHANGE UP (ref 42.2–75.2)
NRBC # BLD: 0 /100 WBCS — SIGNIFICANT CHANGE UP (ref 0–0)
PCO2 BLDA: 44 MMHG — HIGH (ref 38–42)
PH BLDA: 7.41 — SIGNIFICANT CHANGE UP (ref 7.38–7.42)
PLATELET # BLD AUTO: 137 K/UL — SIGNIFICANT CHANGE UP (ref 130–400)
PO2 BLDA: 161 MMHG — HIGH (ref 78–95)
POTASSIUM SERPL-MCNC: 4.7 MMOL/L — SIGNIFICANT CHANGE UP (ref 3.5–5)
POTASSIUM SERPL-SCNC: 4.7 MMOL/L — SIGNIFICANT CHANGE UP (ref 3.5–5)
PROCALCITONIN SERPL-MCNC: 0.24 NG/ML — HIGH (ref 0.02–0.1)
PROT SERPL-MCNC: 6 G/DL — SIGNIFICANT CHANGE UP (ref 6–8)
RBC # BLD: 3.23 M/UL — LOW (ref 4.7–6.1)
RBC # FLD: 18 % — HIGH (ref 11.5–14.5)
SAO2 % BLDA: 99 % — HIGH (ref 94–98)
SODIUM SERPL-SCNC: 135 MMOL/L — SIGNIFICANT CHANGE UP (ref 135–146)
TROPONIN T SERPL-MCNC: 0.09 NG/ML — CRITICAL HIGH
WBC # BLD: 5.2 K/UL — SIGNIFICANT CHANGE UP (ref 4.8–10.8)
WBC # FLD AUTO: 5.2 K/UL — SIGNIFICANT CHANGE UP (ref 4.8–10.8)

## 2021-05-13 PROCEDURE — 93970 EXTREMITY STUDY: CPT | Mod: 26

## 2021-05-13 PROCEDURE — 99232 SBSQ HOSP IP/OBS MODERATE 35: CPT

## 2021-05-13 PROCEDURE — 71045 X-RAY EXAM CHEST 1 VIEW: CPT | Mod: 26

## 2021-05-13 RX ORDER — DEXMEDETOMIDINE HYDROCHLORIDE IN 0.9% SODIUM CHLORIDE 4 UG/ML
0.19 INJECTION INTRAVENOUS
Qty: 400 | Refills: 0 | Status: DISCONTINUED | OUTPATIENT
Start: 2021-05-13 | End: 2021-05-13

## 2021-05-13 RX ORDER — MIDAZOLAM HYDROCHLORIDE 1 MG/ML
2 INJECTION, SOLUTION INTRAMUSCULAR; INTRAVENOUS EVERY 4 HOURS
Refills: 0 | Status: DISCONTINUED | OUTPATIENT
Start: 2021-05-13 | End: 2021-05-16

## 2021-05-13 RX ADMIN — Medication 1 APPLICATION(S): at 05:58

## 2021-05-13 RX ADMIN — Medication 500 MILLIGRAM(S): at 11:22

## 2021-05-13 RX ADMIN — ZINC SULFATE TAB 220 MG (50 MG ZINC EQUIVALENT) 220 MILLIGRAM(S): 220 (50 ZN) TAB at 11:22

## 2021-05-13 RX ADMIN — SENNA PLUS 2 TABLET(S): 8.6 TABLET ORAL at 21:01

## 2021-05-13 RX ADMIN — Medication 1 APPLICATION(S): at 17:11

## 2021-05-13 RX ADMIN — MAGNESIUM OXIDE 400 MG ORAL TABLET 400 MILLIGRAM(S): 241.3 TABLET ORAL at 21:01

## 2021-05-13 RX ADMIN — CARBIDOPA AND LEVODOPA 2 TABLET(S): 25; 100 TABLET ORAL at 21:00

## 2021-05-13 RX ADMIN — PANTOPRAZOLE SODIUM 40 MILLIGRAM(S): 20 TABLET, DELAYED RELEASE ORAL at 11:22

## 2021-05-13 RX ADMIN — MIDODRINE HYDROCHLORIDE 10 MILLIGRAM(S): 2.5 TABLET ORAL at 21:01

## 2021-05-13 RX ADMIN — Medication 100 MILLIGRAM(S): at 05:23

## 2021-05-13 RX ADMIN — MAGNESIUM OXIDE 400 MG ORAL TABLET 400 MILLIGRAM(S): 241.3 TABLET ORAL at 05:23

## 2021-05-13 RX ADMIN — POLYETHYLENE GLYCOL 3350 17 GRAM(S): 17 POWDER, FOR SOLUTION ORAL at 11:23

## 2021-05-13 RX ADMIN — CARBIDOPA AND LEVODOPA 2 TABLET(S): 25; 100 TABLET ORAL at 13:33

## 2021-05-13 RX ADMIN — MIDODRINE HYDROCHLORIDE 10 MILLIGRAM(S): 2.5 TABLET ORAL at 13:43

## 2021-05-13 RX ADMIN — Medication 1 APPLICATION(S): at 05:24

## 2021-05-13 RX ADMIN — ATORVASTATIN CALCIUM 40 MILLIGRAM(S): 80 TABLET, FILM COATED ORAL at 21:01

## 2021-05-13 RX ADMIN — Medication 100 MILLIGRAM(S): at 21:00

## 2021-05-13 RX ADMIN — MIDODRINE HYDROCHLORIDE 10 MILLIGRAM(S): 2.5 TABLET ORAL at 08:13

## 2021-05-13 RX ADMIN — CHLORHEXIDINE GLUCONATE 15 MILLILITER(S): 213 SOLUTION TOPICAL at 05:23

## 2021-05-13 RX ADMIN — CARBIDOPA AND LEVODOPA 2 TABLET(S): 25; 100 TABLET ORAL at 10:29

## 2021-05-13 RX ADMIN — ENOXAPARIN SODIUM 40 MILLIGRAM(S): 100 INJECTION SUBCUTANEOUS at 11:23

## 2021-05-13 RX ADMIN — Medication 100 MILLIGRAM(S): at 13:43

## 2021-05-13 RX ADMIN — CHLORHEXIDINE GLUCONATE 15 MILLILITER(S): 213 SOLUTION TOPICAL at 17:11

## 2021-05-13 RX ADMIN — MAGNESIUM OXIDE 400 MG ORAL TABLET 400 MILLIGRAM(S): 241.3 TABLET ORAL at 13:42

## 2021-05-13 RX ADMIN — CARBIDOPA AND LEVODOPA 2 TABLET(S): 25; 100 TABLET ORAL at 05:59

## 2021-05-13 RX ADMIN — CHLORHEXIDINE GLUCONATE 1 APPLICATION(S): 213 SOLUTION TOPICAL at 05:59

## 2021-05-13 NOTE — CHART NOTE - NSCHARTNOTEFT_GEN_A_CORE
downgrade to step down unit: Patient in ICU less than 24 hrs. no events in ICU    71 year old Male with past medical history of Parkinson's, dementia, CKD Stage 3, 1st degree AV block, HLD, gout, HTN, DM, fungal septicemia presented from [facility] for acute decompensation in mental status. As per documentation patient at baseline was verbal but for past 3 days prior to admission on 3/22,, he has been worsening to state of being non verbal. Patient was recently admitted to [facility] for fungemia and discharged on the 11th March.    Pt admitted for Metabolic encephalopathy in the setting of baseline dementia/Sepsis POA/Necrotic sacral ulcer stage 4/H/o recent fungemia. WCX  GNR, 3/29   Numerous Klebsiella pneumoniae ESBL, Few Pseudomonas aeruginosa, Numerous Enterococcus faecalis (vancomycin resistant),  evaluated by Burn: s/p debridement of sacrum on 3/30. Pt treated w/ IV meropenem, Polymixin and Aztreonam. S/p spinal tap --> CSF clear with no growths and neg PCR. Repeat EEG w/ no epileptiform activity but diffuse cerebral dysfx. Hospital course was further complicated by cholecystitis confirmed with HIDA scan and sonogram. Pt restarted on meropenem for broad cvg.    Rapid response called on patient 4/28 overnight. Pt found to have BP 60s/40s w/ tachypnea/ increased work of breathing (satting 95% on 15L NRB). Pt started on Levophed. Central line placed. Decision made to intubate. ABG collected. STAT CXR,KUB, labs ordered. Pt was transferred to Vent unit 4/28.  Seen by ID.  Changed ABX to IV flagyl and Avycaz with end date of 5/11.  S/p Bronch 5/7 for increased secretions and successfully extubate on 5/7.  He was downgraded to the floor.     Patient was not desaturating on the floor, O2 saturations were wnl. Patient was completing his abx course, as per family it appeared that he was returning to the baseline. He was minimally verbal and unable to move. Anasarca was noted. In the morning of 5/12 patient was seen with increasing work of breathing. RR was 20s-30s with significant accessory muscle use. ABG and STAT CXR were ordered. Duonebs and humidified air given with no improvement. Spoke to patient's daughter Kelton ([phone]) and patient reiterated that she wanted everything done, including intubation. CXR showed worsening opacities. Reintubated for respiratory distress possibly secondary to aspiration PNA and airway protection.  ID recalled, continue avycaz and flagyl.     MEDICATIONS:  STANDING MEDICATIONS  ascorbic acid 500 milliGRAM(s) Oral daily  atorvastatin 40 milliGRAM(s) Oral at bedtime  carbidopa/levodopa  25/100 2 Tablet(s) Oral <User Schedule>  chlorhexidine 0.12% Liquid 15 milliLiter(s) Oral Mucosa every 12 hours  chlorhexidine 4% Liquid 1 Application(s) Topical <User Schedule>  collagenase Ointment 1 Application(s) Topical two times a day  Dakins Solution - 1/2 Strength 1 Application(s) Topical two times a day  enoxaparin Injectable 40 milliGRAM(s) SubCutaneous daily  fentaNYL   Infusion. 0.5 MICROgram(s)/kG/Hr IV Continuous <Continuous>  lidocaine 2% Gel 1 Application(s) Topical once  lidocaine 2% Viscous 100 milliLiter(s) Swish and Spit once  magnesium oxide 400 milliGRAM(s) Oral every 8 hours  midazolam Infusion 0.02 mG/kG/Hr IV Continuous <Continuous>  midodrine 10 milliGRAM(s) Oral every 8 hours  norepinephrine Infusion 0.05 MICROgram(s)/kG/Min IV Continuous <Continuous>  polyethylene glycol 3350 17 Gram(s) Oral daily  senna 2 Tablet(s) Oral at bedtime  zinc sulfate 220 milliGRAM(s) Oral daily    PRN MEDICATIONS  acetaminophen   Tablet .. 650 milliGRAM(s) Oral every 6 hours PRN      VITAL SIGNS: Last 24 Hours  T(C): 36.4 (12 May 2021 09:13), Max: 37.4 (12 May 2021 05:00)  T(F): 97.5 (12 May 2021 09:13), Max: 99.4 (12 May 2021 05:00)  HR: 77 (12 May 2021 09:13) (71 - 77)  BP: 147/65 (12 May 2021 09:13) (105/52 - 147/65)  BP(mean): --  RR: 18 (12 May 2021 05:00) (18 - 18)  SpO2: 97% (12 May 2021 09:13) (97% - 98%)    LABS:                        8.4    8.33  )-----------( 150      ( 12 May 2021 07:20 )             26.2     05-12    133<L>  |  100  |  14  ----------------------------<  88  4.7   |  21  |  0.5<L>    Ca    7.7<L>      12 May 2021 07:20  Mg     2.0     05-12    TPro  6.2  /  Alb  2.0<L>  /  TBili  0.4  /  DBili  x   /  AST  37  /  ALT  14  /  AlkPhos  194<H>  05-12        ABG - ( 12 May 2021 09:08 )  pH, Arterial: 7.42  pH, Blood: x     /  pCO2: 42    /  pO2: 82    / HCO3: 27    / Base Excess: 2.3   /  SaO2: 97          RADIOLOGY:          ASSESSMENT & PLAN:     71 year old Male with past medical history of Parkinson's, dementia, CKD Stage 3, 1st degree AV block, HLD, gout, HTN, DM, fungal septicemia presenting from [facility] for acute decompensation in mental status. As per documentation patient at baseline is verbal but for past 3 days, he has been worsening to state of being non verbal. Patient was admitted to [facility] for fungemia and discharged on the 11th March. At  he had left hand cellulitis and was treated for it.    # Sepsis and septic shock - resolved  # Hypoxemic respiratory failure s/p intubation on 4/28 and extubated. now reintubated  # Complete L lung collapse and atelectasis with PNA secondary to mucus plug  - s/p multiple bronchoscopies on 4/28, 4/30, 5/4, and 5/7  - Bronchial Cx grew  Klebsiella Pneumonia   - CXR worsening  - reintubated for respiratory distress possibly secondary to aspiration PNA vs possible fluid overload.   - Follow up CTH --> negative for acute events  - do EEG  - continue with avycaz and flagyl as per ID.   - pulmonary following   - intubated and sedated with precedex. ABG showing no CO2 retention. FIO2 decreased  - repeat dimer and duplex  - Surgery consult for Trach. Dr. Padilla    # Acute cholecystitis   - confirmed with HIDA scan and sonogram  - s/p IR perc cholecystostomy on 4/29  - gallbladder fluid cx showed NGTD    # Metabolic encephalopathy on top of baseline dementia/Sepsis POA/Necrotic sacral ulcer stage 4/H/o recent fungemia - improved?  - CT Head No acute intracranial pathology.  - Blood & Urine cxs NGTD   - WCX GNR, 3/29   Numerous Klebsiella pneumoniae ESBL, , Few Pseudomonas aeruginosa, Numerous Enterococcus faecalis (vancomycin resistant)  - evaluated by Burn: s/p debridement of sacrum on 3/30 .  no more new recommendation for surgery at this time .continue local wound care with santyl/dakins WTD.  - s/p meropenem, Polymixin and Aztreonam  - S/p spinal tap --> CSF clear with no growths and neg PCR  - repeat EEG w/ no epileptiform activity but diffuse cerebral dysfx  - Follow up repeat CTH --> negative    # Nutrition/Dysphagia:   - 4/14 passed S&S but limited PO intake.   - s/p PEG on 4/19  - family taught how to administer feeds and wound care    #Lt shoulder dislocation w/ collection  - no need to tap as per IR and ID concurs  - fu repeat shoulder xray    #Anemia  - s/p 1u PRBC on   - monitor CBC    # Thrombocytopenia ?sec to sepsis - resolved  - off Pepcid  - heme f/u appreciated    # H/o parkinsons disease  - c/w sinemet    # H/o chronic DVT  - [facility] Duplex Lower Ext Vein Scan, Bilat (03.27.21 @ 18:48) >No evidence of deep venous thrombosis or superficial thrombophlebitis in the bilateral lower extremities.  - unsure why was on Rx >2yrs Eliquis d/c.     # Dyslipidemia  - c/w statin    # Family contact: [place] updated on the patient's condition and plan. Floor resident Had an extensive conversation with Kelton and the patient's son about the patient's prognosis and the course of his disease. As per the patient's family he started experiencing significant decline in the past few months. They verbalized understanding of his current state and would like to keep him full code at this time.   - Family updated that patient had to reintubated.     # DVT prophylaxis: Lovenox  # Diet: PEG feeds  # Dispo: acute. transfer to step down unit      f/u:  1- EEG  2- Duplex  3- Dimer  4- Surgery fu for trach ( Dr. Padilla)

## 2021-05-13 NOTE — PROGRESS NOTE ADULT - ASSESSMENT
ASSESSMENT  71 year old Male with past medical history of Parkinson's, dementia, CKD Stage 3, 1st degree AV block, HLD, gout, HTN, DM, fungal septicemia presenting from [facility] for acute decompensation in mental status.     IMPRESSION  #Intubated for hypoxemia resp failure  #RESOLVED Sepsis/Fever, suspected acute cholecystitis vs UTI, HAP    5/5 decub cx Pseudomonas aeruginosa (Carbapenem Resistant) and other bacteria    5/1 BCX NGTD     s/p 4/29 perc gayle, CX NG     4/28 Blood CX coNS,     4/28 UCX NG     4/26 BCX NGTD     4/24 UCX Kleb (Carbapenem Resistant)   HIDA +  < from: [facility] Dup[facility] Lower Ext Vein Scan, Bilat (04.21.21 @ 11:41) >  No evidence of deep venous thrombosis or superficial thrombophlebitis in the bilateral lower extremities.    4/24 UA WBC 82  #Intubated L lung collapse s/p bronch with mucous plugs    4/28 bronch Kleb   #RESOLVED CoNS in blood,  contaminant     only PIVs    4/8 BCX 1/2 sets   -  Staphylococcus epidermidis, Methicillin resistant: Detec    3/25 BCX 1/2 sets, 1/4 bottles Staphylococcus pettenkoferi, likely contaminant   #RESOLVED Fever with sepsis, not present on admission with metabolic encephalopathy, EEG + seizure, possibly in setting of sepsis secondary to large sacral infected decub    LP not consistent with infection.. G/S NEGATIVE (on ABX)    Total Nucleated Cell Count, CSF: 0 /uL (04.01.21 @ 14:00)    Protein, CSF: 42 mg/dL (04.01.21 @ 14:00)    Glucose, CSF: 97 mg/dL (04.01.21 @ 14:00)    3/25 BCX 1/2 sets, 1/4 bottles Staphylococcus pettenkoferi, likely contaminant     CXR no PNA   3/22 Blood & Urine cxs NGTD   #Sacral ulcer- necrotic     3/31 WCX GNR    3/29   Numerous Klebsiella pneumoniae ESBL    Few CRE Pseudomonas aeruginosa (high MICs to AGs)    Numerous Enterococcus faecalis (vancomycin resistant)- S amp    seen by Burn sacrum with full thickness ~4x5cm with dark /brown devitalized tissue at base; no purulent drainage, no bleeding  #Recent Fungemia    Blood Cx 2/27 Candida albicans    evaluated by optho - no ocular evidence     TTE no significant valvulopathy   #Shoulder dislocation with effusion- joint exam not consistent with a septic arthritis  < from: CT Chest w/ IV Cont (04.07.21 @ 20:31) >  Similar appearance of the left shoulder with complex joint effusion, described in detail on prior exam CT shoulder 2/20/2021.  #ABEBE, resolved  #L hand edema  < from: Xray Wrist 2 Views, Left (03.27.21 @ 11:13) >No acute fracture or dislocation. Diffuse soft tissue swelling. Nonaggressive appearing lucency in the distal radius, indeterminate. Nonemergent MRI may be obtained for further evaluation.    Creatinine, Serum: 0.8 mg/dL (04.26.21 @ 12:17)      RECOMMENDATIONS  - intubated for hypoxemia resp failure, possible aspiration- afebrile and no wbc- D/C ABX and monitor   - s/p Avycaz (non-formulary form) 2.5g q8h IV & Flagyl 500mg q8h IV x 7 days end 5/11  - Appreciate IR consult  - Appreciate Burn consult   - Contact isolation CRE  - GOC, grave prognosis MDROs    If any questions, please call or send a message on [employer] Teams  Spectra [phone]

## 2021-05-13 NOTE — PROGRESS NOTE ADULT - ASSESSMENT
IMPRESSION:  Acute hypoxemic respiratory failure  AMS  HO L lung collapse sp bronch extubated on 5/7,  Septic shock resolved   Necrotic sacral wound culture s/p debridement  Cholecystitis sp CCY  MDR pneumonia Klebsiella/ pseudomonas   HO parkinson's and dementia sp PEG  HO DVT/ A fib  DEC HB NO ACTIVE BLEED  Thrombocytopenia, HIT negative, improving    PLAN:    CNS: SAT.  FU EEG    HEENT:  Oral care    PULMONARY:  HOB @ 45 degrees.  Aspiration precautions.  ARDS network MV Setting.  PEEP 7.  Wean FiO2.  Early trach.       CARDIOVASCULAR: Keep equal. Avoid volume overload.    GI: GI prophylaxis. PEG feeding as tolerated.    RENAL:  F/u  lytes. Correct as needed. Accurate I/O    INFECTIOUS DISEASE: Abx per ID.      HEMATOLOGICAL:  DVT prophylaxis.  Dimer & Duplex     ENDOCRINE:  Follow up FS.  Insulin protocol if needed.    CODE STATUS: FULL CODE    Very poor prognosis    Step down unit

## 2021-05-13 NOTE — PROGRESS NOTE ADULT - SUBJECTIVE AND OBJECTIVE BOX
NIEVES LABOY  71y, Male  Allergy: No Known Allergies      LOS  52d    CHIEF COMPLAINT: Change in Mental Status (13 May 2021 09:16)      INTERVAL EVENTS/HPI  - T(F): , Max: 98.4 (21 @ 16:00), intubated  - WBC Count: 5.20 (21 @ 04:50)  WBC Count: 7.78 (21 @ 16:12)  - Creatinine, Serum: 0.6 (21 @ 04:50)  Creatinine, Serum: 0.6 (21 @ 16:12)     -   -   -     ROS  cannot obtain secondary to patient's sedation and/or mental status    VITALS:  T(F): 97.2, Max: 98.4 (21 @ 16:00)  HR: 71  BP: 163/87  RR: 20Vital Signs Last 24 Hrs  T(C): 36.2 (13 May 2021 12:31), Max: 36.9 (12 May 2021 16:00)  T(F): 97.2 (13 May 2021 12:31), Max: 98.4 (12 May 2021 16:00)  HR: 71 (13 May 2021 12:31) (46 - 80)  BP: 163/87 (13 May 2021 12:31) (93/60 - 186/80)  BP(mean): 113 (13 May 2021 12:31) (67 - 113)  RR: 20 (13 May 2021 12:31) (11 - 20)  SpO2: 100% (13 May 2021 12:31) (100% - 100%)    PHYSICAL EXAM:  Gen: Intubated  CV: RRR  Lungs: Decreased BS at bases  Abd: Soft  Neuro: Sedated  Lines clean, no phlebitis    FH: Non-contributory  Social Hx: Non-contributory    TESTS & MEASUREMENTS:                        8.6    5.20  )-----------( 137      ( 13 May 2021 04:50 )             27.9     05-    135  |  104  |  13  ----------------------------<  107<H>  4.7   |  23  |  0.6<L>    Ca    8.2<L>      13 May 2021 04:50  Mg     2.0         TPro  6.0  /  Alb  2.1<L>  /  TBili  0.3  /  DBili  x   /  AST  27  /  ALT  10  /  AlkPhos  180<H>      eGFR if Non African American: 100 mL/min/1.73M2 (21 @ 04:50)  eGFR if African American: 116 mL/min/1.73M2 (21 @ 04:50)  eGFR if Non African American: 100 mL/min/1.73M2 (21 @ 16:12)  eGFR if : 116 mL/min/1.73M2 (21 @ 16:12)    LIVER FUNCTIONS - ( 13 May 2021 04:50 )  Alb: 2.1 g/dL / Pro: 6.0 g/dL / ALK PHOS: 180 U/L / ALT: 10 U/L / AST: 27 U/L / GGT: x           Urinalysis Basic - ( 12 May 2021 15:47 )    Color: Yellow / Appearance: Clear / S.012 / pH: x  Gluc: x / Ketone: Negative  / Bili: Negative / Urobili: <2 mg/dL   Blood: x / Protein: 30 mg/dL / Nitrite: Negative   Leuk Esterase: Negative / RBC: 1 /HPF / WBC 0 /HPF   Sq Epi: x / Non Sq Epi: 1 /HPF / Bacteria: Negative        Culture - Other (collected 21 @ 15:05)  Source: .Other sacrum  Final Report (21 @ 17:08):    Culture yields growth of greater than 3 colony types of    bacteria,  which may indicate contamination and normal srikanth    Call client services within 7 days if further workup is clinically    indicated. Culture includes    Moderate Pseudomonas aeruginosa (Carbapenem Resistant)  Organism: Pseudomonas aeruginosa (Carbapenem Resistant) (21 @ 17:08)  Organism: Pseudomonas aeruginosa (Carbapenem Resistant) (21 @ 17:08)      -  Amikacin: S <=16      -  Aztreonam: I 16      -  Cefepime: I 16      -  Ceftazidime: I 16      -  Ciprofloxacin: R >2      -  Gentamicin: I 8      -  Imipenem: R >8      -  Levofloxacin: R >4      -  Meropenem: R 8      -  Piperacillin/Tazobactam: I 64      -  Tobramycin: S <=2      Method Type: JAZMIN    Culture - Fungal, Bronchial (collected 21 @ 17:00)  Source: .Bronchial None  Preliminary Report (21 @ 15:02):    No growth    Culture - Acid Fast - Bronchial w/Smear (collected 21 @ 17:00)  Source: .Bronchial None  Preliminary Report (21 @ 15:04):    No growth at 1 week.    Culture - Bronchial (collected 21 @ 17:00)  Source: .Bronchial None  Gram Stain (21 @ 03:49):    Rare polymorphonuclear leukocytes seen per low power field    No Squamous epithelial cells seen per low power field    Moderate Gram Negative Rods seen per oil power field  Final Report (21 @ 17:54):    Moderate Klebsiella pneumoniae (Carbapenem Resistant)    Normal Respiratory Srikanth absent  Organism: Klepne MDRO (21 @ 17:54)  Organism: Klepne MDRO (21 @ 17:54)      -  Amikacin: R >32      -  Amoxicillin/Clavulanic Acid: R >16/8      -  Ampicillin: R >16 These ampicillin results predict results for amoxicillin      -  Ampicillin/Sulbactam: R >16/8 Enterobacter, Citrobacter, and Serratia may develop resistance during prolonged therapy (3-4 days)      -  Aztreonam: R >16      -  Cefazolin: R >16 Enterobacter, Citrobacter, and Serratia may develop resistance during prolonged therapy (3-4 days)      -  Cefepime: R >16      -  Cefoxitin: R >16      -  Ceftazidime/Avibactam: S <=4      -  Ceftolozane/tazobactam: R >8      -  Ceftriaxone: R >32 Enterobacter, Citrobacter, and Serratia may develop resistance during prolonged therapy      -  Ciprofloxacin: R >2      -  Ertapenem: R >1      -  Gentamicin: R >8      -  Imipenem: R 4      -  Levofloxacin: R >4      -  Meropenem: R 8      -  Piperacillin/Tazobactam: R >64      -  Tobramycin: R >8      -  Trimethoprim/Sulfamethoxazole: R >2/38      Method Type: JAZMIN    Culture - Blood (collected 21 @ 07:52)  Source: .Blood None  Final Report (21 @ 18:00):    No Growth Final    Culture - Body Fluid with Gram Stain (collected 21 @ 15:22)  Source: .Body Fluid gall bladder fluid  Gram Stain (21 @ 01:12):    No polymorphonuclear cells seen    No organisms seen    by cytocentrifuge  Final Report (21 @ 17:47):    No growth at 5 days    Culture - Blood (collected 21 @ 06:43)  Source: .Blood None  Final Report (21 @ 18:00):    No Growth Final    Culture - Fungal, Bronchial (collected 21 @ 10:22)  Source: .Bronchial None  Preliminary Report (21 @ 07:17):    Few Yeast    Culture - Acid Fast - Bronchial w/Smear (collected 21 @ 10:22)  Source: .Bronchial None  Preliminary Report (21 @ 15:04):    No growth at 1 week.    Culture - Bronchial (collected 21 @ 10:22)  Source: Bronch Wash None  Gram Stain (21 @ 23:36):    Numerous polymorphonuclear leukocytes per low power field    Few Squamous epithelial cells per low power field    Numerous Gram Negative Coccobacilli per oil power field  Final Report (21 @ 16:31):    Moderate Klebsiella pneumoniae (Carbapenem Resistant)    Normal Respiratory Srikanth absent  Organism: Klepne MDRO (21 @ 15:12)  Organism: Klepne MDRO (21 @ 15:12)      -  Amikacin: R >32      -  Amoxicillin/Clavulanic Acid: R >16/8      -  Ampicillin: R >16 These ampicillin results predict results for amoxicillin      -  Ampicillin/Sulbactam: R >16/8 Enterobacter, Citrobacter, and Serratia may develop resistance during prolonged therapy (3-4 days)      -  Aztreonam: R >16      -  Cefazolin: R >16 Enterobacter, Citrobacter, and Serratia may develop resistance during prolonged therapy (3-4 days)      -  Cefepime: R >16      -  Cefoxitin: R >16      -  Ceftazidime/Avibactam: S <=4      -  Ceftolozane/tazobactam: R >8      -  Ceftriaxone: R >32 Enterobacter, Citrobacter, and Serratia may develop resistance during prolonged therapy      -  Ciprofloxacin: R >2      -  Ertapenem: R >1      -  Gentamicin: R >8      -  Imipenem: R 4      -  Levofloxacin: R >4      -  Meropenem: R 8      -  Minocycline: S <=4      -  Piperacillin/Tazobactam: R >64      -  Tigecycline: S <=2      -  Tobramycin: R >8      -  Trimethoprim/Sulfamethoxazole: R >2/38      Method Type: JAZMIN    Culture - Blood (collected 21 @ 06:10)  Source: .Blood None  Gram Stain (21 @ 05:13):    Growth in aerobic bottle: Gram Positive Cocci in Clusters    Growth in anaerobic bottle: Gram Negative Rods and Gram Positive Cocci in    Clusters  Final Report (21 @ 13:23):    Growth in aerobic and anaerobic bottles: Staphylococcus epidermidis    Coag Negative Staphylococcus    Single set isolate, possible contaminant. Contact    Microbiology if susceptibility testing clinically    indicated.    Growth in anaerobic bottle: Proteusmirabilis ESBL    MDRO detected in BCID PCR, resistance marker = CTX-M (ESBL)    ***Blood Panel PCR results on this specimen are available    approximately 3 hours after the Gram stain result.***    Gram stain, PCR, and/or culture results may not always    correspond due to difference in methodologies.    ************************************************************    This PCR assay was performed by multiplex PCR. This    Assay tests for 66 bacterial and resistance gene targets.    Please refer to the [email or web address] test directory    at [email or web address] for details.  Organism: Blood Culture PCR  Proteus mirabilis ESBL (21 @ 13:23)  Organism: Proteus mirabilis ESBL (21 @ 13:23)      -  Amikacin: S <=16      -  Ampicillin: R >16 These ampicillin results predict results for amoxicillin      -  Ampicillin/Sulbactam: R 8/4 Enterobacter, Citrobacter, and Serratia may develop resistance during prolonged therapy (3-4 days)      -  Aztreonam: R >16      -  Cefazolin: R >16 Enterobacter, Citrobacter, and Serratia may develop resistance during prolonged therapy (3-4 days)      -  Cefepime: R >16      -  Cefoxitin: S <=8      -  Ceftriaxone: R >32 Enterobacter, Citrobacter, and Serratia may develop resistance during prolonged therapy      -  Ciprofloxacin: R >2      -  Ertapenem: S <=0.5      -  Gentamicin: S <=2      -  Levofloxacin: R >4      -  Meropenem: S <=1      -  Piperacillin/Tazobactam: R <=8      -  Tobramycin: S <=2      -  Trimethoprim/Sulfamethoxazole: S <=0.5/9.5      Method Type: JAZMIN  Organism: Blood Culture PCR (21 @ 13:23)      -  ESBL: Detec      -  Proteus mirabilis: Detec      -  Staphylococcus epidermidis, Methicillin resistant: Detec      Method Type: PCR    Culture - Blood (collected 21 @ 06:10)  Source: .Blood None  Gram Stain (21 @ 09:17):    Growth in aerobic bottle: Gram Positive Cocci in Clusters    Growth in anaerobic bottle: Gram Positive Cocci in Clusters and Gram    Positive Rods  Final Report (21 @ 13:37):    **Please Note**: This is a Corrected Report**    Growth in aerobic bottle: Staphylococcus haemolyticus Coag Negative    Staphylococcus    Single set isolate, possible contaminant. Contact    Microbiology if susceptibility testing clinically    indicated.    Growth in anaerobic bottle: Staphylococcus epidermidis    Previously reported as:    Growth in anaerobic bottle: Gram Positive Cocci in Clusters and Gram    Positive Rods  Organism: Blood Culture PCR  Blood Culture PCR  Staphylococcus epidermidis (21 @ 10:01)  Organism: Staphylococcus epidermidis (21 @ 10:01)      -  Ampicillin/Sulbactam: R <=8/4      -  Cefazolin: R 16      -  Clindamycin: R >4      -  Erythromycin: R >4      -  Gentamicin: R >8 Should not be used as monotherapy      -  Oxacillin: R >2      -  Penicillin: R >8      -  RIF- Rifampin: S <=1 Should not be used as monotherapy      -  Tetra/Doxy: S 2      -  Trimethoprim/Sulfamethoxazole: R >2/38      -  Vancomycin: S 2      Method Type: JAZMIN  Organism: Blood Culture PCR (21 @ 11:36)      -  Staphylococcus epidermidis, Methicillin resistant: Detec      Method Type: PCR  Organism: Blood Culture PCR (21 @ 13:20)      -  Coagulase negative Staphylococcus, Methicillin resistant: Detec      Method Type: PCR    Culture - Urine (collected 21 @ 02:00)  Source: .Urine Catheterized  Final Report (21 @ 07:07):    No growth    Culture - Blood (collected 21 @ 02:00)  Source: .Blood Blood-Peripheral  Gram Stain (21 @ 11:26):    Growth in anaerobic bottle: Gram Positive Cocci in Clusters  Final Report (21 @ 13:45):    Growth in anaerobic bottle: Staphylococcus epidermidis  Organism: Staphylococcus epidermidis (21 @ 13:45)  Organism: Staphylococcus epidermidis (21 @ 13:45)      -  Ampicillin/Sulbactam: R <=8/4      -  Cefazolin: R >16      -  Clindamycin: R >4      -  Erythromycin: R >4      -  Gentamicin: R >8 Should not be used as monotherapy      -  Oxacillin: R >2      -  Penicillin: R >8      -  RIF- Rifampin: S <=1 Should not be used as monotherapy      -  Tetra/Doxy: R >8      -  Trimethoprim/Sulfamethoxazole: R >2/38      -  Vancomycin: S 2      Method Type: JAZMIN    Culture - Blood (collected 21 @ 17:42)  Source: .Blood None  Final Report (21 @ 23:01):    No Growth Final    Culture - Urine (collected 21 @ 14:39)  Source: .Urine Clean Catch (Midstream)  Final Report (21 @ 15:53):    >100,000 CFU/ml Klebsiella pneumoniae (Carbapenem Resistant)  Organism: Klepne MDRO (21 @ 15:53)  Organism: Klepne MDRO (21 @ 15:53)      -  Amikacin: S <=16      -  Amoxicillin/Clavulanic Acid: R >16/8      -  Ampicillin: R >16 These ampicillin results predict results for amoxicillin      -  Ampicillin/Sulbactam: R >16/8 Enterobacter, Citrobacter, and Serratia may develop resistance during prolonged therapy (3-4 days)      -  Aztreonam: R >16      -  Cefazolin: R >16 (MIC_CL_COM_ENTERIC_CEFAZU) For uncomplicated UTI with K. pneumoniae, E. coli, or P. mirablis: JAZMIN <=16 is sensitive and JAZMIN >=32 is resistant. This also predicts results for oral agents cefaclor, cefdinir, cefpodoxime, cefprozil, cefuroxime axetil, cephalexin and locarbef for uncomplicated UTI. Note that some isolates may be susceptible to these agents while testing resistant to cefazolin.      -  Cefepime: R >16      -  Cefoxitin: I 16      -  Ceftriaxone: R >32 Enterobacter, Citrobacter, and Serratia may develop resistance during prolonged therapy      -  Ciprofloxacin: R >2      -  Ertapenem: R >1      -  Gentamicin: R >8      -  Imipenem: R 8      -  Levofloxacin: I 1      -  Meropenem: R >8      -  Nitrofurantoin: R >64 Should not be used to treat pyelonephritis      -  Piperacillin/Tazobactam: R >64      -  Tigecycline: S <=2      -  Tobramycin: R >8      -  Trimethoprim/Sulfamethoxazole: R >2/38      Method Type: JAZMIN    Culture - Urine (collected 21 @ 22:07)  Source: .Urine Clean Catch (Midstream)  Final Report (21 @ 16:01):    >100,000 CFU/ml Klebsiella pneumoniae (Carbapenem Resistant)  Organism: Klepne MDRO (21 @ 16:01)  Organism: Klepne MDRO (21 @ 16:01)      -  Amikacin: S <=16      -  Amoxicillin/Clavulanic Acid: R >16/8      -  Ampicillin: R >16 These ampicillin results predict results for amoxicillin      -  Ampicillin/Sulbactam: R >16/8 Enterobacter, Citrobacter, and Serratia may develop resistance during prolonged therapy (3-4 days)      -  Aztreonam: R >16      -  Cefazolin: R >16 (MIC_CL_COM_ENTERIC_CEFAZU) For uncomplicated UTI with K. pneumoniae, E. coli, or P. mirablis: JAZMIN <=16 is sensitive and JAZMIN >=32 is resistant. This also predicts results for oral agents cefaclor, cefdinir, cefpodoxime, cefprozil, cefuroxime axetil, cephalexin and locarbef for uncomplicated UTI. Note that some isolates may be susceptible to these agents while testing resistant to cefazolin.      -  Cefepime: R >16      -  Cefoxitin: S <=8      -  Ceftriaxone: R >32 Enterobacter, Citrobacter, and Serratia may develop resistance during prolonged therapy      -  Ciprofloxacin: R >2      -  Ertapenem: R >1      -  Gentamicin: R >8      -  Imipenem: R 8      -  Levofloxacin: I 1      -  Meropenem: R >8      -  Nitrofurantoin: R >64 Should not be used to treat pyelonephritis      -  Piperacillin/Tazobactam: R >64      -  Tigecycline: S <=2      -  Tobramycin: R >8      -  Trimethoprim/Sulfamethoxazole: R >2/38      Method Type: JAZMIN        Lactate, Blood: 0.7 mmol/L (21 @ 16:12)      INFECTIOUS DISEASES TESTING  Procalcitonin, Serum: 2.03 (21 @ 05:25)  Procalcitonin, Serum: 2.50 (21 @ 16:00)  Procalcitonin, Serum: 0.19 (21 @ 17:42)  COVID-19 PCR: NotDetec (21 @ 10:31)  COVID-19 PCR: NotDetec (21 @ 14:29)  COVID-19 PCR: NotDetec (21 @ 15:00)  COVID-19 PCR: NotDetec (21 @ 14:55)  Procalcitonin, Serum: 0.16 (21 @ 12:32)  COVID-19 PCR: NotDetec (21 @ 15:19)  COVID-19 PCR: NotDetec (21 @ 19:40)  Procalcitonin, Serum: 0.61 (21 @ 10:20)  COVID-19 PCR: NotDetec (21 @ 20:13)  COVID-19 PCR: NotDetec (03-10-21 @ 12:22)  COVID-19 PCR: NotDetec (21 @ 16:49)  Fungitell: 62 (21 @ 11:00)  Procalcitonin, Serum: 0.29 (21 @ 05:32)  MRSA PCR Result.: Negative (21 @ 15:44)  COVID-19 PCR: NotDetec (02-25-21 @ 15:34)  Procalcitonin, Serum: 0.27 (21 @ 10:54)  MRSA PCR Result.: Negative (21 @ 18:29)  HIV-1/2 Combo Result: Nonreact (21 @ 05:07)  Procalcitonin, Serum: 0.46 (21 @ 16:00)  COVID-19 PCR: NotDetec (21 @ 10:17)      INFLAMMATORY MARKERS      RADIOLOGY & ADDITIONAL TESTS:  I have personally reviewed the last available Chest xray  CXR      CT      CARDIOLOGY TESTING      MEDICATIONS  ascorbic acid 500  atorvastatin 40  carbidopa/levodopa  25/100 2  ceftazidime/avibactam IVPB 2.5  chlorhexidine 0.12% Liquid 15  chlorhexidine 4% Liquid 1  collagenase Ointment 1  Dakins Solution - 1/2 Strength 1  enoxaparin Injectable 40  fentaNYL   Infusion. 0.5  lidocaine 2% Gel 1  lidocaine 2% Viscous 100  magnesium oxide 400  metroNIDAZOLE  IVPB   metroNIDAZOLE  IVPB 500  metroNIDAZOLE  IVPB 500  midodrine 10  norepinephrine Infusion 0.05  pantoprazole   Suspension 40  polyethylene glycol 3350 17  senna 2  zinc sulfate 220      WEIGHT  Weight (kg): 82.7 (21 @ 13:00)  Creatinine, Serum: 0.6 mg/dL (21 @ 04:50)  Creatinine, Serum: 0.6 mg/dL (21 @ 16:12)      ANTIBIOTICS:  ceftazidime/avibactam IVPB 2.5 Gram(s) IV Intermittent every 8 hours  metroNIDAZOLE  IVPB      metroNIDAZOLE  IVPB 500 milliGRAM(s) IV Intermittent once  metroNIDAZOLE  IVPB 500 milliGRAM(s) IV Intermittent every 8 hours      All available historical records have been reviewed

## 2021-05-13 NOTE — PROGRESS NOTE ADULT - SUBJECTIVE AND OBJECTIVE BOX
Patient is a 71y old  Male who presents with a chief complaint of Change in Mental Status (12 May 2021 14:54)        Over Night Events:  Remains on MV.  Off pressors.  Sedated          ROS:     All ROS are negative except HPI         PHYSICAL EXAM    ICU Vital Signs Last 24 Hrs  T(C): 35.1 (13 May 2021 08:00), Max: 36.9 (12 May 2021 16:00)  T(F): 95.2 (13 May 2021 08:00), Max: 98.4 (12 May 2021 16:00)  HR: 52 (13 May 2021 09:00) (46 - 84)  BP: 140/66 (13 May 2021 09:00) (93/60 - 186/80)  BP(mean): 89 (13 May 2021 09:00) (67 - 108)  ABP: --  ABP(mean): --  RR: 12 (13 May 2021 09:00) (11 - 17)  SpO2: 100% (13 May 2021 09:00) (100% - 100%)      CONSTITUTIONAL:  Ill appearing In NAD    ENT:   Airway patent,   Mouth with normal mucosa.   No thrush    EYES:   Pupils equal,   Round and reactive to light.    CARDIAC:   Normal rate,   Regular rhythm.     edema      Vascular:  Normal systolic impulse  No Carotid bruits    RESPIRATORY:   No wheezing  Bilateral BS  Normal chest expansion  Not tachypneic,  No use of accessory muscles    GASTROINTESTINAL:  Abdomen soft,   Non-tender,   No guarding,   + BS    MUSCULOSKELETAL:   Contracted   No clubbing, cyanosis    NEUROLOGICAL:   Sedated     SKIN:   Skin normal color for race,   Warm and dry and intact.   No evidence of rash.    PSYCHIATRIC:   Sedated   No apparent risk to self or others.    HEMATOLOGICAL:  No cervical  lymphadenopathy.  no inguinal lymphadenopathy      21 @ 07:01  -  21 @ 07:00  --------------------------------------------------------  IN:    Dexmedetomidine: 105.1 mL    Dexmedetomidine: 10.3 mL    Enteral Tube Flush: 90 mL    IV PiggyBack: 100 mL    IV PiggyBack: 400 mL    Jevity 1.2: 1080 mL  Total IN: 1785.4 mL    OUT:    Drain (mL): 10 mL    FentaNYL: 0 mL    Indwelling Catheter - Urethral (mL): 3145 mL    Midazolam: 0 mL    Norepinephrine: 0 mL  Total OUT: 3155 mL    Total NET: -1369.6 mL      21 @ 07:01  -  21 @ 09:17  --------------------------------------------------------  IN:    Dexmedetomidine: 14.4 mL    Enteral Tube Flush: 50 mL  Total IN: 64.4 mL    OUT:    FentaNYL: 0 mL    Indwelling Catheter - Urethral (mL): 220 mL    Norepinephrine: 0 mL  Total OUT: 220 mL    Total NET: -155.6 mL          LABS:                            8.6    5.20  )-----------( 137      ( 13 May 2021 04:50 )             27.9                                                   135  |  104  |  13  ----------------------------<  107<H>  4.7   |  23  |  0.6<L>    Ca    8.2<L>      13 May 2021 04:50  Mg     2.0         TPro  6.0  /  Alb  2.1<L>  /  TBili  0.3  /  DBili  x   /  AST  27  /  ALT  10  /  AlkPhos  180<H>        PT/INR - ( 12 May 2021 16:12 )   PT: 13.20 sec;   INR: 1.15 ratio         PTT - ( 12 May 2021 16:12 )  PTT:46.0 sec                                       Urinalysis Basic - ( 12 May 2021 15:47 )    Color: Yellow / Appearance: Clear / S.012 / pH: x  Gluc: x / Ketone: Negative  / Bili: Negative / Urobili: <2 mg/dL   Blood: x / Protein: 30 mg/dL / Nitrite: Negative   Leuk Esterase: Negative / RBC: 1 /HPF / WBC 0 /HPF   Sq Epi: x / Non Sq Epi: 1 /HPF / Bacteria: Negative        CARDIAC MARKERS ( 13 May 2021 04:50 )  x     / 0.09 ng/mL / x     / x     / x      CARDIAC MARKERS ( 12 May 2021 16:12 )  x     / 0.11 ng/mL / x     / x     / x                                                LIVER FUNCTIONS - ( 13 May 2021 04:50 )  Alb: 2.1 g/dL / Pro: 6.0 g/dL / ALK PHOS: 180 U/L / ALT: 10 U/L / AST: 27 U/L / GGT: x                                                                                               Mode: AC/ CMV (Assist Control/ Continuous Mandatory Ventilation)  RR (machine): 14  TV (machine): 400  FiO2: 80  PEEP: 5  ITime: 0.9  MAP: 16  PIP: 26                                      ABG - ( 13 May 2021 04:23 )  pH, Arterial: 7.41  pH, Blood: x     /  pCO2: 44    /  pO2: 161   / HCO3: 28    / Base Excess: 2.9   /  SaO2: 99                  MEDICATIONS  (STANDING):  ascorbic acid 500 milliGRAM(s) Oral daily  atorvastatin 40 milliGRAM(s) Oral at bedtime  carbidopa/levodopa  25/100 2 Tablet(s) Oral <User Schedule>  ceftazidime/avibactam IVPB 2.5 Gram(s) IV Intermittent every 8 hours  chlorhexidine 0.12% Liquid 15 milliLiter(s) Oral Mucosa every 12 hours  chlorhexidine 4% Liquid 1 Application(s) Topical <User Schedule>  collagenase Ointment 1 Application(s) Topical two times a day  Dakins Solution - 1/2 Strength 1 Application(s) Topical two times a day  dexMEDEtomidine Infusion 0.192 MICROgram(s)/kG/Hr (3.97 mL/Hr) IV Continuous <Continuous>  enoxaparin Injectable 40 milliGRAM(s) SubCutaneous daily  fentaNYL   Infusion. 0.5 MICROgram(s)/kG/Hr (3.97 mL/Hr) IV Continuous <Continuous>  lidocaine 2% Gel 1 Application(s) Topical once  lidocaine 2% Viscous 100 milliLiter(s) Swish and Spit once  magnesium oxide 400 milliGRAM(s) Oral every 8 hours  metroNIDAZOLE  IVPB      metroNIDAZOLE  IVPB 500 milliGRAM(s) IV Intermittent once  metroNIDAZOLE  IVPB 500 milliGRAM(s) IV Intermittent every 8 hours  midodrine 10 milliGRAM(s) Oral every 8 hours  norepinephrine Infusion 0.05 MICROgram(s)/kG/Min (7.44 mL/Hr) IV Continuous <Continuous>  pantoprazole   Suspension 40 milliGRAM(s) Oral daily  polyethylene glycol 3350 17 Gram(s) Oral daily  senna 2 Tablet(s) Oral at bedtime  zinc sulfate 220 milliGRAM(s) Oral daily    MEDICATIONS  (PRN):  acetaminophen   Tablet .. 650 milliGRAM(s) Oral every 6 hours PRN Temp greater or equal to 38C (100.4F)      New X-rays reviewed:                                                                                  ECHO    CXR interpreted by me:  ET OK>  LLL atelectasis

## 2021-05-14 LAB
ALBUMIN SERPL ELPH-MCNC: 2 G/DL — LOW (ref 3.5–5.2)
ALP SERPL-CCNC: 148 U/L — HIGH (ref 30–115)
ALT FLD-CCNC: 6 U/L — SIGNIFICANT CHANGE UP (ref 0–41)
ANION GAP SERPL CALC-SCNC: 9 MMOL/L — SIGNIFICANT CHANGE UP (ref 7–14)
AST SERPL-CCNC: 25 U/L — SIGNIFICANT CHANGE UP (ref 0–41)
BASE EXCESS BLDA CALC-SCNC: 4.7 MMOL/L — HIGH (ref -2–2)
BILIRUB SERPL-MCNC: 0.3 MG/DL — SIGNIFICANT CHANGE UP (ref 0.2–1.2)
BUN SERPL-MCNC: 14 MG/DL — SIGNIFICANT CHANGE UP (ref 10–20)
CALCIUM SERPL-MCNC: 8.1 MG/DL — LOW (ref 8.5–10.1)
CHLORIDE SERPL-SCNC: 103 MMOL/L — SIGNIFICANT CHANGE UP (ref 98–110)
CO2 SERPL-SCNC: 25 MMOL/L — SIGNIFICANT CHANGE UP (ref 17–32)
CREAT SERPL-MCNC: 0.6 MG/DL — LOW (ref 0.7–1.5)
GAS PNL BLDA: SIGNIFICANT CHANGE UP
GLUCOSE BLDC GLUCOMTR-MCNC: 89 MG/DL — SIGNIFICANT CHANGE UP (ref 70–99)
GLUCOSE BLDC GLUCOMTR-MCNC: 93 MG/DL — SIGNIFICANT CHANGE UP (ref 70–99)
GLUCOSE BLDC GLUCOMTR-MCNC: 93 MG/DL — SIGNIFICANT CHANGE UP (ref 70–99)
GLUCOSE BLDC GLUCOMTR-MCNC: 96 MG/DL — SIGNIFICANT CHANGE UP (ref 70–99)
GLUCOSE SERPL-MCNC: 119 MG/DL — HIGH (ref 70–99)
HCO3 BLDA-SCNC: 29 MMOL/L — HIGH (ref 23–27)
HCT VFR BLD CALC: 25.3 % — LOW (ref 42–52)
HGB BLD-MCNC: 7.7 G/DL — LOW (ref 14–18)
HOROWITZ INDEX BLDA+IHG-RTO: 40 — SIGNIFICANT CHANGE UP
MAGNESIUM SERPL-MCNC: 1.8 MG/DL — SIGNIFICANT CHANGE UP (ref 1.8–2.4)
MCHC RBC-ENTMCNC: 26.6 PG — LOW (ref 27–31)
MCHC RBC-ENTMCNC: 30.4 G/DL — LOW (ref 32–37)
MCV RBC AUTO: 87.5 FL — SIGNIFICANT CHANGE UP (ref 80–94)
NRBC # BLD: 0 /100 WBCS — SIGNIFICANT CHANGE UP (ref 0–0)
PCO2 BLDA: 41 MMHG — SIGNIFICANT CHANGE UP (ref 38–42)
PH BLDA: 7.46 — HIGH (ref 7.38–7.42)
PLATELET # BLD AUTO: 130 K/UL — SIGNIFICANT CHANGE UP (ref 130–400)
PO2 BLDA: 90 MMHG — SIGNIFICANT CHANGE UP (ref 78–95)
POTASSIUM SERPL-MCNC: 4.4 MMOL/L — SIGNIFICANT CHANGE UP (ref 3.5–5)
POTASSIUM SERPL-SCNC: 4.4 MMOL/L — SIGNIFICANT CHANGE UP (ref 3.5–5)
PROT SERPL-MCNC: 6.1 G/DL — SIGNIFICANT CHANGE UP (ref 6–8)
RBC # BLD: 2.89 M/UL — LOW (ref 4.7–6.1)
RBC # FLD: 18.1 % — HIGH (ref 11.5–14.5)
SAO2 % BLDA: 98 % — SIGNIFICANT CHANGE UP (ref 94–98)
SODIUM SERPL-SCNC: 137 MMOL/L — SIGNIFICANT CHANGE UP (ref 135–146)
WBC # BLD: 7.5 K/UL — SIGNIFICANT CHANGE UP (ref 4.8–10.8)
WBC # FLD AUTO: 7.5 K/UL — SIGNIFICANT CHANGE UP (ref 4.8–10.8)

## 2021-05-14 PROCEDURE — 71045 X-RAY EXAM CHEST 1 VIEW: CPT | Mod: 26

## 2021-05-14 PROCEDURE — 99232 SBSQ HOSP IP/OBS MODERATE 35: CPT

## 2021-05-14 PROCEDURE — 95819 EEG AWAKE AND ASLEEP: CPT | Mod: 26

## 2021-05-14 RX ADMIN — CHLORHEXIDINE GLUCONATE 15 MILLILITER(S): 213 SOLUTION TOPICAL at 05:22

## 2021-05-14 RX ADMIN — SENNA PLUS 2 TABLET(S): 8.6 TABLET ORAL at 22:32

## 2021-05-14 RX ADMIN — MAGNESIUM OXIDE 400 MG ORAL TABLET 400 MILLIGRAM(S): 241.3 TABLET ORAL at 05:20

## 2021-05-14 RX ADMIN — ENOXAPARIN SODIUM 40 MILLIGRAM(S): 100 INJECTION SUBCUTANEOUS at 11:39

## 2021-05-14 RX ADMIN — MIDODRINE HYDROCHLORIDE 10 MILLIGRAM(S): 2.5 TABLET ORAL at 22:31

## 2021-05-14 RX ADMIN — CARBIDOPA AND LEVODOPA 2 TABLET(S): 25; 100 TABLET ORAL at 05:20

## 2021-05-14 RX ADMIN — Medication 100 MILLIGRAM(S): at 13:49

## 2021-05-14 RX ADMIN — PANTOPRAZOLE SODIUM 40 MILLIGRAM(S): 20 TABLET, DELAYED RELEASE ORAL at 11:38

## 2021-05-14 RX ADMIN — Medication 1 APPLICATION(S): at 05:22

## 2021-05-14 RX ADMIN — CHLORHEXIDINE GLUCONATE 1 APPLICATION(S): 213 SOLUTION TOPICAL at 05:21

## 2021-05-14 RX ADMIN — POLYETHYLENE GLYCOL 3350 17 GRAM(S): 17 POWDER, FOR SOLUTION ORAL at 11:40

## 2021-05-14 RX ADMIN — ATORVASTATIN CALCIUM 40 MILLIGRAM(S): 80 TABLET, FILM COATED ORAL at 22:32

## 2021-05-14 RX ADMIN — Medication 1 APPLICATION(S): at 17:03

## 2021-05-14 RX ADMIN — Medication 1 APPLICATION(S): at 05:21

## 2021-05-14 RX ADMIN — MAGNESIUM OXIDE 400 MG ORAL TABLET 400 MILLIGRAM(S): 241.3 TABLET ORAL at 13:49

## 2021-05-14 RX ADMIN — Medication 650 MILLIGRAM(S): at 18:16

## 2021-05-14 RX ADMIN — MAGNESIUM OXIDE 400 MG ORAL TABLET 400 MILLIGRAM(S): 241.3 TABLET ORAL at 22:31

## 2021-05-14 RX ADMIN — CARBIDOPA AND LEVODOPA 2 TABLET(S): 25; 100 TABLET ORAL at 22:33

## 2021-05-14 RX ADMIN — CHLORHEXIDINE GLUCONATE 15 MILLILITER(S): 213 SOLUTION TOPICAL at 17:03

## 2021-05-14 RX ADMIN — Medication 100 MILLIGRAM(S): at 05:20

## 2021-05-14 RX ADMIN — CARBIDOPA AND LEVODOPA 2 TABLET(S): 25; 100 TABLET ORAL at 13:49

## 2021-05-14 RX ADMIN — ZINC SULFATE TAB 220 MG (50 MG ZINC EQUIVALENT) 220 MILLIGRAM(S): 220 (50 ZN) TAB at 11:38

## 2021-05-14 RX ADMIN — MIDODRINE HYDROCHLORIDE 10 MILLIGRAM(S): 2.5 TABLET ORAL at 13:49

## 2021-05-14 RX ADMIN — Medication 500 MILLIGRAM(S): at 11:39

## 2021-05-14 RX ADMIN — MIDODRINE HYDROCHLORIDE 10 MILLIGRAM(S): 2.5 TABLET ORAL at 05:20

## 2021-05-14 RX ADMIN — CARBIDOPA AND LEVODOPA 2 TABLET(S): 25; 100 TABLET ORAL at 10:20

## 2021-05-14 RX ADMIN — Medication 100 MILLIGRAM(S): at 22:31

## 2021-05-14 NOTE — CONSULT NOTE ADULT - CONSULT REASON
Left chronic shoulder dislocation
Percutaneous cholecystostomy tube placement
Goals of care and symptom management
Sacral ulcer
ams  fever
enteral feeding
sp intubated
tracheostomy
Concern for acute cholecystitis
hypotension
Altered mental status
Left shoulder complex effusion, possible drainage
Thrombocytopenia

## 2021-05-14 NOTE — CONSULT NOTE ADULT - ATTENDING COMMENTS
patient seen and examined, agree with above, off pressors, on abx, multiple co morbidities, midodrine, very poor MICU candidate
Patient seen and examined and agree with above except as noted.  Patient well known to me and sees me as his neurologist as an out patient.  He was seen at [facility] recently for adjustment of his parkinsons meds and his lethargy and confusion.  He presents with ABEBE and not communicating likely due to a metabolic encephalopathy however this morning on my exam he is improved compared to presentation.  He is able to count fingers shown to him and has a resting tremor which appears to be better then when he was seen 2-3 weeks ago.  He follows some simple commands however with the left arm he is moving less and when passive ROM checked he winces in pain.    Plan  1. Continue medical management  2. Recall for no further improvement with correction of metabolic derangements  3. Continue dopamine agonists
acute respiratory failure   for trache next week
Patient seen and examined with surgery resident on rounds and discussed management plans. Patient elderly from Nursing home hospitalized over one month ago for fungemia. Remains non verbal with generalized edema and pressure ulcers, non ambulatory. Patient was found to have elevated liver enzymes prompting workup suggestive of acute cholecystitis with + HIDA scan. Patient is not a surgical candidate for any operative intervention . May consider IR drainage.
Pressure ulcer of sacrum - Stage 3/4 with devitalized tissue   Wound care as recommended   may need surgical debridement
The patient was seen. Agree with above.  71 year old male with multiple medical problems, Parkinson disease, dementia, CKD III, HTN, HLD, gout, psoriasis, DVT on Eliquis, 1st degree AV block, recently admitted for candidemia/AMS complicated by thrombocytopenia, readmitted for AMS, EEG showing partial seizure; CBC showed recurrent thrombocytopenia, most likely multifactorial due to drug, bone marrow suppression and acute illness.     Recommend supportive care. Monitor CBC. Transfuse PLT if needed.   Moderate anemia, most likely due to chronic disease and CKD. Order blood work as mentioned above.
Patient seen and examined as above. Discussed with daughter, she would like to continue full code and aggressive medical management for now.   will continue to follow.
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
## 2021-05-14 NOTE — CONSULT NOTE ADULT - PROVIDER SPECIALTY LIST ADULT
Critical Care
Surgery
Neurology
Orthopedics
Burn
Critical Care
Heme/Onc
Infectious Disease
Intervent Radiology
Intervent Radiology
Nutrition Support
Palliative Care
Surgery

## 2021-05-14 NOTE — PROGRESS NOTE ADULT - SUBJECTIVE AND OBJECTIVE BOX
Over Night Events:    no events overnight    ROS:  See HPI    PHYSICAL EXAM    ICU Vital Signs Last 24 Hrs  T(C): 36.5 (14 May 2021 07:00), Max: 37.3 (13 May 2021 19:54)  T(F): 97.7 (14 May 2021 07:00), Max: 99.2 (13 May 2021 19:54)  HR: 72 (14 May 2021 07:00) (50 - 97)  BP: 147/90 (14 May 2021 07:00) (96/59 - 166/85)  BP(mean): 106 (14 May 2021 07:00) (72 - 113)  RR: 17 (14 May 2021 07:00) (13 - 20)  SpO2: 100% (14 May 2021 08:10) (100% - 100%)      General: NARD  HEENT: SARA             Lymph Nodes: No cervical LN   Lungs: Bilateral BS  Cardiovascular: Regular   Abdomen: Soft, Positive BS  Extremities: No clubbing   Skin: stage 4 sacral ulcer  Neurological: responds to voice. open his eyes occasioanlly      21 @ 07:01  -  21 @ 07:00  --------------------------------------------------------  IN:    Dexmedetomidine: 22.6 mL    Enteral Tube Flush: 350 mL    Jevity 1.2: 1440 mL  Total IN: 1812.6 mL    OUT:    Drain (mL): 2 mL    FentaNYL: 0 mL    Incontinent per Condom Catheter (mL): 400 mL    Indwelling Catheter - Urethral (mL): 340 mL    Norepinephrine: 0 mL  Total OUT: 742 mL    Total NET: 1070.6 mL          LABS:                          7.7    7.50  )-----------( 130      ( 14 May 2021 07:54 )             25.3                                                   135  |  104  |  13  ----------------------------<  107<H>  4.7   |  23  |  0.6<L>    Ca    8.2<L>      13 May 2021 04:50  Mg     2.0     05-13    TPro  6.0  /  Alb  2.1<L>  /  TBili  0.3  /  DBili  x   /  AST  27  /  ALT  10  /  AlkPhos  180<H>  05-13      PT/INR - ( 12 May 2021 16:12 )   PT: 13.20 sec;   INR: 1.15 ratio         PTT - ( 12 May 2021 16:12 )  PTT:46.0 sec                                       Urinalysis Basic - ( 12 May 2021 15:47 )    Color: Yellow / Appearance: Clear / S.012 / pH: x  Gluc: x / Ketone: Negative  / Bili: Negative / Urobili: <2 mg/dL   Blood: x / Protein: 30 mg/dL / Nitrite: Negative   Leuk Esterase: Negative / RBC: 1 /HPF / WBC 0 /HPF   Sq Epi: x / Non Sq Epi: 1 /HPF / Bacteria: Negative        CARDIAC MARKERS ( 13 May 2021 04:50 )  x     / 0.09 ng/mL / x     / x     / x      CARDIAC MARKERS ( 12 May 2021 16:12 )  x     / 0.11 ng/mL / x     / x     / x                                                LIVER FUNCTIONS - ( 13 May 2021 04:50 )  Alb: 2.1 g/dL / Pro: 6.0 g/dL / ALK PHOS: 180 U/L / ALT: 10 U/L / AST: 27 U/L / GGT: x                                                  Culture - Blood (collected 12 May 2021 16:12)  Source: .Blood None  Preliminary Report (13 May 2021 22:02):    No growth to date.                                                   Mode: AC/ CMV (Assist Control/ Continuous Mandatory Ventilation)  RR (machine): 14  TV (machine): 400  FiO2: 40  PEEP: 7  ITime: 1  MAP: 7  PIP: 29                                      ABG - ( 14 May 2021 03:11 )  pH, Arterial: 7.46  pH, Blood: x     /  pCO2: 41    /  pO2: 90    / HCO3: 29    / Base Excess: 4.7   /  SaO2: 98                  MEDICATIONS  (STANDING):  ascorbic acid 500 milliGRAM(s) Oral daily  atorvastatin 40 milliGRAM(s) Oral at bedtime  carbidopa/levodopa  25/100 2 Tablet(s) Oral <User Schedule>  ceftazidime/avibactam IVPB 2.5 Gram(s) IV Intermittent every 8 hours  chlorhexidine 0.12% Liquid 15 milliLiter(s) Oral Mucosa every 12 hours  chlorhexidine 4% Liquid 1 Application(s) Topical <User Schedule>  collagenase Ointment 1 Application(s) Topical two times a day  Dakins Solution - 1/2 Strength 1 Application(s) Topical two times a day  enoxaparin Injectable 40 milliGRAM(s) SubCutaneous daily  fentaNYL   Infusion. 0.5 MICROgram(s)/kG/Hr (3.97 mL/Hr) IV Continuous <Continuous>  lidocaine 2% Gel 1 Application(s) Topical once  lidocaine 2% Viscous 100 milliLiter(s) Swish and Spit once  magnesium oxide 400 milliGRAM(s) Oral every 8 hours  metroNIDAZOLE  IVPB      metroNIDAZOLE  IVPB 500 milliGRAM(s) IV Intermittent once  metroNIDAZOLE  IVPB 500 milliGRAM(s) IV Intermittent every 8 hours  midodrine 10 milliGRAM(s) Oral every 8 hours  norepinephrine Infusion 0.05 MICROgram(s)/kG/Min (7.44 mL/Hr) IV Continuous <Continuous>  pantoprazole   Suspension 40 milliGRAM(s) Oral daily  polyethylene glycol 3350 17 Gram(s) Oral daily  senna 2 Tablet(s) Oral at bedtime  zinc sulfate 220 milliGRAM(s) Oral daily    MEDICATIONS  (PRN):  acetaminophen   Tablet .. 650 milliGRAM(s) Oral every 6 hours PRN Temp greater or equal to 38C (100.4F)  midazolam Injectable 2 milliGRAM(s) IV Push every 4 hours PRN agitation      Xrays:    left base opacity/atelectasis                                                                                 ECHO     Over Night Events:    no events overnight, still intubated, ventilated    ROS:  See HPI    PHYSICAL EXAM    ICU Vital Signs Last 24 Hrs  T(C): 36.5 (14 May 2021 07:00), Max: 37.3 (13 May 2021 19:54)  T(F): 97.7 (14 May 2021 07:00), Max: 99.2 (13 May 2021 19:54)  HR: 72 (14 May 2021 07:00) (50 - 97)  BP: 147/90 (14 May 2021 07:00) (96/59 - 166/85)  BP(mean): 106 (14 May 2021 07:00) (72 - 113)  RR: 17 (14 May 2021 07:00) (13 - 20)  SpO2: 100% (14 May 2021 08:10) (100% - 100%)      General ill looking  HEENT: SARA             Lymph Nodes: No cervical LN   Lungs: Bilateral BS, dec bs l base  Cardiovascular: Regular   Abdomen: Soft, Positive BS  Extremities: No clubbing   Skin: stage 4 sacral ulcer/ contracted  Neurological: responds to voice. open his eyes occasioanlly      21 @ 07:01  -  21 @ 07:00  --------------------------------------------------------  IN:    Dexmedetomidine: 22.6 mL    Enteral Tube Flush: 350 mL    Jevity 1.2: 1440 mL  Total IN: 1812.6 mL    OUT:    Drain (mL): 2 mL    FentaNYL: 0 mL    Incontinent per Condom Catheter (mL): 400 mL    Indwelling Catheter - Urethral (mL): 340 mL    Norepinephrine: 0 mL  Total OUT: 742 mL    Total NET: 1070.6 mL          LABS:                          7.7    7.50  )-----------( 130      ( 14 May 2021 07:54 )             25.3                                                   135  |  104  |  13  ----------------------------<  107<H>  4.7   |  23  |  0.6<L>    Ca    8.2<L>      13 May 2021 04:50  Mg     2.0     05-    TPro  6.0  /  Alb  2.1<L>  /  TBili  0.3  /  DBili  x   /  AST  27  /  ALT  10  /  AlkPhos  180<H>  -      PT/INR - ( 12 May 2021 16:12 )   PT: 13.20 sec;   INR: 1.15 ratio         PTT - ( 12 May 2021 16:12 )  PTT:46.0 sec                                       Urinalysis Basic - ( 12 May 2021 15:47 )    Color: Yellow / Appearance: Clear / S.012 / pH: x  Gluc: x / Ketone: Negative  / Bili: Negative / Urobili: <2 mg/dL   Blood: x / Protein: 30 mg/dL / Nitrite: Negative   Leuk Esterase: Negative / RBC: 1 /HPF / WBC 0 /HPF   Sq Epi: x / Non Sq Epi: 1 /HPF / Bacteria: Negative        CARDIAC MARKERS ( 13 May 2021 04:50 )  x     / 0.09 ng/mL / x     / x     / x      CARDIAC MARKERS ( 12 May 2021 16:12 )  x     / 0.11 ng/mL / x     / x     / x                                                LIVER FUNCTIONS - ( 13 May 2021 04:50 )  Alb: 2.1 g/dL / Pro: 6.0 g/dL / ALK PHOS: 180 U/L / ALT: 10 U/L / AST: 27 U/L / GGT: x                                                  Culture - Blood (collected 12 May 2021 16:12)  Source: .Blood None  Preliminary Report (13 May 2021 22:02):    No growth to date.                                                   Mode: AC/ CMV (Assist Control/ Continuous Mandatory Ventilation)  RR (machine): 14  TV (machine): 400  FiO2: 40  PEEP: 7  ITime: 1  MAP: 7  PIP: 29                                      ABG - ( 14 May 2021 03:11 )  pH, Arterial: 7.46  pH, Blood: x     /  pCO2: 41    /  pO2: 90    / HCO3: 29    / Base Excess: 4.7   /  SaO2: 98                  MEDICATIONS  (STANDING):  ascorbic acid 500 milliGRAM(s) Oral daily  atorvastatin 40 milliGRAM(s) Oral at bedtime  carbidopa/levodopa  25/100 2 Tablet(s) Oral <User Schedule>  ceftazidime/avibactam IVPB 2.5 Gram(s) IV Intermittent every 8 hours  chlorhexidine 0.12% Liquid 15 milliLiter(s) Oral Mucosa every 12 hours  chlorhexidine 4% Liquid 1 Application(s) Topical <User Schedule>  collagenase Ointment 1 Application(s) Topical two times a day  Dakins Solution - 1/2 Strength 1 Application(s) Topical two times a day  enoxaparin Injectable 40 milliGRAM(s) SubCutaneous daily  fentaNYL   Infusion. 0.5 MICROgram(s)/kG/Hr (3.97 mL/Hr) IV Continuous <Continuous>  lidocaine 2% Gel 1 Application(s) Topical once  lidocaine 2% Viscous 100 milliLiter(s) Swish and Spit once  magnesium oxide 400 milliGRAM(s) Oral every 8 hours  metroNIDAZOLE  IVPB      metroNIDAZOLE  IVPB 500 milliGRAM(s) IV Intermittent once  metroNIDAZOLE  IVPB 500 milliGRAM(s) IV Intermittent every 8 hours  midodrine 10 milliGRAM(s) Oral every 8 hours  norepinephrine Infusion 0.05 MICROgram(s)/kG/Min (7.44 mL/Hr) IV Continuous <Continuous>  pantoprazole   Suspension 40 milliGRAM(s) Oral daily  polyethylene glycol 3350 17 Gram(s) Oral daily  senna 2 Tablet(s) Oral at bedtime  zinc sulfate 220 milliGRAM(s) Oral daily    MEDICATIONS  (PRN):  acetaminophen   Tablet .. 650 milliGRAM(s) Oral every 6 hours PRN Temp greater or equal to 38C (100.4F)  midazolam Injectable 2 milliGRAM(s) IV Push every 4 hours PRN agitation      Xrays:    left base opacity/atelectasis                                                                                 ECHO

## 2021-05-14 NOTE — PROGRESS NOTE ADULT - ASSESSMENT
IMPRESSION:  Acute hypoxemic respiratory failure  AMS  HO L lung collapse sp bronch extubated on 5/7,  Septic shock resolved   Necrotic sacral wound culture s/p debridement  Cholecystitis sp CCY  MDR pneumonia Klebsiella/ pseudomonas   HO parkinson's and dementia sp PEG  HO DVT/ A fib  DEC HB NO ACTIVE BLEED  Thrombocytopenia, HIT negative, improving    PLAN:    CNS: SAT. F/u EEG report    HEENT:  Oral care    PULMONARY:  HOB @ 45 degrees.  Aspiration precautions.  ARDS network MV Setting.  PEEP 7.  Wean FiO2.  Early trach.       CARDIOVASCULAR: Keep equal. Avoid volume overload.    GI: GI prophylaxis. PEG feeding as tolerated.    RENAL:  F/u  lytes. Correct as needed. Accurate I/O    INFECTIOUS DISEASE: Abx per ID.      HEMATOLOGICAL:  DVT prophylaxis. duplex -ve    ENDOCRINE:  Follow up FS.  Insulin protocol if needed.    CODE STATUS: FULL CODE    Very poor prognosis    Step down unit   Poor prognosis IMPRESSION:    Acute hypoxemic respiratory failure sp intubation  AMS  HO L lung collapse sp bronch extubated on 5/7,  Septic shock resolved   Necrotic sacral wound culture s/p debridement  Cholecystitis sp CCY  MDR pneumonia Klebsiella/ pseudomonas   HO parkinson's and dementia sp PEG  HO DVT/ A fib  DEC HB NO ACTIVE BLEED  Thrombocytopenia, HIT negative, improving    PLAN:    CNS: SAT. F/u EEG report    HEENT:  Oral care    PULMONARY:  HOB @ 45 degrees.  Aspiration precautions.  ARDS network MV Setting.  PEEP 7.  Wean FiO2.  Early trach.       CARDIOVASCULAR: Keep equal. Avoid volume overload.    GI: GI prophylaxis. PEG feeding as tolerated.    RENAL:  F/u  lytes. Correct as needed. Accurate I/O    INFECTIOUS DISEASE: Abx per ID.      HEMATOLOGICAL:  DVT prophylaxis. duplex -ve    ENDOCRINE:  Follow up FS.  Insulin protocol if needed.  burn f/up  CODE STATUS: FULL CODE    Very poor prognosis    Step down unit

## 2021-05-14 NOTE — CONSULT NOTE ADULT - REASON FOR ADMISSION
Change in Mental Status

## 2021-05-14 NOTE — CONSULT NOTE ADULT - ASSESSMENT
71M w/ PMH of Parkinson's, dementia, CKD 3, 1st degree AV block, HLD, gout, HTN, and DM who presented from [facility] w/ AMS (verbal to non-verbal). Hospital course notable for metabolic encephalopathy i/s/o dementia, sepsis, pneumonia, acute cholecystitis, sacral ulcer s/p debridement w/ burn. Pt failed extubation multiple times. Most recently reintubated 5/12/21 2/2 respiratory distress from possible aspiration pneumonia. Current vent settings 400/14/40/7.     PLAN:   -  71M w/ PMH of Parkinson's, dementia, CKD 3, 1st degree AV block, HLD, gout, HTN, and DM who presented from [facility] w/ AMS (verbal to non-verbal). Hospital course notable for metabolic encephalopathy i/s/o dementia, sepsis, pneumonia, acute cholecystitis, sacral ulcer s/p debridement w/ burn. Pt failed extubation multiple times. Most recently reintubated 5/12/21 2/2 respiratory distress from possible aspiration pneumonia. Current vent settings 400/14/40/7.     PLAN:   - will schedule for OR Monday, 5/17 for tracheostomy  - over the weekend, please obtain: preop labs (CBC, BMP, Mg, Phos, coags, T&S), repeat COVID swab (needs to be <48H before OR), CXR, EKG  - d/w Dr. Padilla

## 2021-05-15 LAB
ALBUMIN SERPL ELPH-MCNC: 2.1 G/DL — LOW (ref 3.5–5.2)
ALP SERPL-CCNC: 154 U/L — HIGH (ref 30–115)
ALT FLD-CCNC: 6 U/L — SIGNIFICANT CHANGE UP (ref 0–41)
ANION GAP SERPL CALC-SCNC: 8 MMOL/L — SIGNIFICANT CHANGE UP (ref 7–14)
AST SERPL-CCNC: 26 U/L — SIGNIFICANT CHANGE UP (ref 0–41)
BASOPHILS # BLD AUTO: 0.03 K/UL — SIGNIFICANT CHANGE UP (ref 0–0.2)
BASOPHILS NFR BLD AUTO: 0.5 % — SIGNIFICANT CHANGE UP (ref 0–1)
BILIRUB SERPL-MCNC: 0.3 MG/DL — SIGNIFICANT CHANGE UP (ref 0.2–1.2)
BLD GP AB SCN SERPL QL: SIGNIFICANT CHANGE UP
BUN SERPL-MCNC: 13 MG/DL — SIGNIFICANT CHANGE UP (ref 10–20)
CALCIUM SERPL-MCNC: 8.2 MG/DL — LOW (ref 8.5–10.1)
CHLORIDE SERPL-SCNC: 100 MMOL/L — SIGNIFICANT CHANGE UP (ref 98–110)
CO2 SERPL-SCNC: 26 MMOL/L — SIGNIFICANT CHANGE UP (ref 17–32)
CREAT SERPL-MCNC: 0.6 MG/DL — LOW (ref 0.7–1.5)
EOSINOPHIL # BLD AUTO: 0.1 K/UL — SIGNIFICANT CHANGE UP (ref 0–0.7)
EOSINOPHIL NFR BLD AUTO: 1.7 % — SIGNIFICANT CHANGE UP (ref 0–8)
GLUCOSE BLDC GLUCOMTR-MCNC: 100 MG/DL — HIGH (ref 70–99)
GLUCOSE BLDC GLUCOMTR-MCNC: 90 MG/DL — SIGNIFICANT CHANGE UP (ref 70–99)
GLUCOSE BLDC GLUCOMTR-MCNC: 94 MG/DL — SIGNIFICANT CHANGE UP (ref 70–99)
GLUCOSE SERPL-MCNC: 98 MG/DL — SIGNIFICANT CHANGE UP (ref 70–99)
HCT VFR BLD CALC: 27.1 % — LOW (ref 42–52)
HGB BLD-MCNC: 8.5 G/DL — LOW (ref 14–18)
IMM GRANULOCYTES NFR BLD AUTO: 0.5 % — HIGH (ref 0.1–0.3)
INR BLD: 1.18 RATIO — SIGNIFICANT CHANGE UP (ref 0.65–1.3)
LYMPHOCYTES # BLD AUTO: 0.93 K/UL — LOW (ref 1.2–3.4)
LYMPHOCYTES # BLD AUTO: 15.6 % — LOW (ref 20.5–51.1)
MAGNESIUM SERPL-MCNC: 1.7 MG/DL — LOW (ref 1.8–2.4)
MCHC RBC-ENTMCNC: 27 PG — SIGNIFICANT CHANGE UP (ref 27–31)
MCHC RBC-ENTMCNC: 31.4 G/DL — LOW (ref 32–37)
MCV RBC AUTO: 86 FL — SIGNIFICANT CHANGE UP (ref 80–94)
MONOCYTES # BLD AUTO: 0.68 K/UL — HIGH (ref 0.1–0.6)
MONOCYTES NFR BLD AUTO: 11.4 % — HIGH (ref 1.7–9.3)
NEUTROPHILS # BLD AUTO: 4.18 K/UL — SIGNIFICANT CHANGE UP (ref 1.4–6.5)
NEUTROPHILS NFR BLD AUTO: 70.3 % — SIGNIFICANT CHANGE UP (ref 42.2–75.2)
NRBC # BLD: 0 /100 WBCS — SIGNIFICANT CHANGE UP (ref 0–0)
PHOSPHATE SERPL-MCNC: 3 MG/DL — SIGNIFICANT CHANGE UP (ref 2.1–4.9)
PLATELET # BLD AUTO: 154 K/UL — SIGNIFICANT CHANGE UP (ref 130–400)
POTASSIUM SERPL-MCNC: 4.4 MMOL/L — SIGNIFICANT CHANGE UP (ref 3.5–5)
POTASSIUM SERPL-SCNC: 4.4 MMOL/L — SIGNIFICANT CHANGE UP (ref 3.5–5)
PROT SERPL-MCNC: 6.5 G/DL — SIGNIFICANT CHANGE UP (ref 6–8)
PROTHROM AB SERPL-ACNC: 13.6 SEC — HIGH (ref 9.95–12.87)
RBC # BLD: 3.15 M/UL — LOW (ref 4.7–6.1)
RBC # FLD: 18.1 % — HIGH (ref 11.5–14.5)
SODIUM SERPL-SCNC: 134 MMOL/L — LOW (ref 135–146)
WBC # BLD: 5.95 K/UL — SIGNIFICANT CHANGE UP (ref 4.8–10.8)
WBC # FLD AUTO: 5.95 K/UL — SIGNIFICANT CHANGE UP (ref 4.8–10.8)

## 2021-05-15 PROCEDURE — 71045 X-RAY EXAM CHEST 1 VIEW: CPT | Mod: 26

## 2021-05-15 PROCEDURE — 99232 SBSQ HOSP IP/OBS MODERATE 35: CPT

## 2021-05-15 PROCEDURE — 99232 SBSQ HOSP IP/OBS MODERATE 35: CPT | Mod: GC

## 2021-05-15 RX ADMIN — CARBIDOPA AND LEVODOPA 2 TABLET(S): 25; 100 TABLET ORAL at 05:33

## 2021-05-15 RX ADMIN — MIDODRINE HYDROCHLORIDE 10 MILLIGRAM(S): 2.5 TABLET ORAL at 13:21

## 2021-05-15 RX ADMIN — CARBIDOPA AND LEVODOPA 2 TABLET(S): 25; 100 TABLET ORAL at 19:04

## 2021-05-15 RX ADMIN — CARBIDOPA AND LEVODOPA 2 TABLET(S): 25; 100 TABLET ORAL at 13:20

## 2021-05-15 RX ADMIN — CHLORHEXIDINE GLUCONATE 15 MILLILITER(S): 213 SOLUTION TOPICAL at 19:04

## 2021-05-15 RX ADMIN — Medication 650 MILLIGRAM(S): at 19:05

## 2021-05-15 RX ADMIN — Medication 100 MILLIGRAM(S): at 13:21

## 2021-05-15 RX ADMIN — Medication 500 MILLIGRAM(S): at 13:20

## 2021-05-15 RX ADMIN — Medication 100 MILLIGRAM(S): at 05:34

## 2021-05-15 RX ADMIN — Medication 1 APPLICATION(S): at 19:04

## 2021-05-15 RX ADMIN — MAGNESIUM OXIDE 400 MG ORAL TABLET 400 MILLIGRAM(S): 241.3 TABLET ORAL at 05:33

## 2021-05-15 RX ADMIN — MAGNESIUM OXIDE 400 MG ORAL TABLET 400 MILLIGRAM(S): 241.3 TABLET ORAL at 21:04

## 2021-05-15 RX ADMIN — PANTOPRAZOLE SODIUM 40 MILLIGRAM(S): 20 TABLET, DELAYED RELEASE ORAL at 13:20

## 2021-05-15 RX ADMIN — Medication 1 APPLICATION(S): at 05:32

## 2021-05-15 RX ADMIN — ENOXAPARIN SODIUM 40 MILLIGRAM(S): 100 INJECTION SUBCUTANEOUS at 13:20

## 2021-05-15 RX ADMIN — SENNA PLUS 2 TABLET(S): 8.6 TABLET ORAL at 21:01

## 2021-05-15 RX ADMIN — Medication 1 APPLICATION(S): at 05:33

## 2021-05-15 RX ADMIN — CHLORHEXIDINE GLUCONATE 1 APPLICATION(S): 213 SOLUTION TOPICAL at 05:32

## 2021-05-15 RX ADMIN — POLYETHYLENE GLYCOL 3350 17 GRAM(S): 17 POWDER, FOR SOLUTION ORAL at 13:20

## 2021-05-15 RX ADMIN — MIDODRINE HYDROCHLORIDE 10 MILLIGRAM(S): 2.5 TABLET ORAL at 21:01

## 2021-05-15 RX ADMIN — ZINC SULFATE TAB 220 MG (50 MG ZINC EQUIVALENT) 220 MILLIGRAM(S): 220 (50 ZN) TAB at 13:21

## 2021-05-15 RX ADMIN — Medication 100 MILLIGRAM(S): at 21:01

## 2021-05-15 RX ADMIN — CARBIDOPA AND LEVODOPA 2 TABLET(S): 25; 100 TABLET ORAL at 21:01

## 2021-05-15 RX ADMIN — ATORVASTATIN CALCIUM 40 MILLIGRAM(S): 80 TABLET, FILM COATED ORAL at 21:01

## 2021-05-15 RX ADMIN — MAGNESIUM OXIDE 400 MG ORAL TABLET 400 MILLIGRAM(S): 241.3 TABLET ORAL at 13:21

## 2021-05-15 RX ADMIN — Medication 1 APPLICATION(S): at 19:05

## 2021-05-15 RX ADMIN — CHLORHEXIDINE GLUCONATE 15 MILLILITER(S): 213 SOLUTION TOPICAL at 05:34

## 2021-05-15 NOTE — PROGRESS NOTE ADULT - SUBJECTIVE AND OBJECTIVE BOX
Patient is a 71y old  Male who presents with a chief complaint of Change in Mental Status (14 May 2021 12:46)        HPI:  71 year old Male with past medical history of Parkinson's, dementia, CKD Stage 3, 1st degree AV block, HLD, gout, HTN, DM, fungal septicemia presenting from [facility] for acute decompensation in mental status.     As per documentation patient at baseline is verbal but for past 3 days, he has been worsening to state of being non verbal. Patient was admitted to [facility] for fungemia and discharged on the 11th March. At  he had left hand cellulitis and was treated for it.    In ED patient hemodynamically stable, afebrile, ABEBE with rise in Cr to 1.9 and BUN to 75, remains non verbal. CT head negative for any new strokes and UA negative. Family has not visited the patient recently so unaware of his status. (22 Mar 2021 23:59)      Pt evaluated on AM rounds.  I reviewed the radiology tests and hospital record prior to visiting the patient.    Interval Events: No overnight events.    REVIEW OF SYSTEMS:   see HPI      OBJECTIVE:  ICU Vital Signs Last 24 Hrs  T(C): 36.9 (15 May 2021 05:00), Max: 38 (14 May 2021 18:00)  T(F): 98.4 (15 May 2021 05:00), Max: 100.4 (14 May 2021 18:00)  HR: 75 (15 May 2021 05:00) (69 - 80)  BP: 130/79 (15 May 2021 05:00) (130/79 - 168/87)  BP(mean): 121 (14 May 2021 23:43) (103 - 121)  ABP: --  ABP(mean): --  RR: 18 (15 May 2021 05:00) (18 - 18)  SpO2: 99% (15 May 2021 05:00) (99% - 100%)    Mode: AC/ CMV (Assist Control/ Continuous Mandatory Ventilation), RR (machine): 14, TV (machine): 400, FiO2: 30, PEEP: 5, ITime: 1, MAP: 8, PIP: 12    05-14 @ 07:01  -  05-15 @ 07:00  --------------------------------------------------------  IN: 2440 mL / OUT: 2330 mL / NET: 110 mL      CAPILLARY BLOOD GLUCOSE      POCT Blood Glucose.: 90 mg/dL (15 May 2021 05:28)        PHYSICAL EXAM:     · CONSTITUTIONAL:   not septic appearing,   well nourished,   NAD    · ENMT:   Airway patent,   Nasal mucosa clear.  Mouth with normal mucosa.   No thrush    · EYES:   Clear bilaterally,   pupils equal,   round and reactive to light.    · CARDIAC:   Normal rate,   regular rhythm.    Heart sounds S1, S2.   No murmurs, no rubs or gallops on auscultation  no edema        CAROTID:   normal systolic impulse  no bruits    · RESPIRATORY:   rales  normal chest expansion  no retractions or use of accessory muscles  palpation of chest is normal with no fremitus  percussion of chest demonstrates no hyperresonance or dullness    · GASTROINTESTINAL:  Abdomen soft,   non-tender,   + BS  liver/spleen not palpable    · MUSCULOSKELETAL:   no clubbing, cyanosis      · NEUROLOGICAL:   Unavailable as the patient is sedated.        · SKIN:   Skin normal color for race,   warm, dry   No evidence of rash.        · HEME LYMPH:   no splenomegaly.  No cervical  lymphadenopathy.  no inguinal lymphadenopathy    HOSPITAL MEDICATIONS:  MEDICATIONS  (STANDING):  ascorbic acid 500 milliGRAM(s) Oral daily  atorvastatin 40 milliGRAM(s) Oral at bedtime  carbidopa/levodopa  25/100 2 Tablet(s) Oral <User Schedule>  ceftazidime/avibactam IVPB 2.5 Gram(s) IV Intermittent every 8 hours  chlorhexidine 0.12% Liquid 15 milliLiter(s) Oral Mucosa every 12 hours  chlorhexidine 4% Liquid 1 Application(s) Topical <User Schedule>  collagenase Ointment 1 Application(s) Topical two times a day  Dakins Solution - 1/2 Strength 1 Application(s) Topical two times a day  enoxaparin Injectable 40 milliGRAM(s) SubCutaneous daily  fentaNYL   Infusion. 0.5 MICROgram(s)/kG/Hr (3.97 mL/Hr) IV Continuous <Continuous>  lidocaine 2% Gel 1 Application(s) Topical once  lidocaine 2% Viscous 100 milliLiter(s) Swish and Spit once  magnesium oxide 400 milliGRAM(s) Oral every 8 hours  metroNIDAZOLE  IVPB      metroNIDAZOLE  IVPB 500 milliGRAM(s) IV Intermittent once  metroNIDAZOLE  IVPB 500 milliGRAM(s) IV Intermittent every 8 hours  midodrine 10 milliGRAM(s) Oral every 8 hours  norepinephrine Infusion 0.05 MICROgram(s)/kG/Min (7.44 mL/Hr) IV Continuous <Continuous>  pantoprazole   Suspension 40 milliGRAM(s) Oral daily  polyethylene glycol 3350 17 Gram(s) Oral daily  senna 2 Tablet(s) Oral at bedtime  zinc sulfate 220 milliGRAM(s) Oral daily    MEDICATIONS  (PRN):  acetaminophen   Tablet .. 650 milliGRAM(s) Oral every 6 hours PRN Temp greater or equal to 38C (100.4F)  midazolam Injectable 2 milliGRAM(s) IV Push every 4 hours PRN agitation    lactated ringers Bolus:   500 milliLiter(s), IV Bolus, once, infuse over 15 Minute(s), Stop After 1 Doses  sodium chloride 0.9% Bolus:   250 milliLiter(s), IV Bolus, once, infuse over 15 Minute(s), Stop After 1 Doses  Special Instructions: for cheetah  sodium chloride 0.9% Bolus:   1000 milliLiter(s), IV Bolus, once, infuse over 60 Minute(s), Stop After 1 Doses  sodium chloride 0.9% Bolus:   1000 milliLiter(s), IV Bolus, once, infuse over 60 Minute(s), Stop After 1 Doses  sodium chloride 0.9% Bolus:   1000 milliLiter(s), IV Bolus, once, infuse over 60 Minute(s), Stop After 1 Doses  sodium chloride 0.9% Bolus:   1000 milliLiter(s), IV Bolus, once, infuse over 60 Minute(s), Stop After 1 Doses  sodium chloride 0.9% Bolus:   500 milliLiter(s), IV Bolus, once, infuse over 30 Minute(s), Stop After 1 Doses  sodium chloride 0.9% Bolus:   500 milliLiter(s), IV Bolus, once, infuse over 30 Minute(s), Stop After 1 Doses  sodium chloride 0.9%.: Solution, 1000 milliLiter(s) infuse at 75 mL/Hr      LABS:                        7.7    7.50  )-----------( 130      ( 14 May 2021 07:54 )             25.3     05-14    137  |  103  |  14  ----------------------------<  119<H>  4.4   |  25  |  0.6<L>    Ca    8.1<L>      14 May 2021 07:54  Mg     1.8     05-14    TPro  6.1  /  Alb  2.0<L>  /  TBili  0.3  /  DBili  x   /  AST  25  /  ALT  6   /  AlkPhos  148<H>  05-14        Arterial Blood Gas:  05-15 @ 03:34  7.49/40/127/31/99/6.9  ABG lactate: --  Arterial Blood Gas:  05-14 @ 03:11  7.46/41/90/29/98/4.7  ABG lactate: --          COVID-19 PCR: NotDetec (17 Apr 2021 10:31)  COVID-19 PCR: NotDetec (14 Apr 2021 14:29)  COVID-19 PCR: NotDetec (12 Apr 2021 15:00)  COVID-19 PCR: NotDetec (07 Apr 2021 14:55)  COVID-19 PCR: NotDetec (30 Mar 2021 15:19)  COVID-19 PCR: NotDetec (29 Mar 2021 19:40)  COVID-19 PCR: NotDetec (22 Mar 2021 20:13)  COVID-19 PCR: NotDetec (10 Mar 2021 12:22)  COVID-19 PCR: NotDetec (01 Mar 2021 16:49)  COVID-19 PCR: NotDetec (25 Feb 2021 15:34)  COVID-19 PCR: NotDetec (12 Feb 2021 10:17)    Mode: AC/ CMV (Assist Control/ Continuous Mandatory Ventilation)  RR (machine): 14  TV (machine): 400  FiO2: 30  PEEP: 5  ITime: 1  MAP: 8  PIP: 12      ABG - ( 15 May 2021 03:34 )  pH, Arterial: 7.49  pH, Blood: x     /  pCO2: 40    /  pO2: 127   / HCO3: 31    / Base Excess: 6.9   /  SaO2: 99                    RADIOLOGY: Today I personally reviewed latest CXR and other pertinent films.

## 2021-05-15 NOTE — PROGRESS NOTE ADULT - ASSESSMENT
71M w/ PMH of Parkinson's, dementia, CKD 3, 1st degree AV block, HLD, gout, HTN, and DM who presented from [facility] w/ AMS (verbal to non-verbal). Hospital course notable for metabolic encephalopathy i/s/o dementia, sepsis, pneumonia, acute cholecystitis, sacral ulcer s/p debridement w/ burn. Pt failed extubation multiple times. Most recently reintubated 5/12/21 2/2 respiratory distress from possible aspiration pneumonia. Current vent settings 400/14/40/7.     PLAN:   - will schedule for OR Monday, 5/17 for tracheostomy  - Sunday please obtain: preop labs (CBC, BMP, Mg, Phos, coags, T&S), repeat COVID swab (needs to be <48H before OR), CXR, EKG  - d/w Dr. Padilla

## 2021-05-15 NOTE — PROGRESS NOTE ADULT - ATTENDING COMMENTS
Trauma/ACS Attending Progress Note Attestation    Patient is examined and evaluated at the bedside with the residents/PAs. Treatment plan discussed with the team, nurses, and consulting physicians and consulting teams. Medications, radiological studies and all other relevant studies reviewed. I reviewed the resident/PA note and agreed with above assessment and plan with following additions and corrections.    NIEVES LABOY Patient is a 71y old  Male who presents with a chief complaint of Change in Mental Status (15 May 2021 08:07)      Vital Signs Last 24 Hrs  T(C): 37.3 (15 May 2021 08:08), Max: 38 (14 May 2021 18:00)  T(F): 99.2 (15 May 2021 08:08), Max: 100.4 (14 May 2021 18:00)  HR: 75 (15 May 2021 08:08) (69 - 80)  BP: 134/69 (15 May 2021 08:08) (130/79 - 168/87)  BP(mean): 121 (14 May 2021 23:43) (103 - 121)  RR: 18 (15 May 2021 08:08) (18 - 18)  SpO2: 100% (15 May 2021 08:08) (99% - 100%)                        8.5    5.95  )-----------( 154      ( 15 May 2021 08:13 )             27.1     05-14    137  |  103  |  14  ----------------------------<  119<H>  4.4   |  25  |  0.6<L>    Ca    8.1<L>      14 May 2021 07:54  Mg     1.8     05-14    TPro  6.1  /  Alb  2.0<L>  /  TBili  0.3  /  DBili  x   /  AST  25  /  ALT  6   /  AlkPhos  148<H>  05-14      Diagnosis: respiratory insufficiency and adult failure to thrive    Plan:	  - supportive care  - GI/DVT prophylaxis  - pain management  - follow up consults  - repeat studies as needed  - Plan for the OR on Monday

## 2021-05-15 NOTE — PROGRESS NOTE ADULT - SUBJECTIVE AND OBJECTIVE BOX
NIEVES LABOY [id number]  71y Male  53d    No acute overnight events. Patient will be booked for tracheostomy on Monday    PAST MEDICAL & SURGICAL HISTORY:  Parkinson disease  Hypertension  Gout  Dyslipidemia  Prostatitis  Cataract  No significant past surgical history    MEDICATIONS  (STANDING):  ascorbic acid 500 milliGRAM(s) Oral daily  atorvastatin 40 milliGRAM(s) Oral at bedtime  carbidopa/levodopa  25/100 2 Tablet(s) Oral <User Schedule>  ceftazidime/avibactam IVPB 2.5 Gram(s) IV Intermittent every 8 hours  chlorhexidine 0.12% Liquid 15 milliLiter(s) Oral Mucosa every 12 hours  chlorhexidine 4% Liquid 1 Application(s) Topical <User Schedule>  collagenase Ointment 1 Application(s) Topical two times a day  Dakins Solution - 1/2 Strength 1 Application(s) Topical two times a day  enoxaparin Injectable 40 milliGRAM(s) SubCutaneous daily  fentaNYL   Infusion. 0.5 MICROgram(s)/kG/Hr (3.97 mL/Hr) IV Continuous <Continuous>  lidocaine 2% Gel 1 Application(s) Topical once  lidocaine 2% Viscous 100 milliLiter(s) Swish and Spit once  magnesium oxide 400 milliGRAM(s) Oral every 8 hours  metroNIDAZOLE  IVPB      metroNIDAZOLE  IVPB 500 milliGRAM(s) IV Intermittent once  metroNIDAZOLE  IVPB 500 milliGRAM(s) IV Intermittent every 8 hours  midodrine 10 milliGRAM(s) Oral every 8 hours  norepinephrine Infusion 0.05 MICROgram(s)/kG/Min (7.44 mL/Hr) IV Continuous <Continuous>  pantoprazole   Suspension 40 milliGRAM(s) Oral daily  polyethylene glycol 3350 17 Gram(s) Oral daily  senna 2 Tablet(s) Oral at bedtime  zinc sulfate 220 milliGRAM(s) Oral daily    MEDICATIONS  (PRN):  acetaminophen   Tablet .. 650 milliGRAM(s) Oral every 6 hours PRN Temp greater or equal to 38C (100.4F)  midazolam Injectable 2 milliGRAM(s) IV Push every 4 hours PRN agitation    Allergies  No Known Allergies    REVIEW OF SYSTEMS  [ ] A ten-point review of systems was otherwise negative except as noted.  [X] Due to altered mental status/intubation, subjective information were not able to be obtained from the patient. History was obtained, to the extent possible, from review of the chart and collateral sources of information.    Vital Signs Last 24 Hrs  T(C): 36.5 (14 May 2021 07:00), Max: 37.3 (13 May 2021 19:54)  T(F): 97.7 (14 May 2021 07:00), Max: 99.2 (13 May 2021 19:54)  HR: 72 (14 May 2021 07:00) (66 - 97)  BP: 147/90 (14 May 2021 07:00) (96/59 - 158/69)  BP(mean): 106 (14 May 2021 07:00) (72 - 106)  RR: 17 (14 May 2021 07:00) (14 - 20)  SpO2: 100% (14 May 2021 08:10) (100% - 100%)    PHYSICAL EXAM:  GENERAL: NAD, eyes open, does not track, moves limbs spontaneously, does not follow commands  CHEST/LUNG: intubated, /14/40/7  HEART: Regular rate and rhythm  ABDOMEN: Soft, Nontender, Nondistended; PEG in place  EXTREMITIES: No clubbing, cyanosis, or edema    LABS:  POCT Blood Glucose.: 93 mg/dL (14 May 2021 11:40)  POCT Blood Glucose.: 93 mg/dL (14 May 2021 05:34)  POCT Blood Glucose.: 93 mg/dL (13 May 2021 23:51)  POCT Blood Glucose.: 123 mg/dL (13 May 2021 17:47)                        7.7    7.50  )-----------( 130      ( 14 May 2021 07:54 )             25.3         137  |  103  |  14  ----------------------------<  119<H>  4.4   |  25  |  0.6<L>    Calcium, Total Serum: 8.1 mg/dL (21 @ 07:54)    LFTs:             6.1  | 0.3  | 25       ------------------[148     ( 14 May 2021 07:54 )  2.0  | x    | 6           Blood Gas Arterial, Lactate: 0.8 mmoL/L (21 @ 03:11)  Blood Gas Arterial, Lactate: 0.9 mmoL/L (21 @ 04:23)  Blood Gas Arterial, Lactate: 0.7 mmoL/L (21 @ 01:05)  Lactate, Blood: 0.7 mmol/L (21 @ 16:12)  Blood Gas Arterial, Lactate: 0.7 mmoL/L (21 @ 11:54)  Blood Gas Arterial, Lactate: 0.8 mmoL/L (21 @ 09:08)    ABG - ( 14 May 2021 03:11 )  pH: 7.46  /  pCO2: 41    /  pO2: 90    / HCO3: 29    / Base Excess: 4.7   /  SaO2: 98        ABG - ( 13 May 2021 04:23 )  pH: 7.41  /  pCO2: 44    /  pO2: 161   / HCO3: 28    / Base Excess: 2.9   /  SaO2: 99        ABG - ( 13 May 2021 01:05 )  pH: 7.43  /  pCO2: 42    /  pO2: 308   / HCO3: 28    / Base Excess: 3.3   /  SaO2: x         Coags:     13.20  ----< 1.15    ( 12 May 2021 16:12 )     46.0      CARDIAC MARKERS ( 13 May 2021 04:50 )  x     / 0.09 ng/mL / x     / x     / x      CARDIAC MARKERS ( 12 May 2021 16:12 )  x     / 0.11 ng/mL / x     / x     / x        Serum Pro-Brain Natriuretic Peptide: 3716 pg/mL (21 @ 16:12)    Urinalysis Basic - ( 12 May 2021 15:47 )    Color: Yellow / Appearance: Clear / S.012 / pH: x  Gluc: x / Ketone: Negative  / Bili: Negative / Urobili: <2 mg/dL   Blood: x / Protein: 30 mg/dL / Nitrite: Negative   Leuk Esterase: Negative / RBC: 1 /HPF / WBC 0 /HPF   Sq Epi: x / Non Sq Epi: 1 /HPF / Bacteria: Negative    Culture - Blood (collected 12 May 2021 16:12)  Source: .Blood None  Preliminary Report (13 May 2021 22:02):    No growth to date.    RADIOLOGY & ADDITIONAL STUDIES:    < from: Xray Chest 1 View- PORTABLE-Routine (Xray Chest 1 View- PORTABLE-Routine in AM.) (05.14.21 @ 06:07) >  IMPRESSION:  New right and stable left basilar opacities.  < end of copied text >

## 2021-05-15 NOTE — PROGRESS NOTE ADULT - ASSESSMENT
IMPRESSION:    Acute hypoxemic respiratory failure sp intubation  AMS  HO L lung collapse sp bronch extubated on 5/7,  Septic shock resolved   Necrotic sacral wound culture s/p debridement  Cholecystitis sp CCY  MDR pneumonia Klebsiella/ pseudomonas   HO parkinson's and dementia sp PEG  HO DVT/ A fib  DEC HB NO ACTIVE BLEED  Thrombocytopenia, HIT negative, improving    PLAN:    CNS: SAT. F/u EEG report    HEENT:  Oral care    PULMONARY:  HOB @ 45 degrees.  Aspiration precautions.    ARDS network MV Setting.  PEEP 5.  Wean FiO2.    Early trach.       CARDIOVASCULAR: Keep equal. Avoid volume overload.    GI: GI prophylaxis. PEG feeding as tolerated.    RENAL:  F/u  lytes. Correct as needed. Accurate I/O    INFECTIOUS DISEASE: Abx per ID.      HEMATOLOGICAL:  DVT prophylaxis. duplex -ve    ENDOCRINE:  Follow up FS.  Insulin protocol if needed.  burn f/up  CODE STATUS: FULL CODE    Very poor prognosis    Step down unit

## 2021-05-16 LAB
ALBUMIN SERPL ELPH-MCNC: 2.3 G/DL — LOW (ref 3.5–5.2)
ALP SERPL-CCNC: 165 U/L — HIGH (ref 30–115)
ALT FLD-CCNC: <5 U/L — SIGNIFICANT CHANGE UP (ref 0–41)
ANION GAP SERPL CALC-SCNC: 11 MMOL/L — SIGNIFICANT CHANGE UP (ref 7–14)
ANION GAP SERPL CALC-SCNC: 9 MMOL/L — SIGNIFICANT CHANGE UP (ref 7–14)
APTT BLD: 39.3 SEC — HIGH (ref 27–39.2)
APTT BLD: 40 SEC — HIGH (ref 27–39.2)
AST SERPL-CCNC: 28 U/L — SIGNIFICANT CHANGE UP (ref 0–41)
BASOPHILS # BLD AUTO: 0.02 K/UL — SIGNIFICANT CHANGE UP (ref 0–0.2)
BASOPHILS # BLD AUTO: 0.04 K/UL — SIGNIFICANT CHANGE UP (ref 0–0.2)
BASOPHILS NFR BLD AUTO: 0.3 % — SIGNIFICANT CHANGE UP (ref 0–1)
BASOPHILS NFR BLD AUTO: 0.6 % — SIGNIFICANT CHANGE UP (ref 0–1)
BILIRUB SERPL-MCNC: 0.3 MG/DL — SIGNIFICANT CHANGE UP (ref 0.2–1.2)
BLD GP AB SCN SERPL QL: SIGNIFICANT CHANGE UP
BLD GP AB SCN SERPL QL: SIGNIFICANT CHANGE UP
BUN SERPL-MCNC: 15 MG/DL — SIGNIFICANT CHANGE UP (ref 10–20)
BUN SERPL-MCNC: 16 MG/DL — SIGNIFICANT CHANGE UP (ref 10–20)
CALCIUM SERPL-MCNC: 8.2 MG/DL — LOW (ref 8.5–10.1)
CALCIUM SERPL-MCNC: 8.4 MG/DL — LOW (ref 8.5–10.1)
CHLORIDE SERPL-SCNC: 100 MMOL/L — SIGNIFICANT CHANGE UP (ref 98–110)
CHLORIDE SERPL-SCNC: 98 MMOL/L — SIGNIFICANT CHANGE UP (ref 98–110)
CO2 SERPL-SCNC: 25 MMOL/L — SIGNIFICANT CHANGE UP (ref 17–32)
CO2 SERPL-SCNC: 26 MMOL/L — SIGNIFICANT CHANGE UP (ref 17–32)
CREAT SERPL-MCNC: 0.6 MG/DL — LOW (ref 0.7–1.5)
CREAT SERPL-MCNC: 0.7 MG/DL — SIGNIFICANT CHANGE UP (ref 0.7–1.5)
EOSINOPHIL # BLD AUTO: 0.09 K/UL — SIGNIFICANT CHANGE UP (ref 0–0.7)
EOSINOPHIL # BLD AUTO: 0.11 K/UL — SIGNIFICANT CHANGE UP (ref 0–0.7)
EOSINOPHIL NFR BLD AUTO: 1.1 % — SIGNIFICANT CHANGE UP (ref 0–8)
EOSINOPHIL NFR BLD AUTO: 1.6 % — SIGNIFICANT CHANGE UP (ref 0–8)
GLUCOSE BLDC GLUCOMTR-MCNC: 115 MG/DL — HIGH (ref 70–99)
GLUCOSE BLDC GLUCOMTR-MCNC: 90 MG/DL — SIGNIFICANT CHANGE UP (ref 70–99)
GLUCOSE SERPL-MCNC: 101 MG/DL — HIGH (ref 70–99)
GLUCOSE SERPL-MCNC: 91 MG/DL — SIGNIFICANT CHANGE UP (ref 70–99)
HCT VFR BLD CALC: 26.9 % — LOW (ref 42–52)
HCT VFR BLD CALC: 27.1 % — LOW (ref 42–52)
HGB BLD-MCNC: 8.5 G/DL — LOW (ref 14–18)
HGB BLD-MCNC: 8.6 G/DL — LOW (ref 14–18)
IMM GRANULOCYTES NFR BLD AUTO: 0.4 % — HIGH (ref 0.1–0.3)
IMM GRANULOCYTES NFR BLD AUTO: 0.5 % — HIGH (ref 0.1–0.3)
INR BLD: 1.22 RATIO — SIGNIFICANT CHANGE UP (ref 0.65–1.3)
INR BLD: 1.22 RATIO — SIGNIFICANT CHANGE UP (ref 0.65–1.3)
LYMPHOCYTES # BLD AUTO: 1 K/UL — LOW (ref 1.2–3.4)
LYMPHOCYTES # BLD AUTO: 1.05 K/UL — LOW (ref 1.2–3.4)
LYMPHOCYTES # BLD AUTO: 12.8 % — LOW (ref 20.5–51.1)
LYMPHOCYTES # BLD AUTO: 15.2 % — LOW (ref 20.5–51.1)
MAGNESIUM SERPL-MCNC: 1.7 MG/DL — LOW (ref 1.8–2.4)
MAGNESIUM SERPL-MCNC: 1.8 MG/DL — SIGNIFICANT CHANGE UP (ref 1.8–2.4)
MCHC RBC-ENTMCNC: 27.1 PG — SIGNIFICANT CHANGE UP (ref 27–31)
MCHC RBC-ENTMCNC: 27.6 PG — SIGNIFICANT CHANGE UP (ref 27–31)
MCHC RBC-ENTMCNC: 31.4 G/DL — LOW (ref 32–37)
MCHC RBC-ENTMCNC: 32 G/DL — SIGNIFICANT CHANGE UP (ref 32–37)
MCV RBC AUTO: 86.2 FL — SIGNIFICANT CHANGE UP (ref 80–94)
MCV RBC AUTO: 86.3 FL — SIGNIFICANT CHANGE UP (ref 80–94)
MONOCYTES # BLD AUTO: 0.95 K/UL — HIGH (ref 0.1–0.6)
MONOCYTES # BLD AUTO: 1.07 K/UL — HIGH (ref 0.1–0.6)
MONOCYTES NFR BLD AUTO: 13.6 % — HIGH (ref 1.7–9.3)
MONOCYTES NFR BLD AUTO: 13.7 % — HIGH (ref 1.7–9.3)
NEUTROPHILS # BLD AUTO: 4.75 K/UL — SIGNIFICANT CHANGE UP (ref 1.4–6.5)
NEUTROPHILS # BLD AUTO: 5.62 K/UL — SIGNIFICANT CHANGE UP (ref 1.4–6.5)
NEUTROPHILS NFR BLD AUTO: 68.5 % — SIGNIFICANT CHANGE UP (ref 42.2–75.2)
NEUTROPHILS NFR BLD AUTO: 71.7 % — SIGNIFICANT CHANGE UP (ref 42.2–75.2)
NRBC # BLD: 0 /100 WBCS — SIGNIFICANT CHANGE UP (ref 0–0)
NRBC # BLD: 0 /100 WBCS — SIGNIFICANT CHANGE UP (ref 0–0)
PHOSPHATE SERPL-MCNC: 3.2 MG/DL — SIGNIFICANT CHANGE UP (ref 2.1–4.9)
PHOSPHATE SERPL-MCNC: 3.4 MG/DL — SIGNIFICANT CHANGE UP (ref 2.1–4.9)
PLATELET # BLD AUTO: 177 K/UL — SIGNIFICANT CHANGE UP (ref 130–400)
PLATELET # BLD AUTO: 179 K/UL — SIGNIFICANT CHANGE UP (ref 130–400)
POTASSIUM SERPL-MCNC: 4.6 MMOL/L — SIGNIFICANT CHANGE UP (ref 3.5–5)
POTASSIUM SERPL-MCNC: 4.6 MMOL/L — SIGNIFICANT CHANGE UP (ref 3.5–5)
POTASSIUM SERPL-SCNC: 4.6 MMOL/L — SIGNIFICANT CHANGE UP (ref 3.5–5)
POTASSIUM SERPL-SCNC: 4.6 MMOL/L — SIGNIFICANT CHANGE UP (ref 3.5–5)
PROT SERPL-MCNC: 6.7 G/DL — SIGNIFICANT CHANGE UP (ref 6–8)
PROTHROM AB SERPL-ACNC: 14 SEC — HIGH (ref 9.95–12.87)
PROTHROM AB SERPL-ACNC: 14 SEC — HIGH (ref 9.95–12.87)
RBC # BLD: 3.12 M/UL — LOW (ref 4.7–6.1)
RBC # BLD: 3.14 M/UL — LOW (ref 4.7–6.1)
RBC # FLD: 18.2 % — HIGH (ref 11.5–14.5)
RBC # FLD: 18.4 % — HIGH (ref 11.5–14.5)
SODIUM SERPL-SCNC: 134 MMOL/L — LOW (ref 135–146)
SODIUM SERPL-SCNC: 135 MMOL/L — SIGNIFICANT CHANGE UP (ref 135–146)
WBC # BLD: 6.93 K/UL — SIGNIFICANT CHANGE UP (ref 4.8–10.8)
WBC # BLD: 7.84 K/UL — SIGNIFICANT CHANGE UP (ref 4.8–10.8)
WBC # FLD AUTO: 6.93 K/UL — SIGNIFICANT CHANGE UP (ref 4.8–10.8)
WBC # FLD AUTO: 7.84 K/UL — SIGNIFICANT CHANGE UP (ref 4.8–10.8)

## 2021-05-16 PROCEDURE — 71045 X-RAY EXAM CHEST 1 VIEW: CPT | Mod: 26

## 2021-05-16 PROCEDURE — 99232 SBSQ HOSP IP/OBS MODERATE 35: CPT

## 2021-05-16 RX ORDER — MAGNESIUM SULFATE 500 MG/ML
2 VIAL (ML) INJECTION ONCE
Refills: 0 | Status: COMPLETED | OUTPATIENT
Start: 2021-05-16 | End: 2021-05-16

## 2021-05-16 RX ORDER — SODIUM CHLORIDE 9 MG/ML
1000 INJECTION INTRAMUSCULAR; INTRAVENOUS; SUBCUTANEOUS
Refills: 0 | Status: DISCONTINUED | OUTPATIENT
Start: 2021-05-17 | End: 2021-05-26

## 2021-05-16 RX ADMIN — MAGNESIUM OXIDE 400 MG ORAL TABLET 400 MILLIGRAM(S): 241.3 TABLET ORAL at 05:17

## 2021-05-16 RX ADMIN — MIDODRINE HYDROCHLORIDE 10 MILLIGRAM(S): 2.5 TABLET ORAL at 13:43

## 2021-05-16 RX ADMIN — CHLORHEXIDINE GLUCONATE 15 MILLILITER(S): 213 SOLUTION TOPICAL at 05:17

## 2021-05-16 RX ADMIN — MIDAZOLAM HYDROCHLORIDE 2 MILLIGRAM(S): 1 INJECTION, SOLUTION INTRAMUSCULAR; INTRAVENOUS at 08:02

## 2021-05-16 RX ADMIN — CARBIDOPA AND LEVODOPA 2 TABLET(S): 25; 100 TABLET ORAL at 11:29

## 2021-05-16 RX ADMIN — CHLORHEXIDINE GLUCONATE 1 APPLICATION(S): 213 SOLUTION TOPICAL at 05:15

## 2021-05-16 RX ADMIN — CARBIDOPA AND LEVODOPA 2 TABLET(S): 25; 100 TABLET ORAL at 21:02

## 2021-05-16 RX ADMIN — CHLORHEXIDINE GLUCONATE 15 MILLILITER(S): 213 SOLUTION TOPICAL at 14:07

## 2021-05-16 RX ADMIN — Medication 1 APPLICATION(S): at 14:11

## 2021-05-16 RX ADMIN — Medication 100 MILLIGRAM(S): at 21:01

## 2021-05-16 RX ADMIN — SENNA PLUS 2 TABLET(S): 8.6 TABLET ORAL at 21:01

## 2021-05-16 RX ADMIN — ZINC SULFATE TAB 220 MG (50 MG ZINC EQUIVALENT) 220 MILLIGRAM(S): 220 (50 ZN) TAB at 11:36

## 2021-05-16 RX ADMIN — Medication 100 MILLIGRAM(S): at 13:48

## 2021-05-16 RX ADMIN — POLYETHYLENE GLYCOL 3350 17 GRAM(S): 17 POWDER, FOR SOLUTION ORAL at 11:33

## 2021-05-16 RX ADMIN — Medication 1 APPLICATION(S): at 05:16

## 2021-05-16 RX ADMIN — MIDODRINE HYDROCHLORIDE 10 MILLIGRAM(S): 2.5 TABLET ORAL at 21:01

## 2021-05-16 RX ADMIN — MIDODRINE HYDROCHLORIDE 10 MILLIGRAM(S): 2.5 TABLET ORAL at 05:13

## 2021-05-16 RX ADMIN — MORPHINE SULFATE 2 MILLIGRAM(S): 50 CAPSULE, EXTENDED RELEASE ORAL at 08:04

## 2021-05-16 RX ADMIN — CARBIDOPA AND LEVODOPA 2 TABLET(S): 25; 100 TABLET ORAL at 13:43

## 2021-05-16 RX ADMIN — MAGNESIUM OXIDE 400 MG ORAL TABLET 400 MILLIGRAM(S): 241.3 TABLET ORAL at 21:01

## 2021-05-16 RX ADMIN — Medication 1 APPLICATION(S): at 05:15

## 2021-05-16 RX ADMIN — PANTOPRAZOLE SODIUM 40 MILLIGRAM(S): 20 TABLET, DELAYED RELEASE ORAL at 11:33

## 2021-05-16 RX ADMIN — Medication 100 MILLIGRAM(S): at 05:14

## 2021-05-16 RX ADMIN — Medication 500 MILLIGRAM(S): at 11:32

## 2021-05-16 RX ADMIN — ENOXAPARIN SODIUM 40 MILLIGRAM(S): 100 INJECTION SUBCUTANEOUS at 11:36

## 2021-05-16 RX ADMIN — CARBIDOPA AND LEVODOPA 2 TABLET(S): 25; 100 TABLET ORAL at 05:13

## 2021-05-16 RX ADMIN — MIDAZOLAM HYDROCHLORIDE 2 MILLIGRAM(S): 1 INJECTION, SOLUTION INTRAMUSCULAR; INTRAVENOUS at 13:43

## 2021-05-16 RX ADMIN — MAGNESIUM OXIDE 400 MG ORAL TABLET 400 MILLIGRAM(S): 241.3 TABLET ORAL at 13:43

## 2021-05-16 RX ADMIN — ATORVASTATIN CALCIUM 40 MILLIGRAM(S): 80 TABLET, FILM COATED ORAL at 21:01

## 2021-05-16 NOTE — PROGRESS NOTE ADULT - SUBJECTIVE AND OBJECTIVE BOX
Patient is a 71y old  Male who presents with a chief complaint of Change in Mental Status (16 May 2021 08:20)      Over Night Events:  Patient seen and examined.   still on vent     ROS:  See HPI    PHYSICAL EXAM    ICU Vital Signs Last 24 Hrs  T(C): 36.9 (15 May 2021 22:00), Max: 36.9 (15 May 2021 22:00)  T(F): 98.5 (15 May 2021 22:00), Max: 98.5 (15 May 2021 22:00)  HR: 84 (15 May 2021 22:00) (80 - 84)  BP: 161/93 (15 May 2021 22:00) (157/93 - 161/93)  BP(mean): 121 (15 May 2021 22:00) (119 - 121)  ABP: --  ABP(mean): --  RR: 20 (15 May 2021 22:00) (20 - 20)  SpO2: 100% (15 May 2021 22:00) (100% - 100%)      General: at baseline respond to painful   HEENT:      et tube           Lymph Nodes: NO cervical LN   Lungs: Bilateral BS  Cardiovascular: Regular   Abdomen: Soft, Positive BS  Extremities: No clubbing   Skin: warm   Neurological:   Musculoskeletal: move all ext     I&O's Detail    15 May 2021 07:01  -  16 May 2021 07:00  --------------------------------------------------------  IN:    Enteral Tube Flush: 100 mL    IV PiggyBack: 100 mL    Jevity 1.2: 360 mL  Total IN: 560 mL    OUT:  Total OUT: 0 mL    Total NET: 560 mL          LABS:                          8.6    6.93  )-----------( 179      ( 16 May 2021 06:23 )             26.9         16 May 2021 06:23    134    |  98     |  15     ----------------------------<  91     4.6     |  25     |  0.6      Ca    8.2        16 May 2021 06:23  Phos  3.2       16 May 2021 06:23  Mg     1.7       16 May 2021 06:23    TPro  6.7    /  Alb  2.3    /  TBili  0.3    /  DBili  x      /  AST  28     /  ALT  <5     /  AlkPhos  165    16 May 2021 06:23  Amylase x     lipase x                                                 PT/INR - ( 16 May 2021 06:23 )   PT: 14.00 sec;   INR: 1.22 ratio         PTT - ( 16 May 2021 06:23 )  PTT:40.0 sec                                                                                                                                                Mode: AC/ CMV (Assist Control/ Continuous Mandatory Ventilation)  RR (machine): 14  TV (machine): 400  FiO2: 30  PEEP: 5  ITime: 1  MAP: 9  PIP: 15                                      ABG - ( 16 May 2021 05:09 )  pH, Arterial: 7.52  pH, Blood: x     /  pCO2: 37    /  pO2: 100   / HCO3: 30    / Base Excess: 6.7   /  SaO2: 99                  MEDICATIONS  (STANDING):  ascorbic acid 500 milliGRAM(s) Oral daily  atorvastatin 40 milliGRAM(s) Oral at bedtime  carbidopa/levodopa  25/100 2 Tablet(s) Oral <User Schedule>  ceftazidime/avibactam IVPB 2.5 Gram(s) IV Intermittent every 8 hours  chlorhexidine 0.12% Liquid 15 milliLiter(s) Oral Mucosa every 12 hours  chlorhexidine 4% Liquid 1 Application(s) Topical <User Schedule>  collagenase Ointment 1 Application(s) Topical two times a day  Dakins Solution - 1/2 Strength 1 Application(s) Topical two times a day  enoxaparin Injectable 40 milliGRAM(s) SubCutaneous daily  fentaNYL   Infusion. 0.5 MICROgram(s)/kG/Hr (3.97 mL/Hr) IV Continuous <Continuous>  lidocaine 2% Gel 1 Application(s) Topical once  lidocaine 2% Viscous 100 milliLiter(s) Swish and Spit once  magnesium oxide 400 milliGRAM(s) Oral every 8 hours  metroNIDAZOLE  IVPB      metroNIDAZOLE  IVPB 500 milliGRAM(s) IV Intermittent every 8 hours  midodrine 10 milliGRAM(s) Oral every 8 hours  norepinephrine Infusion 0.05 MICROgram(s)/kG/Min (7.44 mL/Hr) IV Continuous <Continuous>  pantoprazole   Suspension 40 milliGRAM(s) Oral daily  polyethylene glycol 3350 17 Gram(s) Oral daily  senna 2 Tablet(s) Oral at bedtime  zinc sulfate 220 milliGRAM(s) Oral daily    MEDICATIONS  (PRN):  acetaminophen   Tablet .. 650 milliGRAM(s) Oral every 6 hours PRN Temp greater or equal to 38C (100.4F)  midazolam Injectable 2 milliGRAM(s) IV Push every 4 hours PRN agitation          Xrays:  TLC:  OG:  ET tube:                                                                                       ECHO:  CAM ICU:

## 2021-05-16 NOTE — PROGRESS NOTE ADULT - ASSESSMENT
IMPRESSION:    Acute hypoxemic respiratory failure sp intubation  AMS  HO L lung collapse sp bronch extubated on 5/7,  Septic shock resolved   Necrotic sacral wound culture s/p debridement  Cholecystitis sp CCY  MDR pneumonia Klebsiella/ pseudomonas   HO parkinson's and dementia sp PEG  HO DVT/ A fib  DEC HB NO ACTIVE BLEED  Thrombocytopenia, HIT negative, improving    PLAN:    CNS: SAT. F/u EEG report    HEENT:  Oral care    PULMONARY:  HOB @ 45 degrees.  Aspiration precautions.    ARDS network MV Setting.  PEEP 5.  Wean FiO2.    Early trach.       CARDIOVASCULAR: Keep equal. Avoid volume overload.    GI: GI prophylaxis. PEG feeding as tolerated.    RENAL:  F/u  lytes. Correct as needed. Accurate I/O    INFECTIOUS DISEASE: Abx per ID.      HEMATOLOGICAL:  DVT prophylaxis. duplex -ve    ENDOCRINE:  Follow up FS.  Insulin protocol if needed.  burn f/up  CODE STATUS: FULL CODE    Very poor prognosis    Step down unit    IMPRESSION:    Acute hypoxemic respiratory failure sp intubation  AMS  HO L lung collapse sp bronch extubated on 5/7,  Septic shock resolved   Necrotic sacral wound culture s/p debridement  Cholecystitis sp CCY  MDR pneumonia Klebsiella/ pseudomonas   HO parkinson's and dementia sp PEG  HO DVT/ A fib  DEC HB NO ACTIVE BLEED  Thrombocytopenia, HIT negative, improving    PLAN:    CNS: SAT. F/u EEG report    HEENT:  Oral care    PULMONARY:  HOB @ 45 degrees.  Aspiration precautions.    ARDS network MV Setting.  PEEP 5.  Wean FiO2.    Early trach.       CARDIOVASCULAR: Keep equal. Avoid volume overload.    GI: GI prophylaxis. PEG feeding as tolerated.    RENAL:  F/u  lytes. Correct as needed. Accurate I/O    INFECTIOUS DISEASE: Abx per ID.    possible debridement osmin     HEMATOLOGICAL:  DVT prophylaxis. duplex -ve    ENDOCRINE:  Follow up FS.  Insulin protocol if needed.  burn f/up  CODE STATUS: FULL CODE    Very poor prognosis    Step down unit    IMPRESSION:    Acute hypoxemic respiratory failure sp intubation  AMS  HO L lung collapse sp bronch extubated on 5/7,  Septic shock resolved   Necrotic sacral wound culture s/p debridement  Cholecystitis sp CCY  MDR pneumonia Klebsiella/ pseudomonas   HO parkinson's and dementia sp PEG  HO DVT/ A fib  DEC HB NO ACTIVE BLEED  Thrombocytopenia, HIT negative, improving    PLAN:    CNS: SAT. F/u EEG report    HEENT:  Oral care    PULMONARY:  HOB @ 45 degrees.  Aspiration precautions.    ARDS network MV Setting.  PEEP 5.  Wean FiO2.    possible trach osmin    CARDIOVASCULAR: Keep equal. Avoid volume overload.    GI: GI prophylaxis. PEG feeding as tolerated.    RENAL:  F/u  lytes. Correct as needed. Accurate I/O    INFECTIOUS DISEASE: Abx per ID.        HEMATOLOGICAL:  DVT prophylaxis. duplex -ve    ENDOCRINE:  Follow up FS.  Insulin protocol if needed.  burn f/up  CODE STATUS: FULL CODE    Very poor prognosis    Step down unit
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
## 2021-05-16 NOTE — PROGRESS NOTE ADULT - SUBJECTIVE AND OBJECTIVE BOX
Progress Note: General Surgery  Patient: NIEVES LABOY , 71y (1950)Male   MRN: [id number]  Location: [address]  Visit: 03-22-21 Inpatient  Date: 05-16-21 @ 08:20    Admit Diagnosis/Chief Complaint: Sacral pressure sore        Procedure/Diagnosis: Sacral pressure sore     S/P Debridement of ulcer of sacrum or ulcer of ischium        Events/ 24h: Patient seen and examined at bedside. No acute events overnight. Afebrile, VSS.    Vitals: T(F): 98.5 (05-15-21 @ 22:00), Max: 98.5 (05-15-21 @ 22:00)  HR: 84 (05-15-21 @ 22:00)  BP: 161/93 (05-15-21 @ 22:00) (157/93 - 161/93)  RR: 20 (05-15-21 @ 22:00)  SpO2: 100% (05-15-21 @ 22:00)  RR (machine): 14, TV (machine): 400, FiO2: 30, PEEP: 5, PIP: 15  In:   05-15-21 @ 07:01  -  05-16-21 @ 07:00  --------------------------------------------------------  IN: 560 mL      Out:   05-15-21 @ 07:01  -  05-16-21 @ 07:00  --------------------------------------------------------  OUT:  Total OUT: 0 mL        Net:   05-15-21 @ 07:01  -  05-16-21 @ 07:00  --------------------------------------------------------  NET: 560 mL        Diet: Diet, NPO after Midnight:      NPO Start Date: 16-May-2021,   NPO Start Time: 23:59  Except Medications (05-16-21 @ 07:42)  Diet, NPO with Tube Feed:   Tube Feeding Modality: Gastrostomy  Jevity 1.2 Sarmad  Total Volume for 24 Hours (mL): 1440  Bolus  Total Volume of Bolus (mL):  360  Tube Feed Frequency: Every 6 hours   Tube Feed Start Time: 12:00  Bolus Feed Rate (mL per Hour): 500   Bolus Feed Duration (in Hours): 0.75  Bolus Feed Instructions:  50 ml free water flush before and after feed  Free Water Flush  Moris(7 Gm Arginine/7 Gm Glut/1.2 Gm HMB     Qty per Day:  2 (05-01-21 @ 11:32)    IV Fluids: ascorbic acid 500 milliGRAM(s) Oral daily  magnesium oxide 400 milliGRAM(s) Oral every 8 hours  zinc sulfate 220 milliGRAM(s) Oral daily      Physical Examination:  General Appearance: NAD   HEENT: EOMI, sclera anicteric.  Heart: RRR   Lungs: Symmetric chest wall expansion, equal rise and fall.  Abdomen:  Soft, nontender, nondistended.   MSK/Extremities: Warm & well-perfused.   Skin: Warm, dry. No jaundice.       Medications: [Standing]  ascorbic acid 500 milliGRAM(s) Oral daily  atorvastatin 40 milliGRAM(s) Oral at bedtime  carbidopa/levodopa  25/100 2 Tablet(s) Oral <User Schedule>  ceftazidime/avibactam IVPB 2.5 Gram(s) IV Intermittent every 8 hours  chlorhexidine 0.12% Liquid 15 milliLiter(s) Oral Mucosa every 12 hours  chlorhexidine 4% Liquid 1 Application(s) Topical <User Schedule>  collagenase Ointment 1 Application(s) Topical two times a day  Dakins Solution - 1/2 Strength 1 Application(s) Topical two times a day  enoxaparin Injectable 40 milliGRAM(s) SubCutaneous daily  fentaNYL   Infusion. 0.5 MICROgram(s)/kG/Hr (3.97 mL/Hr) IV Continuous <Continuous>  lidocaine 2% Gel 1 Application(s) Topical once  lidocaine 2% Viscous 100 milliLiter(s) Swish and Spit once  magnesium oxide 400 milliGRAM(s) Oral every 8 hours  metroNIDAZOLE  IVPB      metroNIDAZOLE  IVPB 500 milliGRAM(s) IV Intermittent every 8 hours  midodrine 10 milliGRAM(s) Oral every 8 hours  norepinephrine Infusion 0.05 MICROgram(s)/kG/Min (7.44 mL/Hr) IV Continuous <Continuous>  pantoprazole   Suspension 40 milliGRAM(s) Oral daily  polyethylene glycol 3350 17 Gram(s) Oral daily  senna 2 Tablet(s) Oral at bedtime  zinc sulfate 220 milliGRAM(s) Oral daily    DVT Prophylaxis: enoxaparin Injectable 40 milliGRAM(s) SubCutaneous daily    GI Prophylaxis: pantoprazole   Suspension 40 milliGRAM(s) Oral daily    Antibiotics: ceftazidime/avibactam IVPB 2.5 Gram(s) IV Intermittent every 8 hours  metroNIDAZOLE  IVPB      metroNIDAZOLE  IVPB 500 milliGRAM(s) IV Intermittent every 8 hours    Anticoagulation:   Medications:[PRN]  acetaminophen   Tablet .. 650 milliGRAM(s) Oral every 6 hours PRN  midazolam Injectable 2 milliGRAM(s) IV Push every 4 hours PRN      Labs:                        8.6    6.93  )-----------( 179      ( 16 May 2021 06:23 )             26.9     05-16    134<L>  |  98  |  15  ----------------------------<  91  4.6   |  25  |  0.6<L>    Ca    8.2<L>      16 May 2021 06:23  Phos  3.2     05-16  Mg     1.7     05-16    TPro  6.7  /  Alb  2.3<L>  /  TBili  0.3  /  DBili  x   /  AST  28  /  ALT  <5  /  AlkPhos  165<H>  05-16    LIVER FUNCTIONS - ( 16 May 2021 06:23 )  Alb: 2.3 g/dL / Pro: 6.7 g/dL / ALK PHOS: 165 U/L / ALT: <5 U/L / AST: 28 U/L / GGT: x           PT/INR - ( 16 May 2021 06:23 )   PT: 14.00 sec;   INR: 1.22 ratio         PTT - ( 16 May 2021 06:23 )  PTT:40.0 sec  ABG - ( 16 May 2021 05:09 )  pH: 7.52  /  pCO2: 37    /  pO2: 100   / HCO3: 30    / Base Excess: 6.7   /  SaO2: 99          Urine/Micro:        Imaging:  Progress Note: General Surgery  Patient: NIEVES LABOY , 71y (1950)Male   MRN: [id number]  Location: [address]  Visit: 03-22-21 Inpatient  Date: 05-16-21 @ 08:20    Admit Diagnosis/Chief Complaint: Sacral pressure sore        Procedure/Diagnosis: Sacral pressure sore S/P Debridement of ulcer of sacrum or ulcer of ischium        Events/ 24h: Patient seen and examined at bedside. No acute events overnight. Afebrile, VSS.    Vitals: T(F): 98.5 (05-15-21 @ 22:00), Max: 98.5 (05-15-21 @ 22:00)  HR: 84 (05-15-21 @ 22:00)  BP: 161/93 (05-15-21 @ 22:00) (157/93 - 161/93)  RR: 20 (05-15-21 @ 22:00)  SpO2: 100% (05-15-21 @ 22:00)  RR (machine): 14, TV (machine): 400, FiO2: 30, PEEP: 5, PIP: 15  In:   05-15-21 @ 07:01  -  05-16-21 @ 07:00  --------------------------------------------------------  IN: 560 mL      Out:   05-15-21 @ 07:01  -  05-16-21 @ 07:00  --------------------------------------------------------  OUT:  Total OUT: 0 mL        Net:   05-15-21 @ 07:01  -  05-16-21 @ 07:00  --------------------------------------------------------  NET: 560 mL        Diet: Diet, NPO after Midnight:      NPO Start Date: 16-May-2021,   NPO Start Time: 23:59  Except Medications (05-16-21 @ 07:42)  Diet, NPO with Tube Feed:   Tube Feeding Modality: Gastrostomy  Jevity 1.2 Sarmad  Total Volume for 24 Hours (mL): 1440  Bolus  Total Volume of Bolus (mL):  360  Tube Feed Frequency: Every 6 hours   Tube Feed Start Time: 12:00  Bolus Feed Rate (mL per Hour): 500   Bolus Feed Duration (in Hours): 0.75  Bolus Feed Instructions:  50 ml free water flush before and after feed  Free Water Flush  Moris(7 Gm Arginine/7 Gm Glut/1.2 Gm HMB     Qty per Day:  2 (05-01-21 @ 11:32)    IV Fluids: ascorbic acid 500 milliGRAM(s) Oral daily  magnesium oxide 400 milliGRAM(s) Oral every 8 hours  zinc sulfate 220 milliGRAM(s) Oral daily      Physical Examination:  General Appearance: NAD   HEENT: EOMI, sclera anicteric.  Heart: RRR   Lungs: Symmetric chest wall expansion, equal rise and fall.  Abdomen:  Soft, nontender, nondistended.   MSK/Extremities: Warm & well-perfused.   Skin: Warm, dry. No jaundice.       Medications: [Standing]  ascorbic acid 500 milliGRAM(s) Oral daily  atorvastatin 40 milliGRAM(s) Oral at bedtime  carbidopa/levodopa  25/100 2 Tablet(s) Oral <User Schedule>  ceftazidime/avibactam IVPB 2.5 Gram(s) IV Intermittent every 8 hours  chlorhexidine 0.12% Liquid 15 milliLiter(s) Oral Mucosa every 12 hours  chlorhexidine 4% Liquid 1 Application(s) Topical <User Schedule>  collagenase Ointment 1 Application(s) Topical two times a day  Dakins Solution - 1/2 Strength 1 Application(s) Topical two times a day  enoxaparin Injectable 40 milliGRAM(s) SubCutaneous daily  fentaNYL   Infusion. 0.5 MICROgram(s)/kG/Hr (3.97 mL/Hr) IV Continuous <Continuous>  lidocaine 2% Gel 1 Application(s) Topical once  lidocaine 2% Viscous 100 milliLiter(s) Swish and Spit once  magnesium oxide 400 milliGRAM(s) Oral every 8 hours  metroNIDAZOLE  IVPB      metroNIDAZOLE  IVPB 500 milliGRAM(s) IV Intermittent every 8 hours  midodrine 10 milliGRAM(s) Oral every 8 hours  norepinephrine Infusion 0.05 MICROgram(s)/kG/Min (7.44 mL/Hr) IV Continuous <Continuous>  pantoprazole   Suspension 40 milliGRAM(s) Oral daily  polyethylene glycol 3350 17 Gram(s) Oral daily  senna 2 Tablet(s) Oral at bedtime  zinc sulfate 220 milliGRAM(s) Oral daily    DVT Prophylaxis: enoxaparin Injectable 40 milliGRAM(s) SubCutaneous daily    GI Prophylaxis: pantoprazole   Suspension 40 milliGRAM(s) Oral daily    Antibiotics: ceftazidime/avibactam IVPB 2.5 Gram(s) IV Intermittent every 8 hours  metroNIDAZOLE  IVPB      metroNIDAZOLE  IVPB 500 milliGRAM(s) IV Intermittent every 8 hours    Anticoagulation:   Medications:[PRN]  acetaminophen   Tablet .. 650 milliGRAM(s) Oral every 6 hours PRN  midazolam Injectable 2 milliGRAM(s) IV Push every 4 hours PRN      Labs:                        8.6    6.93  )-----------( 179      ( 16 May 2021 06:23 )             26.9     05-16    134<L>  |  98  |  15  ----------------------------<  91  4.6   |  25  |  0.6<L>    Ca    8.2<L>      16 May 2021 06:23  Phos  3.2     05-16  Mg     1.7     05-16    TPro  6.7  /  Alb  2.3<L>  /  TBili  0.3  /  DBili  x   /  AST  28  /  ALT  <5  /  AlkPhos  165<H>  05-16    LIVER FUNCTIONS - ( 16 May 2021 06:23 )  Alb: 2.3 g/dL / Pro: 6.7 g/dL / ALK PHOS: 165 U/L / ALT: <5 U/L / AST: 28 U/L / GGT: x           PT/INR - ( 16 May 2021 06:23 )   PT: 14.00 sec;   INR: 1.22 ratio         PTT - ( 16 May 2021 06:23 )  PTT:40.0 sec  ABG - ( 16 May 2021 05:09 )  pH: 7.52  /  pCO2: 37    /  pO2: 100   / HCO3: 30    / Base Excess: 6.7   /  SaO2: 99

## 2021-05-17 LAB
ALBUMIN SERPL ELPH-MCNC: 2.1 G/DL — LOW (ref 3.5–5.2)
ALP SERPL-CCNC: 139 U/L — HIGH (ref 30–115)
ALT FLD-CCNC: <5 U/L — SIGNIFICANT CHANGE UP (ref 0–41)
ANION GAP SERPL CALC-SCNC: 13 MMOL/L — SIGNIFICANT CHANGE UP (ref 7–14)
AST SERPL-CCNC: 24 U/L — SIGNIFICANT CHANGE UP (ref 0–41)
BASOPHILS # BLD AUTO: 0.05 K/UL — SIGNIFICANT CHANGE UP (ref 0–0.2)
BASOPHILS NFR BLD AUTO: 1.1 % — HIGH (ref 0–1)
BILIRUB SERPL-MCNC: 0.4 MG/DL — SIGNIFICANT CHANGE UP (ref 0.2–1.2)
BUN SERPL-MCNC: 16 MG/DL — SIGNIFICANT CHANGE UP (ref 10–20)
CALCIUM SERPL-MCNC: 8.1 MG/DL — LOW (ref 8.5–10.1)
CHLORIDE SERPL-SCNC: 99 MMOL/L — SIGNIFICANT CHANGE UP (ref 98–110)
CO2 SERPL-SCNC: 23 MMOL/L — SIGNIFICANT CHANGE UP (ref 17–32)
CREAT SERPL-MCNC: 0.7 MG/DL — SIGNIFICANT CHANGE UP (ref 0.7–1.5)
CULTURE RESULTS: SIGNIFICANT CHANGE UP
EOSINOPHIL # BLD AUTO: 0.15 K/UL — SIGNIFICANT CHANGE UP (ref 0–0.7)
EOSINOPHIL NFR BLD AUTO: 3.2 % — SIGNIFICANT CHANGE UP (ref 0–8)
GLUCOSE BLDC GLUCOMTR-MCNC: 89 MG/DL — SIGNIFICANT CHANGE UP (ref 70–99)
GLUCOSE BLDC GLUCOMTR-MCNC: 96 MG/DL — SIGNIFICANT CHANGE UP (ref 70–99)
GLUCOSE SERPL-MCNC: 86 MG/DL — SIGNIFICANT CHANGE UP (ref 70–99)
HCT VFR BLD CALC: 24.9 % — LOW (ref 42–52)
HGB BLD-MCNC: 7.7 G/DL — LOW (ref 14–18)
IMM GRANULOCYTES NFR BLD AUTO: 0.4 % — HIGH (ref 0.1–0.3)
LYMPHOCYTES # BLD AUTO: 0.72 K/UL — LOW (ref 1.2–3.4)
LYMPHOCYTES # BLD AUTO: 15.3 % — LOW (ref 20.5–51.1)
MAGNESIUM SERPL-MCNC: 1.9 MG/DL — SIGNIFICANT CHANGE UP (ref 1.8–2.4)
MCHC RBC-ENTMCNC: 26.9 PG — LOW (ref 27–31)
MCHC RBC-ENTMCNC: 30.9 G/DL — LOW (ref 32–37)
MCV RBC AUTO: 87.1 FL — SIGNIFICANT CHANGE UP (ref 80–94)
MONOCYTES # BLD AUTO: 0.7 K/UL — HIGH (ref 0.1–0.6)
MONOCYTES NFR BLD AUTO: 14.9 % — HIGH (ref 1.7–9.3)
NEUTROPHILS # BLD AUTO: 3.07 K/UL — SIGNIFICANT CHANGE UP (ref 1.4–6.5)
NEUTROPHILS NFR BLD AUTO: 65.1 % — SIGNIFICANT CHANGE UP (ref 42.2–75.2)
NRBC # BLD: 0 /100 WBCS — SIGNIFICANT CHANGE UP (ref 0–0)
PLATELET # BLD AUTO: 166 K/UL — SIGNIFICANT CHANGE UP (ref 130–400)
POTASSIUM SERPL-MCNC: 4 MMOL/L — SIGNIFICANT CHANGE UP (ref 3.5–5)
POTASSIUM SERPL-SCNC: 4 MMOL/L — SIGNIFICANT CHANGE UP (ref 3.5–5)
PROT SERPL-MCNC: 6.2 G/DL — SIGNIFICANT CHANGE UP (ref 6–8)
RBC # BLD: 2.86 M/UL — LOW (ref 4.7–6.1)
RBC # FLD: 18.3 % — HIGH (ref 11.5–14.5)
SARS-COV-2 RNA SPEC QL NAA+PROBE: SIGNIFICANT CHANGE UP
SODIUM SERPL-SCNC: 135 MMOL/L — SIGNIFICANT CHANGE UP (ref 135–146)
SPECIMEN SOURCE: SIGNIFICANT CHANGE UP
WBC # BLD: 4.71 K/UL — LOW (ref 4.8–10.8)
WBC # FLD AUTO: 4.71 K/UL — LOW (ref 4.8–10.8)

## 2021-05-17 PROCEDURE — 99232 SBSQ HOSP IP/OBS MODERATE 35: CPT

## 2021-05-17 PROCEDURE — 71045 X-RAY EXAM CHEST 1 VIEW: CPT | Mod: 26

## 2021-05-17 RX ADMIN — CHLORHEXIDINE GLUCONATE 1 APPLICATION(S): 213 SOLUTION TOPICAL at 05:01

## 2021-05-17 RX ADMIN — ZINC SULFATE TAB 220 MG (50 MG ZINC EQUIVALENT) 220 MILLIGRAM(S): 220 (50 ZN) TAB at 11:23

## 2021-05-17 RX ADMIN — CARBIDOPA AND LEVODOPA 2 TABLET(S): 25; 100 TABLET ORAL at 15:17

## 2021-05-17 RX ADMIN — MIDODRINE HYDROCHLORIDE 10 MILLIGRAM(S): 2.5 TABLET ORAL at 05:01

## 2021-05-17 RX ADMIN — SODIUM CHLORIDE 75 MILLILITER(S): 9 INJECTION INTRAMUSCULAR; INTRAVENOUS; SUBCUTANEOUS at 21:31

## 2021-05-17 RX ADMIN — CARBIDOPA AND LEVODOPA 2 TABLET(S): 25; 100 TABLET ORAL at 05:01

## 2021-05-17 RX ADMIN — Medication 1 APPLICATION(S): at 17:06

## 2021-05-17 RX ADMIN — Medication 1 APPLICATION(S): at 17:07

## 2021-05-17 RX ADMIN — PANTOPRAZOLE SODIUM 40 MILLIGRAM(S): 20 TABLET, DELAYED RELEASE ORAL at 11:22

## 2021-05-17 RX ADMIN — Medication 1 APPLICATION(S): at 05:01

## 2021-05-17 RX ADMIN — POLYETHYLENE GLYCOL 3350 17 GRAM(S): 17 POWDER, FOR SOLUTION ORAL at 11:23

## 2021-05-17 RX ADMIN — Medication 500 MILLIGRAM(S): at 11:23

## 2021-05-17 RX ADMIN — MAGNESIUM OXIDE 400 MG ORAL TABLET 400 MILLIGRAM(S): 241.3 TABLET ORAL at 15:17

## 2021-05-17 RX ADMIN — MAGNESIUM OXIDE 400 MG ORAL TABLET 400 MILLIGRAM(S): 241.3 TABLET ORAL at 21:32

## 2021-05-17 RX ADMIN — MAGNESIUM OXIDE 400 MG ORAL TABLET 400 MILLIGRAM(S): 241.3 TABLET ORAL at 05:01

## 2021-05-17 RX ADMIN — CARBIDOPA AND LEVODOPA 2 TABLET(S): 25; 100 TABLET ORAL at 21:32

## 2021-05-17 RX ADMIN — CHLORHEXIDINE GLUCONATE 15 MILLILITER(S): 213 SOLUTION TOPICAL at 05:03

## 2021-05-17 RX ADMIN — Medication 100 MILLIGRAM(S): at 05:00

## 2021-05-17 RX ADMIN — CARBIDOPA AND LEVODOPA 2 TABLET(S): 25; 100 TABLET ORAL at 10:30

## 2021-05-17 RX ADMIN — ATORVASTATIN CALCIUM 40 MILLIGRAM(S): 80 TABLET, FILM COATED ORAL at 21:32

## 2021-05-17 RX ADMIN — MIDODRINE HYDROCHLORIDE 10 MILLIGRAM(S): 2.5 TABLET ORAL at 15:17

## 2021-05-17 RX ADMIN — CHLORHEXIDINE GLUCONATE 15 MILLILITER(S): 213 SOLUTION TOPICAL at 17:21

## 2021-05-17 RX ADMIN — SENNA PLUS 2 TABLET(S): 8.6 TABLET ORAL at 21:32

## 2021-05-17 NOTE — PROGRESS NOTE ADULT - SUBJECTIVE AND OBJECTIVE BOX
NIEVES LABOY  71y, Male  Allergy: No Known Allergies      LOS  56d    CHIEF COMPLAINT: Change in Mental Status (17 May 2021 09:25)      INTERVAL EVENTS/HPI  - T(F): , Max: 100.3 (05-16-21 @ 16:00)  - WBC Count: 4.71 (05-17-21 @ 06:10)  WBC Count: 7.84 (05-16-21 @ 11:37)  - Creatinine, Serum: 0.7 (05-17-21 @ 06:10)  Creatinine, Serum: 0.7 (05-16-21 @ 11:37)     -   -   -     ROS  cannot obtain secondary to patient's sedation and/or mental status    VITALS:  T(F): 98.1, Max: 100.3 (05-16-21 @ 16:00)  HR: 72  BP: 123/69  RR: 16Vital Signs Last 24 Hrs  T(C): 36.7 (17 May 2021 12:00), Max: 37.9 (16 May 2021 16:00)  T(F): 98.1 (17 May 2021 12:00), Max: 100.3 (16 May 2021 16:00)  HR: 72 (17 May 2021 12:00) (71 - 76)  BP: 123/69 (17 May 2021 12:00) (111/58 - 153/80)  BP(mean): 89 (17 May 2021 12:00) (75 - 106)  RR: 16 (17 May 2021 12:00) (14 - 20)  SpO2: 98% (17 May 2021 12:00) (98% - 100%)    PHYSICAL EXAM:  Gen: Intubated  CV: RRR  Lungs: Decreased BS at bases  Abd: Soft  Neuro: Sedated  Lines clean, no phlebitis    FH: Non-contributory  Social Hx: Non-contributory    TESTS & MEASUREMENTS:                        7.7    4.71  )-----------( 166      ( 17 May 2021 06:10 )             24.9     05-17    135  |  99  |  16  ----------------------------<  86  4.0   |  23  |  0.7    Ca    8.1<L>      17 May 2021 06:10  Phos  3.4     05-16  Mg     1.9     05-17    TPro  6.2  /  Alb  2.1<L>  /  TBili  0.4  /  DBili  x   /  AST  24  /  ALT  <5  /  AlkPhos  139<H>  05-17    eGFR if Non African American: 94 mL/min/1.73M2 (05-17-21 @ 06:10)  eGFR if : 109 mL/min/1.73M2 (05-17-21 @ 06:10)    LIVER FUNCTIONS - ( 17 May 2021 06:10 )  Alb: 2.1 g/dL / Pro: 6.2 g/dL / ALK PHOS: 139 U/L / ALT: <5 U/L / AST: 24 U/L / GGT: x               Culture - Blood (collected 05-12-21 @ 16:12)  Source: .Blood None  Preliminary Report (05-13-21 @ 22:02):    No growth to date.    Culture - Other (collected 05-05-21 @ 15:05)  Source: .Other sacrum  Final Report (05-07-21 @ 17:08):    Culture yields growth of greater than 3 colony types of    bacteria,  which may indicate contamination and normal srikanth    Call client services within 7 days if further workup is clinically    indicated. Culture includes    Moderate Pseudomonas aeruginosa (Carbapenem Resistant)  Organism: Pseudomonas aeruginosa (Carbapenem Resistant) (05-07-21 @ 17:08)  Organism: Pseudomonas aeruginosa (Carbapenem Resistant) (05-07-21 @ 17:08)      -  Amikacin: S <=16      -  Aztreonam: I 16      -  Cefepime: I 16      -  Ceftazidime: I 16      -  Ciprofloxacin: R >2      -  Gentamicin: I 8      -  Imipenem: R >8      -  Levofloxacin: R >4      -  Meropenem: R 8      -  Piperacillin/Tazobactam: I 64      -  Tobramycin: S <=2      Method Type: JAZMIN    Culture - Fungal, Bronchial (collected 05-04-21 @ 17:00)  Source: .Bronchial None  Preliminary Report (05-12-21 @ 15:02):    No growth    Culture - Acid Fast - Bronchial w/Smear (collected 05-04-21 @ 17:00)  Source: .Bronchial None  Preliminary Report (05-12-21 @ 15:04):    No growth at 1 week.      Lactate, Blood: 0.7 mmol/L (05-12-21 @ 16:12)      INFECTIOUS DISEASES TESTING  Procalcitonin, Serum: 0.24 (05-13-21 @ 04:30)  Procalcitonin, Serum: 2.03 (04-29-21 @ 05:25)  Procalcitonin, Serum: 2.50 (04-28-21 @ 16:00)  Procalcitonin, Serum: 0.19 (04-26-21 @ 17:42)  COVID-19 PCR: NotDetec (04-17-21 @ 10:31)  COVID-19 PCR: NotDetec (04-14-21 @ 14:29)  COVID-19 PCR: NotDetec (04-12-21 @ 15:00)  COVID-19 PCR: NotDetec (04-07-21 @ 14:55)  Procalcitonin, Serum: 0.16 (04-06-21 @ 12:32)  COVID-19 PCR: NotDetec (03-30-21 @ 15:19)  COVID-19 PCR: NotDetec (03-29-21 @ 19:40)  Procalcitonin, Serum: 0.61 (03-26-21 @ 10:20)  COVID-19 PCR: NotDetec (03-22-21 @ 20:13)  COVID-19 PCR: NotDetec (03-10-21 @ 12:22)  COVID-19 PCR: NotDetec (03-01-21 @ 16:49)  Fungitell: 62 (03-01-21 @ 11:00)  Procalcitonin, Serum: 0.29 (03-01-21 @ 05:32)  MRSA PCR Result.: Negative (02-27-21 @ 15:44)  COVID-19 PCR: NotDetec (02-25-21 @ 15:34)  Procalcitonin, Serum: 0.27 (02-18-21 @ 10:54)  MRSA PCR Result.: Negative (02-14-21 @ 18:29)  HIV-1/2 Combo Result: Nonreact (02-14-21 @ 05:07)  Procalcitonin, Serum: 0.46 (02-13-21 @ 16:00)  COVID-19 PCR: NotDetec (02-12-21 @ 10:17)      INFLAMMATORY MARKERS      RADIOLOGY & ADDITIONAL TESTS:  I have personally reviewed the last available Chest xray  CXR      CT      CARDIOLOGY TESTING  12 Lead ECG:   Ventricular Rate 70 BPM    Atrial Rate 70 BPM    P-R Interval 228 ms    QRS Duration 82 ms    Q-T Interval 418 ms    QTC Calculation(Bazett) 451 ms    P Axis 2 degrees    R Axis -22 degrees    T Axis 18 degrees    Diagnosis Line Sinus rhythm with 1st degree A-V block  Low voltage QRS  Borderline ECG    Confirmed by ANA GARDUNO MD ([id number]) on 5/14/2021 9:52:51 AM (05-12-21 @ 16:17)      MEDICATIONS  ascorbic acid 500  atorvastatin 40  carbidopa/levodopa  25/100 2  chlorhexidine 0.12% Liquid 15  chlorhexidine 4% Liquid 1  collagenase Ointment 1  Dakins Solution - 1/2 Strength 1  enoxaparin Injectable 40  lidocaine 2% Gel 1  lidocaine 2% Viscous 100  magnesium oxide 400  midodrine 10  pantoprazole   Suspension 40  polyethylene glycol 3350 17  senna 2  sodium chloride 0.9%. 1000  zinc sulfate 220      WEIGHT  Weight (kg): 82.7 (05-12-21 @ 13:00)  Creatinine, Serum: 0.7 mg/dL (05-17-21 @ 06:10)      ANTIBIOTICS:      All available historical records have been reviewed

## 2021-05-17 NOTE — PROGRESS NOTE ADULT - SUBJECTIVE AND OBJECTIVE BOX
Patient is a 71y old  Male who presents with a chief complaint of Change in Mental Status (17 May 2021 07:05)        Over Night Events:  n major events      ROS:     All ROS are negative except HPI         PHYSICAL EXAM    ICU Vital Signs Last 24 Hrs  T(C): 36.8 (17 May 2021 07:52), Max: 38.1 (16 May 2021 12:00)  T(F): 98.2 (17 May 2021 07:52), Max: 100.5 (16 May 2021 12:00)  HR: 71 (17 May 2021 07:52) (70 - 76)  BP: 153/80 (17 May 2021 07:52) (110/58 - 153/80)  BP(mean): 106 (17 May 2021 07:52) (75 - 106)  RR: 14 (17 May 2021 07:52) (14 - 20)  SpO2: 100% (17 May 2021 05:00) (100% - 100%)      CONSTITUTIONAL:  Chronically ill    ENT:   + ET  Airway patent,   Mouth with normal mucosa.   No thrush    EYES:   Pupils equal,   Round and reactive to light.    CARDIAC:   Normal rate,   Regular rhythm.    No edema      Vascular:  Normal systolic impulse  No Carotid bruits    RESPIRATORY:   No wheezing  Bilateral BS  Normal chest expansion  Not tachypneic,  No use of accessory muscles    GASTROINTESTINAL:  Abdomen soft,   Non-tender,   No guarding,   + BS    MUSCULOSKELETAL:   Range of motion is not limited,  No clubbing, cyanosis    NEUROLOGICAL:   open his eyes to verbal stimulus    SKIN:   Skin normal color for race,   Warm and dry and intact.   No evidence of rash.  sacral decubitus stage 4    PSYCHIATRIC:   Normal mood and affect.   No apparent risk to self or others.    HEMATOLOGICAL:  No cervical  lymphadenopathy.  no inguinal lymphadenopathy      05-16-21 @ 07:01  -  05-17-21 @ 07:00  --------------------------------------------------------  IN:    Enteral Tube Flush: 100 mL    IV PiggyBack: 100 mL    IV PiggyBack: 100 mL    Jevity 1.2: 360 mL  Total IN: 660 mL    OUT:    Drain (mL): 3 mL    Incontinent per Condom Catheter (mL): 1 mL  Total OUT: 4 mL    Total NET: 656 mL          LABS:                            7.7    4.71  )-----------( 166      ( 17 May 2021 06:10 )             24.9                                               05-17    135  |  99  |  16  ----------------------------<  86  4.0   |  23  |  0.7    Ca    8.1<L>      17 May 2021 06:10  Phos  3.4     05-16  Mg     1.9     05-17    TPro  6.2  /  Alb  2.1<L>  /  TBili  0.4  /  DBili  x   /  AST  x   /  ALT  <5  /  AlkPhos  139<H>  05-17      PT/INR - ( 16 May 2021 11:37 )   PT: 14.00 sec;   INR: 1.22 ratio         PTT - ( 16 May 2021 11:37 )  PTT:39.3 sec                                                                                     LIVER FUNCTIONS - ( 17 May 2021 06:10 )  Alb: 2.1 g/dL / Pro: 6.2 g/dL / ALK PHOS: 139 U/L / ALT: <5 U/L / AST: x     / GGT: x                                                                                               Mode: AC/ CMV (Assist Control/ Continuous Mandatory Ventilation)  RR (machine): 14  TV (machine): 400  FiO2: 30  PEEP: 5  ITime: 1  MAP: 9  PIP: 21                                      ABG - ( 17 May 2021 05:04 )  pH, Arterial: 7.51  pH, Blood: x     /  pCO2: 37    /  pO2: 101   / HCO3: 30    / Base Excess: 6.3   /  SaO2: 99                  MEDICATIONS  (STANDING):  ascorbic acid 500 milliGRAM(s) Oral daily  atorvastatin 40 milliGRAM(s) Oral at bedtime  carbidopa/levodopa  25/100 2 Tablet(s) Oral <User Schedule>  ceftazidime/avibactam IVPB 2.5 Gram(s) IV Intermittent every 8 hours  chlorhexidine 0.12% Liquid 15 milliLiter(s) Oral Mucosa every 12 hours  chlorhexidine 4% Liquid 1 Application(s) Topical <User Schedule>  collagenase Ointment 1 Application(s) Topical two times a day  Dakins Solution - 1/2 Strength 1 Application(s) Topical two times a day  enoxaparin Injectable 40 milliGRAM(s) SubCutaneous daily  fentaNYL   Infusion. 0.5 MICROgram(s)/kG/Hr (3.97 mL/Hr) IV Continuous <Continuous>  lidocaine 2% Gel 1 Application(s) Topical once  lidocaine 2% Viscous 100 milliLiter(s) Swish and Spit once  magnesium oxide 400 milliGRAM(s) Oral every 8 hours  metroNIDAZOLE  IVPB      metroNIDAZOLE  IVPB 500 milliGRAM(s) IV Intermittent every 8 hours  midodrine 10 milliGRAM(s) Oral every 8 hours  norepinephrine Infusion 0.05 MICROgram(s)/kG/Min (7.44 mL/Hr) IV Continuous <Continuous>  pantoprazole   Suspension 40 milliGRAM(s) Oral daily  polyethylene glycol 3350 17 Gram(s) Oral daily  senna 2 Tablet(s) Oral at bedtime  sodium chloride 0.9%. 1000 milliLiter(s) (75 mL/Hr) IV Continuous <Continuous>  zinc sulfate 220 milliGRAM(s) Oral daily    MEDICATIONS  (PRN):  acetaminophen   Tablet .. 650 milliGRAM(s) Oral every 6 hours PRN Temp greater or equal to 38C (100.4F)  midazolam Injectable 2 milliGRAM(s) IV Push every 4 hours PRN agitation      New X-rays reviewed:                                                                                  ECHO    CXR interpreted by me:  left basilar opacity stable

## 2021-05-17 NOTE — PROGRESS NOTE ADULT - ASSESSMENT
ASSESSMENT  71 year old Male with past medical history of Parkinson's, dementia, CKD Stage 3, 1st degree AV block, HLD, gout, HTN, DM, fungal septicemia presenting from [facility] for acute decompensation in mental status.     IMPRESSION  #Intubated for hypoxemia resp failure  #RESOLVED Sepsis/Fever, suspected acute cholecystitis vs UTI, HAP    5/5 decub cx Pseudomonas aeruginosa (Carbapenem Resistant) and other bacteria    5/1 BCX NGTD     s/p 4/29 perc gayle, CX NG     4/28 Blood CX coNS,     4/28 UCX NG     4/26 BCX NGTD     4/24 UCX Kleb (Carbapenem Resistant)   HIDA +  < from: [facility] Duplex Lower Ext Vein Scan, Bilat (04.21.21 @ 11:41) >  No evidence of deep venous thrombosis or superficial thrombophlebitis in the bilateral lower extremities.    4/24 UA WBC 82  #Intubated L lung collapse s/p bronch with mucous plugs    4/28 bronch Kleb   #RESOLVED CoNS in blood,  contaminant     only PIVs    4/8 BCX 1/2 sets   -  Staphylococcus epidermidis, Methicillin resistant: Detec    3/25 BCX 1/2 sets, 1/4 bottles Staphylococcus pettenkoferi, likely contaminant   #RESOLVED Fever with sepsis, not present on admission with metabolic encephalopathy, EEG + seizure, possibly in setting of sepsis secondary to large sacral infected decub    LP not consistent with infection.. G/S NEGATIVE (on ABX)    Total Nucleated Cell Count, CSF: 0 /uL (04.01.21 @ 14:00)    Protein, CSF: 42 mg/dL (04.01.21 @ 14:00)    Glucose, CSF: 97 mg/dL (04.01.21 @ 14:00)    3/25 BCX 1/2 sets, 1/4 bottles Staphylococcus pettenkoferi, likely contaminant     CXR no PNA   3/22 Blood & Urine cxs NGTD   #Sacral ulcer- necrotic     3/31 WCX GNR    3/29   Numerous Klebsiella pneumoniae ESBL    Few CRE Pseudomonas aeruginosa (high MICs to AGs)    Numerous Enterococcus faecalis (vancomycin resistant)- S amp    seen by Burn sacrum with full thickness ~4x5cm with dark /brown devitalized tissue at base; no purulent drainage, no bleeding  #Recent Fungemia    Blood Cx 2/27 Candida albicans    evaluated by optho - no ocular evidence     TTE no significant valvulopathy   #Shoulder dislocation with effusion- joint exam not consistent with a septic arthritis  < from: CT Chest w/ IV Cont (04.07.21 @ 20:31) >  Similar appearance of the left shoulder with complex joint effusion, described in detail on prior exam CT shoulder 2/20/2021.  #ABEBE, resolved  #L hand edema  < from: Xray Wrist 2 Views, Left (03.27.21 @ 11:13) >No acute fracture or dislocation. Diffuse soft tissue swelling. Nonaggressive appearing lucency in the distal radius, indeterminate. Nonemergent MRI may be obtained for further evaluation.    Creatinine, Serum: 0.8 mg/dL (04.26.21 @ 12:17)      RECOMMENDATIONS  - intubated for hypoxemia resp failure, possible aspiration- afebrile and no wbc- D/C ABX and monitor   - s/p Avycaz (non-formulary form) 2.5g q8h IV & Flagyl 500mg q8h IV x 7 days end 5/11  - Appreciate IR consult  - Appreciate Burn consult   - Contact isolation CRE  - GOC, grave prognosis MDROs    If any questions, please call or send a message on [employer] Teams  Spectra [phone]

## 2021-05-17 NOTE — PROGRESS NOTE ADULT - SUBJECTIVE AND OBJECTIVE BOX
GENERAL SURGERY PROGRESS NOTE     NIEVES LABOY  71y  Male  Hospital day :56d  POD:  Procedure: Debridement of ulcer of sacrum or ulcer of ischium      OVERNIGHT EVENTS: no acute events.    T(F): 98.3 (05-17-21 @ 05:00), Max: 100.5 (05-16-21 @ 12:00)  HR: 76 (05-17-21 @ 05:00) (70 - 77)  BP: 116/69 (05-17-21 @ 05:00) (110/58 - 116/69)  ABP: --  ABP(mean): --  RR: 20 (05-17-21 @ 05:00) (14 - 20)  SpO2: 100% (05-17-21 @ 05:00) (100% - 100%)    DIET/FLUIDS: ascorbic acid 500 milliGRAM(s) Oral daily  magnesium oxide 400 milliGRAM(s) Oral every 8 hours  sodium chloride 0.9%. 1000 milliLiter(s) IV Continuous <Continuous>  zinc sulfate 220 milliGRAM(s) Oral daily    NG:                                                                                DRAINS:   05-16-21 @ 07:01  -  05-17-21 @ 07:00  --------------------------------------------------------  OUT: 3 mL      BM:     EMESIS:     URINE:   05-16-21 @ 07:01  -  05-17-21 @ 07:00  --------------------------------------------------------  OUT: 1 mL       GI proph:  pantoprazole   Suspension 40 milliGRAM(s) Oral daily    AC/ proph: enoxaparin Injectable 40 milliGRAM(s) SubCutaneous daily    ABx: ceftazidime/avibactam IVPB 2.5 Gram(s) IV Intermittent every 8 hours  metroNIDAZOLE  IVPB      metroNIDAZOLE  IVPB 500 milliGRAM(s) IV Intermittent every 8 hours      PHYSICAL EXAM:  GENERAL: NAD, well-appearing  CHEST/LUNG: Clear to auscultation bilaterally. Vent 30/5  HEART: Regular rate and rhythm  ABDOMEN: Soft, Nontender, Nondistended; PEG tube in place  EXTREMITIES:  No clubbing, cyanosis, or edema      LABS  Labs:  CAPILLARY BLOOD GLUCOSE      POCT Blood Glucose.: 96 mg/dL (17 May 2021 05:52)  POCT Blood Glucose.: 90 mg/dL (16 May 2021 23:17)                          8.5    7.84  )-----------( 177      ( 16 May 2021 11:37 )             27.1       Auto Neutrophil %: 71.7 % (05-16-21 @ 11:37)  Auto Immature Granulocyte %: 0.5 % (05-16-21 @ 11:37)    05-16    135  |  100  |  16  ----------------------------<  101<H>  4.6   |  26  |  0.7      Calcium, Total Serum: 8.4 mg/dL (05-16-21 @ 11:37)      LFTs:             6.7  | 0.3  | 28       ------------------[165     ( 16 May 2021 06:23 )  2.3  | x    | <5          Lipase:x      Amylase:x         Blood Gas Arterial, Lactate: 0.7 mmoL/L (05-17-21 @ 05:04)  Blood Gas Arterial, Lactate: 0.9 mmoL/L (05-16-21 @ 05:09)  Blood Gas Arterial, Lactate: 0.6 mmoL/L (05-15-21 @ 03:34)    ABG - ( 17 May 2021 05:04 )  pH: 7.51  /  pCO2: 37    /  pO2: 101   / HCO3: 30    / Base Excess: 6.3   /  SaO2: 99              ABG - ( 16 May 2021 05:09 )  pH: 7.52  /  pCO2: 37    /  pO2: 100   / HCO3: 30    / Base Excess: 6.7   /  SaO2: 99              ABG - ( 15 May 2021 03:34 )  pH: 7.49  /  pCO2: 40    /  pO2: 127   / HCO3: 31    / Base Excess: 6.9   /  SaO2: 99                Coags:     14.00  ----< 1.22    ( 16 May 2021 11:37 )     39.3            Serum Pro-Brain Natriuretic Peptide: 3716 pg/mL (05-12-21 @ 16:12)              RADIOLOGY & ADDITIONAL TESTS:

## 2021-05-17 NOTE — PRE-ANESTHESIA EVALUATION ADULT - NSANTHOSAYNRD_GEN_A_CORE
No. RADHA screening performed.  STOP BANG Legend: 0-2 = LOW Risk; 3-4 = INTERMEDIATE Risk; 5-8 = HIGH Risk
No. RADHA screening performed.  STOP BANG Legend: 0-2 = LOW Risk; 3-4 = INTERMEDIATE Risk; 5-8 = HIGH Risk

## 2021-05-17 NOTE — PROGRESS NOTE ADULT - ATTENDING COMMENTS
IMPRESSION:    Acute hypoxemic respiratory failure sp intubation  HO L lung collapse sp bronch extubated on 5/7,  Septic shock resolved   Necrotic sacral wound culture s/p debridement  Cholecystitis sp CCY  MDR pneumonia Klebsiella/ pseudomonas   HO parkinson's and dementia sp PEG  HO DVT/ A fib  DEC HB NO ACTIVE BLEED  Thrombocytopenia, HIT negative, improving    Plan as outlined above

## 2021-05-17 NOTE — PROGRESS NOTE ADULT - ASSESSMENT
IMPRESSION:    Acute hypoxemic respiratory failure sp intubation  AMS  HO L lung collapse sp bronch extubated on 5/7,  Septic shock resolved   Necrotic sacral wound culture s/p debridement  Cholecystitis sp CCY  MDR pneumonia Klebsiella/ pseudomonas   HO parkinson's and dementia sp PEG  HO DVT/ A fib  DEC HB NO ACTIVE BLEED  Thrombocytopenia, HIT negative, improving    PLAN:    CNS: SAT. F/u EEG report    HEENT:  Oral care    PULMONARY:  HOB @ 45 degrees.  Aspiration precautions.    ARDS network MV Setting.  PEEP 5.  Wean FiO2.      trach today.    CARDIOVASCULAR: Keep equal. Avoid volume overload.    GI: GI prophylaxis. PEG feeding as tolerated.    RENAL:  F/u  lytes. Correct as needed. Accurate I/O    INFECTIOUS DISEASE: Abx per ID.        HEMATOLOGICAL:  DVT prophylaxis.    ENDOCRINE:  Follow up FS.  Insulin protocol if needed.  burn f/up  CODE STATUS: FULL CODE    Very poor prognosis    Step down unit  IMPRESSION:    Acute hypoxemic respiratory failure sp intubation  HO L lung collapse sp bronch extubated on 5/7,  Septic shock resolved   Necrotic sacral wound culture s/p debridement  Cholecystitis sp CCY  MDR pneumonia Klebsiella/ pseudomonas   HO parkinson's and dementia sp PEG  HO DVT/ A fib  DEC HB NO ACTIVE BLEED  Thrombocytopenia, HIT negative, improving    PLAN:    CNS: SAT. F/u EEG report    HEENT:  Oral care    PULMONARY:  HOB @ 45 degrees.  Aspiration precautions.    ARDS network MV Setting.  PEEP 5.  Wean FiO2.  Awaiting Trach     trach today.    CARDIOVASCULAR: Keep equal. Avoid volume overload.    GI: GI prophylaxis. PEG feeding as tolerated.    RENAL:  F/u  lytes. Correct as needed. Accurate I/O    INFECTIOUS DISEASE: Abx per ID.        HEMATOLOGICAL:  DVT prophylaxis.    ENDOCRINE:  Follow up FS.  Insulin protocol if needed.  burn f/up  CODE STATUS: FULL CODE    Very poor prognosis    Step down unit

## 2021-05-18 LAB
ALBUMIN SERPL ELPH-MCNC: 2.3 G/DL — LOW (ref 3.5–5.2)
ALP SERPL-CCNC: 145 U/L — HIGH (ref 30–115)
ALT FLD-CCNC: 7 U/L — SIGNIFICANT CHANGE UP (ref 0–41)
ANION GAP SERPL CALC-SCNC: 13 MMOL/L — SIGNIFICANT CHANGE UP (ref 7–14)
AST SERPL-CCNC: 25 U/L — SIGNIFICANT CHANGE UP (ref 0–41)
BASOPHILS # BLD AUTO: 0.05 K/UL — SIGNIFICANT CHANGE UP (ref 0–0.2)
BASOPHILS NFR BLD AUTO: 1 % — SIGNIFICANT CHANGE UP (ref 0–1)
BILIRUB SERPL-MCNC: 0.4 MG/DL — SIGNIFICANT CHANGE UP (ref 0.2–1.2)
BLD GP AB SCN SERPL QL: SIGNIFICANT CHANGE UP
BUN SERPL-MCNC: 17 MG/DL — SIGNIFICANT CHANGE UP (ref 10–20)
CALCIUM SERPL-MCNC: 8.3 MG/DL — LOW (ref 8.5–10.1)
CHLORIDE SERPL-SCNC: 99 MMOL/L — SIGNIFICANT CHANGE UP (ref 98–110)
CO2 SERPL-SCNC: 22 MMOL/L — SIGNIFICANT CHANGE UP (ref 17–32)
CREAT SERPL-MCNC: 0.6 MG/DL — LOW (ref 0.7–1.5)
EOSINOPHIL # BLD AUTO: 0.17 K/UL — SIGNIFICANT CHANGE UP (ref 0–0.7)
EOSINOPHIL NFR BLD AUTO: 3.4 % — SIGNIFICANT CHANGE UP (ref 0–8)
GLUCOSE BLDC GLUCOMTR-MCNC: 66 MG/DL — LOW (ref 70–99)
GLUCOSE BLDC GLUCOMTR-MCNC: 72 MG/DL — SIGNIFICANT CHANGE UP (ref 70–99)
GLUCOSE BLDC GLUCOMTR-MCNC: 73 MG/DL — SIGNIFICANT CHANGE UP (ref 70–99)
GLUCOSE SERPL-MCNC: 111 MG/DL — HIGH (ref 70–99)
HCT VFR BLD CALC: 28.3 % — LOW (ref 42–52)
HGB BLD-MCNC: 8.7 G/DL — LOW (ref 14–18)
IMM GRANULOCYTES NFR BLD AUTO: 0.2 % — SIGNIFICANT CHANGE UP (ref 0.1–0.3)
INR BLD: 1.13 RATIO — SIGNIFICANT CHANGE UP (ref 0.65–1.3)
LYMPHOCYTES # BLD AUTO: 0.62 K/UL — LOW (ref 1.2–3.4)
LYMPHOCYTES # BLD AUTO: 12.5 % — LOW (ref 20.5–51.1)
MAGNESIUM SERPL-MCNC: 1.9 MG/DL — SIGNIFICANT CHANGE UP (ref 1.8–2.4)
MCHC RBC-ENTMCNC: 27.1 PG — SIGNIFICANT CHANGE UP (ref 27–31)
MCHC RBC-ENTMCNC: 30.7 G/DL — LOW (ref 32–37)
MCV RBC AUTO: 88.2 FL — SIGNIFICANT CHANGE UP (ref 80–94)
MONOCYTES # BLD AUTO: 0.43 K/UL — SIGNIFICANT CHANGE UP (ref 0.1–0.6)
MONOCYTES NFR BLD AUTO: 8.7 % — SIGNIFICANT CHANGE UP (ref 1.7–9.3)
NEUTROPHILS # BLD AUTO: 3.69 K/UL — SIGNIFICANT CHANGE UP (ref 1.4–6.5)
NEUTROPHILS NFR BLD AUTO: 74.2 % — SIGNIFICANT CHANGE UP (ref 42.2–75.2)
NRBC # BLD: 0 /100 WBCS — SIGNIFICANT CHANGE UP (ref 0–0)
PLATELET # BLD AUTO: 143 K/UL — SIGNIFICANT CHANGE UP (ref 130–400)
POTASSIUM SERPL-MCNC: 4 MMOL/L — SIGNIFICANT CHANGE UP (ref 3.5–5)
POTASSIUM SERPL-SCNC: 4 MMOL/L — SIGNIFICANT CHANGE UP (ref 3.5–5)
PROT SERPL-MCNC: 6.9 G/DL — SIGNIFICANT CHANGE UP (ref 6–8)
PROTHROM AB SERPL-ACNC: 13 SEC — HIGH (ref 9.95–12.87)
RBC # BLD: 3.21 M/UL — LOW (ref 4.7–6.1)
RBC # FLD: 18.2 % — HIGH (ref 11.5–14.5)
SODIUM SERPL-SCNC: 134 MMOL/L — LOW (ref 135–146)
WBC # BLD: 4.97 K/UL — SIGNIFICANT CHANGE UP (ref 4.8–10.8)
WBC # FLD AUTO: 4.97 K/UL — SIGNIFICANT CHANGE UP (ref 4.8–10.8)

## 2021-05-18 PROCEDURE — 31600 PLANNED TRACHEOSTOMY: CPT

## 2021-05-18 PROCEDURE — 99232 SBSQ HOSP IP/OBS MODERATE 35: CPT

## 2021-05-18 PROCEDURE — 71045 X-RAY EXAM CHEST 1 VIEW: CPT | Mod: 26,77

## 2021-05-18 PROCEDURE — 71045 X-RAY EXAM CHEST 1 VIEW: CPT | Mod: 26

## 2021-05-18 RX ORDER — DEXTROSE 50 % IN WATER 50 %
50 SYRINGE (ML) INTRAVENOUS ONCE
Refills: 0 | Status: COMPLETED | OUTPATIENT
Start: 2021-05-18 | End: 2021-05-18

## 2021-05-18 RX ADMIN — Medication 1 APPLICATION(S): at 05:43

## 2021-05-18 RX ADMIN — MIDODRINE HYDROCHLORIDE 10 MILLIGRAM(S): 2.5 TABLET ORAL at 23:15

## 2021-05-18 RX ADMIN — Medication 1 APPLICATION(S): at 17:24

## 2021-05-18 RX ADMIN — Medication 1 APPLICATION(S): at 17:23

## 2021-05-18 RX ADMIN — MAGNESIUM OXIDE 400 MG ORAL TABLET 400 MILLIGRAM(S): 241.3 TABLET ORAL at 23:15

## 2021-05-18 RX ADMIN — CARBIDOPA AND LEVODOPA 2 TABLET(S): 25; 100 TABLET ORAL at 23:15

## 2021-05-18 RX ADMIN — CHLORHEXIDINE GLUCONATE 15 MILLILITER(S): 213 SOLUTION TOPICAL at 17:24

## 2021-05-18 RX ADMIN — CHLORHEXIDINE GLUCONATE 1 APPLICATION(S): 213 SOLUTION TOPICAL at 05:44

## 2021-05-18 RX ADMIN — SENNA PLUS 2 TABLET(S): 8.6 TABLET ORAL at 23:16

## 2021-05-18 RX ADMIN — CHLORHEXIDINE GLUCONATE 15 MILLILITER(S): 213 SOLUTION TOPICAL at 05:45

## 2021-05-18 RX ADMIN — ATORVASTATIN CALCIUM 40 MILLIGRAM(S): 80 TABLET, FILM COATED ORAL at 23:15

## 2021-05-18 RX ADMIN — Medication 1 APPLICATION(S): at 06:25

## 2021-05-18 RX ADMIN — Medication 50 MILLILITER(S): at 09:06

## 2021-05-18 NOTE — BRIEF OPERATIVE NOTE - NSICDXBRIEFPOSTOP_GEN_ALL_CORE_FT
POST-OP DIAGNOSIS:  Sacral pressure sore 31-Mar-2021 17:30:28  Alexander Miller  
POST-OP DIAGNOSIS:  Respiratory failure, unspecified with hypoxia 18-May-2021 16:40:23  Nohelia Mcneal

## 2021-05-18 NOTE — PRE-OP CHECKLIST - SELECT TESTS ORDERED
CBC/CMP/PT/PTT/Type and Cross/Type and Screen/EKG/CXR/POCT Blood Glucose/COVID-19
BMP/CBC/CMP/PT/PTT/INR/Type and Cross/Type and Screen/EKG/CXR/POCT Blood Glucose/COVID-19
Results in MD note

## 2021-05-18 NOTE — CHART NOTE - NSCHARTNOTEFT_GEN_A_CORE
PACU ANESTHESIA ADMISSION NOTE      Procedure: Debridement of ulcer of sacrum or ulcer of ischium  Stage 4 infected      Post op diagnosis:  Sacral pressure sore        ____  Intubated  TV:_500_____       Rate: ___12___      FiO2: __60____    ___  Patent Airway    __x__  Full return of protective reflexes    ____x  Full recovery from anesthesia / back to baseline     Vitals:   T: 98.5          R:    vent              BP:   140/70               Sat:  100                 P:85       Mental Status:  ____ Awake   _____ Alert   _____ Drowsy   x_____ Sedated    Nausea/Vomiting:  ____ NO  ______Yes,   See Post - Op Orders          Pain Scale (0-10):  _____    Treatment: ____ None    ____ See Post - Op/PCA Orders    Post - Operative Fluids:   ____ Oral   ____ See Post - Op Orders    Plan: Discharge:   ____Home       _____Floor     _____Critical Care    ___vent unit__  Other:_________________    Comments:

## 2021-05-18 NOTE — BRIEF OPERATIVE NOTE - NSICDXBRIEFPREOP_GEN_ALL_CORE_FT
PRE-OP DIAGNOSIS:  Sacral pressure sore 31-Mar-2021 17:29:55  Alexander Miller  
PRE-OP DIAGNOSIS:  Respiratory failure, unspecified with hypoxia 18-May-2021 16:40:19  Nohelia Mcneal

## 2021-05-18 NOTE — BRIEF OPERATIVE NOTE - NSICDXBRIEFPROCEDURE_GEN_ALL_CORE_FT
PROCEDURES:  Debridement of ulcer of sacrum or ulcer of ischium 30-Mar-2021 14:51:22 Stage 4 infected Sujata Landeros  
PROCEDURES:  Open tracheostomy 18-May-2021 16:38:49  Nohelia Mcneal

## 2021-05-18 NOTE — PROGRESS NOTE ADULT - ASSESSMENT
IMPRESSION:    Acute hypoxemic respiratory failure sp intubation  HO L lung collapse sp bronch extubated on 5/7,  Septic shock resolved   Necrotic sacral wound culture s/p debridement  Cholecystitis sp CCY  MDR pneumonia Klebsiella/ pseudomonas   HO parkinson's and dementia sp PEG  HO DVT/ A fib  DEC HB NO ACTIVE BLEED  Thrombocytopenia, HIT negative, improving    PLAN:    CNS: SAT. F/u EEG report    HEENT:  Oral care    PULMONARY:  HOB @ 45 degrees.  Aspiration precautions.    ARDS network MV Setting.  PEEP 5.  Wean FiO2.   Trach today       CARDIOVASCULAR: Keep equal. Avoid volume overload.    GI: GI prophylaxis. PEG feeding as tolerated(on hold for trach).    RENAL:  F/u  lytes. Correct as needed. Accurate I/O    INFECTIOUS DISEASE: off abx        HEMATOLOGICAL:  DVT prophylaxis.    ENDOCRINE:  Follow up FS.  Insulin protocol if needed.  burn f/up  CODE STATUS: FULL CODE    Very poor prognosis    transfer to [facility] post trach. IMPRESSION:    Acute hypoxemic respiratory failure sp intubation  HO L lung collapse sp bronch extubated on 5/7,  Septic shock resolved   Necrotic sacral wound culture s/p debridement  Cholecystitis sp CCY  MDR pneumonia Klebsiella/ pseudomonas   HO parkinson's and dementia sp PEG  HO DVT/ A fib  DEC HB NO ACTIVE BLEED  Thrombocytopenia, HIT negative, improving    PLAN:    CNS: SAT. F/u EEG report    HEENT:  Oral care    PULMONARY:  HOB @ 45 degrees.  Aspiration precautions.   Wean FiO2.   Trach today     CARDIOVASCULAR: Keep equal. Avoid volume overload.    GI: GI prophylaxis. PEG feeding as tolerated(on hold for trach).    RENAL:  F/u  lytes. Correct as needed. Accurate I/O    INFECTIOUS DISEASE: off abx      HEMATOLOGICAL:  DVT prophylaxis.    ENDOCRINE:  Follow up FS.  Insulin protocol if needed.  burn f/up  CODE STATUS: FULL CODE    Very poor prognosis    transfer to [facility] post trach.

## 2021-05-18 NOTE — PROGRESS NOTE ADULT - SUBJECTIVE AND OBJECTIVE BOX
GENERAL SURGERY PROGRESS NOTE     NIEVES LABOY  71y  Male  Hospital day :57d  POD:  Procedure: Debridement of ulcer of sacrum or ulcer of ischium      OVERNIGHT EVENTS: no acute evnts    T(F): 96.4 (05-18-21 @ 08:29), Max: 98.8 (05-17-21 @ 15:00)  HR: 76 (05-18-21 @ 08:29) (72 - 78)  BP: 123/70 (05-18-21 @ 08:29) (120/60 - 127/74)  ABP: --  ABP(mean): --  RR: 14 (05-18-21 @ 08:29) (14 - 23)  SpO2: 100% (05-18-21 @ 05:01) (95% - 100%)    DIET/FLUIDS: ascorbic acid 500 milliGRAM(s) Oral daily  magnesium oxide 400 milliGRAM(s) Oral every 8 hours  sodium chloride 0.9%. 1000 milliLiter(s) IV Continuous <Continuous>  zinc sulfate 220 milliGRAM(s) Oral daily    NG:                                                                                DRAINS:   05-17-21 @ 07:01  -  05-18-21 @ 07:00  --------------------------------------------------------  OUT: 10 mL      BM:     EMESIS:     URINE:      GI proph:  pantoprazole   Suspension 40 milliGRAM(s) Oral daily    AC/ proph: enoxaparin Injectable 40 milliGRAM(s) SubCutaneous daily    ABx:     PHYSICAL EXAM:  GENERAL: NAD, well-appearing. vented  CHEST/LUNG: Clear to auscultation bilaterally  HEART: Regular rate and rhythm  ABDOMEN: Soft, Nontender, Nondistended; Peg tube in place  EXTREMITIES:  No clubbing, cyanosis, or edema      LABS  Labs:  CAPILLARY BLOOD GLUCOSE      POCT Blood Glucose.: 66 mg/dL (18 May 2021 07:27)  POCT Blood Glucose.: 89 mg/dL (17 May 2021 15:10)                          8.7    4.97  )-----------( 143      ( 18 May 2021 08:23 )             28.3       Auto Neutrophil %: 74.2 % (05-18-21 @ 08:23)  Auto Immature Granulocyte %: 0.2 % (05-18-21 @ 08:23)    05-18    134<L>  |  99  |  17  ----------------------------<  111<H>  4.0   |  22  |  0.6<L>      Calcium, Total Serum: 8.3 mg/dL (05-18-21 @ 08:23)      LFTs:             6.9  | 0.4  | 25       ------------------[145     ( 18 May 2021 08:23 )  2.3  | x    | 7           Lipase:x      Amylase:x         Blood Gas Arterial, Lactate: 0.6 mmoL/L (05-18-21 @ 05:16)  Blood Gas Arterial, Lactate: 0.7 mmoL/L (05-17-21 @ 05:04)  Blood Gas Arterial, Lactate: 0.9 mmoL/L (05-16-21 @ 05:09)    ABG - ( 18 May 2021 05:16 )  pH: 7.47  /  pCO2: 37    /  pO2: 127   / HCO3: 27    / Base Excess: 3.4   /  SaO2: 99              ABG - ( 17 May 2021 05:04 )  pH: 7.51  /  pCO2: 37    /  pO2: 101   / HCO3: 30    / Base Excess: 6.3   /  SaO2: 99              ABG - ( 16 May 2021 05:09 )  pH: 7.52  /  pCO2: 37    /  pO2: 100   / HCO3: 30    / Base Excess: 6.7   /  SaO2: 99                Coags:     13.00  ----< 1.13    ( 18 May 2021 08:23 )     x               Serum Pro-Brain Natriuretic Peptide: 3716 pg/mL (05-12-21 @ 16:12)              RADIOLOGY & ADDITIONAL TESTS:

## 2021-05-19 LAB
ALBUMIN SERPL ELPH-MCNC: 2.3 G/DL — LOW (ref 3.5–5.2)
ALP SERPL-CCNC: 147 U/L — HIGH (ref 30–115)
ALT FLD-CCNC: <5 U/L — SIGNIFICANT CHANGE UP (ref 0–41)
ANION GAP SERPL CALC-SCNC: 10 MMOL/L — SIGNIFICANT CHANGE UP (ref 7–14)
AST SERPL-CCNC: 26 U/L — SIGNIFICANT CHANGE UP (ref 0–41)
BASOPHILS # BLD AUTO: 0.05 K/UL — SIGNIFICANT CHANGE UP (ref 0–0.2)
BASOPHILS NFR BLD AUTO: 0.6 % — SIGNIFICANT CHANGE UP (ref 0–1)
BILIRUB SERPL-MCNC: 0.3 MG/DL — SIGNIFICANT CHANGE UP (ref 0.2–1.2)
BUN SERPL-MCNC: 14 MG/DL — SIGNIFICANT CHANGE UP (ref 10–20)
CALCIUM SERPL-MCNC: 8 MG/DL — LOW (ref 8.5–10.1)
CHLORIDE SERPL-SCNC: 103 MMOL/L — SIGNIFICANT CHANGE UP (ref 98–110)
CO2 SERPL-SCNC: 24 MMOL/L — SIGNIFICANT CHANGE UP (ref 17–32)
CREAT SERPL-MCNC: 0.6 MG/DL — LOW (ref 0.7–1.5)
EOSINOPHIL # BLD AUTO: 0.17 K/UL — SIGNIFICANT CHANGE UP (ref 0–0.7)
EOSINOPHIL NFR BLD AUTO: 2.1 % — SIGNIFICANT CHANGE UP (ref 0–8)
GLUCOSE BLDC GLUCOMTR-MCNC: 105 MG/DL — HIGH (ref 70–99)
GLUCOSE BLDC GLUCOMTR-MCNC: 115 MG/DL — HIGH (ref 70–99)
GLUCOSE BLDC GLUCOMTR-MCNC: 136 MG/DL — HIGH (ref 70–99)
GLUCOSE BLDC GLUCOMTR-MCNC: 73 MG/DL — SIGNIFICANT CHANGE UP (ref 70–99)
GLUCOSE SERPL-MCNC: 91 MG/DL — SIGNIFICANT CHANGE UP (ref 70–99)
HCT VFR BLD CALC: 26.8 % — LOW (ref 42–52)
HGB BLD-MCNC: 8.4 G/DL — LOW (ref 14–18)
IMM GRANULOCYTES NFR BLD AUTO: 0.3 % — SIGNIFICANT CHANGE UP (ref 0.1–0.3)
INR BLD: 1.19 RATIO — SIGNIFICANT CHANGE UP (ref 0.65–1.3)
LYMPHOCYTES # BLD AUTO: 0.93 K/UL — LOW (ref 1.2–3.4)
LYMPHOCYTES # BLD AUTO: 11.7 % — LOW (ref 20.5–51.1)
MAGNESIUM SERPL-MCNC: 1.8 MG/DL — SIGNIFICANT CHANGE UP (ref 1.8–2.4)
MCHC RBC-ENTMCNC: 27.5 PG — SIGNIFICANT CHANGE UP (ref 27–31)
MCHC RBC-ENTMCNC: 31.3 G/DL — LOW (ref 32–37)
MCV RBC AUTO: 87.6 FL — SIGNIFICANT CHANGE UP (ref 80–94)
MONOCYTES # BLD AUTO: 0.66 K/UL — HIGH (ref 0.1–0.6)
MONOCYTES NFR BLD AUTO: 8.3 % — SIGNIFICANT CHANGE UP (ref 1.7–9.3)
NEUTROPHILS # BLD AUTO: 6.12 K/UL — SIGNIFICANT CHANGE UP (ref 1.4–6.5)
NEUTROPHILS NFR BLD AUTO: 77 % — HIGH (ref 42.2–75.2)
NRBC # BLD: 0 /100 WBCS — SIGNIFICANT CHANGE UP (ref 0–0)
PLATELET # BLD AUTO: 237 K/UL — SIGNIFICANT CHANGE UP (ref 130–400)
POTASSIUM SERPL-MCNC: 4 MMOL/L — SIGNIFICANT CHANGE UP (ref 3.5–5)
POTASSIUM SERPL-SCNC: 4 MMOL/L — SIGNIFICANT CHANGE UP (ref 3.5–5)
PROT SERPL-MCNC: 6.6 G/DL — SIGNIFICANT CHANGE UP (ref 6–8)
PROTHROM AB SERPL-ACNC: 13.7 SEC — HIGH (ref 9.95–12.87)
RBC # BLD: 3.06 M/UL — LOW (ref 4.7–6.1)
RBC # FLD: 18 % — HIGH (ref 11.5–14.5)
SODIUM SERPL-SCNC: 137 MMOL/L — SIGNIFICANT CHANGE UP (ref 135–146)
WBC # BLD: 7.95 K/UL — SIGNIFICANT CHANGE UP (ref 4.8–10.8)
WBC # FLD AUTO: 7.95 K/UL — SIGNIFICANT CHANGE UP (ref 4.8–10.8)

## 2021-05-19 PROCEDURE — 99231 SBSQ HOSP IP/OBS SF/LOW 25: CPT | Mod: GC

## 2021-05-19 PROCEDURE — 99232 SBSQ HOSP IP/OBS MODERATE 35: CPT

## 2021-05-19 PROCEDURE — 71045 X-RAY EXAM CHEST 1 VIEW: CPT | Mod: 26

## 2021-05-19 RX ADMIN — Medication 1 APPLICATION(S): at 17:32

## 2021-05-19 RX ADMIN — ATORVASTATIN CALCIUM 40 MILLIGRAM(S): 80 TABLET, FILM COATED ORAL at 22:06

## 2021-05-19 RX ADMIN — MAGNESIUM OXIDE 400 MG ORAL TABLET 400 MILLIGRAM(S): 241.3 TABLET ORAL at 06:03

## 2021-05-19 RX ADMIN — Medication 1 APPLICATION(S): at 07:10

## 2021-05-19 RX ADMIN — MIDODRINE HYDROCHLORIDE 10 MILLIGRAM(S): 2.5 TABLET ORAL at 06:03

## 2021-05-19 RX ADMIN — SENNA PLUS 2 TABLET(S): 8.6 TABLET ORAL at 22:06

## 2021-05-19 RX ADMIN — CARBIDOPA AND LEVODOPA 2 TABLET(S): 25; 100 TABLET ORAL at 09:39

## 2021-05-19 RX ADMIN — MIDODRINE HYDROCHLORIDE 10 MILLIGRAM(S): 2.5 TABLET ORAL at 14:15

## 2021-05-19 RX ADMIN — MAGNESIUM OXIDE 400 MG ORAL TABLET 400 MILLIGRAM(S): 241.3 TABLET ORAL at 22:06

## 2021-05-19 RX ADMIN — ZINC SULFATE TAB 220 MG (50 MG ZINC EQUIVALENT) 220 MILLIGRAM(S): 220 (50 ZN) TAB at 12:44

## 2021-05-19 RX ADMIN — ENOXAPARIN SODIUM 40 MILLIGRAM(S): 100 INJECTION SUBCUTANEOUS at 12:44

## 2021-05-19 RX ADMIN — Medication 500 MILLIGRAM(S): at 12:44

## 2021-05-19 RX ADMIN — PANTOPRAZOLE SODIUM 40 MILLIGRAM(S): 20 TABLET, DELAYED RELEASE ORAL at 12:44

## 2021-05-19 RX ADMIN — CHLORHEXIDINE GLUCONATE 15 MILLILITER(S): 213 SOLUTION TOPICAL at 06:03

## 2021-05-19 RX ADMIN — MIDODRINE HYDROCHLORIDE 10 MILLIGRAM(S): 2.5 TABLET ORAL at 22:06

## 2021-05-19 RX ADMIN — CARBIDOPA AND LEVODOPA 2 TABLET(S): 25; 100 TABLET ORAL at 14:14

## 2021-05-19 RX ADMIN — CARBIDOPA AND LEVODOPA 2 TABLET(S): 25; 100 TABLET ORAL at 22:06

## 2021-05-19 RX ADMIN — CHLORHEXIDINE GLUCONATE 15 MILLILITER(S): 213 SOLUTION TOPICAL at 17:29

## 2021-05-19 RX ADMIN — MAGNESIUM OXIDE 400 MG ORAL TABLET 400 MILLIGRAM(S): 241.3 TABLET ORAL at 14:14

## 2021-05-19 RX ADMIN — CARBIDOPA AND LEVODOPA 2 TABLET(S): 25; 100 TABLET ORAL at 06:02

## 2021-05-19 RX ADMIN — POLYETHYLENE GLYCOL 3350 17 GRAM(S): 17 POWDER, FOR SOLUTION ORAL at 12:44

## 2021-05-19 RX ADMIN — CHLORHEXIDINE GLUCONATE 1 APPLICATION(S): 213 SOLUTION TOPICAL at 06:03

## 2021-05-19 RX ADMIN — Medication 1 APPLICATION(S): at 06:03

## 2021-05-19 NOTE — PROGRESS NOTE ADULT - ASSESSMENT
71y Male patient POD#1 s/p tracheostomy. minimal oozing, minimal vent settings   Patient seen and examined at bedside. NAD.   Tolerating:  Diet, NPO with Tube Feed:   Tube Feeding Modality: Gastrostomy  Jevity 1.2 Sarmad  Total Volume for 24 Hours (mL): 1440  Bolus  Total Volume of Bolus (mL):  360  Tube Feed Frequency: Every 6 hours   Tube Feed Start Time: 12:00  Bolus Feed Rate (mL per Hour): 500   Bolus Feed Duration (in Hours): 0.75  Bolus Feed Instructions:  50 ml free water flush before and after feed  Free Water Flush  Moris(7 Gm Arginine/7 Gm Glut/1.2 Gm HMB     Qty per Day:  2 (05-01-21 @ 11:32)    Plan:  - trach care  - primary management as per medical team  - Monitor vitals  - Monitor labs and replete as necessary  - DVT and GI Prophylaxis      Date/Time: 05-19-21 @ 04:17

## 2021-05-19 NOTE — PROGRESS NOTE ADULT - SUBJECTIVE AND OBJECTIVE BOX
Patient is a 71y old  Male who presents with a chief complaint of Change in Mental Status (19 May 2021 04:17)        Over Night Events:  S/p trach yesterday, tolerated the procedure well, no complications      ROS:     All ROS are negative except HPI         PHYSICAL EXAM    ICU Vital Signs Last 24 Hrs  T(C): 37.3 (19 May 2021 08:35), Max: 37.4 (19 May 2021 00:00)  T(F): 99.1 (19 May 2021 08:35), Max: 99.3 (19 May 2021 00:00)  HR: 75 (19 May 2021 08:35) (71 - 86)  BP: 131/76 (19 May 2021 08:35) (97/55 - 141/81)  BP(mean): 98 (19 May 2021 08:35) (68 - 99)  ABP: --  ABP(mean): --  RR: 24 (19 May 2021 08:35) (14 - 28)  SpO2: 100% (19 May 2021 08:00) (92% - 100%)      CONSTITUTIONAL:  Chronically ill.  NAD    ENT:   Airway patent,   Mouth with normal mucosa.   No thrush    EYES:   Pupils equal,   Round and reactive to light.    CARDIAC:   Normal rate,   Regular rhythm.    No edema      Vascular:  Normal systolic impulse  No Carotid bruits    RESPIRATORY:   No wheezing  Bilateral BS  Normal chest expansion  Not tachypneic,  No use of accessory muscles    GASTROINTESTINAL:  Abdomen soft,   Non-tender,   No guarding,   + BS    MUSCULOSKELETAL:   Range of motion is not limited,  No clubbing, cyanosis    NEUROLOGICAL:   awake, alert, able to track.  No motor  deficits.    SKIN:   Skin normal color for race,   Warm and dry and intact.   No evidence of rash.  sacral decubitus        05-18-21 @ 07:01  -  05-19-21 @ 07:00  --------------------------------------------------------  IN:    Enteral Tube Flush: 235 mL    Jevity 1.2: 720 mL    sodium chloride 0.9%: 1725 mL  Total IN: 2680 mL    OUT:    Drain (mL): 10 mL  Total OUT: 10 mL    Total NET: 2670 mL      05-19-21 @ 07:01  -  05-19-21 @ 09:47  --------------------------------------------------------  IN:    sodium chloride 0.9%: 150 mL  Total IN: 150 mL    OUT:  Total OUT: 0 mL    Total NET: 150 mL          LABS:                            8.4    7.95  )-----------( 237      ( 19 May 2021 07:22 )             26.8                                               05-19    137  |  103  |  14  ----------------------------<  91  4.0   |  24  |  0.6<L>    Ca    8.0<L>      19 May 2021 07:22  Mg     1.8     05-19    TPro  6.6  /  Alb  2.3<L>  /  TBili  0.3  /  DBili  x   /  AST  26  /  ALT  <5  /  AlkPhos  147<H>  05-19      PT/INR - ( 19 May 2021 07:22 )   PT: 13.70 sec;   INR: 1.19 ratio                                                                                              LIVER FUNCTIONS - ( 19 May 2021 07:22 )  Alb: 2.3 g/dL / Pro: 6.6 g/dL / ALK PHOS: 147 U/L / ALT: <5 U/L / AST: 26 U/L / GGT: x                                                  Culture - Blood (collected 17 May 2021 16:00)  Source: .Blood Blood-Peripheral  Preliminary Report (18 May 2021 22:11):    No growth to date.                                                   Mode: AC/ CMV (Assist Control/ Continuous Mandatory Ventilation)  RR (machine): 14  TV (machine): 400  FiO2: 30  PEEP: 5  ITime: 1  MAP: 9  PIP: 19                                      ABG - ( 19 May 2021 05:35 )  pH, Arterial: 7.52  pH, Blood: x     /  pCO2: 34    /  pO2: 148   / HCO3: 28    / Base Excess: 4.5   /  SaO2: 100                 MEDICATIONS  (STANDING):  ascorbic acid 500 milliGRAM(s) Oral daily  atorvastatin 40 milliGRAM(s) Oral at bedtime  carbidopa/levodopa  25/100 2 Tablet(s) Oral <User Schedule>  chlorhexidine 0.12% Liquid 15 milliLiter(s) Oral Mucosa every 12 hours  chlorhexidine 4% Liquid 1 Application(s) Topical <User Schedule>  collagenase Ointment 1 Application(s) Topical two times a day  Dakins Solution - 1/2 Strength 1 Application(s) Topical two times a day  enoxaparin Injectable 40 milliGRAM(s) SubCutaneous daily  lidocaine 2% Gel 1 Application(s) Topical once  lidocaine 2% Viscous 100 milliLiter(s) Swish and Spit once  magnesium oxide 400 milliGRAM(s) Oral every 8 hours  midodrine 10 milliGRAM(s) Oral every 8 hours  pantoprazole   Suspension 40 milliGRAM(s) Oral daily  polyethylene glycol 3350 17 Gram(s) Oral daily  senna 2 Tablet(s) Oral at bedtime  sodium chloride 0.9%. 1000 milliLiter(s) (75 mL/Hr) IV Continuous <Continuous>  zinc sulfate 220 milliGRAM(s) Oral daily    MEDICATIONS  (PRN):  acetaminophen   Tablet .. 650 milliGRAM(s) Oral every 6 hours PRN Temp greater or equal to 38C (100.4F)  midazolam Injectable 2 milliGRAM(s) IV Push every 4 hours PRN agitation      New X-rays reviewed:                                                                                  ECHO    CXR interpreted by me:       Patient is a 71y old  Male who presents with a chief complaint of Change in Mental Status (19 May 2021 04:17)        Over Night Events:  S/p trach yesterday, tolerated the procedure well, no complications.  On MV.  Off pressors       ROS:     All ROS are negative except HPI         PHYSICAL EXAM    ICU Vital Signs Last 24 Hrs  T(C): 37.3 (19 May 2021 08:35), Max: 37.4 (19 May 2021 00:00)  T(F): 99.1 (19 May 2021 08:35), Max: 99.3 (19 May 2021 00:00)  HR: 75 (19 May 2021 08:35) (71 - 86)  BP: 131/76 (19 May 2021 08:35) (97/55 - 141/81)  BP(mean): 98 (19 May 2021 08:35) (68 - 99)  ABP: --  ABP(mean): --  RR: 24 (19 May 2021 08:35) (14 - 28)  SpO2: 100% (19 May 2021 08:00) (92% - 100%)      CONSTITUTIONAL:  Chronically ill.  NAD    ENT:   Airway patent,   Mouth with normal mucosa.   No thrush    EYES:   Pupils equal,   Round and reactive to light.    CARDIAC:   Normal rate,   Regular rhythm.    No edema      Vascular:  Normal systolic impulse  No Carotid bruits    RESPIRATORY:   No wheezing  Bilateral BS  Normal chest expansion  Not tachypneic,  No use of accessory muscles    GASTROINTESTINAL:  Abdomen soft,   Non-tender,   No guarding,   + BS    MUSCULOSKELETAL:   Contracted.    No clubbing, cyanosis    NEUROLOGICAL:   awake, alert, able to track.  generalized weakness     SKIN:   Skin normal color for race,   Warm and dry   No evidence of rash.  sacral decubitus        05-18-21 @ 07:01  -  05-19-21 @ 07:00  --------------------------------------------------------  IN:    Enteral Tube Flush: 235 mL    Jevity 1.2: 720 mL    sodium chloride 0.9%: 1725 mL  Total IN: 2680 mL    OUT:    Drain (mL): 10 mL  Total OUT: 10 mL    Total NET: 2670 mL      05-19-21 @ 07:01  -  05-19-21 @ 09:47  --------------------------------------------------------  IN:    sodium chloride 0.9%: 150 mL  Total IN: 150 mL    OUT:  Total OUT: 0 mL    Total NET: 150 mL          LABS:                            8.4    7.95  )-----------( 237      ( 19 May 2021 07:22 )             26.8                                               05-19    137  |  103  |  14  ----------------------------<  91  4.0   |  24  |  0.6<L>    Ca    8.0<L>      19 May 2021 07:22  Mg     1.8     05-19    TPro  6.6  /  Alb  2.3<L>  /  TBili  0.3  /  DBili  x   /  AST  26  /  ALT  <5  /  AlkPhos  147<H>  05-19      PT/INR - ( 19 May 2021 07:22 )   PT: 13.70 sec;   INR: 1.19 ratio                                                                                              LIVER FUNCTIONS - ( 19 May 2021 07:22 )  Alb: 2.3 g/dL / Pro: 6.6 g/dL / ALK PHOS: 147 U/L / ALT: <5 U/L / AST: 26 U/L / GGT: x                                                  Culture - Blood (collected 17 May 2021 16:00)  Source: .Blood Blood-Peripheral  Preliminary Report (18 May 2021 22:11):    No growth to date.                                                   Mode: AC/ CMV (Assist Control/ Continuous Mandatory Ventilation)  RR (machine): 14  TV (machine): 400  FiO2: 30  PEEP: 5  ITime: 1  MAP: 9  PIP: 19                                      ABG - ( 19 May 2021 05:35 )  pH, Arterial: 7.52  pH, Blood: x     /  pCO2: 34    /  pO2: 148   / HCO3: 28    / Base Excess: 4.5   /  SaO2: 100                 MEDICATIONS  (STANDING):  ascorbic acid 500 milliGRAM(s) Oral daily  atorvastatin 40 milliGRAM(s) Oral at bedtime  carbidopa/levodopa  25/100 2 Tablet(s) Oral <User Schedule>  chlorhexidine 0.12% Liquid 15 milliLiter(s) Oral Mucosa every 12 hours  chlorhexidine 4% Liquid 1 Application(s) Topical <User Schedule>  collagenase Ointment 1 Application(s) Topical two times a day  Dakins Solution - 1/2 Strength 1 Application(s) Topical two times a day  enoxaparin Injectable 40 milliGRAM(s) SubCutaneous daily  lidocaine 2% Gel 1 Application(s) Topical once  lidocaine 2% Viscous 100 milliLiter(s) Swish and Spit once  magnesium oxide 400 milliGRAM(s) Oral every 8 hours  midodrine 10 milliGRAM(s) Oral every 8 hours  pantoprazole   Suspension 40 milliGRAM(s) Oral daily  polyethylene glycol 3350 17 Gram(s) Oral daily  senna 2 Tablet(s) Oral at bedtime  sodium chloride 0.9%. 1000 milliLiter(s) (75 mL/Hr) IV Continuous <Continuous>  zinc sulfate 220 milliGRAM(s) Oral daily    MEDICATIONS  (PRN):  acetaminophen   Tablet .. 650 milliGRAM(s) Oral every 6 hours PRN Temp greater or equal to 38C (100.4F)  midazolam Injectable 2 milliGRAM(s) IV Push every 4 hours PRN agitation      New X-rays reviewed:                                                                                  ECHO    CXR interpreted by me:

## 2021-05-19 NOTE — PROGRESS NOTE ADULT - ATTENDING COMMENTS
IMPRESSION:    Acute hypoxemic respiratory failure SP trach  HO L lung collapse sp bronch  Septic shock resolved   Necrotic sacral wound culture s/p debridement  Cholecystitis sp CCY  MDR pneumonia Klebsiella/ pseudomonas   HO parkinson's and dementia sp PEG  HO DVT/ A fib  Thrombocytopenia, HIT negative, improving    Plan as outlined above

## 2021-05-19 NOTE — PROGRESS NOTE ADULT - ASSESSMENT
IMPRESSION:    Acute hypoxemic respiratory failure sp intubation  HO L lung collapse sp bronch extubated on 5/7,  Septic shock resolved   Necrotic sacral wound culture s/p debridement  Cholecystitis sp CCY  MDR pneumonia Klebsiella/ pseudomonas   HO parkinson's and dementia sp PEG  HO DVT/ A fib  DEC HB NO ACTIVE BLEED  Thrombocytopenia, HIT negative, improving  S/p trach on 5/18/2021.    PLAN:    CNS: SAT.    HEENT:  Oral care    PULMONARY:  HOB @ 45 degrees.  Aspiration precautions.  minimal vent settings    CARDIOVASCULAR: Keep equal. Avoid volume overload.    GI: GI prophylaxis. PEG feeding as tolerated.     RENAL:  F/u  lytes. Correct as needed. Accurate I/O    INFECTIOUS DISEASE: off abx      HEMATOLOGICAL:  DVT prophylaxis.    ENDOCRINE:  Follow up FS.  Insulin protocol if needed.  burn f/up    CODE STATUS: FULL CODE    Very poor prognosis    transfer to vent IMPRESSION:    Acute hypoxemic respiratory failure SP trach  HO L lung collapse sp bronch  Septic shock resolved   Necrotic sacral wound culture s/p debridement  Cholecystitis sp CCY  MDR pneumonia Klebsiella/ pseudomonas   HO parkinson's and dementia sp PEG  HO DVT/ A fib  Thrombocytopenia, HIT negative, improving    PLAN:    CNS: PRN sedation ads needed     HEENT:  Oral care    PULMONARY:  HOB @ 45 degrees.  Aspiration precautions.  .  PS wean for 2-4 hours.      CARDIOVASCULAR: Keep equal. Avoid volume overload.    GI: GI prophylaxis. PEG feeding as tolerated.     RENAL:  F/u  lytes. Correct as needed. Accurate I/O    INFECTIOUS DISEASE: Monitor off ABX      HEMATOLOGICAL:  DVT prophylaxis.    ENDOCRINE:  Follow up FS..  Burn f/up    CODE STATUS: FULL CODE    Very poor prognosis    Placement

## 2021-05-19 NOTE — PROGRESS NOTE ADULT - SUBJECTIVE AND OBJECTIVE BOX
Progress Note: General Surgery  Patient: NIEVES LABOY , 71y (1950)Male   MRN: [id number]  Location: [place] [address] A  Visit: 03-22-21 Inpatient  Date: 05-19-21 @ 04:17    Admit Diagnosis/Chief Complaint: Sacral pressure sore    Respiratory failure, acute    Respiratory failure, unspecified with hypoxia        Procedure/Diagnosis: Sacral pressure sore    Respiratory failure, acute    Respiratory failure, unspecified with hypoxia     S/P Debridement of ulcer of sacrum or ulcer of ischium    Open tracheostomy        Events/ 24h: Patient seen and examined at bedside. No acute events overnight. Afebrile, VSS. POD#1 s/p trach. vent setting 30/5    Vitals: T(F): 99.3 (05-19-21 @ 00:00), Max: 99.3 (05-19-21 @ 00:00)  HR: 84 (05-19-21 @ 03:00)  BP: 129/67 (05-19-21 @ 03:00) (97/55 - 141/81)  RR: 26 (05-19-21 @ 03:00)  SpO2: 99% (05-19-21 @ 03:00)  RR (machine): 14, TV (machine): 400, FiO2: 30, PEEP: 5, PIP: 19  In:   05-17-21 @ 07:01  -  05-18-21 @ 07:00  --------------------------------------------------------  IN: 900 mL    05-18-21 @ 07:01  -  05-19-21 @ 04:17  --------------------------------------------------------  IN: 2010 mL      Out:   05-17-21 @ 07:01  -  05-18-21 @ 07:00  --------------------------------------------------------  OUT:    Drain (mL): 10 mL  Total OUT: 10 mL      05-18-21 @ 07:01  -  05-19-21 @ 04:17  --------------------------------------------------------  OUT:    Drain (mL): 10 mL  Total OUT: 10 mL        Net:   05-17-21 @ 07:01  -  05-18-21 @ 07:00  --------------------------------------------------------  NET: 890 mL    05-18-21 @ 07:01  -  05-19-21 @ 04:17  --------------------------------------------------------  NET: 2000 mL        Diet: Diet, NPO with Tube Feed:   Tube Feeding Modality: Gastrostomy  Jevity 1.2 Sarmad  Total Volume for 24 Hours (mL): 1440  Bolus  Total Volume of Bolus (mL):  360  Tube Feed Frequency: Every 6 hours   Tube Feed Start Time: 12:00  Bolus Feed Rate (mL per Hour): 500   Bolus Feed Duration (in Hours): 0.75  Bolus Feed Instructions:  50 ml free water flush before and after feed  Free Water Flush  Moris(7 Gm Arginine/7 Gm Glut/1.2 Gm HMB     Qty per Day:  2 (05-01-21 @ 11:32)    IV Fluids: ascorbic acid 500 milliGRAM(s) Oral daily  magnesium oxide 400 milliGRAM(s) Oral every 8 hours  sodium chloride 0.9%. 1000 milliLiter(s) (75 mL/Hr) IV Continuous <Continuous>  zinc sulfate 220 milliGRAM(s) Oral daily      Physical Examination:  General Appearance: NAD, non-verbal   HEENT: EOMI, sclera anicteric. +tracheostomy vent 30/5. minimal dried blood on guaze around trach, site is clean and dry, no signs of infection or erythema/purulence.   Heart: RRR   Lungs: Symmetric chest wall expansion, equal rise and fall.  Abdomen:  Soft, nontender, nondistended.   MSK/Extremities: Warm & well-perfused.   Skin: Warm, dry. No jaundice.       Medications: [Standing]  ascorbic acid 500 milliGRAM(s) Oral daily  atorvastatin 40 milliGRAM(s) Oral at bedtime  carbidopa/levodopa  25/100 2 Tablet(s) Oral <User Schedule>  chlorhexidine 0.12% Liquid 15 milliLiter(s) Oral Mucosa every 12 hours  chlorhexidine 4% Liquid 1 Application(s) Topical <User Schedule>  collagenase Ointment 1 Application(s) Topical two times a day  Dakins Solution - 1/2 Strength 1 Application(s) Topical two times a day  enoxaparin Injectable 40 milliGRAM(s) SubCutaneous daily  lidocaine 2% Gel 1 Application(s) Topical once  lidocaine 2% Viscous 100 milliLiter(s) Swish and Spit once  magnesium oxide 400 milliGRAM(s) Oral every 8 hours  midodrine 10 milliGRAM(s) Oral every 8 hours  pantoprazole   Suspension 40 milliGRAM(s) Oral daily  polyethylene glycol 3350 17 Gram(s) Oral daily  senna 2 Tablet(s) Oral at bedtime  sodium chloride 0.9%. 1000 milliLiter(s) (75 mL/Hr) IV Continuous <Continuous>  zinc sulfate 220 milliGRAM(s) Oral daily    DVT Prophylaxis: enoxaparin Injectable 40 milliGRAM(s) SubCutaneous daily    GI Prophylaxis: pantoprazole   Suspension 40 milliGRAM(s) Oral daily    Antibiotics:   Anticoagulation:   Medications:[PRN]  acetaminophen   Tablet .. 650 milliGRAM(s) Oral every 6 hours PRN  midazolam Injectable 2 milliGRAM(s) IV Push every 4 hours PRN      Labs:                        8.7    4.97  )-----------( 143      ( 18 May 2021 08:23 )             28.3     05-18    134<L>  |  99  |  17  ----------------------------<  111<H>  4.0   |  22  |  0.6<L>    Ca    8.3<L>      18 May 2021 08:23  Mg     1.9     05-18    TPro  6.9  /  Alb  2.3<L>  /  TBili  0.4  /  DBili  x   /  AST  25  /  ALT  7   /  AlkPhos  145<H>  05-18    LIVER FUNCTIONS - ( 18 May 2021 08:23 )  Alb: 2.3 g/dL / Pro: 6.9 g/dL / ALK PHOS: 145 U/L / ALT: 7 U/L / AST: 25 U/L / GGT: x           PT/INR - ( 18 May 2021 08:23 )   PT: 13.00 sec;   INR: 1.13 ratio           ABG - ( 18 May 2021 05:16 )  pH: 7.47  /  pCO2: 37    /  pO2: 127   / HCO3: 27    / Base Excess: 3.4   /  SaO2: 99                      Urine/Micro:    Culture - Blood (collected 17 May 2021 16:00)  Source: .Blood Blood-Peripheral  Preliminary Report (18 May 2021 22:11):    No growth to date.

## 2021-05-20 LAB
ALBUMIN SERPL ELPH-MCNC: 2.5 G/DL — LOW (ref 3.5–5.2)
ALP SERPL-CCNC: 135 U/L — HIGH (ref 30–115)
ALT FLD-CCNC: <5 U/L — SIGNIFICANT CHANGE UP (ref 0–41)
ANION GAP SERPL CALC-SCNC: 7 MMOL/L — SIGNIFICANT CHANGE UP (ref 7–14)
AST SERPL-CCNC: 24 U/L — SIGNIFICANT CHANGE UP (ref 0–41)
BASOPHILS # BLD AUTO: 0.04 K/UL — SIGNIFICANT CHANGE UP (ref 0–0.2)
BASOPHILS NFR BLD AUTO: 0.6 % — SIGNIFICANT CHANGE UP (ref 0–1)
BILIRUB SERPL-MCNC: 0.3 MG/DL — SIGNIFICANT CHANGE UP (ref 0.2–1.2)
BUN SERPL-MCNC: 13 MG/DL — SIGNIFICANT CHANGE UP (ref 10–20)
CALCIUM SERPL-MCNC: 8.1 MG/DL — LOW (ref 8.5–10.1)
CHLORIDE SERPL-SCNC: 106 MMOL/L — SIGNIFICANT CHANGE UP (ref 98–110)
CO2 SERPL-SCNC: 24 MMOL/L — SIGNIFICANT CHANGE UP (ref 17–32)
CREAT SERPL-MCNC: 0.6 MG/DL — LOW (ref 0.7–1.5)
EOSINOPHIL # BLD AUTO: 0.24 K/UL — SIGNIFICANT CHANGE UP (ref 0–0.7)
EOSINOPHIL NFR BLD AUTO: 3.5 % — SIGNIFICANT CHANGE UP (ref 0–8)
GLUCOSE BLDC GLUCOMTR-MCNC: 100 MG/DL — HIGH (ref 70–99)
GLUCOSE BLDC GLUCOMTR-MCNC: 106 MG/DL — HIGH (ref 70–99)
GLUCOSE SERPL-MCNC: 95 MG/DL — SIGNIFICANT CHANGE UP (ref 70–99)
HCT VFR BLD CALC: 29 % — LOW (ref 42–52)
HGB BLD-MCNC: 8.9 G/DL — LOW (ref 14–18)
IMM GRANULOCYTES NFR BLD AUTO: 0.3 % — SIGNIFICANT CHANGE UP (ref 0.1–0.3)
INR BLD: 1.11 RATIO — SIGNIFICANT CHANGE UP (ref 0.65–1.3)
LYMPHOCYTES # BLD AUTO: 1.19 K/UL — LOW (ref 1.2–3.4)
LYMPHOCYTES # BLD AUTO: 17.2 % — LOW (ref 20.5–51.1)
MAGNESIUM SERPL-MCNC: 1.8 MG/DL — SIGNIFICANT CHANGE UP (ref 1.8–2.4)
MCHC RBC-ENTMCNC: 27.5 PG — SIGNIFICANT CHANGE UP (ref 27–31)
MCHC RBC-ENTMCNC: 30.7 G/DL — LOW (ref 32–37)
MCV RBC AUTO: 89.5 FL — SIGNIFICANT CHANGE UP (ref 80–94)
MONOCYTES # BLD AUTO: 0.69 K/UL — HIGH (ref 0.1–0.6)
MONOCYTES NFR BLD AUTO: 10 % — HIGH (ref 1.7–9.3)
NEUTROPHILS # BLD AUTO: 4.74 K/UL — SIGNIFICANT CHANGE UP (ref 1.4–6.5)
NEUTROPHILS NFR BLD AUTO: 68.4 % — SIGNIFICANT CHANGE UP (ref 42.2–75.2)
NRBC # BLD: 0 /100 WBCS — SIGNIFICANT CHANGE UP (ref 0–0)
PLATELET # BLD AUTO: 206 K/UL — SIGNIFICANT CHANGE UP (ref 130–400)
POTASSIUM SERPL-MCNC: 4.2 MMOL/L — SIGNIFICANT CHANGE UP (ref 3.5–5)
POTASSIUM SERPL-SCNC: 4.2 MMOL/L — SIGNIFICANT CHANGE UP (ref 3.5–5)
PROT SERPL-MCNC: 6.7 G/DL — SIGNIFICANT CHANGE UP (ref 6–8)
PROTHROM AB SERPL-ACNC: 12.8 SEC — SIGNIFICANT CHANGE UP (ref 9.95–12.87)
RBC # BLD: 3.24 M/UL — LOW (ref 4.7–6.1)
RBC # FLD: 18 % — HIGH (ref 11.5–14.5)
SODIUM SERPL-SCNC: 137 MMOL/L — SIGNIFICANT CHANGE UP (ref 135–146)
WBC # BLD: 6.92 K/UL — SIGNIFICANT CHANGE UP (ref 4.8–10.8)
WBC # FLD AUTO: 6.92 K/UL — SIGNIFICANT CHANGE UP (ref 4.8–10.8)

## 2021-05-20 PROCEDURE — 99232 SBSQ HOSP IP/OBS MODERATE 35: CPT

## 2021-05-20 PROCEDURE — 71045 X-RAY EXAM CHEST 1 VIEW: CPT | Mod: 26

## 2021-05-20 PROCEDURE — 11046 DBRDMT MUSC&/FSCA EA ADDL: CPT

## 2021-05-20 PROCEDURE — 11043 DBRDMT MUSC&/FSCA 1ST 20/<: CPT

## 2021-05-20 RX ADMIN — ENOXAPARIN SODIUM 40 MILLIGRAM(S): 100 INJECTION SUBCUTANEOUS at 12:21

## 2021-05-20 RX ADMIN — CARBIDOPA AND LEVODOPA 2 TABLET(S): 25; 100 TABLET ORAL at 05:44

## 2021-05-20 RX ADMIN — CARBIDOPA AND LEVODOPA 2 TABLET(S): 25; 100 TABLET ORAL at 21:39

## 2021-05-20 RX ADMIN — ZINC SULFATE TAB 220 MG (50 MG ZINC EQUIVALENT) 220 MILLIGRAM(S): 220 (50 ZN) TAB at 12:22

## 2021-05-20 RX ADMIN — CHLORHEXIDINE GLUCONATE 15 MILLILITER(S): 213 SOLUTION TOPICAL at 16:46

## 2021-05-20 RX ADMIN — MAGNESIUM OXIDE 400 MG ORAL TABLET 400 MILLIGRAM(S): 241.3 TABLET ORAL at 13:21

## 2021-05-20 RX ADMIN — PANTOPRAZOLE SODIUM 40 MILLIGRAM(S): 20 TABLET, DELAYED RELEASE ORAL at 12:21

## 2021-05-20 RX ADMIN — CHLORHEXIDINE GLUCONATE 1 APPLICATION(S): 213 SOLUTION TOPICAL at 05:49

## 2021-05-20 RX ADMIN — ATORVASTATIN CALCIUM 40 MILLIGRAM(S): 80 TABLET, FILM COATED ORAL at 21:39

## 2021-05-20 RX ADMIN — MIDODRINE HYDROCHLORIDE 10 MILLIGRAM(S): 2.5 TABLET ORAL at 13:22

## 2021-05-20 RX ADMIN — CARBIDOPA AND LEVODOPA 2 TABLET(S): 25; 100 TABLET ORAL at 09:38

## 2021-05-20 RX ADMIN — MAGNESIUM OXIDE 400 MG ORAL TABLET 400 MILLIGRAM(S): 241.3 TABLET ORAL at 21:39

## 2021-05-20 RX ADMIN — Medication 650 MILLIGRAM(S): at 05:49

## 2021-05-20 RX ADMIN — SENNA PLUS 2 TABLET(S): 8.6 TABLET ORAL at 21:39

## 2021-05-20 RX ADMIN — Medication 1 APPLICATION(S): at 12:00

## 2021-05-20 RX ADMIN — Medication 1 APPLICATION(S): at 05:48

## 2021-05-20 RX ADMIN — MIDODRINE HYDROCHLORIDE 10 MILLIGRAM(S): 2.5 TABLET ORAL at 21:39

## 2021-05-20 RX ADMIN — Medication 500 MILLIGRAM(S): at 12:21

## 2021-05-20 RX ADMIN — CARBIDOPA AND LEVODOPA 2 TABLET(S): 25; 100 TABLET ORAL at 13:21

## 2021-05-20 RX ADMIN — MIDODRINE HYDROCHLORIDE 10 MILLIGRAM(S): 2.5 TABLET ORAL at 05:44

## 2021-05-20 RX ADMIN — CHLORHEXIDINE GLUCONATE 15 MILLILITER(S): 213 SOLUTION TOPICAL at 05:44

## 2021-05-20 RX ADMIN — POLYETHYLENE GLYCOL 3350 17 GRAM(S): 17 POWDER, FOR SOLUTION ORAL at 12:21

## 2021-05-20 RX ADMIN — MAGNESIUM OXIDE 400 MG ORAL TABLET 400 MILLIGRAM(S): 241.3 TABLET ORAL at 05:44

## 2021-05-20 NOTE — PROCEDURE NOTE - NSICDXPROCEDURE_GEN_ALL_CORE_FT
PROCEDURES:  Debridement of ulcer of sacrum or ulcer of ischium 30-Mar-2021 14:51:22 Stage 4 infected Sujata Landeros  
PROCEDURES:  Debridement of ulcer of sacrum or ulcer of ischium 30-Mar-2021 14:51:22 Stage 4 - Sujata Landeros

## 2021-05-20 NOTE — PROGRESS NOTE ADULT - ASSESSMENT
IMPRESSION:    Acute hypoxemic respiratory failure SP trach  HO L lung collapse sp bronch  Septic shock resolved   Necrotic sacral wound culture s/p debridement  Cholecystitis sp CCY  MDR pneumonia Klebsiella/ pseudomonas   HO parkinson's and dementia sp PEG  HO DVT/ A fib  Thrombocytopenia, HIT negative, improving    PLAN:    CNS: PRN sedation    HEENT:  Oral care    PULMONARY:  HOB @ 45 degrees.  Aspiration precautions.  .  PS(5 and 5) wean for 8 hours      CARDIOVASCULAR: Keep equal. Avoid volume overload.    GI: GI prophylaxis. PEG feeding as tolerated.     RENAL:  F/u  lytes. Correct as needed. Accurate I/O    INFECTIOUS DISEASE: Monitor off ABX, Check procal      HEMATOLOGICAL:  DVT prophylaxis.    ENDOCRINE:  Follow up FS..  Burn f/up    CODE STATUS: FULL CODE    Very poor prognosis    Placement  IMPRESSION:    Acute hypoxemic respiratory failure SP trach  HO L lung collapse sp bronch  Septic shock resolved   Necrotic sacral wound culture s/p debridement  Cholecystitis sp CCY  MDR pneumonia Klebsiella/ pseudomonas   HO parkinson's and dementia sp PEG  HO DVT/ A fib  Thrombocytopenia, HIT negative, improving    PLAN:    CNS: PRN sedation    HEENT:  Oral care    PULMONARY:  HOB @ 45 degrees.  Aspiration precautions.  .  PS(5 and 5) wean for 8 hours      CARDIOVASCULAR: Keep equal. Avoid volume overload.    GI: GI prophylaxis. PEG feeding as tolerated.     RENAL:  F/u  lytes. Correct as needed. Accurate I/O    INFECTIOUS DISEASE: Monitor off ABX.      HEMATOLOGICAL:  DVT prophylaxis.    ENDOCRINE:  Follow up FS..  Burn f/up    CODE STATUS: FULL CODE    Very poor prognosis    Placement

## 2021-05-20 NOTE — PROGRESS NOTE ADULT - SUBJECTIVE AND OBJECTIVE BOX
Patient is a 71y old  Male who presents with a chief complaint of Change in Mental Status (19 May 2021 09:47)        Over Night Events: tolerated CPAP trial of 5 hours, no major events.        ROS:     All ROS are negative except HPI         PHYSICAL EXAM    ICU Vital Signs Last 24 Hrs  T(C): 38.1 (20 May 2021 04:00), Max: 38.1 (20 May 2021 04:00)  T(F): 100.5 (20 May 2021 04:00), Max: 100.5 (20 May 2021 04:00)  HR: 66 (20 May 2021 07:28) (65 - 76)  BP: 126/74 (20 May 2021 07:28) (101/62 - 126/74)  BP(mean): 94 (20 May 2021 07:28) (76 - 95)  RR: 19 (20 May 2021 07:28) (18 - 24)  SpO2: 100% (20 May 2021 07:28) (100% - 100%)      CONSTITUTIONAL:  Chronically ill.    ENT:   trach  Airway patent,   Mouth with normal mucosa.   No thrush    EYES:   Pupils equal,   Round and reactive to light.    CARDIAC:   Normal rate,   Regular rhythm.    No edema      Vascular:  Normal systolic impulse  No Carotid bruits    RESPIRATORY:   No wheezing  Bilateral BS  Normal chest expansion  Not tachypneic,  No use of accessory muscles    GASTROINTESTINAL:  Abdomen soft,   Non-tender,   No guarding,   + BS    MUSCULOSKELETAL:   Range of motion is not limited,  No clubbing, cyanosis    NEUROLOGICAL:   Awake, alert  generalized weakness.    SKIN:   sacral ulcer    PSYCHIATRIC:   Normal mood and affect.   No apparent risk to self or others.    HEMATOLOGICAL:  No cervical  lymphadenopathy.  no inguinal lymphadenopathy      05-19-21 @ 07:01  -  05-20-21 @ 07:00  --------------------------------------------------------  IN:    Jevity 1.2: 360 mL    Oral Fluid: 90 mL    sodium chloride 0.9%: 150 mL  Total IN: 600 mL    OUT:    Drain (mL): 2 mL  Total OUT: 2 mL    Total NET: 598 mL          LABS:                            8.9    6.92  )-----------( 206      ( 20 May 2021 06:35 )             29.0                                               05-20    137  |  106  |  13  ----------------------------<  95  4.2   |  24  |  0.6<L>    Ca    8.1<L>      20 May 2021 06:35  Mg     1.8     05-20    TPro  6.7  /  Alb  2.5<L>  /  TBili  0.3  /  DBili  x   /  AST  24  /  ALT  <5  /  AlkPhos  135<H>  05-20      PT/INR - ( 20 May 2021 06:35 )   PT: 12.80 sec;   INR: 1.11 ratio                                                                                              LIVER FUNCTIONS - ( 20 May 2021 06:35 )  Alb: 2.5 g/dL / Pro: 6.7 g/dL / ALK PHOS: 135 U/L / ALT: <5 U/L / AST: 24 U/L / GGT: x                                                  Culture - Blood (collected 17 May 2021 16:00)  Source: .Blood Blood-Peripheral  Preliminary Report (18 May 2021 22:11):    No growth to date.                                                   Mode: AC/ CMV (Assist Control/ Continuous Mandatory Ventilation)  RR (machine): 14  TV (machine): 350  FiO2: 30  PEEP: 5  ITime: 1  MAP: 10  PIP: 21                                      ABG - ( 20 May 2021 05:05 )  pH, Arterial: 7.50  pH, Blood: x     /  pCO2: 35    /  pO2: 129   / HCO3: 28    / Base Excess: 4.4   /  SaO2: 100                 MEDICATIONS  (STANDING):  ascorbic acid 500 milliGRAM(s) Oral daily  atorvastatin 40 milliGRAM(s) Oral at bedtime  carbidopa/levodopa  25/100 2 Tablet(s) Oral <User Schedule>  chlorhexidine 0.12% Liquid 15 milliLiter(s) Oral Mucosa every 12 hours  chlorhexidine 4% Liquid 1 Application(s) Topical <User Schedule>  collagenase Ointment 1 Application(s) Topical two times a day  Dakins Solution - 1/2 Strength 1 Application(s) Topical two times a day  enoxaparin Injectable 40 milliGRAM(s) SubCutaneous daily  lidocaine 2% Gel 1 Application(s) Topical once  lidocaine 2% Viscous 100 milliLiter(s) Swish and Spit once  magnesium oxide 400 milliGRAM(s) Oral every 8 hours  midodrine 10 milliGRAM(s) Oral every 8 hours  pantoprazole   Suspension 40 milliGRAM(s) Oral daily  polyethylene glycol 3350 17 Gram(s) Oral daily  senna 2 Tablet(s) Oral at bedtime  sodium chloride 0.9%. 1000 milliLiter(s) (75 mL/Hr) IV Continuous <Continuous>  zinc sulfate 220 milliGRAM(s) Oral daily    MEDICATIONS  (PRN):  acetaminophen   Tablet .. 650 milliGRAM(s) Oral every 6 hours PRN Temp greater or equal to 38C (100.4F)  midazolam Injectable 2 milliGRAM(s) IV Push every 4 hours PRN agitation      New X-rays reviewed:                                                                                  ECHO    CXR interpreted by me:  left base opacity (no change)

## 2021-05-21 LAB
ALBUMIN SERPL ELPH-MCNC: 2.4 G/DL — LOW (ref 3.5–5.2)
ALP SERPL-CCNC: 138 U/L — HIGH (ref 30–115)
ALT FLD-CCNC: 7 U/L — SIGNIFICANT CHANGE UP (ref 0–41)
ANION GAP SERPL CALC-SCNC: 11 MMOL/L — SIGNIFICANT CHANGE UP (ref 7–14)
AST SERPL-CCNC: 21 U/L — SIGNIFICANT CHANGE UP (ref 0–41)
BASOPHILS # BLD AUTO: 0.06 K/UL — SIGNIFICANT CHANGE UP (ref 0–0.2)
BASOPHILS NFR BLD AUTO: 0.9 % — SIGNIFICANT CHANGE UP (ref 0–1)
BILIRUB SERPL-MCNC: 0.3 MG/DL — SIGNIFICANT CHANGE UP (ref 0.2–1.2)
BUN SERPL-MCNC: 16 MG/DL — SIGNIFICANT CHANGE UP (ref 10–20)
CALCIUM SERPL-MCNC: 8.1 MG/DL — LOW (ref 8.5–10.1)
CHLORIDE SERPL-SCNC: 105 MMOL/L — SIGNIFICANT CHANGE UP (ref 98–110)
CO2 SERPL-SCNC: 23 MMOL/L — SIGNIFICANT CHANGE UP (ref 17–32)
CREAT SERPL-MCNC: 0.5 MG/DL — LOW (ref 0.7–1.5)
EOSINOPHIL # BLD AUTO: 0.31 K/UL — SIGNIFICANT CHANGE UP (ref 0–0.7)
EOSINOPHIL NFR BLD AUTO: 4.8 % — SIGNIFICANT CHANGE UP (ref 0–8)
GLUCOSE BLDC GLUCOMTR-MCNC: 100 MG/DL — HIGH (ref 70–99)
GLUCOSE BLDC GLUCOMTR-MCNC: 117 MG/DL — HIGH (ref 70–99)
GLUCOSE SERPL-MCNC: 107 MG/DL — HIGH (ref 70–99)
HCT VFR BLD CALC: 30.4 % — LOW (ref 42–52)
HGB BLD-MCNC: 9.1 G/DL — LOW (ref 14–18)
IMM GRANULOCYTES NFR BLD AUTO: 0.3 % — SIGNIFICANT CHANGE UP (ref 0.1–0.3)
INR BLD: 1.09 RATIO — SIGNIFICANT CHANGE UP (ref 0.65–1.3)
LYMPHOCYTES # BLD AUTO: 0.81 K/UL — LOW (ref 1.2–3.4)
LYMPHOCYTES # BLD AUTO: 12.5 % — LOW (ref 20.5–51.1)
MAGNESIUM SERPL-MCNC: 1.8 MG/DL — SIGNIFICANT CHANGE UP (ref 1.8–2.4)
MCHC RBC-ENTMCNC: 27.5 PG — SIGNIFICANT CHANGE UP (ref 27–31)
MCHC RBC-ENTMCNC: 29.9 G/DL — LOW (ref 32–37)
MCV RBC AUTO: 91.8 FL — SIGNIFICANT CHANGE UP (ref 80–94)
MONOCYTES # BLD AUTO: 0.55 K/UL — SIGNIFICANT CHANGE UP (ref 0.1–0.6)
MONOCYTES NFR BLD AUTO: 8.5 % — SIGNIFICANT CHANGE UP (ref 1.7–9.3)
NEUTROPHILS # BLD AUTO: 4.73 K/UL — SIGNIFICANT CHANGE UP (ref 1.4–6.5)
NEUTROPHILS NFR BLD AUTO: 73 % — SIGNIFICANT CHANGE UP (ref 42.2–75.2)
NRBC # BLD: 0 /100 WBCS — SIGNIFICANT CHANGE UP (ref 0–0)
PLATELET # BLD AUTO: 216 K/UL — SIGNIFICANT CHANGE UP (ref 130–400)
POTASSIUM SERPL-MCNC: 4.1 MMOL/L — SIGNIFICANT CHANGE UP (ref 3.5–5)
POTASSIUM SERPL-SCNC: 4.1 MMOL/L — SIGNIFICANT CHANGE UP (ref 3.5–5)
PROCALCITONIN SERPL-MCNC: 0.21 NG/ML — HIGH (ref 0.02–0.1)
PROT SERPL-MCNC: 6.8 G/DL — SIGNIFICANT CHANGE UP (ref 6–8)
PROTHROM AB SERPL-ACNC: 12.5 SEC — SIGNIFICANT CHANGE UP (ref 9.95–12.87)
RBC # BLD: 3.31 M/UL — LOW (ref 4.7–6.1)
RBC # FLD: 17.5 % — HIGH (ref 11.5–14.5)
SODIUM SERPL-SCNC: 139 MMOL/L — SIGNIFICANT CHANGE UP (ref 135–146)
WBC # BLD: 6.48 K/UL — SIGNIFICANT CHANGE UP (ref 4.8–10.8)
WBC # FLD AUTO: 6.48 K/UL — SIGNIFICANT CHANGE UP (ref 4.8–10.8)

## 2021-05-21 PROCEDURE — 71045 X-RAY EXAM CHEST 1 VIEW: CPT | Mod: 26

## 2021-05-21 PROCEDURE — 99232 SBSQ HOSP IP/OBS MODERATE 35: CPT

## 2021-05-21 RX ADMIN — ZINC SULFATE TAB 220 MG (50 MG ZINC EQUIVALENT) 220 MILLIGRAM(S): 220 (50 ZN) TAB at 12:14

## 2021-05-21 RX ADMIN — ATORVASTATIN CALCIUM 40 MILLIGRAM(S): 80 TABLET, FILM COATED ORAL at 21:13

## 2021-05-21 RX ADMIN — CARBIDOPA AND LEVODOPA 2 TABLET(S): 25; 100 TABLET ORAL at 13:49

## 2021-05-21 RX ADMIN — Medication 1 APPLICATION(S): at 17:16

## 2021-05-21 RX ADMIN — Medication 500 MILLIGRAM(S): at 12:14

## 2021-05-21 RX ADMIN — MIDODRINE HYDROCHLORIDE 10 MILLIGRAM(S): 2.5 TABLET ORAL at 13:49

## 2021-05-21 RX ADMIN — Medication 1 APPLICATION(S): at 05:26

## 2021-05-21 RX ADMIN — CHLORHEXIDINE GLUCONATE 15 MILLILITER(S): 213 SOLUTION TOPICAL at 17:16

## 2021-05-21 RX ADMIN — SENNA PLUS 2 TABLET(S): 8.6 TABLET ORAL at 21:14

## 2021-05-21 RX ADMIN — MAGNESIUM OXIDE 400 MG ORAL TABLET 400 MILLIGRAM(S): 241.3 TABLET ORAL at 13:49

## 2021-05-21 RX ADMIN — PANTOPRAZOLE SODIUM 40 MILLIGRAM(S): 20 TABLET, DELAYED RELEASE ORAL at 12:58

## 2021-05-21 RX ADMIN — MAGNESIUM OXIDE 400 MG ORAL TABLET 400 MILLIGRAM(S): 241.3 TABLET ORAL at 05:26

## 2021-05-21 RX ADMIN — MIDODRINE HYDROCHLORIDE 10 MILLIGRAM(S): 2.5 TABLET ORAL at 05:25

## 2021-05-21 RX ADMIN — CARBIDOPA AND LEVODOPA 2 TABLET(S): 25; 100 TABLET ORAL at 05:26

## 2021-05-21 RX ADMIN — CHLORHEXIDINE GLUCONATE 15 MILLILITER(S): 213 SOLUTION TOPICAL at 05:26

## 2021-05-21 RX ADMIN — CHLORHEXIDINE GLUCONATE 1 APPLICATION(S): 213 SOLUTION TOPICAL at 05:25

## 2021-05-21 RX ADMIN — POLYETHYLENE GLYCOL 3350 17 GRAM(S): 17 POWDER, FOR SOLUTION ORAL at 12:14

## 2021-05-21 RX ADMIN — CARBIDOPA AND LEVODOPA 2 TABLET(S): 25; 100 TABLET ORAL at 21:13

## 2021-05-21 RX ADMIN — MAGNESIUM OXIDE 400 MG ORAL TABLET 400 MILLIGRAM(S): 241.3 TABLET ORAL at 21:13

## 2021-05-21 RX ADMIN — CARBIDOPA AND LEVODOPA 2 TABLET(S): 25; 100 TABLET ORAL at 10:14

## 2021-05-21 RX ADMIN — ENOXAPARIN SODIUM 40 MILLIGRAM(S): 100 INJECTION SUBCUTANEOUS at 12:13

## 2021-05-21 NOTE — PROCEDURE NOTE - NSSITEPREP_SKIN_A_CORE
chlorhexidine
chlorhexidine/povidone iodine (if allergic to chlorhexidine)/Adherence to aseptic technique: hand hygiene prior to donning barriers (gown, gloves), don cap and mask, sterile drape over patient
chlorhexidine
chlorhexidine
povidone iodine (if allergic to chlorhexidine)

## 2021-05-21 NOTE — PROCEDURE NOTE - NSINFORMCONSENT_GEN_A_CORE
Benefits, risks, and possible complications of procedure explained to patient/caregiver who verbalized understanding and gave written consent.
From HCP/Benefits, risks, and possible complications of procedure explained to patient/caregiver who verbalized understanding and gave verbal consent.
This was an emergent procedure.
telephone
Benefits, risks, and possible complications of procedure explained to patient/caregiver who verbalized understanding and gave verbal consent.

## 2021-05-21 NOTE — PROCEDURE NOTE - NSPOSTCAREGUIDE_GEN_A_CORE
Verbal/written post procedure instructions were given to patient/caregiver
Verbal/written post procedure instructions were given to patient/caregiver/Instructed patient/caregiver to follow-up with primary care physician/Instructed patient/caregiver regarding signs and symptoms of infection/Keep the cast/splint/dressing clean and dry/Care for catheter as per unit/ICU protocols
Verbal/written post procedure instructions were given to patient/caregiver

## 2021-05-21 NOTE — PROCEDURE NOTE - NSPROCDETAILS_GEN_ALL_CORE
location identified, draped/prepped, sterile technique used, needle inserted/introduced
incision left open, packing placed
incision left open, packing placed
location identified, draped/prepped, sterile technique used/sterile dressing applied/sterile technique, catheter placed/supine position/ultrasound guidance

## 2021-05-21 NOTE — PROCEDURE NOTE - NSINDICATIONS_GEN_A_CORE
devitalized or nonviable tissue at the level of:
bone/devitalized or nonviable tissue at the level of:
critical illness/emergency venous access/venous access/volume resuscitation
CSF sampling/mental status change/suspected CNS infection
venous access

## 2021-05-21 NOTE — PROGRESS NOTE ADULT - SUBJECTIVE AND OBJECTIVE BOX
Patient is a 71y old  Male who presents with a chief complaint of Change in Mental Status (20 May 2021 09:14)        Over Night Events: S/p debridement yesterday. tolerated 8 hours yesterday        ROS:     All ROS are negative except HPI         PHYSICAL EXAM    ICU Vital Signs Last 24 Hrs  T(C): 36.1 (21 May 2021 05:20), Max: 36.9 (20 May 2021 15:46)  T(F): 96.9 (21 May 2021 05:20), Max: 98.4 (20 May 2021 15:46)  HR: 61 (21 May 2021 08:56) (61 - 67)  BP: 114/62 (21 May 2021 05:20) (114/62 - 133/63)  BP(mean): 83 (21 May 2021 05:20) (83 - 94)  RR: 14 (21 May 2021 05:20) (14 - 21)  SpO2: 100% (21 May 2021 08:56) (100% - 100%)      CONSTITUTIONAL:  Chronically ill.  NAD    ENT:   Airway patent,   Mouth with normal mucosa.   No thrush    EYES:   Pupils equal,   Round and reactive to light.    CARDIAC:   Normal rate,   Regular rhythm.    No edema      Vascular:  Normal systolic impulse  No Carotid bruits    RESPIRATORY:   No wheezing  Bilateral BS  Normal chest expansion  Not tachypneic,  No use of accessory muscles    GASTROINTESTINAL:  Abdomen soft,   Non-tender,   No guarding,   + BS    MUSCULOSKELETAL:   Range of motion is not limited,  No clubbing, cyanosis    NEUROLOGICAL:   Awake, alert. not oriented    SKIN:   Sacral decubitus        05-20-21 @ 07:01  -  05-21-21 @ 07:00  --------------------------------------------------------  IN:    Enteral Tube Flush: 370 mL    Jevity 1.2: 1080 mL  Total IN: 1450 mL    OUT:    Incontinent per Condom Catheter (mL): 800 mL  Total OUT: 800 mL    Total NET: 650 mL          LABS:                            9.1    6.48  )-----------( 216      ( 21 May 2021 06:18 )             30.4                                               05-21    139  |  105  |  16  ----------------------------<  107<H>  4.1   |  23  |  0.5<L>    Ca    8.1<L>      21 May 2021 06:18  Mg     1.8     05-21    TPro  6.8  /  Alb  2.4<L>  /  TBili  0.3  /  DBili  x   /  AST  21  /  ALT  7   /  AlkPhos  138<H>  05-21      PT/INR - ( 21 May 2021 06:18 )   PT: 12.50 sec;   INR: 1.09 ratio                                                                                              LIVER FUNCTIONS - ( 21 May 2021 06:18 )  Alb: 2.4 g/dL / Pro: 6.8 g/dL / ALK PHOS: 138 U/L / ALT: 7 U/L / AST: 21 U/L / GGT: x                                                                                               Mode: AC/ CMV (Assist Control/ Continuous Mandatory Ventilation)  RR (machine): 14  TV (machine): 350  FiO2: 30  PEEP: 5  ITime: 1  MAP: 8  PIP: 17                                      ABG - ( 21 May 2021 05:23 )  pH, Arterial: 7.47  pH, Blood: x     /  pCO2: 38    /  pO2: 146   / HCO3: 27    / Base Excess: 3.4   /  SaO2: 100                 MEDICATIONS  (STANDING):  ascorbic acid 500 milliGRAM(s) Oral daily  atorvastatin 40 milliGRAM(s) Oral at bedtime  carbidopa/levodopa  25/100 2 Tablet(s) Oral <User Schedule>  chlorhexidine 0.12% Liquid 15 milliLiter(s) Oral Mucosa every 12 hours  chlorhexidine 4% Liquid 1 Application(s) Topical <User Schedule>  collagenase Ointment 1 Application(s) Topical two times a day  Dakins Solution - 1/2 Strength 1 Application(s) Topical two times a day  enoxaparin Injectable 40 milliGRAM(s) SubCutaneous daily  lidocaine 2% Gel 1 Application(s) Topical once  lidocaine 2% Viscous 100 milliLiter(s) Swish and Spit once  magnesium oxide 400 milliGRAM(s) Oral every 8 hours  midodrine 10 milliGRAM(s) Oral every 8 hours  pantoprazole   Suspension 40 milliGRAM(s) Oral daily  polyethylene glycol 3350 17 Gram(s) Oral daily  senna 2 Tablet(s) Oral at bedtime  sodium chloride 0.9%. 1000 milliLiter(s) (75 mL/Hr) IV Continuous <Continuous>  zinc sulfate 220 milliGRAM(s) Oral daily    MEDICATIONS  (PRN):  acetaminophen   Tablet .. 650 milliGRAM(s) Oral every 6 hours PRN Temp greater or equal to 38C (100.4F)      New X-rays reviewed:                                                                                  ECHO    CXR interpreted by me:  no change

## 2021-05-21 NOTE — PROCEDURE NOTE - ADDITIONAL PROCEDURE DETAILS
difficult iv
necrotic skin , subcutis fascia , muscle and  multiple pieces of discolored bone - c/w osteomyelitis     purulent drainage    3-0 Vicryl suture placed for hemostasis

## 2021-05-21 NOTE — PROGRESS NOTE ADULT - ASSESSMENT
IMPRESSION:    Acute hypoxemic respiratory failure SP trach  HO L lung collapse sp bronch  Septic shock resolved   Necrotic sacral wound culture s/p debridement  Cholecystitis sp CCY  MDR pneumonia Klebsiella/ pseudomonas   HO parkinson's and dementia sp PEG  HO DVT/ A fib  Thrombocytopenia, HIT negative, improving    PLAN:    CNS: PRN sedation    HEENT:  Oral care    PULMONARY:  HOB @ 45 degrees.  Aspiration precautions.  .   PS(5 and 5) wean for 12 hours    Prn ABGs    CARDIOVASCULAR: Keep equal. Avoid volume overload.    GI: GI prophylaxis. PEG feeding as tolerated.     RENAL:  F/u  lytes. Correct as needed. Accurate I/O    INFECTIOUS DISEASE: Monitor off ABX.      HEMATOLOGICAL:  DVT prophylaxis.    ENDOCRINE:  Follow up FS.  Burn f/up    CODE STATUS: FULL CODE    Very poor prognosis  Placement  IMPRESSION:    Acute hypoxemic respiratory failure SP trach  HO L lung collapse sp bronch  Septic shock resolved   Necrotic sacral wound culture s/p debridement  Cholecystitis sp CCY  MDR pneumonia Klebsiella/ pseudomonas   HO parkinson's and dementia sp PEG  HO DVT/ A fib  Thrombocytopenia, HIT negative, improving    PLAN:    CNS: PRN sedation    HEENT:  Oral care    PULMONARY:  HOB @ 45 degrees.  Aspiration precautions.  PS wean     CARDIOVASCULAR:   Avoid volume overload.    GI: GI prophylaxis. PEG feeding as tolerated.     RENAL:  F/u  lytes. Correct as needed. Accurate I/O    INFECTIOUS DISEASE: Monitor off ABX.      HEMATOLOGICAL:  DVT prophylaxis.    ENDOCRINE:  Follow up FS.  Burn f/up    CODE STATUS: FULL CODE    Very poor prognosis  Placement

## 2021-05-21 NOTE — CHART NOTE - NSCHARTNOTEFT_GEN_A_CORE
s/p debridement of stage IV sacral pressure ulcer yesterday  Trached, +vent  Afebrile  GT in place and tolerating feeds well at goal    T(F): 96.9 (05-21-21 @ 05:20), Max: 98.4 (05-20-21 @ 15:46)  HR: 61 (05-21-21 @ 08:56) (61 - 67)  BP: 114/62 (05-21-21 @ 05:20) (114/62 - 133/63)  RR: 14 (05-21-21 @ 05:20) (14 - 21)  SpO2: 100% (05-21-21 @ 08:56) (100% - 100%)  Mode: AC/ CMV (Assist Control/ Continuous Mandatory Ventilation)  RR (machine): 14  TV (machine): 350  FiO2: 30  PEEP: 5  ITime: 1  MAP: 8  PIP: 17    I&O's Detail    20 May 2021 07:01  -  21 May 2021 07:00  --------------------------------------------------------  IN:    Enteral Tube Flush: 370 mL    Jevity 1.2: 1080 mL  Total IN: 1450 mL    OUT:    Incontinent per Condom Catheter (mL): 800 mL  Total OUT: 800 mL    Total NET: 650 mL    05-21    139  |  105  |  16  ----------------------------<  107<H>  4.1   |  23  |  0.5<L>    Ca    8.1<L>      21 May 2021 06:18  Mg     1.8     05-21  Phosphorus Level, Serum (05.16.21 @ 11:37)    Phosphorus Level, Serum: 3.4 mg/dL    TPro  6.8  /  Alb  2.4<L>  /  TBili  0.3  /  DBili  x   /  AST  21  /  ALT  7   /  AlkPhos  138<H>  05-21                       9.1    6.48  )-----------( 216      ( 21 May 2021 06:18 )             30.4     CAPILLARY BLOOD GLUCOSE  POCT Blood Glucose.: 117 mg/dL (21 May 2021 05:34)  POCT Blood Glucose.: 100 mg/dL (20 May 2021 23:25)  POCT Blood Glucose.: 106 mg/dL (20 May 2021 13:32)    Diet, NPO with Tube Feed:   Tube Feeding Modality: Gastrostomy  Jevity 1.2 Sarmad  Total Volume for 24 Hours (mL): 1440  Bolus  Total Volume of Bolus (mL):  360  Tube Feed Frequency: Every 6 hours   Tube Feed Start Time: 12:00  Bolus Feed Rate (mL per Hour): 500   Bolus Feed Duration (in Hours): 0.75  Bolus Feed Instructions:  50 ml free water flush before and after feed  Free Water Flush  Moris(7 Gm Arginine/7 Gm Glut/1.2 Gm HMB     Qty per Day:  2 (05-01-21 @ 11:32)     IMP:  - altered mental status/ metabolic encephalopathy from sepsis  - large necrotic sacral ulcer stage 4  - post hemorrhagic anemia  - thrombocytopenia  - h/o Parkinson's disease, DM    PLAN:  - cont 360 ml Jevity 1.2 over 45 minutes x 4 feeds/d --> 79 gm protein & 1710 kcal/d  - cont Moris 1 pack mixed with 4-6oz H2O q12hrs   - cont zinc 220mg once daily each  - consider adding lactobacillus and FLoraStor

## 2021-05-22 LAB
ALBUMIN SERPL ELPH-MCNC: 2.5 G/DL — LOW (ref 3.5–5.2)
ALP SERPL-CCNC: 134 U/L — HIGH (ref 30–115)
ALT FLD-CCNC: 14 U/L — SIGNIFICANT CHANGE UP (ref 0–41)
ANION GAP SERPL CALC-SCNC: 7 MMOL/L — SIGNIFICANT CHANGE UP (ref 7–14)
AST SERPL-CCNC: 23 U/L — SIGNIFICANT CHANGE UP (ref 0–41)
BASOPHILS # BLD AUTO: 0.04 K/UL — SIGNIFICANT CHANGE UP (ref 0–0.2)
BASOPHILS NFR BLD AUTO: 0.7 % — SIGNIFICANT CHANGE UP (ref 0–1)
BILIRUB SERPL-MCNC: 0.4 MG/DL — SIGNIFICANT CHANGE UP (ref 0.2–1.2)
BUN SERPL-MCNC: 15 MG/DL — SIGNIFICANT CHANGE UP (ref 10–20)
CALCIUM SERPL-MCNC: 8.2 MG/DL — LOW (ref 8.5–10.1)
CHLORIDE SERPL-SCNC: 106 MMOL/L — SIGNIFICANT CHANGE UP (ref 98–110)
CO2 SERPL-SCNC: 26 MMOL/L — SIGNIFICANT CHANGE UP (ref 17–32)
CREAT SERPL-MCNC: 0.5 MG/DL — LOW (ref 0.7–1.5)
CULTURE RESULTS: SIGNIFICANT CHANGE UP
EOSINOPHIL # BLD AUTO: 0.26 K/UL — SIGNIFICANT CHANGE UP (ref 0–0.7)
EOSINOPHIL NFR BLD AUTO: 4.9 % — SIGNIFICANT CHANGE UP (ref 0–8)
GLUCOSE BLDC GLUCOMTR-MCNC: 104 MG/DL — HIGH (ref 70–99)
GLUCOSE BLDC GLUCOMTR-MCNC: 90 MG/DL — SIGNIFICANT CHANGE UP (ref 70–99)
GLUCOSE SERPL-MCNC: 82 MG/DL — SIGNIFICANT CHANGE UP (ref 70–99)
HCT VFR BLD CALC: 28 % — LOW (ref 42–52)
HGB BLD-MCNC: 8.8 G/DL — LOW (ref 14–18)
IMM GRANULOCYTES NFR BLD AUTO: 0.6 % — HIGH (ref 0.1–0.3)
INR BLD: 1.11 RATIO — SIGNIFICANT CHANGE UP (ref 0.65–1.3)
LYMPHOCYTES # BLD AUTO: 0.97 K/UL — LOW (ref 1.2–3.4)
LYMPHOCYTES # BLD AUTO: 18.1 % — LOW (ref 20.5–51.1)
MAGNESIUM SERPL-MCNC: 1.9 MG/DL — SIGNIFICANT CHANGE UP (ref 1.8–2.4)
MCHC RBC-ENTMCNC: 28 PG — SIGNIFICANT CHANGE UP (ref 27–31)
MCHC RBC-ENTMCNC: 31.4 G/DL — LOW (ref 32–37)
MCV RBC AUTO: 89.2 FL — SIGNIFICANT CHANGE UP (ref 80–94)
MONOCYTES # BLD AUTO: 0.41 K/UL — SIGNIFICANT CHANGE UP (ref 0.1–0.6)
MONOCYTES NFR BLD AUTO: 7.6 % — SIGNIFICANT CHANGE UP (ref 1.7–9.3)
NEUTROPHILS # BLD AUTO: 3.65 K/UL — SIGNIFICANT CHANGE UP (ref 1.4–6.5)
NEUTROPHILS NFR BLD AUTO: 68.1 % — SIGNIFICANT CHANGE UP (ref 42.2–75.2)
NRBC # BLD: 0 /100 WBCS — SIGNIFICANT CHANGE UP (ref 0–0)
PLATELET # BLD AUTO: 185 K/UL — SIGNIFICANT CHANGE UP (ref 130–400)
POTASSIUM SERPL-MCNC: 4.3 MMOL/L — SIGNIFICANT CHANGE UP (ref 3.5–5)
POTASSIUM SERPL-SCNC: 4.3 MMOL/L — SIGNIFICANT CHANGE UP (ref 3.5–5)
PROT SERPL-MCNC: 6.9 G/DL — SIGNIFICANT CHANGE UP (ref 6–8)
PROTHROM AB SERPL-ACNC: 12.8 SEC — SIGNIFICANT CHANGE UP (ref 9.95–12.87)
RBC # BLD: 3.14 M/UL — LOW (ref 4.7–6.1)
RBC # FLD: 17.6 % — HIGH (ref 11.5–14.5)
SODIUM SERPL-SCNC: 139 MMOL/L — SIGNIFICANT CHANGE UP (ref 135–146)
SPECIMEN SOURCE: SIGNIFICANT CHANGE UP
WBC # BLD: 5.36 K/UL — SIGNIFICANT CHANGE UP (ref 4.8–10.8)
WBC # FLD AUTO: 5.36 K/UL — SIGNIFICANT CHANGE UP (ref 4.8–10.8)

## 2021-05-22 PROCEDURE — 99232 SBSQ HOSP IP/OBS MODERATE 35: CPT

## 2021-05-22 PROCEDURE — 71045 X-RAY EXAM CHEST 1 VIEW: CPT | Mod: 26

## 2021-05-22 RX ADMIN — PANTOPRAZOLE SODIUM 40 MILLIGRAM(S): 20 TABLET, DELAYED RELEASE ORAL at 13:36

## 2021-05-22 RX ADMIN — CHLORHEXIDINE GLUCONATE 15 MILLILITER(S): 213 SOLUTION TOPICAL at 17:48

## 2021-05-22 RX ADMIN — CARBIDOPA AND LEVODOPA 2 TABLET(S): 25; 100 TABLET ORAL at 22:19

## 2021-05-22 RX ADMIN — POLYETHYLENE GLYCOL 3350 17 GRAM(S): 17 POWDER, FOR SOLUTION ORAL at 13:35

## 2021-05-22 RX ADMIN — SENNA PLUS 2 TABLET(S): 8.6 TABLET ORAL at 22:20

## 2021-05-22 RX ADMIN — MAGNESIUM OXIDE 400 MG ORAL TABLET 400 MILLIGRAM(S): 241.3 TABLET ORAL at 22:19

## 2021-05-22 RX ADMIN — MIDODRINE HYDROCHLORIDE 10 MILLIGRAM(S): 2.5 TABLET ORAL at 22:19

## 2021-05-22 RX ADMIN — Medication 650 MILLIGRAM(S): at 14:02

## 2021-05-22 RX ADMIN — ATORVASTATIN CALCIUM 40 MILLIGRAM(S): 80 TABLET, FILM COATED ORAL at 22:19

## 2021-05-22 RX ADMIN — CHLORHEXIDINE GLUCONATE 15 MILLILITER(S): 213 SOLUTION TOPICAL at 05:20

## 2021-05-22 RX ADMIN — CARBIDOPA AND LEVODOPA 2 TABLET(S): 25; 100 TABLET ORAL at 05:20

## 2021-05-22 RX ADMIN — Medication 1 APPLICATION(S): at 05:21

## 2021-05-22 RX ADMIN — CARBIDOPA AND LEVODOPA 2 TABLET(S): 25; 100 TABLET ORAL at 09:16

## 2021-05-22 RX ADMIN — Medication 1 APPLICATION(S): at 17:48

## 2021-05-22 RX ADMIN — Medication 650 MILLIGRAM(S): at 14:32

## 2021-05-22 RX ADMIN — ZINC SULFATE TAB 220 MG (50 MG ZINC EQUIVALENT) 220 MILLIGRAM(S): 220 (50 ZN) TAB at 13:36

## 2021-05-22 RX ADMIN — MIDODRINE HYDROCHLORIDE 10 MILLIGRAM(S): 2.5 TABLET ORAL at 13:38

## 2021-05-22 RX ADMIN — CHLORHEXIDINE GLUCONATE 1 APPLICATION(S): 213 SOLUTION TOPICAL at 05:21

## 2021-05-22 RX ADMIN — CARBIDOPA AND LEVODOPA 2 TABLET(S): 25; 100 TABLET ORAL at 13:37

## 2021-05-22 RX ADMIN — MIDODRINE HYDROCHLORIDE 10 MILLIGRAM(S): 2.5 TABLET ORAL at 05:21

## 2021-05-22 RX ADMIN — ENOXAPARIN SODIUM 40 MILLIGRAM(S): 100 INJECTION SUBCUTANEOUS at 13:35

## 2021-05-22 RX ADMIN — MAGNESIUM OXIDE 400 MG ORAL TABLET 400 MILLIGRAM(S): 241.3 TABLET ORAL at 05:21

## 2021-05-22 RX ADMIN — MAGNESIUM OXIDE 400 MG ORAL TABLET 400 MILLIGRAM(S): 241.3 TABLET ORAL at 13:37

## 2021-05-22 RX ADMIN — Medication 500 MILLIGRAM(S): at 13:37

## 2021-05-22 NOTE — PROGRESS NOTE ADULT - ASSESSMENT
IMPRESSION:    Acute hypoxemic respiratory failure SP trach  HO L lung collapse sp bronch  Septic shock resolved   Necrotic sacral wound culture s/p debridement  Cholecystitis sp CCY  MDR pneumonia Klebsiella/ pseudomonas   HO parkinson's and dementia sp PEG  HO DVT/ A fib  Thrombocytopenia, HIT negative, improving    PLAN:    CNS: PRN sedation    HEENT:  Oral care    PULMONARY:  HOB @ 45 degrees.  Aspiration precautions.  PS wean. surgery follow up for trach    CARDIOVASCULAR:   Avoid volume overload.    GI: GI prophylaxis. PEG feeding as tolerated.     RENAL:  F/u  lytes. Correct as needed. Accurate I/O    INFECTIOUS DISEASE: Monitor off ABX.      HEMATOLOGICAL:  DVT prophylaxis.    ENDOCRINE:  Follow up FS.  Burn f/up    CODE STATUS: FULL CODE    Very poor prognosis  Placement  IMPRESSION:    Acute hypoxemic respiratory failure SP trach  HO L lung collapse sp bronch  Septic shock resolved   Necrotic sacral wound culture s/p debridement  Cholecystitis sp CCY  MDR pneumonia Klebsiella/ pseudomonas   HO parkinson's and dementia sp PEG  HO DVT/ A fib  Thrombocytopenia, HIT negative, improving    PLAN:    CNS: PRN sedation    HEENT:  Oral care.  Trach care     PULMONARY:  HOB @ 45 degrees.  Aspiration precautions.  PS wean.     CARDIOVASCULAR:   Avoid volume overload.    GI: GI prophylaxis. PEG feeding as tolerated.     RENAL:  F/u  lytes. Correct as needed. Accurate I/O    INFECTIOUS DISEASE: Monitor off ABX.      HEMATOLOGICAL:  DVT prophylaxis.    ENDOCRINE:  Follow up FS.  Burn f/up    CODE STATUS: FULL CODE    Very poor prognosis  Placement

## 2021-05-22 NOTE — PROGRESS NOTE ADULT - SUBJECTIVE AND OBJECTIVE BOX
Patient is a 71y old  Male who presents with a chief complaint of Change in Mental Status (21 May 2021 09:05)        Over Night Events:  no acute events overnight  now ON PS  toilerated 7.5 hours of t-piece    ROS:     All ROS are negative except HPI         PHYSICAL EXAM    ICU Vital Signs Last 24 Hrs  T(C): 36.7 (22 May 2021 08:00), Max: 36.8 (21 May 2021 15:47)  T(F): 98 (22 May 2021 08:00), Max: 98.3 (21 May 2021 15:47)  HR: 72 (22 May 2021 08:00) (66 - 80)  BP: 122/80 (22 May 2021 08:00) (116/60 - 133/63)  BP(mean): 90 (21 May 2021 15:47) (84 - 90)  ABP: --  ABP(mean): --  RR: 20 (22 May 2021 08:00) (18 - 20)  SpO2: 100% (21 May 2021 20:35) (100% - 100%)      CONSTITUTIONAL:  chronically ill appearing in NAD    ENT:   Airway patent,   Mouth with normal mucosa.   No thrush  +trach    EYES:   Pupils equal,   Round and reactive to light.    CARDIAC:   Normal rate,   Regular rhythm.    No edema      Vascular:  Normal systolic impulse  No Carotid bruits    RESPIRATORY:   No wheezing  Bilateral BS  Normal chest expansion  Not tachypneic,  No use of accessory muscles    GASTROINTESTINAL:  Abdomen soft,   Non-tender,   No guarding,   +peg    MUSCULOSKELETAL:   Range of motion is not limited,  No clubbing, cyanosis    NEUROLOGICAL:   Awake  No motor  deficits.    SKIN:   sacral decubitis      HEMATOLOGICAL:  No cervical  lymphadenopathy.  no inguinal lymphadenopathy      05-21-21 @ 07:01  -  05-22-21 @ 07:00  --------------------------------------------------------  IN:    Enteral Tube Flush: 100 mL    Jevity 1.2: 360 mL    sodium chloride 0.9%: 450 mL  Total IN: 910 mL    OUT:    Drain (mL): 5 mL    Incontinent per Condom Catheter (mL): 260 mL  Total OUT: 265 mL    Total NET: 645 mL          LABS:                            8.8    5.36  )-----------( 185      ( 22 May 2021 05:24 )             28.0                                               05-22    139  |  106  |  15  ----------------------------<  82  4.3   |  26  |  0.5<L>    Ca    8.2<L>      22 May 2021 05:24  Mg     1.9     05-22    TPro  6.9  /  Alb  2.5<L>  /  TBili  0.4  /  DBili  x   /  AST  23  /  ALT  14  /  AlkPhos  134<H>  05-22      PT/INR - ( 22 May 2021 05:24 )   PT: 12.80 sec;   INR: 1.11 ratio                                                                                              LIVER FUNCTIONS - ( 22 May 2021 05:24 )  Alb: 2.5 g/dL / Pro: 6.9 g/dL / ALK PHOS: 134 U/L / ALT: 14 U/L / AST: 23 U/L / GGT: x                                                  Culture - Blood (collected 20 May 2021 12:05)  Source: .Blood None  Preliminary Report (21 May 2021 22:02):    No growth to date.                                                   Mode: AC/ CMV (Assist Control/ Continuous Mandatory Ventilation)  RR (machine): 14  TV (machine): 350  FiO2: 30  PEEP: 5  ITime: 1  MAP: 8  PIP: 13                                      ABG - ( 22 May 2021 04:16 )  pH, Arterial: 7.50  pH, Blood: x     /  pCO2: 36    /  pO2: 110   / HCO3: 28    / Base Excess: 4.6   /  SaO2: 100                 MEDICATIONS  (STANDING):  ascorbic acid 500 milliGRAM(s) Oral daily  atorvastatin 40 milliGRAM(s) Oral at bedtime  carbidopa/levodopa  25/100 2 Tablet(s) Oral <User Schedule>  chlorhexidine 0.12% Liquid 15 milliLiter(s) Oral Mucosa every 12 hours  chlorhexidine 4% Liquid 1 Application(s) Topical <User Schedule>  collagenase Ointment 1 Application(s) Topical two times a day  Dakins Solution - 1/2 Strength 1 Application(s) Topical two times a day  enoxaparin Injectable 40 milliGRAM(s) SubCutaneous daily  lidocaine 2% Gel 1 Application(s) Topical once  lidocaine 2% Viscous 100 milliLiter(s) Swish and Spit once  magnesium oxide 400 milliGRAM(s) Oral every 8 hours  midodrine 10 milliGRAM(s) Oral every 8 hours  pantoprazole   Suspension 40 milliGRAM(s) Oral daily  polyethylene glycol 3350 17 Gram(s) Oral daily  senna 2 Tablet(s) Oral at bedtime  sodium chloride 0.9%. 1000 milliLiter(s) (75 mL/Hr) IV Continuous <Continuous>  zinc sulfate 220 milliGRAM(s) Oral daily    MEDICATIONS  (PRN):  acetaminophen   Tablet .. 650 milliGRAM(s) Oral every 6 hours PRN Temp greater or equal to 38C (100.4F)      New X-rays reviewed:                                                                                  ECHO    CXR interpreted by me:

## 2021-05-23 LAB
ALBUMIN SERPL ELPH-MCNC: 2.3 G/DL — LOW (ref 3.5–5.2)
ALP SERPL-CCNC: 125 U/L — HIGH (ref 30–115)
ALT FLD-CCNC: 8 U/L — SIGNIFICANT CHANGE UP (ref 0–41)
ANION GAP SERPL CALC-SCNC: 7 MMOL/L — SIGNIFICANT CHANGE UP (ref 7–14)
AST SERPL-CCNC: 22 U/L — SIGNIFICANT CHANGE UP (ref 0–41)
BASE EXCESS BLDA CALC-SCNC: 4.7 MMOL/L — HIGH (ref -2–2)
BASOPHILS # BLD AUTO: 0.05 K/UL — SIGNIFICANT CHANGE UP (ref 0–0.2)
BASOPHILS NFR BLD AUTO: 0.9 % — SIGNIFICANT CHANGE UP (ref 0–1)
BILIRUB SERPL-MCNC: 0.3 MG/DL — SIGNIFICANT CHANGE UP (ref 0.2–1.2)
BUN SERPL-MCNC: 13 MG/DL — SIGNIFICANT CHANGE UP (ref 10–20)
CALCIUM SERPL-MCNC: 7.9 MG/DL — LOW (ref 8.5–10.1)
CHLORIDE SERPL-SCNC: 106 MMOL/L — SIGNIFICANT CHANGE UP (ref 98–110)
CO2 SERPL-SCNC: 25 MMOL/L — SIGNIFICANT CHANGE UP (ref 17–32)
CREAT SERPL-MCNC: 0.5 MG/DL — LOW (ref 0.7–1.5)
EOSINOPHIL # BLD AUTO: 0.34 K/UL — SIGNIFICANT CHANGE UP (ref 0–0.7)
EOSINOPHIL NFR BLD AUTO: 6.2 % — SIGNIFICANT CHANGE UP (ref 0–8)
GLUCOSE BLDC GLUCOMTR-MCNC: 100 MG/DL — HIGH (ref 70–99)
GLUCOSE BLDC GLUCOMTR-MCNC: 117 MG/DL — HIGH (ref 70–99)
GLUCOSE BLDC GLUCOMTR-MCNC: 93 MG/DL — SIGNIFICANT CHANGE UP (ref 70–99)
GLUCOSE BLDC GLUCOMTR-MCNC: 96 MG/DL — SIGNIFICANT CHANGE UP (ref 70–99)
GLUCOSE SERPL-MCNC: 102 MG/DL — HIGH (ref 70–99)
HCO3 BLDA-SCNC: 27 MMOL/L — SIGNIFICANT CHANGE UP (ref 23–27)
HCT VFR BLD CALC: 25.9 % — LOW (ref 42–52)
HGB BLD-MCNC: 8 G/DL — LOW (ref 14–18)
IMM GRANULOCYTES NFR BLD AUTO: 0.2 % — SIGNIFICANT CHANGE UP (ref 0.1–0.3)
INR BLD: 1.05 RATIO — SIGNIFICANT CHANGE UP (ref 0.65–1.3)
LYMPHOCYTES # BLD AUTO: 0.86 K/UL — LOW (ref 1.2–3.4)
LYMPHOCYTES # BLD AUTO: 15.6 % — LOW (ref 20.5–51.1)
MAGNESIUM SERPL-MCNC: 1.8 MG/DL — SIGNIFICANT CHANGE UP (ref 1.8–2.4)
MCHC RBC-ENTMCNC: 27.4 PG — SIGNIFICANT CHANGE UP (ref 27–31)
MCHC RBC-ENTMCNC: 30.9 G/DL — LOW (ref 32–37)
MCV RBC AUTO: 88.7 FL — SIGNIFICANT CHANGE UP (ref 80–94)
MONOCYTES # BLD AUTO: 0.61 K/UL — HIGH (ref 0.1–0.6)
MONOCYTES NFR BLD AUTO: 11.1 % — HIGH (ref 1.7–9.3)
NEUTROPHILS # BLD AUTO: 3.64 K/UL — SIGNIFICANT CHANGE UP (ref 1.4–6.5)
NEUTROPHILS NFR BLD AUTO: 66 % — SIGNIFICANT CHANGE UP (ref 42.2–75.2)
NRBC # BLD: 0 /100 WBCS — SIGNIFICANT CHANGE UP (ref 0–0)
PCO2 BLDA: 32 MMHG — LOW (ref 38–42)
PH BLDA: 7.54 — HIGH (ref 7.38–7.42)
PLATELET # BLD AUTO: 233 K/UL — SIGNIFICANT CHANGE UP (ref 130–400)
PO2 BLDA: 144 MMHG — HIGH (ref 78–95)
POTASSIUM SERPL-MCNC: 4.3 MMOL/L — SIGNIFICANT CHANGE UP (ref 3.5–5)
POTASSIUM SERPL-SCNC: 4.3 MMOL/L — SIGNIFICANT CHANGE UP (ref 3.5–5)
PROT SERPL-MCNC: 6.4 G/DL — SIGNIFICANT CHANGE UP (ref 6–8)
PROTHROM AB SERPL-ACNC: 12.1 SEC — SIGNIFICANT CHANGE UP (ref 9.95–12.87)
RBC # BLD: 2.92 M/UL — LOW (ref 4.7–6.1)
RBC # FLD: 17.2 % — HIGH (ref 11.5–14.5)
SAO2 % BLDA: 100 % — HIGH (ref 94–98)
SODIUM SERPL-SCNC: 138 MMOL/L — SIGNIFICANT CHANGE UP (ref 135–146)
WBC # BLD: 5.51 K/UL — SIGNIFICANT CHANGE UP (ref 4.8–10.8)
WBC # FLD AUTO: 5.51 K/UL — SIGNIFICANT CHANGE UP (ref 4.8–10.8)

## 2021-05-23 PROCEDURE — 71045 X-RAY EXAM CHEST 1 VIEW: CPT | Mod: 26

## 2021-05-23 PROCEDURE — 99232 SBSQ HOSP IP/OBS MODERATE 35: CPT

## 2021-05-23 RX ADMIN — CHLORHEXIDINE GLUCONATE 15 MILLILITER(S): 213 SOLUTION TOPICAL at 05:49

## 2021-05-23 RX ADMIN — ENOXAPARIN SODIUM 40 MILLIGRAM(S): 100 INJECTION SUBCUTANEOUS at 12:42

## 2021-05-23 RX ADMIN — POLYETHYLENE GLYCOL 3350 17 GRAM(S): 17 POWDER, FOR SOLUTION ORAL at 12:44

## 2021-05-23 RX ADMIN — Medication 1 APPLICATION(S): at 19:05

## 2021-05-23 RX ADMIN — MIDODRINE HYDROCHLORIDE 10 MILLIGRAM(S): 2.5 TABLET ORAL at 22:58

## 2021-05-23 RX ADMIN — SENNA PLUS 2 TABLET(S): 8.6 TABLET ORAL at 22:58

## 2021-05-23 RX ADMIN — Medication 650 MILLIGRAM(S): at 12:56

## 2021-05-23 RX ADMIN — ZINC SULFATE TAB 220 MG (50 MG ZINC EQUIVALENT) 220 MILLIGRAM(S): 220 (50 ZN) TAB at 12:43

## 2021-05-23 RX ADMIN — CARBIDOPA AND LEVODOPA 2 TABLET(S): 25; 100 TABLET ORAL at 22:58

## 2021-05-23 RX ADMIN — Medication 1 APPLICATION(S): at 05:51

## 2021-05-23 RX ADMIN — CARBIDOPA AND LEVODOPA 2 TABLET(S): 25; 100 TABLET ORAL at 15:18

## 2021-05-23 RX ADMIN — PANTOPRAZOLE SODIUM 40 MILLIGRAM(S): 20 TABLET, DELAYED RELEASE ORAL at 12:43

## 2021-05-23 RX ADMIN — ATORVASTATIN CALCIUM 40 MILLIGRAM(S): 80 TABLET, FILM COATED ORAL at 22:59

## 2021-05-23 RX ADMIN — MAGNESIUM OXIDE 400 MG ORAL TABLET 400 MILLIGRAM(S): 241.3 TABLET ORAL at 22:58

## 2021-05-23 RX ADMIN — CHLORHEXIDINE GLUCONATE 1 APPLICATION(S): 213 SOLUTION TOPICAL at 05:50

## 2021-05-23 RX ADMIN — CARBIDOPA AND LEVODOPA 2 TABLET(S): 25; 100 TABLET ORAL at 05:51

## 2021-05-23 RX ADMIN — MIDODRINE HYDROCHLORIDE 10 MILLIGRAM(S): 2.5 TABLET ORAL at 15:19

## 2021-05-23 RX ADMIN — Medication 500 MILLIGRAM(S): at 12:42

## 2021-05-23 RX ADMIN — CHLORHEXIDINE GLUCONATE 15 MILLILITER(S): 213 SOLUTION TOPICAL at 19:05

## 2021-05-23 RX ADMIN — MAGNESIUM OXIDE 400 MG ORAL TABLET 400 MILLIGRAM(S): 241.3 TABLET ORAL at 05:51

## 2021-05-23 RX ADMIN — CARBIDOPA AND LEVODOPA 2 TABLET(S): 25; 100 TABLET ORAL at 12:42

## 2021-05-23 RX ADMIN — MIDODRINE HYDROCHLORIDE 10 MILLIGRAM(S): 2.5 TABLET ORAL at 05:50

## 2021-05-23 RX ADMIN — MAGNESIUM OXIDE 400 MG ORAL TABLET 400 MILLIGRAM(S): 241.3 TABLET ORAL at 15:18

## 2021-05-23 NOTE — PROGRESS NOTE ADULT - ASSESSMENT
IMPRESSION:    Acute hypoxemic respiratory failure SP trach  HO L lung collapse sp bronch  Septic shock resolved   Necrotic sacral wound culture s/p debridement  Cholecystitis sp CCY  MDR pneumonia Klebsiella/ pseudomonas   HO parkinson's and dementia sp PEG  HO DVT/ A fib  Thrombocytopenia, HIT negative, improving    PLAN:    CNS: PRN sedation    HEENT:  Oral care.  Trach care     PULMONARY:  HOB @ 45 degrees.  Aspiration precautions.  PS wean.     CARDIOVASCULAR:   Avoid volume overload.    GI: GI prophylaxis. PEG feeding as tolerated.     RENAL:  F/u  lytes. Correct as needed. Accurate I/O    INFECTIOUS DISEASE: Monitor off ABX.      HEMATOLOGICAL:  DVT prophylaxis.    ENDOCRINE:  Follow up FS.  Burn f/up    CODE STATUS: FULL CODE    Very poor prognosis  Placement  IMPRESSION:    Acute hypoxemic respiratory failure SP trach  HO L lung collapse sp bronch  Septic shock resolved   Necrotic sacral wound culture s/p debridement  Cholecystitis sp CCY  MDR pneumonia Klebsiella/ pseudomonas   HO parkinson's and dementia sp PEG  HO DVT/ A fib  Thrombocytopenia, HIT negative, improving    PLAN:    CNS: PRN sedation    HEENT:  Oral care.  Trach care     PULMONARY:  HOB @ 45 degrees.  Aspiration precautions.  PS wean.     CARDIOVASCULAR:   Avoid volume overload. D/C IVF    GI: GI prophylaxis. PEG feeding as tolerated.     RENAL:  F/u  lytes. Correct as needed. Accurate I/O    INFECTIOUS DISEASE: Monitor off ABX.      HEMATOLOGICAL:  DVT prophylaxis.    ENDOCRINE:  Follow up FS.  Burn f/up    CODE STATUS: FULL CODE    Very poor prognosis  Placement  IMPRESSION:    Acute hypoxemic respiratory failure SP trach  HO L lung collapse sp bronch  Septic shock resolved   Necrotic sacral wound culture s/p debridement  Cholecystitis sp CCY  MDR pneumonia Klebsiella/ pseudomonas   HO parkinson's and dementia sp PEG  HO DVT/ A fib  Thrombocytopenia, HIT negative, improving    PLAN:    CNS: PRN sedation    HEENT:  Oral care.  Trach care     PULMONARY:  HOB @ 45 degrees.  Aspiration precautions.  PS wean 12 hours today.     CARDIOVASCULAR:   Avoid volume overload. D/C IVF    GI: GI prophylaxis. PEG feeding as tolerated.     RENAL:  F/u  lytes. Correct as needed. Accurate I/O    INFECTIOUS DISEASE: Monitor off ABX.      HEMATOLOGICAL:  DVT prophylaxis.    ENDOCRINE:  Follow up FS.  Burn f/up    CODE STATUS: FULL CODE    Very poor prognosis    Placement

## 2021-05-23 NOTE — PROGRESS NOTE ADULT - SUBJECTIVE AND OBJECTIVE BOX
Patient is a 71y old  Male who presents with a chief complaint of Change in Mental Status (22 May 2021 10:10)        Over Night Events: No acute events overnight . Tolerated PS 12 hours (5/22).         ROS:     All ROS are negative except HPI         PHYSICAL EXAM    ICU Vital Signs Last 24 Hrs  T(C): 36.1 (23 May 2021 09:16), Max: 37.4 (22 May 2021 16:00)  T(F): 97 (23 May 2021 09:16), Max: 99.3 (22 May 2021 16:00)  HR: 69 (23 May 2021 09:16) (69 - 89)  BP: 135/77 (23 May 2021 09:16) (130/55 - 145/61)  BP(mean): 100 (23 May 2021 09:16) (88 - 100)  ABP: --  ABP(mean): --  RR: 14 (23 May 2021 09:16) (14 - 99)  SpO2: 99% (23 May 2021 00:00) (99% - 100%)      CONSTITUTIONAL:  Ill appearing.  NAD    ENT:   Trach+  Mouth with normal mucosa.   No thrush    EYES:   Pupils equal,   Round and reactive to light.    CARDIAC:   Normal rate,   Irregular rhythm.    No edema      Vascular:  Normal systolic impulse  No Carotid bruits    RESPIRATORY:   No wheezing  dec BSb/l bases   Normal chest expansion  Not tachypneic,  No use of accessory muscles    GASTROINTESTINAL:  Abdomen soft,   PEG+  Non-tender,   No guarding,   + BS    MUSCULOSKELETAL:   Range of motion is not limited,  No clubbing, cyanosis    NEUROLOGICAL:   Awake and opens eyes.  Moves all extremities    SKIN:   Skin normal color for race,   Warm and dry and intact.   No evidence of rash.      05-22-21 @ 07:01  -  05-23-21 @ 07:00  --------------------------------------------------------  IN:    Enteral Tube Flush: 200 mL    Jevity 1.2: 1449 mL    sodium chloride 0.9%: 1200 mL  Total IN: 2849 mL    OUT:    Drain (mL): 5 mL  Total OUT: 5 mL    Total NET: 2844 mL          LABS:                            8.8    5.36  )-----------( 185      ( 22 May 2021 05:24 )             28.0                                               05-22    139  |  106  |  15  ----------------------------<  82  4.3   |  26  |  0.5<L>    Ca    8.2<L>      22 May 2021 05:24  Mg     1.9     05-22    TPro  6.9  /  Alb  2.5<L>  /  TBili  0.4  /  DBili  x   /  AST  23  /  ALT  14  /  AlkPhos  134<H>  05-22      PT/INR - ( 22 May 2021 05:24 )   PT: 12.80 sec;   INR: 1.11 ratio                                                                                              LIVER FUNCTIONS - ( 22 May 2021 05:24 )  Alb: 2.5 g/dL / Pro: 6.9 g/dL / ALK PHOS: 134 U/L / ALT: 14 U/L / AST: 23 U/L / GGT: x                                                  Culture - Blood (collected 20 May 2021 12:05)  Source: .Blood None  Preliminary Report (21 May 2021 22:02):    No growth to date.                                                   Mode: AC/ CMV (Assist Control/ Continuous Mandatory Ventilation)  RR (machine): 14  TV (machine): 350  FiO2: 30  PEEP: 5  ITime: 1  MAP: 14  PIP: 46                                      ABG - ( 23 May 2021 04:41 )  pH, Arterial: 7.54  pH, Blood: x     /  pCO2: 32    /  pO2: 144   / HCO3: 27    / Base Excess: 4.7   /  SaO2: 100                 MEDICATIONS  (STANDING):  ascorbic acid 500 milliGRAM(s) Oral daily  atorvastatin 40 milliGRAM(s) Oral at bedtime  carbidopa/levodopa  25/100 2 Tablet(s) Oral <User Schedule>  chlorhexidine 0.12% Liquid 15 milliLiter(s) Oral Mucosa every 12 hours  chlorhexidine 4% Liquid 1 Application(s) Topical <User Schedule>  collagenase Ointment 1 Application(s) Topical two times a day  Dakins Solution - 1/2 Strength 1 Application(s) Topical two times a day  enoxaparin Injectable 40 milliGRAM(s) SubCutaneous daily  lidocaine 2% Gel 1 Application(s) Topical once  lidocaine 2% Viscous 100 milliLiter(s) Swish and Spit once  magnesium oxide 400 milliGRAM(s) Oral every 8 hours  midodrine 10 milliGRAM(s) Oral every 8 hours  pantoprazole   Suspension 40 milliGRAM(s) Oral daily  polyethylene glycol 3350 17 Gram(s) Oral daily  senna 2 Tablet(s) Oral at bedtime  sodium chloride 0.9%. 1000 milliLiter(s) (75 mL/Hr) IV Continuous <Continuous>  zinc sulfate 220 milliGRAM(s) Oral daily    MEDICATIONS  (PRN):  acetaminophen   Tablet .. 650 milliGRAM(s) Oral every 6 hours PRN Temp greater or equal to 38C (100.4F)      New X-rays reviewed:  Trach+,b/l inifltrates

## 2021-05-24 LAB
ALBUMIN SERPL ELPH-MCNC: 2.6 G/DL — LOW (ref 3.5–5.2)
ALP SERPL-CCNC: 126 U/L — HIGH (ref 30–115)
ALT FLD-CCNC: <5 U/L — SIGNIFICANT CHANGE UP (ref 0–41)
ANION GAP SERPL CALC-SCNC: 7 MMOL/L — SIGNIFICANT CHANGE UP (ref 7–14)
AST SERPL-CCNC: 20 U/L — SIGNIFICANT CHANGE UP (ref 0–41)
BASE EXCESS BLDA CALC-SCNC: 4.4 MMOL/L — HIGH (ref -2–2)
BASOPHILS # BLD AUTO: 0.04 K/UL — SIGNIFICANT CHANGE UP (ref 0–0.2)
BASOPHILS NFR BLD AUTO: 0.7 % — SIGNIFICANT CHANGE UP (ref 0–1)
BILIRUB SERPL-MCNC: 0.4 MG/DL — SIGNIFICANT CHANGE UP (ref 0.2–1.2)
BUN SERPL-MCNC: 12 MG/DL — SIGNIFICANT CHANGE UP (ref 10–20)
CALCIUM SERPL-MCNC: 8.6 MG/DL — SIGNIFICANT CHANGE UP (ref 8.5–10.1)
CHLORIDE SERPL-SCNC: 102 MMOL/L — SIGNIFICANT CHANGE UP (ref 98–110)
CO2 SERPL-SCNC: 28 MMOL/L — SIGNIFICANT CHANGE UP (ref 17–32)
CREAT SERPL-MCNC: 0.6 MG/DL — LOW (ref 0.7–1.5)
EOSINOPHIL # BLD AUTO: 0.36 K/UL — SIGNIFICANT CHANGE UP (ref 0–0.7)
EOSINOPHIL NFR BLD AUTO: 6.7 % — SIGNIFICANT CHANGE UP (ref 0–8)
GLUCOSE BLDC GLUCOMTR-MCNC: 95 MG/DL — SIGNIFICANT CHANGE UP (ref 70–99)
GLUCOSE SERPL-MCNC: 90 MG/DL — SIGNIFICANT CHANGE UP (ref 70–99)
HCO3 BLDA-SCNC: 27 MMOL/L — SIGNIFICANT CHANGE UP (ref 23–27)
HCT VFR BLD CALC: 28.1 % — LOW (ref 42–52)
HGB BLD-MCNC: 8.8 G/DL — LOW (ref 14–18)
IMM GRANULOCYTES NFR BLD AUTO: 0.4 % — HIGH (ref 0.1–0.3)
INR BLD: 1.08 RATIO — SIGNIFICANT CHANGE UP (ref 0.65–1.3)
LYMPHOCYTES # BLD AUTO: 0.97 K/UL — LOW (ref 1.2–3.4)
LYMPHOCYTES # BLD AUTO: 17.9 % — LOW (ref 20.5–51.1)
MAGNESIUM SERPL-MCNC: 1.8 MG/DL — SIGNIFICANT CHANGE UP (ref 1.8–2.4)
MCHC RBC-ENTMCNC: 27.8 PG — SIGNIFICANT CHANGE UP (ref 27–31)
MCHC RBC-ENTMCNC: 31.3 G/DL — LOW (ref 32–37)
MCV RBC AUTO: 88.6 FL — SIGNIFICANT CHANGE UP (ref 80–94)
MONOCYTES # BLD AUTO: 0.5 K/UL — SIGNIFICANT CHANGE UP (ref 0.1–0.6)
MONOCYTES NFR BLD AUTO: 9.2 % — SIGNIFICANT CHANGE UP (ref 1.7–9.3)
NEUTROPHILS # BLD AUTO: 3.52 K/UL — SIGNIFICANT CHANGE UP (ref 1.4–6.5)
NEUTROPHILS NFR BLD AUTO: 65.1 % — SIGNIFICANT CHANGE UP (ref 42.2–75.2)
NRBC # BLD: 0 /100 WBCS — SIGNIFICANT CHANGE UP (ref 0–0)
PCO2 BLDA: 34 MMHG — LOW (ref 38–42)
PH BLDA: 7.52 — HIGH (ref 7.38–7.42)
PLATELET # BLD AUTO: 277 K/UL — SIGNIFICANT CHANGE UP (ref 130–400)
PO2 BLDA: 179 MMHG — HIGH (ref 78–95)
POTASSIUM SERPL-MCNC: 4.7 MMOL/L — SIGNIFICANT CHANGE UP (ref 3.5–5)
POTASSIUM SERPL-SCNC: 4.7 MMOL/L — SIGNIFICANT CHANGE UP (ref 3.5–5)
PROT SERPL-MCNC: 7 G/DL — SIGNIFICANT CHANGE UP (ref 6–8)
PROTHROM AB SERPL-ACNC: 12.4 SEC — SIGNIFICANT CHANGE UP (ref 9.95–12.87)
RBC # BLD: 3.17 M/UL — LOW (ref 4.7–6.1)
RBC # FLD: 16.9 % — HIGH (ref 11.5–14.5)
SAO2 % BLDA: 100 % — HIGH (ref 94–98)
SODIUM SERPL-SCNC: 137 MMOL/L — SIGNIFICANT CHANGE UP (ref 135–146)
WBC # BLD: 5.41 K/UL — SIGNIFICANT CHANGE UP (ref 4.8–10.8)
WBC # FLD AUTO: 5.41 K/UL — SIGNIFICANT CHANGE UP (ref 4.8–10.8)

## 2021-05-24 PROCEDURE — 71045 X-RAY EXAM CHEST 1 VIEW: CPT | Mod: 26

## 2021-05-24 PROCEDURE — 99232 SBSQ HOSP IP/OBS MODERATE 35: CPT

## 2021-05-24 RX ORDER — DILTIAZEM HCL 120 MG
20 CAPSULE, EXT RELEASE 24 HR ORAL ONCE
Refills: 0 | Status: DISCONTINUED | OUTPATIENT
Start: 2021-05-24 | End: 2021-05-24

## 2021-05-24 RX ORDER — MAGNESIUM SULFATE 500 MG/ML
2 VIAL (ML) INJECTION ONCE
Refills: 0 | Status: COMPLETED | OUTPATIENT
Start: 2021-05-24 | End: 2021-05-24

## 2021-05-24 RX ORDER — DILTIAZEM HCL 120 MG
28 CAPSULE, EXT RELEASE 24 HR ORAL ONCE
Refills: 0 | Status: DISCONTINUED | OUTPATIENT
Start: 2021-05-24 | End: 2021-05-24

## 2021-05-24 RX ORDER — MAGNESIUM SULFATE 500 MG/ML
1 VIAL (ML) INJECTION ONCE
Refills: 0 | Status: DISCONTINUED | OUTPATIENT
Start: 2021-05-24 | End: 2021-05-28

## 2021-05-24 RX ORDER — DEXMEDETOMIDINE HYDROCHLORIDE IN 0.9% SODIUM CHLORIDE 4 UG/ML
0.2 INJECTION INTRAVENOUS
Qty: 400 | Refills: 0 | Status: DISCONTINUED | OUTPATIENT
Start: 2021-05-24 | End: 2021-05-24

## 2021-05-24 RX ADMIN — MIDODRINE HYDROCHLORIDE 10 MILLIGRAM(S): 2.5 TABLET ORAL at 21:25

## 2021-05-24 RX ADMIN — MAGNESIUM OXIDE 400 MG ORAL TABLET 400 MILLIGRAM(S): 241.3 TABLET ORAL at 13:52

## 2021-05-24 RX ADMIN — CARBIDOPA AND LEVODOPA 2 TABLET(S): 25; 100 TABLET ORAL at 05:32

## 2021-05-24 RX ADMIN — CHLORHEXIDINE GLUCONATE 15 MILLILITER(S): 213 SOLUTION TOPICAL at 18:46

## 2021-05-24 RX ADMIN — PANTOPRAZOLE SODIUM 40 MILLIGRAM(S): 20 TABLET, DELAYED RELEASE ORAL at 12:28

## 2021-05-24 RX ADMIN — Medication 500 MILLIGRAM(S): at 12:27

## 2021-05-24 RX ADMIN — Medication 1 APPLICATION(S): at 18:47

## 2021-05-24 RX ADMIN — SENNA PLUS 2 TABLET(S): 8.6 TABLET ORAL at 21:25

## 2021-05-24 RX ADMIN — CARBIDOPA AND LEVODOPA 2 TABLET(S): 25; 100 TABLET ORAL at 10:50

## 2021-05-24 RX ADMIN — ZINC SULFATE TAB 220 MG (50 MG ZINC EQUIVALENT) 220 MILLIGRAM(S): 220 (50 ZN) TAB at 12:27

## 2021-05-24 RX ADMIN — POLYETHYLENE GLYCOL 3350 17 GRAM(S): 17 POWDER, FOR SOLUTION ORAL at 12:28

## 2021-05-24 RX ADMIN — ATORVASTATIN CALCIUM 40 MILLIGRAM(S): 80 TABLET, FILM COATED ORAL at 21:25

## 2021-05-24 RX ADMIN — MAGNESIUM OXIDE 400 MG ORAL TABLET 400 MILLIGRAM(S): 241.3 TABLET ORAL at 21:25

## 2021-05-24 RX ADMIN — MAGNESIUM OXIDE 400 MG ORAL TABLET 400 MILLIGRAM(S): 241.3 TABLET ORAL at 05:33

## 2021-05-24 RX ADMIN — Medication 1 APPLICATION(S): at 05:34

## 2021-05-24 RX ADMIN — CARBIDOPA AND LEVODOPA 2 TABLET(S): 25; 100 TABLET ORAL at 13:52

## 2021-05-24 RX ADMIN — Medication 1 APPLICATION(S): at 05:35

## 2021-05-24 RX ADMIN — Medication 12.5 GRAM(S): at 13:50

## 2021-05-24 RX ADMIN — CHLORHEXIDINE GLUCONATE 15 MILLILITER(S): 213 SOLUTION TOPICAL at 05:35

## 2021-05-24 RX ADMIN — CARBIDOPA AND LEVODOPA 2 TABLET(S): 25; 100 TABLET ORAL at 21:25

## 2021-05-24 RX ADMIN — MIDODRINE HYDROCHLORIDE 10 MILLIGRAM(S): 2.5 TABLET ORAL at 05:32

## 2021-05-24 RX ADMIN — MIDODRINE HYDROCHLORIDE 10 MILLIGRAM(S): 2.5 TABLET ORAL at 13:52

## 2021-05-24 RX ADMIN — CHLORHEXIDINE GLUCONATE 1 APPLICATION(S): 213 SOLUTION TOPICAL at 05:34

## 2021-05-24 RX ADMIN — ENOXAPARIN SODIUM 40 MILLIGRAM(S): 100 INJECTION SUBCUTANEOUS at 12:28

## 2021-05-24 NOTE — PROGRESS NOTE ADULT - ASSESSMENT
IMPRESSION:  Acute hypoxemic respiratory failure SP trach  HO L lung collapse sp bronch  Septic shock resolved   Necrotic sacral wound culture s/p debridement  Cholecystitis sp CCY  MDR pneumonia Klebsiella/ pseudomonas   HO parkinson's and dementia sp PEG  HO DVT/ A fib  Thrombocytopenia, HIT negative, improving    PLAN:    CNS: PRN sedation    HEENT:  Oral care.  Trach care     PULMONARY:  HOB @ 45 degrees.  Aspiration precautions.  PS wean 12 hours today. Dec RR to 12.     CARDIOVASCULAR:  Avoid volume overload.    GI: GI prophylaxis. PEG feeding as tolerated.     RENAL:  F/u  lytes. Correct as needed. Accurate I/O. FU IR drain.     INFECTIOUS DISEASE: Monitor off ABX.  ID FU.     HEMATOLOGICAL:  DVT prophylaxis.    ENDOCRINE:  Follow up FS.  Burn f/up    CODE STATUS: FULL CODE    Very poor prognosis    Transfer to Vent Unit IMPRESSION:  Acute hypoxemic respiratory failure SP trach  HO L lung collapse sp bronch  Septic shock resolved   Necrotic sacral wound culture s/p debridement  Cholecystitis sp CCY  MDR pneumonia Klebsiella/ pseudomonas   HO parkinson's and dementia sp PEG  HO DVT/ A fib  Thrombocytopenia, HIT negative, improving    PLAN:    CNS: PRN sedation    HEENT:  Oral care.  Trach care     PULMONARY:  HOB @ 45 degrees.  Aspiration precautions.  PS wean 12 hours today. Dec RR to 12/ tv    CARDIOVASCULAR:  Avoid volume overload.    GI: GI prophylaxis. PEG feeding as tolerated.     RENAL:  F/u  lytes. Correct as needed. Accurate I/O. FU IR drain.     INFECTIOUS DISEASE: Monitor off ABX.  ID FU.     HEMATOLOGICAL:  DVT prophylaxis.    ENDOCRINE:  Follow up FS.  Burn f/up    CODE STATUS: FULL CODE    Very poor prognosis    Transfer to Vent Unit

## 2021-05-24 NOTE — PROGRESS NOTE ADULT - SUBJECTIVE AND OBJECTIVE BOX
Patient is a 71y old  Male who presents with a chief complaint of Change in Mental Status (23 May 2021 09:23)        Over Night Events:  No overnight events. No drips. S/p trach, on mechanical ventilation.       ROS:     All pertinent ROS are negative except HPI         PHYSICAL EXAM    ICU Vital Signs Last 24 Hrs  T(C): 37.2 (24 May 2021 09:20), Max: 37.3 (23 May 2021 21:25)  T(F): 98.9 (24 May 2021 09:20), Max: 99.2 (23 May 2021 21:25)  HR: 65 (24 May 2021 09:20) (65 - 72)  BP: 104/56 (24 May 2021 09:20) (104/56 - 132/77)  BP(mean): 75 (24 May 2021 09:20) (75 - 75)  RR: 20 (24 May 2021 09:20) (20 - 21)  SpO2: 99% (24 May 2021 03:00) (99% - 100%)      CONSTITUTIONAL:   Ill appearing.  NAD    ENT:   Airway patent,   Mouth with normal mucosa.   No thrush    EYES:   Pupils equal,   Round and reactive to light.    CARDIAC:   Normal rate,   Regular rhythm.    No edema    RESPIRATORY:   S/p trach  No wheezing  Bilateral BS  Normal chest expansion  Not tachypneic,  No use of accessory muscles    GASTROINTESTINAL:  Abdomen soft,   Non-tender,   No guarding,   + BS    MUSCULOSKELETAL:   Range of motion is not limited,  No clubbing, cyanosis    NEUROLOGICAL:   Opens eyes spontaneously  Tracks eyes  Moves right upper extremity, appears to be his baseline    SKIN:   Skin normal color for race,   Warm and dry  No evidence of rash.    PSYCHIATRIC:   No apparent risk to self or others.      05-23-21 @ 07:01  -  05-24-21 @ 07:00  --------------------------------------------------------  IN:    Enteral Tube Flush: 200 mL    Jevity 1.2: 720 mL  Total IN: 920 mL    OUT:  Total OUT: 0 mL    Total NET: 920 mL          LABS:                            8.8    5.41  )-----------( 277      ( 24 May 2021 06:33 )             28.1                                               05-24    137  |  102  |  12  ----------------------------<  90  4.7   |  28  |  0.6<L>    Ca    8.6      24 May 2021 06:33  Mg     1.8     05-24    TPro  7.0  /  Alb  2.6<L>  /  TBili  0.4  /  DBili  x   /  AST  20  /  ALT  <5  /  AlkPhos  126<H>  05-24      PT/INR - ( 24 May 2021 06:33 )   PT: 12.40 sec;   INR: 1.08 ratio                                                                                              LIVER FUNCTIONS - ( 24 May 2021 06:33 )  Alb: 2.6 g/dL / Pro: 7.0 g/dL / ALK PHOS: 126 U/L / ALT: <5 U/L / AST: 20 U/L / GGT: x                                                                                               Mode: AC/ CMV (Assist Control/ Continuous Mandatory Ventilation)  RR (machine): 14  TV (machine): 350  FiO2: 30  PEEP: 5  ITime: 1  MAP: 9  PIP: 20                                      ABG - ( 24 May 2021 04:12 )  pH, Arterial: 7.52  pH, Blood: x     /  pCO2: 34    /  pO2: 179   / HCO3: 27    / Base Excess: 4.4   /  SaO2: 100                 MEDICATIONS  (STANDING):  ascorbic acid 500 milliGRAM(s) Oral daily  atorvastatin 40 milliGRAM(s) Oral at bedtime  carbidopa/levodopa  25/100 2 Tablet(s) Oral <User Schedule>  chlorhexidine 0.12% Liquid 15 milliLiter(s) Oral Mucosa every 12 hours  chlorhexidine 4% Liquid 1 Application(s) Topical <User Schedule>  collagenase Ointment 1 Application(s) Topical two times a day  Dakins Solution - 1/2 Strength 1 Application(s) Topical two times a day  enoxaparin Injectable 40 milliGRAM(s) SubCutaneous daily  lidocaine 2% Gel 1 Application(s) Topical once  lidocaine 2% Viscous 100 milliLiter(s) Swish and Spit once  magnesium oxide 400 milliGRAM(s) Oral every 8 hours  magnesium sulfate  IVPB 1 Gram(s) IV Intermittent once  midodrine 10 milliGRAM(s) Oral every 8 hours  pantoprazole   Suspension 40 milliGRAM(s) Oral daily  polyethylene glycol 3350 17 Gram(s) Oral daily  senna 2 Tablet(s) Oral at bedtime  sodium chloride 0.9%. 1000 milliLiter(s) (75 mL/Hr) IV Continuous <Continuous>  zinc sulfate 220 milliGRAM(s) Oral daily    MEDICATIONS  (PRN):  acetaminophen   Tablet .. 650 milliGRAM(s) Oral every 6 hours PRN Temp greater or equal to 38C (100.4F)       Patient is a 71y old  Male who presents with a chief complaint of Change in Mental Status (23 May 2021 09:23)        Over Night Events:  No overnight events. No drips. S/p trach, on mechanical ventilation.         PHYSICAL EXAM    ICU Vital Signs Last 24 Hrs  T(C): 37.2 (24 May 2021 09:20), Max: 37.3 (23 May 2021 21:25)  T(F): 98.9 (24 May 2021 09:20), Max: 99.2 (23 May 2021 21:25)  HR: 65 (24 May 2021 09:20) (65 - 72)  BP: 104/56 (24 May 2021 09:20) (104/56 - 132/77)  BP(mean): 75 (24 May 2021 09:20) (75 - 75)  RR: 20 (24 May 2021 09:20) (20 - 21)  SpO2: 99% (24 May 2021 03:00) (99% - 100%)      CONSTITUTIONAL:   Ill appearing.     ENT:   Airway patent,   Mouth with normal mucosa.   No thrush    EYES:   Pupils equal,   Round and reactive to light.    CARDIAC:   EVANS 3/6    RESPIRATORY:   S/p trach  Dec BS l base    GASTROINTESTINAL:  Abdomen soft,   Non-tender,   No guarding,   + BS  peg    MUSCULOSKELETAL:   Range of motion is not limited,  No clubbing, cyanosis    NEUROLOGICAL:   Opens eyes spontaneously  Tracks eyes  Moves right upper extremity, appears to be his baseline  contracted      05-23-21 @ 07:01  -  05-24-21 @ 07:00  --------------------------------------------------------  IN:    Enteral Tube Flush: 200 mL    Jevity 1.2: 720 mL  Total IN: 920 mL    OUT:  Total OUT: 0 mL    Total NET: 920 mL          LABS:                            8.8    5.41  )-----------( 277      ( 24 May 2021 06:33 )             28.1                                               05-24    137  |  102  |  12  ----------------------------<  90  4.7   |  28  |  0.6<L>    Ca    8.6      24 May 2021 06:33  Mg     1.8     05-24    TPro  7.0  /  Alb  2.6<L>  /  TBili  0.4  /  DBili  x   /  AST  20  /  ALT  <5  /  AlkPhos  126<H>  05-24      PT/INR - ( 24 May 2021 06:33 )   PT: 12.40 sec;   INR: 1.08 ratio                                                                                              LIVER FUNCTIONS - ( 24 May 2021 06:33 )  Alb: 2.6 g/dL / Pro: 7.0 g/dL / ALK PHOS: 126 U/L / ALT: <5 U/L / AST: 20 U/L / GGT: x                                                                                               Mode: AC/ CMV (Assist Control/ Continuous Mandatory Ventilation)  RR (machine): 14  TV (machine): 350  FiO2: 30  PEEP: 5  ITime: 1  MAP: 9  PIP: 20                                      ABG - ( 24 May 2021 04:12 )  pH, Arterial: 7.52  pH, Blood: x     /  pCO2: 34    /  pO2: 179   / HCO3: 27    / Base Excess: 4.4   /  SaO2: 100                 MEDICATIONS  (STANDING):  ascorbic acid 500 milliGRAM(s) Oral daily  atorvastatin 40 milliGRAM(s) Oral at bedtime  carbidopa/levodopa  25/100 2 Tablet(s) Oral <User Schedule>  chlorhexidine 0.12% Liquid 15 milliLiter(s) Oral Mucosa every 12 hours  chlorhexidine 4% Liquid 1 Application(s) Topical <User Schedule>  collagenase Ointment 1 Application(s) Topical two times a day  Dakins Solution - 1/2 Strength 1 Application(s) Topical two times a day  enoxaparin Injectable 40 milliGRAM(s) SubCutaneous daily  lidocaine 2% Gel 1 Application(s) Topical once  lidocaine 2% Viscous 100 milliLiter(s) Swish and Spit once  magnesium oxide 400 milliGRAM(s) Oral every 8 hours  magnesium sulfate  IVPB 1 Gram(s) IV Intermittent once  midodrine 10 milliGRAM(s) Oral every 8 hours  pantoprazole   Suspension 40 milliGRAM(s) Oral daily  polyethylene glycol 3350 17 Gram(s) Oral daily  senna 2 Tablet(s) Oral at bedtime  sodium chloride 0.9%. 1000 milliLiter(s) (75 mL/Hr) IV Continuous <Continuous>  zinc sulfate 220 milliGRAM(s) Oral daily    MEDICATIONS  (PRN):  acetaminophen   Tablet .. 650 milliGRAM(s) Oral every 6 hours PRN Temp greater or equal to 38C (100.4F)

## 2021-05-24 NOTE — CHART NOTE - NSCHARTNOTEFT_GEN_A_CORE
Transfer Note    Transfer from: Vent  Transfer to:  (  ) Medicine    (  ) Telemetry    (  ) RCU    (  ) Palliative    (  ) Stroke Unit    (x) Vent 3E      VENT UNIT COURSE:    Patient initially in ICU for AHRF due to atelectasis from mucus plugs s/p multiple bronchoscopies (4). Patient was intubation, extubation, reintubated and downgraded to vent unit. While in Vent unit, patient was Trach and PEG'ed, underwent debridement on       ASSESSMENT & PLAN:   71 year old Male with past medical history of Parkinson's, dementia, CKD Stage 3, 1st degree AV block, HLD, gout, HTN, DM, fungal septicemia presenting from [facility] for acute decompensation in mental status. As per documentation patient at baseline is verbal but for past 3 days, he has been worsening to state of being non verbal. Patient was admitted to [facility] for fungemia and discharged on the 11th March. At  he had left hand cellulitis and was treated for it.    # Sepsis and septic shock - resolved  # Hypoxemic respiratory failure s/p intubation on 4/28 and extubated. now reintubated  # Complete L lung collapse and atelectasis with PNA secondary to mucus plug  - s/p multiple bronchoscopies on 4/28, 4/30, 5/4, and 5/7  - Bronchial Cx grew  Klebsiella Pneumonia   - CXR worsening  - reintubated for respiratory distress possibly secondary to aspiration PNA vs possible fluid overload.   - Follow up CTH --> negative for acute events  - do EEG  - continue with avycaz and flagyl as per ID.   - pulmonary following   - intubated and sedated with precedex. ABG showing no CO2 retention. FIO2 decreased  - repeat dimer and duplex  - Surgery consult for Trach.  Gave    # Acute cholecystitis   - confirmed with HIDA scan and sonogram  - s/p IR perc cholecystostomy on 4/29  - gallbladder fluid cx showed NGTD    # Metabolic encephalopathy on top of baseline dementia/Sepsis POA/Necrotic sacral ulcer stage 4/H/o recent fungemia - improved?  - CT Head No acute intracranial pathology.  - Blood & Urine cxs NGTD   - WCX GNR, 3/29   Numerous Klebsiella pneumoniae ESBL, , Few Pseudomonas aeruginosa, Numerous Enterococcus faecalis (vancomycin resistant)  - evaluated by Burn: s/p debridement of sacrum on 3/30 .  no more new recommendation for surgery at this time .continue local wound care with santyl/dakins WTD.  - s/p meropenem, Polymixin and Aztreonam  - S/p spinal tap --> CSF clear with no growths and neg PCR  - repeat EEG w/ no epileptiform activity but diffuse cerebral dysfx  - Follow up repeat CTH --> negative    # Nutrition/Dysphagia:   - 4/14 passed S&S but limited PO intake.   - s/p PEG on 4/19  - family taught how to administer feeds and wound care    #Lt shoulder dislocation w/ collection  - no need to tap as per IR and ID concurs  - fu repeat shoulder xray    #Anemia  - s/p 1u PRBC on   - monitor CBC    # Thrombocytopenia ?sec to sepsis - resolved  - off Pepcid  - heme f/u appreciated    # H/o parkinsons disease  - c/w sinemet    # H/o chronic DVT  - [facility] Duplex Lower Ext Vein Scan, Bilat (03.27.21 @ 18:48) >No evidence of deep venous thrombosis or superficial thrombophlebitis in the bilateral lower extremities.  - unsure why was on Rx >2yrs Eliquis d/c.     # Dyslipidemia  - c/w statin    # Family contact: Kelton updated on the patient's condition and plan. Floor resident Had an extensive conversation with Kelton and the patient's son about the patient's prognosis and the course of his disease. As per the patient's family he started experiencing significant decline in the past few months. They verbalized understanding of his current state and would like to keep him full code at this time.   - Family updated that patient had to reintubated.     # DVT prophylaxis: Lovenox  # Diet: PEG feeds  # Dispo: acute. transfer to step down unit      f/u:  1- EEG  2- Duplex  3- Dimer  4- Surgery fu for trach ( Dr. Padilla).        For Follow-Up:          Vital Signs Last 24 Hrs  T(C): 37.2 (24 May 2021 09:20), Max: 37.3 (23 May 2021 21:25)  T(F): 98.9 (24 May 2021 09:20), Max: 99.2 (23 May 2021 21:25)  HR: 57 (24 May 2021 10:15) (57 - 72)  BP: 104/56 (24 May 2021 09:20) (104/56 - 132/77)  BP(mean): 75 (24 May 2021 09:20) (75 - 75)  RR: 20 (24 May 2021 09:20) (20 - 21)  SpO2: 100% (24 May 2021 10:15) (99% - 100%)  I&O's Summary    23 May 2021 07:01  -  24 May 2021 07:00  --------------------------------------------------------  IN: 920 mL / OUT: 0 mL / NET: 920 mL          MEDICATIONS  (STANDING):  ascorbic acid 500 milliGRAM(s) Oral daily  atorvastatin 40 milliGRAM(s) Oral at bedtime  carbidopa/levodopa  25/100 2 Tablet(s) Oral <User Schedule>  chlorhexidine 0.12% Liquid 15 milliLiter(s) Oral Mucosa every 12 hours  chlorhexidine 4% Liquid 1 Application(s) Topical <User Schedule>  collagenase Ointment 1 Application(s) Topical two times a day  Dakins Solution - 1/2 Strength 1 Application(s) Topical two times a day  enoxaparin Injectable 40 milliGRAM(s) SubCutaneous daily  lidocaine 2% Gel 1 Application(s) Topical once  lidocaine 2% Viscous 100 milliLiter(s) Swish and Spit once  magnesium oxide 400 milliGRAM(s) Oral every 8 hours  magnesium sulfate  IVPB 1 Gram(s) IV Intermittent once  midodrine 10 milliGRAM(s) Oral every 8 hours  pantoprazole   Suspension 40 milliGRAM(s) Oral daily  polyethylene glycol 3350 17 Gram(s) Oral daily  senna 2 Tablet(s) Oral at bedtime  sodium chloride 0.9%. 1000 milliLiter(s) (75 mL/Hr) IV Continuous <Continuous>  zinc sulfate 220 milliGRAM(s) Oral daily    MEDICATIONS  (PRN):  acetaminophen   Tablet .. 650 milliGRAM(s) Oral every 6 hours PRN Temp greater or equal to 38C (100.4F)        LABS                                            8.8                   Neurophils% (auto):   65.1   (05-24 @ 06:33):    5.41 )-----------(277          Lymphocytes% (auto):  17.9                                          28.1                   Eosinphils% (auto):   6.7      Manual%: Neutrophils x    ; Lymphocytes x    ; Eosinophils x    ; Bands%: x    ; Blasts x                                    137    |  102    |  12                  Calcium: 8.6   / iCa: x      (05-24 @ 06:33)    ----------------------------<  90        Magnesium: 1.8                              4.7     |  28     |  0.6              Phosphorous: x        TPro  7.0    /  Alb  2.6    /  TBili  0.4    /  DBili  x      /  AST  20     /  ALT  <5     /  AlkPhos  126    24 May 2021 06:33    ( 05-24 @ 06:33 )   PT: 12.40 sec;   INR: 1.08 ratio  aPTT: x Transfer Note    Transfer from: Vent  Transfer to:  (  ) Medicine    (  ) Telemetry    (  ) RCU    (  ) Palliative    (  ) Stroke Unit    (x) Vent 3E      VENT UNIT COURSE:    Patient initially in ICU for AHRF due to atelectasis from mucus plugs s/p multiple bronchoscopies (4). Patient was intubation, extubation, reintubated and downgraded to vent unit. While in Vent unit, patient was Trach and PEG'ed, underwent debridement of sacral ulcer and monitored off ABx.       ASSESSMENT & PLAN:   71 year old Male with past medical history of Parkinson's, dementia, CKD Stage 3, 1st degree AV block, HLD, gout, HTN, DM, fungal septicemia presenting from [facility] for acute decompensation in mental status. As per documentation patient at baseline is verbal but for past 3 days, he has been worsening to state of being non verbal. Patient was admitted to [facility] for fungemia and discharged on the 11th March. At  he had left hand cellulitis and was treated for it.    # Sepsis and septic shock - resolved  # Hypoxemic respiratory failure s/p intubation on 4/28 and extubated. now reintubated  # Complete L lung collapse and atelectasis with PNA secondary to mucus plug  - s/p multiple bronchoscopies on 4/28, 4/30, 5/4, and 5/7  - Bronchial Cx grew  Klebsiella Pneumonia   - CXR worsening  - reintubated for respiratory distress possibly secondary to aspiration PNA vs possible fluid overload.   - Follow up CTH --> negative for acute events  - do EEG  - continue with avycaz and flagyl as per ID.   - pulmonary following   - intubated and sedated with precedex. ABG showing no CO2 retention. FIO2 decreased  - repeat dimer and duplex  - Surgery consult for Trach. Dr. Padilla    # Acute cholecystitis   - confirmed with HIDA scan and sonogram  - s/p IR perc cholecystostomy on 4/29  - gallbladder fluid cx showed NGTD    # Metabolic encephalopathy on top of baseline dementia/Sepsis POA/Necrotic sacral ulcer stage 4/H/o recent fungemia - improved?  - CT Head No acute intracranial pathology.  - Blood & Urine cxs NGTD   - WCX GNR, 3/29   Numerous Klebsiella pneumoniae ESBL, , Few Pseudomonas aeruginosa, Numerous Enterococcus faecalis (vancomycin resistant)  - evaluated by Burn: s/p debridement of sacrum on 3/30 .  no more new recommendation for surgery at this time .continue local wound care with santyl/dakins WTD.  - s/p meropenem, Polymixin and Aztreonam  - S/p spinal tap --> CSF clear with no growths and neg PCR  - repeat EEG w/ no epileptiform activity but diffuse cerebral dysfx  - Follow up repeat CTH --> negative    # Nutrition/Dysphagia:   - 4/14 passed S&S but limited PO intake.   - s/p PEG on 4/19  - family taught how to administer feeds and wound care    #Lt shoulder dislocation w/ collection  - no need to tap as per IR and ID concurs  - fu repeat shoulder xray    #Anemia  - s/p 1u PRBC on   - monitor CBC    # Thrombocytopenia ?sec to sepsis - resolved  - off Pepcid  - heme f/u appreciated    # H/o parkinsons disease  - c/w sinemet    # H/o chronic DVT  - [facility] Duplex Lower Ext Vein Scan, Bilat (03.27.21 @ 18:48) >No evidence of deep venous thrombosis or superficial thrombophlebitis in the bilateral lower extremities.  - unsure why was on Rx >2yrs Eliquis d/c.     # Dyslipidemia  - c/w statin    # Family contact: Kelton updated on the patient's condition and plan. Floor resident Had an extensive conversation with Kelton and the patient's son about the patient's prognosis and the course of his disease. As per the patient's family he started experiencing significant decline in the past few months. They verbalized understanding of his current state and would like to keep him full code at this time.   - Family updated that patient had to reintubated.     # DVT prophylaxis: Lovenox  # Diet: PEG feeds  # Dispo: acute. transfer to step down unit      f/u:  1- EEG  2- Duplex  3- Dimer  4- Surgery fu for trach ( Dr. Padilal).        For Follow-Up:          Vital Signs Last 24 Hrs  T(C): 37.2 (24 May 2021 09:20), Max: 37.3 (23 May 2021 21:25)  T(F): 98.9 (24 May 2021 09:20), Max: 99.2 (23 May 2021 21:25)  HR: 57 (24 May 2021 10:15) (57 - 72)  BP: 104/56 (24 May 2021 09:20) (104/56 - 132/77)  BP(mean): 75 (24 May 2021 09:20) (75 - 75)  RR: 20 (24 May 2021 09:20) (20 - 21)  SpO2: 100% (24 May 2021 10:15) (99% - 100%)  I&O's Summary    23 May 2021 07:01  -  24 May 2021 07:00  --------------------------------------------------------  IN: 920 mL / OUT: 0 mL / NET: 920 mL          MEDICATIONS  (STANDING):  ascorbic acid 500 milliGRAM(s) Oral daily  atorvastatin 40 milliGRAM(s) Oral at bedtime  carbidopa/levodopa  25/100 2 Tablet(s) Oral <User Schedule>  chlorhexidine 0.12% Liquid 15 milliLiter(s) Oral Mucosa every 12 hours  chlorhexidine 4% Liquid 1 Application(s) Topical <User Schedule>  collagenase Ointment 1 Application(s) Topical two times a day  Dakins Solution - 1/2 Strength 1 Application(s) Topical two times a day  enoxaparin Injectable 40 milliGRAM(s) SubCutaneous daily  lidocaine 2% Gel 1 Application(s) Topical once  lidocaine 2% Viscous 100 milliLiter(s) Swish and Spit once  magnesium oxide 400 milliGRAM(s) Oral every 8 hours  magnesium sulfate  IVPB 1 Gram(s) IV Intermittent once  midodrine 10 milliGRAM(s) Oral every 8 hours  pantoprazole   Suspension 40 milliGRAM(s) Oral daily  polyethylene glycol 3350 17 Gram(s) Oral daily  senna 2 Tablet(s) Oral at bedtime  sodium chloride 0.9%. 1000 milliLiter(s) (75 mL/Hr) IV Continuous <Continuous>  zinc sulfate 220 milliGRAM(s) Oral daily    MEDICATIONS  (PRN):  acetaminophen   Tablet .. 650 milliGRAM(s) Oral every 6 hours PRN Temp greater or equal to 38C (100.4F)        LABS                                            8.8                   Neurophils% (auto):   65.1   (05-24 @ 06:33):    5.41 )-----------(277          Lymphocytes% (auto):  17.9                                          28.1                   Eosinphils% (auto):   6.7      Manual%: Neutrophils x    ; Lymphocytes x    ; Eosinophils x    ; Bands%: x    ; Blasts x                                    137    |  102    |  12                  Calcium: 8.6   / iCa: x      (05-24 @ 06:33)    ----------------------------<  90        Magnesium: 1.8                              4.7     |  28     |  0.6              Phosphorous: x        TPro  7.0    /  Alb  2.6    /  TBili  0.4    /  DBili  x      /  AST  20     /  ALT  <5     /  AlkPhos  126    24 May 2021 06:33    ( 05-24 @ 06:33 )   PT: 12.40 sec;   INR: 1.08 ratio  aPTT: x Transfer Note    Transfer from: Vent  Transfer to:  (  ) Medicine    (  ) Telemetry    (  ) RCU    (  ) Palliative    (  ) Stroke Unit    (x) Vent 3E      VENT UNIT COURSE:    Patient initially in ICU for AHRF due to atelectasis from mucus plugs s/p multiple bronchoscopies (4). Patient was intubation, extubation, reintubated and downgraded to vent unit. While in Vent unit, patient was Trach and PEG'ed, underwent debridement of sacral ulcer and monitored off ABx as per ID. Patient for transfer to 3E Vent     ASSESSMENT & PLAN:   71 year old Male with past medical history of Parkinson's, dementia, CKD Stage 3, 1st degree AV block, HLD, gout, HTN, DM, fungal septicemia presenting from [facility] for acute decompensation in mental status. As per documentation patient at baseline is verbal but for past 3 days, he has been worsening to state of being non verbal. Patient was admitted to [facility] for fungemia and discharged on the 11th March. At  he had left hand cellulitis and was treated for it.    # Sepsis and septic shock - resolved  # Hypoxemic respiratory failure s/p intubation, extubation, intubation and trach/peg  # Complete L lung collapse and atelectasis with PNA secondary to mucus plug  - s/p multiple bronchoscopies on 4/28, 4/30, 5/4, and 5/7  - Bronchial Cx grew  Klebsiella Pneumonia   - CXR - Bibasilar lung opacities, left greater than right.  - CTH --> negative for acute events  - s/p avycaz and flagyl as per ID.     # Acute cholecystitis   - confirmed with HIDA scan and sonogram  - s/p IR perc cholecystostomy on 4/29  - gallbladder fluid cx showed NGTD    # Metabolic encephalopathy on top of baseline dementia/Sepsis POA/Necrotic sacral ulcer stage 4/H/o recent fungemia - improved?  - CT Head No acute intracranial pathology.  - Blood & Urine cxs NGTD   - WCX GNR, 3/29   Numerous Klebsiella pneumoniae ESBL, , Few Pseudomonas aeruginosa, Numerous Enterococcus faecalis (vancomycin resistant)  - evaluated by Burn: s/p debridement of sacrum on 3/30 .  no more new recommendation for surgery at this time .continue local wound care with santyl/dakins WTD.  - s/p meropenem, Polymixin and Aztreonam  - S/p spinal tap --> CSF clear with no growths and neg PCR  - repeat EEG w/ no epileptiform activity but diffuse cerebral dysfx  - Follow up repeat CTH --> negative    #Necrotic sacral ulcer stage 4  - s/p debridement by burn  - Monitor off ABx as per ID    # Nutrition/Dysphagia:   - 4/14 passed S&S but limited PO intake.   - s/p PEG on 4/19  - family taught how to administer feeds and wound care    #Lt shoulder dislocation w/ collection  - no need to tap as per IR and ID concurs  - fu repeat shoulder xray    #Anemia  - s/p 1u PRBC on   - monitor CBC    # Thrombocytopenia ?sec to sepsis - resolved  - off Pepcid  - heme f/u appreciated    # H/o parkinsons disease  - c/w sinemet    # H/o chronic DVT  - [facility] Duplex Lower Ext Vein Scan, Bilat (03.27.21 @ 18:48) >No evidence of deep venous thrombosis or superficial thrombophlebitis in the bilateral lower extremities.  - unsure why was on Rx >2yrs Eliquis d/c.     # Dyslipidemia  - c/w statin    # DVT prophylaxis: Lovenox  # Diet: PEG feeds  # Dispo: acute. transfer to step down unit      For Follow-Up:          Vital Signs Last 24 Hrs  T(C): 37.2 (24 May 2021 09:20), Max: 37.3 (23 May 2021 21:25)  T(F): 98.9 (24 May 2021 09:20), Max: 99.2 (23 May 2021 21:25)  HR: 57 (24 May 2021 10:15) (57 - 72)  BP: 104/56 (24 May 2021 09:20) (104/56 - 132/77)  BP(mean): 75 (24 May 2021 09:20) (75 - 75)  RR: 20 (24 May 2021 09:20) (20 - 21)  SpO2: 100% (24 May 2021 10:15) (99% - 100%)  I&O's Summary    23 May 2021 07:01  -  24 May 2021 07:00  --------------------------------------------------------  IN: 920 mL / OUT: 0 mL / NET: 920 mL          MEDICATIONS  (STANDING):  ascorbic acid 500 milliGRAM(s) Oral daily  atorvastatin 40 milliGRAM(s) Oral at bedtime  carbidopa/levodopa  25/100 2 Tablet(s) Oral <User Schedule>  chlorhexidine 0.12% Liquid 15 milliLiter(s) Oral Mucosa every 12 hours  chlorhexidine 4% Liquid 1 Application(s) Topical <User Schedule>  collagenase Ointment 1 Application(s) Topical two times a day  Dakins Solution - 1/2 Strength 1 Application(s) Topical two times a day  enoxaparin Injectable 40 milliGRAM(s) SubCutaneous daily  lidocaine 2% Gel 1 Application(s) Topical once  lidocaine 2% Viscous 100 milliLiter(s) Swish and Spit once  magnesium oxide 400 milliGRAM(s) Oral every 8 hours  magnesium sulfate  IVPB 1 Gram(s) IV Intermittent once  midodrine 10 milliGRAM(s) Oral every 8 hours  pantoprazole   Suspension 40 milliGRAM(s) Oral daily  polyethylene glycol 3350 17 Gram(s) Oral daily  senna 2 Tablet(s) Oral at bedtime  sodium chloride 0.9%. 1000 milliLiter(s) (75 mL/Hr) IV Continuous <Continuous>  zinc sulfate 220 milliGRAM(s) Oral daily    MEDICATIONS  (PRN):  acetaminophen   Tablet .. 650 milliGRAM(s) Oral every 6 hours PRN Temp greater or equal to 38C (100.4F)        LABS                                            8.8                   Neurophils% (auto):   65.1   (05-24 @ 06:33):    5.41 )-----------(277          Lymphocytes% (auto):  17.9                                          28.1                   Eosinphils% (auto):   6.7      Manual%: Neutrophils x    ; Lymphocytes x    ; Eosinophils x    ; Bands%: x    ; Blasts x                                    137    |  102    |  12                  Calcium: 8.6   / iCa: x      (05-24 @ 06:33)    ----------------------------<  90        Magnesium: 1.8                              4.7     |  28     |  0.6              Phosphorous: x        TPro  7.0    /  Alb  2.6    /  TBili  0.4    /  DBili  x      /  AST  20     /  ALT  <5     /  AlkPhos  126    24 May 2021 06:33    ( 05-24 @ 06:33 )   PT: 12.40 sec;   INR: 1.08 ratio  aPTT: x

## 2021-05-25 LAB
ALBUMIN SERPL ELPH-MCNC: 2.6 G/DL — LOW (ref 3.5–5.2)
ALP SERPL-CCNC: 133 U/L — HIGH (ref 30–115)
ALT FLD-CCNC: <5 U/L — SIGNIFICANT CHANGE UP (ref 0–41)
ANION GAP SERPL CALC-SCNC: 12 MMOL/L — SIGNIFICANT CHANGE UP (ref 7–14)
AST SERPL-CCNC: 21 U/L — SIGNIFICANT CHANGE UP (ref 0–41)
BASOPHILS # BLD AUTO: 0.04 K/UL — SIGNIFICANT CHANGE UP (ref 0–0.2)
BASOPHILS NFR BLD AUTO: 0.7 % — SIGNIFICANT CHANGE UP (ref 0–1)
BILIRUB SERPL-MCNC: 0.3 MG/DL — SIGNIFICANT CHANGE UP (ref 0.2–1.2)
BLD GP AB SCN SERPL QL: SIGNIFICANT CHANGE UP
BUN SERPL-MCNC: 13 MG/DL — SIGNIFICANT CHANGE UP (ref 10–20)
CALCIUM SERPL-MCNC: 8.6 MG/DL — SIGNIFICANT CHANGE UP (ref 8.5–10.1)
CHLORIDE SERPL-SCNC: 102 MMOL/L — SIGNIFICANT CHANGE UP (ref 98–110)
CO2 SERPL-SCNC: 24 MMOL/L — SIGNIFICANT CHANGE UP (ref 17–32)
CREAT SERPL-MCNC: 0.5 MG/DL — LOW (ref 0.7–1.5)
CULTURE RESULTS: SIGNIFICANT CHANGE UP
EOSINOPHIL # BLD AUTO: 0.39 K/UL — SIGNIFICANT CHANGE UP (ref 0–0.7)
EOSINOPHIL NFR BLD AUTO: 7.2 % — SIGNIFICANT CHANGE UP (ref 0–8)
GLUCOSE BLDC GLUCOMTR-MCNC: 102 MG/DL — HIGH (ref 70–99)
GLUCOSE BLDC GLUCOMTR-MCNC: 123 MG/DL — HIGH (ref 70–99)
GLUCOSE SERPL-MCNC: 109 MG/DL — HIGH (ref 70–99)
HCT VFR BLD CALC: 29.9 % — LOW (ref 42–52)
HGB BLD-MCNC: 9.1 G/DL — LOW (ref 14–18)
IMM GRANULOCYTES NFR BLD AUTO: 0.4 % — HIGH (ref 0.1–0.3)
INR BLD: 1.03 RATIO — SIGNIFICANT CHANGE UP (ref 0.65–1.3)
LYMPHOCYTES # BLD AUTO: 0.79 K/UL — LOW (ref 1.2–3.4)
LYMPHOCYTES # BLD AUTO: 14.7 % — LOW (ref 20.5–51.1)
MAGNESIUM SERPL-MCNC: 2 MG/DL — SIGNIFICANT CHANGE UP (ref 1.8–2.4)
MCHC RBC-ENTMCNC: 27.2 PG — SIGNIFICANT CHANGE UP (ref 27–31)
MCHC RBC-ENTMCNC: 30.4 G/DL — LOW (ref 32–37)
MCV RBC AUTO: 89.5 FL — SIGNIFICANT CHANGE UP (ref 80–94)
MONOCYTES # BLD AUTO: 0.59 K/UL — SIGNIFICANT CHANGE UP (ref 0.1–0.6)
MONOCYTES NFR BLD AUTO: 10.9 % — HIGH (ref 1.7–9.3)
NEUTROPHILS # BLD AUTO: 3.56 K/UL — SIGNIFICANT CHANGE UP (ref 1.4–6.5)
NEUTROPHILS NFR BLD AUTO: 66.1 % — SIGNIFICANT CHANGE UP (ref 42.2–75.2)
NRBC # BLD: 0 /100 WBCS — SIGNIFICANT CHANGE UP (ref 0–0)
PLATELET # BLD AUTO: 273 K/UL — SIGNIFICANT CHANGE UP (ref 130–400)
POTASSIUM SERPL-MCNC: 4.4 MMOL/L — SIGNIFICANT CHANGE UP (ref 3.5–5)
POTASSIUM SERPL-SCNC: 4.4 MMOL/L — SIGNIFICANT CHANGE UP (ref 3.5–5)
PROT SERPL-MCNC: 7 G/DL — SIGNIFICANT CHANGE UP (ref 6–8)
PROTHROM AB SERPL-ACNC: 11.8 SEC — SIGNIFICANT CHANGE UP (ref 9.95–12.87)
RBC # BLD: 3.34 M/UL — LOW (ref 4.7–6.1)
RBC # FLD: 16.7 % — HIGH (ref 11.5–14.5)
SODIUM SERPL-SCNC: 138 MMOL/L — SIGNIFICANT CHANGE UP (ref 135–146)
SPECIMEN SOURCE: SIGNIFICANT CHANGE UP
WBC # BLD: 5.39 K/UL — SIGNIFICANT CHANGE UP (ref 4.8–10.8)
WBC # FLD AUTO: 5.39 K/UL — SIGNIFICANT CHANGE UP (ref 4.8–10.8)

## 2021-05-25 PROCEDURE — 99232 SBSQ HOSP IP/OBS MODERATE 35: CPT

## 2021-05-25 PROCEDURE — 71045 X-RAY EXAM CHEST 1 VIEW: CPT | Mod: 26

## 2021-05-25 RX ADMIN — Medication 1 APPLICATION(S): at 17:27

## 2021-05-25 RX ADMIN — ZINC SULFATE TAB 220 MG (50 MG ZINC EQUIVALENT) 220 MILLIGRAM(S): 220 (50 ZN) TAB at 11:25

## 2021-05-25 RX ADMIN — POLYETHYLENE GLYCOL 3350 17 GRAM(S): 17 POWDER, FOR SOLUTION ORAL at 11:28

## 2021-05-25 RX ADMIN — MIDODRINE HYDROCHLORIDE 10 MILLIGRAM(S): 2.5 TABLET ORAL at 21:49

## 2021-05-25 RX ADMIN — PANTOPRAZOLE SODIUM 40 MILLIGRAM(S): 20 TABLET, DELAYED RELEASE ORAL at 11:26

## 2021-05-25 RX ADMIN — SODIUM CHLORIDE 75 MILLILITER(S): 9 INJECTION INTRAMUSCULAR; INTRAVENOUS; SUBCUTANEOUS at 09:35

## 2021-05-25 RX ADMIN — Medication 1 APPLICATION(S): at 17:26

## 2021-05-25 RX ADMIN — CARBIDOPA AND LEVODOPA 2 TABLET(S): 25; 100 TABLET ORAL at 05:15

## 2021-05-25 RX ADMIN — MAGNESIUM OXIDE 400 MG ORAL TABLET 400 MILLIGRAM(S): 241.3 TABLET ORAL at 05:15

## 2021-05-25 RX ADMIN — Medication 1 APPLICATION(S): at 05:17

## 2021-05-25 RX ADMIN — MAGNESIUM OXIDE 400 MG ORAL TABLET 400 MILLIGRAM(S): 241.3 TABLET ORAL at 15:21

## 2021-05-25 RX ADMIN — CARBIDOPA AND LEVODOPA 2 TABLET(S): 25; 100 TABLET ORAL at 21:49

## 2021-05-25 RX ADMIN — MAGNESIUM OXIDE 400 MG ORAL TABLET 400 MILLIGRAM(S): 241.3 TABLET ORAL at 21:49

## 2021-05-25 RX ADMIN — CHLORHEXIDINE GLUCONATE 15 MILLILITER(S): 213 SOLUTION TOPICAL at 05:16

## 2021-05-25 RX ADMIN — SENNA PLUS 2 TABLET(S): 8.6 TABLET ORAL at 21:50

## 2021-05-25 RX ADMIN — ENOXAPARIN SODIUM 40 MILLIGRAM(S): 100 INJECTION SUBCUTANEOUS at 11:27

## 2021-05-25 RX ADMIN — CHLORHEXIDINE GLUCONATE 15 MILLILITER(S): 213 SOLUTION TOPICAL at 17:27

## 2021-05-25 RX ADMIN — Medication 500 MILLIGRAM(S): at 11:26

## 2021-05-25 RX ADMIN — ATORVASTATIN CALCIUM 40 MILLIGRAM(S): 80 TABLET, FILM COATED ORAL at 21:49

## 2021-05-25 RX ADMIN — MIDODRINE HYDROCHLORIDE 10 MILLIGRAM(S): 2.5 TABLET ORAL at 05:15

## 2021-05-25 RX ADMIN — CARBIDOPA AND LEVODOPA 2 TABLET(S): 25; 100 TABLET ORAL at 15:21

## 2021-05-25 RX ADMIN — CHLORHEXIDINE GLUCONATE 1 APPLICATION(S): 213 SOLUTION TOPICAL at 05:16

## 2021-05-25 RX ADMIN — CARBIDOPA AND LEVODOPA 2 TABLET(S): 25; 100 TABLET ORAL at 09:33

## 2021-05-25 NOTE — PROGRESS NOTE ADULT - ASSESSMENT
IMPRESSION:    Acute hypoxemic respiratory failure SP trach  HO L lung collapse sp bronch  Septic shock resolved   Necrotic sacral wound culture s/p debridement  Cholecystitis sp CCY  MDR pneumonia Klebsiella/ pseudomonas   HO parkinson's and dementia sp PEG  HO DVT/ A fib    PLAN:    CNS: PRN sedation    HEENT:  Oral care.  Trach care     PULMONARY:  HOB @ 45 degrees.  Aspiration precautions.  PS wean 12 hours today.     CARDIOVASCULAR:  Avoid volume overload.    GI: GI prophylaxis. PEG feeding as tolerated.     RENAL:  F/u  lytes. Correct as needed. Accurate I/O. FU IR drain.     INFECTIOUS DISEASE: Monitor off ABX.  ID FU.     HEMATOLOGICAL:  DVT prophylaxis.    ENDOCRINE:  Follow up FS.  Burn f/up    CODE STATUS: FULL CODE    Very poor prognosis    Transfer to Vent Unit IMPRESSION:    Acute hypoxemic respiratory failure SP trach 12 H  HO L lung collapse sp bronch  Septic shock resolved   Necrotic sacral wound culture s/p debridement  Cholecystitis sp CCY  MDR pneumonia Klebsiella/ pseudomonas   HO parkinson's and dementia sp PEG  HO DVT/ A fib    PLAN:    CNS: PRN sedation    HEENT:  Oral care.  Trach care     PULMONARY:  HOB @ 45 degrees.  Aspiration precautions.  PS wean 12 hours today.     CARDIOVASCULAR:  Avoid volume overload.    GI: GI prophylaxis. PEG feeding as tolerated.     RENAL:  F/u  lytes. Correct as needed. Accurate I/O. FU IR drain.     INFECTIOUS DISEASE: Monitor off ABX.  ID FU.     HEMATOLOGICAL:  DVT prophylaxis.    ENDOCRINE:  Follow up FS.  Burn f/up    CODE STATUS: FULL CODE    Very poor prognosis    Transfer to Vent Unit

## 2021-05-25 NOTE — PROGRESS NOTE ADULT - SUBJECTIVE AND OBJECTIVE BOX
OVERNIGHT EVENTS: events noted, afebrile    Vital Signs Last 24 Hrs  T(C): 37.2 (24 May 2021 20:00), Max: 37.2 (24 May 2021 09:20)  T(F): 98.9 (24 May 2021 20:00), Max: 98.9 (24 May 2021 09:20)  HR: 62 (24 May 2021 23:50) (57 - 66)  BP: 114/63 (24 May 2021 20:00) (104/56 - 149/72)  BP(mean): 83 (24 May 2021 20:00) (75 - 103)  RR: 23 (24 May 2021 20:00) (20 - 23)  SpO2: 100% (24 May 2021 23:50) (100% - 100%)    PHYSICAL EXAMINATION:    GENERAL: ill looking    HEENT: Head is normocephalic and atraumatic. tracheo    NECK: Supple.    LUNGS: dec bs both bases    HEART: EVANS 3/6    ABDOMEN: Soft, nontender, and nondistended.      EXTREMITIES: Contracted    NEUROLOGIC: follows simple commands    SKIN: ulcer      LABS:                        8.8    5.41  )-----------( 277      ( 24 May 2021 06:33 )             28.1     05-24    137  |  102  |  12  ----------------------------<  90  4.7   |  28  |  0.6<L>    Ca    8.6      24 May 2021 06:33  Mg     1.8     05-24    TPro  7.0  /  Alb  2.6<L>  /  TBili  0.4  /  DBili  x   /  AST  20  /  ALT  <5  /  AlkPhos  126<H>  05-24    PT/INR - ( 24 May 2021 06:33 )   PT: 12.40 sec;   INR: 1.08 ratio             ABG - ( 24 May 2021 04:12 )  pH, Arterial: 7.52  pH, Blood: x     /  pCO2: 34    /  pO2: 179   / HCO3: 27    / Base Excess: 4.4   /  SaO2: 100                               05-24-21 @ 07:01  -  05-25-21 @ 07:00  --------------------------------------------------------  IN: 1195 mL / OUT: 0 mL / NET: 1195 mL        MICROBIOLOGY:      MEDICATIONS  (STANDING):  ascorbic acid 500 milliGRAM(s) Oral daily  atorvastatin 40 milliGRAM(s) Oral at bedtime  carbidopa/levodopa  25/100 2 Tablet(s) Oral <User Schedule>  chlorhexidine 0.12% Liquid 15 milliLiter(s) Oral Mucosa every 12 hours  chlorhexidine 4% Liquid 1 Application(s) Topical <User Schedule>  collagenase Ointment 1 Application(s) Topical two times a day  Dakins Solution - 1/2 Strength 1 Application(s) Topical two times a day  enoxaparin Injectable 40 milliGRAM(s) SubCutaneous daily  lidocaine 2% Gel 1 Application(s) Topical once  lidocaine 2% Viscous 100 milliLiter(s) Swish and Spit once  magnesium oxide 400 milliGRAM(s) Oral every 8 hours  magnesium sulfate  IVPB 1 Gram(s) IV Intermittent once  midodrine 10 milliGRAM(s) Oral every 8 hours  pantoprazole   Suspension 40 milliGRAM(s) Oral daily  polyethylene glycol 3350 17 Gram(s) Oral daily  senna 2 Tablet(s) Oral at bedtime  sodium chloride 0.9%. 1000 milliLiter(s) (75 mL/Hr) IV Continuous <Continuous>  zinc sulfate 220 milliGRAM(s) Oral daily    MEDICATIONS  (PRN):  acetaminophen   Tablet .. 650 milliGRAM(s) Oral every 6 hours PRN Temp greater or equal to 38C (100.4F)      RADIOLOGY & ADDITIONAL STUDIES:         OVERNIGHT EVENTS: events noted, afebrile, PS 12H    Vital Signs Last 24 Hrs  T(C): 37.2 (24 May 2021 20:00), Max: 37.2 (24 May 2021 09:20)  T(F): 98.9 (24 May 2021 20:00), Max: 98.9 (24 May 2021 09:20)  HR: 62 (24 May 2021 23:50) (57 - 66)  BP: 114/63 (24 May 2021 20:00) (104/56 - 149/72)  BP(mean): 83 (24 May 2021 20:00) (75 - 103)  RR: 23 (24 May 2021 20:00) (20 - 23)  SpO2: 100% (24 May 2021 23:50) (100% - 100%)    PHYSICAL EXAMINATION:    GENERAL: ill looking    HEENT: Head is normocephalic and atraumatic. tracheo    NECK: Supple.    LUNGS: dec bs both bases    HEART: EVANS 3/6    ABDOMEN: Soft, nontender, and nondistended.      EXTREMITIES: Contracted    NEUROLOGIC: NOT FOLLOWING COMMANDS    SKIN: ulcer      LABS:                        8.8    5.41  )-----------( 277      ( 24 May 2021 06:33 )             28.1     05-24    137  |  102  |  12  ----------------------------<  90  4.7   |  28  |  0.6<L>    Ca    8.6      24 May 2021 06:33  Mg     1.8     05-24    TPro  7.0  /  Alb  2.6<L>  /  TBili  0.4  /  DBili  x   /  AST  20  /  ALT  <5  /  AlkPhos  126<H>  05-24    PT/INR - ( 24 May 2021 06:33 )   PT: 12.40 sec;   INR: 1.08 ratio             ABG - ( 24 May 2021 04:12 )  pH, Arterial: 7.52  pH, Blood: x     /  pCO2: 34    /  pO2: 179   / HCO3: 27    / Base Excess: 4.4   /  SaO2: 100                               05-24-21 @ 07:01  -  05-25-21 @ 07:00  --------------------------------------------------------  IN: 1195 mL / OUT: 0 mL / NET: 1195 mL        MICROBIOLOGY:      MEDICATIONS  (STANDING):  ascorbic acid 500 milliGRAM(s) Oral daily  atorvastatin 40 milliGRAM(s) Oral at bedtime  carbidopa/levodopa  25/100 2 Tablet(s) Oral <User Schedule>  chlorhexidine 0.12% Liquid 15 milliLiter(s) Oral Mucosa every 12 hours  chlorhexidine 4% Liquid 1 Application(s) Topical <User Schedule>  collagenase Ointment 1 Application(s) Topical two times a day  Dakins Solution - 1/2 Strength 1 Application(s) Topical two times a day  enoxaparin Injectable 40 milliGRAM(s) SubCutaneous daily  lidocaine 2% Gel 1 Application(s) Topical once  lidocaine 2% Viscous 100 milliLiter(s) Swish and Spit once  magnesium oxide 400 milliGRAM(s) Oral every 8 hours  magnesium sulfate  IVPB 1 Gram(s) IV Intermittent once  midodrine 10 milliGRAM(s) Oral every 8 hours  pantoprazole   Suspension 40 milliGRAM(s) Oral daily  polyethylene glycol 3350 17 Gram(s) Oral daily  senna 2 Tablet(s) Oral at bedtime  sodium chloride 0.9%. 1000 milliLiter(s) (75 mL/Hr) IV Continuous <Continuous>  zinc sulfate 220 milliGRAM(s) Oral daily    MEDICATIONS  (PRN):  acetaminophen   Tablet .. 650 milliGRAM(s) Oral every 6 hours PRN Temp greater or equal to 38C (100.4F)      RADIOLOGY & ADDITIONAL STUDIES:

## 2021-05-26 LAB — GLUCOSE BLDC GLUCOMTR-MCNC: 100 MG/DL — HIGH (ref 70–99)

## 2021-05-26 PROCEDURE — 99232 SBSQ HOSP IP/OBS MODERATE 35: CPT

## 2021-05-26 PROCEDURE — 93970 EXTREMITY STUDY: CPT | Mod: 26

## 2021-05-26 RX ADMIN — MAGNESIUM OXIDE 400 MG ORAL TABLET 400 MILLIGRAM(S): 241.3 TABLET ORAL at 06:28

## 2021-05-26 RX ADMIN — CARBIDOPA AND LEVODOPA 2 TABLET(S): 25; 100 TABLET ORAL at 09:37

## 2021-05-26 RX ADMIN — MAGNESIUM OXIDE 400 MG ORAL TABLET 400 MILLIGRAM(S): 241.3 TABLET ORAL at 13:52

## 2021-05-26 RX ADMIN — CARBIDOPA AND LEVODOPA 2 TABLET(S): 25; 100 TABLET ORAL at 22:35

## 2021-05-26 RX ADMIN — MAGNESIUM OXIDE 400 MG ORAL TABLET 400 MILLIGRAM(S): 241.3 TABLET ORAL at 22:35

## 2021-05-26 RX ADMIN — CHLORHEXIDINE GLUCONATE 15 MILLILITER(S): 213 SOLUTION TOPICAL at 18:36

## 2021-05-26 RX ADMIN — ATORVASTATIN CALCIUM 40 MILLIGRAM(S): 80 TABLET, FILM COATED ORAL at 22:34

## 2021-05-26 RX ADMIN — ZINC SULFATE TAB 220 MG (50 MG ZINC EQUIVALENT) 220 MILLIGRAM(S): 220 (50 ZN) TAB at 11:12

## 2021-05-26 RX ADMIN — ENOXAPARIN SODIUM 40 MILLIGRAM(S): 100 INJECTION SUBCUTANEOUS at 11:12

## 2021-05-26 RX ADMIN — POLYETHYLENE GLYCOL 3350 17 GRAM(S): 17 POWDER, FOR SOLUTION ORAL at 11:12

## 2021-05-26 RX ADMIN — CHLORHEXIDINE GLUCONATE 15 MILLILITER(S): 213 SOLUTION TOPICAL at 06:29

## 2021-05-26 RX ADMIN — PANTOPRAZOLE SODIUM 40 MILLIGRAM(S): 20 TABLET, DELAYED RELEASE ORAL at 11:12

## 2021-05-26 RX ADMIN — CARBIDOPA AND LEVODOPA 2 TABLET(S): 25; 100 TABLET ORAL at 13:51

## 2021-05-26 RX ADMIN — Medication 1 APPLICATION(S): at 18:36

## 2021-05-26 RX ADMIN — Medication 1 APPLICATION(S): at 06:30

## 2021-05-26 RX ADMIN — CHLORHEXIDINE GLUCONATE 1 APPLICATION(S): 213 SOLUTION TOPICAL at 06:28

## 2021-05-26 RX ADMIN — Medication 1 APPLICATION(S): at 18:37

## 2021-05-26 RX ADMIN — MIDODRINE HYDROCHLORIDE 10 MILLIGRAM(S): 2.5 TABLET ORAL at 06:28

## 2021-05-26 RX ADMIN — Medication 500 MILLIGRAM(S): at 11:12

## 2021-05-26 RX ADMIN — CARBIDOPA AND LEVODOPA 2 TABLET(S): 25; 100 TABLET ORAL at 06:28

## 2021-05-26 RX ADMIN — SENNA PLUS 2 TABLET(S): 8.6 TABLET ORAL at 22:35

## 2021-05-26 RX ADMIN — MIDODRINE HYDROCHLORIDE 10 MILLIGRAM(S): 2.5 TABLET ORAL at 13:52

## 2021-05-26 NOTE — PROGRESS NOTE ADULT - SUBJECTIVE AND OBJECTIVE BOX
OVERNIGHT EVENTS: events noted, low grade fever    Vital Signs Last 24 Hrs  T(C): 37.4 (26 May 2021 05:20), Max: 37.6 (25 May 2021 20:10)  T(F): 99.4 (26 May 2021 05:20), Max: 99.6 (25 May 2021 20:10)  HR: 59 (26 May 2021 05:20) (59 - 84)  BP: 118/66 (26 May 2021 05:20) (112/54 - 166/75)  BP(mean): 86 (26 May 2021 05:20) (77 - 108)  RR: 16 (26 May 2021 05:20) (16 - 20)  SpO2: 100% (26 May 2021 05:20) (98% - 100%)    PHYSICAL EXAMINATION:    GENERAL: ill looking    HEENT: Head is normocephalic and atraumatic.     NECK: Supple.    LUNGS: dec bs both bases    HEART: EVANS 3/6    ABDOMEN: Soft, nontender, and nondistended.  PEG    EXTREMITIES: Contracted    NEUROLOGIC: not following commands    SKIN: sacral ulcer      LABS:                        9.1    5.39  )-----------( 273      ( 25 May 2021 07:53 )             29.9     05-25    138  |  102  |  13  ----------------------------<  109<H>  4.4   |  24  |  0.5<L>    Ca    8.6      25 May 2021 07:53  Mg     2.0     05-25    TPro  7.0  /  Alb  2.6<L>  /  TBili  0.3  /  DBili  x   /  AST  21  /  ALT  <5  /  AlkPhos  133<H>  05-25    PT/INR - ( 25 May 2021 07:53 )   PT: 11.80 sec;   INR: 1.03 ratio             ABG - ( 26 May 2021 05:20 )  pH, Arterial: 7.51  pH, Blood: x     /  pCO2: 36    /  pO2: 132   / HCO3: 28    / Base Excess: 5.1   /  SaO2: 100                               05-25-21 @ 07:01  -  05-26-21 @ 07:00  --------------------------------------------------------  IN: 3640 mL / OUT: 1 mL / NET: 3639 mL        MICROBIOLOGY:      MEDICATIONS  (STANDING):  ascorbic acid 500 milliGRAM(s) Oral daily  atorvastatin 40 milliGRAM(s) Oral at bedtime  carbidopa/levodopa  25/100 2 Tablet(s) Oral <User Schedule>  chlorhexidine 0.12% Liquid 15 milliLiter(s) Oral Mucosa every 12 hours  chlorhexidine 4% Liquid 1 Application(s) Topical <User Schedule>  collagenase Ointment 1 Application(s) Topical two times a day  Dakins Solution - 1/2 Strength 1 Application(s) Topical two times a day  enoxaparin Injectable 40 milliGRAM(s) SubCutaneous daily  lidocaine 2% Gel 1 Application(s) Topical once  lidocaine 2% Viscous 100 milliLiter(s) Swish and Spit once  magnesium oxide 400 milliGRAM(s) Oral every 8 hours  magnesium sulfate  IVPB 1 Gram(s) IV Intermittent once  midodrine 10 milliGRAM(s) Oral every 8 hours  pantoprazole   Suspension 40 milliGRAM(s) Oral daily  polyethylene glycol 3350 17 Gram(s) Oral daily  senna 2 Tablet(s) Oral at bedtime  sodium chloride 0.9%. 1000 milliLiter(s) (75 mL/Hr) IV Continuous <Continuous>  zinc sulfate 220 milliGRAM(s) Oral daily    MEDICATIONS  (PRN):  acetaminophen   Tablet .. 650 milliGRAM(s) Oral every 6 hours PRN Temp greater or equal to 38C (100.4F)      RADIOLOGY & ADDITIONAL STUDIES:         OVERNIGHT EVENTS: events noted, low grade fever, PS 8h    Vital Signs Last 24 Hrs  T(C): 37.4 (26 May 2021 05:20), Max: 37.6 (25 May 2021 20:10)  T(F): 99.4 (26 May 2021 05:20), Max: 99.6 (25 May 2021 20:10)  HR: 59 (26 May 2021 05:20) (59 - 84)  BP: 118/66 (26 May 2021 05:20) (112/54 - 166/75)  BP(mean): 86 (26 May 2021 05:20) (77 - 108)  RR: 16 (26 May 2021 05:20) (16 - 20)  SpO2: 100% (26 May 2021 05:20) (98% - 100%)    PHYSICAL EXAMINATION:    GENERAL: ill looking    HEENT: Head is normocephalic and atraumatic.     NECK: Supple.    LUNGS: dec bs both bases    HEART: EVANS 3/6    ABDOMEN: Soft, nontender, and nondistended.  PEG    EXTREMITIES: Contracted    NEUROLOGIC: not following commands    SKIN: sacral ulcer      LABS:                        9.1    5.39  )-----------( 273      ( 25 May 2021 07:53 )             29.9     05-25    138  |  102  |  13  ----------------------------<  109<H>  4.4   |  24  |  0.5<L>    Ca    8.6      25 May 2021 07:53  Mg     2.0     05-25    TPro  7.0  /  Alb  2.6<L>  /  TBili  0.3  /  DBili  x   /  AST  21  /  ALT  <5  /  AlkPhos  133<H>  05-25    PT/INR - ( 25 May 2021 07:53 )   PT: 11.80 sec;   INR: 1.03 ratio             ABG - ( 26 May 2021 05:20 )  pH, Arterial: 7.51  pH, Blood: x     /  pCO2: 36    /  pO2: 132   / HCO3: 28    / Base Excess: 5.1   /  SaO2: 100                               05-25-21 @ 07:01  -  05-26-21 @ 07:00  --------------------------------------------------------  IN: 3640 mL / OUT: 1 mL / NET: 3639 mL        MICROBIOLOGY:      MEDICATIONS  (STANDING):  ascorbic acid 500 milliGRAM(s) Oral daily  atorvastatin 40 milliGRAM(s) Oral at bedtime  carbidopa/levodopa  25/100 2 Tablet(s) Oral <User Schedule>  chlorhexidine 0.12% Liquid 15 milliLiter(s) Oral Mucosa every 12 hours  chlorhexidine 4% Liquid 1 Application(s) Topical <User Schedule>  collagenase Ointment 1 Application(s) Topical two times a day  Dakins Solution - 1/2 Strength 1 Application(s) Topical two times a day  enoxaparin Injectable 40 milliGRAM(s) SubCutaneous daily  lidocaine 2% Gel 1 Application(s) Topical once  lidocaine 2% Viscous 100 milliLiter(s) Swish and Spit once  magnesium oxide 400 milliGRAM(s) Oral every 8 hours  magnesium sulfate  IVPB 1 Gram(s) IV Intermittent once  midodrine 10 milliGRAM(s) Oral every 8 hours  pantoprazole   Suspension 40 milliGRAM(s) Oral daily  polyethylene glycol 3350 17 Gram(s) Oral daily  senna 2 Tablet(s) Oral at bedtime  sodium chloride 0.9%. 1000 milliLiter(s) (75 mL/Hr) IV Continuous <Continuous>  zinc sulfate 220 milliGRAM(s) Oral daily    MEDICATIONS  (PRN):  acetaminophen   Tablet .. 650 milliGRAM(s) Oral every 6 hours PRN Temp greater or equal to 38C (100.4F)      RADIOLOGY & ADDITIONAL STUDIES:

## 2021-05-26 NOTE — PROGRESS NOTE ADULT - ASSESSMENT
IMPRESSION:    Acute hypoxemic respiratory failure SP trach   HO L lung collapse sp bronch  Septic shock resolved   Necrotic sacral wound culture s/p debridement  Cholecystitis sp CCY  MDR pneumonia Klebsiella/ pseudomonas   HO parkinson's and dementia sp PEG  HO DVT/ A fib    PLAN:    CNS: PRN sedation    HEENT:  Oral care.  Trach care     PULMONARY:  HOB @ 45 degrees.  Aspiration precautions.  PS wean 12 hours as tolearated    CARDIOVASCULAR:  Avoid volume overload.    GI: GI prophylaxis. PEG feeding as tolerated.     RENAL:  F/u  lytes. Correct as needed. Accurate I/O. FU IR drain.     INFECTIOUS DISEASE: Monitor off ABX.  ID FU.     HEMATOLOGICAL:  DVT prophylaxis. LE doppler    ENDOCRINE:  Follow up FS.  Burn f/up    CODE STATUS: FULL CODE    Very poor prognosis    Transfer to Vent Unit IMPRESSION:    Acute hypoxemic respiratory failure SP trach   HO L lung collapse sp bronch  Septic shock resolved   Necrotic sacral wound culture s/p debridement  Cholecystitis sp CCY  MDR pneumonia Klebsiella/ pseudomonas   HO parkinson's and dementia sp PEG  HO DVT/ A fib    PLAN:    CNS: PRN sedation    HEENT:  Oral care.  Trach care     PULMONARY:  HOB @ 45 degrees.  Aspiration precautions.  PS wean 12 hours as tolearated    CARDIOVASCULAR:  Avoid volume overload. dc ivf    GI: GI prophylaxis. PEG feeding as tolerated.     RENAL:  F/u  lytes. Correct as needed. Accurate I/O. FU IR drain.     INFECTIOUS DISEAS  ID FU.     HEMATOLOGICAL:  DVT prophylaxis. LE doppler    ENDOCRINE:  Follow up FS.  Burn f/up    CODE STATUS: FULL CODE    Very poor prognosis    Transfer to Vent Unit

## 2021-05-27 LAB
ALBUMIN SERPL ELPH-MCNC: 2.4 G/DL — LOW (ref 3.5–5.2)
ALP SERPL-CCNC: 123 U/L — HIGH (ref 30–115)
ALT FLD-CCNC: <5 U/L — SIGNIFICANT CHANGE UP (ref 0–41)
ANION GAP SERPL CALC-SCNC: 10 MMOL/L — SIGNIFICANT CHANGE UP (ref 7–14)
AST SERPL-CCNC: 21 U/L — SIGNIFICANT CHANGE UP (ref 0–41)
BASOPHILS # BLD AUTO: 0.03 K/UL — SIGNIFICANT CHANGE UP (ref 0–0.2)
BASOPHILS NFR BLD AUTO: 0.5 % — SIGNIFICANT CHANGE UP (ref 0–1)
BILIRUB SERPL-MCNC: 0.2 MG/DL — SIGNIFICANT CHANGE UP (ref 0.2–1.2)
BLD GP AB SCN SERPL QL: SIGNIFICANT CHANGE UP
BUN SERPL-MCNC: 14 MG/DL — SIGNIFICANT CHANGE UP (ref 10–20)
CALCIUM SERPL-MCNC: 8.6 MG/DL — SIGNIFICANT CHANGE UP (ref 8.5–10.1)
CHLORIDE SERPL-SCNC: 101 MMOL/L — SIGNIFICANT CHANGE UP (ref 98–110)
CO2 SERPL-SCNC: 25 MMOL/L — SIGNIFICANT CHANGE UP (ref 17–32)
CREAT SERPL-MCNC: 0.6 MG/DL — LOW (ref 0.7–1.5)
EOSINOPHIL # BLD AUTO: 0.31 K/UL — SIGNIFICANT CHANGE UP (ref 0–0.7)
EOSINOPHIL NFR BLD AUTO: 4.8 % — SIGNIFICANT CHANGE UP (ref 0–8)
GLUCOSE BLDC GLUCOMTR-MCNC: 108 MG/DL — HIGH (ref 70–99)
GLUCOSE SERPL-MCNC: 109 MG/DL — HIGH (ref 70–99)
HCT VFR BLD CALC: 29 % — LOW (ref 42–52)
HGB BLD-MCNC: 8.9 G/DL — LOW (ref 14–18)
IMM GRANULOCYTES NFR BLD AUTO: 0.3 % — SIGNIFICANT CHANGE UP (ref 0.1–0.3)
LYMPHOCYTES # BLD AUTO: 0.89 K/UL — LOW (ref 1.2–3.4)
LYMPHOCYTES # BLD AUTO: 13.8 % — LOW (ref 20.5–51.1)
MAGNESIUM SERPL-MCNC: 1.9 MG/DL — SIGNIFICANT CHANGE UP (ref 1.8–2.4)
MCHC RBC-ENTMCNC: 27.3 PG — SIGNIFICANT CHANGE UP (ref 27–31)
MCHC RBC-ENTMCNC: 30.7 G/DL — LOW (ref 32–37)
MCV RBC AUTO: 89 FL — SIGNIFICANT CHANGE UP (ref 80–94)
MONOCYTES # BLD AUTO: 0.57 K/UL — SIGNIFICANT CHANGE UP (ref 0.1–0.6)
MONOCYTES NFR BLD AUTO: 8.9 % — SIGNIFICANT CHANGE UP (ref 1.7–9.3)
NEUTROPHILS # BLD AUTO: 4.61 K/UL — SIGNIFICANT CHANGE UP (ref 1.4–6.5)
NEUTROPHILS NFR BLD AUTO: 71.7 % — SIGNIFICANT CHANGE UP (ref 42.2–75.2)
NRBC # BLD: 0 /100 WBCS — SIGNIFICANT CHANGE UP (ref 0–0)
PLATELET # BLD AUTO: 215 K/UL — SIGNIFICANT CHANGE UP (ref 130–400)
POTASSIUM SERPL-MCNC: 4.3 MMOL/L — SIGNIFICANT CHANGE UP (ref 3.5–5)
POTASSIUM SERPL-SCNC: 4.3 MMOL/L — SIGNIFICANT CHANGE UP (ref 3.5–5)
PROT SERPL-MCNC: 7 G/DL — SIGNIFICANT CHANGE UP (ref 6–8)
RBC # BLD: 3.26 M/UL — LOW (ref 4.7–6.1)
RBC # FLD: 16.7 % — HIGH (ref 11.5–14.5)
SODIUM SERPL-SCNC: 136 MMOL/L — SIGNIFICANT CHANGE UP (ref 135–146)
WBC # BLD: 6.43 K/UL — SIGNIFICANT CHANGE UP (ref 4.8–10.8)
WBC # FLD AUTO: 6.43 K/UL — SIGNIFICANT CHANGE UP (ref 4.8–10.8)

## 2021-05-27 PROCEDURE — 99232 SBSQ HOSP IP/OBS MODERATE 35: CPT

## 2021-05-27 PROCEDURE — 71045 X-RAY EXAM CHEST 1 VIEW: CPT | Mod: 26

## 2021-05-27 RX ADMIN — MAGNESIUM OXIDE 400 MG ORAL TABLET 400 MILLIGRAM(S): 241.3 TABLET ORAL at 14:48

## 2021-05-27 RX ADMIN — MAGNESIUM OXIDE 400 MG ORAL TABLET 400 MILLIGRAM(S): 241.3 TABLET ORAL at 06:18

## 2021-05-27 RX ADMIN — SENNA PLUS 2 TABLET(S): 8.6 TABLET ORAL at 21:43

## 2021-05-27 RX ADMIN — MIDODRINE HYDROCHLORIDE 10 MILLIGRAM(S): 2.5 TABLET ORAL at 21:43

## 2021-05-27 RX ADMIN — MIDODRINE HYDROCHLORIDE 10 MILLIGRAM(S): 2.5 TABLET ORAL at 14:48

## 2021-05-27 RX ADMIN — Medication 1 APPLICATION(S): at 06:19

## 2021-05-27 RX ADMIN — CHLORHEXIDINE GLUCONATE 15 MILLILITER(S): 213 SOLUTION TOPICAL at 06:19

## 2021-05-27 RX ADMIN — CHLORHEXIDINE GLUCONATE 1 APPLICATION(S): 213 SOLUTION TOPICAL at 06:19

## 2021-05-27 RX ADMIN — POLYETHYLENE GLYCOL 3350 17 GRAM(S): 17 POWDER, FOR SOLUTION ORAL at 12:04

## 2021-05-27 RX ADMIN — MAGNESIUM OXIDE 400 MG ORAL TABLET 400 MILLIGRAM(S): 241.3 TABLET ORAL at 21:43

## 2021-05-27 RX ADMIN — CARBIDOPA AND LEVODOPA 2 TABLET(S): 25; 100 TABLET ORAL at 06:18

## 2021-05-27 RX ADMIN — CHLORHEXIDINE GLUCONATE 15 MILLILITER(S): 213 SOLUTION TOPICAL at 18:38

## 2021-05-27 RX ADMIN — Medication 500 MILLIGRAM(S): at 12:04

## 2021-05-27 RX ADMIN — PANTOPRAZOLE SODIUM 40 MILLIGRAM(S): 20 TABLET, DELAYED RELEASE ORAL at 12:05

## 2021-05-27 RX ADMIN — ATORVASTATIN CALCIUM 40 MILLIGRAM(S): 80 TABLET, FILM COATED ORAL at 21:43

## 2021-05-27 RX ADMIN — CARBIDOPA AND LEVODOPA 2 TABLET(S): 25; 100 TABLET ORAL at 21:43

## 2021-05-27 RX ADMIN — ENOXAPARIN SODIUM 40 MILLIGRAM(S): 100 INJECTION SUBCUTANEOUS at 12:04

## 2021-05-27 RX ADMIN — CARBIDOPA AND LEVODOPA 2 TABLET(S): 25; 100 TABLET ORAL at 14:48

## 2021-05-27 RX ADMIN — ZINC SULFATE TAB 220 MG (50 MG ZINC EQUIVALENT) 220 MILLIGRAM(S): 220 (50 ZN) TAB at 12:04

## 2021-05-27 RX ADMIN — CARBIDOPA AND LEVODOPA 2 TABLET(S): 25; 100 TABLET ORAL at 09:38

## 2021-05-27 NOTE — PROGRESS NOTE ADULT - SUBJECTIVE AND OBJECTIVE BOX
OVERNIGHT EVENTS: events noted, 12 h PS, no drips    Vital Signs Last 24 Hrs  T(C): 37 (27 May 2021 07:30), Max: 37.3 (26 May 2021 15:00)  T(F): 98.6 (27 May 2021 07:30), Max: 99.2 (26 May 2021 15:00)  HR: 74 (27 May 2021 07:30) (66 - 84)  BP: 104/68 (27 May 2021 07:30) (104/68 - 137/80)  BP(mean): 83 (27 May 2021 07:30) (83 - 103)  RR: 20 (27 May 2021 07:30) (20 - 25)  SpO2: 95% (27 May 2021 07:30) (95% - 100%)    PHYSICAL EXAMINATION:    GENERAL: Ill looking    HEENT: Head is normocephalic and atraumatic. Trach    NECK: Supple.    LUNGS: dec bs both bases    HEART: Regular rate and rhythm without murmur.    ABDOMEN: Soft, nontender, and nondistended.      EXTREMITIES: contracted    NEUROLOGIC: not following commands    SKIN: [facility]      LABS:              ABG - ( 27 May 2021 04:32 )  pH, Arterial: 7.53  pH, Blood: x     /  pCO2: 35    /  pO2: 124   / HCO3: 29    / Base Excess: 6.0   /  SaO2: 99                                05-26-21 @ 07:01  -  05-27-21 @ 07:00  --------------------------------------------------------  IN: 1530 mL / OUT: 685 mL / NET: 845 mL        MICROBIOLOGY:      MEDICATIONS  (STANDING):  ascorbic acid 500 milliGRAM(s) Oral daily  atorvastatin 40 milliGRAM(s) Oral at bedtime  carbidopa/levodopa  25/100 2 Tablet(s) Oral <User Schedule>  chlorhexidine 0.12% Liquid 15 milliLiter(s) Oral Mucosa every 12 hours  chlorhexidine 4% Liquid 1 Application(s) Topical <User Schedule>  collagenase Ointment 1 Application(s) Topical two times a day  Dakins Solution - 1/2 Strength 1 Application(s) Topical two times a day  enoxaparin Injectable 40 milliGRAM(s) SubCutaneous daily  lidocaine 2% Gel 1 Application(s) Topical once  lidocaine 2% Viscous 100 milliLiter(s) Swish and Spit once  magnesium oxide 400 milliGRAM(s) Oral every 8 hours  magnesium sulfate  IVPB 1 Gram(s) IV Intermittent once  midodrine 10 milliGRAM(s) Oral every 8 hours  pantoprazole   Suspension 40 milliGRAM(s) Oral daily  polyethylene glycol 3350 17 Gram(s) Oral daily  senna 2 Tablet(s) Oral at bedtime  zinc sulfate 220 milliGRAM(s) Oral daily    MEDICATIONS  (PRN):  acetaminophen   Tablet .. 650 milliGRAM(s) Oral every 6 hours PRN Temp greater or equal to 38C (100.4F)      RADIOLOGY & ADDITIONAL STUDIES:

## 2021-05-27 NOTE — PROGRESS NOTE ADULT - ASSESSMENT
IMPRESSION:    Acute hypoxemic respiratory failure SP trach 12 h PS  HO L lung collapse sp bronch  Septic shock resolved   Necrotic sacral wound culture s/p debridement  Cholecystitis sp CCY  MDR pneumonia Klebsiella/ pseudomonas   HO parkinson's and dementia sp PEG  HO DVT/ A fib    PLAN:    CNS: PRN sedation    HEENT:  Oral care.  Trach care     PULMONARY:  HOB @ 45 degrees.  Aspiration precautions.  PS wean 12 hours as tolearated    CARDIOVASCULAR:  Avoid volume overload.    GI: GI prophylaxis. PEG feeding as tolerated.     RENAL:  F/u  lytes. Correct as needed. Accurate I/O. FU IR drain.     INFECTIOUS DISEAS  ID FU.     HEMATOLOGICAL:  DVT prophylaxis. LE doppler    ENDOCRINE:  Follow up FS.  Burn f/up    CODE STATUS: FULL CODE    Very poor prognosis    [facility]

## 2021-05-28 ENCOUNTER — TRANSCRIPTION ENCOUNTER (OUTPATIENT)
Age: 71
End: 2021-05-28

## 2021-05-28 VITALS — TEMPERATURE: 98 F

## 2021-05-28 LAB
ALBUMIN SERPL ELPH-MCNC: 2.7 G/DL — LOW (ref 3.5–5.2)
ALP SERPL-CCNC: 137 U/L — HIGH (ref 30–115)
ALT FLD-CCNC: <5 U/L — SIGNIFICANT CHANGE UP (ref 0–41)
ANION GAP SERPL CALC-SCNC: 8 MMOL/L — SIGNIFICANT CHANGE UP (ref 7–14)
AST SERPL-CCNC: 24 U/L — SIGNIFICANT CHANGE UP (ref 0–41)
BASOPHILS # BLD AUTO: 0.05 K/UL — SIGNIFICANT CHANGE UP (ref 0–0.2)
BASOPHILS NFR BLD AUTO: 0.8 % — SIGNIFICANT CHANGE UP (ref 0–1)
BILIRUB SERPL-MCNC: 0.5 MG/DL — SIGNIFICANT CHANGE UP (ref 0.2–1.2)
BUN SERPL-MCNC: 13 MG/DL — SIGNIFICANT CHANGE UP (ref 10–20)
CALCIUM SERPL-MCNC: 8.4 MG/DL — LOW (ref 8.5–10.1)
CHLORIDE SERPL-SCNC: 100 MMOL/L — SIGNIFICANT CHANGE UP (ref 98–110)
CO2 SERPL-SCNC: 28 MMOL/L — SIGNIFICANT CHANGE UP (ref 17–32)
CREAT SERPL-MCNC: 0.6 MG/DL — LOW (ref 0.7–1.5)
EOSINOPHIL # BLD AUTO: 0.33 K/UL — SIGNIFICANT CHANGE UP (ref 0–0.7)
EOSINOPHIL NFR BLD AUTO: 5.5 % — SIGNIFICANT CHANGE UP (ref 0–8)
GLUCOSE BLDC GLUCOMTR-MCNC: 105 MG/DL — HIGH (ref 70–99)
GLUCOSE BLDC GLUCOMTR-MCNC: 109 MG/DL — HIGH (ref 70–99)
GLUCOSE BLDC GLUCOMTR-MCNC: 117 MG/DL — HIGH (ref 70–99)
GLUCOSE SERPL-MCNC: 94 MG/DL — SIGNIFICANT CHANGE UP (ref 70–99)
HCT VFR BLD CALC: 29.4 % — LOW (ref 42–52)
HGB BLD-MCNC: 9.4 G/DL — LOW (ref 14–18)
IMM GRANULOCYTES NFR BLD AUTO: 0.7 % — HIGH (ref 0.1–0.3)
LYMPHOCYTES # BLD AUTO: 1.21 K/UL — SIGNIFICANT CHANGE UP (ref 1.2–3.4)
LYMPHOCYTES # BLD AUTO: 20 % — LOW (ref 20.5–51.1)
MAGNESIUM SERPL-MCNC: 1.9 MG/DL — SIGNIFICANT CHANGE UP (ref 1.8–2.4)
MCHC RBC-ENTMCNC: 28.2 PG — SIGNIFICANT CHANGE UP (ref 27–31)
MCHC RBC-ENTMCNC: 32 G/DL — SIGNIFICANT CHANGE UP (ref 32–37)
MCV RBC AUTO: 88.3 FL — SIGNIFICANT CHANGE UP (ref 80–94)
MONOCYTES # BLD AUTO: 0.68 K/UL — HIGH (ref 0.1–0.6)
MONOCYTES NFR BLD AUTO: 11.3 % — HIGH (ref 1.7–9.3)
NEUTROPHILS # BLD AUTO: 3.73 K/UL — SIGNIFICANT CHANGE UP (ref 1.4–6.5)
NEUTROPHILS NFR BLD AUTO: 61.7 % — SIGNIFICANT CHANGE UP (ref 42.2–75.2)
NRBC # BLD: 0 /100 WBCS — SIGNIFICANT CHANGE UP (ref 0–0)
PLATELET # BLD AUTO: 328 K/UL — SIGNIFICANT CHANGE UP (ref 130–400)
POTASSIUM SERPL-MCNC: 5 MMOL/L — SIGNIFICANT CHANGE UP (ref 3.5–5)
POTASSIUM SERPL-SCNC: 5 MMOL/L — SIGNIFICANT CHANGE UP (ref 3.5–5)
PROT SERPL-MCNC: 7.3 G/DL — SIGNIFICANT CHANGE UP (ref 6–8)
RBC # BLD: 3.33 M/UL — LOW (ref 4.7–6.1)
RBC # FLD: 16.6 % — HIGH (ref 11.5–14.5)
SODIUM SERPL-SCNC: 136 MMOL/L — SIGNIFICANT CHANGE UP (ref 135–146)
WBC # BLD: 6.04 K/UL — SIGNIFICANT CHANGE UP (ref 4.8–10.8)
WBC # FLD AUTO: 6.04 K/UL — SIGNIFICANT CHANGE UP (ref 4.8–10.8)

## 2021-05-28 PROCEDURE — 71045 X-RAY EXAM CHEST 1 VIEW: CPT | Mod: 26

## 2021-05-28 PROCEDURE — 99239 HOSP IP/OBS DSCHRG MGMT >30: CPT

## 2021-05-28 PROCEDURE — 99232 SBSQ HOSP IP/OBS MODERATE 35: CPT

## 2021-05-28 RX ADMIN — CARBIDOPA AND LEVODOPA 2 TABLET(S): 25; 100 TABLET ORAL at 10:18

## 2021-05-28 RX ADMIN — CARBIDOPA AND LEVODOPA 2 TABLET(S): 25; 100 TABLET ORAL at 13:11

## 2021-05-28 RX ADMIN — MIDODRINE HYDROCHLORIDE 10 MILLIGRAM(S): 2.5 TABLET ORAL at 13:11

## 2021-05-28 RX ADMIN — ZINC SULFATE TAB 220 MG (50 MG ZINC EQUIVALENT) 220 MILLIGRAM(S): 220 (50 ZN) TAB at 12:29

## 2021-05-28 RX ADMIN — MAGNESIUM OXIDE 400 MG ORAL TABLET 400 MILLIGRAM(S): 241.3 TABLET ORAL at 13:11

## 2021-05-28 RX ADMIN — POLYETHYLENE GLYCOL 3350 17 GRAM(S): 17 POWDER, FOR SOLUTION ORAL at 12:29

## 2021-05-28 RX ADMIN — PANTOPRAZOLE SODIUM 40 MILLIGRAM(S): 20 TABLET, DELAYED RELEASE ORAL at 12:29

## 2021-05-28 RX ADMIN — Medication 1 APPLICATION(S): at 05:00

## 2021-05-28 RX ADMIN — CHLORHEXIDINE GLUCONATE 1 APPLICATION(S): 213 SOLUTION TOPICAL at 05:00

## 2021-05-28 RX ADMIN — CHLORHEXIDINE GLUCONATE 15 MILLILITER(S): 213 SOLUTION TOPICAL at 05:00

## 2021-05-28 RX ADMIN — Medication 500 MILLIGRAM(S): at 12:29

## 2021-05-28 RX ADMIN — CARBIDOPA AND LEVODOPA 2 TABLET(S): 25; 100 TABLET ORAL at 05:00

## 2021-05-28 RX ADMIN — MAGNESIUM OXIDE 400 MG ORAL TABLET 400 MILLIGRAM(S): 241.3 TABLET ORAL at 05:00

## 2021-05-28 RX ADMIN — ENOXAPARIN SODIUM 40 MILLIGRAM(S): 100 INJECTION SUBCUTANEOUS at 12:30

## 2021-05-28 RX ADMIN — Medication 1 APPLICATION(S): at 05:01

## 2021-05-28 NOTE — PROGRESS NOTE ADULT - TIME BILLING
Discussed diagnostic testing and treatment plan with primary team.
complexity of care
Discussed with primary team.
Discussed with primary team.
complexity of care
complexity of care
Counseled patient about diagnostic testing and treatment plan. All questions answered.
complexity of care
wound eval and treat
Counseled patient about diagnostic testing and treatment plan. All questions answered.
Discussed with primary team.
Discussed with primary team.
complexity of care
complexity of care
Discussed diagnostic testing and treatment plan with primary team.
Discussed with primary team.
complexity of care
Counseled patient about diagnostic testing and treatment plan. All questions answered.
Discussed diagnostic testing and treatment plan with primary team.
Discussed with primary team.
complexity of care
wound care
Discussed on Rounds with Housestaff
Discussed on Rounds with Housestaff
Discussed with primary team.
complexity of care
complexity of care
I have personally seen and examined this patient.    I have reviewed all pertinent clinical information and reviewed all relevant imaging and diagnostic studies personally.   I counseled the patient about diagnostic testing and treatment plan. All questions were answered.   I discussed recommendations with the primary team.
wound eval and treat
Discussed with Housestaff

## 2021-05-28 NOTE — PROGRESS NOTE ADULT - REASON FOR ADMISSION
Change in Mental Status

## 2021-05-28 NOTE — PROGRESS NOTE ADULT - SUBJECTIVE AND OBJECTIVE BOX
Neurology Progress Note    Interval History:  patient is examined at the bedside, he is trached and pegged, non verbal does not follow commands but able to track,     HPI:  71 year old Male with past medical history of Parkinson's, dementia, CKD Stage 3, 1st degree AV block, HLD, gout, HTN, DM, fungal septicemia presenting from [facility] for acute decompensation in mental status.     As per documentation patient at baseline is verbal but for past 3 days, he has been worsening to state of being non verbal. Patient was admitted to [facility] for fungemia and discharged on the 11th March. At  he had left hand cellulitis and was treated for it.    In ED patient hemodynamically stable, afebrile, ABEBE with rise in Cr to 1.9 and BUN to 75, remains non verbal. CT head negative for any new strokes and UA negative. Family has not visited the patient recently so unaware of his status. (22 Mar 2021 23:59)      PAST MEDICAL & SURGICAL HISTORY:  Parkinson disease    Hypertension    Gout    Dyslipidemia    Prostatitis    Cataract    No significant past surgical history            Medications:  acetaminophen   Tablet .. 650 milliGRAM(s) Oral every 6 hours PRN  ascorbic acid 500 milliGRAM(s) Oral daily  atorvastatin 40 milliGRAM(s) Oral at bedtime  carbidopa/levodopa  25/100 2 Tablet(s) Oral <User Schedule>  chlorhexidine 0.12% Liquid 15 milliLiter(s) Oral Mucosa every 12 hours  chlorhexidine 4% Liquid 1 Application(s) Topical <User Schedule>  collagenase Ointment 1 Application(s) Topical two times a day  Dakins Solution - 1/2 Strength 1 Application(s) Topical two times a day  enoxaparin Injectable 40 milliGRAM(s) SubCutaneous daily  lidocaine 2% Gel 1 Application(s) Topical once  lidocaine 2% Viscous 100 milliLiter(s) Swish and Spit once  magnesium oxide 400 milliGRAM(s) Oral every 8 hours  magnesium sulfate  IVPB 1 Gram(s) IV Intermittent once  midodrine 10 milliGRAM(s) Oral every 8 hours  pantoprazole   Suspension 40 milliGRAM(s) Oral daily  polyethylene glycol 3350 17 Gram(s) Oral daily  senna 2 Tablet(s) Oral at bedtime  zinc sulfate 220 milliGRAM(s) Oral daily      Vital Signs Last 24 Hrs  T(C): 37.1 (28 May 2021 04:52), Max: 37.3 (27 May 2021 18:00)  T(F): 98.7 (28 May 2021 04:52), Max: 99.1 (27 May 2021 18:00)  HR: 77 (28 May 2021 04:52) (70 - 86)  BP: 141/87 (28 May 2021 04:52) (104/68 - 141/87)  BP(mean): 108 (28 May 2021 04:52) (83 - 108)  RR: 20 (28 May 2021 04:52) (20 - 20)  SpO2: 100% (28 May 2021 04:52) (95% - 100%)    Neurological Exam:   Mental status: awake but not alert, does not follow commands  CN: CHANDA, EOMI, no facial assymetry, non verbal  Motor moderate rigidity in BL UE L>R LE trace movement to pain  Sensory: withdarws to noxious stimuli    Labs:  CBC Full  -  ( 27 May 2021 07:47 )  WBC Count : 6.43 K/uL  RBC Count : 3.26 M/uL  Hemoglobin : 8.9 g/dL  Hematocrit : 29.0 %  Platelet Count - Automated : 215 K/uL  Mean Cell Volume : 89.0 fL  Mean Cell Hemoglobin : 27.3 pg  Mean Cell Hemoglobin Concentration : 30.7 g/dL  Auto Neutrophil # : 4.61 K/uL  Auto Lymphocyte # : 0.89 K/uL  Auto Monocyte # : 0.57 K/uL  Auto Eosinophil # : 0.31 K/uL  Auto Basophil # : 0.03 K/uL  Auto Neutrophil % : 71.7 %  Auto Lymphocyte % : 13.8 %  Auto Monocyte % : 8.9 %  Auto Eosinophil % : 4.8 %  Auto Basophil % : 0.5 %    05-27    136  |  101  |  14  ----------------------------<  109<H>  4.3   |  25  |  0.6<L>    Ca    8.6      27 May 2021 07:47  Mg     1.9     05-27    TPro  7.0  /  Alb  2.4<L>  /  TBili  0.2  /  DBili  x   /  AST  21  /  ALT  <5  /  AlkPhos  123<H>  05-27    LIVER FUNCTIONS - ( 27 May 2021 07:47 )  Alb: 2.4 g/dL / Pro: 7.0 g/dL / ALK PHOS: 123 U/L / ALT: <5 U/L / AST: 21 U/L / GGT: x                 Assessment:  This is a 71y Male w/ h/o     Plan:

## 2021-05-28 NOTE — PROGRESS NOTE ADULT - PROVIDER SPECIALTY LIST ADULT
Critical Care
Hospitalist
Infectious Disease
Infectious Disease
Internal Medicine
Neurology
Surgery
Critical Care
Hospitalist
Infectious Disease
Infectious Disease
Internal Medicine
Intervent Radiology
Neurology
Neurology
Orthopedics
Orthopedics
Pulmonology
Burn
Critical Care
Heme/Onc
Hospitalist
Infectious Disease
Internal Medicine
Intervent Radiology
Neurology
Nutrition Support
Pulmonology
Surgery
Burn
Critical Care
Critical Care
Infectious Disease
Internal Medicine
Neurology
Neurology
Pulmonology
Burn
Critical Care
Hospitalist
Infectious Disease
Internal Medicine
Pulmonology
Surgery
Surgery
Infectious Disease
Hospitalist
Hospitalist
Infectious Disease
Burn
Hospitalist
Infectious Disease

## 2021-05-28 NOTE — PROGRESS NOTE ADULT - ASSESSMENT
71 year old Male with past medical history of Parkinson's, dementia, CKD Stage 3, 1st degree AV block, HLD, gout, HTN, DM, fungal septicemia presenting from [facility] for acute decompensation in mental status. As per documentation patient at baseline is verbal but for past 3 days, he has been worsening to state of being non verbal. Patient was admitted to [facility] for fungemia and discharged on the 11th March. Had complicated course on the floor S/p PEG, acute gayle s/p PTC. Upgraded to ICU or AHRF due to atelectasis from mucus plugs s/p multiple bronchoscopies (4). Patient was intubation, extubation, reintubated and downgraded to vent unit. While in Vent unit, patient s/p Trach underwent debridement of sacral ulcer and monitored off ABx as per ID.     # Sepsis and septic shock - resolved  # Hypoxemic respiratory failure s/p intubation, extubation, intubation and trach/peg  # Complete L lung collapse and atelectasis with PNA secondary to mucus plug  - s/p multiple bronchoscopies on 4/28, 4/30, 5/4, and 5/7  - Bronchial Cx grew  Klebsiella Pneumonia   - CXR - Bibasilar lung opacities, left greater than right.  - CTH --> negative for acute events  - s/p avycaz and flagyl as per ID.     # Acute cholecystitis   - confirmed with HIDA scan and sonogram  - s/p IR perc cholecystostomy on 4/29  - gallbladder fluid cx showed NGTD    # Metabolic encephalopathy on top of baseline dementia/Sepsis POA/Necrotic sacral ulcer stage 4/  - CT Head No acute intracranial pathology.  - Blood & Urine cxs NGTD   - WCX GNR, 3/29   Numerous Klebsiella pneumoniae ESBL, , Few Pseudomonas aeruginosa, Numerous Enterococcus faecalis (vancomycin resistant)  - evaluated by Burn: s/p debridement of sacrum on 3/30 .  no more new recommendation for surgery at this time .continue local wound care with santyl/dakins WTD.  - s/p meropenem, Polymixin and Aztreonam  - S/p spinal tap --> CSF clear with no growths and neg PCR  - repeat EEG w/ no epileptiform activity but diffuse cerebral dysfx  - Follow up repeat CTH --> negative    #Necrotic sacral ulcer stage 4  - s/p debridement by burn  - Monitor off ABx as per ID    # Nutrition/Dysphagia:   - 4/14 passed S&S but limited PO intake.   - s/p PEG on 4/19    #Lt shoulder dislocation w/ collection  - no need to tap as per IR and ID concurs      #Anemia  - s/p PRBCs       # Thrombocytopenia ?sec to sepsis - resolved  - off Pepcid  - heme f/u appreciated    # H/o parkinsons disease  - c/w sinemet    # H/o chronic DVT  - VA Duplex Lower Ext Vein Scan, Bilat (03.27.21 @ 18:48) >No evidence of deep venous thrombosis or superficial thrombophlebitis in the bilateral lower extremities.  - daughter wants to cont A/c at half the dose    # Dyslipidemia  - c/w statin    Grave Px despite max care. Daughter with unrealistic expectations. Will be going to SNF today.

## 2021-05-28 NOTE — CHART NOTE - NSCHARTNOTESELECT_GEN_ALL_CORE
Event Note
Internal Medicine/Event Note
Palliative Care NP/Event Note
Transfer Note
bronchoscopy/Event Note
Bronchoscopy
Bronchoscopy/Event Note
Event Note
Neurology/Event Note
Nutrition Support/Nutrition Services
RD at risk f/u/Event Note
Transfer Note
Upgrade note/Event Note
bronchoscopy/Event Note
f/u/Event Note

## 2021-05-28 NOTE — PROGRESS NOTE ADULT - ATTENDING SUPERVISION STATEMENT
Resident
ACP
Fellow
Resident
Resident
ACP
ACP
Fellow
Resident
ACP
ACP
Fellow
Resident
ACP
Fellow
Resident
Resident
ACP
Resident

## 2021-05-28 NOTE — PROGRESS NOTE ADULT - NSICDXPILOT_GEN_ALL_CORE
[place]

## 2021-05-28 NOTE — PROGRESS NOTE ADULT - SUBJECTIVE AND OBJECTIVE BOX
Patient is a 71y old  Male who presents with a chief complaint of Change in Mental Status (05 Apr 2021 13:36)    INTERVAL HPI/OVERNIGHT EVENTS: Patient was examined and seen at bedside. Events notedUnable to obtain ROS.    InitialHPI:  71 year old Male with past medical history of Parkinson's, dementia, CKD Stage 3, 1st degree AV block, HLD, gout, HTN, DM, fungal septicemia presenting from [facility] for acute decompensation in mental status.   As per documentation patient at baseline is verbal but for past 3 days, he has been worsening to state of being non verbal. Patient was admitted to [facility] for fungemia and discharged on the 11th March. At  he had left hand cellulitis and was treated for it.      PAST MEDICAL & SURGICAL HISTORY:  Parkinson disease    Hypertension    Gout    Dyslipidemia    Prostatitis    Cataract    No significant past surgical history        General: min responsive   HEENT:  no LAD, +trach  CV: S1 S2  Resp: decreased breath sounds at bases  GI: RUQ AUGUSTINA, ND/S +BS, +PEG  MS: no clubbing/cyanosis/edema,   Neuro: unable to access  Skin: multiple skin decubs            MEDICATIONS  (STANDING):  ascorbic acid 500 milliGRAM(s) Oral daily  atorvastatin 40 milliGRAM(s) Oral at bedtime  carbidopa/levodopa  25/100 2 Tablet(s) Oral <User Schedule>  chlorhexidine 0.12% Liquid 15 milliLiter(s) Oral Mucosa every 12 hours  chlorhexidine 4% Liquid 1 Application(s) Topical <User Schedule>  collagenase Ointment 1 Application(s) Topical two times a day  Dakins Solution - 1/2 Strength 1 Application(s) Topical two times a day  enoxaparin Injectable 40 milliGRAM(s) SubCutaneous daily  lidocaine 2% Gel 1 Application(s) Topical once  lidocaine 2% Viscous 100 milliLiter(s) Swish and Spit once  magnesium oxide 400 milliGRAM(s) Oral every 8 hours  magnesium sulfate  IVPB 1 Gram(s) IV Intermittent once  midodrine 10 milliGRAM(s) Oral every 8 hours  pantoprazole   Suspension 40 milliGRAM(s) Oral daily  polyethylene glycol 3350 17 Gram(s) Oral daily  senna 2 Tablet(s) Oral at bedtime  zinc sulfate 220 milliGRAM(s) Oral daily    MEDICATIONS  (PRN):  acetaminophen   Tablet .. 650 milliGRAM(s) Oral every 6 hours PRN Temp greater or equal to 38C (100.4F)    Home Medications:  apixaban 2.5 mg oral tablet: 1 tab(s) orally 2 times a day (23 Apr 2021 15:32)  ascorbic acid 500 mg oral tablet: 1 tab(s) orally once a day (23 Apr 2021 15:32)  atorvastatin 40 mg oral tablet: 1 tab(s) orally once a day (at bedtime) (23 Apr 2021 15:32)  carbidopa-levodopa 25 mg-100 mg oral tablet, extended release: 2 tab(s) orally 4 times a day at 6AM,10Am,2PM,6PM,10 PM (23 Apr 2021 15:39)  collagenase 250 units/g topical ointment: 1 application topically 2 times a day (23 Apr 2021 15:32)  magnesium oxide 500 mg oral tablet: 1 tab(s) orally once a day (23 Apr 2021 15:32)  midodrine 10 mg oral tablet: 1 tab(s) orally every 8 hours (24 Apr 2021 16:30)  omeprazole 40 mg oral delayed release capsule: 1 cap(s) orally once a day (23 Mar 2021 01:07)  sodium hypochlorite 0.25% topical solution: 1 application topically 2 times a day (23 Apr 2021 15:32)  zinc sulfate 220 mg oral capsule: 1 cap(s) orally once a day (23 Apr 2021 15:32)    Vital Signs Last 24 Hrs  T(C): 36.9 (28 May 2021 13:00), Max: 37.3 (27 May 2021 18:00)  T(F): 98.5 (28 May 2021 13:00), Max: 99.1 (27 May 2021 18:00)  HR: 71 (28 May 2021 13:00) (70 - 86)  BP: 150/67 (28 May 2021 13:00) (128/64 - 150/67)  BP(mean): 95 (28 May 2021 08:00) (90 - 108)  RR: 17 (28 May 2021 13:00) (17 - 20)  SpO2: 100% (28 May 2021 08:00) (100% - 100%)  CAPILLARY BLOOD GLUCOSE      POCT Blood Glucose.: 105 mg/dL (28 May 2021 11:20)  POCT Blood Glucose.: 109 mg/dL (28 May 2021 05:32)  POCT Blood Glucose.: 117 mg/dL (28 May 2021 01:11)    LABS:                        9.4    6.04  )-----------( 328      ( 28 May 2021 07:07 )             29.4     05-28    136  |  100  |  13  ----------------------------<  94  5.0   |  28  |  0.6<L>    Ca    8.4<L>      28 May 2021 07:07  Mg     1.9     05-28    TPro  7.3  /  Alb  2.7<L>  /  TBili  0.5  /  DBili  x   /  AST  24  /  ALT  <5  /  AlkPhos  137<H>  05-28    LIVER FUNCTIONS - ( 28 May 2021 07:07 )  Alb: 2.7 g/dL / Pro: 7.3 g/dL / ALK PHOS: 137 U/L / ALT: <5 U/L / AST: 24 U/L / GGT: x                   ABG - ( 27 May 2021 04:32 )  pH, Arterial: 7.53  pH, Blood: x     /  pCO2: 35    /  pO2: 124   / HCO3: 29    / Base Excess: 6.0   /  SaO2: 99                      Consultant Notes Reviewed:  [x ] YES  [ ] NO  Care Discussed with Consultants/Other Providers/ Housestaff [ x] YES  [ ] NO  Radiology, labs, new studies personally reviewed.

## 2021-05-28 NOTE — PROGRESS NOTE ADULT - TIME-BASED BILLING (NON-CRITICAL CARE)
Time-based billing (NON-critical care)

## 2021-05-28 NOTE — DISCHARGE NOTE NURSING/CASE MANAGEMENT/SOCIAL WORK - PATIENT PORTAL LINK FT
You can access the FollowMyHealth Patient Portal offered by [facility] by registering at the following website: [email or web address]. By joining [facility]’s FollowMyHealth portal, you will also be able to view your health information using other applications (apps) compatible with our system.

## 2021-05-28 NOTE — PROGRESS NOTE ADULT - TIME-BASED
35
40
30
45
20
25
35
40
40
15
15
35
40
40
45
25
30
30
35
40
45

## 2021-05-28 NOTE — PROGRESS NOTE ADULT - ASSESSMENT
71 year old Male with past medical history of Parkinson's, dementia, CKD Stage 3, 1st degree AV block, HLD, gout, HTN, DM, fungal septicemia presenting from [facility] for acute decompensation in mental status. currently trached and pegged, remains in obtunded state. Neurology was consulted for Parkinson meds adjustments. Currently takes Sinemet 25/100 4 times a day 6am, 10am, 14 and 22. 71 year old Male with past medical history of Parkinson's, dementia, CKD Stage 3, 1st degree AV block, HLD, gout, HTN, DM, fungal septicemia presenting from [facility] for acute decompensation in mental status. currently trached and pegged, remains in obtunded state. Neurology was consulted for Parkinson meds adjustments. Currently takes Sinemet 25/100 4 times a day 6am, 10am, 14 and 22.      Recommendations:  - Recommend continue current dosage since pt unlikely to benefit from higher dose due to bedbound status.    - may f/u with outside neurologist as outpt for further adjustment or taper

## 2021-05-28 NOTE — CHART NOTE - NSCHARTNOTEFT_GEN_A_CORE
<<<RESIDENT DISCHARGE NOTE>>>     NIEVES LABOY  MRN-[id number]    No overnight events. Seen and examined at bedside. Appearing in no acute distress. Neurology contacted and as per attending neurologist; no adjustment to Carbidopa/Levodopa on discharge. Family contacted and updated. Advised on plan for discharge to facility today.       VITAL SIGNS:  T(F): 98.7 (05-28-21 @ 08:00), Max: 99.1 (05-27-21 @ 18:00)  HR: 72 (05-28-21 @ 08:00)  BP: 142/71 (05-28-21 @ 08:00)  SpO2: 100% (05-28-21 @ 08:00)      PHYSICAL EXAMINATION:  General: Trach/Peg, laying in bed no signs of distress   Head & Neck: PERRLA, EOMI, NCAT  Pulmonary: Breath sounds bilateral  Cardiovascular: RRR, No m/r/g  Gastrointestinal/Abdomen & Pelvis: Soft, NT/ND, PEG  Neurologic/Motor: Awake not following commands, tremors     TEST RESULTS:                        9.4    6.04  )-----------( 328      ( 28 May 2021 07:07 )             29.4       05-28    136  |  100  |  13  ----------------------------<  94  5.0   |  28  |  0.6<L>    Ca    8.4<L>      28 May 2021 07:07  Mg     1.9     05-28    TPro  7.3  /  Alb  2.7<L>  /  TBili  0.5  /  DBili  x   /  AST  24  /  ALT  <5  /  AlkPhos  137<H>  05-28      FINAL DISCHARGE INTERVIEW:  Resident(s) Present: Jozef Lyon, RN Present: Cynthia AMARAL MEDICATION RECONCILIATION  reviewed with Attending Dr. Tony     DISPOSITION:   [  ] Home,    [  ] Home with Visiting Nursing Services,   [ x ]  SNF/ NH,    [   ] Acute Rehab (4A),   [   ] Other (Specify:_________) <<<RESIDENT DISCHARGE NOTE>>>     NIEVES LABOY  MRN-[id number]    No overnight events. Seen and examined at bedside. Appearing in no acute distress. Neurology contacted and as per attending neurologist; no adjustment to Carbidopa/Levodopa on discharge. Family contacted and updated. Advised on plan for discharge to facility today.       VITAL SIGNS:  T(F): 98.7 (05-28-21 @ 08:00), Max: 99.1 (05-27-21 @ 18:00)  HR: 72 (05-28-21 @ 08:00)  BP: 142/71 (05-28-21 @ 08:00)  SpO2: 100% (05-28-21 @ 08:00)      PHYSICAL EXAMINATION:  General: Trach/Peg, laying in bed no signs of distress   Head & Neck: PERRLA, EOMI, NCAT  Pulmonary: Breath sounds bilateral  Cardiovascular: RRR, No m/r/g  Gastrointestinal/Abdomen & Pelvis: Soft, NT/ND, PEG  Neurologic/Motor: Awake not following commands, tremors     TEST RESULTS:                        9.4    6.04  )-----------( 328      ( 28 May 2021 07:07 )             29.4       05-28    136  |  100  |  13  ----------------------------<  94  5.0   |  28  |  0.6<L>    Ca    8.4<L>      28 May 2021 07:07  Mg     1.9     05-28    TPro  7.3  /  Alb  2.7<L>  /  TBili  0.5  /  DBili  x   /  AST  24  /  ALT  <5  /  AlkPhos  137<H>  05-28      FINAL DISCHARGE INTERVIEW:  Resident(s) Present: Jozef Lyon, RN Present: Chayito AMARAL MEDICATION RECONCILIATION  reviewed with Attending Dr. Tony     DISPOSITION:   [  ] Home,    [  ] Home with Visiting Nursing Services,   [ x ]  SNF/ NH,    [   ] Acute Rehab (4A),   [   ] Other (Specify:_________)

## 2021-06-07 DIAGNOSIS — L89.896 PRESSURE-INDUCED DEEP TISSUE DAMAGE OF OTHER SITE: ICD-10-CM

## 2021-06-07 DIAGNOSIS — D62 ACUTE POSTHEMORRHAGIC ANEMIA: ICD-10-CM

## 2021-06-07 DIAGNOSIS — A41.59 OTHER GRAM-NEGATIVE SEPSIS: ICD-10-CM

## 2021-06-07 DIAGNOSIS — G40.89 OTHER SEIZURES: ICD-10-CM

## 2021-06-07 DIAGNOSIS — Z16.12 EXTENDED SPECTRUM BETA LACTAMASE (ESBL) RESISTANCE: ICD-10-CM

## 2021-06-07 DIAGNOSIS — K81.0 ACUTE CHOLECYSTITIS: ICD-10-CM

## 2021-06-07 DIAGNOSIS — Z86.718 PERSONAL HISTORY OF OTHER VENOUS THROMBOSIS AND EMBOLISM: ICD-10-CM

## 2021-06-07 DIAGNOSIS — E87.1 HYPO-OSMOLALITY AND HYPONATREMIA: ICD-10-CM

## 2021-06-07 DIAGNOSIS — I44.0 ATRIOVENTRICULAR BLOCK, FIRST DEGREE: ICD-10-CM

## 2021-06-07 DIAGNOSIS — N18.30 CHRONIC KIDNEY DISEASE, STAGE 3 UNSPECIFIED: ICD-10-CM

## 2021-06-07 DIAGNOSIS — E11.22 TYPE 2 DIABETES MELLITUS WITH DIABETIC CHRONIC KIDNEY DISEASE: ICD-10-CM

## 2021-06-07 DIAGNOSIS — J96.01 ACUTE RESPIRATORY FAILURE WITH HYPOXIA: ICD-10-CM

## 2021-06-07 DIAGNOSIS — J96.02 ACUTE RESPIRATORY FAILURE WITH HYPERCAPNIA: ICD-10-CM

## 2021-06-07 DIAGNOSIS — N17.9 ACUTE KIDNEY FAILURE, UNSPECIFIED: ICD-10-CM

## 2021-06-07 DIAGNOSIS — M86.9 OSTEOMYELITIS, UNSPECIFIED: ICD-10-CM

## 2021-06-07 DIAGNOSIS — J69.0 PNEUMONITIS DUE TO INHALATION OF FOOD AND VOMIT: ICD-10-CM

## 2021-06-07 DIAGNOSIS — Z16.21 RESISTANCE TO VANCOMYCIN: ICD-10-CM

## 2021-06-07 DIAGNOSIS — J98.19 OTHER PULMONARY COLLAPSE: ICD-10-CM

## 2021-06-07 DIAGNOSIS — R65.21 SEVERE SEPSIS WITH SEPTIC SHOCK: ICD-10-CM

## 2021-06-07 DIAGNOSIS — E11.69 TYPE 2 DIABETES MELLITUS WITH OTHER SPECIFIED COMPLICATION: ICD-10-CM

## 2021-06-07 DIAGNOSIS — G93.41 METABOLIC ENCEPHALOPATHY: ICD-10-CM

## 2021-06-07 DIAGNOSIS — J95.851 VENTILATOR ASSOCIATED PNEUMONIA: ICD-10-CM

## 2021-06-07 DIAGNOSIS — E46 UNSPECIFIED PROTEIN-CALORIE MALNUTRITION: ICD-10-CM

## 2021-06-07 DIAGNOSIS — N39.0 URINARY TRACT INFECTION, SITE NOT SPECIFIED: ICD-10-CM

## 2021-06-07 DIAGNOSIS — D69.59 OTHER SECONDARY THROMBOCYTOPENIA: ICD-10-CM

## 2021-06-07 DIAGNOSIS — G20 PARKINSON'S DISEASE: ICD-10-CM

## 2021-06-07 DIAGNOSIS — R41.82 ALTERED MENTAL STATUS, UNSPECIFIED: ICD-10-CM

## 2021-06-07 DIAGNOSIS — L89.154 PRESSURE ULCER OF SACRAL REGION, STAGE 4: ICD-10-CM

## 2021-06-07 DIAGNOSIS — F02.80 DEMENTIA IN OTHER DISEASES CLASSIFIED ELSEWHERE, UNSPECIFIED SEVERITY, WITHOUT BEHAVIORAL DISTURBANCE, PSYCHOTIC DISTURBANCE, MOOD DISTURBANCE, AND ANXIETY: ICD-10-CM

## 2021-06-07 DIAGNOSIS — E87.0 HYPEROSMOLALITY AND HYPERNATREMIA: ICD-10-CM

## 2021-06-07 DIAGNOSIS — I12.9 HYPERTENSIVE CHRONIC KIDNEY DISEASE WITH STAGE 1 THROUGH STAGE 4 CHRONIC KIDNEY DISEASE, OR UNSPECIFIED CHRONIC KIDNEY DISEASE: ICD-10-CM

## 2021-07-10 ENCOUNTER — INPATIENT (INPATIENT)
Facility: HOSPITAL | Age: 71
LOS: 2 days | Discharge: NURSING, REHAB OR LONG-TERM CARE | End: 2021-07-13
Attending: INTERNAL MEDICINE | Admitting: INTERNAL MEDICINE
Payer: MEDICARE

## 2021-07-10 VITALS
TEMPERATURE: 97 F | WEIGHT: 179.9 LBS | OXYGEN SATURATION: 100 % | DIASTOLIC BLOOD PRESSURE: 56 MMHG | RESPIRATION RATE: 18 BRPM | SYSTOLIC BLOOD PRESSURE: 116 MMHG | HEART RATE: 76 BPM

## 2021-07-10 PROCEDURE — 99285 EMERGENCY DEPT VISIT HI MDM: CPT

## 2021-07-11 LAB
ALBUMIN SERPL ELPH-MCNC: 2.7 G/DL — LOW (ref 3.5–5.2)
ALP SERPL-CCNC: 182 U/L — HIGH (ref 30–115)
ALT FLD-CCNC: 29 U/L — SIGNIFICANT CHANGE UP (ref 0–41)
ANION GAP SERPL CALC-SCNC: 11 MMOL/L — SIGNIFICANT CHANGE UP (ref 7–14)
ANION GAP SERPL CALC-SCNC: 9 MMOL/L — SIGNIFICANT CHANGE UP (ref 7–14)
AST SERPL-CCNC: 41 U/L — SIGNIFICANT CHANGE UP (ref 0–41)
BASOPHILS # BLD AUTO: 0.02 K/UL — SIGNIFICANT CHANGE UP (ref 0–0.2)
BASOPHILS NFR BLD AUTO: 0.3 % — SIGNIFICANT CHANGE UP (ref 0–1)
BILIRUB SERPL-MCNC: 0.2 MG/DL — SIGNIFICANT CHANGE UP (ref 0.2–1.2)
BUN SERPL-MCNC: 27 MG/DL — HIGH (ref 10–20)
BUN SERPL-MCNC: 28 MG/DL — HIGH (ref 10–20)
CALCIUM SERPL-MCNC: 8.8 MG/DL — SIGNIFICANT CHANGE UP (ref 8.5–10.1)
CALCIUM SERPL-MCNC: 9 MG/DL — SIGNIFICANT CHANGE UP (ref 8.5–10.1)
CHLORIDE SERPL-SCNC: 102 MMOL/L — SIGNIFICANT CHANGE UP (ref 98–110)
CHLORIDE SERPL-SCNC: 99 MMOL/L — SIGNIFICANT CHANGE UP (ref 98–110)
CO2 SERPL-SCNC: 25 MMOL/L — SIGNIFICANT CHANGE UP (ref 17–32)
CO2 SERPL-SCNC: 26 MMOL/L — SIGNIFICANT CHANGE UP (ref 17–32)
CREAT SERPL-MCNC: 0.5 MG/DL — LOW (ref 0.7–1.5)
CREAT SERPL-MCNC: 0.6 MG/DL — LOW (ref 0.7–1.5)
EOSINOPHIL # BLD AUTO: 0.24 K/UL — SIGNIFICANT CHANGE UP (ref 0–0.7)
EOSINOPHIL NFR BLD AUTO: 3.7 % — SIGNIFICANT CHANGE UP (ref 0–8)
GLUCOSE BLDC GLUCOMTR-MCNC: 80 MG/DL — SIGNIFICANT CHANGE UP (ref 70–99)
GLUCOSE SERPL-MCNC: 75 MG/DL — SIGNIFICANT CHANGE UP (ref 70–99)
GLUCOSE SERPL-MCNC: 82 MG/DL — SIGNIFICANT CHANGE UP (ref 70–99)
HCT VFR BLD CALC: 30.7 % — LOW (ref 42–52)
HGB BLD-MCNC: 9.4 G/DL — LOW (ref 14–18)
IMM GRANULOCYTES NFR BLD AUTO: 0.8 % — HIGH (ref 0.1–0.3)
LIDOCAIN IGE QN: 15 U/L — SIGNIFICANT CHANGE UP (ref 7–60)
LYMPHOCYTES # BLD AUTO: 0.85 K/UL — LOW (ref 1.2–3.4)
LYMPHOCYTES # BLD AUTO: 13 % — LOW (ref 20.5–51.1)
MCHC RBC-ENTMCNC: 27 PG — SIGNIFICANT CHANGE UP (ref 27–31)
MCHC RBC-ENTMCNC: 30.6 G/DL — LOW (ref 32–37)
MCV RBC AUTO: 88.2 FL — SIGNIFICANT CHANGE UP (ref 80–94)
MONOCYTES # BLD AUTO: 0.31 K/UL — SIGNIFICANT CHANGE UP (ref 0.1–0.6)
MONOCYTES NFR BLD AUTO: 4.7 % — SIGNIFICANT CHANGE UP (ref 1.7–9.3)
NEUTROPHILS # BLD AUTO: 5.07 K/UL — SIGNIFICANT CHANGE UP (ref 1.4–6.5)
NEUTROPHILS NFR BLD AUTO: 77.5 % — HIGH (ref 42.2–75.2)
NRBC # BLD: 0 /100 WBCS — SIGNIFICANT CHANGE UP (ref 0–0)
PLATELET # BLD AUTO: 217 K/UL — SIGNIFICANT CHANGE UP (ref 130–400)
POTASSIUM SERPL-MCNC: 4.2 MMOL/L — SIGNIFICANT CHANGE UP (ref 3.5–5)
POTASSIUM SERPL-MCNC: 5.4 MMOL/L — HIGH (ref 3.5–5)
POTASSIUM SERPL-SCNC: 4.2 MMOL/L — SIGNIFICANT CHANGE UP (ref 3.5–5)
POTASSIUM SERPL-SCNC: 5.4 MMOL/L — HIGH (ref 3.5–5)
PROT SERPL-MCNC: 7.4 G/DL — SIGNIFICANT CHANGE UP (ref 6–8)
RBC # BLD: 3.48 M/UL — LOW (ref 4.7–6.1)
RBC # FLD: 14.7 % — HIGH (ref 11.5–14.5)
SARS-COV-2 RNA SPEC QL NAA+PROBE: SIGNIFICANT CHANGE UP
SODIUM SERPL-SCNC: 136 MMOL/L — SIGNIFICANT CHANGE UP (ref 135–146)
SODIUM SERPL-SCNC: 136 MMOL/L — SIGNIFICANT CHANGE UP (ref 135–146)
WBC # BLD: 6.54 K/UL — SIGNIFICANT CHANGE UP (ref 4.8–10.8)
WBC # FLD AUTO: 6.54 K/UL — SIGNIFICANT CHANGE UP (ref 4.8–10.8)

## 2021-07-11 PROCEDURE — 93010 ELECTROCARDIOGRAM REPORT: CPT

## 2021-07-11 PROCEDURE — 71045 X-RAY EXAM CHEST 1 VIEW: CPT | Mod: 26

## 2021-07-11 RX ORDER — CHLORHEXIDINE GLUCONATE 213 G/1000ML
1 SOLUTION TOPICAL
Refills: 0 | Status: DISCONTINUED | OUTPATIENT
Start: 2021-07-11 | End: 2021-07-13

## 2021-07-11 RX ORDER — SODIUM CHLORIDE 9 MG/ML
1000 INJECTION, SOLUTION INTRAVENOUS
Refills: 0 | Status: DISCONTINUED | OUTPATIENT
Start: 2021-07-11 | End: 2021-07-13

## 2021-07-11 RX ORDER — CHLORHEXIDINE GLUCONATE 213 G/1000ML
15 SOLUTION TOPICAL EVERY 12 HOURS
Refills: 0 | Status: DISCONTINUED | OUTPATIENT
Start: 2021-07-11 | End: 2021-07-13

## 2021-07-11 RX ADMIN — SODIUM CHLORIDE 50 MILLILITER(S): 9 INJECTION, SOLUTION INTRAVENOUS at 15:47

## 2021-07-11 RX ADMIN — CHLORHEXIDINE GLUCONATE 1 APPLICATION(S): 213 SOLUTION TOPICAL at 05:32

## 2021-07-11 NOTE — H&P ADULT - ASSESSMENT
71 yo M vent dependent s/p trach and PEG with PMH of Parkinson's, dementia, CKD Stage 3, 1st degree AV block, HLD, gout, HTN, DM, fungal septicemia presents from NH for dislodged g-tube.    #Dislodged g-tube  - IR consult to replace tube  - NPO for now  - Can start gentle hydration    #Chronic respiratory failure s/p trach vent dependent  - Continue current management    #s/p cholecystostomy  - Continue to monitor drain output    #CKD  - Cr 0.5, monitor    #Anemia  - Required transfusion on prior admission  - Continue to monitor    #Parkinson's disease  - Sinemet on hold while patient NPO    DVT PPX: on hold for possible OR  Pending: G-tube replacement

## 2021-07-11 NOTE — H&P ADULT - NSHPPHYSICALEXAM_GEN_ALL_CORE
GENERAL: NAD  HEAD:  Atraumatic, Normocephalic  EYES: EOMI, conjunctiva and sclera clear  NECK: trach present  CHEST/LUNG: Clear to auscultation bilaterally, no crackles or wheezing  HEART: Regular rate and rhythm; No murmurs, rubs, or gallops  ABDOMEN: soft, nontnender, g-tube site with serous discharge, no redness or swelling, yobani drain with serosanguinous discharge  EXTREMITIES:  no edema or cyanosis

## 2021-07-11 NOTE — ED PROVIDER NOTE - CLINICAL SUMMARY MEDICAL DECISION MAKING FREE TEXT BOX
71 yo man with trach and PEG which was dislodged.  Unfortunately was unable to replace in ED.  Will need IR eval for replacement.

## 2021-07-11 NOTE — CHART NOTE - NSCHARTNOTEFT_GEN_A_CORE
71 yo M vent dependent s/p trach and PEG with PMH of Parkinson's, dementia, CKD Stage 3, 1st degree AV block, HLD, gout, HTN, DM, fungal septicemia presents from NH for dislodged g-tube.    Patient examined at bedside, hemodynamically stable. Baseline mental status AOx0. PEG tube dislodged, area covered by gauze dressing.     Plan:   - IR consult to replace tube --> spoke with IR resident on call (ext [phone]), PEG tubes not replaced over the weekend and asked to call in consult again on Monday morning to ext: [phone]  - NPO for now  - c/w gentle hydration    Case discussed with attending MD

## 2021-07-11 NOTE — H&P ADULT - HISTORY OF PRESENT ILLNESS
71 yo M vent dependent s/p trach and PEG with PMH of Parkinson's, dementia, CKD Stage 3, 1st degree AV block, HLD, gout, HTN, DM, fungal septicemia presents from NH for dislodged g-tube. As per NH staff, the tube became dislodged yesterday and they were unable to insert it.    Vital Signs Last 24 Hrs  T(C): 36.2 (10 Jul 2021 23:58), Max: 36.2 (10 Jul 2021 23:58)  T(F): 97.1 (10 Jul 2021 23:58), Max: 97.1 (10 Jul 2021 23:58)  HR: 76 (10 Jul 2021 23:58) (76 - 76)  BP: 116/56 (10 Jul 2021 23:58) (116/56 - 116/56)  BP(mean): --  RR: 18 (10 Jul 2021 23:58) (18 - 18)  SpO2: 100% (11 Jul 2021 00:06) (100% - 100%)     69 yo M vent dependent s/p trach and PEG with PMH of Parkinson's, dementia, CKD Stage 3, 1st degree AV block, HLD, gout, HTN, DM, fungal septicemia presents from NH for dislodged g-tube. As per NH staff, the tube became dislodged yesterday and they were unable to insert it. The patient was brought to the hospital for g-tube replacement.    Vital Signs Last 24 Hrs  T(C): 36.2 (10 Jul 2021 23:58), Max: 36.2 (10 Jul 2021 23:58)  T(F): 97.1 (10 Jul 2021 23:58), Max: 97.1 (10 Jul 2021 23:58)  HR: 76 (10 Jul 2021 23:58) (76 - 76)  BP: 116/56 (10 Jul 2021 23:58) (116/56 - 116/56)  BP(mean): --  RR: 18 (10 Jul 2021 23:58) (18 - 18)  SpO2: 100% (11 Jul 2021 00:06) (100% - 100%)

## 2021-07-11 NOTE — ED PROVIDER NOTE - ATTENDING CONTRIBUTION TO CARE
I personally evaluated the patient. I reviewed the Resident’s or Physician Assistant’s note (as assigned above), and agree with the findings and plan except as documented in my note.  71 yo from SNF with trach and mechanical vent due to chronic respiratory failure presents due to PEG tube dislodgement.  On evaluation, concern for false tract and closure of original tract.  Will need admission for IR evaluation and IVf until PO feedings can be resumed.

## 2021-07-11 NOTE — ED PROVIDER NOTE - PHYSICAL EXAMINATION
Constitutional: Well developed, well nourished. NAD  Head: Normocephalic, atraumatic.  Eyes: PERRL, EOMI.  ENT: No nasal discharge. Mucous membranes dry.  Neck: Supple. Painless ROM.  Cardiovascular:  Regular rate and rhythm.    Pulmonary:   Lungs clear to auscultation bilaterally.   Abdominal: Soft Left side G-tube os noted with suture in place - mild serosanguinous oozing; right biliary yobani drain noted;   Extremities. Pelvis stable. multiple contractures bilat arm/bilat lower ext; sacral decubiti stage 4; bilat heel pressure ulcers;   Skin: No rashes, cyanosis.  Neuro: AAOx1. No focal neurological deficits.  Psych: Normal mood. Normal affect.

## 2021-07-11 NOTE — ED PROVIDER NOTE - OBJECTIVE STATEMENT
70 yold male to Ed [facility] Respiratory failure tracheostomy - vent dep; CKD, Hld, anemia, Gtube, DM, Parkinson's disease; Pt from [facility] for dislodged Gtube; pt received at [facility] 5/21 with Gtube in place(suture also in place) as per NH staff; today ~10p Gtube noted to be out; staff attempted reinsertion but unsuccessful and pt sent to Ed; pt alert not able to provide any hx; pt with multiple pressure ulcers - sacrum, heel bilat;

## 2021-07-11 NOTE — ED ADULT NURSE NOTE - OBJECTIVE STATEMENT
Pt was sent from [facility] for displaced PEG tube from the NH. Pt is vented with trach in the midline and connected to Vent. Pt is nonverbal, but awake and calm, has contractures on both upper and lower limps. Midline in left forearm, and pressure ulcers in sacrum( unstagable)  both and bilateral heals. Also a bile pigtail empty handing from the abdomen. Dressing was applied to the Peg tube.

## 2021-07-12 LAB
ALBUMIN SERPL ELPH-MCNC: 2.5 G/DL — LOW (ref 3.5–5.2)
ALP SERPL-CCNC: 165 U/L — HIGH (ref 30–115)
ALT FLD-CCNC: 44 U/L — HIGH (ref 0–41)
ANION GAP SERPL CALC-SCNC: 7 MMOL/L — SIGNIFICANT CHANGE UP (ref 7–14)
APTT BLD: 33.1 SEC — SIGNIFICANT CHANGE UP (ref 27–39.2)
AST SERPL-CCNC: 36 U/L — SIGNIFICANT CHANGE UP (ref 0–41)
BASOPHILS # BLD AUTO: 0.03 K/UL — SIGNIFICANT CHANGE UP (ref 0–0.2)
BASOPHILS NFR BLD AUTO: 0.4 % — SIGNIFICANT CHANGE UP (ref 0–1)
BILIRUB SERPL-MCNC: 0.3 MG/DL — SIGNIFICANT CHANGE UP (ref 0.2–1.2)
BLD GP AB SCN SERPL QL: SIGNIFICANT CHANGE UP
BUN SERPL-MCNC: 24 MG/DL — HIGH (ref 10–20)
CALCIUM SERPL-MCNC: 8.7 MG/DL — SIGNIFICANT CHANGE UP (ref 8.5–10.1)
CHLORIDE SERPL-SCNC: 100 MMOL/L — SIGNIFICANT CHANGE UP (ref 98–110)
CO2 SERPL-SCNC: 26 MMOL/L — SIGNIFICANT CHANGE UP (ref 17–32)
CREAT SERPL-MCNC: 0.5 MG/DL — LOW (ref 0.7–1.5)
EOSINOPHIL # BLD AUTO: 0.29 K/UL — SIGNIFICANT CHANGE UP (ref 0–0.7)
EOSINOPHIL NFR BLD AUTO: 3.5 % — SIGNIFICANT CHANGE UP (ref 0–8)
GLUCOSE BLDC GLUCOMTR-MCNC: 85 MG/DL — SIGNIFICANT CHANGE UP (ref 70–99)
GLUCOSE BLDC GLUCOMTR-MCNC: 89 MG/DL — SIGNIFICANT CHANGE UP (ref 70–99)
GLUCOSE BLDC GLUCOMTR-MCNC: 91 MG/DL — SIGNIFICANT CHANGE UP (ref 70–99)
GLUCOSE SERPL-MCNC: 90 MG/DL — SIGNIFICANT CHANGE UP (ref 70–99)
HCT VFR BLD CALC: 25.6 % — LOW (ref 42–52)
HGB BLD-MCNC: 8.2 G/DL — LOW (ref 14–18)
IMM GRANULOCYTES NFR BLD AUTO: 0.6 % — HIGH (ref 0.1–0.3)
INR BLD: 1.08 RATIO — SIGNIFICANT CHANGE UP (ref 0.65–1.3)
LYMPHOCYTES # BLD AUTO: 0.79 K/UL — LOW (ref 1.2–3.4)
LYMPHOCYTES # BLD AUTO: 9.5 % — LOW (ref 20.5–51.1)
MAGNESIUM SERPL-MCNC: 1.9 MG/DL — SIGNIFICANT CHANGE UP (ref 1.8–2.4)
MCHC RBC-ENTMCNC: 27.9 PG — SIGNIFICANT CHANGE UP (ref 27–31)
MCHC RBC-ENTMCNC: 32 G/DL — SIGNIFICANT CHANGE UP (ref 32–37)
MCV RBC AUTO: 87.1 FL — SIGNIFICANT CHANGE UP (ref 80–94)
MONOCYTES # BLD AUTO: 0.64 K/UL — HIGH (ref 0.1–0.6)
MONOCYTES NFR BLD AUTO: 7.7 % — SIGNIFICANT CHANGE UP (ref 1.7–9.3)
NEUTROPHILS # BLD AUTO: 6.53 K/UL — HIGH (ref 1.4–6.5)
NEUTROPHILS NFR BLD AUTO: 78.3 % — HIGH (ref 42.2–75.2)
NRBC # BLD: 0 /100 WBCS — SIGNIFICANT CHANGE UP (ref 0–0)
PLATELET # BLD AUTO: 213 K/UL — SIGNIFICANT CHANGE UP (ref 130–400)
POTASSIUM SERPL-MCNC: 4 MMOL/L — SIGNIFICANT CHANGE UP (ref 3.5–5)
POTASSIUM SERPL-SCNC: 4 MMOL/L — SIGNIFICANT CHANGE UP (ref 3.5–5)
PROT SERPL-MCNC: 6.9 G/DL — SIGNIFICANT CHANGE UP (ref 6–8)
PROTHROM AB SERPL-ACNC: 12.4 SEC — SIGNIFICANT CHANGE UP (ref 9.95–12.87)
RBC # BLD: 2.94 M/UL — LOW (ref 4.7–6.1)
RBC # FLD: 14.9 % — HIGH (ref 11.5–14.5)
SODIUM SERPL-SCNC: 133 MMOL/L — LOW (ref 135–146)
WBC # BLD: 8.33 K/UL — SIGNIFICANT CHANGE UP (ref 4.8–10.8)
WBC # FLD AUTO: 8.33 K/UL — SIGNIFICANT CHANGE UP (ref 4.8–10.8)

## 2021-07-12 PROCEDURE — 49450 REPLACE G/C TUBE PERC: CPT

## 2021-07-12 PROCEDURE — 99233 SBSQ HOSP IP/OBS HIGH 50: CPT

## 2021-07-12 RX ORDER — ASCORBIC ACID 60 MG
500 TABLET,CHEWABLE ORAL DAILY
Refills: 0 | Status: DISCONTINUED | OUTPATIENT
Start: 2021-07-12 | End: 2021-07-13

## 2021-07-12 RX ORDER — PANTOPRAZOLE SODIUM 20 MG/1
40 TABLET, DELAYED RELEASE ORAL DAILY
Refills: 0 | Status: DISCONTINUED | OUTPATIENT
Start: 2021-07-12 | End: 2021-07-13

## 2021-07-12 RX ORDER — ENOXAPARIN SODIUM 100 MG/ML
40 INJECTION SUBCUTANEOUS AT BEDTIME
Refills: 0 | Status: DISCONTINUED | OUTPATIENT
Start: 2021-07-12 | End: 2021-07-13

## 2021-07-12 RX ORDER — CARBIDOPA AND LEVODOPA 25; 100 MG/1; MG/1
1 TABLET ORAL
Refills: 0 | Status: DISCONTINUED | OUTPATIENT
Start: 2021-07-12 | End: 2021-07-13

## 2021-07-12 RX ORDER — HEPARIN SODIUM 5000 [USP'U]/ML
5000 INJECTION INTRAVENOUS; SUBCUTANEOUS EVERY 12 HOURS
Refills: 0 | Status: DISCONTINUED | OUTPATIENT
Start: 2021-07-12 | End: 2021-07-12

## 2021-07-12 RX ORDER — ATORVASTATIN CALCIUM 80 MG/1
40 TABLET, FILM COATED ORAL AT BEDTIME
Refills: 0 | Status: DISCONTINUED | OUTPATIENT
Start: 2021-07-12 | End: 2021-07-13

## 2021-07-12 RX ADMIN — ENOXAPARIN SODIUM 40 MILLIGRAM(S): 100 INJECTION SUBCUTANEOUS at 21:15

## 2021-07-12 RX ADMIN — CHLORHEXIDINE GLUCONATE 1 APPLICATION(S): 213 SOLUTION TOPICAL at 05:16

## 2021-07-12 RX ADMIN — CHLORHEXIDINE GLUCONATE 15 MILLILITER(S): 213 SOLUTION TOPICAL at 05:16

## 2021-07-12 RX ADMIN — CHLORHEXIDINE GLUCONATE 15 MILLILITER(S): 213 SOLUTION TOPICAL at 17:52

## 2021-07-12 NOTE — CONSULT NOTE ADULT - SUBJECTIVE AND OBJECTIVE BOX
Patient is a 70y old  Male who presents with a chief complaint of dislodged g-tube (11 Jul 2021 03:17)      HPI:  69 yo M vent dependent s/p trach and PEG with PMH of Parkinson's, dementia, CKD Stage 3, 1st degree AV block, HLD, gout, HTN, DM, fungal septicemia presents from NH for dislodged g-tube. As per NH staff, the tube became dislodged yesterday and they were unable to insert it. The patient was brought to the hospital for g-tube replacement.    Vital Signs Last 24 Hrs  T(C): 36.2 (10 Jul 2021 23:58), Max: 36.2 (10 Jul 2021 23:58)  T(F): 97.1 (10 Jul 2021 23:58), Max: 97.1 (10 Jul 2021 23:58)  HR: 76 (10 Jul 2021 23:58) (76 - 76)  BP: 116/56 (10 Jul 2021 23:58) (116/56 - 116/56)  BP(mean): --  RR: 18 (10 Jul 2021 23:58) (18 - 18)  SpO2: 100% (11 Jul 2021 00:06) (100% - 100%)     (11 Jul 2021 03:17)      PAST MEDICAL & SURGICAL HISTORY:      SOCIAL HX:   nonsmoker, no etoh abuse    FAMILY HISTORY:  :  No known cardiovascular family history     Review Of Systems:     see HPI      Allergies    No Known Allergies    Intolerances          PHYSICAL EXAM    ICU Vital Signs Last 24 Hrs  T(C): 37.6 (12 Jul 2021 03:58), Max: 37.7 (11 Jul 2021 12:00)  T(F): 99.6 (12 Jul 2021 03:58), Max: 99.8 (11 Jul 2021 12:00)  HR: 72 (12 Jul 2021 07:56) (68 - 77)  BP: 147/75 (12 Jul 2021 03:58) (124/69 - 147/75)  BP(mean): --  ABP: --  ABP(mean): --  RR: 17 (11 Jul 2021 16:00) (17 - 18)  SpO2: 100% (12 Jul 2021 07:56) (99% - 100%)      CONSTITUTIONAL:   Well nourished.  NAD    ENT:   +trach  Airway patent,   Mouth with normal mucosa.   No thrush    EYES:   pupils equal,   round and reactive to light.    CARDIAC:   Normal rate,   Regular rhythm.    Heart sounds S1, S2.   No edema    Vascular:   normal systolic impulse  no bruits    RESPIRATORY:   No wheezing   Normal chest expansion  No use of accessory muscles    GASTROINTESTINAL:  Abdomen soft   Non-tender,   No guarding,   + BS    MUSCULOSKELETAL:   Range of motion is not limited,  Nno clubbing, cyanosis    NEUROLOGICAL:   Alert and oriented x0  contracted            07-11-21 @ 07:01  -  07-12-21 @ 07:00  --------------------------------------------------------  IN:  Total IN: 0 mL    OUT:    Bulb (mL): 10 mL    Voided (mL): 200 mL  Total OUT: 210 mL    Total NET: -210 mL          LABS:                          9.4    6.54  )-----------( 217      ( 11 Jul 2021 01:30 )             30.7                                               07-11    136  |  99  |  27<H>  ----------------------------<  75  4.2   |  26  |  0.6<L>    Ca    9.0      11 Jul 2021 16:14    TPro  7.4  /  Alb  2.7<L>  /  TBili  0.2  /  DBili  x   /  AST  41  /  ALT  29  /  AlkPhos  182<H>  07-11                                                                                           LIVER FUNCTIONS - ( 11 Jul 2021 01:30 )  Alb: 2.7 g/dL / Pro: 7.4 g/dL / ALK PHOS: 182 U/L / ALT: 29 U/L / AST: 41 U/L / GGT: x                                                                                               Mode: AC/ CMV (Assist Control/ Continuous Mandatory Ventilation)  RR (machine): 14  TV (machine): 350  FiO2: 30  PEEP: 5  ITime: 1  MAP: 10  PIP: 29                                      ABG - ( 11 Jul 2021 05:59 )  pH, Arterial: 7.51  pH, Blood: x     /  pCO2: 38    /  pO2: 157   / HCO3: 30    / Base Excess: 6.5   /  SaO2: 100           MEDICATIONS  (STANDING):  chlorhexidine 0.12% Liquid 15 milliLiter(s) Oral Mucosa every 12 hours  chlorhexidine 4% Liquid 1 Application(s) Topical <User Schedule>  dextrose 5%. 1000 milliLiter(s) (50 mL/Hr) IV Continuous <Continuous>    MEDICATIONS  (PRN):         Patient is a 70y old  Male who presents with a chief complaint of dislodged g-tube (11 Jul 2021 03:17)      HPI:  69 yo M vent dependent s/p trach and PEG with PMH of Parkinson's, dementia, CKD Stage 3, 1st degree AV block, HLD, gout, HTN, DM, fungal septicemia presents from NH for dislodged g-tube. As per NH staff, the tube became dislodged yesterday and they were unable to insert it. The patient was brought to the hospital for g-tube replacement.    Vital Signs Last 24 Hrs  T(C): 36.2 (10 Jul 2021 23:58), Max: 36.2 (10 Jul 2021 23:58)  T(F): 97.1 (10 Jul 2021 23:58), Max: 97.1 (10 Jul 2021 23:58)  HR: 76 (10 Jul 2021 23:58) (76 - 76)  BP: 116/56 (10 Jul 2021 23:58) (116/56 - 116/56)  BP(mean): --  RR: 18 (10 Jul 2021 23:58) (18 - 18)  SpO2: 100% (11 Jul 2021 00:06) (100% - 100%)     (11 Jul 2021 03:17)      PAST MEDICAL & SURGICAL HISTORY:      SOCIAL HX:   nonsmoker, no etoh abuse    FAMILY HISTORY:  :  No known cardiovascular family history     Review Of Systems:     see HPI      Allergies    No Known Allergies    Intolerances          PHYSICAL EXAM    ICU Vital Signs Last 24 Hrs  T(C): 37.6 (12 Jul 2021 03:58), Max: 37.7 (11 Jul 2021 12:00)  T(F): 99.6 (12 Jul 2021 03:58), Max: 99.8 (11 Jul 2021 12:00)  HR: 72 (12 Jul 2021 07:56) (68 - 77)  BP: 147/75 (12 Jul 2021 03:58) (124/69 - 147/75)  BP(mean): --  ABP: --  ABP(mean): --  RR: 17 (11 Jul 2021 16:00) (17 - 18)  SpO2: 100% (12 Jul 2021 07:56) (99% - 100%)      CONSTITUTIONAL:  Ill appearing.  In  NAD    ENT:   +trach  Airway patent,   Mouth with normal mucosa.   No thrush    EYES:   pupils equal,   round and reactive to light.    CARDIAC:   Normal rate,   Regular rhythm.    Heart sounds S1, S2.   No edema    Vascular:   normal systolic impulse  no bruits    RESPIRATORY:   No wheezing   Normal chest expansion  No use of accessory muscles    GASTROINTESTINAL:  Abdomen soft   Non-tender,   No guarding,   + BS    MUSCULOSKELETAL:   Range of motion is not limited,  No clubbing, cyanosis    NEUROLOGICAL:   Alert.  Confused   contracted\  Does not follow commands             07-11-21 @ 07:01  -  07-12-21 @ 07:00  --------------------------------------------------------  IN:  Total IN: 0 mL    OUT:    Bulb (mL): 10 mL    Voided (mL): 200 mL  Total OUT: 210 mL    Total NET: -210 mL          LABS:                          9.4    6.54  )-----------( 217      ( 11 Jul 2021 01:30 )             30.7                                               07-11    136  |  99  |  27<H>  ----------------------------<  75  4.2   |  26  |  0.6<L>    Ca    9.0      11 Jul 2021 16:14    TPro  7.4  /  Alb  2.7<L>  /  TBili  0.2  /  DBili  x   /  AST  41  /  ALT  29  /  AlkPhos  182<H>  07-11                                                                                           LIVER FUNCTIONS - ( 11 Jul 2021 01:30 )  Alb: 2.7 g/dL / Pro: 7.4 g/dL / ALK PHOS: 182 U/L / ALT: 29 U/L / AST: 41 U/L / GGT: x                                                                                               Mode: AC/ CMV (Assist Control/ Continuous Mandatory Ventilation)  RR (machine): 14  TV (machine): 350  FiO2: 30  PEEP: 5  ITime: 1  MAP: 10  PIP: 29                                      ABG - ( 11 Jul 2021 05:59 )  pH, Arterial: 7.51  pH, Blood: x     /  pCO2: 38    /  pO2: 157   / HCO3: 30    / Base Excess: 6.5   /  SaO2: 100           MEDICATIONS  (STANDING):  chlorhexidine 0.12% Liquid 15 milliLiter(s) Oral Mucosa every 12 hours  chlorhexidine 4% Liquid 1 Application(s) Topical <User Schedule>  dextrose 5%. 1000 milliLiter(s) (50 mL/Hr) IV Continuous <Continuous>    MEDICATIONS  (PRN):

## 2021-07-12 NOTE — PROGRESS NOTE ADULT - SUBJECTIVE AND OBJECTIVE BOX
INTERVENTIONAL RADIOLOGY BRIEF-OPERATIVE NOTE    Procedure: G-tube exchange    Pre-Op Diagnosis: dislodged G-tube    Post-Op Diagnosis: same    Attending: Elisha  Resident: Mel    Anesthesia (type):  [ ] General Anesthesia  [ ] Sedation  [ ] Spinal Anesthesia  [ x] Local/Regional    Contrast: 20cc    Estimated Blood Loss: Minimal, < 5 cc    Condition:   [ ] Critical  [ ] Serious  [ ] Fair   [ c] Good    Findings/Follow up Plan of Care: Successful replacement of 18Fr Gastrostomy tube through existing lumen. Pt tolerated the procedure well.    Specimens Removed: none    Implants: none    Complications: none immediate    Disposition: Transfer to floor      Please call Interventional Radiology [phone] with any questions, concerns, or issues.         INTERVENTIONAL RADIOLOGY BRIEF-OPERATIVE NOTE    Procedure: G-tube exchange    Pre-Op Diagnosis: Dislodged G-tube    Post-Op Diagnosis: same    Attending: Elisha  Resident: eMl    Anesthesia (type):  [ ] General Anesthesia  [ ] Sedation  [ ] Spinal Anesthesia  [x] Local/Regional    Contrast: 20cc    Estimated Blood Loss: Minimal, < 5 cc    Condition:   [ ] Critical  [ ] Serious  [x] Fair   [ ] Good    Findings/Follow up Plan of Care: Successful replacement of 18Fr Gastrostomy tube through existing lumen. Pt tolerated the procedure well.    Specimens Removed: none    Complications: None immediate    Disposition: Transfer to floor      Please call Interventional Radiology [phone] with any questions, concerns, or issues.

## 2021-07-12 NOTE — CONSULT NOTE ADULT - SUBJECTIVE AND OBJECTIVE BOX
INTERVENTIONAL RADIOLOGY CONSULT:     Procedure Requested: Gastrostomy tube replacement    HPI:  71 yo M vent dependent s/p trach and PEG with PMH of Parkinson's, dementia, CKD Stage 3, 1st degree AV block, HLD, gout, HTN, DM, fungal septicemia presents from NH for dislodged g-tube. As per NH staff, the tube became dislodged yesterday and they were unable to insert it. The patient was brought to the hospital for g-tube replacement.    Vital Signs Last 24 Hrs  T(C): 36.2 (10 Jul 2021 23:58), Max: 36.2 (10 Jul 2021 23:58)  T(F): 97.1 (10 Jul 2021 23:58), Max: 97.1 (10 Jul 2021 23:58)  HR: 76 (10 Jul 2021 23:58) (76 - 76)  BP: 116/56 (10 Jul 2021 23:58) (116/56 - 116/56)  BP(mean): --  RR: 18 (10 Jul 2021 23:58) (18 - 18)  SpO2: 100% (11 Jul 2021 00:06) (100% - 100%)     (11 Jul 2021 03:17)      PAST MEDICAL & SURGICAL HISTORY:      MEDICATIONS  (STANDING):  chlorhexidine 0.12% Liquid 15 milliLiter(s) Oral Mucosa every 12 hours  chlorhexidine 4% Liquid 1 Application(s) Topical <User Schedule>  dextrose 5%. 1000 milliLiter(s) (50 mL/Hr) IV Continuous <Continuous>  enoxaparin Injectable 40 milliGRAM(s) SubCutaneous at bedtime    MEDICATIONS  (PRN):      Allergies    No Known Allergies    Intolerances      FAMILY HISTORY:      Physical Exam:   Vital Signs Last 24 Hrs  T(C): 37.4 (12 Jul 2021 08:21), Max: 37.7 (11 Jul 2021 12:00)  T(F): 99.4 (12 Jul 2021 08:21), Max: 99.8 (11 Jul 2021 12:00)  HR: 68 (12 Jul 2021 08:21) (68 - 77)  BP: 146/83 (12 Jul 2021 08:21) (124/69 - 147/75)  BP(mean): 106 (12 Jul 2021 08:21) (106 - 106)  RR: 20 (12 Jul 2021 08:21) (17 - 20)  SpO2: 100% (12 Jul 2021 07:56) (100% - 100%)    General:   not responsive, NAD  Abdomen:  - peg tube site clean, no bleeding, oozing    Labs:                         8.2    8.33  )-----------( 213      ( 12 Jul 2021 09:40 )             25.6     07-11    136  |  99  |  27<H>  ----------------------------<  75  4.2   |  26  |  0.6<L>    Ca    9.0      11 Jul 2021 16:14    TPro  7.4  /  Alb  2.7<L>  /  TBili  0.2  /  DBili  x   /  AST  41  /  ALT  29  /  AlkPhos  182<H>  07-11        Pertinent labs:                      8.2    8.33  )-----------( 213      ( 12 Jul 2021 09:40 )             25.6       07-11    136  |  99  |  27<H>  ----------------------------<  75  4.2   |  26  |  0.6<L>    Ca    9.0      11 Jul 2021 16:14    TPro  7.4  /  Alb  2.7<L>  /  TBili  0.2  /  DBili  x   /  AST  41  /  ALT  29  /  AlkPhos  182<H>  07-11          Radiology & Additional Studies:     Radiology imaging reviewed.       ASSESSMENT/ PLAN:     71 yo M vent dependent s/p trach and PEG with PMH of Parkinson's, dementia, CKD Stage 3, 1st degree AV block, HLD, gout, HTN, DM, fungal septicemia presents from NH for dislodged g-tube.  - not known when or where tube was placed  - per NH, tube fell out 2 days ago  - IR consulted to replace gastrostomy tube  - pt seen at bedside  - placed a 10Fr Berenstein catheter at bedside to maintain tract/access  - will plan for Gastrostomy replacement today or tomorrow, pending labs, consent   - discussed with resident      Risks, benefits, and alternatives to treatment discussed. All questions answered with understanding.    Thank you for the courtesy of this consult, please call [phone] with any further questions.      INTERVENTIONAL RADIOLOGY CONSULT:     Procedure Requested: Gastrostomy tube replacement    HPI:  71 yo M vent dependent s/p trach and PEG with PMH of Parkinson's, dementia, CKD Stage 3, 1st degree AV block, HLD, gout, HTN, DM, fungal septicemia presents from NH for dislodged g-tube. As per NH staff, the tube became dislodged yesterday and they were unable to insert it. The patient was brought to the hospital for g-tube replacement.    Vital Signs Last 24 Hrs  T(C): 36.2 (10 Jul 2021 23:58), Max: 36.2 (10 Jul 2021 23:58)  T(F): 97.1 (10 Jul 2021 23:58), Max: 97.1 (10 Jul 2021 23:58)  HR: 76 (10 Jul 2021 23:58) (76 - 76)  BP: 116/56 (10 Jul 2021 23:58) (116/56 - 116/56)  BP(mean): --  RR: 18 (10 Jul 2021 23:58) (18 - 18)  SpO2: 100% (11 Jul 2021 00:06) (100% - 100%)     (11 Jul 2021 03:17)      MEDICATIONS  (STANDING):  chlorhexidine 0.12% Liquid 15 milliLiter(s) Oral Mucosa every 12 hours  chlorhexidine 4% Liquid 1 Application(s) Topical <User Schedule>  dextrose 5%. 1000 milliLiter(s) (50 mL/Hr) IV Continuous <Continuous>  enoxaparin Injectable 40 milliGRAM(s) SubCutaneous at bedtime    Allergies    No Known Allergies      FAMILY HISTORY:      Physical Exam:   Vital Signs Last 24 Hrs  T(C): 37.4 (12 Jul 2021 08:21), Max: 37.7 (11 Jul 2021 12:00)  T(F): 99.4 (12 Jul 2021 08:21), Max: 99.8 (11 Jul 2021 12:00)  HR: 68 (12 Jul 2021 08:21) (68 - 77)  BP: 146/83 (12 Jul 2021 08:21) (124/69 - 147/75)  BP(mean): 106 (12 Jul 2021 08:21) (106 - 106)  RR: 20 (12 Jul 2021 08:21) (17 - 20)  SpO2: 100% (12 Jul 2021 07:56) (100% - 100%)    General:   not responsive, NAD  Abdomen:  - peg tube site clean, no bleeding, oozing    Labs:                         8.2    8.33  )-----------( 213      ( 12 Jul 2021 09:40 )             25.6     07-11    136  |  99  |  27<H>  ----------------------------<  75  4.2   |  26  |  0.6<L>    Ca    9.0      11 Jul 2021 16:14    TPro  7.4  /  Alb  2.7<L>  /  TBili  0.2  /  DBili  x   /  AST  41  /  ALT  29  /  AlkPhos  182<H>  07-11        Pertinent labs:                      8.2    8.33  )-----------( 213      ( 12 Jul 2021 09:40 )             25.6       07-11    136  |  99  |  27<H>  ----------------------------<  75  4.2   |  26  |  0.6<L>    Ca    9.0      11 Jul 2021 16:14    TPro  7.4  /  Alb  2.7<L>  /  TBili  0.2  /  DBili  x   /  AST  41  /  ALT  29  /  AlkPhos  182<H>  07-11          Radiology & Additional Studies:     Radiology imaging reviewed.       ASSESSMENT/ PLAN:     71 yo M vent dependent s/p trach and PEG with PMH of Parkinson's, dementia, CKD Stage 3, 1st degree AV block, HLD, gout, HTN, DM, fungal septicemia presents from NH for dislodged g-tube.  - not known when or where tube was placed  - per NH, tube fell out 2 days ago  - IR consulted to replace gastrostomy tube  - pt seen at bedside  - placed a 5 Fr Berenstein catheter at bedside to maintain tract/access  - will plan for Gastrostomy replacement today or tomorrow, pending labs, consent   - discussed with resident      Risks, benefits, and alternatives to treatment discussed. All questions answered with understanding.    Thank you for the courtesy of this consult, please call [phone] with any further questions.

## 2021-07-12 NOTE — CONSULT NOTE ADULT - ATTENDING COMMENTS
IMPRESSION:    Dislodged PEG tube  HO Parkinson's dementia  Chronic respiratory failure SP trach and PEG    Plan as outlined above

## 2021-07-12 NOTE — CONSULT NOTE ADULT - ASSESSMENT
IMPRESSION:    Dislodged PEG tube  HO Parkinson's dementia  HO CKD 3    PLAN:    CNS: no sedation    HEENT: oral care, trach care    PULMONARY: HOB >45. DTA.     CARDIOVASCULAR: keep i=o. gentle IVF hydration    GI: GI prophylaxis.  NPO for now. fu IR for replacement of PEG.     RENAL: fu lytes. replete as needed    INFECTIOUS DISEASE: off antibx.    HEMATOLOGICAL:  DVT prophylaxis.    ENDOCRINE:  Follow up FS.  Insulin protocol if needed.    MUSCULOSKELETAL: bedrest. wound care for sacrum    SDU     IMPRESSION:    Dislodged PEG tube  HO Parkinson's dementia  Chronic respiratory failure SP trach and PEG    PLAN:    CNS: no sedation    HEENT: oral care, trach care    PULMONARY: HOB >45. NO vent changes.  Pulmonary toilet.  ABG PRN     CARDIOVASCULAR: keep i=o. Gentle IVF hydration while NPO     GI: GI prophylaxis.  NPO for now. fu IR for replacement of PEG.     RENAL: fu lytes. Replete as needed    INFECTIOUS DISEASE:  Monitor off antibx.    HEMATOLOGICAL:  DVT prophylaxis.    ENDOCRINE:  Follow up FS.  Insulin protocol if needed.    MUSCULOSKELETAL: bedrest.  Wound care for sacrum    SDU      DC planing

## 2021-07-12 NOTE — PROGRESS NOTE ADULT - SUBJECTIVE AND OBJECTIVE BOX
NIEVES LABOY 70y Male  MRN#: [id number]   CODE STATUS: FULL CODE     Hospital Day: 1d    Pt is currently admitted with the primary diagnosis of PEG TUBE dislodgement     SUBJECTIVE  69 yo M vent dependent s/p trach and PEG with PMH of Parkinson's, dementia, CKD Stage 3, 1st degree AV block, HLD, gout, HTN, DM, fungal septicemia presents from NH for dislodged g-tube. As per NH staff, the tube became dislodged yesterday and they were unable to insert it. The patient was brought to the hospital for g-tube replacement.    Overnight events: No overnight events.     Subjective complaints: Patient is unable to answer ROS questions. Spoke to son at bedside and daughter Shanti over the phone. Per daughter, He was treated at [facility] and then went to [facility] where he has been for 1.5 months. Family would like a speech and swallow eval and to try to take him off vent.     Present Today:   - Fe:  No [  ], Yes [   ] : Indication:     - Type of IV Access:       .. CVC/Piccline:  No [  ], Yes [   ] : Indication:       .. Midline: No [  ], Yes [   ] : Indication:                                             ----------------------------------------------------------  OBJECTIVE  PAST MEDICAL & SURGICAL HISTORY                                            -----------------------------------------------------------  ALLERGIES:  No Known Allergies                                            ------------------------------------------------------------    HOME MEDICATIONS  Home Medications:                           MEDICATIONS:  STANDING MEDICATIONS  chlorhexidine 0.12% Liquid 15 milliLiter(s) Oral Mucosa every 12 hours  chlorhexidine 4% Liquid 1 Application(s) Topical <User Schedule>  dextrose 5%. 1000 milliLiter(s) IV Continuous <Continuous>  enoxaparin Injectable 40 milliGRAM(s) SubCutaneous at bedtime    PRN MEDICATIONS                                            ------------------------------------------------------------  VITAL SIGNS: Last 24 Hours  T(C): 37.4 (12 Jul 2021 08:21), Max: 37.7 (11 Jul 2021 16:00)  T(F): 99.4 (12 Jul 2021 08:21), Max: 99.8 (11 Jul 2021 16:00)  HR: 68 (12 Jul 2021 08:21) (68 - 77)  BP: 146/83 (12 Jul 2021 08:21) (124/69 - 147/75)  BP(mean): 106 (12 Jul 2021 08:21) (106 - 106)  RR: 20 (12 Jul 2021 08:21) (17 - 20)  SpO2: 100% (12 Jul 2021 07:56) (100% - 100%)      07-11-21 @ 07:01  -  07-12-21 @ 07:00  --------------------------------------------------------  IN: 0 mL / OUT: 210 mL / NET: -210 mL                                             --------------------------------------------------------------  LABS:                        8.2    8.33  )-----------( 213      ( 12 Jul 2021 09:40 )             25.6     07-12    133<L>  |  100  |  24<H>  ----------------------------<  90  4.0   |  26  |  0.5<L>    Ca    8.7      12 Jul 2021 09:40  Mg     1.9     07-12    TPro  6.9  /  Alb  2.5<L>  /  TBili  0.3  /  DBili  x   /  AST  36  /  ALT  44<H>  /  AlkPhos  165<H>  07-12    PT/INR - ( 12 Jul 2021 09:40 )   PT: 12.40 sec;   INR: 1.08 ratio         PTT - ( 12 Jul 2021 09:40 )  PTT:33.1 sec    ABG - ( 11 Jul 2021 05:59 )  pH, Arterial: 7.51  pH, Blood: x     /  pCO2: 38    /  pO2: 157   / HCO3: 30    / Base Excess: 6.5   /  SaO2: 100                                                         -------------------------------------------------------------  RADIOLOGY:                                            --------------------------------------------------------------    PHYSICAL EXAM:  HEAD:  Atraumatic, Normocephalic  EYES: EOMI, conjunctiva and sclera clear  NECK: trach present  CHEST/LUNG: Clear to auscultation bilaterally, no crackles or wheezing  HEART: Regular rate and rhythm; No murmurs, rubs, or gallops  ABDOMEN: soft, nontnender, g-tube site with serous discharge, no redness or swelling, yobani drain with serosanguinous discharge  EXTREMITIES:  no edema or cyanosis                                           --------------------------------------------------------------    ASSESSMENT & PLAN    69 yo M vent dependent s/p trach and PEG with PMH of Parkinson's, dementia, CKD Stage 3, 1st degree AV block, HLD, gout, HTN, DM, fungal septicemia presents from NH for dislodged g-tube.    #Dislodged g-tube  - IR consult to replace tube  - NPO for now  - Can start gentle hydration    #Chronic respiratory failure s/p trach vent dependent  - Continue current management    #s/p cholecystostomy  - Continue to monitor drain output    #CKD  - Cr 0.5, monitor    #Anemia  - Required transfusion on prior admission  - Continue to monitor    #Parkinson's disease  - Sinemet on hold while patient NPO    DVT PPX: on hold for possible OR  Pending: G-tube replacement                  NIEVES LABOY 70y Male  MRN#: [id number]   CODE STATUS: FULL CODE     Hospital Day: 1d    Pt is currently admitted with the primary diagnosis of PEG TUBE dislodgement from Nursing home.     SUBJECTIVE  69 yo M vent dependent s/p trach and PEG with PMH of Parkinson's, dementia, CKD Stage 3, 1st degree AV block, HLD, gout, HTN, DM, fungal septicemia presents from NH for dislodged g-tube. As per NH staff, the tube became dislodged yesterday and they were unable to insert it. The patient was brought to the hospital for g-tube replacement.    Overnight events: No overnight events.     Subjective complaints: Patient is unable to answer ROS questions. Spoke to son at bedside and daughter Shanti over the phone. Per daughter, He was treated at [facility] and then went to [facility] where he has been for 1.5 months. Family would like a speech and swallow eval and to try to take him off vent. Patient speaks Sinhala. Doesn't understand english per daughter.     Present Today:   - Miramontes:  No [  ], Yes [   ] : Indication:     - Type of IV Access:       .. CVC/Piccline:  No [  ], Yes [   ] : Indication:       .. Midline: No [  ], Yes [   ] : Indication:                                             ----------------------------------------------------------  OBJECTIVE  PAST MEDICAL & SURGICAL HISTORY                                            -----------------------------------------------------------  ALLERGIES:  No Known Allergies                                            ------------------------------------------------------------    HOME MEDICATIONS  Home Medications:                           MEDICATIONS:  STANDING MEDICATIONS  chlorhexidine 0.12% Liquid 15 milliLiter(s) Oral Mucosa every 12 hours  chlorhexidine 4% Liquid 1 Application(s) Topical <User Schedule>  dextrose 5%. 1000 milliLiter(s) IV Continuous <Continuous>  enoxaparin Injectable 40 milliGRAM(s) SubCutaneous at bedtime    PRN MEDICATIONS                                            ------------------------------------------------------------  VITAL SIGNS: Last 24 Hours  T(C): 37.4 (12 Jul 2021 08:21), Max: 37.7 (11 Jul 2021 16:00)  T(F): 99.4 (12 Jul 2021 08:21), Max: 99.8 (11 Jul 2021 16:00)  HR: 68 (12 Jul 2021 08:21) (68 - 77)  BP: 146/83 (12 Jul 2021 08:21) (124/69 - 147/75)  BP(mean): 106 (12 Jul 2021 08:21) (106 - 106)  RR: 20 (12 Jul 2021 08:21) (17 - 20)  SpO2: 100% (12 Jul 2021 07:56) (100% - 100%)      07-11-21 @ 07:01  -  07-12-21 @ 07:00  --------------------------------------------------------  IN: 0 mL / OUT: 210 mL / NET: -210 mL                                             --------------------------------------------------------------  LABS:                        8.2    8.33  )-----------( 213      ( 12 Jul 2021 09:40 )             25.6     07-12    133<L>  |  100  |  24<H>  ----------------------------<  90  4.0   |  26  |  0.5<L>    Ca    8.7      12 Jul 2021 09:40  Mg     1.9     07-12    TPro  6.9  /  Alb  2.5<L>  /  TBili  0.3  /  DBili  x   /  AST  36  /  ALT  44<H>  /  AlkPhos  165<H>  07-12    PT/INR - ( 12 Jul 2021 09:40 )   PT: 12.40 sec;   INR: 1.08 ratio         PTT - ( 12 Jul 2021 09:40 )  PTT:33.1 sec    ABG - ( 11 Jul 2021 05:59 )  pH, Arterial: 7.51  pH, Blood: x     /  pCO2: 38    /  pO2: 157   / HCO3: 30    / Base Excess: 6.5   /  SaO2: 100                                                         -------------------------------------------------------------  RADIOLOGY:                                            --------------------------------------------------------------    PHYSICAL EXAM:  HEAD:  Atraumatic, Normocephalic  EYES: EOMI, conjunctiva and sclera clear  NECK: trach present  CHEST/LUNG: Clear to auscultation bilaterally, no crackles or wheezing  HEART: Regular rate and rhythm; No murmurs, rubs, or gallops  ABDOMEN: soft, nontnender, g-tube site with serous discharge, no redness or swelling, yobani drain with serosanguinous discharge  EXTREMITIES:  no edema or cyanosis  SKIN: Sacral stage 4, Left ankle, shin, right ankle various stages > wound care per nurse                                          --------------------------------------------------------------    ASSESSMENT & PLAN    69 yo M vent dependent s/p trach and PEG with PMH of Parkinson's, dementia, CKD Stage 3, 1st degree AV block, HLD, gout, HTN, DM, fungal septicemia presents from NH for dislodged g-tube.    #Dislodged g-tube  - IR consult to replace tube > For IR today   - NPO for now  - D5 for hydration for now.     #Chronic respiratory failure s/p trach vent dependent  - Continue current management  - Volume A/C, Peep 5, Fio2 35,     #s/p cholecystostomy  - Continue to monitor drain output    #CKD  - Cr 0.5, monitor    #Anemia  - Required transfusion on prior admission  - Continue to monitor  - CBC Hgb 9.4-8.2  - No signs of overt bleeding     #Parkinson's disease  - Sinemet on hold while patient NPO  - Will restart feeds once G tube is placed       #Sacral wound Stage 4  - Wound care per wound care nurse       Diet: Pending G tube  Code: Full code   DVT PPX: on hold for possible OR  Pending: G-tube replacement   Dispo: Pending G-tube

## 2021-07-13 ENCOUNTER — TRANSCRIPTION ENCOUNTER (OUTPATIENT)
Age: 71
End: 2021-07-13

## 2021-07-13 VITALS
OXYGEN SATURATION: 100 % | TEMPERATURE: 98 F | RESPIRATION RATE: 18 BRPM | DIASTOLIC BLOOD PRESSURE: 60 MMHG | SYSTOLIC BLOOD PRESSURE: 115 MMHG | HEART RATE: 69 BPM

## 2021-07-13 LAB
ALBUMIN SERPL ELPH-MCNC: 2.7 G/DL — LOW (ref 3.5–5.2)
ALP SERPL-CCNC: 161 U/L — HIGH (ref 30–115)
ALT FLD-CCNC: 43 U/L — HIGH (ref 0–41)
ANION GAP SERPL CALC-SCNC: 11 MMOL/L — SIGNIFICANT CHANGE UP (ref 7–14)
AST SERPL-CCNC: 38 U/L — SIGNIFICANT CHANGE UP (ref 0–41)
BASOPHILS # BLD AUTO: 0.03 K/UL — SIGNIFICANT CHANGE UP (ref 0–0.2)
BASOPHILS NFR BLD AUTO: 0.5 % — SIGNIFICANT CHANGE UP (ref 0–1)
BILIRUB SERPL-MCNC: 0.3 MG/DL — SIGNIFICANT CHANGE UP (ref 0.2–1.2)
BLD GP AB SCN SERPL QL: SIGNIFICANT CHANGE UP
BUN SERPL-MCNC: 19 MG/DL — SIGNIFICANT CHANGE UP (ref 10–20)
CALCIUM SERPL-MCNC: 8.9 MG/DL — SIGNIFICANT CHANGE UP (ref 8.5–10.1)
CHLORIDE SERPL-SCNC: 99 MMOL/L — SIGNIFICANT CHANGE UP (ref 98–110)
CO2 SERPL-SCNC: 24 MMOL/L — SIGNIFICANT CHANGE UP (ref 17–32)
CREAT SERPL-MCNC: 0.6 MG/DL — LOW (ref 0.7–1.5)
EOSINOPHIL # BLD AUTO: 0.23 K/UL — SIGNIFICANT CHANGE UP (ref 0–0.7)
EOSINOPHIL NFR BLD AUTO: 3.9 % — SIGNIFICANT CHANGE UP (ref 0–8)
GLUCOSE BLDC GLUCOMTR-MCNC: 92 MG/DL — SIGNIFICANT CHANGE UP (ref 70–99)
GLUCOSE BLDC GLUCOMTR-MCNC: 93 MG/DL — SIGNIFICANT CHANGE UP (ref 70–99)
GLUCOSE SERPL-MCNC: 81 MG/DL — SIGNIFICANT CHANGE UP (ref 70–99)
HCT VFR BLD CALC: 27.3 % — LOW (ref 42–52)
HGB BLD-MCNC: 8.5 G/DL — LOW (ref 14–18)
IMM GRANULOCYTES NFR BLD AUTO: 0.8 % — HIGH (ref 0.1–0.3)
LYMPHOCYTES # BLD AUTO: 0.9 K/UL — LOW (ref 1.2–3.4)
LYMPHOCYTES # BLD AUTO: 15.1 % — LOW (ref 20.5–51.1)
MAGNESIUM SERPL-MCNC: 1.9 MG/DL — SIGNIFICANT CHANGE UP (ref 1.8–2.4)
MCHC RBC-ENTMCNC: 27.2 PG — SIGNIFICANT CHANGE UP (ref 27–31)
MCHC RBC-ENTMCNC: 31.1 G/DL — LOW (ref 32–37)
MCV RBC AUTO: 87.5 FL — SIGNIFICANT CHANGE UP (ref 80–94)
MONOCYTES # BLD AUTO: 0.4 K/UL — SIGNIFICANT CHANGE UP (ref 0.1–0.6)
MONOCYTES NFR BLD AUTO: 6.7 % — SIGNIFICANT CHANGE UP (ref 1.7–9.3)
NEUTROPHILS # BLD AUTO: 4.34 K/UL — SIGNIFICANT CHANGE UP (ref 1.4–6.5)
NEUTROPHILS NFR BLD AUTO: 73 % — SIGNIFICANT CHANGE UP (ref 42.2–75.2)
NRBC # BLD: 0 /100 WBCS — SIGNIFICANT CHANGE UP (ref 0–0)
PLATELET # BLD AUTO: 189 K/UL — SIGNIFICANT CHANGE UP (ref 130–400)
POTASSIUM SERPL-MCNC: 4.3 MMOL/L — SIGNIFICANT CHANGE UP (ref 3.5–5)
POTASSIUM SERPL-SCNC: 4.3 MMOL/L — SIGNIFICANT CHANGE UP (ref 3.5–5)
PROT SERPL-MCNC: 6.9 G/DL — SIGNIFICANT CHANGE UP (ref 6–8)
RBC # BLD: 3.12 M/UL — LOW (ref 4.7–6.1)
RBC # FLD: 15 % — HIGH (ref 11.5–14.5)
SODIUM SERPL-SCNC: 134 MMOL/L — LOW (ref 135–146)
WBC # BLD: 5.95 K/UL — SIGNIFICANT CHANGE UP (ref 4.8–10.8)
WBC # FLD AUTO: 5.95 K/UL — SIGNIFICANT CHANGE UP (ref 4.8–10.8)

## 2021-07-13 PROCEDURE — 93010 ELECTROCARDIOGRAM REPORT: CPT

## 2021-07-13 PROCEDURE — 99232 SBSQ HOSP IP/OBS MODERATE 35: CPT

## 2021-07-13 PROCEDURE — 93010 ELECTROCARDIOGRAM REPORT: CPT | Mod: 77

## 2021-07-13 RX ORDER — PANTOPRAZOLE SODIUM 20 MG/1
1 TABLET, DELAYED RELEASE ORAL
Qty: 0 | Refills: 0 | DISCHARGE
Start: 2021-07-13

## 2021-07-13 RX ORDER — ATORVASTATIN CALCIUM 80 MG/1
1 TABLET, FILM COATED ORAL
Qty: 0 | Refills: 0 | DISCHARGE
Start: 2021-07-13

## 2021-07-13 RX ORDER — CARBIDOPA AND LEVODOPA 25; 100 MG/1; MG/1
1 TABLET ORAL
Qty: 0 | Refills: 0 | DISCHARGE
Start: 2021-07-13

## 2021-07-13 RX ORDER — ASCORBIC ACID 60 MG
1 TABLET,CHEWABLE ORAL
Qty: 0 | Refills: 0 | DISCHARGE
Start: 2021-07-13

## 2021-07-13 RX ADMIN — CHLORHEXIDINE GLUCONATE 15 MILLILITER(S): 213 SOLUTION TOPICAL at 17:46

## 2021-07-13 RX ADMIN — CARBIDOPA AND LEVODOPA 1 TABLET(S): 25; 100 TABLET ORAL at 12:30

## 2021-07-13 RX ADMIN — CARBIDOPA AND LEVODOPA 1 TABLET(S): 25; 100 TABLET ORAL at 17:46

## 2021-07-13 RX ADMIN — CHLORHEXIDINE GLUCONATE 15 MILLILITER(S): 213 SOLUTION TOPICAL at 05:09

## 2021-07-13 RX ADMIN — PANTOPRAZOLE SODIUM 40 MILLIGRAM(S): 20 TABLET, DELAYED RELEASE ORAL at 12:30

## 2021-07-13 RX ADMIN — Medication 500 MILLIGRAM(S): at 12:30

## 2021-07-13 RX ADMIN — CHLORHEXIDINE GLUCONATE 1 APPLICATION(S): 213 SOLUTION TOPICAL at 05:08

## 2021-07-13 NOTE — DISCHARGE NOTE PROVIDER - NSDCCPCAREPLAN_GEN_ALL_CORE_FT
PRINCIPAL DISCHARGE DIAGNOSIS  Diagnosis: Dislodged gastrostomy tube  Assessment and Plan of Treatment: You had a feeding tube that fell out during your stay at the nursing home. You brought to [facility] for replacement of the feeding tube. the interventional radiology team was able to replace the tube. We restarted your feeds and checked that the tube is working properly. Please follow up with your nursing home physician for continuos care.

## 2021-07-13 NOTE — PROGRESS NOTE ADULT - SUBJECTIVE AND OBJECTIVE BOX
NIEVES LABOY 70y Male  MRN#: [id number]   CODE STATUS:________    Hospital Day: 2d    Pt is currently admitted with the primary diagnosis of     SUBJECTIVE  Hospital Course    Overnight events     Subjective complaints     Present Today:   - Miramontes:  No [  ], Yes [   ] : Indication:     - Type of IV Access:       .. CVC/Piccline:  No [  ], Yes [   ] : Indication:       .. Midline: No [  ], Yes [   ] : Indication:                                             ----------------------------------------------------------  OBJECTIVE  PAST MEDICAL & SURGICAL HISTORY                                            -----------------------------------------------------------  ALLERGIES:  No Known Allergies                                            ------------------------------------------------------------    HOME MEDICATIONS  Home Medications:  ascorbic acid 500 mg oral tablet: 1 tab(s) orally once a day (13 Jul 2021 13:21)  atorvastatin 40 mg oral tablet: 1 tab(s) orally once a day (at bedtime) (13 Jul 2021 13:21)  carbidopa-levodopa 25 mg-100 mg oral tablet: 1 tab(s) orally 4 times a day (13 Jul 2021 13:21)  pantoprazole 40 mg oral granule, delayed release: 1 cap(s) orally once a day (13 Jul 2021 13:21)                           MEDICATIONS:  STANDING MEDICATIONS  ascorbic acid 500 milliGRAM(s) Oral daily  atorvastatin 40 milliGRAM(s) Oral at bedtime  carbidopa/levodopa  25/100 1 Tablet(s) Oral four times a day  chlorhexidine 0.12% Liquid 15 milliLiter(s) Oral Mucosa every 12 hours  chlorhexidine 4% Liquid 1 Application(s) Topical <User Schedule>  dextrose 5%. 1000 milliLiter(s) IV Continuous <Continuous>  enoxaparin Injectable 40 milliGRAM(s) SubCutaneous at bedtime  pantoprazole   Suspension 40 milliGRAM(s) Oral daily    PRN MEDICATIONS                                            ------------------------------------------------------------  VITAL SIGNS: Last 24 Hours  T(C): 36.4 (13 Jul 2021 12:40), Max: 36.4 (13 Jul 2021 12:40)  T(F): 97.6 (13 Jul 2021 12:40), Max: 97.6 (13 Jul 2021 12:40)  HR: 108 (13 Jul 2021 12:40) (62 - 108)  BP: 130/69 (13 Jul 2021 12:40) (79/50 - 157/111)  BP(mean): 96 (13 Jul 2021 12:40) (96 - 130)  RR: 18 (13 Jul 2021 12:40) (16 - 20)  SpO2: 100% (13 Jul 2021 08:16) (100% - 100%)      07-12-21 @ 07:01  -  07-13-21 @ 07:00  --------------------------------------------------------  IN: 600 mL / OUT: 10 mL / NET: 590 mL                                             --------------------------------------------------------------  LABS:                        8.5    5.95  )-----------( 189      ( 13 Jul 2021 05:25 )             27.3     07-13    134<L>  |  99  |  19  ----------------------------<  81  4.3   |  24  |  0.6<L>    Ca    8.9      13 Jul 2021 05:25  Mg     1.9     07-13    TPro  6.9  /  Alb  2.7<L>  /  TBili  0.3  /  DBili  x   /  AST  38  /  ALT  43<H>  /  AlkPhos  161<H>  07-13    PT/INR - ( 12 Jul 2021 09:40 )   PT: 12.40 sec;   INR: 1.08 ratio         PTT - ( 12 Jul 2021 09:40 )  PTT:33.1 sec                                                          -------------------------------------------------------------  RADIOLOGY:                                            --------------------------------------------------------------    PHYSICAL EXAM:  General:   HEENT: NCAT  LUNGS: CTAB, Good air entry bilat  HEART: RRR, +S1,S2, RRR  ABDOMEN: SNTTP, ND x 4 q's  EXT: Warm, well perfused x 4  NEURO: AxOx3, No FND's noted  SKIN: No new breakdown or rashes noted                                           --------------------------------------------------------------    ASSESSMENT & PLAN    Past medical history and hospital course                  NIEVES LABOY 70y Male  MRN#: [id number]   CODE STATUS: FULL CODE    Hospital Day: 2d    Pt is currently admitted with the primary diagnosis of PEG TUBE dislodgement from Nursing home.       SUBJECTIVE    Overnight events: Nurse overnight were concerned over monitor reading . EKG was performed which showed sinus rhythm. Patient has a tremor which presents with false reads on the monitor.     Subjective complaints: Patient is unable to answer ROS questions. S/p Peg -tube replacement. Spoke to son at bedside and daughter Shanti over the phone. Patient to return to [facility].   Present Today:   - Fe:  No [  ], Yes [   ] : Indication:     - Type of IV Access:       .. CVC/Piccline:  No [  ], Yes [   ] : Indication:       .. Midline: No [  ], Yes [   ] : Indication:                                             ----------------------------------------------------------  OBJECTIVE  PAST MEDICAL & SURGICAL HISTORY                                            -----------------------------------------------------------  ALLERGIES:  No Known Allergies                                            ------------------------------------------------------------    HOME MEDICATIONS  Home Medications:  ascorbic acid 500 mg oral tablet: 1 tab(s) orally once a day (13 Jul 2021 13:21)  atorvastatin 40 mg oral tablet: 1 tab(s) orally once a day (at bedtime) (13 Jul 2021 13:21)  carbidopa-levodopa 25 mg-100 mg oral tablet: 1 tab(s) orally 4 times a day (13 Jul 2021 13:21)  pantoprazole 40 mg oral granule, delayed release: 1 cap(s) orally once a day (13 Jul 2021 13:21)                           MEDICATIONS:  STANDING MEDICATIONS  ascorbic acid 500 milliGRAM(s) Oral daily  atorvastatin 40 milliGRAM(s) Oral at bedtime  carbidopa/levodopa  25/100 1 Tablet(s) Oral four times a day  chlorhexidine 0.12% Liquid 15 milliLiter(s) Oral Mucosa every 12 hours  chlorhexidine 4% Liquid 1 Application(s) Topical <User Schedule>  dextrose 5%. 1000 milliLiter(s) IV Continuous <Continuous>  enoxaparin Injectable 40 milliGRAM(s) SubCutaneous at bedtime  pantoprazole   Suspension 40 milliGRAM(s) Oral daily    PRN MEDICATIONS                                            ------------------------------------------------------------  VITAL SIGNS: Last 24 Hours  T(C): 36.4 (13 Jul 2021 12:40), Max: 36.4 (13 Jul 2021 12:40)  T(F): 97.6 (13 Jul 2021 12:40), Max: 97.6 (13 Jul 2021 12:40)  HR: 108 (13 Jul 2021 12:40) (62 - 108)  BP: 130/69 (13 Jul 2021 12:40) (79/50 - 157/111)  BP(mean): 96 (13 Jul 2021 12:40) (96 - 130)  RR: 18 (13 Jul 2021 12:40) (16 - 20)  SpO2: 100% (13 Jul 2021 08:16) (100% - 100%)      07-12-21 @ 07:01  -  07-13-21 @ 07:00  --------------------------------------------------------  IN: 600 mL / OUT: 10 mL / NET: 590 mL                                             --------------------------------------------------------------  LABS:                        8.5    5.95  )-----------( 189      ( 13 Jul 2021 05:25 )             27.3     07-13    134<L>  |  99  |  19  ----------------------------<  81  4.3   |  24  |  0.6<L>    Ca    8.9      13 Jul 2021 05:25  Mg     1.9     07-13    TPro  6.9  /  Alb  2.7<L>  /  TBili  0.3  /  DBili  x   /  AST  38  /  ALT  43<H>  /  AlkPhos  161<H>  07-13    PT/INR - ( 12 Jul 2021 09:40 )   PT: 12.40 sec;   INR: 1.08 ratio         PTT - ( 12 Jul 2021 09:40 )  PTT:33.1 sec                                                          -------------------------------------------------------------  RADIOLOGY:                                            --------------------------------------------------------------    PHYSICAL EXAM:  General:   HEENT: NCAT  LUNGS: CTAB, Good air entry bilat  HEART: RRR, +S1,S2, RRR  ABDOMEN: SNTTP, ND x 4 q's  EXT: Warm, well perfused x 4  NEURO: AxOx3, No FND's noted  SKIN: No new breakdown or rashes noted                                           --------------------------------------------------------------    ASSESSMENT & PLAN    Past medical history and hospital course                    NIEVES LABOY 70y Male  MRN#: [id number]   CODE STATUS: FULL CODE    Hospital Day: 2d    Pt is currently admitted with the primary diagnosis of PEG TUBE dislodgement from Nursing home.       SUBJECTIVE    Overnight events: Nurse overnight were concerned over tele monitor reading . EKG was performed which showed sinus rhythm. Patient has a tremor which presents with false reads on the monitor due to leads moving.     Subjective complaints: Patient is unable to answer ROS questions. S/p Peg -tube replacement. IR confirmed that we can use the PEG tube. Spoke to son at bedside and daughter Shanti over the phone. Patient to return to [facility]. Stable for discharge.     Present Today:   - Miramontes:  No [  ], Yes [   ] : Indication:     - Type of IV Access:       .. CVC/Piccline:  No [  ], Yes [   ] : Indication:       .. Midline: No [  ], Yes [   ] : Indication:                                             ----------------------------------------------------------  OBJECTIVE  PAST MEDICAL & SURGICAL HISTORY                                            -----------------------------------------------------------  ALLERGIES:  No Known Allergies                                            ------------------------------------------------------------    HOME MEDICATIONS  Home Medications:  ascorbic acid 500 mg oral tablet: 1 tab(s) orally once a day (13 Jul 2021 13:21)  atorvastatin 40 mg oral tablet: 1 tab(s) orally once a day (at bedtime) (13 Jul 2021 13:21)  carbidopa-levodopa 25 mg-100 mg oral tablet: 1 tab(s) orally 4 times a day (13 Jul 2021 13:21)  pantoprazole 40 mg oral granule, delayed release: 1 cap(s) orally once a day (13 Jul 2021 13:21)                           MEDICATIONS:  STANDING MEDICATIONS  ascorbic acid 500 milliGRAM(s) Oral daily  atorvastatin 40 milliGRAM(s) Oral at bedtime  carbidopa/levodopa  25/100 1 Tablet(s) Oral four times a day  chlorhexidine 0.12% Liquid 15 milliLiter(s) Oral Mucosa every 12 hours  chlorhexidine 4% Liquid 1 Application(s) Topical <User Schedule>  dextrose 5%. 1000 milliLiter(s) IV Continuous <Continuous>  enoxaparin Injectable 40 milliGRAM(s) SubCutaneous at bedtime  pantoprazole   Suspension 40 milliGRAM(s) Oral daily    PRN MEDICATIONS                                            ------------------------------------------------------------  VITAL SIGNS: Last 24 Hours  T(C): 36.4 (13 Jul 2021 12:40), Max: 36.4 (13 Jul 2021 12:40)  T(F): 97.6 (13 Jul 2021 12:40), Max: 97.6 (13 Jul 2021 12:40)  HR: 108 (13 Jul 2021 12:40) (62 - 108)  BP: 130/69 (13 Jul 2021 12:40) (79/50 - 157/111)  BP(mean): 96 (13 Jul 2021 12:40) (96 - 130)  RR: 18 (13 Jul 2021 12:40) (16 - 20)  SpO2: 100% (13 Jul 2021 08:16) (100% - 100%)      07-12-21 @ 07:01  -  07-13-21 @ 07:00  --------------------------------------------------------  IN: 600 mL / OUT: 10 mL / NET: 590 mL                                             --------------------------------------------------------------  LABS:                        8.5    5.95  )-----------( 189      ( 13 Jul 2021 05:25 )             27.3     07-13    134<L>  |  99  |  19  ----------------------------<  81  4.3   |  24  |  0.6<L>    Ca    8.9      13 Jul 2021 05:25  Mg     1.9     07-13    TPro  6.9  /  Alb  2.7<L>  /  TBili  0.3  /  DBili  x   /  AST  38  /  ALT  43<H>  /  AlkPhos  161<H>  07-13    PT/INR - ( 12 Jul 2021 09:40 )   PT: 12.40 sec;   INR: 1.08 ratio         PTT - ( 12 Jul 2021 09:40 )  PTT:33.1 sec                                                          -------------------------------------------------------------  RADIOLOGY:                                            --------------------------------------------------------------    PHYSICAL EXAM:  HEAD:  Atraumatic, Normocephalic  EYES: EOMI, conjunctiva and sclera clear  NECK: trach present  CHEST/LUNG: Clear to auscultation bilaterally, no crackles or wheezing  HEART: Regular rate and rhythm; No murmurs, rubs, or gallops  ABDOMEN: soft, nontnender, g-tube site with serous discharge, no redness or swelling, yobani drain with serosanguinous discharge  EXTREMITIES:  no edema or cyanosis  SKIN: Sacral stage 4, Left ankle, shin, right ankle various stages > wound care per nurse                                            --------------------------------------------------------------    ASSESSMENT & PLAN      71 yo M vent dependent s/p trach and PEG with PMH of Parkinson's, dementia, CKD Stage 3, 1st degree AV block, HLD, gout, HTN, DM, fungal septicemia presents from NH for dislodged g-tube.    #Dislodged g-tube  - IR consult to replace tube > For IR today   - Restart feeds  - D/c D5 gtt    #Chronic respiratory failure s/p trach vent dependent  - Continue current management  - Volume A/C, Peep 5, Fio2 35,     #s/p cholecystostomy  - Continue to monitor drain output    #CKD  - Cr 0.5, monitor    #Anemia  - Required transfusion on prior admission  - Continue to monitor  - CBC Hgb 9.4-8.2> 8.5 stable  - No signs of overt bleeding     #Parkinson's disease  - restart home Sinemet   - restart feeds      #Sacral wound Stage 4  - Wound care per wound care nurse       Diet: Pending G tube  Code: Full code   DVT PPX: on hold for possible OR  Pending: G-tube replacement   Dispo: Stable for discharge back to nursing home

## 2021-07-13 NOTE — PROGRESS NOTE ADULT - ASSESSMENT
IMPRESSION:    Dislodged PEG tube  HO Parkinson's dementia  Chronic respiratory failure SP trach and PEG    PLAN:    CNS: no sedation    HEENT: oral care, trach care    PULMONARY: HOB >45. NO vent changes.  Pulmonary toilet.  ABG PRN     CARDIOVASCULAR: keep i=o. Gentle IVF hydration while NPO     GI: GI prophylaxis. PEG feeding.      RENAL: fu lytes. Replete as needed    INFECTIOUS DISEASE:  Monitor off antibx.    HEMATOLOGICAL:  DVT prophylaxis.    ENDOCRINE:  Follow up FS.  Insulin protocol if needed.    MUSCULOSKELETAL: bedrest.  Wound care for sacrum    DC to NH

## 2021-07-13 NOTE — PROGRESS NOTE ADULT - SUBJECTIVE AND OBJECTIVE BOX
Patient is a 70y old  Male who presents with a chief complaint of dislodged g-tube (12 Jul 2021 15:41)        Over Night Events:  Remains on MV.  SP PEG replacement.  Off pressors.          ROS:     All ROS are negative except HPI         PHYSICAL EXAM    ICU Vital Signs Last 24 Hrs  T(C): 35.8 (13 Jul 2021 08:16), Max: 36.2 (12 Jul 2021 17:00)  T(F): 96.5 (13 Jul 2021 08:16), Max: 97.1 (12 Jul 2021 17:00)  HR: 62 (13 Jul 2021 08:16) (62 - 65)  BP: 143/69 (13 Jul 2021 08:16) (79/50 - 157/111)  BP(mean): 96 (13 Jul 2021 08:16) (96 - 130)  ABP: --  ABP(mean): --  RR: 18 (13 Jul 2021 08:16) (16 - 20)  SpO2: 100% (13 Jul 2021 08:09) (100% - 100%)      CONSTITUTIONAL:  Ill appearing.  In  NAD    ENT:   Airway patent,   Mouth with normal mucosa.   No thrush    EYES:   Pupils equal,   Round and reactive to light.    CARDIAC:   Normal rate,   Regular rhythm.    No edema      Vascular:  Normal systolic impulse  No Carotid bruits    RESPIRATORY:   No wheezing  Bilateral BS  Normal chest expansion  Not tachypneic,  No use of accessory muscles    GASTROINTESTINAL:  Abdomen soft,   Non-tender,   No guarding,   + BS    MUSCULOSKELETAL:   Range of motion is not limited,  No clubbing, cyanosis    NEUROLOGICAL:   Alert   Does not follow commands     SKIN:   Skin normal color for race,   Warm and dry.   No evidence of rash.    PSYCHIATRIC:   No apparent risk to self or others.    HEMATOLOGICAL:  No cervical  lymphadenopathy.  no inguinal lymphadenopathy      07-12-21 @ 07:01  -  07-13-21 @ 07:00  --------------------------------------------------------  IN:    dextrose 5%: 600 mL  Total IN: 600 mL    OUT:    Bulb (mL): 10 mL    Voided (mL): 0 mL  Total OUT: 10 mL    Total NET: 590 mL          LABS:                            8.5    5.95  )-----------( 189      ( 13 Jul 2021 05:25 )             27.3                                               07-13    134<L>  |  99  |  19  ----------------------------<  81  4.3   |  24  |  0.6<L>    Ca    8.9      13 Jul 2021 05:25  Mg     1.9     07-13    TPro  6.9  /  Alb  2.7<L>  /  TBili  0.3  /  DBili  x   /  AST  38  /  ALT  43<H>  /  AlkPhos  161<H>  07-13      PT/INR - ( 12 Jul 2021 09:40 )   PT: 12.40 sec;   INR: 1.08 ratio         PTT - ( 12 Jul 2021 09:40 )  PTT:33.1 sec                                                                                     LIVER FUNCTIONS - ( 13 Jul 2021 05:25 )  Alb: 2.7 g/dL / Pro: 6.9 g/dL / ALK PHOS: 161 U/L / ALT: 43 U/L / AST: 38 U/L / GGT: x                                                                                               Mode: AC/ CMV (Assist Control/ Continuous Mandatory Ventilation)  RR (machine): 14  TV (machine): 350  FiO2: 35  PEEP: 5  ITime: 1  MAP: 8  PIP: 15                                          MEDICATIONS  (STANDING):  ascorbic acid 500 milliGRAM(s) Oral daily  atorvastatin 40 milliGRAM(s) Oral at bedtime  carbidopa/levodopa  25/100 1 Tablet(s) Oral four times a day  chlorhexidine 0.12% Liquid 15 milliLiter(s) Oral Mucosa every 12 hours  chlorhexidine 4% Liquid 1 Application(s) Topical <User Schedule>  dextrose 5%. 1000 milliLiter(s) (50 mL/Hr) IV Continuous <Continuous>  enoxaparin Injectable 40 milliGRAM(s) SubCutaneous at bedtime  pantoprazole   Suspension 40 milliGRAM(s) Oral daily    MEDICATIONS  (PRN):      New X-rays reviewed:                                                                                  ECHO    CXR interpreted by me:

## 2021-07-13 NOTE — DISCHARGE NOTE PROVIDER - NSDCMRMEDTOKEN_GEN_ALL_CORE_FT
ascorbic acid 500 mg oral tablet: 1 tab(s) orally once a day  atorvastatin 40 mg oral tablet: 1 tab(s) orally once a day (at bedtime)  carbidopa-levodopa 25 mg-100 mg oral tablet: 1 tab(s) orally 4 times a day  pantoprazole 40 mg oral granule, delayed release: 1 cap(s) orally once a day

## 2021-07-13 NOTE — DISCHARGE NOTE PROVIDER - HOSPITAL COURSE
Grey is a 70 yp M vent dependent s/p trach and PEG with PMH of Parkinson's dementia, CKD stage 3, 1st degree AV block, HLD, gout, HTN, DM, fungal septicemia who presented from [facility] for dislodged G-tube. As per nursing home staff, the tube became dislodged on 7/10/21 at the nursing home and they were unable to re-insert it. The patient was brought to the hospital for G-tube replacement. Interventional Radiology was consulted for replacement. They placed a 5 Fr Berenstsein catheter at bedside to maintain the tract/ access. Consent for procedure was obtained from daughter over the phone. The G-tube was replaced successfully. Feeds were restarted today with no leak.

## 2021-07-13 NOTE — DISCHARGE NOTE NURSING/CASE MANAGEMENT/SOCIAL WORK - PATIENT PORTAL LINK FT
You can access the FollowMyHealth Patient Portal offered by [facility] by registering at the following website: [email or web address]. By joining [employer]’s FollowMyHealth portal, you will also be able to view your health information using other applications (apps) compatible with our system.

## 2021-07-20 DIAGNOSIS — F02.80 DEMENTIA IN OTHER DISEASES CLASSIFIED ELSEWHERE, UNSPECIFIED SEVERITY, WITHOUT BEHAVIORAL DISTURBANCE, PSYCHOTIC DISTURBANCE, MOOD DISTURBANCE, AND ANXIETY: ICD-10-CM

## 2021-07-20 DIAGNOSIS — G20 PARKINSON'S DISEASE: ICD-10-CM

## 2021-07-20 DIAGNOSIS — E11.22 TYPE 2 DIABETES MELLITUS WITH DIABETIC CHRONIC KIDNEY DISEASE: ICD-10-CM

## 2021-07-20 DIAGNOSIS — E78.5 HYPERLIPIDEMIA, UNSPECIFIED: ICD-10-CM

## 2021-07-20 DIAGNOSIS — N18.30 CHRONIC KIDNEY DISEASE, STAGE 3 UNSPECIFIED: ICD-10-CM

## 2021-07-20 DIAGNOSIS — K94.23 GASTROSTOMY MALFUNCTION: ICD-10-CM

## 2021-07-20 DIAGNOSIS — D64.9 ANEMIA, UNSPECIFIED: ICD-10-CM

## 2021-07-20 DIAGNOSIS — I44.0 ATRIOVENTRICULAR BLOCK, FIRST DEGREE: ICD-10-CM

## 2021-07-20 DIAGNOSIS — I12.9 HYPERTENSIVE CHRONIC KIDNEY DISEASE WITH STAGE 1 THROUGH STAGE 4 CHRONIC KIDNEY DISEASE, OR UNSPECIFIED CHRONIC KIDNEY DISEASE: ICD-10-CM

## 2021-07-20 DIAGNOSIS — M10.9 GOUT, UNSPECIFIED: ICD-10-CM

## 2021-07-20 DIAGNOSIS — J96.10 CHRONIC RESPIRATORY FAILURE, UNSPECIFIED WHETHER WITH HYPOXIA OR HYPERCAPNIA: ICD-10-CM

## 2021-07-20 DIAGNOSIS — L89.619 PRESSURE ULCER OF RIGHT HEEL, UNSPECIFIED STAGE: ICD-10-CM

## 2021-07-20 DIAGNOSIS — L89.629 PRESSURE ULCER OF LEFT HEEL, UNSPECIFIED STAGE: ICD-10-CM

## 2021-07-20 DIAGNOSIS — L89.154 PRESSURE ULCER OF SACRAL REGION, STAGE 4: ICD-10-CM

## 2021-08-12 NOTE — PHYSICAL EXAM
Patient called to request Ketoconazole 2% cream to apply to legs for fungal infection.  Wound care Dr. Andrade had prescribed to use after wounds healed for intermittent fungal issues. Dr. Andrade @ wound center is on vaccation and wound center is requesting patient have PCP order medication.     [FreeTextEntry1] : a+Ox2 naming and repetition normal\par CN 2-12 Normal and no fatigability of up gaze or lids\par No drift and power 5/5\par L>R tremor and cog wheel rigidity\par Sensory symmetric to LT, Temp, Vib\par FTN NL\par Finger tap and foot tap symmetric and fast\par Gait antalgic seocndary to knee pain\par not shuffling much\par

## 2022-03-15 NOTE — PATIENT PROFILE ADULT - BILL PAYMENT
[FreeTextEntry3] : All medical record entries made by the Scribe were at my,  Dr. Madhu Gtz MD., direction and personally dictated by me on 03/15/2022. I have personally reviewed the chart and agree that the record accurately reflects my personal performance of the history, physical exam, assessment and plan. 
no

## 2022-04-19 ENCOUNTER — EMERGENCY (EMERGENCY)
Facility: HOSPITAL | Age: 72
LOS: 0 days | Discharge: HOME | End: 2022-04-19
Attending: EMERGENCY MEDICINE | Admitting: EMERGENCY MEDICINE
Payer: MEDICARE

## 2022-04-19 VITALS
DIASTOLIC BLOOD PRESSURE: 82 MMHG | WEIGHT: 169.98 LBS | OXYGEN SATURATION: 100 % | SYSTOLIC BLOOD PRESSURE: 133 MMHG | RESPIRATION RATE: 18 BRPM | TEMPERATURE: 98 F | HEART RATE: 87 BPM | HEIGHT: 66 IN

## 2022-04-19 VITALS — OXYGEN SATURATION: 98 %

## 2022-04-19 DIAGNOSIS — S43.015A ANTERIOR DISLOCATION OF LEFT HUMERUS, INITIAL ENCOUNTER: ICD-10-CM
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
## 2022-04-19 DIAGNOSIS — G20 PARKINSON'S DISEASE: ICD-10-CM

## 2022-04-19 DIAGNOSIS — E11.9 TYPE 2 DIABETES MELLITUS WITHOUT COMPLICATIONS: ICD-10-CM

## 2022-04-19 DIAGNOSIS — M10.9 GOUT, UNSPECIFIED: ICD-10-CM

## 2022-04-19 DIAGNOSIS — F02.80 DEMENTIA IN OTHER DISEASES CLASSIFIED ELSEWHERE, UNSPECIFIED SEVERITY, WITHOUT BEHAVIORAL DISTURBANCE, PSYCHOTIC DISTURBANCE, MOOD DISTURBANCE, AND ANXIETY: ICD-10-CM

## 2022-04-19 DIAGNOSIS — N18.30 CHRONIC KIDNEY DISEASE, STAGE 3 UNSPECIFIED: ICD-10-CM

## 2022-04-19 DIAGNOSIS — I10 ESSENTIAL (PRIMARY) HYPERTENSION: ICD-10-CM

## 2022-04-19 DIAGNOSIS — E78.5 HYPERLIPIDEMIA, UNSPECIFIED: ICD-10-CM

## 2022-04-19 PROCEDURE — 99284 EMERGENCY DEPT VISIT MOD MDM: CPT

## 2022-04-19 PROCEDURE — 73030 X-RAY EXAM OF SHOULDER: CPT | Mod: 26,LT

## 2022-04-19 RX ORDER — KETOROLAC TROMETHAMINE 30 MG/ML
30 SYRINGE (ML) INJECTION ONCE
Refills: 0 | Status: DISCONTINUED | OUTPATIENT
Start: 2022-04-19 | End: 2022-04-19

## 2022-04-19 RX ADMIN — Medication 30 MILLIGRAM(S): at 15:45

## 2022-04-19 NOTE — ED ADULT NURSE NOTE - OBJECTIVE STATEMENT
pt sent in for new avulsion fx on left shoulder. Pt has chronic left should dislocation, but xray for last night showed a new fx. Pt non-verbal and trached.

## 2022-04-19 NOTE — ED ADULT TRIAGE NOTE - CHIEF COMPLAINT QUOTE
Pt BIBA from [facility] for new avulsion fx on left shoulder. Pt has chronic left should dislocation, but xray for last night showed a new fx. Pt non-verbal and trached.

## 2022-04-19 NOTE — ED PROVIDER NOTE - CARE PROVIDER_API CALL
Justyn Ignacio)  [facility]  [address] [place] [address] [place] [address]  Phone: [phone]  Fax: [phone]  Follow Up Time: 1-3 Days

## 2022-04-19 NOTE — ED PROVIDER NOTE - OBJECTIVE STATEMENT
73 yo male w/ PMH of Parkinson's, dementia, CKD Stage 3, 1st degree AV block, HLD, gout, HTN, DM, trached for fungal septicemia last year, L shoulder dislocations presents for possible L shoulder dislocation. Pt nonverbal at baseline, was found by family to have L shoulder deformity 2 days ago, pt having pain when shoulder touched so came into ED. Pt unable to provide further HPI due to being nonverbal.

## 2022-04-19 NOTE — CONSULT NOTE ADULT - SUBJECTIVE AND OBJECTIVE BOX
Orthopaedic Surgery Consult Note   Reason for Consult: Chronic Left Shoulder Dislocation     71M with non-verbal dementia, Parkinson's admitted from Nursing Home for fungal septicemia and acute decline in mental status. Known to have left shoulder dislocation, chronic, since at least 06/2020 per family members. Patient is non-verbal upon encounter and unable to provide history.    Patient was previously seen by Orthopaedics team on last admission. Last encounter was on 5/23/20221, previously evaluated on 2/23/21 with note documented in chart. No acute orthopaedic intervention indicated at that time. Plan was for outpatient follow up. Patient's orthopaedic condition remains unchanged at this time.       Orthopaedic Surgery Consult Note   Reason for Consult: Chronic Left Shoulder Dislocation     72M with non-verbal dementia, Parkinson's known to have left shoulder dislocation, chronic, since at least 06/2020 per past documented conversations family members. Patient is non-verbal upon encounter and unable to provide history. Patient was brought to ED by family as they desire surgical intervention for his chronic shoulder dislocation.     Patient was previously seen by Orthopaedics team on last admission. Last encounter was on 5/23/20221, previously evaluated on 2/23/21 with note documented in chart. Plan was for outpatient follow-up with Dr. Ignacio at that time.       Exam  General: Non-verbal, trached, not responsive to questions    LUE   Gross deformity   No active motion of the shoulder or elbow   Limited active motion of left hand   Motor examination limited due to mental status   Radial pulse 1+       Imaging: Chronic Left Shoulder Dislocation     A&P: 72M w/ Chronic left Shoulder dislocation, taken to ED by family for unknown reasons. Family no longer responsive to inquiries by phone. No indication for acute orthopaedic surgery intervention .     - No acute orthopaedic surgery intervention at this time  - Given prior CT no need for acute CT   - Family contacted at [phone], phone is disconnected.   -  Recommend outpatient follow-up with Dr. Ignacio, [phone] at [address] [facility]

## 2022-04-19 NOTE — ED PROVIDER NOTE - PROGRESS NOTE DETAILS
Julio: Past XRs showing that shoulder has been dislocated for at least a year. Ortho consulted, will come and see pt. Family would like to be updated if any significant interventions performed. Kelton [phone] Julio: Called Kelton and discussed ortho plan for no acute interventions at this time. Ortho also spoke with Kelton and she is aware of plan to have pt DCed and follow up with ortho outpt. Jonas MORENO.

## 2022-04-19 NOTE — ED ADULT NURSE NOTE - NS ED NURSE RECORD ANOTHER VITAL SIGN
Yes Partial Purse String (Simple) Text: Given the location of the defect and the characteristics of the surrounding skin a simple purse string closure was deemed most appropriate.  Undermining was performed circumfirentially around the surgical defect.  A purse string suture was then placed and tightened. Wound tension only allowed a partial closure of the circular defect.

## 2022-04-19 NOTE — ED ADULT NURSE NOTE - NSIMPLEMENTINTERV_GEN_ALL_ED
Implemented All Fall with Harm Risk Interventions:  [place] to call system. Call bell, personal items and telephone within reach. Instruct patient to call for assistance. Room bathroom lighting operational. Non-slip footwear when patient is off stretcher. Physically safe environment: no spills, clutter or unnecessary equipment. Stretcher in lowest position, wheels locked, appropriate side rails in place. Provide visual cue, wrist band, yellow gown, etc. Monitor gait and stability. Monitor for mental status changes and reorient to person, place, and time. Review medications for side effects contributing to fall risk. Reinforce activity limits and safety measures with patient and family. Provide visual clues: red socks.

## 2022-04-19 NOTE — ED PROVIDER NOTE - PHYSICAL EXAMINATION
GENERAL: Chronically ill appearing   SKIN: warm, dry  CARD: S1, S2 normal; no murmurs, gallops, or rubs. Regular rate and rhythm.   RESP: LCTAB; No wheezes, rales, rhonchi, or stridor.  EXT: L shoulder appears squared off, TTP.   NEURO: Nonverbal, at baseline mental status. Unable to cooperate with neuro testing.

## 2022-04-19 NOTE — ED PROVIDER NOTE - CLINICAL SUMMARY MEDICAL DECISION MAKING FREE TEXT BOX
72-year-old male past medical history as noted, was sent from nursing home on the request of the family to address chronic left shoulder dislocation.  According to the patient's daughter, he had an unsuccessful surgery to repair the problem, she would like this procedure to be attempted again. The patient was evaluated by orthopedic, no acute intervention at present, this was explained to the family, they were advised to follow-up with orthopedics outpatient, contact information was provided.  Patient stable for discharge back to the nursing home.

## 2022-04-19 NOTE — ED PROVIDER NOTE - PATIENT PORTAL LINK FT
You can access the FollowMyHealth Patient Portal offered by [facility] by registering at the following website: [email or web address]. By joining [employer]’s FollowMyHealth portal, you will also be able to view your health information using other applications (apps) compatible with our system.

## 2022-04-25 ENCOUNTER — INPATIENT (INPATIENT)
Facility: HOSPITAL | Age: 72
LOS: 7 days | Discharge: NURSING, REHAB OR LONG-TERM CARE | End: 2022-05-03
Attending: INTERNAL MEDICINE | Admitting: INTERNAL MEDICINE
Payer: MEDICARE

## 2022-04-25 VITALS
TEMPERATURE: 99 F | HEART RATE: 66 BPM | DIASTOLIC BLOOD PRESSURE: 69 MMHG | SYSTOLIC BLOOD PRESSURE: 123 MMHG | OXYGEN SATURATION: 100 % | RESPIRATION RATE: 18 BRPM

## 2022-04-25 LAB
ALBUMIN SERPL ELPH-MCNC: 2.7 G/DL — LOW (ref 3.5–5.2)
ALP SERPL-CCNC: 160 U/L — HIGH (ref 30–115)
ALT FLD-CCNC: 48 U/L — HIGH (ref 0–41)
ANION GAP SERPL CALC-SCNC: 10 MMOL/L — SIGNIFICANT CHANGE UP (ref 7–14)
AST SERPL-CCNC: 37 U/L — SIGNIFICANT CHANGE UP (ref 0–41)
BASOPHILS # BLD AUTO: 0.02 K/UL — SIGNIFICANT CHANGE UP (ref 0–0.2)
BASOPHILS NFR BLD AUTO: 0.4 % — SIGNIFICANT CHANGE UP (ref 0–1)
BILIRUB SERPL-MCNC: 0.3 MG/DL — SIGNIFICANT CHANGE UP (ref 0.2–1.2)
BUN SERPL-MCNC: 29 MG/DL — HIGH (ref 10–20)
CALCIUM SERPL-MCNC: 8.6 MG/DL — SIGNIFICANT CHANGE UP (ref 8.5–10.1)
CHLORIDE SERPL-SCNC: 103 MMOL/L — SIGNIFICANT CHANGE UP (ref 98–110)
CO2 SERPL-SCNC: 27 MMOL/L — SIGNIFICANT CHANGE UP (ref 17–32)
CREAT SERPL-MCNC: 0.7 MG/DL — SIGNIFICANT CHANGE UP (ref 0.7–1.5)
EGFR: 98 ML/MIN/1.73M2 — SIGNIFICANT CHANGE UP
EOSINOPHIL # BLD AUTO: 0.27 K/UL — SIGNIFICANT CHANGE UP (ref 0–0.7)
EOSINOPHIL NFR BLD AUTO: 4.7 % — SIGNIFICANT CHANGE UP (ref 0–8)
ERYTHROCYTE [SEDIMENTATION RATE] IN BLOOD: >140 MM/HR — HIGH (ref 0–10)
GLUCOSE SERPL-MCNC: 89 MG/DL — SIGNIFICANT CHANGE UP (ref 70–99)
HCT VFR BLD CALC: 28.3 % — LOW (ref 42–52)
HGB BLD-MCNC: 8.8 G/DL — LOW (ref 14–18)
IMM GRANULOCYTES NFR BLD AUTO: 0.5 % — HIGH (ref 0.1–0.3)
LACTATE SERPL-SCNC: 1 MMOL/L — SIGNIFICANT CHANGE UP (ref 0.7–2)
LYMPHOCYTES # BLD AUTO: 0.84 K/UL — LOW (ref 1.2–3.4)
LYMPHOCYTES # BLD AUTO: 14.8 % — LOW (ref 20.5–51.1)
MAGNESIUM SERPL-MCNC: 2 MG/DL — SIGNIFICANT CHANGE UP (ref 1.8–2.4)
MCHC RBC-ENTMCNC: 28.2 PG — SIGNIFICANT CHANGE UP (ref 27–31)
MCHC RBC-ENTMCNC: 31.1 G/DL — LOW (ref 32–37)
MCV RBC AUTO: 90.7 FL — SIGNIFICANT CHANGE UP (ref 80–94)
MONOCYTES # BLD AUTO: 0.61 K/UL — HIGH (ref 0.1–0.6)
MONOCYTES NFR BLD AUTO: 10.7 % — HIGH (ref 1.7–9.3)
NEUTROPHILS # BLD AUTO: 3.92 K/UL — SIGNIFICANT CHANGE UP (ref 1.4–6.5)
NEUTROPHILS NFR BLD AUTO: 68.9 % — SIGNIFICANT CHANGE UP (ref 42.2–75.2)
NRBC # BLD: 0 /100 WBCS — SIGNIFICANT CHANGE UP (ref 0–0)
PHOSPHATE SERPL-MCNC: 3.2 MG/DL — SIGNIFICANT CHANGE UP (ref 2.1–4.9)
PLATELET # BLD AUTO: 319 K/UL — SIGNIFICANT CHANGE UP (ref 130–400)
POTASSIUM SERPL-MCNC: 4.7 MMOL/L — SIGNIFICANT CHANGE UP (ref 3.5–5)
POTASSIUM SERPL-SCNC: 4.7 MMOL/L — SIGNIFICANT CHANGE UP (ref 3.5–5)
PROT SERPL-MCNC: 7.4 G/DL — SIGNIFICANT CHANGE UP (ref 6–8)
RBC # BLD: 3.12 M/UL — LOW (ref 4.7–6.1)
RBC # FLD: 14.6 % — HIGH (ref 11.5–14.5)
SODIUM SERPL-SCNC: 140 MMOL/L — SIGNIFICANT CHANGE UP (ref 135–146)
TROPONIN T SERPL-MCNC: 0.08 NG/ML — CRITICAL HIGH
WBC # BLD: 5.69 K/UL — SIGNIFICANT CHANGE UP (ref 4.8–10.8)
WBC # FLD AUTO: 5.69 K/UL — SIGNIFICANT CHANGE UP (ref 4.8–10.8)

## 2022-04-25 PROCEDURE — 71045 X-RAY EXAM CHEST 1 VIEW: CPT | Mod: 26

## 2022-04-25 PROCEDURE — 99285 EMERGENCY DEPT VISIT HI MDM: CPT | Mod: FS

## 2022-04-25 PROCEDURE — 74177 CT ABD & PELVIS W/CONTRAST: CPT | Mod: 26,MA

## 2022-04-25 PROCEDURE — 93010 ELECTROCARDIOGRAM REPORT: CPT

## 2022-04-25 PROCEDURE — 70450 CT HEAD/BRAIN W/O DYE: CPT | Mod: 26,MA

## 2022-04-25 RX ORDER — CEFEPIME 1 G/1
2000 INJECTION, POWDER, FOR SOLUTION INTRAMUSCULAR; INTRAVENOUS ONCE
Refills: 0 | Status: COMPLETED | OUTPATIENT
Start: 2022-04-25 | End: 2022-04-25

## 2022-04-25 RX ORDER — CARBIDOPA AND LEVODOPA 25; 100 MG/1; MG/1
1 TABLET ORAL ONCE
Refills: 0 | Status: COMPLETED | OUTPATIENT
Start: 2022-04-25 | End: 2022-04-25

## 2022-04-25 NOTE — ED PROVIDER NOTE - ATTENDING CONTRIBUTION TO CARE
71M with PMH Parkinson's, s/p trach/PEG, dementia, CKD 3, HTN, HLD, T2DM, gout, 1st degree AVB, Chronic left shoulder dislocation who presents to [facility] for Change in mental status.  His daughter.  He reports that he normally communicates with his eyes and hands but the last 2 days has not been as responsive.  Denies vomiting, diarrhea, known fever.    Daughter: Kelton, [phone]    vs noted, triage note reviewed    Gen - sitting supine in stretcher, in NAD, Head - NCAT, Trach noted, Heart - RRR, no m/g/r, Lungs - coarse rhonchi b/l, Abdomen - GT noted, site clean and dry, no erythema, abd soft, NT, ND, Skin - Warm, No rash, Extremities - contracted b/l LE, no edema, erythema, ecchymosis, brisk cap refill, Neuro - A&O x3, equal strength and sensation, non-focal exam     a/p:  rectal temp pls  change in mental status ~48hrs  HCT, labs, EKG, CXR, UA  reassess 71M with PMH Parkinson's, s/p trach/PEG, dementia, CKD 3, HTN, HLD, T2DM, gout, 1st degree AVB, Chronic left shoulder dislocation who presents to [facility] for Change in mental status.  His daughter reports that he normally communicates with his eyes and hands but the last 2 days has not been as responsive.  Denies vomiting, diarrhea, known fever.    Daughter: Kelton, [phone]    vs noted, triage note reviewed    Gen - sitting supine in stretcher, in NAD, Head - NCAT, Trach noted, Heart - RRR, no m/g/r, Lungs - coarse rhonchi b/l, Abdomen - GT noted, site clean and dry, no erythema, abd soft, NT, ND, Skin - Warm, No rash, Extremities - contracted b/l LE, no edema, erythema, ecchymosis, brisk cap refill, Neuro - A&O x3, equal strength and sensation, non-focal exam     a/p:  rectal temp pls  change in mental status ~48hrs  HCT, labs, EKG, CXR, UA  reassess

## 2022-04-25 NOTE — ED ADULT NURSE NOTE - OBJECTIVE STATEMENT
71 year old male BIBA from nursing home for change in mental status. Pt is trached to vent, but as per family, pt is alert and responds with eyes. As per family, "pt has not been responding today and has not been alert" Pt denies known recent illness. 71 year old male BIBA from nursing home for change in mental status. Pt is trached to vent, but as per family, pt is alert and responds with eyes at baseline. As per family, "pt has not been responding today and has not been alert" Pt denies known recent illness. RUE PICC line present on arrival

## 2022-04-25 NOTE — ED PROVIDER NOTE - PHYSICAL EXAMINATION
CONST: Cachectic chronically ill-appearing, with tracheostomy on vent  EYES: PERRL  NECK: Non-tender, no meningeal signs  CARD: Patient with a regular heart rate normal S1-S2  RESP: Diffuse rhonchi no respiratory distress  GI: Soft, non-tender, non-distended. No rebound or Guarding  MS: Normal ROM in all extremities. No midline spinal tenderness.  SKIN: Warm, dry, no acute rashes. Good turgor  NEURO: Patient is nonverbal does not answer questions does not follow commands

## 2022-04-25 NOTE — ED PROVIDER NOTE - CLINICAL SUMMARY MEDICAL DECISION MAKING FREE TEXT BOX
71M with PMH Parkinson's, s/p trach/PEG, dementia, CKD 3, HTN, HLD, T2DM, gout, 1st degree AVB, Chronic left shoulder dislocation who presents to [facility] for Change in mental status. EKG, labs and imaging reviewed. HCT without acute findings. Cardiology consulted, elevated troponin noted. Patient admitted for further monitoring/mgmt.

## 2022-04-25 NOTE — ED PROVIDER NOTE - OBJECTIVE STATEMENT
71-year-old male nonverbal with tracheostomy presents emergency department from SNF for evaluation.  As per daughter who is not present but spoke over the phone patient has had decreased responsiveness over the past 2 days.  She states that at baseline patient would open his eyes and shake his head to answer questions but he has not been doing that over the past 2 days.  As per daughter no fevers nausea vomiting or other symptoms. 71-year-old male nonverbal with tracheostomy presents emergency department from SNF for evaluation.  As per daughter who is not present but spoke over the phone patient has had decreased responsiveness over the past 2 days.  She states that at baseline patient would open his eyes and shake his head to answer questions but he has not been doing that over the past 2 days.  Pt did complain of abdominal pain this morning. As per daughter no fevers nausea vomiting or other symptoms.

## 2022-04-25 NOTE — ED ADULT NURSE NOTE - NSFALLRSKHMRSKTYOTFT_ED_ALL_ED
Status[de-identified] INPATIENT [101]   Type of Bed: Medical [8]   Inpatient Hospitalization Certified Necessary for the Following Reasons: 3. Patient receiving treatment that can only be provided in an inpatient setting (further clarification in H&P documentation)   Admitting Diagnosis: Rhabdomyolysis [728.88. ICD-9-CM]   Admitting Physician: Stevan Hammond   Attending Physician: Stevan Hammond   Estimated Length of Stay: 2 Midnights   Discharge Plan[de-identified] Home with Office Follow-up contracted

## 2022-04-25 NOTE — ED ADULT NURSE REASSESSMENT NOTE - NS ED NURSE REASSESS COMMENT FT1
Pt placed on cardiac monitor. Cardiac monitor showed that pt had 3 episodes of VTACH. MD Navarrete made aware and assessed pt. Pt is trached to vent. Pt placed on ZOLL. Pt will be upgraded to critical care area as per MD. Awaiting respiratory for transport

## 2022-04-26 DIAGNOSIS — J96.11 CHRONIC RESPIRATORY FAILURE WITH HYPOXIA: ICD-10-CM

## 2022-04-26 DIAGNOSIS — R77.8 OTHER SPECIFIED ABNORMALITIES OF PLASMA PROTEINS: ICD-10-CM

## 2022-04-26 DIAGNOSIS — N18.2 CHRONIC KIDNEY DISEASE, STAGE 2 (MILD): ICD-10-CM

## 2022-04-26 DIAGNOSIS — G92.8 OTHER TOXIC ENCEPHALOPATHY: ICD-10-CM

## 2022-04-26 DIAGNOSIS — Z93.1 GASTROSTOMY STATUS: Chronic | ICD-10-CM

## 2022-04-26 LAB
A1C WITH ESTIMATED AVERAGE GLUCOSE RESULT: 5.1 % — SIGNIFICANT CHANGE UP (ref 4–5.6)
ALBUMIN SERPL ELPH-MCNC: 2.8 G/DL — LOW (ref 3.5–5.2)
ALP SERPL-CCNC: 166 U/L — HIGH (ref 30–115)
ALT FLD-CCNC: 9 U/L — SIGNIFICANT CHANGE UP (ref 0–41)
ANION GAP SERPL CALC-SCNC: 13 MMOL/L — SIGNIFICANT CHANGE UP (ref 7–14)
APPEARANCE UR: CLEAR — SIGNIFICANT CHANGE UP
AST SERPL-CCNC: 33 U/L — SIGNIFICANT CHANGE UP (ref 0–41)
BASOPHILS # BLD AUTO: 0.03 K/UL — SIGNIFICANT CHANGE UP (ref 0–0.2)
BASOPHILS NFR BLD AUTO: 0.7 % — SIGNIFICANT CHANGE UP (ref 0–1)
BILIRUB SERPL-MCNC: 0.4 MG/DL — SIGNIFICANT CHANGE UP (ref 0.2–1.2)
BILIRUB UR-MCNC: NEGATIVE — SIGNIFICANT CHANGE UP
BUN SERPL-MCNC: 29 MG/DL — HIGH (ref 10–20)
CALCIUM SERPL-MCNC: 8.9 MG/DL — SIGNIFICANT CHANGE UP (ref 8.5–10.1)
CHLORIDE SERPL-SCNC: 98 MMOL/L — SIGNIFICANT CHANGE UP (ref 98–110)
CO2 SERPL-SCNC: 24 MMOL/L — SIGNIFICANT CHANGE UP (ref 17–32)
COLOR SPEC: YELLOW — SIGNIFICANT CHANGE UP
CREAT SERPL-MCNC: 0.8 MG/DL — SIGNIFICANT CHANGE UP (ref 0.7–1.5)
DIFF PNL FLD: NEGATIVE — SIGNIFICANT CHANGE UP
EGFR: 94 ML/MIN/1.73M2 — SIGNIFICANT CHANGE UP
EOSINOPHIL # BLD AUTO: 0.11 K/UL — SIGNIFICANT CHANGE UP (ref 0–0.7)
EOSINOPHIL NFR BLD AUTO: 2.5 % — SIGNIFICANT CHANGE UP (ref 0–8)
ESTIMATED AVERAGE GLUCOSE: 100 MG/DL — SIGNIFICANT CHANGE UP (ref 68–114)
FOLATE SERPL-MCNC: 19.6 NG/ML — SIGNIFICANT CHANGE UP
GLUCOSE BLDC GLUCOMTR-MCNC: 118 MG/DL — HIGH (ref 70–99)
GLUCOSE BLDC GLUCOMTR-MCNC: 91 MG/DL — SIGNIFICANT CHANGE UP (ref 70–99)
GLUCOSE SERPL-MCNC: 84 MG/DL — SIGNIFICANT CHANGE UP (ref 70–99)
GLUCOSE UR QL: NEGATIVE — SIGNIFICANT CHANGE UP
HCT VFR BLD CALC: 27.6 % — LOW (ref 42–52)
HGB BLD-MCNC: 8.7 G/DL — LOW (ref 14–18)
IMM GRANULOCYTES NFR BLD AUTO: 0.7 % — HIGH (ref 0.1–0.3)
KETONES UR-MCNC: NEGATIVE — SIGNIFICANT CHANGE UP
LEUKOCYTE ESTERASE UR-ACNC: NEGATIVE — SIGNIFICANT CHANGE UP
LYMPHOCYTES # BLD AUTO: 0.66 K/UL — LOW (ref 1.2–3.4)
LYMPHOCYTES # BLD AUTO: 15.2 % — LOW (ref 20.5–51.1)
MCHC RBC-ENTMCNC: 28.2 PG — SIGNIFICANT CHANGE UP (ref 27–31)
MCHC RBC-ENTMCNC: 31.5 G/DL — LOW (ref 32–37)
MCV RBC AUTO: 89.6 FL — SIGNIFICANT CHANGE UP (ref 80–94)
MONOCYTES # BLD AUTO: 0.5 K/UL — SIGNIFICANT CHANGE UP (ref 0.1–0.6)
MONOCYTES NFR BLD AUTO: 11.5 % — HIGH (ref 1.7–9.3)
NEUTROPHILS # BLD AUTO: 3.02 K/UL — SIGNIFICANT CHANGE UP (ref 1.4–6.5)
NEUTROPHILS NFR BLD AUTO: 69.4 % — SIGNIFICANT CHANGE UP (ref 42.2–75.2)
NITRITE UR-MCNC: NEGATIVE — SIGNIFICANT CHANGE UP
NRBC # BLD: 0 /100 WBCS — SIGNIFICANT CHANGE UP (ref 0–0)
PH UR: 8 — SIGNIFICANT CHANGE UP (ref 5–8)
PLATELET # BLD AUTO: 312 K/UL — SIGNIFICANT CHANGE UP (ref 130–400)
POTASSIUM SERPL-MCNC: 4.1 MMOL/L — SIGNIFICANT CHANGE UP (ref 3.5–5)
POTASSIUM SERPL-SCNC: 4.1 MMOL/L — SIGNIFICANT CHANGE UP (ref 3.5–5)
PROT SERPL-MCNC: 7.7 G/DL — SIGNIFICANT CHANGE UP (ref 6–8)
PROT UR-MCNC: ABNORMAL
RAPID RVP RESULT: SIGNIFICANT CHANGE UP
RBC # BLD: 3.08 M/UL — LOW (ref 4.7–6.1)
RBC # FLD: 14.5 % — SIGNIFICANT CHANGE UP (ref 11.5–14.5)
SARS-COV-2 RNA SPEC QL NAA+PROBE: SIGNIFICANT CHANGE UP
SODIUM SERPL-SCNC: 135 MMOL/L — SIGNIFICANT CHANGE UP (ref 135–146)
SP GR SPEC: >1.05 (ref 1.01–1.03)
T3 SERPL-MCNC: 88 NG/DL — SIGNIFICANT CHANGE UP (ref 80–200)
T4 AB SER-ACNC: 6 UG/DL — SIGNIFICANT CHANGE UP (ref 4.6–12)
TROPONIN T SERPL-MCNC: 0.1 NG/ML — CRITICAL HIGH
TSH SERPL-MCNC: 1.28 UIU/ML — SIGNIFICANT CHANGE UP (ref 0.27–4.2)
UROBILINOGEN FLD QL: SIGNIFICANT CHANGE UP
VIT B12 SERPL-MCNC: >2000 PG/ML — HIGH (ref 232–1245)
WBC # BLD: 4.35 K/UL — LOW (ref 4.8–10.8)
WBC # FLD AUTO: 4.35 K/UL — LOW (ref 4.8–10.8)

## 2022-04-26 PROCEDURE — 99223 1ST HOSP IP/OBS HIGH 75: CPT | Mod: GC

## 2022-04-26 RX ORDER — PANTOPRAZOLE SODIUM 20 MG/1
40 TABLET, DELAYED RELEASE ORAL
Refills: 0 | Status: DISCONTINUED | OUTPATIENT
Start: 2022-04-26 | End: 2022-04-30

## 2022-04-26 RX ORDER — CHLORHEXIDINE GLUCONATE 213 G/1000ML
1 SOLUTION TOPICAL
Refills: 0 | Status: DISCONTINUED | OUTPATIENT
Start: 2022-04-26 | End: 2022-05-03

## 2022-04-26 RX ORDER — POLYETHYLENE GLYCOL 3350 17 G/17G
17 POWDER, FOR SOLUTION ORAL EVERY 6 HOURS
Refills: 0 | Status: DISCONTINUED | OUTPATIENT
Start: 2022-04-26 | End: 2022-04-28

## 2022-04-26 RX ORDER — ONDANSETRON 8 MG/1
4 TABLET, FILM COATED ORAL EVERY 8 HOURS
Refills: 0 | Status: DISCONTINUED | OUTPATIENT
Start: 2022-04-26 | End: 2022-05-03

## 2022-04-26 RX ORDER — VANCOMYCIN HCL 1 G
1250 VIAL (EA) INTRAVENOUS EVERY 12 HOURS
Refills: 0 | Status: DISCONTINUED | OUTPATIENT
Start: 2022-04-26 | End: 2022-04-29

## 2022-04-26 RX ORDER — ACETAMINOPHEN 500 MG
650 TABLET ORAL EVERY 6 HOURS
Refills: 0 | Status: DISCONTINUED | OUTPATIENT
Start: 2022-04-26 | End: 2022-05-03

## 2022-04-26 RX ORDER — ENTACAPONE 200 MG/1
200 TABLET, FILM COATED ORAL
Refills: 0 | Status: DISCONTINUED | OUTPATIENT
Start: 2022-04-26 | End: 2022-05-03

## 2022-04-26 RX ORDER — ENOXAPARIN SODIUM 100 MG/ML
40 INJECTION SUBCUTANEOUS EVERY 24 HOURS
Refills: 0 | Status: DISCONTINUED | OUTPATIENT
Start: 2022-04-26 | End: 2022-05-03

## 2022-04-26 RX ORDER — RASAGILINE 0.5 MG/1
0.5 TABLET ORAL DAILY
Refills: 0 | Status: DISCONTINUED | OUTPATIENT
Start: 2022-04-26 | End: 2022-05-03

## 2022-04-26 RX ORDER — OXYCODONE HYDROCHLORIDE 5 MG/1
5 TABLET ORAL EVERY 8 HOURS
Refills: 0 | Status: DISCONTINUED | OUTPATIENT
Start: 2022-04-26 | End: 2022-05-02

## 2022-04-26 RX ORDER — LANOLIN ALCOHOL/MO/W.PET/CERES
3 CREAM (GRAM) TOPICAL AT BEDTIME
Refills: 0 | Status: DISCONTINUED | OUTPATIENT
Start: 2022-04-26 | End: 2022-05-03

## 2022-04-26 RX ORDER — CEFEPIME 1 G/1
2000 INJECTION, POWDER, FOR SOLUTION INTRAMUSCULAR; INTRAVENOUS EVERY 8 HOURS
Refills: 0 | Status: DISCONTINUED | OUTPATIENT
Start: 2022-04-26 | End: 2022-05-01

## 2022-04-26 RX ORDER — ASCORBIC ACID 60 MG
500 TABLET,CHEWABLE ORAL DAILY
Refills: 0 | Status: DISCONTINUED | OUTPATIENT
Start: 2022-04-26 | End: 2022-05-03

## 2022-04-26 RX ORDER — ALBUTEROL 90 UG/1
2 AEROSOL, METERED ORAL EVERY 6 HOURS
Refills: 0 | Status: DISCONTINUED | OUTPATIENT
Start: 2022-04-26 | End: 2022-04-27

## 2022-04-26 RX ORDER — VANCOMYCIN HCL 1 G
1500 VIAL (EA) INTRAVENOUS EVERY 12 HOURS
Refills: 0 | Status: DISCONTINUED | OUTPATIENT
Start: 2022-04-26 | End: 2022-04-26

## 2022-04-26 RX ORDER — ATORVASTATIN CALCIUM 80 MG/1
40 TABLET, FILM COATED ORAL AT BEDTIME
Refills: 0 | Status: DISCONTINUED | OUTPATIENT
Start: 2022-04-26 | End: 2022-05-03

## 2022-04-26 RX ADMIN — Medication 500 MILLIGRAM(S): at 11:54

## 2022-04-26 RX ADMIN — CEFEPIME 100 MILLIGRAM(S): 1 INJECTION, POWDER, FOR SOLUTION INTRAMUSCULAR; INTRAVENOUS at 21:03

## 2022-04-26 RX ADMIN — RASAGILINE 0.5 MILLIGRAM(S): 0.5 TABLET ORAL at 16:09

## 2022-04-26 RX ADMIN — ALBUTEROL 2 PUFF(S): 90 AEROSOL, METERED ORAL at 16:43

## 2022-04-26 RX ADMIN — ENOXAPARIN SODIUM 40 MILLIGRAM(S): 100 INJECTION SUBCUTANEOUS at 11:53

## 2022-04-26 RX ADMIN — ALBUTEROL 2 PUFF(S): 90 AEROSOL, METERED ORAL at 23:14

## 2022-04-26 RX ADMIN — Medication 166.67 MILLIGRAM(S): at 18:20

## 2022-04-26 RX ADMIN — POLYETHYLENE GLYCOL 3350 17 GRAM(S): 17 POWDER, FOR SOLUTION ORAL at 11:54

## 2022-04-26 RX ADMIN — ENTACAPONE 200 MILLIGRAM(S): 200 TABLET, FILM COATED ORAL at 18:20

## 2022-04-26 RX ADMIN — PANTOPRAZOLE SODIUM 40 MILLIGRAM(S): 20 TABLET, DELAYED RELEASE ORAL at 11:54

## 2022-04-26 RX ADMIN — POLYETHYLENE GLYCOL 3350 17 GRAM(S): 17 POWDER, FOR SOLUTION ORAL at 18:20

## 2022-04-26 RX ADMIN — CARBIDOPA AND LEVODOPA 1 TABLET(S): 25; 100 TABLET ORAL at 02:22

## 2022-04-26 RX ADMIN — ATORVASTATIN CALCIUM 40 MILLIGRAM(S): 80 TABLET, FILM COATED ORAL at 21:03

## 2022-04-26 RX ADMIN — CEFEPIME 100 MILLIGRAM(S): 1 INJECTION, POWDER, FOR SOLUTION INTRAMUSCULAR; INTRAVENOUS at 15:56

## 2022-04-26 RX ADMIN — CEFEPIME 100 MILLIGRAM(S): 1 INJECTION, POWDER, FOR SOLUTION INTRAMUSCULAR; INTRAVENOUS at 00:49

## 2022-04-26 NOTE — H&P ADULT - NSICDXPASTMEDICALHX_GEN_ALL_CORE_FT
PAST MEDICAL HISTORY:  Diabetes mellitus     Gout     HTN (hypertension)     Stage 2 chronic kidney disease

## 2022-04-26 NOTE — PHYSICAL THERAPY INITIAL EVALUATION ADULT - GENERAL OBSERVATIONS, REHAB EVAL
[id number] Patient encountered in stretcher + IV , + peg , + trach with vent , son at bedside serving as interpretor

## 2022-04-26 NOTE — H&P ADULT - ATTENDING COMMENTS
71 year old male with a PMhx of parkinson's disease with dementia requiting trach (now with trach collar) and PEG, CKD II, DLD, HTN, gout, DMII, 1st degree AV block and hx of fungemia. Presenting with Encephalopathy. Unclear etiology at this time and could be multifactorial (infection, metabolic encephalopathy)    Labs and scans: personally reviewed.     Physical exam:   General: not in distress  HEENT: ATNC  LUNGS: CTAB, no wheezing, rales, rhonchi, trach to vent   HEART: RRR, +S1,S2  ABDOMEN: NDNT x 4 q's  EXT: Warm, well perfused    NEURO: awake, alert to self and surroundings     Plan:     # Toxic metabolic encephalopathy - infectious   - ESR>140   - f/u bcx  - obtain UA and Ucx  - antibiotics: Vancomycine + cefepime  - if not improvement in clinical status must consider other source of infection (r/o meningitis vs osteomyelitis)  - ID eval     # stool burden on CT scan   - aggressive bowel regiment (miralex q6hrs via PEG) + taper water enema x1    # elevated troponin - likely type II MI   - r/o ACS? Patient not complaining of chest pain and EKG showing no acute ST changes  - trend trops    # dementia   # chronic hypoxic respiratory failure - trach to vent   # dysphagia - s/p PEG   # malnutrition and low BMI  - continue vent at current settings   - PEG feeds  - free water after feeds  - continue Rasagiline and Entacapone     # CKD II  - cr at baseline  - monitor cr  - avoid nephrotoxic drugs    # DLD   # HTN  # DMII?  - check A1c    # gout  # hx of 1st degree AV block   # hx of fungemia.     # DVT Prophylaxis: lovenox

## 2022-04-26 NOTE — PHYSICAL THERAPY INITIAL EVALUATION ADULT - IMPAIRMENTS CONTRIBUTING IMPAIRED BED MOBILITY, REHAB EVAL
decreased flexibility/impaired motor control Mohs Histo Method Verbiage: Each section was then chromacoded and processed in the Mohs lab using the Mohs protocol and submitted for frozen section.

## 2022-04-26 NOTE — ED ADULT NURSE REASSESSMENT NOTE - NS ED NURSE REASSESS COMMENT FT1
as per family at bedside, patient was brought in for change in mental status. patient is trached to vent. no signs or symptoms of pain or discomfort noted.

## 2022-04-26 NOTE — PHYSICAL THERAPY INITIAL EVALUATION ADULT - PERTINENT HX OF CURRENT PROBLEM, REHAB EVAL
71 year old male with a PMhx of parkinson's disease with dementia requiting trach (now with trach collar) and PEG, CKD II, DLD, HTN, gout, DMII, 1st degree AV block and hx of fungemia.

## 2022-04-26 NOTE — H&P ADULT - ASSESSMENT
71 year old male with a PMhx of parkinson's disease with dementia requiting trach (now with trach collar) and PEG, CKD II, DLD, HTN, gout, DMII, 1st degree AV block and hx of fungemia.   Presenting with AMS. Unclear etiology at this time and could be multifactorial (infection, metabolic encephalopathy)    # r/o underlying infection   # mastoiditis   ESR>140   - f/u bcx  - obtain UA and Ucx  - antibiotics:  - if not improvement in clinical status must consider other source of infection (r/o meningitis)     # abdominal pain with heavy stool burden  - aggressive bowel regiment (miralex q6hrs via PEG) + taper water enema x1  - avoid opioids if possible     # elevated troponin  r/o ACS?  - trend trops    # metabolic alkalosis  - check uCl and Uca    # mild hypercalcemia with mild elevated alk-p  could be due to immobilization      # dementia requiting trach (now with trach collar) and PEG,     # CKD II  - cr at baseline  - monitor cr  - avoid nephrotoxic drugs    # DLD   # HTN  # DMII?  - check A1c    # gout  # hx of 1st degree AV block   # hx of fungemia.      71 year old male with a PMhx of parkinson's disease with dementia requiting trach (now with trach collar) and PEG, CKD II, DLD, HTN, gout, DMII, 1st degree AV block and hx of fungemia.   Presenting with AMS. Unclear etiology at this time and could be multifactorial (infection, metabolic encephalopathy)    # r/o underlying infection   # mastoiditis.   ESR>140   - f/u bcx  - obtain UA and Ucx  - antibiotics: Vancomycine + cefepime  - if not improvement in clinical status must consider other source of infection (r/o meningitis vs osteomyelitis)  - ID eval     # abdominal pain with heavy stool burden  - aggressive bowel regiment (miralex q6hrs via PEG) + taper water enema x1  - avoid opioids if possible     # elevated troponin  r/o ACS?  - trend trops    # metabolic alkalosis  - check uCl and Uca    # mild hypercalcemia with mild elevated alk-p  could be due to immobilization      # dementia requiting trach (now with trach collar) and PEG,     # CKD II  - cr at baseline  - monitor cr  - avoid nephrotoxic drugs    # DLD   # HTN  # DMII?  - check A1c    # gout  # hx of 1st degree AV block   # hx of fungemia.     #Misc  - DVT Prophylaxis: lovenox  - GI Prophylaxis: pantoprazole 40mg PO QD   - Diet: PEG feeds  - Activity: as tolerated  - Code Status: Full Code    SOCIAL: patient lives alone, walks independently, comfortable with ADLs.    * MED REC COMPLETED WITH PATIENT*    Nephrologist:   Cardiologist:    PCP:    Pharmacy:      71 year old male with a PMhx of parkinson's disease with dementia requiting trach (now with trach collar) and PEG, CKD II, DLD, HTN, gout, DMII, 1st degree AV block and hx of fungemia.   Presenting with AMS. Unclear etiology at this time and could be multifactorial (infection, metabolic encephalopathy)    # r/o underlying infection   # mastoiditis.   ESR>140   - f/u bcx  - obtain UA and Ucx  - antibiotics: Vancomycine + cefepime  - if not improvement in clinical status must consider other source of infection (r/o meningitis vs osteomyelitis)  - ID eval     # abdominal pain with heavy stool burden  - aggressive bowel regiment (miralex q6hrs via PEG) + taper water enema x1  - avoid opioids if possible     # elevated troponin  r/o ACS? Patient not complaining of chest pain and EKG showing no acute ST changes  - trend trops    # metabolic alkalosis  - check uCl and Uca    # mild hypercalcemia with mild elevated alk-p  could be due to immobilization      # dementia requiting trach (now with trach collar) and PEG,     # CKD II  - cr at baseline  - monitor cr  - avoid nephrotoxic drugs    # DLD   # HTN  # DMII?  - check A1c    # gout  # hx of 1st degree AV block   # hx of fungemia.     #Misc  - DVT Prophylaxis: lovenox  - GI Prophylaxis: pantoprazole 40mg PO QD   - Diet: PEG feeds  - Activity: as tolerated  - Code Status: Full Code    * MED REC COMPLETED WITH NH chart*     71 year old male with a PMhx of parkinson's disease with dementia requiting trach (now with trach collar) and PEG, CKD II, DLD, HTN, gout, DMII, 1st degree AV block and hx of fungemia.   Presenting with AMS. Unclear etiology at this time and could be multifactorial (infection, metabolic encephalopathy)    # r/o underlying infection   # mastoiditis.   ESR>140   - f/u bcx  - obtain UA and Ucx  - antibiotics: Vancomycine + cefepime  - if not improvement in clinical status must consider other source of infection (r/o meningitis vs osteomyelitis)  - ID eval     # abdominal pain with heavy stool burden  - aggressive bowel regiment (miralex q6hrs via PEG) + taper water enema x1  - avoid opioids if possible     # elevated troponin  r/o ACS? Patient not complaining of chest pain and EKG showing no acute ST changes  - trend trops    # metabolic alkalosis  - check uCl and Uca    # mild hypercalcemia with mild elevated alk-p  could be due to immobilization      # dementia requiting trach (now with trach collar) and PEG,   # malnutrition and low BMI  - PEG feeds  - free water after feeds  - continue Rasagiline and Entacapone     # CKD II  - cr at baseline  - monitor cr  - avoid nephrotoxic drugs    # DLD   # HTN  # DMII?  - check A1c    # gout  # hx of 1st degree AV block   # hx of fungemia.     #Misc  - DVT Prophylaxis: lovenox  - GI Prophylaxis: pantoprazole 40mg PO QD   - Diet: PEG feeds  - Activity: as tolerated  - Code Status: Full Code    * MED REC COMPLETED WITH NH chart*

## 2022-04-26 NOTE — H&P ADULT - HISTORY OF PRESENT ILLNESS
71 year old male with a PMhx of parkinson's disease with dementia requiting trach (now with trach collar) and PEG, CKD II, DLD, HTN, gout, DMII, 1st degree AV block and hx of fungemia    CC: lethargy     History goes back to:     ROS is positive for:     ROS is negative for:     Of note:    In the ED: VS WNL  Labs significant for: Hb:8.8 (chronic), ESR>140, Albumin:2.7  VBG: consistent primarily with metabolic alkalosis   Troponin: 0.08  EKG:   CT head: no acute intracranial pathology. CT suggestive of otomastoiditis  CT abdomen: mild- moderate bilateral pleural effusions, heavy colonic stool burden, otherwise no acute pathology   71 year old male with a PMhx of parkinson's disease with dementia requiting trach (now with trach collar) and PEG, CKD II, DLD, HTN, gout, DMII, 1st degree AV block and hx of fungemia.   History obtained from patients son at bedside. On my exam, patient is Hungarian speaking, responds to yes/no questions with eye movements/head nodding (this is his baseline). Patient is coming from NH    CC: lethargy     History goes back to: 1 day prior to admission when patients family noticed that he was lethargic, less responsive than usual. On questioning he reported abdominal pain. Of note patient takes oxycodone for pain and was recently started on Fe-supplements for anemia.    ROS is negative for: no fever, no chills, no SOB, chest pains or palpitations no nausea or vomiting. Family does not know when his last bowel movement was.      In the ED: VS WNL  Labs significant for: Hb:8.8 (chronic), ESR>140, Albumin:2.7  VBG: consistent primarily with metabolic alkalosis   Troponin: 0.08  EKG: NSR with 1st degree AV block   CT head: no acute intracranial pathology. CT suggestive of otomastoiditis  CT abdomen: mild- moderate bilateral pleural effusions, heavy colonic stool burden, otherwise no acute pathology

## 2022-04-26 NOTE — H&P ADULT - NSHPPHYSICALEXAM_GEN_ALL_CORE
Physical Exam:  General: NAD,   Neurology: A&Ox3, nonfocal, follows commands  Eyes: PERRLA/ EOMI  ENT/Neck: Neck supple, trachea midline, No JVD  Respiratory: B/L basilar rales, No wheezing, rhonchi  Cardiovascular: Normal rate regular rhythm, S1S2, no murmurs, rubs or gallops  Abdominal: Soft, non-tender, non-distended  Extremities: no pedal edema, + peripheral pulses  Musculoskeletal: 5/5 BUE and BLE muscle strength  Skin: No Rashes, Hematoma, Ecchymosis Physical Exam:  General: NAD,   Neurology: A&Ox3,   Eyes: PERRLA/ EOMI  ENT/Neck: Neck supple, trachea midline, No JVD  Respiratory: B/L basilar rales, No wheezing, rhonchi  Cardiovascular: Normal rate regular rhythm, S1S2, no murmurs, rubs or gallops  Abdominal: Soft, non-tender, non-distended  Extremities: no pedal edema, + peripheral pulses  Musculoskeletal: unable to evaluate, contracted BLE   Skin: No Rashes, Hematoma, Ecchymosis

## 2022-04-27 LAB
A1C WITH ESTIMATED AVERAGE GLUCOSE RESULT: 4.2 % — SIGNIFICANT CHANGE UP (ref 4–5.6)
ALBUMIN SERPL ELPH-MCNC: 2.4 G/DL — LOW (ref 3.5–5.2)
ALP SERPL-CCNC: 147 U/L — HIGH (ref 30–115)
ALT FLD-CCNC: 40 U/L — SIGNIFICANT CHANGE UP (ref 0–41)
ANION GAP SERPL CALC-SCNC: 10 MMOL/L — SIGNIFICANT CHANGE UP (ref 7–14)
AST SERPL-CCNC: 31 U/L — SIGNIFICANT CHANGE UP (ref 0–41)
BASOPHILS # BLD AUTO: 0.03 K/UL — SIGNIFICANT CHANGE UP (ref 0–0.2)
BASOPHILS NFR BLD AUTO: 0.5 % — SIGNIFICANT CHANGE UP (ref 0–1)
BILIRUB SERPL-MCNC: 0.4 MG/DL — SIGNIFICANT CHANGE UP (ref 0.2–1.2)
BUN SERPL-MCNC: 35 MG/DL — HIGH (ref 10–20)
CALCIUM SERPL-MCNC: 8.4 MG/DL — LOW (ref 8.5–10.1)
CHLORIDE SERPL-SCNC: 101 MMOL/L — SIGNIFICANT CHANGE UP (ref 98–110)
CO2 SERPL-SCNC: 25 MMOL/L — SIGNIFICANT CHANGE UP (ref 17–32)
CREAT SERPL-MCNC: 0.8 MG/DL — SIGNIFICANT CHANGE UP (ref 0.7–1.5)
EGFR: 94 ML/MIN/1.73M2 — SIGNIFICANT CHANGE UP
EOSINOPHIL # BLD AUTO: 0.06 K/UL — SIGNIFICANT CHANGE UP (ref 0–0.7)
EOSINOPHIL NFR BLD AUTO: 0.9 % — SIGNIFICANT CHANGE UP (ref 0–8)
ESTIMATED AVERAGE GLUCOSE: 74 MG/DL — SIGNIFICANT CHANGE UP (ref 68–114)
GLUCOSE SERPL-MCNC: 127 MG/DL — HIGH (ref 70–99)
HCT VFR BLD CALC: 27.6 % — LOW (ref 42–52)
HCV AB S/CO SERPL IA: 0.04 COI — SIGNIFICANT CHANGE UP
HCV AB SERPL-IMP: SIGNIFICANT CHANGE UP
HGB BLD-MCNC: 8.8 G/DL — LOW (ref 14–18)
IMM GRANULOCYTES NFR BLD AUTO: 0.5 % — HIGH (ref 0.1–0.3)
LYMPHOCYTES # BLD AUTO: 0.62 K/UL — LOW (ref 1.2–3.4)
LYMPHOCYTES # BLD AUTO: 9.7 % — LOW (ref 20.5–51.1)
MCHC RBC-ENTMCNC: 28.3 PG — SIGNIFICANT CHANGE UP (ref 27–31)
MCHC RBC-ENTMCNC: 31.9 G/DL — LOW (ref 32–37)
MCV RBC AUTO: 88.7 FL — SIGNIFICANT CHANGE UP (ref 80–94)
MONOCYTES # BLD AUTO: 0.55 K/UL — SIGNIFICANT CHANGE UP (ref 0.1–0.6)
MONOCYTES NFR BLD AUTO: 8.6 % — SIGNIFICANT CHANGE UP (ref 1.7–9.3)
NEUTROPHILS # BLD AUTO: 5.08 K/UL — SIGNIFICANT CHANGE UP (ref 1.4–6.5)
NEUTROPHILS NFR BLD AUTO: 79.8 % — HIGH (ref 42.2–75.2)
NRBC # BLD: 0 /100 WBCS — SIGNIFICANT CHANGE UP (ref 0–0)
PLATELET # BLD AUTO: 231 K/UL — SIGNIFICANT CHANGE UP (ref 130–400)
POTASSIUM SERPL-MCNC: 4.2 MMOL/L — SIGNIFICANT CHANGE UP (ref 3.5–5)
POTASSIUM SERPL-SCNC: 4.2 MMOL/L — SIGNIFICANT CHANGE UP (ref 3.5–5)
PROT SERPL-MCNC: 7.3 G/DL — SIGNIFICANT CHANGE UP (ref 6–8)
RBC # BLD: 3.11 M/UL — LOW (ref 4.7–6.1)
RBC # FLD: 14.5 % — SIGNIFICANT CHANGE UP (ref 11.5–14.5)
SODIUM SERPL-SCNC: 136 MMOL/L — SIGNIFICANT CHANGE UP (ref 135–146)
TROPONIN T SERPL-MCNC: 0.12 NG/ML — CRITICAL HIGH
WBC # BLD: 6.37 K/UL — SIGNIFICANT CHANGE UP (ref 4.8–10.8)
WBC # FLD AUTO: 6.37 K/UL — SIGNIFICANT CHANGE UP (ref 4.8–10.8)

## 2022-04-27 PROCEDURE — 99233 SBSQ HOSP IP/OBS HIGH 50: CPT

## 2022-04-27 RX ORDER — SODIUM CHLORIDE 9 MG/ML
1000 INJECTION INTRAMUSCULAR; INTRAVENOUS; SUBCUTANEOUS
Refills: 0 | Status: DISCONTINUED | OUTPATIENT
Start: 2022-04-27 | End: 2022-04-27

## 2022-04-27 RX ADMIN — Medication 166.67 MILLIGRAM(S): at 06:43

## 2022-04-27 RX ADMIN — ALBUTEROL 2 PUFF(S): 90 AEROSOL, METERED ORAL at 04:09

## 2022-04-27 RX ADMIN — ENTACAPONE 200 MILLIGRAM(S): 200 TABLET, FILM COATED ORAL at 17:19

## 2022-04-27 RX ADMIN — POLYETHYLENE GLYCOL 3350 17 GRAM(S): 17 POWDER, FOR SOLUTION ORAL at 11:30

## 2022-04-27 RX ADMIN — CEFEPIME 100 MILLIGRAM(S): 1 INJECTION, POWDER, FOR SOLUTION INTRAMUSCULAR; INTRAVENOUS at 07:37

## 2022-04-27 RX ADMIN — Medication 500 MILLIGRAM(S): at 11:31

## 2022-04-27 RX ADMIN — POLYETHYLENE GLYCOL 3350 17 GRAM(S): 17 POWDER, FOR SOLUTION ORAL at 06:44

## 2022-04-27 RX ADMIN — ATORVASTATIN CALCIUM 40 MILLIGRAM(S): 80 TABLET, FILM COATED ORAL at 22:13

## 2022-04-27 RX ADMIN — ENOXAPARIN SODIUM 40 MILLIGRAM(S): 100 INJECTION SUBCUTANEOUS at 11:32

## 2022-04-27 RX ADMIN — CEFEPIME 100 MILLIGRAM(S): 1 INJECTION, POWDER, FOR SOLUTION INTRAMUSCULAR; INTRAVENOUS at 22:12

## 2022-04-27 RX ADMIN — Medication 166.67 MILLIGRAM(S): at 17:23

## 2022-04-27 RX ADMIN — CEFEPIME 100 MILLIGRAM(S): 1 INJECTION, POWDER, FOR SOLUTION INTRAMUSCULAR; INTRAVENOUS at 14:13

## 2022-04-27 RX ADMIN — POLYETHYLENE GLYCOL 3350 17 GRAM(S): 17 POWDER, FOR SOLUTION ORAL at 17:18

## 2022-04-27 RX ADMIN — RASAGILINE 0.5 MILLIGRAM(S): 0.5 TABLET ORAL at 11:31

## 2022-04-27 NOTE — CONSULT NOTE ADULT - SUBJECTIVE AND OBJECTIVE BOX
NIEVES LABOY  71y, Male  Allergy: No Known Allergies    CHIEF COMPLAINT:   HPI:  HPI:  71 year old male with a PMhx of parkinson's disease with dementia requiting trach (now with trach collar) and PEG, CKD II, DLD, HTN, gout, DMII, 1st degree AV block and hx of fungemia.   History obtained from patients son at bedside. On my exam, patient is Chinese speaking, responds to yes/no questions with eye movements/head nodding (this is his baseline). Patient is coming from NH    CC: lethargy     History goes back to: 1 day prior to admission when patients family noticed that he was lethargic, less responsive than usual. On questioning he reported abdominal pain. Of note patient takes oxycodone for pain and was recently started on Fe-supplements for anemia.    ROS is negative for: no fever, no chills, no SOB, chest pains or palpitations no nausea or vomiting. Family does not know when his last bowel movement was.      In the ED: VS WNL  Labs significant for: Hb:8.8 (chronic), ESR>140, Albumin:2.7  VBG: consistent primarily with metabolic alkalosis   Troponin: 0.08  EKG: NSR with 1st degree AV block   CT head: no acute intracranial pathology. CT suggestive of otomastoiditis  CT abdomen: mild- moderate bilateral pleural effusions, heavy colonic stool burden, otherwise no acute pathology   (26 Apr 2022 08:32)      Infectious Diseases History:  Old Micro Data/Cultures:     FAMILY HISTORY:  No pertinent family history in first degree relatives      PAST MEDICAL & SURGICAL HISTORY:  HTN (hypertension)    Gout    Diabetes mellitus    Stage 2 chronic kidney disease    PEG (percutaneous endoscopic gastrostomy) status        SOCIAL HISTORY  Social History:  bed bound  non-verbal  incontinent - uses diaper (26 Apr 2022 08:32)      Recent Travel:  Other Exposures:     ROS  Unable to be performed. Pt nonverbal    VITALS:  T(F): 98.9, Max: 99.5 (04-27-22 @ 03:30)  HR: 71  BP: 118/58  RR: 18Vital Signs Last 24 Hrs  T(C): 37.2 (27 Apr 2022 08:03), Max: 37.5 (27 Apr 2022 03:30)  T(F): 98.9 (27 Apr 2022 08:03), Max: 99.5 (27 Apr 2022 03:30)  HR: 71 (27 Apr 2022 08:03) (64 - 78)  BP: 118/58 (27 Apr 2022 08:03) (112/56 - 127/69)  BP(mean): --  RR: 18 (27 Apr 2022 08:03) (18 - 20)  SpO2: 99% (27 Apr 2022 08:03) (96% - 100%)    PHYSICAL EXAM:  Gen: ill-appearing, diaphoretic male  HEENT: Normocephalic, atraumatic, thick colored secretions noted on lips and in ETT. No mastoid tenderness or erythema, however some edema noted on left >right posteriorly to the mastoid process  Neck: supple, no lymphadenopathy  CV: Regular rate & regular rhythm  Lungs: bibasilar crackles, no wheeze, ETT in place connected to vent  Abdomen: Soft, BS present, PEG tube in place  Ext: Warm, well perfused  Neuro: non focal, awake. Negative Kernig sign. Unable to fully perform Brudzinski due to trach however has some ROM without obvious nuchal rigidity  Skin: no rash, no lesions  Lines: no phlebitis    TESTS & MEASUREMENTS:                        8.8    6.37  )-----------( 231      ( 27 Apr 2022 07:00 )             27.6     04-27    136  |  101  |  35<H>  ----------------------------<  127<H>  4.2   |  25  |  0.8    Ca    8.4<L>      27 Apr 2022 07:00  Phos  3.2     04-25  Mg     2.0     04-25    TPro  7.3  /  Alb  2.4<L>  /  TBili  0.4  /  DBili  x   /  AST  31  /  ALT  40  /  AlkPhos  147<H>  04-27      LIVER FUNCTIONS - ( 27 Apr 2022 07:00 )  Alb: 2.4 g/dL / Pro: 7.3 g/dL / ALK PHOS: 147 U/L / ALT: 40 U/L / AST: 31 U/L / GGT: x           Urinalysis Basic - ( 26 Apr 2022 08:36 )    Color: Yellow / Appearance: Clear / SG: >1.050 / pH: x  Gluc: x / Ketone: Negative  / Bili: Negative / Urobili: <2 mg/dL   Blood: x / Protein: 30 mg/dL / Nitrite: Negative   Leuk Esterase: Negative / RBC: 3 /HPF / WBC 1 /HPF   Sq Epi: x / Non Sq Epi: 1 /HPF / Bacteria: 0.0        Culture - Blood (collected 04-25-22 @ 19:37)  Source: .Blood Blood  Preliminary Report (04-27-22 @ 01:02):    No growth to date.    Culture - Blood (collected 04-25-22 @ 19:37)  Source: .Blood Blood  Preliminary Report (04-27-22 @ 01:02):    No growth to date.        Lactate, Blood: 1.0 mmol/L (04-25-22 @ 19:07)      INFECTIOUS DISEASES TESTING      RADIOLOGY & ADDITIONAL TESTS:  I have personally reviewed the last available Chest xray  CXR      CT  CT Abdomen and Pelvis w/ IV Cont:   ACC: [id number] EXAM:  CT ABDOMEN AND PELVIS IC                          PROCEDURE DATE:  04/25/2022          INTERPRETATION:  CLINICAL HISTORY: Abdominal pain.    TECHNIQUE: Contiguous axial CT images were obtained from the lower chest   to the pubic symphysis following administration of intravenous contrast.   Oral contrast was not administered. Reformatted images in the coronal and   sagittal planes were acquired.    COMPARISON: None.      FINDINGS:    Images are partially degraded by beam hardening artifact secondary to the   patient's adducted upper extremities    LOWER CHEST: Partially imaged bilateral small to moderate pleural   effusions and overlying atelectasis. Cardiomegaly    HEPATOBILIARY: Indeterminate 2 cm right hepatic lobe peripherally versus   capsular hypodensity. Contracted gallbladder    SPLEEN: Unremarkable.    PANCREAS: Unremarkable.    ADRENAL GLANDS: Unremarkable.    KIDNEYS: No hydronephrosis bilaterally. Symmetric nephrograms.    ABDOMINOPELVIC NODES: No lymphadenopathy.    PELVIC ORGANS: Unremarkable.    PERITONEUM/MESENTERY/BOWEL: Large colonic stool load. Marked   hyperattenuation of the distal colonic stool may reflect retained enteric   contrast from a preceding examination. No pneumoperitoneum or   abdominopelvic free fluid. The appendix is not definitely seen. However,   there is no evidence of pericecal inflammation. Gastrostomy tube balloon   within the stomach.    BONES/SOFT TISSUES: No acute osseous abnormality. Diffuse osteopenia and   degenerative changes.    OTHER: Ectatic dilatation of the infrarenal abdominal aorta to 3.1 cm.   Calcific atherosclerosis.      IMPRESSION:    1.  Large colonic stool load. No CT evidence of acute inflammatory   abdominopelvic pathology.    2.  Partially imaged bilateral small to moderate pleural effusions.    3.  Indeterminate 2 cm right hepatic lobe hypodensity. Further evaluation   with nonemergent contrast-enhanced MRI is recommended.    --- End of Report ---          LILIA SZYMANSKI MD; Resident Radiologist  This document has been electronically signed.  OWEN MCCABE MD; Attending Radiologist  This document has been electronically signed. Apr 26 2022  1:23AM (04-25-22 @ 23:01)      CARDIOLOGY TESTING  12 Lead ECG:   Ventricular Rate 58 BPM    Atrial Rate 58 BPM    P-R Interval 238 ms    QRS Duration 70 ms    Q-T Interval 414 ms    QTC Calculation(Bazett) 406 ms    P Axis 46 degrees    R Axis 18 degrees    T Axis 35 degrees    Diagnosis Line Sinus bradycardia with 1st degree A-V block  Otherwise normal ECG    Confirmed by Shikha Chairez MD ([id number]) on 4/26/2022 9:18:00 AM (04-25-22 @ 21:15)      All available historical records have been reviewed    MEDICATIONS  ascorbic acid 500  atorvastatin 40  cefepime   IVPB 2000  chlorhexidine 4% Liquid 1  enoxaparin Injectable 40  entacapone 200  pantoprazole    Tablet 40  polyethylene glycol 3350 Oral Powder - Peds 17  rasagiline Tablet 0.5  vancomycin  IVPB 1250      ANTIBIOTICS:  cefepime   IVPB 2000 milliGRAM(s) IV Intermittent every 8 hours  vancomycin  IVPB 1250 milliGRAM(s) IV Intermittent every 12 hours      All available historical data has been reviewed    ASSESSMENT  71yMale with a PMhx of parkinson's disease with dementia requiting trach (now with trach collar) and PEG, CKD II, DLD, HTN, gout, DMII, 1st degree AV block and hx of fungemia with AMS/lethargy with thick secretions, b/l otomastoiditis L>R, bibasilar opacities/effusions, increased stool burden on CT without fevers or leukocytosis, sepsis ruled out on admission.     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  # Hx Parkinson's Disease  # Hx s/p trach and PEG    RECOMMENDATIONS  - obtain sputum - gram stain and cultures  - obtain bcx  - continue IV Vancomycin 1.25 g q12h  - continue IV Cefepime 2g q8h  - relieve large stool burden with laxatives - may need disimpaction   - can consider ENT consult for otomastoiditis  - unlikely meningitis, however if AMS continues and patient not improving after relieving stool burden can consider LP      NIEVES LABOY  71y, Male  Allergy: No Known Allergies    CHIEF COMPLAINT:   HPI:  HPI:  71 year old male with a PMhx of parkinson's disease with dementia requiting trach (now with trach collar) and PEG, CKD II, DLD, HTN, gout, DMII, 1st degree AV block and hx of fungemia.   History obtained from patients son at bedside. On my exam, patient is English speaking, responds to yes/no questions with eye movements/head nodding (this is his baseline). Patient is coming from NH    CC: lethargy     History goes back to: 1 day prior to admission when patients family noticed that he was lethargic, less responsive than usual. On questioning he reported abdominal pain. Of note patient takes oxycodone for pain and was recently started on Fe-supplements for anemia.    ROS is negative for: no fever, no chills, no SOB, chest pains or palpitations no nausea or vomiting. Family does not know when his last bowel movement was.      In the ED: VS WNL  Labs significant for: Hb:8.8 (chronic), ESR>140, Albumin:2.7  VBG: consistent primarily with metabolic alkalosis   Troponin: 0.08  EKG: NSR with 1st degree AV block   CT head: no acute intracranial pathology. CT suggestive of otomastoiditis  CT abdomen: mild- moderate bilateral pleural effusions, heavy colonic stool burden, otherwise no acute pathology   (26 Apr 2022 08:32)      Infectious Diseases History:  Old Micro Data/Cultures:     FAMILY HISTORY:  No pertinent family history in first degree relatives      PAST MEDICAL & SURGICAL HISTORY:  HTN (hypertension)    Gout    Diabetes mellitus    Stage 2 chronic kidney disease    PEG (percutaneous endoscopic gastrostomy) status        SOCIAL HISTORY  Social History:  bed bound  non-verbal  incontinent - uses diaper (26 Apr 2022 08:32)      Recent Travel:  Other Exposures:     ROS  Unable to be performed. Pt nonverbal    VITALS:  T(F): 98.9, Max: 99.5 (04-27-22 @ 03:30)  HR: 71  BP: 118/58  RR: 18Vital Signs Last 24 Hrs  T(C): 37.2 (27 Apr 2022 08:03), Max: 37.5 (27 Apr 2022 03:30)  T(F): 98.9 (27 Apr 2022 08:03), Max: 99.5 (27 Apr 2022 03:30)  HR: 71 (27 Apr 2022 08:03) (64 - 78)  BP: 118/58 (27 Apr 2022 08:03) (112/56 - 127/69)  BP(mean): --  RR: 18 (27 Apr 2022 08:03) (18 - 20)  SpO2: 99% (27 Apr 2022 08:03) (96% - 100%)    PHYSICAL EXAM:  Gen: ill-appearing, diaphoretic male  HEENT: Normocephalic, atraumatic, thick colored secretions noted on lips and in ETT. No mastoid tenderness or erythema, however some edema noted on left >right posteriorly to the mastoid process  Neck: supple, no lymphadenopathy  CV: Regular rate & regular rhythm  Lungs: bibasilar crackles, no wheeze, ETT in place connected to vent  Abdomen: Soft, BS present, PEG tube in place  Ext: Warm, well perfused  Neuro: non focal, awake. Negative Kernig sign. Unable to fully perform Brudzinski due to trach however has some ROM without obvious nuchal rigidity  Skin: no rash, no lesions  Lines: no phlebitis    TESTS & MEASUREMENTS:                        8.8    6.37  )-----------( 231      ( 27 Apr 2022 07:00 )             27.6     04-27    136  |  101  |  35<H>  ----------------------------<  127<H>  4.2   |  25  |  0.8    Ca    8.4<L>      27 Apr 2022 07:00  Phos  3.2     04-25  Mg     2.0     04-25    TPro  7.3  /  Alb  2.4<L>  /  TBili  0.4  /  DBili  x   /  AST  31  /  ALT  40  /  AlkPhos  147<H>  04-27      LIVER FUNCTIONS - ( 27 Apr 2022 07:00 )  Alb: 2.4 g/dL / Pro: 7.3 g/dL / ALK PHOS: 147 U/L / ALT: 40 U/L / AST: 31 U/L / GGT: x           Urinalysis Basic - ( 26 Apr 2022 08:36 )    Color: Yellow / Appearance: Clear / SG: >1.050 / pH: x  Gluc: x / Ketone: Negative  / Bili: Negative / Urobili: <2 mg/dL   Blood: x / Protein: 30 mg/dL / Nitrite: Negative   Leuk Esterase: Negative / RBC: 3 /HPF / WBC 1 /HPF   Sq Epi: x / Non Sq Epi: 1 /HPF / Bacteria: 0.0        Culture - Blood (collected 04-25-22 @ 19:37)  Source: .Blood Blood  Preliminary Report (04-27-22 @ 01:02):    No growth to date.    Culture - Blood (collected 04-25-22 @ 19:37)  Source: .Blood Blood  Preliminary Report (04-27-22 @ 01:02):    No growth to date.        Lactate, Blood: 1.0 mmol/L (04-25-22 @ 19:07)      INFECTIOUS DISEASES TESTING      RADIOLOGY & ADDITIONAL TESTS:  I have personally reviewed the last available Chest xray  CXR      CT  CT Abdomen and Pelvis w/ IV Cont:   ACC: [id number] EXAM:  CT ABDOMEN AND PELVIS IC                          PROCEDURE DATE:  04/25/2022          INTERPRETATION:  CLINICAL HISTORY: Abdominal pain.    TECHNIQUE: Contiguous axial CT images were obtained from the lower chest   to the pubic symphysis following administration of intravenous contrast.   Oral contrast was not administered. Reformatted images in the coronal and   sagittal planes were acquired.    COMPARISON: None.      FINDINGS:    Images are partially degraded by beam hardening artifact secondary to the   patient's adducted upper extremities    LOWER CHEST: Partially imaged bilateral small to moderate pleural   effusions and overlying atelectasis. Cardiomegaly    HEPATOBILIARY: Indeterminate 2 cm right hepatic lobe peripherally versus   capsular hypodensity. Contracted gallbladder    SPLEEN: Unremarkable.    PANCREAS: Unremarkable.    ADRENAL GLANDS: Unremarkable.    KIDNEYS: No hydronephrosis bilaterally. Symmetric nephrograms.    ABDOMINOPELVIC NODES: No lymphadenopathy.    PELVIC ORGANS: Unremarkable.    PERITONEUM/MESENTERY/BOWEL: Large colonic stool load. Marked   hyperattenuation of the distal colonic stool may reflect retained enteric   contrast from a preceding examination. No pneumoperitoneum or   abdominopelvic free fluid. The appendix is not definitely seen. However,   there is no evidence of pericecal inflammation. Gastrostomy tube balloon   within the stomach.    BONES/SOFT TISSUES: No acute osseous abnormality. Diffuse osteopenia and   degenerative changes.    OTHER: Ectatic dilatation of the infrarenal abdominal aorta to 3.1 cm.   Calcific atherosclerosis.      IMPRESSION:    1.  Large colonic stool load. No CT evidence of acute inflammatory   abdominopelvic pathology.    2.  Partially imaged bilateral small to moderate pleural effusions.    3.  Indeterminate 2 cm right hepatic lobe hypodensity. Further evaluation   with nonemergent contrast-enhanced MRI is recommended.    --- End of Report ---          LILIA SZYMANSKI MD; Resident Radiologist  This document has been electronically signed.  OWEN MCCABE MD; Attending Radiologist  This document has been electronically signed. Apr 26 2022  1:23AM (04-25-22 @ 23:01)      CARDIOLOGY TESTING  12 Lead ECG:   Ventricular Rate 58 BPM    Atrial Rate 58 BPM    P-R Interval 238 ms    QRS Duration 70 ms    Q-T Interval 414 ms    QTC Calculation(Bazett) 406 ms    P Axis 46 degrees    R Axis 18 degrees    T Axis 35 degrees    Diagnosis Line Sinus bradycardia with 1st degree A-V block  Otherwise normal ECG    Confirmed by Shikha Chairez MD ([id number]) on 4/26/2022 9:18:00 AM (04-25-22 @ 21:15)      All available historical records have been reviewed    MEDICATIONS  ascorbic acid 500  atorvastatin 40  cefepime   IVPB 2000  chlorhexidine 4% Liquid 1  enoxaparin Injectable 40  entacapone 200  pantoprazole    Tablet 40  polyethylene glycol 3350 Oral Powder - Peds 17  rasagiline Tablet 0.5  vancomycin  IVPB 1250      ANTIBIOTICS:  cefepime   IVPB 2000 milliGRAM(s) IV Intermittent every 8 hours  vancomycin  IVPB 1250 milliGRAM(s) IV Intermittent every 12 hours      All available historical data has been reviewed    ASSESSMENT  71yMale with a PMhx of parkinson's disease with dementia requiting trach (now with trach collar) and PEG, CKD II, DLD, HTN, gout, DMII, 1st degree AV block and hx of fungemia with AMS/lethargy with thick secretions, b/l otomastoiditis L>R, bibasilar opacities/effusions, increased stool burden on CT without fevers or leukocytosis, sepsis ruled out on admission.     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  # Hx Parkinson's Disease  # Hx s/p trach and PEG    RECOMMENDATIONS  - obtain sputum - gram stain and cultures   - obtain bcx  - ORSA nares, urine strep pneumoniae and legionella Ag  - continue IV Vancomycin 1.25 g q12h  - continue IV Cefepime 2g q8h  - relieve large stool burden with laxatives - may need disimpaction   - can consider ENT consult for otomastoiditis  - unlikely meningitis, however if AMS continues and patient not improving after relieving stool burden can consider LP      NIEVES LABOY  71y, Male  Allergy: No Known Allergies    CHIEF COMPLAINT:   HPI:  HPI:  71 year old male with a PMhx of parkinson's disease with dementia requiting trach (now with trach collar) and PEG, CKD II, DLD, HTN, gout, DMII, 1st degree AV block and hx of fungemia.   History obtained from patients son at bedside. On my exam, patient is Syriac speaking, responds to yes/no questions with eye movements/head nodding (this is his baseline). Patient is coming from NH    CC: lethargy     History goes back to: 1 day prior to admission when patients family noticed that he was lethargic, less responsive than usual. On questioning he reported abdominal pain. Of note patient takes oxycodone for pain and was recently started on Fe-supplements for anemia.    ROS is negative for: no fever, no chills, no SOB, chest pains or palpitations no nausea or vomiting. Family does not know when his last bowel movement was.      In the ED: VS WNL  Labs significant for: Hb:8.8 (chronic), ESR>140, Albumin:2.7  VBG: consistent primarily with metabolic alkalosis   Troponin: 0.08  EKG: NSR with 1st degree AV block   CT head: no acute intracranial pathology. CT suggestive of otomastoiditis  CT abdomen: mild- moderate bilateral pleural effusions, heavy colonic stool burden, otherwise no acute pathology   (26 Apr 2022 08:32)      Infectious Diseases History:  Old Micro Data/Cultures:     FAMILY HISTORY:  No pertinent family history in first degree relatives      PAST MEDICAL & SURGICAL HISTORY:  HTN (hypertension)    Gout    Diabetes mellitus    Stage 2 chronic kidney disease    PEG (percutaneous endoscopic gastrostomy) status        SOCIAL HISTORY  Social History:  bed bound  non-verbal  incontinent - uses diaper (26 Apr 2022 08:32)      Recent Travel:  Other Exposures:     ROS  Unable to be performed. Pt nonverbal    VITALS:  T(F): 98.9, Max: 99.5 (04-27-22 @ 03:30)  HR: 71  BP: 118/58  RR: 18Vital Signs Last 24 Hrs  T(C): 37.2 (27 Apr 2022 08:03), Max: 37.5 (27 Apr 2022 03:30)  T(F): 98.9 (27 Apr 2022 08:03), Max: 99.5 (27 Apr 2022 03:30)  HR: 71 (27 Apr 2022 08:03) (64 - 78)  BP: 118/58 (27 Apr 2022 08:03) (112/56 - 127/69)  BP(mean): --  RR: 18 (27 Apr 2022 08:03) (18 - 20)  SpO2: 99% (27 Apr 2022 08:03) (96% - 100%)    PHYSICAL EXAM:  Gen: ill-appearing, diaphoretic male  HEENT: Normocephalic, atraumatic, thick colored secretions noted on lips and in ETT. No mastoid tenderness or erythema, however some edema noted on left >right posteriorly to the mastoid process  Neck: supple, no lymphadenopathy  CV: Regular rate & regular rhythm  Lungs: bibasilar crackles, no wheeze, ETT in place connected to vent  Abdomen: Soft, BS present, PEG tube in place  Ext: Warm, well perfused  Neuro: non focal, awake. Negative Kernig sign. Unable to fully perform Brudzinski due to trach however has some ROM without obvious nuchal rigidity  Skin: no rash, no lesions  Lines: no phlebitis    TESTS & MEASUREMENTS:                        8.8    6.37  )-----------( 231      ( 27 Apr 2022 07:00 )             27.6     04-27    136  |  101  |  35<H>  ----------------------------<  127<H>  4.2   |  25  |  0.8    Ca    8.4<L>      27 Apr 2022 07:00  Phos  3.2     04-25  Mg     2.0     04-25    TPro  7.3  /  Alb  2.4<L>  /  TBili  0.4  /  DBili  x   /  AST  31  /  ALT  40  /  AlkPhos  147<H>  04-27      LIVER FUNCTIONS - ( 27 Apr 2022 07:00 )  Alb: 2.4 g/dL / Pro: 7.3 g/dL / ALK PHOS: 147 U/L / ALT: 40 U/L / AST: 31 U/L / GGT: x           Urinalysis Basic - ( 26 Apr 2022 08:36 )    Color: Yellow / Appearance: Clear / SG: >1.050 / pH: x  Gluc: x / Ketone: Negative  / Bili: Negative / Urobili: <2 mg/dL   Blood: x / Protein: 30 mg/dL / Nitrite: Negative   Leuk Esterase: Negative / RBC: 3 /HPF / WBC 1 /HPF   Sq Epi: x / Non Sq Epi: 1 /HPF / Bacteria: 0.0        Culture - Blood (collected 04-25-22 @ 19:37)  Source: .Blood Blood  Preliminary Report (04-27-22 @ 01:02):    No growth to date.    Culture - Blood (collected 04-25-22 @ 19:37)  Source: .Blood Blood  Preliminary Report (04-27-22 @ 01:02):    No growth to date.        Lactate, Blood: 1.0 mmol/L (04-25-22 @ 19:07)      INFECTIOUS DISEASES TESTING      RADIOLOGY & ADDITIONAL TESTS:  I have personally reviewed the last available Chest xray  CXR      CT  CT Abdomen and Pelvis w/ IV Cont:   ACC: [id number] EXAM:  CT ABDOMEN AND PELVIS IC                          PROCEDURE DATE:  04/25/2022          INTERPRETATION:  CLINICAL HISTORY: Abdominal pain.    TECHNIQUE: Contiguous axial CT images were obtained from the lower chest   to the pubic symphysis following administration of intravenous contrast.   Oral contrast was not administered. Reformatted images in the coronal and   sagittal planes were acquired.    COMPARISON: None.      FINDINGS:    Images are partially degraded by beam hardening artifact secondary to the   patient's adducted upper extremities    LOWER CHEST: Partially imaged bilateral small to moderate pleural   effusions and overlying atelectasis. Cardiomegaly    HEPATOBILIARY: Indeterminate 2 cm right hepatic lobe peripherally versus   capsular hypodensity. Contracted gallbladder    SPLEEN: Unremarkable.    PANCREAS: Unremarkable.    ADRENAL GLANDS: Unremarkable.    KIDNEYS: No hydronephrosis bilaterally. Symmetric nephrograms.    ABDOMINOPELVIC NODES: No lymphadenopathy.    PELVIC ORGANS: Unremarkable.    PERITONEUM/MESENTERY/BOWEL: Large colonic stool load. Marked   hyperattenuation of the distal colonic stool may reflect retained enteric   contrast from a preceding examination. No pneumoperitoneum or   abdominopelvic free fluid. The appendix is not definitely seen. However,   there is no evidence of pericecal inflammation. Gastrostomy tube balloon   within the stomach.    BONES/SOFT TISSUES: No acute osseous abnormality. Diffuse osteopenia and   degenerative changes.    OTHER: Ectatic dilatation of the infrarenal abdominal aorta to 3.1 cm.   Calcific atherosclerosis.      IMPRESSION:    1.  Large colonic stool load. No CT evidence of acute inflammatory   abdominopelvic pathology.    2.  Partially imaged bilateral small to moderate pleural effusions.    3.  Indeterminate 2 cm right hepatic lobe hypodensity. Further evaluation   with nonemergent contrast-enhanced MRI is recommended.    --- End of Report ---          LILIA SZYMANSKI MD; Resident Radiologist  This document has been electronically signed.  OWEN MCCABE MD; Attending Radiologist  This document has been electronically signed. Apr 26 2022  1:23AM (04-25-22 @ 23:01)      CARDIOLOGY TESTING  12 Lead ECG:   Ventricular Rate 58 BPM    Atrial Rate 58 BPM    P-R Interval 238 ms    QRS Duration 70 ms    Q-T Interval 414 ms    QTC Calculation(Bazett) 406 ms    P Axis 46 degrees    R Axis 18 degrees    T Axis 35 degrees    Diagnosis Line Sinus bradycardia with 1st degree A-V block  Otherwise normal ECG    Confirmed by Shikha Chairez MD ([id number]) on 4/26/2022 9:18:00 AM (04-25-22 @ 21:15)      All available historical records have been reviewed    MEDICATIONS  ascorbic acid 500  atorvastatin 40  cefepime   IVPB 2000  chlorhexidine 4% Liquid 1  enoxaparin Injectable 40  entacapone 200  pantoprazole    Tablet 40  polyethylene glycol 3350 Oral Powder - Peds 17  rasagiline Tablet 0.5  vancomycin  IVPB 1250      ANTIBIOTICS:  cefepime   IVPB 2000 milliGRAM(s) IV Intermittent every 8 hours  vancomycin  IVPB 1250 milliGRAM(s) IV Intermittent every 12 hours      All available historical data has been reviewed

## 2022-04-27 NOTE — PROGRESS NOTE ADULT - ASSESSMENT
71 year old male with a PMhx of parkinson's disease with dementia requiting trach (now with trach collar) and PEG, CKD II, DLD, HTN, gout, DMII, 1st degree AV block and hx of fungemia. Presenting with Encephalopathy possibly due to mastoiditis vs PNA    #Metabolic Encephalopathy  #Possibly due to mastoiditis, PNA, Sacral ulcer  #B/l Pleural effusions  ESR noted to be >140  Bcx 04/25: NGTD  CTAP (04/25) Large colonic stool load. No CT evidence of acute inflammatory   abdominopelvic pathology.  2.  Partially imaged bilateral small to moderate pleural effusions.  3.  Indeterminate 2 cm right hepatic lobe hypodensity. Further evaluation   with nonemergent contrast-enhanced MRI is recommended.  - Woundcare eval requested  - ID eval pending  - Cont vancomycin 1250mg q12h  - Cont cefepime 2g q8h  - f/u Ucx  - Echocardiogram ordered  - Bowel regimen    #HTN/HLD  - Currently not on BP meds  - Cont atorvastatin 40mg qHs    #Parkinson's disease with dementia  s/p trach/PEG  - Cont entacapone  - Cont rasaligine  - palliative eval    #DM2  HBa1c 5.1  - Monitor FS    #CKD Stage 2  - Monitor cr    DVT PPX, Lovenox    #Progress Note Handoff  Pending (specify): ID eval, woundcare eval, cont IV antibiotics  Disposition: NH

## 2022-04-27 NOTE — CONSULT NOTE ADULT - ATTENDING COMMENTS
I have personally seen and examined this patient.  I have fully participated in the care of this patient.  I have reviewed all pertinent clinical information, including history, physical exam, plan and note.  I have reviewed all pertinent clinical information and reviewed all relevant imaging and diagnostic studies personally.  Corrections and edits were made wherever needed.       #No sepsis on admission; metabolic encephalopathy  low suspicion for meningitis  +tracheal secretions- rule out tracheitis  Large stool burden- can be contributing to mental status  WBC 5  ESR > 120  CTH L mastoid opacification, cannot rule out mastoiditis- does not appear painful on exam- no erythema/edema    - plan as above      If any questions, please call or send a message on [employer] Teams  Please continue to update ID with any pertinent new laboratory or radiographic findings  Spectra [id number]

## 2022-04-27 NOTE — PROGRESS NOTE ADULT - SUBJECTIVE AND OBJECTIVE BOX
NIEVES LABOY  71y, Male  Allergy: No Known Allergies    Hospital Day: 1d    Patient seen and examined. No acute events overnight    PMH/PSH:  PAST MEDICAL & SURGICAL HISTORY:  HTN (hypertension)    Gout    Diabetes mellitus    Stage 2 chronic kidney disease    PEG (percutaneous endoscopic gastrostomy) status    VITALS:  T(F): 98.9 (04-27-22 @ 08:03), Max: 99.5 (04-27-22 @ 03:30)  HR: 71 (04-27-22 @ 08:03)  BP: 118/58 (04-27-22 @ 08:03) (112/56 - 132/63)  RR: 18 (04-27-22 @ 08:03)  SpO2: 99% (04-27-22 @ 08:03)    TESTS & MEASUREMENTS:  Weight (Kg): 81.6 (04-26-22 @ 07:25)  BMI (kg/m2): 28.2 (04-26)    04-26-22 @ 07:01  -  04-27-22 @ 07:00  --------------------------------------------------------  IN: 1500 mL / OUT: 750 mL / NET: 750 mL    04-27-22 @ 07:01  -  04-27-22 @ 12:28  --------------------------------------------------------  IN: 600 mL / OUT: 0 mL / NET: 600 mL                        8.8    6.37  )-----------( 231      ( 27 Apr 2022 07:00 )             27.6     04-27    136  |  101  |  35<H>  ----------------------------<  127<H>  4.2   |  25  |  0.8    Ca    8.4<L>      27 Apr 2022 07:00  Phos  3.2     04-25  Mg     2.0     04-25    TPro  7.3  /  Alb  2.4<L>  /  TBili  0.4  /  DBili  x   /  AST  31  /  ALT  40  /  AlkPhos  147<H>  04-27    LIVER FUNCTIONS - ( 27 Apr 2022 07:00 )  Alb: 2.4 g/dL / Pro: 7.3 g/dL / ALK PHOS: 147 U/L / ALT: 40 U/L / AST: 31 U/L / GGT: x           CARDIAC MARKERS ( 26 Apr 2022 12:00 )  x     / 0.10 ng/mL / x     / x     / x      CARDIAC MARKERS ( 25 Apr 2022 19:07 )  x     / 0.08 ng/mL / x     / x     / x        Culture - Blood (collected 04-25-22 @ 19:37)  Source: .Blood Blood  Preliminary Report (04-27-22 @ 01:02):    No growth to date.    Culture - Blood (collected 04-25-22 @ 19:37)  Source: .Blood Blood  Preliminary Report (04-27-22 @ 01:02):    No growth to date.    Urinalysis Basic - ( 26 Apr 2022 08:36 )    Color: Yellow / Appearance: Clear / SG: >1.050 / pH: x  Gluc: x / Ketone: Negative  / Bili: Negative / Urobili: <2 mg/dL   Blood: x / Protein: 30 mg/dL / Nitrite: Negative   Leuk Esterase: Negative / RBC: 3 /HPF / WBC 1 /HPF   Sq Epi: x / Non Sq Epi: 1 /HPF / Bacteria: 0.0    MEDICATIONS:  MEDICATIONS  (STANDING):  ALBUTerol    90 MICROgram(s) HFA Inhaler 2 Puff(s) Inhalation every 6 hours  ascorbic acid 500 milliGRAM(s) Oral daily  atorvastatin 40 milliGRAM(s) Oral at bedtime  cefepime   IVPB 2000 milliGRAM(s) IV Intermittent every 8 hours  chlorhexidine 4% Liquid 1 Application(s) Topical <User Schedule>  enoxaparin Injectable 40 milliGRAM(s) SubCutaneous every 24 hours  entacapone 200 milliGRAM(s) Oral two times a day  pantoprazole    Tablet 40 milliGRAM(s) Oral before breakfast  polyethylene glycol 3350 Oral Powder - Peds 17 Gram(s) Oral every 6 hours  rasagiline Tablet 0.5 milliGRAM(s) Oral daily  vancomycin  IVPB 1250 milliGRAM(s) IV Intermittent every 12 hours    MEDICATIONS  (PRN):  acetaminophen     Tablet .. 650 milliGRAM(s) Oral every 6 hours PRN Temp greater or equal to 38C (100.4F), Mild Pain (1 - 3)  aluminum hydroxide/magnesium hydroxide/simethicone Suspension 30 milliLiter(s) Oral every 4 hours PRN Dyspepsia  melatonin 3 milliGRAM(s) Oral at bedtime PRN Insomnia  ondansetron Injectable 4 milliGRAM(s) IV Push every 8 hours PRN Nausea and/or Vomiting  oxyCODONE    IR 5 milliGRAM(s) Oral every 8 hours PRN Moderate Pain (4 - 6)    HOME MEDICATIONS:  albuterol-ipratropium (04-26)  ascorbic acid 500 mg oral tablet (04-26)  atorvastatin 40 mg oral tablet (04-26)  entacapone 200 mg oral tablet (04-26)  ferrous sulfate (as elemental iron) 15 mg/1.5 mL oral liquid (04-26)  omeprazole 20 mg oral delayed release capsule (04-26)  oxyCODONE 5 mg oral tablet (04-26)  rasagiline 0.5 mg oral tablet (04-26)  Retacrit 4000 units/mL preservative-free injectable solution (04-26)  zinc sulfate 220 mg oral capsule (04-26)      REVIEW OF SYSTEMS:  All other review of systems is negative unless indicated above.     PHYSICAL EXAM:  PHYSICAL EXAM:  GENERAL: NAD,   HEAD:  Atraumatic, Normocephalic  NECK: Supple, Trach to vent  CHEST/LUNG: Bibasilar crackles  HEART: Regular rate and rhythm; No murmurs, rubs, or gallops  ABDOMEN: Soft, Nontender, Nondistended; Bowel sounds present  EXTREMITIES:  2+ Peripheral Pulses, No clubbing, cyanosis, or edema

## 2022-04-27 NOTE — CONSULT NOTE ADULT - ASSESSMENT
ASSESSMENT  71yMale with a PMhx of parkinson's disease with dementia requiting trach (now with trach collar) and PEG, CKD II, DLD, HTN, gout, DMII, 1st degree AV block and hx of fungemia with AMS/lethargy with thick secretions, b/l otomastoiditis L>R, bibasilar opacities/effusions, increased stool burden on CT without fevers or leukocytosis, sepsis ruled out on admission.     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

## 2022-04-27 NOTE — PROGRESS NOTE ADULT - SUBJECTIVE AND OBJECTIVE BOX
SUBJECTIVE:    Patient is a 71y old Male who presents with a chief complaint of ams/lethargy (27 Apr 2022 13:59)    Currently admitted to medicine with the primary diagnosis of Weakness    Today is hospital day 1d. This morning he is resting in bed. No overnight events.  Pt responding to the sternal rub- awakens upon stimulation.    PAST MEDICAL & SURGICAL HISTORY  HTN (hypertension)    Gout    Diabetes mellitus    Stage 2 chronic kidney disease    PEG (percutaneous endoscopic gastrostomy) status      ALLERGIES:  No Known Allergies    MEDICATIONS:  STANDING MEDICATIONS  ascorbic acid 500 milliGRAM(s) Oral daily  atorvastatin 40 milliGRAM(s) Oral at bedtime  cefepime   IVPB 2000 milliGRAM(s) IV Intermittent every 8 hours  chlorhexidine 4% Liquid 1 Application(s) Topical <User Schedule>  enoxaparin Injectable 40 milliGRAM(s) SubCutaneous every 24 hours  entacapone 200 milliGRAM(s) Oral two times a day  pantoprazole    Tablet 40 milliGRAM(s) Oral before breakfast  polyethylene glycol 3350 Oral Powder - Peds 17 Gram(s) Oral every 6 hours  rasagiline Tablet 0.5 milliGRAM(s) Oral daily  vancomycin  IVPB 1250 milliGRAM(s) IV Intermittent every 12 hours    PRN MEDICATIONS  acetaminophen     Tablet .. 650 milliGRAM(s) Oral every 6 hours PRN  aluminum hydroxide/magnesium hydroxide/simethicone Suspension 30 milliLiter(s) Oral every 4 hours PRN  melatonin 3 milliGRAM(s) Oral at bedtime PRN  ondansetron Injectable 4 milliGRAM(s) IV Push every 8 hours PRN  oxyCODONE    IR 5 milliGRAM(s) Oral every 8 hours PRN    VITALS:   T(F): 98.9  HR: 71  BP: 118/58  RR: 18  SpO2: 99%    LABS:                        8.8    6.37  )-----------( 231      ( 27 Apr 2022 07:00 )             27.6     04-27    136  |  101  |  35<H>  ----------------------------<  127<H>  4.2   |  25  |  0.8    Ca    8.4<L>      27 Apr 2022 07:00  Phos  3.2     04-25  Mg     2.0     04-25    TPro  7.3  /  Alb  2.4<L>  /  TBili  0.4  /  DBili  x   /  AST  31  /  ALT  40  /  AlkPhos  147<H>  04-27      Urinalysis Basic - ( 26 Apr 2022 08:36 )    Color: Yellow / Appearance: Clear / SG: >1.050 / pH: x  Gluc: x / Ketone: Negative  / Bili: Negative / Urobili: <2 mg/dL   Blood: x / Protein: 30 mg/dL / Nitrite: Negative   Leuk Esterase: Negative / RBC: 3 /HPF / WBC 1 /HPF   Sq Epi: x / Non Sq Epi: 1 /HPF / Bacteria: 0.0      ABG - ( 25 Apr 2022 20:48 )  pH, Arterial: 7.52  pH, Blood: x     /  pCO2: 39    /  pO2: 138   / HCO3: 32    / Base Excess: 8.3   /  SaO2: 100.0                 Culture - Blood (collected 25 Apr 2022 19:37)  Source: .Blood Blood  Preliminary Report (27 Apr 2022 01:02):    No growth to date.    Culture - Blood (collected 25 Apr 2022 19:37)  Source: .Blood Blood  Preliminary Report (27 Apr 2022 01:02):    No growth to date.      CARDIAC MARKERS ( 26 Apr 2022 12:00 )  x     / 0.10 ng/mL / x     / x     / x      CARDIAC MARKERS ( 25 Apr 2022 19:07 )  x     / 0.08 ng/mL / x     / x     / x          RADIOLOGY:  < from: CT Abdomen and Pelvis w/ IV Cont (04.25.22 @ 23:01) >  IMPRESSION:    1.  Large colonic stool load. No CT evidence of acute inflammatory   abdominopelvic pathology.    2.  Partially imaged bilateral small to moderate pleural effusions.    3.  Indeterminate 2 cm right hepatic lobe hypodensity. Further evaluation   with nonemergent contrast-enhanced MRI is recommended.    --- End of Report ---      < end of copied text >  < from: Xray Chest 1 View-PORTABLE IMMEDIATE (Xray Chest 1 View-PORTABLE IMMEDIATE .) (04.25.22 @ 22:08) >  Impression:    Bibasilar opacity/effusions.      < end of copied text >  < from: CT Head No Cont (04.25.22 @ 20:01) >  IMPRESSION:    No acute intracranial pathology.    Mild chronic microvascular ischemic changes.    Complete effusion of the left mastoid and middle ear cavity, as well as   partial effusion of the right mastoid. The finding is nonspecific but can   be seen with otomastoiditis.    < end of copied text >      PHYSICAL EXAM:  GEN: No acute distress,   LUNGS: course bilateral breath sounds, tracheostomy in place   HEART: Regular rate and rhythm, +s 1 s2  ABD: Soft, non-tender, non-distended.  EXT: b/l UE and LE extremities are contracted.   NEURO: awake, responsive to sternal rub.

## 2022-04-27 NOTE — PATIENT PROFILE ADULT - FALL HARM RISK - HARM RISK INTERVENTIONS

## 2022-04-27 NOTE — PROGRESS NOTE ADULT - ASSESSMENT
71 year old male with a PMhx of parkinson's disease with dementia requiting trach (now with trach collar) and PEG, CKD II, DLD, HTN, gout, DMII, 1st degree AV block and hx of fungemia. Presenting with Encephalopathy possibly due to mastoiditis vs PNA    #Metabolic Encephalopathy  #Possibly due to mastoiditis, PNA, Sacral ulcer  #B/l Pleural effusions  - ESR noted to be >140  - Bcx 04/25: NGTD  - CTAP (04/25) Large colonic stool load. No CT evidence of acute inflammatory abdominopelvic pathology. Partially imaged bilateral small to moderate pleural effusions.  Indeterminate 2 cm right hepatic lobe hypodensity. Further evaluation with nonemergent contrast-enhanced MRI is recommended.  - f/u Woundcare eval  - f/u ID   - Cont vancomycin 1250mg q12h  - Cont cefepime 2g q8h  - f/u Ucx, bcx, MRSA nasal PCR, urine strep+ legionella  - f/u ECHO    # heavy stool burden  - CTAP (04/25) Large colonic stool load.   - Bowel regimen: miralax,     #HTN/HLD  - Currently not on BP meds  - Cont atorvastatin 40mg qHs    #Parkinson's disease with dementia, s/p trach/PEG  - Cont entacapone  - Cont rasaligine  - palliative eval    #DM2  - HBa1c 5.1  - Monitor FS    #CKD Stage 2  - Monitor cr    DVT PPX, Lovenox    Pending: ID eval, woundcare eval, c/w IV antibiotics

## 2022-04-28 DIAGNOSIS — G20 PARKINSON'S DISEASE: ICD-10-CM

## 2022-04-28 DIAGNOSIS — Z51.5 ENCOUNTER FOR PALLIATIVE CARE: ICD-10-CM

## 2022-04-28 LAB
ALBUMIN SERPL ELPH-MCNC: 2.5 G/DL — LOW (ref 3.5–5.2)
ALP SERPL-CCNC: 140 U/L — HIGH (ref 30–115)
ALT FLD-CCNC: 37 U/L — SIGNIFICANT CHANGE UP (ref 0–41)
ANION GAP SERPL CALC-SCNC: 11 MMOL/L — SIGNIFICANT CHANGE UP (ref 7–14)
AST SERPL-CCNC: 28 U/L — SIGNIFICANT CHANGE UP (ref 0–41)
BASOPHILS # BLD AUTO: 0.05 K/UL — SIGNIFICANT CHANGE UP (ref 0–0.2)
BASOPHILS NFR BLD AUTO: 1 % — SIGNIFICANT CHANGE UP (ref 0–1)
BILIRUB SERPL-MCNC: 0.3 MG/DL — SIGNIFICANT CHANGE UP (ref 0.2–1.2)
BUN SERPL-MCNC: 31 MG/DL — HIGH (ref 10–20)
CALCIUM SERPL-MCNC: 8.6 MG/DL — SIGNIFICANT CHANGE UP (ref 8.5–10.1)
CHLORIDE SERPL-SCNC: 101 MMOL/L — SIGNIFICANT CHANGE UP (ref 98–110)
CO2 SERPL-SCNC: 24 MMOL/L — SIGNIFICANT CHANGE UP (ref 17–32)
CREAT SERPL-MCNC: 0.8 MG/DL — SIGNIFICANT CHANGE UP (ref 0.7–1.5)
CULTURE RESULTS: NO GROWTH — SIGNIFICANT CHANGE UP
EGFR: 94 ML/MIN/1.73M2 — SIGNIFICANT CHANGE UP
EOSINOPHIL # BLD AUTO: 0.18 K/UL — SIGNIFICANT CHANGE UP (ref 0–0.7)
EOSINOPHIL NFR BLD AUTO: 3.5 % — SIGNIFICANT CHANGE UP (ref 0–8)
GLUCOSE BLDC GLUCOMTR-MCNC: 109 MG/DL — HIGH (ref 70–99)
GLUCOSE BLDC GLUCOMTR-MCNC: 122 MG/DL — HIGH (ref 70–99)
GLUCOSE BLDC GLUCOMTR-MCNC: 135 MG/DL — HIGH (ref 70–99)
GLUCOSE SERPL-MCNC: 111 MG/DL — HIGH (ref 70–99)
HCT VFR BLD CALC: 27.5 % — LOW (ref 42–52)
HGB BLD-MCNC: 8.8 G/DL — LOW (ref 14–18)
IMM GRANULOCYTES NFR BLD AUTO: 0.6 % — HIGH (ref 0.1–0.3)
LYMPHOCYTES # BLD AUTO: 0.52 K/UL — LOW (ref 1.2–3.4)
LYMPHOCYTES # BLD AUTO: 10 % — LOW (ref 20.5–51.1)
MAGNESIUM SERPL-MCNC: 2.1 MG/DL — SIGNIFICANT CHANGE UP (ref 1.8–2.4)
MCHC RBC-ENTMCNC: 28.1 PG — SIGNIFICANT CHANGE UP (ref 27–31)
MCHC RBC-ENTMCNC: 32 G/DL — SIGNIFICANT CHANGE UP (ref 32–37)
MCV RBC AUTO: 87.9 FL — SIGNIFICANT CHANGE UP (ref 80–94)
MONOCYTES # BLD AUTO: 0.61 K/UL — HIGH (ref 0.1–0.6)
MONOCYTES NFR BLD AUTO: 11.7 % — HIGH (ref 1.7–9.3)
NEUTROPHILS # BLD AUTO: 3.81 K/UL — SIGNIFICANT CHANGE UP (ref 1.4–6.5)
NEUTROPHILS NFR BLD AUTO: 73.2 % — SIGNIFICANT CHANGE UP (ref 42.2–75.2)
NRBC # BLD: 0 /100 WBCS — SIGNIFICANT CHANGE UP (ref 0–0)
PLATELET # BLD AUTO: 291 K/UL — SIGNIFICANT CHANGE UP (ref 130–400)
POTASSIUM SERPL-MCNC: 4.4 MMOL/L — SIGNIFICANT CHANGE UP (ref 3.5–5)
POTASSIUM SERPL-SCNC: 4.4 MMOL/L — SIGNIFICANT CHANGE UP (ref 3.5–5)
PROT SERPL-MCNC: 7.3 G/DL — SIGNIFICANT CHANGE UP (ref 6–8)
RBC # BLD: 3.13 M/UL — LOW (ref 4.7–6.1)
RBC # FLD: 14.6 % — HIGH (ref 11.5–14.5)
SODIUM SERPL-SCNC: 136 MMOL/L — SIGNIFICANT CHANGE UP (ref 135–146)
SPECIMEN SOURCE: SIGNIFICANT CHANGE UP
TROPONIN T SERPL-MCNC: 0.12 NG/ML — CRITICAL HIGH
WBC # BLD: 5.2 K/UL — SIGNIFICANT CHANGE UP (ref 4.8–10.8)
WBC # FLD AUTO: 5.2 K/UL — SIGNIFICANT CHANGE UP (ref 4.8–10.8)

## 2022-04-28 PROCEDURE — 93010 ELECTROCARDIOGRAM REPORT: CPT

## 2022-04-28 PROCEDURE — 74018 RADEX ABDOMEN 1 VIEW: CPT | Mod: 26

## 2022-04-28 PROCEDURE — 99222 1ST HOSP IP/OBS MODERATE 55: CPT

## 2022-04-28 PROCEDURE — 99233 SBSQ HOSP IP/OBS HIGH 50: CPT

## 2022-04-28 PROCEDURE — 99497 ADVNCD CARE PLAN 30 MIN: CPT

## 2022-04-28 RX ORDER — POLYETHYLENE GLYCOL 3350 17 G/17G
17 POWDER, FOR SOLUTION ORAL
Refills: 0 | Status: DISCONTINUED | OUTPATIENT
Start: 2022-04-28 | End: 2022-04-29

## 2022-04-28 RX ADMIN — RASAGILINE 0.5 MILLIGRAM(S): 0.5 TABLET ORAL at 18:47

## 2022-04-28 RX ADMIN — ENTACAPONE 200 MILLIGRAM(S): 200 TABLET, FILM COATED ORAL at 18:00

## 2022-04-28 RX ADMIN — ATORVASTATIN CALCIUM 40 MILLIGRAM(S): 80 TABLET, FILM COATED ORAL at 21:21

## 2022-04-28 RX ADMIN — PANTOPRAZOLE SODIUM 40 MILLIGRAM(S): 20 TABLET, DELAYED RELEASE ORAL at 06:47

## 2022-04-28 RX ADMIN — CEFEPIME 100 MILLIGRAM(S): 1 INJECTION, POWDER, FOR SOLUTION INTRAMUSCULAR; INTRAVENOUS at 05:42

## 2022-04-28 RX ADMIN — CEFEPIME 100 MILLIGRAM(S): 1 INJECTION, POWDER, FOR SOLUTION INTRAMUSCULAR; INTRAVENOUS at 21:21

## 2022-04-28 RX ADMIN — POLYETHYLENE GLYCOL 3350 17 GRAM(S): 17 POWDER, FOR SOLUTION ORAL at 11:33

## 2022-04-28 RX ADMIN — POLYETHYLENE GLYCOL 3350 17 GRAM(S): 17 POWDER, FOR SOLUTION ORAL at 18:00

## 2022-04-28 RX ADMIN — ENOXAPARIN SODIUM 40 MILLIGRAM(S): 100 INJECTION SUBCUTANEOUS at 11:34

## 2022-04-28 RX ADMIN — ENTACAPONE 200 MILLIGRAM(S): 200 TABLET, FILM COATED ORAL at 05:34

## 2022-04-28 RX ADMIN — POLYETHYLENE GLYCOL 3350 17 GRAM(S): 17 POWDER, FOR SOLUTION ORAL at 05:42

## 2022-04-28 RX ADMIN — POLYETHYLENE GLYCOL 3350 17 GRAM(S): 17 POWDER, FOR SOLUTION ORAL at 03:11

## 2022-04-28 RX ADMIN — CHLORHEXIDINE GLUCONATE 1 APPLICATION(S): 213 SOLUTION TOPICAL at 05:42

## 2022-04-28 RX ADMIN — CEFEPIME 100 MILLIGRAM(S): 1 INJECTION, POWDER, FOR SOLUTION INTRAMUSCULAR; INTRAVENOUS at 14:33

## 2022-04-28 RX ADMIN — Medication 166.67 MILLIGRAM(S): at 08:33

## 2022-04-28 RX ADMIN — Medication 500 MILLIGRAM(S): at 11:32

## 2022-04-28 RX ADMIN — Medication 166.67 MILLIGRAM(S): at 17:59

## 2022-04-28 NOTE — PROGRESS NOTE ADULT - ASSESSMENT
71 year old male with a PMhx of parkinson's disease with dementia requiting trach (now with trach collar) and PEG, CKD II, DLD, HTN, gout, DMII, 1st degree AV block and hx of fungemia. Presenting with Encephalopathy possibly due to mastoiditis vs PNA    #Metabolic Encephalopathy  #Possibly due to mastoiditis, PNA, Sacral ulcer  #B/l Pleural effusions  ESR noted to be >140  Bcx 04/25: NGTD  CTAP (04/25) Large colonic stool load. No CT evidence of acute inflammatory   abdominopelvic pathology.  2.  Partially imaged bilateral small to moderate pleural effusions.  3.  Indeterminate 2 cm right hepatic lobe hypodensity. Further evaluation   with nonemergent contrast-enhanced MRI is recommended.  UCx negative  - Woundcare per team recs  - Cont vancomycin 1250mg q12h  - Cont cefepime 2g q8h  - f/u Bcx, Sputum Cx  - repeat KUB, can hold off on bowel regimen  - Echocardiogram still pending    #HTN/HLD  - Currently not on BP meds  - Cont atorvastatin 40mg qHs    #Parkinson's disease with dementia  s/p trach/PEG  - Cont entacapone  - Cont rasaligine  - palliative eval    #DM2  HBa1c 5.1  - Monitor FS    #CKD Stage 2  - Monitor cr    DVT PPX, Lovenox    #Progress Note Handoff  Pending (specify):  Echo, cont IV antibiotics, repeat KUB  Disposition: NH

## 2022-04-28 NOTE — ADVANCED PRACTICE NURSE CONSULT - ASSESSMENT
71 year old male with a PMhx of parkinson's disease with dementia requiting trach (now with trach collar) and PEG, CKD II, DLD, HTN, gout, DMII, 1st degree AV block and hx of fungemia, presenting to ED (4/26) for lethargy. History obtained from patients son at bedside. On my exam, patient is Uz[place] speaking, responds to yes/no questions with eye movements/head nodding (this is his baseline). Patient is coming from NH. History goes back to: 1 day prior to admission when patients family noticed that he was lethargic, less responsive than usual. On questioning he reported abdominal pain. Of note patient takes oxycodone for pain and was recently started on Fe-supplements for anemia. ROS is negative for: no fever, no chills, no SOB, chest pains or palpitations no nausea or vomiting. Family does not know when his last bowel movement was.   In the ED: VS WNL. Labs significant for: Hb:8.8 (chronic), ESR>140, Albumin:2.7; VBG: consistent primarily with metabolic alkalosis Troponin: 0.08; EKG: NSR with 1st degree AV block ; CT head: no acute intracranial pathology. CT suggestive of otomastoiditis; CT abdomen: mild- moderate bilateral pleural effusions, heavy colonic stool burden, otherwise no acute pathology; AMS w/ unclear etiology at this time and could be multifactorial (infection, metabolic encephalopathy)    Currently admitted to medicine with primary diagnosis of weakness, today is hospital day-1d; Presenting with Encephalopathy possibly due to mastoiditis vs PNA. Currently in ED, being managed for Metabolic Encephalopathy; Possibly due to mastoiditis, PNA, Sacral ulcer; B/l Pleural effusions;  heavy stool burden; HTN/HLD; Parkinson's disease with dementia, s/p trach/PEG; DM2; CKD Stage 2.     Received patient in ED,  laying supine on stretcher, turned to left side (pillow under right & left sides and in between contracted BLEs), HOB elevated 30 degrees. Pt awake, eyes open, not attentive, non-verbal, vented via trach, all extremities severely contracted, primary RN Cassidy at bedside, made aware of purpose of WOCN visit, agreeable to consult. Multiple areas of healed light pink tissue throughout BLEs, medial knees & bilateral feet.   Dressings to b/l feet in place, all dressings removed for assessment.     Type of wound: Deep tissue pressure injuries/DTPI x 2; POA; pressure w/ likely diabetic component  Location: right 4th digit and right lateral foot   Wound measurements: each 1cm x 1cm x 0cm; true depth indeterminable at this time  Tunneling/Undermining: none  Wound bed: intact, dry maroon-purple colored tissue   Wound edges: attached, flush, irregular  Periwound: intact; healed light pink tissue throughout right medial foot   Wound exudate: none  Wound odor: none  Induration, erythema, crepitus, warmth: none   Wound pain: no s/s pain present on assessment     Type of wound: Deep tissue pressure injuries/DTPIs x 3; POA; pressure w/ likely diabetic component  Location: left medial foot  Wound measurements: most inferior wound- 3cm x 3cm x 0cm; medial & superior wounds- 1cm x 1cm x 0cm; true depths indeterminable at this time  Tunneling/Undermining: none  Wound bed: intact, dry maroon-purple colored tissue   Wound edges: attached, flush, irregular  Periwound: intact, healed light pink tissue throughout left foot   Wound exudate: none  Wound odor: none  Induration, erythema, crepitus, warmth: none  Wound pain: no s/s pain present on assessment     Type of wound: Stage 4 pressure injury; POA; pressure w/ likely diabetic component  Location: left lateral foot   Wound measurements: 1cm x 1cm x 0.5cm  Tunneling/Undermining: none  Wound bed: dark red tissue   Wound edges: attached   Periwound: intact; healed light pink tissue circumferentially   Wound exudate: moderate amount of serous drainage present on assessment   Wound odor: none  Induration, erythema, warmth: none  Wound pain: no s/s pain present on assessment     With assistance from RN & PCA, turned patient completely to left side. Gauze packing dressing & ABD pad secured w/ foam tape to sacroccocygeal, dressing removed.      Type of wound: Stage 4 pressure injury; POA; chronic  Location: sacrococcygeal   Wound measurements: 5cm x 3cm x 2cm   Tunneling: none  Undermining: from 4-9 o'clock at 3cm deep  Wound bed: clean, smooth, dark red tissue, small scattered areas of purple hyperpigmented tissue throughout; bone not palpable within wound bed on assessment   Wound edges: open  Periwound: healed light pink tissue & light brown hyperpigmented tissue at 6 o'clock   Wound exudate: removed dressing moderately saturated w/ serosanguinous drainage  Wound odor: none  Induration, erythema, warmth: none  Wound pain: no s/s pain present on assessment     Patient bedbound, immobile, incontinent of urine, + condom catheter, incontinent of large amount of pasty brown stool during WOCN visit. Ordered for TF via G tube.

## 2022-04-28 NOTE — CONSULT NOTE ADULT - PROBLEM SELECTOR RECOMMENDATION 3
dementia, advanced parkinsons   communicative per family  continue medical management   would be hospice appropirate if within goals of care

## 2022-04-28 NOTE — ADVANCED PRACTICE NURSE CONSULT - RECOMMEDATIONS
1. DTPIs R/L feet- Swab w/ Betadine 10% daily to maintain clean, dry wound bed and allow devitalized tissue to uproof naturally. Leave open to air.  2. Stage 4 left lateral foot & sacrococcygeal pressure injuries- Cleanse wound bed w/ normal saline, gently pat dry. Apply Cavilon no-sting barrier film to wound edges & periwound to prevent maceration. Lightly pack wound bed and all undermining areas w/ silver hydrofiber/Aquacel Ag to fill in wound depth,  absorb wound exudate & prophylactically provide antimicrobial properties to wound bed(upon removal of old dressing, it will be in gel form since it gels when in contact w/ wound exudate). Cover w/ dry gauze &  foam Allevyn dressing. Change dressing daily and prn for strike-through drainage or soiling.  -Assess skin/wound qshift, report changes to primary provider.     Additional recs:  -Continue turning/positioning patient from side-to-side q2h while in bed,  or in accordance w/ pt's plan of care. Continue utilizing pillows and/or  fluidized positioner to assist w/ turning/positioning. Continue utilizing pillow in between contracted BLEs to offload pressure to medial knee area.    -Offload heels from bed surface by applying offloading boots to BLEs.   -Continue applying Coloplast Matthew Protect moisture barrier cream to buttock and perineal area daily and prn after each incontinent episode.    -Continue utilizing one underpad underneath patient to contain incontinence episodes; change pad when saturated/soiled.   -Continue utilizing condom catheter (if patient candidate) to contain any urinary incontinence.   -Continue nutrition consult for optimal wound healing & nutritional status.     Plan of Care: Primary RN Cassidy made aware of above recs. Spoke w/  covering/primary MD Wells  (at [phone]) in regards to above. No further needs/recs from [facility] service at this time. Staff RN to perform routine skin/wound assessment and manage wound care. Questions or concerns or if wound worsens and reconsult needed, please contact [facility], [phone].     1. DTPIs R/L feet- Swab w/ Betadine 10% daily to maintain clean, dry wound bed and allow devitalized tissue to uproof naturally. Leave open to air.  2. Stage 4 left lateral foot & sacrococcygeal pressure injuries- Cleanse wound bed w/ normal saline, gently pat dry. Apply Cavilon no-sting barrier film to wound edges & periwound to prevent maceration. Lightly pack wound bed and all undermining areas w/ silver hydrofiber/Aquacel Ag to fill in wound depth,  absorb wound exudate & prophylactically provide antimicrobial properties to wound bed(upon removal of old dressing, it will be in gel form since it gels when in contact w/ wound exudate). Cover w/ dry gauze &  foam Allevyn dressing. Change dressing daily and prn for strike-through drainage or soiling.  -Assess skin/wound qshift, report changes to primary provider.     Additional recs:  -Continue turning/positioning patient from side-to-side q2h while in bed,  or in accordance w/ pt's plan of care. Continue utilizing pillows and/or  fluidized positioner to assist w/ turning/positioning. Continue utilizing pillow in between contracted BLEs to offload pressure to medial knee area.    -Offload heels from bed surface by applying offloading boots to BLEs.   -Continue applying Coloplast Matthew Protect moisture barrier cream to buttock and perineal area daily and prn after each incontinent episode.    -Continue utilizing one underpad underneath patient to contain incontinence episodes; change pad when saturated/soiled.   -Continue utilizing condom catheter (if patient candidate) to contain any urinary incontinence.   -Continue nutrition consult & tight glucose control for optimal wound healing & nutritional status.     Plan of Care: Primary RN Cassidy made aware of above recs. Spoke w/  covering/primary MD Wells  (at [phone]) in regards to above. No further needs/recs from [facility] service at this time. Staff RN to perform routine skin/wound assessment and manage wound care. Questions or concerns or if wound worsens and reconsult needed, please contact [facility], [phone].

## 2022-04-28 NOTE — PROGRESS NOTE ADULT - SUBJECTIVE AND OBJECTIVE BOX
NIEVES LABOY  71y, Male  Allergy: No Known Allergies    Hospital Day: 2d    Patient seen and examined. No acute events overnight    PMH/PSH:  PAST MEDICAL & SURGICAL HISTORY:  HTN (hypertension)    Gout    Diabetes mellitus    Stage 2 chronic kidney disease    PEG (percutaneous endoscopic gastrostomy) status        VITALS:  T(F): 98.3 (04-28-22 @ 10:10), Max: 98.3 (04-28-22 @ 10:10)  HR: 60 (04-28-22 @ 10:10)  BP: 107/49 (04-28-22 @ 10:10) (107/49 - 126/58)  RR: 18 (04-27-22 @ 23:56)  SpO2: 100% (04-28-22 @ 10:10)    TESTS & MEASUREMENTS:  Weight (Kg):   BMI (kg/m2): 28.2 (04-26)    04-26-22 @ 07:01  -  04-27-22 @ 07:00  --------------------------------------------------------  IN: 1500 mL / OUT: 750 mL / NET: 750 mL    04-27-22 @ 07:01  -  04-28-22 @ 07:00  --------------------------------------------------------  IN: 2400 mL / OUT: 0 mL / NET: 2400 mL    04-28-22 @ 07:01  -  04-28-22 @ 13:07  --------------------------------------------------------  IN: 550 mL / OUT: 0 mL / NET: 550 mL                            8.8    5.20  )-----------( 291      ( 28 Apr 2022 06:10 )             27.5       04-28    136  |  101  |  31<H>  ----------------------------<  111<H>  4.4   |  24  |  0.8    Ca    8.6      28 Apr 2022 06:10  Mg     2.1     04-28    TPro  7.3  /  Alb  2.5<L>  /  TBili  0.3  /  DBili  x   /  AST  28  /  ALT  37  /  AlkPhos  140<H>  04-28    LIVER FUNCTIONS - ( 28 Apr 2022 06:10 )  Alb: 2.5 g/dL / Pro: 7.3 g/dL / ALK PHOS: 140 U/L / ALT: 37 U/L / AST: 28 U/L / GGT: x           CARDIAC MARKERS ( 28 Apr 2022 06:10 )  x     / 0.12 ng/mL / x     / x     / x      CARDIAC MARKERS ( 27 Apr 2022 16:00 )  x     / 0.12 ng/mL / x     / x     / x            Culture - Urine (collected 04-26-22 @ 08:36)  Source: Catheterized Catheterized  Final Report (04-28-22 @ 01:21):    No growth    Culture - Blood (collected 04-25-22 @ 19:37)  Source: .Blood Blood  Preliminary Report (04-27-22 @ 01:02):    No growth to date.    Culture - Blood (collected 04-25-22 @ 19:37)  Source: .Blood Blood  Preliminary Report (04-27-22 @ 01:02):    No growth to date.          RADIOLOGY & ADDITIONAL TESTS:    RECENT DIAGNOSTIC ORDERS:  12 Lead ECG (04-28-22 @ 10:16)  Xray Kidney Ureter Bladder: Routine   Indication: Assess for stool burden  Transport: Portable (04-28-22 @ 07:53)  Vancomycin Level, Trough: STAT (04-27-22 @ 17:15)  Streptococcus pneumoniae Ag, Ur: Routine (04-27-22 @ 14:52)  Legionella pneumophila Antigen, Urine: Routine (04-27-22 @ 14:52)  Culture - Sputum: Routine  Specimen Source: Sputum (04-27-22 @ 14:52)  MRSA/MSSA PCR: Routine (04-27-22 @ 14:51)  Culture - Blood: AM Sched. Collection:28-Apr-2022 04:30  Specimen Source: Blood-Peripheral (04-27-22 @ 14:50)  Culture - Blood: 16:00  Specimen Source: Blood-Peripheral (04-27-22 @ 14:50)      MEDICATIONS:  MEDICATIONS  (STANDING):  ascorbic acid 500 milliGRAM(s) Oral daily  atorvastatin 40 milliGRAM(s) Oral at bedtime  cefepime   IVPB 2000 milliGRAM(s) IV Intermittent every 8 hours  chlorhexidine 4% Liquid 1 Application(s) Topical <User Schedule>  enoxaparin Injectable 40 milliGRAM(s) SubCutaneous every 24 hours  entacapone 200 milliGRAM(s) Oral two times a day  pantoprazole    Tablet 40 milliGRAM(s) Oral before breakfast  rasagiline Tablet 0.5 milliGRAM(s) Oral daily  vancomycin  IVPB 1250 milliGRAM(s) IV Intermittent every 12 hours    MEDICATIONS  (PRN):  acetaminophen     Tablet .. 650 milliGRAM(s) Oral every 6 hours PRN Temp greater or equal to 38C (100.4F), Mild Pain (1 - 3)  aluminum hydroxide/magnesium hydroxide/simethicone Suspension 30 milliLiter(s) Oral every 4 hours PRN Dyspepsia  melatonin 3 milliGRAM(s) Oral at bedtime PRN Insomnia  ondansetron Injectable 4 milliGRAM(s) IV Push every 8 hours PRN Nausea and/or Vomiting  oxyCODONE    IR 5 milliGRAM(s) Oral every 8 hours PRN Moderate Pain (4 - 6)    HOME MEDICATIONS:  albuterol-ipratropium (04-26)  ascorbic acid 500 mg oral tablet (04-26)  atorvastatin 40 mg oral tablet (04-26)  entacapone 200 mg oral tablet (04-26)  ferrous sulfate (as elemental iron) 15 mg/1.5 mL oral liquid (04-26)  omeprazole 20 mg oral delayed release capsule (04-26)  oxyCODONE 5 mg oral tablet (04-26)  rasagiline 0.5 mg oral tablet (04-26)  Retacrit 4000 units/mL preservative-free injectable solution (04-26)  zinc sulfate 220 mg oral capsule (04-26)    REVIEW OF SYSTEMS:  All other review of systems is negative unless indicated above.     PHYSICAL EXAM:  PHYSICAL EXAM:  GENERAL: NAD, well-developed  HEAD:  Atraumatic, Normocephalic  NECK: Supple, No JVD, Trach to vent  CHEST/LUNG: Clear to auscultation bilaterally; No wheeze  HEART: Regular rate and rhythm; No murmurs, rubs, or gallops  ABDOMEN: Soft, Nontender, Nondistended; Bowel sounds present, PEG  EXTREMITIES:  2+ Peripheral Pulses, No clubbing, cyanosis, or edema,   SKIN: Sacral ulcers, feet ulcers

## 2022-04-28 NOTE — CONSULT NOTE ADULT - ASSESSMENT
71yMale with PMH including parkinson's disease with dementia requiting trach (now with trach collar) and PEG, CKD II, DLD, HTN, gout, DM2, 1st degree AV block and hx of fungemia, being evaluated for GOC in the setting of admission for AMS. pt currently being treated for metabolic encephalopathy, BL pleural effusions, heavy stool burden.       MEDD (morphine equivalent daily dose):      See Recs below.    Please call [phone] with questions or concerns 24/7.   We will continue to follow.     Discussed with primary MD.   71yMale with PMH including parkinson's disease with dementia requiting trach (now with trach collar) and PEG, CKD II, DLD, HTN, gout, DM2, 1st degree AV block and hx of fungemia, being evaluated for GOC in the setting of admission for AMS. pt currently being treated for metabolic encephalopathy, BL pleural effusions, heavy stool burden.     Family not interested in GOC discussions. Goals are clear- full code, full aggressive medical management.   No symptoms to manage.   Will sign off.     MEDD (morphine equivalent daily dose):  0      See Recs below.    Please call [phone] with questions or concerns 24/7.   Discussed with primary MD.

## 2022-04-28 NOTE — PROGRESS NOTE ADULT - CONVERSATION DETAILS
GOC discussed with family given patient's advanced illness. They would like to continue to keep him full code. They are hopeful that he will be able to be weaned of the ventilator eventually.

## 2022-04-28 NOTE — CONSULT NOTE ADULT - SUBJECTIVE AND OBJECTIVE BOX
NIEVES LABOY          MRN-[id number]              HPI:    71 year old male with a PMhx of parkinson's disease with dementia requiting trach (now with trach collar) and PEG, CKD II, DLD, HTN, gout, DM2, 1st degree AV block and hx of fungemia.   History obtained from patients son at bedside. On my exam, patient is Sami speaking, responds to yes/no questions with eye movements/head nodding (this is his baseline). Patient is coming from NH    CC: lethargy     History goes back to: 1 day prior to admission when patients family noticed that he was lethargic, less responsive than usual. On questioning he reported abdominal pain. Of note patient takes oxycodone for pain and was recently started on Fe-supplements for anemia.    ROS is negative for: no fever, no chills, no SOB, chest pains or palpitations no nausea or vomiting. Family does not know when his last bowel movement was.      In the ED: VS WNL  Labs significant for: Hb:8.8 (chronic), ESR>140, Albumin:2.7  VBG: consistent primarily with metabolic alkalosis   Troponin: 0.08  EKG: NSR with 1st degree AV block   CT head: no acute intracranial pathology. CT suggestive of otomastoiditis  CT abdomen: mild- moderate bilateral pleural effusions, heavy colonic stool burden, otherwise no acute pathology   (2022 08:32)      PAST MEDICAL & SURGICAL HISTORY:  HTN (hypertension)    Gout    Diabetes mellitus    Stage 2 chronic kidney disease    PEG (percutaneous endoscopic gastrostomy) status        FAMILY HISTORY:  No pertinent family history in first degree relatives     Reviewed and found non contributory in mother or father    SOCIAL HISTORY:   Tobacco/etoh/illicit drug use use reported. Yes [ ]  _________  No [ ]  Pt resides at: home [ ]  facility [X ]  other [ ] _______  Family lives in [place]- they are interested in moving him to a facility in [place].   Pt is Sami speaking and can communicate with his eyes.    Lives in long term care facility.     ROS:	    Dyspnea (Gabriela 0-10): 0                       N/V (Y/N): No                             Secretions (Y/N) : No                                          Agitation(Y/N): No                              Pain (Y/N): No                                 -Provocation/Palliation: N/A  -Quality/Quantity: N/A  -Radiating: N/A  -Severity: No pain  -Timing/Frequency: N/A  -Impact on ADLs: N/A    General:  Denied  HEENT:    Denied  Neck:  Denied  CVS:  Denied  Resp:  Denied  GI:  Denied    :  Denied  Musc:  Denied  Neuro:  Denied  Psych:  Denied  Skin:  Denied  Lymph:  Denied    Last BM:      Allergies    No Known Allergies    Intolerances      -iStop reviewed (Y/N):   Ref#:         This report was requested by: Isela Peterson | Reference #: [id number]         Labs:	    CBC:                        8.8    5.20  )-----------( 291      ( 2022 06:10 )             27.5     CMP:        136  |  101  |  31<H>  ----------------------------<  111<H>  4.4   |  24  |  0.8    Ca    8.6      2022 06:10  Mg     2.1         TPro  7.3  /  Alb  2.5<L>  /  TBili  0.3  /  DBili  x   /  AST  28  /  ALT  37  /  AlkPhos  140<H>         Radiology:	     < from: CT Head No Cont (22 @ 20:01) >    IMPRESSION:    No acute intracranial pathology.    Mild chronic microvascular ischemic changes.    Complete effusion of the left mastoid and middle ear cavity, as well as   partial effusion of the right mastoid. The finding is nonspecific but can   be seen with otomastoiditis.    --- End of Report ---      < end of copied text >    < from: CT Abdomen and Pelvis w/ IV Cont (22 @ 23:01) >      IMPRESSION:    1.  Large colonic stool load. No CT evidence of acute inflammatory   abdominopelvic pathology.    2.  Partially imaged bilateral small to moderate pleural effusions.    3.  Indeterminate 2 cm right hepatic lobe hypodensity. Further evaluation   with nonemergent contrast-enhanced MRI is recommended.    --- End of Report ---    < end of copied text >      EK Lead ECG:   Ventricular Rate 58 BPM    Atrial Rate 58 BPM    P-R Interval 238 ms    QRS Duration 70 ms    Q-T Interval 414 ms    QTC Calculation(Bazett) 406 ms    P Axis 46 degrees    R Axis 18 degrees    T Axis 35 degrees    Diagnosis Line Sinus bradycardia with 1st degree A-V block  Otherwise normal ECG    Confirmed by Shikha Chairez MD ([id number]) on 2022 9:18:00 AM (22 @ 21:15)      Imaging Personally Reviewed:  [X ] YES  [ ] NO    Consultant(s) Notes Reviewed:  [X ] YES  [ ] NO  Care Discussed with Consultants/Other Providers [ X] YES  [ ] NO    PEx:	  T(C): 36.2 (22 @ 07:55), Max: 36.2 (22 @ 07:55)  HR: 60 (22 @ 07:55) (58 - 74)  BP: 115/61 (22 @ 07:55) (115/61 - 126/58)  RR: 18 (22 @ 23:56) (18 - 18)  SpO2: 99% (22 @ 07:55) (99% - 100%)    General:  found in bed in NAD  Eyes:  PERRL EOMI Non icteric MOM  ENMT: no external oral ulcers, MMM, no thrush   CVS: RR S1S2 No M/G/R  Resp: Unlabored Non tachypneic No increased WOB  GI:  Soft NT ND BS+  :  Voiding / Miramontes / [employer]  Musc: No C/C/E    Neuro: Follows commands No focal deficits  Psych: Calm Pleasant, AAOx3    Skin: Non jaundiced , no rash   Lymph: no adenopathy     Preadmit Karnofsky:  %           Current Karnofsky:    20 %      Medications:	      MEDICATIONS  (STANDING):  ascorbic acid 500 milliGRAM(s) Oral daily  atorvastatin 40 milliGRAM(s) Oral at bedtime  cefepime   IVPB 2000 milliGRAM(s) IV Intermittent every 8 hours  chlorhexidine 4% Liquid 1 Application(s) Topical <User Schedule>  enoxaparin Injectable 40 milliGRAM(s) SubCutaneous every 24 hours  entacapone 200 milliGRAM(s) Oral two times a day  pantoprazole    Tablet 40 milliGRAM(s) Oral before breakfast  polyethylene glycol 3350 Oral Powder - Peds 17 Gram(s) Oral every 6 hours  rasagiline Tablet 0.5 milliGRAM(s) Oral daily  vancomycin  IVPB 1250 milliGRAM(s) IV Intermittent every 12 hours    MEDICATIONS  (PRN):  acetaminophen     Tablet .. 650 milliGRAM(s) Oral every 6 hours PRN Temp greater or equal to 38C (100.4F), Mild Pain (1 - 3)  aluminum hydroxide/magnesium hydroxide/simethicone Suspension 30 milliLiter(s) Oral every 4 hours PRN Dyspepsia  melatonin 3 milliGRAM(s) Oral at bedtime PRN Insomnia  ondansetron Injectable 4 milliGRAM(s) IV Push every 8 hours PRN Nausea and/or Vomiting  oxyCODONE    IR 5 milliGRAM(s) Oral every 8 hours PRN Moderate Pain (4 - 6)      Advanced Directives:	     Full Code    Decision maker: The patient is unable to participate in complex medical decision making conversations.   Legal surrogate: Family - Dtr is Kelton hammond     GOALS OF CARE DISCUSSION	      PSYCHOSOCIAL-SPIRITUAL ASSESSMENT:       Reviewed       See Palliative Care SW/ documentation        	    REFERRALS       Palliative Med        Unit SW/Case Mgmt          NIEVES LABOY          MRN-[id number]              HPI:    71 year old male with a PMhx of parkinson's disease with dementia requiting trach (now with trach collar) and PEG, CKD II, DLD, HTN, gout, DM2, 1st degree AV block and hx of fungemia.   History obtained from patients son at bedside. On my exam, patient is Kinyarwanda speaking, responds to yes/no questions with eye movements/head nodding (this is his baseline). Patient is coming from NH    CC: lethargy     History goes back to: 1 day prior to admission when patients family noticed that he was lethargic, less responsive than usual. On questioning he reported abdominal pain. Of note patient takes oxycodone for pain and was recently started on Fe-supplements for anemia.    ROS is negative for: no fever, no chills, no SOB, chest pains or palpitations no nausea or vomiting. Family does not know when his last bowel movement was.      In the ED: VS WNL  Labs significant for: Hb:8.8 (chronic), ESR>140, Albumin:2.7  VBG: consistent primarily with metabolic alkalosis   Troponin: 0.08  EKG: NSR with 1st degree AV block   CT head: no acute intracranial pathology. CT suggestive of otomastoiditis  CT abdomen: mild- moderate bilateral pleural effusions, heavy colonic stool burden, otherwise no acute pathology   (2022 08:32)      PAST MEDICAL & SURGICAL HISTORY:  HTN (hypertension)    Gout    Diabetes mellitus    Stage 2 chronic kidney disease    PEG (percutaneous endoscopic gastrostomy) status        FAMILY HISTORY:  No pertinent family history in first degree relatives     Reviewed and found non contributory in mother or father    SOCIAL HISTORY:   Tobacco/etoh/illicit drug use use reported. Yes [ ]  _________  No [ ]  Pt resides at: home [ ]  facility [X ]  other [ ] _______  Family lives in [place]- they are interested in moving him to a facility in [place].   Pt is Kinyarwanda speaking and can communicate with his eyes.    Lives in long term care facility.     ROS:	    Unable to obtain due to mental status     Allergies    No Known Allergies    Intolerances      -iStop reviewed (Y/N):   Ref#:         This report was requested by: Isela Peterson | Reference #: [id number]         Labs:	    CBC:                        8.8    5.20  )-----------( 291      ( 2022 06:10 )             27.5     CMP:        136  |  101  |  31<H>  ----------------------------<  111<H>  4.4   |  24  |  0.8    Ca    8.6      2022 06:10  Mg     2.1         TPro  7.3  /  Alb  2.5<L>  /  TBili  0.3  /  DBili  x   /  AST  28  /  ALT  37  /  AlkPhos  140<H>         Radiology:	     < from: CT Head No Cont (22 @ 20:01) >    IMPRESSION:    No acute intracranial pathology.    Mild chronic microvascular ischemic changes.    Complete effusion of the left mastoid and middle ear cavity, as well as   partial effusion of the right mastoid. The finding is nonspecific but can   be seen with otomastoiditis.    --- End of Report ---      < end of copied text >    < from: CT Abdomen and Pelvis w/ IV Cont (22 @ 23:01) >      IMPRESSION:    1.  Large colonic stool load. No CT evidence of acute inflammatory   abdominopelvic pathology.    2.  Partially imaged bilateral small to moderate pleural effusions.    3.  Indeterminate 2 cm right hepatic lobe hypodensity. Further evaluation   with nonemergent contrast-enhanced MRI is recommended.    --- End of Report ---    < end of copied text >      EK Lead ECG:   Ventricular Rate 58 BPM    Atrial Rate 58 BPM    P-R Interval 238 ms    QRS Duration 70 ms    Q-T Interval 414 ms    QTC Calculation(Bazett) 406 ms    P Axis 46 degrees    R Axis 18 degrees    T Axis 35 degrees    Diagnosis Line Sinus bradycardia with 1st degree A-V block  Otherwise normal ECG    Confirmed by Shikha Chairez MD ([id number]) on 2022 9:18:00 AM (22 @ 21:15)      Imaging Personally Reviewed:  [X ] YES  [ ] NO    Consultant(s) Notes Reviewed:  [X ] YES  [ ] NO  Care Discussed with Consultants/Other Providers [ X] YES  [ ] NO    PEx:	  T(C): 36.2 (22 @ 07:55), Max: 36.2 (04-28-22 @ 07:55)  HR: 60 (22 @ 07:55) (58 - 74)  BP: 115/61 (22 @ 07:55) (115/61 - 126/58)  RR: 18 (22 @ 23:56) (18 - 18)  SpO2: 99% (22 @ 07:55) (99% - 100%)    General:  found in bed in NAD, ill appearing   Eyes:  PERRL EOMI Non icteric MOM  ENMT: no external oral ulcers, MMM, no thrush   CVS: RR S1S2 No M/G/R  Resp: Unlabored Non tachypneic No increased WOB, trach on vent 40%  GI:  Soft NT ND   Musc: No C/C/E    Neuro: Did not follow commands, obtunded   Psych: Calm , AAOx0   Skin: Non jaundiced , no rash   Lymph: no adenopathy     Preadmit Karnofsky:  20 %           Current Karnofsky:    10 %      Medications:	      MEDICATIONS  (STANDING):  ascorbic acid 500 milliGRAM(s) Oral daily  atorvastatin 40 milliGRAM(s) Oral at bedtime  cefepime   IVPB 2000 milliGRAM(s) IV Intermittent every 8 hours  chlorhexidine 4% Liquid 1 Application(s) Topical <User Schedule>  enoxaparin Injectable 40 milliGRAM(s) SubCutaneous every 24 hours  entacapone 200 milliGRAM(s) Oral two times a day  pantoprazole    Tablet 40 milliGRAM(s) Oral before breakfast  polyethylene glycol 3350 Oral Powder - Peds 17 Gram(s) Oral every 6 hours  rasagiline Tablet 0.5 milliGRAM(s) Oral daily  vancomycin  IVPB 1250 milliGRAM(s) IV Intermittent every 12 hours    MEDICATIONS  (PRN):  acetaminophen     Tablet .. 650 milliGRAM(s) Oral every 6 hours PRN Temp greater or equal to 38C (100.4F), Mild Pain (1 - 3)  aluminum hydroxide/magnesium hydroxide/simethicone Suspension 30 milliLiter(s) Oral every 4 hours PRN Dyspepsia  melatonin 3 milliGRAM(s) Oral at bedtime PRN Insomnia  ondansetron Injectable 4 milliGRAM(s) IV Push every 8 hours PRN Nausea and/or Vomiting  oxyCODONE    IR 5 milliGRAM(s) Oral every 8 hours PRN Moderate Pain (4 - 6)      Advanced Directives:	     Full Code    Decision maker: The patient is unable to participate in complex medical decision making conversations.   Legal surrogate: Family - Dtr is Kelton hammond     GOALS OF CARE DISCUSSION	  - Dr. Whyte reached out to family. they want full code and are not interesting in further GOC discussions at this time  - will sign off    NIEVES LABOY          MRN-[id number]                HPI:    71 year old male with a PMhx of parkinson's disease with dementia requiting trach (now with trach collar) and PEG, CKD II, DLD, HTN, gout, DM2, 1st degree AV block and hx of fungemia.   History obtained from patients son at bedside. On my exam, patient is Italian speaking, responds to yes/no questions with eye movements/head nodding (this is his baseline). Patient is coming from NH    CC: lethargy     History goes back to: 1 day prior to admission when patients family noticed that he was lethargic, less responsive than usual. On questioning he reported abdominal pain. Of note patient takes oxycodone for pain and was recently started on Fe-supplements for anemia.    ROS is negative for: no fever, no chills, no SOB, chest pains or palpitations no nausea or vomiting. Family does not know when his last bowel movement was.      In the ED: VS WNL  Labs significant for: Hb:8.8 (chronic), ESR>140, Albumin:2.7  VBG: consistent primarily with metabolic alkalosis   Troponin: 0.08  EKG: NSR with 1st degree AV block   CT head: no acute intracranial pathology. CT suggestive of otomastoiditis  CT abdomen: mild- moderate bilateral pleural effusions, heavy colonic stool burden, otherwise no acute pathology   (2022 08:32)      PAST MEDICAL & SURGICAL HISTORY:  HTN (hypertension)    Gout    Diabetes mellitus    Stage 2 chronic kidney disease    PEG (percutaneous endoscopic gastrostomy) status        FAMILY HISTORY:  No pertinent family history in first degree relatives     Reviewed and found non contributory in mother or father    SOCIAL HISTORY:   Tobacco/etoh/illicit drug use use reported. Yes [ ]  _________  No [ ]  Pt resides at: home [ ]  facility [X ]  other [ ] _______  Family lives in [place]- they are interested in moving him to a facility in [place].   Pt is Italian speaking and can communicate with his eyes.    Lives in long term care facility.     ROS:	    Unable to obtain due to mental status     Allergies  No Known Allergies      -iStop reviewed (Y/N):   Ref#:         This report was requested by: Isela Peterson | Reference #: [id number]         Labs:	    CBC:                        8.8    5.20  )-----------( 291      ( 2022 06:10 )             27.5     CMP:        136  |  101  |  31<H>  ----------------------------<  111<H>  4.4   |  24  |  0.8    Ca    8.6      2022 06:10  Mg     2.1         TPro  7.3  /  Alb  2.5<L>  /  TBili  0.3  /  DBili  x   /  AST  28  /  ALT  37  /  AlkPhos  140<H>         Radiology:	     < from: CT Head No Cont (22 @ 20:01) >    IMPRESSION:    No acute intracranial pathology.    Mild chronic microvascular ischemic changes.    Complete effusion of the left mastoid and middle ear cavity, as well as   partial effusion of the right mastoid. The finding is nonspecific but can   be seen with otomastoiditis.    --- End of Report ---      < end of copied text >    < from: CT Abdomen and Pelvis w/ IV Cont (22 @ 23:01) >      IMPRESSION:    1.  Large colonic stool load. No CT evidence of acute inflammatory   abdominopelvic pathology.    2.  Partially imaged bilateral small to moderate pleural effusions.    3.  Indeterminate 2 cm right hepatic lobe hypodensity. Further evaluation   with nonemergent contrast-enhanced MRI is recommended.    --- End of Report ---    < end of copied text >      EK Lead ECG:   Ventricular Rate 58 BPM    Atrial Rate 58 BPM    P-R Interval 238 ms    QRS Duration 70 ms    Q-T Interval 414 ms    QTC Calculation(Bazett) 406 ms    P Axis 46 degrees    R Axis 18 degrees    T Axis 35 degrees    Diagnosis Line Sinus bradycardia with 1st degree A-V block  Otherwise normal ECG    Confirmed by Shikha Chairez MD ([id number]) on 2022 9:18:00 AM (22 @ 21:15)      Imaging Personally Reviewed:  [X ] YES  [ ] NO    Consultant(s) Notes Reviewed:  [X ] YES  [ ] NO  Care Discussed with Consultants/Other Providers [ X] YES  [ ] NO    PEx:	  T(C): 36.2 (22 @ 07:55), Max: 36.2 (22 @ 07:55)  HR: 60 (22 @ 07:55) (58 - 74)  BP: 115/61 (22 @ 07:55) (115/61 - 126/58)  RR: 18 (22 @ 23:56) (18 - 18)  SpO2: 99% (22 @ 07:55) (99% - 100%)    General:  found in bed in NAD, ill appearing   Eyes:  PERRL EOMI Non icteric MOM  ENMT: no external oral ulcers, MMM, no thrush   CVS: RR S1S2 No M/G/R  Resp: Unlabored Non tachypneic No increased WOB, trach on vent 40%  GI:  Soft NT ND   Musc: No C/C/E    Neuro: Did not follow commands, obtunded   Psych: Calm , AAOx0   Skin: Non jaundiced , no rash   Lymph: no adenopathy     Preadmit Karnofsky:  20 %           Current Karnofsky:    10 %      Medications:	      MEDICATIONS  (STANDING):  ascorbic acid 500 milliGRAM(s) Oral daily  atorvastatin 40 milliGRAM(s) Oral at bedtime  cefepime   IVPB 2000 milliGRAM(s) IV Intermittent every 8 hours  chlorhexidine 4% Liquid 1 Application(s) Topical <User Schedule>  enoxaparin Injectable 40 milliGRAM(s) SubCutaneous every 24 hours  entacapone 200 milliGRAM(s) Oral two times a day  pantoprazole    Tablet 40 milliGRAM(s) Oral before breakfast  polyethylene glycol 3350 Oral Powder - Peds 17 Gram(s) Oral every 6 hours  rasagiline Tablet 0.5 milliGRAM(s) Oral daily  vancomycin  IVPB 1250 milliGRAM(s) IV Intermittent every 12 hours    MEDICATIONS  (PRN):  acetaminophen     Tablet .. 650 milliGRAM(s) Oral every 6 hours PRN Temp greater or equal to 38C (100.4F), Mild Pain (1 - 3)  aluminum hydroxide/magnesium hydroxide/simethicone Suspension 30 milliLiter(s) Oral every 4 hours PRN Dyspepsia  melatonin 3 milliGRAM(s) Oral at bedtime PRN Insomnia  ondansetron Injectable 4 milliGRAM(s) IV Push every 8 hours PRN Nausea and/or Vomiting  oxyCODONE    IR 5 milliGRAM(s) Oral every 8 hours PRN Moderate Pain (4 - 6)      Advanced Directives:	     Full Code    Decision maker: The patient is unable to participate in complex medical decision making conversations.   Legal surrogate: Family - Dtr is Kelton hammond     GOALS OF CARE DISCUSSION	  - Dr. Whyte reached out to family. they want full code and are not interesting in further GOC discussions at this time  - will sign off

## 2022-04-29 LAB
APPEARANCE UR: ABNORMAL
BACTERIA # UR AUTO: NEGATIVE — SIGNIFICANT CHANGE UP
BILIRUB UR-MCNC: NEGATIVE — SIGNIFICANT CHANGE UP
CALCIUM UR-MCNC: 2 MG/DL — SIGNIFICANT CHANGE UP
CHLORIDE UR-SCNC: <20 — SIGNIFICANT CHANGE UP
COLOR SPEC: YELLOW — SIGNIFICANT CHANGE UP
DIFF PNL FLD: NEGATIVE — SIGNIFICANT CHANGE UP
EPI CELLS # UR: 2 /HPF — SIGNIFICANT CHANGE UP (ref 0–5)
GLUCOSE BLDC GLUCOMTR-MCNC: 118 MG/DL — HIGH (ref 70–99)
GLUCOSE BLDC GLUCOMTR-MCNC: 143 MG/DL — HIGH (ref 70–99)
GLUCOSE UR QL: NEGATIVE — SIGNIFICANT CHANGE UP
HYALINE CASTS # UR AUTO: 3 /LPF — SIGNIFICANT CHANGE UP (ref 0–7)
KETONES UR-MCNC: SIGNIFICANT CHANGE UP
LEGIONELLA AG UR QL: NEGATIVE — SIGNIFICANT CHANGE UP
LEUKOCYTE ESTERASE UR-ACNC: NEGATIVE — SIGNIFICANT CHANGE UP
MRSA PCR RESULT.: NEGATIVE — SIGNIFICANT CHANGE UP
NITRITE UR-MCNC: NEGATIVE — SIGNIFICANT CHANGE UP
PH UR: 6.5 — SIGNIFICANT CHANGE UP (ref 5–8)
PROT UR-MCNC: ABNORMAL
RBC CASTS # UR COMP ASSIST: 9 /HPF — HIGH (ref 0–4)
S PNEUM AG UR QL: NEGATIVE — SIGNIFICANT CHANGE UP
SP GR SPEC: 1.02 — SIGNIFICANT CHANGE UP (ref 1.01–1.03)
TROPONIN T SERPL-MCNC: 0.11 NG/ML — CRITICAL HIGH
UROBILINOGEN FLD QL: SIGNIFICANT CHANGE UP
VANCOMYCIN TROUGH SERPL-MCNC: 38.9 UG/ML — HIGH (ref 5–10)
WBC UR QL: 5 /HPF — SIGNIFICANT CHANGE UP (ref 0–5)

## 2022-04-29 PROCEDURE — 93010 ELECTROCARDIOGRAM REPORT: CPT | Mod: 77

## 2022-04-29 PROCEDURE — 99233 SBSQ HOSP IP/OBS HIGH 50: CPT

## 2022-04-29 PROCEDURE — 93010 ELECTROCARDIOGRAM REPORT: CPT

## 2022-04-29 PROCEDURE — 99222 1ST HOSP IP/OBS MODERATE 55: CPT

## 2022-04-29 RX ORDER — POLYETHYLENE GLYCOL 3350 17 G/17G
17 POWDER, FOR SOLUTION ORAL DAILY
Refills: 0 | Status: DISCONTINUED | OUTPATIENT
Start: 2022-04-29 | End: 2022-05-03

## 2022-04-29 RX ORDER — VANCOMYCIN HCL 1 G
500 VIAL (EA) INTRAVENOUS EVERY 12 HOURS
Refills: 0 | Status: DISCONTINUED | OUTPATIENT
Start: 2022-04-29 | End: 2022-04-29

## 2022-04-29 RX ADMIN — RASAGILINE 0.5 MILLIGRAM(S): 0.5 TABLET ORAL at 17:48

## 2022-04-29 RX ADMIN — CEFEPIME 100 MILLIGRAM(S): 1 INJECTION, POWDER, FOR SOLUTION INTRAMUSCULAR; INTRAVENOUS at 23:13

## 2022-04-29 RX ADMIN — PANTOPRAZOLE SODIUM 40 MILLIGRAM(S): 20 TABLET, DELAYED RELEASE ORAL at 06:41

## 2022-04-29 RX ADMIN — Medication 500 MILLIGRAM(S): at 11:55

## 2022-04-29 RX ADMIN — ATORVASTATIN CALCIUM 40 MILLIGRAM(S): 80 TABLET, FILM COATED ORAL at 22:46

## 2022-04-29 RX ADMIN — ENTACAPONE 200 MILLIGRAM(S): 200 TABLET, FILM COATED ORAL at 18:08

## 2022-04-29 RX ADMIN — ENOXAPARIN SODIUM 40 MILLIGRAM(S): 100 INJECTION SUBCUTANEOUS at 11:55

## 2022-04-29 RX ADMIN — CEFEPIME 100 MILLIGRAM(S): 1 INJECTION, POWDER, FOR SOLUTION INTRAMUSCULAR; INTRAVENOUS at 15:05

## 2022-04-29 RX ADMIN — ENTACAPONE 200 MILLIGRAM(S): 200 TABLET, FILM COATED ORAL at 06:41

## 2022-04-29 RX ADMIN — CEFEPIME 100 MILLIGRAM(S): 1 INJECTION, POWDER, FOR SOLUTION INTRAMUSCULAR; INTRAVENOUS at 05:37

## 2022-04-29 NOTE — DIETITIAN INITIAL EVALUATION ADULT - CONTINUE CURRENT NUTRITION CARE PLAN
No. Current EN order not meeting estimated needs. Recommend to switch to high-protein polymeric formula. Recommend to discontinue vitamin C given hx CKD2, also vit C for pressure injury is not backed by any guidelines. EN alone will meet all RDAs.

## 2022-04-29 NOTE — DIETITIAN INITIAL EVALUATION ADULT - NS FNS DIET ORDER
Diet, NPO with Tube Feed:   Tube Feeding Modality: Gastrostomy  Jevity 1.2 Sarmad  Total Volume for 24 Hours (mL): 1200  Bolus  Total Volume of Bolus (mL):  300  Tube Feed Frequency: Every 6 hours   Tube Feed Start Time: 11:00  Bolus Feed Rate (mL per Hour): 200   Bolus Feed Duration (in Hours): 1.4 (04-26-22 @ 10:07) [Active]  -

## 2022-04-29 NOTE — DIETITIAN INITIAL EVALUATION ADULT - PERTINENT LABORATORY DATA
04-28    136  |  101  |  31<H>  ----------------------------<  111<H>  4.4   |  24  |  0.8    Ca    8.6      28 Apr 2022 06:10  Mg     2.1     04-28    TPro  7.3  /  Alb  2.5<L>  /  TBili  0.3  /  DBili  x   /  AST  28  /  ALT  37  /  AlkPhos  140<H>  04-28  POCT Blood Glucose.: 135 mg/dL (04-28-22 @ 17:13)  A1C with Estimated Average Glucose Result: 4.2 % (04-27-22 @ 07:00)  A1C with Estimated Average Glucose Result: 5.1 % (04-26-22 @ 08:40)   04-28    136  |  101  |  31<H>  ----------------------------<  111<H>  4.4   |  24  |  0.8    Ca    8.6      28 Apr 2022 06:10  Mg     2.1     04-28    TPro  7.3  /  Alb  2.5<L>  /  TBili  0.3  /  DBili  x   /  AST  28  /  ALT  37  /  AlkPhos  140<H>  04-28    CAPILLARY BLOOD GLUCOSE  POCT Blood Glucose.: 135 mg/dL (28 Apr 2022 17:13)    A1C with Estimated Average Glucose Result: 4.2 % (04-27-22 @ 07:00)  A1C with Estimated Average Glucose Result: 5.1 % (04-26-22 @ 08:40)  -

## 2022-04-29 NOTE — DIETITIAN INITIAL EVALUATION ADULT - NSFNSPHYEXAMSKINFT_GEN_A_CORE
Deep tissue pressure injuries/DTPI   Location: right 4th digit and right lateral foot   Wound measurements: each 1cm x 1cm x 0cm; true depth indeterminable at this time    Deep tissue pressure injuries/DTPIs x 3  Location: left medial foot  Wound measurements: most inferior wound- 3cm x 3cm x 0cm; medial & superior wounds- 1cm x 1cm x 0cm; true depths indeterminable at this time    Stage 4 pressure injury  Location: left lateral foot   Wound measurements: 1cm x 1cm x 0.5cm    Stage 4 pressure injury  Location: sacrococcygeal   Wound measurements: 5cm x 3cm x 2cm   -

## 2022-04-29 NOTE — DIETITIAN INITIAL EVALUATION ADULT - HEIGHT FOR BMI (CENTIMETERS)
180.34
“I have personally evaluated and examined the patient. The Attending was available to me as a supervising provider if needed.”

## 2022-04-29 NOTE — CONSULT NOTE ADULT - SUBJECTIVE AND OBJECTIVE BOX
Patient is a 71y old  Male who presents with a chief complaint of AMS (28 Apr 2022 09:02)      HPI:  71 year old male with a PMhx of parkinson's disease with dementia requiting trach and PEG, CKD II, DLD, HTN, gout, DMII, 1st degree AV block and hx of fungemia.   History obtained from patients son at bedside. On my exam, patient is Greek speaking, responds to yes/no questions with eye movements/head nodding (this is his baseline). Patient is coming from NH, History goes back to: 1 day prior to admission when patients family noticed that he was lethargic, less responsive than usual. On questioning he reported abdominal pain. Of note patient takes oxycodone for pain and was recently started on Fe-supplements for anemia.    ROS is negative for: no fever, no chills, no SOB, chest pains or palpitations no nausea or vomiting. Family does not know when his last bowel movement was.      In the ED: VS WNL  Labs significant for: Hb:8.8 (chronic), ESR>140, Albumin:2.7  VBG: consistent primarily with metabolic alkalosis   Troponin: 0.08  EKG: NSR with 1st degree AV block   CT head: no acute intracranial pathology. CT suggestive of otomastoiditis  CT abdomen: mild- moderate bilateral pleural effusions, heavy colonic stool burden, admitted, called ot evaluate      PAST MEDICAL & SURGICAL HISTORY:  HTN (hypertension)    Gout    Diabetes mellitus    Stage 2 chronic kidney disease    PEG (percutaneous endoscopic gastrostomy) status        SOCIAL HX:   Smoking    -    FAMILY HISTORY:  No pertinent family history in first degree relatives        REVIEW OF SYSTEMS see hpi  Allergies    No Known Allergies    Intolerances        acetaminophen     Tablet .. 650 milliGRAM(s) Oral every 6 hours PRN  aluminum hydroxide/magnesium hydroxide/simethicone Suspension 30 milliLiter(s) Oral every 4 hours PRN  ascorbic acid 500 milliGRAM(s) Oral daily  atorvastatin 40 milliGRAM(s) Oral at bedtime  cefepime   IVPB 2000 milliGRAM(s) IV Intermittent every 8 hours  chlorhexidine 4% Liquid 1 Application(s) Topical <User Schedule>  enoxaparin Injectable 40 milliGRAM(s) SubCutaneous every 24 hours  entacapone 200 milliGRAM(s) Oral two times a day  melatonin 3 milliGRAM(s) Oral at bedtime PRN  ondansetron Injectable 4 milliGRAM(s) IV Push every 8 hours PRN  oxyCODONE    IR 5 milliGRAM(s) Oral every 8 hours PRN  pantoprazole    Tablet 40 milliGRAM(s) Oral before breakfast  polyethylene glycol 3350 17 Gram(s) Oral daily PRN  rasagiline Tablet 0.5 milliGRAM(s) Oral daily  vancomycin  IVPB 1250 milliGRAM(s) IV Intermittent every 12 hours  : Home Meds:      PHYSICAL EXAM    ICU Vital Signs Last 24 Hrs  T(C): 36.4 (29 Apr 2022 00:16), Max: 36.8 (28 Apr 2022 10:10)  T(F): 97.5 (29 Apr 2022 00:16), Max: 98.3 (28 Apr 2022 10:10)  HR: 57 (29 Apr 2022 04:11) (57 - 75)  BP: 123/60 (29 Apr 2022 00:16) (105/54 - 123/60)  BP(mean): 71 (28 Apr 2022 10:10) (71 - 71)  RR: 17 (29 Apr 2022 00:16) (17 - 20)  SpO2: 100% (29 Apr 2022 04:11) (99% - 100%)      General: ill looking  HEENT:  trach            Lymph Nodes: No cervical LN   Lungs dec both bases  Cardiovascular: EVANS 2.6  Abdomen: Soft, Positive BS, distended  ulcer  contracted    04-27-22 @ 07:01  -  04-28-22 @ 07:00  --------------------------------------------------------  IN:    Free Water: 1200 mL    Jevity 1.2: 1200 mL  Total IN: 2400 mL    OUT:  Total OUT: 0 mL    Total NET: 2400 mL      04-28-22 @ 07:01  -  04-29-22 @ 06:07  --------------------------------------------------------  IN:    Free Water: 900 mL    IV PiggyBack: 550 mL    Jevity 1.2: 900 mL  Total IN: 2350 mL    OUT:    Voided (mL): 700 mL  Total OUT: 700 mL    Total NET: 1650 mL          LABS:                          8.8    5.20  )-----------( 291      ( 28 Apr 2022 06:10 )             27.5                                               04-28    136  |  101  |  31<H>  ----------------------------<  111<H>  4.4   |  24  |  0.8    Ca    8.6      28 Apr 2022 06:10  Mg     2.1     04-28    TPro  7.3  /  Alb  2.5<L>  /  TBili  0.3  /  DBili  x   /  AST  28  /  ALT  37  /  AlkPhos  140<H>  04-28                                                 CARDIAC MARKERS ( 29 Apr 2022 04:30 )  x     / 0.11 ng/mL / x     / x     / x      CARDIAC MARKERS ( 28 Apr 2022 06:10 )  x     / 0.12 ng/mL / x     / x     / x      CARDIAC MARKERS ( 27 Apr 2022 16:00 )  x     / 0.12 ng/mL / x     / x     / x                                                LIVER FUNCTIONS - ( 28 Apr 2022 06:10 )  Alb: 2.5 g/dL / Pro: 7.3 g/dL / ALK PHOS: 140 U/L / ALT: 37 U/L / AST: 28 U/L / GGT: x                                                  Culture - Blood (collected 27 Apr 2022 16:00)  Source: .Blood Blood-Peripheral  Preliminary Report (28 Apr 2022 23:01):    No growth to date.    Culture - Urine (collected 26 Apr 2022 08:36)  Source: Catheterized Catheterized  Final Report (28 Apr 2022 01:21):    No growth                                                   Mode: AC/ CMV (Assist Control/ Continuous Mandatory Ventilation)  RR (machine): 20  TV (machine): 400  FiO2: 40  PEEP: 5  ITime: 1  MAP: 9  PIP: 21                                MEDICATIONS  (STANDING):  ascorbic acid 500 milliGRAM(s) Oral daily  atorvastatin 40 milliGRAM(s) Oral at bedtime  cefepime   IVPB 2000 milliGRAM(s) IV Intermittent every 8 hours  chlorhexidine 4% Liquid 1 Application(s) Topical <User Schedule>  enoxaparin Injectable 40 milliGRAM(s) SubCutaneous every 24 hours  entacapone 200 milliGRAM(s) Oral two times a day  pantoprazole    Tablet 40 milliGRAM(s) Oral before breakfast  rasagiline Tablet 0.5 milliGRAM(s) Oral daily  vancomycin  IVPB 1250 milliGRAM(s) IV Intermittent every 12 hours    MEDICATIONS  (PRN):  acetaminophen     Tablet .. 650 milliGRAM(s) Oral every 6 hours PRN Temp greater or equal to 38C (100.4F), Mild Pain (1 - 3)  aluminum hydroxide/magnesium hydroxide/simethicone Suspension 30 milliLiter(s) Oral every 4 hours PRN Dyspepsia  melatonin 3 milliGRAM(s) Oral at bedtime PRN Insomnia  ondansetron Injectable 4 milliGRAM(s) IV Push every 8 hours PRN Nausea and/or Vomiting  oxyCODONE    IR 5 milliGRAM(s) Oral every 8 hours PRN Moderate Pain (4 - 6)  polyethylene glycol 3350 17 Gram(s) Oral daily PRN Constipation

## 2022-04-29 NOTE — DIETITIAN INITIAL EVALUATION ADULT - PERTINENT MEDS FT
MEDICATIONS  (STANDING):  ascorbic acid 500 milliGRAM(s) Oral daily  atorvastatin 40 milliGRAM(s) Oral at bedtime  cefepime   IVPB 2000 milliGRAM(s) IV Intermittent every 8 hours  chlorhexidine 4% Liquid 1 Application(s) Topical <User Schedule>  enoxaparin Injectable 40 milliGRAM(s) SubCutaneous every 24 hours  entacapone 200 milliGRAM(s) Oral two times a day  pantoprazole    Tablet 40 milliGRAM(s) Oral before breakfast  rasagiline Tablet 0.5 milliGRAM(s) Oral daily    MEDICATIONS  (PRN):  acetaminophen     Tablet .. 650 milliGRAM(s) Oral every 6 hours PRN Temp greater or equal to 38C (100.4F), Mild Pain (1 - 3)  aluminum hydroxide/magnesium hydroxide/simethicone Suspension 30 milliLiter(s) Oral every 4 hours PRN Dyspepsia  melatonin 3 milliGRAM(s) Oral at bedtime PRN Insomnia  ondansetron Injectable 4 milliGRAM(s) IV Push every 8 hours PRN Nausea and/or Vomiting  oxyCODONE    IR 5 milliGRAM(s) Oral every 8 hours PRN Moderate Pain (4 - 6)  polyethylene glycol 3350 17 Gram(s) Oral daily PRN Constipation   ascorbic acid 500 milliGRAM(s) Oral daily  atorvastatin 40 milliGRAM(s) Oral at bedtime  cefepime   IVPB   pantoprazole Oral before breakfast    MEDICATIONS  (PRN):  acetaminophen     Tablet .. 650 milliGRAM(s) Oral every 6 hours PRN Temp greater or equal to 38C (100.4F), Mild Pain (1 - 3)  aluminum hydroxide/magnesium hydroxide/simethicone Suspension 30 milliLiter(s) Oral every 4 hours PRN Dyspepsia  melatonin 3 milliGRAM(s) Oral at bedtime PRN Insomnia  ondansetron Injectable 4 milliGRAM(s) IV Push every 8 hours PRN Nausea and/or Vomiting  oxyCODONE    IR 5 milliGRAM(s) Oral every 8 hours PRN Moderate Pain (4 - 6)  polyethylene glycol 3350 17 Gram(s) Oral daily PRN Constipation  -

## 2022-04-29 NOTE — CONSULT NOTE ADULT - ASSESSMENT
IMPRESSION:    altered MS/ Toxic/ metabolic ( head CT reviewed)  HO Parkinson's dementia  Chronic respiratory failure SP trach and PEG  large stool burden on CT AP  recurrent admission    PLAN:    CNS: avoid CNS depressant    HEENT: oral care, trach care    PULMONARY: HOB >45. NO vent changes.  Pulmonary toilet. keep SaO2 92 TO 96%    CARDIOVASCULAR: avoid overload    GI: GI prophylaxis. PEG feeding.   laxatives    RENAL: fu lytes. Replete as needed    INFECTIOUS DISEASE: abx per ID    HEMATOLOGICAL:  DVT prophylaxis.    ENDOCRINE:  Follow up FS.  Insulin protocol if needed.     Wound care for sacrum    VENT UNIT  GOC/ poor prognosis

## 2022-04-29 NOTE — PROGRESS NOTE ADULT - SUBJECTIVE AND OBJECTIVE BOX
NIEVES LABOY  71y, Male  Allergy: No Known Allergies      LOS  3d    CHIEF COMPLAINT: AMS (28 Apr 2022 09:02)      INTERVAL EVENTS/HPI  - No acute events overnight  - T(F): , Max: 98.3 (04-28-22 @ 10:10)  - Tolerating medication  - WBC Count: 5.20 (04-28-22 @ 06:10)  WBC Count: 6.37 (04-27-22 @ 07:00)     - Creatinine, Serum: 0.8 (04-28-22 @ 06:10)       ROS  ***    VITALS:  T(F): 97.7, Max: 98.3 (04-28-22 @ 10:10)  HR: 57  BP: 115/58  RR: 18Vital Signs Last 24 Hrs  T(C): 36.5 (29 Apr 2022 04:11), Max: 36.8 (28 Apr 2022 10:10)  T(F): 97.7 (29 Apr 2022 04:11), Max: 98.3 (28 Apr 2022 10:10)  HR: 57 (29 Apr 2022 04:11) (57 - 71)  BP: 115/58 (29 Apr 2022 04:11) (105/54 - 123/60)  BP(mean): 71 (28 Apr 2022 10:10) (71 - 71)  RR: 18 (29 Apr 2022 04:11) (17 - 20)  SpO2: 99% (29 Apr 2022 07:47) (99% - 100%)    PHYSICAL EXAM:  ***    FH: Non-contributory  Social Hx: Non-contributory    TESTS & MEASUREMENTS:                        8.8    5.20  )-----------( 291      ( 28 Apr 2022 06:10 )             27.5     04-28    136  |  101  |  31<H>  ----------------------------<  111<H>  4.4   |  24  |  0.8    Ca    8.6      28 Apr 2022 06:10  Mg     2.1     04-28    TPro  7.3  /  Alb  2.5<L>  /  TBili  0.3  /  DBili  x   /  AST  28  /  ALT  37  /  AlkPhos  140<H>  04-28      LIVER FUNCTIONS - ( 28 Apr 2022 06:10 )  Alb: 2.5 g/dL / Pro: 7.3 g/dL / ALK PHOS: 140 U/L / ALT: 37 U/L / AST: 28 U/L / GGT: x               Culture - Blood (collected 04-27-22 @ 16:00)  Source: .Blood Blood-Peripheral  Preliminary Report (04-28-22 @ 23:01):    No growth to date.    Culture - Urine (collected 04-26-22 @ 08:36)  Source: Catheterized Catheterized  Final Report (04-28-22 @ 01:21):    No growth    Culture - Blood (collected 04-25-22 @ 19:37)  Source: .Blood Blood  Preliminary Report (04-27-22 @ 01:02):    No growth to date.    Culture - Blood (collected 04-25-22 @ 19:37)  Source: .Blood Blood  Preliminary Report (04-27-22 @ 01:02):    No growth to date.        Lactate, Blood: 1.0 mmol/L (04-25-22 @ 19:07)      INFECTIOUS DISEASES TESTING  Vancomycin Level, Trough: 38.9 (04-29-22 @ 06:25)  MRSA PCR Result.: Negative (04-27-22 @ 22:39)  Rapid RVP Result: NotDetec (04-25-22 @ 21:57)  COVID-19 PCR: NotDetec (07-11-21 @ 05:06)  strept    INFLAMMATORY MARKERS  Sedimentation Rate, Erythrocyte: >140 mm/Hr (04-25-22 @ 21:28)      RADIOLOGY & ADDITIONAL TESTS:  I have personally reviewed the last available Chest xray  CXR      CT      CARDIOLOGY TESTING  12 Lead ECG:   Ventricular Rate 62 BPM    Atrial Rate 62 BPM    P-R Interval 250 ms    QRS Duration 74 ms    Q-T Interval 390 ms    QTC Calculation(Bazett) 395 ms    P Axis 41 degrees    R Axis -35 degrees    T Axis -7 degrees    Diagnosis Line Sinus rhythm with 1st degree A-V block  Left axis deviation  Left anterior fascicular block  Inferior infarct , age undetermined  Abnormal ECG    Confirmed by ANA GARDUNO MD ([id number]) on 4/29/2022 6:17:12 AM (04-29-22 @ 04:19)  12 Lead ECG:   Ventricular Rate 179 BPM    Atrial Rate 197 BPM    QRS Duration 64 ms    Q-T Interval 198 ms    QTC Calculation(Bazett) 341 ms    R Axis 163 degrees    T Axis 185 degrees    Diagnosis Line *** Poor data quality, interpretation may be adversely affected  Atrial flutter with 4:1 A-V conduction  Abnormal ECG    Confirmed by Shubham Ovalle ([id number]) on 4/28/2022 1:11:16 PM (04-28-22 @ 10:05)      MEDICATIONS  ascorbic acid 500 Oral daily  atorvastatin 40 Oral at bedtime  cefepime   IVPB 2000 IV Intermittent every 8 hours  chlorhexidine 4% Liquid 1 Topical <User Schedule>  enoxaparin Injectable 40 SubCutaneous every 24 hours  entacapone 200 Oral two times a day  pantoprazole    Tablet 40 Oral before breakfast  rasagiline Tablet 0.5 Oral daily  vancomycin  IVPB 500 IV Intermittent every 12 hours      WEIGHT  Weight (kg): 81.6 (04-26-22 @ 07:25)      ANTIBIOTICS:  cefepime   IVPB 2000 milliGRAM(s) IV Intermittent every 8 hours  vancomycin  IVPB 500 milliGRAM(s) IV Intermittent every 12 hours      All available historical records have been reviewed       NIEVES LABOY  71y, Male  Allergy: No Known Allergies      LOS  3d    CHIEF COMPLAINT: AMS (28 Apr 2022 09:02)      INTERVAL EVENTS/HPI  - No acute events overnight  - T(F): , Max: 98.3 (04-28-22 @ 10:10)  - Tolerating medication  - WBC Count: 5.20 (04-28-22 @ 06:10)  WBC Count: 6.37 (04-27-22 @ 07:00)     - Creatinine, Serum: 0.8 (04-28-22 @ 06:10)       ROS  unable to obtain history secondary to patient's mental status and/or sedation     VITALS:  T(F): 97.7, Max: 98.3 (04-28-22 @ 10:10)  HR: 57  BP: 115/58  RR: 18Vital Signs Last 24 Hrs  T(C): 36.5 (29 Apr 2022 04:11), Max: 36.8 (28 Apr 2022 10:10)  T(F): 97.7 (29 Apr 2022 04:11), Max: 98.3 (28 Apr 2022 10:10)  HR: 57 (29 Apr 2022 04:11) (57 - 71)  BP: 115/58 (29 Apr 2022 04:11) (105/54 - 123/60)  BP(mean): 71 (28 Apr 2022 10:10) (71 - 71)  RR: 18 (29 Apr 2022 04:11) (17 - 20)  SpO2: 99% (29 Apr 2022 07:47) (99% - 100%)    PHYSICAL EXAM:  Gen: trach/ vent  CV: RRR  Lungs: Decreased BS at bases  Abd: Soft  Neuro: does not follow commands  Skin: no rash   Lines clean, no phlebitis     FH: Non-contributory  Social Hx: Non-contributory    TESTS & MEASUREMENTS:                        8.8    5.20  )-----------( 291      ( 28 Apr 2022 06:10 )             27.5     04-28    136  |  101  |  31<H>  ----------------------------<  111<H>  4.4   |  24  |  0.8    Ca    8.6      28 Apr 2022 06:10  Mg     2.1     04-28    TPro  7.3  /  Alb  2.5<L>  /  TBili  0.3  /  DBili  x   /  AST  28  /  ALT  37  /  AlkPhos  140<H>  04-28      LIVER FUNCTIONS - ( 28 Apr 2022 06:10 )  Alb: 2.5 g/dL / Pro: 7.3 g/dL / ALK PHOS: 140 U/L / ALT: 37 U/L / AST: 28 U/L / GGT: x               Culture - Blood (collected 04-27-22 @ 16:00)  Source: .Blood Blood-Peripheral  Preliminary Report (04-28-22 @ 23:01):    No growth to date.    Culture - Urine (collected 04-26-22 @ 08:36)  Source: Catheterized Catheterized  Final Report (04-28-22 @ 01:21):    No growth    Culture - Blood (collected 04-25-22 @ 19:37)  Source: .Blood Blood  Preliminary Report (04-27-22 @ 01:02):    No growth to date.    Culture - Blood (collected 04-25-22 @ 19:37)  Source: .Blood Blood  Preliminary Report (04-27-22 @ 01:02):    No growth to date.        Lactate, Blood: 1.0 mmol/L (04-25-22 @ 19:07)      INFECTIOUS DISEASES TESTING  Vancomycin Level, Trough: 38.9 (04-29-22 @ 06:25)  MRSA PCR Result.: Negative (04-27-22 @ 22:39)  Rapid RVP Result: NotDetec (04-25-22 @ 21:57)  COVID-19 PCR: NotDetec (07-11-21 @ 05:06)  strept    INFLAMMATORY MARKERS  Sedimentation Rate, Erythrocyte: >140 mm/Hr (04-25-22 @ 21:28)      RADIOLOGY & ADDITIONAL TESTS:  I have personally reviewed the last available Chest xray  CXR      CT      CARDIOLOGY TESTING  12 Lead ECG:   Ventricular Rate 62 BPM    Atrial Rate 62 BPM    P-R Interval 250 ms    QRS Duration 74 ms    Q-T Interval 390 ms    QTC Calculation(Bazett) 395 ms    P Axis 41 degrees    R Axis -35 degrees    T Axis -7 degrees    Diagnosis Line Sinus rhythm with 1st degree A-V block  Left axis deviation  Left anterior fascicular block  Inferior infarct , age undetermined  Abnormal ECG    Confirmed by ANA GARDUNO MD ([id number]) on 4/29/2022 6:17:12 AM (04-29-22 @ 04:19)  12 Lead ECG:   Ventricular Rate 179 BPM    Atrial Rate 197 BPM    QRS Duration 64 ms    Q-T Interval 198 ms    QTC Calculation(Bazett) 341 ms    R Axis 163 degrees    T Axis 185 degrees    Diagnosis Line *** Poor data quality, interpretation may be adversely affected  Atrial flutter with 4:1 A-V conduction  Abnormal ECG    Confirmed by Shubham Ovalle ([id number]) on 4/28/2022 1:11:16 PM (04-28-22 @ 10:05)      MEDICATIONS  ascorbic acid 500 Oral daily  atorvastatin 40 Oral at bedtime  cefepime   IVPB 2000 IV Intermittent every 8 hours  chlorhexidine 4% Liquid 1 Topical <User Schedule>  enoxaparin Injectable 40 SubCutaneous every 24 hours  entacapone 200 Oral two times a day  pantoprazole    Tablet 40 Oral before breakfast  rasagiline Tablet 0.5 Oral daily  vancomycin  IVPB 500 IV Intermittent every 12 hours      WEIGHT  Weight (kg): 81.6 (04-26-22 @ 07:25)      ANTIBIOTICS:  cefepime   IVPB 2000 milliGRAM(s) IV Intermittent every 8 hours  vancomycin  IVPB 500 milliGRAM(s) IV Intermittent every 12 hours      All available historical records have been reviewed

## 2022-04-29 NOTE — PROGRESS NOTE ADULT - ASSESSMENT
ASSESSMENT  71yMale with a PMhx of parkinson's disease with dementia requiting trach (now with trach collar) and PEG, CKD II, DLD, HTN, gout, DMII, 1st degree AV block and hx of fungemia with AMS/lethargy with thick secretions, b/l otomastoiditis L>R, bibasilar opacities/effusions, increased stool burden on CT without fevers or leukocytosis, sepsis ruled out on admission.     IMPRESSION  #No sepsis on admission; metabolic encephalopathy  low suspicion for meningitis  +tracheal secretions- rule out tracheitis  Large stool burden- can be contributing to mental status  WBC 5  ESR > 120  #CTH L mastoid opacification, cannot rule out mastoiditis- does not appear painful on exam- no erythema/edema  < from: CT Head No Cont (04.25.22 @ 20:01) >  Complete effusion of the left mastoid and middle ear cavity, as well as partial effusion of the right mastoid. The finding is nonspecific but can be seen with otomastoiditis.  # Hx Parkinson's Disease  # Hx s/p trach and PEG    RECOMMENDATIONS  - D/C Vanc  - Send CRP, not sent  - Empiric cefepime 5 days for tracheitis , cx not sent   - relieve large stool burden with laxatives - may need disimpaction   - can consider ENT consult for otomastoiditis    If any questions, please call or send a message on [employer] Teams  Please continue to update ID with any pertinent new laboratory or radiographic findings  Spectra [id number]    ASSESSMENT  71yMale with a PMhx of parkinson's disease with dementia requiting trach (now with trach collar) and PEG, CKD II, DLD, HTN, gout, DMII, 1st degree AV block and hx of fungemia with AMS/lethargy with thick secretions, b/l otomastoiditis L>R, bibasilar opacities/effusions, increased stool burden on CT without fevers or leukocytosis, sepsis ruled out on admission.     IMPRESSION  #No sepsis on admission; metabolic encephalopathy  low suspicion for meningitis  +tracheal secretions- rule out tracheitis  Large stool burden- can be contributing to mental status  WBC 5  ESR > 120  #CTH L mastoid opacification, cannot rule out mastoiditis- does not appear painful on exam- no erythema/edema  < from: CT Head No Cont (04.25.22 @ 20:01) >  Complete effusion of the left mastoid and middle ear cavity, as well as partial effusion of the right mastoid. The finding is nonspecific but can be seen with otomastoiditis.  # Hx Parkinson's Disease  # Hx s/p trach and PEG    RECOMMENDATIONS  - D/C Vanc  - Send CRP, not sent  - Empiric cefepime 5 days for tracheitis , cx not sent   - relieve large stool burden with laxatives - may need disimpaction   - can consider ENT consult for otomastoiditis  - if MS not improving and significant change from baseline, recommend LP though overall low suspicion for CNS infection as no fevers    If any questions, please call or send a message on [employer] Teams  Please continue to update ID with any pertinent new laboratory or radiographic findings  Spectra [phone]

## 2022-04-29 NOTE — PROGRESS NOTE ADULT - SUBJECTIVE AND OBJECTIVE BOX
NIEVES LABOY  71y, Male  Allergy: No Known Allergies    Hospital Day: 3d    Patient seen and examined. No acute events overnight    PMH/PSH:  PAST MEDICAL & SURGICAL HISTORY:  HTN (hypertension)    Gout    Diabetes mellitus    Stage 2 chronic kidney disease    PEG (percutaneous endoscopic gastrostomy) status    VITALS:  T(F): 97.7 (04-29-22 @ 04:11), Max: 98.2 (04-28-22 @ 15:45)  HR: 57 (04-29-22 @ 04:11)  BP: 115/58 (04-29-22 @ 04:11) (105/54 - 123/60)  RR: 18 (04-29-22 @ 04:11)  SpO2: 99% (04-29-22 @ 07:47)    TESTS & MEASUREMENTS:  Weight (Kg):   BMI (kg/m2): 28.2 (04-26)    04-27-22 @ 07:01  -  04-28-22 @ 07:00  --------------------------------------------------------  IN: 2400 mL / OUT: 0 mL / NET: 2400 mL    04-28-22 @ 07:01  -  04-29-22 @ 07:00  --------------------------------------------------------  IN: 2350 mL / OUT: 700 mL / NET: 1650 mL    04-29-22 @ 07:01  -  04-29-22 @ 12:52  --------------------------------------------------------  IN: 300 mL / OUT: 0 mL / NET: 300 mL                        8.8    5.20  )-----------( 291      ( 28 Apr 2022 06:10 )             27.5     04-28    136  |  101  |  31<H>  ----------------------------<  111<H>  4.4   |  24  |  0.8    Ca    8.6      28 Apr 2022 06:10  Mg     2.1     04-28    TPro  7.3  /  Alb  2.5<L>  /  TBili  0.3  /  DBili  x   /  AST  28  /  ALT  37  /  AlkPhos  140<H>  04-28    LIVER FUNCTIONS - ( 28 Apr 2022 06:10 )  Alb: 2.5 g/dL / Pro: 7.3 g/dL / ALK PHOS: 140 U/L / ALT: 37 U/L / AST: 28 U/L / GGT: x           CARDIAC MARKERS ( 29 Apr 2022 04:30 )  x     / 0.11 ng/mL / x     / x     / x      CARDIAC MARKERS ( 28 Apr 2022 06:10 )  x     / 0.12 ng/mL / x     / x     / x      CARDIAC MARKERS ( 27 Apr 2022 16:00 )  x     / 0.12 ng/mL / x     / x     / x        Culture - Blood (collected 04-27-22 @ 16:00)  Source: .Blood Blood-Peripheral  Preliminary Report (04-28-22 @ 23:01):    No growth to date.    Culture - Urine (collected 04-26-22 @ 08:36)  Source: Catheterized Catheterized  Final Report (04-28-22 @ 01:21):    No growth    Culture - Blood (collected 04-25-22 @ 19:37)  Source: .Blood Blood  Preliminary Report (04-27-22 @ 01:02):    No growth to date.    Culture - Blood (collected 04-25-22 @ 19:37)  Source: .Blood Blood  Preliminary Report (04-27-22 @ 01:02):    No growth to date.    RECENT DIAGNOSTIC ORDERS:  C-Reactive Protein, Serum: 16:00 (04-29-22 @ 12:44)  Magnesium, Serum: AM Sched. Collection: 30-Apr-2022 04:30 (04-29-22 @ 09:18)  Complete Blood Count: AM Sched. Collection: 30-Apr-2022 04:30 (04-29-22 @ 09:18)  Basic Metabolic Panel: AM Sched. Collection: 30-Apr-2022 04:30 (04-29-22 @ 09:18)  Magnesium, Serum: AM Sched. Collection: 30-Apr-2022 04:30 (04-29-22 @ 09:02)  Complete Blood Count: AM Sched. Collection: 30-Apr-2022 04:30 (04-29-22 @ 09:02)  Basic Metabolic Panel: AM Sched. Collection: 30-Apr-2022 04:30 (04-29-22 @ 09:02)  Troponin T, Serum: 04:30 (04-29-22 @ 03:40)    MEDICATIONS:  MEDICATIONS  (STANDING):  ascorbic acid 500 milliGRAM(s) Oral daily  atorvastatin 40 milliGRAM(s) Oral at bedtime  cefepime   IVPB 2000 milliGRAM(s) IV Intermittent every 8 hours  chlorhexidine 4% Liquid 1 Application(s) Topical <User Schedule>  enoxaparin Injectable 40 milliGRAM(s) SubCutaneous every 24 hours  entacapone 200 milliGRAM(s) Oral two times a day  pantoprazole    Tablet 40 milliGRAM(s) Oral before breakfast  rasagiline Tablet 0.5 milliGRAM(s) Oral daily    MEDICATIONS  (PRN):  acetaminophen     Tablet .. 650 milliGRAM(s) Oral every 6 hours PRN Temp greater or equal to 38C (100.4F), Mild Pain (1 - 3)  aluminum hydroxide/magnesium hydroxide/simethicone Suspension 30 milliLiter(s) Oral every 4 hours PRN Dyspepsia  melatonin 3 milliGRAM(s) Oral at bedtime PRN Insomnia  ondansetron Injectable 4 milliGRAM(s) IV Push every 8 hours PRN Nausea and/or Vomiting  oxyCODONE    IR 5 milliGRAM(s) Oral every 8 hours PRN Moderate Pain (4 - 6)  polyethylene glycol 3350 17 Gram(s) Oral daily PRN Constipation    HOME MEDICATIONS:  albuterol-ipratropium (04-26)  ascorbic acid 500 mg oral tablet (04-26)  atorvastatin 40 mg oral tablet (04-26)  entacapone 200 mg oral tablet (04-26)  ferrous sulfate (as elemental iron) 15 mg/1.5 mL oral liquid (04-26)  omeprazole 20 mg oral delayed release capsule (04-26)  oxyCODONE 5 mg oral tablet (04-26)  rasagiline 0.5 mg oral tablet (04-26)  Retacrit 4000 units/mL preservative-free injectable solution (04-26)  zinc sulfate 220 mg oral capsule (04-26)    REVIEW OF SYSTEMS:  All other review of systems is negative unless indicated above.     PHYSICAL EXAM:  PHYSICAL EXAM:  GENERAL: NAD, well-developed  HEAD:  Atraumatic, Normocephalic  NECK: Supple, No JVD, trach to vent  CHEST/LUNG: Clear to auscultation bilaterally; No wheeze  HEART: Regular rate and rhythm; No murmurs, rubs, or gallops  ABDOMEN: Soft, Nontender, Nondistended; Bowel sounds present  EXTREMITIES:  2+ Peripheral Pulses, No clubbing, cyanosis, or edema  SKIN: Sacral ulcers, b/l foot ulcers

## 2022-04-29 NOTE — DIETITIAN INITIAL EVALUATION ADULT - COLLABORATION WITH OTHER PROVIDERS
Unable to reach team. Diet/EN recommended above is pending activation. Please go to Orders, and make sure order status filter is off ->under food and nutrition tab, locate diet order with status "pending verification by attending" -> right click, review and approve.

## 2022-04-29 NOTE — DIETITIAN INITIAL EVALUATION ADULT - ENTERAL
Replete formula (type in formula name in order form), 410ml bolus via gravity bag, Q4H (6x boluses daily). Free water flushes 50ml before and after each bolus. -- 100% TF volume as ordered will provide 2460kcal, 157g protein, 2066+400ml free water.

## 2022-04-29 NOTE — DIETITIAN INITIAL EVALUATION ADULT - NSFNSGIIOFT_GEN_A_CORE
04-28-22 @ 07:01  -  04-29-22 @ 07:00  --------------------------------------------------------  OUT:  Total OUT: 0 mL    Total NET: 900 mL       -  I&O's Detail    28 Apr 2022 07:01  -  29 Apr 2022 07:00  --------------------------------------------------------  IN:    Free Water: 900 mL    IV PiggyBack: 550 mL    Jevity 1.2: 900 mL  Total IN: 2350 mL    OUT:    Voided (mL): 700 mL  Total OUT: 700 mL    Total NET: 1650 mL

## 2022-04-29 NOTE — PROGRESS NOTE ADULT - ASSESSMENT
71 year old male with a PMhx of parkinson's disease with dementia requiting trach (now with trach collar) and PEG, CKD II, DLD, HTN, gout, DMII, 1st degree AV block and hx of fungemia. Presenting with Encephalopathy possibly due to mastoiditis vs PNA    #Metabolic Encephalopathy  #Possibly due to mastoiditis, PNA, Sacral ulcer  #B/l Pleural effusions  ESR noted to be >140  Bcx 04/25: NGTD  CTAP (04/25) Large colonic stool load. No CT evidence of acute inflammatory   abdominopelvic pathology.  2.  Partially imaged bilateral small to moderate pleural effusions.  3.  Indeterminate 2 cm right hepatic lobe hypodensity. Further evaluation   with nonemergent contrast-enhanced MRI is recommended.  UCx 04/26 negative  BCx 04/27: Negative  KUB 04/28: Nonobstructive bowel gas pattern, no large colonic stool burden is noted  - Woundcare per team recs  - DC vanc  - Cont cefepime 2g q8h x5 days  - sputum cx  - Echocardiogram still pending  - ENT eval for otomastoiditis    #HTN/HLD  - Currently not on BP meds  - Cont atorvastatin 40mg qHs    #Parkinson's disease with dementia  s/p trach/PEG  - Cont entacapone  - Cont rasaligine    #DM2  HBa1c 5.1  - Monitor FS    #CKD Stage 2  - Monitor cr    DVT PPX, Lovenox    #Progress Note Handoff  Pending (specify):  Continue IV abx, sputum cx, ENT eval  Family: Discussed with family regarding management of possible tracheitis  Disposition: NH

## 2022-04-30 LAB
ANION GAP SERPL CALC-SCNC: 11 MMOL/L — SIGNIFICANT CHANGE UP (ref 7–14)
BUN SERPL-MCNC: 24 MG/DL — HIGH (ref 10–20)
CALCIUM SERPL-MCNC: 8.5 MG/DL — SIGNIFICANT CHANGE UP (ref 8.5–10.1)
CHLORIDE SERPL-SCNC: 102 MMOL/L — SIGNIFICANT CHANGE UP (ref 98–110)
CO2 SERPL-SCNC: 22 MMOL/L — SIGNIFICANT CHANGE UP (ref 17–32)
CREAT SERPL-MCNC: 0.8 MG/DL — SIGNIFICANT CHANGE UP (ref 0.7–1.5)
CRP SERPL-MCNC: 119 MG/L — HIGH
CRP SERPL-MCNC: 134 MG/L — HIGH
EGFR: 94 ML/MIN/1.73M2 — SIGNIFICANT CHANGE UP
GLUCOSE BLDC GLUCOMTR-MCNC: 110 MG/DL — HIGH (ref 70–99)
GLUCOSE BLDC GLUCOMTR-MCNC: 137 MG/DL — HIGH (ref 70–99)
GLUCOSE SERPL-MCNC: 105 MG/DL — HIGH (ref 70–99)
HCT VFR BLD CALC: 28.1 % — LOW (ref 42–52)
HGB BLD-MCNC: 9.1 G/DL — LOW (ref 14–18)
MAGNESIUM SERPL-MCNC: 2.1 MG/DL — SIGNIFICANT CHANGE UP (ref 1.8–2.4)
MCHC RBC-ENTMCNC: 28.5 PG — SIGNIFICANT CHANGE UP (ref 27–31)
MCHC RBC-ENTMCNC: 32.4 G/DL — SIGNIFICANT CHANGE UP (ref 32–37)
MCV RBC AUTO: 88.1 FL — SIGNIFICANT CHANGE UP (ref 80–94)
NRBC # BLD: 0 /100 WBCS — SIGNIFICANT CHANGE UP (ref 0–0)
PLATELET # BLD AUTO: 254 K/UL — SIGNIFICANT CHANGE UP (ref 130–400)
POTASSIUM SERPL-MCNC: 4.2 MMOL/L — SIGNIFICANT CHANGE UP (ref 3.5–5)
POTASSIUM SERPL-SCNC: 4.2 MMOL/L — SIGNIFICANT CHANGE UP (ref 3.5–5)
RBC # BLD: 3.19 M/UL — LOW (ref 4.7–6.1)
RBC # FLD: 14.3 % — SIGNIFICANT CHANGE UP (ref 11.5–14.5)
SODIUM SERPL-SCNC: 135 MMOL/L — SIGNIFICANT CHANGE UP (ref 135–146)
WBC # BLD: 4.95 K/UL — SIGNIFICANT CHANGE UP (ref 4.8–10.8)
WBC # FLD AUTO: 4.95 K/UL — SIGNIFICANT CHANGE UP (ref 4.8–10.8)

## 2022-04-30 PROCEDURE — 93306 TTE W/DOPPLER COMPLETE: CPT | Mod: 26

## 2022-04-30 PROCEDURE — 99233 SBSQ HOSP IP/OBS HIGH 50: CPT

## 2022-04-30 PROCEDURE — 99232 SBSQ HOSP IP/OBS MODERATE 35: CPT

## 2022-04-30 RX ORDER — PANTOPRAZOLE SODIUM 20 MG/1
40 TABLET, DELAYED RELEASE ORAL DAILY
Refills: 0 | Status: DISCONTINUED | OUTPATIENT
Start: 2022-04-30 | End: 2022-05-02

## 2022-04-30 RX ORDER — ENTACAPONE 200 MG/1
200 TABLET, FILM COATED ORAL ONCE
Refills: 0 | Status: COMPLETED | OUTPATIENT
Start: 2022-04-30 | End: 2022-04-30

## 2022-04-30 RX ADMIN — ATORVASTATIN CALCIUM 40 MILLIGRAM(S): 80 TABLET, FILM COATED ORAL at 22:38

## 2022-04-30 RX ADMIN — ENTACAPONE 200 MILLIGRAM(S): 200 TABLET, FILM COATED ORAL at 09:54

## 2022-04-30 RX ADMIN — ENOXAPARIN SODIUM 40 MILLIGRAM(S): 100 INJECTION SUBCUTANEOUS at 11:27

## 2022-04-30 RX ADMIN — RASAGILINE 0.5 MILLIGRAM(S): 0.5 TABLET ORAL at 11:27

## 2022-04-30 RX ADMIN — CEFEPIME 100 MILLIGRAM(S): 1 INJECTION, POWDER, FOR SOLUTION INTRAMUSCULAR; INTRAVENOUS at 22:38

## 2022-04-30 RX ADMIN — ENTACAPONE 200 MILLIGRAM(S): 200 TABLET, FILM COATED ORAL at 17:03

## 2022-04-30 RX ADMIN — CEFEPIME 100 MILLIGRAM(S): 1 INJECTION, POWDER, FOR SOLUTION INTRAMUSCULAR; INTRAVENOUS at 06:01

## 2022-04-30 RX ADMIN — CHLORHEXIDINE GLUCONATE 1 APPLICATION(S): 213 SOLUTION TOPICAL at 05:38

## 2022-04-30 RX ADMIN — PANTOPRAZOLE SODIUM 40 MILLIGRAM(S): 20 TABLET, DELAYED RELEASE ORAL at 11:29

## 2022-04-30 RX ADMIN — CEFEPIME 100 MILLIGRAM(S): 1 INJECTION, POWDER, FOR SOLUTION INTRAMUSCULAR; INTRAVENOUS at 16:04

## 2022-04-30 RX ADMIN — Medication 500 MILLIGRAM(S): at 11:27

## 2022-04-30 NOTE — PROGRESS NOTE ADULT - SUBJECTIVE AND OBJECTIVE BOX
NIEVES LABOY  71y, Male  Allergy: No Known Allergies      Patient seen and examined. No acute events overnight  son is bedside  spoke to daughter via phone     PMH/PSH:  PAST MEDICAL & SURGICAL HISTORY:  HTN (hypertension)    Gout    Diabetes mellitus    Stage 2 chronic kidney disease    PEG (percutaneous endoscopic gastrostomy) status    VITALS:  Vital Signs Last 24 Hrs  T(C): 36.8 (30 Apr 2022 08:42), Max: 37.1 (30 Apr 2022 00:25)  T(F): 98.2 (30 Apr 2022 08:42), Max: 98.7 (30 Apr 2022 00:25)  HR: 68 (30 Apr 2022 13:20) (58 - 99)  BP: 117/62 (30 Apr 2022 12:08) (105/74 - 117/62)  BP(mean): --  RR: 20 (30 Apr 2022 12:08) (18 - 20)  SpO2: 97% (30 Apr 2022 13:20) (97% - 100%) on vent via trach    TESTS & MEASUREMENTS:                        9.1    4.95  )-----------( 254      ( 30 Apr 2022 04:30 )             28.1     04-30    135  |  102  |  24<H>  ----------------------------<  105<H>  4.2   |  22  |  0.8    Ca    8.5      30 Apr 2022 04:30  Mg     2.1     04-30              Culture - Blood (collected 04-27-22 @ 16:00)  Source: .Blood Blood-Peripheral  Preliminary Report (04-28-22 @ 23:01):    No growth to date.    Culture - Urine (collected 04-26-22 @ 08:36)  Source: Catheterized Catheterized  Final Report (04-28-22 @ 01:21):    No growth    Culture - Blood (collected 04-25-22 @ 19:37)  Source: .Blood Blood  Preliminary Report (04-27-22 @ 01:02):    No growth to date.    Culture - Blood (collected 04-25-22 @ 19:37)  Source: .Blood Blood  Preliminary Report (04-27-22 @ 01:02):    No growth to date.    RECENT DIAGNOSTIC ORDERS:  < from: Xray Kidney Ureter Bladder (04.28.22 @ 14:06) >  Images are partially obscured by patient's arms and hands.    Nonobstructive appearing bowel gas pattern.. No large colonic stool   burden is noted. Stable bones.    < end of copied text >  < from: CT Head No Cont (04.25.22 @ 20:01) >  IMPRESSION:    No acute intracranial pathology.    Mild chronic microvascular ischemic changes.    Complete effusion of the left mastoid and middle ear cavity, as well as   partial effusion of the right mastoid. The finding is nonspecific but can   be seen with otomastoiditis.    < end of copied text >        REVIEW OF SYSTEMS:  All other review of systems is negative unless indicated above.     PHYSICAL EXAM:  PHYSICAL EXAM:  GENERAL: NAD, well-developed  HEAD:  Atraumatic, Normocephalic  NECK: Supple, No JVD, trach to vent  CHEST/LUNG: decreased breath sounds bilaterally   HEART: Regular rate and rhythm; No murmurs, rubs, or gallops  ABDOMEN: Soft, Nontender, Nondistended; Bowel sounds present, hancock  EXTREMITIES:  2+ Peripheral Pulses, No clubbing, cyanosis, or edema  SKIN: Sacral ulcers, b/l foot ulcers      MEDICATIONS:  MEDICATIONS  (STANDING):  ascorbic acid 500 milliGRAM(s) Oral daily  atorvastatin 40 milliGRAM(s) Oral at bedtime  cefepime   IVPB 2000 milliGRAM(s) IV Intermittent every 8 hours  chlorhexidine 4% Liquid 1 Application(s) Topical <User Schedule>  enoxaparin Injectable 40 milliGRAM(s) SubCutaneous every 24 hours  entacapone 200 milliGRAM(s) Oral two times a day  pantoprazole  Injectable 40 milliGRAM(s) IV Push daily  rasagiline Tablet 0.5 milliGRAM(s) Oral daily    MEDICATIONS  (PRN):  acetaminophen     Tablet .. 650 milliGRAM(s) Oral every 6 hours PRN Temp greater or equal to 38C (100.4F), Mild Pain (1 - 3)  aluminum hydroxide/magnesium hydroxide/simethicone Suspension 30 milliLiter(s) Oral every 4 hours PRN Dyspepsia  melatonin 3 milliGRAM(s) Oral at bedtime PRN Insomnia  ondansetron Injectable 4 milliGRAM(s) IV Push every 8 hours PRN Nausea and/or Vomiting  oxyCODONE    IR 5 milliGRAM(s) Oral every 8 hours PRN Moderate Pain (4 - 6)  polyethylene glycol 3350 17 Gram(s) Oral daily PRN Constipation

## 2022-04-30 NOTE — PROGRESS NOTE ADULT - SUBJECTIVE AND OBJECTIVE BOX
Over Night Events: Events noted, vent dependant, afebrile    PHYSICAL EXAM    ICU Vital Signs Last 24 Hrs  T(C): 36.7 (30 Apr 2022 04:20), Max: 37.1 (30 Apr 2022 00:25)  T(F): 98 (30 Apr 2022 04:20), Max: 98.7 (30 Apr 2022 00:25)  HR: 99 (30 Apr 2022 05:04) (58 - 99)  BP: 108/58 (30 Apr 2022 04:20) (105/74 - 115/58)  BP(mean): 88 (29 Apr 2022 13:00) (88 - 88)  RR: 20 (30 Apr 2022 04:20) (18 - 20)  SpO2: 99% (30 Apr 2022 05:04) (99% - 100%)      General: ill looking  HEENT: trach  Lungs: Bilateral BS  Cardiovascular: EVANS 2.6  Abdomen: Soft, Positive BS  Extremities: No clubbing   not following commands      04-29-22 @ 07:01  -  04-30-22 @ 07:00  --------------------------------------------------------  IN:    Free Water: 600 mL    Jevity 1.2: 600 mL  Total IN: 1200 mL    OUT:    Indwelling Catheter - Urethral (mL): 250 mL    Voided (mL): 700 mL  Total OUT: 950 mL    Total NET: 250 mL          LABS:                          9.1    4.95  )-----------( 254      ( 30 Apr 2022 04:30 )             28.1                                               04-30    135  |  102  |  24<H>  ----------------------------<  105<H>  4.2   |  22  |  0.8    Ca    8.5      30 Apr 2022 04:30  Mg     2.1     04-30                                                   CARDIAC MARKERS ( 29 Apr 2022 04:30 )  x     / 0.11 ng/mL / x     / x     / x                                                                                         Culture - Blood (collected 28 Apr 2022 04:30)  Source: .Blood Blood-Peripheral  Preliminary Report (29 Apr 2022 20:02):    No growth to date.    Culture - Blood (collected 27 Apr 2022 16:00)  Source: .Blood Blood-Peripheral  Preliminary Report (28 Apr 2022 23:01):    No growth to date.                                                   Mode: AC/ CMV (Assist Control/ Continuous Mandatory Ventilation)  RR (machine): 20  TV (machine): 400  FiO2: 40  PEEP: 5  ITime: 1  MAP: 14  PIP: 28                                          MEDICATIONS  (STANDING):  ascorbic acid 500 milliGRAM(s) Oral daily  atorvastatin 40 milliGRAM(s) Oral at bedtime  cefepime   IVPB 2000 milliGRAM(s) IV Intermittent every 8 hours  chlorhexidine 4% Liquid 1 Application(s) Topical <User Schedule>  enoxaparin Injectable 40 milliGRAM(s) SubCutaneous every 24 hours  entacapone 200 milliGRAM(s) Oral two times a day  pantoprazole  Injectable 40 milliGRAM(s) IV Push daily  rasagiline Tablet 0.5 milliGRAM(s) Oral daily    MEDICATIONS  (PRN):  acetaminophen     Tablet .. 650 milliGRAM(s) Oral every 6 hours PRN Temp greater or equal to 38C (100.4F), Mild Pain (1 - 3)  aluminum hydroxide/magnesium hydroxide/simethicone Suspension 30 milliLiter(s) Oral every 4 hours PRN Dyspepsia  melatonin 3 milliGRAM(s) Oral at bedtime PRN Insomnia  ondansetron Injectable 4 milliGRAM(s) IV Push every 8 hours PRN Nausea and/or Vomiting  oxyCODONE    IR 5 milliGRAM(s) Oral every 8 hours PRN Moderate Pain (4 - 6)  polyethylene glycol 3350 17 Gram(s) Oral daily PRN Constipation

## 2022-04-30 NOTE — PROGRESS NOTE ADULT - ASSESSMENT
71 year old male with a PMhx of parkinson's disease with dementia requiting trach (now with trach collar) and PEG, CKD II, DLD, HTN, gout, DMII, 1st degree AV block and hx of fungemia. Presenting with Encephalopathy possibly due to mastoiditis vs PNA    #Metabolic Encephalopathy suspected 2/2 mastoiditis, PNA, Sacral ulcer  #B/l Pleural effusions  -ESR >140  - Wound care per team recs  - Cont cefepime 2g q8h x5 days  - follow up echo  - check cultures  - ENT consult pending     #HTN/HLD  - Currently not on BP meds  - Cont atorvastatin 40mg qHs    #Parkinson's disease with dementia; s/p trach/PEG  - Cont entacapone and rasaligine    #DM II- well controlled with diet  - HBa1c 5.1  - Monitor FS as per protocol     #CKD Stage II - stable   - Monitor cr    GOC - full code   overall poor prognosis  transfer to vent bed as per critical care     Progress Note Handoff  Pending Consults: ENT  Pending Tests: labs  Pending Results: labs  Family Discussion: discussed medication, labs and overall plan of care with pt's family and medical staff.  In agreement with plan of care   Disposition: Home_____/SNF______/Other_____/Unknown at this time___x_  Spent over 35 min reviewing chart and on coordinating patient care during interdisciplinary rounds

## 2022-04-30 NOTE — CHART NOTE - NSCHARTNOTEFT_GEN_A_CORE
HPI:  71 year old male with a PMhx of parkinson's disease with dementia requiting trach (now with trach collar) and PEG, CKD II, DLD, HTN, gout, DMII, 1st degree AV block and hx of fungemia.   History obtained from patients son at bedside. On my exam, patient is Albanian speaking, responds to yes/no questions with eye movements/head nodding (this is his baseline). Patient is coming from NH    CC: lethargy     History goes back to: 1 day prior to admission when patients family noticed that he was lethargic, less responsive than usual. On questioning he reported abdominal pain. Of note patient takes oxycodone for pain and was recently started on Fe-supplements for anemia.    ROS is negative for: no fever, no chills, no SOB, chest pains or palpitations no nausea or vomiting. Family does not know when his last bowel movement was.      In the ED: VS WNL  Labs significant for: Hb:8.8 (chronic), ESR>140, Albumin:2.7  VBG: consistent primarily with metabolic alkalosis   Troponin: 0.08  EKG: NSR with 1st degree AV block   CT head: no acute intracranial pathology. CT suggestive of otomastoiditis  CT abdomen: mild- moderate bilateral pleural effusions, heavy colonic stool burden, otherwise no acute pathology        Hospital Course:  Pt admitted for AMS workup, likely due to infection from mastoiditis vs PNA vs sacral ulceration. Per ID, pt to receive empiric therapy for tracheitis, may need LP but overall unlikely picture of CNS inf. Pt to receive ENT eval for left mastoid and middle ear effusion (mastoiditis) per CT head.     **Follow up**  1. ENT eval for mastoiditis  2. cultures- BCx, UCx. Sputum culture still to be drawn    ****OTHERWISE****    71 year old male with a PMhx of parkinson's disease with dementia requiting trach (now with trach collar) and PEG, CKD II, DLD, HTN, gout, DMII, 1st degree AV block and hx of fungemia. Presenting with Encephalopathy possibly due to mastoiditis vs PNA    #Metabolic Encephalopathy  #Possibly due to mastoiditis, PNA, Sacral ulcer  #B/l Pleural effusions  -ESR noted to be >140  CTAP (04/25) Large colonic stool load. No CT evidence of acute inflammatory   abdominopelvic pathology. Small pl ef b/l, 2cm hepatic lobe hypodensity  -UCx 04/26 negative  -BCx 04/27: Negative  -- Cont cefepime 2g q8h x5 days  -f/u sputum Cx  -f/u procal  -f/u Echo  - ENT eval for otomastoiditis  KUB 04/28: Nonobstructive bowel gas pattern, no large colonic stool burden is noted  -advance bowel regimen as needed (had BM 4/29)    #HTN/HLD  - Currently not on BP meds  - Cont atorvastatin 40mg qHs    #Parkinson's disease with dementia  s/p trach/PEG  - Cont entacapone  - Cont rasaligine    #DM2  HBa1c 5.1  - Monitor FS    #CKD Stage 2  - Monitor cr    DVT PPX, Lovenox    #Progress Note Handoff  Pending (specify):  Continue IV abx, sputum cx, ENT eval, Echo  Family: Discussed with family regarding management of possible tracheitis  Disposition: NH

## 2022-04-30 NOTE — PROGRESS NOTE ADULT - ASSESSMENT
IMPRESSION:    altered MS/ Toxic/ metabolic ( head CT reviewed)  HO Parkinson's dementia  Chronic respiratory failure SP trach and PEG  large stool burden on CT AP  recurrent admission    PLAN:    CNS: avoid CNS depressant    HEENT: oral care, trach care, ENT eval    PULMONARY: HOB >45. NO vent changes.  Pulmonary toilet. keep SaO2 92 TO 96%    CARDIOVASCULAR: avoid overload    GI: GI prophylaxis. PEG feeding.   laxatives    RENAL: fu lytes. Replete as needed    INFECTIOUS DISEASE: abx per ID    HEMATOLOGICAL:  DVT prophylaxis.    ENDOCRINE:  Follow up FS.  Insulin protocol if needed.     Wound care for sacrum    VENT UNIT  GOC/ poor prognosis

## 2022-05-01 LAB
ALBUMIN SERPL ELPH-MCNC: 2.4 G/DL — LOW (ref 3.5–5.2)
ALP SERPL-CCNC: 133 U/L — HIGH (ref 30–115)
ALT FLD-CCNC: 39 U/L — SIGNIFICANT CHANGE UP (ref 0–41)
ANION GAP SERPL CALC-SCNC: 13 MMOL/L — SIGNIFICANT CHANGE UP (ref 7–14)
AST SERPL-CCNC: 33 U/L — SIGNIFICANT CHANGE UP (ref 0–41)
BASOPHILS # BLD AUTO: 0.04 K/UL — SIGNIFICANT CHANGE UP (ref 0–0.2)
BASOPHILS NFR BLD AUTO: 0.8 % — SIGNIFICANT CHANGE UP (ref 0–1)
BILIRUB SERPL-MCNC: 0.2 MG/DL — SIGNIFICANT CHANGE UP (ref 0.2–1.2)
BUN SERPL-MCNC: 25 MG/DL — HIGH (ref 10–20)
CALCIUM SERPL-MCNC: 8.7 MG/DL — SIGNIFICANT CHANGE UP (ref 8.5–10.1)
CHLORIDE SERPL-SCNC: 106 MMOL/L — SIGNIFICANT CHANGE UP (ref 98–110)
CO2 SERPL-SCNC: 18 MMOL/L — SIGNIFICANT CHANGE UP (ref 17–32)
CREAT SERPL-MCNC: 0.8 MG/DL — SIGNIFICANT CHANGE UP (ref 0.7–1.5)
CULTURE RESULTS: SIGNIFICANT CHANGE UP
CULTURE RESULTS: SIGNIFICANT CHANGE UP
EGFR: 94 ML/MIN/1.73M2 — SIGNIFICANT CHANGE UP
EOSINOPHIL # BLD AUTO: 0.35 K/UL — SIGNIFICANT CHANGE UP (ref 0–0.7)
EOSINOPHIL NFR BLD AUTO: 7.2 % — SIGNIFICANT CHANGE UP (ref 0–8)
GLUCOSE BLDC GLUCOMTR-MCNC: 118 MG/DL — HIGH (ref 70–99)
GLUCOSE BLDC GLUCOMTR-MCNC: 122 MG/DL — HIGH (ref 70–99)
GLUCOSE BLDC GLUCOMTR-MCNC: 124 MG/DL — HIGH (ref 70–99)
GLUCOSE SERPL-MCNC: 109 MG/DL — HIGH (ref 70–99)
HCT VFR BLD CALC: 29 % — LOW (ref 42–52)
HGB BLD-MCNC: 9.2 G/DL — LOW (ref 14–18)
IMM GRANULOCYTES NFR BLD AUTO: 0.6 % — HIGH (ref 0.1–0.3)
LYMPHOCYTES # BLD AUTO: 0.76 K/UL — LOW (ref 1.2–3.4)
LYMPHOCYTES # BLD AUTO: 15.5 % — LOW (ref 20.5–51.1)
MAGNESIUM SERPL-MCNC: 2.1 MG/DL — SIGNIFICANT CHANGE UP (ref 1.8–2.4)
MCHC RBC-ENTMCNC: 28.2 PG — SIGNIFICANT CHANGE UP (ref 27–31)
MCHC RBC-ENTMCNC: 31.7 G/DL — LOW (ref 32–37)
MCV RBC AUTO: 89 FL — SIGNIFICANT CHANGE UP (ref 80–94)
MONOCYTES # BLD AUTO: 0.45 K/UL — SIGNIFICANT CHANGE UP (ref 0.1–0.6)
MONOCYTES NFR BLD AUTO: 9.2 % — SIGNIFICANT CHANGE UP (ref 1.7–9.3)
NEUTROPHILS # BLD AUTO: 3.26 K/UL — SIGNIFICANT CHANGE UP (ref 1.4–6.5)
NEUTROPHILS NFR BLD AUTO: 66.7 % — SIGNIFICANT CHANGE UP (ref 42.2–75.2)
NRBC # BLD: 0 /100 WBCS — SIGNIFICANT CHANGE UP (ref 0–0)
PLATELET # BLD AUTO: 234 K/UL — SIGNIFICANT CHANGE UP (ref 130–400)
POTASSIUM SERPL-MCNC: 4.7 MMOL/L — SIGNIFICANT CHANGE UP (ref 3.5–5)
POTASSIUM SERPL-SCNC: 4.7 MMOL/L — SIGNIFICANT CHANGE UP (ref 3.5–5)
PROT SERPL-MCNC: 7.1 G/DL — SIGNIFICANT CHANGE UP (ref 6–8)
RBC # BLD: 3.26 M/UL — LOW (ref 4.7–6.1)
RBC # FLD: 14.2 % — SIGNIFICANT CHANGE UP (ref 11.5–14.5)
SODIUM SERPL-SCNC: 137 MMOL/L — SIGNIFICANT CHANGE UP (ref 135–146)
SPECIMEN SOURCE: SIGNIFICANT CHANGE UP
SPECIMEN SOURCE: SIGNIFICANT CHANGE UP
WBC # BLD: 4.89 K/UL — SIGNIFICANT CHANGE UP (ref 4.8–10.8)
WBC # FLD AUTO: 4.89 K/UL — SIGNIFICANT CHANGE UP (ref 4.8–10.8)

## 2022-05-01 PROCEDURE — 99232 SBSQ HOSP IP/OBS MODERATE 35: CPT

## 2022-05-01 PROCEDURE — 99233 SBSQ HOSP IP/OBS HIGH 50: CPT

## 2022-05-01 RX ADMIN — PANTOPRAZOLE SODIUM 40 MILLIGRAM(S): 20 TABLET, DELAYED RELEASE ORAL at 11:55

## 2022-05-01 RX ADMIN — CEFEPIME 100 MILLIGRAM(S): 1 INJECTION, POWDER, FOR SOLUTION INTRAMUSCULAR; INTRAVENOUS at 06:44

## 2022-05-01 RX ADMIN — ATORVASTATIN CALCIUM 40 MILLIGRAM(S): 80 TABLET, FILM COATED ORAL at 22:14

## 2022-05-01 RX ADMIN — CHLORHEXIDINE GLUCONATE 1 APPLICATION(S): 213 SOLUTION TOPICAL at 06:44

## 2022-05-01 RX ADMIN — ENTACAPONE 200 MILLIGRAM(S): 200 TABLET, FILM COATED ORAL at 06:44

## 2022-05-01 RX ADMIN — ENOXAPARIN SODIUM 40 MILLIGRAM(S): 100 INJECTION SUBCUTANEOUS at 11:09

## 2022-05-01 RX ADMIN — RASAGILINE 0.5 MILLIGRAM(S): 0.5 TABLET ORAL at 11:56

## 2022-05-01 RX ADMIN — Medication 500 MILLIGRAM(S): at 11:10

## 2022-05-01 RX ADMIN — CEFEPIME 100 MILLIGRAM(S): 1 INJECTION, POWDER, FOR SOLUTION INTRAMUSCULAR; INTRAVENOUS at 13:07

## 2022-05-01 NOTE — PROGRESS NOTE ADULT - SUBJECTIVE AND OBJECTIVE BOX
Over Night Events: events noted, transferred from SDU, afebrile    PHYSICAL EXAM    ICU Vital Signs Last 24 Hrs  T(C): 37 (01 May 2022 05:00), Max: 37 (01 May 2022 05:00)  T(F): 98.6 (01 May 2022 05:00), Max: 98.6 (01 May 2022 05:00)  HR: 57 (01 May 2022 05:00) (54 - 81)  BP: 126/67 (01 May 2022 05:00) (111/57 - 129/58)  RR: 20 (01 May 2022 05:00) (18 - 20)  SpO2: 100% (01 May 2022 05:00) (97% - 100%)      General: ill looking  HEENT: trach  Lungs: Bilateral BS  Cardiovascular: Regular   Abdomen: Soft, Positive BS  Extremities: No clubbing   contracted  ulcer       04-30-22 @ 07:01  -  05-01-22 @ 07:00  --------------------------------------------------------  IN:    Free Water: 200 mL    Jevity 1.2: 600 mL  Total IN: 800 mL    OUT:    Voided (mL): 950 mL  Total OUT: 950 mL    Total NET: -150 mL          LABS:                          9.1    4.95  )-----------( 254      ( 30 Apr 2022 04:30 )             28.1                                               04-30    135  |  102  |  24<H>  ----------------------------<  105<H>  4.2   |  22  |  0.8    Ca    8.5      30 Apr 2022 04:30  Mg     2.1     04-30                                                                                                                                                                                   Mode: AC/ CMV (Assist Control/ Continuous Mandatory Ventilation)  RR (machine): 20  TV (machine): 400  FiO2: 40  PEEP: 5  ITime: 1  MAP: 9  PIP: 20                                          MEDICATIONS  (STANDING):  ascorbic acid 500 milliGRAM(s) Oral daily  atorvastatin 40 milliGRAM(s) Oral at bedtime  cefepime   IVPB 2000 milliGRAM(s) IV Intermittent every 8 hours  chlorhexidine 4% Liquid 1 Application(s) Topical <User Schedule>  enoxaparin Injectable 40 milliGRAM(s) SubCutaneous every 24 hours  entacapone 200 milliGRAM(s) Oral two times a day  pantoprazole  Injectable 40 milliGRAM(s) IV Push daily  rasagiline Tablet 0.5 milliGRAM(s) Oral daily    MEDICATIONS  (PRN):  acetaminophen     Tablet .. 650 milliGRAM(s) Oral every 6 hours PRN Temp greater or equal to 38C (100.4F), Mild Pain (1 - 3)  aluminum hydroxide/magnesium hydroxide/simethicone Suspension 30 milliLiter(s) Oral every 4 hours PRN Dyspepsia  melatonin 3 milliGRAM(s) Oral at bedtime PRN Insomnia  ondansetron Injectable 4 milliGRAM(s) IV Push every 8 hours PRN Nausea and/or Vomiting  oxyCODONE    IR 5 milliGRAM(s) Oral every 8 hours PRN Moderate Pain (4 - 6)  polyethylene glycol 3350 17 Gram(s) Oral daily PRN Constipation

## 2022-05-01 NOTE — CONSULT NOTE ADULT - SUBJECTIVE AND OBJECTIVE BOX
71 year old male with a PMhx of parkinson's disease with dementia requiting trach (now with trach collar) and PEG, CKD II, DLD, HTN, gout, DM2, 1st degree AV block and hx of fungemia who presents from NH with lethargy. History obtained from patients son at bedside. On my exam, patient is Georgian speaking, responds to yes/no questions with eye movements/head nodding (this is his baseline). History goes back to: 1 day prior to admission when patients family noticed that he was lethargic, less responsive than usual. On questioning he reported abdominal pain. Of note patient takes oxycodone for pain and was recently started on Fe-supplements for anemia. In the ED: VS WNL; Labs significant for: Hb:8.8 (chronic), ESR>140, Albumin:2.7; VBG: consistent primarily with metabolic alkalosis ; Troponin: 0.08; EKG: NSR with 1st degree AV block. Pt admitted for AMS workup, likely due to infection from mastoiditis vs PNA vs sacral ulceration.    ENT consulted for possible left otomastoiditis.     PAST MEDICAL & SURGICAL HISTORY:  HTN (hypertension)  Gout  Diabetes mellitus  Stage 2 chronic kidney disease  PEG (percutaneous endoscopic gastrostomy) status    MEDICATIONS  (STANDING):  ascorbic acid 500 milliGRAM(s) Oral daily  atorvastatin 40 milliGRAM(s) Oral at bedtime  cefepime   IVPB 2000 milliGRAM(s) IV Intermittent every 8 hours  chlorhexidine 4% Liquid 1 Application(s) Topical <User Schedule>  enoxaparin Injectable 40 milliGRAM(s) SubCutaneous every 24 hours  entacapone 200 milliGRAM(s) Oral two times a day  pantoprazole  Injectable 40 milliGRAM(s) IV Push daily  rasagiline Tablet 0.5 milliGRAM(s) Oral daily    MEDICATIONS  (PRN):  acetaminophen     Tablet .. 650 milliGRAM(s) Oral every 6 hours PRN Temp greater or equal to 38C (100.4F), Mild Pain (1 - 3)  aluminum hydroxide/magnesium hydroxide/simethicone Suspension 30 milliLiter(s) Oral every 4 hours PRN Dyspepsia  melatonin 3 milliGRAM(s) Oral at bedtime PRN Insomnia  ondansetron Injectable 4 milliGRAM(s) IV Push every 8 hours PRN Nausea and/or Vomiting  oxyCODONE    IR 5 milliGRAM(s) Oral every 8 hours PRN Moderate Pain (4 - 6)  polyethylene glycol 3350 17 Gram(s) Oral daily PRN Constipation    Allergies  No Known Allergies    FAMILY HISTORY:  No pertinent family history in first degree relatives    REVIEW OF SYSTEMS   [ X ] Due to altered mental status/intubation, subjective information were not able to be obtained from patient. History was obtained, to the extent possible, from review of the chart and collateral sources of information.    Vital Signs Last 24 Hrs  T(C): 37 (01 May 2022 05:00), Max: 37 (01 May 2022 05:00)  T(F): 98.6 (01 May 2022 05:00), Max: 98.6 (01 May 2022 05:00)  HR: 58 (01 May 2022 08:20) (54 - 81)  BP: 126/67 (01 May 2022 05:00) (115/56 - 129/58)  RR: 20 (01 May 2022 05:00) (18 - 20)  SpO2: 100% (01 May 2022 08:20) (97% - 100%)    PHYSICAL EXAM:  GEN: No acute distress, awake and alert. No drooling or pooling of secretions.  SKIN: Good color, non diaphoretic.  HEENT: Normocephalic, atraumatic. No external ear deformities bilaterally, no external edema or erythema. Right ear without obvious tenderness to palpation over the tragus, pinna, or mastoid. Right EAC with large volume of hard cerumen within canal, obstructing visualization of the TM. Left ear without obvious tenderness to palpation over the tragus, pinna, or mastoid. No erythema, edema, or fluctuance palpated over mastoid. Left Right EAC with large volume of hard cerumen within canal, obstructing visualization of the TM.  Nasal mucosa pink and moist bilaterally without discharge or active bleeding.   Oral mucosa pink and dry. No erythema or edema noted to buccal mucosa, tongue, FOM, uvula or posterior oropharynx. Uvula midline.   NECK: +Trach in place. Trachea midline, Neck supple, no cervical LAD.   RESP: +vented.  CARDIO: +S1/S2  ABDO: Soft, NT. +PEG.  EXT: +Contracted    LABS:                     9.1    4.95  )-----------( 254      ( 30 Apr 2022 04:30 )             28.1     05-01  137  |  106  |  25<H>  ----------------------------<  109<H>  4.7   |  18  |  0.8    Ca    8.7      01 May 2022 07:12  Mg     2.1     05-01    TPro  7.1  /  Alb  2.4<L>  /  TBili  0.2  /  DBili  x   /  AST  33  /  ALT  39  /  AlkPhos  133<H>  05-01    RADIOLOGY & ADDITIONAL STUDIES:  < from: CT Head No Cont (04.25.22 @ 20:01) >  FINDINGS:    There is enlargement of the sulci, sylvian fissures, and ventricles,   reflecting moderate diffuse parenchymal volume loss.    There are scattered patchy low attenuations in the bilateral   periventricular cerebral white matter consistent with mild chronic   microvascular ischemic changes.    There is no evidence of acute territorial infarct or intracranial   hemorrhage. There is no space-occupying lesion or midline shift.    There is no evidence of hydrocephalus. There are no extra-axial fluid   collections.    Status post left cataract surgery. Trace effusion in the right ethmoid   sinus and right maxillary sinus. There is complete effusion of the left   mastoid and left middle ear cavity. Thereis partial effusion of the   right mastoid. The visualized soft tissues and osseous structures appear   normal.    IMPRESSION:    1.) No acute intracranial pathology.  2.) Mild chronic microvascular ischemic changes.  3.) Complete effusion of the left mastoid and middle ear cavity, as well as   partial effusion of the right mastoid. The finding is nonspecific but can   be seen with otomastoiditis.  < end of copied text >

## 2022-05-01 NOTE — CONSULT NOTE ADULT - NS ATTEND AMEND GEN_ALL_CORE FT
plan as per above
We will sign off as patient's family is not interested in speaking with palliative care.

## 2022-05-01 NOTE — CONSULT NOTE ADULT - ASSESSMENT
71 year old male with a PMhx of parkinson's disease with dementia requiting trach (now with trach collar) and PEG, CKD II, DLD, HTN, gout, DM2, 1st degree AV block and hx of fungemia who presents from NH with lethargy. Pt admitted for AMS workup, likely due to infection from mastoiditis vs PNA vs sacral ulceration. ENT consulted for possible left otomastoiditis.     PLAN:  Patient discussed with Dr. Mares, plan as per attending below:  -No acute ENT intervention at this time  -Continue with IV abx as per ID -- on Cefepime  -No need for CT temporal bone at this time  -Trend WBC -- 4.95  -Blood cultures negative  -Debrox gtt bilaterally -- hard cerumen noted  -Oral care  -Remainder of care as per primary team 71 year old male with a PMhx of parkinson's disease with dementia requiting trach (now with trach collar) and PEG, CKD II, DLD, HTN, gout, DM2, 1st degree AV block and hx of fungemia who presents from NH with lethargy. Pt admitted for AMS workup, likely due to infection from mastoiditis vs PNA vs sacral ulceration. ENT consulted for possible left otomastoiditis, no evidence of mastoiditis on exam.    PLAN:  Patient discussed with Dr. Mares, plan as per attending below:  -No acute ENT intervention at this time  -Continue with IV abx as per ID -- on Cefepime  -No need for CT temporal bone at this time  -Trend WBC -- 4.95  -Blood cultures negative  -Debrox gtt bilaterally -- hard cerumen noted  -Oral care  -Remainder of care as per primary team

## 2022-05-01 NOTE — PROGRESS NOTE ADULT - SUBJECTIVE AND OBJECTIVE BOX
NIEVES LABOY  [facility] T2-3A 0[address] ([facility] T2-3A)        Patient was evaluated and examined  by bedside, no active events over night as per covering nurse report          REVIEW OF SYSTEMS:  unable to obtain due to patient's confused state      T(C): , Max: 37 (05-01-22 @ 05:00)  HR: 58 (05-01-22 @ 08:20)  BP: 126/67 (05-01-22 @ 05:00)  RR: 20 (05-01-22 @ 05:00)  SpO2: 100% (05-01-22 @ 08:20)  CAPILLARY BLOOD GLUCOSE      POCT Blood Glucose.: 122 mg/dL (01 May 2022 11:55)  POCT Blood Glucose.: 118 mg/dL (01 May 2022 06:20)  POCT Blood Glucose.: 124 mg/dL (01 May 2022 00:02)  POCT Blood Glucose.: 110 mg/dL (30 Apr 2022 19:01)  POCT Blood Glucose.: 137 mg/dL (30 Apr 2022 16:52)      PHYSICAL EXAM:  General: NAD, sleeping , patient is laying comfortably in bed  HEENT: AT, NC, Supple, NO JVD, NO CB, trach present  Lungs: CTA B/L, no wheezing, no rhonchi  CVS: normal S1, S2, RRR, NO M/G/R  Abdomen: soft, bowel sounds present, non-tender, non-distended, peg present  Extremities: no edema, no clubbing, no cyanosis, positive peripheral pulses b/l  Neuro: contracted, spastic all extremities  Skin: no rash, no ecchymosis, sacral decub. present      LAB  CBC  Date: 05-01-22 @ 07:12  Mean cell Wiyoovrxxa74.2  Mean cell Hemoglobin Conc31.7  Mean cell Volum 89.0  Platelet count-Automate 234  RBC Count 3.26  Red Cell Distrib Width14.2  WBC Count4.89  % Albumin, Urine--  Hematocrit 29.0  Hemoglobin 9.2  CBC  Date: 04-30-22 @ 04:30  Mean cell Cwcfqlnfrn69.5  Mean cell Hemoglobin Conc32.4  Mean cell Volum 88.1  Platelet count-Automate 254  RBC Count 3.19  Red Cell Distrib Width14.3  WBC Count4.95  % Albumin, Urine--  Hematocrit 28.1  Hemoglobin 9.1        BMP  05-01-22 @ 07:12  Blood Gas Arterial-Calcium,Ionized--  Blood Urea Nitrogen, Serum 25 mg/dL<H> [10 - 20]  Carbon Dioxide, Serum18 mmol/L [17 - 32]  Chloride, Xlamt136 mmol/L [98 - 110]  Creatinie, Serum0.8 mg/dL [0.7 - 1.5]  Glucose, Fdxwu641 mg/dL<H> [70 - 99]  Potassium, Serum4.7 mmol/L [3.5 - 5.0] [Slighty Hemolyzed use with Caution]  Sodium, Serum 137 mmol/L [135 - 146]  BMP  04-30-22 @ 04:30  Blood Gas Arterial-Calcium,Ionized--  Blood Urea Nitrogen, Serum 24 mg/dL<H> [10 - 20]  Carbon Dioxide, Serum22 mmol/L [17 - 32]  Chloride, Qxzqw016 mmol/L [98 - 110]  Creatinie, Serum0.8 mg/dL [0.7 - 1.5]  Glucose, Skyof637 mg/dL<H> [70 - 99]  Potassium, Serum4.2 mmol/L [3.5 - 5.0]  Sodium, Serum 135 mmol/L [135 - 146]      Microbiology:    Culture - Blood (collected 04-28-22 @ 04:30)  Source: .Blood Blood-Peripheral  Preliminary Report (04-29-22 @ 20:02):    No growth to date.    Culture - Blood (collected 04-27-22 @ 16:00)  Source: .Blood Blood-Peripheral  Preliminary Report (04-28-22 @ 23:01):    No growth to date.    Culture - Urine (collected 04-26-22 @ 08:36)  Source: Catheterized Catheterized  Final Report (04-28-22 @ 01:21):    No growth    Culture - Blood (collected 04-25-22 @ 19:37)  Source: .Blood Blood  Final Report (05-01-22 @ 01:01):    No Growth Final    Culture - Blood (collected 04-25-22 @ 19:37)  Source: .Blood Blood  Final Report (05-01-22 @ 01:01):    No Growth Final        Medications:  acetaminophen     Tablet .. 650 milliGRAM(s) Oral every 6 hours PRN  aluminum hydroxide/magnesium hydroxide/simethicone Suspension 30 milliLiter(s) Oral every 4 hours PRN  ascorbic acid 500 milliGRAM(s) Oral daily  atorvastatin 40 milliGRAM(s) Oral at bedtime  cefepime   IVPB 2000 milliGRAM(s) IV Intermittent every 8 hours  chlorhexidine 4% Liquid 1 Application(s) Topical <User Schedule>  enoxaparin Injectable 40 milliGRAM(s) SubCutaneous every 24 hours  entacapone 200 milliGRAM(s) Oral two times a day  melatonin 3 milliGRAM(s) Oral at bedtime PRN  ondansetron Injectable 4 milliGRAM(s) IV Push every 8 hours PRN  oxyCODONE    IR 5 milliGRAM(s) Oral every 8 hours PRN  pantoprazole  Injectable 40 milliGRAM(s) IV Push daily  polyethylene glycol 3350 17 Gram(s) Oral daily PRN  rasagiline Tablet 0.5 milliGRAM(s) Oral daily        Assessment and Plan:  Patient is a 71 year old male with a PMhx of parkinson's disease with dementia requiting trach (now with trach collar) and PEG, CKD II, DLD, HTN, gout, DMII, 1st degree AV block and hx of fungemia. Presenting with Encephalopathy possibly due to mastoiditis vs PNA    #Metabolic Encephalopathy suspected 2/2 mastoiditis, PNA  #B/l Pleural effusions/ -ESR >140  - blood cxs- negative, urine legionella negative, MRSA - negative, urine cxs - negative, RVP- negative, Wound care per team recs  -as per ID -  cefepime 2g q8h x5 days- completed the course.   -   ENT consult completed- recommended to continue IV abx. tx.       #HTN/HLD  - Cont atorvastatin 40mg qHs    #Parkinson's disease with dementia; s/p trach/PEG, quadriplegia  - Cont entacapone and rasaligine    #DM II- well controlled with diet  - HBa1c 5.1  - Monitor FS as per protocol     #CKD Stage II - stable   - Monitor cr    GOC - full code   overall poor prognosis    #Progress Note Handoff:  f/up final cxs. , start d/c planning   Family discussion: yes, medical team Disposition: SNF once medically stable    Total time spent to complete patient's bedside assessment, review medical chart, discuss medical plan of care with covering medical team was more than 35 minutes

## 2022-05-01 NOTE — PROGRESS NOTE ADULT - ASSESSMENT
IMPRESSION:    altered MS/ Toxic/ metabolic ( head CT reviewed)  HO Parkinson's dementia  Chronic respiratory failure SP trach and PEG  large stool burden improved  recurrent admission    PLAN:    CNS: avoid CNS depressant    HEENT: oral care, trach care, ENT eval    PULMONARY: HOB >45. NO vent changes.  Pulmonary toilet. keep SaO2 92 TO 96%    CARDIOVASCULAR: avoid overload    GI: GI prophylaxis. PEG feeding.   laxatives    RENAL: fu lytes. Replete as needed    INFECTIOUS DISEASE: abx per ID    HEMATOLOGICAL:  DVT prophylaxis.    ENDOCRINE:  Follow up FS.  Insulin protocol if needed.     Wound care for sacrum  dc planning

## 2022-05-02 ENCOUNTER — TRANSCRIPTION ENCOUNTER (OUTPATIENT)
Age: 72
End: 2022-05-02

## 2022-05-02 LAB
ALBUMIN SERPL ELPH-MCNC: 2.7 G/DL — LOW (ref 3.5–5.2)
ALP SERPL-CCNC: 150 U/L — HIGH (ref 30–115)
ALT FLD-CCNC: 46 U/L — HIGH (ref 0–41)
ANION GAP SERPL CALC-SCNC: 14 MMOL/L — SIGNIFICANT CHANGE UP (ref 7–14)
AST SERPL-CCNC: 36 U/L — SIGNIFICANT CHANGE UP (ref 0–41)
BASOPHILS # BLD AUTO: 0.04 K/UL — SIGNIFICANT CHANGE UP (ref 0–0.2)
BASOPHILS NFR BLD AUTO: 0.6 % — SIGNIFICANT CHANGE UP (ref 0–1)
BILIRUB SERPL-MCNC: <0.2 MG/DL — SIGNIFICANT CHANGE UP (ref 0.2–1.2)
BUN SERPL-MCNC: 29 MG/DL — HIGH (ref 10–20)
CALCIUM SERPL-MCNC: 9.2 MG/DL — SIGNIFICANT CHANGE UP (ref 8.5–10.1)
CHLORIDE SERPL-SCNC: 105 MMOL/L — SIGNIFICANT CHANGE UP (ref 98–110)
CO2 SERPL-SCNC: 19 MMOL/L — SIGNIFICANT CHANGE UP (ref 17–32)
CREAT SERPL-MCNC: 1 MG/DL — SIGNIFICANT CHANGE UP (ref 0.7–1.5)
CULTURE RESULTS: SIGNIFICANT CHANGE UP
EGFR: 80 ML/MIN/1.73M2 — SIGNIFICANT CHANGE UP
EOSINOPHIL # BLD AUTO: 0.19 K/UL — SIGNIFICANT CHANGE UP (ref 0–0.7)
EOSINOPHIL NFR BLD AUTO: 2.8 % — SIGNIFICANT CHANGE UP (ref 0–8)
GLUCOSE BLDC GLUCOMTR-MCNC: 109 MG/DL — HIGH (ref 70–99)
GLUCOSE BLDC GLUCOMTR-MCNC: 115 MG/DL — HIGH (ref 70–99)
GLUCOSE BLDC GLUCOMTR-MCNC: 130 MG/DL — HIGH (ref 70–99)
GLUCOSE SERPL-MCNC: 119 MG/DL — HIGH (ref 70–99)
HCT VFR BLD CALC: 28.5 % — LOW (ref 42–52)
HGB BLD-MCNC: 9 G/DL — LOW (ref 14–18)
IMM GRANULOCYTES NFR BLD AUTO: 0.3 % — SIGNIFICANT CHANGE UP (ref 0.1–0.3)
LYMPHOCYTES # BLD AUTO: 0.82 K/UL — LOW (ref 1.2–3.4)
LYMPHOCYTES # BLD AUTO: 12.1 % — LOW (ref 20.5–51.1)
MAGNESIUM SERPL-MCNC: 2.2 MG/DL — SIGNIFICANT CHANGE UP (ref 1.8–2.4)
MCHC RBC-ENTMCNC: 28.3 PG — SIGNIFICANT CHANGE UP (ref 27–31)
MCHC RBC-ENTMCNC: 31.6 G/DL — LOW (ref 32–37)
MCV RBC AUTO: 89.6 FL — SIGNIFICANT CHANGE UP (ref 80–94)
MONOCYTES # BLD AUTO: 0.7 K/UL — HIGH (ref 0.1–0.6)
MONOCYTES NFR BLD AUTO: 10.3 % — HIGH (ref 1.7–9.3)
NEUTROPHILS # BLD AUTO: 5.01 K/UL — SIGNIFICANT CHANGE UP (ref 1.4–6.5)
NEUTROPHILS NFR BLD AUTO: 73.9 % — SIGNIFICANT CHANGE UP (ref 42.2–75.2)
NRBC # BLD: 0 /100 WBCS — SIGNIFICANT CHANGE UP (ref 0–0)
PLATELET # BLD AUTO: 194 K/UL — SIGNIFICANT CHANGE UP (ref 130–400)
POTASSIUM SERPL-MCNC: 4.6 MMOL/L — SIGNIFICANT CHANGE UP (ref 3.5–5)
POTASSIUM SERPL-SCNC: 4.6 MMOL/L — SIGNIFICANT CHANGE UP (ref 3.5–5)
PROCALCITONIN SERPL-MCNC: 0.14 NG/ML — HIGH (ref 0.02–0.1)
PROT SERPL-MCNC: 7.9 G/DL — SIGNIFICANT CHANGE UP (ref 6–8)
RBC # BLD: 3.18 M/UL — LOW (ref 4.7–6.1)
RBC # FLD: 14 % — SIGNIFICANT CHANGE UP (ref 11.5–14.5)
SODIUM SERPL-SCNC: 138 MMOL/L — SIGNIFICANT CHANGE UP (ref 135–146)
SPECIMEN SOURCE: SIGNIFICANT CHANGE UP
WBC # BLD: 6.78 K/UL — SIGNIFICANT CHANGE UP (ref 4.8–10.8)
WBC # FLD AUTO: 6.78 K/UL — SIGNIFICANT CHANGE UP (ref 4.8–10.8)

## 2022-05-02 PROCEDURE — 99232 SBSQ HOSP IP/OBS MODERATE 35: CPT

## 2022-05-02 PROCEDURE — 99223 1ST HOSP IP/OBS HIGH 75: CPT

## 2022-05-02 PROCEDURE — 71045 X-RAY EXAM CHEST 1 VIEW: CPT | Mod: 26

## 2022-05-02 PROCEDURE — 99233 SBSQ HOSP IP/OBS HIGH 50: CPT

## 2022-05-02 RX ORDER — OXYCODONE HYDROCHLORIDE 5 MG/1
5 TABLET ORAL EVERY 8 HOURS
Refills: 0 | Status: DISCONTINUED | OUTPATIENT
Start: 2022-05-02 | End: 2022-05-03

## 2022-05-02 RX ORDER — PANTOPRAZOLE SODIUM 20 MG/1
40 TABLET, DELAYED RELEASE ORAL
Refills: 0 | Status: DISCONTINUED | OUTPATIENT
Start: 2022-05-02 | End: 2022-05-03

## 2022-05-02 RX ORDER — POLYETHYLENE GLYCOL 3350 17 G/17G
17 POWDER, FOR SOLUTION ORAL
Qty: 0 | Refills: 0 | DISCHARGE
Start: 2022-05-02

## 2022-05-02 RX ADMIN — OXYCODONE HYDROCHLORIDE 5 MILLIGRAM(S): 5 TABLET ORAL at 15:04

## 2022-05-02 RX ADMIN — ENOXAPARIN SODIUM 40 MILLIGRAM(S): 100 INJECTION SUBCUTANEOUS at 15:04

## 2022-05-02 RX ADMIN — ENTACAPONE 200 MILLIGRAM(S): 200 TABLET, FILM COATED ORAL at 05:42

## 2022-05-02 RX ADMIN — Medication 500 MILLIGRAM(S): at 15:04

## 2022-05-02 RX ADMIN — OXYCODONE HYDROCHLORIDE 5 MILLIGRAM(S): 5 TABLET ORAL at 21:12

## 2022-05-02 RX ADMIN — ATORVASTATIN CALCIUM 40 MILLIGRAM(S): 80 TABLET, FILM COATED ORAL at 21:11

## 2022-05-02 RX ADMIN — OXYCODONE HYDROCHLORIDE 5 MILLIGRAM(S): 5 TABLET ORAL at 09:03

## 2022-05-02 RX ADMIN — CHLORHEXIDINE GLUCONATE 1 APPLICATION(S): 213 SOLUTION TOPICAL at 05:41

## 2022-05-02 RX ADMIN — OXYCODONE HYDROCHLORIDE 5 MILLIGRAM(S): 5 TABLET ORAL at 21:45

## 2022-05-02 NOTE — DISCHARGE NOTE PROVIDER - PROVIDER TOKENS
PROVIDER:[TOKEN:[[id number]],FOLLOWUP:[1 week],ESTABLISHEDPATIENT:[T]],PROVIDER:[TOKEN:[[id number]],FOLLOWUP:[1 week]],PROVIDER:[TOKEN:[[id number]],FOLLOWUP:[1 week]]

## 2022-05-02 NOTE — DISCHARGE NOTE PROVIDER - NSDCMRMEDTOKEN_GEN_ALL_CORE_FT
albuterol-ipratropium: 3 milliliter(s) inhaled once a day, As Needed  ascorbic acid 500 mg oral tablet: 1 tab(s) orally once a day  atorvastatin 40 mg oral tablet: 1 tab(s) orally once a day (at bedtime)  entacapone 200 mg oral tablet: 1 tab(s) orally 2 times a day  ferrous sulfate (as elemental iron) 15 mg/1.5 mL oral liquid:   omeprazole 20 mg oral delayed release capsule: 1 cap(s) orally once a day  oxyCODONE 5 mg oral tablet: 1 tab(s) orally every 8 hours, As Needed  polyethylene glycol 3350 oral powder for reconstitution: 17 gram(s) orally once a day, As needed, Constipation  rasagiline 0.5 mg oral tablet: 1 tab(s) orally once a day  Retacrit 4000 units/mL preservative-free injectable solution: injectable 3 times a week on MW  zinc sulfate 220 mg oral capsule: 1 cap(s) orally once a day for 14 days

## 2022-05-02 NOTE — DISCHARGE NOTE PROVIDER - HOSPITAL COURSE
HPI:  CC: lethargy     History goes back to: 1 day prior to admission when patients family noticed that he was lethargic, less responsive than usual. On questioning he reported abdominal pain. Of note patient takes oxycodone for pain and was recently started on Fe-supplements for anemia.    ROS is negative for: no fever, no chills, no SOB, chest pains or palpitations no nausea or vomiting. Family does not know when his last bowel movement was.      In the ED: VS WNL  Labs significant for: Hb:8.8 (chronic), ESR>140, Albumin:2.7  VBG: consistent primarily with metabolic alkalosis   Troponin: 0.08  EKG: NSR with 1st degree AV block   CT head: no acute intracranial pathology. CT suggestive of otomastoiditis  CT abdomen: mild- moderate bilateral pleural effusions, heavy colonic stool burden, otherwise no acute pathology   (26 Apr 2022 08:32)    Hospital course:  Patient admitted for AMS, concern for PNA/mastoiditis, s/p cefepime 5 day course as per ID, resolved. Bcx and Ucx, urine legionella and strep wnl.    Patient is medically stable and ready for discharge.

## 2022-05-02 NOTE — DISCHARGE NOTE PROVIDER - NSDCFUADDAPPT_GEN_ALL_CORE_FT
Follow up with hepatology specialist and get a MR abdomen done to evaluate for a hepatic lobe 2 cm mass.    Contact info for Dr. Rodriguez, one of our hepatologist, provided.

## 2022-05-02 NOTE — PROGRESS NOTE ADULT - SUBJECTIVE AND OBJECTIVE BOX
Over Night Events: events noted, vent dependant, afebrile    PHYSICAL EXAM    ICU Vital Signs Last 24 Hrs  T(C): 35.8 (02 May 2022 04:49), Max: 36.3 (01 May 2022 22:04)  T(F): 96.5 (02 May 2022 04:49), Max: 97.3 (01 May 2022 22:04)  HR: 59 (02 May 2022 04:49) (54 - 84)  BP: 182/92 (02 May 2022 04:49) (116/58 - 182/92  RR: 20 (02 May 2022 04:51) (20 - 20)  SpO2: 100% (02 May 2022 04:51) (100% - 100%)      General: ill looking  HEENT: trach          Lungs: Dec bs both bases  Cardiovascular: EVANS 2/6   Abdomen: Soft, Positive BS  Extremities: No clubbing   Contracted      04-30-22 @ 07:01  -  05-01-22 @ 07:00  --------------------------------------------------------  IN:    Free Water: 200 mL    Jevity 1.2: 600 mL  Total IN: 800 mL    OUT:    Voided (mL): 950 mL  Total OUT: 950 mL    Total NET: -150 mL      05-01-22 @ 07:01  -  05-02-22 @ 06:19  --------------------------------------------------------  IN:    Free Water: 250 mL    Jevity 1.2: 300 mL  Total IN: 550 mL    OUT:    Indwelling Catheter - Urethral (mL): 550 mL  Total OUT: 550 mL    Total NET: 0 mL          LABS:                          9.2    4.89  )-----------( 234      ( 01 May 2022 07:12 )             29.0                                               05-01    137  |  106  |  25<H>  ----------------------------<  109<H>  4.7   |  18  |  0.8    Ca    8.7      01 May 2022 07:12  Mg     2.1     05-01    TPro  7.1  /  Alb  2.4<L>  /  TBili  0.2  /  DBili  x   /  AST  33  /  ALT  39  /  AlkPhos  133<H>  05-01                                                                                           LIVER FUNCTIONS - ( 01 May 2022 07:12 )  Alb: 2.4 g/dL / Pro: 7.1 g/dL / ALK PHOS: 133 U/L / ALT: 39 U/L / AST: 33 U/L / GGT: x                                                                                               Mode: AC/ CMV (Assist Control/ Continuous Mandatory Ventilation)  RR (machine): 20  TV (machine): 400  FiO2: 40  PEEP: 5  ITime: 1  MAP: 9  PIP: 19                                          MEDICATIONS  (STANDING):  ascorbic acid 500 milliGRAM(s) Oral daily  atorvastatin 40 milliGRAM(s) Oral at bedtime  chlorhexidine 4% Liquid 1 Application(s) Topical <User Schedule>  enoxaparin Injectable 40 milliGRAM(s) SubCutaneous every 24 hours  entacapone 200 milliGRAM(s) Oral two times a day  pantoprazole  Injectable 40 milliGRAM(s) IV Push daily  rasagiline Tablet 0.5 milliGRAM(s) Oral daily    MEDICATIONS  (PRN):  acetaminophen     Tablet .. 650 milliGRAM(s) Oral every 6 hours PRN Temp greater or equal to 38C (100.4F), Mild Pain (1 - 3)  aluminum hydroxide/magnesium hydroxide/simethicone Suspension 30 milliLiter(s) Oral every 4 hours PRN Dyspepsia  melatonin 3 milliGRAM(s) Oral at bedtime PRN Insomnia  ondansetron Injectable 4 milliGRAM(s) IV Push every 8 hours PRN Nausea and/or Vomiting  oxyCODONE    IR 5 milliGRAM(s) Oral every 8 hours PRN Moderate Pain (4 - 6)  polyethylene glycol 3350 17 Gram(s) Oral daily PRN Constipation

## 2022-05-02 NOTE — DISCHARGE NOTE NURSING/CASE MANAGEMENT/SOCIAL WORK - PATIENT PORTAL LINK FT
You can access the FollowMyHealth Patient Portal offered by [facility] by registering at the following website: [email or web address]. By joining [employer]’s FollowMyHealth portal, you will also be able to view your health information using other applications (apps) compatible with our system.

## 2022-05-02 NOTE — DISCHARGE NOTE PROVIDER - CARE PROVIDER_API CALL
PAM YAN  Internal Medicine  [facility],    Phone: [phone]  Fax: [phone]  Established Patient  Follow Up Time: 1 week    Layla Rodriguez)  Internal Medicine  [address] [place] [address]  Phone: [phone]  Fax: [phone]  Follow Up Time: 1 week    Kwadwo Avalos)  Critical Care Medicine; Internal Medicine; Pulmonary Disease; Sleep Medicine  [address] [place] [address]  Phone: [phone]  Fax: [phone]  Follow Up Time: 1 week

## 2022-05-02 NOTE — PROGRESS NOTE ADULT - ATTENDING COMMENTS
Patient is a 71 year old male with a PMhx of parkinson's disease with dementia requiting trach (now with trach collar) and PEG, CKD II, DLD, HTN, gout, DMII, 1st degree AV block and hx of fungemia. Presenting with Encephalopathy possibly due to mastoiditis vs PNA    #Metabolic Encephalopathy suspected 2/2 mastoiditis, PNA  #B/l Pleural effusions/ -ESR >140  - blood cxs- negative, urine legionella negative, MRSA - negative, urine cxs - negative, RVP- negative, Wound care per team recs  -as per ID -  cefepime 2g q8h x5 days- completed the course.   -   ENT consult completed- recommended no interventions, abx. as ID      #HTN/HLD  - Cont atorvastatin 40mg qHs    #Parkinson's disease with dementia; s/p trach/PEG, quadriplegia  - Cont entacapone and rasaligine    #DM II- well controlled with diet  - HBa1c 5.1  - Monitor FS as per protocol     #CKD Stage II - stable   - Monitor cr    GOC - full code   overall poor prognosis    #Progress Note Handoff:  patient was medically optimized, stable for d/c to SNF   Family discussion: yes, medical team Disposition: SNF once bed is available    Total time spent to complete patient's bedside assessment, review medical chart, discuss medical plan of care with covering medical team was more than 35 minutes

## 2022-05-02 NOTE — PROGRESS NOTE ADULT - SUBJECTIVE AND OBJECTIVE BOX
SUBJECTIVE:  HPI:  71 year old male with a PMhx of parkinson's disease with dementia requiting trach (now with trach collar) and PEG, CKD II, DLD, HTN, gout, DMII, 1st degree AV block and hx of fungemia.   History obtained from patients son at bedside. On my exam, patient is Tamazight speaking, responds to yes/no questions with eye movements/head nodding (this is his baseline). Patient is coming from NH    CC: lethargy     History goes back to: 1 day prior to admission when patients family noticed that he was lethargic, less responsive than usual. On questioning he reported abdominal pain. Of note patient takes oxycodone for pain and was recently started on Fe-supplements for anemia.    ROS is negative for: no fever, no chills, no SOB, chest pains or palpitations no nausea or vomiting. Family does not know when his last bowel movement was.      In the ED: VS WNL  Labs significant for: Hb:8.8 (chronic), ESR>140, Albumin:2.7  VBG: consistent primarily with metabolic alkalosis   Troponin: 0.08  EKG: NSR with 1st degree AV block   CT head: no acute intracranial pathology. CT suggestive of otomastoiditis  CT abdomen: mild- moderate bilateral pleural effusions, heavy colonic stool burden, otherwise no acute pathology   (26 Apr 2022 08:32)      Patient is a 71y old Male who presents with a chief complaint of sepsis (02 May 2022 06:18)    Currently admitted to medicine with the primary diagnosis of Weakness       Today is hospital day 6d.     PAST MEDICAL & SURGICAL HISTORY  HTN (hypertension)    Gout    Diabetes mellitus    Stage 2 chronic kidney disease    PEG (percutaneous endoscopic gastrostomy) status        ALLERGIES:  No Known Allergies    MEDICATIONS:  ACTIVE MEDICATIONS  acetaminophen     Tablet .. 650 milliGRAM(s) Oral every 6 hours PRN  aluminum hydroxide/magnesium hydroxide/simethicone Suspension 30 milliLiter(s) Oral every 4 hours PRN  ascorbic acid 500 milliGRAM(s) Oral daily  atorvastatin 40 milliGRAM(s) Oral at bedtime  chlorhexidine 4% Liquid 1 Application(s) Topical <User Schedule>  enoxaparin Injectable 40 milliGRAM(s) SubCutaneous every 24 hours  entacapone 200 milliGRAM(s) Oral two times a day  melatonin 3 milliGRAM(s) Oral at bedtime PRN  ondansetron Injectable 4 milliGRAM(s) IV Push every 8 hours PRN  oxyCODONE    IR 5 milliGRAM(s) Oral every 8 hours PRN  pantoprazole    Tablet 40 milliGRAM(s) Oral before breakfast  polyethylene glycol 3350 17 Gram(s) Oral daily PRN  rasagiline Tablet 0.5 milliGRAM(s) Oral daily      VITALS:   T(F): 96.5  HR: 59  BP: 182/92  RR: 20  SpO2: 100%    LABS:                        9.2    4.89  )-----------( 234      ( 01 May 2022 07:12 )             29.0     05-01    137  |  106  |  25<H>  ----------------------------<  109<H>  4.7   |  18  |  0.8    Ca    8.7      01 May 2022 07:12  Mg     2.1     05-01    TPro  7.1  /  Alb  2.4<L>  /  TBili  0.2  /  DBili  x   /  AST  33  /  ALT  39  /  AlkPhos  133<H>  05-01                      PHYSICAL EXAM:  GEN: Ill looking  LUNGS: Clear to auscultation bilaterally- Limited exam due to tremors  HEART: S1/S2 present.    ABD: Soft, non-tender, non-distended.   EXT: No pedal edema, warm to touch, no discoloration, contracted  NEURO: AAOX0-1- baseline?, Rhythmic tremors more on right side      A/P:    Patient is a 71 year old male with a PMhx of parkinson's disease with dementia requiting trach (now with trach collar) and PEG, CKD II, DLD, HTN, gout, DMII, 1st degree AV block and hx of fungemia. Presenting with Encephalopathy possibly due to mastoiditis vs PNA    #Metabolic Encephalopathy suspected 2/2 mastoiditis, PNA  #B/l Pleural effusions/ -ESR >140  - Patient AOX0 right now, talked to daughter on 5/2, at baseline patient does not have significant tremors and is alert and understands conversation, Tamazight speaking  - blood cxs- negative, urine legionella negative, MRSA - negative, urine cxs - negative, RVP- negative,   - as per ID: s/p cefepime 2g q8h x5 days  - ENT recs no further intervention    #Pain control  - BP high with tachycardia and profuse sweating noted on exam, patient not receiving pain meds  - Will order pain meds as standing    #HTN/HLD  - Cont atorvastatin 40mg qHs    #Parkinson's disease with dementia; s/p trach/PEG, quadriplegia  - Cont entacapone and rasaligine    #DM II- well controlled with diet  - HBa1c 5.1  - Monitor FS as per protocol     #CKD Stage II - stable   - Monitor cr    GOC - full code   overall poor prognosis    #Misc  -DVT prophylaxis: Lovenox  -GI prophylaxis: Protonix  -Diet: NPO with tube feed  -Code status: Full code  -Activity: Bed rest  -Dispo: NH    #Handoff: D/C planning, family wants pt. to be transferred to a [facility]

## 2022-05-02 NOTE — DISCHARGE NOTE PROVIDER - NSDCCPCAREPLAN_GEN_ALL_CORE_FT
PRINCIPAL DISCHARGE DIAGNOSIS  Diagnosis: Toxic metabolic encephalopathy  Assessment and Plan of Treatment: You were admitted for altered mental status, there was some concern for infection, you recieved antibiotic for 5 day duration after which clinically infection seems to have resolved.  Please follow up with all your doctor appointment as instructed and take medication as prescribed.      SECONDARY DISCHARGE DIAGNOSES  Diagnosis: Elevated troponin  Assessment and Plan of Treatment:

## 2022-05-02 NOTE — DISCHARGE NOTE NURSING/CASE MANAGEMENT/SOCIAL WORK - NSDCPEFALRISK_GEN_ALL_CORE
For information on Fall & Injury Prevention, visit: [email or web address] OR  [email or web address] OR  https://www.cdc.gov/steadi/patient.html

## 2022-05-03 VITALS
OXYGEN SATURATION: 100 % | TEMPERATURE: 97 F | HEART RATE: 65 BPM | DIASTOLIC BLOOD PRESSURE: 67 MMHG | SYSTOLIC BLOOD PRESSURE: 152 MMHG

## 2022-05-03 LAB
CULTURE RESULTS: SIGNIFICANT CHANGE UP
SPECIMEN SOURCE: SIGNIFICANT CHANGE UP

## 2022-05-03 PROCEDURE — 99232 SBSQ HOSP IP/OBS MODERATE 35: CPT

## 2022-05-03 PROCEDURE — 99239 HOSP IP/OBS DSCHRG MGMT >30: CPT

## 2022-05-03 RX ADMIN — ENTACAPONE 200 MILLIGRAM(S): 200 TABLET, FILM COATED ORAL at 05:09

## 2022-05-03 RX ADMIN — RASAGILINE 0.5 MILLIGRAM(S): 0.5 TABLET ORAL at 13:34

## 2022-05-03 RX ADMIN — OXYCODONE HYDROCHLORIDE 5 MILLIGRAM(S): 5 TABLET ORAL at 13:32

## 2022-05-03 RX ADMIN — ENOXAPARIN SODIUM 40 MILLIGRAM(S): 100 INJECTION SUBCUTANEOUS at 13:33

## 2022-05-03 RX ADMIN — Medication 500 MILLIGRAM(S): at 13:32

## 2022-05-03 RX ADMIN — OXYCODONE HYDROCHLORIDE 5 MILLIGRAM(S): 5 TABLET ORAL at 05:08

## 2022-05-03 NOTE — PROGRESS NOTE ADULT - ASSESSMENT
IMPRESSION:    altered MS/ Toxic/ metabolic ( head CT reviewed)  HO Parkinson's dementia  Chronic respiratory failure SP trach and PEG  large stool burden improved  recurrent admission    PLAN:    CNS: avoid CNS depressant    HEENT: oral care, trach care    PULMONARY: HOB >45. NO vent changes.  Pulmonary toilet. keep SaO2 92 TO 96%    CARDIOVASCULAR: avoid overload    GI: GI prophylaxis. PEG feeding.   laxatives    RENAL: fu lytes. Replete as needed    INFECTIOUS DISEASE: abx per ID    HEMATOLOGICAL:  DVT prophylaxis.    ENDOCRINE:  Follow up FS.  Insulin protocol if needed.     Wound care for sacrum  dc planning

## 2022-05-03 NOTE — PROGRESS NOTE ADULT - SUBJECTIVE AND OBJECTIVE BOX
Over Night Events: events noted, vent dependant, afebrile    PHYSICAL EXAM    ICU Vital Signs Last 24 Hrs  T(C): 36.5 (03 May 2022 05:00), Max: 36.5 (03 May 2022 05:00)  T(F): 97.7 (03 May 2022 05:00), Max: 97.7 (03 May 2022 05:00)  HR: 61 (03 May 2022 05:00) (56 - 64)  BP: 108/60 (03 May 2022 05:00) (108/60 - 130/58)  RR: 18 (03 May 2022 05:00) (18 - 20)  SpO2: 100% (03 May 2022 05:00) (99% - 100%)      General: ill looking  HEENT: SARA             Lymph Nodes: No cervical LN   Lungs: dec bs both bases  Cardiovascular: Regular   Abdomen: Soft, Positive BS  Extremities: No clubbing   contracted  not following commands      05-01-22 @ 07:01  -  05-02-22 @ 07:00  --------------------------------------------------------  IN:    Free Water: 250 mL    Jevity 1.2: 300 mL  Total IN: 550 mL    OUT:    Indwelling Catheter - Urethral (mL): 550 mL  Total OUT: 550 mL    Total NET: 0 mL      05-02-22 @ 07:01  -  05-03-22 @ 06:54  --------------------------------------------------------  IN:    Free Water: 200 mL    Jevity 1.2: 820 mL  Total IN: 1020 mL    OUT:  Total OUT: 0 mL    Total NET: 1020 mL          LABS:                          9.0    6.78  )-----------( 194      ( 02 May 2022 04:30 )             28.5                                               05-02    138  |  105  |  29<H>  ----------------------------<  119<H>  4.6   |  19  |  1.0    Ca    9.2      02 May 2022 09:00  Mg     2.2     05-02    TPro  7.9  /  Alb  2.7<L>  /  TBili  <0.2  /  DBili  x   /  AST  36  /  ALT  46<H>  /  AlkPhos  150<H>  05-02                                                                                           LIVER FUNCTIONS - ( 02 May 2022 09:00 )  Alb: 2.7 g/dL / Pro: 7.9 g/dL / ALK PHOS: 150 U/L / ALT: 46 U/L / AST: 36 U/L / GGT: x                                                                                               Mode: AC/ CMV (Assist Control/ Continuous Mandatory Ventilation)  RR (machine): 20  TV (machine): 400  FiO2: 40  PEEP: 5  ITime: 1  MAP: 9  PIP: 20                                          MEDICATIONS  (STANDING):  ascorbic acid 500 milliGRAM(s) Oral daily  atorvastatin 40 milliGRAM(s) Oral at bedtime  chlorhexidine 4% Liquid 1 Application(s) Topical <User Schedule>  enoxaparin Injectable 40 milliGRAM(s) SubCutaneous every 24 hours  entacapone 200 milliGRAM(s) Oral two times a day  oxyCODONE    IR 5 milliGRAM(s) Oral every 8 hours  pantoprazole    Tablet 40 milliGRAM(s) Oral before breakfast  rasagiline Tablet 0.5 milliGRAM(s) Oral daily    MEDICATIONS  (PRN):  acetaminophen     Tablet .. 650 milliGRAM(s) Oral every 6 hours PRN Temp greater or equal to 38C (100.4F), Mild Pain (1 - 3)  aluminum hydroxide/magnesium hydroxide/simethicone Suspension 30 milliLiter(s) Oral every 4 hours PRN Dyspepsia  melatonin 3 milliGRAM(s) Oral at bedtime PRN Insomnia  ondansetron Injectable 4 milliGRAM(s) IV Push every 8 hours PRN Nausea and/or Vomiting  polyethylene glycol 3350 17 Gram(s) Oral daily PRN Constipation

## 2022-05-03 NOTE — PROGRESS NOTE ADULT - ATTENDING COMMENTS
Patient is a 71 year old male with a PMhx of parkinson's disease with dementia requiting trach (now with trach collar) and PEG, CKD II, DLD, HTN, gout, DMII, 1st degree AV block and hx of fungemia. Presenting with Encephalopathy possibly due to mastoiditis vs PNA    #Metabolic Encephalopathy suspected 2/2 mastoiditis, PNA  #B/l Pleural effusions/ -ESR >140  - blood cxs- negative, urine legionella negative, MRSA - negative, urine cxs - negative, RVP- negative, Wound care per team recs  -as per ID -  cefepime 2g q8h x5 days- completed the course.   -   ENT consult completed- recommended no interventions, abx. as ID      #HTN/HLD  - Cont atorvastatin 40mg qHs    #Parkinson's disease with dementia; s/p trach/PEG, quadriplegia  - Cont entacapone and rasaligine    #DM II- well controlled with diet  - HBa1c 5.1  - Monitor FS as per protocol     #CKD Stage II - stable   - Monitor cr    GOC - full code   overall poor prognosis    #Progress Note Handoff:  patient was medically optimized, stable for d/c to SNF   Family discussion: yes, medical team Disposition: SNF once bed is available    Total time spent to complete patient's bedside assessment, review medical chart, discuss medical plan of care with covering medical team was more than 35 minutes .

## 2022-05-03 NOTE — PROGRESS NOTE ADULT - PROVIDER SPECIALTY LIST ADULT
Hospitalist
Internal Medicine
Internal Medicine
Pulmonology
Hospitalist
Infectious Disease
Internal Medicine
Pulmonology
Pulmonology
Hospitalist
Pulmonology

## 2022-05-03 NOTE — PROGRESS NOTE ADULT - SUBJECTIVE AND OBJECTIVE BOX
SUBJECTIVE:  HPI:  71 year old male with a PMhx of parkinson's disease with dementia requiting trach (now with trach collar) and PEG, CKD II, DLD, HTN, gout, DMII, 1st degree AV block and hx of fungemia.   History obtained from patients son at bedside. On my exam, patient is Bengali speaking, responds to yes/no questions with eye movements/head nodding (this is his baseline). Patient is coming from NH    CC: lethargy     History goes back to: 1 day prior to admission when patients family noticed that he was lethargic, less responsive than usual. On questioning he reported abdominal pain. Of note patient takes oxycodone for pain and was recently started on Fe-supplements for anemia.    ROS is negative for: no fever, no chills, no SOB, chest pains or palpitations no nausea or vomiting. Family does not know when his last bowel movement was.      In the ED: VS WNL  Labs significant for: Hb:8.8 (chronic), ESR>140, Albumin:2.7  VBG: consistent primarily with metabolic alkalosis   Troponin: 0.08  EKG: NSR with 1st degree AV block   CT head: no acute intracranial pathology. CT suggestive of otomastoiditis  CT abdomen: mild- moderate bilateral pleural effusions, heavy colonic stool burden, otherwise no acute pathology   (26 Apr 2022 08:32)      Patient is a 71y old Male who presents with a chief complaint of sepsis (03 May 2022 06:54)    Currently admitted to medicine with the primary diagnosis of Toxic metabolic encephalopathy       Today is hospital day 7d.     PAST MEDICAL & SURGICAL HISTORY  HTN (hypertension)    Gout    Diabetes mellitus    Stage 2 chronic kidney disease    PEG (percutaneous endoscopic gastrostomy) status        ALLERGIES:  No Known Allergies    MEDICATIONS:  ACTIVE MEDICATIONS  acetaminophen     Tablet .. 650 milliGRAM(s) Oral every 6 hours PRN  aluminum hydroxide/magnesium hydroxide/simethicone Suspension 30 milliLiter(s) Oral every 4 hours PRN  ascorbic acid 500 milliGRAM(s) Oral daily  atorvastatin 40 milliGRAM(s) Oral at bedtime  chlorhexidine 4% Liquid 1 Application(s) Topical <User Schedule>  enoxaparin Injectable 40 milliGRAM(s) SubCutaneous every 24 hours  entacapone 200 milliGRAM(s) Oral two times a day  melatonin 3 milliGRAM(s) Oral at bedtime PRN  ondansetron Injectable 4 milliGRAM(s) IV Push every 8 hours PRN  oxyCODONE    IR 5 milliGRAM(s) Oral every 8 hours  pantoprazole    Tablet 40 milliGRAM(s) Oral before breakfast  polyethylene glycol 3350 17 Gram(s) Oral daily PRN  rasagiline Tablet 0.5 milliGRAM(s) Oral daily      VITALS:   T(F): 97.7  HR: 61  BP: 108/60  RR: 18  SpO2: 100%    LABS:                        9.0    6.78  )-----------( 194      ( 02 May 2022 04:30 )             28.5     05-02    138  |  105  |  29<H>  ----------------------------<  119<H>  4.6   |  19  |  1.0    Ca    9.2      02 May 2022 09:00  Mg     2.2     05-02    TPro  7.9  /  Alb  2.7<L>  /  TBili  <0.2  /  DBili  x   /  AST  36  /  ALT  46<H>  /  AlkPhos  150<H>  05-02                      PHYSICAL EXAM:  GEN: Ill looking  LUNGS: Clear to auscultation bilaterally- Limited exam due to tremors  HEART: S1/S2 present.    ABD: Soft, non-tender, non-distended.   EXT: No pedal edema, warm to touch, no discoloration, contracted  NEURO: AAOX0-1- baseline?, Rhythmic tremors more on right side      A/P:    Patient is a 71 year old male with a PMhx of parkinson's disease with dementia requiting trach (now with trach collar) and PEG, CKD II, DLD, HTN, gout, DMII, 1st degree AV block and hx of fungemia. Presenting with Encephalopathy possibly due to mastoiditis vs PNA    #Metabolic Encephalopathy suspected 2/2 mastoiditis, PNA  #B/l Pleural effusions/ -ESR >140  - Patient AOX0 right now, talked to daughter on 5/2, at baseline patient does not have significant tremors and is alert and understands conversation, Bengali speaking  - blood cxs- negative, urine legionella negative, MRSA - negative, urine cxs - negative, RVP- negative,   - as per ID: s/p cefepime 2g q8h x5 days  - ENT recs no further intervention    #Pain control  - BP high with tachycardia and profuse sweating noted on exam, patient not receiving pain meds  - Will order pain meds as standing    #HTN/HLD  - Cont atorvastatin 40mg qHs    #Parkinson's disease with dementia; s/p trach/PEG, quadriplegia  - Cont entacapone and rasaligine    #DM II- well controlled with diet  - HBa1c 5.1  - Monitor FS as per protocol     #CKD Stage II - stable   - Monitor cr    GOC - full code   overall poor prognosis    #Misc  -DVT prophylaxis: Lovenox  -GI prophylaxis: Protonix  -Diet: NPO with tube feed  -Code status: Full code  -Activity: Bed rest  -Dispo: NH    #Handoff: D/C planning, family wants pt. to be transferred to a [facility]

## 2022-05-11 DIAGNOSIS — I44.0 ATRIOVENTRICULAR BLOCK, FIRST DEGREE: ICD-10-CM

## 2022-05-11 DIAGNOSIS — G92.8 OTHER TOXIC ENCEPHALOPATHY: ICD-10-CM

## 2022-05-11 DIAGNOSIS — F41.9 ANXIETY DISORDER, UNSPECIFIED: ICD-10-CM

## 2022-05-11 DIAGNOSIS — D63.1 ANEMIA IN CHRONIC KIDNEY DISEASE: ICD-10-CM

## 2022-05-11 DIAGNOSIS — H70.90 UNSPECIFIED MASTOIDITIS, UNSPECIFIED EAR: ICD-10-CM

## 2022-05-11 DIAGNOSIS — I12.9 HYPERTENSIVE CHRONIC KIDNEY DISEASE WITH STAGE 1 THROUGH STAGE 4 CHRONIC KIDNEY DISEASE, OR UNSPECIFIED CHRONIC KIDNEY DISEASE: ICD-10-CM

## 2022-05-11 DIAGNOSIS — R32 UNSPECIFIED URINARY INCONTINENCE: ICD-10-CM

## 2022-05-11 DIAGNOSIS — E87.4 MIXED DISORDER OF ACID-BASE BALANCE: ICD-10-CM

## 2022-05-11 DIAGNOSIS — M10.9 GOUT, UNSPECIFIED: ICD-10-CM

## 2022-05-11 DIAGNOSIS — E22.2 SYNDROME OF INAPPROPRIATE SECRETION OF ANTIDIURETIC HORMONE: ICD-10-CM

## 2022-05-11 DIAGNOSIS — Z93.0 TRACHEOSTOMY STATUS: ICD-10-CM

## 2022-05-11 DIAGNOSIS — R77.8 OTHER SPECIFIED ABNORMALITIES OF PLASMA PROTEINS: ICD-10-CM

## 2022-05-11 DIAGNOSIS — G20 PARKINSON'S DISEASE: ICD-10-CM

## 2022-05-11 DIAGNOSIS — Z79.891 LONG TERM (CURRENT) USE OF OPIATE ANALGESIC: ICD-10-CM

## 2022-05-11 DIAGNOSIS — J18.9 PNEUMONIA, UNSPECIFIED ORGANISM: ICD-10-CM

## 2022-05-11 DIAGNOSIS — Z93.1 GASTROSTOMY STATUS: ICD-10-CM

## 2022-05-11 DIAGNOSIS — J90 PLEURAL EFFUSION, NOT ELSEWHERE CLASSIFIED: ICD-10-CM

## 2022-05-11 DIAGNOSIS — J96.11 CHRONIC RESPIRATORY FAILURE WITH HYPOXIA: ICD-10-CM

## 2022-05-11 DIAGNOSIS — F02.80 DEMENTIA IN OTHER DISEASES CLASSIFIED ELSEWHERE, UNSPECIFIED SEVERITY, WITHOUT BEHAVIORAL DISTURBANCE, PSYCHOTIC DISTURBANCE, MOOD DISTURBANCE, AND ANXIETY: ICD-10-CM

## 2022-05-11 DIAGNOSIS — L89.154 PRESSURE ULCER OF SACRAL REGION, STAGE 4: ICD-10-CM

## 2022-05-11 DIAGNOSIS — E78.5 HYPERLIPIDEMIA, UNSPECIFIED: ICD-10-CM

## 2022-05-11 DIAGNOSIS — E55.9 VITAMIN D DEFICIENCY, UNSPECIFIED: ICD-10-CM

## 2022-05-11 DIAGNOSIS — E11.22 TYPE 2 DIABETES MELLITUS WITH DIABETIC CHRONIC KIDNEY DISEASE: ICD-10-CM

## 2022-05-11 DIAGNOSIS — S83.92XA SPRAIN OF UNSPECIFIED SITE OF LEFT KNEE, INITIAL ENCOUNTER: ICD-10-CM

## 2022-05-11 DIAGNOSIS — E83.52 HYPERCALCEMIA: ICD-10-CM

## 2022-05-11 DIAGNOSIS — N18.2 CHRONIC KIDNEY DISEASE, STAGE 2 (MILD): ICD-10-CM

## 2022-05-11 DIAGNOSIS — H70.92 UNSPECIFIED MASTOIDITIS, LEFT EAR: ICD-10-CM

## 2022-05-11 DIAGNOSIS — Z74.01 BED CONFINEMENT STATUS: ICD-10-CM

## 2022-05-11 DIAGNOSIS — G82.50 QUADRIPLEGIA, UNSPECIFIED: ICD-10-CM

## 2022-05-11 DIAGNOSIS — Z20.822 CONTACT WITH AND (SUSPECTED) EXPOSURE TO COVID-19: ICD-10-CM

## 2023-04-27 NOTE — ED PROVIDER NOTE - DOMESTIC TRAVEL HIGH RISK QUESTION
No O-Z Plasty Text: The defect edges were debeveled with a #15 scalpel blade. Given the location of the defect, shape of the defect and the proximity to free margins an O-Z plasty (double transposition flap) was deemed most appropriate. Using a sterile surgical marker, the appropriate transposition flaps were drawn incorporating the defect and placing the expected incisions within the relaxed skin tension lines where possible. The area thus outlined was incised deep to adipose tissue with a #15 scalpel blade. The skin margins were undermined to an appropriate distance in all directions utilizing iris scissors. Hemostasis was achieved with electrocautery. The flaps were then transposed and carried over into place, one clockwise and the other counterclockwise, and anchored with interrupted buried subcutaneous sutures.

## 2023-10-20 NOTE — ED ADULT NURSE NOTE - NSIMPLEMENTINTERV_GEN_ALL_ED
136
Implemented All Fall with Harm Risk Interventions:  [place] to call system. Call bell, personal items and telephone within reach. Instruct patient to call for assistance. Room bathroom lighting operational. Non-slip footwear when patient is off stretcher. Physically safe environment: no spills, clutter or unnecessary equipment. Stretcher in lowest position, wheels locked, appropriate side rails in place. Provide visual cue, wrist band, yellow gown, etc. Monitor gait and stability. Monitor for mental status changes and reorient to person, place, and time. Review medications for side effects contributing to fall risk. Reinforce activity limits and safety measures with patient and family. Provide visual clues: red socks.

## 2024-02-05 NOTE — H&P ADULT - NSHPLABSRESULTS_GEN_ALL_CORE
8.8    5.69  )-----------( 319      ( 25 Apr 2022 19:07 )             28.3       04-25    140  |  103  |  29<H>  ----------------------------<  89  4.7   |  27  |  0.7    Ca    8.6      25 Apr 2022 19:07  Phos  3.2     04-25  Mg     2.0     04-25    TPro  7.4  /  Alb  2.7<L>  /  TBili  0.3  /  DBili  x   /  AST  37  /  ALT  48<H>  /  AlkPhos  160<H>  04-25 CONSTITUTIONAL: NAD  SKIN: Warm, dry  HEAD: NCAT  EYES: Clear conjunctiva   ENT: MMM  NECK: Supple  CARD: RRR, S1, S2; no M/R/G  RESP: Normal respiratory effort, CTAB  ABD: Soft, nontender. No rebound, rigidity, or guarding  EXT: Pulses palpable distally, +pitting edema to bilateral lower extremities, + tenderness over right knee, abrasion to right lower anterior tibia, pain with right knee flexion,   NEURO: Grossly intact. Awake, alert, moving all extremities, no facial asymmetry.

## 2024-04-15 RX ORDER — OXYCODONE HYDROCHLORIDE 5 MG/1
1 TABLET ORAL
Qty: 0 | Refills: 0 | DISCHARGE

## 2024-04-15 RX ORDER — RASAGILINE 0.5 MG/1
1 TABLET ORAL
Qty: 0 | Refills: 0 | DISCHARGE

## 2024-04-15 RX ORDER — ERYTHROPOIETIN 10000 [IU]/ML
0 INJECTION, SOLUTION INTRAVENOUS; SUBCUTANEOUS
Qty: 0 | Refills: 0 | DISCHARGE

## 2024-04-15 RX ORDER — FERROUS SULFATE 325(65) MG
0 TABLET ORAL
Qty: 0 | Refills: 0 | DISCHARGE

## 2024-04-15 RX ORDER — ZINC SULFATE TAB 220 MG (50 MG ZINC EQUIVALENT) 220 (50 ZN) MG
1 TAB ORAL
Qty: 0 | Refills: 0 | DISCHARGE

## 2024-04-15 RX ORDER — ENTACAPONE 200 MG/1
1 TABLET, FILM COATED ORAL
Qty: 0 | Refills: 0 | DISCHARGE

## 2024-04-15 RX ORDER — OMEPRAZOLE 10 MG/1
1 CAPSULE, DELAYED RELEASE ORAL
Qty: 0 | Refills: 0 | DISCHARGE

## 2024-04-15 RX ORDER — IPRATROPIUM/ALBUTEROL SULFATE 18-103MCG
3 AEROSOL WITH ADAPTER (GRAM) INHALATION
Qty: 0 | Refills: 0 | DISCHARGE

## 2024-07-22 NOTE — PROGRESS NOTE ADULT - ASSESSMENT
Pre-Op call completed, unable to reach patient.    Instructions for procedure given via voicemail.    Medications list in chart reviewed.  Instructed patient on medications to take DOS: SEE MEDICATION LIST.      Patient instructed to arrive for surgery at 0700 on 7/25/24 at [facility].    Instructed patient to call back with any questions or concerns - call back number [phone] given.    Patient instructed to bring in current list of medications (name of medication, dose and frequency of daily use) day of surgery.     Pre-op Teaching Completed:    OR - Procedure, OR - Time and time of arrival, Location of Registration, NPO, Medications to take Day of Surgery, Skin Prep, Equipment to bring day of surgery, Discharge Policy: Ride/Responsibile Adult, and Instructions left via voicemail      Patient instructed to notify surgeon if patient has or develops any fever, cold or flu like symptoms, other signs of general illness, or any exposure to COVID19.  Patient also notified of current hospital visitor restrictions and hospital entrance masking/screening (if any).        71y year old Male presenting with worsening level of arousal from NH after spending a prolonged admission at [facility].  Recent REEG shows risk for seizures and keppra started. Currently on VEEG with generalized slowing and triphasic waves, remains obtunded.  Likely Toxic Metabolic encephalopathy, Now hemodynamically Unstable  on pressors. Minimally responsive.     IMPRESSION   Toxic Metabolic  Encephalopathy / r/o Meningitis Etiology  Unknown   Underlying Parkinson's   Appears Septic        Plan  F/u LP  w/u   Continue  Abx as per ID   Recheck Covid  Continue Keppra 125 BID  Continue Sinemet via NG tube  Continue medical management for possible toxic/metabolic encephalopathy/meningitis         Plan discussed with Dr. Lm Velasquez

## 2025-01-20 NOTE — PHARMACOTHERAPY INTERVENTION NOTE - [facility]
URGENT CARE NOTE    Chief Complaint   Patient presents with    Flu Like Symptoms     Tested positive for influenza a at home dad tested positive a few days ago - Entered by patient  2 days ago started with generally not feeling well, cough, body aches, mild fever, decreased appetite. Today fever 101. Initially started with multi-symptom medication, then switched to tylenol. At home COVID/influenza, positive influenza A.        HPI: Serina Miller is a 10 year old female who presents to Urgent Care today accompanied by mother and brother with fever, cough, sinus congestion, and myalgias onset two days ago. Her father tested positive for influenza last week, and she had a home test positive for Influenza A. Denies any present emesis or diarrhea, and she is tolerating fluids. They are interested in Tamiflu.    The following patient allergies, current medications, and past medical history were reviewed as below.  Current Outpatient Medications   Medication Sig Dispense Refill    oseltamivir (TAMIFLU) 75 MG capsule Take 1 capsule by mouth in the morning and 1 capsule in the evening. Do all this for 5 days. 10 capsule 0    albuterol 108 (90 Base) MCG/ACT inhaler Inhale 2 puffs into the lungs 4 times daily as needed. (Patient not taking: Reported on 11/26/2024) 8.5 g 1    pimecrolimus (Elidel) 1 % cream Apply topically to affected area on the trunk and extremities twice daily (Patient not taking: Reported on 11/26/2024) 60 g 0    triamcinolone (KENALOG) 0.025 % ointment Apply topically to the affected areas on the trunk and extremities twice daily for 2 weeks max (Patient not taking: Reported on 11/26/2024) 30 g 0    ibuprofen (Childrens Motrin) 100 MG/5ML suspension Take 18.7 mLs by mouth every 6 hours as needed for Fever. (Patient not taking: Reported on 11/26/2024) 120 mL 0    acetaminophen (Tylenol Childrens) 160 MG/5ML suspension Take 15.6 mLs by mouth every 6 hours  ASP - Renal dose adjustment as needed for Pain. (Patient not taking: Reported on 1/20/2025) 100 mL 0    Pediatric Multiple Vit-C-FA (MULTIVITAMIN CHILDRENS) Chew Tab Chew 1 tablet by mouth daily. (Patient not taking: Reported on 11/26/2024)       No current facility-administered medications for this visit.      ALLERGIES:   Allergen Reactions    Seasonal Runny Nose     No past medical history on file.    ROS:   ALL 13 SYSTEMS REVIEWED AND ARE NEGATIVE  UNLESS OTHERWISE NOTED IN HPI    PHYSICAL EXAMINATION:  Visit Vitals  /58   Pulse 110   Temp 98.2 °F (36.8 °C) (Temporal)   Resp 20   Wt 61.7 kg (136 lb 0.4 oz)   SpO2 95%       Constitutional:  Well developed, well nourished, no acute distress, non-toxic appearance. Tired-appearing, sitting upright in chair   Eyes:  PERRLA, conjunctiva without erythema.  HENT:  Atraumatic. External ears nontender to palpation. Clear effusions of bilateral TMs without erythema, purulence, or perforation. No nasal drainage. Oropharynx moist without pharyngeal exudate.   Respiratory:  Clear to auscultation bilaterally without wheezing, rhonchi, or crackles. No tachypnea or dyspnea on exam.  Cardiovascular:  Regular rhythm and normal rate without obvious murmurs, gallops, or rubs.   Integument:  Well hydrated, no rash   Lymphatic:  Submandibular lymphadenopathy.       ASSESSMENT & PLAN:  Patient presents today with influenza A per home test. They are interested in antiviral, which was sent to pharmacy to start today. In the meantime, encouraged plenty of fluids, rest, OTC expectorant, nasal irrigation as needed for congestion, cool mist humidifier, and p.r.n. ibuprofen or acetaminophen for fevers and discomfort. Advised to return with any new or significantly worsened symptoms. Parent verbalizes understanding of this care plan, and questions were answered.    Diagnosis:  1. Influenza A  - oseltamivir (TAMIFLU) 75 MG capsule; Take 1 capsule by mouth in the morning and 1 capsule in the evening. Do all this for  5 days.  Dispense: 10 capsule; Refill: 0        Follow Up:  No follow-ups on file.   Patient was instructed to return to the ED/UC immediately if symptoms worsen or any new unusual symptoms arise.      Recheck on patient. Discussed with patient UC findings and plan for discharge. Patient was given UC warnings, discharge instructions, and follow up information to go home with. Patient understands and agrees with plan for discharge. Any questions have been answered.      Closure:  Informed patient that this is a provisional diagnosis. Provisional diagnosis can and do change. The diagnosis that you are discharged with today is based on the symptoms with which you presented today. If any new symptoms occur or worsen, you should seek immediate attention for re-evaluation.  Any symptoms that persist or fail to completely resolve require further evaluation by your other healthcare provider(s).    This chart was composed by Rose Ferreira, DNP, BS, APRN, FNP-BC; collaborating physicians Dr. Prateek Diaz and Dr. Dick Pepe.      1/20/2025, 12:06 PM.

## 2025-03-04 NOTE — CONSULT NOTE ADULT - [facility]
Occupational Therapy Visit    Visit Type: Daily Treatment Note  Visit: 8  Referring Provider: Zabrina Saini MD  Medical Diagnosis (from order): R29.898 - Left arm weakness  R29.898 - Left leg weakness     SUBJECTIVE                                                                                                               \"I just want to know if this is going to get better or not, but no one will tell me anything. I've contacted doctor's offices to set-up appointments, but no one has gotten back to me.\"    Pain / Symptoms  - Patient denies pain / symptoms.     OBJECTIVE                                                                                                                           Coordination  Gross Motor:  LUE: effortful movement  RUE: grossly intact  Fine Motor:  LUE: impaired in-hand/object manipulation and impairments in timing and fluidity RUE: grossly intact               Treatment     Therapeutic Exercise  - NuStep at level 4 workload x 10 minutes, 46-67 steps/minute with 1 rest break, to maximize bilateral integration, BUE and BLE strength, activity tolerance, cardiovascular endurance, and pain management.    Therapeutic Activity  - Therapeutic use of self to actively listen to patient's concerns re: continued LUE coordination difficulties, current low frustration tolerance in general, and frustration with medical system re: obtaining answers. Continue to encourage patient to restart mental health therapy services to assist with stress management and set-up neurology appointment 2/2 patient reports having contacted various offices, but has not heard anything back re: setting up appointments. Patient verbalized understanding and taught back concepts covered.       Neuromuscular Re-Education  Repetitive task training to maximize cortical reorganization, increase LUE coordination, and improve functional use of LUE and bilateral integration  - Left in-hand manipulation to pinch/grasp 5 pony 
beads from proximal raised table top (one at a time, all different colors), finger<>palm translation, carry forward and laterally (in semi-[place] on middle-distal table top), and release (one at a time) into 1/2-1 inch opening in cone (sorting beads by color into corresponding cones) x 50 beads. Min verbal cues to focus on palm<>finger translation vs dropping from bottom of hand  - Left in-hand manipulation to pinch/grasp 10 pony beads from proximal table top (one at a time, all of same color), finger<>palm translation, carry forward and laterally (in semi-[place] on middle-distal table top), and release (one at a time) into 1/2-1 inch opening in cone (sorting beads by color into corresponding cones) x 50 beads. Min verbal cues to focus on palm<>finger translation vs dropping from bottom of hand    Cognititive Skill Development  - Dual tasking to address divided and sustained attention, visual scanning, memory, problem solving, strategy development. Tasks: 1. Assemble 2 Lego plants via following pictorial instructions (completed 1st without issue, but having more difficulty with 2nd and becoming frustrated when pieces breaking off multiple times, forgetting about [employer] in this instance) and 2. attend to [employer] lights in periphery spread over whole table and intermittently turning on every 5-10 seconds x 12 minutes    Skilled input: verbal instruction/cues, tactile instruction/cues, demonstration and as detailed above    Writer verbally educated and received verbal consent for hand placement, positioning of patient, and techniques to be performed today from patient for therapist position for techniques and hand placement and palpation for techniques as described above and how they are pertinent to the patient's plan of care.  Home Exercise Program  Access Code: [email or web address]  URL: [email or web address]  Date: Updated 01/28/2025  Prepared by: Brianne Saenz      ASSESSMENT                   
                                                                                         Patient was seen this date for skilled outpatient occupational therapy session to address activity tolerance, bilateral upper extremity strength and coordination to maximize patient's independence, ease, and safety with ADLs, Instrumental Activities of Daily Living, and eventual return to work. Patient demonstrated improving finger<>palm translation with specific focus but becoming more easily frustrated with manipulation when assembling Lego pieces. Continue to recommend patient follow up with mental health therapy and neurology 2/2 still has not set up appointments though she reports she has contacted those providers' offices. Patient would continue to benefit from skilled outpatient occupational therapy services to address left upper extremity AROM, strength, and coordination, bilateral integration, stress management.     Education:   - Results of above outlined education: Verbalizes understanding and Demonstrates understanding    PLAN                                                                                                                           Suggestions for next session as indicated: Progress per plan of care. Review and upgrade Home Exercise Program and/or Home Activity Program as patient is able to tolerate.       Therapy procedure time and total treatment time can be found documented on the Time Entry flowsheet  
No

## 2025-04-08 NOTE — DIETITIAN INITIAL EVALUATION ADULT - WEIGHT FOR BMI (LBS)
[facility] Pending    Insurance response  Prescription Drug Insurance: [employer]  Notes: Prior authorization submitted - will update provider when decision has been made by insurance.        179.9

## 2025-04-08 NOTE — SWALLOW BEDSIDE ASSESSMENT ADULT - SWALLOW EVAL: RECOMMENDED FEEDING/EATING TECHNIQUES
crush medication (when feasible)/oral hygiene/position upright (90 degrees)/small sips/bites
crush medication (when feasible)/oral hygiene/position upright (90 degrees)/small sips/bites
small sips/bites
  Conner Browne RN,CCM  
crush medication (when feasible)

## 2025-07-31 NOTE — ED ADULT TRIAGE NOTE - MODE OF ARRIVAL
Body Location Override (Optional - Billing Will Still Be Based On Selected Body Map Location If Applicable): Left medial zygomatic arch Ambulance Mohs Case Number: [id number] Date Of Previous Biopsy (Optional): 5/21/25 Previous Accession (Optional): [id number] Biopsy Photograph Reviewed: Yes EMS Referring Physician (Optional): Miranda Bennett NP Consent Type: Consent 2 Eye Shield Used: No Initial Size Of Lesion: 0.8 X Size Of Lesion In Cm (Optional): 0.4 Number Of Stages: 1 Primary Defect Length In Cm (Final Defect Size - Required For Flaps/Grafts): 1.8 Primary Defect Width In Cm (Final Defect Size - Required For Flaps/Grafts): 0.7 Primary Defect Depth In Cm (Optional But Required For Some Insurers): 0 Repair Type: Referred to plastics for closure Which Instrument Did You Use For Dermabrasion?: Wire Brush Which Eyelid Repair Cpt Are You Using?: 39306 Oculoplastic Surgeon Procedure Text (A): After obtaining clear surgical margins the patient was sent to oculoplastics for surgical repair.  The patient understands they will receive post-surgical care and follow-up from the referring physician's office. Otolaryngologist Procedure Text (A): After obtaining clear surgical margins the patient was sent to otolaryngology for surgical repair.  The patient understands they will receive post-surgical care and follow-up from the referring physician's office. Plastic Surgeon Procedure Text (A): After obtaining clear surgical margins the patient was sent to plastics for surgical repair.  The patient understands they will receive post-surgical care and follow-up from the referring physician's office. Mid-Level Procedure Text (A): After obtaining clear surgical margins the patient was sent to a mid-level provider for surgical repair.  The patient understands they will receive post-surgical care and follow-up from the mid-level provider. Provider Procedure Text (A): After obtaining clear surgical margins the defect was repaired by another provider. Asc Procedure Text (A): After obtaining clear surgical margins the patient was sent to an ASC for surgical repair.  The patient understands they will receive post-surgical care and follow-up from the ASC physician. Deep Sutures: 5-0 Vicryl Epidermal Sutures: 6-0 Prolene Suture Removal: 7 days Suturegard Retention Suture: 2-0 Nylon Retention Suture Bite Size: 3 mm Length To Time In Minutes Device Was In Place: 10 Undermining Type: Entire Wound Debridement Text: The wound edges were debrided prior to proceeding with the closure to facilitate wound healing. Helical Rim Text: The closure involved the helical rim. Vermilion Border Text: The closure involved the vermilion border. Nostril Rim Text: The closure involved the nostril rim. Retention Suture Text: Retention sutures were placed to support the closure and prevent dehiscence. Area H Indication Text: Tumors in this location are included in Area H (eyelids, eyebrows, nose, lips, chin, ear, pre-auricular, post-auricular, temple, genitalia, hands, feet, ankles and areola).  Tissue conservation is critical in these anatomic locations. Area M Indication Text: Tumors in this location are included in Area M (cheek, forehead, scalp, neck, jawline and pretibial skin).  Mohs surgery is indicated for tumors in these anatomic locations. Area L Indication Text: Tumors in this location are included in Area L (trunk and extremities).  Mohs surgery is indicated for larger tumors, or tumors with aggressive histologic features, in these anatomic locations. Perineural Invasion (For Histology - Be Specific If Possible): absent Special Stains Stage 1 - Results: Base On Clearance Noted Above Stage 2: Additional Anesthesia Type: 1% lidocaine with epinephrine Staging Info: By selecting yes to the question above you will include information on AJCC 8 tumor staging in your Mohs note. Information on tumor staging will be automatically added for SCCs on the head and neck. AJCC 8 includes tumor size, tumor depth, perineural involvement and bone invasion. Tumor Depth: Less than 6mm from granular layer and no invasion beyond the subcutaneous fat Was The Patient On Physician Recommended Anticoagulation Therapy?: Please Select the Appropriate Response Medical Necessity Statement: Based on my medical judgement, Mohs surgery is the most appropriate treatment for this cancer compared to other treatments. Alternatives Discussed Intro (Do Not Add Period): I discussed alternative treatments to Mohs surgery and specifically discussed the risks and benefits of Consent 1/Introductory Paragraph: The rationale for Mohs was explained to the patient and consent was obtained. The risks, benefits and alternatives to therapy were discussed in detail. Specifically, the risks of infection, scarring, bleeding, prolonged wound healing, incomplete removal, allergy to anesthesia, nerve injury and recurrence were addressed. Prior to the procedure, the treatment site was clearly identified and confirmed by the patient using a mirror. All components of Universal Protocol/PAUSE Rule completed. Consent 2/Introductory Paragraph: Mohs surgery was explained to the patient and consent was obtained. The risks, benefits and alternatives to therapy were discussed in detail. Specifically, the risks of infection, scarring, bleeding, prolonged wound healing, incomplete removal, allergy to anesthesia, nerve injury and recurrence were addressed. Prior to the procedure, the treatment site was clearly identified and confirmed by the patient. All components of Universal Protocol/PAUSE Rule completed. Consent 3/Introductory Paragraph: I gave the patient a chance to ask questions they had about the procedure.  Following this I explained the Mohs procedure and consent was obtained. The risks, benefits and alternatives to therapy were discussed in detail. Specifically, the risks of infection, scarring, bleeding, prolonged wound healing, incomplete removal, allergy to anesthesia, nerve injury and recurrence were addressed. Prior to the procedure, the treatment site was clearly identified and confirmed by the patient. All components of Universal Protocol/PAUSE Rule completed. Consent (Temporal Branch)/Introductory Paragraph: The rationale for Mohs was explained to the patient and consent was obtained. The risks, benefits and alternatives to therapy were discussed in detail. Specifically, the risks of damage to the temporal branch of the facial nerve, infection, scarring, bleeding, prolonged wound healing, incomplete removal, allergy to anesthesia, and recurrence were addressed. Prior to the procedure, the treatment site was clearly identified and confirmed by the patient. All components of Universal Protocol/PAUSE Rule completed. Consent (Marginal Mandibular)/Introductory Paragraph: The rationale for Mohs was explained to the patient and consent was obtained. The risks, benefits and alternatives to therapy were discussed in detail. Specifically, the risks of damage to the marginal mandibular branch of the facial nerve, infection, scarring, bleeding, prolonged wound healing, incomplete removal, allergy to anesthesia, and recurrence were addressed. Prior to the procedure, the treatment site was clearly identified and confirmed by the patient. All components of Universal Protocol/PAUSE Rule completed. Consent (Spinal Accessory)/Introductory Paragraph: The rationale for Mohs was explained to the patient and consent was obtained. The risks, benefits and alternatives to therapy were discussed in detail. Specifically, the risks of damage to the spinal accessory nerve, infection, scarring, bleeding, prolonged wound healing, incomplete removal, allergy to anesthesia, and recurrence were addressed. Prior to the procedure, the treatment site was clearly identified and confirmed by the patient. All components of Universal Protocol/PAUSE Rule completed. Consent (Near Eyelid Margin)/Introductory Paragraph: The rationale for Mohs was explained to the patient and consent was obtained. The risks, benefits and alternatives to therapy were discussed in detail. Specifically, the risks of ectropion or eyelid deformity, infection, scarring, bleeding, prolonged wound healing, incomplete removal, allergy to anesthesia, nerve injury and recurrence were addressed. Prior to the procedure, the treatment site was clearly identified and confirmed by the patient. All components of Universal Protocol/PAUSE Rule completed. Consent (Ear)/Introductory Paragraph: The rationale for Mohs was explained to the patient and consent was obtained. The risks, benefits and alternatives to therapy were discussed in detail. Specifically, the risks of ear deformity, infection, scarring, bleeding, prolonged wound healing, incomplete removal, allergy to anesthesia, nerve injury and recurrence were addressed. Prior to the procedure, the treatment site was clearly identified and confirmed by the patient. All components of Universal Protocol/PAUSE Rule completed. Consent (Nose)/Introductory Paragraph: The rationale for Mohs was explained to the patient and consent was obtained. The risks, benefits and alternatives to therapy were discussed in detail. Specifically, the risks of nasal deformity, changes in the flow of air through the nose, infection, scarring, bleeding, prolonged wound healing, incomplete removal, allergy to anesthesia, nerve injury and recurrence were addressed. Prior to the procedure, the treatment site was clearly identified and confirmed by the patient. All components of Universal Protocol/PAUSE Rule completed. Consent (Lip)/Introductory Paragraph: The rationale for Mohs was explained to the patient and consent was obtained. The risks, benefits and alternatives to therapy were discussed in detail. Specifically, the risks of lip deformity, changes in the oral aperture, infection, scarring, bleeding, prolonged wound healing, incomplete removal, allergy to anesthesia, nerve injury and recurrence were addressed. Prior to the procedure, the treatment site was clearly identified and confirmed by the patient. All components of Universal Protocol/PAUSE Rule completed. Consent (Scalp)/Introductory Paragraph: The rationale for Mohs was explained to the patient and consent was obtained. The risks, benefits and alternatives to therapy were discussed in detail. Specifically, the risks of changes in hair growth pattern secondary to repair, infection, scarring, bleeding, prolonged wound healing, incomplete removal, allergy to anesthesia, nerve injury and recurrence were addressed. Prior to the procedure, the treatment site was clearly identified and confirmed by the patient. All components of Universal Protocol/PAUSE Rule completed. Detail Level: Detailed Postop Diagnosis: same Surgeon: Pratibha Paiz Anesthesia Volume In Cc: 2 Additional Anesthesia Volume In Cc: 6 Hemostasis: Electrodesiccation Estimated Blood Loss (Cc): minimal Anesthesia Volume In Cc: 12 Brow Lift Text: A midfrontal incision was made medially to the defect to allow access to the tissues just superior to the left eyebrow. Following careful dissection inferiorly in a supraperiosteal plane to the level of the left eyebrow, several 3-0 monocryl sutures were used to resuspend the eyebrow orbicularis oculi muscular unit to the superior frontal bone periosteum. This resulted in an appropriate reapproximation of static eyebrow symmetry and correction of the left brow ptosis. Epidermal Closure: running Suturegard Intro: Intraoperative tissue expansion was performed, utilizing the SUTUREGARD device, in order to reduce wound tension. Suturegard Body: The suture ends were repeatedly re-tightened and re-clamped to achieve the desired tissue expansion. Hemigard Intro: Due to skin fragility and wound tension, it was decided to use HEMIGARD adhesive retention suture devices to permit a linear closure. The skin was cleaned and dried for a 6cm distance away from the wound. Excessive hair, if present, was removed to allow for adhesion. Hemigard Postcare Instructions: The HEMIGARD strips are to remain completely dry for at least 5-7 days. Donor Site Anesthesia Type: same as repair anesthesia Epidermal Closure Graft Donor Site (Optional): simple interrupted Graft Donor Site Bandage (Optional-Leave Blank If You Don't Want In Note): Steri-strips and a pressure bandage were applied to the donor site. Closure 2 Information: This tab is for additional flaps and grafts, including complex repair and grafts and complex repair and flaps. You can also specify a different location for the additional defect, if the location is the same you do not need to select a new one. We will insert the automated text for the repair you select below just as we do for solitary flaps and grafts. Please note that at this time if you select a location with a different insurance zone you will need to override the ICD10 and CPT if appropriate. Closure 3 Information: This tab is for additional flaps and grafts above and beyond our usual structured repairs.  Please note if you enter information here it will not currently bill and you will need to add the billing information manually. Wound Care: Petrolatum Dressing: dry sterile dressing Unna Boot Text: An Unna boot was placed to help immobilize the limb and facilitate more rapid healing. Home Suture Removal Text: Patient was provided instructions on removing sutures and will remove their sutures at home.  If they have any questions or difficulties they will call the office. Post-Care Instructions: I reviewed with the patient in detail post-care instructions. Patient is not to engage in any heavy lifting, exercise, or swimming for the next 14 days. Should the patient develop any fevers, chills, bleeding, severe pain patient will contact the office immediately. Pain Refusal Text: I offered to prescribe pain medication but the patient refused to take this medication. Mauc Instructions: By selecting yes to the question below the MAUC number will be added into the note.  This will be calculated automatically based on the diagnosis chosen, the size entered, the body zone selected (H,M,L) and the specific indications you chose. You will also have the option to override the Mohs AUC if you disagree with the automatically calculated number and this option is found in the Case Summary tab. Where Do You Want The Question To Include Opioid Counseling Located?: Case Summary Tab Eye Protection Verbiage: Before proceeding with the stage, a plastic scleral shield was inserted. The globe was anesthetized with a few drops of 1% lidocaine with 1:100,000 epinephrine. Then, an appropriate sized scleral shield was chosen and coated with lacrilube ointment. The shield was gently inserted and left in place for the duration of each stage. After the stage was completed, the shield was gently removed. Mohs Method Verbiage: An incision at a 45 degree angle following the standard Mohs approach was done and the specimen was harvested as a microscopic controlled layer. Surgeon/Pathologist Verbiage (Will Incorporate Name Of Surgeon From Intro If Not Blank): operated in two distinct and integrated capacities as the surgeon and pathologist. Mohs Histo Method Verbiage: Each section was then chromacoded and processed in the Mohs lab using the Mohs protocol and submitted for tissue processing. Subsequent Stages Histo Method Verbiage: Using a similar technique to that described above, a thin layer of tissue was removed from all areas where tumor was visible on the previous stage.  The tissue was again oriented, mapped, dyed, and processed as above. Mohs Rapid Report Verbiage: The area of clinically evident tumor was marked with skin marking ink and appropriately hatched.  The initial incision was made following the Mohs approach through the skin.  The specimen was taken to the lab, divided into the necessary number of pieces, chromacoded and processed according to the Mohs protocol.  This was repeated in successive stages until a tumor free defect was achieved. Complex Repair Preamble Text (Leave Blank If You Do Not Want): Extensive wide undermining was performed. Intermediate Repair Preamble Text (Leave Blank If You Do Not Want): Undermining was performed with blunt dissection. Graft Cartilage Fenestration Text: The cartilage was fenestrated with a 2mm punch biopsy to help facilitate graft survival and healing. Non-Graft Cartilage Fenestration Text: The cartilage was fenestrated with a 2mm punch biopsy to help facilitate healing. Secondary Intention Text (Leave Blank If You Do Not Want): The defect will heal with secondary intention. No Repair - Repaired With Adjacent Surgical Defect Text (Leave Blank If You Do Not Want): After obtaining clear surgical margins the defect was repaired concurrently with another surgical defect which was in close approximation. Adjacent Tissue Transfer Text: The defect edges were debeveled with a #15 scalpel blade.  Given the location of the defect and the proximity to free margins an adjacent tissue transfer was deemed most appropriate.  Using a sterile surgical marker, an appropriate flap was drawn incorporating the defect and placing the expected incisions within the relaxed skin tension lines where possible.    The area thus outlined was incised deep to adipose tissue with a #15 scalpel blade.  The skin margins were undermined to an appropriate distance in all directions utilizing iris scissors. Advancement Flap (Single) Text: The defect edges were debeveled with a #15 scalpel blade.  Given the location of the defect and the proximity to free margins a single advancement flap was deemed most appropriate.  Using a sterile surgical marker, an appropriate advancement flap was drawn incorporating the defect and placing the expected incisions within the relaxed skin tension lines where possible.    The area thus outlined was incised deep to adipose tissue with a #15 scalpel blade.  The skin margins were undermined to an appropriate distance in all directions utilizing iris scissors. Advancement Flap (Double) Text: The defect edges were debeveled with a #15 scalpel blade.  Given the location of the defect and the proximity to free margins a double advancement flap was deemed most appropriate.  Using a sterile surgical marker, the appropriate advancement flaps were drawn incorporating the defect and placing the expected incisions within the relaxed skin tension lines where possible.    The area thus outlined was incised deep to adipose tissue with a #15 scalpel blade.  The skin margins were undermined to an appropriate distance in all directions utilizing iris scissors. Advancement-Rotation Flap Text: The defect edges were debeveled with a #15 scalpel blade.  Given the location of the defect, shape of the defect and the proximity to free margins an advancement-rotation flap was deemed most appropriate.  Using a sterile surgical marker, an appropriate flap was drawn incorporating the defect and placing the expected incisions within the relaxed skin tension lines where possible. The area thus outlined was incised deep to adipose tissue with a #15 scalpel blade.  The skin margins were undermined to an appropriate distance in all directions utilizing iris scissors. Alar Island Pedicle Flap Text: The defect edges were debeveled with a #15 scalpel blade.  Given the location of the defect, shape of the defect and the proximity to the alar rim an island pedicle advancement flap was deemed most appropriate.  Using a sterile surgical marker, an appropriate advancement flap was drawn incorporating the defect, outlining the appropriate donor tissue and placing the expected incisions within the nasal ala running parallel to the alar rim. The area thus outlined was incised with a #15 scalpel blade.  The skin margins were undermined minimally to an appropriate distance in all directions around the primary defect and laterally outward around the island pedicle utilizing iris scissors.  There was minimal undermining beneath the pedicle flap. A-T Advancement Flap Text: The defect edges were debeveled with a #15 scalpel blade.  Given the location of the defect, shape of the defect and the proximity to free margins an A-T advancement flap was deemed most appropriate.  Using a sterile surgical marker, an appropriate advancement flap was drawn incorporating the defect and placing the expected incisions within the relaxed skin tension lines where possible.    The area thus outlined was incised deep to adipose tissue with a #15 scalpel blade.  The skin margins were undermined to an appropriate distance in all directions utilizing iris scissors. Banner Transposition Flap Text: The defect edges were debeveled with a #15 scalpel blade.  Given the location of the defect and the proximity to free margins a Banner transposition flap was deemed most appropriate.  Using a sterile surgical marker, an appropriate flap drawn around the defect. The area thus outlined was incised deep to adipose tissue with a #15 scalpel blade.  The skin margins were undermined to an appropriate distance in all directions utilizing iris scissors. Bilateral Helical Rim Advancement Flap Text: The defect edges were debeveled with a #15 blade scalpel.  Given the location of the defect and the proximity to free margins (helical rim) a bilateral helical rim advancement flap was deemed most appropriate.  Using a sterile surgical marker, the appropriate advancement flaps were drawn incorporating the defect and placing the expected incisions between the helical rim and antihelix where possible.  The area thus outlined was incised through and through with a #15 scalpel blade.  With a skin hook and iris scissors, the flaps were gently and sharply undermined and freed up. Bilateral Rotation Flap Text: The defect edges were debeveled with a #15 scalpel blade. Given the location of the defect, shape of the defect and the proximity to free margins a bilateral rotation flap was deemed most appropriate. Using a sterile surgical marker, an appropriate rotation flap was drawn incorporating the defect and placing the expected incisions within the relaxed skin tension lines where possible. The area thus outlined was incised deep to adipose tissue with a #15 scalpel blade. The skin margins were undermined to an appropriate distance in all directions utilizing iris scissors. Following this, the designed flap was carried over into the primary defect and sutured into place. Bilobed Flap Text: The defect edges were debeveled with a #15 scalpel blade.  Given the location of the defect and the proximity to free margins a bilobe flap was deemed most appropriate.  Using a sterile surgical marker, an appropriate bilobe flap drawn around the defect.    The area thus outlined was incised deep to adipose tissue with a #15 scalpel blade.  The skin margins were undermined to an appropriate distance in all directions utilizing iris scissors. Bilobed Transposition Flap Text: The defect edges were debeveled with a #15 scalpel blade.  Given the location of the defect and the proximity to free margins a bilobed transposition flap was deemed most appropriate.  Using a sterile surgical marker, an appropriate bilobe flap drawn around the defect.    The area thus outlined was incised deep to adipose tissue with a #15 scalpel blade.  The skin margins were undermined to an appropriate distance in all directions utilizing iris scissors. Bi-Rhombic Flap Text: The defect edges were debeveled with a #15 scalpel blade.  Given the location of the defect and the proximity to free margins a bi-rhombic flap was deemed most appropriate.  Using a sterile surgical marker, an appropriate rhombic flap was drawn incorporating the defect. The area thus outlined was incised deep to adipose tissue with a #15 scalpel blade.  The skin margins were undermined to an appropriate distance in all directions utilizing iris scissors. Burow's Advancement Flap Text: The defect edges were debeveled with a #15 scalpel blade.  Given the location of the defect and the proximity to free margins a Burow's advancement flap was deemed most appropriate.  Using a sterile surgical marker, the appropriate advancement flap was drawn incorporating the defect and placing the expected incisions within the relaxed skin tension lines where possible.    The area thus outlined was incised deep to adipose tissue with a #15 scalpel blade.  The skin margins were undermined to an appropriate distance in all directions utilizing iris scissors. Chonodrocutaneous Helical Advancement Flap Text: The defect edges were debeveled with a #15 scalpel blade.  Given the location of the defect and the proximity to free margins a chondrocutaneous helical advancement flap was deemed most appropriate.  Using a sterile surgical marker, the appropriate advancement flap was drawn incorporating the defect and placing the expected incisions within the relaxed skin tension lines where possible.    The area thus outlined was incised deep to adipose tissue with a #15 scalpel blade.  The skin margins were undermined to an appropriate distance in all directions utilizing iris scissors. Crescentic Advancement Flap Text: The defect edges were debeveled with a #15 scalpel blade.  Given the location of the defect and the proximity to free margins a crescentic advancement flap was deemed most appropriate.  Using a sterile surgical marker, the appropriate advancement flap was drawn incorporating the defect and placing the expected incisions within the relaxed skin tension lines where possible.    The area thus outlined was incised deep to adipose tissue with a #15 scalpel blade.  The skin margins were undermined to an appropriate distance in all directions utilizing iris scissors. Dorsal Nasal Flap Text: The defect edges were debeveled with a #15 scalpel blade.  Given the location of the defect and the proximity to free margins a dorsal nasal flap was deemed most appropriate.  Using a sterile surgical marker, an appropriate dorsal nasal flap was drawn around the defect.    The area thus outlined was incised deep to adipose tissue with a #15 scalpel blade.  The skin margins were undermined to an appropriate distance in all directions utilizing iris scissors. Double Island Pedicle Flap Text: The defect edges were debeveled with a #15 scalpel blade.  Given the location of the defect, shape of the defect and the proximity to free margins a double island pedicle advancement flap was deemed most appropriate.  Using a sterile surgical marker, an appropriate advancement flap was drawn incorporating the defect, outlining the appropriate donor tissue and placing the expected incisions within the relaxed skin tension lines where possible.    The area thus outlined was incised deep to adipose tissue with a #15 scalpel blade.  The skin margins were undermined to an appropriate distance in all directions around the primary defect and laterally outward around the island pedicle utilizing iris scissors.  There was minimal undermining beneath the pedicle flap. Double O-Z Flap Text: The defect edges were debeveled with a #15 scalpel blade.  Given the location of the defect, shape of the defect and the proximity to free margins a Double O-Z flap was deemed most appropriate.  Using a sterile surgical marker, an appropriate transposition flap was drawn incorporating the defect and placing the expected incisions within the relaxed skin tension lines where possible. The area thus outlined was incised deep to adipose tissue with a #15 scalpel blade.  The skin margins were undermined to an appropriate distance in all directions utilizing iris scissors. Double O-Z Plasty Text: The defect edges were debeveled with a #15 scalpel blade.  Given the location of the defect, shape of the defect and the proximity to free margins a Double O-Z plasty (double transposition flap) was deemed most appropriate.  Using a sterile surgical marker, the appropriate transposition flaps were drawn incorporating the defect and placing the expected incisions within the relaxed skin tension lines where possible. The area thus outlined was incised deep to adipose tissue with a #15 scalpel blade.  The skin margins were undermined to an appropriate distance in all directions utilizing iris scissors.  Hemostasis was achieved with electrocautery.  The flaps were then transposed into place, one clockwise and the other counterclockwise, and anchored with interrupted buried subcutaneous sutures. Double Z Plasty Text: The lesion was extirpated to the level of the fat with a #15 scalpel blade. Given the location of the defect, shape of the defect and the proximity to free margins a double Z-plasty was deemed most appropriate for repair. Using a sterile surgical marker, the appropriate transposition arms of the double Z-plasty were drawn incorporating the defect and placing the expected incisions within the relaxed skin tension lines where possible. The area thus outlined was incised deep to adipose tissue with a #15 scalpel blade. The skin margins were undermined to an appropriate distance in all directions utilizing iris scissors. The opposing transposition arms were then transposed and carried over into place in opposite direction and anchored with interrupted buried subcutaneous sutures. Ear Star Wedge Flap Text: The defect edges were debeveled with a #15 blade scalpel.  Given the location of the defect and the proximity to free margins (helical rim) an ear star wedge flap was deemed most appropriate.  Using a sterile surgical marker, the appropriate flap was drawn incorporating the defect and placing the expected incisions between the helical rim and antihelix where possible.  The area thus outlined was incised through and through with a #15 scalpel blade. Flip-Flop Flap Text: The defect edges were debeveled with a #15 blade scalpel.  Given the location of the defect and the proximity to free margins a flip-flop flap was deemed most appropriate. Using a sterile surgical marker, the appropriate flap was drawn incorporating the defect and placing the expected incisions between the helical rim and antihelix where possible.  The area thus outlined was incised through and through with a #15 scalpel blade. Following this, the designed flap was carried over into the primary defect and sutured into place. Hatchet Flap Text: The defect edges were debeveled with a #15 scalpel blade.  Given the location of the defect, shape of the defect and the proximity to free margins a hatchet flap was deemed most appropriate.  Using a sterile surgical marker, an appropriate hatchet flap was drawn incorporating the defect and placing the expected incisions within the relaxed skin tension lines where possible.    The area thus outlined was incised deep to adipose tissue with a #15 scalpel blade.  The skin margins were undermined to an appropriate distance in all directions utilizing iris scissors. Helical Rim Advancement Flap Text: The defect edges were debeveled with a #15 blade scalpel.  Given the location of the defect and the proximity to free margins (helical rim) a double helical rim advancement flap was deemed most appropriate.  Using a sterile surgical marker, the appropriate advancement flaps were drawn incorporating the defect and placing the expected incisions between the helical rim and antihelix where possible.  The area thus outlined was incised through and through with a #15 scalpel blade.  With a skin hook and iris scissors, the flaps were gently and sharply undermined and freed up. H Plasty Text: Given the location of the defect, shape of the defect and the proximity to free margins a H-plasty was deemed most appropriate for repair.  Using a sterile surgical marker, the appropriate advancement arms of the H-plasty were drawn incorporating the defect and placing the expected incisions within the relaxed skin tension lines where possible. The area thus outlined was incised deep to adipose tissue with a #15 scalpel blade. The skin margins were undermined to an appropriate distance in all directions utilizing iris scissors.  The opposing advancement arms were then advanced into place in opposite direction and anchored with interrupted buried subcutaneous sutures. Island Pedicle Flap Text: The defect edges were debeveled with a #15 scalpel blade.  Given the location of the defect, shape of the defect and the proximity to free margins an island pedicle advancement flap was deemed most appropriate.  Using a sterile surgical marker, an appropriate advancement flap was drawn incorporating the defect, outlining the appropriate donor tissue and placing the expected incisions within the relaxed skin tension lines where possible.    The area thus outlined was incised deep to adipose tissue with a #15 scalpel blade.  The skin margins were undermined to an appropriate distance in all directions around the primary defect and laterally outward around the island pedicle utilizing iris scissors.  There was minimal undermining beneath the pedicle flap. Island Pedicle Flap With Canthal Suspension Text: The defect edges were debeveled with a #15 scalpel blade.  Given the location of the defect, shape of the defect and the proximity to free margins an island pedicle advancement flap was deemed most appropriate.  Using a sterile surgical marker, an appropriate advancement flap was drawn incorporating the defect, outlining the appropriate donor tissue and placing the expected incisions within the relaxed skin tension lines where possible. The area thus outlined was incised deep to adipose tissue with a #15 scalpel blade.  The skin margins were undermined to an appropriate distance in all directions around the primary defect and laterally outward around the island pedicle utilizing iris scissors.  There was minimal undermining beneath the pedicle flap. A suspension suture was placed in the canthal tendon to prevent tension and prevent ectropion. Island Pedicle Flap-Requiring Vessel Identification Text: The defect edges were debeveled with a #15 scalpel blade.  Given the location of the defect, shape of the defect and the proximity to free margins an island pedicle advancement flap was deemed most appropriate.  Using a sterile surgical marker, an appropriate advancement flap was drawn, based on the axial vessel mentioned above, incorporating the defect, outlining the appropriate donor tissue and placing the expected incisions within the relaxed skin tension lines where possible.    The area thus outlined was incised deep to adipose tissue with a #15 scalpel blade.  The skin margins were undermined to an appropriate distance in all directions around the primary defect and laterally outward around the island pedicle utilizing iris scissors.  There was minimal undermining beneath the pedicle flap. Keystone Flap Text: The defect edges were debeveled with a #15 scalpel blade.  Given the location of the defect, shape of the defect a keystone flap was deemed most appropriate.  Using a sterile surgical marker, an appropriate keystone flap was drawn incorporating the defect, outlining the appropriate donor tissue and placing the expected incisions within the relaxed skin tension lines where possible. The area thus outlined was incised deep to adipose tissue with a #15 scalpel blade.  The skin margins were undermined to an appropriate distance in all directions around the primary defect and laterally outward around the flap utilizing iris scissors. Melolabial Transposition Flap Text: The defect edges were debeveled with a #15 scalpel blade.  Given the location of the defect and the proximity to free margins a melolabial flap was deemed most appropriate.  Using a sterile surgical marker, an appropriate melolabial transposition flap was drawn incorporating the defect.    The area thus outlined was incised deep to adipose tissue with a #15 scalpel blade.  The skin margins were undermined to an appropriate distance in all directions utilizing iris scissors. Mercedes Flap Text: The defect edges were debeveled with a #15 scalpel blade.  Given the location of the defect, shape of the defect and the proximity to free margins a Mercedes flap was deemed most appropriate.  Using a sterile surgical marker, an appropriate advancement flap was drawn incorporating the defect and placing the expected incisions within the relaxed skin tension lines where possible. The area thus outlined was incised deep to adipose tissue with a #15 scalpel blade.  The skin margins were undermined to an appropriate distance in all directions utilizing iris scissors. Modified Advancement Flap Text: The defect edges were debeveled with a #15 scalpel blade.  Given the location of the defect, shape of the defect and the proximity to free margins a modified advancement flap was deemed most appropriate.  Using a sterile surgical marker, an appropriate advancement flap was drawn incorporating the defect and placing the expected incisions within the relaxed skin tension lines where possible.    The area thus outlined was incised deep to adipose tissue with a #15 scalpel blade.  The skin margins were undermined to an appropriate distance in all directions utilizing iris scissors. Mucosal Advancement Flap Text: Given the location of the defect, shape of the defect and the proximity to free margins a mucosal advancement flap was deemed most appropriate. Incisions were made with a 15 blade scalpel in the appropriate fashion along the cutaneous vermilion border and the mucosal lip. The remaining actinically damaged mucosal tissue was excised.  The mucosal advancement flap was then elevated to the gingival sulcus with care taken to preserve the neurovascular structures and advanced into the primary defect. Care was taken to ensure that precise realignment of the vermilion border was achieved. Muscle Hinge Flap Text: The defect edges were debeveled with a #15 scalpel blade.  Given the size, depth and location of the defect and the proximity to free margins a muscle hinge flap was deemed most appropriate.  Using a sterile surgical marker, an appropriate hinge flap was drawn incorporating the defect. The area thus outlined was incised with a #15 scalpel blade.  The skin margins were undermined to an appropriate distance in all directions utilizing iris scissors. Mustarde Flap Text: The defect edges were debeveled with a #15 scalpel blade.  Given the size, depth and location of the defect and the proximity to free margins a Mustarde flap was deemed most appropriate.  Using a sterile surgical marker, an appropriate flap was drawn incorporating the defect. The area thus outlined was incised with a #15 scalpel blade.  The skin margins were undermined to an appropriate distance in all directions utilizing iris scissors. Nasal Turnover Hinge Flap Text: The defect edges were debeveled with a #15 scalpel blade.  Given the size, depth, location of the defect and the defect being full thickness a nasal turnover hinge flap was deemed most appropriate.  Using a sterile surgical marker, an appropriate hinge flap was drawn incorporating the defect. The area thus outlined was incised with a #15 scalpel blade. The flap was designed to recreate the nasal mucosal lining and the alar rim. The skin margins were undermined to an appropriate distance in all directions utilizing iris scissors. Nasalis-Muscle-Based Myocutaneous Island Pedicle Flap Text: Using a #15 blade, an incision was made around the donor flap to the level of the nasalis muscle. Wide lateral undermining was then performed in both the subcutaneous plane above the nasalis muscle, and in a submuscular plane just above periosteum. This allowed the formation of a free nasalis muscle axial pedicle (based on the angular artery) which was still attached to the actual cutaneous flap, increasing its mobility and vascular viability. Hemostasis was obtained with pinpoint electrocoagulation. The flap was mobilized into position and the pivotal anchor points positioned and stabilized with buried interrupted sutures. Subcutaneous and dermal tissues were closed in a multilayered fashion with sutures. Tissue redundancies were excised, and the epidermal edges were apposed without significant tension and sutured with sutures. Nasalis Myocutaneous Flap Text: Using a #15 blade, an incision was made around the donor flap to the level of the nasalis muscle. Wide lateral undermining was then performed in both the subcutaneous plane above the nasalis muscle, and in a submuscular plane just above periosteum. This allowed the formation of a free nasalis muscle axial pedicle which was still attached to the actual cutaneous flap, increasing its mobility and vascular viability. Hemostasis was obtained with pinpoint electrocoagulation. The flap was mobilized into position and the pivotal anchor points positioned and stabilized with buried interrupted sutures. Subcutaneous and dermal tissues were closed in a multilayered fashion with sutures. Tissue redundancies were excised, and the epidermal edges were apposed without significant tension and sutured with sutures. Nasolabial Transposition Flap Text: The defect edges were debeveled with a #15 scalpel blade.  Given the size, depth and location of the defect and the proximity to free margins a nasolabial transposition flap was deemed most appropriate. Using a sterile surgical marker, an appropriate flap was drawn incorporating the defect. The area thus outlined was incised with a #15 scalpel blade. The skin margins were undermined to an appropriate distance in all directions utilizing iris scissors. Following this, the designed flap was carried into the primary defect and sutured into place. Orbicularis Oris Muscle Flap Text: The defect edges were debeveled with a #15 scalpel blade.  Given that the defect affected the competency of the oral sphincter an orbicularis oris muscle flap was deemed most appropriate to restore this competency and normal muscle function.  Using a sterile surgical marker, an appropriate flap was drawn incorporating the defect. The area thus outlined was incised with a #15 scalpel blade. O-T Advancement Flap Text: The defect edges were debeveled with a #15 scalpel blade.  Given the location of the defect, shape of the defect and the proximity to free margins an O-T advancement flap was deemed most appropriate.  Using a sterile surgical marker, an appropriate advancement flap was drawn incorporating the defect and placing the expected incisions within the relaxed skin tension lines where possible.    The area thus outlined was incised deep to adipose tissue with a #15 scalpel blade.  The skin margins were undermined to an appropriate distance in all directions utilizing iris scissors. O-T Plasty Text: The defect edges were debeveled with a #15 scalpel blade.  Given the location of the defect, shape of the defect and the proximity to free margins an O-T plasty was deemed most appropriate.  Using a sterile surgical marker, an appropriate O-T plasty was drawn incorporating the defect and placing the expected incisions within the relaxed skin tension lines where possible.    The area thus outlined was incised deep to adipose tissue with a #15 scalpel blade.  The skin margins were undermined to an appropriate distance in all directions utilizing iris scissors. O-L Flap Text: The defect edges were debeveled with a #15 scalpel blade.  Given the location of the defect, shape of the defect and the proximity to free margins an O-L flap was deemed most appropriate.  Using a sterile surgical marker, an appropriate advancement flap was drawn incorporating the defect and placing the expected incisions within the relaxed skin tension lines where possible.    The area thus outlined was incised deep to adipose tissue with a #15 scalpel blade.  The skin margins were undermined to an appropriate distance in all directions utilizing iris scissors. O-Z Flap Text: The defect edges were debeveled with a #15 scalpel blade.  Given the location of the defect, shape of the defect and the proximity to free margins an O-Z flap was deemed most appropriate.  Using a sterile surgical marker, an appropriate transposition flap was drawn incorporating the defect and placing the expected incisions within the relaxed skin tension lines where possible. The area thus outlined was incised deep to adipose tissue with a #15 scalpel blade.  The skin margins were undermined to an appropriate distance in all directions utilizing iris scissors. O-Z Plasty Text: The defect edges were debeveled with a #15 scalpel blade.  Given the location of the defect, shape of the defect and the proximity to free margins an O-Z plasty (double transposition flap) was deemed most appropriate.  Using a sterile surgical marker, the appropriate transposition flaps were drawn incorporating the defect and placing the expected incisions within the relaxed skin tension lines where possible.    The area thus outlined was incised deep to adipose tissue with a #15 scalpel blade.  The skin margins were undermined to an appropriate distance in all directions utilizing iris scissors.  Hemostasis was achieved with electrocautery.  The flaps were then transposed into place, one clockwise and the other counterclockwise, and anchored with interrupted buried subcutaneous sutures. Peng Advancement Flap Text: The defect edges were debeveled with a #15 scalpel blade.  Given the location of the defect, shape of the defect and the proximity to free margins a Peng advancement flap was deemed most appropriate.  Using a sterile surgical marker, an appropriate advancement flap was drawn incorporating the defect and placing the expected incisions within the relaxed skin tension lines where possible. The area thus outlined was incised deep to adipose tissue with a #15 scalpel blade.  The skin margins were undermined to an appropriate distance in all directions utilizing iris scissors. Rectangular Flap Text: The defect edges were debeveled with a #15 scalpel blade. Given the location of the defect and the proximity to free margins a rectangular flap was deemed most appropriate. Using a sterile surgical marker, an appropriate rectangular flap was drawn incorporating the defect. The area thus outlined was incised deep to adipose tissue with a #15 scalpel blade. The skin margins were undermined to an appropriate distance in all directions utilizing iris scissors. Following this, the designed flap was carried over into the primary defect and sutured into place. Rhombic Flap Text: The defect edges were debeveled with a #15 scalpel blade.  Given the location of the defect and the proximity to free margins a rhombic flap was deemed most appropriate.  Using a sterile surgical marker, an appropriate rhombic flap was drawn incorporating the defect.    The area thus outlined was incised deep to adipose tissue with a #15 scalpel blade.  The skin margins were undermined to an appropriate distance in all directions utilizing iris scissors. Rhomboid Transposition Flap Text: The defect edges were debeveled with a #15 scalpel blade.  Given the location of the defect and the proximity to free margins a rhomboid transposition flap was deemed most appropriate.  Using a sterile surgical marker, an appropriate rhomboid flap was drawn incorporating the defect.    The area thus outlined was incised deep to adipose tissue with a #15 scalpel blade.  The skin margins were undermined to an appropriate distance in all directions utilizing iris scissors. Rotation Flap Text: The defect edges were debeveled with a #15 scalpel blade.  Given the location of the defect, shape of the defect and the proximity to free margins a rotation flap was deemed most appropriate.  Using a sterile surgical marker, an appropriate rotation flap was drawn incorporating the defect and placing the expected incisions within the relaxed skin tension lines where possible.    The area thus outlined was incised deep to adipose tissue with a #15 scalpel blade.  The skin margins were undermined to an appropriate distance in all directions utilizing iris scissors. Spiral Flap Text: The defect edges were debeveled with a #15 scalpel blade.  Given the location of the defect, shape of the defect and the proximity to free margins a spiral flap was deemed most appropriate.  Using a sterile surgical marker, an appropriate rotation flap was drawn incorporating the defect and placing the expected incisions within the relaxed skin tension lines where possible. The area thus outlined was incised deep to adipose tissue with a #15 scalpel blade.  The skin margins were undermined to an appropriate distance in all directions utilizing iris scissors. Staged Advancement Flap Text: The defect edges were debeveled with a #15 scalpel blade.  Given the location of the defect, shape of the defect and the proximity to free margins a staged advancement flap was deemed most appropriate.  Using a sterile surgical marker, an appropriate advancement flap was drawn incorporating the defect and placing the expected incisions within the relaxed skin tension lines where possible. The area thus outlined was incised deep to adipose tissue with a #15 scalpel blade.  The skin margins were undermined to an appropriate distance in all directions utilizing iris scissors. Star Wedge Flap Text: The defect edges were debeveled with a #15 scalpel blade.  Given the location of the defect, shape of the defect and the proximity to free margins a star wedge flap was deemed most appropriate.  Using a sterile surgical marker, an appropriate rotation flap was drawn incorporating the defect and placing the expected incisions within the relaxed skin tension lines where possible. The area thus outlined was incised deep to adipose tissue with a #15 scalpel blade.  The skin margins were undermined to an appropriate distance in all directions utilizing iris scissors. Transposition Flap Text: The defect edges were debeveled with a #15 scalpel blade.  Given the location of the defect and the proximity to free margins a transposition flap was deemed most appropriate.  Using a sterile surgical marker, an appropriate transposition flap was drawn incorporating the defect.    The area thus outlined was incised deep to adipose tissue with a #15 scalpel blade.  The skin margins were undermined to an appropriate distance in all directions utilizing iris scissors. Trilobed Flap Text: The defect edges were debeveled with a #15 scalpel blade.  Given the location of the defect and the proximity to free margins a trilobed flap was deemed most appropriate.  Using a sterile surgical marker, an appropriate trilobed flap drawn around the defect.    The area thus outlined was incised deep to adipose tissue with a #15 scalpel blade.  The skin margins were undermined to an appropriate distance in all directions utilizing iris scissors. V-Y Flap Text: The defect edges were debeveled with a #15 scalpel blade.  Given the location of the defect, shape of the defect and the proximity to free margins a V-Y flap was deemed most appropriate.  Using a sterile surgical marker, an appropriate advancement flap was drawn incorporating the defect and placing the expected incisions within the relaxed skin tension lines where possible.    The area thus outlined was incised deep to adipose tissue with a #15 scalpel blade.  The skin margins were undermined to an appropriate distance in all directions utilizing iris scissors. V-Y Plasty Text: The defect edges were debeveled with a #15 scalpel blade.  Given the location of the defect, shape of the defect and the proximity to free margins an V-Y advancement flap was deemed most appropriate.  Using a sterile surgical marker, an appropriate advancement flap was drawn incorporating the defect and placing the expected incisions within the relaxed skin tension lines where possible.    The area thus outlined was incised deep to adipose tissue with a #15 scalpel blade.  The skin margins were undermined to an appropriate distance in all directions utilizing iris scissors. W Plasty Text: The lesion was extirpated to the level of the fat with a #15 scalpel blade.  Given the location of the defect, shape of the defect and the proximity to free margins a W-plasty was deemed most appropriate for repair.  Using a sterile surgical marker, the appropriate transposition arms of the W-plasty were drawn incorporating the defect and placing the expected incisions within the relaxed skin tension lines where possible.    The area thus outlined was incised deep to adipose tissue with a #15 scalpel blade.  The skin margins were undermined to an appropriate distance in all directions utilizing iris scissors.  The opposing transposition arms were then transposed into place in opposite direction and anchored with interrupted buried subcutaneous sutures. Z Plasty Text: The lesion was extirpated to the level of the fat with a #15 scalpel blade.  Given the location of the defect, shape of the defect and the proximity to free margins a Z-plasty was deemed most appropriate for repair.  Using a sterile surgical marker, the appropriate transposition arms of the Z-plasty were drawn incorporating the defect and placing the expected incisions within the relaxed skin tension lines where possible.    The area thus outlined was incised deep to adipose tissue with a #15 scalpel blade.  The skin margins were undermined to an appropriate distance in all directions utilizing iris scissors.  The opposing transposition arms were then transposed into place in opposite direction and anchored with interrupted buried subcutaneous sutures. Zygomaticofacial Flap Text: Given the location of the defect, shape of the defect and the proximity to free margins a zygomaticofacial flap was deemed most appropriate for repair.  Using a sterile surgical marker, the appropriate flap was drawn incorporating the defect and placing the expected incisions within the relaxed skin tension lines where possible. The area thus outlined was incised deep to adipose tissue with a #15 scalpel blade with preservation of a vascular pedicle.  The skin margins were undermined to an appropriate distance in all directions utilizing iris scissors.  The flap was then placed into the defect and anchored with interrupted buried subcutaneous sutures. Abbe Flap (Lower To Upper Lip) Text: The defect of the upper lip was assessed and measured.  Given the location and size of the defect, an Abbe flap was deemed most appropriate.  Using a sterile surgical marker, an appropriate Abbe flap was measured and drawn on the lower lip. Local anesthesia was then infiltrated. A scalpel was then used to incise the upper lip through and through the skin, vermilion, muscle and mucosa, leaving the flap pedicled on the opposite side.  The flap was then rotated and transferred to the lower lip defect.  The flap was then sutured into place with a three layer technique, closing the orbicularis oris muscle layer with subcutaneous buried sutures, followed by a mucosal layer and an epidermal layer. Abbe Flap (Upper To Lower Lip) Text: The defect of the lower lip was assessed and measured.  Given the location and size of the defect, an Abbe flap was deemed most appropriate.  Using a sterile surgical marker, an appropriate Abbe flap was measured and drawn on the upper lip. Local anesthesia was then infiltrated.  A scalpel was then used to incise the upper lip through and through the skin, vermilion, muscle and mucosa, leaving the flap pedicled on the opposite side.  The flap was then rotated and transferred to the lower lip defect.  The flap was then sutured into place with a three layer technique, closing the orbicularis oris muscle layer with subcutaneous buried sutures, followed by a mucosal layer and an epidermal layer. Cheek Interpolation Flap Text: A decision was made to reconstruct the defect utilizing an interpolation axial flap and a staged reconstruction.  A telfa template was made of the defect.  This telfa template was then used to outline the Cheek Interpolation flap.  The donor area for the pedicle flap was then injected with anesthesia.  The flap was excised through the skin and subcutaneous tissue down to the layer of the underlying musculature.  The interpolation flap was carefully excised within this deep plane to maintain its blood supply.  The edges of the donor site were undermined.   The donor site was closed in a primary fashion.  The pedicle was then rotated into position and sutured.  Once the tube was sutured into place, adequate blood supply was confirmed with blanching and refill.  The pedicle was then wrapped with xeroform gauze and dressed appropriately with a telfa and gauze bandage to ensure continued blood supply and protect the attached pedicle. Cheek-To-Nose Interpolation Flap Text: A decision was made to reconstruct the defect utilizing an interpolation axial flap and a staged reconstruction.  A telfa template was made of the defect.  This telfa template was then used to outline the Cheek-To-Nose Interpolation flap.  The donor area for the pedicle flap was then injected with anesthesia.  The flap was excised through the skin and subcutaneous tissue down to the layer of the underlying musculature.  The interpolation flap was carefully excised within this deep plane to maintain its blood supply.  The edges of the donor site were undermined.   The donor site was closed in a primary fashion.  The pedicle was then rotated into position and sutured.  Once the tube was sutured into place, adequate blood supply was confirmed with blanching and refill.  The pedicle was then wrapped with xeroform gauze and dressed appropriately with a telfa and gauze bandage to ensure continued blood supply and protect the attached pedicle. Estlander Flap (Lower To Upper Lip) Text: The defect of the lower lip was assessed and measured.  Given the location and size of the defect, an Estlander flap was deemed most appropriate.  Using a sterile surgical marker, an appropriate Estlander flap was measured and drawn on the upper lip. Local anesthesia was then infiltrated. A scalpel was then used to incise the lateral aspect of the flap, through skin, muscle and mucosa, leaving the flap pedicled medially.  The flap was then rotated and positioned to fill the lower lip defect.  The flap was then sutured into place with a three layer technique, closing the orbicularis oris muscle layer with subcutaneous buried sutures, followed by a mucosal layer and an epidermal layer. Interpolation Flap Text: A decision was made to reconstruct the defect utilizing an interpolation axial flap and a staged reconstruction.  A telfa template was made of the defect.  This telfa template was then used to outline the interpolation flap.  The donor area for the pedicle flap was then injected with anesthesia.  The flap was excised through the skin and subcutaneous tissue down to the layer of the underlying musculature.  The interpolation flap was carefully excised within this deep plane to maintain its blood supply.  The edges of the donor site were undermined.   The donor site was closed in a primary fashion.  The pedicle was then rotated into position and sutured.  Once the tube was sutured into place, adequate blood supply was confirmed with blanching and refill.  The pedicle was then wrapped with xeroform gauze and dressed appropriately with a telfa and gauze bandage to ensure continued blood supply and protect the attached pedicle. Melolabial Interpolation Flap Text: A decision was made to reconstruct the defect utilizing an interpolation axial flap and a staged reconstruction.  A telfa template was made of the defect.  This telfa template was then used to outline the melolabial interpolation flap.  The donor area for the pedicle flap was then injected with anesthesia.  The flap was excised through the skin and subcutaneous tissue down to the layer of the underlying musculature.  The pedicle flap was carefully excised within this deep plane to maintain its blood supply.  The edges of the donor site were undermined.   The donor site was closed in a primary fashion.  The pedicle was then rotated into position and sutured.  Once the tube was sutured into place, adequate blood supply was confirmed with blanching and refill.  The pedicle was then wrapped with xeroform gauze and dressed appropriately with a telfa and gauze bandage to ensure continued blood supply and protect the attached pedicle. Mastoid Interpolation Flap Text: A decision was made to reconstruct the defect utilizing an interpolation axial flap and a staged reconstruction.  A telfa template was made of the defect.  This telfa template was then used to outline the mastoid interpolation flap.  The donor area for the pedicle flap was then injected with anesthesia.  The flap was excised through the skin and subcutaneous tissue down to the layer of the underlying musculature.  The pedicle flap was carefully excised within this deep plane to maintain its blood supply.  The edges of the donor site were undermined.   The donor site was closed in a primary fashion.  The pedicle was then rotated into position and sutured.  Once the tube was sutured into place, adequate blood supply was confirmed with blanching and refill.  The pedicle was then wrapped with xeroform gauze and dressed appropriately with a telfa and gauze bandage to ensure continued blood supply and protect the attached pedicle. Paramedian Forehead Flap Text: A decision was made to reconstruct the defect utilizing an interpolation axial flap and a staged reconstruction.  A telfa template was made of the defect.  This telfa template was then used to outline the paramedian forehead pedicle flap.  The donor area for the pedicle flap was then injected with anesthesia.  The flap was excised through the skin and subcutaneous tissue down to the layer of the underlying musculature.  The pedicle flap was carefully excised within this deep plane to maintain its blood supply.  The edges of the donor site were undermined.   The donor site was closed in a primary fashion.  The pedicle was then rotated into position and sutured.  Once the tube was sutured into place, adequate blood supply was confirmed with blanching and refill.  The pedicle was then wrapped with xeroform gauze and dressed appropriately with a telfa and gauze bandage to ensure continued blood supply and protect the attached pedicle. Posterior Auricular Interpolation Flap Text: A decision was made to reconstruct the defect utilizing an interpolation axial flap and a staged reconstruction.  A telfa template was made of the defect.  This telfa template was then used to outline the posterior auricular interpolation flap.  The donor area for the pedicle flap was then injected with anesthesia.  The flap was excised through the skin and subcutaneous tissue down to the layer of the underlying musculature.  The pedicle flap was carefully excised within this deep plane to maintain its blood supply.  The edges of the donor site were undermined.   The donor site was closed in a primary fashion.  The pedicle was then rotated into position and sutured.  Once the tube was sutured into place, adequate blood supply was confirmed with blanching and refill.  The pedicle was then wrapped with xeroform gauze and dressed appropriately with a telfa and gauze bandage to ensure continued blood supply and protect the attached pedicle. Cheiloplasty (Complex) Text: A decision was made to reconstruct the defect with a  cheiloplasty.  The defect was undermined extensively.  Additional orbicularis oris muscle was excised with a 15 blade scalpel.  The defect was converted into a full thickness wedge to facilite a better cosmetic result.  Small vessels were then tied off with 5-0 monocyrl. The orbicularis oris, superficial fascia, adipose and dermis were then reapproximated.  After the deeper layers were approximated the epidermis was reapproximated with particular care given to realign the vermilion border. Cheiloplasty (Less Than 50%) Text: A decision was made to reconstruct the defect with a  cheiloplasty.  The defect was undermined extensively.  Additional orbicularis oris muscle was excised with a 15 blade scalpel.  The defect was converted into a full thickness wedge, of less than 50% of the vertical height of the lip, to facilite a better cosmetic result.  Small vessels were then tied off with 5-0 monocyrl. The orbicularis oris, superficial fascia, adipose and dermis were then reapproximated.  After the deeper layers were approximated the epidermis was reapproximated with particular care given to realign the vermilion border. Ear Wedge Repair Text: A wedge excision was completed by carrying down an excision through the full thickness of the ear and cartilage with an inward facing Burow's triangle. The wound was then closed in a layered fashion. Full Thickness Lip Wedge Repair (Flap) Text: Given the location of the defect and the proximity to free margins a full thickness wedge repair was deemed most appropriate.  Using a sterile surgical marker, the appropriate repair was drawn incorporating the defect and placing the expected incisions perpendicular to the vermilion border.  The vermilion border was also meticulously outlined to ensure appropriate reapproximation during the repair.  The area thus outlined was incised through and through with a #15 scalpel blade.  The muscularis and dermis were reaproximated with deep sutures following hemostasis. Care was taken to realign the vermilion border before proceeding with the superficial closure.  Once the vermilion was realigned the superfical and mucosal closure was finished. Burow's Graft Text: The defect edges were debeveled with a #15 scalpel blade.  Given the location of the defect, shape of the defect, the proximity to free margins and the presence of a standing cone deformity a Burow's skin graft was deemed most appropriate. The standing cone was removed and this tissue was then trimmed to the shape of the primary defect. The adipose tissue was also removed until only dermis and epidermis were left.  The skin margins of the secondary defect were undermined to an appropriate distance in all directions utilizing iris scissors.  The secondary defect was closed with interrupted buried subcutaneous sutures.  The skin edges were then re-apposed with running  sutures.  The skin graft was then placed in the primary defect and oriented appropriately. Cartilage Graft Text: The defect edges were debeveled with a #15 scalpel blade.  Given the location of the defect, shape of the defect, the fact the defect involved a full thickness cartilage defect a cartilage graft was deemed most appropriate.  An appropriate donor site was identified, cleansed, and anesthetized. The cartilage graft was then harvested and transferred to the recipient site, oriented appropriately and then sutured into place.  The secondary defect was then repaired using a primary closure. Composite Graft Text: The defect edges were debeveled with a #15 scalpel blade.  Given the location of the defect, shape of the defect, the proximity to free margins and the fact the defect was full thickness a composite graft was deemed most appropriate.  The defect was outline and then transferred to the donor site.  A full thickness graft was then excised from the donor site. The graft was then placed in the primary defect, oriented appropriately and then sutured into place.  The secondary defect was then repaired using a primary closure. Epidermal Autograft Text: The defect edges were debeveled with a #15 scalpel blade.  Given the location of the defect, shape of the defect and the proximity to free margins an epidermal autograft was deemed most appropriate.  Using a sterile surgical marker, the primary defect shape was transferred to the donor site. The epidermal graft was then harvested.  The skin graft was then placed in the primary defect and oriented appropriately. Dermal Autograft Text: The defect edges were debeveled with a #15 scalpel blade.  Given the location of the defect, shape of the defect and the proximity to free margins a dermal autograft was deemed most appropriate.  Using a sterile surgical marker, the primary defect shape was transferred to the donor site. The area thus outlined was incised deep to adipose tissue with a #15 scalpel blade.  The harvested graft was then trimmed of adipose and epidermal tissue until only dermis was left.  The skin graft was then placed in the primary defect and oriented appropriately. Ftsg Text: The defect edges were debeveled with a #15 scalpel blade.  Given the location of the defect, shape of the defect and the proximity to free margins a full thickness skin graft was deemed most appropriate.  Using a sterile surgical marker, the primary defect shape was transferred to the donor site. The area thus outlined was incised deep to adipose tissue with a #15 scalpel blade.  The harvested graft was then trimmed of adipose tissue until only dermis and epidermis was left.  The skin margins of the secondary defect were undermined to an appropriate distance in all directions utilizing iris scissors.  The secondary defect was closed with interrupted buried subcutaneous sutures.  The skin edges were then re-apposed with running  sutures.  The skin graft was then placed in the primary defect and oriented appropriately. Pinch Graft Text: The defect edges were debeveled with a #15 scalpel blade. Given the location of the defect, shape of the defect and the proximity to free margins a pinch graft was deemed most appropriate. Using a sterile surgical marker, the primary defect shape was transferred to the donor site. The area thus outlined was incised deep to adipose tissue with a #15 scalpel blade.  The harvested graft was then trimmed of adipose tissue until only dermis and epidermis was left. The skin margins of the secondary defect were undermined to an appropriate distance in all directions utilizing iris scissors.  The secondary defect was closed with interrupted buried subcutaneous sutures.  The skin edges were then re-apposed with running  sutures.  The skin graft was then placed in the primary defect and oriented appropriately. Skin Substitute Text: The defect edges were debeveled with a #15 scalpel blade.  Given the location of the defect, shape of the defect and the proximity to free margins a skin substitute graft was deemed most appropriate.  The graft material was trimmed to fit the size of the defect. The graft was then placed in the primary defect and oriented appropriately. Split-Thickness Skin Graft Text: The defect edges were debeveled with a #15 scalpel blade.  Given the location of the defect, shape of the defect and the proximity to free margins a split thickness skin graft was deemed most appropriate.  Using a sterile surgical marker, the primary defect shape was transferred to the donor site. The split thickness graft was then harvested.  The skin graft was then placed in the primary defect and oriented appropriately. Tissue Cultured Epidermal Autograft Text: The defect edges were debeveled with a #15 scalpel blade.  Given the location of the defect, shape of the defect and the proximity to free margins a tissue cultured epidermal autograft was deemed most appropriate.  The graft was then trimmed to fit the size of the defect.  The graft was then placed in the primary defect and oriented appropriately. Xenograft Text: The defect edges were debeveled with a #15 scalpel blade.  Given the location of the defect, shape of the defect and the proximity to free margins a xenograft was deemed most appropriate.  The graft was then trimmed to fit the size of the defect.  The graft was then placed in the primary defect and oriented appropriately. Complex Repair And Flap Additional Text (Will Appearing After The Standard Complex Repair Text): The complex repair was not sufficient to completely close the primary defect. The remaining additional defect was repaired with the flap mentioned below. Complex Repair And Graft Additional Text (Will Appearing After The Standard Complex Repair Text): The complex repair was not sufficient to completely close the primary defect. The remaining additional defect was repaired with the graft mentioned below. Eyelid Full Thickness Repair - 52082: The eyelid defect was full thickness which required a wedge repair of the eyelid. Special care was taken to ensure that the eyelid margin was realligned when placing sutures. Eyelid Partial Thickness Repair - 72124: The eyelid defect was partial thickness which required a wedge repair of the eyelid. Special care was taken to ensure that the eyelid margin was realligned when placing sutures. Intermediate Repair And Flap Additional Text (Will Appearing After The Standard Complex Repair Text): The intermediate repair was not sufficient to completely close the primary defect. The remaining additional defect was repaired with the flap mentioned below. Intermediate Repair And Graft Additional Text (Will Appearing After The Standard Complex Repair Text): The intermediate repair was not sufficient to completely close the primary defect. The remaining additional defect was repaired with the graft mentioned below. Localized Dermabrasion With 15 Blade Text: The patient was draped in routine manner.  Localized dermabrasion using a 15 blade was performed in routine manner to papillary dermis. This spot dermabrasion is being performed to complete skin cancer reconstruction. It also will eliminate the other sun damaged precancerous cells that are known to be part of the regional effect of a lifetime's worth of sun exposure. This localized dermabrasion is therapeutic and should not be considered cosmetic in any regard. Localized Dermabrasion With Sand Papertext: The patient was draped in routine manner.  Localized dermabrasion using sterile sand paper was performed in routine manner to papillary dermis. This spot dermabrasion is being performed to complete skin cancer reconstruction. It also will eliminate the other sun damaged precancerous cells that are known to be part of the regional effect of a lifetime's worth of sun exposure. This localized dermabrasion is therapeutic and should not be considered cosmetic in any regard. Localized Dermabrasion With Wire Brush Text: The patient was draped in routine manner.  Localized dermabrasion using 3 x 17 mm wire brush was performed in routine manner to papillary dermis. This spot dermabrasion is being performed to complete skin cancer reconstruction. It also will eliminate the other sun damaged precancerous cells that are known to be part of the regional effect of a lifetime's worth of sun exposure. This localized dermabrasion is therapeutic and should not be considered cosmetic in any regard. Purse String (Simple) Text: Given the location of the defect and the characteristics of the surrounding skin a purse string closure was deemed most appropriate.  Undermining was performed circumfirentially around the surgical defect.  A purse string suture was then placed and tightened. Purse String (Intermediate) Text: Given the location of the defect and the characteristics of the surrounding skin a purse string intermediate closure was deemed most appropriate.  Undermining was performed circumfirentially around the surgical defect.  A purse string suture was then placed and tightened. Partial Purse String (Simple) Text: Given the location of the defect and the characteristics of the surrounding skin a simple purse string closure was deemed most appropriate.  Undermining was performed circumfirentially around the surgical defect.  A purse string suture was then placed and tightened. Wound tension only allowed a partial closure of the circular defect. Partial Purse String (Intermediate) Text: Given the location of the defect and the characteristics of the surrounding skin an intermediate purse string closure was deemed most appropriate.  Undermining was performed circumfirentially around the surgical defect.  A purse string suture was then placed and tightened. Wound tension only allowed a partial closure of the circular defect. Tarsorrhaphy Text: A tarsorrhaphy was performed using Frost sutures. Manual Repair Warning Statement: We plan on removing the manually selected variable below in favor of our much easier automatic structured text blocks found in the previous tab. We decided to do this to help make the flow better and give you the full power of structured data. Manual selection is never going to be ideal in our platform and I would encourage you to avoid using manual selection from this point on, especially since I will be sunsetting this feature. It is important that you do one of two things with the customized text below. First, you can save all of the text in a word file so you can have it for future reference. Second, transfer the text to the appropriate area in the Library tab. Lastly, if there is a flap or graft type which we do not have you need to let us know right away so I can add it in before the variable is hidden. No need to panic, we plan to give you roughly 6 months to make the change. Same Histology In Subsequent Stages Text: The pattern and morphology of the tumor is as described in the first stage. No Residual Tumor Seen Histology Text: There were no malignant cells seen in the sections examined. Inflammation Suggestive Of Cancer Camouflage Histology Text: There was a dense lymphocytic infiltrate which prevented adequate histologic evaluation of adjacent structures. Follicular Atypia Histology Text: There were atypical follicular structures. Incidental Superficial Basal Cell Carcinoma Histology Text: There was superficially budding atypical basaloid cells with peripheral palisading and clefting. Bcc Histology Text: There were numerous aggregates of basaloid cells. Bcc Infiltrative Histology Text: There were numerous aggregates of basaloid cells demonstrating an infiltrative pattern. Mart-1 - Positive Histology Text: MART-1 staining demonstrates areas of higher density and clustering of melanocytes with Pagetoid spread upwards within the epidermis. The surgical margins are positive for tumor cells. Mart-1 - Negative Histology Text: MART-1 staining demonstrates a normal density and pattern of melanocytes along the dermal-epidermal junction. The surgical margins are negative for tumor cells. Information: Selecting Yes will display possible errors in your note based on the variables you have selected. This validation is only offered as a suggestion for you. PLEASE NOTE THAT THE VALIDATION TEXT WILL BE REMOVED WHEN YOU FINALIZE YOUR NOTE. IF YOU WANT TO FAX A PRELIMINARY NOTE YOU WILL NEED TO TOGGLE THIS TO 'NO' IF YOU DO NOT WANT IT IN YOUR FAXED NOTE. Bill 59 Modifier?: No - Continue to Bill 79 Modifier